# Patient Record
Sex: FEMALE | Race: WHITE | NOT HISPANIC OR LATINO | Employment: OTHER | URBAN - NONMETROPOLITAN AREA
[De-identification: names, ages, dates, MRNs, and addresses within clinical notes are randomized per-mention and may not be internally consistent; named-entity substitution may affect disease eponyms.]

---

## 2017-01-01 ENCOUNTER — HOSPITAL ENCOUNTER (OUTPATIENT)
Dept: PHYSICAL THERAPY | Facility: HOSPITAL | Age: 67
Setting detail: THERAPIES SERIES
Discharge: HOME | End: 2017-01-20
Attending: ORTHOPAEDIC SURGERY | Admitting: ORTHOPAEDIC SURGERY

## 2017-01-20 ENCOUNTER — TRANSCRIBE ORDERS (OUTPATIENT)
Dept: SPEECH THERAPY | Facility: HOSPITAL | Age: 67
End: 2017-01-20

## 2017-01-20 DIAGNOSIS — M25.512 LEFT SHOULDER PAIN, UNSPECIFIED CHRONICITY: Primary | ICD-10-CM

## 2017-01-25 ENCOUNTER — HOSPITAL ENCOUNTER (OUTPATIENT)
Dept: PHYSICAL THERAPY | Facility: HOSPITAL | Age: 67
Setting detail: THERAPIES SERIES
Discharge: HOME | End: 2017-01-25

## 2017-01-25 ENCOUNTER — TRANSCRIBE ORDERS (OUTPATIENT)
Dept: PHYSICAL THERAPY | Facility: HOSPITAL | Age: 67
End: 2017-01-25

## 2017-01-25 DIAGNOSIS — M25.512 PAIN IN JOINT OF LEFT SHOULDER: Primary | ICD-10-CM

## 2017-01-27 ENCOUNTER — APPOINTMENT (OUTPATIENT)
Dept: PHYSICAL THERAPY | Facility: HOSPITAL | Age: 67
End: 2017-01-27

## 2017-01-31 ENCOUNTER — HOSPITAL ENCOUNTER (OUTPATIENT)
Dept: PHYSICAL THERAPY | Facility: HOSPITAL | Age: 67
Setting detail: THERAPIES SERIES
Discharge: HOME | End: 2017-01-31

## 2017-01-31 DIAGNOSIS — M25.512 PAIN IN JOINT OF LEFT SHOULDER: ICD-10-CM

## 2017-01-31 PROCEDURE — 97110 THERAPEUTIC EXERCISES: CPT | Performed by: PHYSICAL THERAPIST

## 2017-01-31 PROCEDURE — 97164 PT RE-EVAL EST PLAN CARE: CPT | Performed by: PHYSICAL THERAPIST

## 2017-01-31 PROCEDURE — G8985 CARRY GOAL STATUS: HCPCS | Performed by: PHYSICAL THERAPIST

## 2017-01-31 PROCEDURE — G8984 CARRY CURRENT STATUS: HCPCS | Performed by: PHYSICAL THERAPIST

## 2017-02-07 ENCOUNTER — HOSPITAL ENCOUNTER (OUTPATIENT)
Dept: PHYSICAL THERAPY | Facility: HOSPITAL | Age: 67
Setting detail: THERAPIES SERIES
Discharge: HOME | End: 2017-02-07

## 2017-02-07 PROCEDURE — 97110 THERAPEUTIC EXERCISES: CPT | Performed by: PHYSICAL THERAPIST

## 2017-02-07 NOTE — PROGRESS NOTES
Outpatient Physical Therapy Ortho Treatment Note  [facility]     Patient Name: Mona Perales  : 1950  MRN: [id number]  Today's Date: 2017      Visit Date: 2017     Pt attended 10/12 scheduled visits.   Pt feels has improved 30% since beginning therapy.  Re-cert date 17    Visit Dx:  No diagnosis found.    There is no problem list on file for this patient.       Past Medical History   Diagnosis Date   • 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 with ulceration      bilateral legs - bilateral iliac stents       • COPD (chronic obstructive pulmonary disease)    • Coronary arteriosclerosis    • Essential (primary) hypertension    • Hyperlipidemia    • Nicotine dependence    • Occlusion of artery       and stenosis of bilateral carotid arteries - BABS 16-49%, LICA 0-15%   • Other atherosclerosis of native artery of other extremity      LEFT subclavian stent  (occluded)        Past Surgical History   Procedure Laterality Date   • Cardiac catheterization  2016     No evidence of any obstructive epicardial CAD.Preserved LV systolic function with EF of 55%.   • Central venous line insertion  2016     Successful placement of right uppe extremity 6-Irish triple lumen PICC line.   • Transesophageal echocardiogram (yanet)  2016     With color flow-Mild to moderate [facility].LV systolic function well preserved with Ef of 55-60%.Mitral and AV intact.Diastolic dysfunction             PT Ortho       17 1300    Subjective Comments    Subjective Comments States feels much better; less pain, increased motion. HEP  goign well.   -LF    Subjective Pain    Able to rate subjective pain? yes  -LF    Pre-Treatment Pain Level 0  -LF    ROM (Range of Motion)    General ROM Detail L shoulder PROM (pulley) flexion 110, abd 110.  AROM flexion/ abd 85.   -LF      User Key  (r) = Recorded By, (t) = Taken By, (c) = Cosigned By    Initials Name Provider Type    ADAIR CHAU  Geoff, PT Physical Therapist                            PT Assessment/Plan       02/07/17 1400 01/31/17 1438       PT Assessment    Functional Limitations  Limitation in home management;Limitations in community activities;Limitations in functional capacity and performance;Performance in leisure activities;Performance in self-care ADL  -LF     Impairments  Impaired muscle length;Impaired muscle power;Posture;Muscle strength;Pain;Impaired muscle endurance  -LF     Assessment Comments Nice progression: increased AROM, PROM, decreased pain. Pt pleased with her progressin the last week.   -LF Pt di dvery well today. States has had increased ain since the fall, 1/18/17, and has had difficulty and pan to move the L arm. Today she was able to perform exercises without pain. States feel sgood about the HEP as perscribed today. Remotivated.  Overall, p continues to have limited AROM L shoulder elevation, pain limited / guarded strength. Pt is nevertheless motivated to improve. She lives alone and must be I in ADLs and home tasks. Pt would benefit from continued therapy to progress functional strength and use of the L shoulder.   -LF     Please refer to paper survey for additional self-reported information  Yes  -LF     Rehab Potential  Good  -LF     Patient/caregiver participated in establishment of treatment plan and goals  Yes  -LF     Patient would benefit from skilled therapy intervention  Yes  -LF     PT Plan    PT Frequency  2x/week  -LF     Predicted Duration of Therapy Intervention (days/wks)  4 weeks  -LF     Planned CPT's?  PT RE-EVAL: 47703;PT THER PROC EA 15 MIN: 30414;PT MANUAL THERAPY EA 15 MIN: 04288;PT THER ACT EA 15 MIN: 59078;PT NEUROMUSC RE-EDUCATION EA 15 MIN: 13409;PT HOT OR COLD PACK TREAT MCARE;PT ELECTRICAL STIM UNATTEND:   -LF     Physical Therapy Interventions (Optional Details)  ROM (Range of Motion);manual therapy techniques;strengthening;stretching;neuromuscular re-education;home exercise  "program  -LF     PT Plan Comments Continue therapy to progress strength in supported position, progress to elevation as indicated. Nice progress.   -LF Continue therapy to regain functional use of L UE; strengthening, stretching, modalities PRN, manual techniques as indicated, progressive HEP.   -LF       User Key  (r) = Recorded By, (t) = Taken By, (c) = Cosigned By    Initials Name Provider Type    LF Ethel Tellez, PT Physical Therapist                    Exercises       02/07/17 1300          Subjective Comments    Subjective Comments States feels much better; less pain, increased motion. HEP  goign well.   -LF      Subjective Pain    Able to rate subjective pain? yes  -LF      Pre-Treatment Pain Level 0  -LF      Exercise 1    Exercise Name 1 standing pulley, flexion>abd  -LF      Cueing 1 Verbal  -LF      Sets 1 2  -LF      Time (Minutes) 1 5  -LF      Treatment Type 1 Ther Ex  -LF      Exercise 2    Exercise Name 2 pendulum  -LF      Reps 2 20  -LF      Exercise 3    Exercise Name 3 seated resisted \"lap rub\",  flexion  -LF      Cueing 3 Verbal;Demo  -LF      Equipment 3 Theraband  -LF      Resistance 3 Yellow  -LF      Reps 3 20  -LF      Exercise 4    Exercise Name 4 seated \"lap rub\", horiz adduction  -LF      Cueing 4 Verbal;Demo  -LF      Equipment 4 Theraband  -LF      Resistance 4 Yellow  -LF      Reps 4 20  -LF      Exercise 5    Exercise Name 5 bicep culs  -LF      Cueing 5 Verbal  -LF      Equipment 5 Theraband  -LF      Resistance 5 Yellow  -LF      Exercise 6    Exercise Name 6 tricep press  -LF      Cueing 6 Verbal  -LF      Equipment 6 Theraband  -LF      Resistance 6 Yellow  -LF      Reps 6 15  -LF      Exercise 7    Exercise Name 7 seated bicep curls/ triceps  -LF      Cueing 7 Verbal  -LF      Equipment 7 Theraband  -LF      Resistance 7 Yellow  -LF      Reps 7 15  -LF        User Key  (r) = Recorded By, (t) = Taken By, (c) = Cosigned By    Initials Name Provider Type    LF Ethel Tellez, PT " Physical Therapist                               PT OP Goals       02/07/17 1343 01/31/17 1400 01/31/17 1346    PT Short Term Goals    STG Date to Achieve 02/14/17  -LF 02/14/17  -LF     STG 1 Decrease pain to less than or equal to 6/10 on the visual analog scale.  -LF Decrease pain to less than or equal to 6/10 on the visual analog scale.  -LF     STG 1 Progress Progressing  -LF Progressing  -LF     STG 2 Increase AROM L shoulder  flexion 40, abduction 30  -LF Increase AROM L shoulder  flexion 40, abduction 30  -LF     STG 2 Progress Met  -LF New  -LF     STG 3 Increase strength in the left shoulder  3/5   -LF Increase strength in the left shoulder  3/5   -LF     STG 3 Progress New  -LF New  -LF     STG 4 I HEP  -LF I HEP  -LF     STG 4 Progress Ongoing  -LF Ongoing  -LF     STG 5 Pt will demonstrate AROM L shoulder to 110 flex, 100 abduction, no cc/o pain  -LF      STG 5 Progress New  -LF      Long Term Goals    LTG Date to Achieve 02/28/17  -LF 02/28/17  -LF     LTG 1 Decreased pain to less than 1/10 on the visual analog scale.  -LF Decreased pain to less than 1/10 on the visual analog scale.  -LF     LTG 1 Progress New  -LF New  -LF     LTG 2 Increased shoulder flexion and abduction to greater than or equal to 70 degrees.  -LF Increased shoulder flexion and abduction to greater than or equal to 70 degrees.  -LF     LTG 2 Progress Met  -LF New  -LF     LTG 3 Strength  in left shoulder will be greater than or equal to 4/5 in all regards.  -LF Strength  in left shoulder will be greater than or equal to 4/5 in all regards.  -LF     LTG 3 Progress New  -LF New  -LF     LTG 4 Patient barron be I in ADLs and household tasks with min c/o pain L shoulder.  -LF Patient barron be I in ADLs and household tasks with min c/o pain L shoulder.  -LF     LTG 4 Progress New  -LF New  -LF     Time Calculation    PT Goal Re-Cert Due Date 02/21/17  -LF  02/21/17  -LF      User Key  (r) = Recorded By, (t) = Taken By, (c) = Cosigned By     Initials Name Provider Type    LF Ethel Tellez, PT Physical Therapist                Therapy Education       02/07/17 1400    Therapy Education    Given HEP  -LF    Program New  -LF    How Provided Written;Demonstration;Verbal  -LF    Provided to Patient  -LF    Level of Understanding Verbalized;Demonstrated  -LF      01/31/17 1454    Therapy Education    Given HEP;Symptoms/condition management;Pain management;Posture/body mechanics  -LF    Program New  -LF    How Provided Verbal;Demonstration;Written  -LF    Provided to Patient  -LF    Level of Understanding Demonstrated;Verbalized  -LF      User Key  (r) = Recorded By, (t) = Taken By, (c) = Cosigned By    Initials Name Provider Type    LF Ethel Tellez, PT Physical Therapist                Time Calculation:   Start Time: 1300  Stop Time: 1343  Time Calculation (min): 43 min  Total Timed Code Minutes- PT: 43 minute(s)    Therapy Charges for Today     Code Description Service Date Service Provider Modifiers Qty    [id number]  PT THER PROC EA 15 MIN 2/7/2017 Ethel Tellez, PT GP 3                    Ethel Tellez, PT  2/7/2017

## 2017-02-09 ENCOUNTER — HOSPITAL ENCOUNTER (OUTPATIENT)
Dept: PHYSICAL THERAPY | Facility: HOSPITAL | Age: 67
Setting detail: THERAPIES SERIES
Discharge: HOME | End: 2017-02-09

## 2017-02-09 DIAGNOSIS — M25.512 PAIN IN JOINT OF LEFT SHOULDER: Primary | ICD-10-CM

## 2017-02-09 PROCEDURE — 97110 THERAPEUTIC EXERCISES: CPT | Performed by: PHYSICAL THERAPIST

## 2017-02-09 NOTE — PROGRESS NOTES
Outpatient Physical Therapy Ortho Treatment Note  [facility]     Patient Name: Mona Perales  : 1950  MRN: [id number]  Today's Date: 2017      Visit Date: 2017   11  Pt attended / scheduled visits.   Pt feels has improved 75% since beginning therapy.  Re-cert date 17  Pt will RTD 3/16/17    Visit Dx:    ICD-10-CM ICD-9-CM   1. Pain in joint of left shoulder M25.512 719.41       There is no problem list on file for this patient.       Past Medical History   Diagnosis Date   • 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 with ulceration      bilateral legs - bilateral iliac stents       • COPD (chronic obstructive pulmonary disease)    • Coronary arteriosclerosis    • Essential (primary) hypertension    • Hyperlipidemia    • Nicotine dependence    • Occlusion of artery       and stenosis of bilateral carotid arteries - BABS 16-49%, LICA 0-15%   • Other atherosclerosis of native artery of other extremity      LEFT subclavian stent  (occluded)        Past Surgical History   Procedure Laterality Date   • Cardiac catheterization  2016     No evidence of any obstructive epicardial CAD.Preserved LV systolic function with EF of 55%.   • Central venous line insertion  2016     Successful placement of right uppe extremity 6-Portuguese triple lumen PICC line.   • Transesophageal echocardiogram (yanet)  2016     With color flow-Mild to moderate [facility].LV systolic function well preserved with Ef of 55-60%.Mitral and AV intact.Diastolic dysfunction             PT Ortho       17 1300    Subjective Comments    Subjective Comments States L shoulder feeling even better, really likes the pulleys at home. States can reach higher. Sleeping better. States HEP gonig well.   -LF    ROM (Range of Motion)    General ROM Detail L shoulde flexion AROM 110, pulley 115; abd pulley 120, active 110. after session, active flexion 125  -LF      17 1300    Subjective  Comments    Subjective Comments States feels much better; less pain, increased motion. HEP  goign well.   -LF    Subjective Pain    Able to rate subjective pain? yes  -LF    Pre-Treatment Pain Level 0  -LF    ROM (Range of Motion)    General ROM Detail L shoulder PROM (pulley) flexion 110, abd 110.  AROM flexion/ abd 85.   -LF      User Key  (r) = Recorded By, (t) = Taken By, (c) = Cosigned By    Initials Name Provider Type    ADAIR Tellez, PT Physical Therapist                            PT Assessment/Plan       02/09/17 1700 02/07/17 1400       PT Assessment    Assessment Comments Continued nice progression, increased ability to lift the L arm with less pain . Pt is motivated and works hard on HEP. 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   -LF Nice progression: increased AROM, PROM, decreased pain. Pt pleased with her progressin the last week.   -LF     PT Plan    PT Plan Comments Continue therpay as planned, progres sstrength, active elevation, progress HEP.   -LF Continue therapy to progress strength in supported position, progress to elevation as indicated. Nice progress.   -LF       User Key  (r) = Recorded By, (t) = Taken By, (c) = Cosigned By    Initials Name Provider Type    ADAIR Tellez, PT Physical Therapist                    Exercises       02/09/17 1300          Subjective Comments    Subjective Comments States L shoulder feeling even better, really likes the pulleys at home. States can reach higher. Sleeping better. States HEP gonig well.   -LF      Subjective Pain    Able to rate subjective pain? yes  -LF      Pre-Treatment Pain Level 0  -LF      Exercise 1    Exercise Name 1 pulley standing flex<>abd  -LF      Time (Minutes) 1 6  -LF      Exercise 2    Exercise Name 2 supine with stick: resisted pressups - with band looped behind back  -LF      Cueing 2 Verbal;Demo  -LF      Equipment 2 Theraband  -LF      Resistance 2 Yellow  -LF      Sets 2 3  -LF      Reps 2 10   -LF      Exercise 3    Exercise Name 3 supine with stick:: resisted hox adduction at 90, with band looped behind back  -LF      Cueing 3 Verbal;Demo  -LF      Equipment 3 Theraband  -LF      Resistance 3 Yellow  -LF      Sets 3 3  -LF      Reps 3 10  -LF      Exercise 4    Exercise Name 4 supine with stick:: resisted flex/ ext at 90 with band looped behind back  -LF      Cueing 4 Verbal;Demo  -LF      Equipment 4 Theraband  -LF      Resistance 4 Yellow  -LF      Sets 4 3  -LF      Reps 4 10  -LF      Exercise 5    Exercise Name 5 hooklying  with stick: resisted bicep / forearm curls with band looped around knees  -LF      Cueing 5 Demo  -LF      Equipment 5 Theraband  -LF      Resistance 5 Yellow  -LF      Sets 5 2  -LF      Reps 5 10  -LF      Exercise 6    Exercise Name 6 standing, band attatched above door, resisted shoulder extensoin: start at  degrees elevation and  down to hip with elbow straight  -LF      Cueing 6 Demo  -LF      Equipment 6 Theraband  -LF      Resistance 6 Yellow  -LF      Sets 6 2  -LF      Reps 6 10  -LF        User Key  (r) = Recorded By, (t) = Taken By, (c) = Cosigned By    Initials Name Provider Type    LF Ethel Tellez, PT Physical Therapist                               PT OP Goals       02/09/17 1700 02/09/17 1300 02/07/17 1343    PT Short Term Goals    STG Date to Achieve 02/14/17  -LF 02/14/17  -LF 02/14/17  -LF    STG 1 Decrease pain to less than or equal to 6/10 on the visual analog scale.  -LF Decrease pain to less than or equal to 6/10 on the visual analog scale.  -LF Decrease pain to less than or equal to 6/10 on the visual analog scale.  -LF    STG 1 Progress Met  -LF Progressing  -LF Progressing  -LF    STG 2 Increase AROM L shoulder  flexion 40, abduction 30  -LF Increase AROM L shoulder  flexion 40, abduction 30  -LF Increase AROM L shoulder  flexion 40, abduction 30  -LF    STG 2 Progress Met  -LF Met  -LF Met  -LF    STG 3 Increase strength in the left  shoulder  3/5   -LF Increase strength in the left shoulder  3/5   -LF Increase strength in the left shoulder  3/5   -LF    STG 3 Progress Met  -LF New  -LF New  -LF    STG 4 I HEP  -LF I HEP  -LF I HEP  -LF    STG 4 Progress Ongoing  -LF Ongoing  -LF Ongoing  -LF    STG 5 Pt will demonstrate AROM L shoulder to 110 flex, 100 abduction, no cc/o pain  -LF Pt will demonstrate AROM L shoulder to 110 flex, 100 abduction, no cc/o pain  -LF Pt will demonstrate AROM L shoulder to 110 flex, 100 abduction, no cc/o pain  -LF    STG 5 Progress New  -LF New  -LF New  -LF    Long Term Goals    LTG Date to Achieve 02/28/17  -LF 02/28/17  -LF 02/28/17  -LF    LTG 1 Decreased pain to less than 1/10 on the visual analog scale.  -LF Decreased pain to less than 1/10 on the visual analog scale.  -LF Decreased pain to less than 1/10 on the visual analog scale.  -LF    LTG 1 Progress Progressing  -LF New  -LF New  -LF    LTG 2 Increased shoulder flexion and abduction to greater than or equal to 70 degrees.  -LF Increased shoulder flexion and abduction to greater than or equal to 70 degrees.  -LF Increased shoulder flexion and abduction to greater than or equal to 70 degrees.  -LF    LTG 2 Progress Met  -LF Met  -LF Met  -LF    LTG 3 Strength  in left shoulder will be greater than or equal to 4/5 in all planes  -LF Strength  in left shoulder will be greater than or equal to 4/5 in all regards.  -LF Strength  in left shoulder will be greater than or equal to 4/5 in all regards.  -LF    LTG 3 Progress New  -LF New  -LF New  -LF    LTG 4 Patient barron be I in ADLs and household tasks with min c/o pain L shoulder.  -LF Patient barron be I in ADLs and household tasks with min c/o pain L shoulder.  -LF Patient barron be I in ADLs and household tasks with min c/o pain L shoulder.  -LF    LTG 4 Progress Progressing  -LF New  -LF New  -LF    Time Calculation    PT Goal Re-Cert Due Date 02/22/17  -LF 02/21/17  -LF 02/21/17  -LF      01/31/17 1400 01/31/17  1346       PT Short Term Goals    STG Date to Achieve 02/14/17  -LF      STG 1 Decrease pain to less than or equal to 6/10 on the visual analog scale.  -LF      STG 1 Progress Progressing  -LF      STG 2 Increase AROM L shoulder  flexion 40, abduction 30  -LF      STG 2 Progress New  -LF      STG 3 Increase strength in the left shoulder  3/5   -LF      STG 3 Progress New  -LF      STG 4 I HEP  -LF      STG 4 Progress Ongoing  -LF      Long Term Goals    LTG Date to Achieve 02/28/17  -LF      LTG 1 Decreased pain to less than 1/10 on the visual analog scale.  -LF      LTG 1 Progress New  -LF      LTG 2 Increased shoulder flexion and abduction to greater than or equal to 70 degrees.  -LF      LTG 2 Progress New  -LF      LTG 3 Strength  in left shoulder will be greater than or equal to 4/5 in all regards.  -LF      LTG 3 Progress New  -LF      LTG 4 Patient barron be I in ADLs and household tasks with min c/o pain L shoulder.  -LF      LTG 4 Progress New  -LF      Time Calculation    PT Goal Re-Cert Due Date  02/21/17  -LF       User Key  (r) = Recorded By, (t) = Taken By, (c) = Cosigned By    Initials Name Provider Type     Ethel Tellez, PT Physical Therapist                Therapy Education       02/09/17 1700    Therapy Education    Given HEP  -LF    Program New  -LF    How Provided Verbal;Demonstration;Written  -LF    Provided to Patient  -LF    Level of Understanding Verbalized;Demonstrated  -LF      02/09/17 1300    Therapy Education    Given HEP  -LF    Program New  -LF    How Provided Verbal;Demonstration;Written  -LF    Provided to Patient  -LF    Level of Understanding Verbalized;Demonstrated  -LF      02/07/17 1400    Therapy Education    Given HEP  -LF    Program New  -LF    How Provided Written;Demonstration;Verbal  -LF    Provided to Patient  -LF    Level of Understanding Verbalized;Demonstrated  -LF      User Key  (r) = Recorded By, (t) = Taken By, (c) = Cosigned By    Initials Name Provider Type    LF  Ethel Tellez, PT Physical Therapist                Time Calculation:   Start Time: 1256  Stop Time: 1339  Time Calculation (min): 43 min  Total Timed Code Minutes- PT: 43 minute(s)    Therapy Charges for Today     Code Description Service Date Service Provider Modifiers Qty    34859419297 HC PT THER PROC EA 15 MIN 2/9/2017 Ethel Tellez, PT GP 3                    Ethel Tellez, PT  2/9/2017

## 2017-02-14 ENCOUNTER — APPOINTMENT (OUTPATIENT)
Dept: PHYSICAL THERAPY | Facility: HOSPITAL | Age: 67
End: 2017-02-14

## 2017-02-16 ENCOUNTER — APPOINTMENT (OUTPATIENT)
Dept: PHYSICAL THERAPY | Facility: HOSPITAL | Age: 67
End: 2017-02-16

## 2017-02-20 ENCOUNTER — APPOINTMENT (OUTPATIENT)
Dept: PHYSICAL THERAPY | Facility: HOSPITAL | Age: 67
End: 2017-02-20

## 2017-03-01 ENCOUNTER — APPOINTMENT (OUTPATIENT)
Dept: PHYSICAL THERAPY | Facility: HOSPITAL | Age: 67
End: 2017-03-01

## 2017-03-07 ENCOUNTER — APPOINTMENT (OUTPATIENT)
Dept: PHYSICAL THERAPY | Facility: HOSPITAL | Age: 67
End: 2017-03-07

## 2017-03-09 ENCOUNTER — HOSPITAL ENCOUNTER (OUTPATIENT)
Dept: PHYSICAL THERAPY | Facility: HOSPITAL | Age: 67
Setting detail: THERAPIES SERIES
Discharge: HOME | End: 2017-03-09

## 2017-03-09 DIAGNOSIS — M25.512 PAIN IN JOINT OF LEFT SHOULDER: Primary | ICD-10-CM

## 2017-03-09 PROCEDURE — 97110 THERAPEUTIC EXERCISES: CPT | Performed by: PHYSICAL THERAPIST

## 2017-03-09 PROCEDURE — G8986 CARRY D/C STATUS: HCPCS | Performed by: PHYSICAL THERAPIST

## 2017-03-09 PROCEDURE — G8985 CARRY GOAL STATUS: HCPCS | Performed by: PHYSICAL THERAPIST

## 2017-03-09 NOTE — THERAPY DISCHARGE NOTE
Outpatient Physical Therapy Ortho Treatment Note/Discharge Summary  [facility]     Patient Name: Mona Perales  : 1950  MRN: [id number]  Today's Date: 3/9/2017      Visit Date: 2017     Pt attended 10/10 scheduled visits.   Pt feels has improved 100% since beginning therapy.  Pt will RTD PRN    Visit Dx:    ICD-10-CM ICD-9-CM   1. Pain in joint of left shoulder M25.512 719.41       There is no problem list on file for this patient.       Past Medical History   Diagnosis Date   • 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 with ulceration      bilateral legs - bilateral iliac stents       • COPD (chronic obstructive pulmonary disease)    • Coronary arteriosclerosis    • Essential (primary) hypertension    • Hyperlipidemia    • Nicotine dependence    • Occlusion of artery       and stenosis of bilateral carotid arteries - BABS 16-49%, LICA 0-15%   • Other atherosclerosis of native artery of other extremity      LEFT subclavian stent  (occluded)        Past Surgical History   Procedure Laterality Date   • Cardiac catheterization  2016     No evidence of any obstructive epicardial CAD.Preserved LV systolic function with EF of 55%.   • Central venous line insertion  2016     Successful placement of right uppe extremity 6-Maltese triple lumen PICC line.   • Transesophageal echocardiogram (yanet)  2016     With color flow-Mild to moderate [facility].LV systolic function well preserved with Ef of 55-60%.Mitral and AV intact.Diastolic dysfunction             PT Ortho       17 1400    Subjective Comments    Subjective Comments States L shoulder is feeling good. States has continued HEP QD. States has less pain, improved motion, improved strength, improved ability to perform ADLs. States is sleeping better. Denies limitations with ADLs, other than lifting heavy objects. States is pleased with her progress.   -LF    Subjective Pain    Able to rate subjective pain? yes   -LF    Pre-Treatment Pain Level 0  -LF    ROM (Range of Motion)    General ROM Detail L shoulder AROM flexion 110, abd 110, Ir still limited as expected due to the surgery, ER - able to reach behind head  -LF      User Key  (r) = Recorded By, (t) = Taken By, (c) = Cosigned By    Initials Name Provider Type    ADAIR Tellez, PT Physical Therapist                            PT Assessment/Plan       03/09/17 1400       PT Assessment    Assessment Comments Nice progression. Pt denies limitaions, is pleased with her progress. Relates is goign to continue HEP. Pt demonstrates AROM L shoulder witout c/o pain. Relates resumption of ADLs. Pt is appropriate for D/C PT to HEP .   -LF     Please refer to paper survey for additional self-reported information Yes  -LF     Rehab Potential Excellent  -LF     Patient/caregiver participated in establishment of treatment plan and goals Yes  -LF     Patient would benefit from skilled therapy intervention Yes  -LF     PT Plan    PT Plan Comments Will D/ c PT to HEP .   -LF       User Key  (r) = Recorded By, (t) = Taken By, (c) = Cosigned By    Initials Name Provider Type    ADAIR Tellez, PT Physical Therapist                    Exercises       03/09/17 1400          Subjective Comments    Subjective Comments States L shoulder is feeling good. States has continued HEP QD. States has less pain, improved motion, improved strength, improved ability to perform ADLs. States is sleeping better. Denies limitations with ADLs, other than lifting heavy objects. States is pleased with her progress.   -LF      Subjective Pain    Able to rate subjective pain? yes  -LF      Pre-Treatment Pain Level 0  -LF      Exercise 1    Exercise Name 1 Reviewed HEP , given red adn greeen bands to progress HEP   -LF      Time (Minutes) 1 7  -LF        User Key  (r) = Recorded By, (t) = Taken By, (c) = Cosigned By    Initials Name Provider Type    ADAIR Tellez PT Physical Therapist                                PT OP Goals       03/09/17 1424       PT Short Term Goals    STG Date to Achieve 02/14/17  -LF     STG 1 Decrease pain to less than or equal to 6/10 on the visual analog scale.  -LF     STG 1 Progress Met  -LF     STG 2 Increase AROM L shoulder  flexion 40, abduction 30  -LF     STG 2 Progress Met  -LF     STG 3 Increase strength in the left shoulder  3/5   -LF     STG 3 Progress Met  -LF     STG 4 I HEP  -LF     STG 4 Progress Ongoing  -LF     STG 5 Pt will demonstrate AROM L shoulder to 110 flex, 100 abduction, no cc/o pain  -LF     STG 5 Progress Met  -LF     Long Term Goals    LTG Date to Achieve 02/28/17  -LF     LTG 1 Decreased pain to less than 1/10 on the visual analog scale.  -LF     LTG 1 Progress Met  -LF     LTG 2 Increased shoulder flexion and abduction to greater than or equal to 70 degrees.  -LF     LTG 2 Progress Met  -LF     LTG 3 Strength  in left shoulder will be greater than or equal to 4/5 in all planes  -LF     LTG 3 Progress Progressing  -LF     LTG 4 Patient barron be I in ADLs and household tasks with min c/o pain L shoulder.  -LF     LTG 4 Progress Met  -LF     Time Calculation    PT Goal Re-Cert Due Date 03/30/17  -LF       User Key  (r) = Recorded By, (t) = Taken By, (c) = Cosigned By    Initials Name Provider Type    ADAIR Tellez, PT Physical Therapist                Therapy Education       03/09/17 1400          Therapy Education    Given HEP  -LF      Program Reinforced  -LF      How Provided Verbal;Demonstration  -LF      Provided to Patient  -LF      Level of Understanding Verbalized  -LF        User Key  (r) = Recorded By, (t) = Taken By, (c) = Cosigned By    Initials Name Provider Type    ADAIR Tellez, PT Physical Therapist                Outcome Measures       03/09/17 1400          Quick DASH    Open a tight or new jar. 2  -LF      Do heavy household chores (e.g., wash walls, wash floors) 3  -LF      Carry a shopping bag or briefcase 2  -LF      Wash your back  4  -LF      Use a knife to cut food 1  -LF      Recreational activities in which you take some force or impact through your arm, should or hand (e.g. golf, hammering, tennis, etc.) 2  -LF      During the past week, to what extent has your arm, shoulder, or hand problem interfered with your normal social activites with family, friends, neighbors or groups? 1  -LF      During the past week, were you limited in your work or other regular daily activities as a result of your arm, shoulder or hand problem? 1  -LF      Arm, Shoulder, or hand pain 1  -LF      Tingling (pins and needles) in your arm, shoulder, or hand 1  -LF      During the past week, how much difficulty have you had sleeping because of the pain in your arm, shoulder or hand? 1  -LF      Number of Questions Answered 11  -LF      Quick DASH Score 18.18  -LF      Functional Assessment    Outcome Measure Options Quick DASH  -LF        User Key  (r) = Recorded By, (t) = Taken By, (c) = Cosigned By    Initials Name Provider Type     Ethel Tellez, PT Physical Therapist            Time Calculation:   Start Time: 1346  Stop Time: 1416  Time Calculation (min): 30 min  Total Timed Code Minutes- PT: 30 minute(s)    Therapy Charges for Today     Code Description Service Date Service Provider Modifiers Qty    75121194935 HC PT CARRY MOV HAND OBJ PROJECTED 3/9/2017 Ethel Tellez, PT GP, CK 1    33665947666 HC PT CARRY MOV HAND OBJ DISCHARGE 3/9/2017 Ethel Tellez, PT GP, CI 1    75376626785 HC PT THER PROC EA 15 MIN 3/9/2017 Ethel Tellez, PT GP 2    28188070420 HC PT THER SUPP EA 15 MIN 3/9/2017 Ethel Tellez, PT GP 1          PT G-Codes  PT Professional Judgement Used?: Yes  Outcome Measure Options: Quick DASH  Score: 18.18  Functional Limitation: Carrying, moving and handling objects  Carrying, Moving and Handling Objects Goal Status (): At least 40 percent but less than 60 percent impaired, limited or restricted  Carrying, Moving and Handling Objects  Discharge Status (): At least 1 percent but less than 20 percent impaired, limited or restricted     OP PT Discharge Summary  Date of Discharge: 03/09/17  Reason for Discharge: All goals achieved  Outcomes Achieved: Able to achieve all goals within established timeline  Discharge Destination: Home with home program      Ethel Tellez, PT  3/9/2017

## 2017-07-06 ENCOUNTER — OFFICE VISIT (OUTPATIENT)
Dept: CARDIOLOGY | Facility: CLINIC | Age: 67
End: 2017-07-06

## 2017-07-06 VITALS
SYSTOLIC BLOOD PRESSURE: 120 MMHG | HEART RATE: 70 BPM | HEIGHT: 62 IN | DIASTOLIC BLOOD PRESSURE: 70 MMHG | WEIGHT: 120 LBS | BODY MASS INDEX: 22.08 KG/M2

## 2017-07-06 DIAGNOSIS — I26.99 OTHER PULMONARY EMBOLISM WITHOUT ACUTE COR PULMONALE (HCC): ICD-10-CM

## 2017-07-06 DIAGNOSIS — Z72.0 NICOTINE ABUSE: ICD-10-CM

## 2017-07-06 DIAGNOSIS — I73.9 PERIPHERAL ARTERIAL DISEASE (HCC): Primary | ICD-10-CM

## 2017-07-06 DIAGNOSIS — R42 DIZZINESS: ICD-10-CM

## 2017-07-06 DIAGNOSIS — J42 CHRONIC BRONCHITIS, UNSPECIFIED CHRONIC BRONCHITIS TYPE (HCC): ICD-10-CM

## 2017-07-06 PROCEDURE — 99214 OFFICE O/P EST MOD 30 MIN: CPT | Performed by: INTERNAL MEDICINE

## 2017-07-06 PROCEDURE — 93000 ELECTROCARDIOGRAM COMPLETE: CPT | Performed by: INTERNAL MEDICINE

## 2017-07-06 RX ORDER — HYDROXYZINE PAMOATE 50 MG/1
50 CAPSULE ORAL 3 TIMES DAILY PRN
COMMUNITY
End: 2017-08-08

## 2017-07-06 RX ORDER — PRAVASTATIN SODIUM 20 MG
20 TABLET ORAL DAILY
COMMUNITY
End: 2019-05-20

## 2017-07-06 RX ORDER — GABAPENTIN 800 MG/1
800 TABLET ORAL 3 TIMES DAILY
COMMUNITY
End: 2022-11-17 | Stop reason: HOSPADM

## 2017-07-06 RX ORDER — ALBUTEROL SULFATE 2.5 MG/3ML
2.5 SOLUTION RESPIRATORY (INHALATION) EVERY 8 HOURS PRN
Status: ON HOLD | COMMUNITY
End: 2020-09-09

## 2017-07-06 RX ORDER — ONDANSETRON 4 MG/1
4 TABLET, FILM COATED ORAL EVERY 8 HOURS PRN
COMMUNITY
End: 2019-02-26 | Stop reason: HOSPADM

## 2017-07-06 RX ORDER — PROPRANOLOL HYDROCHLORIDE 20 MG/1
20 TABLET ORAL 2 TIMES DAILY
COMMUNITY
End: 2017-07-06

## 2017-07-06 RX ORDER — SERTRALINE HYDROCHLORIDE 100 MG/1
100 TABLET, FILM COATED ORAL DAILY
COMMUNITY

## 2017-07-06 RX ORDER — AMLODIPINE BESYLATE 10 MG/1
10 TABLET ORAL DAILY
COMMUNITY
End: 2020-03-04 | Stop reason: HOSPADM

## 2017-07-06 RX ORDER — MECLIZINE HYDROCHLORIDE 25 MG/1
25 TABLET ORAL 3 TIMES DAILY PRN
COMMUNITY
End: 2018-02-28

## 2017-07-06 RX ORDER — PENCICLOVIR 10 MG/G
1 CREAM TOPICAL AS NEEDED
COMMUNITY

## 2017-07-06 RX ORDER — TRAZODONE HYDROCHLORIDE 300 MG/1
300 TABLET ORAL NIGHTLY
COMMUNITY
End: 2019-02-26 | Stop reason: HOSPADM

## 2017-07-06 NOTE — PROGRESS NOTES
[facility]  OFFICE NOTE    Mona Perales  66 y.o. female    07/06/2017  1. Peripheral arterial disease    2. Dizziness    3. Nicotine abuse    4. Other pulmonary embolism without acute cor pulmonale    5. Chronic bronchitis, unspecified chronic bronchitis type        Chief complaint -Dizziness      History of present Illness- 66 a lady who came for establishment who used to see Dr. Oneal, and had cardiac catheterization in April 2016 which showed no CAD.  There is a history of PE in the past and she has been on eliquis 2.5 mg twice a day.  And she takes Plavix for her peripheral vascular disease with prior stent placement in 2008 by Dr. Negro and before that by Dr. Hughes.  She still smokes and she says she is dizzy she is on multiple medications including gabapentin, Vistaril, and he worked, Zoloft and trazodone.  She takes amlodipine for blood pressure.  She has multiple somatic complaints including chronic back pain              Allergies   Allergen Reactions   • Morphine And Related Itching, Confusion and Hallucinations         Past Medical History:   Diagnosis Date   • Anxiety    • 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 with ulceration     bilateral legs - bilateral iliac stents 2008      • Chronic lower back pain    • COPD (chronic obstructive pulmonary disease)    • Coronary arteriosclerosis    • Essential (primary) hypertension    • History of pulmonary embolus (PE)    • Hyperlipidemia    • Insomnia    • Lumbago    • Nicotine dependence    • Occlusion of artery      and stenosis of bilateral carotid arteries - BABS 16-49%, LICA 0-15%   • Other atherosclerosis of native artery of other extremity     LEFT subclavian stent 2005 (occluded)         Past Surgical History:   Procedure Laterality Date   • CARDIAC CATHETERIZATION  04/06/2016    No evidence of any obstructive epicardial CAD.Preserved LV systolic function with EF of 55%.   • CENTRAL VENOUS LINE  INSERTION  04/07/2016    Successful placement of right uppe extremity 6-French triple lumen PICC line.   • TOTAL SHOULDER REPLACEMENT Left    • TRANSESOPHAGEAL ECHOCARDIOGRAM (JACQUES)  04/07/2016    With color flow-Mild to moderate [facility].LV systolic function well preserved with Ef of 55-60%.Mitral and AV intact.Diastolic dysfunction         Family History   Problem Relation Age of Onset   • Heart disease Other    • Hypertension Other          Social History     Social History   • Marital status:      Spouse name: N/A   • Number of children: N/A   • Years of education: N/A     Occupational History   • Not on file.     Social History Main Topics   • Smoking status: Current Every Day Smoker   • Smokeless tobacco: Not on file   • Alcohol use Not on file   • Drug use: Not on file   • Sexual activity: Not on file     Other Topics Concern   • Not on file     Social History Narrative         Current Outpatient Prescriptions   Medication Sig Dispense Refill   • albuterol (PROVENTIL HFA;VENTOLIN HFA) 108 (90 BASE) MCG/ACT inhaler Inhale 2 puffs every night.     • albuterol (PROVENTIL) (2.5 MG/3ML) 0.083% nebulizer solution Take 2.5 mg by nebulization Every 8 (Eight) Hours As Needed for Wheezing.     • amLODIPine (NORVASC) 10 MG tablet Take 10 mg by mouth Daily.     • apixaban (ELIQUIS) 2.5 MG tablet tablet Take 2.5 mg by mouth 2 (Two) Times a Day.     • Fluticasone Furoate-Vilanterol (BREO ELLIPTA) 100-25 MCG/INH aerosol powder  Inhale 1 puff Daily.     • gabapentin (NEURONTIN) 800 MG tablet Take 800 mg by mouth 3 (Three) Times a Day.     • hydrOXYzine (VISTARIL) 50 MG capsule Take 50 mg by mouth 3 (Three) Times a Day As Needed for Itching.     • meclizine (ANTIVERT) 25 MG tablet Take 25 mg by mouth 3 (Three) Times a Day As Needed for dizziness.     • ondansetron (ZOFRAN) 4 MG tablet Take 4 mg by mouth Every 8 (Eight) Hours As Needed for Nausea or Vomiting.     • penciclovir (DENAVIR) 1 % cream Apply 1 application  "topically Every 2 (Two) Hours.     • pravastatin (PRAVACHOL) 20 MG tablet Take 20 mg by mouth Daily.     • sertraline (ZOLOFT) 100 MG tablet Take 100 mg by mouth Daily.     • traZODone (DESYREL) 300 MG tablet Take 300 mg by mouth Every Night.       No current facility-administered medications for this visit.          Review of Systems     Constitution: Denies any fatigue, fever or chills    HENT: Denies any headache, hearing impairment,     Eyes: Denies any blurring of vision, or photophobia     Cardivascular - As per history of present illness     Respiratory system- COPD with shortness of breath,       Endocrine:   history of hyperlipidemia                      Hypothyrodism       Musculoskeletal:   history of arthritis with musculoskeletal problems    Gastrointestinal: No nausea, vomiting, or melena    Genitourinary: No dysuria or hematuria    Neurological:   No history of seizure disorder, stroke, memory problems    Psychiatric/Behavioral:       history of depression,      Hematological- no history of easy bruising or any bleeding diathesis            OBJECTIVE    /70  Pulse 70  Ht 62\" (157.5 cm)  Wt 120 lb (54.4 kg)  BMI 21.95 kg/m2      Physical Exam     Constitutional: is oriented to person, place, and time.     Skin-warm and dry    Head: Normocephalic and atraumatic.     Eyes: Pupils are equal, round, and reactive to light.     Neck: Neck supple. No bruit in the carotids, no elevation of JVD    Cardiovascular: [place] in the fifth intercostal space   Regular rate, and  Rhythm,    S1 greater than S2, no S3 or S4, no gallop     Pulmonary/Chest:   Air  Entry is equal on both sides and decreased all areas  No wheezing or crackles,      Abdominal: Soft.  No hepatosplenomegaly, bowel sounds are present    Musculoskeletal: No kyphoscoliosis, no significant thickening of the joints    Neurological: is alert and oriented to person, place, and time.    cranial nerve are intact .   No motor or sensory " deficit    Extremities-trace to 1+ bilateral edema, no radial femoral delay      Psychiatric: He has a normal mood and affect.                  His behavior is normal.             ECG 12 Lead  Date/Time: 7/6/2017 12:37 PM  Performed by: HYUN CASTRO  Authorized by: HYUN CASTRO   Comparison: not compared with previous ECG   Rhythm: sinus rhythm  Clinical impression: abnormal ECG and dysrhythmia - non-specific                           A/P    Complaint of chronic dizziness possible vertigo, has memory issues from possibly small vessel disease may need evaluation for dementia.  She had a cardiac catheterization in April 2016 which showed no obstructive disease and she had previous stent placement to the left subclavian and in the iliac in the past was on Plavix but with the risk of bleeding and she falls frequently will stop the Plavix.  She has been on eliquis for history of DVT in the past.  She still smokes.    COPD with chronic bronchitis on multiple inhalers and has frequent PACs which could be causing some of her palpitations.    Depression and anxiety on Zoloft, trazodone and hydroxyzine which can also increase her dizziness which needed to be addressed by the primary doctor.    We'll check a carotid duplex as she hasn't had done one and recently and check an echo and follow-up in 4 weeks            This document has been electronically signed by Hyun Castro MD on July 6, 2017 12:29 PM       EMR Dragon/Transcription disclaimer:   Some of this note may be an electronic transcription/translation of spoken language to printed text. The electronic translation of spoken language may permit erroneous, or at times, nonsensical words or phrases to be inadvertently transcribed; Although I have reviewed the note for such errors, some may still exist.

## 2017-07-25 LAB
BH CV ECHO MEAS - ACS: 2 CM
BH CV ECHO MEAS - AO MAX PG (FULL): 1.1 MMHG
BH CV ECHO MEAS - AO MAX PG: 4.2 MMHG
BH CV ECHO MEAS - AO MEAN PG (FULL): 1 MMHG
BH CV ECHO MEAS - AO MEAN PG: 2 MMHG
BH CV ECHO MEAS - AO ROOT AREA (BSA CORRECTED): 1.8
BH CV ECHO MEAS - AO ROOT AREA: 5.7 CM^2
BH CV ECHO MEAS - AO ROOT DIAM: 2.7 CM
BH CV ECHO MEAS - AO V2 MAX: 103 CM/SEC
BH CV ECHO MEAS - AO V2 MEAN: 72.3 CM/SEC
BH CV ECHO MEAS - AO V2 VTI: 27.4 CM
BH CV ECHO MEAS - BSA(HAYCOCK): 1.5 M^2
BH CV ECHO MEAS - BSA: 1.5 M^2
BH CV ECHO MEAS - BZI_BMI: 21.9 KILOGRAMS/M^2
BH CV ECHO MEAS - BZI_METRIC_HEIGHT: 157.5 CM
BH CV ECHO MEAS - BZI_METRIC_WEIGHT: 54.4 KG
BH CV ECHO MEAS - EDV(CUBED): 85.2 ML
BH CV ECHO MEAS - EDV(TEICH): 87.7 ML
BH CV ECHO MEAS - EF(CUBED): 57.8 %
BH CV ECHO MEAS - EF(MOD-SP4): 60 %
BH CV ECHO MEAS - EF(TEICH): 49.7 %
BH CV ECHO MEAS - EPSS: 0.2 CM
BH CV ECHO MEAS - ESV(CUBED): 35.9 ML
BH CV ECHO MEAS - ESV(TEICH): 44.1 ML
BH CV ECHO MEAS - FS: 25 %
BH CV ECHO MEAS - IVS/LVPW: 1
BH CV ECHO MEAS - IVSD: 1.2 CM
BH CV ECHO MEAS - LA DIMENSION: 3.7 CM
BH CV ECHO MEAS - LA/AO: 1.4
BH CV ECHO MEAS - LV MASS(C)D: 180 GRAMS
BH CV ECHO MEAS - LV MASS(C)DI: 117 GRAMS/M^2
BH CV ECHO MEAS - LV MAX PG: 3.1 MMHG
BH CV ECHO MEAS - LV MEAN PG: 1 MMHG
BH CV ECHO MEAS - LV V1 MAX: 88.7 CM/SEC
BH CV ECHO MEAS - LV V1 MEAN: 52.3 CM/SEC
BH CV ECHO MEAS - LV V1 VTI: 23.5 CM
BH CV ECHO MEAS - LVIDD: 4.4 CM
BH CV ECHO MEAS - LVIDS: 3.3 CM
BH CV ECHO MEAS - LVPWD: 1.2 CM
BH CV ECHO MEAS - MR MAX PG: 49 MMHG
BH CV ECHO MEAS - MR MAX VEL: 350 CM/SEC
BH CV ECHO MEAS - MV A MAX VEL: 77.5 CM/SEC
BH CV ECHO MEAS - MV E MAX VEL: 64.2 CM/SEC
BH CV ECHO MEAS - MV E/A: 0.83
BH CV ECHO MEAS - PA MAX PG: 2.5 MMHG
BH CV ECHO MEAS - PA MEAN PG: 2 MMHG
BH CV ECHO MEAS - PA V2 MAX: 78.6 CM/SEC
BH CV ECHO MEAS - PA V2 MEAN: 58.9 CM/SEC
BH CV ECHO MEAS - PA V2 VTI: 21.3 CM
BH CV ECHO MEAS - PI END-D VEL: 132 CM/SEC
BH CV ECHO MEAS - RAP SYSTOLE: 10 MMHG
BH CV ECHO MEAS - RVDD: 2.6 CM
BH CV ECHO MEAS - RVSP: 25.1 MMHG
BH CV ECHO MEAS - SI(AO): 102 ML/M^2
BH CV ECHO MEAS - SI(CUBED): 32 ML/M^2
BH CV ECHO MEAS - SI(TEICH): 28.3 ML/M^2
BH CV ECHO MEAS - SV(AO): 156.9 ML
BH CV ECHO MEAS - SV(CUBED): 49.2 ML
BH CV ECHO MEAS - SV(TEICH): 43.6 ML
BH CV ECHO MEAS - TR MAX VEL: 193 CM/SEC
LV EF 2D ECHO EST: 55 %

## 2017-07-27 LAB
BH CV XLRA MEAS LEFT BULB EDV: -22.6 CM/SEC
BH CV XLRA MEAS LEFT BULB PSV: -58.4 CM/SEC
BH CV XLRA MEAS LEFT CCA RATIO VEL: 59.9 CM/SEC
BH CV XLRA MEAS LEFT DIST CCA EDV: -22.1 CM/SEC
BH CV XLRA MEAS LEFT DIST CCA PSV: -58.4 CM/SEC
BH CV XLRA MEAS LEFT DIST ICA EDV: -27.7 CM/SEC
BH CV XLRA MEAS LEFT DIST ICA PSV: -59.7 CM/SEC
BH CV XLRA MEAS LEFT ICA RATIO VEL: -72.6 CM/SEC
BH CV XLRA MEAS LEFT ICA/CCA RATIO: -1.2
BH CV XLRA MEAS LEFT MID CCA EDV: 22.1 CM/SEC
BH CV XLRA MEAS LEFT MID CCA PSV: 59.9 CM/SEC
BH CV XLRA MEAS LEFT MID ICA EDV: -32.7 CM/SEC
BH CV XLRA MEAS LEFT MID ICA PSV: -72.6 CM/SEC
BH CV XLRA MEAS LEFT PROX CCA EDV: 16.2 CM/SEC
BH CV XLRA MEAS LEFT PROX CCA PSV: 50.6 CM/SEC
BH CV XLRA MEAS LEFT PROX ECA EDV: -15.2 CM/SEC
BH CV XLRA MEAS LEFT PROX ECA PSV: -60.4 CM/SEC
BH CV XLRA MEAS LEFT PROX ICA EDV: -11.3 CM/SEC
BH CV XLRA MEAS LEFT PROX ICA PSV: -44.2 CM/SEC
BH CV XLRA MEAS LEFT PROX SCLA PSV: 56.6 CM/SEC
BH CV XLRA MEAS LEFT VERTEBRAL A EDV: 25.8 CM/SEC
BH CV XLRA MEAS LEFT VERTEBRAL A PSV: 48.2 CM/SEC
BH CV XLRA MEAS RIGHT BULB EDV: -13.9 CM/SEC
BH CV XLRA MEAS RIGHT BULB PSV: -30.5 CM/SEC
BH CV XLRA MEAS RIGHT CCA RATIO VEL: 84.9 CM/SEC
BH CV XLRA MEAS RIGHT DIST CCA EDV: 19.4 CM/SEC
BH CV XLRA MEAS RIGHT DIST CCA PSV: 44.1 CM/SEC
BH CV XLRA MEAS RIGHT DIST ICA EDV: -24.3 CM/SEC
BH CV XLRA MEAS RIGHT DIST ICA PSV: -58 CM/SEC
BH CV XLRA MEAS RIGHT ICA RATIO VEL: -60.6 CM/SEC
BH CV XLRA MEAS RIGHT ICA/CCA RATIO: -0.71
BH CV XLRA MEAS RIGHT MID CCA EDV: 16.9 CM/SEC
BH CV XLRA MEAS RIGHT MID CCA PSV: 47.1 CM/SEC
BH CV XLRA MEAS RIGHT MID ICA EDV: -24 CM/SEC
BH CV XLRA MEAS RIGHT MID ICA PSV: -60.6 CM/SEC
BH CV XLRA MEAS RIGHT PROX CCA EDV: 11.8 CM/SEC
BH CV XLRA MEAS RIGHT PROX CCA PSV: 84.9 CM/SEC
BH CV XLRA MEAS RIGHT PROX ECA EDV: -8.9 CM/SEC
BH CV XLRA MEAS RIGHT PROX ECA PSV: -46 CM/SEC
BH CV XLRA MEAS RIGHT PROX ICA EDV: -22 CM/SEC
BH CV XLRA MEAS RIGHT PROX ICA PSV: -44 CM/SEC
BH CV XLRA MEAS RIGHT PROX SCLA PSV: -68.3 CM/SEC
BH CV XLRA MEAS RIGHT VERTEBRAL A EDV: 10.8 CM/SEC
BH CV XLRA MEAS RIGHT VERTEBRAL A PSV: 37.8 CM/SEC

## 2017-08-08 ENCOUNTER — OFFICE VISIT (OUTPATIENT)
Dept: CARDIOLOGY | Facility: CLINIC | Age: 67
End: 2017-08-08

## 2017-08-08 VITALS
HEIGHT: 62 IN | DIASTOLIC BLOOD PRESSURE: 70 MMHG | BODY MASS INDEX: 22.26 KG/M2 | WEIGHT: 121 LBS | SYSTOLIC BLOOD PRESSURE: 118 MMHG | HEART RATE: 69 BPM

## 2017-08-08 DIAGNOSIS — I25.119 ATHEROSCLEROSIS OF NATIVE CORONARY ARTERY OF NATIVE HEART WITH ANGINA PECTORIS (HCC): Primary | ICD-10-CM

## 2017-08-08 DIAGNOSIS — I73.9 PERIPHERAL ARTERIAL DISEASE (HCC): ICD-10-CM

## 2017-08-08 DIAGNOSIS — E78.00 PURE HYPERCHOLESTEROLEMIA: ICD-10-CM

## 2017-08-08 DIAGNOSIS — I10 BENIGN ESSENTIAL HYPERTENSION: ICD-10-CM

## 2017-08-08 DIAGNOSIS — Z72.0 NICOTINE ABUSE: ICD-10-CM

## 2017-08-08 DIAGNOSIS — J42 CHRONIC BRONCHITIS, UNSPECIFIED CHRONIC BRONCHITIS TYPE (HCC): ICD-10-CM

## 2017-08-08 PROCEDURE — 99213 OFFICE O/P EST LOW 20 MIN: CPT | Performed by: INTERNAL MEDICINE

## 2017-08-08 NOTE — PROGRESS NOTES
[facility]  OFFICE NOTE    Mona Perales  66 y.o. female      1. Atherosclerosis of native coronary artery of native heart with angina pectoris    2. Benign essential hypertension    3. Peripheral arterial disease    4. Chronic bronchitis, unspecified chronic bronchitis type    5. Pure hypercholesterolemia    6. Nicotine abuse        Chief complaint -Dizziness      History of present Illness- 66 yr lady who had cardiac catheterization in April 2016 which showed no CAD.  There is a history of PE in May 2016 and she has been on eliquis 2.5 mg twice a day.  And she takes Plavix for her peripheral vascular disease with prior stent placement in 2008 by Dr. Negro and before that by Dr. Hughes.  She still smokes and she says she is dizzy she is on multiple medications including gabapentin, Vistaril, and he worked, Zoloft and trazodone.  She takes amlodipine for blood pressure.  She has multiple somatic complaints including chronic back pain.  She had a carotid duplex which was unremarkable and had an echo which showed normal LV systolic function.  I stopped the Plavix and increased the dose of her eliquis according to the creatinine clearance  To 5 mg twice a day as she needs it for history of pulmonary embolism from her family doctor.  She won't quit smoking she still smoking some.              Allergies   Allergen Reactions   • Morphine And Related Itching, Confusion and Hallucinations         Past Medical History:   Diagnosis Date   • Anxiety    • 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 with ulceration     bilateral legs - bilateral iliac stents 2008      • Chronic lower back pain    • COPD (chronic obstructive pulmonary disease)    • Coronary arteriosclerosis    • Essential (primary) hypertension    • History of pulmonary embolus (PE)    • Hyperlipidemia    • Insomnia    • Lumbago    • Nicotine dependence    • Occlusion of artery      and stenosis of bilateral carotid  arteries - BABS 16-49%, LICA 0-15%   • Other atherosclerosis of native artery of other extremity     LEFT subclavian stent 2005 (occluded)         Past Surgical History:   Procedure Laterality Date   • CARDIAC CATHETERIZATION  04/06/2016    No evidence of any obstructive epicardial CAD.Preserved LV systolic function with EF of 55%.   • CENTRAL VENOUS LINE INSERTION  04/07/2016    Successful placement of right uppe extremity 6-French triple lumen PICC line.   • TOTAL SHOULDER REPLACEMENT Left    • TRANSESOPHAGEAL ECHOCARDIOGRAM (JACQUES)  04/07/2016    With color flow-Mild to moderate [facility].LV systolic function well preserved with Ef of 55-60%.Mitral and AV intact.Diastolic dysfunction         Family History   Problem Relation Age of Onset   • Heart disease Other    • Hypertension Other          Social History     Social History   • Marital status:      Spouse name: N/A   • Number of children: N/A   • Years of education: N/A     Occupational History   • Not on file.     Social History Main Topics   • Smoking status: Current Every Day Smoker   • Smokeless tobacco: Never Used   • Alcohol use No   • Drug use: No   • Sexual activity: Not on file     Other Topics Concern   • Not on file     Social History Narrative         Current Outpatient Prescriptions   Medication Sig Dispense Refill   • albuterol (PROVENTIL HFA;VENTOLIN HFA) 108 (90 BASE) MCG/ACT inhaler Inhale 2 puffs every night.     • albuterol (PROVENTIL) (2.5 MG/3ML) 0.083% nebulizer solution Take 2.5 mg by nebulization Every 8 (Eight) Hours As Needed for Wheezing.     • amLODIPine (NORVASC) 10 MG tablet Take 10 mg by mouth Daily.     • Fluticasone Furoate-Vilanterol (BREO ELLIPTA) 100-25 MCG/INH aerosol powder  Inhale 1 puff Daily.     • gabapentin (NEURONTIN) 800 MG tablet Take 800 mg by mouth 3 (Three) Times a Day.     • meclizine (ANTIVERT) 25 MG tablet Take 25 mg by mouth 3 (Three) Times a Day As Needed for dizziness.     • ondansetron (ZOFRAN) 4 MG  "tablet Take 4 mg by mouth Every 8 (Eight) Hours As Needed for Nausea or Vomiting.     • penciclovir (DENAVIR) 1 % cream Apply 1 application topically Every 2 (Two) Hours.     • pravastatin (PRAVACHOL) 20 MG tablet Take 20 mg by mouth Daily.     • sertraline (ZOLOFT) 100 MG tablet Take 100 mg by mouth Daily.     • traZODone (DESYREL) 300 MG tablet Take 300 mg by mouth Every Night.     • apixaban (ELIQUIS) 5 MG tablet tablet Take 1 tablet by mouth 2 (Two) Times a Day. 180 tablet 5     No current facility-administered medications for this visit.          Review of Systems     Constitution: Denies any fatigue, fever or chills    HENT: Denies any headache, hearing impairment,     Eyes: Denies any blurring of vision, or photophobia     Cardivascular - As per history of present illness     Respiratory system- COPD with shortness of breath,       Endocrine:   history of hyperlipidemia                      Hypothyrodism       Musculoskeletal:   history of arthritis with musculoskeletal problems    Gastrointestinal: No nausea, vomiting, or melena    Genitourinary: No dysuria or hematuria    Neurological:   No history of seizure disorder, stroke, memory problems    Psychiatric/Behavioral:       history of depression,      Hematological- no history of easy bruising or any bleeding diathesis            OBJECTIVE    /70  Pulse 69  Ht 62\" (157.5 cm)  Wt 121 lb (54.9 kg)  BMI 22.13 kg/m2      Physical Exam     Constitutional: is oriented to person, place, and time.     Skin-warm and dry    Head: Normocephalic and atraumatic.     Eyes: Pupils are equal, round, and reactive to light.     Neck: Neck supple. No bruit in the carotids, no elevation of JVD    Cardiovascular: [place] in the fifth intercostal space   Regular rate, and  Rhythm,    S1 greater than S2, no S3 or S4, no gallop     Pulmonary/Chest:   Air  Entry is equal on both sides and decreased all areas  No wheezing or crackles,      Abdominal: Soft.  No " hepatosplenomegaly, bowel sounds are present    Musculoskeletal: No kyphoscoliosis, no significant thickening of the joints    Neurological: is alert and oriented to person, place, and time.    cranial nerve are intact .   No motor or sensory deficit    Extremities-trace to 1+ bilateral edema, no radial femoral delay      Psychiatric: He has a normal mood and affect.                  His behavior is normal.           Procedures                   A/P    Chronic dizziness-carotid duplex was unremarkable, echo was unremarkable, she has history of subclavian stent in the past, her dizziness is possibly due to micro-vascular disease in the brain and some memory problems.  I asked her to quit smoking to help with that.    COPD with chronic bronchitis on multiple inhalers and has frequent PACs which could be causing some of her palpitations.    Depression and anxiety on Zoloft, trazodone and hydroxyzine which can also increase her dizziness which needed to be addressed by the primary doctor.    Follow-up in 8 months            This document has been electronically signed by Kris Peres MD on August 8, 2017 2:47 PM       EMR Dragon/Transcription disclaimer:   Some of this note may be an electronic transcription/translation of spoken language to printed text. The electronic translation of spoken language may permit erroneous, or at times, nonsensical words or phrases to be inadvertently transcribed; Although I have reviewed the note for such errors, some may still exist.

## 2018-01-09 ENCOUNTER — APPOINTMENT (OUTPATIENT)
Dept: INTERVENTIONAL RADIOLOGY/VASCULAR | Facility: HOSPITAL | Age: 68
End: 2018-01-09

## 2018-01-09 ENCOUNTER — APPOINTMENT (OUTPATIENT)
Dept: GENERAL RADIOLOGY | Facility: HOSPITAL | Age: 68
End: 2018-01-09

## 2018-01-09 ENCOUNTER — APPOINTMENT (OUTPATIENT)
Dept: ULTRASOUND IMAGING | Facility: HOSPITAL | Age: 68
End: 2018-01-09

## 2018-01-09 ENCOUNTER — HOSPITAL ENCOUNTER (INPATIENT)
Facility: HOSPITAL | Age: 68
LOS: 8 days | Discharge: HOME | End: 2018-01-17
Attending: INTERNAL MEDICINE | Admitting: INTERNAL MEDICINE

## 2018-01-09 DIAGNOSIS — J18.9 PNEUMONIA OF RIGHT UPPER LOBE DUE TO INFECTIOUS ORGANISM: Primary | ICD-10-CM

## 2018-01-09 DIAGNOSIS — R09.02 HYPOXIA: ICD-10-CM

## 2018-01-09 DIAGNOSIS — R13.10 DYSPHAGIA, UNSPECIFIED TYPE: ICD-10-CM

## 2018-01-09 DIAGNOSIS — Z78.9 DECREASED ACTIVITIES OF DAILY LIVING (ADL): ICD-10-CM

## 2018-01-09 DIAGNOSIS — Z74.09 IMPAIRED FUNCTIONAL MOBILITY, BALANCE, GAIT, AND ENDURANCE: ICD-10-CM

## 2018-01-09 LAB
ALBUMIN SERPL-MCNC: 4.1 G/DL (ref 3.4–4.8)
ALBUMIN/GLOB SERPL: 1.2 G/DL (ref 1.1–1.8)
ALP SERPL-CCNC: 92 U/L (ref 38–126)
ALT SERPL W P-5'-P-CCNC: 31 U/L (ref 9–52)
ANION GAP SERPL CALCULATED.3IONS-SCNC: 10 MMOL/L (ref 5–15)
APTT PPP: 34.7 SECONDS (ref 20–40.3)
AST SERPL-CCNC: 23 U/L (ref 14–36)
BASOPHILS # BLD AUTO: 0.05 10*3/MM3 (ref 0–0.2)
BASOPHILS NFR BLD AUTO: 0.4 % (ref 0–2)
BILIRUB SERPL-MCNC: 0.4 MG/DL (ref 0.2–1.3)
BILIRUB UR QL STRIP: NEGATIVE
BUN BLD-MCNC: 11 MG/DL (ref 7–21)
BUN/CREAT SERPL: 22 (ref 7–25)
CALCIUM SPEC-SCNC: 9.5 MG/DL (ref 8.4–10.2)
CHLORIDE SERPL-SCNC: 100 MMOL/L (ref 95–110)
CK MB SERPL-CCNC: 1.65 NG/ML (ref 0–5)
CLARITY UR: CLEAR
CO2 SERPL-SCNC: 23 MMOL/L (ref 22–31)
COLOR UR: YELLOW
CREAT BLD-MCNC: 0.5 MG/DL (ref 0.5–1)
DEPRECATED RDW RBC AUTO: 42.5 FL (ref 36.4–46.3)
EOSINOPHIL # BLD AUTO: 0.12 10*3/MM3 (ref 0–0.7)
EOSINOPHIL NFR BLD AUTO: 1 % (ref 0–7)
ERYTHROCYTE [DISTWIDTH] IN BLOOD BY AUTOMATED COUNT: 12.9 % (ref 11.5–14.5)
GFR SERPL CREATININE-BSD FRML MDRD: 124 ML/MIN/1.73 (ref 45–104)
GLOBULIN UR ELPH-MCNC: 3.4 GM/DL (ref 2.3–3.5)
GLUCOSE BLD-MCNC: 101 MG/DL (ref 60–100)
GLUCOSE UR STRIP-MCNC: NEGATIVE MG/DL
HCT VFR BLD AUTO: 35.4 % (ref 35–45)
HGB BLD-MCNC: 12.2 G/DL (ref 12–15.5)
HGB UR QL STRIP.AUTO: NEGATIVE
HOLD SPECIMEN: NORMAL
IMM GRANULOCYTES # BLD: 0.05 10*3/MM3 (ref 0–0.02)
IMM GRANULOCYTES NFR BLD: 0.4 % (ref 0–0.5)
INR PPP: 0.95 (ref 0.8–1.2)
KETONES UR QL STRIP: NEGATIVE
LEUKOCYTE ESTERASE UR QL STRIP.AUTO: NEGATIVE
LYMPHOCYTES # BLD AUTO: 3.53 10*3/MM3 (ref 0.6–4.2)
LYMPHOCYTES NFR BLD AUTO: 29.5 % (ref 10–50)
MCH RBC QN AUTO: 31.2 PG (ref 26.5–34)
MCHC RBC AUTO-ENTMCNC: 34.5 G/DL (ref 31.4–36)
MCV RBC AUTO: 90.5 FL (ref 80–98)
MONOCYTES # BLD AUTO: 1.41 10*3/MM3 (ref 0–0.9)
MONOCYTES NFR BLD AUTO: 11.8 % (ref 0–12)
NEUTROPHILS # BLD AUTO: 6.81 10*3/MM3 (ref 2–8.6)
NEUTROPHILS NFR BLD AUTO: 56.9 % (ref 37–80)
NITRITE UR QL STRIP: NEGATIVE
NRBC BLD MANUAL-RTO: 0 /100 WBC (ref 0–0)
NT-PROBNP SERPL-MCNC: 199 PG/ML (ref 0–900)
PH UR STRIP.AUTO: 6.5 [PH] (ref 5–9)
PLATELET # BLD AUTO: 321 10*3/MM3 (ref 150–450)
PMV BLD AUTO: 8.8 FL (ref 8–12)
POTASSIUM BLD-SCNC: 4.3 MMOL/L (ref 3.5–5.1)
PROT SERPL-MCNC: 7.5 G/DL (ref 6.3–8.6)
PROT UR QL STRIP: NEGATIVE
PROTHROMBIN TIME: 12.6 SECONDS (ref 11.1–15.3)
RBC # BLD AUTO: 3.91 10*6/MM3 (ref 3.77–5.16)
SODIUM BLD-SCNC: 133 MMOL/L (ref 137–145)
SP GR UR STRIP: 1.01 (ref 1–1.03)
TROPONIN I SERPL-MCNC: <0.012 NG/ML
UROBILINOGEN UR QL STRIP: NORMAL
WBC NRBC COR # BLD: 11.97 10*3/MM3 (ref 3.2–9.8)

## 2018-01-09 PROCEDURE — 25010000002 METHYLPREDNISOLONE PER 125 MG: Performed by: PHYSICIAN ASSISTANT

## 2018-01-09 PROCEDURE — 87040 BLOOD CULTURE FOR BACTERIA: CPT | Performed by: INTERNAL MEDICINE

## 2018-01-09 PROCEDURE — 84484 ASSAY OF TROPONIN QUANT: CPT | Performed by: PHYSICIAN ASSISTANT

## 2018-01-09 PROCEDURE — 93010 ELECTROCARDIOGRAM REPORT: CPT | Performed by: INTERNAL MEDICINE

## 2018-01-09 PROCEDURE — 93005 ELECTROCARDIOGRAM TRACING: CPT | Performed by: PHYSICIAN ASSISTANT

## 2018-01-09 PROCEDURE — C1751 CATH, INF, PER/CENT/MIDLINE: HCPCS

## 2018-01-09 PROCEDURE — 76937 US GUIDE VASCULAR ACCESS: CPT

## 2018-01-09 PROCEDURE — 85730 THROMBOPLASTIN TIME PARTIAL: CPT | Performed by: PHYSICIAN ASSISTANT

## 2018-01-09 PROCEDURE — 71046 X-RAY EXAM CHEST 2 VIEWS: CPT

## 2018-01-09 PROCEDURE — 02HV33Z INSERTION OF INFUSION DEVICE INTO SUPERIOR VENA CAVA, PERCUTANEOUS APPROACH: ICD-10-PCS | Performed by: INTERNAL MEDICINE

## 2018-01-09 PROCEDURE — 83880 ASSAY OF NATRIURETIC PEPTIDE: CPT | Performed by: PHYSICIAN ASSISTANT

## 2018-01-09 PROCEDURE — B548ZZA ULTRASONOGRAPHY OF SUPERIOR VENA CAVA, GUIDANCE: ICD-10-PCS | Performed by: INTERNAL MEDICINE

## 2018-01-09 PROCEDURE — 99285 EMERGENCY DEPT VISIT HI MDM: CPT

## 2018-01-09 PROCEDURE — 94640 AIRWAY INHALATION TREATMENT: CPT

## 2018-01-09 PROCEDURE — 80053 COMPREHEN METABOLIC PANEL: CPT | Performed by: PHYSICIAN ASSISTANT

## 2018-01-09 PROCEDURE — 85610 PROTHROMBIN TIME: CPT | Performed by: PHYSICIAN ASSISTANT

## 2018-01-09 PROCEDURE — 85025 COMPLETE CBC W/AUTO DIFF WBC: CPT | Performed by: PHYSICIAN ASSISTANT

## 2018-01-09 PROCEDURE — 82553 CREATINE MB FRACTION: CPT | Performed by: PHYSICIAN ASSISTANT

## 2018-01-09 PROCEDURE — 81003 URINALYSIS AUTO W/O SCOPE: CPT | Performed by: PHYSICIAN ASSISTANT

## 2018-01-09 PROCEDURE — 25010000002 AZITHROMYCIN PER 500 MG: Performed by: PHYSICIAN ASSISTANT

## 2018-01-09 PROCEDURE — 25010000002 PROMETHAZINE PER 50 MG: Performed by: PHYSICIAN ASSISTANT

## 2018-01-09 RX ORDER — SODIUM CHLORIDE 9 MG/ML
75 INJECTION, SOLUTION INTRAVENOUS CONTINUOUS
Status: DISCONTINUED | OUTPATIENT
Start: 2018-01-09 | End: 2018-01-17 | Stop reason: HOSPADM

## 2018-01-09 RX ORDER — ACETAMINOPHEN 325 MG/1
650 TABLET ORAL EVERY 6 HOURS PRN
Status: DISCONTINUED | OUTPATIENT
Start: 2018-01-09 | End: 2018-01-17 | Stop reason: HOSPADM

## 2018-01-09 RX ORDER — TRAZODONE HYDROCHLORIDE 150 MG/1
300 TABLET ORAL NIGHTLY
Status: DISCONTINUED | OUTPATIENT
Start: 2018-01-09 | End: 2018-01-17 | Stop reason: HOSPADM

## 2018-01-09 RX ORDER — ONDANSETRON 4 MG/1
4 TABLET, FILM COATED ORAL EVERY 8 HOURS PRN
Status: DISCONTINUED | OUTPATIENT
Start: 2018-01-09 | End: 2018-01-17 | Stop reason: HOSPADM

## 2018-01-09 RX ORDER — ATORVASTATIN CALCIUM 10 MG/1
10 TABLET, FILM COATED ORAL DAILY
Status: DISCONTINUED | OUTPATIENT
Start: 2018-01-10 | End: 2018-01-17 | Stop reason: HOSPADM

## 2018-01-09 RX ORDER — AMLODIPINE BESYLATE 10 MG/1
10 TABLET ORAL DAILY
Status: DISCONTINUED | OUTPATIENT
Start: 2018-01-10 | End: 2018-01-17 | Stop reason: HOSPADM

## 2018-01-09 RX ORDER — SODIUM CHLORIDE 0.9 % (FLUSH) 0.9 %
1-10 SYRINGE (ML) INJECTION AS NEEDED
Status: DISCONTINUED | OUTPATIENT
Start: 2018-01-09 | End: 2018-01-17 | Stop reason: HOSPADM

## 2018-01-09 RX ORDER — BENZONATATE 100 MG/1
100 CAPSULE ORAL 3 TIMES DAILY PRN
Status: DISCONTINUED | OUTPATIENT
Start: 2018-01-09 | End: 2018-01-17 | Stop reason: HOSPADM

## 2018-01-09 RX ORDER — ASPIRIN 81 MG/1
325 TABLET, CHEWABLE ORAL ONCE
Status: DISCONTINUED | OUTPATIENT
Start: 2018-01-09 | End: 2018-01-09

## 2018-01-09 RX ORDER — FAMOTIDINE 40 MG/1
40 TABLET, FILM COATED ORAL DAILY
Status: DISCONTINUED | OUTPATIENT
Start: 2018-01-10 | End: 2018-01-12

## 2018-01-09 RX ORDER — IPRATROPIUM BROMIDE AND ALBUTEROL SULFATE 2.5; .5 MG/3ML; MG/3ML
3 SOLUTION RESPIRATORY (INHALATION)
Status: DISCONTINUED | OUTPATIENT
Start: 2018-01-09 | End: 2018-01-17 | Stop reason: HOSPADM

## 2018-01-09 RX ORDER — METHYLPREDNISOLONE SODIUM SUCCINATE 125 MG/2ML
125 INJECTION, POWDER, LYOPHILIZED, FOR SOLUTION INTRAMUSCULAR; INTRAVENOUS ONCE
Status: COMPLETED | OUTPATIENT
Start: 2018-01-09 | End: 2018-01-09

## 2018-01-09 RX ORDER — MECLIZINE HYDROCHLORIDE 25 MG/1
25 TABLET ORAL 3 TIMES DAILY PRN
Status: DISCONTINUED | OUTPATIENT
Start: 2018-01-09 | End: 2018-01-17 | Stop reason: HOSPADM

## 2018-01-09 RX ORDER — BUDESONIDE AND FORMOTEROL FUMARATE DIHYDRATE 160; 4.5 UG/1; UG/1
2 AEROSOL RESPIRATORY (INHALATION)
Status: DISCONTINUED | OUTPATIENT
Start: 2018-01-09 | End: 2018-01-17 | Stop reason: HOSPADM

## 2018-01-09 RX ORDER — NICOTINE 21 MG/24HR
1 PATCH, TRANSDERMAL 24 HOURS TRANSDERMAL EVERY 24 HOURS
Status: DISCONTINUED | OUTPATIENT
Start: 2018-01-09 | End: 2018-01-17 | Stop reason: HOSPADM

## 2018-01-09 RX ORDER — PROMETHAZINE HYDROCHLORIDE 25 MG/ML
12.5 INJECTION, SOLUTION INTRAMUSCULAR; INTRAVENOUS ONCE
Status: COMPLETED | OUTPATIENT
Start: 2018-01-09 | End: 2018-01-09

## 2018-01-09 RX ORDER — GABAPENTIN 400 MG/1
800 CAPSULE ORAL EVERY 8 HOURS SCHEDULED
Status: DISCONTINUED | OUTPATIENT
Start: 2018-01-09 | End: 2018-01-17 | Stop reason: HOSPADM

## 2018-01-09 RX ORDER — BISACODYL 5 MG/1
5 TABLET, DELAYED RELEASE ORAL DAILY PRN
Status: DISCONTINUED | OUTPATIENT
Start: 2018-01-09 | End: 2018-01-17 | Stop reason: HOSPADM

## 2018-01-09 RX ORDER — IPRATROPIUM BROMIDE AND ALBUTEROL SULFATE 2.5; .5 MG/3ML; MG/3ML
3 SOLUTION RESPIRATORY (INHALATION) ONCE
Status: COMPLETED | OUTPATIENT
Start: 2018-01-09 | End: 2018-01-09

## 2018-01-09 RX ORDER — METHYLPREDNISOLONE SODIUM SUCCINATE 125 MG/2ML
60 INJECTION, POWDER, LYOPHILIZED, FOR SOLUTION INTRAMUSCULAR; INTRAVENOUS EVERY 8 HOURS
Status: DISCONTINUED | OUTPATIENT
Start: 2018-01-10 | End: 2018-01-11

## 2018-01-09 RX ORDER — SODIUM CHLORIDE 0.9 % (FLUSH) 0.9 %
10 SYRINGE (ML) INJECTION AS NEEDED
Status: DISCONTINUED | OUTPATIENT
Start: 2018-01-09 | End: 2018-01-17 | Stop reason: HOSPADM

## 2018-01-09 RX ADMIN — ONDANSETRON 4 MG: 4 TABLET, FILM COATED ORAL at 23:13

## 2018-01-09 RX ADMIN — METHYLPREDNISOLONE SODIUM SUCCINATE 125 MG: 125 INJECTION, POWDER, FOR SOLUTION INTRAMUSCULAR; INTRAVENOUS at 16:06

## 2018-01-09 RX ADMIN — IPRATROPIUM BROMIDE AND ALBUTEROL SULFATE 3 ML: .5; 3 SOLUTION RESPIRATORY (INHALATION) at 14:48

## 2018-01-09 RX ADMIN — PROMETHAZINE HYDROCHLORIDE 12.5 MG: 25 INJECTION INTRAMUSCULAR; INTRAVENOUS at 16:56

## 2018-01-09 RX ADMIN — IPRATROPIUM BROMIDE AND ALBUTEROL SULFATE 3 ML: 2.5; .5 SOLUTION RESPIRATORY (INHALATION) at 23:04

## 2018-01-09 RX ADMIN — AZITHROMYCIN 500 MG: 500 INJECTION, POWDER, LYOPHILIZED, FOR SOLUTION INTRAVENOUS at 16:56

## 2018-01-09 NOTE — ED PROVIDER NOTES
Subjective   Patient is a 67 y.o. female presenting with shortness of breath.   History provided by:  Patient   used: No    Shortness of Breath   Severity:  Mild  Onset quality:  Gradual  Duration:  30 days  Timing:  Constant  Progression:  Worsening  Chronicity:  Recurrent  Context: smoke exposure    Context: not activity, not animal exposure, not emotional upset, not fumes, not known allergens, not occupational exposure, not pollens, not strong odors, not URI and not weather changes    Relieved by:  Nothing  Worsened by:  Deep breathing and coughing  Ineffective treatments:  None tried  Associated symptoms: cough, fever, sputum production and wheezing    Associated symptoms: no abdominal pain, no chest pain, no claudication, no diaphoresis, no ear pain, no headaches, no hemoptysis, no neck pain, no PND, no rash, no sore throat, no syncope, no swollen glands and no vomiting    Risk factors: tobacco use    Risk factors: no recent alcohol use, no family hx of DVT, no hx of cancer, no hx of PE/DVT, no obesity, no oral contraceptive use, no prolonged immobilization and no recent surgery        Review of Systems   Constitutional: Positive for fever. Negative for activity change, appetite change, chills, diaphoresis, fatigue and unexpected weight change.   HENT: Negative.  Negative for ear pain and sore throat.    Eyes: Negative.    Respiratory: Positive for cough, sputum production, shortness of breath and wheezing. Negative for apnea, hemoptysis, choking, chest tightness and stridor.    Cardiovascular: Negative.  Negative for chest pain, claudication, syncope and PND.   Gastrointestinal: Negative.  Negative for abdominal pain and vomiting.   Endocrine: Negative.    Genitourinary: Negative.    Musculoskeletal: Negative.  Negative for neck pain.   Skin: Negative.  Negative for rash.   Allergic/Immunologic: Negative.    Neurological: Negative.  Negative for headaches.   Hematological: Negative.     Psychiatric/Behavioral: Negative.        Past Medical History:   Diagnosis Date   • Anxiety    • 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 with ulceration     bilateral legs - bilateral iliac stents 2008      • Chronic lower back pain    • COPD (chronic obstructive pulmonary disease)    • Coronary arteriosclerosis    • Essential (primary) hypertension    • History of pulmonary embolus (PE)    • Hyperlipidemia    • Insomnia    • Lumbago    • Nicotine dependence    • Occlusion of artery      and stenosis of bilateral carotid arteries - BABS 16-49%, LICA 0-15%   • Other atherosclerosis of native artery of other extremity     LEFT subclavian stent 2005 (occluded)       Allergies   Allergen Reactions   • Morphine And Related Itching, Confusion and Hallucinations       Past Surgical History:   Procedure Laterality Date   • CARDIAC CATHETERIZATION  04/06/2016    No evidence of any obstructive epicardial CAD.Preserved LV systolic function with EF of 55%.   • CENTRAL VENOUS LINE INSERTION  04/07/2016    Successful placement of right uppe extremity 6-Pashto triple lumen PICC line.   • TOTAL SHOULDER REPLACEMENT Left    • TRANSESOPHAGEAL ECHOCARDIOGRAM (JACQUES)  04/07/2016    With color flow-Mild to moderate [facility].LV systolic function well preserved with Ef of 55-60%.Mitral and AV intact.Diastolic dysfunction       Family History   Problem Relation Age of Onset   • Heart disease Other    • Hypertension Other        Social History     Social History   • Marital status:      Spouse name: N/A   • Number of children: N/A   • Years of education: N/A     Social History Main Topics   • Smoking status: Current Every Day Smoker     Packs/day: 1.00   • Smokeless tobacco: Never Used   • Alcohol use No   • Drug use: No   • Sexual activity: Not Asked     Other Topics Concern   • None     Social History Narrative   • None           Objective   Physical Exam   Constitutional: She is oriented to person, place, and time. She  appears well-developed and well-nourished. No distress. She is not intubated.   HENT:   Head: Normocephalic and atraumatic.   Eyes: Conjunctivae and EOM are normal. Pupils are equal, round, and reactive to light. Right eye exhibits no discharge. Left eye exhibits no discharge. No scleral icterus.   Neck: Normal range of motion. Neck supple. No JVD present. No tracheal deviation present. No thyromegaly present.   Cardiovascular: Normal rate, regular rhythm, normal heart sounds and intact distal pulses.  Exam reveals no gallop and no friction rub.    No murmur heard.  Pulmonary/Chest: Effort normal. No accessory muscle usage or stridor. No tachypnea and no bradypnea. She is not intubated. No respiratory distress. She has decreased breath sounds. She has wheezes. She has rhonchi. She has rales. She exhibits no tenderness.   Abdominal: Soft. Bowel sounds are normal. She exhibits no distension and no mass. There is no tenderness. There is no rebound and no guarding. No hernia.   Musculoskeletal: Normal range of motion. She exhibits no edema, tenderness or deformity.   Lymphadenopathy:     She has no cervical adenopathy.   Neurological: She is alert and oriented to person, place, and time. She has normal reflexes.   Skin: Skin is warm and dry. No rash noted. She is not diaphoretic. No erythema. No pallor.   Psychiatric: She has a normal mood and affect. Her behavior is normal. Judgment and thought content normal.   Nursing note and vitals reviewed.      ECG 12 Lead    Date/Time: 1/9/2018 3:16 PM  Performed by: DAKOTA LLANES  Authorized by: DAKOTA LLANES   Interpreted by physician  Rhythm: sinus rhythm  Rate: normal  BPM: 64  Clinical impression: normal ECG               ED Course  ED Course      Labs Reviewed   CBC WITH AUTO DIFFERENTIAL - Abnormal; Notable for the following:        Result Value    WBC 11.97 (*)     Monocytes, Absolute 1.41 (*)     Immature Grans, Absolute 0.05 (*)     All other components within normal  limits   PROTIME-INR - Normal    Narrative:     Therapeutic range for most indications is 2.0-3.0 INR,  or 2.5-3.5 for mechanical heart valves.   APTT - Normal    Narrative:     The recommended Heparin therapeutic range is 68-97 seconds.   BNP (IN-HOUSE) - Normal   URINALYSIS W/ CULTURE IF INDICATED - Normal    Narrative:     Urine microscopic not indicated.   CK MB - Normal   TROPONIN (IN-HOUSE) - Normal   RAINBOW DRAW    Narrative:     The following orders were created for panel order [place] Draw.  Procedure                               Abnormality         Status                     ---------                               -----------         ------                     Light Blue Top[[id number]]                                                              Green Top (Gel)[[id number]]                                                             Lavender Top[[id number]]                                                                Gold Top - SST[[id number]]                                   Final result                 Please view results for these tests on the individual orders.   COMPREHENSIVE METABOLIC PANEL   CBC AND DIFFERENTIAL    Narrative:     The following orders were created for panel order CBC & Differential.  Procedure                               Abnormality         Status                     ---------                               -----------         ------                     CBC Auto Differential[[id number]]        Abnormal            Final result                 Please view results for these tests on the individual orders.   GOLD TOP - SST   EXTRA TUBES    Narrative:     The following orders were created for panel order Extra Tubes.  Procedure                               Abnormality         Status                     ---------                               -----------         ------                     Green Top (Gel)[[id number]]                                  Final result                 Please view  results for these tests on the individual orders.   GREEN TOP     Xr Chest 2 View    Result Date: 1/9/2018  Narrative: EXAM:          Radiograph(s), Chest VIEWS:    PA / Lat ; 2     DATE/TIME:  1/9/2018 4:07 PM CST            INDICATION:   shortness of breath  COMPARISON:  CXR: 4/6/16         FINDINGS:         - lines/tubes:    none   - cardiac:         size within normal limits       - mediastinum: contour within normal limits       - lungs:         Linearly oriented airspace disease of the right upper lobe. The remainder of the lungs are clear. Left lung granuloma noted.           - pleura:         no evidence of  fluid                - osseous:         Status post left shoulder repair.               - misc.:        Impression: CONCLUSION:    1. Right upper lobe airspace disease presumably representing a focus of pneumonia.                                   Electronically signed by:  KRYSTEN Cast MD  1/9/2018 4:09 PM CST Workstation: [id number]    Us Guided Vascular Access    Result Date: 1/9/2018  Narrative: This procedure was auto-finalized with no dictation required.    Xr Chest Post Cva Port    Result Date: 1/9/2018  Narrative: EXAM:         Radiograph(s), Chest VIEWS:   Portable ; 1     DATE/TIME:  1/9/2018 5:15 PM CST            INDICATION:   PICC placement  COMPARISON:  CXR: 4/7/16         FINDINGS:         - lines/tubes:    Right approach PICC line in standard position.  - cardiac:         size within normal limits       - mediastinum: contour within normal limits       - lungs:         Focus of airspace disease in the right lung, probably lower lobe.           - pleura:         no evidence of  fluid                - osseous:         Status post left shoulder replacement.               - misc.:        Impression: CONCLUSION:    1. Right approach PICC line in standard position. 2. Suspect focal airspace disease in the right lower lobe.                                  Electronically signed by:  KRYSTEN  Delmar Cast MD  1/9/2018 5:18 PM CST Workstation: [id number]    Ir Picc Wo Fluoro Guidance    Result Date: 1/9/2018  Narrative: This procedure was auto-finalized with no dictation required.                MDM  Number of Diagnoses or Management Options      Final diagnoses:   Pneumonia of right upper lobe due to infectious organism   Hypoxia            RACHEL Lange  01/09/18 4538

## 2018-01-09 NOTE — PROGRESS NOTES
TWO PATIENT IDENTIFIERS WERE USED. CONSENT WAS SIGNED PER PATIENT EDUCATION MATERIAL WAS GIVEN TO PATIENT AND / OR FAMILY. THE PATIENT WAS DRAPED WITH FULL BODY DRAPE AND PATIENT'SRIGHT   ARM WAS PREPPED WITH CHLORAPREP.  ULTRASOUND WAS USED TO LOCALIZE THERIGHT BASILIC VEIN. SUBCUTANEOUS TISSUE AT THE CATHETER SITE WAS INFILTRATED WITH 2% LIDOCAINE. UNDER ULTRASOUND GUIDANCE, THE VEIN WAS ACCESSED WITH A 21GAUGE  NEEDLE. AN 0.018 WIRE WAS THEN THREADED THROUGH THE NEEDLE INTO THE CENTRAL VENOUS SYSTEM. THE 21GAUGE  NEEDLE WAS REMOVED AND A 6 FRENCHPEEL AWAY SHEATH WAS PLACED OVER THE WIRE. THE PICC LINE CATHETER WAS CUT AT 34 CM. THE PICC LINE CATHETER WAS THEN PLACED OVER THE WIRE INTO THE VEIN, THE SHEATH WAS PEELED AWAY,WIRE WAS REMOVED. CATHETER WAS FLUSHED WITH NORMAL SALINE AND TIPS APPLIED. BIOPATCH PLACED. CATHETER SECURED WITHSTATLOCK  AND TEGADERM. PATIENT TOLERATED PROCEDURE WELL. THIS WAS DONE IN   ER      IMPRESSION: SUCCESSFUL PLACEMENT OF TRIPLE LUMEN SOLO PICC        Danyell Reid  1/9/2018  4:53 PM      IMPRESSION: SUCCESSFUL PLACEMENT OF TRIPLE LUMEN SOLO PICC        Danyell Reid  1/9/2018  4:53 PM

## 2018-01-09 NOTE — H&P
[facility] Medicine Services  INPATIENT HISTORY AND PHYSICAL       Patient Care Team:  DELFINO Smith as PCP - General  Kris Peres MD as Consulting Physician (Cardiology)    Chief complaint   Chief Complaint   Patient presents with   • Shortness of Breath       Subjective     Patient is a 67 y.o. female with history of nicotine dependence, COPD, hypertension, carotid artery stenosis and dyslipidemia who presents to emergency department secondary to 10 day history of progressively worsening cough productive of brownish sputum, nasal congestion, low-grade fever and shortness of air.  She denies chest pain, nausea, vomiting, diarrhea, palpitations, hemoptysis, syncope or presyncope.    Patient recently finished a course of an oral antibiotics that was prescribed by her family doctor and continued to remain symptomatic despite the outpatient treatment.     In the ER she was noted to have O2 saturation of 88-89% on room air.  She was placed on supplemental oxygen and given a dose of bronchodilators and IV steroids.  Chest x-ray was done and showed a evidence of right upper lobe airspace changes.     Family and social history: Patient lives with her daughter and able to take care of her activities of daily living.  She has been smoking on average of one pack of cigarettes a day for more than 30 years and denies history of alcohol use.  There is family history of hypertension and heart disease.    Review of Systems   Review of Systems   Constitutional: Negative for activity change, appetite change, chills, diaphoresis, fatigue and fever.   HENT: Positive for congestion. Negative for trouble swallowing and voice change.    Eyes: Negative for photophobia and visual disturbance.   Respiratory: Positive for cough and shortness of breath. Negative for choking, chest tightness, wheezing and stridor.    Cardiovascular: Negative for chest pain, palpitations and  leg swelling.   Gastrointestinal: Negative for abdominal distention, abdominal pain, blood in stool, constipation, diarrhea, nausea and vomiting.   Endocrine: Negative for cold intolerance, heat intolerance, polydipsia, polyphagia and polyuria.   Genitourinary: Negative for decreased urine volume, difficulty urinating, dysuria, enuresis, flank pain, frequency, hematuria and urgency.   Musculoskeletal: Negative for arthralgias, gait problem, myalgias, neck pain and neck stiffness.   Skin: Negative for pallor, rash and wound.   Neurological: Negative for dizziness, tremors, seizures, syncope, facial asymmetry, speech difficulty, weakness, light-headedness, numbness and headaches.   Hematological: Does not bruise/bleed easily.   Psychiatric/Behavioral: Negative for agitation, behavioral problems and confusion.       History  Past Medical History:   Diagnosis Date   • Anxiety    • 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 with ulceration     bilateral legs - bilateral iliac stents 2008      • Chronic lower back pain    • COPD (chronic obstructive pulmonary disease)    • Coronary arteriosclerosis    • Essential (primary) hypertension    • History of pulmonary embolus (PE)    • Hyperlipidemia    • Insomnia    • Lumbago    • Nicotine dependence    • Occlusion of artery      and stenosis of bilateral carotid arteries - BABS 16-49%, LICA 0-15%   • Other atherosclerosis of native artery of other extremity     LEFT subclavian stent 2005 (occluded)     Past Surgical History:   Procedure Laterality Date   • CARDIAC CATHETERIZATION  04/06/2016    No evidence of any obstructive epicardial CAD.Preserved LV systolic function with EF of 55%.   • CENTRAL VENOUS LINE INSERTION  04/07/2016    Successful placement of right uppe extremity 6-[place] triple lumen PICC line.   • TOTAL SHOULDER REPLACEMENT Left    • TRANSESOPHAGEAL ECHOCARDIOGRAM (JACQUES)  04/07/2016    With color flow-Mild to moderate [facility].LV systolic function well  preserved with Ef of 55-60%.Mitral and AV intact.Diastolic dysfunction     Family History   Problem Relation Age of Onset   • Heart disease Other    • Hypertension Other      Social History   Substance Use Topics   • Smoking status: Current Every Day Smoker     Packs/day: 1.00   • Smokeless tobacco: Never Used   • Alcohol use No       (Not in a hospital admission)  Allergies:  Morphine and related  Prior to Admission medications    Medication Sig Start Date End Date Taking? Authorizing Provider   albuterol (PROVENTIL HFA;VENTOLIN HFA) 108 (90 BASE) MCG/ACT inhaler Inhale 2 puffs every night.    Historical Provider, MD   albuterol (PROVENTIL) (2.5 MG/3ML) 0.083% nebulizer solution Take 2.5 mg by nebulization Every 8 (Eight) Hours As Needed for Wheezing.    Historical Provider, MD   amLODIPine (NORVASC) 10 MG tablet Take 10 mg by mouth Daily.    Historical Provider, MD   apixaban (ELIQUIS) 5 MG tablet tablet Take 1 tablet by mouth 2 (Two) Times a Day. 8/8/17   Kris Peres MD   Fluticasone Furoate-Vilanterol (BREO ELLIPTA) 100-25 MCG/INH aerosol powder  Inhale 1 puff Daily.    Historical Provider, MD   gabapentin (NEURONTIN) 800 MG tablet Take 800 mg by mouth 3 (Three) Times a Day.    Historical Provider, MD   meclizine (ANTIVERT) 25 MG tablet Take 25 mg by mouth 3 (Three) Times a Day As Needed for dizziness.    Historical Provider, MD   ondansetron (ZOFRAN) 4 MG tablet Take 4 mg by mouth Every 8 (Eight) Hours As Needed for Nausea or Vomiting.    Historical Provider, MD   penciclovir (DENAVIR) 1 % cream Apply 1 application topically Every 2 (Two) Hours.    Historical Provider, MD   pravastatin (PRAVACHOL) 20 MG tablet Take 20 mg by mouth Daily.    Historical Provider, MD   sertraline (ZOLOFT) 100 MG tablet Take 100 mg by mouth Daily.    Historical Provider, MD   traZODone (DESYREL) 300 MG tablet Take 300 mg by mouth Every Night.    Historical Provider, MD       Objective     Vital Signs    Temp:  [98.6 °F (37  °C)] 98.6 °F (37 °C)  Heart Rate:  [64-79] 64  Resp:  [16-22] 16  BP: (107-154)/(68-80) 151/74    Physical Exam:      Physical Exam   Constitutional: She is oriented to person, place, and time. She appears well-developed and well-nourished. She is cooperative. No distress.   HENT:   Head: Normocephalic and atraumatic.   Right Ear: External ear normal.   Left Ear: External ear normal.   Nose: Nose normal.   Mouth/Throat: Oropharynx is clear and moist.   Eyes: Conjunctivae and EOM are normal. Pupils are equal, round, and reactive to light.   Neck: Normal range of motion. Neck supple. No JVD present. No thyromegaly present.   Cardiovascular: Normal rate, regular rhythm, normal heart sounds and intact distal pulses.  Exam reveals no gallop and no friction rub.    No murmur heard.  Pulmonary/Chest: Effort normal. No stridor. No respiratory distress. She has decreased breath sounds in the right lower field and the left lower field. She has wheezes. She has rhonchi. She has no rales. She exhibits no tenderness.   Abdominal: Soft. Bowel sounds are normal. She exhibits no distension and no mass. There is no tenderness. There is no rebound and no guarding. No hernia.   Musculoskeletal: Normal range of motion. She exhibits no edema, tenderness or deformity.   Neurological: She is alert and oriented to person, place, and time. She has normal reflexes. She displays normal reflexes. No cranial nerve deficit. She exhibits normal muscle tone.   Skin: Skin is warm and dry. No rash noted. She is not diaphoretic. No erythema. No pallor.   Psychiatric: She has a normal mood and affect. Her behavior is normal. Judgment and thought content normal.   Nursing note and vitals reviewed.      Results Review:           Invalid input(s): LABALBU, PROT            Results from last 7 days  Lab Units 01/09/18  1646   WBC 10*3/mm3 11.97*   HEMOGLOBIN g/dL 12.2   HEMATOCRIT % 35.4   PLATELETS 10*3/mm3 321         Results from last 7 days  Lab Units  01/09/18  1646   INR  0.95     Imaging Results (last 7 days)     Procedure Component Value Units Date/Time    XR Chest 2 View [[id number]] Collected:  01/09/18 1554     Updated:  01/09/18 1610    Narrative:         EXAM:          Radiograph(s), Chest   VIEWS:    PA / Lat ; 2       DATE/TIME:  1/9/2018 4:07 PM CST                INDICATION:   shortness of breath    COMPARISON:  CXR: 4/6/16             FINDINGS:             - lines/tubes:    none     - cardiac:         size within normal limits         - mediastinum: contour within normal limits         - lungs:         Linearly oriented airspace disease of the right  upper lobe. The remainder of the lungs are clear. Left lung  granuloma noted.             - pleura:         no evidence of  fluid                  - osseous:         Status post left shoulder repair.                   - misc.:         Impression:       CONCLUSION:        1. Right upper lobe airspace disease presumably representing a  focus of pneumonia.                                               Electronically signed by:  KRYSTEN Cast MD  1/9/2018 4:09 PM  CST Workstation: [id number]    IR PICC wo fluoro guidance [[id number]] Resulted:  01/09/18 1654     Updated:  01/09/18 1654    Narrative:       This procedure was auto-finalized with no dictation required.    US Guided Vascular Access [[id number]] Resulted:  01/09/18 1716     Updated:  01/09/18 1716    Narrative:       This procedure was auto-finalized with no dictation required.    XR Chest Post CVA Port [[id number]] Collected:  01/09/18 1655     Updated:  01/09/18 1719    Narrative:         EXAM:         Radiograph(s), Chest   VIEWS:   Portable ; 1       DATE/TIME:  1/9/2018 5:15 PM CST                INDICATION:   PICC placement    COMPARISON:  CXR: 4/7/16             FINDINGS:             - lines/tubes:    Right approach PICC line in standard position.      - cardiac:         size within normal limits         - mediastinum: contour within normal  limits         - lungs:         Focus of airspace disease in the right lung,  probably lower lobe.             - pleura:         no evidence of  fluid                  - osseous:         Status post left shoulder replacement.                   - misc.:         Impression:       CONCLUSION:        1. Right approach PICC line in standard position.  2. Suspect focal airspace disease in the right lower lobe.                                                Electronically signed by:  KRYSTEN Cast MD  1/9/2018 5:18 PM  CST Workstation: [id number]          Assessment/Plan   1.  Pneumonia: Patient had failed outpatient therapy.  She'll be admitted for IV antibiotics and will be continued on supplemental oxygen.  Check blood cultures and influenza screen.  2.  History of nicotine dependence/COPD with possible COPD exacerbation: Continue supplemental oxygen, bronchodilators and begin steroids.  She'll be placed on nicotine patch and has been advised on the need to quit smoking.  3.  Hypertension: Continue home medications and adjust as needed to achieve optimal blood pressure control.  4.  Begin GI prophylaxis and continue Apixaban.  5.  Further treatment plans or diagnostic studies as hospital course dictates.      I discussed the patients findings and my recommendations with patient and her daughter..     Kev Abraham MD  01/09/18  5:36 PM        Total Time Spent: 50 minutes    EMR Dragon/Transcription disclaimer:   Much of this encounter note is an electronic transcription/translation of spoken language to printed text. The electronic translation of spoken language may permit erroneous, or at times, nonsensical words or phrases to be inadvertently transcribed; Although I have reviewed the note for such errors, some may still exist.

## 2018-01-10 LAB
ANION GAP SERPL CALCULATED.3IONS-SCNC: 10 MMOL/L (ref 5–15)
BASOPHILS # BLD AUTO: 0.02 10*3/MM3 (ref 0–0.2)
BASOPHILS NFR BLD AUTO: 0.1 % (ref 0–2)
BUN BLD-MCNC: 19 MG/DL (ref 7–21)
BUN/CREAT SERPL: 35.2 (ref 7–25)
CALCIUM SPEC-SCNC: 9.3 MG/DL (ref 8.4–10.2)
CHLORIDE SERPL-SCNC: 102 MMOL/L (ref 95–110)
CO2 SERPL-SCNC: 26 MMOL/L (ref 22–31)
CREAT BLD-MCNC: 0.54 MG/DL (ref 0.5–1)
DEPRECATED RDW RBC AUTO: 42.5 FL (ref 36.4–46.3)
EOSINOPHIL # BLD AUTO: 0.01 10*3/MM3 (ref 0–0.7)
EOSINOPHIL NFR BLD AUTO: 0.1 % (ref 0–7)
ERYTHROCYTE [DISTWIDTH] IN BLOOD BY AUTOMATED COUNT: 12.8 % (ref 11.5–14.5)
GFR SERPL CREATININE-BSD FRML MDRD: 113 ML/MIN/1.73 (ref 45–104)
GLUCOSE BLD-MCNC: 142 MG/DL (ref 60–100)
HCT VFR BLD AUTO: 31.9 % (ref 35–45)
HGB BLD-MCNC: 11.2 G/DL (ref 12–15.5)
IMM GRANULOCYTES # BLD: 0.09 10*3/MM3 (ref 0–0.02)
IMM GRANULOCYTES NFR BLD: 0.5 % (ref 0–0.5)
LYMPHOCYTES # BLD AUTO: 1.46 10*3/MM3 (ref 0.6–4.2)
LYMPHOCYTES NFR BLD AUTO: 7.8 % (ref 10–50)
MCH RBC QN AUTO: 31.6 PG (ref 26.5–34)
MCHC RBC AUTO-ENTMCNC: 35.1 G/DL (ref 31.4–36)
MCV RBC AUTO: 90.1 FL (ref 80–98)
MONOCYTES # BLD AUTO: 0.9 10*3/MM3 (ref 0–0.9)
MONOCYTES NFR BLD AUTO: 4.8 % (ref 0–12)
NEUTROPHILS # BLD AUTO: 16.29 10*3/MM3 (ref 2–8.6)
NEUTROPHILS NFR BLD AUTO: 86.7 % (ref 37–80)
PLATELET # BLD AUTO: 250 10*3/MM3 (ref 150–450)
PMV BLD AUTO: 8.9 FL (ref 8–12)
POTASSIUM BLD-SCNC: 4.3 MMOL/L (ref 3.5–5.1)
RBC # BLD AUTO: 3.54 10*6/MM3 (ref 3.77–5.16)
SODIUM BLD-SCNC: 138 MMOL/L (ref 137–145)
WBC NRBC COR # BLD: 18.77 10*3/MM3 (ref 3.2–9.8)

## 2018-01-10 PROCEDURE — 25010000002 AZITHROMYCIN PER 500 MG: Performed by: INTERNAL MEDICINE

## 2018-01-10 PROCEDURE — 97161 PT EVAL LOW COMPLEX 20 MIN: CPT

## 2018-01-10 PROCEDURE — 25010000002 CEFTRIAXONE PER 250 MG: Performed by: INTERNAL MEDICINE

## 2018-01-10 PROCEDURE — 94760 N-INVAS EAR/PLS OXIMETRY 1: CPT

## 2018-01-10 PROCEDURE — G8978 MOBILITY CURRENT STATUS: HCPCS

## 2018-01-10 PROCEDURE — G8979 MOBILITY GOAL STATUS: HCPCS

## 2018-01-10 PROCEDURE — 94799 UNLISTED PULMONARY SVC/PX: CPT

## 2018-01-10 PROCEDURE — G8987 SELF CARE CURRENT STATUS: HCPCS

## 2018-01-10 PROCEDURE — 85025 COMPLETE CBC W/AUTO DIFF WBC: CPT | Performed by: INTERNAL MEDICINE

## 2018-01-10 PROCEDURE — G8988 SELF CARE GOAL STATUS: HCPCS

## 2018-01-10 PROCEDURE — 80048 BASIC METABOLIC PNL TOTAL CA: CPT | Performed by: INTERNAL MEDICINE

## 2018-01-10 PROCEDURE — 25010000002 METHYLPREDNISOLONE PER 125 MG: Performed by: INTERNAL MEDICINE

## 2018-01-10 PROCEDURE — 97165 OT EVAL LOW COMPLEX 30 MIN: CPT

## 2018-01-10 RX ORDER — TRAMADOL HYDROCHLORIDE 50 MG/1
50 TABLET ORAL EVERY 6 HOURS PRN
Status: DISCONTINUED | OUTPATIENT
Start: 2018-01-10 | End: 2018-01-17 | Stop reason: HOSPADM

## 2018-01-10 RX ADMIN — SERTRALINE HYDROCHLORIDE 100 MG: 50 TABLET ORAL at 08:11

## 2018-01-10 RX ADMIN — METHYLPREDNISOLONE SODIUM SUCCINATE 60 MG: 125 INJECTION, POWDER, FOR SOLUTION INTRAMUSCULAR; INTRAVENOUS at 23:44

## 2018-01-10 RX ADMIN — GABAPENTIN 800 MG: 400 CAPSULE ORAL at 21:52

## 2018-01-10 RX ADMIN — IPRATROPIUM BROMIDE AND ALBUTEROL SULFATE 3 ML: 2.5; .5 SOLUTION RESPIRATORY (INHALATION) at 20:20

## 2018-01-10 RX ADMIN — TRAZODONE HYDROCHLORIDE 300 MG: 150 TABLET ORAL at 00:01

## 2018-01-10 RX ADMIN — METHYLPREDNISOLONE SODIUM SUCCINATE 60 MG: 125 INJECTION, POWDER, FOR SOLUTION INTRAMUSCULAR; INTRAVENOUS at 15:18

## 2018-01-10 RX ADMIN — TRAMADOL HYDROCHLORIDE 50 MG: 50 TABLET, FILM COATED ORAL at 16:59

## 2018-01-10 RX ADMIN — IPRATROPIUM BROMIDE AND ALBUTEROL SULFATE 3 ML: 2.5; .5 SOLUTION RESPIRATORY (INHALATION) at 06:57

## 2018-01-10 RX ADMIN — IPRATROPIUM BROMIDE AND ALBUTEROL SULFATE 3 ML: 2.5; .5 SOLUTION RESPIRATORY (INHALATION) at 14:12

## 2018-01-10 RX ADMIN — NICOTINE 1 PATCH: 21 PATCH TRANSDERMAL at 00:01

## 2018-01-10 RX ADMIN — SODIUM CHLORIDE 75 ML/HR: 9 INJECTION, SOLUTION INTRAVENOUS at 22:26

## 2018-01-10 RX ADMIN — AZITHROMYCIN 500 MG: 500 INJECTION, POWDER, LYOPHILIZED, FOR SOLUTION INTRAVENOUS at 17:14

## 2018-01-10 RX ADMIN — TRAZODONE HYDROCHLORIDE 300 MG: 150 TABLET ORAL at 21:52

## 2018-01-10 RX ADMIN — IPRATROPIUM BROMIDE AND ALBUTEROL SULFATE 3 ML: 2.5; .5 SOLUTION RESPIRATORY (INHALATION) at 10:37

## 2018-01-10 RX ADMIN — SODIUM CHLORIDE 75 ML/HR: 9 INJECTION, SOLUTION INTRAVENOUS at 00:10

## 2018-01-10 RX ADMIN — SODIUM CHLORIDE 1 G: 9 INJECTION INTRAMUSCULAR; INTRAVENOUS; SUBCUTANEOUS at 00:08

## 2018-01-10 RX ADMIN — NICOTINE 1 PATCH: 21 PATCH TRANSDERMAL at 23:54

## 2018-01-10 RX ADMIN — GABAPENTIN 800 MG: 400 CAPSULE ORAL at 13:00

## 2018-01-10 RX ADMIN — APIXABAN 5 MG: 5 TABLET, FILM COATED ORAL at 00:01

## 2018-01-10 RX ADMIN — ATORVASTATIN CALCIUM 10 MG: 10 TABLET, FILM COATED ORAL at 08:12

## 2018-01-10 RX ADMIN — BUDESONIDE AND FORMOTEROL FUMARATE DIHYDRATE 2 PUFF: 160; 4.5 AEROSOL RESPIRATORY (INHALATION) at 20:20

## 2018-01-10 RX ADMIN — FAMOTIDINE 40 MG: 40 TABLET ORAL at 08:12

## 2018-01-10 RX ADMIN — ONDANSETRON 4 MG: 4 TABLET, FILM COATED ORAL at 16:59

## 2018-01-10 RX ADMIN — GABAPENTIN 800 MG: 400 CAPSULE ORAL at 05:51

## 2018-01-10 RX ADMIN — APIXABAN 5 MG: 5 TABLET, FILM COATED ORAL at 08:11

## 2018-01-10 RX ADMIN — METHYLPREDNISOLONE SODIUM SUCCINATE 60 MG: 125 INJECTION, POWDER, FOR SOLUTION INTRAMUSCULAR; INTRAVENOUS at 08:09

## 2018-01-10 RX ADMIN — SODIUM CHLORIDE 1 G: 9 INJECTION INTRAMUSCULAR; INTRAVENOUS; SUBCUTANEOUS at 23:45

## 2018-01-10 RX ADMIN — AMLODIPINE BESYLATE 10 MG: 10 TABLET ORAL at 08:11

## 2018-01-10 RX ADMIN — METHYLPREDNISOLONE SODIUM SUCCINATE 60 MG: 125 INJECTION, POWDER, FOR SOLUTION INTRAMUSCULAR; INTRAVENOUS at 00:01

## 2018-01-10 RX ADMIN — APIXABAN 5 MG: 5 TABLET, FILM COATED ORAL at 21:52

## 2018-01-10 RX ADMIN — GABAPENTIN 800 MG: 400 CAPSULE ORAL at 00:08

## 2018-01-10 NOTE — PAYOR COMM NOTE
"New inpatient admission 1/9/2018  ICD 10- J18.9  [facility] NPI# [id number]  Admitting Angie Ribeiro MD  NPI# [id number]        Filomena Perales (67 y.o. Female)     Date of Birth Social Security Number Address Home Phone MRN    1950  [address]   [place] [address] [phone] [id number]    [employer] Marital Status          [employer]        Admission Date Admission Type Admitting Provider Attending Provider Department, Room/Bed    1/9/18 Emergency Angie Ribeiro MD Amsler, Haizia L, MD [address], 408/1    Discharge Date Discharge Disposition Discharge Destination                      Attending Provider: Angie Ribeiro MD     Allergies:  Morphine And Related    Isolation:  None   Infection:  None   Code Status:  FULL    Ht:  157.5 cm (62\")   Wt:  53.6 kg (118 lb 1.6 oz)    Admission Cmt:  None   Principal Problem:  None                Active Insurance as of 1/9/2018     Primary Coverage     Payor Plan Insurance Group Employer/Plan Group    MISC MEDICARE REPLACMENT [facility]      Coverage Address Coverage Phone Number Effective From Effective To    [phone] 1/1/2015     [place] [id number]       Subscriber Name Subscriber Birth Date Member ID       FILOMENA PERALES [id number]                 Emergency Contacts      (Rel.) Home Phone Work Phone Mobile Phone    Torri Rivero (Daughter) [phone] -- [phone]    Susan Green (Relative) [phone] -- [phone]            Problem List           Codes Noted - Resolved       Hospital    Pneumonia of right upper lobe due to infectious organism ICD-10-CM: J18.1  ICD-9-CM: 486 1/9/2018 - Present       Non-Hospital    Peripheral arterial disease ICD-10-CM: I73.9  ICD-9-CM: 443.9 7/6/2017 - Present    Dizziness ICD-10-CM: R42  ICD-9-CM: 780.4 7/6/2017 - Present    Nicotine abuse ICD-10-CM: Z72.0  ICD-9-CM: 305.1 7/6/2017 - Present    " RUQ abdominal pain ICD-10-CM: R10.11  ICD-9-CM: 789.01 10/19/2015 - Present    COPD with exacerbation ICD-10-CM: J44.1  ICD-9-CM: 491.21 9/16/2015 - Present    Low back pain ICD-10-CM: M54.5  ICD-9-CM: 724.2 2/3/2015 - Present    Anxiety ICD-10-CM: F41.9  ICD-9-CM: 300.00 9/4/2014 - Present    Carotid stenosis ICD-10-CM: I65.29  ICD-9-CM: 433.10 9/4/2014 - Present    COPD (chronic obstructive pulmonary disease) ICD-10-CM: J44.9  ICD-9-CM: 496 6/4/2014 - Present    Osteoporosis ICD-10-CM: M81.0  ICD-9-CM: 733.00 12/20/2013 - Present    Other screening mammogram ICD-10-CM: Z12.31  ICD-9-CM: V76.12 9/10/2013 - Present    Insomnia ICD-10-CM: G47.00  ICD-9-CM: 780.52 6/10/2013 - Present    Hypercholesteremia ICD-10-CM: E78.00  ICD-9-CM: 272.0 1/8/2013 - Present    CAD (coronary atherosclerotic disease) ICD-10-CM: I25.10  ICD-9-CM: 414.00 12/18/2012 - Present    Depressive disorder, not elsewhere classified ICD-10-CM: F32.9  ICD-9-CM: 311 10/19/2010 - Present    Benign essential hypertension ICD-10-CM: I10  ICD-9-CM: 401.1 9/3/2009 - Present             History & Physical      Kev Abraham MD at 1/9/2018  5:35 PM                [facility] Medicine Services  INPATIENT HISTORY AND PHYSICAL       Patient Care Team:  DELFINO Smith as PCP - General  Kris Peres MD as Consulting Physician (Cardiology)    Chief complaint   Chief Complaint   Patient presents with   • Shortness of Breath       Subjective     Patient is a 67 y.o. female with history of nicotine dependence, COPD, hypertension, carotid artery stenosis and dyslipidemia who presents to emergency department secondary to 10 day history of progressively worsening cough productive of brownish sputum, nasal congestion, low-grade fever and shortness of air.  She denies chest pain, nausea, vomiting, diarrhea, palpitations, hemoptysis, syncope or presyncope.    Patient recently finished a course of an oral  antibiotics that was prescribed by her family doctor and continued to remain symptomatic despite the outpatient treatment.     In the ER she was noted to have O2 saturation of 88-89% on room air.  She was placed on supplemental oxygen and given a dose of bronchodilators and IV steroids.  Chest x-ray was done and showed a evidence of right upper lobe airspace changes.     Family and social history: Patient lives with her daughter and able to take care of her activities of daily living.  She has been smoking on average of one pack of cigarettes a day for more than 30 years and denies history of alcohol use.  There is family history of hypertension and heart disease.    Review of Systems   Review of Systems   Constitutional: Negative for activity change, appetite change, chills, diaphoresis, fatigue and fever.   HENT: Positive for congestion. Negative for trouble swallowing and voice change.    Eyes: Negative for photophobia and visual disturbance.   Respiratory: Positive for cough and shortness of breath. Negative for choking, chest tightness, wheezing and stridor.    Cardiovascular: Negative for chest pain, palpitations and leg swelling.   Gastrointestinal: Negative for abdominal distention, abdominal pain, blood in stool, constipation, diarrhea, nausea and vomiting.   Endocrine: Negative for cold intolerance, heat intolerance, polydipsia, polyphagia and polyuria.   Genitourinary: Negative for decreased urine volume, difficulty urinating, dysuria, enuresis, flank pain, frequency, hematuria and urgency.   Musculoskeletal: Negative for arthralgias, gait problem, myalgias, neck pain and neck stiffness.   Skin: Negative for pallor, rash and wound.   Neurological: Negative for dizziness, tremors, seizures, syncope, facial asymmetry, speech difficulty, weakness, light-headedness, numbness and headaches.   Hematological: Does not bruise/bleed easily.   Psychiatric/Behavioral: Negative for agitation, behavioral problems and  confusion.       History  Past Medical History:   Diagnosis Date   • Anxiety    • 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 with ulceration     bilateral legs - bilateral iliac stents 2008      • Chronic lower back pain    • COPD (chronic obstructive pulmonary disease)    • Coronary arteriosclerosis    • Essential (primary) hypertension    • History of pulmonary embolus (PE)    • Hyperlipidemia    • Insomnia    • Lumbago    • Nicotine dependence    • Occlusion of artery      and stenosis of bilateral carotid arteries - BABS 16-49%, LICA 0-15%   • Other atherosclerosis of native artery of other extremity     LEFT subclavian stent 2005 (occluded)     Past Surgical History:   Procedure Laterality Date   • CARDIAC CATHETERIZATION  04/06/2016    No evidence of any obstructive epicardial CAD.Preserved LV systolic function with EF of 55%.   • CENTRAL VENOUS LINE INSERTION  04/07/2016    Successful placement of right uppe extremity 6-French triple lumen PICC line.   • TOTAL SHOULDER REPLACEMENT Left    • TRANSESOPHAGEAL ECHOCARDIOGRAM (JACQUES)  04/07/2016    With color flow-Mild to moderate [facility].LV systolic function well preserved with Ef of 55-60%.Mitral and AV intact.Diastolic dysfunction     Family History   Problem Relation Age of Onset   • Heart disease Other    • Hypertension Other      Social History   Substance Use Topics   • Smoking status: Current Every Day Smoker     Packs/day: 1.00   • Smokeless tobacco: Never Used   • Alcohol use No       (Not in a hospital admission)  Allergies:  Morphine and related  Prior to Admission medications    Medication Sig Start Date End Date Taking? Authorizing Provider   albuterol (PROVENTIL HFA;VENTOLIN HFA) 108 (90 BASE) MCG/ACT inhaler Inhale 2 puffs every night.    Historical Provider, MD   albuterol (PROVENTIL) (2.5 MG/3ML) 0.083% nebulizer solution Take 2.5 mg by nebulization Every 8 (Eight) Hours As Needed for Wheezing.    Historical Provider, MD   amLODIPine  (NORVASC) 10 MG tablet Take 10 mg by mouth Daily.    Historical Provider, MD   apixaban (ELIQUIS) 5 MG tablet tablet Take 1 tablet by mouth 2 (Two) Times a Day. 8/8/17   Kris Peres MD   Fluticasone Furoate-Vilanterol (BREO ELLIPTA) 100-25 MCG/INH aerosol powder  Inhale 1 puff Daily.    Historical Provider, MD   gabapentin (NEURONTIN) 800 MG tablet Take 800 mg by mouth 3 (Three) Times a Day.    Historical Provider, MD   meclizine (ANTIVERT) 25 MG tablet Take 25 mg by mouth 3 (Three) Times a Day As Needed for dizziness.    Historical Provider, MD   ondansetron (ZOFRAN) 4 MG tablet Take 4 mg by mouth Every 8 (Eight) Hours As Needed for Nausea or Vomiting.    Historical Provider, MD   penciclovir (DENAVIR) 1 % cream Apply 1 application topically Every 2 (Two) Hours.    Historical Provider, MD   pravastatin (PRAVACHOL) 20 MG tablet Take 20 mg by mouth Daily.    Historical Provider, MD   sertraline (ZOLOFT) 100 MG tablet Take 100 mg by mouth Daily.    Historical Provider, MD   traZODone (DESYREL) 300 MG tablet Take 300 mg by mouth Every Night.    Historical Provider, MD       Objective     Vital Signs    Temp:  [98.6 °F (37 °C)] 98.6 °F (37 °C)  Heart Rate:  [64-79] 64  Resp:  [16-22] 16  BP: (107-154)/(68-80) 151/74    Physical Exam:      Physical Exam   Constitutional: She is oriented to person, place, and time. She appears well-developed and well-nourished. She is cooperative. No distress.   HENT:   Head: Normocephalic and atraumatic.   Right Ear: External ear normal.   Left Ear: External ear normal.   Nose: Nose normal.   Mouth/Throat: Oropharynx is clear and moist.   Eyes: Conjunctivae and EOM are normal. Pupils are equal, round, and reactive to light.   Neck: Normal range of motion. Neck supple. No JVD present. No thyromegaly present.   Cardiovascular: Normal rate, regular rhythm, normal heart sounds and intact distal pulses.  Exam reveals no gallop and no friction rub.    No murmur  heard.  Pulmonary/Chest: Effort normal. No stridor. No respiratory distress. She has decreased breath sounds in the right lower field and the left lower field. She has wheezes. She has rhonchi. She has no rales. She exhibits no tenderness.   Abdominal: Soft. Bowel sounds are normal. She exhibits no distension and no mass. There is no tenderness. There is no rebound and no guarding. No hernia.   Musculoskeletal: Normal range of motion. She exhibits no edema, tenderness or deformity.   Neurological: She is alert and oriented to person, place, and time. She has normal reflexes. She displays normal reflexes. No cranial nerve deficit. She exhibits normal muscle tone.   Skin: Skin is warm and dry. No rash noted. She is not diaphoretic. No erythema. No pallor.   Psychiatric: She has a normal mood and affect. Her behavior is normal. Judgment and thought content normal.   Nursing note and vitals reviewed.      Results Review:           Invalid input(s): LABALBU, PROT            Results from last 7 days  Lab Units 01/09/18  1646   WBC 10*3/mm3 11.97*   HEMOGLOBIN g/dL 12.2   HEMATOCRIT % 35.4   PLATELETS 10*3/mm3 321         Results from last 7 days  Lab Units 01/09/18  1646   INR  0.95     Imaging Results (last 7 days)     Procedure Component Value Units Date/Time    XR Chest 2 View [[id number]] Collected:  01/09/18 1554     Updated:  01/09/18 1610    Narrative:         EXAM:          Radiograph(s), Chest   VIEWS:    PA / Lat ; 2       DATE/TIME:  1/9/2018 4:07 PM CST                INDICATION:   shortness of breath    COMPARISON:  CXR: 4/6/16             FINDINGS:             - lines/tubes:    none     - cardiac:         size within normal limits         - mediastinum: contour within normal limits         - lungs:         Linearly oriented airspace disease of the right  upper lobe. The remainder of the lungs are clear. Left lung  granuloma noted.             - pleura:         no evidence of  fluid                  - osseous:          Status post left shoulder repair.                   - misc.:         Impression:       CONCLUSION:        1. Right upper lobe airspace disease presumably representing a  focus of pneumonia.                                               Electronically signed by:  KRYSTEN Cast MD  1/9/2018 4:09 PM  CST Workstation: [id number]    IR PICC wo fluoro guidance [[id number]] Resulted:  01/09/18 1654     Updated:  01/09/18 1654    Narrative:       This procedure was auto-finalized with no dictation required.    US Guided Vascular Access [[id number]] Resulted:  01/09/18 1716     Updated:  01/09/18 1716    Narrative:       This procedure was auto-finalized with no dictation required.    XR Chest Post CVA Port [[id number]] Collected:  01/09/18 1655     Updated:  01/09/18 1719    Narrative:         EXAM:         Radiograph(s), Chest   VIEWS:   Portable ; 1       DATE/TIME:  1/9/2018 5:15 PM CST                INDICATION:   PICC placement    COMPARISON:  CXR: 4/7/16             FINDINGS:             - lines/tubes:    Right approach PICC line in standard position.      - cardiac:         size within normal limits         - mediastinum: contour within normal limits         - lungs:         Focus of airspace disease in the right lung,  probably lower lobe.             - pleura:         no evidence of  fluid                  - osseous:         Status post left shoulder replacement.                   - misc.:         Impression:       CONCLUSION:        1. Right approach PICC line in standard position.  2. Suspect focal airspace disease in the right lower lobe.                                                Electronically signed by:  KRYSTEN Cast MD  1/9/2018 5:18 PM  CST Workstation: [id number]          Assessment/Plan   1.  Pneumonia: Patient had failed outpatient therapy.  She'll be admitted for IV antibiotics and will be continued on supplemental oxygen.  Check blood cultures and influenza screen.  2.  History of  nicotine dependence/COPD with possible COPD exacerbation: Continue supplemental oxygen, bronchodilators and begin steroids.  She'll be placed on nicotine patch and has been advised on the need to quit smoking.  3.  Hypertension: Continue home medications and adjust as needed to achieve optimal blood pressure control.  4.  Begin GI prophylaxis and continue Apixaban.  5.  Further treatment plans or diagnostic studies as hospital course dictates.      I discussed the patients findings and my recommendations with patient and her daughter..     Kev Abraham MD  01/09/18  5:36 PM        Total Time Spent: 50 minutes    EMR Dragon/Transcription disclaimer:   Much of this encounter note is an electronic transcription/translation of spoken language to printed text. The electronic translation of spoken language may permit erroneous, or at times, nonsensical words or phrases to be inadvertently transcribed; Although I have reviewed the note for such errors, some may still exist.                 Electronically signed by Kev Abraham MD at 1/9/2018  5:49 PM           Emergency Department Notes      RACHEL Lange at 1/9/2018  2:30 PM      Procedure Orders:    1. ECG 12 Lead [[id number]] ordered by RACHEL Lange at 01/09/18 1433           Attestation signed by Chaitanya Domingo MD at 1/9/2018 11:14 PM              For this patient encounter, I reviewed the NP or PA documentation, treatment plan, and medical decision making. Chaitanya Domingo MD 1/9/2018 11:14 PM                               Subjective   Patient is a 67 y.o. female presenting with shortness of breath.   History provided by:  Patient   used: No    Shortness of Breath   Severity:  Mild  Onset quality:  Gradual  Duration:  30 days  Timing:  Constant  Progression:  Worsening  Chronicity:  Recurrent  Context: smoke exposure    Context: not activity, not animal exposure, not emotional upset, not fumes, not known allergens, not  occupational exposure, not pollens, not strong odors, not URI and not weather changes    Relieved by:  Nothing  Worsened by:  Deep breathing and coughing  Ineffective treatments:  None tried  Associated symptoms: cough, fever, sputum production and wheezing    Associated symptoms: no abdominal pain, no chest pain, no claudication, no diaphoresis, no ear pain, no headaches, no hemoptysis, no neck pain, no PND, no rash, no sore throat, no syncope, no swollen glands and no vomiting    Risk factors: tobacco use    Risk factors: no recent alcohol use, no family hx of DVT, no hx of cancer, no hx of PE/DVT, no obesity, no oral contraceptive use, no prolonged immobilization and no recent surgery        Review of Systems   Constitutional: Positive for fever. Negative for activity change, appetite change, chills, diaphoresis, fatigue and unexpected weight change.   HENT: Negative.  Negative for ear pain and sore throat.    Eyes: Negative.    Respiratory: Positive for cough, sputum production, shortness of breath and wheezing. Negative for apnea, hemoptysis, choking, chest tightness and stridor.    Cardiovascular: Negative.  Negative for chest pain, claudication, syncope and PND.   Gastrointestinal: Negative.  Negative for abdominal pain and vomiting.   Endocrine: Negative.    Genitourinary: Negative.    Musculoskeletal: Negative.  Negative for neck pain.   Skin: Negative.  Negative for rash.   Allergic/Immunologic: Negative.    Neurological: Negative.  Negative for headaches.   Hematological: Negative.    Psychiatric/Behavioral: Negative.        Past Medical History:   Diagnosis Date   • Anxiety    • 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 with ulceration     bilateral legs - bilateral iliac stents 2008      • Chronic lower back pain    • COPD (chronic obstructive pulmonary disease)    • Coronary arteriosclerosis    • Essential (primary) hypertension    • History of pulmonary embolus (PE)    • Hyperlipidemia     • Insomnia    • Lumbago    • Nicotine dependence    • Occlusion of artery      and stenosis of bilateral carotid arteries - BABS 16-49%, LICA 0-15%   • Other atherosclerosis of native artery of other extremity     LEFT subclavian stent 2005 (occluded)       Allergies   Allergen Reactions   • Morphine And Related Itching, Confusion and Hallucinations       Past Surgical History:   Procedure Laterality Date   • CARDIAC CATHETERIZATION  04/06/2016    No evidence of any obstructive epicardial CAD.Preserved LV systolic function with EF of 55%.   • CENTRAL VENOUS LINE INSERTION  04/07/2016    Successful placement of right uppe extremity 6-Turkmen triple lumen PICC line.   • TOTAL SHOULDER REPLACEMENT Left    • TRANSESOPHAGEAL ECHOCARDIOGRAM (JACQUES)  04/07/2016    With color flow-Mild to moderate [facility].LV systolic function well preserved with Ef of 55-60%.Mitral and AV intact.Diastolic dysfunction       Family History   Problem Relation Age of Onset   • Heart disease Other    • Hypertension Other        Social History     Social History   • Marital status:      Spouse name: N/A   • Number of children: N/A   • Years of education: N/A     Social History Main Topics   • Smoking status: Current Every Day Smoker     Packs/day: 1.00   • Smokeless tobacco: Never Used   • Alcohol use No   • Drug use: No   • Sexual activity: Not Asked     Other Topics Concern   • None     Social History Narrative   • None           Objective   Physical Exam   Constitutional: She is oriented to person, place, and time. She appears well-developed and well-nourished. No distress. She is not intubated.   HENT:   Head: Normocephalic and atraumatic.   Eyes: Conjunctivae and EOM are normal. Pupils are equal, round, and reactive to light. Right eye exhibits no discharge. Left eye exhibits no discharge. No scleral icterus.   Neck: Normal range of motion. Neck supple. No JVD present. No tracheal deviation present. No thyromegaly present.    Cardiovascular: Normal rate, regular rhythm, normal heart sounds and intact distal pulses.  Exam reveals no gallop and no friction rub.    No murmur heard.  Pulmonary/Chest: Effort normal. No accessory muscle usage or stridor. No tachypnea and no bradypnea. She is not intubated. No respiratory distress. She has decreased breath sounds. She has wheezes. She has rhonchi. She has rales. She exhibits no tenderness.   Abdominal: Soft. Bowel sounds are normal. She exhibits no distension and no mass. There is no tenderness. There is no rebound and no guarding. No hernia.   Musculoskeletal: Normal range of motion. She exhibits no edema, tenderness or deformity.   Lymphadenopathy:     She has no cervical adenopathy.   Neurological: She is alert and oriented to person, place, and time. She has normal reflexes.   Skin: Skin is warm and dry. No rash noted. She is not diaphoretic. No erythema. No pallor.   Psychiatric: She has a normal mood and affect. Her behavior is normal. Judgment and thought content normal.   Nursing note and vitals reviewed.      ECG 12 Lead    Date/Time: 1/9/2018 3:16 PM  Performed by: DAKOTA LLANES  Authorized by: DAKOTA LLANES   Interpreted by physician  Rhythm: sinus rhythm  Rate: normal  BPM: 64  Clinical impression: normal ECG              ED Course  ED Course      Labs Reviewed   CBC WITH AUTO DIFFERENTIAL - Abnormal; Notable for the following:        Result Value    WBC 11.97 (*)     Monocytes, Absolute 1.41 (*)     Immature Grans, Absolute 0.05 (*)     All other components within normal limits   PROTIME-INR - Normal    Narrative:     Therapeutic range for most indications is 2.0-3.0 INR,  or 2.5-3.5 for mechanical heart valves.   APTT - Normal    Narrative:     The recommended Heparin therapeutic range is 68-97 seconds.   BNP (IN-HOUSE) - Normal   URINALYSIS W/ CULTURE IF INDICATED - Normal    Narrative:     Urine microscopic not indicated.   CK MB - Normal   TROPONIN (IN-HOUSE) - Normal    RAINBOW DRAW    Narrative:     The following orders were created for panel order [place] Draw.  Procedure                               Abnormality         Status                     ---------                               -----------         ------                     Light Blue Top[[id number]]                                                              Green Top (Gel)[[id number]]                                                             Lavender Top[[id number]]                                                                Gold Top - SST[[id number]]                                   Final result                 Please view results for these tests on the individual orders.   COMPREHENSIVE METABOLIC PANEL   CBC AND DIFFERENTIAL    Narrative:     The following orders were created for panel order CBC & Differential.  Procedure                               Abnormality         Status                     ---------                               -----------         ------                     CBC Auto Differential[[id number]]        Abnormal            Final result                 Please view results for these tests on the individual orders.   GOLD TOP - SST   EXTRA TUBES    Narrative:     The following orders were created for panel order Extra Tubes.  Procedure                               Abnormality         Status                     ---------                               -----------         ------                     Green Top (Gel)[[id number]]                                  Final result                 Please view results for these tests on the individual orders.   GREEN TOP     Xr Chest 2 View    Result Date: 1/9/2018  Narrative: EXAM:          Radiograph(s), Chest VIEWS:    PA / Lat ; 2     DATE/TIME:  1/9/2018 4:07 PM CST            INDICATION:   shortness of breath  COMPARISON:  CXR: 4/6/16         FINDINGS:         - lines/tubes:    none   - cardiac:         size within normal limits       - mediastinum:  contour within normal limits       - lungs:         Linearly oriented airspace disease of the right upper lobe. The remainder of the lungs are clear. Left lung granuloma noted.           - pleura:         no evidence of  fluid                - osseous:         Status post left shoulder repair.               - misc.:        Impression: CONCLUSION:    1. Right upper lobe airspace disease presumably representing a focus of pneumonia.                                   Electronically signed by:  KRYSTEN Cast MD  1/9/2018 4:09 PM CST Workstation: [id number]    Us Guided Vascular Access    Result Date: 1/9/2018  Narrative: This procedure was auto-finalized with no dictation required.    Xr Chest Post Cva Port    Result Date: 1/9/2018  Narrative: EXAM:         Radiograph(s), Chest VIEWS:   Portable ; 1     DATE/TIME:  1/9/2018 5:15 PM CST            INDICATION:   PICC placement  COMPARISON:  CXR: 4/7/16         FINDINGS:         - lines/tubes:    Right approach PICC line in standard position.  - cardiac:         size within normal limits       - mediastinum: contour within normal limits       - lungs:         Focus of airspace disease in the right lung, probably lower lobe.           - pleura:         no evidence of  fluid                - osseous:         Status post left shoulder replacement.               - misc.:        Impression: CONCLUSION:    1. Right approach PICC line in standard position. 2. Suspect focal airspace disease in the right lower lobe.                                  Electronically signed by:  KRYSTEN Cast MD  1/9/2018 5:18 PM CST Workstation: [id number]    Ir Picc Wo Fluoro Guidance    Result Date: 1/9/2018  Narrative: This procedure was auto-finalized with no dictation required.                MDM  Number of Diagnoses or Management Options      Final diagnoses:   Pneumonia of right upper lobe due to infectious organism   Hypoxia            RACHEL Lange  01/09/18 7169       Electronically  signed by Chaitanya Domingo MD at 1/9/2018 11:14 PM      Jordin Merino RN at 1/9/2018  3:27 PM          Pt refused ABG draw. PA notified.     Jordin Merino RN  01/09/18 1527       Electronically signed by Jordin Merino RN at 1/9/2018  3:27 PM      Jordin Merino RN at 1/9/2018  3:27 PM          Two unsuccessful IV attempts.     Jordin Merino RN  01/09/18 1527       Electronically signed by Jordin Merino RN at 1/9/2018  3:27 PM      Jordin Merino RN at 1/9/2018  4:09 PM          Pt refuses blood draw until PICC line is inserted.     Jordin Merino RN  01/09/18 1609       Electronically signed by Jordin Merino RN at 1/9/2018  4:09 PM      Jordin Merino RN at 1/9/2018  4:24 PM          PICC team at bedside.     Jordin Merino RN  01/09/18 1624       Electronically signed by Jordin Merino RN at 1/9/2018  4:24 PM      Jordin Merino RN at 1/9/2018  5:53 PM          Unable to draw blood from PICC, phlebotomist notified.     Jordin Merino RN  01/09/18 1755       Electronically signed by Jordin Merino RN at 1/9/2018  5:55 PM        Vital Signs (last 72 hrs)       01/07 0700  -  01/08 0659 01/08 0700  -  01/09 0659 01/09 0700  -  01/10 0659 01/10 0700  -  01/10 1059   Most Recent    Temp (°F)     98.6 -  98.9      98.3     98.3 (36.8)    Heart Rate     64 -  87    74 -  96     84    Resp     16 -  22    18 -  20     20    BP     107/71 -  154/80      112/68     112/68    SpO2 (%)     (!)89 -  95    (!)88 -  96     96          Intake & Output (last 3 days)       01/07 0701 - 01/08 0700 01/08 0701 - 01/09 0700 01/09 0701 - 01/10 0700 01/10 0701 - 01/11 0700    P.O.   120 480    Total Intake(mL/kg)   120 (2.2) 480 (9)    Net     +120 +480            Unmeasured Urine Occurrence   1 x         Lines, Drains & Airways    Active LDAs     Name:   Placement date:   Placement time:   Site:   Days:    PICC Line - Triple Lumen 01/09/18 1653 01/09/18    1653      less than 1                Hospital Medications (active)       Dose  Frequency Start End    acetaminophen (TYLENOL) tablet 650 mg 650 mg Every 6 Hours PRN 1/9/2018     Sig - Route: Take 2 tablets by mouth Every 6 (Six) Hours As Needed for Mild Pain . - Oral    amLODIPine (NORVASC) tablet 10 mg 10 mg Daily 1/10/2018     Sig - Route: Take 1 tablet by mouth Daily. - Oral    apixaban (ELIQUIS) tablet 5 mg 5 mg Every 12 Hours Scheduled 1/9/2018     Sig - Route: Take 1 tablet by mouth Every 12 (Twelve) Hours. - Oral    atorvastatin (LIPITOR) tablet 10 mg 10 mg Daily 1/10/2018     Sig - Route: Take 1 tablet by mouth Daily. - Oral    AZITHROMYCIN 500 MG/250 ML 0.9% NS IVPB (vial-mate) 500 mg Once 1/9/2018 1/9/2018    Sig - Route: Infuse 500 mg into a venous catheter 1 (One) Time. - Intravenous    Cosign for Ordering: Accepted by Chaitanya Domingo MD on 1/9/2018 11:14 PM    AZITHROMYCIN 500 MG/250 ML 0.9% NS IVPB (vial-mate) 500 mg Every 24 Hours 1/10/2018 1/14/2018    Sig - Route: Infuse 500 mg into a venous catheter Daily. - Intravenous    benzonatate (TESSALON) capsule 100 mg 100 mg 3 Times Daily PRN 1/9/2018     Sig - Route: Take 1 capsule by mouth 3 (Three) Times a Day As Needed for Cough. - Oral    bisacodyl (DULCOLAX) EC tablet 5 mg 5 mg Daily PRN 1/9/2018     Sig - Route: Take 1 tablet by mouth Daily As Needed for Constipation. - Oral    budesonide-formoterol (SYMBICORT) 160-4.5 MCG/ACT inhaler 2 puff 2 puff 2 Times Daily - RT 1/9/2018     Sig - Route: Inhale 2 puffs 2 (Two) Times a Day. - Inhalation    cefTRIAXone (ROCEPHIN) in NS 1 gram/10ml IV PUSH syringe 1 g Every 24 Hours 1/9/2018 1/16/2018    Sig - Route: Infuse 10 mL into a venous catheter Daily. - Intravenous    famotidine (PEPCID) tablet 40 mg 40 mg Daily 1/10/2018     Sig - Route: Take 1 tablet by mouth Daily. - Oral    gabapentin (NEURONTIN) capsule 800 mg 800 mg Every 8 Hours Scheduled 1/9/2018     Sig - Route: Take 2 capsules by mouth Every 8 (Eight) Hours. - Oral    ipratropium-albuterol (DUO-NEB) nebulizer solution 3 mL 3  mL Once 1/9/2018 1/9/2018    Sig - Route: Take 3 mL by nebulization 1 (One) Time. - Nebulization    Cosign for Ordering: Accepted by Chaitanya Domingo MD on 1/9/2018 11:14 PM    ipratropium-albuterol (DUO-NEB) nebulizer solution 3 mL 3 mL 4 Times Daily - RT 1/9/2018     Sig - Route: Take 3 mL by nebulization 4 (Four) Times a Day. - Nebulization    meclizine (ANTIVERT) tablet 25 mg 25 mg 3 Times Daily PRN 1/9/2018     Sig - Route: Take 1 tablet by mouth 3 (Three) Times a Day As Needed for dizziness. - Oral    methylPREDNISolone sodium succinate (SOLU-Medrol) injection 125 mg 125 mg Once 1/9/2018 1/9/2018    Sig - Route: Infuse 2 mL into a venous catheter 1 (One) Time. - Intravenous    Cosign for Ordering: Accepted by Chaitanya Domingo MD on 1/9/2018 11:14 PM    methylPREDNISolone sodium succinate (SOLU-Medrol) injection 60 mg 60 mg Every 8 Hours 1/10/2018     Sig - Route: Infuse 0.96 mL into a venous catheter Every 8 (Eight) Hours. - Intravenous    nicotine (NICODERM CQ) 21 MG/24HR patch 1 patch 1 patch Every 24 Hours 1/9/2018     Sig - Route: Place 1 patch on the skin Daily. - Transdermal    ondansetron (ZOFRAN) tablet 4 mg 4 mg Every 8 Hours PRN 1/9/2018     Sig - Route: Take 1 tablet by mouth Every 8 (Eight) Hours As Needed for Nausea or Vomiting. - Oral    promethazine (PHENERGAN) injection 12.5 mg 12.5 mg Once 1/9/2018 1/9/2018    Sig - Route: Infuse 0.5 mL into a venous catheter 1 (One) Time. - Intravenous    Cosign for Ordering: Required by [facility]    sertraline (ZOLOFT) tablet 100 mg 100 mg Daily 1/10/2018     Sig - Route: Take 2 tablets by mouth Daily. - Oral    sodium chloride 0.9 % flush 1-10 mL 1-10 mL As Needed 1/9/2018     Sig - Route: Infuse 1-10 mL into a venous catheter As Needed for Line Care. - Intravenous    sodium chloride 0.9 % flush 10 mL 10 mL As Needed 1/9/2018     Sig - Route: Infuse 10 mL into a venous catheter As Needed for Line Care. - Intravenous    Cosign for Ordering: Accepted by  "Chaitanya Domingo MD on 1/9/2018 11:14 PM    Linked Group 1:  \"And\" Linked Group Details        sodium chloride 0.9 % infusion 75 mL/hr Continuous 1/9/2018     Sig - Route: Infuse 75 mL/hr into a venous catheter Continuous. - Intravenous    traMADol (ULTRAM) tablet 50 mg 50 mg Every 6 Hours PRN 1/10/2018 1/20/2018    Sig - Route: Take 1 tablet by mouth Every 6 (Six) Hours As Needed for Moderate Pain . - Oral    traZODone (DESYREL) tablet 300 mg 300 mg Nightly 1/9/2018     Sig - Route: Take 2 tablets by mouth Every Night. - Oral    aspirin chewable tablet 325 mg (Discontinued) 325 mg Once 1/9/2018 1/9/2018    Sig - Route: Chew 4 tablets 1 (One) Time. - Oral    Cosign for Ordering: Accepted by Chaitanya Domingo MD on 1/9/2018 11:14 PM          Lab Results (last 72 hours)     Procedure Component Value Units Date/Time    CBC & Differential [[id number]] Collected:  01/09/18 1646    Specimen:  Blood Updated:  01/09/18 1658    Narrative:       The following orders were created for panel order CBC & Differential.  Procedure                               Abnormality         Status                     ---------                               -----------         ------                     CBC Auto Differential[[id number]]        Abnormal            Final result                 Please view results for these tests on the individual orders.    CBC Auto Differential [[id number]]  (Abnormal) Collected:  01/09/18 1646    Specimen:  Blood Updated:  01/09/18 1658     WBC 11.97 (H) 10*3/mm3      RBC 3.91 10*6/mm3      Hemoglobin 12.2 g/dL      Hematocrit 35.4 %      MCV 90.5 fL      MCH 31.2 pg      MCHC 34.5 g/dL      RDW 12.9 %      RDW-SD 42.5 fl      MPV 8.8 fL      Platelets 321 10*3/mm3      Neutrophil % 56.9 %      Lymphocyte % 29.5 %      Monocyte % 11.8 %      Eosinophil % 1.0 %      Basophil % 0.4 %      Immature Grans % 0.4 %      Neutrophils, Absolute 6.81 10*3/mm3      Lymphocytes, Absolute 3.53 10*3/mm3      Monocytes, Absolute " 1.41 (H) 10*3/mm3      Eosinophils, Absolute 0.12 10*3/mm3      Basophils, Absolute 0.05 10*3/mm3      Immature Grans, Absolute 0.05 (H) 10*3/mm3      nRBC 0.0 /100 WBC     Protime-INR [[id number]]  (Normal) Collected:  01/09/18 1646    Specimen:  Blood from Arm, Right Updated:  01/09/18 1720     Protime 12.6 Seconds      INR 0.95    Narrative:       Therapeutic range for most indications is 2.0-3.0 INR,  or 2.5-3.5 for mechanical heart valves.    aPTT [[id number]]  (Normal) Collected:  01/09/18 1646    Specimen:  Blood from Arm, Right Updated:  01/09/18 1720     PTT 34.7 seconds     Narrative:       The recommended Heparin therapeutic range is 68-97 seconds.    Troponin [[id number]]  (Normal) Collected:  01/09/18 1646    Specimen:  Blood Updated:  01/09/18 1724     Troponin I <0.012 ng/mL     BNP [[id number]]  (Normal) Collected:  01/09/18 1646    Specimen:  Blood Updated:  01/09/18 1724     proBNP 199.0 pg/mL     CK-MB [[id number]]  (Normal) Collected:  01/09/18 1646    Specimen:  Blood Updated:  01/09/18 1724     CKMB 1.65 ng/mL     [place] Draw [[id number]] Collected:  01/09/18 1646    Specimen:  Blood Updated:  01/09/18 1801    Narrative:       The following orders were created for panel order [place] Draw.  Procedure                               Abnormality         Status                     ---------                               -----------         ------                     Light Blue Top[[id number]]                                                              Green Top (Gel)[[id number]]                                                             Lavender Top[[id number]]                                                                Gold Top - SST[[id number]]                                   Final result                 Please view results for these tests on the individual orders.    Gold Top - SST [[id number]] Collected:  01/09/18 1646    Specimen:  Blood Updated:  01/09/18 1801     Extra Tube Hold for add-ons.       Auto resulted.       Extra Tubes [[id number]] Collected:  01/09/18 1646    Specimen:  Blood from Blood, Venous Line Updated:  01/09/18 1801    Narrative:       The following orders were created for panel order Extra Tubes.  Procedure                               Abnormality         Status                     ---------                               -----------         ------                     Green Top (Gel)[[id number]]                                  Final result                 Please view results for these tests on the individual orders.    Green Top (Gel) [[id number]] Collected:  01/09/18 1646    Specimen:  Blood Updated:  01/09/18 1801     Extra Tube Hold for add-ons.      Auto resulted.       Urinalysis With / Culture If Indicated - Urine, Clean Catch [[id number]]  (Normal) Collected:  01/09/18 1757    Specimen:  Urine from Urine, Clean Catch Updated:  01/09/18 1809     Color, UA Yellow     Appearance, UA Clear     pH, UA 6.5     Specific Gravity, UA 1.010     Glucose, UA Negative     Ketones, UA Negative     Bilirubin, UA Negative     Blood, UA Negative     Protein, UA Negative     Leuk Esterase, UA Negative     Nitrite, UA Negative     Urobilinogen, UA 0.2 E.U./dL    Narrative:       Urine microscopic not indicated.    Comprehensive Metabolic Panel [[id number]]  (Abnormal) Collected:  01/09/18 1828    Specimen:  Blood from Hand, Right Updated:  01/09/18 1854     Glucose 101 (H) mg/dL      BUN 11 mg/dL      Creatinine 0.50 mg/dL      Sodium 133 (L) mmol/L      Potassium 4.3 mmol/L      Chloride 100 mmol/L      CO2 23.0 mmol/L      Calcium 9.5 mg/dL      Total Protein 7.5 g/dL      Albumin 4.10 g/dL      ALT (SGPT) 31 U/L      AST (SGOT) 23 U/L      Alkaline Phosphatase 92 U/L      Total Bilirubin 0.4 mg/dL      eGFR Non African Amer 124 (H) mL/min/1.73      Globulin 3.4 gm/dL      A/G Ratio 1.2 g/dL      BUN/Creatinine Ratio 22.0     Anion Gap 10.0 mmol/L     Extra Tubes [[id number]] Collected:  01/09/18 1646     Specimen:  Blood from Blood, Venous Line Updated:  01/09/18 1946    Narrative:       The following orders were created for panel order Extra Tubes.  Procedure                               Abnormality         Status                     ---------                               -----------         ------                     Blood Culture Bottle Set[[id number]]                         Final result                 Please view results for these tests on the individual orders.    Blood Culture Bottle Set [[id number]] Collected:  01/09/18 1646    Specimen:  Blood from Blood, Arterial Line Updated:  01/09/18 1946     Extra Tube Hold for add-ons.      Auto resulted.       Blood Culture - Blood, [[id number]] Collected:  01/09/18 2258    Specimen:  Blood from Arm, Left Updated:  01/09/18 2318    Blood Culture - Blood, [[id number]]  (Normal) Collected:  01/09/18 1646    Specimen:  Blood from Blood, Arterial Line Updated:  01/10/18 1046     Blood Culture No growth at less than 24 hours          Imaging Results (last 72 hours)     Procedure Component Value Units Date/Time    XR Chest 2 View [[id number]] Collected:  01/09/18 1554     Updated:  01/09/18 1610    Narrative:         EXAM:          Radiograph(s), Chest   VIEWS:    PA / Lat ; 2       DATE/TIME:  1/9/2018 4:07 PM CST                INDICATION:   shortness of breath    COMPARISON:  CXR: 4/6/16             FINDINGS:             - lines/tubes:    none     - cardiac:         size within normal limits         - mediastinum: contour within normal limits         - lungs:         Linearly oriented airspace disease of the right  upper lobe. The remainder of the lungs are clear. Left lung  granuloma noted.             - pleura:         no evidence of  fluid                  - osseous:         Status post left shoulder repair.                   - misc.:         Impression:       CONCLUSION:        1. Right upper lobe airspace disease presumably representing a  focus of pneumonia.                                                Electronically signed by:  KRYSTEN Cast MD  1/9/2018 4:09 PM  CST Workstation: [id number]    IR PICC wo fluoro guidance [[id number]] Resulted:  01/09/18 1654     Updated:  01/09/18 1654    Narrative:       This procedure was auto-finalized with no dictation required.    US Guided Vascular Access [[id number]] Resulted:  01/09/18 1716     Updated:  01/09/18 1716    Narrative:       This procedure was auto-finalized with no dictation required.    XR Chest Post CVA Port [[id number]] Collected:  01/09/18 1655     Updated:  01/09/18 1719    Narrative:         EXAM:         Radiograph(s), Chest   VIEWS:   Portable ; 1       DATE/TIME:  1/9/2018 5:15 PM CST                INDICATION:   PICC placement    COMPARISON:  CXR: 4/7/16             FINDINGS:             - lines/tubes:    Right approach PICC line in standard position.      - cardiac:         size within normal limits         - mediastinum: contour within normal limits         - lungs:         Focus of airspace disease in the right lung,  probably lower lobe.             - pleura:         no evidence of  fluid                  - osseous:         Status post left shoulder replacement.                   - misc.:         Impression:       CONCLUSION:        1. Right approach PICC line in standard position.  2. Suspect focal airspace disease in the right lower lobe.                                                Electronically signed by:  KRYSTEN Cast MD  1/9/2018 5:18 PM  CST Workstation: [id number]          ECG/EMG Results (most recent)     Procedure Component Value Units Date/Time    SCANNED EKG [[id number]] Resulted:  01/09/18      Updated:  01/09/18 1528    ECG 12 Lead [[id number]] Collected:  01/09/18 1454     Updated:  01/10/18 0753    Narrative:       Test Reason : chest pain  Blood Pressure : **/** mmHG  Vent. Rate : 064 BPM     Atrial Rate : 064 BPM     P-R Int : 152 ms          QRS Dur : 088 ms      QT Int : 414 ms        P-R-T Axes : 031 -03 104 degrees     QTc Int : 427 ms    Normal sinus rhythm  Left ventricular hypertrophy with repolarization abnormality  Inferior infarct , age undetermined  Abnormal ECG    Confirmed by YASMANY BECKMAN MD ([id number]) on 1/10/2018 7:53:09 AM    Referred By:             Confirmed By:YASMANY BECKMAN MD

## 2018-01-10 NOTE — PROGRESS NOTES
[facility] Medicine Services  INPATIENT PROGRESS NOTE    Length of Stay: 1  Date of Admission: 1/9/2018  Primary Care Physician: DELFINO Smith    Subjective     Chief Complaint   Patient presents with   • Shortness of Breath       HPI:  Patient was seen and examined this morning. Patient feels slightly better , still complains of dyspnea and cough and feeling tired and weak.  Patient is tolerating diet , passing gas, Patient denies, headache or dizziness, chest pain or palpitations,abdominal pain N/V/D , blood in the urine or blood in the stool , or any urinary symptoms , weakness or numbness or tingling in the extremities or visual changes.    Review of Systems     All pertinent negatives and positives are as above. All other systems have been reviewed and are negative unless otherwise stated.   Objective    Physical exam    Temp:  [98.3 °F (36.8 °C)-98.9 °F (37.2 °C)] 98.3 °F (36.8 °C)  Heart Rate:  [64-96] 84  Resp:  [16-22] 20  BP: (107-154)/(65-80) 112/68    -Constitutional: Patient is AAO x 3. Patient appears well-developed and well-nourished.   -CVS: Normal rate, regular rhythm, normal heart sounds and intact distal pulses.  Exam reveals no gallop and no friction rub.  No murmur heard.  -Pulm: mild b/l scattered wheezes , diminished b/l  -Abdominal: Soft. Bowel sounds are normal. No distension, no tenderness. There is no rebound and no guarding. No hernia.   -Musculoskeletal: No Cyanosis clubbing or edema.    Results Review:    Laboratory Data:     Results from last 7 days  Lab Units 01/09/18  1828   SODIUM mmol/L 133*   POTASSIUM mmol/L 4.3   CHLORIDE mmol/L 100   CO2 mmol/L 23.0   BUN mg/dL 11   CREATININE mg/dL 0.50   GLUCOSE mg/dL 101*   CALCIUM mg/dL 9.5   BILIRUBIN mg/dL 0.4   ALK PHOS U/L 92   ALT (SGPT) U/L 31   AST (SGOT) U/L 23   ANION GAP mmol/L 10.0     Estimated Creatinine Clearance: 59.3 mL/min (by C-G formula based on Cr of 0.5).             Results from last 7 days  Lab Units 01/10/18  1225 01/09/18  1646   WBC 10*3/mm3 18.77* 11.97*   HEMOGLOBIN g/dL 11.2* 12.2   HEMATOCRIT % 31.9* 35.4   PLATELETS 10*3/mm3 250 321       Results from last 7 days  Lab Units 01/09/18  1646   INR  0.95       Culture Data:   Blood Culture   Date Value Ref Range Status   01/09/2018 No growth at less than 24 hours  Preliminary   01/09/2018 No growth at less than 24 hours  Preliminary     No results found for: URINECX  No results found for: RESPCX  No results found for: WOUNDCX  No results found for: STOOLCX  No components found for: BODYFLD    Micobiology  Blood Culture   Date Value Ref Range Status   01/09/2018 No growth at less than 24 hours  Preliminary                            Radiology Data:   Imaging Results (all)     Procedure Component Value Units Date/Time    XR Chest 2 View [[id number]] Collected:  01/09/18 1554     Updated:  01/09/18 1610    Narrative:         EXAM:          Radiograph(s), Chest   VIEWS:    PA / Lat ; 2       DATE/TIME:  1/9/2018 4:07 PM CST                INDICATION:   shortness of breath    COMPARISON:  CXR: 4/6/16             FINDINGS:             - lines/tubes:    none     - cardiac:         size within normal limits         - mediastinum: contour within normal limits         - lungs:         Linearly oriented airspace disease of the right  upper lobe. The remainder of the lungs are clear. Left lung  granuloma noted.             - pleura:         no evidence of  fluid                  - osseous:         Status post left shoulder repair.                   - misc.:         Impression:       CONCLUSION:        1. Right upper lobe airspace disease presumably representing a  focus of pneumonia.                                               Electronically signed by:  KRYSTEN Cast MD  1/9/2018 4:09 PM  CST Workstation: [id number]    IR PICC wo fluoro guidance [[id number]] Resulted:  01/09/18 1654     Updated:  01/09/18 1654    Narrative:        This procedure was auto-finalized with no dictation required.    US Guided Vascular Access [[id number]] Resulted:  01/09/18 1716     Updated:  01/09/18 1716    Narrative:       This procedure was auto-finalized with no dictation required.    XR Chest Post CVA Port [[id number]] Collected:  01/09/18 1655     Updated:  01/09/18 1719    Narrative:         EXAM:         Radiograph(s), Chest   VIEWS:   Portable ; 1       DATE/TIME:  1/9/2018 5:15 PM CST                INDICATION:   PICC placement    COMPARISON:  CXR: 4/7/16             FINDINGS:             - lines/tubes:    Right approach PICC line in standard position.      - cardiac:         size within normal limits         - mediastinum: contour within normal limits         - lungs:         Focus of airspace disease in the right lung,  probably lower lobe.             - pleura:         no evidence of  fluid                  - osseous:         Status post left shoulder replacement.                   - misc.:         Impression:       CONCLUSION:        1. Right approach PICC line in standard position.  2. Suspect focal airspace disease in the right lower lobe.                                                Electronically signed by:  KRYSTEN Cast MD  1/9/2018 5:18 PM  CST Workstation: [id number]          I have reviewed the patient current medications.   Assessment/Plan     Hospital Problem List     Pneumonia of right upper lobe due to infectious organism          Assessment / Plan    --Pneumonia  failed outpatient therapy.  Slightly improved   continue IV  Antibiotics( rocephin and azithro day 2) and  supplemental oxygen, solumedrol, inceptive tara,      --COPD exacerbation:   Continue supplemental oxygen, bronchodilators and steroids.      --Tobacco use  nicotine patch    --Hypertension:   Controlled  Continue home medications     --GI prophylaxis and continue Apixaban.       This document has been electronically signed by Conner Ingram MD on January 10, 2018  1:08 PM

## 2018-01-10 NOTE — PLAN OF CARE
Problem: Patient Care Overview (Adult)  Goal: Plan of Care Review  Outcome: Ongoing (interventions implemented as appropriate)   01/10/18 1512   Coping/Psychosocial Response Interventions   Plan Of Care Reviewed With patient   Outcome Evaluation   Outcome Summary/Follow up Plan PT evaluation completed today. Pt admitted with PNE and COPD exacerbation. She presents with decreased LE strength, endurance and pulmonary function. She ambulated 300 ft with 2L O2 and SpO2 at 96% after gait. Pt had one episode of LOB in halls needing Fani to correct. Pt will benefit from cont skilled PT to achieve highest level of function prior to d/c home with assist prn.        Problem: Inpatient Physical Therapy  Goal: Transfer Training Goal 1 STG- PT  Outcome: Ongoing (interventions implemented as appropriate)   01/10/18 1512   Transfer Training PT STG   Transfer Training PT STG, Date Established 01/10/18   Transfer Training PT STG, Time to Achieve 5 days   Transfer Training PT STG, Activity Type bed to chair /chair to bed;sit to stand/stand to sit;toilet   Transfer Training PT STG, [place] Level independent     Goal: Gait Training Goal STG- PT  Outcome: Ongoing (interventions implemented as appropriate)   01/10/18 1512   Gait Training PT STG   Gait Training Goal PT STG, Date Established 01/10/18   Gait Training Goal PT STG, Time to Achieve 5 days   Gait Training Goal PT STG, [place] Level conditional independence   Gait Training Goal PT STG, Assist Device cane, straight   Gait Training Goal PT STG, Distance to Achieve 600 ft

## 2018-01-10 NOTE — CONSULTS
"Adult Nutrition  Assessment    Patient Name:  Mona Perales  YOB: 1950  MRN: [id number]  Admit Date:  1/9/2018    Assessment Date:  1/10/2018    Comments:  Pt is a 67 year old female admitted with pneumonia who has concurrent medical hx of COPD.  Pt reports her appetite has been decreased over the past month but has improved since being in the hospital.  PO Intakes 100% x 2.  Pt reports 5 lb wt loss over the past 2 weeks since feeling ill.  No GI complaints.  Pt and family present report that she gets Meals on Wheels and normally only eats one meal daily and snacks for the rest of the time.  Pt willing to try supplements, would like to try Boost which RD encouraged.  Currently on consistent carb diet but does not have DM, glucose elevated w/steroids.  Will monitor.          Reason for Assessment       01/10/18 1431    Reason for Assessment    Reason For Assessment/Visit identified at risk by screening criteria    Identified At Risk By Screening Criteria MST SCORE 2+;unintentional loss of 10 lbs or more in the past 2 mos;reduced oral intake over the last month    Diagnosis Diagnosis    Pulmonary/Critical Care COPD;Pneumonia    Factors Affecting Nutrition Factors    Appetite Poor at this Time;Improved                Nutrition/Diet History       01/10/18 1433    Nutrition/Diet History    Typical Food/Fluid Intake Pt reports her appetite has been decreased over the past month, reports she gets Meals on Wheels and usually saves it for supper so she only eats once daily.    Supplemental Drinks/Foods/Additives Willing to try supplements.            Anthropometrics       01/10/18 1436    Anthropometrics    Height 157.5 cm (62\")    Weight 53.5 kg (118 lb)    Anthropometrics (Special Considerations)    Height Used for Calculations 1.575 m (5' 2\")    Weight Used for Calculations 53.5 kg (118 lb)    RD Calculated     RD Calculated %     Ideal Body Weight (IBW)    Ideal Body Weight (IBW), Female " "50.83    % Ideal Body Weight 105.52    Usual Body Weight (UBW)    Weight Loss 2.268 kg (5 lb)    Weight Loss Time Frame 2 weeks    Body Mass Index (BMI)    BMI (kg/m2) 21.63    BMI Grade 19.1 - 24.9 - normal            Labs/Tests/Procedures/Meds       01/10/18 1438    Labs/Tests/Procedures/Meds    Labs/Tests Review Reviewed    Medication Review Reviewed, pertinent;Steroid              Estimated/Assessed Needs       01/10/18 1439    Calculation Measurements    Weight Used For Calculations 53.5 kg (118 lb)    Height Used for Calculations 1.575 m (5' 2\")    Estimated/Assessed Energy Needs    Energy Need Method Asyscoor    Age 67    RMR ([employer] Equation) 1033.49    Activity Factors ([employer])  --   1.4    Total estimated needs ([place] St[place] Jeor) 1446    Estimated/Assessed Protein Needs    Weight Used for Protein Calculation 53.5 kg (118 lb)    Protein (gm/kg) 1.2    1.2 Gm Protein (gm) 64.23    Estimated Protein Range 54-64    Estimated/Assessed Fluid Needs    Fluid Need Method Other (comment)   1500ml            Nutrition Prescription Ordered       01/10/18 1441    Nutrition Prescription PO    Current PO Diet Regular    Common Modifiers Cardiac;Consistent Carbohydrate            Evaluation of Received Nutrient/Fluid Intake       01/10/18 1441    PO Evaluation    Number of Meals 2    % PO Intake 100            Electronically signed by:  Alma Birmingham RD  01/10/18 2:41 PM  "

## 2018-01-10 NOTE — PLAN OF CARE
Problem: Patient Care Overview (Adult)  Goal: Plan of Care Review  Outcome: Ongoing (interventions implemented as appropriate)   01/10/18 7017   Coping/Psychosocial Response Interventions   Plan Of Care Reviewed With patient;family   Patient Care Overview   Progress no change   Outcome Evaluation   Outcome Summary/Follow up Plan Pt reports appetite improving. Willing to try Boost and may purchase at home to supplement between meals.

## 2018-01-10 NOTE — THERAPY EVALUATION
Acute Care - Occupational Therapy Initial Evaluation  [facility]     Patient Name: Mona Perales  : 1950  MRN: [id number]  Today's Date: 1/10/2018  Onset of Illness/Injury or Date of Surgery Date: 18  Date of Referral to OT: 01/10/18  Referring Physician: Conner Ingram MD    Admit Date: 2018       ICD-10-CM ICD-9-CM   1. Pneumonia of right upper lobe due to infectious organism J18.1 486   2. Hypoxia R09.02 799.02   3. Decreased activities of daily living (ADL) Z78.9 V49.89     Patient Active Problem List   Diagnosis   • Anxiety   • CAD (coronary atherosclerotic disease)   • Carotid stenosis   • COPD (chronic obstructive pulmonary disease)   • COPD with exacerbation   • Depressive disorder, not elsewhere classified   • Benign essential hypertension   • Hypercholesteremia   • Insomnia   • Low back pain   • Osteoporosis   • Other screening mammogram   • RUQ abdominal pain   • Peripheral arterial disease   • Dizziness   • Nicotine abuse   • Pneumonia of right upper lobe due to infectious organism     Past Medical History:   Diagnosis Date   • Anxiety    • Arthritis    • Asthma    • 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 with ulceration     bilateral legs - bilateral iliac stents       • Cancer    • Chronic lower back pain    • COPD (chronic obstructive pulmonary disease)    • Coronary arteriosclerosis    • Essential (primary) hypertension    • History of pulmonary embolus (PE)    • Hyperlipidemia    • Insomnia    • Lumbago    • Nicotine dependence    • Occlusion of artery      and stenosis of bilateral carotid arteries - BABS 16-49%, LICA 0-15%   • Other atherosclerosis of native artery of other extremity     LEFT subclavian stent  (occluded)     Past Surgical History:   Procedure Laterality Date   • CARDIAC CATHETERIZATION  2016    No evidence of any obstructive epicardial CAD.Preserved LV systolic function with EF of 55%.   • CENTRAL VENOUS LINE INSERTION   04/07/2016    Successful placement of right uppe extremity 6-[place] triple lumen PICC line.   • FRACTURE SURGERY     • HYSTERECTOMY     • JOINT REPLACEMENT     • TOTAL SHOULDER REPLACEMENT Left    • TRANSESOPHAGEAL ECHOCARDIOGRAM (JACQUES)  04/07/2016    With color flow-Mild to moderate [facility].LV systolic function well preserved with Ef of 55-60%.Mitral and AV intact.Diastolic dysfunction          OT ASSESSMENT FLOWSHEET (last 72 hours)      OT Evaluation       01/10/18 1424 01/09/18 2213 01/09/18 2140          Rehab Evaluation    Document Type evaluation  -NN        Subjective Information agree to therapy;complains of;weakness;fatigue;pain;dyspnea  -NN        General Information    Patient Profile Review yes  -NN        Onset of Illness/Injury or Date of Surgery Date 01/09/18  -NN        Referring Physician Conner Ingram MD  -NN        General Observations fowlers, IV, O2 via nc, family present  -NN        Pertinent History Of Current Problem Admitted for pneumonia  -NN        Precautions/Limitations fall precautions;oxygen therapy device and L/min  -NN        Prior Level of Function independent:;all household mobility;community mobility;ADL's;home management  -NN        Equipment Currently Used at Home nebulizer;cane, straight;shower chair;walker, rolling   does not use AE/AD  -NN  nebulizer;cane, straight;walker, standard;shower chair  -RE      Plans/Goals Discussed With patient;agreed upon  -NN        Risks Reviewed patient:;LOB;nausea/vomiting;dizziness;increased discomfort;change in vital signs;increased drainage;lines disloged  -NN        Benefits Reviewed patient:;improve function;increase independence;increase strength;increase balance;decrease pain;decrease risk of DVT;improve skin integrity;increase knowledge  -NN        Barriers to Rehab medically complex  -NN        Living Environment    Lives With child(lima), adult   DAUGHTER  -NN child(lima), adult  -RE       Living Arrangements apartment  -NN apartment  -RE        Home Accessibility bed and bath on same level;tub/shower is not walk in;grab bars present (bathtub)  -NN no concerns  -RE       Stair Railings at Home none  -NN none  -RE       Type of Financial/Environmental Concern  none  -RE       Transportation Available  car  -RE       Clinical Impression    Date of Referral to OT 01/10/18  -NN        OT Diagnosis DECREASED ADL  -NN        Prognosis good  -NN        Impairments Found (describe specific impairments) aerobic capacity/endurance;ergonomics and body mechanics;gait, locomotion, and balance;posture  -NN        Patient/Family Goals Statement go home  -NN        Criteria for Skilled Therapeutic Interventions Met yes;treatment indicated  -NN        Rehab Potential good, to achieve stated therapy goals  -NN        Therapy Frequency --   3-14x/week  -NN        Predicted Duration of Therapy Intervention (days/wks) until dc  -NN        Anticipated Discharge Disposition home with assist;home with home health  -NN        Functional Level Prior    Ambulation   0-->independent  -RE      Transferring   0-->independent  -RE      Toileting   0-->independent  -RE      Bathing   0-->independent  -RE      Dressing   0-->independent  -RE      Eating   0-->independent  -RE      Communication   0-->understands/communicates without difficulty  -RE      Swallowing   0-->swallows foods/liquids without difficulty  -RE      Vital Signs    Pre Systolic BP Rehab 109  -NN        Pre Treatment Diastolic BP 59  -NN        Post Systolic BP Rehab 128  -NN        Post Treatment Diastolic BP 61  -NN        Pretreatment Heart Rate (beats/min) 89  -NN        Intratreatment Heart Rate (beats/min) 101  -NN        Posttreatment Heart Rate (beats/min) 113  -NN        Pretreatment Resp Rate (breaths/min) 104  -NN        Pre SpO2 (%) 95  -NN        O2 Delivery Pre Treatment supplemental O2  -NN        Intra SpO2 (%) 94  -NN        O2 Delivery Intra Treatment supplemental O2  -NN        Post SpO2 (%) 96   "-NN        O2 Delivery Post Treatment supplemental O2  -NN        Pre Patient Position Sitting  -NN        Intra Patient Position Standing  -NN        Pain Assessment    Pain Assessment 0-10  -NN        Pain Score --   \"12\"  -NN        Pain Type Chronic pain  -NN        Pain Location Back   and L hip with movement  -NN        Pain Descriptors Aching  -NN        Pain Frequency Constant/continuous  -NN        Pain Intervention(s) Medication (See MAR);Repositioned  -NN        Response to Interventions tolerated  -NN        Vision Assessment/Intervention    Visual Impairment WFL with corrective lenses  -NN        Cognitive Assessment/Intervention    Current Cognitive/Communication Assessment functional  -NN        Orientation Status oriented x 4  -NN        Follows Commands/Answers Questions 100% of the time;able to follow multi-step instructions  -NN        Personal Safety WNL/WFL  -NN        Personal Safety Interventions gait belt;muscle strengthening facilitated;fall prevention program maintained;nonskid shoes/slippers when out of bed;supervised activity  -NN        ROM (Range of Motion)    General ROM no range of motion deficits identified  -NN        MMT (Manual Muscle Testing)    General MMT Assessment Detail BUE functionally 4/5  -NN        Bed Mobility, Assessment/Treatment    Bed Mobility, Scoot/Bridge, [place] conditional independence  -NN        Bed Mob, Supine to Sit, [place] conditional independence  -NN        Bed Mob, Sit to Supine, [place] conditional independence  -NN        Transfer Assessment/Treatment    Transfers, Sit-Stand [place] supervision required  -NN        Transfers, Stand-Sit [place] supervision required  -NN        Functional Mobility    Functional Mobility- Ind. Level supervision required;contact guard assist  -NN        Functional Mobility-Distance (Feet) --   in room in moura  -NN        Functional Mobility- Safety Issues supplemental O2  -NN        Functional " Mobility- Comment Pt. had 1 LOB in moura but easily corrected balance  -NN        Lower Body Dressing Assessment/Training    LB Dressing Assess/Train, Clothing Type doffing:;donning:;socks  -NN        LB Dressing Assess/Train, Position edge of bed  -NN        LB Dressing Assess/Train, [place] independent  -NN        Motor Skills/Interventions    Additional Documentation Balance Skills Training (Group)  -NN        Balance Skills Training    Sitting-Level of Assistance Independent  -NN        Sitting-Balance Support Right upper extremity supported;Left upper extremity supported;Feet supported  -NN        Standing-Level of Assistance Close supervision  -NN        Static Standing Balance Support No upper extremity supported  -NN        Gait Balance-Level of Assistance Contact guard;Close supervision  -NN        Gait Balance Support No upper extremity supported  -NN        Sensory Assessment/Intervention    Sensory Impairment --   WFL per pt  -NN        General Therapy Interventions    Planned Therapy Interventions activity intolerance;ADL retraining;balance training;bed mobility training;energy conservation;home exercise program;strengthening  -NN        Positioning and Restraints    Pre-Treatment Position in bed  -NN        Post Treatment Position bed  -NN        In Bed sitting EOB;encouraged to call for assist;call light within reach;with family/caregiver;side rails up x2  -NN          User Key  (r) = Recorded By, (t) = Taken By, (c) = Cosigned By    Initials Name Effective Dates    RE Raven Casey RN 10/17/16 -     NN MELANIA Gomez/ARMANDO 11/08/17 -            Occupational Therapy Education     Title: PT OT SLP Therapies (Done)     Topic: Occupational Therapy (Done)     Point: ADL training (Done)    Description: Instruct learner(s) on proper safety adaptation and remediation techniques during self care or transfers.   Instruct in proper use of assistive devices.    Learning Progress Summary    Learner  Readiness Method Response Comment Documented by Status   Patient Acceptance E VU,NR Pt. educated on role of OT, safe t/f, benefit of therapy, calling for assist, progression with poc NN 01/10/18 1451 Done               Point: Home exercise program (Done)    Description: Instruct learner(s) on appropriate technique for monitoring, assisting and/or progressing therapeutic exercises/activities.    Learning Progress Summary    Learner Readiness Method Response Comment Documented by Status   Patient Acceptance E VU,NR Pt. educated on role of OT, safe t/f, benefit of therapy, calling for assist, progression with poc NN 01/10/18 1451 Done               Point: Body mechanics (Done)    Description: Instruct learner(s) on proper positioning and spine alignment during self-care, functional mobility activities and/or exercises.    Learning Progress Summary    Learner Readiness Method Response Comment Documented by Status   Patient Acceptance E VU,NR Pt. educated on role of OT, safe t/f, benefit of therapy, calling for assist, progression with poc NN 01/10/18 1451 Done                      User Key     Initials Effective Dates Name Provider Type Discipline    NN 11/08/17 -  An Meyer OTR/L Occupational Therapist OT                  OT Recommendation and Plan  Anticipated Discharge Disposition: home with assist, home with home health  Planned Therapy Interventions: activity intolerance, ADL retraining, balance training, bed mobility training, energy conservation, home exercise program, strengthening  Therapy Frequency:  (3-14x/week)  Plan of Care Review  Plan Of Care Reviewed With: patient  Progress: progress toward functional goals as expected  Outcome Summary/Follow up Plan: OT eval completed. Pt. requiring supervsion/cga for all functional activities d/t decreased balance, decreased strength, decreased activity tolerance, Skilled OT will address all mentioned deficits. Anticipated dc is home with assist. 1/10 1450           OT Goals       01/10/18 1451          Transfer Training OT LTG    Transfer Training OT LTG, Date Established 01/10/18  -NN      Transfer Training OT LTG, Time to Achieve by discharge  -NN      Transfer Training OT LTG, Activity Type all transfers  -NN      Transfer Training OT LTG, [place] Level independent  -NN      Strength OT LTG    Strength Goal OT LTG, Date Established 01/10/18  -NN      Strength Goal OT LTG, Time to Achieve by discharge  -NN      Strength Goal OT LTG, Measure to Achieve Pt. will complete BUE strengthening exercises all planes and joints to increase BUE strength to 5/5 for ADLs.   -NN      ADL OT LTG    ADL OT LTG, Date Established 01/10/18  -NN      ADL OT LTG, Activity Type ADL skills  -NN      ADL OT LTG, [place] Level independent  -NN        User Key  (r) = Recorded By, (t) = Taken By, (c) = Cosigned By    Initials Name Provider Type    LAKESHA Meyer OTR/L Occupational Therapist                Outcome Measures       01/10/18 1400          How much help from another is currently needed...    Putting on and taking off regular lower body clothing? 3  -NN      Bathing (including washing, rinsing, and drying) 3  -NN      Toileting (which includes using toilet bed pan or urinal) 4  -NN      Putting on and taking off regular upper body clothing 4  -NN      Taking care of personal grooming (such as brushing teeth) 4  -NN      Eating meals 4  -NN      Score 22  -NN      Functional Assessment    Outcome Measure Options AM-PAC 6 Clicks Daily Activity (OT)  -NN        User Key  (r) = Recorded By, (t) = Taken By, (c) = Cosigned By    Initials Name Provider Type    LAKESHA Meyer OTR/L Occupational Therapist          Time Calculation:   OT Start Time: 1424  OT Stop Time: 1450  OT Time Calculation (min): 26 min    Therapy Charges for Today     Code Description Service Date Service Provider Modifiers Qty    78037457878 HC OT EVAL LOW COMPLEXITY 2 1/10/2018 An Meyer  OTR/L GO 1    [id number]  OT SELFCARE CURRENT 1/10/2018 An Meyer OTR/L GO, CJ 1    15260088017 HC OT SELFCARE PROJECTED 1/10/2018 An Meyer OTR/L GO, CI 1          OT G-codes  OT Functional Scales Options: AM-PAC 6 Clicks Daily Activity (OT)  Functional Limitation: Self care  Self Care Current Status (): At least 20 percent but less than 40 percent impaired, limited or restricted  Self Care Goal Status (): At least 1 percent but less than 20 percent impaired, limited or restricted    An Meyer OTR/L  1/10/2018

## 2018-01-10 NOTE — PLAN OF CARE
Problem: Patient Care Overview (Adult)  Goal: Plan of Care Review  Outcome: Ongoing (interventions implemented as appropriate)   01/10/18 1451   Coping/Psychosocial Response Interventions   Plan Of Care Reviewed With patient   Patient Care Overview   Progress progress toward functional goals as expected   Outcome Evaluation   Outcome Summary/Follow up Plan OT eval completed. Pt. requiring supervsion/cga for all functional activities d/t decreased balance, decreased strength, decreased activity tolerance, Skilled OT will address all mentioned deficits. Anticipated dc is home with assist. 1/10 1450       Problem: Inpatient Occupational Therapy  Goal: Transfer Training Goal 1 LTG- OT  Outcome: Ongoing (interventions implemented as appropriate)   01/10/18 1451   Transfer Training OT LTG   Transfer Training OT LTG, Date Established 01/10/18   Transfer Training OT LTG, Time to Achieve by discharge   Transfer Training OT LTG, Activity Type all transfers   Transfer Training OT LTG, [place] Level independent     Goal: Strength Goal LTG- OT  Outcome: Ongoing (interventions implemented as appropriate)   01/10/18 1451   Strength OT LTG   Strength Goal OT LTG, Date Established 01/10/18   Strength Goal OT LTG, Time to Achieve by discharge   Strength Goal OT LTG, Measure to Achieve Pt. will complete BUE strengthening exercises all planes and joints to increase BUE strength to 5/5 for ADLs.      Goal: ADL Goal LTG- OT  Outcome: Ongoing (interventions implemented as appropriate)   01/10/18 1451   ADL OT LTG   ADL OT LTG, Date Established 01/10/18   ADL OT LTG, Activity Type ADL skills   ADL OT LTG, [place] Level independent

## 2018-01-10 NOTE — PLAN OF CARE
Problem: Patient Care Overview (Adult)  Goal: Plan of Care Review  Outcome: Ongoing (interventions implemented as appropriate)   01/10/18 0326   Coping/Psychosocial Response Interventions   Plan Of Care Reviewed With patient   Patient Care Overview   Progress no change   Outcome Evaluation   Outcome Summary/Follow up Plan pt presents to the unit with pain and SOA r/t pneumonia of RUL; tramadol ordered PRN for pain; pt currently sleeping and has not requested pain meds; vital signs stable; will continue to monitor     Goal: Adult Individualization and Mutuality  Outcome: Ongoing (interventions implemented as appropriate)    Goal: Discharge Needs Assessment  Outcome: Ongoing (interventions implemented as appropriate)      Problem: Pneumonia (Adult)  Goal: Signs and Symptoms of Listed Potential Problems Will be Absent or Manageable (Pneumonia)  Outcome: Ongoing (interventions implemented as appropriate)      Problem: COPD, Chronic Bronchitis/Emphysema (Adult)  Goal: Signs and Symptoms of Listed Potential Problems Will be Absent or Manageable (COPD, Chronic Bronchitis/Emphysema)  Outcome: Ongoing (interventions implemented as appropriate)      Problem: Fall Risk (Adult)  Goal: Identify Related Risk Factors and Signs and Symptoms  Outcome: Ongoing (interventions implemented as appropriate)    Goal: Absence of Falls  Outcome: Ongoing (interventions implemented as appropriate)

## 2018-01-11 ENCOUNTER — APPOINTMENT (OUTPATIENT)
Dept: ULTRASOUND IMAGING | Facility: HOSPITAL | Age: 68
End: 2018-01-11

## 2018-01-11 ENCOUNTER — APPOINTMENT (OUTPATIENT)
Dept: GENERAL RADIOLOGY | Facility: HOSPITAL | Age: 68
End: 2018-01-11

## 2018-01-11 ENCOUNTER — APPOINTMENT (OUTPATIENT)
Dept: CT IMAGING | Facility: HOSPITAL | Age: 68
End: 2018-01-11

## 2018-01-11 LAB
ANION GAP SERPL CALCULATED.3IONS-SCNC: 11 MMOL/L (ref 5–15)
BASOPHILS # BLD AUTO: 0.01 10*3/MM3 (ref 0–0.2)
BASOPHILS NFR BLD AUTO: 0 % (ref 0–2)
BUN BLD-MCNC: 16 MG/DL (ref 7–21)
BUN/CREAT SERPL: 30.2 (ref 7–25)
CALCIUM SPEC-SCNC: 8.8 MG/DL (ref 8.4–10.2)
CHLORIDE SERPL-SCNC: 104 MMOL/L (ref 95–110)
CO2 SERPL-SCNC: 28 MMOL/L (ref 22–31)
CREAT BLD-MCNC: 0.53 MG/DL (ref 0.5–1)
D-DIMER, QUANTITATIVE (MAD,POW, STR): 837 NG/ML (FEU) (ref 0–470)
DEPRECATED RDW RBC AUTO: 44.8 FL (ref 36.4–46.3)
EOSINOPHIL # BLD AUTO: 0 10*3/MM3 (ref 0–0.7)
EOSINOPHIL NFR BLD AUTO: 0 % (ref 0–7)
ERYTHROCYTE [DISTWIDTH] IN BLOOD BY AUTOMATED COUNT: 13.4 % (ref 11.5–14.5)
GFR SERPL CREATININE-BSD FRML MDRD: 116 ML/MIN/1.73 (ref 60–104)
GLUCOSE BLD-MCNC: 151 MG/DL (ref 60–100)
HCT VFR BLD AUTO: 32.5 % (ref 35–45)
HGB BLD-MCNC: 10.7 G/DL (ref 12–15.5)
IMM GRANULOCYTES # BLD: 0.1 10*3/MM3 (ref 0–0.02)
IMM GRANULOCYTES NFR BLD: 0.4 % (ref 0–0.5)
LYMPHOCYTES # BLD AUTO: 1.37 10*3/MM3 (ref 0.6–4.2)
LYMPHOCYTES NFR BLD AUTO: 5.5 % (ref 10–50)
MCH RBC QN AUTO: 30.7 PG (ref 26.5–34)
MCHC RBC AUTO-ENTMCNC: 32.9 G/DL (ref 31.4–36)
MCV RBC AUTO: 93.1 FL (ref 80–98)
MONOCYTES # BLD AUTO: 0.79 10*3/MM3 (ref 0–0.9)
MONOCYTES NFR BLD AUTO: 3.2 % (ref 0–12)
NEUTROPHILS # BLD AUTO: 22.8 10*3/MM3 (ref 2–8.6)
NEUTROPHILS NFR BLD AUTO: 90.9 % (ref 37–80)
PLATELET # BLD AUTO: 340 10*3/MM3 (ref 150–450)
PMV BLD AUTO: 8.8 FL (ref 8–12)
POTASSIUM BLD-SCNC: 4.5 MMOL/L (ref 3.5–5.1)
RBC # BLD AUTO: 3.49 10*6/MM3 (ref 3.77–5.16)
SODIUM BLD-SCNC: 143 MMOL/L (ref 137–145)
WBC NRBC COR # BLD: 25.07 10*3/MM3 (ref 3.2–9.8)

## 2018-01-11 PROCEDURE — 97110 THERAPEUTIC EXERCISES: CPT

## 2018-01-11 PROCEDURE — 80048 BASIC METABOLIC PNL TOTAL CA: CPT | Performed by: INTERNAL MEDICINE

## 2018-01-11 PROCEDURE — 25010000002 PIPERACILLIN SOD-TAZOBACTAM PER 1 G: Performed by: FAMILY MEDICINE

## 2018-01-11 PROCEDURE — 93970 EXTREMITY STUDY: CPT

## 2018-01-11 PROCEDURE — 71046 X-RAY EXAM CHEST 2 VIEWS: CPT

## 2018-01-11 PROCEDURE — 0 DIATRIZOATE MEGLUMINE & SODIUM PER 1 ML: Performed by: INTERNAL MEDICINE

## 2018-01-11 PROCEDURE — 85379 FIBRIN DEGRADATION QUANT: CPT | Performed by: FAMILY MEDICINE

## 2018-01-11 PROCEDURE — 85025 COMPLETE CBC W/AUTO DIFF WBC: CPT | Performed by: INTERNAL MEDICINE

## 2018-01-11 PROCEDURE — 25010000002 VANCOMYCIN PER 500 MG: Performed by: FAMILY MEDICINE

## 2018-01-11 PROCEDURE — 25010000002 METHYLPREDNISOLONE PER 125 MG: Performed by: INTERNAL MEDICINE

## 2018-01-11 PROCEDURE — 97116 GAIT TRAINING THERAPY: CPT

## 2018-01-11 PROCEDURE — 94799 UNLISTED PULMONARY SVC/PX: CPT

## 2018-01-11 PROCEDURE — 25010000002 METHYLPREDNISOLONE PER 40 MG: Performed by: FAMILY MEDICINE

## 2018-01-11 PROCEDURE — 71275 CT ANGIOGRAPHY CHEST: CPT

## 2018-01-11 PROCEDURE — 94760 N-INVAS EAR/PLS OXIMETRY 1: CPT

## 2018-01-11 PROCEDURE — 0 IOPAMIDOL PER 1 ML: Performed by: INTERNAL MEDICINE

## 2018-01-11 PROCEDURE — 74220 X-RAY XM ESOPHAGUS 1CNTRST: CPT

## 2018-01-11 RX ORDER — METHYLPREDNISOLONE SODIUM SUCCINATE 40 MG/ML
40 INJECTION, POWDER, LYOPHILIZED, FOR SOLUTION INTRAMUSCULAR; INTRAVENOUS EVERY 8 HOURS
Status: DISCONTINUED | OUTPATIENT
Start: 2018-01-11 | End: 2018-01-12

## 2018-01-11 RX ORDER — BUTALBITAL, ACETAMINOPHEN AND CAFFEINE 50; 325; 40 MG/1; MG/1; MG/1
1 TABLET ORAL EVERY 12 HOURS PRN
Status: DISCONTINUED | OUTPATIENT
Start: 2018-01-11 | End: 2018-01-12

## 2018-01-11 RX ORDER — LEVOFLOXACIN 750 MG/1
750 TABLET ORAL EVERY 24 HOURS
Status: DISCONTINUED | OUTPATIENT
Start: 2018-01-11 | End: 2018-01-11

## 2018-01-11 RX ORDER — PANTOPRAZOLE SODIUM 40 MG/1
40 TABLET, DELAYED RELEASE ORAL
Status: DISCONTINUED | OUTPATIENT
Start: 2018-01-11 | End: 2018-01-12

## 2018-01-11 RX ADMIN — IPRATROPIUM BROMIDE AND ALBUTEROL SULFATE 3 ML: 2.5; .5 SOLUTION RESPIRATORY (INHALATION) at 10:48

## 2018-01-11 RX ADMIN — PANTOPRAZOLE SODIUM 40 MG: 40 TABLET, DELAYED RELEASE ORAL at 19:26

## 2018-01-11 RX ADMIN — TAZOBACTAM SODIUM AND PIPERACILLIN SODIUM 3.38 G: 375; 3 INJECTION, SOLUTION INTRAVENOUS at 18:38

## 2018-01-11 RX ADMIN — ATORVASTATIN CALCIUM 10 MG: 10 TABLET, FILM COATED ORAL at 08:16

## 2018-01-11 RX ADMIN — IPRATROPIUM BROMIDE AND ALBUTEROL SULFATE 3 ML: 2.5; .5 SOLUTION RESPIRATORY (INHALATION) at 19:30

## 2018-01-11 RX ADMIN — VANCOMYCIN HYDROCHLORIDE 750 MG: 5 INJECTION, POWDER, LYOPHILIZED, FOR SOLUTION INTRAVENOUS at 21:52

## 2018-01-11 RX ADMIN — METHYLPREDNISOLONE SODIUM SUCCINATE 60 MG: 125 INJECTION, POWDER, FOR SOLUTION INTRAMUSCULAR; INTRAVENOUS at 08:16

## 2018-01-11 RX ADMIN — IOPAMIDOL 56 ML: 755 INJECTION, SOLUTION INTRAVENOUS at 13:15

## 2018-01-11 RX ADMIN — METHYLPREDNISOLONE SODIUM SUCCINATE 40 MG: 40 INJECTION, POWDER, FOR SOLUTION INTRAMUSCULAR; INTRAVENOUS at 15:22

## 2018-01-11 RX ADMIN — NICOTINE 1 PATCH: 21 PATCH TRANSDERMAL at 23:39

## 2018-01-11 RX ADMIN — GABAPENTIN 800 MG: 400 CAPSULE ORAL at 05:27

## 2018-01-11 RX ADMIN — GABAPENTIN 800 MG: 400 CAPSULE ORAL at 21:51

## 2018-01-11 RX ADMIN — LEVOFLOXACIN 750 MG: 750 TABLET, FILM COATED ORAL at 11:10

## 2018-01-11 RX ADMIN — AMLODIPINE BESYLATE 10 MG: 10 TABLET ORAL at 08:17

## 2018-01-11 RX ADMIN — VANCOMYCIN HYDROCHLORIDE 750 MG: 5 INJECTION, POWDER, LYOPHILIZED, FOR SOLUTION INTRAVENOUS at 11:10

## 2018-01-11 RX ADMIN — FAMOTIDINE 40 MG: 40 TABLET ORAL at 08:16

## 2018-01-11 RX ADMIN — SODIUM CHLORIDE 75 ML/HR: 9 INJECTION, SOLUTION INTRAVENOUS at 15:43

## 2018-01-11 RX ADMIN — TRAZODONE HYDROCHLORIDE 300 MG: 150 TABLET ORAL at 21:51

## 2018-01-11 RX ADMIN — IPRATROPIUM BROMIDE AND ALBUTEROL SULFATE 3 ML: 2.5; .5 SOLUTION RESPIRATORY (INHALATION) at 16:01

## 2018-01-11 RX ADMIN — ACETAMINOPHEN 650 MG: 325 TABLET ORAL at 08:22

## 2018-01-11 RX ADMIN — BUTALBITAL, ACETAMINOPHEN, AND CAFFEINE 1 TABLET: 50; 325; 40 TABLET ORAL at 14:32

## 2018-01-11 RX ADMIN — SERTRALINE HYDROCHLORIDE 100 MG: 50 TABLET ORAL at 08:16

## 2018-01-11 RX ADMIN — APIXABAN 5 MG: 5 TABLET, FILM COATED ORAL at 08:16

## 2018-01-11 RX ADMIN — BUDESONIDE AND FORMOTEROL FUMARATE DIHYDRATE 2 PUFF: 160; 4.5 AEROSOL RESPIRATORY (INHALATION) at 07:05

## 2018-01-11 RX ADMIN — IPRATROPIUM BROMIDE AND ALBUTEROL SULFATE 3 ML: 2.5; .5 SOLUTION RESPIRATORY (INHALATION) at 07:00

## 2018-01-11 RX ADMIN — DIATRIZOATE MEGLUMINE AND DIATRIZOATE SODIUM 60 ML: 660; 100 LIQUID ORAL; RECTAL at 17:49

## 2018-01-11 RX ADMIN — BARIUM SULFATE 20 ML: 960 POWDER, FOR SUSPENSION ORAL at 17:48

## 2018-01-11 RX ADMIN — GABAPENTIN 800 MG: 400 CAPSULE ORAL at 14:32

## 2018-01-11 RX ADMIN — METHYLPREDNISOLONE SODIUM SUCCINATE 40 MG: 40 INJECTION, POWDER, FOR SOLUTION INTRAMUSCULAR; INTRAVENOUS at 23:38

## 2018-01-11 RX ADMIN — BUDESONIDE AND FORMOTEROL FUMARATE DIHYDRATE 2 PUFF: 160; 4.5 AEROSOL RESPIRATORY (INHALATION) at 19:36

## 2018-01-11 NOTE — THERAPY TREATMENT NOTE
Acute Care - Physical Therapy Treatment Note  [facility]     Patient Name: Mona Perales  : 1950  MRN: [id number]  Today's Date: 2018  Onset of Illness/Injury or Date of Surgery Date: 18  Date of Referral to PT: 01/10/18  Referring Physician: Dr. Ingram     Admit Date: 2018    Visit Dx:    ICD-10-CM ICD-9-CM   1. Pneumonia of right upper lobe due to infectious organism J18.1 486   2. Hypoxia R09.02 799.02   3. Decreased activities of daily living (ADL) Z78.9 V49.89   4. Impaired functional mobility, balance, gait, and endurance Z74.09 V49.89     Patient Active Problem List   Diagnosis   • Anxiety   • CAD (coronary atherosclerotic disease)   • Carotid stenosis   • COPD (chronic obstructive pulmonary disease)   • COPD with exacerbation   • Depressive disorder, not elsewhere classified   • Benign essential hypertension   • Hypercholesteremia   • Insomnia   • Low back pain   • Osteoporosis   • Other screening mammogram   • RUQ abdominal pain   • Peripheral arterial disease   • Dizziness   • Nicotine abuse   • Pneumonia of right upper lobe due to infectious organism               Adult Rehabilitation Note       1815          Rehab Assessment/Intervention    Treatment Not Performed other (see comments)  -NN      Treatment Not Performed, Comment Pt. will PTA  -NN      Recorded by [NN] MELANIA Gomez/L        User Key  (r) = Recorded By, (t) = Taken By, (c) = Cosigned By    Initials Name Effective Dates    MELANIA Chawla/ARMANDO 17 -                 IP PT Goals       18 0911 01/10/18 1512       Transfer Training PT STG    Transfer Training PT STG, Date Established (P)  18  -BM 01/10/18  -MN     Transfer Training PT STG, Time to Achieve  5 days  -MN     Transfer Training PT STG, Activity Type  bed to chair /chair to bed;sit to stand/stand to sit;toilet  -MN     Transfer Training PT STG, [place] Level  independent  -MN     Transfer Training PT STG,  Date Goal Reviewed (P)  01/11/18  -BM      Transfer Training PT STG, Outcome (P)  goal ongoing  -BM      Gait Training PT STG    Gait Training Goal PT STG, Date Established  01/10/18  -MN     Gait Training Goal PT STG, Time to Achieve  5 days  -MN     Gait Training Goal PT STG, [place] Level  conditional independence  -MN     Gait Training Goal PT STG, Assist Device  cane, straight  -MN     Gait Training Goal PT STG, Distance to Achieve  600 ft  -MN     Gait Training Goal PT STG, Date Goal Reviewed (P)  01/11/18  -BM      Gait Training Goal PT STG, Outcome (P)  goal ongoing  -BM        User Key  (r) = Recorded By, (t) = Taken By, (c) = Cosigned By    Initials Name Provider Type    MN Georgia Holcomb, PT Physical Therapist    TURNER Melton, PT Student PT Student          Physical Therapy Education     Title: PT OT SLP Therapies (Active)     Topic: Physical Therapy (Active)     Point: Precautions (Done)    Learning Progress Summary    Learner Readiness Method Response Comment Documented by Status   Patient Acceptance E VU role of PT in hospital. call for assist for OOB MN 01/10/18 1512 Done                      User Key     Initials Effective Dates Name Provider Type Discipline    MN 10/17/16 -  Georgia Holcomb, PT Physical Therapist PT                    PT Recommendation and Plan  Anticipated Discharge Disposition: home with assist, home with home health  Planned Therapy Interventions: balance training, gait training, home exercise program, stair training, strengthening, stretching, transfer training, patient/family education  PT Frequency: other (see comments) (5-14x/week)  Plan of Care Review  Plan Of Care Reviewed With: (P) patient  Progress: (P) progress toward functional goals as expected  Outcome Summary/Follow up Plan: (P) pt ambulated 310 ft contact guard assist and maintained balance throughout. Pt performed bilateral 15x LE exercises without pain or discomfort.  Signee recommends home health to  take place once pt is discharged.          Outcome Measures       01/11/18 0911 01/10/18 1425 01/10/18 1400    How much help from another person do you currently need...    Turning from your back to your side while in flat bed without using bedrails? (P)  4  -BM 4  -MN     Moving from lying on back to sitting on the side of a flat bed without bedrails? (P)  4  -BM 4  -MN     Moving to and from a bed to a chair (including a wheelchair)? (P)  3  -BM 3  -MN     Standing up from a chair using your arms (e.g., wheelchair, bedside chair)? (P)  4  -BM 3  -MN     Climbing 3-5 steps with a railing? (P)  3  -BM 3  -MN     To walk in hospital room? (P)  3  -BM 3  -MN     AM-PAC 6 Clicks Score (P)  21  -BM 20  -MN     How much help from another is currently needed...    Putting on and taking off regular lower body clothing?   3  -NN    Bathing (including washing, rinsing, and drying)   3  -NN    Toileting (which includes using toilet bed pan or urinal)   4  -NN    Putting on and taking off regular upper body clothing   4  -NN    Taking care of personal grooming (such as brushing teeth)   4  -NN    Eating meals   4  -NN    Score   22  -NN    Functional Assessment    Outcome Measure Options  AM-PAC 6 Clicks Basic Mobility (PT)  -MN AM-PAC 6 Clicks Daily Activity (OT)  -NN      User Key  (r) = Recorded By, (t) = Taken By, (c) = Cosigned By    Initials Name Provider Type    LACHELLE Holcomb, PT Physical Therapist    NN An Meyer, OTR/L Occupational Therapist    TURNER Melton, PT Student PT Student           Time Calculation:           PT G-Codes  PT Professional Judgement Used?: Yes  Outcome Measure Options: AM-PAC 6 Clicks Basic Mobility (PT)  Score: 20  Functional Limitation: Mobility: Walking and moving around  Mobility: Walking and Moving Around Current Status (): At least 20 percent but less than 40 percent impaired, limited or restricted  Mobility: Walking and Moving Around Goal Status (): At least 1  percent but less than 20 percent impaired, limited or restricted    Keisha Melton, PT Student  1/11/2018

## 2018-01-11 NOTE — PLAN OF CARE
Problem: Patient Care Overview (Adult)  Goal: Plan of Care Review  Outcome: Ongoing (interventions implemented as appropriate)   01/11/18 0684   Coping/Psychosocial Response Interventions   Plan Of Care Reviewed With patient   Patient Care Overview   Progress no change   Outcome Evaluation   Outcome Summary/Follow up Plan Pt reports no pain at this time; oxygen on 1L NC; will continue to monitor.      Goal: Adult Individualization and Mutuality  Outcome: Ongoing (interventions implemented as appropriate)      Problem: Pneumonia (Adult)  Goal: Signs and Symptoms of Listed Potential Problems Will be Absent or Manageable (Pneumonia)  Outcome: Ongoing (interventions implemented as appropriate)      Problem: COPD, Chronic Bronchitis/Emphysema (Adult)  Goal: Signs and Symptoms of Listed Potential Problems Will be Absent or Manageable (COPD, Chronic Bronchitis/Emphysema)  Outcome: Ongoing (interventions implemented as appropriate)      Problem: Fall Risk (Adult)  Goal: Identify Related Risk Factors and Signs and Symptoms  Outcome: Outcome(s) achieved Date Met: 01/11/18      Problem: Pain, Acute (Adult)  Goal: Identify Related Risk Factors and Signs and Symptoms  Outcome: Outcome(s) achieved Date Met: 01/11/18    Goal: Acceptable Pain Control/Comfort Level  Outcome: Ongoing (interventions implemented as appropriate)

## 2018-01-11 NOTE — THERAPY TREATMENT NOTE
Acute Care - Occupational Therapy Treatment Note  [facility]     Patient Name: Mona Perales  : 1950  MRN: [id number]  Today's Date: 2018  Onset of Illness/Injury or Date of Surgery Date: 18  Date of Referral to OT: 01/10/18  Referring Physician: Dr. Ingram       Admit Date: 2018    Visit Dx:     ICD-10-CM ICD-9-CM   1. Pneumonia of right upper lobe due to infectious organism J18.1 486   2. Hypoxia R09.02 799.02   3. Decreased activities of daily living (ADL) Z78.9 V49.89   4. Impaired functional mobility, balance, gait, and endurance Z74.09 V49.89     Patient Active Problem List   Diagnosis   • Anxiety   • CAD (coronary atherosclerotic disease)   • Carotid stenosis   • COPD (chronic obstructive pulmonary disease)   • COPD with exacerbation   • Depressive disorder, not elsewhere classified   • Benign essential hypertension   • Hypercholesteremia   • Insomnia   • Low back pain   • Osteoporosis   • Other screening mammogram   • RUQ abdominal pain   • Peripheral arterial disease   • Dizziness   • Nicotine abuse   • Pneumonia of right upper lobe due to infectious organism             Adult Rehabilitation Note       18 0955 18 0915       Rehab Assessment/Intervention    Discipline occupational therapist  -NN      Document Type therapy note (daily note)  -NN      Subjective Information complains of;agree to therapy;weakness;fatigue;pain;dyspnea  -NN      Treatment Not Performed  other (see comments)  -NN     Treatment Not Performed, Comment  Pt. will PTA  -NN     Recorded by [NN] An Meyer, OTR/L [NN] An Meyer, OTR/L     Vital Signs    Pre Systolic BP Rehab 120  -NN      Pre Treatment Diastolic BP 50  -NN      Pretreatment Heart Rate (beats/min) 108  -NN      Pre SpO2 (%) 98  -NN      O2 Delivery Pre Treatment supplemental O2  -NN      Pre Patient Position Supine  -NN      Recorded by [NN] An Meyer, OTR/L      Pain Assessment    Pain Assessment 0-10  -NN       Pain Score 8  -NN      Pain Type Acute pain  -NN      Pain Location Back  -NN      Pain Descriptors Aching  -NN      Pain Frequency Constant/continuous  -NN      Pain Intervention(s) Medication (See MAR);Repositioned  -NN      Response to Interventions tolerated  -NN      Recorded by [NN] MELANIA Gomez/L      Bed Mobility, Assessment/Treatment    Bed Mobility, Assistive Device head of bed elevated;bed rails  -NN      Bed Mob, Supine to Sit, [place] conditional independence  -NN      Bed Mob, Sit to Supine, [place] conditional independence  -NN      Recorded by [NN] MELANIA Gomez/L      Transfer Assessment/Treatment    Transfer, Comment Pt. stated she has been awake since 430 because someone came in her room asking for a bath and she has worked with therapy and been down for radiology so she just wants to take it easy right now  -NN      Recorded by [NN] MELANIA Gomez/L      Therapy Exercises    Bilateral Upper Extremity AROM:;15 reps;sitting;elbow flexion/extension;hand pumps;pronation/supination;shoulder abduction/adduction;shoulder extension/flexion;shoulder horizontal abd/add   3x15 reps  -NN      Recorded by [NN] MELANIA Gomez/ARMANDO      Positioning and Restraints    Pre-Treatment Position in bed  -NN      Post Treatment Position bed  -NN      In Bed fowlers;call light within reach;encouraged to call for assist;with other staff;side rails up x2  -NN      Recorded by [NN] MELANIA Gomez/L        User Key  (r) = Recorded By, (t) = Taken By, (c) = Cosigned By    Initials Name Effective Dates    NN MELANIA Gomez/ARMANDO 11/08/17 -                 OT Goals       01/10/18 1451          Transfer Training OT LTG    Transfer Training OT LTG, Date Established 01/10/18  -NN      Transfer Training OT LTG, Time to Achieve by discharge  -NN      Transfer Training OT LTG, Activity Type all transfers  -NN      Transfer Training OT LTG, [place] Level independent  -NN       Strength OT LTG    Strength Goal OT LTG, Date Established 01/10/18  -NN      Strength Goal OT LTG, Time to Achieve by discharge  -NN      Strength Goal OT LTG, Measure to Achieve Pt. will complete BUE strengthening exercises all planes and joints to increase BUE strength to 5/5 for ADLs.   -NN      ADL OT LTG    ADL OT LTG, Date Established 01/10/18  -NN      ADL OT LTG, Activity Type ADL skills  -NN      ADL OT LTG, [place] Level independent  -NN        User Key  (r) = Recorded By, (t) = Taken By, (c) = Cosigned By    Initials Name Provider Type    NN An Meyer, OTR/L Occupational Therapist          Occupational Therapy Education     Title: PT OT SLP Therapies (Active)     Topic: Occupational Therapy (Active)     Point: ADL training (Active)    Description: Instruct learner(s) on proper safety adaptation and remediation techniques during self care or transfers.   Instruct in proper use of assistive devices.    Learning Progress Summary    Learner Readiness Method Response Comment Documented by Status   Patient Acceptance E NR Pt. educated on role of OT, safe bed mobility, benefit of OOB activity, progression with [facility] 01/11/18 1016 Active    Acceptance E VU,NR Pt. educated on role of OT, safe t/f, benefit of therapy, calling for assist, progression with [facility] 01/10/18 1451 Done               Point: Home exercise program (Active)    Description: Instruct learner(s) on appropriate technique for monitoring, assisting and/or progressing therapeutic exercises/activities.    Learning Progress Summary    Learner Readiness Method Response Comment Documented by Status   Patient Acceptance E NR Pt. educated on role of OT, safe bed mobility, benefit of OOB activity, progression with [facility] 01/11/18 1016 Active    Acceptance E VU,NR Pt. educated on role of OT, safe t/f, benefit of therapy, calling for assist, progression with [facility] 01/10/18 1451 Done               Point: Body mechanics (Active)    Description:  Instruct learner(s) on proper positioning and spine alignment during self-care, functional mobility activities and/or exercises.    Learning Progress Summary    Learner Readiness Method Response Comment Documented by Status   Patient Acceptance E NR Pt. educated on role of OT, safe bed mobility, benefit of OOB activity, progression with poc NN 01/11/18 1016 Active    Acceptance E VU,NR Pt. educated on role of OT, safe t/f, benefit of therapy, calling for assist, progression with poc NN 01/10/18 1451 Done                      User Key     Initials Effective Dates Name Provider Type Discipline    NN 11/08/17 -  An Meyer, OTR/L Occupational Therapist OT                  OT Recommendation and Plan  Anticipated Discharge Disposition: home with assist, home with home health  Planned Therapy Interventions: activity intolerance, ADL retraining, balance training, bed mobility training, energy conservation, home exercise program, strengthening  Therapy Frequency:  (3-14x/week)  Plan of Care Review  Plan Of Care Reviewed With: patient  Progress: progress toward functional goals as expected  Outcome Summary/Follow up Plan: OT tx completed. Pt. fatigued this am from being up since 430, working with therapy, and being down for RAD. Pt. completed AROM to help increase functional strength. Pt. cont to benefit from skilled OT. 1/11        Outcome Measures       01/11/18 1000 01/11/18 0911 01/10/18 1425    How much help from another person do you currently need...    Turning from your back to your side while in flat bed without using bedrails?  4  -BRENDAN (r) BM (t) BRENDAN (c) 4  -MN    Moving from lying on back to sitting on the side of a flat bed without bedrails?  4  -BRENDAN (r) BM (t) BRENDAN (c) 4  -MN    Moving to and from a bed to a chair (including a wheelchair)?  3  -BRENDAN (r) BM (t) BRENDAN (c) 3  -MN    Standing up from a chair using your arms (e.g., wheelchair, bedside chair)?  4  -BRENDAN (r) BM (t) BRENDAN (c) 3  -MN    Climbing 3-5 steps with a  railing?  3  -BRENDAN (r) BM (t) BRENDAN (c) 3  -MN    To walk in hospital room?  3  -BRENDAN (r) BM (t) BRENDAN (c) 3  -MN    AM-PAC 6 Clicks Score  21  -BRENDAN (r) BM (t) 20  -MN    How much help from another is currently needed...    Putting on and taking off regular lower body clothing? 3  -NN      Bathing (including washing, rinsing, and drying) 3  -NN      Toileting (which includes using toilet bed pan or urinal) 3  -NN      Putting on and taking off regular upper body clothing 4  -NN      Taking care of personal grooming (such as brushing teeth) 4  -NN      Eating meals 4  -NN      Score 21  -NN      Functional Assessment    Outcome Measure Options   AM-PAC 6 Clicks Basic Mobility (PT)  -MN      01/10/18 1400          How much help from another is currently needed...    Putting on and taking off regular lower body clothing? 3  -NN      Bathing (including washing, rinsing, and drying) 3  -NN      Toileting (which includes using toilet bed pan or urinal) 4  -NN      Putting on and taking off regular upper body clothing 4  -NN      Taking care of personal grooming (such as brushing teeth) 4  -NN      Eating meals 4  -NN      Score 22  -NN      Functional Assessment    Outcome Measure Options AM-PAC 6 Clicks Daily Activity (OT)  -NN        User Key  (r) = Recorded By, (t) = Taken By, (c) = Cosigned By    Initials Name Provider Type    LACHELLE Holcomb, PT Physical Therapist    BRENDAN Morales, PTA Physical Therapy Assistant    LAKESHA Meyer, OTR/L Occupational Therapist    TURNER Melton, PT Student PT Student           Time Calculation:         Time Calculation- OT       01/11/18 1029          Time Calculation- OT    OT Start Time 0955  -NN      OT Stop Time 1011  -NN      OT Time Calculation (min) 16 min  -NN      OT Received On 01/11/18  -NN        User Key  (r) = Recorded By, (t) = Taken By, (c) = Cosigned By    Initials Name Provider Type    LAKESHA Meyer, OTR/L Occupational Therapist           Therapy Charges  for Today     Code Description Service Date Service Provider Modifiers Qty    16847119946 HC OT EVAL LOW COMPLEXITY 2 1/10/2018 An Meyer OTR/L GO 1    35697200558 HC OT SELFCARE CURRENT 1/10/2018 An Meyer OTR/L GO, CJ 1    35267104314 HC OT SELFCARE PROJECTED 1/10/2018 An Meyer OTR/L GO, CI 1    80820003650 HC OT THER PROC EA 15 MIN 1/11/2018 An Meyer OTR/L GO 1          OT G-codes  OT Functional Scales Options: AM-PAC 6 Clicks Daily Activity (OT)  Functional Limitation: Self care  Self Care Current Status (): At least 20 percent but less than 40 percent impaired, limited or restricted  Self Care Goal Status (): At least 1 percent but less than 20 percent impaired, limited or restricted    An Meyer OTR/L  1/11/2018

## 2018-01-11 NOTE — PLAN OF CARE
Problem: Patient Care Overview (Adult)  Goal: Plan of Care Review  Outcome: Ongoing (interventions implemented as appropriate)   01/11/18 1021   Coping/Psychosocial Response Interventions   Plan Of Care Reviewed With patient   Patient Care Overview   Progress progress toward functional goals as expected   Outcome Evaluation   Outcome Summary/Follow up Plan OT tx completed. Pt. fatigued this am from being up since 430, working with therapy, and being down for RAD. Pt. completed AROM to help increase functional strength. Pt. cont to benefit from skilled OT. 1/11

## 2018-01-11 NOTE — PROGRESS NOTES
"Pharmacokinetics by Pharmacy - Vancomycin    Mona Perales is a 67 y.o. female   [Ht: 157.5 cm (62\"); Wt: 58.7 kg (129 lb 4.8 oz)]    Estimated Creatinine Clearance: 65 mL/min (by C-G formula based on Cr of 0.53).   Lab Results   Component Value Date    CREATININE 0.53 01/11/2018    CREATININE 0.54 01/10/2018    CREATININE 0.50 01/09/2018      Lab Results   Component Value Date    WBC 25.07 (H) 01/11/2018    WBC 18.77 (H) 01/10/2018    WBC 11.97 (H) 01/09/2018      Temp Readings from Last 1 Encounters:   01/11/18 97.6 °F (36.4 °C) (Oral)       Indication for use: pneumonia     Baseline culture results:  Microbiology Results (last 10 days)       Procedure Component Value - Date/Time      Blood Culture - Blood, [[id number]]  (Normal) Collected:  01/09/18 2258    Lab Status:  Preliminary result Specimen:  Blood from Arm, Left Updated:  01/10/18 2331     Blood Culture No growth at 24 hours    Blood Culture - Blood, [[id number]]  (Normal) Collected:  01/09/18 1646    Lab Status:  Preliminary result Specimen:  Blood from Blood, Arterial Line Updated:  01/10/18 2246     Blood Culture No growth at 24 hours               Assessment/Plan  Utilizing AUC dosing.  Initiated Vancomycin 750 mg IVPB Q12H. Will order Vancomycin peak level 1/12 1230, vancomycin trough 1/12 2200.  Reviewed labs above.  WBC 25.07.  SCr 0.53.  Concurrent antibiotics, levofloxacin.  Labs in progress, blood, respiratory.  Pharmacy will continue to monitor renal function and adjust dose accordingly.      Surinder Oliver, [facility]  01/11/18 9:35 AM     "

## 2018-01-11 NOTE — PLAN OF CARE
Problem: Patient Care Overview (Adult)  Goal: Plan of Care Review  Outcome: Ongoing (interventions implemented as appropriate)   01/11/18 0911   Coping/Psychosocial Response Interventions   Plan Of Care Reviewed With patient   Patient Care Overview   Progress progress toward functional goals as expected   Outcome Evaluation   Outcome Summary/Follow up Plan pt ambulated 310 ft contact guard assist and maintained balance throughout. Pt performed bilateral 15x LE exercises without pain or discomfort. Signee recommends home health to take place once pt is discharged.       Problem: Inpatient Physical Therapy  Goal: Transfer Training Goal 1 STG- PT  Outcome: Ongoing (interventions implemented as appropriate)   01/10/18 1512 01/11/18 0911   Transfer Training PT STG   Transfer Training PT STG, Date Established --  01/11/18   Transfer Training PT STG, Time to Achieve 5 days --    Transfer Training PT STG, Activity Type bed to chair /chair to bed;sit to stand/stand to sit;toilet --    Transfer Training PT STG, [place] Level independent --    Transfer Training PT STG, Date Goal Reviewed --  01/11/18   Transfer Training PT STG, Outcome --  goal ongoing     Goal: Gait Training Goal STG- PT  Outcome: Ongoing (interventions implemented as appropriate)   01/10/18 1512 01/11/18 0911   Gait Training PT STG   Gait Training Goal PT STG, Date Established 01/10/18 --    Gait Training Goal PT STG, Time to Achieve 5 days --    Gait Training Goal PT STG, [place] Level conditional independence --    Gait Training Goal PT STG, Assist Device cane, straight --    Gait Training Goal PT STG, Distance to Achieve 600 ft --    Gait Training Goal PT STG, Date Goal Reviewed --  01/11/18   Gait Training Goal PT STG, Outcome --  goal ongoing

## 2018-01-11 NOTE — PLAN OF CARE
Problem: Patient Care Overview (Adult)  Goal: Plan of Care Review  Outcome: Ongoing (interventions implemented as appropriate)   01/11/18 0139   Coping/Psychosocial Response Interventions   Plan Of Care Reviewed With patient   Patient Care Overview   Progress no change   Outcome Evaluation   Outcome Summary/Follow up Plan Patient resting comfortably with no complaints. Will continue to monitor.      Goal: Adult Individualization and Mutuality  Outcome: Ongoing (interventions implemented as appropriate)    Goal: Discharge Needs Assessment  Outcome: Ongoing (interventions implemented as appropriate)      Problem: Pneumonia (Adult)  Goal: Signs and Symptoms of Listed Potential Problems Will be Absent or Manageable (Pneumonia)  Outcome: Ongoing (interventions implemented as appropriate)      Problem: COPD, Chronic Bronchitis/Emphysema (Adult)  Goal: Signs and Symptoms of Listed Potential Problems Will be Absent or Manageable (COPD, Chronic Bronchitis/Emphysema)  Outcome: Ongoing (interventions implemented as appropriate)      Problem: Fall Risk (Adult)  Goal: Identify Related Risk Factors and Signs and Symptoms  Outcome: Ongoing (interventions implemented as appropriate)    Goal: Absence of Falls  Outcome: Outcome(s) achieved Date Met: 01/11/18

## 2018-01-12 ENCOUNTER — ANESTHESIA EVENT (OUTPATIENT)
Dept: GASTROENTEROLOGY | Facility: HOSPITAL | Age: 68
End: 2018-01-12

## 2018-01-12 ENCOUNTER — ANESTHESIA (OUTPATIENT)
Dept: GASTROENTEROLOGY | Facility: HOSPITAL | Age: 68
End: 2018-01-12

## 2018-01-12 PROBLEM — R13.10 DYSPHAGIA: Status: ACTIVE | Noted: 2018-01-09

## 2018-01-12 LAB
ANION GAP SERPL CALCULATED.3IONS-SCNC: 10 MMOL/L (ref 5–15)
BASOPHILS # BLD AUTO: 0.02 10*3/MM3 (ref 0–0.2)
BASOPHILS NFR BLD AUTO: 0.1 % (ref 0–2)
BUN BLD-MCNC: 16 MG/DL (ref 7–21)
BUN/CREAT SERPL: 28.1 (ref 7–25)
CALCIUM SPEC-SCNC: 9.3 MG/DL (ref 8.4–10.2)
CHLORIDE SERPL-SCNC: 102 MMOL/L (ref 95–110)
CO2 SERPL-SCNC: 29 MMOL/L (ref 22–31)
CREAT BLD-MCNC: 0.57 MG/DL (ref 0.5–1)
DEPRECATED RDW RBC AUTO: 47.5 FL (ref 36.4–46.3)
EOSINOPHIL # BLD AUTO: 0 10*3/MM3 (ref 0–0.7)
EOSINOPHIL NFR BLD AUTO: 0 % (ref 0–7)
ERYTHROCYTE [DISTWIDTH] IN BLOOD BY AUTOMATED COUNT: 13.8 % (ref 11.5–14.5)
GFR SERPL CREATININE-BSD FRML MDRD: 106 ML/MIN/1.73 (ref 45–104)
GLUCOSE BLD-MCNC: 136 MG/DL (ref 60–100)
HCT VFR BLD AUTO: 33.1 % (ref 35–45)
HGB BLD-MCNC: 11.2 G/DL (ref 12–15.5)
IMM GRANULOCYTES # BLD: 0.12 10*3/MM3 (ref 0–0.02)
IMM GRANULOCYTES NFR BLD: 0.5 % (ref 0–0.5)
LYMPHOCYTES # BLD AUTO: 1.38 10*3/MM3 (ref 0.6–4.2)
LYMPHOCYTES NFR BLD AUTO: 6.2 % (ref 10–50)
MCH RBC QN AUTO: 31.9 PG (ref 26.5–34)
MCHC RBC AUTO-ENTMCNC: 33.8 G/DL (ref 31.4–36)
MCV RBC AUTO: 94.3 FL (ref 80–98)
MONOCYTES # BLD AUTO: 1.11 10*3/MM3 (ref 0–0.9)
MONOCYTES NFR BLD AUTO: 5 % (ref 0–12)
NEUTROPHILS # BLD AUTO: 19.55 10*3/MM3 (ref 2–8.6)
NEUTROPHILS NFR BLD AUTO: 88.2 % (ref 37–80)
NRBC BLD MANUAL-RTO: 0 /100 WBC (ref 0–0)
PLATELET # BLD AUTO: 377 10*3/MM3 (ref 150–450)
PMV BLD AUTO: 8.2 FL (ref 8–12)
POTASSIUM BLD-SCNC: 4.3 MMOL/L (ref 3.5–5.1)
RBC # BLD AUTO: 3.51 10*6/MM3 (ref 3.77–5.16)
SODIUM BLD-SCNC: 141 MMOL/L (ref 137–145)
VANCOMYCIN PEAK SERPL-MCNC: 16.64 MCG/ML (ref 30–40)
VANCOMYCIN PEAK SERPL-MCNC: 6.47 MCG/ML (ref 30–40)
WBC NRBC COR # BLD: 22.18 10*3/MM3 (ref 3.2–9.8)

## 2018-01-12 PROCEDURE — 99231 SBSQ HOSP IP/OBS SF/LOW 25: CPT | Performed by: INTERNAL MEDICINE

## 2018-01-12 PROCEDURE — 94799 UNLISTED PULMONARY SVC/PX: CPT

## 2018-01-12 PROCEDURE — 97116 GAIT TRAINING THERAPY: CPT

## 2018-01-12 PROCEDURE — 97535 SELF CARE MNGMENT TRAINING: CPT

## 2018-01-12 PROCEDURE — 94760 N-INVAS EAR/PLS OXIMETRY 1: CPT

## 2018-01-12 PROCEDURE — 25010000002 METHYLPREDNISOLONE PER 40 MG: Performed by: FAMILY MEDICINE

## 2018-01-12 PROCEDURE — 25010000002 VANCOMYCIN PER 500 MG: Performed by: FAMILY MEDICINE

## 2018-01-12 PROCEDURE — 97530 THERAPEUTIC ACTIVITIES: CPT

## 2018-01-12 PROCEDURE — 97110 THERAPEUTIC EXERCISES: CPT

## 2018-01-12 PROCEDURE — 25010000002 LORAZEPAM PER 2 MG: Performed by: FAMILY MEDICINE

## 2018-01-12 PROCEDURE — 80048 BASIC METABOLIC PNL TOTAL CA: CPT | Performed by: INTERNAL MEDICINE

## 2018-01-12 PROCEDURE — 85025 COMPLETE CBC W/AUTO DIFF WBC: CPT | Performed by: INTERNAL MEDICINE

## 2018-01-12 PROCEDURE — 25010000002 PIPERACILLIN SOD-TAZOBACTAM PER 1 G: Performed by: FAMILY MEDICINE

## 2018-01-12 PROCEDURE — 80202 ASSAY OF VANCOMYCIN: CPT | Performed by: FAMILY MEDICINE

## 2018-01-12 RX ORDER — HYDRALAZINE HYDROCHLORIDE 20 MG/ML
5 INJECTION INTRAMUSCULAR; INTRAVENOUS EVERY 6 HOURS PRN
Status: DISCONTINUED | OUTPATIENT
Start: 2018-01-12 | End: 2018-01-17 | Stop reason: HOSPADM

## 2018-01-12 RX ORDER — METHYLPREDNISOLONE SODIUM SUCCINATE 40 MG/ML
40 INJECTION, POWDER, LYOPHILIZED, FOR SOLUTION INTRAMUSCULAR; INTRAVENOUS EVERY 12 HOURS
Status: DISCONTINUED | OUTPATIENT
Start: 2018-01-12 | End: 2018-01-13

## 2018-01-12 RX ORDER — PANTOPRAZOLE SODIUM 40 MG/10ML
40 INJECTION, POWDER, LYOPHILIZED, FOR SOLUTION INTRAVENOUS
Status: DISCONTINUED | OUTPATIENT
Start: 2018-01-12 | End: 2018-01-17 | Stop reason: HOSPADM

## 2018-01-12 RX ORDER — METHYLPREDNISOLONE SODIUM SUCCINATE 40 MG/ML
40 INJECTION, POWDER, LYOPHILIZED, FOR SOLUTION INTRAMUSCULAR; INTRAVENOUS DAILY
Status: DISCONTINUED | OUTPATIENT
Start: 2018-01-13 | End: 2018-01-12

## 2018-01-12 RX ORDER — BUTALBITAL, ACETAMINOPHEN AND CAFFEINE 50; 325; 40 MG/1; MG/1; MG/1
2 TABLET ORAL EVERY 12 HOURS PRN
Status: DISCONTINUED | OUTPATIENT
Start: 2018-01-12 | End: 2018-01-13

## 2018-01-12 RX ORDER — LORAZEPAM 2 MG/ML
0.5 INJECTION INTRAMUSCULAR ONCE
Status: COMPLETED | OUTPATIENT
Start: 2018-01-12 | End: 2018-01-12

## 2018-01-12 RX ORDER — SODIUM CHLORIDE 0.9 % (FLUSH) 0.9 %
1-10 SYRINGE (ML) INJECTION AS NEEDED
Status: CANCELLED | OUTPATIENT
Start: 2018-01-12

## 2018-01-12 RX ORDER — DEXTROSE AND SODIUM CHLORIDE 5; .45 G/100ML; G/100ML
30 INJECTION, SOLUTION INTRAVENOUS CONTINUOUS PRN
Status: CANCELLED | OUTPATIENT
Start: 2018-01-12

## 2018-01-12 RX ADMIN — PANTOPRAZOLE SODIUM 40 MG: 40 INJECTION, POWDER, FOR SOLUTION INTRAVENOUS at 16:07

## 2018-01-12 RX ADMIN — TAZOBACTAM SODIUM AND PIPERACILLIN SODIUM 3.38 G: 375; 3 INJECTION, SOLUTION INTRAVENOUS at 23:30

## 2018-01-12 RX ADMIN — BUTALBITAL, ACETAMINOPHEN, AND CAFFEINE 1 TABLET: 50; 325; 40 TABLET ORAL at 04:11

## 2018-01-12 RX ADMIN — GABAPENTIN 800 MG: 400 CAPSULE ORAL at 05:45

## 2018-01-12 RX ADMIN — METHYLPREDNISOLONE SODIUM SUCCINATE 40 MG: 40 INJECTION, POWDER, FOR SOLUTION INTRAMUSCULAR; INTRAVENOUS at 22:15

## 2018-01-12 RX ADMIN — IPRATROPIUM BROMIDE AND ALBUTEROL SULFATE 3 ML: 2.5; .5 SOLUTION RESPIRATORY (INHALATION) at 07:39

## 2018-01-12 RX ADMIN — TRAZODONE HYDROCHLORIDE 300 MG: 150 TABLET ORAL at 20:44

## 2018-01-12 RX ADMIN — IPRATROPIUM BROMIDE AND ALBUTEROL SULFATE 3 ML: 2.5; .5 SOLUTION RESPIRATORY (INHALATION) at 14:57

## 2018-01-12 RX ADMIN — NICOTINE 1 PATCH: 21 PATCH TRANSDERMAL at 23:15

## 2018-01-12 RX ADMIN — SERTRALINE HYDROCHLORIDE 100 MG: 50 TABLET ORAL at 16:05

## 2018-01-12 RX ADMIN — IPRATROPIUM BROMIDE AND ALBUTEROL SULFATE 3 ML: 2.5; .5 SOLUTION RESPIRATORY (INHALATION) at 19:03

## 2018-01-12 RX ADMIN — VANCOMYCIN HYDROCHLORIDE 750 MG: 5 INJECTION, POWDER, LYOPHILIZED, FOR SOLUTION INTRAVENOUS at 16:04

## 2018-01-12 RX ADMIN — BUDESONIDE AND FORMOTEROL FUMARATE DIHYDRATE 2 PUFF: 160; 4.5 AEROSOL RESPIRATORY (INHALATION) at 07:47

## 2018-01-12 RX ADMIN — BUTALBITAL, ACETAMINOPHEN, AND CAFFEINE 2 TABLET: 50; 325; 40 TABLET ORAL at 16:47

## 2018-01-12 RX ADMIN — METHYLPREDNISOLONE SODIUM SUCCINATE 40 MG: 40 INJECTION, POWDER, FOR SOLUTION INTRAMUSCULAR; INTRAVENOUS at 10:10

## 2018-01-12 RX ADMIN — TAZOBACTAM SODIUM AND PIPERACILLIN SODIUM 3.38 G: 375; 3 INJECTION, SOLUTION INTRAVENOUS at 11:26

## 2018-01-12 RX ADMIN — ATORVASTATIN CALCIUM 10 MG: 10 TABLET, FILM COATED ORAL at 16:06

## 2018-01-12 RX ADMIN — TAZOBACTAM SODIUM AND PIPERACILLIN SODIUM 3.38 G: 375; 3 INJECTION, SOLUTION INTRAVENOUS at 00:27

## 2018-01-12 RX ADMIN — AMLODIPINE BESYLATE 10 MG: 10 TABLET ORAL at 16:05

## 2018-01-12 RX ADMIN — GABAPENTIN 800 MG: 400 CAPSULE ORAL at 16:07

## 2018-01-12 RX ADMIN — LORAZEPAM 0.5 MG: 2 INJECTION INTRAMUSCULAR; INTRAVENOUS at 11:25

## 2018-01-12 RX ADMIN — METHYLPREDNISOLONE SODIUM SUCCINATE 40 MG: 40 INJECTION, POWDER, FOR SOLUTION INTRAMUSCULAR; INTRAVENOUS at 10:07

## 2018-01-12 RX ADMIN — BUDESONIDE AND FORMOTEROL FUMARATE DIHYDRATE 2 PUFF: 160; 4.5 AEROSOL RESPIRATORY (INHALATION) at 19:03

## 2018-01-12 RX ADMIN — IPRATROPIUM BROMIDE AND ALBUTEROL SULFATE 3 ML: 2.5; .5 SOLUTION RESPIRATORY (INHALATION) at 11:23

## 2018-01-12 RX ADMIN — GABAPENTIN 800 MG: 400 CAPSULE ORAL at 22:00

## 2018-01-12 RX ADMIN — TRAMADOL HYDROCHLORIDE 50 MG: 50 TABLET, FILM COATED ORAL at 16:05

## 2018-01-12 NOTE — ANESTHESIA PREPROCEDURE EVALUATION
Anesthesia Evaluation     no history of anesthetic complications:  NPO Solid Status: > 8 hours  NPO Liquid Status: > 8 hours     Airway   Mallampati: I  TM distance: <3 FB  Neck ROM: full  possible difficult intubation and anterior  Dental    (+) upper dentures    Comment: Patient states dentures wont come out      Pulmonary    (+) pneumonia stable , a smoker Current Abstained day of surgery, COPD, asthma, rhonchi, decreased breath sounds, wheezes,   Cardiovascular - normal exam    (+) hypertension, CAD, PVD, hyperlipidemia  (-) angina      Neuro/Psych  (+) dizziness/light headedness, psychiatric history Anxiety and Depression,     GI/Hepatic/Renal/Endo      Musculoskeletal     Abdominal    Substance History      OB/GYN          Other                                                Anesthesia Plan    ASA 3     MAC     intravenous induction   Anesthetic plan and risks discussed with patient.

## 2018-01-12 NOTE — PLAN OF CARE
Problem: Patient Care Overview (Adult)  Goal: Plan of Care Review  Outcome: Ongoing (interventions implemented as appropriate)   01/12/18 0930   Coping/Psychosocial Response Interventions   Plan Of Care Reviewed With patient   Patient Care Overview   Progress progress toward functional goals as expected   Outcome Evaluation   Outcome Summary/Follow up Plan pt demosntrated good overall strength and endurance with gait however she did have one lateral LOB that she was able to self recover from. Pt has not met any goals but is progressing well.        Problem: Inpatient Physical Therapy  Goal: Transfer Training Goal 1 STG- PT  Outcome: Ongoing (interventions implemented as appropriate)   01/10/18 1512 01/11/18 0911 01/12/18 0930   Transfer Training PT STG   Transfer Training PT STG, Date Established --  01/11/18 --    Transfer Training PT STG, Time to Achieve 5 days --  --    Transfer Training PT STG, Activity Type bed to chair /chair to bed;sit to stand/stand to sit;toilet --  --    Transfer Training PT STG, [place] Level independent --  --    Transfer Training PT STG, Date Goal Reviewed --  --  01/12/18   Transfer Training PT STG, Outcome --  --  goal ongoing     Goal: Gait Training Goal STG- PT  Outcome: Ongoing (interventions implemented as appropriate)   01/10/18 1512 01/12/18 0930   Gait Training PT STG   Gait Training Goal PT STG, Date Established 01/10/18 --    Gait Training Goal PT STG, Time to Achieve 5 days --    Gait Training Goal PT STG, [place] Level conditional independence --    Gait Training Goal PT STG, Assist Device cane, straight --    Gait Training Goal PT STG, Distance to Achieve 600 ft --    Gait Training Goal PT STG, Date Goal Reviewed --  01/12/18   Gait Training Goal PT STG, Outcome --  goal ongoing

## 2018-01-12 NOTE — THERAPY TREATMENT NOTE
Acute Care - Occupational Therapy Treatment Note  [facility]     Patient Name: Mona Perales  : 1950  MRN: [id number]  Today's Date: 2018  Onset of Illness/Injury or Date of Surgery Date: 18  Date of Referral to OT: 01/10/18  Referring Physician: Dr. Ingram       Admit Date: 2018    Visit Dx:     ICD-10-CM ICD-9-CM   1. Pneumonia of right upper lobe due to infectious organism J18.1 486   2. Hypoxia R09.02 799.02   3. Decreased activities of daily living (ADL) Z78.9 V49.89   4. Impaired functional mobility, balance, gait, and endurance Z74.09 V49.89     Patient Active Problem List   Diagnosis   • Anxiety   • CAD (coronary atherosclerotic disease)   • Carotid stenosis   • COPD (chronic obstructive pulmonary disease)   • COPD with exacerbation   • Depressive disorder, not elsewhere classified   • Benign essential hypertension   • Hypercholesteremia   • Insomnia   • Low back pain   • Osteoporosis   • Other screening mammogram   • RUQ abdominal pain   • Peripheral arterial disease   • Dizziness   • Nicotine abuse   • Pneumonia of right upper lobe due to infectious organism             Adult Rehabilitation Note       18 0930 18 0620 18 0955    Rehab Assessment/Intervention    Discipline physical therapy assistant  -BRENDAN occupational therapy assistant  -LW occupational therapist  -NN    Document Type therapy note (daily note)  -BRENDAN therapy note (daily note)  -LW therapy note (daily note)  -NN    Subjective Information complains of;fatigue  -BRENDAN agree to therapy  -LW complains of;agree to therapy;weakness;fatigue;pain;dyspnea  -NN    Patient Effort, Rehab Treatment good  -BRENDAN good  -LW     Precautions/Limitations  fall precautions  -LW     Recorded by [BRENDAN] Navya Morales PTA [LW] Nuzhat Rodrigues, DALE/L [NN] An Meyer, OTR/L    Vital Signs    Pre Systolic BP Rehab 127  -BRENDAN 126  -  -NN    Pre Treatment Diastolic BP 74  -BRENDAN 66  -LW 50  -NN    Post Systolic BP Rehab  134  -BRENDAN      Post Treatment Diastolic BP 80  -BRENDAN      Pretreatment Heart Rate (beats/min) 79  -BRENDAN 73  -  -NN    Posttreatment Heart Rate (beats/min) 87  -BRENDAN      Pretreatment Resp Rate (breaths/min)  73  -LW     Pre SpO2 (%) 96  -BRENDAN 99  -LW 98  -NN    O2 Delivery Pre Treatment supplemental O2  -BRENDAN supplemental O2  -LW supplemental O2  -NN    Post SpO2 (%) 93  -BRENDAN 98  -LW     O2 Delivery Post Treatment supplemental O2  -BRENDAN supplemental O2  -LW     Pre Patient Position Supine  -BRENDAN Supine  -LW Supine  -NN    Intra Patient Position Sitting  -BRENDAN      Post Patient Position Supine  -BRENDAN Sitting  -LW     Recorded by [BRENDAN] Navya Morales PTA [LW] TAYLOR SolanoA/L [NN] An Meyer, OTR/L    Pain Assessment    Pain Assessment 0-10  -BRENDAN 0-10  -LW 0-10  -NN    Pain Score 7  -BRENDAN 7  -LW 8  -NN    Post Pain Score 8  -BRENDAN 7  -LW     Pain Type Acute pain  -BRENDAN Acute pain  -LW Acute pain  -NN    Pain Location Back  -BRENDAN Back  -LW Back  -NN    Pain Descriptors  Aching  -LW Aching  -NN    Pain Frequency  Constant/continuous  -LW Constant/continuous  -NN    Pain Intervention(s)  Medication (See MAR)  -LW Medication (See MAR);Repositioned  -NN    Response to Interventions   tolerated  -NN    Recorded by [BRENDAN] Navya Morales PTA [LW] CHITO Solano/L [NN] An Meyer, OTR/L    Vision Assessment/Intervention    Visual Impairment WNL;WFL  -BRENDAN WNL;WFL  -LW     Recorded by [BRENDAN] Navya Morales PTA [LW] TAYLOR SolanoA/L     Cognitive Assessment/Intervention    Current Cognitive/Communication Assessment functional  -BRENDAN functional  -LW     Orientation Status oriented x 4  -BRENDAN oriented x 4  -LW     Follows Commands/Answers Questions 100% of the time  -BRENDAN 100% of the time  -LW     Personal Safety WNL/WFL  -BRENDAN WNL/WFL  -LW     Personal Safety Interventions gait belt;supervised activity;nonskid shoes/slippers when out of bed;fall prevention program maintained  -BRENDAN gait belt;nonskid shoes/slippers when out of bed  -LW      Short/Long Term Memory intact short term memory;intact long term memory  -BRENDAN      Recorded by [BRENDAN] Navya Morales PTA [LW] Nuzhat Rodrigues, DALE/L     Bed Mobility, Assessment/Treatment    Bed Mobility, Assistive Device bed rails;head of bed elevated  -BRENDAN  head of bed elevated;bed rails  -NN    Bed Mobility, Roll Left, [place] independent  -BRENDAN      Bed Mobility, Roll Right, [place] not tested  -BRENDAN      Bed Mobility, Scoot/Bridge, [place] independent  -BRENDAN      Bed Mob, Supine to Sit, [place] independent  -BRENDAN conditional independence  -LW conditional independence  -NN    Bed Mob, Sit to Supine, [place] independent  -BRENDAN conditional independence  -LW conditional independence  -NN    Recorded by [BRENDAN] Navya Morales PTA [LW] TAYLOR SolanoA/L [NN] An Meyer, OTR/L    Transfer Assessment/Treatment    Transfers, Sit-Stand [place] independent  -BRENDAN independent  -LW     Transfers, Stand-Sit [place] independent  -BRENDAN independent  -LW     Transfers, Sit-Stand-Sit, Assist Device other (see comments)   none  -BRENDAN bed rails  -LW     Toilet Transfer, [place]  independent   none  -LW     Toilet Transfer, Assistive Device  other (see comments)   none  -LW     Transfer, Comment   Pt. stated she has been awake since 430 because someone came in her room asking for a bath and she has worked with therapy and been down for radiology so she just wants to take it easy right now  -NN    Recorded by [BRENDAN] Navya Morales PTA [LW] TAYLOR SolanoA/L [NN] An Meyer, OTR/L    Gait Assessment/Treatment    Gait, [place] Level stand by assist;contact guard assist  -BRENDAN      Gait, Assistive Device other (see comments)  -BRENDAN      Gait, Distance (Feet) 340  -BRENDAN      Gait, Comment pt had one LOB when her name was called by someone in the moura, she was able to self recover  -BRENDAN      Recorded by [BRENDAN] Navya Morales PTA      Toileting Assessment/Training    Toileting Assess/Train,  Assistive Device  grab bars  -LW     Toileting Assess/Train, Position  sitting  -LW     Toileting Assess/Train, Indepen Level  independent  -LW     Recorded by  [LW] CHITO Solano/L     Grooming Assessment/Training    Grooming Assess/Train, Position  sitting  -LW     Grooming Assess/Train, Indepen Level  set up required;conditional independence  -LW     Grooming Assess/Train, Comment  combed hair, washed face and hands, oral care  -LW     Recorded by  [LW] CHITO Solano/L     Therapy Exercises    Bilateral Lower Extremities AROM:;20 reps;sitting;ankle pumps/circles;glut sets;heel slides;hip abduction/adduction;hip flexion;LAQ  -BRENDAN      Bilateral Upper Extremity  AROM:;sitting;elbow flexion/extension;shoulder extension/flexion;shoulder ER/IR;shoulder protraction/retraction  -LW AROM:;15 reps;sitting;elbow flexion/extension;hand pumps;pronation/supination;shoulder abduction/adduction;shoulder extension/flexion;shoulder horizontal abd/add   3x15 reps  -NN    BUE Resistance  manual resistance- minimal   20  -LW     Recorded by [BRENDAN] Navya Morales PTA [LW] TAYLOR SolanoA/L [NN] An Meyer, OTR/L    Positioning and Restraints    Pre-Treatment Position in bed  -BRENDAN in bed  -LW in bed  -NN    Post Treatment Position bed  -BRENDAN bed  -LW bed  -NN    In Bed supine;call light within reach;encouraged to call for assist;exit alarm on;side rails up x2  -BRENDAN supine;call light within reach;encouraged to call for assist;exit alarm on;with family/caregiver  -LW fowlers;call light within reach;encouraged to call for assist;with other staff;side rails up x2  -NN    Recorded by [BRENDAN] Navya Morales PTA [LW] TAYLOR SolanoA/L [NN] An Meyer, OTR/L      01/11/18 0915 01/11/18 0911       Rehab Assessment/Intervention    Treatment Not Performed other (see comments)  -NN      Treatment Not Performed, Comment Pt. will PTA  -NN      Precautions/Limitations  fall precautions;oxygen therapy device and L/min   -BRENDAN     Recorded by [NN] An Meyer, OTR/L [BRENDAN] Navya Morales, PTA       User Key  (r) = Recorded By, (t) = Taken By, (c) = Cosigned By    Initials Name Effective Dates    BRENDAN Navya Morales, PTA 10/17/16 -     LW Nuzhat Rodrigues, DALE/L 10/17/16 -     NN An Meyer, OTR/L 11/08/17 -                 OT Goals       01/12/18 1153 01/10/18 1451       Transfer Training OT LTG    Transfer Training OT LTG, Date Established  01/10/18  -NN     Transfer Training OT LTG, Time to Achieve  by discharge  -NN     Transfer Training OT LTG, Activity Type  all transfers  -NN     Transfer Training OT LTG, [place] Level  independent  -NN     Transfer Training OT LTG, Date Goal Reviewed 01/12/18  -LW      Transfer Training OT LTG, Outcome goal ongoing  -LW      Strength OT LTG    Strength Goal OT LTG, Date Established  01/10/18  -NN     Strength Goal OT LTG, Time to Achieve  by discharge  -NN     Strength Goal OT LTG, Measure to Achieve  Pt. will complete BUE strengthening exercises all planes and joints to increase BUE strength to 5/5 for ADLs.   -NN     Strength Goal OT LTG, Date Goal Reviewed 01/12/18  -LW      ADL OT LTG    ADL OT LTG, Date Established  01/10/18  -NN     ADL OT LTG, Activity Type  ADL skills  -NN     ADL OT LTG, [place] Level  independent  -NN     ADL OT LTG, Date Goal Reviewed 01/12/18  -LW      ADL OT LTG, Outcome goal ongoing  -LW        User Key  (r) = Recorded By, (t) = Taken By, (c) = Cosigned By    Initials Name Provider Type    LW Nuzhat Rodrigues, DALE/L Occupational Therapy Assistant    NN An Meyer, OTR/L Occupational Therapist          Occupational Therapy Education     Title: PT OT SLP Therapies (Active)     Topic: Occupational Therapy (Done)     Point: ADL training (Done)    Description: Instruct learner(s) on proper safety adaptation and remediation techniques during self care or transfers.   Instruct in proper use of assistive devices.    Learning Progress Summary     Learner Readiness Method Response Comment Documented by Status   Patient Acceptance E VU  LW 01/12/18 1203 Done    Acceptance E NR Pt. educated on role of OT, safe bed mobility, benefit of OOB activity, progression with poc NN 01/11/18 1016 Active    Acceptance E VU,NR Pt. educated on role of OT, safe t/f, benefit of therapy, calling for assist, progression with poc NN 01/10/18 1451 Done               Point: Home exercise program (Done)    Description: Instruct learner(s) on appropriate technique for monitoring, assisting and/or progressing therapeutic exercises/activities.    Learning Progress Summary    Learner Readiness Method Response Comment Documented by Status   Patient Acceptance E VU  LW 01/12/18 1203 Done    Acceptance E NR Pt. educated on role of OT, safe bed mobility, benefit of OOB activity, progression with poc NN 01/11/18 1016 Active    Acceptance E VU,NR Pt. educated on role of OT, safe t/f, benefit of therapy, calling for assist, progression with poc NN 01/10/18 1451 Done               Point: Body mechanics (Done)    Description: Instruct learner(s) on proper positioning and spine alignment during self-care, functional mobility activities and/or exercises.    Learning Progress Summary    Learner Readiness Method Response Comment Documented by Status   Patient Acceptance E VU  LW 01/12/18 1203 Done    Acceptance E NR Pt. educated on role of OT, safe bed mobility, benefit of OOB activity, progression with poc NN 01/11/18 1016 Active    Acceptance E VU,NR Pt. educated on role of OT, safe t/f, benefit of therapy, calling for assist, progression with poc NN 01/10/18 1451 Done                      User Key     Initials Effective Dates Name Provider Type Discipline     10/17/16 -  CHITO Solano/L Occupational Therapy Assistant OT     11/08/17 -  MELANIA Gomez/L Occupational Therapist OT                  OT Recommendation and Plan  Anticipated Discharge Disposition: home with assist,  home with home health  Planned Therapy Interventions: activity intolerance, ADL retraining, balance training, bed mobility training, energy conservation, home exercise program, strengthening  Therapy Frequency:  (3-14x/week)  Plan of Care Review  Plan Of Care Reviewed With: patient  Progress: progress toward functional goals as expected  Outcome Summary/Follow up Plan: Pt is getting ready to go down for test but did agree to work with tx working on Grooming and arm ex for increased independence for ADL's        Outcome Measures       01/12/18 0930 01/12/18 0620 01/11/18 1000    How much help from another person do you currently need...    Turning from your back to your side while in flat bed without using bedrails? 4  -BRENDAN      Moving from lying on back to sitting on the side of a flat bed without bedrails? 4  -BRENDAN      Moving to and from a bed to a chair (including a wheelchair)? 3  -BRENDAN      Standing up from a chair using your arms (e.g., wheelchair, bedside chair)? 4  -BRENDAN      Climbing 3-5 steps with a railing? 3  -BRENDAN      To walk in hospital room? 3  -BRENDAN      AM-PAC 6 Clicks Score 21  -BRENDAN      How much help from another is currently needed...    Putting on and taking off regular lower body clothing?  3  -LW 3  -NN    Bathing (including washing, rinsing, and drying)  3  -LW 3  -NN    Toileting (which includes using toilet bed pan or urinal)  3  -LW 3  -NN    Putting on and taking off regular upper body clothing  4  -LW 4  -NN    Taking care of personal grooming (such as brushing teeth)  4  -LW 4  -NN    Eating meals  4  -LW 4  -NN    Score  21  -LW 21  -NN      01/11/18 0911 01/10/18 1425 01/10/18 1400    How much help from another person do you currently need...    Turning from your back to your side while in flat bed without using bedrails? 4  -BRENDAN (r) BM (t) BRENDAN (c) 4  -MN     Moving from lying on back to sitting on the side of a flat bed without bedrails? 4  -BRENDAN (r) BM (t) BRENDAN (c) 4  -MN     Moving to and from a  bed to a chair (including a wheelchair)? 3  -BRENDAN (r) BM (t) BRENDAN (c) 3  -MN     Standing up from a chair using your arms (e.g., wheelchair, bedside chair)? 4  -BRENDAN (r) BM (t) BRENDAN (c) 3  -MN     Climbing 3-5 steps with a railing? 3  -BRENDAN (r) BM (t) BRENDAN (c) 3  -MN     To walk in hospital room? 3  -BRENDAN (r) BM (t) BRENDAN (c) 3  -MN     AM-PAC 6 Clicks Score 21  -BRENDAN (r) BM (t) 20  -MN     How much help from another is currently needed...    Putting on and taking off regular lower body clothing?   3  -NN    Bathing (including washing, rinsing, and drying)   3  -NN    Toileting (which includes using toilet bed pan or urinal)   4  -NN    Putting on and taking off regular upper body clothing   4  -NN    Taking care of personal grooming (such as brushing teeth)   4  -NN    Eating meals   4  -NN    Score   22  -NN    Functional Assessment    Outcome Measure Options  AM-PAC 6 Clicks Basic Mobility (PT)  -MN AM-PAC 6 Clicks Daily Activity (OT)  -NN      User Key  (r) = Recorded By, (t) = Taken By, (c) = Cosigned By    Initials Name Provider Type    MN Georgia Holcomb, PT Physical Therapist    BRENDAN Morales, PTA Physical Therapy Assistant    LW CHITO Solano/L Occupational Therapy Assistant    LAKESHA Meyer, OTR/L Occupational Therapist    TURNER Melton, PT Student PT Student           Time Calculation:         Time Calculation- OT       01/12/18 1206          Time Calculation- OT    OT Start Time 0620  -LW      OT Stop Time 0700  -LW      OT Time Calculation (min) 40 min  -LW      Total Timed Code Minutes- OT 40 minute(s)  -LW      OT Received On 01/12/18  -LW        User Key  (r) = Recorded By, (t) = Taken By, (c) = Cosigned By    Initials Name Provider Type    LW CHITO Solano/L Occupational Therapy Assistant           Therapy Charges for Today     Code Description Service Date Service Provider Modifiers Qty    58839838194 HC OT SELF CARE/MGMT/TRAIN EA 15 MIN 1/12/2018 ADITYA Solano GO 2     [id number]  OT THER PROC EA 15 MIN 1/12/2018 CHITO Solano/L GO 1          OT G-codes  OT Functional Scales Options: AM-PAC 6 Clicks Daily Activity (OT)  Functional Limitation: Self care  Self Care Current Status (): At least 20 percent but less than 40 percent impaired, limited or restricted  Self Care Goal Status (): At least 1 percent but less than 20 percent impaired, limited or restricted    CHITO Solano/ARMANDO  1/12/2018

## 2018-01-12 NOTE — SIGNIFICANT NOTE
01/12/18 1342   Rehab Evaluation   Evaluation Not Performed patient unavailable for evaluation  (Pt having EGD today therefore pt is NPO for procedure. )

## 2018-01-12 NOTE — THERAPY TREATMENT NOTE
Acute Care - Physical Therapy Treatment Note  [facility]     Patient Name: Mona Perales  : 1950  MRN: [id number]  Today's Date: 2018  Onset of Illness/Injury or Date of Surgery Date: 18  Date of Referral to PT: 01/10/18  Referring Physician: Dr. Ingram     Admit Date: 2018    Visit Dx:    ICD-10-CM ICD-9-CM   1. Pneumonia of right upper lobe due to infectious organism J18.1 486   2. Hypoxia R09.02 799.02   3. Decreased activities of daily living (ADL) Z78.9 V49.89   4. Impaired functional mobility, balance, gait, and endurance Z74.09 V49.89     Patient Active Problem List   Diagnosis   • Anxiety   • CAD (coronary atherosclerotic disease)   • Carotid stenosis   • COPD (chronic obstructive pulmonary disease)   • COPD with exacerbation   • Depressive disorder, not elsewhere classified   • Benign essential hypertension   • Hypercholesteremia   • Insomnia   • Low back pain   • Osteoporosis   • Other screening mammogram   • RUQ abdominal pain   • Peripheral arterial disease   • Dizziness   • Nicotine abuse   • Pneumonia of right upper lobe due to infectious organism               Adult Rehabilitation Note       18 0930 18 0955 18 0915    Rehab Assessment/Intervention    Discipline physical therapy assistant  -BRENDAN occupational therapist  -NN     Document Type therapy note (daily note)  -BRENDAN therapy note (daily note)  -NN     Subjective Information complains of;fatigue  -BRENDAN complains of;agree to therapy;weakness;fatigue;pain;dyspnea  -NN     Patient Effort, Rehab Treatment good  -BRENDAN      Treatment Not Performed   other (see comments)  -NN    Treatment Not Performed, Comment   Pt. will PTA  -NN    Recorded by [BRENDAN] Navya Morales, PTA [NN] An Meyer, OTR/L [NN] An Meyer, OTR/L    Vital Signs    Pre Systolic BP Rehab 127  -BRENDAN 120  -NN     Pre Treatment Diastolic BP 74  -BRENDAN 50  -NN     Post Systolic BP Rehab 134  -BRENDAN      Post Treatment Diastolic BP 80  -BRENDAN       Pretreatment Heart Rate (beats/min) 79  -BRENDAN 108  -NN     Posttreatment Heart Rate (beats/min) 87  -BRENDAN      Pre SpO2 (%) 96  -BRENDAN 98  -NN     O2 Delivery Pre Treatment supplemental O2  -BRENDAN supplemental O2  -NN     Post SpO2 (%) 93  -BRENDAN      O2 Delivery Post Treatment supplemental O2  -BRENDAN      Pre Patient Position Supine  -BRENDAN Supine  -NN     Intra Patient Position Sitting  -BRENDAN      Post Patient Position Supine  -BRENDAN      Recorded by [BRENDAN] Navya Morales PTA [NN] An Meyer, OTR/L     Pain Assessment    Pain Assessment 0-10  -BRENDAN 0-10  -NN     Pain Score 7  -BRENDAN 8  -NN     Post Pain Score 8  -BRENDAN      Pain Type Acute pain  -BRENDAN Acute pain  -NN     Pain Location Back  -BRENDAN Back  -NN     Pain Descriptors  Aching  -NN     Pain Frequency  Constant/continuous  -NN     Pain Intervention(s)  Medication (See MAR);Repositioned  -NN     Response to Interventions  tolerated  -NN     Recorded by [BRENDAN] Navya Morales PTA [NN] An Meyer, OTR/L     Vision Assessment/Intervention    Visual Impairment WNL;WFL  -BRENDAN      Recorded by [BRENDAN] Navya Morales PTA      Cognitive Assessment/Intervention    Current Cognitive/Communication Assessment functional  -BRENDAN      Orientation Status oriented x 4  -BRENDAN      Follows Commands/Answers Questions 100% of the time  -BRENDAN      Personal Safety WNL/WFL  -BRENDAN      Personal Safety Interventions gait belt;supervised activity;nonskid shoes/slippers when out of bed;fall prevention program maintained  -BRENDAN      Short/Long Term Memory intact short term memory;intact long term memory  -BRENDAN      Recorded by [BRENDAN] Navya Morales PTA      Bed Mobility, Assessment/Treatment    Bed Mobility, Assistive Device bed rails;head of bed elevated  -BRENDAN head of bed elevated;bed rails  -NN     Bed Mobility, Roll Left, [place] independent  -BRENDAN      Bed Mobility, Roll Right, [place] not tested  -BRENDAN      Bed Mobility, Scoot/Bridge, [place] independent  -BRENDAN      Bed Mob, Supine to Sit, [place] independent   -BRENDAN conditional independence  -NN     Bed Mob, Sit to Supine, [place] independent  -BRENDAN conditional independence  -NN     Recorded by [BRENDAN] Navya Morales PTA [NN] An Meyer, OTR/L     Transfer Assessment/Treatment    Transfers, Sit-Stand [place] independent  -BRENDAN      Transfers, Stand-Sit [place] independent  -BRENDAN      Transfers, Sit-Stand-Sit, Assist Device other (see comments)   none  -BRENDAN      Transfer, Comment  Pt. stated she has been awake since 430 because someone came in her room asking for a bath and she has worked with therapy and been down for radiology so she just wants to take it easy right now  -NN     Recorded by [BRENDAN] Navya Morales PTA [NN] An Meyer, OTR/L     Gait Assessment/Treatment    Gait, [place] Level stand by assist;contact guard assist  -      Gait, Assistive Device other (see comments)  -      Gait, Distance (Feet) 340  -      Gait, Comment pt had one LOB when her name was called by someone in the moura, she was able to self recover  -BRENDAN      Recorded by [BRENDAN] Navya Morales PTA      Therapy Exercises    Bilateral Lower Extremities AROM:;20 reps;sitting;ankle pumps/circles;glut sets;heel slides;hip abduction/adduction;hip flexion;LAQ  -BRENDAN      Bilateral Upper Extremity  AROM:;15 reps;sitting;elbow flexion/extension;hand pumps;pronation/supination;shoulder abduction/adduction;shoulder extension/flexion;shoulder horizontal abd/add   3x15 reps  -NN     Recorded by [BRENDAN] Navya Morales PTA [NN] An Meyer, OTR/L     Positioning and Restraints    Pre-Treatment Position in bed  - in bed  -NN     Post Treatment Position bed  - bed  -NN     In Bed supine;call light within reach;encouraged to call for assist;exit alarm on;side rails up x2  -BRENDAN fowlers;call light within reach;encouraged to call for assist;with other staff;side rails up x2  -NN     Recorded by [BRENDAN] Navya Morales PTA [NN] An Meyer OTR/L       01/11/18 0911          Rehab  Assessment/Intervention    Precautions/Limitations fall precautions;oxygen therapy device and L/min  -BRENDAN      Recorded by [BRENDAN] Navya Morales PTA        User Key  (r) = Recorded By, (t) = Taken By, (c) = Cosigned By    Initials Name Effective Dates    BRENDAN Morales PTA 10/17/16 -     NN An Meyer, OTR/L 11/08/17 -                 IP PT Goals       01/12/18 0930 01/11/18 0911 01/10/18 1512    Transfer Training PT STG    Transfer Training PT STG, Date Established  01/11/18  -BRENDAN (r) BM (t) BRENDAN (c) 01/10/18  -MN    Transfer Training PT STG, Time to Achieve   5 days  -MN    Transfer Training PT STG, Activity Type   bed to chair /chair to bed;sit to stand/stand to sit;toilet  -MN    Transfer Training PT STG, [place] Level   independent  -MN    Transfer Training PT STG, Date Goal Reviewed 01/12/18  -BRENDAN 01/11/18  -BRENDAN (r) BM (t) BRENDAN (c)     Transfer Training PT STG, Outcome goal ongoing  -BRENDAN goal ongoing  -BRENDAN (r) BM (t) BRENDAN (c)     Gait Training PT STG    Gait Training Goal PT STG, Date Established   01/10/18  -MN    Gait Training Goal PT STG, Time to Achieve   5 days  -MN    Gait Training Goal PT STG, [place] Level   conditional independence  -MN    Gait Training Goal PT STG, Assist Device   cane, straight  -MN    Gait Training Goal PT STG, Distance to Achieve   600 ft  -MN    Gait Training Goal PT STG, Date Goal Reviewed 01/12/18  -BRENDAN 01/11/18  -BRENDAN (r) BM (t) BRENDAN (c)     Gait Training Goal PT STG, Outcome goal ongoing  -BRENDAN goal ongoing  -BRENDAN (r) BM (t) BRENDAN (c)       User Key  (r) = Recorded By, (t) = Taken By, (c) = Cosigned By    Initials Name Provider Type    MN Georgia Holcomb, PT Physical Therapist    BRENDAN Morales, MIREILLE Physical Therapy Assistant    TURNER Melton, PT Student PT Student          Physical Therapy Education     Title: PT OT SLP Therapies (Active)     Topic: Physical Therapy (Active)     Point: Precautions (Done)    Learning Progress Summary    Learner Readiness Method Response  Comment Documented by Status   Patient Acceptance E VU role of PT in hospital. call for assist for OOB MN 01/10/18 1512 Done                      User Key     Initials Effective Dates Name Provider Type Discipline    MN 10/17/16 -  Georgia Holcomb, PT Physical Therapist PT                    PT Recommendation and Plan  Anticipated Discharge Disposition: home with assist, home with home health  Planned Therapy Interventions: balance training, gait training, home exercise program, stair training, strengthening, stretching, transfer training, patient/family education  PT Frequency: other (see comments) (5-14x/week)  Plan of Care Review  Plan Of Care Reviewed With: patient  Progress: progress toward functional goals as expected  Outcome Summary/Follow up Plan: pt demosntrated good overall strength and endurance with gait however she did have one lateral LOB that she was able to self recover from.  Pt has not met any goals but is progressing well.           Outcome Measures       01/12/18 0930 01/11/18 1000 01/11/18 0911    How much help from another person do you currently need...    Turning from your back to your side while in flat bed without using bedrails? 4  -BRENDAN  4  -BRENDAN (r) BM (t) BRENDAN (c)    Moving from lying on back to sitting on the side of a flat bed without bedrails? 4  -BRENDAN  4  -BRENDAN (r) BM (t) BRENDAN (c)    Moving to and from a bed to a chair (including a wheelchair)? 3  -BRENDAN  3  -BRENDAN (r) BM (t) BRENDAN (c)    Standing up from a chair using your arms (e.g., wheelchair, bedside chair)? 4  -BRENDAN  4  -BRENDAN (r) BM (t) BRENDAN (c)    Climbing 3-5 steps with a railing? 3  -BRENDAN  3  -BRENDAN (r) BM (t) BRENDAN (c)    To walk in hospital room? 3  -BRENDAN  3  -BRENDAN (r) BM (t) BRENDAN (c)    AM-PAC 6 Clicks Score 21  -BRENDAN  21  -BRENDAN (r) BM (t)    How much help from another is currently needed...    Putting on and taking off regular lower body clothing?  3  -NN     Bathing (including washing, rinsing, and drying)  3  -NN     Toileting (which includes using toilet bed  pan or urinal)  3  -NN     Putting on and taking off regular upper body clothing  4  -NN     Taking care of personal grooming (such as brushing teeth)  4  -NN     Eating meals  4  -NN     Score  21  -NN       01/10/18 1425 01/10/18 1400       How much help from another person do you currently need...    Turning from your back to your side while in flat bed without using bedrails? 4  -MN      Moving from lying on back to sitting on the side of a flat bed without bedrails? 4  -MN      Moving to and from a bed to a chair (including a wheelchair)? 3  -MN      Standing up from a chair using your arms (e.g., wheelchair, bedside chair)? 3  -MN      Climbing 3-5 steps with a railing? 3  -MN      To walk in hospital room? 3  -MN      AM-PAC 6 Clicks Score 20  -MN      How much help from another is currently needed...    Putting on and taking off regular lower body clothing?  3  -NN     Bathing (including washing, rinsing, and drying)  3  -NN     Toileting (which includes using toilet bed pan or urinal)  4  -NN     Putting on and taking off regular upper body clothing  4  -NN     Taking care of personal grooming (such as brushing teeth)  4  -NN     Eating meals  4  -NN     Score  22  -NN     Functional Assessment    Outcome Measure Options AM-PAC 6 Clicks Basic Mobility (PT)  -MN AM-PAC 6 Clicks Daily Activity (OT)  -NN       User Key  (r) = Recorded By, (t) = Taken By, (c) = Cosigned By    Initials Name Provider Type    LACHELLE Holcomb, PT Physical Therapist    BRENDAN Morales PTA Physical Therapy Assistant    LAKESHA Meyer, OTR/L Occupational Therapist    TURNER Melton, PT Student PT Student           Time Calculation:         PT Charges       01/12/18 1147          Time Calculation    Start Time 0930  -BRENDAN      Stop Time 1026  -BRENDAN      Time Calculation (min) 56 min  -BRENDAN      Time Calculation- PT    Total Timed Code Minutes- PT 56 minute(s)  -BRENDAN        User Key  (r) = Recorded By, (t) = Taken By, (c) =  Cosigned By    Initials Name Provider Type    BRENDAN Morales PTA Physical Therapy Assistant          Therapy Charges for Today     Code Description Service Date Service Provider Modifiers Qty    82607033490 HC GAIT TRAINING EA 15 MIN 1/11/2018 Navya Morales, PTA GP 1    15400058389 HC PT THER PROC EA 15 MIN 1/11/2018 Navya Morales, MIREILLE GP 1    98704897865 HC GAIT TRAINING EA 15 MIN 1/12/2018 Navya Morales PTA GP 1    61432410813 HC PT THER PROC EA 15 MIN 1/12/2018 Navya Morales PTA GP 2    38794102399 HC PT THERAPEUTIC ACT EA 15 MIN 1/12/2018 Navya Morales PTA GP 1          PT G-Codes  PT Professional Judgement Used?: Yes  Outcome Measure Options: AM-PAC 6 Clicks Basic Mobility (PT)  Score: 20  Functional Limitation: Mobility: Walking and moving around  Mobility: Walking and Moving Around Current Status (): At least 20 percent but less than 40 percent impaired, limited or restricted  Mobility: Walking and Moving Around Goal Status (): At least 1 percent but less than 20 percent impaired, limited or restricted    Navya Morales PTA  1/12/2018

## 2018-01-12 NOTE — CONSULTS
SUBJECTIVE:   1/12/2018    Name: Mona Perales  DOD: 1950    REASON FOR CONSULT:Patient with epigastric abdominal pain or dysphagia.    Chief Complaint:     Chief Complaint   Patient presents with   • Shortness of Breath       Subjective     Patient is 67 y.o. female presentsWith a complaint of some difficulty swallowing.  Patient states that food gets stuck in her esophagus occasionally.      ROS/HISTORY/ CURRENT MEDICATIONS/OBJECTIVE/VS/PE:   Review of Systems:   Review of Systems   Constitutional: Negative for activity change, appetite change, chills, diaphoresis, fatigue, fever and unexpected weight change.   HENT: Positive for trouble swallowing. Negative for sore throat.    Respiratory: Negative for shortness of breath.    Gastrointestinal: Negative for abdominal distention, abdominal pain, anal bleeding, blood in stool, constipation, diarrhea, nausea, rectal pain and vomiting.   Musculoskeletal: Negative for arthralgias.   Skin: Negative for pallor.   Neurological: Negative for light-headedness.       History:     Past Medical History:   Diagnosis Date   • Anxiety    • Arthritis    • Asthma    • 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 with ulceration     bilateral legs - bilateral iliac stents 2008      • Cancer    • Chronic lower back pain    • COPD (chronic obstructive pulmonary disease)    • Coronary arteriosclerosis    • Essential (primary) hypertension    • History of pulmonary embolus (PE)    • Hyperlipidemia    • Insomnia    • Lumbago    • Nicotine dependence    • Occlusion of artery      and stenosis of bilateral carotid arteries - BABS 16-49%, LICA 0-15%   • Other atherosclerosis of native artery of other extremity     LEFT subclavian stent 2005 (occluded)     Past Surgical History:   Procedure Laterality Date   • CARDIAC CATHETERIZATION  04/06/2016    No evidence of any obstructive epicardial CAD.Preserved LV systolic function with EF of 55%.   • CENTRAL VENOUS LINE INSERTION   04/07/2016    Successful placement of right uppe extremity 6-French triple lumen PICC line.   • FRACTURE SURGERY     • HYSTERECTOMY     • JOINT REPLACEMENT     • TOTAL SHOULDER REPLACEMENT Left    • TRANSESOPHAGEAL ECHOCARDIOGRAM (JACQUES)  04/07/2016    With color flow-Mild to moderate [facility].LV systolic function well preserved with Ef of 55-60%.Mitral and AV intact.Diastolic dysfunction     Family History   Problem Relation Age of Onset   • Heart disease Other    • Hypertension Other      Social History   Substance Use Topics   • Smoking status: Current Every Day Smoker     Packs/day: 1.00   • Smokeless tobacco: Never Used   • Alcohol use No     Prescriptions Prior to Admission   Medication Sig Dispense Refill Last Dose   • albuterol (PROVENTIL HFA;VENTOLIN HFA) 108 (90 BASE) MCG/ACT inhaler Inhale 2 puffs every night.   Taking   • albuterol (PROVENTIL) (2.5 MG/3ML) 0.083% nebulizer solution Take 2.5 mg by nebulization Every 8 (Eight) Hours As Needed for Wheezing.   Taking   • amLODIPine (NORVASC) 10 MG tablet Take 10 mg by mouth Daily.   Taking   • apixaban (ELIQUIS) 5 MG tablet tablet Take 1 tablet by mouth 2 (Two) Times a Day. 180 tablet 5    • Fluticasone Furoate-Vilanterol (BREO ELLIPTA) 100-25 MCG/INH aerosol powder  Inhale 1 puff Daily.   Taking   • gabapentin (NEURONTIN) 800 MG tablet Take 800 mg by mouth 3 (Three) Times a Day.   Taking   • meclizine (ANTIVERT) 25 MG tablet Take 25 mg by mouth 3 (Three) Times a Day As Needed for dizziness.   Taking   • ondansetron (ZOFRAN) 4 MG tablet Take 4 mg by mouth Every 8 (Eight) Hours As Needed for Nausea or Vomiting.   Taking   • penciclovir (DENAVIR) 1 % cream Apply 1 application topically Every 2 (Two) Hours.   Taking   • pravastatin (PRAVACHOL) 20 MG tablet Take 20 mg by mouth Daily.   Taking   • sertraline (ZOLOFT) 100 MG tablet Take 100 mg by mouth Daily.   Taking   • traZODone (DESYREL) 300 MG tablet Take 300 mg by mouth Every Night.   Taking     Allergies:   Morphine and related    I have reviewed the patients medical history, surgical history and family history in the available medical record system.     Current Medications:     Current Facility-Administered Medications   Medication Dose Route Frequency Provider Last Rate Last Dose   • acetaminophen (TYLENOL) tablet 650 mg  650 mg Oral Q6H PRN Kev Abraham MD   650 mg at 01/11/18 0822   • amLODIPine (NORVASC) tablet 10 mg  10 mg Oral Daily Kev Abraham MD   10 mg at 01/11/18 0817   • atorvastatin (LIPITOR) tablet 10 mg  10 mg Oral Daily Kev Abraham MD   10 mg at 01/11/18 0816   • benzonatate (TESSALON) capsule 100 mg  100 mg Oral TID PRN Kev Abraham MD       • bisacodyl (DULCOLAX) EC tablet 5 mg  5 mg Oral Daily PRN Kev Abraham MD       • budesonide-formoterol (SYMBICORT) 160-4.5 MCG/ACT inhaler 2 puff  2 puff Inhalation BID - RT Kev Abraham MD   2 puff at 01/12/18 0747   • butalbital-acetaminophen-caffeine (FIORICET, ESGIC) -40 MG per tablet 1 tablet  1 tablet Oral Q12H PRN Conner Ingram MD   1 tablet at 01/12/18 0411   • gabapentin (NEURONTIN) capsule 800 mg  800 mg Oral Q8H Kev Abraham MD   800 mg at 01/12/18 0545   • hydrALAZINE (APRESOLINE) injection 5 mg  5 mg Intravenous Q6H PRN Conner Ingram MD       • ipratropium-albuterol (DUO-NEB) nebulizer solution 3 mL  3 mL Nebulization 4x Daily - RT Kev Abraham MD   3 mL at 01/12/18 1123   • meclizine (ANTIVERT) tablet 25 mg  25 mg Oral TID PRN Kev Abraham MD       • [START ON 1/13/2018] methylPREDNISolone sodium succinate (SOLU-Medrol) injection 40 mg  40 mg Intravenous Daily Conner Ingram MD   40 mg at 01/12/18 1007   • nicotine (NICODERM CQ) 21 MG/24HR patch 1 patch  1 patch Transdermal Q24H Kev Abraham MD   1 patch at 01/11/18 9351   • ondansetron (ZOFRAN) tablet 4 mg  4 mg Oral Q8H PRN Kev Abraham MD   4 mg at 01/10/18 1654   • pantoprazole (PROTONIX) injection 40 mg  40 mg Intravenous  Q AM Conner Ingram MD       • Pharmacy to dose vancomycin   Does not apply Continuous PRN Conner Ingram MD       • Pharmacy to Dose Zosyn   Does not apply Continuous PRN Conner Ingram MD       • piperacillin-tazobactam (ZOSYN) 3.375 g in iso-osmotic dextrose 50 ml (premix)  3.375 g Intravenous Q8H Conner Ingram MD 50 mL/hr at 01/12/18 1126 3.375 g at 01/12/18 1126   • sertraline (ZOLOFT) tablet 100 mg  100 mg Oral Daily Kev Abraham MD   100 mg at 01/11/18 0816   • sodium chloride 0.9 % flush 1-10 mL  1-10 mL Intravenous PRN Kev Abraham MD       • sodium chloride 0.9 % flush 10 mL  10 mL Intravenous PRN RACHEL Lange       • sodium chloride 0.9 % infusion  75 mL/hr Intravenous Continuous Kev Abraham MD 75 mL/hr at 01/11/18 1543 75 mL/hr at 01/11/18 1543   • traMADol (ULTRAM) tablet 50 mg  50 mg Oral Q6H PRN Mason Lamar MD   50 mg at 01/10/18 1659   • traZODone (DESYREL) tablet 300 mg  300 mg Oral Nightly Kev Abraham MD   300 mg at 01/11/18 2151   • vancomycin (VANCOCIN) 750 mg in sodium chloride 0.9 % 250 mL IVPB  750 mg Intravenous Q12H Conner Ingram MD   750 mg at 01/11/18 2152       Objective     Physical Exam:   Temp:  [98 °F (36.7 °C)-98.8 °F (37.1 °C)] 98.8 °F (37.1 °C)  Heart Rate:  [67-99] 86  Resp:  [16-20] 18  BP: (112-124)/(58-60) 116/60    Physical Exam:  General Appearance:    Alert, cooperative, in no acute distress   Head:    Normocephalic, without obvious abnormality, atraumatic   Eyes:            Lids and lashes normal, conjunctivae and sclerae normal, no   icterus, no pallor, corneas clear, PERRLA   Ears:    Ears appear intact with no abnormalities noted   Throat:   No oral lesions, no thrush, oral mucosa moist   Neck:   No adenopathy, supple, trachea midline, no thyromegaly, no     carotid bruit, no JVD   Back:     No kyphosis present, no scoliosis present, no skin lesions,       erythema or scars, no tenderness to percussion or                   palpation,    range of motion normal   Lungs:     Clear to auscultation,respirations regular, even and                   unlabored    Heart:    Regular rhythm and normal rate, normal S1 and S2, no            murmur, no gallop, no rub, no click   Breast Exam:    Deferred   Abdomen:     Normal bowel sounds, no masses, no organomegaly, soft        non-tender, non-distended, no guarding, no rebound                 tenderness   Genitalia:    Deferred   Extremities:   Moves all extremities well, no edema, no cyanosis, no              redness   Pulses:   Pulses palpable and equal bilaterally   Skin:   No bleeding, bruising or rash   Lymph nodes:   No palpable adenopathy   Neurologic:   Cranial nerves 2 - 12 grossly intact, sensation intact, DTR        present and equal bilaterally      Results Review:     Lab Results   Component Value Date    WBC 22.18 (H) 01/12/2018    WBC 25.07 (H) 01/11/2018    WBC 18.77 (H) 01/10/2018    HGB 11.2 (L) 01/12/2018    HGB 10.7 (L) 01/11/2018    HGB 11.2 (L) 01/10/2018    HCT 33.1 (L) 01/12/2018    HCT 32.5 (L) 01/11/2018    HCT 31.9 (L) 01/10/2018     01/12/2018     01/11/2018     01/10/2018       Results from last 7 days  Lab Units 01/09/18  1828   ALK PHOS U/L 92   ALT (SGPT) U/L 31   AST (SGOT) U/L 23       Results from last 7 days  Lab Units 01/09/18  1828   BILIRUBIN mg/dL 0.4   ALK PHOS U/L 92     No results found for: LIPASE  Lab Results   Component Value Date    INR 0.95 01/09/2018    INR 1.0 04/08/2016    INR 1.0 11/06/2014         Radiology Review:  Imaging Results (last 72 hours)     Procedure Component Value Units Date/Time    XR Chest 2 View [[id number]] Collected:  01/09/18 1554     Updated:  01/09/18 1610    Narrative:         EXAM:          Radiograph(s), Chest   VIEWS:    PA / Lat ; 2       DATE/TIME:  1/9/2018 4:07 PM CST                INDICATION:   shortness of breath    COMPARISON:  CXR: 4/6/16             FINDINGS:             - lines/tubes:    none     -  cardiac:         size within normal limits         - mediastinum: contour within normal limits         - lungs:         Linearly oriented airspace disease of the right  upper lobe. The remainder of the lungs are clear. Left lung  granuloma noted.             - pleura:         no evidence of  fluid                  - osseous:         Status post left shoulder repair.                   - misc.:         Impression:       CONCLUSION:        1. Right upper lobe airspace disease presumably representing a  focus of pneumonia.                                               Electronically signed by:  KRYSTEN Cast MD  1/9/2018 4:09 PM  CST Workstation: [id number]    IR PICC wo fluoro guidance [[id number]] Resulted:  01/09/18 1654     Updated:  01/09/18 1654    Narrative:       This procedure was auto-finalized with no dictation required.    US Guided Vascular Access [[id number]] Resulted:  01/09/18 1716     Updated:  01/09/18 1716    Narrative:       This procedure was auto-finalized with no dictation required.    XR Chest Post CVA Port [[id number]] Collected:  01/09/18 1655     Updated:  01/09/18 1719    Narrative:         EXAM:         Radiograph(s), Chest   VIEWS:   Portable ; 1       DATE/TIME:  1/9/2018 5:15 PM CST                INDICATION:   PICC placement    COMPARISON:  CXR: 4/7/16             FINDINGS:             - lines/tubes:    Right approach PICC line in standard position.      - cardiac:         size within normal limits         - mediastinum: contour within normal limits         - lungs:         Focus of airspace disease in the right lung,  probably lower lobe.             - pleura:         no evidence of  fluid                  - osseous:         Status post left shoulder replacement.                   - misc.:         Impression:       CONCLUSION:        1. Right approach PICC line in standard position.  2. Suspect focal airspace disease in the right lower lobe.                                                 Electronically signed by:  KRYSTEN Cast MD  1/9/2018 5:18 PM  CST Workstation: [id number]    XR Chest PA & Lateral [[id number]] Collected:  01/11/18 0929     Updated:  01/11/18 0944    Narrative:         [facility] [place]    Patient Name: FILOMENA BELTRAN    ORDERING: REBECCA ROBBINS     ATTENDING: MICKY TORRES     REFERRING: REBECCA ROBBINS    -----------------------    PROCEDURE: Two-view chest    COMPARISON: 1/9/2018    HISTORY: dyspnea, J18.1 Lobar pneumonia, unspecified organism  R09.02 Hypoxemia Z78.9 Other specified health status Z74.09 Other  reduced mobility    FINDINGS: Frontal and lateral views of the chest are obtained.     Devices: There is a right upper extremity PICC line with the tip  directed into the superior vena cava.    Lungs/Pleura: There is a bandlike opacity redemonstrated within  the right mid lung zone and more conspicuous currently within the  right lower lobe with minimal changes in the left base. This  likely represents atelectasis. There is a background of mild  interstitial marking accentuation. No effusion or pneumothorax.    Cardiomediastinal Structures: Heart size and mediastinal contours  within limits of normal. Vascular calcification involving the  aorta. The trachea is midline.     Skeletal Structures: Demineralization and degenerative changes.  Left shoulder arthroplasty. No free air beneath the diaphragm.      Impression:       Right upper extremity PICC line with the tip directed into the  SVC.    Chronic lung background with superimposed bandlike atelectasis  within the right mid lung zone and right greater than left lung  base. No acute pleural finding.    Electronically signed by:  John Leach MD  1/11/2018 9:43 AM  CST Workstation: [id number]    CT Angiogram Chest With Contrast [[id number]] Collected:  01/11/18 1225     Updated:  01/11/18 1309    Narrative:         EXAM:  Computed Tomography with CTA         REGION:  Chest       INDICATION:    dyspnea elevated D/dimer, J18.1 Lobar pneumonia,  unspecified organism R09.02 Hypoxemia Z78.9 Other specified  health status Z74.09 Other reduced mobility    - rule out pulmonary embolism       CLINICAL HISTORY:  CORRELATIVE IMAGING:  None                         TECHNIQUE:     - PE / vascular protocol     - reconstructions:  axial, coronal, sagittal, obliques     - computer-generated 3D reconstructions (MIPS) were performed.     - contrast:  intravenous Isovue 370, 56 mL                                 This exam was performed according to the departmental  dose-optimization program which includes automated exposure  control, adjustment of the mA and/or kV according to patient size  and/or use of iterative reconstruction technique.         COMMENTS:    - Pulmonary arterial system:      - Main pulmonary artery trunk:  negative      - Left, right main pulmonary arteries: negative      - Lobar arteries: negative       - Segmental arteries: negative      - Systemic vascularity (as visualized):        - Aorta:  grossly negative / normal caliber / no dissection        - roots of great vessels:  grossly negative / normal  caliber        - SVC / IVC:  grossly negative / normal caliber     - Misc (limited visualization):      - pulmonary parenchyma:  Scattered airspace changes in the  right middle lobe, and right lower lobe      - pleura:  negative      - mediastinal / luisito:  negative      - neck, inferior:  grossly wnl      - subdiaphragmatic structures:  grossly negative (limited  evaluation)       - osseous:      - misc:  Incidental note is made of a dilated esophageal  lumen, more proximally than distally, containing internal debris  throughout.        .        Impression:       CONCLUSION:          1.  No evidence of pulmonary embolism.            2.  No evidence of pathology associated with the visualized  aorta.        3. Scattered airspace changes in the right lung.                       4. Dilated proximal  esophagus, containing internal debris  throughout with a focal point of transition not clearly  visualized. Recommend correlation with barium swallow for further  assessment.                Electronically signed by:  KRYSTEN Cast MD  1/11/2018 1:07  PM CST Workstation: [id number]    US Venous Doppler Lower Extremity Bilateral (duplex) [[id number]] Collected:  01/11/18 1235     Updated:  01/11/18 1336    Narrative:       .  EXAMINATION:  Ultrasound, venous, lower extremity    CLINICAL INDICATION / HISTORY:   edema .elevated d-dimer, J18.1  Lobar pneumonia, unspecified organism R09.02 Hypoxemia Z78.9  Other specified health status Z74.09 Other reduced mobility    COMPARISON:  none  TECHNIQUE:  Bilateral lower extremity(ies)                          serial axial graded compression technique                          grayscale, color flow, spectral and  Doppler     FINDINGS:    Imaged vessels:    - common femoral vein (CFV)    - deep femoral vein (FV) (formally called superficial femoral  vein (SFV))    - profunda femoral vein (PFV) (cephalad portion)    - popliteal vein (PV)    - anterior tibial vein (ATV) (cephalad portion)    - posterior tibial vein (PTV)    - greater saphenous vein (GSV) (non-contiguous segments)    Unless otherwise indicated, all of the above described vessels  were freely compressible and demonstrated spontaneous and phasic  flow as well as appropriate flow with augmentation.    .    Impression:       CONCLUSION:  1. Negative examination - no evidence of deep venous thrombosis  within the femoral-popliteal system of the bilateral lower  extremity(ies).        Electronically signed by:  KRYSTEN Cast MD  1/11/2018 1:35  PM CST Workstation: [id number]    FL Esophagram Complete [[id number]] Collected:  01/11/18 1718     Updated:  01/11/18 1819    Narrative:         PROCEDURE: Barium swallow    HISTORY:  Dysphagia     COMPARISON:  None    Fluoroscopy time was one minute 54 seconds  Total # of films  = 34     TECHNIQUE:    Mucosal relief views were obtained in the LPO position.  Subsequently, a double contrast esophagram was performed using  effervescent granules followed by thick barium. The esophagus was  evaluated in the LPO position with spot fluoroscopic images  obtained. The single column portion of the exam was performed in  the prone, RICHARDS position.    FINDINGS:  Multiple tertiary contractions were seen during the fluoroscopic  exam indicative of presbyesophagus.   A coarse pattern of esophageal mucosa is seen throughout the  esophagus, most pronounced in the upper to mid segments, possibly  due to reflux esophagitis.  No debris was seen in the esophagus during the exam.  No suspicious esophageal filling defect was seen.  No esophageal stricture was visualized, however there is mass  effect on the esophagus at the level of the aortic arch.  The gastroesophageal junction distends appropriately.   Mild esophageal reflux was present.  The patient was given a 12.5 mm barium tablet which passed  through the esophagus and into the stomach without delay.       Impression:       CONCLUSION:    1.  Multiple tertiary contractions were seen during the  fluoroscopic exam indicative of presbyesophagus.   2.  A coarse pattern of esophageal mucosa is seen throughout the  esophagus, most pronounced in the upper to mid segments, possibly  due to reflux esophagitis.  3.  No debris was seen in the esophagus during the exam.  4.  No suspicious esophageal filling defect was seen.  5.  No esophageal stricture was visualized, however there is mass  effect on the esophagus at the level of the aortic arch.  6.  Mild gastroesophageal reflux visualized during the exam.  Suggest gastroenterology consultation.    Electronically signed by:  Corey Arteaga MD  1/11/2018 6:18 PM CST  Workstation: [id number]           I reviewed the patient's new clinical results.  I reviewed the patient's new imaging results and agree with the  interpretation.     ASSESSMENT/PLAN:   ASSESSMENT: Patient with episodes of dysphagia stating food is getting stuck upper GI series shows marked irritation in the distal esophagus but no evidence of stricture.    PLAN: We'll schedule patient for EGD to be done today.  The biopsies of the distal esophagus.  The risks, benefits, and alternatives of this procedure have been discussed with the patient or the responsible party- the patient understands and agrees to proceed.         Bin Oh MD  01/12/18  12:52 PM         This document has been electronically signed by Bin Oh MD on January 12, 2018 12:52 PM

## 2018-01-12 NOTE — PLAN OF CARE
Problem: Patient Care Overview (Adult)  Goal: Plan of Care Review  Outcome: Ongoing (interventions implemented as appropriate)      Problem: Inpatient Occupational Therapy  Goal: Transfer Training Goal 1 LTG- OT  Outcome: Ongoing (interventions implemented as appropriate)   01/10/18 1451 01/12/18 1153   Transfer Training OT LTG   Transfer Training OT LTG, Date Established 01/10/18 --    Transfer Training OT LTG, Time to Achieve by discharge --    Transfer Training OT LTG, Activity Type all transfers --    Transfer Training OT LTG, Grant Level independent --    Transfer Training OT LTG, Date Goal Reviewed --  01/12/18   Transfer Training OT LTG, Outcome --  goal ongoing     Goal: Strength Goal LTG- OT  Outcome: Ongoing (interventions implemented as appropriate)   01/10/18 1451 01/12/18 1153   Strength OT LTG   Strength Goal OT LTG, Date Established 01/10/18 --    Strength Goal OT LTG, Time to Achieve by discharge --    Strength Goal OT LTG, Measure to Achieve Pt. will complete BUE strengthening exercises all planes and joints to increase BUE strength to 5/5 for ADLs.  --    Strength Goal OT LTG, Date Goal Reviewed --  01/12/18     Goal: ADL Goal LTG- OT  Outcome: Ongoing (interventions implemented as appropriate)   01/10/18 1451 01/12/18 1153   ADL OT LTG   ADL OT LTG, Date Established 01/10/18 --    ADL OT LTG, Activity Type ADL skills --    ADL OT LTG, Grant Level independent --    ADL OT LTG, Date Goal Reviewed --  01/12/18   ADL OT LTG, Outcome --  goal ongoing

## 2018-01-12 NOTE — PROGRESS NOTES
[facility]  INPATIENT PROGRESS NOTE    Length of Stay: 2  Date of Admission: 1/9/2018  Primary Care Physician: DELFINO Smith    Subjective     Chief Complaint   Patient presents with   • Shortness of Breath       HPI:  Patient was seen and examined this morning. Patient feels better , dyspnea cough improved and feels stronger .  Patient is tolerating diet , passing gas, Patient denies, headache or dizziness, chest pain or palpitations,abdominal pain N/V/D , blood in the urine or blood in the stool , or any urinary symptoms , weakness or numbness or tingling in the extremities or visual changes.    Review of Systems     All pertinent negatives and positives are as above. All other systems have been reviewed and are negative unless otherwise stated.   Objective    Physical exam    Temp:  [96.9 °F (36.1 °C)-98.2 °F (36.8 °C)] 98.2 °F (36.8 °C)  Heart Rate:  [] 99  Resp:  [16-18] 16  BP: (114-124)/(56-59) 124/58    -Constitutional: Patient is AAO x 3. Patient appears well-developed and well-nourished.   -CVS: Normal rate, regular rhythm, normal heart sounds and intact distal pulses.  Exam reveals no gallop and no friction rub.  No murmur heard.  -Pulm: mild b/l scattered wheezes ,much improved  -Abdominal: Soft. Bowel sounds are normal. No distension, no tenderness. There is no rebound and no guarding. No hernia.   -Musculoskeletal: No Cyanosis clubbing or edema.    Results Review:    Laboratory Data:     Results from last 7 days  Lab Units 01/11/18  0605 01/10/18  1310 01/09/18  1828   SODIUM mmol/L 143 138 133*   POTASSIUM mmol/L 4.5 4.3 4.3   CHLORIDE mmol/L 104 102 100   CO2 mmol/L 28.0 26.0 23.0   BUN mg/dL 16 19 11   CREATININE mg/dL 0.53 0.54 0.50   GLUCOSE mg/dL 151* 142* 101*   CALCIUM mg/dL 8.8 9.3 9.5   BILIRUBIN mg/dL  --   --  0.4   ALK PHOS U/L  --   --  92   ALT (SGPT) U/L  --   --  31   AST (SGOT) U/L  --   --  23   ANION  GAP mmol/L 11.0 10.0 10.0     Estimated Creatinine Clearance: 65 mL/min (by C-G formula based on Cr of 0.53).            Results from last 7 days  Lab Units 01/11/18  0605 01/10/18  1225 01/09/18  1646   WBC 10*3/mm3 25.07* 18.77* 11.97*   HEMOGLOBIN g/dL 10.7* 11.2* 12.2   HEMATOCRIT % 32.5* 31.9* 35.4   PLATELETS 10*3/mm3 340 250 321       Results from last 7 days  Lab Units 01/09/18  1646   INR  0.95       Culture Data:   Blood Culture   Date Value Ref Range Status   01/09/2018 No growth at 24 hours  Preliminary   01/09/2018 No growth at 24 hours  Preliminary     No results found for: URINECX  No results found for: RESPCX  No results found for: WOUNDCX  No results found for: STOOLCX  No components found for: BODYFLD    Micobiology  Blood Culture   Date Value Ref Range Status   01/09/2018 No growth at less than 24 hours  Preliminary                            Radiology Data:   Imaging Results (all)     Procedure Component Value Units Date/Time    XR Chest 2 View [[id number]] Collected:  01/09/18 1554     Updated:  01/09/18 1610    Narrative:         EXAM:          Radiograph(s), Chest   VIEWS:    PA / Lat ; 2       DATE/TIME:  1/9/2018 4:07 PM CST                INDICATION:   shortness of breath    COMPARISON:  CXR: 4/6/16             FINDINGS:             - lines/tubes:    none     - cardiac:         size within normal limits         - mediastinum: contour within normal limits         - lungs:         Linearly oriented airspace disease of the right  upper lobe. The remainder of the lungs are clear. Left lung  granuloma noted.             - pleura:         no evidence of  fluid                  - osseous:         Status post left shoulder repair.                   - misc.:         Impression:       CONCLUSION:        1. Right upper lobe airspace disease presumably representing a  focus of pneumonia.                                               Electronically signed by:  KRYSTEN Cast MD  1/9/2018 4:09 PM  CST  Workstation: [id number]    IR PICC wo fluoro guidance [[id number]] Resulted:  01/09/18 1654     Updated:  01/09/18 1654    Narrative:       This procedure was auto-finalized with no dictation required.    US Guided Vascular Access [[id number]] Resulted:  01/09/18 1716     Updated:  01/09/18 1716    Narrative:       This procedure was auto-finalized with no dictation required.    XR Chest Post CVA Port [[id number]] Collected:  01/09/18 1655     Updated:  01/09/18 1719    Narrative:         EXAM:         Radiograph(s), Chest   VIEWS:   Portable ; 1       DATE/TIME:  1/9/2018 5:15 PM CST                INDICATION:   PICC placement    COMPARISON:  CXR: 4/7/16             FINDINGS:             - lines/tubes:    Right approach PICC line in standard position.      - cardiac:         size within normal limits         - mediastinum: contour within normal limits         - lungs:         Focus of airspace disease in the right lung,  probably lower lobe.             - pleura:         no evidence of  fluid                  - osseous:         Status post left shoulder replacement.                   - misc.:         Impression:       CONCLUSION:        1. Right approach PICC line in standard position.  2. Suspect focal airspace disease in the right lower lobe.                                                Electronically signed by:  KRYSTEN Cast MD  1/9/2018 5:18 PM  CST Workstation: [id number]          I have reviewed the patient current medications.   Assessment/Plan     Hospital Problem List     Pneumonia of right upper lobe due to infectious organism          Assessment / Plan    --Pneumonia  failed outpatient therapy.  Symptoms improved  WBC worsening :25.07  continue IV  Antibiotics( will switch to vanc and zosyn) and  supplemental oxygen, solumedrol, inceptive tara,      --proximal esophageal dilatation on CTA  CTA:Dilated proximal esophagus, containing internal debris  throughout with a focal point of transition not  clearly  visualized. Recommend correlation with barium swallow for further  Assessment  Barium swallow . NPO. Held eliquix .Dr Oh consult ( input appreciated)    --COPD exacerbation:   Continue supplemental oxygen, bronchodilators and steroids.      --hx of PE  eliquis ( Held for now)    --Tobacco use  nicotine patch    --Hypertension:   Controlled  Continue home medications     --GI prophylaxis/DVT prophylaxi     This document has been electronically signed by Conner Ingram MD on January 11, 2018 6:15 PM

## 2018-01-12 NOTE — PLAN OF CARE
"Problem: Patient Care Overview (Adult)  Goal: Plan of Care Review  Outcome: Ongoing (interventions implemented as appropriate)   01/11/18 1654 01/11/18 2100 01/12/18 0130   Coping/Psychosocial Response Interventions   Plan Of Care Reviewed With --  patient --    Patient Care Overview   Progress no change --  --    Outcome Evaluation   Outcome Summary/Follow up Plan --  --  Patient continues to report that pills feel like they are \"sticking in her throat\". Patient has no other complaints at this time. Will continue to monitor.      Goal: Adult Individualization and Mutuality  Outcome: Ongoing (interventions implemented as appropriate)    Goal: Discharge Needs Assessment  Outcome: Ongoing (interventions implemented as appropriate)      Problem: Pneumonia (Adult)  Goal: Signs and Symptoms of Listed Potential Problems Will be Absent or Manageable (Pneumonia)  Outcome: Ongoing (interventions implemented as appropriate)      Problem: COPD, Chronic Bronchitis/Emphysema (Adult)  Goal: Signs and Symptoms of Listed Potential Problems Will be Absent or Manageable (COPD, Chronic Bronchitis/Emphysema)  Outcome: Ongoing (interventions implemented as appropriate)      Problem: Pain, Acute (Adult)  Goal: Acceptable Pain Control/Comfort Level  Outcome: Ongoing (interventions implemented as appropriate)        "

## 2018-01-13 LAB
ANION GAP SERPL CALCULATED.3IONS-SCNC: 9 MMOL/L (ref 5–15)
BASOPHILS # BLD AUTO: 0.01 10*3/MM3 (ref 0–0.2)
BASOPHILS NFR BLD AUTO: 0.1 % (ref 0–2)
BUN BLD-MCNC: 14 MG/DL (ref 7–21)
BUN/CREAT SERPL: 22.6 (ref 7–25)
CALCIUM SPEC-SCNC: 7.9 MG/DL (ref 8.4–10.2)
CHLORIDE SERPL-SCNC: 105 MMOL/L (ref 95–110)
CO2 SERPL-SCNC: 27 MMOL/L (ref 22–31)
CREAT BLD-MCNC: 0.62 MG/DL (ref 0.5–1)
DEPRECATED RDW RBC AUTO: 45.6 FL (ref 36.4–46.3)
EOSINOPHIL # BLD AUTO: 0 10*3/MM3 (ref 0–0.7)
EOSINOPHIL NFR BLD AUTO: 0 % (ref 0–7)
ERYTHROCYTE [DISTWIDTH] IN BLOOD BY AUTOMATED COUNT: 13.4 % (ref 11.5–14.5)
GFR SERPL CREATININE-BSD FRML MDRD: 97 ML/MIN/1.73 (ref 60–104)
GLUCOSE BLD-MCNC: 114 MG/DL (ref 60–100)
HCT VFR BLD AUTO: 30.2 % (ref 35–45)
HGB BLD-MCNC: 10.1 G/DL (ref 12–15.5)
IMM GRANULOCYTES # BLD: 0.07 10*3/MM3 (ref 0–0.02)
IMM GRANULOCYTES NFR BLD: 0.5 % (ref 0–0.5)
LYMPHOCYTES # BLD AUTO: 1.37 10*3/MM3 (ref 0.6–4.2)
LYMPHOCYTES NFR BLD AUTO: 9.4 % (ref 10–50)
MCH RBC QN AUTO: 31 PG (ref 26.5–34)
MCHC RBC AUTO-ENTMCNC: 33.4 G/DL (ref 31.4–36)
MCV RBC AUTO: 92.6 FL (ref 80–98)
MONOCYTES # BLD AUTO: 0.78 10*3/MM3 (ref 0–0.9)
MONOCYTES NFR BLD AUTO: 5.3 % (ref 0–12)
NEUTROPHILS # BLD AUTO: 12.41 10*3/MM3 (ref 2–8.6)
NEUTROPHILS NFR BLD AUTO: 84.7 % (ref 37–80)
PLATELET # BLD AUTO: 314 10*3/MM3 (ref 150–450)
PMV BLD AUTO: 8.7 FL (ref 8–12)
POTASSIUM BLD-SCNC: 3.7 MMOL/L (ref 3.5–5.1)
RBC # BLD AUTO: 3.26 10*6/MM3 (ref 3.77–5.16)
SODIUM BLD-SCNC: 141 MMOL/L (ref 137–145)
VANCOMYCIN TROUGH SERPL-MCNC: 9.44 MCG/ML (ref 10–15)
WBC NRBC COR # BLD: 14.64 10*3/MM3 (ref 3.2–9.8)

## 2018-01-13 PROCEDURE — 80202 ASSAY OF VANCOMYCIN: CPT | Performed by: FAMILY MEDICINE

## 2018-01-13 PROCEDURE — 25010000002 VANCOMYCIN PER 500 MG: Performed by: FAMILY MEDICINE

## 2018-01-13 PROCEDURE — 92610 EVALUATE SWALLOWING FUNCTION: CPT | Performed by: SPEECH-LANGUAGE PATHOLOGIST

## 2018-01-13 PROCEDURE — 97535 SELF CARE MNGMENT TRAINING: CPT

## 2018-01-13 PROCEDURE — 80048 BASIC METABOLIC PNL TOTAL CA: CPT | Performed by: INTERNAL MEDICINE

## 2018-01-13 PROCEDURE — 25010000002 METHYLPREDNISOLONE PER 40 MG: Performed by: FAMILY MEDICINE

## 2018-01-13 PROCEDURE — 97116 GAIT TRAINING THERAPY: CPT

## 2018-01-13 PROCEDURE — 25010000002 PIPERACILLIN SOD-TAZOBACTAM PER 1 G: Performed by: FAMILY MEDICINE

## 2018-01-13 PROCEDURE — 94799 UNLISTED PULMONARY SVC/PX: CPT

## 2018-01-13 PROCEDURE — 94760 N-INVAS EAR/PLS OXIMETRY 1: CPT

## 2018-01-13 PROCEDURE — 97110 THERAPEUTIC EXERCISES: CPT

## 2018-01-13 PROCEDURE — 25010000002 METHYLPREDNISOLONE PER 125 MG: Performed by: FAMILY MEDICINE

## 2018-01-13 PROCEDURE — 85025 COMPLETE CBC W/AUTO DIFF WBC: CPT | Performed by: INTERNAL MEDICINE

## 2018-01-13 RX ORDER — METHYLPREDNISOLONE SODIUM SUCCINATE 125 MG/2ML
60 INJECTION, POWDER, LYOPHILIZED, FOR SOLUTION INTRAMUSCULAR; INTRAVENOUS EVERY 6 HOURS
Status: DISCONTINUED | OUTPATIENT
Start: 2018-01-13 | End: 2018-01-14

## 2018-01-13 RX ORDER — BUTALBITAL, ACETAMINOPHEN AND CAFFEINE 50; 325; 40 MG/1; MG/1; MG/1
1 TABLET ORAL EVERY 6 HOURS PRN
Status: DISCONTINUED | OUTPATIENT
Start: 2018-01-13 | End: 2018-01-17 | Stop reason: HOSPADM

## 2018-01-13 RX ADMIN — BUTALBITAL, ACETAMINOPHEN, AND CAFFEINE 1 TABLET: 50; 325; 40 TABLET ORAL at 10:57

## 2018-01-13 RX ADMIN — VANCOMYCIN HYDROCHLORIDE 750 MG: 5 INJECTION, POWDER, LYOPHILIZED, FOR SOLUTION INTRAVENOUS at 05:49

## 2018-01-13 RX ADMIN — BUTALBITAL, ACETAMINOPHEN, AND CAFFEINE 1 TABLET: 50; 325; 40 TABLET ORAL at 20:35

## 2018-01-13 RX ADMIN — ATORVASTATIN CALCIUM 10 MG: 10 TABLET, FILM COATED ORAL at 09:46

## 2018-01-13 RX ADMIN — APIXABAN 5 MG: 5 TABLET, FILM COATED ORAL at 20:34

## 2018-01-13 RX ADMIN — AMLODIPINE BESYLATE 10 MG: 10 TABLET ORAL at 09:46

## 2018-01-13 RX ADMIN — GABAPENTIN 800 MG: 400 CAPSULE ORAL at 22:41

## 2018-01-13 RX ADMIN — IPRATROPIUM BROMIDE AND ALBUTEROL SULFATE 3 ML: 2.5; .5 SOLUTION RESPIRATORY (INHALATION) at 20:17

## 2018-01-13 RX ADMIN — METHYLPREDNISOLONE SODIUM SUCCINATE 60 MG: 125 INJECTION, POWDER, FOR SOLUTION INTRAMUSCULAR; INTRAVENOUS at 16:19

## 2018-01-13 RX ADMIN — METHYLPREDNISOLONE SODIUM SUCCINATE 40 MG: 40 INJECTION, POWDER, FOR SOLUTION INTRAMUSCULAR; INTRAVENOUS at 09:46

## 2018-01-13 RX ADMIN — TRAMADOL HYDROCHLORIDE 50 MG: 50 TABLET, FILM COATED ORAL at 10:57

## 2018-01-13 RX ADMIN — GABAPENTIN 800 MG: 400 CAPSULE ORAL at 05:49

## 2018-01-13 RX ADMIN — APIXABAN 5 MG: 5 TABLET, FILM COATED ORAL at 16:19

## 2018-01-13 RX ADMIN — IPRATROPIUM BROMIDE AND ALBUTEROL SULFATE 3 ML: 2.5; .5 SOLUTION RESPIRATORY (INHALATION) at 14:20

## 2018-01-13 RX ADMIN — TAZOBACTAM SODIUM AND PIPERACILLIN SODIUM 3.38 G: 375; 3 INJECTION, SOLUTION INTRAVENOUS at 23:30

## 2018-01-13 RX ADMIN — SERTRALINE HYDROCHLORIDE 100 MG: 50 TABLET ORAL at 09:46

## 2018-01-13 RX ADMIN — PANTOPRAZOLE SODIUM 40 MG: 40 INJECTION, POWDER, FOR SOLUTION INTRAVENOUS at 05:49

## 2018-01-13 RX ADMIN — GABAPENTIN 800 MG: 400 CAPSULE ORAL at 13:49

## 2018-01-13 RX ADMIN — BUDESONIDE AND FORMOTEROL FUMARATE DIHYDRATE 2 PUFF: 160; 4.5 AEROSOL RESPIRATORY (INHALATION) at 20:18

## 2018-01-13 RX ADMIN — NICOTINE 1 PATCH: 21 PATCH TRANSDERMAL at 20:35

## 2018-01-13 RX ADMIN — METHYLPREDNISOLONE SODIUM SUCCINATE 60 MG: 125 INJECTION, POWDER, FOR SOLUTION INTRAMUSCULAR; INTRAVENOUS at 22:41

## 2018-01-13 RX ADMIN — IPRATROPIUM BROMIDE AND ALBUTEROL SULFATE 3 ML: 2.5; .5 SOLUTION RESPIRATORY (INHALATION) at 06:37

## 2018-01-13 RX ADMIN — VANCOMYCIN HYDROCHLORIDE 1000 MG: 1 INJECTION, POWDER, LYOPHILIZED, FOR SOLUTION INTRAVENOUS at 17:06

## 2018-01-13 RX ADMIN — BUDESONIDE AND FORMOTEROL FUMARATE DIHYDRATE 2 PUFF: 160; 4.5 AEROSOL RESPIRATORY (INHALATION) at 06:37

## 2018-01-13 RX ADMIN — TAZOBACTAM SODIUM AND PIPERACILLIN SODIUM 3.38 G: 375; 3 INJECTION, SOLUTION INTRAVENOUS at 16:19

## 2018-01-13 RX ADMIN — TAZOBACTAM SODIUM AND PIPERACILLIN SODIUM 3.38 G: 375; 3 INJECTION, SOLUTION INTRAVENOUS at 09:45

## 2018-01-13 RX ADMIN — TRAZODONE HYDROCHLORIDE 300 MG: 150 TABLET ORAL at 20:34

## 2018-01-13 NOTE — THERAPY TREATMENT NOTE
Acute Care - Occupational Therapy Treatment Note  [facility]     Patient Name: Mona Perales  : 1950  MRN: [id number]  Today's Date: 2018  Onset of Illness/Injury or Date of Surgery Date: 18  Date of Referral to OT: 01/10/18  Referring Physician: Dr. Ingram       Admit Date: 2018    Visit Dx:     ICD-10-CM ICD-9-CM   1. Pneumonia of right upper lobe due to infectious organism J18.1 486   2. Hypoxia R09.02 799.02   3. Decreased activities of daily living (ADL) Z78.9 V49.89   4. Impaired functional mobility, balance, gait, and endurance Z74.09 V49.89   5. Dysphagia, unspecified type R13.10 787.20     Patient Active Problem List   Diagnosis   • Anxiety   • CAD (coronary atherosclerotic disease)   • Carotid stenosis   • COPD (chronic obstructive pulmonary disease)   • COPD with exacerbation   • Depressive disorder, not elsewhere classified   • Benign essential hypertension   • Hypercholesteremia   • Insomnia   • Low back pain   • Osteoporosis   • Other screening mammogram   • RUQ abdominal pain   • Peripheral arterial disease   • Dizziness   • Nicotine abuse   • Pneumonia of right upper lobe due to infectious organism   • Dysphagia             Adult Rehabilitation Note       18 0900 18 0930 18 0620    Rehab Assessment/Intervention    Discipline occupational therapist  -NN physical therapy assistant  -BRENDAN occupational therapy assistant  -LW    Document Type therapy note (daily note)  -NN therapy note (daily note)  -BRENDAN therapy note (daily note)  -LW    Subjective Information agree to therapy;complains of;weakness;fatigue  -NN complains of;fatigue  -BRENDAN agree to therapy  -LW    Patient Effort, Rehab Treatment  good  -BRENDAN good  -LW    Precautions/Limitations fall precautions;oxygen therapy device and L/min  -NN  fall precautions  -LW    Recorded by [NN] An Meyer OTR/L [BRENDAN] Navya Morales PTA [LW] CHITO Solano/L    Vital Signs    Pre Systolic BP Rehab  127   -BRENDAN 126  -LW    Pre Treatment Diastolic BP  74  -BRENDAN 66  -LW    Post Systolic BP Rehab  134  -BRENDAN     Post Treatment Diastolic BP  80  -BRENDAN     Pretreatment Heart Rate (beats/min)  79  -BRENDAN 73  -LW    Posttreatment Heart Rate (beats/min)  87  -BRENDAN     Pretreatment Resp Rate (breaths/min)   73  -LW    Pre SpO2 (%)  96  -BRENDAN 99  -LW    O2 Delivery Pre Treatment  supplemental O2  -BRENDAN supplemental O2  -LW    Post SpO2 (%)  93  -BRENDAN 98  -LW    O2 Delivery Post Treatment  supplemental O2  -BRENDAN supplemental O2  -LW    Pre Patient Position  Supine  -BRENDAN Supine  -LW    Intra Patient Position  Sitting  -BRENDAN     Post Patient Position  Supine  -BRENDAN Sitting  -LW    Recorded by  [BRENDAN] Navya Morales PTA [LW] CHITO Solano/L    Pain Assessment    Pain Assessment  0-10  -BRENDAN 0-10  -LW    Pain Score  7  -BRENDAN 7  -LW    Post Pain Score  8  -BRENDAN 7  -LW    Pain Type  Acute pain  -BRENDAN Acute pain  -LW    Pain Location  Back  -BRENDAN Back  -LW    Pain Descriptors   Aching  -LW    Pain Frequency   Constant/continuous  -LW    Pain Intervention(s)   Medication (See MAR)  -LW    Recorded by  [BRENDAN] Navya Morales PTA [LW] CHITO Solano/L    Vision Assessment/Intervention    Visual Impairment  WNL;WFL  -BRENDAN WNL;WFL  -LW    Recorded by  [BRENDAN] Navya Morales PTA [LW] TAYLOR SolanoA/L    Cognitive Assessment/Intervention    Current Cognitive/Communication Assessment functional  -NN functional  -BRENDAN functional  -LW    Orientation Status oriented x 4  -NN oriented x 4  -BRENDAN oriented x 4  -LW    Follows Commands/Answers Questions 100% of the time;able to follow multi-step instructions  -% of the time  -BRENDAN 100% of the time  -LW    Personal Safety WNL/WFL  -NN WNL/WFL  -BRENDAN WNL/WFL  -LW    Personal Safety Interventions fall prevention program maintained;muscle strengthening facilitated;nonskid shoes/slippers when out of bed;supervised activity  -NN gait belt;supervised activity;nonskid shoes/slippers when out of bed;fall prevention program  maintained  -BRENDAN gait belt;nonskid shoes/slippers when out of bed  -LW    Short/Long Term Memory  intact short term memory;intact long term memory  -BRENDAN     Recorded by [NN] An Meyer, OTR/L [BRENDAN] Navya Morales PTA [LW] Nuzhat Rodrigues, DALE/L    Bed Mobility, Assessment/Treatment    Bed Mobility, Assistive Device  bed rails;head of bed elevated  -BRENDAN     Bed Mobility, Roll Left, [place]  independent  -BRENDAN     Bed Mobility, Roll Right, [place]  not tested  -BRENDAN     Bed Mobility, Scoot/Bridge, [place] independent  -NN independent  -BRENDAN     Bed Mob, Supine to Sit, [place] independent  -NN independent  -BRENDAN conditional independence  -LW    Bed Mob, Sit to Supine, [place] independent  -NN independent  -BRENDAN conditional independence  -LW    Recorded by [NN] An Meyer, OTR/L [BRENDAN] Navya Morales PTA [LW] Nuzhat Rodrigues, DALE/L    Transfer Assessment/Treatment    Transfers, Sit-Stand [place] independent  -NN independent  -BRENDAN independent  -LW    Transfers, Stand-Sit [place] independent  -NN independent  -BRENDAN independent  -LW    Transfers, Sit-Stand-Sit, Assist Device  other (see comments)   none  -BRENDAN bed rails  -LW    Toilet Transfer, [place]   independent   none  -LW    Toilet Transfer, Assistive Device   other (see comments)   none  -LW    Walk-In Shower Transfer, [place] supervision required  -NN      Walk-In Shower Transfer, Assist Device --   grab bar  -NN      Transfer, Impairments impaired balance;strength decreased  -NN      Recorded by [NN] An Meyer, OTR/L [BRENDAN] Navya Morales PTA [LW] Nuzhat Rodrigues, DALE/L    Gait Assessment/Treatment    Gait, [place] Level  stand by assist;contact guard assist  -BRENDAN     Gait, Assistive Device  other (see comments)  -BRENDAN     Gait, Distance (Feet)  340  -BRENDAN     Gait, Comment  pt had one LOB when her name was called by someone in the moura, she was able to self recover  -BRENDAN     Recorded by  [BRENDAN] Navya Morales PTA      Functional Mobility    Functional Mobility- Ind. Level supervision required  -NN      Functional Mobility-Distance (Feet) --   in room in shower  -NN      Functional Mobility- Safety Issues step length decreased;supplemental O2  -NN      Recorded by [NN] An Meyer, OTR/L      Upper Body Bathing Assessment/Training    UB Bathing Assess/Train, Position standing  -NN      UB Bathing Assess/Train, [place] Level minimum assist (75% patient effort)  -NN      UB Bathing Assess/Train, Impairments strength decreased  -NN      Recorded by [NN] An Meyer, OTR/L      Lower Body Bathing Assessment/Training    LB Bathing Assess/Train, Position standing  -NN      LB Bathing Assess/Train, [place] Level minimum assist (75% patient effort)  -NN      LB Bathing Assess/Train, Impairments decreased flexibility;strength decreased  -NN      Recorded by [NN] An Meyer, OTR/L      Upper Body Dressing Assessment/Training    UB Dressing Assess/Train, Clothing Type doffing:;donning:;hospital gown  -NN      UB Dressing Assess/Train, Position sitting  -NN      UB Dressing Assess/Train, [place] minimum assist (75% patient effort)  -NN      UB Dressing Assess/Train, Comment Min to manage wires  -NN      Recorded by [NN] An Meyer, OTR/L      Lower Body Dressing Assessment/Training    LB Dressing Assess/Train, Clothing Type donning:;doffing:;socks  -NN      LB Dressing Assess/Train, Position edge of bed  -NN      LB Dressing Assess/Train, [place] independent  -NN      Recorded by [NN] An Meyer OTR/L      Toileting Assessment/Training    Toileting Assess/Train, Assistive Device   grab bars  -LW    Toileting Assess/Train, Position   sitting  -LW    Toileting Assess/Train, Indepen Level   independent  -LW    Recorded by   [LW] CHITO Solano/L    Grooming Assessment/Training    Grooming Assess/Train, Assistive Device --   brush hair  -NN      Grooming Assess/Train, Position sitting  -NN   sitting  -LW    Grooming Assess/Train, Indepen Level independent  -NN  set up required;conditional independence  -LW    Grooming Assess/Train, Comment   combed hair, washed face and hands, oral care  -LW    Recorded by [NN] An Meyer, OTR/L  [LW] TAYLOR SolanoA/L    Therapy Exercises    Bilateral Lower Extremities  AROM:;20 reps;sitting;ankle pumps/circles;glut sets;heel slides;hip abduction/adduction;hip flexion;LAQ  -BRENDAN     Bilateral Upper Extremity   AROM:;sitting;elbow flexion/extension;shoulder extension/flexion;shoulder ER/IR;shoulder protraction/retraction  -LW    BUE Resistance   manual resistance- minimal   20  -LW    Recorded by  [BRENDAN] Navya Morales PTA [LW] TAYLOR SolanoA/L    Positioning and Restraints    Pre-Treatment Position in bed  -NN in bed  -BRENDAN in bed  -LW    Post Treatment Position bed  -NN bed  -BRENDAN bed  -LW    In Bed fowlers;call light within reach;encouraged to call for assist;side rails up x2  -NN supine;call light within reach;encouraged to call for assist;exit alarm on;side rails up x2  -BRENDAN supine;call light within reach;encouraged to call for assist;exit alarm on;with family/caregiver  -LW    Recorded by [NN] An Meyer OTR/L [BRENDAN] Navya Morales PTA [LW] TAYLOR SolanoA/ARMANDO      01/11/18 0955 01/11/18 0915 01/11/18 0911    Rehab Assessment/Intervention    Discipline occupational therapist  -NN      Document Type therapy note (daily note)  -NN      Subjective Information complains of;agree to therapy;weakness;fatigue;pain;dyspnea  -NN      Treatment Not Performed  other (see comments)  -NN     Treatment Not Performed, Comment  Pt. will PTA  -NN     Precautions/Limitations   fall precautions;oxygen therapy device and L/min  -BRENDAN    Recorded by [NN] An Meyer, OTR/L [NN] An Meyer, OTR/L [BRENDAN] Navya Morales PTA    Vital Signs    Pre Systolic BP Rehab 120  -NN      Pre Treatment Diastolic BP 50  -NN      Pretreatment Heart Rate (beats/min) 108   -NN      Pre SpO2 (%) 98  -NN      O2 Delivery Pre Treatment supplemental O2  -NN      Pre Patient Position Supine  -NN      Recorded by [NN] An Meyer OTR/L      Pain Assessment    Pain Assessment 0-10  -NN      Pain Score 8  -NN      Pain Type Acute pain  -NN      Pain Location Back  -NN      Pain Descriptors Aching  -NN      Pain Frequency Constant/continuous  -NN      Pain Intervention(s) Medication (See MAR);Repositioned  -NN      Response to Interventions tolerated  -NN      Recorded by [NN] MELANIA Gomez/L      Bed Mobility, Assessment/Treatment    Bed Mobility, Assistive Device head of bed elevated;bed rails  -NN      Bed Mob, Supine to Sit, [place] conditional independence  -NN      Bed Mob, Sit to Supine, [place] conditional independence  -NN      Recorded by [NN] MELANIA Gomez/L      Transfer Assessment/Treatment    Transfer, Comment Pt. stated she has been awake since 430 because someone came in her room asking for a bath and she has worked with therapy and been down for radiology so she just wants to take it easy right now  -NN      Recorded by [NN] An Meyer OTR/L      Therapy Exercises    Bilateral Upper Extremity AROM:;15 reps;sitting;elbow flexion/extension;hand pumps;pronation/supination;shoulder abduction/adduction;shoulder extension/flexion;shoulder horizontal abd/add   3x15 reps  -NN      Recorded by [NN] MELANIA Gomez/ARMANDO      Positioning and Restraints    Pre-Treatment Position in bed  -NN      Post Treatment Position bed  -NN      In Bed fowlers;call light within reach;encouraged to call for assist;with other staff;side rails up x2  -NN      Recorded by [NN] MELANIA Gomez/L        User Key  (r) = Recorded By, (t) = Taken By, (c) = Cosigned By    Initials Name Effective Dates    BRENDAN Morales, PTA 10/17/16 -     LW Nuzhat Rodrigues, DALE/L 10/17/16 -     NN MELANIA Gomez/ARMANDO 11/08/17 -                 OT Goals       01/12/18 1153  01/10/18 1451       Transfer Training OT LTG    Transfer Training OT LTG, Date Established  01/10/18  -NN     Transfer Training OT LTG, Time to Achieve  by discharge  -NN     Transfer Training OT LTG, Activity Type  all transfers  -NN     Transfer Training OT LTG, [place] Level  independent  -NN     Transfer Training OT LTG, Date Goal Reviewed 01/12/18  -LW      Transfer Training OT LTG, Outcome goal ongoing  -LW      Strength OT LTG    Strength Goal OT LTG, Date Established  01/10/18  -NN     Strength Goal OT LTG, Time to Achieve  by discharge  -NN     Strength Goal OT LTG, Measure to Achieve  Pt. will complete BUE strengthening exercises all planes and joints to increase BUE strength to 5/5 for ADLs.   -NN     Strength Goal OT LTG, Date Goal Reviewed 01/12/18  -LW      ADL OT LTG    ADL OT LTG, Date Established  01/10/18  -NN     ADL OT LTG, Activity Type  ADL skills  -NN     ADL OT LTG, [place] Level  independent  -NN     ADL OT LTG, Date Goal Reviewed 01/12/18  -LW      ADL OT LTG, Outcome goal ongoing  -LW        User Key  (r) = Recorded By, (t) = Taken By, (c) = Cosigned By    Initials Name Provider Type    LW CHITO Solano/L Occupational Therapy Assistant    MELANIA Chawla/L Occupational Therapist          Occupational Therapy Education     Title: PT OT SLP Therapies (Active)     Topic: Occupational Therapy (Done)     Point: ADL training (Done)    Description: Instruct learner(s) on proper safety adaptation and remediation techniques during self care or transfers.   Instruct in proper use of assistive devices.    Learning Progress Summary    Learner Readiness Method Response Comment Documented by Status   Patient Acceptance E VU,NR Pt. educated on role of OT, safe t/f, benefit of activity, PLB, progression with poc NN 01/13/18 0943 Done    Acceptance E VU  LW 01/12/18 1203 Done    Acceptance E NR Pt. educated on role of OT, safe bed mobility, benefit of OOB activity, progression  with poc NN 01/11/18 1016 Active    Acceptance E VU,NR Pt. educated on role of OT, safe t/f, benefit of therapy, calling for assist, progression with poc NN 01/10/18 1451 Done               Point: Home exercise program (Done)    Description: Instruct learner(s) on appropriate technique for monitoring, assisting and/or progressing therapeutic exercises/activities.    Learning Progress Summary    Learner Readiness Method Response Comment Documented by Status   Patient Acceptance E VU,NR Pt. educated on role of OT, safe t/f, benefit of activity, PLB, progression with poc NN 01/13/18 0943 Done    Acceptance E VU  LW 01/12/18 1203 Done    Acceptance E NR Pt. educated on role of OT, safe bed mobility, benefit of OOB activity, progression with poc NN 01/11/18 1016 Active    Acceptance E VU,NR Pt. educated on role of OT, safe t/f, benefit of therapy, calling for assist, progression with poc NN 01/10/18 1451 Done               Point: Body mechanics (Done)    Description: Instruct learner(s) on proper positioning and spine alignment during self-care, functional mobility activities and/or exercises.    Learning Progress Summary    Learner Readiness Method Response Comment Documented by Status   Patient Acceptance E VU,NR Pt. educated on role of OT, safe t/f, benefit of activity, PLB, progression with poc NN 01/13/18 0943 Done    Acceptance E VU  LW 01/12/18 1203 Done    Acceptance E NR Pt. educated on role of OT, safe bed mobility, benefit of OOB activity, progression with poc NN 01/11/18 1016 Active    Acceptance E VU,NR Pt. educated on role of OT, safe t/f, benefit of therapy, calling for assist, progression with poc NN 01/10/18 1451 Done                      User Key     Initials Effective Dates Name Provider Type Discipline    LW 10/17/16 -  CHITO Solano/L Occupational Therapy Assistant OT     11/08/17 -  MELANIA Gomez/L Occupational Therapist OT                  OT Recommendation and Plan  Anticipated  Discharge Disposition: home with assist, home with home health  Planned Therapy Interventions: activity intolerance, ADL retraining, balance training, bed mobility training, energy conservation, home exercise program, strengthening  Therapy Frequency:  (3-14x/week)  Plan of Care Review  Plan Of Care Reviewed With: patient  Progress: progress toward functional goals as expected  Outcome Summary/Follow up Plan: OT tx completed. Pt. requesting shower this day. Pt. completed shower with Min Assistance d/t management of wires and small shower stall. Pt. able to dress self independently besides managing her wires. Pt. educated on home safety, PLB and importance of OOB activity. Pt cont to benefit from skilled OT d/t decreased activity tolerance. 1/13 0945        Outcome Measures       01/13/18 0900 01/12/18 0930 01/12/18 0620    How much help from another person do you currently need...    Turning from your back to your side while in flat bed without using bedrails?  4  -BRENDAN     Moving from lying on back to sitting on the side of a flat bed without bedrails?  4  -BRENDAN     Moving to and from a bed to a chair (including a wheelchair)?  3  -BRENDAN     Standing up from a chair using your arms (e.g., wheelchair, bedside chair)?  4  -BRENDAN     Climbing 3-5 steps with a railing?  3  -BRENDAN     To walk in hospital room?  3  -BRENDAN     AM-PAC 6 Clicks Score  21  -BRENDAN     How much help from another is currently needed...    Putting on and taking off regular lower body clothing? 3  -NN  3  -LW    Bathing (including washing, rinsing, and drying) 4  -NN  3  -LW    Toileting (which includes using toilet bed pan or urinal) 4  -NN  3  -LW    Putting on and taking off regular upper body clothing 4  -NN  4  -LW    Taking care of personal grooming (such as brushing teeth) 4  -NN  4  -LW    Eating meals 4  -NN  4  -LW    Score 23  -NN  21  -LW      01/11/18 1000 01/11/18 0911 01/10/18 1425    How much help from another person do you currently need...     Turning from your back to your side while in flat bed without using bedrails?  4  -BRENDAN (r) BM (t) BRENDAN (c) 4  -MN    Moving from lying on back to sitting on the side of a flat bed without bedrails?  4  -BRENDAN (r) BM (t) BRENDAN (c) 4  -MN    Moving to and from a bed to a chair (including a wheelchair)?  3  -BRENDAN (r) BM (t) BRENDAN (c) 3  -MN    Standing up from a chair using your arms (e.g., wheelchair, bedside chair)?  4  -BRENDAN (r) BM (t) BRENDAN (c) 3  -MN    Climbing 3-5 steps with a railing?  3  -BRENDAN (r) BM (t) BRENDAN (c) 3  -MN    To walk in hospital room?  3  -BRENDAN (r) BM (t) BRENDAN (c) 3  -MN    AM-PAC 6 Clicks Score  21  -BRENDAN (r) BM (t) 20  -MN    How much help from another is currently needed...    Putting on and taking off regular lower body clothing? 3  -NN      Bathing (including washing, rinsing, and drying) 3  -NN      Toileting (which includes using toilet bed pan or urinal) 3  -NN      Putting on and taking off regular upper body clothing 4  -NN      Taking care of personal grooming (such as brushing teeth) 4  -NN      Eating meals 4  -NN      Score 21  -NN      Functional Assessment    Outcome Measure Options   AM-PAC 6 Clicks Basic Mobility (PT)  -MN      01/10/18 1400          How much help from another is currently needed...    Putting on and taking off regular lower body clothing? 3  -NN      Bathing (including washing, rinsing, and drying) 3  -NN      Toileting (which includes using toilet bed pan or urinal) 4  -NN      Putting on and taking off regular upper body clothing 4  -NN      Taking care of personal grooming (such as brushing teeth) 4  -NN      Eating meals 4  -NN      Score 22  -NN      Functional Assessment    Outcome Measure Options AM-PAC 6 Clicks Daily Activity (OT)  -NN        User Key  (r) = Recorded By, (t) = Taken By, (c) = Cosigned By    Initials Name Provider Type    LACHELLE Holcomb, PT Physical Therapist    BRENDAN Morales, PTA Physical Therapy Assistant    CHITO Moura/L Occupational Therapy  Assistant    NN An Meyer OTR/L Occupational Therapist    BM Keisha Melton, PT Student PT Student           Time Calculation:         Time Calculation- OT       01/13/18 0945          Time Calculation- OT    OT Start Time 0900  -NN      OT Stop Time 0945  -NN      OT Time Calculation (min) 45 min  -NN      Total Timed Code Minutes- OT 45 minute(s)  -NN      OT Received On 01/13/18  -NN        User Key  (r) = Recorded By, (t) = Taken By, (c) = Cosigned By    Initials Name Provider Type    NN An Meyer OTR/L Occupational Therapist           Therapy Charges for Today     Code Description Service Date Service Provider Modifiers Qty    [id number] HC OT SELF CARE/MGMT/TRAIN EA 15 MIN 1/13/2018 An Meyer OTR/L GO 3          OT G-codes  OT Functional Scales Options: AM-PAC 6 Clicks Daily Activity (OT)  Functional Limitation: Self care  Self Care Current Status (): At least 20 percent but less than 40 percent impaired, limited or restricted  Self Care Goal Status (): At least 1 percent but less than 20 percent impaired, limited or restricted    An Meyer OTR/L  1/13/2018

## 2018-01-13 NOTE — PLAN OF CARE
Problem: Patient Care Overview (Adult)  Goal: Plan of Care Review  Outcome: Ongoing (interventions implemented as appropriate)   01/13/18 0862   Coping/Psychosocial Response Interventions   Plan Of Care Reviewed With patient   Patient Care Overview   Progress improving   Outcome Evaluation   Outcome Summary/Follow up Plan Swallow evaluation, no tx recommended. Pt with no s/s of aspiration upon evaluation. Pt aware of need of esophageal dilitation after resolution of pneumonia.

## 2018-01-13 NOTE — PLAN OF CARE
Problem: Patient Care Overview (Adult)  Goal: Plan of Care Review  Outcome: Ongoing (interventions implemented as appropriate)   01/13/18 0943   Coping/Psychosocial Response Interventions   Plan Of Care Reviewed With patient   Patient Care Overview   Progress progress toward functional goals as expected   Outcome Evaluation   Outcome Summary/Follow up Plan OT tx completed. Pt. requesting shower this day. Pt. completed shower with Min Assistance d/t management of wires and small shower stall. Pt. able to dress self independently besides managing her wires. Pt. educated on home safety, PLB and importance of OOB activity. Pt cont to benefit from skilled OT d/t decreased activity tolerance. 1/13 4819

## 2018-01-13 NOTE — THERAPY TREATMENT NOTE
Acute Care - Physical Therapy Treatment Note  [facility]     Patient Name: Mona Perales  : 1950  MRN: [id number]  Today's Date: 2018  Onset of Illness/Injury or Date of Surgery Date: 18  Date of Referral to PT: 01/10/18  Referring Physician: Dr. Ingram     Admit Date: 2018    Visit Dx:    ICD-10-CM ICD-9-CM   1. Pneumonia of right upper lobe due to infectious organism J18.1 486   2. Hypoxia R09.02 799.02   3. Decreased activities of daily living (ADL) Z78.9 V49.89   4. Impaired functional mobility, balance, gait, and endurance Z74.09 V49.89   5. Dysphagia, unspecified type R13.10 787.20     Patient Active Problem List   Diagnosis   • Anxiety   • CAD (coronary atherosclerotic disease)   • Carotid stenosis   • COPD (chronic obstructive pulmonary disease)   • COPD with exacerbation   • Depressive disorder, not elsewhere classified   • Benign essential hypertension   • Hypercholesteremia   • Insomnia   • Low back pain   • Osteoporosis   • Other screening mammogram   • RUQ abdominal pain   • Peripheral arterial disease   • Dizziness   • Nicotine abuse   • Pneumonia of right upper lobe due to infectious organism   • Dysphagia               Adult Rehabilitation Note       18 1026 18 0900 18 0930    Rehab Assessment/Intervention    Discipline physical therapy assistant  -CH occupational therapist  -NN physical therapy assistant  -BRENDAN    Document Type therapy note (daily note)  - therapy note (daily note)  -NN therapy note (daily note)  -BRENDAN    Subjective Information agree to therapy;complains of;pain  - agree to therapy;complains of;weakness;fatigue  -NN complains of;fatigue  -BRENDAN    Patient Effort, Rehab Treatment good  -CH  good  -BRENDAN    Precautions/Limitations fall precautions;oxygen therapy device and L/min  -CH fall precautions;oxygen therapy device and L/min  -NN     Recorded by [CH] Aurora Rangel PTA [NN] An Meyer OTR/L [BRENDAN] Navya Morales PTA    Vital  Signs    Pre Systolic BP Rehab 122  -CH  127  -BRENDAN    Pre Treatment Diastolic BP 76  -CH  74  -BRENDAN    Post Systolic BP Rehab 145  -CH  134  -BRENDAN    Post Treatment Diastolic BP 78  -CH  80  -BRENDAN    Pretreatment Heart Rate (beats/min) 73  -CH  79  -BRENDAN    Intratreatment Heart Rate (beats/min) 82  -CH      Posttreatment Heart Rate (beats/min) 78  -CH  87  -BRENDAN    Pre SpO2 (%) 96  -CH  96  -BRENDAN    O2 Delivery Pre Treatment supplemental O2  -CH  supplemental O2  -BRENDAN    Intra SpO2 (%) 97  -CH      O2 Delivery Intra Treatment supplemental O2  -CH      Post SpO2 (%) 98  -CH  93  -BRENDAN    O2 Delivery Post Treatment supplemental O2  -CH  supplemental O2  -BRENDAN    Pre Patient Position Sitting  -CH  Supine  -BRENDAN    Intra Patient Position Standing  -CH  Sitting  -BRENDAN    Post Patient Position Sitting  -CH  Supine  -BRENDAN    Recorded by [CH] Aurora Rangel PTA  [BRENDAN] Navya Morales PTA    Pain Assessment    Pain Assessment 0-10  -CH  0-10  -BRENDAN    Pain Score 8  -CH  7  -BRENDAN    Post Pain Score   8  -BRENDAN    Pain Type Chronic pain  -CH  Acute pain  -BRENDAN    Pain Location Back  -CH  Back  -BRENDAN    Pain Orientation Lower  -CH      Pain Intervention(s) Medication (See MAR);Ambulation/increased activity  -      Recorded by [CH] Aurora Rangel PTA  [BRENDAN] Navya Morales PTA    Vision Assessment/Intervention    Visual Impairment WFL with corrective lenses  -CH  WNL;WFL  -BRENDAN    Recorded by [] Aurora Rangel PTA  [BRENDAN] Navya Morlaes PTA    Cognitive Assessment/Intervention    Current Cognitive/Communication Assessment functional  -CH functional  -NN functional  -BRENDAN    Orientation Status oriented x 4  -CH oriented x 4  -NN oriented x 4  -BRENDAN    Follows Commands/Answers Questions 100% of the time  -% of the time;able to follow multi-step instructions  -% of the time  -BRENDAN    Personal Safety WNL/WFL  -CH WNL/WFL  -NN WNL/WFL  -BRENDAN    Personal Safety Interventions fall prevention program maintained;gait belt;muscle strengthening  facilitated;nonskid shoes/slippers when out of bed;supervised activity  - fall prevention program maintained;muscle strengthening facilitated;nonskid shoes/slippers when out of bed;supervised activity  -NN gait belt;supervised activity;nonskid shoes/slippers when out of bed;fall prevention program maintained  -BRENDAN    Short/Long Term Memory   intact short term memory;intact long term memory  -BRENDAN    Recorded by [] Aurora Rangel, PTA [NN] An Meyer, OTR/L [BRENDAN] Navya Morales, MIREILLE    Bed Mobility, Assessment/Treatment    Bed Mobility, Assistive Device   bed rails;head of bed elevated  -BRNEDAN    Bed Mobility, Roll Left, [place]   independent  -BRENDAN    Bed Mobility, Roll Right, [place]   not tested  -BRENDAN    Bed Mobility, Scoot/Bridge, [place] not tested  - independent  -NN independent  -BRENDAN    Bed Mob, Supine to Sit, [place] not tested  - independent  -NN independent  -BRENDAN    Bed Mob, Sit to Supine, [place] not tested  - independent  -NN independent  -BRENDAN    Recorded by [] Aurora Rangel, PTA [NN] An Meyer, OTR/L [BRENDAN] Navya Morales, PTA    Transfer Assessment/Treatment    Transfers, Sit-Stand [place] independent  - independent  -NN independent  -BRENDAN    Transfers, Stand-Sit [place] independent  - independent  -NN independent  -BRENDAN    Transfers, Sit-Stand-Sit, Assist Device   other (see comments)   none  -BRENDAN    Walk-In Shower Transfer, [place]  supervision required  -NN     Walk-In Shower Transfer, Assist Device  --   grab bar  -NN     Transfer, Impairments  impaired balance;strength decreased  -NN     Recorded by [] Aurora Rangel, PTA [NN] An Meyer, OTR/L [BRENDAN] Navya Morales, PTA    Gait Assessment/Treatment    Gait, [place] Level conditional independence  -  stand by assist;contact guard assist  -    Gait, Assistive Device other (see comments)   none  -  other (see comments)  -    Gait, Distance (Feet) 600  -  340  -    Gait,  Safety Issues supplemental O2  -CH      Gait, Comment No LOB  -  pt had one LOB when her name was called by someone in the moura, she was able to self recover  -BRENDAN    Recorded by [CH] Aurora Rangel PTA  [BRENDAN] Navya Morales PTA    Stairs Assessment/Treatment    Stairs, Central Level not tested  -CH      Recorded by [CH] Aurora Rangel PTA      Functional Mobility    Functional Mobility- Ind. Level  supervision required  -NN     Functional Mobility-Distance (Feet)  --   in room in shower  -NN     Functional Mobility- Safety Issues  step length decreased;supplemental O2  -NN     Recorded by  [NN] An Meyer, OTR/L     Upper Body Bathing Assessment/Training    UB Bathing Assess/Train, Position  standing  -NN     UB Bathing Assess/Train, Central Level  minimum assist (75% patient effort)  -NN     UB Bathing Assess/Train, Impairments  strength decreased  -NN     Recorded by  [NN] An Meyer, OTR/L     Lower Body Bathing Assessment/Training    LB Bathing Assess/Train, Position  standing  -NN     LB Bathing Assess/Train, Central Level  minimum assist (75% patient effort)  -NN     LB Bathing Assess/Train, Impairments  decreased flexibility;strength decreased  -NN     Recorded by  [NN] An Meyer, OTR/L     Upper Body Dressing Assessment/Training    UB Dressing Assess/Train, Clothing Type  doffing:;donning:;hospital gown  -NN     UB Dressing Assess/Train, Position  sitting  -NN     UB Dressing Assess/Train, Central  minimum assist (75% patient effort)  -NN     UB Dressing Assess/Train, Comment  Min to manage wires  -NN     Recorded by  [NN] An Meyer, OTR/L     Lower Body Dressing Assessment/Training    LB Dressing Assess/Train, Clothing Type  donning:;doffing:;socks  -NN     LB Dressing Assess/Train, Position  edge of bed  -NN     LB Dressing Assess/Train, Central  independent  -NN     Recorded by  [NN] An Meyer, OTR/L     Grooming Assessment/Training     Grooming Assess/Train, Assistive Device  --   brush hair  -NN     Grooming Assess/Train, Position  sitting  -NN     Grooming Assess/Train, Indepen Level  independent  -NN     Recorded by  [NN] An Meyer OTR/L     Therapy Exercises    Bilateral Lower Extremities AROM:;15 reps;standing;glut sets;heel raises;hip abduction/adduction;hip flexion;mini squats  -  AROM:;20 reps;sitting;ankle pumps/circles;glut sets;heel slides;hip abduction/adduction;hip flexion;LAQ  -BRENDAN    Recorded by [] Aurora Rangel PTA  [BRENDAN] Navya Morales PTA    Positioning and Restraints    Pre-Treatment Position in bed  - in bed  -NN in bed  -BRENDAN    Post Treatment Position bed  - bed  - bed  -BRENDAN    In Bed sitting EOB;call light within reach;encouraged to call for assist  - fowlers;call light within reach;encouraged to call for assist;side rails up x2  -NN supine;call light within reach;encouraged to call for assist;exit alarm on;side rails up x2  -BRENDAN    Recorded by [] Aurora Rangel, PTA [NN] An Meyer OTR/L [BRENDAN] Navya Morales, MIREILLE      01/12/18 0620 01/11/18 0955 01/11/18 0915    Rehab Assessment/Intervention    Discipline occupational therapy assistant  -LW occupational therapist  -NN     Document Type therapy note (daily note)  -LW therapy note (daily note)  -NN     Subjective Information agree to therapy  -LW complains of;agree to therapy;weakness;fatigue;pain;dyspnea  -NN     Patient Effort, Rehab Treatment good  -LW      Treatment Not Performed   other (see comments)  -NN    Treatment Not Performed, Comment   Pt. will PTA  -NN    Precautions/Limitations fall precautions  -LW      Recorded by [LW] Nuzhat Rodrigues DALE/L [NN] An Meyer, OTR/L [NN] An Meyer, OTR/L    Vital Signs    Pre Systolic BP Rehab 126  -  -NN     Pre Treatment Diastolic BP 66  -LW 50  -NN     Pretreatment Heart Rate (beats/min) 73  -  -NN     Pretreatment Resp Rate (breaths/min) 73  -LW      Pre SpO2 (%) 99   -LW 98  -NN     O2 Delivery Pre Treatment supplemental O2  -LW supplemental O2  -NN     Post SpO2 (%) 98  -LW      O2 Delivery Post Treatment supplemental O2  -LW      Pre Patient Position Supine  -LW Supine  -NN     Post Patient Position Sitting  -LW      Recorded by [LW] CHITO Solano/L [NN] An Meyer, OTR/L     Pain Assessment    Pain Assessment 0-10  -LW 0-10  -NN     Pain Score 7  -LW 8  -NN     Post Pain Score 7  -LW      Pain Type Acute pain  -LW Acute pain  -NN     Pain Location Back  -LW Back  -NN     Pain Descriptors Aching  -LW Aching  -NN     Pain Frequency Constant/continuous  -LW Constant/continuous  -NN     Pain Intervention(s) Medication (See MAR)  -LW Medication (See MAR);Repositioned  -NN     Response to Interventions  tolerated  -NN     Recorded by [LW] CHITO Solano/L [NN] An Meyer, OTR/L     Vision Assessment/Intervention    Visual Impairment WNL;WFL  -LW      Recorded by [LW] CHITO Solano/L      Cognitive Assessment/Intervention    Current Cognitive/Communication Assessment functional  -LW      Orientation Status oriented x 4  -LW      Follows Commands/Answers Questions 100% of the time  -LW      Personal Safety WNL/WFL  -LW      Personal Safety Interventions gait belt;nonskid shoes/slippers when out of bed  -LW      Recorded by [LW] CHITO Solano/L      Bed Mobility, Assessment/Treatment    Bed Mobility, Assistive Device  head of bed elevated;bed rails  -NN     Bed Mob, Supine to Sit, [place] conditional independence  -LW conditional independence  -NN     Bed Mob, Sit to Supine, [place] conditional independence  -LW conditional independence  -NN     Recorded by [LW] CHITO Solano/ARMANDO [NN] An Meyer, OTR/L     Transfer Assessment/Treatment    Transfers, Sit-Stand [place] independent  -LW      Transfers, Stand-Sit [place] independent  -LW      Transfers, Sit-Stand-Sit, Assist Device bed rails  -LW      Toilet  Transfer, [place] independent   none  -LW      Toilet Transfer, Assistive Device other (see comments)   none  -LW      Transfer, Comment  Pt. stated she has been awake since 430 because someone came in her room asking for a bath and she has worked with therapy and been down for radiology so she just wants to take it easy right now  -NN     Recorded by [LW] TAYLOR SolanoA/L [NN] An Meyer, OTR/L     Toileting Assessment/Training    Toileting Assess/Train, Assistive Device grab bars  -LW      Toileting Assess/Train, Position sitting  -LW      Toileting Assess/Train, Indepen Level independent  -LW      Recorded by [LW] TAYLOR SolanoA/L      Grooming Assessment/Training    Grooming Assess/Train, Position sitting  -LW      Grooming Assess/Train, Indepen Level set up required;conditional independence  -LW      Grooming Assess/Train, Comment combed hair, washed face and hands, oral care  -LW      Recorded by [LW] Nuzhat Rodrigues DALE/L      Therapy Exercises    Bilateral Upper Extremity AROM:;sitting;elbow flexion/extension;shoulder extension/flexion;shoulder ER/IR;shoulder protraction/retraction  -LW AROM:;15 reps;sitting;elbow flexion/extension;hand pumps;pronation/supination;shoulder abduction/adduction;shoulder extension/flexion;shoulder horizontal abd/add   3x15 reps  -NN     BUE Resistance manual resistance- minimal   20  -LW      Recorded by [LW] TAYLOR SolanoA/L [NN] An Meyer, OTR/L     Positioning and Restraints    Pre-Treatment Position in bed  -LW in bed  -NN     Post Treatment Position bed  -LW bed  -NN     In Bed supine;call light within reach;encouraged to call for assist;exit alarm on;with family/caregiver  -LW fowlers;call light within reach;encouraged to call for assist;with other staff;side rails up x2  -NN     Recorded by [LW] HCITO Solano/ARMANDO [NN] An Meyer OTR/L       01/11/18 0911          Rehab Assessment/Intervention    Precautions/Limitations  fall precautions;oxygen therapy device and L/min  -BRENDAN      Recorded by [BRENDAN] Navya Morales, PTA        User Key  (r) = Recorded By, (t) = Taken By, (c) = Cosigned By    Initials Name Effective Dates    BRENDAN Navya Morales, PTA 10/17/16 -     CH Aurora Rivasroseann, PTA 10/17/16 -     LW Nuzhat Rodrigues, DALE/L 10/17/16 -     NN An Meyer, OTR/L 11/08/17 -                 IP PT Goals       01/13/18 1113 01/13/18 1102 01/12/18 0930    Transfer Training PT STG    Transfer Training PT STG, Date Goal Reviewed 01/13/18  -CH (P)  01/13/18  -CH 01/12/18  -BRENDAN    Transfer Training PT STG, Outcome goal met  -CH (P)  goal ongoing  -CH goal ongoing  -BRENDAN    Gait Training PT STG    Gait Training Goal PT STG, Date Goal Reviewed 01/13/18  -CH (P)  01/13/18  -CH 01/12/18  -BRENDAN    Gait Training Goal PT STG, Outcome goal met  -CH (P)  goal met  -CH goal ongoing  -BRENDAN      01/11/18 0911 01/10/18 1512       Transfer Training PT STG    Transfer Training PT STG, Date Established 01/11/18  -BRENDNA (r) BM (t) BRENDAN (c) 01/10/18  -MN     Transfer Training PT STG, Time to Achieve  5 days  -MN     Transfer Training PT STG, Activity Type  bed to chair /chair to bed;sit to stand/stand to sit;toilet  -MN     Transfer Training PT STG, [place] Level  independent  -MN     Transfer Training PT STG, Date Goal Reviewed 01/11/18  -BRENDAN (r) BM (t) BRENDAN (c)      Transfer Training PT STG, Outcome goal ongoing  -BRENDAN (r) BM (t) BRENDAN (c)      Gait Training PT STG    Gait Training Goal PT STG, Date Established  01/10/18  -MN     Gait Training Goal PT STG, Time to Achieve  5 days  -MN     Gait Training Goal PT STG, [place] Level  conditional independence  -MN     Gait Training Goal PT STG, Assist Device  cane, straight  -MN     Gait Training Goal PT STG, Distance to Achieve  600 ft  -MN     Gait Training Goal PT STG, Date Goal Reviewed 01/11/18  -BRENDAN (r) BM (t) BRENDAN (c)      Gait Training Goal PT STG, Outcome goal ongoing  -BRENDAN (r) BM (t) BRENDAN (c)        User Key  (r) =  Recorded By, (t) = Taken By, (c) = Cosigned By    Initials Name Provider Type    MN Georgia Holcomb, PT Physical Therapist    BRENDAN Morales, PTA Physical Therapy Assistant    YADIEL Rangel PTA Physical Therapy Assistant    TURNER Melton, PT Student PT Student          Physical Therapy Education     Title: PT OT SLP Therapies (Active)     Topic: Physical Therapy (Active)     Point: Precautions (Done)    Learning Progress Summary    Learner Readiness Method Response Comment Documented by Status   Patient Acceptance E VU role of PT in hospital. call for assist for OOB MN 01/10/18 1512 Done                      User Key     Initials Effective Dates Name Provider Type Discipline    MN 10/17/16 -  Georgia Holcomb, PT Physical Therapist PT                    PT Recommendation and Plan  Anticipated Discharge Disposition: home with assist, home with home health  Planned Therapy Interventions: balance training, gait training, home exercise program, stair training, strengthening, stretching, transfer training, patient/family education  PT Frequency: other (see comments) (5-14x/week)  Plan of Care Review  Plan Of Care Reviewed With: patient  Outcome Summary/Follow up Plan: Pt met all goals this tx. Pt ambulated 600ft with conditional independence and scored 27/28 on Tinetti test. Pt verbalized and demonstrated understanding of home safety and HEP.  Pt may continue to benefit from skilled therapy services for continued strengthening with HHPT upon D/C          Outcome Measures       01/13/18 1026 01/13/18 0900 01/12/18 0930    How much help from another person do you currently need...    Turning from your back to your side while in flat bed without using bedrails? 4  -CH  4  -BRENDAN    Moving from lying on back to sitting on the side of a flat bed without bedrails? 4  -CH  4  -BRENDAN    Moving to and from a bed to a chair (including a wheelchair)? 4  -CH  3  -BRENDAN    Standing up from a chair using your arms (e.g.,  "wheelchair, bedside chair)? 4  -CH  4  -BRENDAN    Climbing 3-5 steps with a railing? 3  -CH  3  -BRENDAN    To walk in hospital room? 4  -CH  3  -BRENDAN    AM-PAC 6 Clicks Score 23  -CH  21  -BRENDAN    How much help from another is currently needed...    Putting on and taking off regular lower body clothing?  3  -NN     Bathing (including washing, rinsing, and drying)  4  -NN     Toileting (which includes using toilet bed pan or urinal)  4  -NN     Putting on and taking off regular upper body clothing  4  -NN     Taking care of personal grooming (such as brushing teeth)  4  -NN     Eating meals  4  -NN     Score  23  -NN     Tinetti Assessment    Tinetti Assessment yes  -CH      Sitting Balance 1  -CH      Arises 2  -CH      Attempts to Rise 2  -CH      Immediate Standing Balance (first 5 sec) 2  -CH      Standing Balance 2  -CH      Sternal Nudge (feet close together) 2  -CH      Eyes Closed (feet close together) 1  -CH      Turning 360 Degrees- Steps 0  -CH      Turning 360 Degrees- Steadiness 1  -CH      Sitting Down 2  -CH      Tinetti Balance Score 15  -CH      Gait Initiation (immediate after told \"go\") 1  -CH      Step Length- Right Swing 1  -CH      Step Length- Left Swing 1  -CH      Foot Clearance- Right Foot 1  -CH      Foot Clearance- Left Foot 1  -CH      Step Symmetry 1  -CH      Step Continuity 1  -CH      Path (excursion) 2  -CH      Trunk 2  -CH      Base of Support 1  -CH      Gait Score 12  -CH      Tinetti Total Score 27  -CH      Functional Assessment    Outcome Measure Options Tinetti  -        01/12/18 0620 01/11/18 1000 01/11/18 0911    How much help from another person do you currently need...    Turning from your back to your side while in flat bed without using bedrails?   4  -BRENDAN (r) BM (t) BRENDAN (c)    Moving from lying on back to sitting on the side of a flat bed without bedrails?   4  -BRENDAN (r) BM (t) BRENDAN (c)    Moving to and from a bed to a chair (including a wheelchair)?   3  -BRENDAN (r) BM (t) BRENDAN (c)    " Standing up from a chair using your arms (e.g., wheelchair, bedside chair)?   4  -BRENDAN (r) BM (t) BRENDAN (c)    Climbing 3-5 steps with a railing?   3  -BRENDAN (r) BM (t) BRENDAN (c)    To walk in hospital room?   3  -BRENDAN (r) BM (t) BRENDAN (c)    AM-PAC 6 Clicks Score   21  -BRENDAN (r) BM (t)    How much help from another is currently needed...    Putting on and taking off regular lower body clothing? 3  -LW 3  -NN     Bathing (including washing, rinsing, and drying) 3  -LW 3  -NN     Toileting (which includes using toilet bed pan or urinal) 3  -LW 3  -NN     Putting on and taking off regular upper body clothing 4  -LW 4  -NN     Taking care of personal grooming (such as brushing teeth) 4  -LW 4  -NN     Eating meals 4  -LW 4  -NN     Score 21  -LW 21  -NN       01/10/18 1425 01/10/18 1400       How much help from another person do you currently need...    Turning from your back to your side while in flat bed without using bedrails? 4  -MN      Moving from lying on back to sitting on the side of a flat bed without bedrails? 4  -MN      Moving to and from a bed to a chair (including a wheelchair)? 3  -MN      Standing up from a chair using your arms (e.g., wheelchair, bedside chair)? 3  -MN      Climbing 3-5 steps with a railing? 3  -MN      To walk in hospital room? 3  -MN      AM-PAC 6 Clicks Score 20  -MN      How much help from another is currently needed...    Putting on and taking off regular lower body clothing?  3  -NN     Bathing (including washing, rinsing, and drying)  3  -NN     Toileting (which includes using toilet bed pan or urinal)  4  -NN     Putting on and taking off regular upper body clothing  4  -NN     Taking care of personal grooming (such as brushing teeth)  4  -NN     Eating meals  4  -NN     Score  22  -NN     Functional Assessment    Outcome Measure Options AM-PAC 6 Clicks Basic Mobility (PT)  -MN AM-PAC 6 Clicks Daily Activity (OT)  -NN       User Key  (r) = Recorded By, (t) = Taken By, (c) = Cosigned By     Initials Name Provider Type    MN Georgia Holcomb, PT Physical Therapist    BRENDAN Navya Morales, PTA Physical Therapy Assistant    CH Aurora Rangel, MIREILLE Physical Therapy Assistant    LW Nuzhat Rodrigues, DALE/L Occupational Therapy Assistant    NN An Meyer, OTR/L Occupational Therapist    BM Keisha Melton, PT Student PT Student           Time Calculation:         PT Charges       01/13/18 1116          Time Calculation    Start Time 1026  -CH      Stop Time 1101  -CH      Time Calculation (min) 35 min  -CH      PT Received On 01/13/18  -CH      Time Calculation- PT    Total Timed Code Minutes- PT 35 minute(s)  -CH        User Key  (r) = Recorded By, (t) = Taken By, (c) = Cosigned By    Initials Name Provider Type     Aurora Rangel, MIREILLE Physical Therapy Assistant          Therapy Charges for Today     Code Description Service Date Service Provider Modifiers Qty    42980418991 HC GAIT TRAINING EA 15 MIN 1/13/2018 Aurora Rangel PTA GP 1     Duration:  19m (10:26 AM - 10:45 AM)      [id number] HC PT THER PROC EA 15 MIN 1/13/2018 Aurora Rangel PTA GP 1     Duration:  16m (10:45 AM - 11:01 AM)            PT G-Codes  PT Professional Judgement Used?: Yes  Outcome Measure Options: Tinetti  Score: 20  Functional Limitation: Mobility: Walking and moving around  Mobility: Walking and Moving Around Current Status (): At least 20 percent but less than 40 percent impaired, limited or restricted  Mobility: Walking and Moving Around Goal Status (): At least 1 percent but less than 20 percent impaired, limited or restricted    Aurora Rangel PTA  1/13/2018

## 2018-01-13 NOTE — THERAPY DISCHARGE NOTE
Acute Care - Speech Language Pathology   Swallow Progress Note/Discharge   [facility]     Patient Name: Mona Perales  : 1950  MRN: [id number]  Today's Date: 2018  Onset of Illness/Injury or Date of Surgery Date: 18            Admit Date: 2018  Swallow evaluation, no tx recommended. Pt with no s/s of aspiration upon evaluation. Pt aware of need of esophageal dilitation after resolution of pneumonia. Pt is independent with small sips/bites, alternate solids and liquids, and cutting meats into small bites and pieces. SLP also educated pt on adding meats/gravies to meats to keep moist. SLP discussed placing medications in pudding/applesauce as needed.   Visit Dx:      ICD-10-CM ICD-9-CM   1. Pneumonia of right upper lobe due to infectious organism J18.1 486   2. Hypoxia R09.02 799.02   3. Decreased activities of daily living (ADL) Z78.9 V49.89   4. Impaired functional mobility, balance, gait, and endurance Z74.09 V49.89   5. Dysphagia, unspecified type R13.10 787.20     Patient Active Problem List   Diagnosis   • Anxiety   • CAD (coronary atherosclerotic disease)   • Carotid stenosis   • COPD (chronic obstructive pulmonary disease)   • COPD with exacerbation   • Depressive disorder, not elsewhere classified   • Benign essential hypertension   • Hypercholesteremia   • Insomnia   • Low back pain   • Osteoporosis   • Other screening mammogram   • RUQ abdominal pain   • Peripheral arterial disease   • Dizziness   • Nicotine abuse   • Pneumonia of right upper lobe due to infectious organism   • Dysphagia             Adult Rehabilitation Note       18 0900 18 0930 18 0620    Rehab Assessment/Intervention    Discipline occupational therapist  -NN physical therapy assistant  -BRENDAN occupational therapy assistant  -LW    Document Type therapy note (daily note)  -NN therapy note (daily note)  -BRENDAN therapy note (daily note)  -LW    Subjective Information agree to therapy;complains  of;weakness;fatigue  -NN complains of;fatigue  -BRENDAN agree to therapy  -LW    Patient Effort, Rehab Treatment  good  -BRENDAN good  -LW    Precautions/Limitations fall precautions;oxygen therapy device and L/min  -NN  fall precautions  -LW    Recorded by [NN] An Meyer, OTR/L [BRENDAN] Navya Morales PTA [LW] Nuzhat Rodrigues, DALE/L    Vital Signs    Pre Systolic BP Rehab  127  -BRENDAN 126  -LW    Pre Treatment Diastolic BP  74  -BRENDAN 66  -LW    Post Systolic BP Rehab  134  -BRENDAN     Post Treatment Diastolic BP  80  -BRENDAN     Pretreatment Heart Rate (beats/min)  79  -BRENDAN 73  -LW    Posttreatment Heart Rate (beats/min)  87  -BRENDAN     Pretreatment Resp Rate (breaths/min)   73  -LW    Pre SpO2 (%)  96  -BRENDAN 99  -LW    O2 Delivery Pre Treatment  supplemental O2  -BRENDAN supplemental O2  -LW    Post SpO2 (%)  93  -BRENDAN 98  -LW    O2 Delivery Post Treatment  supplemental O2  -BRENDAN supplemental O2  -LW    Pre Patient Position  Supine  -BRENDAN Supine  -LW    Intra Patient Position  Sitting  -BRENDAN     Post Patient Position  Supine  -BRENDAN Sitting  -LW    Recorded by  [BRENDAN] Navya Morales PTA [LW] Nuzhat Rodrigues, DALE/L    Pain Assessment    Pain Assessment  0-10  -BRENDAN 0-10  -LW    Pain Score  7  -BRENDAN 7  -LW    Post Pain Score  8  -BRENDAN 7  -LW    Pain Type  Acute pain  -BRENDAN Acute pain  -LW    Pain Location  Back  -BRENDAN Back  -LW    Pain Descriptors   Aching  -LW    Pain Frequency   Constant/continuous  -LW    Pain Intervention(s)   Medication (See MAR)  -LW    Recorded by  [BRENDAN] Navya Morales PTA [LW] Nuzhat Rodrigues DALE/L    Vision Assessment/Intervention    Visual Impairment  WNL;WFL  -BRENDAN WNL;WFL  -LW    Recorded by  [BRENDAN] Navya Morales PTA [LW] Nuzhat Rodrigues DALE/L    Cognitive Assessment/Intervention    Current Cognitive/Communication Assessment functional  -NN functional  -BRENDAN functional  -LW    Orientation Status oriented x 4  -NN oriented x 4  -BRENDAN oriented x 4  -LW    Follows Commands/Answers Questions 100% of the time;able to follow multi-step  instructions  -% of the time  -BRENDAN 100% of the time  -LW    Personal Safety WNL/WFL  -NN WNL/WFL  -BRENDAN WNL/WFL  -LW    Personal Safety Interventions fall prevention program maintained;muscle strengthening facilitated;nonskid shoes/slippers when out of bed;supervised activity  -NN gait belt;supervised activity;nonskid shoes/slippers when out of bed;fall prevention program maintained  -BRENDAN gait belt;nonskid shoes/slippers when out of bed  -LW    Short/Long Term Memory  intact short term memory;intact long term memory  -BRENDAN     Recorded by [NN] An Meyer, OTR/L [BRENDAN] Navya Morales, PTA [LW] Nuzhat Rodrigues, DALE/L    Bed Mobility, Assessment/Treatment    Bed Mobility, Assistive Device  bed rails;head of bed elevated  -BRENDAN     Bed Mobility, Roll Left, [place]  independent  -BRENDAN     Bed Mobility, Roll Right, [place]  not tested  -BRENDAN     Bed Mobility, Scoot/Bridge, [place] independent  -NN independent  -BRENDAN     Bed Mob, Supine to Sit, [place] independent  -NN independent  -BRENDAN conditional independence  -LW    Bed Mob, Sit to Supine, [place] independent  -NN independent  -BRENDAN conditional independence  -LW    Recorded by [NN] An Meyer, OTR/L [BRENDAN] Navya Morales, MIREILLE [LW] Nuzhat Rodrigues, DALE/L    Transfer Assessment/Treatment    Transfers, Sit-Stand [place] independent  -NN independent  -BRENDAN independent  -LW    Transfers, Stand-Sit [place] independent  -NN independent  -BRENDAN independent  -LW    Transfers, Sit-Stand-Sit, Assist Device  other (see comments)   none  -BRENDAN bed rails  -LW    Toilet Transfer, [place]   independent   none  -LW    Toilet Transfer, Assistive Device   other (see comments)   none  -LW    Walk-In Shower Transfer, [place] supervision required  -NN      Walk-In Shower Transfer, Assist Device --   grab bar  -NN      Transfer, Impairments impaired balance;strength decreased  -NN      Recorded by [NN] An Meyer, OTR/L [BRENDAN] Navya oMrales, MIREILLE [LW]  Nuzhat Rodrigues, DALE/L    Gait Assessment/Treatment    Gait, [place] Level  stand by assist;contact guard assist  -     Gait, Assistive Device  other (see comments)  -     Gait, Distance (Feet)  340  -     Gait, Comment  pt had one LOB when her name was called by someone in the moura, she was able to self recover  -BRENDAN     Recorded by  [BRENDAN] Navya Morales, PTA     Functional Mobility    Functional Mobility- Ind. Level supervision required  -NN      Functional Mobility-Distance (Feet) --   in room in shower  -NN      Functional Mobility- Safety Issues step length decreased;supplemental O2  -NN      Recorded by [NN] An Meyer, OTR/L      Upper Body Bathing Assessment/Training    UB Bathing Assess/Train, Position standing  -NN      UB Bathing Assess/Train, [place] Level minimum assist (75% patient effort)  -NN      UB Bathing Assess/Train, Impairments strength decreased  -NN      Recorded by [NN] An Meyer, OTR/L      Lower Body Bathing Assessment/Training    LB Bathing Assess/Train, Position standing  -NN      LB Bathing Assess/Train, [place] Level minimum assist (75% patient effort)  -NN      LB Bathing Assess/Train, Impairments decreased flexibility;strength decreased  -NN      Recorded by [NN] An Meyer, OTR/L      Upper Body Dressing Assessment/Training    UB Dressing Assess/Train, Clothing Type doffing:;donning:;hospital gown  -NN      UB Dressing Assess/Train, Position sitting  -NN      UB Dressing Assess/Train, [place] minimum assist (75% patient effort)  -NN      UB Dressing Assess/Train, Comment Min to manage wires  -NN      Recorded by [NN] An Meyer, OTR/L      Lower Body Dressing Assessment/Training    LB Dressing Assess/Train, Clothing Type donning:;doffing:;socks  -NN      LB Dressing Assess/Train, Position edge of bed  -NN      LB Dressing Assess/Train, [place] independent  -NN      Recorded by [NN] An Meyer, OTR/L      Toileting  Assessment/Training    Toileting Assess/Train, Assistive Device   grab bars  -LW    Toileting Assess/Train, Position   sitting  -LW    Toileting Assess/Train, Indepen Level   independent  -LW    Recorded by   [LW] CHITO Solano/L    Grooming Assessment/Training    Grooming Assess/Train, Assistive Device --   brush hair  -NN      Grooming Assess/Train, Position sitting  -NN  sitting  -LW    Grooming Assess/Train, Indepen Level independent  -NN  set up required;conditional independence  -LW    Grooming Assess/Train, Comment   combed hair, washed face and hands, oral care  -LW    Recorded by [NN] An Meyer OTR/L  [LW] TAYLOR SolanoA/L    Therapy Exercises    Bilateral Lower Extremities  AROM:;20 reps;sitting;ankle pumps/circles;glut sets;heel slides;hip abduction/adduction;hip flexion;LAQ  -BRENDAN     Bilateral Upper Extremity   AROM:;sitting;elbow flexion/extension;shoulder extension/flexion;shoulder ER/IR;shoulder protraction/retraction  -LW    BUE Resistance   manual resistance- minimal   20  -LW    Recorded by  [BRENDAN] Navya Morales PTA [LW] TAYLOR SolanoA/L    Positioning and Restraints    Pre-Treatment Position in bed  -NN in bed  -BRENDAN in bed  -LW    Post Treatment Position bed  -NN bed  -BRENDAN bed  -LW    In Bed fowlers;call light within reach;encouraged to call for assist;side rails up x2  -NN supine;call light within reach;encouraged to call for assist;exit alarm on;side rails up x2  -BRENDAN supine;call light within reach;encouraged to call for assist;exit alarm on;with family/caregiver  -LW    Recorded by [NN] An Meyer, OTR/L [BRENDAN] Navya Morales PTA [LW] TAYLOR SolanoA/ARMANDO      01/11/18 0955 01/11/18 0915 01/11/18 0911    Rehab Assessment/Intervention    Discipline occupational therapist  -NN      Document Type therapy note (daily note)  -NN      Subjective Information complains of;agree to therapy;weakness;fatigue;pain;dyspnea  -NN      Treatment Not Performed  other (see  comments)  -NN     Treatment Not Performed, Comment  Pt. will PTA  -NN     Precautions/Limitations   fall precautions;oxygen therapy device and L/min  -BRENDAN    Recorded by [NN] An Meyer OTR/L [NN] An Meyer OTR/ARMANDO [BRENDAN] Navya Morales, PTA    Vital Signs    Pre Systolic BP Rehab 120  -NN      Pre Treatment Diastolic BP 50  -NN      Pretreatment Heart Rate (beats/min) 108  -NN      Pre SpO2 (%) 98  -NN      O2 Delivery Pre Treatment supplemental O2  -NN      Pre Patient Position Supine  -NN      Recorded by [NN] An Meyer OTR/L      Pain Assessment    Pain Assessment 0-10  -NN      Pain Score 8  -NN      Pain Type Acute pain  -NN      Pain Location Back  -NN      Pain Descriptors Aching  -NN      Pain Frequency Constant/continuous  -NN      Pain Intervention(s) Medication (See MAR);Repositioned  -NN      Response to Interventions tolerated  -NN      Recorded by [NN] An Meyer OTR/L      Bed Mobility, Assessment/Treatment    Bed Mobility, Assistive Device head of bed elevated;bed rails  -NN      Bed Mob, Supine to Sit, [place] conditional independence  -NN      Bed Mob, Sit to Supine, [place] conditional independence  -NN      Recorded by [NN] An Meyer OTR/L      Transfer Assessment/Treatment    Transfer, Comment Pt. stated she has been awake since 430 because someone came in her room asking for a bath and she has worked with therapy and been down for radiology so she just wants to take it easy right now  -NN      Recorded by [NN] An Meyer, OTR/L      Therapy Exercises    Bilateral Upper Extremity AROM:;15 reps;sitting;elbow flexion/extension;hand pumps;pronation/supination;shoulder abduction/adduction;shoulder extension/flexion;shoulder horizontal abd/add   3x15 reps  -NN      Recorded by [NN] An Meyer, OTR/L      Positioning and Restraints    Pre-Treatment Position in bed  -NN      Post Treatment Position bed  -NN      In Bed fowlers;call light within  reach;encouraged to call for assist;with other staff;side rails up x2  -NN      Recorded by [NN] An Meyer, OTR/L        User Key  (r) = Recorded By, (t) = Taken By, (c) = Cosigned By    Initials Name Effective Dates    BRENDAN Navya Morales, PTA 10/17/16 -     LW Nuzhat OCHOA Ravi, DALE/L 10/17/16 -     NN An Meyer, OTR/L 11/08/17 -             EDUCATION  The patient has been educated in the following areas:   Dysphagia (Swallowing Impairment).    SLP Recommendation and Plan  SLP Swallowing Diagnosis: esophageal dysfunction (narrowing in esophagus)  SLP Diet Recommendation: regular textures, thin liquids  Recommended Feeding/Eating Techniques: maintain upright posture during/after eating for 30 mins, small sips/bites (may need sauce/gravy on meats to keep food moist)  SLP Rec. for Method of Medication Administration: meds whole with thin liquid (if difficulty with medications )        Criteria for Skilled Therapeutic Interventions Met: not appropriate for intervention  Anticipated Discharge Disposition: home     Therapy Frequency: evaluation only             Plan of Care Review  Plan Of Care Reviewed With: patient  Progress: improving  Outcome Summary/Follow up Plan: Swallow evaluation, no tx recommended. Pt with no s/s of aspiration upon evaluation. Pt aware of need of esophageal dilitation after resolution of pneumonia.            Time Calculation:         Time Calculation- SLP       01/13/18 0846          Time Calculation- SLP    SLP Start Time 0828  -EK      SLP Stop Time 0851  -EK      SLP Time Calculation (min) 23 min  -EK      SLP Received On 01/13/18  -EK        User Key  (r) = Recorded By, (t) = Taken By, (c) = Cosigned By    Initials Name Provider Type    KAYE Mcnair CCC-SLP Speech and Language Pathologist          Therapy Charges for Today     Code Description Service Date Service Provider Modifiers Qty    [id number]  ST EVAL ORAL PHARYNG SWALLOW 2 1/13/2018 Val  Sun Mcnair CCC-SLP GN 1               SLP Discharge Summary  Anticipated Discharge Disposition: home    Val Sun Mcnair CCC-SLP  1/13/2018

## 2018-01-13 NOTE — SIGNIFICANT NOTE
01/13/18 1337   Rehab Treatment   Discipline physical therapy assistant   Treatment Not Performed patient/family declined treatment  (Pt deferred BID tx this PM. Pt stated she had too much going on this PM and needed to take care of some things)   Recommendation   PT - Next Appointment 01/14/18

## 2018-01-13 NOTE — PROGRESS NOTES
"Pharmacokinetics by Pharmacy - Vancomycin    Mona Perales is a 67 y.o. female  [Ht: 157.5 cm (62\"); Wt: 55.7 kg (122 lb 12.7 oz)]    Estimated Creatinine Clearance: 61.6 mL/min (by C-G formula based on Cr of 0.62).   Lab Results   Component Value Date    CREATININE 0.62 01/13/2018    CREATININE 0.57 01/12/2018    CREATININE 0.53 01/11/2018      Lab Results   Component Value Date    WBC 14.64 (H) 01/13/2018    WBC 22.18 (H) 01/12/2018    WBC 25.07 (H) 01/11/2018      Temp Readings from Last 1 Encounters:   01/13/18 97 °F (36.1 °C) (Oral)      Lab Results   Component Value Date    VANCOPEAK 16.64 (L) 01/12/2018    VANCOTROUGH 9.44 (L) 01/13/2018         Culture Results:  Microbiology Results (last 10 days)       Procedure Component Value - Date/Time      Blood Culture - Blood, [[id number]]  (Normal) Collected:  01/09/18 2258    Lab Status:  Preliminary result Specimen:  Blood from Arm, Left Updated:  01/12/18 2331     Blood Culture No growth at 3 days    Blood Culture - Blood, [[id number]]  (Normal) Collected:  01/09/18 1646    Lab Status:  Preliminary result Specimen:  Blood from Blood, Arterial Line Updated:  01/12/18 2246     Blood Culture No growth at 3 days               Indication for use: pneumonia    Current Vancomycin Dose:  750 mg IVPB every 12 hours, day 3 of therapy.      Assessment/Plan:  Adjust vancomycin to 1000 IVPB every 12 hours.  Estimated , trough 13.  Reviewed labs above.  WBC 14.64.  SCr 0.62.  Concurrent antibiotics, zosyn.  Labs in progress, blood, resp, influenza.  Trough 1/15 @ 0415.  Pharmacy will continue to monitor renal function and adjust dose accordingly.      Surinder Oliver, [facility]   01/13/18 3:59 PM    "

## 2018-01-13 NOTE — NURSING NOTE
RN notified by lab of vancomycin trough failure to yield accurate result. Will repeat vancomycin peak and trough with this dose of antibiotics.

## 2018-01-13 NOTE — PROGRESS NOTES
[facility]  INPATIENT PROGRESS NOTE    Length of Stay: 4  Date of Admission: 1/9/2018  Primary Care Physician: DELFINO Smith    Subjective     Chief Complaint   Patient presents with   • Shortness of Breath       HPI:  Patient was seen and examined this morning. Patient feels much better , dyspnea cough significantly  improved and feels stronger, ambulating the hallways .   Patient is passing gas, Patient denies, headache or dizziness, chest pain or palpitations,abdominal pain N/V/D , blood in the urine or blood in the stool , or any urinary symptoms , weakness or numbness or tingling in the extremities or visual changes.    Review of Systems     All pertinent negatives and positives are as above. All other systems have been reviewed and are negative unless otherwise stated.   Objective    Physical exam    Temp:  [97 °F (36.1 °C)-98.5 °F (36.9 °C)] 97 °F (36.1 °C)  Heart Rate:  [] 77  Resp:  [16-22] 20  BP: (103-142)/(55-75) 136/65    -Constitutional: Patient is AAO x 3. Patient appears well-developed and well-nourished.   -CVS: Normal rate, regular rhythm, normal heart sounds and intact distal pulses.  Exam reveals no gallop and no friction rub.  No murmur heard.  -Pulm: mild b/l scattered wheezes   -Abdominal: Soft. Bowel sounds are normal. No distension, no tenderness. There is no rebound and no guarding. No hernia.   -Musculoskeletal: No Cyanosis clubbing or edema.    Results Review:    Laboratory Data:     Results from last 7 days  Lab Units 01/13/18  0338 01/12/18  0640 01/11/18  0605  01/09/18  1828   SODIUM mmol/L 141 141 143  < > 133*   POTASSIUM mmol/L 3.7 4.3 4.5  < > 4.3   CHLORIDE mmol/L 105 102 104  < > 100   CO2 mmol/L 27.0 29.0 28.0  < > 23.0   BUN mg/dL 14 16 16  < > 11   CREATININE mg/dL 0.62 0.57 0.53  < > 0.50   GLUCOSE mg/dL 114* 136* 151*  < > 101*   CALCIUM mg/dL 7.9* 9.3 8.8  < > 9.5   BILIRUBIN mg/dL  --   --    --   --  0.4   ALK PHOS U/L  --   --   --   --  92   ALT (SGPT) U/L  --   --   --   --  31   AST (SGOT) U/L  --   --   --   --  23   ANION GAP mmol/L 9.0 10.0 11.0  < > 10.0   < > = values in this interval not displayed.  Estimated Creatinine Clearance: 61.6 mL/min (by C-G formula based on Cr of 0.62).            Results from last 7 days  Lab Units 01/13/18  0338 01/12/18  0640 01/11/18  0605 01/10/18  1225 01/09/18  1646   WBC 10*3/mm3 14.64* 22.18* 25.07* 18.77* 11.97*   HEMOGLOBIN g/dL 10.1* 11.2* 10.7* 11.2* 12.2   HEMATOCRIT % 30.2* 33.1* 32.5* 31.9* 35.4   PLATELETS 10*3/mm3 314 377 340 250 321       Results from last 7 days  Lab Units 01/09/18  1646   INR  0.95       Culture Data:   No results found for: BLOODCX  No results found for: URINECX  No results found for: RESPCX  No results found for: WOUNDCX  No results found for: STOOLCX  No components found for: BODYFLD    Micobiology  Blood Culture   Date Value Ref Range Status   01/09/2018 No growth at less than 24 hours  Preliminary                            Radiology Data:   Imaging Results (all)     Procedure Component Value Units Date/Time    XR Chest 2 View [[id number]] Collected:  01/09/18 1554     Updated:  01/09/18 1610    Narrative:         EXAM:          Radiograph(s), Chest   VIEWS:    PA / Lat ; 2       DATE/TIME:  1/9/2018 4:07 PM CST                INDICATION:   shortness of breath    COMPARISON:  CXR: 4/6/16             FINDINGS:             - lines/tubes:    none     - cardiac:         size within normal limits         - mediastinum: contour within normal limits         - lungs:         Linearly oriented airspace disease of the right  upper lobe. The remainder of the lungs are clear. Left lung  granuloma noted.             - pleura:         no evidence of  fluid                  - osseous:         Status post left shoulder repair.                   - misc.:         Impression:       CONCLUSION:        1. Right upper lobe airspace disease  presumably representing a  focus of pneumonia.                                               Electronically signed by:  KRYSTEN Cast MD  1/9/2018 4:09 PM  CST Workstation: [id number]    IR PICC wo fluoro guidance [[id number]] Resulted:  01/09/18 1654     Updated:  01/09/18 1654    Narrative:       This procedure was auto-finalized with no dictation required.    US Guided Vascular Access [[id number]] Resulted:  01/09/18 1716     Updated:  01/09/18 1716    Narrative:       This procedure was auto-finalized with no dictation required.    XR Chest Post CVA Port [[id number]] Collected:  01/09/18 1655     Updated:  01/09/18 1719    Narrative:         EXAM:         Radiograph(s), Chest   VIEWS:   Portable ; 1       DATE/TIME:  1/9/2018 5:15 PM CST                INDICATION:   PICC placement    COMPARISON:  CXR: 4/7/16             FINDINGS:             - lines/tubes:    Right approach PICC line in standard position.      - cardiac:         size within normal limits         - mediastinum: contour within normal limits         - lungs:         Focus of airspace disease in the right lung,  probably lower lobe.             - pleura:         no evidence of  fluid                  - osseous:         Status post left shoulder replacement.                   - misc.:         Impression:       CONCLUSION:        1. Right approach PICC line in standard position.  2. Suspect focal airspace disease in the right lower lobe.                                                Electronically signed by:  KRYSTEN Cast MD  1/9/2018 5:18 PM  CST Workstation: [id number]          I have reviewed the patient current medications.   Assessment/Plan     Hospital Problem List     * (Principal)Dysphagia    Overview Signed 1/12/2018  1:12 PM by Bin Oh MD     Added automatically from request for surgery [id number]         Pneumonia of right upper lobe due to infectious organism          Assessment / Plan    --Pneumonia  failed outpatient  therapy.  Symptoms improving, dyspnea improved . wheezing continues   WBC improving :22.2--14.6  continue IV  Antibiotics(vanc and zosyn) and  supplemental oxygen, solumedrol, inceptive tara,    Will increase solumedrol dose .    --dysphagia   proximal esophageal dilatation on CTA  CTA:Dilated proximal esophagus .Barium swallow done.   Dr Oh consulted . EGD plan for wednesday , as yesterday was unable to perform EGD due to pulmonary function .  Will continue soft blend GI diet . Plan for scope on Wednesday    --COPD exacerbation:   Continue supplemental oxygen, bronchodilators and steroids.       --hx of PE  eliquis     --Tobacco use  nicotine patch    --Hypertension:   Controlled  Continue home medications     --GI prophylaxis/DVT prophylaxi     This document has been electronically signed by Conner Ingram MD on January 13, 2018 11:41 AM

## 2018-01-13 NOTE — PLAN OF CARE
Problem: Patient Care Overview (Adult)  Goal: Plan of Care Review  Outcome: Ongoing (interventions implemented as appropriate)   01/13/18 1113   Coping/Psychosocial Response Interventions   Plan Of Care Reviewed With patient   Outcome Evaluation   Outcome Summary/Follow up Plan Pt met all goals this tx. Pt ambulated 600ft with conditional independence and scored 27/28 on Tinetti test. Pt verbalized and demonstrated understanding of home safety and HEP. Pt may continue to benefit from skilled therapy services for continued strengthening with HHPT upon D/C     Goal: Discharge Needs Assessment  Outcome: Ongoing (interventions implemented as appropriate)   01/10/18 1423   Living Environment   Transportation Available car   Self-Care   Equipment Currently Used at Home nebulizer;cane, straight;shower chair;walker, rolling       Problem: Inpatient Physical Therapy  Goal: Transfer Training Goal 1 STG- PT  Outcome: Outcome(s) achieved Date Met: 01/13/18   01/10/18 1512 01/11/18 0911 01/13/18 1113   Transfer Training PT STG   Transfer Training PT STG, Date Established --  01/11/18 --    Transfer Training PT STG, Time to Achieve 5 days --  --    Transfer Training PT STG, Activity Type bed to chair /chair to bed;sit to stand/stand to sit;toilet --  --    Transfer Training PT STG, [place] Level independent --  --    Transfer Training PT STG, Date Goal Reviewed --  --  01/13/18   Transfer Training PT STG, Outcome --  --  goal met     Goal: Gait Training Goal STG- PT  Outcome: Outcome(s) achieved Date Met: 01/13/18   01/10/18 1512 01/13/18 1113   Gait Training PT STG   Gait Training Goal PT STG, Date Established 01/10/18 --    Gait Training Goal PT STG, Time to Achieve 5 days --    Gait Training Goal PT STG, [place] Level conditional independence --    Gait Training Goal PT STG, Assist Device cane, straight --    Gait Training Goal PT STG, Distance to Achieve 600 ft --    Gait Training Goal PT STG, Date Goal Reviewed --   01/13/18   Gait Training Goal PT STG, Outcome --  goal met

## 2018-01-13 NOTE — PLAN OF CARE
Problem: Patient Care Overview (Adult)  Goal: Plan of Care Review  Outcome: Ongoing (interventions implemented as appropriate)   01/13/18 0633   Coping/Psychosocial Response Interventions   Plan Of Care Reviewed With patient   Patient Care Overview   Progress no change   Outcome Evaluation   Outcome Summary/Follow up Plan Pt has rested most of this shift. VSS. No new issues.

## 2018-01-14 LAB
ANION GAP SERPL CALCULATED.3IONS-SCNC: 10 MMOL/L (ref 5–15)
BACTERIA SPEC AEROBE CULT: NORMAL
BACTERIA SPEC AEROBE CULT: NORMAL
BASOPHILS # BLD AUTO: 0 10*3/MM3 (ref 0–0.2)
BASOPHILS NFR BLD AUTO: 0 % (ref 0–2)
BUN BLD-MCNC: 19 MG/DL (ref 7–21)
BUN/CREAT SERPL: 29.7 (ref 7–25)
CALCIUM SPEC-SCNC: 9 MG/DL (ref 8.4–10.2)
CHLORIDE SERPL-SCNC: 98 MMOL/L (ref 95–110)
CO2 SERPL-SCNC: 30 MMOL/L (ref 22–31)
CREAT BLD-MCNC: 0.64 MG/DL (ref 0.5–1)
DEPRECATED RDW RBC AUTO: 44.8 FL (ref 36.4–46.3)
EOSINOPHIL # BLD AUTO: 0 10*3/MM3 (ref 0–0.7)
EOSINOPHIL NFR BLD AUTO: 0 % (ref 0–7)
ERYTHROCYTE [DISTWIDTH] IN BLOOD BY AUTOMATED COUNT: 13.3 % (ref 11.5–14.5)
GFR SERPL CREATININE-BSD FRML MDRD: 93 ML/MIN/1.73 (ref 45–104)
GLUCOSE BLD-MCNC: 136 MG/DL (ref 60–100)
HCT VFR BLD AUTO: 33 % (ref 35–45)
HGB BLD-MCNC: 10.9 G/DL (ref 12–15.5)
IMM GRANULOCYTES # BLD: 0.1 10*3/MM3 (ref 0–0.02)
IMM GRANULOCYTES NFR BLD: 0.8 % (ref 0–0.5)
LYMPHOCYTES # BLD AUTO: 1.48 10*3/MM3 (ref 0.6–4.2)
LYMPHOCYTES NFR BLD AUTO: 11.6 % (ref 10–50)
MCH RBC QN AUTO: 30.6 PG (ref 26.5–34)
MCHC RBC AUTO-ENTMCNC: 33 G/DL (ref 31.4–36)
MCV RBC AUTO: 92.7 FL (ref 80–98)
MONOCYTES # BLD AUTO: 1.05 10*3/MM3 (ref 0–0.9)
MONOCYTES NFR BLD AUTO: 8.2 % (ref 0–12)
NEUTROPHILS # BLD AUTO: 10.16 10*3/MM3 (ref 2–8.6)
NEUTROPHILS NFR BLD AUTO: 79.4 % (ref 37–80)
PLATELET # BLD AUTO: 341 10*3/MM3 (ref 150–450)
PMV BLD AUTO: 9 FL (ref 8–12)
POTASSIUM BLD-SCNC: 4.3 MMOL/L (ref 3.5–5.1)
RBC # BLD AUTO: 3.56 10*6/MM3 (ref 3.77–5.16)
SODIUM BLD-SCNC: 138 MMOL/L (ref 137–145)
WBC NRBC COR # BLD: 12.79 10*3/MM3 (ref 3.2–9.8)

## 2018-01-14 PROCEDURE — 25010000002 VANCOMYCIN PER 500 MG: Performed by: INTERNAL MEDICINE

## 2018-01-14 PROCEDURE — 97535 SELF CARE MNGMENT TRAINING: CPT

## 2018-01-14 PROCEDURE — 94760 N-INVAS EAR/PLS OXIMETRY 1: CPT

## 2018-01-14 PROCEDURE — 80048 BASIC METABOLIC PNL TOTAL CA: CPT | Performed by: INTERNAL MEDICINE

## 2018-01-14 PROCEDURE — 97530 THERAPEUTIC ACTIVITIES: CPT

## 2018-01-14 PROCEDURE — 85025 COMPLETE CBC W/AUTO DIFF WBC: CPT | Performed by: INTERNAL MEDICINE

## 2018-01-14 PROCEDURE — 25010000002 METHYLPREDNISOLONE PER 125 MG: Performed by: FAMILY MEDICINE

## 2018-01-14 PROCEDURE — 94799 UNLISTED PULMONARY SVC/PX: CPT

## 2018-01-14 PROCEDURE — 25010000002 PIPERACILLIN SOD-TAZOBACTAM PER 1 G: Performed by: FAMILY MEDICINE

## 2018-01-14 RX ORDER — METHYLPREDNISOLONE SODIUM SUCCINATE 125 MG/2ML
60 INJECTION, POWDER, LYOPHILIZED, FOR SOLUTION INTRAMUSCULAR; INTRAVENOUS EVERY 8 HOURS
Status: DISCONTINUED | OUTPATIENT
Start: 2018-01-14 | End: 2018-01-15

## 2018-01-14 RX ORDER — LORAZEPAM 0.5 MG/1
0.5 TABLET ORAL EVERY 12 HOURS PRN
Status: DISCONTINUED | OUTPATIENT
Start: 2018-01-14 | End: 2018-01-17 | Stop reason: HOSPADM

## 2018-01-14 RX ADMIN — IPRATROPIUM BROMIDE AND ALBUTEROL SULFATE 3 ML: 2.5; .5 SOLUTION RESPIRATORY (INHALATION) at 08:46

## 2018-01-14 RX ADMIN — TAZOBACTAM SODIUM AND PIPERACILLIN SODIUM 3.38 G: 375; 3 INJECTION, SOLUTION INTRAVENOUS at 14:47

## 2018-01-14 RX ADMIN — APIXABAN 5 MG: 5 TABLET, FILM COATED ORAL at 20:57

## 2018-01-14 RX ADMIN — BUTALBITAL, ACETAMINOPHEN, AND CAFFEINE 1 TABLET: 50; 325; 40 TABLET ORAL at 06:06

## 2018-01-14 RX ADMIN — TRAMADOL HYDROCHLORIDE 50 MG: 50 TABLET, FILM COATED ORAL at 09:50

## 2018-01-14 RX ADMIN — GABAPENTIN 800 MG: 400 CAPSULE ORAL at 22:00

## 2018-01-14 RX ADMIN — BUDESONIDE AND FORMOTEROL FUMARATE DIHYDRATE 2 PUFF: 160; 4.5 AEROSOL RESPIRATORY (INHALATION) at 08:57

## 2018-01-14 RX ADMIN — IPRATROPIUM BROMIDE AND ALBUTEROL SULFATE 3 ML: 2.5; .5 SOLUTION RESPIRATORY (INHALATION) at 16:39

## 2018-01-14 RX ADMIN — LORAZEPAM 0.5 MG: 0.5 TABLET ORAL at 12:02

## 2018-01-14 RX ADMIN — NICOTINE 1 PATCH: 21 PATCH TRANSDERMAL at 20:58

## 2018-01-14 RX ADMIN — METHYLPREDNISOLONE SODIUM SUCCINATE 60 MG: 125 INJECTION, POWDER, FOR SOLUTION INTRAMUSCULAR; INTRAVENOUS at 17:42

## 2018-01-14 RX ADMIN — IPRATROPIUM BROMIDE AND ALBUTEROL SULFATE 3 ML: 2.5; .5 SOLUTION RESPIRATORY (INHALATION) at 19:53

## 2018-01-14 RX ADMIN — ATORVASTATIN CALCIUM 10 MG: 10 TABLET, FILM COATED ORAL at 09:50

## 2018-01-14 RX ADMIN — PANTOPRAZOLE SODIUM 40 MG: 40 INJECTION, POWDER, FOR SOLUTION INTRAVENOUS at 06:05

## 2018-01-14 RX ADMIN — METHYLPREDNISOLONE SODIUM SUCCINATE 60 MG: 125 INJECTION, POWDER, FOR SOLUTION INTRAMUSCULAR; INTRAVENOUS at 09:50

## 2018-01-14 RX ADMIN — BUDESONIDE AND FORMOTEROL FUMARATE DIHYDRATE 2 PUFF: 160; 4.5 AEROSOL RESPIRATORY (INHALATION) at 19:54

## 2018-01-14 RX ADMIN — SERTRALINE HYDROCHLORIDE 100 MG: 50 TABLET ORAL at 09:50

## 2018-01-14 RX ADMIN — APIXABAN 5 MG: 5 TABLET, FILM COATED ORAL at 09:50

## 2018-01-14 RX ADMIN — BUTALBITAL, ACETAMINOPHEN, AND CAFFEINE 1 TABLET: 50; 325; 40 TABLET ORAL at 20:57

## 2018-01-14 RX ADMIN — TRAZODONE HYDROCHLORIDE 300 MG: 150 TABLET ORAL at 20:57

## 2018-01-14 RX ADMIN — VANCOMYCIN HYDROCHLORIDE 1000 MG: 1 INJECTION, POWDER, LYOPHILIZED, FOR SOLUTION INTRAVENOUS at 04:00

## 2018-01-14 RX ADMIN — TAZOBACTAM SODIUM AND PIPERACILLIN SODIUM 3.38 G: 375; 3 INJECTION, SOLUTION INTRAVENOUS at 23:30

## 2018-01-14 RX ADMIN — GABAPENTIN 800 MG: 400 CAPSULE ORAL at 06:06

## 2018-01-14 RX ADMIN — VANCOMYCIN HYDROCHLORIDE 1000 MG: 1 INJECTION, POWDER, LYOPHILIZED, FOR SOLUTION INTRAVENOUS at 14:47

## 2018-01-14 RX ADMIN — AMLODIPINE BESYLATE 10 MG: 10 TABLET ORAL at 09:50

## 2018-01-14 RX ADMIN — BUTALBITAL, ACETAMINOPHEN, AND CAFFEINE 1 TABLET: 50; 325; 40 TABLET ORAL at 14:47

## 2018-01-14 RX ADMIN — SODIUM CHLORIDE 75 ML/HR: 9 INJECTION, SOLUTION INTRAVENOUS at 14:47

## 2018-01-14 RX ADMIN — TAZOBACTAM SODIUM AND PIPERACILLIN SODIUM 3.38 G: 375; 3 INJECTION, SOLUTION INTRAVENOUS at 09:49

## 2018-01-14 RX ADMIN — GABAPENTIN 800 MG: 400 CAPSULE ORAL at 14:48

## 2018-01-14 RX ADMIN — METHYLPREDNISOLONE SODIUM SUCCINATE 60 MG: 125 INJECTION, POWDER, FOR SOLUTION INTRAMUSCULAR; INTRAVENOUS at 04:00

## 2018-01-14 NOTE — PLAN OF CARE
Problem: Patient Care Overview (Adult)  Goal: Plan of Care Review  Outcome: Ongoing (interventions implemented as appropriate)   01/14/18 0259   Coping/Psychosocial Response Interventions   Plan Of Care Reviewed With patient   Patient Care Overview   Progress no change   Outcome Evaluation   Outcome Summary/Follow up Plan Pt has rested most of this shift. VSS. Will continue to monitor.

## 2018-01-14 NOTE — PLAN OF CARE
Problem: Patient Care Overview (Adult)  Goal: Plan of Care Review  Outcome: Ongoing (interventions implemented as appropriate)   01/14/18 1342   Coping/Psychosocial Response Interventions   Plan Of Care Reviewed With patient   Patient Care Overview   Progress no change   Outcome Evaluation   Outcome Summary/Follow up Plan Pt Indep with bed-toilet t/f's. Pt completed B UE exercises to increase strength and endurance.Pt is progressing well toware OT goals.       Problem: Inpatient Occupational Therapy  Goal: Transfer Training Goal 1 LTG- OT  Outcome: Ongoing (interventions implemented as appropriate)   01/10/18 1451 01/12/18 1153 01/14/18 1342   Transfer Training OT LTG   Transfer Training OT LTG, Date Established 01/10/18 --  --    Transfer Training OT LTG, Time to Achieve by discharge --  --    Transfer Training OT LTG, Activity Type all transfers --  --    Transfer Training OT LTG, [place] Level independent --  --    Transfer Training OT LTG, Date Goal Reviewed --  --  01/14/18   Transfer Training OT LTG, Outcome --  goal ongoing --      Goal: Strength Goal LTG- OT  Outcome: Ongoing (interventions implemented as appropriate)   01/10/18 1451 01/14/18 1342   Strength OT LTG   Strength Goal OT LTG, Date Established 01/10/18 --    Strength Goal OT LTG, Time to Achieve by discharge --    Strength Goal OT LTG, Measure to Achieve Pt. will complete BUE strengthening exercises all planes and joints to increase BUE strength to 5/5 for ADLs.  --    Strength Goal OT LTG, Date Goal Reviewed --  01/14/18   Strength Goal OT LTG, Outcome --  goal ongoing     Goal: ADL Goal LTG- OT  Outcome: Ongoing (interventions implemented as appropriate)   01/10/18 1451 01/12/18 1153 01/14/18 1342   ADL OT LTG   ADL OT LTG, Date Established 01/10/18 --  --    ADL OT LTG, Activity Type ADL skills --  --    ADL OT LTG, [place] Level independent --  --    ADL OT LTG, Date Goal Reviewed --  --  01/14/18   ADL OT LTG, Outcome --  goal  ongoing --

## 2018-01-14 NOTE — THERAPY TREATMENT NOTE
Acute Care - Occupational Therapy Treatment Note  [facility]     Patient Name: Mona Perales  : 1950  MRN: [id number]  Today's Date: 2018  Onset of Illness/Injury or Date of Surgery Date: 18  Date of Referral to OT: 01/10/18  Referring Physician: Dr. Ingram       Admit Date: 2018    Visit Dx:     ICD-10-CM ICD-9-CM   1. Pneumonia of right upper lobe due to infectious organism J18.1 486   2. Hypoxia R09.02 799.02   3. Decreased activities of daily living (ADL) Z78.9 V49.89   4. Impaired functional mobility, balance, gait, and endurance Z74.09 V49.89   5. Dysphagia, unspecified type R13.10 787.20     Patient Active Problem List   Diagnosis   • Anxiety   • CAD (coronary atherosclerotic disease)   • Carotid stenosis   • COPD (chronic obstructive pulmonary disease)   • COPD with exacerbation   • Depressive disorder, not elsewhere classified   • Benign essential hypertension   • Hypercholesteremia   • Insomnia   • Low back pain   • Osteoporosis   • Other screening mammogram   • RUQ abdominal pain   • Peripheral arterial disease   • Dizziness   • Nicotine abuse   • Pneumonia of right upper lobe due to infectious organism   • Dysphagia             Adult Rehabilitation Note       18 0635 18 1026 18 0900    Rehab Assessment/Intervention    Discipline occupational therapy assistant  -BB physical therapy assistant  -CH occupational therapist  -NN    Document Type therapy note (daily note)  -BB therapy note (daily note)  -CH therapy note (daily note)  -NN    Subjective Information agree to therapy  -BB agree to therapy;complains of;pain  -CH agree to therapy;complains of;weakness;fatigue  -NN    Patient Effort, Rehab Treatment good  -BB good  -CH     Symptoms Noted During/After Treatment none  -BB      Precautions/Limitations fall precautions;oxygen therapy device and L/min  -BB fall precautions;oxygen therapy device and L/min  -CH fall precautions;oxygen therapy device and  L/min  -NN    Recorded by [BB] CHITO Gilbert/ARMANDO [CH] Aurora Rangel PTA [NN] An Meyer, OTR/L    Vital Signs    Pre Systolic BP Rehab 126  -  -CH     Pre Treatment Diastolic BP 73  -BB 76  -CH     Post Systolic BP Rehab  145  -CH     Post Treatment Diastolic BP  78  -CH     Pretreatment Heart Rate (beats/min) 76  -BB 73  -CH     Intratreatment Heart Rate (beats/min)  82  -CH     Posttreatment Heart Rate (beats/min) 78  -BB 78  -CH     Pre SpO2 (%) 97  -BB 96  -CH     O2 Delivery Pre Treatment supplemental O2  -BB supplemental O2  -CH     Intra SpO2 (%)  97  -CH     O2 Delivery Intra Treatment  supplemental O2  -CH     Post SpO2 (%) 96  -BB 98  -CH     O2 Delivery Post Treatment supplemental O2  -BB supplemental O2  -CH     Pre Patient Position Sitting  -BB Sitting  -CH     Intra Patient Position  Standing  -CH     Post Patient Position Sitting  -BB Sitting  -CH     Recorded by [BB] CHITO Gilbert/ARMANDO [CH] Aurora Rangel PTA     Pain Assessment    Pain Assessment 0-10  -BB 0-10  -CH     Pain Score 6  -BB 8  -CH     Post Pain Score 6  -BB      Pain Type Chronic pain  -BB Chronic pain  -CH     Pain Location Back  -BB Back  -CH     Pain Orientation Lower   neck also hurts  -BB Lower  -CH     Pain Intervention(s) Medication (See MAR)  -BB Medication (See MAR);Ambulation/increased activity  -CH     Recorded by [BB] CHITO Gilbert/ARMANDO [CH] Aurora Rangel PTA     Vision Assessment/Intervention    Visual Impairment  WFL with corrective lenses  -CH     Recorded by  [CH] Aurora Rangel PTA     Cognitive Assessment/Intervention    Current Cognitive/Communication Assessment functional  -BB functional  -CH functional  -NN    Orientation Status oriented x 4  -BB oriented x 4  -CH oriented x 4  -NN    Follows Commands/Answers Questions 100% of the time  -% of the time  -% of the time;able to follow multi-step instructions  -NN    Personal Safety --   discussed home  safety, E/C, E/S, breathing technique,posture  -BB WNL/WFL  -CH WNL/WFL  -NN    Personal Safety Interventions fall prevention program maintained;gait belt;nonskid shoes/slippers when out of bed;muscle strengthening facilitated;supervised activity  -BB fall prevention program maintained;gait belt;muscle strengthening facilitated;nonskid shoes/slippers when out of bed;supervised activity  -CH fall prevention program maintained;muscle strengthening facilitated;nonskid shoes/slippers when out of bed;supervised activity  -NN    Short/Long Term Memory intact short term memory;intact long term memory  -BB      Recorded by [BB] Mirna Herrera, DALE/L [CH] Aurora Rangel, PTA [NN] An Meyer, OTR/L    Bed Mobility, Assessment/Treatment    Bed Mobility, Scoot/Bridge, [place]  not tested  -CH independent  -NN    Bed Mob, Supine to Sit, [place] independent  -BB not tested  -CH independent  -NN    Bed Mob, Sit to Supine, [place] independent  -BB not tested  -CH independent  -NN    Recorded by [BB] Mirna Herrera, DALE/L [CH] Aurora Rangel, PTA [NN] An Meyer, OTR/L    Transfer Assessment/Treatment    Transfers, Sit-Stand [place] independent  -BB independent  -CH independent  -NN    Transfers, Stand-Sit [place] independent  -BB independent  -CH independent  -NN    Transfers, Sit-Stand-Sit, Assist Device --   none  -BB      Toilet Transfer, [place] independent  -BB      Toilet Transfer, Assistive Device other (see comments)   none  -BB      Walk-In Shower Transfer, [place]   supervision required  -NN    Walk-In Shower Transfer, Assist Device   --   grab bar  -NN    Transfer, Impairments   impaired balance;strength decreased  -NN    Recorded by [BB] Mirna Herrera, DALE/L [CH] Aurora Rangel, PTA [NN] An Meyer, OTR/L    Gait Assessment/Treatment    Gait, [place] Level  conditional independence  -CH     Gait, Assistive Device  other (see comments)   none   -CH     Gait, Distance (Feet)  600  -CH     Gait, Safety Issues  supplemental O2  -CH     Gait, Comment  No LOB  -CH     Recorded by  [CH] Aurora Rangel PTA     Stairs Assessment/Treatment    Stairs, [place] Level  not tested  -CH     Recorded by  [CH] Aurora Rangel PTA     Functional Mobility    Functional Mobility- Ind. Level   supervision required  -NN    Functional Mobility-Distance (Feet)   --   in room in shower  -NN    Functional Mobility- Safety Issues   step length decreased;supplemental O2  -NN    Recorded by   [NN] An Meyer, OTR/L    Upper Body Bathing Assessment/Training    UB Bathing Assess/Train, Position   standing  -NN    UB Bathing Assess/Train, [place] Level   minimum assist (75% patient effort)  -NN    UB Bathing Assess/Train, Impairments   strength decreased  -NN    Recorded by   [NN] An Meyer, OTR/L    Lower Body Bathing Assessment/Training    LB Bathing Assess/Train, Position   standing  -NN    LB Bathing Assess/Train, [place] Level   minimum assist (75% patient effort)  -NN    LB Bathing Assess/Train, Impairments   decreased flexibility;strength decreased  -NN    Recorded by   [NN] An Meyer, OTR/L    Upper Body Dressing Assessment/Training    UB Dressing Assess/Train, Clothing Type   doffing:;donning:;hospital gown  -NN    UB Dressing Assess/Train, Position   sitting  -NN    UB Dressing Assess/Train, [place]   minimum assist (75% patient effort)  -NN    UB Dressing Assess/Train, Comment   Min to manage wires  -NN    Recorded by   [NN] An Meyer, OTR/L    Lower Body Dressing Assessment/Training    LB Dressing Assess/Train, Clothing Type donning:;doffing:;socks  -BB  donning:;doffing:;socks  -NN    LB Dressing Assess/Train, Position edge of bed  -BB  edge of bed  -NN    LB Dressing Assess/Train, [place] independent  -BB  independent  -NN    Recorded by [BB] CHITO Gilbert/L  [NN] An Meyer, OTR/L    Toileting  Assessment/Training    Toileting Assess/Train, Assistive Device grab bars  -BB      Toileting Assess/Train, Position sitting  -BB      Toileting Assess/Train, Indepen Level independent  -BB      Recorded by [BB] CHITO Gilbert/L      Grooming Assessment/Training    Grooming Assess/Train, Assistive Device   --   brush hair  -NN    Grooming Assess/Train, Position sitting  -BB  sitting  -NN    Grooming Assess/Train, Indepen Level independent  -BB  independent  -NN    Grooming Assess/Train, Comment washed hands, face, combed hair, oral care  -BB      Recorded by [BB] CHITO Gilbert/ARMANDO  [NN] An Meyer, OTR/L    Therapy Exercises    Bilateral Lower Extremities  AROM:;15 reps;standing;glut sets;heel raises;hip abduction/adduction;hip flexion;mini squats  -CH     Bilateral Upper Extremity AROM:;20 reps;sitting;elbow flexion/extension;shoulder extension/flexion;hand pumps;pronation/supination;shoulder rolls/shrugs   wrist flesxion/extension 2 sets all exercises  -BB      BUE Resistance manual resistance- minimal  -BB      Recorded by [BB] CHITO Gilbert/ARMANDO [CH] Aurora Rangel PTA     Positioning and Restraints    Pre-Treatment Position in bed  -BB in bed  -CH in bed  -NN    Post Treatment Position bed  -BB bed  -CH bed  -NN    In Bed sitting;call light within reach;encouraged to call for assist;exit alarm on  -BB sitting EOB;call light within reach;encouraged to call for assist  - fowlers;call light within reach;encouraged to call for assist;side rails up x2  -NN    Recorded by [BB] CHITO Gilbert/ARMANDO [CH] Aurora Rangel PTA [NN] An Meyer, OTR/L      01/12/18 0930 01/12/18 0620       Rehab Assessment/Intervention    Discipline physical therapy assistant  -BRENDAN occupational therapy assistant  -LW     Document Type therapy note (daily note)  -BRENDAN therapy note (daily note)  -LW     Subjective Information complains of;fatigue  -BRENDAN agree to therapy  -LW     Patient Effort,  Rehab Treatment good  -BRENDAN good  -LW     Precautions/Limitations  fall precautions  -LW     Recorded by [BRENDAN] Navya Morales PTA [LW] TAYLOR SolanoA/L     Vital Signs    Pre Systolic BP Rehab 127  -BRENDAN 126  -LW     Pre Treatment Diastolic BP 74  -BRENDAN 66  -LW     Post Systolic BP Rehab 134  -BRENDAN      Post Treatment Diastolic BP 80  -BRENDAN      Pretreatment Heart Rate (beats/min) 79  -BRENDAN 73  -LW     Posttreatment Heart Rate (beats/min) 87  -BRENDAN      Pretreatment Resp Rate (breaths/min)  73  -LW     Pre SpO2 (%) 96  -BRENDAN 99  -LW     O2 Delivery Pre Treatment supplemental O2  -BRENDAN supplemental O2  -LW     Post SpO2 (%) 93  -BRENDAN 98  -LW     O2 Delivery Post Treatment supplemental O2  -BRENDAN supplemental O2  -LW     Pre Patient Position Supine  -BRENDAN Supine  -LW     Intra Patient Position Sitting  -BRENDAN      Post Patient Position Supine  -BRENDAN Sitting  -LW     Recorded by [BRENDAN] Navya Morales PTA [LW] TAYLOR SolanoA/L     Pain Assessment    Pain Assessment 0-10  -BRENDAN 0-10  -LW     Pain Score 7  -BRENDAN 7  -LW     Post Pain Score 8  -BRENDAN 7  -LW     Pain Type Acute pain  -BRENDAN Acute pain  -LW     Pain Location Back  -BRENDAN Back  -LW     Pain Descriptors  Aching  -LW     Pain Frequency  Constant/continuous  -LW     Pain Intervention(s)  Medication (See MAR)  -LW     Recorded by [BRENDAN] Navya Morales PTA [LW] TAYLOR SolanoA/L     Vision Assessment/Intervention    Visual Impairment WNL;WFL  -BRENDAN WNL;WFL  -LW     Recorded by [BRENDAN] Navya Morales PTA [LW] TAYLOR SolanoA/L     Cognitive Assessment/Intervention    Current Cognitive/Communication Assessment functional  -BRENDAN functional  -LW     Orientation Status oriented x 4  -BRENDAN oriented x 4  -LW     Follows Commands/Answers Questions 100% of the time  -BRENDAN 100% of the time  -LW     Personal Safety WNL/WFL  -BRENDAN WNL/WFL  -LW     Personal Safety Interventions gait belt;supervised activity;nonskid shoes/slippers when out of bed;fall prevention program maintained  -BRENDAN gait belt;nonskid  shoes/slippers when out of bed  -LW     Short/Long Term Memory intact short term memory;intact long term memory  -      Recorded by [BRENDAN] Navya Morales PTA [LW] Nuzhat Rodrigues, DALE/L     Bed Mobility, Assessment/Treatment    Bed Mobility, Assistive Device bed rails;head of bed elevated  -BRENDAN      Bed Mobility, Roll Left, [place] independent  -BRENDAN      Bed Mobility, Roll Right, [place] not tested  -BRENDAN      Bed Mobility, Scoot/Bridge, [place] independent  -BRENDAN      Bed Mob, Supine to Sit, [place] independent  -BRENDAN conditional independence  -LW     Bed Mob, Sit to Supine, [place] independent  -BRENDAN conditional independence  -LW     Recorded by [BRENDAN] Navya Morales PTA [LW] TAYLOR SolanoA/L     Transfer Assessment/Treatment    Transfers, Sit-Stand [place] independent  -BRENDAN independent  -LW     Transfers, Stand-Sit [place] independent  -BRENDAN independent  -LW     Transfers, Sit-Stand-Sit, Assist Device other (see comments)   none  -BRENDAN bed rails  -LW     Toilet Transfer, [place]  independent   none  -LW     Toilet Transfer, Assistive Device  other (see comments)   none  -LW     Recorded by [BRENDAN] Navya Morales PTA [LW] Nuzhat Rodrigues, DALE/L     Gait Assessment/Treatment    Gait, [place] Level stand by assist;contact guard assist  -      Gait, Assistive Device other (see comments)  -      Gait, Distance (Feet) 340  -      Gait, Comment pt had one LOB when her name was called by someone in the moura, she was able to self recover  -BRENDAN      Recorded by [BRENDAN] Navya Morales PTA      Toileting Assessment/Training    Toileting Assess/Train, Assistive Device  grab bars  -     Toileting Assess/Train, Position  sitting  -LW     Toileting Assess/Train, Indepen Level  independent  -LW     Recorded by  [LW] Nuzhat Rodrigues DALE/L     Grooming Assessment/Training    Grooming Assess/Train, Position  sitting  -LW     Grooming Assess/Train, Indepen Level  set up required;conditional  independence  -LW     Grooming Assess/Train, Comment  combed hair, washed face and hands, oral care  -LW     Recorded by  [LW] TAYLOR SolanoA/L     Therapy Exercises    Bilateral Lower Extremities AROM:;20 reps;sitting;ankle pumps/circles;glut sets;heel slides;hip abduction/adduction;hip flexion;LAQ  -BRENDAN      Bilateral Upper Extremity  AROM:;sitting;elbow flexion/extension;shoulder extension/flexion;shoulder ER/IR;shoulder protraction/retraction  -LW     BUE Resistance  manual resistance- minimal   20  -LW     Recorded by [BRENDAN] Navya Morales PTA [LW] TAYLOR SolanoA/L     Positioning and Restraints    Pre-Treatment Position in bed  -BRENDAN in bed  -LW     Post Treatment Position bed  -BRENDAN bed  -LW     In Bed supine;call light within reach;encouraged to call for assist;exit alarm on;side rails up x2  -BRENDAN supine;call light within reach;encouraged to call for assist;exit alarm on;with family/caregiver  -LW     Recorded by [BRENDAN] Navya Morales PTA [LW] Nuzhat Rodrigues DALE/L       User Key  (r) = Recorded By, (t) = Taken By, (c) = Cosigned By    Initials Name Effective Dates     Navya Morales, PTA 10/17/16 -     CH Aurora Rangel, PTA 10/17/16 -     BB Mirna Herrera, DALE/L 10/17/16 -     LW Nuzhat Rodrigues DALE/L 10/17/16 -     NN An Meyer, OTR/L 11/08/17 -                 OT Goals       01/14/18 1342 01/12/18 1153 01/10/18 1451    Transfer Training OT LTG    Transfer Training OT LTG, Date Established   01/10/18  -NN    Transfer Training OT LTG, Time to Achieve   by discharge  -NN    Transfer Training OT LTG, Activity Type   all transfers  -NN    Transfer Training OT LTG, [place] Level   independent  -NN    Transfer Training OT LTG, Date Goal Reviewed 01/14/18  -BB 01/12/18  -LW     Transfer Training OT LTG, Outcome  goal ongoing  -LW     Strength OT LTG    Strength Goal OT LTG, Date Established   01/10/18  -NN    Strength Goal OT LTG, Time to Achieve   by discharge  -NN    Strength  Goal OT LTG, Measure to Achieve   Pt. will complete BUE strengthening exercises all planes and joints to increase BUE strength to 5/5 for ADLs.   -NN    Strength Goal OT LTG, Date Goal Reviewed 01/14/18  -BB 01/12/18  -LW     Strength Goal OT LTG, Outcome goal ongoing  -BB      ADL OT LTG    ADL OT LTG, Date Established   01/10/18  -NN    ADL OT LTG, Activity Type   ADL skills  -NN    ADL OT LTG, [place] Level   independent  -NN    ADL OT LTG, Date Goal Reviewed 01/14/18  -BB 01/12/18  -LW     ADL OT LTG, Outcome  goal ongoing  -LW       User Key  (r) = Recorded By, (t) = Taken By, (c) = Cosigned By    Initials Name Provider Type    BB Mirna Herrera, DALE/L Occupational Therapy Assistant    LW Nuzhat Rodrigues DALE/L Occupational Therapy Assistant    NN An Meyer, OTR/L Occupational Therapist          Occupational Therapy Education     Title: PT OT SLP Therapies (Active)     Topic: Occupational Therapy (Done)     Point: ADL training (Done)    Description: Instruct learner(s) on proper safety adaptation and remediation techniques during self care or transfers.   Instruct in proper use of assistive devices.    Learning Progress Summary    Learner Readiness Method Response Comment Documented by Status   Patient Acceptance E,TB VU,DU  BB 01/14/18 1341 Done    Acceptance E VU,NR Pt. educated on role of OT, safe t/f, benefit of activity, PLB, progression with poc  01/13/18 0943 Done    Acceptance E VU   01/12/18 1203 Done    Acceptance E NR Pt. educated on role of OT, safe bed mobility, benefit of OOB activity, progression with poc  01/11/18 1016 Active    Acceptance E VU,NR Pt. educated on role of OT, safe t/f, benefit of therapy, calling for assist, progression with poc  01/10/18 1451 Done               Point: Home exercise program (Done)    Description: Instruct learner(s) on appropriate technique for monitoring, assisting and/or progressing therapeutic exercises/activities.    Learning  Progress Summary    Learner Readiness Method Response Comment Documented by Status   Patient Acceptance E,TB VU,DU  BB 01/14/18 1341 Done    Acceptance E VU,NR Pt. educated on role of OT, safe t/f, benefit of activity, PLB, progression with poc NN 01/13/18 0943 Done    Acceptance E VU  LW 01/12/18 1203 Done    Acceptance E NR Pt. educated on role of OT, safe bed mobility, benefit of OOB activity, progression with poc NN 01/11/18 1016 Active    Acceptance E VU,NR Pt. educated on role of OT, safe t/f, benefit of therapy, calling for assist, progression with poc NN 01/10/18 1451 Done               Point: Body mechanics (Done)    Description: Instruct learner(s) on proper positioning and spine alignment during self-care, functional mobility activities and/or exercises.    Learning Progress Summary    Learner Readiness Method Response Comment Documented by Status   Patient Acceptance E,TB VU,DU  BB 01/14/18 1341 Done    Acceptance E VU,NR Pt. educated on role of OT, safe t/f, benefit of activity, PLB, progression with poc NN 01/13/18 0943 Done    Acceptance E VU  LW 01/12/18 1203 Done    Acceptance E NR Pt. educated on role of OT, safe bed mobility, benefit of OOB activity, progression with poc NN 01/11/18 1016 Active    Acceptance E VU,NR Pt. educated on role of OT, safe t/f, benefit of therapy, calling for assist, progression with poc NN 01/10/18 1451 Done                      User Key     Initials Effective Dates Name Provider Type Discipline     10/17/16 -  CHITO Gilbert/L Occupational Therapy Assistant OT     10/17/16 -  CHITO Solano/L Occupational Therapy Assistant OT     11/08/17 -  An Meyer OTJOSÉ ANTONIO/L Occupational Therapist OT                  OT Recommendation and Plan  Anticipated Discharge Disposition: home with assist, home with home health  Planned Therapy Interventions: activity intolerance, ADL retraining, balance training, bed mobility training, energy conservation, home  "exercise program, strengthening  Therapy Frequency:  (3-14x/week)  Plan of Care Review  Plan Of Care Reviewed With: patient  Progress: no change  Outcome Summary/Follow up Plan: Pt Indep with bed-toilet t/f's. Pt completed B UE exercises to increase strength and endurance.Pt is progressing well toware OT goals.        Outcome Measures       01/14/18 0635 01/13/18 1026 01/13/18 0900    How much help from another person do you currently need...    Turning from your back to your side while in flat bed without using bedrails?  4  -CH     Moving from lying on back to sitting on the side of a flat bed without bedrails?  4  -CH     Moving to and from a bed to a chair (including a wheelchair)?  4  -CH     Standing up from a chair using your arms (e.g., wheelchair, bedside chair)?  4  -CH     Climbing 3-5 steps with a railing?  3  -CH     To walk in hospital room?  4  -CH     AM-PAC 6 Clicks Score  23  -CH     How much help from another is currently needed...    Putting on and taking off regular lower body clothing? 4  -BB  3  -NN    Bathing (including washing, rinsing, and drying) 4  -BB  4  -NN    Toileting (which includes using toilet bed pan or urinal) 4  -BB  4  -NN    Putting on and taking off regular upper body clothing 4  -BB  4  -NN    Taking care of personal grooming (such as brushing teeth) 4  -BB  4  -NN    Eating meals 4  -BB  4  -NN    Score 24  -BB  23  -NN    Tinetti Assessment    Tinetti Assessment  yes  -CH     Sitting Balance  1  -CH     Arises  2  -CH     Attempts to Rise  2  -CH     Immediate Standing Balance (first 5 sec)  2  -CH     Standing Balance  2  -CH     Sternal Nudge (feet close together)  2  -CH     Eyes Closed (feet close together)  1  -CH     Turning 360 Degrees- Steps  0  -CH     Turning 360 Degrees- Steadiness  1  -CH     Sitting Down  2  -CH     Tinetti Balance Score  15  -CH     Gait Initiation (immediate after told \"go\")  1  -CH     Step Length- Right Swing  1  -CH     Step Length- Left " Swing  1  -CH     Foot Clearance- Right Foot  1  -CH     Foot Clearance- Left Foot  1  -CH     Step Symmetry  1  -CH     Step Continuity  1  -CH     Path (excursion)  2  -CH     Trunk  2  -CH     Base of Support  1  -CH     Gait Score  12  -CH     Tinetti Total Score  27  -CH     Functional Assessment    Outcome Measure Options  Tinetti  -CH       01/12/18 0930 01/12/18 0620       How much help from another person do you currently need...    Turning from your back to your side while in flat bed without using bedrails? 4  -BRENDAN      Moving from lying on back to sitting on the side of a flat bed without bedrails? 4  -BRENDAN      Moving to and from a bed to a chair (including a wheelchair)? 3  -BRENDAN      Standing up from a chair using your arms (e.g., wheelchair, bedside chair)? 4  -BRENDAN      Climbing 3-5 steps with a railing? 3  -BRENDAN      To walk in hospital room? 3  -BRENDAN      AM-PAC 6 Clicks Score 21  -BRENDAN      How much help from another is currently needed...    Putting on and taking off regular lower body clothing?  3  -LW     Bathing (including washing, rinsing, and drying)  3  -LW     Toileting (which includes using toilet bed pan or urinal)  3  -LW     Putting on and taking off regular upper body clothing  4  -LW     Taking care of personal grooming (such as brushing teeth)  4  -LW     Eating meals  4  -LW     Score  21  -LW       User Key  (r) = Recorded By, (t) = Taken By, (c) = Cosigned By    Initials Name Provider Type    BRENDAN Navya Morales PTA Physical Therapy Assistant    CH uArora Rangel PTA Physical Therapy Assistant    BB Mirna Herrera, DALE/L Occupational Therapy Assistant    SADI Rodrigues, DALE/L Occupational Therapy Assistant    LAKESHA Meyer, OTR/L Occupational Therapist           Time Calculation:         Time Calculation- OT       01/14/18 1345          Time Calculation- OT    OT Start Time 0635  -BB      OT Stop Time 0728  -BB      OT Time Calculation (min) 53 min  -BB      Total Timed  Code Minutes- OT 53 minute(s)  -BB      OT Received On 01/14/18  -HITESH        User Key  (r) = Recorded By, (t) = Taken By, (c) = Cosigned By    Initials Name Provider Type    CHITO Gerardo/L Occupational Therapy Assistant           Therapy Charges for Today     Code Description Service Date Service Provider Modifiers Qty    69710029835 HC OT SELF CARE/MGMT/TRAIN EA 15 MIN 1/14/2018 CHITO Gilbert/L GO 2    39850848740 HC OT THERAPEUTIC ACT EA 15 MIN 1/14/2018 CHITO Gilbert/L GO 2          OT G-codes  OT Functional Scales Options: AM-PAC 6 Clicks Daily Activity (OT)  Functional Limitation: Self care  Self Care Current Status (): At least 20 percent but less than 40 percent impaired, limited or restricted  Self Care Goal Status (): At least 1 percent but less than 20 percent impaired, limited or restricted    ADITYA Gilbert  1/14/2018

## 2018-01-15 LAB
ANION GAP SERPL CALCULATED.3IONS-SCNC: 5 MMOL/L (ref 5–15)
BASOPHILS # BLD AUTO: 0 10*3/MM3 (ref 0–0.2)
BASOPHILS NFR BLD AUTO: 0 % (ref 0–2)
BUN BLD-MCNC: 16 MG/DL (ref 7–21)
BUN/CREAT SERPL: 29.6 (ref 7–25)
CALCIUM SPEC-SCNC: 7.8 MG/DL (ref 8.4–10.2)
CHLORIDE SERPL-SCNC: 105 MMOL/L (ref 95–110)
CO2 SERPL-SCNC: 30 MMOL/L (ref 22–31)
CREAT BLD-MCNC: 0.54 MG/DL (ref 0.5–1)
DEPRECATED RDW RBC AUTO: 43.5 FL (ref 36.4–46.3)
EOSINOPHIL # BLD AUTO: 0 10*3/MM3 (ref 0–0.7)
EOSINOPHIL NFR BLD AUTO: 0 % (ref 0–7)
ERYTHROCYTE [DISTWIDTH] IN BLOOD BY AUTOMATED COUNT: 13.1 % (ref 11.5–14.5)
GFR SERPL CREATININE-BSD FRML MDRD: 113 ML/MIN/1.73 (ref 45–104)
GLUCOSE BLD-MCNC: 113 MG/DL (ref 60–100)
HCT VFR BLD AUTO: 30.4 % (ref 35–45)
HGB BLD-MCNC: 10.4 G/DL (ref 12–15.5)
IMM GRANULOCYTES # BLD: 0.13 10*3/MM3 (ref 0–0.02)
IMM GRANULOCYTES NFR BLD: 1 % (ref 0–0.5)
LYMPHOCYTES # BLD AUTO: 1.34 10*3/MM3 (ref 0.6–4.2)
LYMPHOCYTES NFR BLD AUTO: 10.6 % (ref 10–50)
MCH RBC QN AUTO: 31.1 PG (ref 26.5–34)
MCHC RBC AUTO-ENTMCNC: 34.2 G/DL (ref 31.4–36)
MCV RBC AUTO: 91 FL (ref 80–98)
MONOCYTES # BLD AUTO: 1.02 10*3/MM3 (ref 0–0.9)
MONOCYTES NFR BLD AUTO: 8.1 % (ref 0–12)
NEUTROPHILS # BLD AUTO: 10.17 10*3/MM3 (ref 2–8.6)
NEUTROPHILS NFR BLD AUTO: 80.3 % (ref 37–80)
PLATELET # BLD AUTO: 289 10*3/MM3 (ref 150–450)
PMV BLD AUTO: 8.6 FL (ref 8–12)
POTASSIUM BLD-SCNC: 3.6 MMOL/L (ref 3.5–5.1)
RBC # BLD AUTO: 3.34 10*6/MM3 (ref 3.77–5.16)
SODIUM BLD-SCNC: 140 MMOL/L (ref 137–145)
VANCOMYCIN TROUGH SERPL-MCNC: 9.21 MCG/ML (ref 10–15)
WBC NRBC COR # BLD: 12.66 10*3/MM3 (ref 3.2–9.8)

## 2018-01-15 PROCEDURE — 25010000002 VANCOMYCIN PER 500 MG: Performed by: FAMILY MEDICINE

## 2018-01-15 PROCEDURE — 85025 COMPLETE CBC W/AUTO DIFF WBC: CPT | Performed by: INTERNAL MEDICINE

## 2018-01-15 PROCEDURE — 94799 UNLISTED PULMONARY SVC/PX: CPT

## 2018-01-15 PROCEDURE — 97110 THERAPEUTIC EXERCISES: CPT

## 2018-01-15 PROCEDURE — 97535 SELF CARE MNGMENT TRAINING: CPT

## 2018-01-15 PROCEDURE — 94760 N-INVAS EAR/PLS OXIMETRY 1: CPT

## 2018-01-15 PROCEDURE — 25010000002 METHYLPREDNISOLONE PER 125 MG: Performed by: FAMILY MEDICINE

## 2018-01-15 PROCEDURE — 25010000002 METHYLPREDNISOLONE PER 40 MG: Performed by: FAMILY MEDICINE

## 2018-01-15 PROCEDURE — 80202 ASSAY OF VANCOMYCIN: CPT | Performed by: INTERNAL MEDICINE

## 2018-01-15 PROCEDURE — 25010000002 PIPERACILLIN SOD-TAZOBACTAM PER 1 G: Performed by: FAMILY MEDICINE

## 2018-01-15 PROCEDURE — 80048 BASIC METABOLIC PNL TOTAL CA: CPT | Performed by: INTERNAL MEDICINE

## 2018-01-15 RX ORDER — METHYLPREDNISOLONE SODIUM SUCCINATE 40 MG/ML
40 INJECTION, POWDER, LYOPHILIZED, FOR SOLUTION INTRAMUSCULAR; INTRAVENOUS EVERY 8 HOURS
Status: DISCONTINUED | OUTPATIENT
Start: 2018-01-15 | End: 2018-01-16

## 2018-01-15 RX ADMIN — VANCOMYCIN HYDROCHLORIDE 1250 MG: 5 INJECTION, POWDER, LYOPHILIZED, FOR SOLUTION INTRAVENOUS at 17:11

## 2018-01-15 RX ADMIN — BUDESONIDE AND FORMOTEROL FUMARATE DIHYDRATE 2 PUFF: 160; 4.5 AEROSOL RESPIRATORY (INHALATION) at 20:45

## 2018-01-15 RX ADMIN — IPRATROPIUM BROMIDE AND ALBUTEROL SULFATE 3 ML: 2.5; .5 SOLUTION RESPIRATORY (INHALATION) at 20:44

## 2018-01-15 RX ADMIN — IPRATROPIUM BROMIDE AND ALBUTEROL SULFATE 3 ML: 2.5; .5 SOLUTION RESPIRATORY (INHALATION) at 11:03

## 2018-01-15 RX ADMIN — NICOTINE 1 PATCH: 21 PATCH TRANSDERMAL at 21:03

## 2018-01-15 RX ADMIN — TRAZODONE HYDROCHLORIDE 300 MG: 150 TABLET ORAL at 21:00

## 2018-01-15 RX ADMIN — BUDESONIDE AND FORMOTEROL FUMARATE DIHYDRATE 2 PUFF: 160; 4.5 AEROSOL RESPIRATORY (INHALATION) at 06:48

## 2018-01-15 RX ADMIN — ATORVASTATIN CALCIUM 10 MG: 10 TABLET, FILM COATED ORAL at 09:00

## 2018-01-15 RX ADMIN — GABAPENTIN 800 MG: 400 CAPSULE ORAL at 05:16

## 2018-01-15 RX ADMIN — TAZOBACTAM SODIUM AND PIPERACILLIN SODIUM 3.38 G: 375; 3 INJECTION, SOLUTION INTRAVENOUS at 23:08

## 2018-01-15 RX ADMIN — LORAZEPAM 0.5 MG: 0.5 TABLET ORAL at 10:29

## 2018-01-15 RX ADMIN — IPRATROPIUM BROMIDE AND ALBUTEROL SULFATE 3 ML: 2.5; .5 SOLUTION RESPIRATORY (INHALATION) at 06:47

## 2018-01-15 RX ADMIN — PANTOPRAZOLE SODIUM 40 MG: 40 INJECTION, POWDER, FOR SOLUTION INTRAVENOUS at 05:16

## 2018-01-15 RX ADMIN — METHYLPREDNISOLONE SODIUM SUCCINATE 40 MG: 40 INJECTION, POWDER, FOR SOLUTION INTRAMUSCULAR; INTRAVENOUS at 17:12

## 2018-01-15 RX ADMIN — METHYLPREDNISOLONE SODIUM SUCCINATE 60 MG: 125 INJECTION, POWDER, FOR SOLUTION INTRAMUSCULAR; INTRAVENOUS at 02:00

## 2018-01-15 RX ADMIN — VANCOMYCIN HYDROCHLORIDE 1000 MG: 1 INJECTION, POWDER, LYOPHILIZED, FOR SOLUTION INTRAVENOUS at 04:40

## 2018-01-15 RX ADMIN — GABAPENTIN 800 MG: 400 CAPSULE ORAL at 21:00

## 2018-01-15 RX ADMIN — TAZOBACTAM SODIUM AND PIPERACILLIN SODIUM 3.38 G: 375; 3 INJECTION, SOLUTION INTRAVENOUS at 15:17

## 2018-01-15 RX ADMIN — TRAMADOL HYDROCHLORIDE 50 MG: 50 TABLET, FILM COATED ORAL at 10:28

## 2018-01-15 RX ADMIN — AMLODIPINE BESYLATE 10 MG: 10 TABLET ORAL at 10:28

## 2018-01-15 RX ADMIN — TRAMADOL HYDROCHLORIDE 50 MG: 50 TABLET, FILM COATED ORAL at 21:43

## 2018-01-15 RX ADMIN — Medication 3 ML: at 10:31

## 2018-01-15 RX ADMIN — METHYLPREDNISOLONE SODIUM SUCCINATE 60 MG: 125 INJECTION, POWDER, FOR SOLUTION INTRAMUSCULAR; INTRAVENOUS at 10:28

## 2018-01-15 RX ADMIN — SERTRALINE HYDROCHLORIDE 100 MG: 50 TABLET ORAL at 10:29

## 2018-01-15 RX ADMIN — IPRATROPIUM BROMIDE AND ALBUTEROL SULFATE 3 ML: 2.5; .5 SOLUTION RESPIRATORY (INHALATION) at 14:46

## 2018-01-15 RX ADMIN — Medication 10 ML: at 17:12

## 2018-01-15 RX ADMIN — BUTALBITAL, ACETAMINOPHEN, AND CAFFEINE 1 TABLET: 50; 325; 40 TABLET ORAL at 21:00

## 2018-01-15 RX ADMIN — GABAPENTIN 800 MG: 400 CAPSULE ORAL at 13:29

## 2018-01-15 RX ADMIN — SODIUM CHLORIDE 75 ML/HR: 9 INJECTION, SOLUTION INTRAVENOUS at 23:08

## 2018-01-15 RX ADMIN — TAZOBACTAM SODIUM AND PIPERACILLIN SODIUM 3.38 G: 375; 3 INJECTION, SOLUTION INTRAVENOUS at 10:29

## 2018-01-15 NOTE — PLAN OF CARE
Problem: Patient Care Overview (Adult)  Goal: Plan of Care Review  Outcome: Ongoing (interventions implemented as appropriate)   01/15/18 2383   Coping/Psychosocial Response Interventions   Plan Of Care Reviewed With patient   Patient Care Overview   Progress improving   Outcome Evaluation   Outcome Summary/Follow up Plan Pt's breath sounds are improved; EGD planned to evaluate why Pt reports difficulty with swallowing     Goal: Adult Individualization and Mutuality  Outcome: Ongoing (interventions implemented as appropriate)    Goal: Discharge Needs Assessment  Outcome: Ongoing (interventions implemented as appropriate)      Problem: Pneumonia (Adult)  Goal: Signs and Symptoms of Listed Potential Problems Will be Absent or Manageable (Pneumonia)  Outcome: Ongoing (interventions implemented as appropriate)      Problem: COPD, Chronic Bronchitis/Emphysema (Adult)  Goal: Signs and Symptoms of Listed Potential Problems Will be Absent or Manageable (COPD, Chronic Bronchitis/Emphysema)  Outcome: Ongoing (interventions implemented as appropriate)      Problem: Pain, Acute (Adult)  Goal: Acceptable Pain Control/Comfort Level  Outcome: Ongoing (interventions implemented as appropriate)      Problem: Dysphagia (Adult)  Goal: Identify Related Risk Factors and Signs and Symptoms  Outcome: Ongoing (interventions implemented as appropriate)    Goal: Functional/Safe Swallow  Outcome: Ongoing (interventions implemented as appropriate)    Goal: Compensatory Techniques to Improve Safety/Function with Swallowing  Outcome: Ongoing (interventions implemented as appropriate)

## 2018-01-15 NOTE — PROGRESS NOTES
[facility] Medicine Services  INPATIENT PROGRESS NOTE    Length of Stay: 6  Date of Admission: 1/9/2018  Primary Care Physician: DELFINO Smith    Subjective     Chief Complaint   Patient presents with   • Shortness of Breath       HPI:  Patient was seen and examined this morning. Patient feels significantly better , dyspnea cough significantly  improved and resolving , feels stronger, ambulating the hallways . Patient is passing gas, Patient denies, headache or dizziness, chest pain or palpitations,abdominal pain N/V/D , blood in the urine or blood in the stool , or any urinary symptoms , weakness or numbness or tingling in the extremities or visual changes.    Review of Systems     All pertinent negatives and positives are as above. All other systems have been reviewed and are negative unless otherwise stated.   Objective    Physical exam    Temp:  [97.6 °F (36.4 °C)-98.2 °F (36.8 °C)] 97.6 °F (36.4 °C)  Heart Rate:  [67-86] 73  Resp:  [18] 18  BP: (114-133)/(55-69) 114/55    -Constitutional: Patient is AAO x 3. Patient appears well-developed and well-nourished.   -CVS: Normal rate, regular rhythm, normal heart sounds and intact distal pulses.  Exam reveals no gallop and no friction rub.  No murmur heard.  -Pulm: mild b/l scattered wheezes ( significantly improved)  -Abdominal: Soft. Bowel sounds are normal. No distension, no tenderness. There is no rebound and no guarding. No hernia.   -Musculoskeletal: No Cyanosis clubbing or edema.    Results Review:    Laboratory Data:     Results from last 7 days  Lab Units 01/15/18  0400 01/14/18  0600 01/13/18  0338  01/09/18  1828   SODIUM mmol/L 140 138 141  < > 133*   POTASSIUM mmol/L 3.6 4.3 3.7  < > 4.3   CHLORIDE mmol/L 105 98 105  < > 100   CO2 mmol/L 30.0 30.0 27.0  < > 23.0   BUN mg/dL 16 19 14  < > 11   CREATININE mg/dL 0.54 0.64 0.62  < > 0.50   GLUCOSE mg/dL 113* 136* 114*  < > 101*   CALCIUM mg/dL 7.8*  9.0 7.9*  < > 9.5   BILIRUBIN mg/dL  --   --   --   --  0.4   ALK PHOS U/L  --   --   --   --  92   ALT (SGPT) U/L  --   --   --   --  31   AST (SGOT) U/L  --   --   --   --  23   ANION GAP mmol/L 5.0 10.0 9.0  < > 10.0   < > = values in this interval not displayed.  Estimated Creatinine Clearance: 63.9 mL/min (by C-G formula based on Cr of 0.54).            Results from last 7 days  Lab Units 01/15/18  0400 01/14/18  0600 01/13/18  0338 01/12/18  0640 01/11/18  0605   WBC 10*3/mm3 12.66* 12.79* 14.64* 22.18* 25.07*   HEMOGLOBIN g/dL 10.4* 10.9* 10.1* 11.2* 10.7*   HEMATOCRIT % 30.4* 33.0* 30.2* 33.1* 32.5*   PLATELETS 10*3/mm3 289 341 314 377 340       Results from last 7 days  Lab Units 01/09/18  1646   INR  0.95       Culture Data:   No results found for: BLOODCX  No results found for: URINECX  No results found for: RESPCX  No results found for: WOUNDCX  No results found for: STOOLCX  No components found for: BODYFLD    Micobiology  Blood Culture   Date Value Ref Range Status   01/09/2018 No growth at less than 24 hours  Preliminary                            Radiology Data:   Imaging Results (all)     Procedure Component Value Units Date/Time    XR Chest 2 View [[id number]] Collected:  01/09/18 1554     Updated:  01/09/18 1610    Narrative:         EXAM:          Radiograph(s), Chest   VIEWS:    PA / Lat ; 2       DATE/TIME:  1/9/2018 4:07 PM CST                INDICATION:   shortness of breath    COMPARISON:  CXR: 4/6/16             FINDINGS:             - lines/tubes:    none     - cardiac:         size within normal limits         - mediastinum: contour within normal limits         - lungs:         Linearly oriented airspace disease of the right  upper lobe. The remainder of the lungs are clear. Left lung  granuloma noted.             - pleura:         no evidence of  fluid                  - osseous:         Status post left shoulder repair.                   - misc.:         Impression:       CONCLUSION:         1. Right upper lobe airspace disease presumably representing a  focus of pneumonia.                                               Electronically signed by:  KRYSTEN Cast MD  1/9/2018 4:09 PM  CST Workstation: [id number]    IR PICC wo fluoro guidance [[id number]] Resulted:  01/09/18 1654     Updated:  01/09/18 1654    Narrative:       This procedure was auto-finalized with no dictation required.    US Guided Vascular Access [[id number]] Resulted:  01/09/18 1716     Updated:  01/09/18 1716    Narrative:       This procedure was auto-finalized with no dictation required.    XR Chest Post CVA Port [[id number]] Collected:  01/09/18 1655     Updated:  01/09/18 1719    Narrative:         EXAM:         Radiograph(s), Chest   VIEWS:   Portable ; 1       DATE/TIME:  1/9/2018 5:15 PM CST                INDICATION:   PICC placement    COMPARISON:  CXR: 4/7/16             FINDINGS:             - lines/tubes:    Right approach PICC line in standard position.      - cardiac:         size within normal limits         - mediastinum: contour within normal limits         - lungs:         Focus of airspace disease in the right lung,  probably lower lobe.             - pleura:         no evidence of  fluid                  - osseous:         Status post left shoulder replacement.                   - misc.:         Impression:       CONCLUSION:        1. Right approach PICC line in standard position.  2. Suspect focal airspace disease in the right lower lobe.                                                Electronically signed by:  KRYSTEN Cast MD  1/9/2018 5:18 PM  CST Workstation: [id number]          I have reviewed the patient current medications.   Assessment/Plan     Hospital Problem List     * (Principal)Dysphagia    Overview Signed 1/12/2018  1:12 PM by Bin Oh MD     Added automatically from request for surgery [id number]         Pneumonia of right upper lobe due to infectious organism          Assessment /  Plan    --Pneumonia  failed outpatient therapy.  Symptoms improving, dyspnea improved . wheezing improved  WBC improving :22.2--14.6--12.79--12.6  continue IV  Antibiotics(vanc and zosyn) and  supplemental oxygen, solumedrol decreased to 40mg Q8 hours), inceptive tara,      --dysphagia   proximal esophageal dilatation on CTA  CTA:Dilated proximal esophagus .Barium swallow done.   Dr Oh consulted . EGD plan for wednesday , as yesterday was unable to perform EGD due to pulmonary function .  Will continue soft blend GI diet . Plan for scope on Wednesday    --COPD exacerbation:   Continue supplemental oxygen, bronchodilators and steroids.       --anxiety  Ativan     --hx of PE  eliquis . Will hold on Monday AM as patient having EGD Wed.    --Tobacco use  nicotine patch    --Hypertension:   Controlled  Continue home medications     --GI prophylaxis/DVT prophylaxis    This document has been electronically signed by Conner Ingram MD on January 15, 2018 11:28 AM

## 2018-01-15 NOTE — THERAPY TREATMENT NOTE
Acute Care - Occupational Therapy Treatment Note  [facility]     Patient Name: Mona Perales  : 1950  MRN: [id number]  Today's Date: 1/15/2018  Onset of Illness/Injury or Date of Surgery Date: 18  Date of Referral to OT: 01/10/18  Referring Physician: Dr. Ingram       Admit Date: 2018    Visit Dx:     ICD-10-CM ICD-9-CM   1. Pneumonia of right upper lobe due to infectious organism J18.1 486   2. Hypoxia R09.02 799.02   3. Decreased activities of daily living (ADL) Z78.9 V49.89   4. Impaired functional mobility, balance, gait, and endurance Z74.09 V49.89   5. Dysphagia, unspecified type R13.10 787.20     Patient Active Problem List   Diagnosis   • Anxiety   • CAD (coronary atherosclerotic disease)   • Carotid stenosis   • COPD (chronic obstructive pulmonary disease)   • COPD with exacerbation   • Depressive disorder, not elsewhere classified   • Benign essential hypertension   • Hypercholesteremia   • Insomnia   • Low back pain   • Osteoporosis   • Other screening mammogram   • RUQ abdominal pain   • Peripheral arterial disease   • Dizziness   • Nicotine abuse   • Pneumonia of right upper lobe due to infectious organism   • Dysphagia             Adult Rehabilitation Note       01/15/18 0935 18 0635 18 1026    Rehab Assessment/Intervention    Discipline occupational therapy assistant  -LW occupational therapy assistant  -BB physical therapy assistant  -CH    Document Type therapy note (daily note)  -LW therapy note (daily note)  -BB therapy note (daily note)  -CH    Subjective Information agree to therapy  -LW agree to therapy  -BB agree to therapy;complains of;pain  -CH    Patient Effort, Rehab Treatment good  -LW good  -BB good  -CH    Symptoms Noted During/After Treatment  none  -BB     Precautions/Limitations fall precautions;oxygen therapy device and L/min  -LW fall precautions;oxygen therapy device and L/min  -BB fall precautions;oxygen therapy device and L/min  -CH     Precautions/Limitations, Vision WFL with corrective lenses  -LW      Precautions/Limitations, Hearing WFL  -LW      Recorded by [LW] CHITO Solano/ARMANDO [BB] CHITO Gilbert/ARMANDO [CH] Aurora Rangel PTA    Vital Signs    Pre Systolic BP Rehab 142  -  -  -CH    Pre Treatment Diastolic BP 75  -LW 73  -BB 76  -CH    Post Systolic BP Rehab   145  -CH    Post Treatment Diastolic BP   78  -CH    Pretreatment Heart Rate (beats/min) 75  -LW 76  -BB 73  -CH    Intratreatment Heart Rate (beats/min)   82  -CH    Posttreatment Heart Rate (beats/min) 77  -LW 78  -BB 78  -CH    Pre SpO2 (%) 98  -LW 97  -BB 96  -CH    O2 Delivery Pre Treatment supplemental O2  -LW supplemental O2  -BB supplemental O2  -CH    Intra SpO2 (%)   97  -CH    O2 Delivery Intra Treatment   supplemental O2  -CH    Post SpO2 (%) 97  -LW 96  -BB 98  -CH    O2 Delivery Post Treatment supplemental O2  -LW supplemental O2  -BB supplemental O2  -CH    Pre Patient Position Sitting  -LW Sitting  -BB Sitting  -CH    Intra Patient Position   Standing  -CH    Post Patient Position Sitting  -LW Sitting  -BB Sitting  -CH    Recorded by [LW] CHITO Solano/ARMANDO [BB] CHITO Gilbert/ARMANDO [CH] Aurora Rangel PTA    Pain Assessment    Pain Assessment 0-10  -LW 0-10  -BB 0-10  -CH    Pain Score 4  -LW 6  -BB 8  -CH    Post Pain Score 4  -LW 6  -BB     Pain Type Chronic pain  -LW Chronic pain  -BB Chronic pain  -CH    Pain Location Back  -LW Back  -BB Back  -CH    Pain Orientation Lower  -LW Lower   neck also hurts  -BB Lower  -CH    Pain Descriptors Aching  -LW      Pain Frequency Constant/continuous  -LW      Pain Intervention(s) Medication (See MAR)  -LW Medication (See MAR)  -BB Medication (See MAR);Ambulation/increased activity  -CH    Recorded by [LW] CHITO Solano/ARMANDO [BB] CHITO Gilbert/ARMANDO [CH] Aurora Rangel PTA    Vision Assessment/Intervention    Visual Impairment WFL with corrective lenses  -LW  WFL with  corrective lenses  -CH    Recorded by [LW] CHITO Solano/ARMANDO  [CH] Aurora Rangel PTA    Cognitive Assessment/Intervention    Current Cognitive/Communication Assessment functional  -LW functional  -BB functional  -CH    Orientation Status oriented x 4  -LW oriented x 4  -BB oriented x 4  -CH    Follows Commands/Answers Questions 100% of the time  -% of the time  -% of the time  -CH    Personal Safety WNL/WFL  -LW --   discussed home safety, E/C, E/S, breathing technique,posture  -BB WNL/WFL  -CH    Personal Safety Interventions fall prevention program maintained  -LW fall prevention program maintained;gait belt;nonskid shoes/slippers when out of bed;muscle strengthening facilitated;supervised activity  -BB fall prevention program maintained;gait belt;muscle strengthening facilitated;nonskid shoes/slippers when out of bed;supervised activity  -CH    Short/Long Term Memory  intact short term memory;intact long term memory  -BB     Recorded by [LW] CHITO Solano/ARMANDO [BB] CHITO Gilbert/ARMANDO [CH] Aurora Rangel PTA    Bed Mobility, Assessment/Treatment    Bed Mobility, Scoot/Bridge, [place]   not tested  -CH    Bed Mob, Supine to Sit, [place]  independent  -BB not tested  -CH    Bed Mob, Sit to Supine, [place]  independent  -BB not tested  -CH    Recorded by  [BB] CHITO Gilbert/ARMANDO [CH] Aurora Rangel PTA    Transfer Assessment/Treatment    Transfers, Sit-Stand [place] independent  -LW independent  -BB independent  -CH    Transfers, Stand-Sit [place] independent  -LW independent  -BB independent  -CH    Transfers, Sit-Stand-Sit, Assist Device --   none  -LW --   none  -BB     Toilet Transfer, [place] independent  -LW independent  -BB     Toilet Transfer, Assistive Device other (see comments)   none  -LW other (see comments)   none  -BB     Recorded by [LW] CHITO Solano/ARMANDO [BB] CHITO Gilbert/ARMANDO [CH] Aurora Rangel, PTA     Gait Assessment/Treatment    Gait, [place] Level   conditional independence  -CH    Gait, Assistive Device   other (see comments)   none  -CH    Gait, Distance (Feet)   600  -CH    Gait, Safety Issues   supplemental O2  -CH    Gait, Comment   No LOB  -CH    Recorded by   [CH] Aurora Rangel PTA    Stairs Assessment/Treatment    Stairs, [place] Level   not tested  -CH    Recorded by   [CH] Aurora Rangel PTA    Lower Body Dressing Assessment/Training    LB Dressing Assess/Train, Clothing Type  donning:;doffing:;socks  -BB     LB Dressing Assess/Train, Position  edge of bed  -BB     LB Dressing Assess/Train, [place]  independent  -BB     Recorded by  [BB] CHITO Gilbert/L     Toileting Assessment/Training    Toileting Assess/Train, Assistive Device grab bars  -LW grab bars  -BB     Toileting Assess/Train, Position sitting  -LW sitting  -BB     Toileting Assess/Train, Indepen Level independent  -LW independent  -BB     Recorded by [LW] CHITO Solano/ARMANDO [BB] CHITO Gilbert/L     Grooming Assessment/Training    Grooming Assess/Train, Position standing;sink side  -LW sitting  -BB     Grooming Assess/Train, Indepen Level independent  -LW independent  -BB     Grooming Assess/Train, Comment washed face and hands, oral care, combed hair  -LW washed hands, face, combed hair, oral care  -BB     Recorded by [LW] CHITO Solano/ARMANDO [BB] CHITO Gilbert/L     Therapy Exercises    Bilateral Lower Extremities   AROM:;15 reps;standing;glut sets;heel raises;hip abduction/adduction;hip flexion;mini squats  -    Bilateral Upper Extremity AROM:;20 reps;sitting   ROM  -LW AROM:;20 reps;sitting;elbow flexion/extension;shoulder extension/flexion;hand pumps;pronation/supination;shoulder rolls/shrugs   wrist flesxion/extension 2 sets all exercises  -BB     BUE Resistance manual resistance- minimal  -LW manual resistance- minimal  -BB     Recorded by [LW] CHITO Solano/ARMANDO  [BB] Mirna Herrera, DALE/L [CH] Aurora Rangel, PTA    Positioning and Restraints    Pre-Treatment Position in bed  -LW in bed  -BB in bed  -CH    Post Treatment Position bed  -LW bed  -BB bed  -CH    In Bed sitting EOB;call light within reach;encouraged to call for assist;exit alarm on  -LW sitting;call light within reach;encouraged to call for assist;exit alarm on  -BB sitting EOB;call light within reach;encouraged to call for assist  -CH    Recorded by [LW] Nuzhat Rodrigues, DALE/L [BB] Mirna Herrera, DALE/L [CH] Aurora Rangel, PTA      01/13/18 0900          Rehab Assessment/Intervention    Discipline occupational therapist  -NN      Document Type therapy note (daily note)  -NN      Subjective Information agree to therapy;complains of;weakness;fatigue  -NN      Precautions/Limitations fall precautions;oxygen therapy device and L/min  -NN      Recorded by [NN] An Meyer, OTR/L      Cognitive Assessment/Intervention    Current Cognitive/Communication Assessment functional  -NN      Orientation Status oriented x 4  -NN      Follows Commands/Answers Questions 100% of the time;able to follow multi-step instructions  -NN      Personal Safety WNL/WFL  -NN      Personal Safety Interventions fall prevention program maintained;muscle strengthening facilitated;nonskid shoes/slippers when out of bed;supervised activity  -NN      Recorded by [NN] An Meyer, OTR/L      Bed Mobility, Assessment/Treatment    Bed Mobility, Scoot/Bridge, [place] independent  -NN      Bed Mob, Supine to Sit, [place] independent  -NN      Bed Mob, Sit to Supine, [place] independent  -NN      Recorded by [NN] An Meyer OTR/L      Transfer Assessment/Treatment    Transfers, Sit-Stand [place] independent  -NN      Transfers, Stand-Sit [place] independent  -NN      Walk-In Shower Transfer, [place] supervision required  -NN      Walk-In Shower Transfer, Assist Device --   grab bar  -NN       Transfer, Impairments impaired balance;strength decreased  -NN      Recorded by [NN] An Meyer, OTR/L      Functional Mobility    Functional Mobility- Ind. Level supervision required  -NN      Functional Mobility-Distance (Feet) --   in room in shower  -NN      Functional Mobility- Safety Issues step length decreased;supplemental O2  -NN      Recorded by [NN] An Meyer, OTR/L      Upper Body Bathing Assessment/Training    UB Bathing Assess/Train, Position standing  -NN      UB Bathing Assess/Train, [place] Level minimum assist (75% patient effort)  -NN      UB Bathing Assess/Train, Impairments strength decreased  -NN      Recorded by [NN] An Meyer, OTR/L      Lower Body Bathing Assessment/Training    LB Bathing Assess/Train, Position standing  -NN      LB Bathing Assess/Train, [place] Level minimum assist (75% patient effort)  -NN      LB Bathing Assess/Train, Impairments decreased flexibility;strength decreased  -NN      Recorded by [NN] An Meyer, OTR/L      Upper Body Dressing Assessment/Training    UB Dressing Assess/Train, Clothing Type doffing:;donning:;hospital gown  -NN      UB Dressing Assess/Train, Position sitting  -NN      UB Dressing Assess/Train, [place] minimum assist (75% patient effort)  -NN      UB Dressing Assess/Train, Comment Min to manage wires  -NN      Recorded by [NN] An Meyer, OTR/L      Lower Body Dressing Assessment/Training    LB Dressing Assess/Train, Clothing Type donning:;doffing:;socks  -NN      LB Dressing Assess/Train, Position edge of bed  -NN      LB Dressing Assess/Train, [place] independent  -NN      Recorded by [NN] An Meyer, OTR/L      Grooming Assessment/Training    Grooming Assess/Train, Assistive Device --   brush hair  -NN      Grooming Assess/Train, Position sitting  -NN      Grooming Assess/Train, Indepen Level independent  -NN      Recorded by [NN] An Meyer, OTR/L      Positioning and  Restraints    Pre-Treatment Position in bed  -NN      Post Treatment Position bed  -NN      In Bed fowlers;call light within reach;encouraged to call for assist;side rails up x2  -NN      Recorded by [NN] An Meyer, OTR/L        User Key  (r) = Recorded By, (t) = Taken By, (c) = Cosigned By    Initials Name Effective Dates    CH Aurora Rivasroseann, PTA 10/17/16 -     BB Mirna Herrera, DALE/L 10/17/16 -     LW Nuzhat Rodrigues, DALE/L 10/17/16 -     NN An Meyer, OTR/L 11/08/17 -                 OT Goals       01/15/18 1112 01/14/18 1342 01/12/18 1153    Transfer Training OT LTG    Transfer Training OT LTG, Date Goal Reviewed 01/15/18  -LW 01/14/18  -BB 01/12/18  -LW    Transfer Training OT LTG, Outcome goal met  -LW  goal ongoing  -LW    Strength OT LTG    Strength Goal OT LTG, Date Goal Reviewed 01/15/18  -LW 01/14/18  -BB 01/12/18  -LW    Strength Goal OT LTG, Outcome goal ongoing  -LW goal ongoing  -BB     ADL OT LTG    ADL OT LTG, Date Goal Reviewed 01/15/18  -LW 01/14/18  -BB 01/12/18  -LW    ADL OT LTG, Outcome goal ongoing  -LW  goal ongoing  -LW      01/10/18 1451          Transfer Training OT LTG    Transfer Training OT LTG, Date Established 01/10/18  -NN      Transfer Training OT LTG, Time to Achieve by discharge  -NN      Transfer Training OT LTG, Activity Type all transfers  -NN      Transfer Training OT LTG, [place] Level independent  -NN      Strength OT LTG    Strength Goal OT LTG, Date Established 01/10/18  -NN      Strength Goal OT LTG, Time to Achieve by discharge  -NN      Strength Goal OT LTG, Measure to Achieve Pt. will complete BUE strengthening exercises all planes and joints to increase BUE strength to 5/5 for ADLs.   -NN      ADL OT LTG    ADL OT LTG, Date Established 01/10/18  -NN      ADL OT LTG, Activity Type ADL skills  -NN      ADL OT LTG, [place] Level independent  -NN        User Key  (r) = Recorded By, (t) = Taken By, (c) = Cosigned By    Initials Name  Provider Type    BB Mirna Herrera, DALE/L Occupational Therapy Assistant    LW Nuzhat Rodrigues, DALE/L Occupational Therapy Assistant    LAKESHA Meyer, OTR/L Occupational Therapist          Occupational Therapy Education     Title: PT OT SLP Therapies (Active)     Topic: Occupational Therapy (Done)     Point: ADL training (Done)    Description: Instruct learner(s) on proper safety adaptation and remediation techniques during self care or transfers.   Instruct in proper use of assistive devices.    Learning Progress Summary    Learner Readiness Method Response Comment Documented by Status   Patient Acceptance E VU HEP LW 01/15/18 1120 Done    Acceptance E,TB DIEGO SUMMERS  BB 01/14/18 1341 Done    Acceptance E VU,NR Pt. educated on role of OT, safe t/f, benefit of activity, PLB, progression with poc NN 01/13/18 0943 Done    Acceptance E VU  LW 01/12/18 1203 Done    Acceptance E NR Pt. educated on role of OT, safe bed mobility, benefit of OOB activity, progression with poc NN 01/11/18 1016 Active    Acceptance E VU,NR Pt. educated on role of OT, safe t/f, benefit of therapy, calling for assist, progression with poc NN 01/10/18 1451 Done               Point: Home exercise program (Done)    Description: Instruct learner(s) on appropriate technique for monitoring, assisting and/or progressing therapeutic exercises/activities.    Learning Progress Summary    Learner Readiness Method Response Comment Documented by Status   Patient Acceptance E VU HEP LW 01/15/18 1120 Done    Acceptance E,TB [place]  BB 01/14/18 1341 Done    Acceptance E VU,NR Pt. educated on role of OT, safe t/f, benefit of activity, PLB, progression with poc NN 01/13/18 0943 Done    Acceptance E VU  LW 01/12/18 1203 Done    Acceptance E NR Pt. educated on role of OT, safe bed mobility, benefit of OOB activity, progression with poc NN 01/11/18 1016 Active    Acceptance E VU,NR Pt. educated on role of OT, safe t/f, benefit of therapy, calling for assist,  progression with poc NN 01/10/18 1451 Done               Point: Body mechanics (Done)    Description: Instruct learner(s) on proper positioning and spine alignment during self-care, functional mobility activities and/or exercises.    Learning Progress Summary    Learner Readiness Method Response Comment Documented by Status   Patient Acceptance E VU HEP LW 01/15/18 1120 Done    Acceptance E,TB VU,DU  BB 01/14/18 1341 Done    Acceptance E VU,NR Pt. educated on role of OT, safe t/f, benefit of activity, PLB, progression with poc NN 01/13/18 0943 Done    Acceptance E VU  LW 01/12/18 1203 Done    Acceptance E NR Pt. educated on role of OT, safe bed mobility, benefit of OOB activity, progression with poc NN 01/11/18 1016 Active    Acceptance E VU,NR Pt. educated on role of OT, safe t/f, benefit of therapy, calling for assist, progression with poc NN 01/10/18 1451 Done                      User Key     Initials Effective Dates Name Provider Type Discipline     10/17/16 -  Mirna Herrera DALE/L Occupational Therapy Assistant OT     10/17/16 -  Nuzhat Rodrigues DALE/L Occupational Therapy Assistant OT     11/08/17 -  An Meyer OTR/L Occupational Therapist OT                  OT Recommendation and Plan  Anticipated Discharge Disposition: home with assist, home with home health  Planned Therapy Interventions: activity intolerance, ADL retraining, balance training, bed mobility training, energy conservation, home exercise program, strengthening  Therapy Frequency:  (3-14x/week)  Plan of Care Review  Plan Of Care Reviewed With: patient  Progress: improving  Outcome Summary/Follow up Plan: Worked on UB strengthening for independence on ADL's. Worked on Grooming. HEP        Outcome Measures       01/15/18 0935 01/14/18 0635 01/13/18 1026    How much help from another person do you currently need...    Turning from your back to your side while in flat bed without using bedrails?   4  -CH    Moving from lying on  "back to sitting on the side of a flat bed without bedrails?   4  -CH    Moving to and from a bed to a chair (including a wheelchair)?   4  -CH    Standing up from a chair using your arms (e.g., wheelchair, bedside chair)?   4  -CH    Climbing 3-5 steps with a railing?   3  -CH    To walk in hospital room?   4  -CH    AM-PAC 6 Clicks Score   23  -CH    How much help from another is currently needed...    Putting on and taking off regular lower body clothing? 4  -LW 4  -BB     Bathing (including washing, rinsing, and drying) 4  -LW 4  -BB     Toileting (which includes using toilet bed pan or urinal) 4  -LW 4  -BB     Putting on and taking off regular upper body clothing 4  -LW 4  -BB     Taking care of personal grooming (such as brushing teeth) 4  -LW 4  -BB     Eating meals 4  -LW 4  -BB     Score 24  -LW 24  -BB     Tinetti Assessment    Tinetti Assessment   yes  -CH    Sitting Balance   1  -CH    Arises   2  -CH    Attempts to Rise   2  -CH    Immediate Standing Balance (first 5 sec)   2  -CH    Standing Balance   2  -CH    Sternal Nudge (feet close together)   2  -CH    Eyes Closed (feet close together)   1  -CH    Turning 360 Degrees- Steps   0  -CH    Turning 360 Degrees- Steadiness   1  -CH    Sitting Down   2  -CH    Tinetti Balance Score   15  -CH    Gait Initiation (immediate after told \"go\")   1  -CH    Step Length- Right Swing   1  -CH    Step Length- Left Swing   1  -CH    Foot Clearance- Right Foot   1  -CH    Foot Clearance- Left Foot   1  -CH    Step Symmetry   1  -CH    Step Continuity   1  -CH    Path (excursion)   2  -CH    Trunk   2  -CH    Base of Support   1  -CH    Gait Score   12  -CH    Tinetti Total Score   27  -CH    Functional Assessment    Outcome Measure Options   Tinetti  -      01/13/18 0900          How much help from another is currently needed...    Putting on and taking off regular lower body clothing? 3  -NN      Bathing (including washing, rinsing, and drying) 4  -NN      " Toileting (which includes using toilet bed pan or urinal) 4  -NN      Putting on and taking off regular upper body clothing 4  -NN      Taking care of personal grooming (such as brushing teeth) 4  -NN      Eating meals 4  -NN      Score 23  -NN        User Key  (r) = Recorded By, (t) = Taken By, (c) = Cosigned By    Initials Name Provider Type     Aurora Rangel, PTA Physical Therapy Assistant    BB Mirna Herrera, DALE/L Occupational Therapy Assistant    LW Nuzhat Rodrigues DALE/L Occupational Therapy Assistant    LAKESHA Meyer, OTR/L Occupational Therapist           Time Calculation:         Time Calculation- OT       01/15/18 1124          Time Calculation- OT    OT Start Time 0935  -LW      OT Stop Time 1030  -LW      OT Time Calculation (min) 55 min  -LW      Total Timed Code Minutes- OT 55 minute(s)  -LW      OT Received On 01/15/18  -        User Key  (r) = Recorded By, (t) = Taken By, (c) = Cosigned By    Initials Name Provider Type     TAYLOR SolanoA/L Occupational Therapy Assistant           Therapy Charges for Today     Code Description Service Date Service Provider Modifiers Qty    63455648269 HC OT SELF CARE/MGMT/TRAIN EA 15 MIN 1/15/2018 CHITO Solano/L GO 2    63150651303 HC OT THER PROC EA 15 MIN 1/15/2018 CHITO Solano/L GO 2          OT G-codes  OT Functional Scales Options: AM-PAC 6 Clicks Daily Activity (OT)  Functional Limitation: Self care  Self Care Current Status (): At least 20 percent but less than 40 percent impaired, limited or restricted  Self Care Goal Status (): At least 1 percent but less than 20 percent impaired, limited or restricted    ADITYA Solano  1/15/2018

## 2018-01-15 NOTE — PROGRESS NOTES
"Discharge Planning Assessment  [facility]     Patient Name: Mona Perales  MRN: [id number]  Today's Date: 1/15/2018    Admit Date: 1/9/2018          Discharge Needs Assessment       01/15/18 1054    Living Environment    Lives With alone;other (see comments)   Patient states that her daughter, Torri, does not live with her, but voiced she \"checks in\" on her regularly.     Living Arrangements apartment   Contact information on face sheet confirmed with patient.     Home Accessibility no concerns    Transportation Available car   If unable to drive, patient states she has used [employer] to assist at times with transportation.     Living Environment    Provides Primary Care For no one    Quality Of Family Relationships supportive;involved   Patient voiced that she has support/assistance from her daughter, Torri.     Able to Return to Prior Living Arrangements yes    Discharge Needs Assessment    Concerns To Be Addressed other (see comments)   SHANIKA spoke with patient re: transition visit. Patient is agreeable to services.     Readmission Within The Last 30 Days no previous admission in last 30 days    Anticipated Changes Related to Illness none    Equipment Currently Used at Home nebulizer;other (see comments)   access to walker, if needed.     Equipment Needed After Discharge none   Patient is not requiring use of an assistive device at this time during PT treatment.     Discharge Facility/Level Of Care Needs home with home health   transition visit    Current Discharge Risk chronically ill   dx: COPD    Discharge Disposition home or self-care            Discharge Plan       01/15/18 1102    Case Management/Social Work Plan    Plan home with home health transition visit.     Additional Comments LACE assessment complete. PT notes reveiwed. Patient has met PT goals. Noted that she has ambulated 600 ft without use of an assistive device. Patient has dx: COPD exacerbation. At this time, she is independent with ADL's. " SHANIKA left a note for HH transition visit secondary to patient's level of function at this time. Patient is agreeable to HH transition visit. Patient was also receptive to nicotine patches being ordered at d/c. SHANIKA left a note for MD with request for HH transition visit and nicotine patches at d/c. No additional needs presented and/or voiced at this time. Continue with medical  management....Meena Muir LS      01/15/18 1051    Case Management/Social Work Plan    Additional Comments Team rounding complete, patient doing better but still quite a bit of trouble with dysphagia. Have been unable to perform EGD d/t respiratory status. Patient is doing better. EGD scheduled for Wednesday but Dr. Ingram feels she needs EGD before discharge. Will attempt to get EGD moved up. Ethel Mar RN,         Discharge Placement     No information found        Expected Discharge Date and Time     Expected Discharge Date Expected Discharge Time    Carlos 15, 2018               Demographic Summary       01/15/18 1052    Referral Information    Admission Type inpatient    Referral Source high risk screening   LACE score 11    Record Reviewed clinical discipline documentation;history and physical;medical record    Primary Care Physician Information    Name Isi SALEH            Functional Status       01/15/18 1053    Functional Status Prior    Ambulation 0-->independent    Transferring 0-->independent    Toileting 0-->independent    Bathing 0-->independent    Dressing 0-->independent    Eating 0-->independent    Communication 0-->understands/communicates without difficulty    Swallowing --   Noted difficulty with swallowing. Plan for EGD    IADL    Medications independent   Pharmacy, [facility], and coverage verfied with patient.     Meal Preparation independent    Housekeeping independent    Laundry independent    Shopping independent    Oral Care independent    Activity Tolerance    Current Activity Limitations none     Usual Activity Tolerance good    Cognitive/Perceptual/Developmental    Current Mental Status/Cognitive Functioning no deficits noted    Recent Changes in Mental Status/Cognitive Functioning no changes    Developmental Stage (Eriksson's Stages of Development) Stage 7 (35-65 years/Middle Adulthood) Generativity vs. Stagnation            Psychosocial     None            Abuse/Neglect     None            Legal     None            Substance Abuse     None            Patient Forms     None          Brandie Muir, FRANK

## 2018-01-15 NOTE — PLAN OF CARE
Problem: Patient Care Overview (Adult)  Goal: Plan of Care Review  Outcome: Ongoing (interventions implemented as appropriate)   01/15/18 0523   Coping/Psychosocial Response Interventions   Plan Of Care Reviewed With patient   Patient Care Overview   Progress no change   Outcome Evaluation   Outcome Summary/Follow up Plan Pt has rested most of this shift. Anxiety at minimum at this time per pt report. Vss. No new concerns.

## 2018-01-15 NOTE — PLAN OF CARE
Problem: Patient Care Overview (Adult)  Goal: Plan of Care Review  Outcome: Ongoing (interventions implemented as appropriate)   01/15/18 1112   Coping/Psychosocial Response Interventions   Plan Of Care Reviewed With patient   Patient Care Overview   Progress improving   Outcome Evaluation   Outcome Summary/Follow up Plan Worked on UB strengthening for independence on ADL's. Worked on Grooming. HEP       Problem: Inpatient Occupational Therapy  Goal: Transfer Training Goal 1 LTG- OT  Outcome: Ongoing (interventions implemented as appropriate)   01/10/18 1451 01/15/18 1112   Transfer Training OT LTG   Transfer Training OT LTG, Date Established 01/10/18 --    Transfer Training OT LTG, Time to Achieve by discharge --    Transfer Training OT LTG, Activity Type all transfers --    Transfer Training OT LTG, [place] Level independent --    Transfer Training OT LTG, Date Goal Reviewed --  01/15/18   Transfer Training OT LTG, Outcome --  goal met     Goal: Strength Goal LTG- OT  Outcome: Ongoing (interventions implemented as appropriate)   01/10/18 1451 01/15/18 1112   Strength OT LTG   Strength Goal OT LTG, Date Established 01/10/18 --    Strength Goal OT LTG, Time to Achieve by discharge --    Strength Goal OT LTG, Measure to Achieve Pt. will complete BUE strengthening exercises all planes and joints to increase BUE strength to 5/5 for ADLs.  --    Strength Goal OT LTG, Date Goal Reviewed --  01/15/18   Strength Goal OT LTG, Outcome --  goal ongoing     Goal: ADL Goal LTG- OT  Outcome: Ongoing (interventions implemented as appropriate)   01/10/18 1451 01/15/18 1112   ADL OT LTG   ADL OT LTG, Date Established 01/10/18 --    ADL OT LTG, Activity Type ADL skills --    ADL OT LTG, [place] Level independent --    ADL OT LTG, Date Goal Reviewed --  01/15/18   ADL OT LTG, Outcome --  goal ongoing

## 2018-01-15 NOTE — SIGNIFICANT NOTE
01/15/18 0934   Rehab Treatment   Discipline physical therapy assistant   Treatment Not Performed other (see comments)  (all goals met awaiting PT to D/C)

## 2018-01-15 NOTE — PAYOR COMM NOTE
"Filomena Perales (67 y.o. Female)     Date of Birth Social Security Number Address Home Phone MRN    1950  [address] [address] [phone] [id number]    [place] Marital Status          Mormon        Admission Date Admission Type Admitting Provider Attending Provider Department, Room/Bed    1/9/18 Emergency Angie Ribeiro MD Amsler, Haizia L, MD [address], 408/1    Discharge Date Discharge Disposition Discharge Destination                      Attending Provider: Angie Ribeiro MD     Allergies:  Morphine And Related    Isolation:  None   Infection:  None   Code Status:  FULL    Ht:  157.5 cm (62\")   Wt:  57.7 kg (127 lb 1.6 oz)    Admission Cmt:  None   Principal Problem:  Dysphagia [R13.10] More...                 Active Insurance as of 1/9/2018     Primary Coverage     Payor Plan Insurance Group Employer/Plan Group    MISC MEDICARE REPLACMENT [facility]      Coverage Address Coverage Phone Number Effective From Effective To    [phone] 1/1/2015     [place] [id number]       Subscriber Name Subscriber Birth Date Member ID       FILOMENA PERALES 1950 [id number]                 Emergency Contacts      (Rel.) Home Phone Work Phone Mobile Phone    Torri Rivero (Daughter) [phone] -- [phone]    AlfredSusan dang (Relative) [phone] -- [phone]            [facility] Medications (active)       Dose Frequency Start End    acetaminophen (TYLENOL) tablet 650 mg 650 mg Every 6 Hours PRN 1/9/2018     Sig - Route: Take 2 tablets by mouth Every 6 (Six) Hours As Needed for Mild Pain . - Oral    amLODIPine (NORVASC) tablet 10 mg 10 mg Daily 1/10/2018     Sig - Route: Take 1 tablet by mouth Daily. - Oral    apixaban (ELIQUIS) tablet 5 mg 5 mg Every 12 Hours Scheduled 1/13/2018 1/14/2018    Sig - Route: Take 1 tablet by mouth Every 12 (Twelve) Hours. - Oral    atorvastatin (LIPITOR) tablet 10 " mg 10 mg Daily 1/10/2018     Sig - Route: Take 1 tablet by mouth Daily. - Oral    benzonatate (TESSALON) capsule 100 mg 100 mg 3 Times Daily PRN 1/9/2018     Sig - Route: Take 1 capsule by mouth 3 (Three) Times a Day As Needed for Cough. - Oral    bisacodyl (DULCOLAX) EC tablet 5 mg 5 mg Daily PRN 1/9/2018     Sig - Route: Take 1 tablet by mouth Daily As Needed for Constipation. - Oral    budesonide-formoterol (SYMBICORT) 160-4.5 MCG/ACT inhaler 2 puff 2 puff 2 Times Daily - RT 1/9/2018     Sig - Route: Inhale 2 puffs 2 (Two) Times a Day. - Inhalation    butalbital-acetaminophen-caffeine (FIORICET, ESGIC) -40 MG per tablet 1 tablet 1 tablet Every 6 Hours PRN 1/13/2018     Sig - Route: Take 1 tablet by mouth Every 6 (Six) Hours As Needed for Migraine. - Oral    gabapentin (NEURONTIN) capsule 800 mg 800 mg Every 8 Hours Scheduled 1/9/2018     Sig - Route: Take 2 capsules by mouth Every 8 (Eight) Hours. - Oral    hydrALAZINE (APRESOLINE) injection 5 mg 5 mg Every 6 Hours PRN 1/12/2018     Sig - Route: Infuse 0.25 mL into a venous catheter Every 6 (Six) Hours As Needed for High Blood Pressure. - Intravenous    ipratropium-albuterol (DUO-NEB) nebulizer solution 3 mL 3 mL 4 Times Daily - RT 1/9/2018     Sig - Route: Take 3 mL by nebulization 4 (Four) Times a Day. - Nebulization    LORazepam (ATIVAN) tablet 0.5 mg 0.5 mg Every 12 Hours PRN 1/14/2018 1/24/2018    Sig - Route: Take 1 tablet by mouth Every 12 (Twelve) Hours As Needed for Anxiety. - Oral    meclizine (ANTIVERT) tablet 25 mg 25 mg 3 Times Daily PRN 1/9/2018     Sig - Route: Take 1 tablet by mouth 3 (Three) Times a Day As Needed for dizziness. - Oral    methylPREDNISolone sodium succinate (SOLU-Medrol) injection 40 mg 40 mg Every 8 Hours 1/15/2018     Sig - Route: Infuse 1 mL into a venous catheter Every 8 (Eight) Hours. - Intravenous    nicotine (NICODERM CQ) 21 MG/24HR patch 1 patch 1 patch Every 24 Hours 1/9/2018     Sig - Route: Place 1 patch on the  "skin Daily. - Transdermal    ondansetron (ZOFRAN) tablet 4 mg 4 mg Every 8 Hours PRN 1/9/2018     Sig - Route: Take 1 tablet by mouth Every 8 (Eight) Hours As Needed for Nausea or Vomiting. - Oral    pantoprazole (PROTONIX) injection 40 mg 40 mg Every Early Morning 1/12/2018     Sig - Route: Infuse 10 mL into a venous catheter Every Morning. - Intravenous    Pharmacy to dose vancomycin  Continuous PRN 1/11/2018 1/18/2018    Sig - Route: Continuous As Needed for Consult. - Does not apply    Pharmacy to Dose Zosyn  Continuous PRN 1/11/2018 1/18/2018    Sig - Route: Continuous As Needed for Consult. - Does not apply    piperacillin-tazobactam (ZOSYN) 3.375 g in iso-osmotic dextrose 50 ml (premix) 3.375 g Every 8 Hours 1/11/2018 1/18/2018    Sig - Route: Infuse 50 mL into a venous catheter Every 8 (Eight) Hours. - Intravenous    sertraline (ZOLOFT) tablet 100 mg 100 mg Daily 1/10/2018     Sig - Route: Take 2 tablets by mouth Daily. - Oral    sodium chloride 0.9 % flush 1-10 mL 1-10 mL As Needed 1/9/2018     Sig - Route: Infuse 1-10 mL into a venous catheter As Needed for Line Care. - Intravenous    sodium chloride 0.9 % flush 10 mL 10 mL As Needed 1/9/2018     Sig - Route: Infuse 10 mL into a venous catheter As Needed for Line Care. - Intravenous    Cosign for Ordering: Accepted by Chaitanya Domingo MD on 1/9/2018 11:14 PM    Linked Group 1:  \"And\" Linked Group Details        sodium chloride 0.9 % infusion 75 mL/hr Continuous 1/9/2018     Sig - Route: Infuse 75 mL/hr into a venous catheter Continuous. - Intravenous    traMADol (ULTRAM) tablet 50 mg 50 mg Every 6 Hours PRN 1/10/2018 1/20/2018    Sig - Route: Take 1 tablet by mouth Every 6 (Six) Hours As Needed for Moderate Pain . - Oral    traZODone (DESYREL) tablet 300 mg 300 mg Nightly 1/9/2018     Sig - Route: Take 2 tablets by mouth Every Night. - Oral    vancomycin (VANCOCIN) 1,250 mg in sodium chloride 0.9 % 250 mL IVPB 1,250 mg Every 12 Hours 1/15/2018 1/18/2018    " Sig - Route: Infuse 1,250 mg into a venous catheter Every 12 (Twelve) Hours. - Intravenous    methylPREDNISolone sodium succinate (SOLU-Medrol) injection 60 mg (Discontinued) 60 mg Every 8 Hours 1/14/2018 1/15/2018    Sig - Route: Infuse 0.96 mL into a venous catheter Every 8 (Eight) Hours. - Intravenous    vancomycin 1 g/250 mL 0.9% NS (vial-mate) (Discontinued) 1,000 mg Every 12 Hours 1/13/2018 1/15/2018    Sig - Route: Infuse 250 mL into a venous catheter Every 12 (Twelve) Hours. - Intravenous    Reason for Discontinue: Dose adjustment          Lab Results (last 24 hours)     Procedure Component Value Units Date/Time    Blood Culture - Blood, [[id number]]  (Normal) Collected:  01/09/18 1646    Specimen:  Blood from Blood, Arterial Line Updated:  01/14/18 2246     Blood Culture No growth at 5 days    Blood Culture - Blood, [[id number]]  (Normal) Collected:  01/09/18 2258    Specimen:  Blood from Arm, Left Updated:  01/14/18 2331     Blood Culture No growth at 5 days    CBC & Differential [[id number]] Collected:  01/15/18 0400    Specimen:  Blood Updated:  01/15/18 0406    Narrative:       The following orders were created for panel order CBC & Differential.  Procedure                               Abnormality         Status                     ---------                               -----------         ------                     CBC Auto Differential[[id number]]        Abnormal            Final result                 Please view results for these tests on the individual orders.    CBC Auto Differential [[id number]]  (Abnormal) Collected:  01/15/18 0400    Specimen:  Blood Updated:  01/15/18 0406     WBC 12.66 (H) 10*3/mm3      RBC 3.34 (L) 10*6/mm3      Hemoglobin 10.4 (L) g/dL      Hematocrit 30.4 (L) %      MCV 91.0 fL      MCH 31.1 pg      MCHC 34.2 g/dL      RDW 13.1 %      RDW-SD 43.5 fl      MPV 8.6 fL      Platelets 289 10*3/mm3      Neutrophil % 80.3 (H) %      Lymphocyte % 10.6 %      Monocyte % 8.1 %       Eosinophil % 0.0 %      Basophil % 0.0 %      Immature Grans % 1.0 (H) %      Neutrophils, Absolute 10.17 (H) 10*3/mm3      Lymphocytes, Absolute 1.34 10*3/mm3      Monocytes, Absolute 1.02 (H) 10*3/mm3      Eosinophils, Absolute 0.00 10*3/mm3      Basophils, Absolute 0.00 10*3/mm3      Immature Grans, Absolute 0.13 (H) 10*3/mm3     Basic Metabolic Panel [[id number]]  (Abnormal) Collected:  01/15/18 0400    Specimen:  Blood Updated:  01/15/18 0420     Glucose 113 (H) mg/dL      BUN 16 mg/dL      Creatinine 0.54 mg/dL      Sodium 140 mmol/L      Potassium 3.6 mmol/L      Chloride 105 mmol/L      CO2 30.0 mmol/L      Calcium 7.8 (L) mg/dL      eGFR Non African Amer 113 (H) mL/min/1.73      BUN/Creatinine Ratio 29.6 (H)     Anion Gap 5.0 mmol/L     Vancomycin, Trough 30 minutes before vancomycin infusion please [[id number]]  (Abnormal) Collected:  01/15/18 0400    Specimen:  Blood Updated:  01/15/18 0423     Vancomycin Trough 9.21 (L) mcg/mL         Imaging Results (last 24 hours)     ** No results found for the last 24 hours. **           Physician Progress Notes (most recent note)      Conner Ingram MD at 1/15/2018 11:28 AM  Version 1 of 1             [facility] Medicine Services  INPATIENT PROGRESS NOTE    Length of Stay: 6  Date of Admission: 1/9/2018  Primary Care Physician: DELFINO Smith    Subjective     Chief Complaint   Patient presents with   • Shortness of Breath       HPI:  Patient was seen and examined this morning. Patient feels significantly better , dyspnea cough significantly  improved and resolving , feels stronger, ambulating the hallways . Patient is passing gas, Patient denies, headache or dizziness, chest pain or palpitations,abdominal pain N/V/D , blood in the urine or blood in the stool , or any urinary symptoms , weakness or numbness or tingling in the extremities or visual changes.    Review of Systems     All pertinent negatives and  positives are as above. All other systems have been reviewed and are negative unless otherwise stated.   Objective    Physical exam    Temp:  [97.6 °F (36.4 °C)-98.2 °F (36.8 °C)] 97.6 °F (36.4 °C)  Heart Rate:  [67-86] 73  Resp:  [18] 18  BP: (114-133)/(55-69) 114/55    -Constitutional: Patient is AAO x 3. Patient appears well-developed and well-nourished.   -CVS: Normal rate, regular rhythm, normal heart sounds and intact distal pulses.  Exam reveals no gallop and no friction rub.  No murmur heard.  -Pulm: mild b/l scattered wheezes ( significantly improved)  -Abdominal: Soft. Bowel sounds are normal. No distension, no tenderness. There is no rebound and no guarding. No hernia.   -Musculoskeletal: No Cyanosis clubbing or edema.    Results Review:    Laboratory Data:     Results from last 7 days  Lab Units 01/15/18  0400 01/14/18  0600 01/13/18  0338  01/09/18  1828   SODIUM mmol/L 140 138 141  < > 133*   POTASSIUM mmol/L 3.6 4.3 3.7  < > 4.3   CHLORIDE mmol/L 105 98 105  < > 100   CO2 mmol/L 30.0 30.0 27.0  < > 23.0   BUN mg/dL 16 19 14  < > 11   CREATININE mg/dL 0.54 0.64 0.62  < > 0.50   GLUCOSE mg/dL 113* 136* 114*  < > 101*   CALCIUM mg/dL 7.8* 9.0 7.9*  < > 9.5   BILIRUBIN mg/dL  --   --   --   --  0.4   ALK PHOS U/L  --   --   --   --  92   ALT (SGPT) U/L  --   --   --   --  31   AST (SGOT) U/L  --   --   --   --  23   ANION GAP mmol/L 5.0 10.0 9.0  < > 10.0   < > = values in this interval not displayed.  Estimated Creatinine Clearance: 63.9 mL/min (by C-G formula based on Cr of 0.54).            Results from last 7 days  Lab Units 01/15/18  0400 01/14/18  0600 01/13/18  0338 01/12/18  0640 01/11/18  0605   WBC 10*3/mm3 12.66* 12.79* 14.64* 22.18* 25.07*   HEMOGLOBIN g/dL 10.4* 10.9* 10.1* 11.2* 10.7*   HEMATOCRIT % 30.4* 33.0* 30.2* 33.1* 32.5*   PLATELETS 10*3/mm3 289 341 314 377 340       Results from last 7 days  Lab Units 01/09/18  1646   INR  0.95       Culture Data:   No results found for: BLOODCX  No  results found for: URINECX  No results found for: RESPCX  No results found for: WOUNDCX  No results found for: STOOLCX  No components found for: BODYFLD    Micobiology  Blood Culture   Date Value Ref Range Status   01/09/2018 No growth at less than 24 hours  Preliminary                            Radiology Data:   Imaging Results (all)     Procedure Component Value Units Date/Time    XR Chest 2 View [[id number]] Collected:  01/09/18 1554     Updated:  01/09/18 1610    Narrative:         EXAM:          Radiograph(s), Chest   VIEWS:    PA / Lat ; 2       DATE/TIME:  1/9/2018 4:07 PM CST                INDICATION:   shortness of breath    COMPARISON:  CXR: 4/6/16             FINDINGS:             - lines/tubes:    none     - cardiac:         size within normal limits         - mediastinum: contour within normal limits         - lungs:         Linearly oriented airspace disease of the right  upper lobe. The remainder of the lungs are clear. Left lung  granuloma noted.             - pleura:         no evidence of  fluid                  - osseous:         Status post left shoulder repair.                   - misc.:         Impression:       CONCLUSION:        1. Right upper lobe airspace disease presumably representing a  focus of pneumonia.                                               Electronically signed by:  KRYSTEN Cast MD  1/9/2018 4:09 PM  CST Workstation: [id number]    IR PICC wo fluoro guidance [[id number]] Resulted:  01/09/18 1654     Updated:  01/09/18 1654    Narrative:       This procedure was auto-finalized with no dictation required.    US Guided Vascular Access [[id number]] Resulted:  01/09/18 1716     Updated:  01/09/18 1716    Narrative:       This procedure was auto-finalized with no dictation required.    XR Chest Post CVA Port [[id number]] Collected:  01/09/18 1655     Updated:  01/09/18 1719    Narrative:         EXAM:         Radiograph(s), Chest   VIEWS:   Portable ; 1       DATE/TIME:  1/9/2018  5:15 PM CST                INDICATION:   PICC placement    COMPARISON:  CXR: 4/7/16             FINDINGS:             - lines/tubes:    Right approach PICC line in standard position.      - cardiac:         size within normal limits         - mediastinum: contour within normal limits         - lungs:         Focus of airspace disease in the right lung,  probably lower lobe.             - pleura:         no evidence of  fluid                  - osseous:         Status post left shoulder replacement.                   - misc.:         Impression:       CONCLUSION:        1. Right approach PICC line in standard position.  2. Suspect focal airspace disease in the right lower lobe.                                                Electronically signed by:  KRYSTEN Cast MD  1/9/2018 5:18 PM  CST Workstation: [id number]          I have reviewed the patient current medications.   Assessment/Plan     Hospital Problem List     * (Principal)Dysphagia    Overview Signed 1/12/2018  1:12 PM by Bin Oh MD     Added automatically from request for surgery [id number]         Pneumonia of right upper lobe due to infectious organism          Assessment / Plan    --Pneumonia  failed outpatient therapy.  Symptoms improving, dyspnea improved . wheezing improved  WBC improving :22.2--14.6--12.79--12.6  continue IV  Antibiotics(vanc and zosyn) and  supplemental oxygen, solumedrol decreased to 40mg Q8 hours), inceptive tara,      --dysphagia   proximal esophageal dilatation on CTA  CTA:Dilated proximal esophagus .Barium swallow done.   Dr Oh consulted . EGD plan for wednesday , as yesterday was unable to perform EGD due to pulmonary function .  Will continue soft blend GI diet . Plan for scope on Wednesday    --COPD exacerbation:   Continue supplemental oxygen, bronchodilators and steroids.       --anxiety  Ativan     --hx of PE  eliquis . Will hold on Monday AM as patient having EGD Wed.    --Tobacco use  nicotine  patch    --Hypertension:   Controlled  Continue home medications     --GI prophylaxis/DVT prophylaxis    This document has been electronically signed by Conner Ingram MD on January 15, 2018 11:28 AM       Electronically signed by Conner Ingram MD at 1/15/2018 11:31 AM           Consult Notes (most recent note)      Bin Oh MD at 1/12/2018 12:52 PM  Version 1 of 1     Consult Orders:    1. Inpatient Consult to Gastroenterology [[id number]] ordered by Conner Ingram MD at 01/11/18 1802                SUBJECTIVE:   1/12/2018    Name: Mona Perales  DOD: 1950    REASON FOR CONSULT:Patient with epigastric abdominal pain or dysphagia.    Chief Complaint:     Chief Complaint   Patient presents with   • Shortness of Breath       Subjective     Patient is 67 y.o. female presentsWith a complaint of some difficulty swallowing.  Patient states that food gets stuck in her esophagus occasionally.      ROS/HISTORY/ CURRENT MEDICATIONS/OBJECTIVE/VS/PE:   Review of Systems:   Review of Systems   Constitutional: Negative for activity change, appetite change, chills, diaphoresis, fatigue, fever and unexpected weight change.   HENT: Positive for trouble swallowing. Negative for sore throat.    Respiratory: Negative for shortness of breath.    Gastrointestinal: Negative for abdominal distention, abdominal pain, anal bleeding, blood in stool, constipation, diarrhea, nausea, rectal pain and vomiting.   Musculoskeletal: Negative for arthralgias.   Skin: Negative for pallor.   Neurological: Negative for light-headedness.       History:     Past Medical History:   Diagnosis Date   • Anxiety    • Arthritis    • Asthma    • 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 with ulceration     bilateral legs - bilateral iliac stents 2008      • Cancer    • Chronic lower back pain    • COPD (chronic obstructive pulmonary disease)    • Coronary arteriosclerosis    • Essential (primary) hypertension    • History of pulmonary  embolus (PE)    • Hyperlipidemia    • Insomnia    • Lumbago    • Nicotine dependence    • Occlusion of artery      and stenosis of bilateral carotid arteries - BABS 16-49%, LICA 0-15%   • Other atherosclerosis of native artery of other extremity     LEFT subclavian stent 2005 (occluded)     Past Surgical History:   Procedure Laterality Date   • CARDIAC CATHETERIZATION  04/06/2016    No evidence of any obstructive epicardial CAD.Preserved LV systolic function with EF of 55%.   • CENTRAL VENOUS LINE INSERTION  04/07/2016    Successful placement of right uppe extremity 6-French triple lumen PICC line.   • FRACTURE SURGERY     • HYSTERECTOMY     • JOINT REPLACEMENT     • TOTAL SHOULDER REPLACEMENT Left    • TRANSESOPHAGEAL ECHOCARDIOGRAM (JACQUES)  04/07/2016    With color flow-Mild to moderate CLVH.LV systolic function well preserved with Ef of 55-60%.Mitral and AV intact.Diastolic dysfunction     Family History   Problem Relation Age of Onset   • Heart disease Other    • Hypertension Other      Social History   Substance Use Topics   • Smoking status: Current Every Day Smoker     Packs/day: 1.00   • Smokeless tobacco: Never Used   • Alcohol use No     Prescriptions Prior to Admission   Medication Sig Dispense Refill Last Dose   • albuterol (PROVENTIL HFA;VENTOLIN HFA) 108 (90 BASE) MCG/ACT inhaler Inhale 2 puffs every night.   Taking   • albuterol (PROVENTIL) (2.5 MG/3ML) 0.083% nebulizer solution Take 2.5 mg by nebulization Every 8 (Eight) Hours As Needed for Wheezing.   Taking   • amLODIPine (NORVASC) 10 MG tablet Take 10 mg by mouth Daily.   Taking   • apixaban (ELIQUIS) 5 MG tablet tablet Take 1 tablet by mouth 2 (Two) Times a Day. 180 tablet 5    • Fluticasone Furoate-Vilanterol (BREO ELLIPTA) 100-25 MCG/INH aerosol powder  Inhale 1 puff Daily.   Taking   • gabapentin (NEURONTIN) 800 MG tablet Take 800 mg by mouth 3 (Three) Times a Day.   Taking   • meclizine (ANTIVERT) 25 MG tablet Take 25 mg by mouth 3 (Three)  Times a Day As Needed for dizziness.   Taking   • ondansetron (ZOFRAN) 4 MG tablet Take 4 mg by mouth Every 8 (Eight) Hours As Needed for Nausea or Vomiting.   Taking   • penciclovir (DENAVIR) 1 % cream Apply 1 application topically Every 2 (Two) Hours.   Taking   • pravastatin (PRAVACHOL) 20 MG tablet Take 20 mg by mouth Daily.   Taking   • sertraline (ZOLOFT) 100 MG tablet Take 100 mg by mouth Daily.   Taking   • traZODone (DESYREL) 300 MG tablet Take 300 mg by mouth Every Night.   Taking     Allergies:  Morphine and related    I have reviewed the patients medical history, surgical history and family history in the available medical record system.     Current Medications:     Current Facility-Administered Medications   Medication Dose Route Frequency Provider Last Rate Last Dose   • acetaminophen (TYLENOL) tablet 650 mg  650 mg Oral Q6H PRN Kev Abraham MD   650 mg at 01/11/18 0822   • amLODIPine (NORVASC) tablet 10 mg  10 mg Oral Daily Kev Abraham MD   10 mg at 01/11/18 0817   • atorvastatin (LIPITOR) tablet 10 mg  10 mg Oral Daily Kev Abraham MD   10 mg at 01/11/18 0816   • benzonatate (TESSALON) capsule 100 mg  100 mg Oral TID PRN Kev Abraham MD       • bisacodyl (DULCOLAX) EC tablet 5 mg  5 mg Oral Daily PRN Kev Abraham MD       • budesonide-formoterol (SYMBICORT) 160-4.5 MCG/ACT inhaler 2 puff  2 puff Inhalation BID - RT Kev Abraham MD   2 puff at 01/12/18 0747   • butalbital-acetaminophen-caffeine (FIORICET, ESGIC) -40 MG per tablet 1 tablet  1 tablet Oral Q12H PRN Conner Ingram MD   1 tablet at 01/12/18 0411   • gabapentin (NEURONTIN) capsule 800 mg  800 mg Oral Q8H Kev Abraham MD   800 mg at 01/12/18 0545   • hydrALAZINE (APRESOLINE) injection 5 mg  5 mg Intravenous Q6H PRN Conner Ingram MD       • ipratropium-albuterol (DUO-NEB) nebulizer solution 3 mL  3 mL Nebulization 4x Daily - RT Kev Abraham MD   3 mL at 01/12/18 1123   • meclizine  (ANTIVERT) tablet 25 mg  25 mg Oral TID PRN Kev Abraham MD       • [START ON 1/13/2018] methylPREDNISolone sodium succinate (SOLU-Medrol) injection 40 mg  40 mg Intravenous Daily Conner Ingram MD   40 mg at 01/12/18 1007   • nicotine (NICODERM CQ) 21 MG/24HR patch 1 patch  1 patch Transdermal Q24H Kev Abraham MD   1 patch at 01/11/18 2339   • ondansetron (ZOFRAN) tablet 4 mg  4 mg Oral Q8H PRN Kev Abraham MD   4 mg at 01/10/18 1659   • pantoprazole (PROTONIX) injection 40 mg  40 mg Intravenous Q AM Conner Ingram MD       • Pharmacy to dose vancomycin   Does not apply Continuous PRN Conner Ingram MD       • Pharmacy to Dose Zosyn   Does not apply Continuous PRN Conner Ingram MD       • piperacillin-tazobactam (ZOSYN) 3.375 g in iso-osmotic dextrose 50 ml (premix)  3.375 g Intravenous Q8H Conner Ingram MD 50 mL/hr at 01/12/18 1126 3.375 g at 01/12/18 1126   • sertraline (ZOLOFT) tablet 100 mg  100 mg Oral Daily Kev Abraham MD   100 mg at 01/11/18 0816   • sodium chloride 0.9 % flush 1-10 mL  1-10 mL Intravenous PRN Kev Abraham MD       • sodium chloride 0.9 % flush 10 mL  10 mL Intravenous PRN RACHEL Lange       • sodium chloride 0.9 % infusion  75 mL/hr Intravenous Continuous Kev Abraham MD 75 mL/hr at 01/11/18 1543 75 mL/hr at 01/11/18 1543   • traMADol (ULTRAM) tablet 50 mg  50 mg Oral Q6H PRN Mason Lamar MD   50 mg at 01/10/18 1659   • traZODone (DESYREL) tablet 300 mg  300 mg Oral Nightly Kev Abraham MD   300 mg at 01/11/18 2151   • vancomycin (VANCOCIN) 750 mg in sodium chloride 0.9 % 250 mL IVPB  750 mg Intravenous Q12H Conner Ingram MD   750 mg at 01/11/18 6505       Objective     Physical Exam:   Temp:  [98 °F (36.7 °C)-98.8 °F (37.1 °C)] 98.8 °F (37.1 °C)  Heart Rate:  [67-99] 86  Resp:  [16-20] 18  BP: (112-124)/(58-60) 116/60    Physical Exam:  General Appearance:    Alert, cooperative, in no acute distress   Head:    Normocephalic, without  obvious abnormality, atraumatic   Eyes:            Lids and lashes normal, conjunctivae and sclerae normal, no   icterus, no pallor, corneas clear, PERRLA   Ears:    Ears appear intact with no abnormalities noted   Throat:   No oral lesions, no thrush, oral mucosa moist   Neck:   No adenopathy, supple, trachea midline, no thyromegaly, no     carotid bruit, no JVD   Back:     No kyphosis present, no scoliosis present, no skin lesions,       erythema or scars, no tenderness to percussion or                   palpation,   range of motion normal   Lungs:     Clear to auscultation,respirations regular, even and                   unlabored    Heart:    Regular rhythm and normal rate, normal S1 and S2, no            murmur, no gallop, no rub, no click   Breast Exam:    Deferred   Abdomen:     Normal bowel sounds, no masses, no organomegaly, soft        non-tender, non-distended, no guarding, no rebound                 tenderness   Genitalia:    Deferred   Extremities:   Moves all extremities well, no edema, no cyanosis, no              redness   Pulses:   Pulses palpable and equal bilaterally   Skin:   No bleeding, bruising or rash   Lymph nodes:   No palpable adenopathy   Neurologic:   Cranial nerves 2 - 12 grossly intact, sensation intact, DTR        present and equal bilaterally      Results Review:     Lab Results   Component Value Date    WBC 22.18 (H) 01/12/2018    WBC 25.07 (H) 01/11/2018    WBC 18.77 (H) 01/10/2018    HGB 11.2 (L) 01/12/2018    HGB 10.7 (L) 01/11/2018    HGB 11.2 (L) 01/10/2018    HCT 33.1 (L) 01/12/2018    HCT 32.5 (L) 01/11/2018    HCT 31.9 (L) 01/10/2018     01/12/2018     01/11/2018     01/10/2018       Results from last 7 days  Lab Units 01/09/18  1828   ALK PHOS U/L 92   ALT (SGPT) U/L 31   AST (SGOT) U/L 23       Results from last 7 days  Lab Units 01/09/18  1828   BILIRUBIN mg/dL 0.4   ALK PHOS U/L 92     No results found for: LIPASE  Lab Results   Component Value Date     INR 0.95 01/09/2018    INR 1.0 04/08/2016    INR 1.0 11/06/2014         Radiology Review:  Imaging Results (last 72 hours)     Procedure Component Value Units Date/Time    XR Chest 2 View [[id number]] Collected:  01/09/18 1554     Updated:  01/09/18 1610    Narrative:         EXAM:          Radiograph(s), Chest   VIEWS:    PA / Lat ; 2       DATE/TIME:  1/9/2018 4:07 PM CST                INDICATION:   shortness of breath    COMPARISON:  CXR: 4/6/16             FINDINGS:             - lines/tubes:    none     - cardiac:         size within normal limits         - mediastinum: contour within normal limits         - lungs:         Linearly oriented airspace disease of the right  upper lobe. The remainder of the lungs are clear. Left lung  granuloma noted.             - pleura:         no evidence of  fluid                  - osseous:         Status post left shoulder repair.                   - misc.:         Impression:       CONCLUSION:        1. Right upper lobe airspace disease presumably representing a  focus of pneumonia.                                               Electronically signed by:  KRYSTEN Cast MD  1/9/2018 4:09 PM  CST Workstation: [id number]    IR PICC wo fluoro guidance [[id number]] Resulted:  01/09/18 1654     Updated:  01/09/18 1654    Narrative:       This procedure was auto-finalized with no dictation required.    US Guided Vascular Access [[id number]] Resulted:  01/09/18 1716     Updated:  01/09/18 1716    Narrative:       This procedure was auto-finalized with no dictation required.    XR Chest Post CVA Port [[id number]] Collected:  01/09/18 1655     Updated:  01/09/18 1719    Narrative:         EXAM:         Radiograph(s), Chest   VIEWS:   Portable ; 1       DATE/TIME:  1/9/2018 5:15 PM CST                INDICATION:   PICC placement    COMPARISON:  CXR: 4/7/16             FINDINGS:             - lines/tubes:    Right approach PICC line in standard position.      - cardiac:         size  within normal limits         - mediastinum: contour within normal limits         - lungs:         Focus of airspace disease in the right lung,  probably lower lobe.             - pleura:         no evidence of  fluid                  - osseous:         Status post left shoulder replacement.                   - misc.:         Impression:       CONCLUSION:        1. Right approach PICC line in standard position.  2. Suspect focal airspace disease in the right lower lobe.                                                Electronically signed by:  KRYSTEN Cast MD  1/9/2018 5:18 PM  CST Workstation: [id number]    XR Chest PA & Lateral [[id number]] Collected:  01/11/18 0929     Updated:  01/11/18 0944    Narrative:         [facility] [place]    Patient Name: FILOMENA PRETTYILJESSIE    ORDERING: REBECCA ROBBINS     ATTENDING: MICKY TORRES     REFERRING: REBECCA ROBBINS    -----------------------    PROCEDURE: Two-view chest    COMPARISON: 1/9/2018    HISTORY: dyspnea, J18.1 Lobar pneumonia, unspecified organism  R09.02 Hypoxemia Z78.9 Other specified health status Z74.09 Other  reduced mobility    FINDINGS: Frontal and lateral views of the chest are obtained.     Devices: There is a right upper extremity PICC line with the tip  directed into the superior vena cava.    Lungs/Pleura: There is a bandlike opacity redemonstrated within  the right mid lung zone and more conspicuous currently within the  right lower lobe with minimal changes in the left base. This  likely represents atelectasis. There is a background of mild  interstitial marking accentuation. No effusion or pneumothorax.    Cardiomediastinal Structures: Heart size and mediastinal contours  within limits of normal. Vascular calcification involving the  aorta. The trachea is midline.     Skeletal Structures: Demineralization and degenerative changes.  Left shoulder arthroplasty. No free air beneath the diaphragm.      Impression:       Right upper  extremity PICC line with the tip directed into the  SVC.    Chronic lung background with superimposed bandlike atelectasis  within the right mid lung zone and right greater than left lung  base. No acute pleural finding.    Electronically signed by:  John Leach MD  1/11/2018 9:43 AM  CST Workstation: [id number]    CT Angiogram Chest With Contrast [[id number]] Collected:  01/11/18 1225     Updated:  01/11/18 1309    Narrative:         EXAM:  Computed Tomography with CTA         REGION:  Chest       INDICATION:   dyspnea elevated D/dimer, J18.1 Lobar pneumonia,  unspecified organism R09.02 Hypoxemia Z78.9 Other specified  health status Z74.09 Other reduced mobility    - rule out pulmonary embolism       CLINICAL HISTORY:  CORRELATIVE IMAGING:  None                         TECHNIQUE:     - PE / vascular protocol     - reconstructions:  axial, coronal, sagittal, obliques     - computer-generated 3D reconstructions (MIPS) were performed.     - contrast:  intravenous Isovue 370, 56 mL                                 This exam was performed according to the departmental  dose-optimization program which includes automated exposure  control, adjustment of the mA and/or kV according to patient size  and/or use of iterative reconstruction technique.         COMMENTS:    - Pulmonary arterial system:      - Main pulmonary artery trunk:  negative      - Left, right main pulmonary arteries: negative      - Lobar arteries: negative       - Segmental arteries: negative      - Systemic vascularity (as visualized):        - Aorta:  grossly negative / normal caliber / no dissection        - roots of great vessels:  grossly negative / normal  caliber        - SVC / IVC:  grossly negative / normal caliber     - Misc (limited visualization):      - pulmonary parenchyma:  Scattered airspace changes in the  right middle lobe, and right lower lobe      - pleura:  negative      - mediastinal / luisito:  negative      - neck, inferior:  grossly  wnl      - subdiaphragmatic structures:  grossly negative (limited  evaluation)       - osseous:      - misc:  Incidental note is made of a dilated esophageal  lumen, more proximally than distally, containing internal debris  throughout.        .        Impression:       CONCLUSION:          1.  No evidence of pulmonary embolism.            2.  No evidence of pathology associated with the visualized  aorta.        3. Scattered airspace changes in the right lung.                       4. Dilated proximal esophagus, containing internal debris  throughout with a focal point of transition not clearly  visualized. Recommend correlation with barium swallow for further  assessment.                Electronically signed by:  KRYSTEN Cast MD  1/11/2018 1:07  PM CST Workstation: [id number]    US Venous Doppler Lower Extremity Bilateral (duplex) [[id number]] Collected:  01/11/18 1235     Updated:  01/11/18 7270    Narrative:       .  EXAMINATION:  Ultrasound, venous, lower extremity    CLINICAL INDICATION / HISTORY:   edema .elevated d-dimer, J18.1  Lobar pneumonia, unspecified organism R09.02 Hypoxemia Z78.9  Other specified health status Z74.09 Other reduced mobility    COMPARISON:  none  TECHNIQUE:  Bilateral lower extremity(ies)                          serial axial graded compression technique                          grayscale, color flow, spectral and  Doppler     FINDINGS:    Imaged vessels:    - common femoral vein (CFV)    - deep femoral vein (FV) (formally called superficial femoral  vein (SFV))    - profunda femoral vein (PFV) (cephalad portion)    - popliteal vein (PV)    - anterior tibial vein (ATV) (cephalad portion)    - posterior tibial vein (PTV)    - greater saphenous vein (GSV) (non-contiguous segments)    Unless otherwise indicated, all of the above described vessels  were freely compressible and demonstrated spontaneous and phasic  flow as well as appropriate flow with augmentation.    .    Impression:        CONCLUSION:  1. Negative examination - no evidence of deep venous thrombosis  within the femoral-popliteal system of the bilateral lower  extremity(ies).        Electronically signed by:  KRYSTEN Cast MD  1/11/2018 1:35  PM [facility] Workstation: [id number]    FL Esophagram Complete [[id number]] Collected:  01/11/18 1718     Updated:  01/11/18 1819    Narrative:         PROCEDURE: Barium swallow    HISTORY:  Dysphagia     COMPARISON:  None    Fluoroscopy time was one minute 54 seconds  Total # of films = 34     TECHNIQUE:    Mucosal relief views were obtained in the LPO position.  Subsequently, a double contrast esophagram was performed using  effervescent granules followed by thick barium. The esophagus was  evaluated in the LPO position with spot fluoroscopic images  obtained. The single column portion of the exam was performed in  the prone, RICHARDS position.    FINDINGS:  Multiple tertiary contractions were seen during the fluoroscopic  exam indicative of presbyesophagus.   A coarse pattern of esophageal mucosa is seen throughout the  esophagus, most pronounced in the upper to mid segments, possibly  due to reflux esophagitis.  No debris was seen in the esophagus during the exam.  No suspicious esophageal filling defect was seen.  No esophageal stricture was visualized, however there is mass  effect on the esophagus at the level of the aortic arch.  The gastroesophageal junction distends appropriately.   Mild esophageal reflux was present.  The patient was given a 12.5 mm barium tablet which passed  through the esophagus and into the stomach without delay.       Impression:       CONCLUSION:    1.  Multiple tertiary contractions were seen during the  fluoroscopic exam indicative of presbyesophagus.   2.  A coarse pattern of esophageal mucosa is seen throughout the  esophagus, most pronounced in the upper to mid segments, possibly  due to reflux esophagitis.  3.  No debris was seen in the esophagus during the  exam.  4.  No suspicious esophageal filling defect was seen.  5.  No esophageal stricture was visualized, however there is mass  effect on the esophagus at the level of the aortic arch.  6.  Mild gastroesophageal reflux visualized during the exam.  Suggest gastroenterology consultation.    Electronically signed by:  Corey Arteaga MD  1/11/2018 6:18 PM CST  Workstation: [id number]           I reviewed the patient's new clinical results.  I reviewed the patient's new imaging results and agree with the interpretation.     ASSESSMENT/PLAN:   ASSESSMENT: Patient with episodes of dysphagia stating food is getting stuck upper GI series shows marked irritation in the distal esophagus but no evidence of stricture.    PLAN: We'll schedule patient for EGD to be done today.  The biopsies of the distal esophagus.  The risks, benefits, and alternatives of this procedure have been discussed with the patient or the responsible party- the patient understands and agrees to proceed.         Bin Oh MD  01/12/18  12:52 PM         This document has been electronically signed by Bin Oh MD on January 12, 2018 12:52 PM       Electronically signed by Bin Oh MD at 1/12/2018 12:59 PM          Continued Stay Review Clinicals  Auth # [id number]  Ethel Mar RN,   [phone] phone  [phone] fax

## 2018-01-15 NOTE — CONSULTS
Adult Nutrition  Assessment    Patient Name:  Mona Perales  YOB: 1950  MRN: [id number]  Admit Date:  1/9/2018    Assessment Date:  1/15/2018    Comments:  Pt reports she has been eating fine since being in hospital, only has difficulty swallowing pills.  MD ordered bland/GI soft diet to ease swallowing.  Plan is for EGD with dilatation on Wednesday.  PO intakes %.  Pt likes Boost, will continue to send on trays.  Wt up since admit.  Glucose up and appetite improved w/steroids.  RD will monitor.          Reason for Assessment       01/15/18 1549    Reason for Assessment    Reason For Assessment/Visit follow up protocol    Identified At Risk By Screening Criteria dysphagia or difficulty swallowing    Appetite Improved                Nutrition/Diet History       01/15/18 1549    Nutrition/Diet History    Typical Food/Fluid Intake Pt reports she has been eating fine since being in hospital, only has difficulty swallowing pills.  Pt likes Boost.              Labs/Tests/Procedures/Meds       01/15/18 1549    Labs/Tests/Procedures/Meds    Labs/Tests Review Reviewed    Medication Review Reviewed, pertinent;Steroid                Nutrition Prescription Ordered       01/15/18 1550    Nutrition Prescription PO    Current PO Diet Regular    Supplement Boost    Supplement Frequency 2 times a day    Common Modifiers GI Soft/[place]            Evaluation of Received Nutrient/Fluid Intake       01/15/18 1551    PO Evaluation    Number of Days PO Intake Evaluated 3 days    % PO Intake             Electronically signed by:  Alma Birmingham RD  01/15/18 3:51 PM

## 2018-01-16 LAB
ANION GAP SERPL CALCULATED.3IONS-SCNC: 6 MMOL/L (ref 5–15)
BASOPHILS # BLD AUTO: 0.01 10*3/MM3 (ref 0–0.2)
BASOPHILS NFR BLD AUTO: 0.1 % (ref 0–2)
BUN BLD-MCNC: 21 MG/DL (ref 7–21)
BUN/CREAT SERPL: 36.8 (ref 7–25)
CALCIUM SPEC-SCNC: 8.2 MG/DL (ref 8.4–10.2)
CHLORIDE SERPL-SCNC: 99 MMOL/L (ref 95–110)
CO2 SERPL-SCNC: 29 MMOL/L (ref 22–31)
CREAT BLD-MCNC: 0.57 MG/DL (ref 0.5–1)
DEPRECATED RDW RBC AUTO: 44.2 FL (ref 36.4–46.3)
EOSINOPHIL # BLD AUTO: 0.01 10*3/MM3 (ref 0–0.7)
EOSINOPHIL NFR BLD AUTO: 0.1 % (ref 0–7)
ERYTHROCYTE [DISTWIDTH] IN BLOOD BY AUTOMATED COUNT: 13.2 % (ref 11.5–14.5)
GFR SERPL CREATININE-BSD FRML MDRD: 106 ML/MIN/1.73 (ref 45–104)
GLUCOSE BLD-MCNC: 111 MG/DL (ref 60–100)
HCT VFR BLD AUTO: 32.4 % (ref 35–45)
HGB BLD-MCNC: 10.9 G/DL (ref 12–15.5)
IMM GRANULOCYTES # BLD: 0.2 10*3/MM3 (ref 0–0.02)
IMM GRANULOCYTES NFR BLD: 1.4 % (ref 0–0.5)
LYMPHOCYTES # BLD AUTO: 1.49 10*3/MM3 (ref 0.6–4.2)
LYMPHOCYTES NFR BLD AUTO: 10.1 % (ref 10–50)
MCH RBC QN AUTO: 30.8 PG (ref 26.5–34)
MCHC RBC AUTO-ENTMCNC: 33.6 G/DL (ref 31.4–36)
MCV RBC AUTO: 91.5 FL (ref 80–98)
MONOCYTES # BLD AUTO: 1.01 10*3/MM3 (ref 0–0.9)
MONOCYTES NFR BLD AUTO: 6.9 % (ref 0–12)
NEUTROPHILS # BLD AUTO: 11.97 10*3/MM3 (ref 2–8.6)
NEUTROPHILS NFR BLD AUTO: 81.4 % (ref 37–80)
PLATELET # BLD AUTO: 305 10*3/MM3 (ref 150–450)
PMV BLD AUTO: 8.9 FL (ref 8–12)
POTASSIUM BLD-SCNC: 4.6 MMOL/L (ref 3.5–5.1)
RBC # BLD AUTO: 3.54 10*6/MM3 (ref 3.77–5.16)
SODIUM BLD-SCNC: 134 MMOL/L (ref 137–145)
WBC NRBC COR # BLD: 14.69 10*3/MM3 (ref 3.2–9.8)

## 2018-01-16 PROCEDURE — 25010000002 LORAZEPAM PER 2 MG: Performed by: FAMILY MEDICINE

## 2018-01-16 PROCEDURE — 94799 UNLISTED PULMONARY SVC/PX: CPT

## 2018-01-16 PROCEDURE — 25010000002 METHYLPREDNISOLONE PER 40 MG: Performed by: FAMILY MEDICINE

## 2018-01-16 PROCEDURE — 80048 BASIC METABOLIC PNL TOTAL CA: CPT | Performed by: INTERNAL MEDICINE

## 2018-01-16 PROCEDURE — 85025 COMPLETE CBC W/AUTO DIFF WBC: CPT | Performed by: INTERNAL MEDICINE

## 2018-01-16 PROCEDURE — 94760 N-INVAS EAR/PLS OXIMETRY 1: CPT

## 2018-01-16 PROCEDURE — 25010000002 VANCOMYCIN PER 500 MG: Performed by: FAMILY MEDICINE

## 2018-01-16 PROCEDURE — 25010000002 PIPERACILLIN SOD-TAZOBACTAM PER 1 G: Performed by: FAMILY MEDICINE

## 2018-01-16 RX ORDER — BUTALBITAL, ACETAMINOPHEN AND CAFFEINE 50; 325; 40 MG/1; MG/1; MG/1
2 TABLET ORAL ONCE
Status: COMPLETED | OUTPATIENT
Start: 2018-01-16 | End: 2018-01-16

## 2018-01-16 RX ORDER — METHYLPREDNISOLONE SODIUM SUCCINATE 40 MG/ML
20 INJECTION, POWDER, LYOPHILIZED, FOR SOLUTION INTRAMUSCULAR; INTRAVENOUS EVERY 8 HOURS
Status: DISCONTINUED | OUTPATIENT
Start: 2018-01-16 | End: 2018-01-17 | Stop reason: HOSPADM

## 2018-01-16 RX ORDER — LORAZEPAM 2 MG/ML
1 INJECTION INTRAMUSCULAR ONCE
Status: COMPLETED | OUTPATIENT
Start: 2018-01-16 | End: 2018-01-16

## 2018-01-16 RX ADMIN — SERTRALINE HYDROCHLORIDE 100 MG: 50 TABLET ORAL at 08:39

## 2018-01-16 RX ADMIN — ONDANSETRON 4 MG: 4 TABLET, FILM COATED ORAL at 05:27

## 2018-01-16 RX ADMIN — AMLODIPINE BESYLATE 10 MG: 10 TABLET ORAL at 08:39

## 2018-01-16 RX ADMIN — TRAMADOL HYDROCHLORIDE 50 MG: 50 TABLET, FILM COATED ORAL at 08:39

## 2018-01-16 RX ADMIN — PANTOPRAZOLE SODIUM 40 MG: 40 INJECTION, POWDER, FOR SOLUTION INTRAVENOUS at 05:27

## 2018-01-16 RX ADMIN — TAZOBACTAM SODIUM AND PIPERACILLIN SODIUM 3.38 G: 375; 3 INJECTION, SOLUTION INTRAVENOUS at 15:13

## 2018-01-16 RX ADMIN — IPRATROPIUM BROMIDE AND ALBUTEROL SULFATE 3 ML: 2.5; .5 SOLUTION RESPIRATORY (INHALATION) at 11:18

## 2018-01-16 RX ADMIN — BUTALBITAL, ACETAMINOPHEN, AND CAFFEINE 2 TABLET: 50; 325; 40 TABLET ORAL at 17:12

## 2018-01-16 RX ADMIN — VANCOMYCIN HYDROCHLORIDE 1250 MG: 5 INJECTION, POWDER, LYOPHILIZED, FOR SOLUTION INTRAVENOUS at 15:13

## 2018-01-16 RX ADMIN — IPRATROPIUM BROMIDE AND ALBUTEROL SULFATE 3 ML: 2.5; .5 SOLUTION RESPIRATORY (INHALATION) at 07:17

## 2018-01-16 RX ADMIN — BUTALBITAL, ACETAMINOPHEN, AND CAFFEINE 1 TABLET: 50; 325; 40 TABLET ORAL at 05:27

## 2018-01-16 RX ADMIN — Medication 10 ML: at 08:39

## 2018-01-16 RX ADMIN — Medication 10 ML: at 15:13

## 2018-01-16 RX ADMIN — TRAZODONE HYDROCHLORIDE 300 MG: 150 TABLET ORAL at 21:20

## 2018-01-16 RX ADMIN — VANCOMYCIN HYDROCHLORIDE 1250 MG: 5 INJECTION, POWDER, LYOPHILIZED, FOR SOLUTION INTRAVENOUS at 04:23

## 2018-01-16 RX ADMIN — BUTALBITAL, ACETAMINOPHEN, AND CAFFEINE 1 TABLET: 50; 325; 40 TABLET ORAL at 10:01

## 2018-01-16 RX ADMIN — TRAMADOL HYDROCHLORIDE 50 MG: 50 TABLET, FILM COATED ORAL at 15:22

## 2018-01-16 RX ADMIN — METHYLPREDNISOLONE SODIUM SUCCINATE 40 MG: 40 INJECTION, POWDER, FOR SOLUTION INTRAMUSCULAR; INTRAVENOUS at 01:55

## 2018-01-16 RX ADMIN — ONDANSETRON 4 MG: 4 TABLET, FILM COATED ORAL at 15:22

## 2018-01-16 RX ADMIN — TAZOBACTAM SODIUM AND PIPERACILLIN SODIUM 3.38 G: 375; 3 INJECTION, SOLUTION INTRAVENOUS at 08:38

## 2018-01-16 RX ADMIN — Medication 10 ML: at 18:56

## 2018-01-16 RX ADMIN — LORAZEPAM 0.5 MG: 0.5 TABLET ORAL at 11:52

## 2018-01-16 RX ADMIN — GABAPENTIN 800 MG: 400 CAPSULE ORAL at 05:27

## 2018-01-16 RX ADMIN — ATORVASTATIN CALCIUM 10 MG: 10 TABLET, FILM COATED ORAL at 08:39

## 2018-01-16 RX ADMIN — METHYLPREDNISOLONE SODIUM SUCCINATE 20 MG: 40 INJECTION, POWDER, FOR SOLUTION INTRAMUSCULAR; INTRAVENOUS at 17:14

## 2018-01-16 RX ADMIN — SODIUM CHLORIDE 75 ML/HR: 9 INJECTION, SOLUTION INTRAVENOUS at 13:57

## 2018-01-16 RX ADMIN — NICOTINE 1 PATCH: 21 PATCH TRANSDERMAL at 21:20

## 2018-01-16 RX ADMIN — GABAPENTIN 800 MG: 400 CAPSULE ORAL at 13:57

## 2018-01-16 RX ADMIN — BUDESONIDE AND FORMOTEROL FUMARATE DIHYDRATE 2 PUFF: 160; 4.5 AEROSOL RESPIRATORY (INHALATION) at 07:18

## 2018-01-16 RX ADMIN — METHYLPREDNISOLONE SODIUM SUCCINATE 40 MG: 40 INJECTION, POWDER, FOR SOLUTION INTRAMUSCULAR; INTRAVENOUS at 10:04

## 2018-01-16 RX ADMIN — TAZOBACTAM SODIUM AND PIPERACILLIN SODIUM 3.38 G: 375; 3 INJECTION, SOLUTION INTRAVENOUS at 23:19

## 2018-01-16 RX ADMIN — GABAPENTIN 800 MG: 400 CAPSULE ORAL at 21:20

## 2018-01-16 RX ADMIN — LORAZEPAM 1 MG: 2 INJECTION INTRAMUSCULAR; INTRAVENOUS at 18:56

## 2018-01-16 NOTE — THERAPY DISCHARGE NOTE
Acute Care - Physical Therapy Discharge Summary  [facility]       Patient Name: Mona Perales  : 1950  MRN: [id number]    Today's Date: 2018  Onset of Illness/Injury or Date of Surgery Date: 18    Date of Referral to PT: 01/10/18  Referring Physician: Dr. Ingram       Admit Date: 2018      PT Recommendation and Plan    Visit Dx:    ICD-10-CM ICD-9-CM   1. Pneumonia of right upper lobe due to infectious organism J18.1 486   2. Hypoxia R09.02 799.02   3. Decreased activities of daily living (ADL) Z78.9 V49.89   4. Impaired functional mobility, balance, gait, and endurance Z74.09 V49.89   5. Dysphagia, unspecified type R13.10 787.20             Outcome Measures       01/15/18 0935 18 0635 18 1026    How much help from another person do you currently need...    Turning from your back to your side while in flat bed without using bedrails?   4  -CH    Moving from lying on back to sitting on the side of a flat bed without bedrails?   4  -CH    Moving to and from a bed to a chair (including a wheelchair)?   4  -CH    Standing up from a chair using your arms (e.g., wheelchair, bedside chair)?   4  -CH    Climbing 3-5 steps with a railing?   3  -CH    To walk in hospital room?   4  -CH    AM-PAC 6 Clicks Score   23  -CH    How much help from another is currently needed...    Putting on and taking off regular lower body clothing? 4  -LW 4  -BB     Bathing (including washing, rinsing, and drying) 4  -LW 4  -BB     Toileting (which includes using toilet bed pan or urinal) 4  -LW 4  -BB     Putting on and taking off regular upper body clothing 4  -LW 4  -BB     Taking care of personal grooming (such as brushing teeth) 4  -LW 4  -BB     Eating meals 4  -LW 4  -BB     Score 24  -LW 24  -BB     Tinetti Assessment    Tinetti Assessment   yes  -CH    Sitting Balance   1  -CH    Arises   2  -CH    Attempts to Rise   2  -CH    Immediate Standing Balance (first 5 sec)   2  -CH    Standing  "Balance   2  -CH    Sternal Nudge (feet close together)   2  -CH    Eyes Closed (feet close together)   1  -CH    Turning 360 Degrees- Steps   0  -CH    Turning 360 Degrees- Steadiness   1  -CH    Sitting Down   2  -CH    Tinetti Balance Score   15  -CH    Gait Initiation (immediate after told \"go\")   1  -CH    Step Length- Right Swing   1  -CH    Step Length- Left Swing   1  -CH    Foot Clearance- Right Foot   1  -CH    Foot Clearance- Left Foot   1  -CH    Step Symmetry   1  -CH    Step Continuity   1  -CH    Path (excursion)   2  -CH    Trunk   2  -CH    Base of Support   1  -CH    Gait Score   12  -CH    Tinetti Total Score   27  -CH    Functional Assessment    Outcome Measure Options   Tinetti  -CH      User Key  (r) = Recorded By, (t) = Taken By, (c) = Cosigned By    Initials Name Provider Type    CH Aurora Rangel, PTA Physical Therapy Assistant    BB Mirna Hrerera, DALE/L Occupational Therapy Assistant    LW Nuzhat Rodrigues, DALE/L Occupational Therapy Assistant                      IP PT Goals       01/16/18 0956 01/13/18 1113 01/13/18 1102    Transfer Training PT STG    Transfer Training PT STG, Date Goal Reviewed 01/16/18  -MN 01/13/18  -CH (P)  01/13/18  -CH    Transfer Training PT STG, Outcome goal met  -MN goal met  -CH (P)  goal ongoing  -CH    Gait Training PT STG    Gait Training Goal PT STG, Date Goal Reviewed 01/16/18  -MN 01/13/18  -CH (P)  01/13/18  -CH    Gait Training Goal PT STG, Outcome goal met  -MN goal met  -CH (P)  goal met  -CH      01/12/18 0930 01/11/18 0911 01/10/18 1512    Transfer Training PT STG    Transfer Training PT STG, Date Established  01/11/18  -BRENDAN (r) BM (t) BRENDAN (c) 01/10/18  -MN    Transfer Training PT STG, Time to Achieve   5 days  -MN    Transfer Training PT STG, Activity Type   bed to chair /chair to bed;sit to stand/stand to sit;toilet  -MN    Transfer Training PT STG, Lucas Level   independent  -MN    Transfer Training PT STG, Date Goal Reviewed 01/12/18 "  -BRENDAN 01/11/18  -BRENDAN (r) BM (t) BRENDAN (c)     Transfer Training PT STG, Outcome goal ongoing  -BRENDAN goal ongoing  -BRENDAN (r) BM (t) BRENDAN (c)     Gait Training PT STG    Gait Training Goal PT STG, Date Established   01/10/18  -MN    Gait Training Goal PT STG, Time to Achieve   5 days  -MN    Gait Training Goal PT STG, [place] Level   conditional independence  -MN    Gait Training Goal PT STG, Assist Device   cane, straight  -MN    Gait Training Goal PT STG, Distance to Achieve   600 ft  -MN    Gait Training Goal PT STG, Date Goal Reviewed 01/12/18  -BRENDAN 01/11/18  -BRENDAN (r) BM (t) BRENDAN (c)     Gait Training Goal PT STG, Outcome goal ongoing  -BRENDAN goal ongoing  -BRENDAN (r) BM (t) BRENDAN (c)       User Key  (r) = Recorded By, (t) = Taken By, (c) = Cosigned By    Initials Name Provider Type    MN Georgia Holcomb, PT Physical Therapist    BRENDAN Navya Morales, PTA Physical Therapy Assistant     Aurora Rangel, PTA Physical Therapy Assistant     Keisha Melton, PT Student PT Student              PT Discharge Summary  Anticipated Discharge Disposition: home  Reason for Discharge: Independent, At baseline function      Georgia Holcomb, PT   1/16/2018

## 2018-01-16 NOTE — SIGNIFICANT NOTE
01/16/18 1439   Rehab Treatment   Discipline occupational therapy assistant   Treatment Not Performed patient/family declined treatment   pt declined 2* to having a migraine at this time

## 2018-01-16 NOTE — PLAN OF CARE
Problem: Patient Care Overview (Adult)  Goal: Plan of Care Review  Outcome: Ongoing (interventions implemented as appropriate)   01/16/18 0224   Coping/Psychosocial Response Interventions   Plan Of Care Reviewed With patient   Patient Care Overview   Progress no change   Outcome Evaluation   Outcome Summary/Follow up Plan Pt has no new complaints at this time. Will continue to monitor.      Goal: Adult Individualization and Mutuality  Outcome: Ongoing (interventions implemented as appropriate)    Goal: Discharge Needs Assessment  Outcome: Ongoing (interventions implemented as appropriate)      Problem: Pneumonia (Adult)  Goal: Signs and Symptoms of Listed Potential Problems Will be Absent or Manageable (Pneumonia)  Outcome: Ongoing (interventions implemented as appropriate)      Problem: COPD, Chronic Bronchitis/Emphysema (Adult)  Goal: Signs and Symptoms of Listed Potential Problems Will be Absent or Manageable (COPD, Chronic Bronchitis/Emphysema)  Outcome: Ongoing (interventions implemented as appropriate)      Problem: Pain, Acute (Adult)  Goal: Acceptable Pain Control/Comfort Level  Outcome: Ongoing (interventions implemented as appropriate)      Problem: Dysphagia (Adult)  Goal: Identify Related Risk Factors and Signs and Symptoms  Outcome: Outcome(s) achieved Date Met: 01/16/18    Goal: Functional/Safe Swallow  Outcome: Ongoing (interventions implemented as appropriate)    Goal: Compensatory Techniques to Improve Safety/Function with Swallowing  Outcome: Ongoing (interventions implemented as appropriate)

## 2018-01-16 NOTE — PROGRESS NOTES
[facility] Medicine Services  INPATIENT PROGRESS NOTE    Length of Stay: 7  Date of Admission: 1/9/2018  Primary Care Physician: DELFINO Smith    Subjective     Chief Complaint   Patient presents with   • Shortness of Breath       HPI:  Patient was seen and examined this morning. Patient feels significantly better , dyspnea cough  resolving , feels stronger, ambulating the hallways .complains of migraine headache and dysphahea  Patient is passing gas, Patient denies, headache or dizziness, chest pain or palpitations,abdominal pain N/V/D , blood in the urine or blood in the stool , or any urinary symptoms , weakness or numbness or tingling in the extremities or visual changes.    Review of Systems     All pertinent negatives and positives are as above. All other systems have been reviewed and are negative unless otherwise stated.   Objective    Physical exam    Temp:  [97.6 °F (36.4 °C)-98.7 °F (37.1 °C)] 98.5 °F (36.9 °C)  Heart Rate:  [64-85] 69  Resp:  [16-20] 16  BP: (121-136)/(61-70) 125/61    -Constitutional: Patient is AAO x 3. Patient appears well-developed and well-nourished.   -CVS: Normal rate, regular rhythm, normal heart sounds and intact distal pulses.  Exam reveals no gallop and no friction rub.  No murmur heard.  -Pulm: CTAB/l .   -Abdominal: Soft. Bowel sounds are normal. No distension, no tenderness. There is no rebound and no guarding. No hernia.   -Musculoskeletal: No Cyanosis clubbing or edema.    Results Review:    Laboratory Data:     Results from last 7 days  Lab Units 01/16/18  0657 01/15/18  0400 01/14/18  0600  01/09/18  1828   SODIUM mmol/L 134* 140 138  < > 133*   POTASSIUM mmol/L 4.6 3.6 4.3  < > 4.3   CHLORIDE mmol/L 99 105 98  < > 100   CO2 mmol/L 29.0 30.0 30.0  < > 23.0   BUN mg/dL 21 16 19  < > 11   CREATININE mg/dL 0.57 0.54 0.64  < > 0.50   GLUCOSE mg/dL 111* 113* 136*  < > 101*   CALCIUM mg/dL 8.2* 7.8* 9.0  < > 9.5    BILIRUBIN mg/dL  --   --   --   --  0.4   ALK PHOS U/L  --   --   --   --  92   ALT (SGPT) U/L  --   --   --   --  31   AST (SGOT) U/L  --   --   --   --  23   ANION GAP mmol/L 6.0 5.0 10.0  < > 10.0   < > = values in this interval not displayed.  Estimated Creatinine Clearance: 61.8 mL/min (by C-G formula based on Cr of 0.57).            Results from last 7 days  Lab Units 01/16/18  0530 01/15/18  0400 01/14/18  0600 01/13/18  0338 01/12/18  0640   WBC 10*3/mm3 14.69* 12.66* 12.79* 14.64* 22.18*   HEMOGLOBIN g/dL 10.9* 10.4* 10.9* 10.1* 11.2*   HEMATOCRIT % 32.4* 30.4* 33.0* 30.2* 33.1*   PLATELETS 10*3/mm3 305 289 341 314 377       Results from last 7 days  Lab Units 01/09/18  1646   INR  0.95       Culture Data:   No results found for: BLOODCX  No results found for: URINECX  No results found for: RESPCX  No results found for: WOUNDCX  No results found for: STOOLCX  No components found for: BODYFLD    Micobiology  Blood Culture   Date Value Ref Range Status   01/09/2018 No growth at less than 24 hours  Preliminary                            Radiology Data:   Imaging Results (all)     Procedure Component Value Units Date/Time    XR Chest 2 View [[id number]] Collected:  01/09/18 1554     Updated:  01/09/18 1610    Narrative:         EXAM:          Radiograph(s), Chest   VIEWS:    PA / Lat ; 2       DATE/TIME:  1/9/2018 4:07 PM CST                INDICATION:   shortness of breath    COMPARISON:  CXR: 4/6/16             FINDINGS:             - lines/tubes:    none     - cardiac:         size within normal limits         - mediastinum: contour within normal limits         - lungs:         Linearly oriented airspace disease of the right  upper lobe. The remainder of the lungs are clear. Left lung  granuloma noted.             - pleura:         no evidence of  fluid                  - osseous:         Status post left shoulder repair.                   - misc.:         Impression:       CONCLUSION:        1. Right upper  lobe airspace disease presumably representing a  focus of pneumonia.                                               Electronically signed by:  KRYSTEN Cast MD  1/9/2018 4:09 PM  CST Workstation: [id number]    IR PICC wo fluoro guidance [[id number]] Resulted:  01/09/18 1654     Updated:  01/09/18 1654    Narrative:       This procedure was auto-finalized with no dictation required.    US Guided Vascular Access [[id number]] Resulted:  01/09/18 1716     Updated:  01/09/18 1716    Narrative:       This procedure was auto-finalized with no dictation required.    XR Chest Post CVA Port [[id number]] Collected:  01/09/18 1655     Updated:  01/09/18 1719    Narrative:         EXAM:         Radiograph(s), Chest   VIEWS:   Portable ; 1       DATE/TIME:  1/9/2018 5:15 PM CST                INDICATION:   PICC placement    COMPARISON:  CXR: 4/7/16             FINDINGS:             - lines/tubes:    Right approach PICC line in standard position.      - cardiac:         size within normal limits         - mediastinum: contour within normal limits         - lungs:         Focus of airspace disease in the right lung,  probably lower lobe.             - pleura:         no evidence of  fluid                  - osseous:         Status post left shoulder replacement.                   - misc.:         Impression:       CONCLUSION:        1. Right approach PICC line in standard position.  2. Suspect focal airspace disease in the right lower lobe.                                                Electronically signed by:  KRYSTEN Cast MD  1/9/2018 5:18 PM  CST Workstation: [id number]          I have reviewed the patient current medications.   Assessment/Plan     Hospital Problem List     * (Principal)Dysphagia    Overview Signed 1/12/2018  1:12 PM by Bin Oh MD     Added automatically from request for surgery [id number]         Pneumonia of right upper lobe due to infectious organism          Assessment / Plan    --Pneumonia  failed  outpatient therapy.  Symptoms improving, dyspnea improved . wheezing resolved  WBC improving :22.2--14.6--12.79--12.6--14.69  continue IV  Antibiotics(vanc and zosyn) and  supplemental oxygen, solumedrol decreased to 20mg Q8), inceptive tara     --dysphagia   proximal esophageal dilatation on CTA  CTA:Dilated proximal esophagus .Barium swallow done.   Dr Oh consulted . EGD plan for wednesday , as yesterday was unable to perform EGD due to pulmonary function .  Will continue soft blend GI diet . Plan for scope on Wednesday    --COPD exacerbation:   Continue supplemental oxygen, bronchodilators and steroids.       --anxiety  Ativan     --hx of PE  eliquis hold for EGD Wed.    --Tobacco use  nicotine patch    --Hypertension:   Controlled  Continue home medications     --GI prophylaxis/DVT prophylaxis    This document has been electronically signed by Conner Ingram MD on January 16, 2018 11:57 AM

## 2018-01-16 NOTE — PAYOR COMM NOTE
"Filomena Perales (67 y.o. Female)     Date of Birth Social Security Number Address Home Phone MRN    1950  [address] [address] [phone] [id number]    Yarsani Marital Status          Restoration        Admission Date Admission Type Admitting Provider Attending Provider Department, Room/Bed    1/9/18 Emergency Angie Ribeiro MD Amsler, Haizia L, MD [address], 408/1    Discharge Date Discharge Disposition Discharge Destination                      Attending Provider: Angie Ribeiro MD     Allergies:  Morphine And Related    Isolation:  None   Infection:  None   Code Status:  FULL    Ht:  157.5 cm (62\")   Wt:  55.8 kg (123 lb)    Admission Cmt:  None   Principal Problem:  Dysphagia [R13.10] More...                 Active Insurance as of 1/9/2018     Primary Coverage     Payor Plan Insurance Group Employer/Plan Group    MISC MEDICARE REPLACMENT [employer]      Coverage Address Coverage Phone Number Effective From Effective To    [phone] 1/1/2015     [place] [id number]       Subscriber Name Subscriber Birth Date Member ID       FILOMENA PERALES 1950 [id number]                 Emergency Contacts      (Rel.) Home Phone Work Phone Mobile Phone    Torri Rivero (Daughter) [phone] -- [phone]    Susan Green (Relative) [phone] -- [phone]            Insurance Information                [facility] [place]PLACMENT/[employer] Phone: [phone]    Subscriber: Filomena Perales Subscriber#: [id number]    Group#:  Precert#:           Vital Signs (last 24 hours)       01/15 0700  -  01/16 0659 01/16 0700  -  01/16 1146   Most Recent    Temp (°F) 97.6 -  98.7    97.6 -  98.5     98.5 (36.9)    Heart Rate 72 -  85    64 -  75     69    Resp 18 -  20    16 -  18     16    /55 -  136/65    121/62 -  125/61     125/61    SpO2 (%) 93 -  99    93 -  96     95          [facility]" Medications (active)       Dose Frequency Start End    acetaminophen (TYLENOL) tablet 650 mg 650 mg Every 6 Hours PRN 1/9/2018     Sig - Route: Take 2 tablets by mouth Every 6 (Six) Hours As Needed for Mild Pain . - Oral    amLODIPine (NORVASC) tablet 10 mg 10 mg Daily 1/10/2018     Sig - Route: Take 1 tablet by mouth Daily. - Oral    atorvastatin (LIPITOR) tablet 10 mg 10 mg Daily 1/10/2018     Sig - Route: Take 1 tablet by mouth Daily. - Oral    benzonatate (TESSALON) capsule 100 mg 100 mg 3 Times Daily PRN 1/9/2018     Sig - Route: Take 1 capsule by mouth 3 (Three) Times a Day As Needed for Cough. - Oral    bisacodyl (DULCOLAX) EC tablet 5 mg 5 mg Daily PRN 1/9/2018     Sig - Route: Take 1 tablet by mouth Daily As Needed for Constipation. - Oral    budesonide-formoterol (SYMBICORT) 160-4.5 MCG/ACT inhaler 2 puff 2 puff 2 Times Daily - RT 1/9/2018     Sig - Route: Inhale 2 puffs 2 (Two) Times a Day. - Inhalation    butalbital-acetaminophen-caffeine (FIORICET, ESGIC) -40 MG per tablet 1 tablet 1 tablet Every 6 Hours PRN 1/13/2018     Sig - Route: Take 1 tablet by mouth Every 6 (Six) Hours As Needed for Migraine. - Oral    gabapentin (NEURONTIN) capsule 800 mg 800 mg Every 8 Hours Scheduled 1/9/2018     Sig - Route: Take 2 capsules by mouth Every 8 (Eight) Hours. - Oral    hydrALAZINE (APRESOLINE) injection 5 mg 5 mg Every 6 Hours PRN 1/12/2018     Sig - Route: Infuse 0.25 mL into a venous catheter Every 6 (Six) Hours As Needed for High Blood Pressure. - Intravenous    ipratropium-albuterol (DUO-NEB) nebulizer solution 3 mL 3 mL 4 Times Daily - RT 1/9/2018     Sig - Route: Take 3 mL by nebulization 4 (Four) Times a Day. - Nebulization    LORazepam (ATIVAN) tablet 0.5 mg 0.5 mg Every 12 Hours PRN 1/14/2018 1/24/2018    Sig - Route: Take 1 tablet by mouth Every 12 (Twelve) Hours As Needed for Anxiety. - Oral    meclizine (ANTIVERT) tablet 25 mg 25 mg 3 Times Daily PRN 1/9/2018     Sig - Route: Take 1 tablet by  "mouth 3 (Three) Times a Day As Needed for dizziness. - Oral    methylPREDNISolone sodium succinate (SOLU-Medrol) injection 40 mg 40 mg Every 8 Hours 1/15/2018     Sig - Route: Infuse 1 mL into a venous catheter Every 8 (Eight) Hours. - Intravenous    nicotine (NICODERM CQ) 21 MG/24HR patch 1 patch 1 patch Every 24 Hours 1/9/2018     Sig - Route: Place 1 patch on the skin Daily. - Transdermal    ondansetron (ZOFRAN) tablet 4 mg 4 mg Every 8 Hours PRN 1/9/2018     Sig - Route: Take 1 tablet by mouth Every 8 (Eight) Hours As Needed for Nausea or Vomiting. - Oral    pantoprazole (PROTONIX) injection 40 mg 40 mg Every Early Morning 1/12/2018     Sig - Route: Infuse 10 mL into a venous catheter Every Morning. - Intravenous    Pharmacy to dose vancomycin  Continuous PRN 1/11/2018 1/18/2018    Sig - Route: Continuous As Needed for Consult. - Does not apply    Pharmacy to Dose Zosyn  Continuous PRN 1/11/2018 1/18/2018    Sig - Route: Continuous As Needed for Consult. - Does not apply    piperacillin-tazobactam (ZOSYN) 3.375 g in iso-osmotic dextrose 50 ml (premix) 3.375 g Every 8 Hours 1/11/2018 1/18/2018    Sig - Route: Infuse 50 mL into a venous catheter Every 8 (Eight) Hours. - Intravenous    sertraline (ZOLOFT) tablet 100 mg 100 mg Daily 1/10/2018     Sig - Route: Take 2 tablets by mouth Daily. - Oral    sodium chloride 0.9 % flush 1-10 mL 1-10 mL As Needed 1/9/2018     Sig - Route: Infuse 1-10 mL into a venous catheter As Needed for Line Care. - Intravenous    sodium chloride 0.9 % flush 10 mL 10 mL As Needed 1/9/2018     Sig - Route: Infuse 10 mL into a venous catheter As Needed for Line Care. - Intravenous    Cosign for Ordering: Accepted by Chaitanya Domingo MD on 1/9/2018 11:14 PM    Linked Group 1:  \"And\" Linked Group Details        sodium chloride 0.9 % infusion 75 mL/hr Continuous 1/9/2018     Sig - Route: Infuse 75 mL/hr into a venous catheter Continuous. - Intravenous    traMADol (ULTRAM) tablet 50 mg 50 mg Every " 6 Hours PRN 1/10/2018 1/20/2018    Sig - Route: Take 1 tablet by mouth Every 6 (Six) Hours As Needed for Moderate Pain . - Oral    traZODone (DESYREL) tablet 300 mg 300 mg Nightly 1/9/2018     Sig - Route: Take 2 tablets by mouth Every Night. - Oral    vancomycin (VANCOCIN) 1,250 mg in sodium chloride 0.9 % 250 mL IVPB 1,250 mg Every 12 Hours 1/15/2018 1/18/2018    Sig - Route: Infuse 1,250 mg into a venous catheter Every 12 (Twelve) Hours. - Intravenous          Physician Progress Notes (last 24 hours) (Notes from 1/15/2018 11:47 AM through 1/16/2018 11:47 AM)     No notes of this type exist for this encounter.       [id number]

## 2018-01-17 ENCOUNTER — ANESTHESIA (OUTPATIENT)
Dept: GASTROENTEROLOGY | Facility: HOSPITAL | Age: 68
End: 2018-01-17

## 2018-01-17 ENCOUNTER — ANESTHESIA EVENT (OUTPATIENT)
Dept: GASTROENTEROLOGY | Facility: HOSPITAL | Age: 68
End: 2018-01-17

## 2018-01-17 VITALS
BODY MASS INDEX: 22.86 KG/M2 | HEART RATE: 55 BPM | WEIGHT: 124.2 LBS | DIASTOLIC BLOOD PRESSURE: 63 MMHG | TEMPERATURE: 98 F | OXYGEN SATURATION: 98 % | SYSTOLIC BLOOD PRESSURE: 133 MMHG | HEIGHT: 62 IN | RESPIRATION RATE: 18 BRPM

## 2018-01-17 PROBLEM — J18.9 PNEUMONIA OF RIGHT UPPER LOBE DUE TO INFECTIOUS ORGANISM: Status: RESOLVED | Noted: 2018-01-09 | Resolved: 2018-01-17

## 2018-01-17 LAB
ANION GAP SERPL CALCULATED.3IONS-SCNC: 9 MMOL/L (ref 5–15)
BASOPHILS # BLD AUTO: 0.01 10*3/MM3 (ref 0–0.2)
BASOPHILS NFR BLD AUTO: 0.1 % (ref 0–2)
BUN BLD-MCNC: 25 MG/DL (ref 7–21)
BUN/CREAT SERPL: 43.1 (ref 7–25)
CALCIUM SPEC-SCNC: 8.5 MG/DL (ref 8.4–10.2)
CHLORIDE SERPL-SCNC: 96 MMOL/L (ref 95–110)
CO2 SERPL-SCNC: 32 MMOL/L (ref 22–31)
CREAT BLD-MCNC: 0.58 MG/DL (ref 0.5–1)
DEPRECATED RDW RBC AUTO: 44.6 FL (ref 36.4–46.3)
EOSINOPHIL # BLD AUTO: 0.01 10*3/MM3 (ref 0–0.7)
EOSINOPHIL NFR BLD AUTO: 0.1 % (ref 0–7)
ERYTHROCYTE [DISTWIDTH] IN BLOOD BY AUTOMATED COUNT: 13.4 % (ref 11.5–14.5)
GFR SERPL CREATININE-BSD FRML MDRD: 104 ML/MIN/1.73 (ref 60–104)
GLUCOSE BLD-MCNC: 99 MG/DL (ref 60–100)
HCT VFR BLD AUTO: 32.7 % (ref 35–45)
HGB BLD-MCNC: 10.9 G/DL (ref 12–15.5)
IMM GRANULOCYTES # BLD: 0.26 10*3/MM3 (ref 0–0.02)
IMM GRANULOCYTES NFR BLD: 1.6 % (ref 0–0.5)
LYMPHOCYTES # BLD AUTO: 1.69 10*3/MM3 (ref 0.6–4.2)
LYMPHOCYTES NFR BLD AUTO: 10.3 % (ref 10–50)
MCH RBC QN AUTO: 30.6 PG (ref 26.5–34)
MCHC RBC AUTO-ENTMCNC: 33.3 G/DL (ref 31.4–36)
MCV RBC AUTO: 91.9 FL (ref 80–98)
MONOCYTES # BLD AUTO: 1.24 10*3/MM3 (ref 0–0.9)
MONOCYTES NFR BLD AUTO: 7.5 % (ref 0–12)
NEUTROPHILS # BLD AUTO: 13.24 10*3/MM3 (ref 2–8.6)
NEUTROPHILS NFR BLD AUTO: 80.4 % (ref 37–80)
PLATELET # BLD AUTO: 286 10*3/MM3 (ref 150–450)
PMV BLD AUTO: 8.8 FL (ref 8–12)
POTASSIUM BLD-SCNC: 4.5 MMOL/L (ref 3.5–5.1)
RBC # BLD AUTO: 3.56 10*6/MM3 (ref 3.77–5.16)
SODIUM BLD-SCNC: 137 MMOL/L (ref 137–145)
WBC NRBC COR # BLD: 16.45 10*3/MM3 (ref 3.2–9.8)

## 2018-01-17 PROCEDURE — 94799 UNLISTED PULMONARY SVC/PX: CPT

## 2018-01-17 PROCEDURE — 99232 SBSQ HOSP IP/OBS MODERATE 35: CPT | Performed by: INTERNAL MEDICINE

## 2018-01-17 PROCEDURE — 25010000002 METHYLPREDNISOLONE PER 40 MG: Performed by: FAMILY MEDICINE

## 2018-01-17 PROCEDURE — 25010000002 PIPERACILLIN SOD-TAZOBACTAM PER 1 G: Performed by: FAMILY MEDICINE

## 2018-01-17 PROCEDURE — 25010000002 VANCOMYCIN PER 500 MG: Performed by: FAMILY MEDICINE

## 2018-01-17 PROCEDURE — 43239 EGD BIOPSY SINGLE/MULTIPLE: CPT | Performed by: INTERNAL MEDICINE

## 2018-01-17 PROCEDURE — 85025 COMPLETE CBC W/AUTO DIFF WBC: CPT | Performed by: INTERNAL MEDICINE

## 2018-01-17 PROCEDURE — 94760 N-INVAS EAR/PLS OXIMETRY 1: CPT

## 2018-01-17 PROCEDURE — 0DB38ZX EXCISION OF LOWER ESOPHAGUS, VIA NATURAL OR ARTIFICIAL OPENING ENDOSCOPIC, DIAGNOSTIC: ICD-10-PCS | Performed by: INTERNAL MEDICINE

## 2018-01-17 PROCEDURE — 88305 TISSUE EXAM BY PATHOLOGIST: CPT | Performed by: PATHOLOGY

## 2018-01-17 PROCEDURE — 97535 SELF CARE MNGMENT TRAINING: CPT

## 2018-01-17 PROCEDURE — 80048 BASIC METABOLIC PNL TOTAL CA: CPT | Performed by: INTERNAL MEDICINE

## 2018-01-17 PROCEDURE — 25010000002 PROPOFOL 10 MG/ML EMULSION: Performed by: NURSE ANESTHETIST, CERTIFIED REGISTERED

## 2018-01-17 PROCEDURE — 88305 TISSUE EXAM BY PATHOLOGIST: CPT | Performed by: INTERNAL MEDICINE

## 2018-01-17 RX ORDER — PANTOPRAZOLE SODIUM 40 MG/1
40 TABLET, DELAYED RELEASE ORAL DAILY
Qty: 30 TABLET | Refills: 0 | Status: SHIPPED | OUTPATIENT
Start: 2018-01-17 | End: 2018-02-05 | Stop reason: SDUPTHER

## 2018-01-17 RX ORDER — LIDOCAINE HYDROCHLORIDE 10 MG/ML
INJECTION, SOLUTION INFILTRATION; PERINEURAL AS NEEDED
Status: DISCONTINUED | OUTPATIENT
Start: 2018-01-17 | End: 2018-01-17 | Stop reason: SURG

## 2018-01-17 RX ORDER — DEXTROSE AND SODIUM CHLORIDE 5; .45 G/100ML; G/100ML
30 INJECTION, SOLUTION INTRAVENOUS CONTINUOUS PRN
Status: DISCONTINUED | OUTPATIENT
Start: 2018-01-17 | End: 2018-01-17 | Stop reason: HOSPADM

## 2018-01-17 RX ORDER — SODIUM CHLORIDE 0.9 % (FLUSH) 0.9 %
1-10 SYRINGE (ML) INJECTION AS NEEDED
Status: DISCONTINUED | OUTPATIENT
Start: 2018-01-17 | End: 2018-01-17 | Stop reason: HOSPADM

## 2018-01-17 RX ORDER — PROPOFOL 10 MG/ML
VIAL (ML) INTRAVENOUS AS NEEDED
Status: DISCONTINUED | OUTPATIENT
Start: 2018-01-17 | End: 2018-01-17 | Stop reason: SURG

## 2018-01-17 RX ORDER — SODIUM CHLORIDE 9 MG/ML
INJECTION, SOLUTION INTRAVENOUS CONTINUOUS PRN
Status: DISCONTINUED | OUTPATIENT
Start: 2018-01-17 | End: 2018-01-17 | Stop reason: SURG

## 2018-01-17 RX ADMIN — BUTALBITAL, ACETAMINOPHEN, AND CAFFEINE 1 TABLET: 50; 325; 40 TABLET ORAL at 03:44

## 2018-01-17 RX ADMIN — METHYLPREDNISOLONE SODIUM SUCCINATE 20 MG: 40 INJECTION, POWDER, FOR SOLUTION INTRAMUSCULAR; INTRAVENOUS at 02:00

## 2018-01-17 RX ADMIN — PANTOPRAZOLE SODIUM 40 MG: 40 INJECTION, POWDER, FOR SOLUTION INTRAVENOUS at 05:57

## 2018-01-17 RX ADMIN — GABAPENTIN 800 MG: 400 CAPSULE ORAL at 05:58

## 2018-01-17 RX ADMIN — AMLODIPINE BESYLATE 10 MG: 10 TABLET ORAL at 09:02

## 2018-01-17 RX ADMIN — SODIUM CHLORIDE: 9 INJECTION, SOLUTION INTRAVENOUS at 15:08

## 2018-01-17 RX ADMIN — PROPOFOL 70 MG: 10 INJECTION, EMULSION INTRAVENOUS at 15:10

## 2018-01-17 RX ADMIN — METHYLPREDNISOLONE SODIUM SUCCINATE 20 MG: 40 INJECTION, POWDER, FOR SOLUTION INTRAMUSCULAR; INTRAVENOUS at 09:03

## 2018-01-17 RX ADMIN — BUDESONIDE AND FORMOTEROL FUMARATE DIHYDRATE 2 PUFF: 160; 4.5 AEROSOL RESPIRATORY (INHALATION) at 07:22

## 2018-01-17 RX ADMIN — TRAMADOL HYDROCHLORIDE 50 MG: 50 TABLET, FILM COATED ORAL at 09:01

## 2018-01-17 RX ADMIN — SERTRALINE HYDROCHLORIDE 100 MG: 50 TABLET ORAL at 09:01

## 2018-01-17 RX ADMIN — TAZOBACTAM SODIUM AND PIPERACILLIN SODIUM 3.38 G: 375; 3 INJECTION, SOLUTION INTRAVENOUS at 09:02

## 2018-01-17 RX ADMIN — SODIUM CHLORIDE 75 ML/HR: 9 INJECTION, SOLUTION INTRAVENOUS at 09:03

## 2018-01-17 RX ADMIN — LIDOCAINE HYDROCHLORIDE 80 MG: 10 INJECTION, SOLUTION INFILTRATION; PERINEURAL at 15:10

## 2018-01-17 RX ADMIN — VANCOMYCIN HYDROCHLORIDE 1250 MG: 5 INJECTION, POWDER, LYOPHILIZED, FOR SOLUTION INTRAVENOUS at 03:44

## 2018-01-17 RX ADMIN — LORAZEPAM 0.5 MG: 0.5 TABLET ORAL at 03:46

## 2018-01-17 RX ADMIN — ATORVASTATIN CALCIUM 10 MG: 10 TABLET, FILM COATED ORAL at 09:02

## 2018-01-17 RX ADMIN — BUTALBITAL, ACETAMINOPHEN, AND CAFFEINE 1 TABLET: 50; 325; 40 TABLET ORAL at 10:51

## 2018-01-17 RX ADMIN — IPRATROPIUM BROMIDE AND ALBUTEROL SULFATE 3 ML: 2.5; .5 SOLUTION RESPIRATORY (INHALATION) at 07:15

## 2018-01-17 NOTE — H&P (VIEW-ONLY)
SUBJECTIVE:   1/12/2018    Name: Mona Perales  DOD: 1950    REASON FOR CONSULT:Patient with epigastric abdominal pain or dysphagia.    Chief Complaint:     Chief Complaint   Patient presents with   • Shortness of Breath       Subjective     Patient is 67 y.o. female presentsWith a complaint of some difficulty swallowing.  Patient states that food gets stuck in her esophagus occasionally.      ROS/HISTORY/ CURRENT MEDICATIONS/OBJECTIVE/VS/PE:   Review of Systems:   Review of Systems   Constitutional: Negative for activity change, appetite change, chills, diaphoresis, fatigue, fever and unexpected weight change.   HENT: Positive for trouble swallowing. Negative for sore throat.    Respiratory: Negative for shortness of breath.    Gastrointestinal: Negative for abdominal distention, abdominal pain, anal bleeding, blood in stool, constipation, diarrhea, nausea, rectal pain and vomiting.   Musculoskeletal: Negative for arthralgias.   Skin: Negative for pallor.   Neurological: Negative for light-headedness.       History:     Past Medical History:   Diagnosis Date   • Anxiety    • Arthritis    • Asthma    • 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 with ulceration     bilateral legs - bilateral iliac stents 2008      • Cancer    • Chronic lower back pain    • COPD (chronic obstructive pulmonary disease)    • Coronary arteriosclerosis    • Essential (primary) hypertension    • History of pulmonary embolus (PE)    • Hyperlipidemia    • Insomnia    • Lumbago    • Nicotine dependence    • Occlusion of artery      and stenosis of bilateral carotid arteries - BABS 16-49%, LICA 0-15%   • Other atherosclerosis of native artery of other extremity     LEFT subclavian stent 2005 (occluded)     Past Surgical History:   Procedure Laterality Date   • CARDIAC CATHETERIZATION  04/06/2016    No evidence of any obstructive epicardial CAD.Preserved LV systolic function with EF of 55%.   • CENTRAL VENOUS LINE INSERTION   04/07/2016    Successful placement of right uppe extremity 6-French triple lumen PICC line.   • FRACTURE SURGERY     • HYSTERECTOMY     • JOINT REPLACEMENT     • TOTAL SHOULDER REPLACEMENT Left    • TRANSESOPHAGEAL ECHOCARDIOGRAM (JACQUES)  04/07/2016    With color flow-Mild to moderate [facility].LV systolic function well preserved with Ef of 55-60%.Mitral and AV intact.Diastolic dysfunction     Family History   Problem Relation Age of Onset   • Heart disease Other    • Hypertension Other      Social History   Substance Use Topics   • Smoking status: Current Every Day Smoker     Packs/day: 1.00   • Smokeless tobacco: Never Used   • Alcohol use No     Prescriptions Prior to Admission   Medication Sig Dispense Refill Last Dose   • albuterol (PROVENTIL HFA;VENTOLIN HFA) 108 (90 BASE) MCG/ACT inhaler Inhale 2 puffs every night.   Taking   • albuterol (PROVENTIL) (2.5 MG/3ML) 0.083% nebulizer solution Take 2.5 mg by nebulization Every 8 (Eight) Hours As Needed for Wheezing.   Taking   • amLODIPine (NORVASC) 10 MG tablet Take 10 mg by mouth Daily.   Taking   • apixaban (ELIQUIS) 5 MG tablet tablet Take 1 tablet by mouth 2 (Two) Times a Day. 180 tablet 5    • Fluticasone Furoate-Vilanterol (BREO ELLIPTA) 100-25 MCG/INH aerosol powder  Inhale 1 puff Daily.   Taking   • gabapentin (NEURONTIN) 800 MG tablet Take 800 mg by mouth 3 (Three) Times a Day.   Taking   • meclizine (ANTIVERT) 25 MG tablet Take 25 mg by mouth 3 (Three) Times a Day As Needed for dizziness.   Taking   • ondansetron (ZOFRAN) 4 MG tablet Take 4 mg by mouth Every 8 (Eight) Hours As Needed for Nausea or Vomiting.   Taking   • penciclovir (DENAVIR) 1 % cream Apply 1 application topically Every 2 (Two) Hours.   Taking   • pravastatin (PRAVACHOL) 20 MG tablet Take 20 mg by mouth Daily.   Taking   • sertraline (ZOLOFT) 100 MG tablet Take 100 mg by mouth Daily.   Taking   • traZODone (DESYREL) 300 MG tablet Take 300 mg by mouth Every Night.   Taking     Allergies:   Morphine and related    I have reviewed the patients medical history, surgical history and family history in the available medical record system.     Current Medications:     Current Facility-Administered Medications   Medication Dose Route Frequency Provider Last Rate Last Dose   • acetaminophen (TYLENOL) tablet 650 mg  650 mg Oral Q6H PRN Kev Abraham MD   650 mg at 01/11/18 0822   • amLODIPine (NORVASC) tablet 10 mg  10 mg Oral Daily Kev Abraham MD   10 mg at 01/11/18 0817   • atorvastatin (LIPITOR) tablet 10 mg  10 mg Oral Daily Kev Abraham MD   10 mg at 01/11/18 0816   • benzonatate (TESSALON) capsule 100 mg  100 mg Oral TID PRN Kev Abraham MD       • bisacodyl (DULCOLAX) EC tablet 5 mg  5 mg Oral Daily PRN Kev Abraham MD       • budesonide-formoterol (SYMBICORT) 160-4.5 MCG/ACT inhaler 2 puff  2 puff Inhalation BID - RT Kev Abraham MD   2 puff at 01/12/18 0747   • butalbital-acetaminophen-caffeine (FIORICET, ESGIC) -40 MG per tablet 1 tablet  1 tablet Oral Q12H PRN Conner Ingram MD   1 tablet at 01/12/18 0411   • gabapentin (NEURONTIN) capsule 800 mg  800 mg Oral Q8H Kev Abraham MD   800 mg at 01/12/18 0545   • hydrALAZINE (APRESOLINE) injection 5 mg  5 mg Intravenous Q6H PRN Conner Ingram MD       • ipratropium-albuterol (DUO-NEB) nebulizer solution 3 mL  3 mL Nebulization 4x Daily - RT Kev Abraham MD   3 mL at 01/12/18 1123   • meclizine (ANTIVERT) tablet 25 mg  25 mg Oral TID PRN Kev Abraham MD       • [START ON 1/13/2018] methylPREDNISolone sodium succinate (SOLU-Medrol) injection 40 mg  40 mg Intravenous Daily Conner Ingram MD   40 mg at 01/12/18 1007   • nicotine (NICODERM CQ) 21 MG/24HR patch 1 patch  1 patch Transdermal Q24H Kev Abraham MD   1 patch at 01/11/18 7018   • ondansetron (ZOFRAN) tablet 4 mg  4 mg Oral Q8H PRN Kev Abraham MD   4 mg at 01/10/18 1657   • pantoprazole (PROTONIX) injection 40 mg  40 mg Intravenous  Q AM Conner Ingram MD       • Pharmacy to dose vancomycin   Does not apply Continuous PRN Conner Ingram MD       • Pharmacy to Dose Zosyn   Does not apply Continuous PRN Conner Ingram MD       • piperacillin-tazobactam (ZOSYN) 3.375 g in iso-osmotic dextrose 50 ml (premix)  3.375 g Intravenous Q8H Conner Ingram MD 50 mL/hr at 01/12/18 1126 3.375 g at 01/12/18 1126   • sertraline (ZOLOFT) tablet 100 mg  100 mg Oral Daily Kev Abraham MD   100 mg at 01/11/18 0816   • sodium chloride 0.9 % flush 1-10 mL  1-10 mL Intravenous PRN Kev Abraham MD       • sodium chloride 0.9 % flush 10 mL  10 mL Intravenous PRN RACHEL Lange       • sodium chloride 0.9 % infusion  75 mL/hr Intravenous Continuous Kev Abraham MD 75 mL/hr at 01/11/18 1543 75 mL/hr at 01/11/18 1543   • traMADol (ULTRAM) tablet 50 mg  50 mg Oral Q6H PRN Mason Lamar MD   50 mg at 01/10/18 1659   • traZODone (DESYREL) tablet 300 mg  300 mg Oral Nightly Kev Abraham MD   300 mg at 01/11/18 2151   • vancomycin (VANCOCIN) 750 mg in sodium chloride 0.9 % 250 mL IVPB  750 mg Intravenous Q12H Conner Ingram MD   750 mg at 01/11/18 2152       Objective     Physical Exam:   Temp:  [98 °F (36.7 °C)-98.8 °F (37.1 °C)] 98.8 °F (37.1 °C)  Heart Rate:  [67-99] 86  Resp:  [16-20] 18  BP: (112-124)/(58-60) 116/60    Physical Exam:  General Appearance:    Alert, cooperative, in no acute distress   Head:    Normocephalic, without obvious abnormality, atraumatic   Eyes:            Lids and lashes normal, conjunctivae and sclerae normal, no   icterus, no pallor, corneas clear, PERRLA   Ears:    Ears appear intact with no abnormalities noted   Throat:   No oral lesions, no thrush, oral mucosa moist   Neck:   No adenopathy, supple, trachea midline, no thyromegaly, no     carotid bruit, no JVD   Back:     No kyphosis present, no scoliosis present, no skin lesions,       erythema or scars, no tenderness to percussion or                   palpation,    range of motion normal   Lungs:     Clear to auscultation,respirations regular, even and                   unlabored    Heart:    Regular rhythm and normal rate, normal S1 and S2, no            murmur, no gallop, no rub, no click   Breast Exam:    Deferred   Abdomen:     Normal bowel sounds, no masses, no organomegaly, soft        non-tender, non-distended, no guarding, no rebound                 tenderness   Genitalia:    Deferred   Extremities:   Moves all extremities well, no edema, no cyanosis, no              redness   Pulses:   Pulses palpable and equal bilaterally   Skin:   No bleeding, bruising or rash   Lymph nodes:   No palpable adenopathy   Neurologic:   Cranial nerves 2 - 12 grossly intact, sensation intact, DTR        present and equal bilaterally      Results Review:     Lab Results   Component Value Date    WBC 22.18 (H) 01/12/2018    WBC 25.07 (H) 01/11/2018    WBC 18.77 (H) 01/10/2018    HGB 11.2 (L) 01/12/2018    HGB 10.7 (L) 01/11/2018    HGB 11.2 (L) 01/10/2018    HCT 33.1 (L) 01/12/2018    HCT 32.5 (L) 01/11/2018    HCT 31.9 (L) 01/10/2018     01/12/2018     01/11/2018     01/10/2018       Results from last 7 days  Lab Units 01/09/18  1828   ALK PHOS U/L 92   ALT (SGPT) U/L 31   AST (SGOT) U/L 23       Results from last 7 days  Lab Units 01/09/18  1828   BILIRUBIN mg/dL 0.4   ALK PHOS U/L 92     No results found for: LIPASE  Lab Results   Component Value Date    INR 0.95 01/09/2018    INR 1.0 04/08/2016    INR 1.0 11/06/2014         Radiology Review:  Imaging Results (last 72 hours)     Procedure Component Value Units Date/Time    XR Chest 2 View [[id number]] Collected:  01/09/18 1554     Updated:  01/09/18 1610    Narrative:         EXAM:          Radiograph(s), Chest   VIEWS:    PA / Lat ; 2       DATE/TIME:  1/9/2018 4:07 PM CST                INDICATION:   shortness of breath    COMPARISON:  CXR: 4/6/16             FINDINGS:             - lines/tubes:    none     -  cardiac:         size within normal limits         - mediastinum: contour within normal limits         - lungs:         Linearly oriented airspace disease of the right  upper lobe. The remainder of the lungs are clear. Left lung  granuloma noted.             - pleura:         no evidence of  fluid                  - osseous:         Status post left shoulder repair.                   - misc.:         Impression:       CONCLUSION:        1. Right upper lobe airspace disease presumably representing a  focus of pneumonia.                                               Electronically signed by:  KRYSTEN Cast MD  1/9/2018 4:09 PM  CST Workstation: [id number]    IR PICC wo fluoro guidance [[id number]] Resulted:  01/09/18 1654     Updated:  01/09/18 1654    Narrative:       This procedure was auto-finalized with no dictation required.    US Guided Vascular Access [[id number]] Resulted:  01/09/18 1716     Updated:  01/09/18 1716    Narrative:       This procedure was auto-finalized with no dictation required.    XR Chest Post CVA Port [[id number]] Collected:  01/09/18 1655     Updated:  01/09/18 1719    Narrative:         EXAM:         Radiograph(s), Chest   VIEWS:   Portable ; 1       DATE/TIME:  1/9/2018 5:15 PM CST                INDICATION:   PICC placement    COMPARISON:  CXR: 4/7/16             FINDINGS:             - lines/tubes:    Right approach PICC line in standard position.      - cardiac:         size within normal limits         - mediastinum: contour within normal limits         - lungs:         Focus of airspace disease in the right lung,  probably lower lobe.             - pleura:         no evidence of  fluid                  - osseous:         Status post left shoulder replacement.                   - misc.:         Impression:       CONCLUSION:        1. Right approach PICC line in standard position.  2. Suspect focal airspace disease in the right lower lobe.                                                     Electronically signed by:  KRYSTEN Cast MD  1/9/2018 5:18 PM  CST Workstation: [id number]    XR Chest PA & Lateral [[id number]] Collected:  01/11/18 0929     Updated:  01/11/18 0944    Narrative:         [facility] [place]    Patient Name: FILOMENA BELTRAN    ORDERING: REBECCA ROBBINS     ATTENDING: MICKY TORRES     REFERRING: REBECCA ROBBINS    -----------------------    PROCEDURE: Two-view chest    COMPARISON: 1/9/2018    HISTORY: dyspnea, J18.1 Lobar pneumonia, unspecified organism  R09.02 Hypoxemia Z78.9 Other specified health status Z74.09 Other  reduced mobility    FINDINGS: Frontal and lateral views of the chest are obtained.     Devices: There is a right upper extremity PICC line with the tip  directed into the superior vena cava.    Lungs/Pleura: There is a bandlike opacity redemonstrated within  the right mid lung zone and more conspicuous currently within the  right lower lobe with minimal changes in the left base. This  likely represents atelectasis. There is a background of mild  interstitial marking accentuation. No effusion or pneumothorax.    Cardiomediastinal Structures: Heart size and mediastinal contours  within limits of normal. Vascular calcification involving the  aorta. The trachea is midline.     Skeletal Structures: Demineralization and degenerative changes.  Left shoulder arthroplasty. No free air beneath the diaphragm.      Impression:       Right upper extremity PICC line with the tip directed into the  SVC.    Chronic lung background with superimposed bandlike atelectasis  within the right mid lung zone and right greater than left lung  base. No acute pleural finding.    Electronically signed by:  John Leach MD  1/11/2018 9:43 AM  CST Workstation: [id number]    CT Angiogram Chest With Contrast [[id number]] Collected:  01/11/18 1225     Updated:  01/11/18 1309    Narrative:         EXAM:  Computed Tomography with CTA         REGION:  Chest       INDICATION:    dyspnea elevated D/dimer, J18.1 Lobar pneumonia,  unspecified organism R09.02 Hypoxemia Z78.9 Other specified  health status Z74.09 Other reduced mobility    - rule out pulmonary embolism       CLINICAL HISTORY:  CORRELATIVE IMAGING:  None                         TECHNIQUE:     - PE / vascular protocol     - reconstructions:  axial, coronal, sagittal, obliques     - computer-generated 3D reconstructions (MIPS) were performed.     - contrast:  intravenous Isovue 370, 56 mL                                 This exam was performed according to the departmental  dose-optimization program which includes automated exposure  control, adjustment of the mA and/or kV according to patient size  and/or use of iterative reconstruction technique.         COMMENTS:    - Pulmonary arterial system:      - Main pulmonary artery trunk:  negative      - Left, right main pulmonary arteries: negative      - Lobar arteries: negative       - Segmental arteries: negative      - Systemic vascularity (as visualized):        - Aorta:  grossly negative / normal caliber / no dissection        - roots of great vessels:  grossly negative / normal  caliber        - SVC / IVC:  grossly negative / normal caliber     - Misc (limited visualization):      - pulmonary parenchyma:  Scattered airspace changes in the  right middle lobe, and right lower lobe      - pleura:  negative      - mediastinal / luisito:  negative      - neck, inferior:  grossly wnl      - subdiaphragmatic structures:  grossly negative (limited  evaluation)       - osseous:      - misc:  Incidental note is made of a dilated esophageal  lumen, more proximally than distally, containing internal debris  throughout.        .        Impression:       CONCLUSION:          1.  No evidence of pulmonary embolism.            2.  No evidence of pathology associated with the visualized  aorta.        3. Scattered airspace changes in the right lung.                       4. Dilated proximal  esophagus, containing internal debris  throughout with a focal point of transition not clearly  visualized. Recommend correlation with barium swallow for further  assessment.                Electronically signed by:  KRYSTEN Cast MD  1/11/2018 1:07  PM CST Workstation: [id number]    US Venous Doppler Lower Extremity Bilateral (duplex) [[id number]] Collected:  01/11/18 1235     Updated:  01/11/18 1336    Narrative:       .  EXAMINATION:  Ultrasound, venous, lower extremity    CLINICAL INDICATION / HISTORY:   edema .elevated d-dimer, J18.1  Lobar pneumonia, unspecified organism R09.02 Hypoxemia Z78.9  Other specified health status Z74.09 Other reduced mobility    COMPARISON:  none  TECHNIQUE:  Bilateral lower extremity(ies)                          serial axial graded compression technique                          grayscale, color flow, spectral and  Doppler     FINDINGS:    Imaged vessels:    - common femoral vein (CFV)    - deep femoral vein (FV) (formally called superficial femoral  vein (SFV))    - profunda femoral vein (PFV) (cephalad portion)    - popliteal vein (PV)    - anterior tibial vein (ATV) (cephalad portion)    - posterior tibial vein (PTV)    - greater saphenous vein (GSV) (non-contiguous segments)    Unless otherwise indicated, all of the above described vessels  were freely compressible and demonstrated spontaneous and phasic  flow as well as appropriate flow with augmentation.    .    Impression:       CONCLUSION:  1. Negative examination - no evidence of deep venous thrombosis  within the femoral-popliteal system of the bilateral lower  extremity(ies).        Electronically signed by:  KRYSTEN Cast MD  1/11/2018 1:35  PM CST Workstation: [id number]    FL Esophagram Complete [[id number]] Collected:  01/11/18 1718     Updated:  01/11/18 1819    Narrative:         PROCEDURE: Barium swallow    HISTORY:  Dysphagia     COMPARISON:  None    Fluoroscopy time was one minute 54 seconds  Total # of films  = 34     TECHNIQUE:    Mucosal relief views were obtained in the LPO position.  Subsequently, a double contrast esophagram was performed using  effervescent granules followed by thick barium. The esophagus was  evaluated in the LPO position with spot fluoroscopic images  obtained. The single column portion of the exam was performed in  the prone, RICHARDS position.    FINDINGS:  Multiple tertiary contractions were seen during the fluoroscopic  exam indicative of presbyesophagus.   A coarse pattern of esophageal mucosa is seen throughout the  esophagus, most pronounced in the upper to mid segments, possibly  due to reflux esophagitis.  No debris was seen in the esophagus during the exam.  No suspicious esophageal filling defect was seen.  No esophageal stricture was visualized, however there is mass  effect on the esophagus at the level of the aortic arch.  The gastroesophageal junction distends appropriately.   Mild esophageal reflux was present.  The patient was given a 12.5 mm barium tablet which passed  through the esophagus and into the stomach without delay.       Impression:       CONCLUSION:    1.  Multiple tertiary contractions were seen during the  fluoroscopic exam indicative of presbyesophagus.   2.  A coarse pattern of esophageal mucosa is seen throughout the  esophagus, most pronounced in the upper to mid segments, possibly  due to reflux esophagitis.  3.  No debris was seen in the esophagus during the exam.  4.  No suspicious esophageal filling defect was seen.  5.  No esophageal stricture was visualized, however there is mass  effect on the esophagus at the level of the aortic arch.  6.  Mild gastroesophageal reflux visualized during the exam.  Suggest gastroenterology consultation.    Electronically signed by:  Corey Arteaga MD  1/11/2018 6:18 PM CST  Workstation: [id number]           I reviewed the patient's new clinical results.  I reviewed the patient's new imaging results and agree with the  interpretation.     ASSESSMENT/PLAN:   ASSESSMENT: Patient with episodes of dysphagia stating food is getting stuck upper GI series shows marked irritation in the distal esophagus but no evidence of stricture.    PLAN: We'll schedule patient for EGD to be done today.  The biopsies of the distal esophagus.  The risks, benefits, and alternatives of this procedure have been discussed with the patient or the responsible party- the patient understands and agrees to proceed.         Bin Oh MD  01/12/18  12:52 PM         This document has been electronically signed by Bin Oh MD on January 12, 2018 12:52 PM

## 2018-01-17 NOTE — DISCHARGE INSTRUCTIONS
Dysphagia  Swallowing problems (dysphagia) occur when solids and liquids seem to stick in your throat on the way down to your stomach, or the food takes longer to get to the stomach. Other symptoms include regurgitating food, noises coming from the throat, chest discomfort with swallowing, and a feeling of fullness or the feeling of something being stuck in your throat when swallowing. When blockage in your throat is complete, it may be associated with drooling.  CAUSES   Problems with swallowing may occur because of problems with the muscles. The food cannot be propelled in the usual manner into your stomach. You may have ulcers, scar tissue, or inflammation in the tube down which food travels from your mouth to your stomach (esophagus), which blocks food from passing normally into the stomach. Causes of inflammation include:  · Acid reflux from your stomach into your esophagus.  · Infection.  · Radiation treatment for cancer.  · Medicines taken without enough fluids to wash them down into your stomach.  You may have nerve problems that prevent signals from being sent to the muscles of your esophagus to contract and move your food down to your stomach. Globus pharyngeus is a relatively common problem in which there is a sense of an obstruction or difficulty in swallowing, without any physical abnormalities of the swallowing passages being found. This problem usually improves over time with reassurance and testing to rule out other causes.  DIAGNOSIS  Dysphagia can be diagnosed and its cause can be determined by tests in which you swallow a white substance that helps illuminate the inside of your throat (contrast medium) while X-rays are taken. Sometimes a flexible telescope that is inserted down your throat (endoscopy) to look at your esophagus and stomach is used.  TREATMENT   · If the dysphagia is caused by acid reflux or infection, medicines may be used.  · If the dysphagia is caused by problems with your  swallowing muscles, swallowing therapy may be used to help you strengthen your swallowing muscles.  · If the dysphagia is caused by a blockage or mass, procedures to remove the blockage may be done.  HOME CARE INSTRUCTIONS  · Try to eat soft food that is easier to swallow and check your weight on a daily basis to be sure that it is not decreasing.  · Be sure to drink liquids when sitting upright (not lying down).  SEEK MEDICAL CARE IF:  · You are losing weight because you are unable to swallow.  · You are coughing when you drink liquids (aspiration).  · You are coughing up partially digested food.  SEEK IMMEDIATE MEDICAL CARE IF:  · You are unable to swallow your own saliva?.  · You are having shortness of breath or a fever, or both.?  · You have a hoarse voice along with difficulty swallowing.  MAKE SURE YOU:  · Understand these instructions.  · Will watch your condition.  · Will get help right away if you are not doing well or get worse.  This information is not intended to replace advice given to you by your health care provider. Make sure you discuss any questions you have with your health care provider.  Document Released: 12/15/2001 Document Revised: 01/08/2016 Document Reviewed: 06/06/2014  [employer] Interactive Patient Education © 2017 [employer]      Community-Acquired Pneumonia, Adult  Pneumonia is an infection of the lungs. There are different types of pneumonia. One type can develop while a person is in a hospital. A different type, called community-acquired pneumonia, develops in people who are not, or have not recently been, in the hospital or other health care facility.  What are the causes?  Pneumonia may be caused by bacteria, viruses, or funguses. Community-acquired pneumonia is often caused by Streptococcus pneumonia bacteria. These bacteria are often passed from one person to another by breathing in droplets from the cough or sneeze of an infected person.  What increases the risk?  The  condition is more likely to develop in:  · People who have chronic diseases, such as chronic obstructive pulmonary disease (COPD), asthma, congestive heart failure, cystic fibrosis, diabetes, or kidney disease.  · People who have early-stage or late-stage HIV.  · People who have sickle cell disease.  · People who have had their spleen removed (splenectomy).  · People who have poor dental hygiene.  · People who have medical conditions that increase the risk of breathing in (aspirating) secretions their own mouth and nose.  · People who have a weakened immune system (immunocompromised).  · People who smoke.  · People who travel to areas where pneumonia-causing germs commonly exist.  · People who are around animal habitats or animals that have pneumonia-causing germs, including birds, bats, rabbits, cats, and farm animals.  What are the signs or symptoms?  Symptoms of this condition include:  · A dry cough.  · A wet (productive) cough.  · Fever.  · Sweating.  · Chest pain, especially when breathing deeply or coughing.  · Rapid breathing or difficulty breathing.  · Shortness of breath.  · Shaking chills.  · Fatigue.  · Muscle aches.  How is this diagnosed?  Your health care provider will take a medical history and perform a physical exam. You may also have other tests, including:  · Imaging studies of your chest, including X-rays.  · Tests to check your blood oxygen level and other blood gases.  · Other tests on blood, mucus (sputum), fluid around your lungs (pleural fluid), and urine.  If your pneumonia is severe, other tests may be done to identify the specific cause of your illness.  How is this treated?  The type of treatment that you receive depends on many factors, such as the cause of your pneumonia, the medicines you take, and other medical conditions that you have. For most adults, treatment and recovery from pneumonia may occur at home. In some cases, treatment must happen in a hospital. Treatment may  include:  · Antibiotic medicines, if the pneumonia was caused by bacteria.  · Antiviral medicines, if the pneumonia was caused by a virus.  · Medicines that are given by mouth or through an IV tube.  · Oxygen.  · Respiratory therapy.  Although rare, treating severe pneumonia may include:  · Mechanical ventilation. This is done if you are not breathing well on your own and you cannot maintain a safe blood oxygen level.  · Thoracentesis. This procedure removes fluid around one lung or both lungs to help you breathe better.  Follow these instructions at home:  · Take over-the-counter and prescription medicines only as told by your health care provider.  ¨ Only take cough medicine if you are losing sleep. Understand that cough medicine can prevent your body’s natural ability to remove mucus from your lungs.  ¨ If you were prescribed an antibiotic medicine, take it as told by your health care provider. Do not stop taking the antibiotic even if you start to feel better.  · Sleep in a semi-upright position at night. Try sleeping in a reclining chair, or place a few pillows under your head.  · Do not use tobacco products, including cigarettes, chewing tobacco, and e-cigarettes. If you need help quitting, ask your health care provider.  · Drink enough water to keep your urine clear or pale yellow. This will help to thin out mucus secretions in your lungs.  How is this prevented?  There are ways that you can decrease your risk of developing community-acquired pneumonia. Consider getting a pneumococcal vaccine if:  · You are older than 65 years of age.  · You are older than 19 years of age and are undergoing cancer treatment, have chronic lung disease, or have other medical conditions that affect your immune system. Ask your health care provider if this applies to you.  There are different types and schedules of pneumococcal vaccines. Ask your health care provider which vaccination option is best for you.  You may also prevent  community-acquired pneumonia if you take these actions:  · Get an influenza vaccine every year. Ask your health care provider which type of influenza vaccine is best for you.  · Go to the dentist on a regular basis.  · Wash your hands often. Use hand  if soap and water are not available.  Contact a health care provider if:  · You have a fever.  · You are losing sleep because you cannot control your cough with cough medicine.  Get help right away if:  · You have worsening shortness of breath.  · You have increased chest pain.  · Your sickness becomes worse, especially if you are an older adult or have a weakened immune system.  · You cough up blood.  This information is not intended to replace advice given to you by your health care provider. Make sure you discuss any questions you have with your health care provider.  Document Released: 12/18/2006 Document Revised: 04/27/2017 Document Reviewed: 04/13/2016  [employer] Interactive Patient Education © 2017 [employer]

## 2018-01-17 NOTE — DISCHARGE SUMMARY
[facility] Medicine Services  DISCHARGE SUMMARY       Date of Admission: 1/9/2018  Date of Discharge:  1/17/2018  Primary Care Physician: DELFINO Smith    Presenting Problem/History of Present Illness:  Hypoxia [R09.02]  Pneumonia of right upper lobe due to infectious organism [J18.1]   Dysphagia    Final Discharge Diagnoses:  Hospital Problem List     * (Principal)Dysphagia    Overview Signed 1/12/2018  1:12 PM by Bin Oh MD     Added automatically from request for surgery [id number]               Consults:   Consults     Date and Time Order Name Status Description    1/11/2018 1802 Inpatient Consult to Gastroenterology Completed     1/9/2018 1701 Hospitalist (on-call MD unless specified)            Procedures Performed: Procedure(s):  ESOPHAGOGASTRODUODENOSCOPY           01/17 1508 UPPER GI ENDOSCOPY    Pertinent Test Results:  EGD showing Gastritis and possible stricture    Chief Complaint on Day of Discharge: None    Hospital Course:  The patient is a 67 y.o. female who presented to [facility] with 10 day history of progressively worsening cough productive of brownish sputum, nasal congestion, low-grade fever and shortness of air. Patient recently finished a course of an oral antibiotics that was prescribed by her family doctor and continued to remain symptomatic despite the outpatient treatment. Chest x-ray was done and showed a evidence of right upper lobe airspace changes. Her other major complaint was chronic dysphagia. CT and barium swallow were done showing   No esophageal stricture was visualized, however there is mass effect on the esophagus at the level of the aortic arch. Gi Was consulted and EGD showed Gastritis with possible stricture. No Dilation was done because food remained in the stomach and there was fear of worsening aspiration. Discussed with Gi and felt it is safe for D/c and Pt would need essentially a liquid/  "soft diet until follow up with Gi next week    Condition on Discharge:  Stable    Physical Exam on Discharge:  /63  Pulse 55  Temp 98 °F (36.7 °C)  Resp 18  Ht 157.5 cm (62\")  Wt 56.3 kg (124 lb 3.2 oz)  LMP Comment: postmenopausal  SpO2 98%  BMI 22.72 kg/m2  Physical Exam   Constitutional: She is oriented to person, place, and time. She appears well-developed.   HENT:   Head: Normocephalic and atraumatic.   Eyes: EOM are normal. Pupils are equal, round, and reactive to light.   Neck: Normal range of motion.   Cardiovascular: Normal rate and regular rhythm.    Pulmonary/Chest: Effort normal.   Abdominal: Soft. Bowel sounds are normal.   Musculoskeletal: Normal range of motion.   Neurological: She is alert and oriented to person, place, and time.         Discharge Disposition:  Home or Self Care    Discharge Medications:   Mona Perales   Home Medication Instructions BERNARDINO:[id number]    Printed on:01/17/18 1610   Medication Information                      albuterol (PROVENTIL HFA;VENTOLIN HFA) 108 (90 BASE) MCG/ACT inhaler  Inhale 2 puffs every night.             albuterol (PROVENTIL) (2.5 MG/3ML) 0.083% nebulizer solution  Take 2.5 mg by nebulization Every 8 (Eight) Hours As Needed for Wheezing.             amLODIPine (NORVASC) 10 MG tablet  Take 10 mg by mouth Daily.             apixaban (ELIQUIS) 5 MG tablet tablet  Take 1 tablet by mouth 2 (Two) Times a Day.             Fluticasone Furoate-Vilanterol (BREO ELLIPTA) 100-25 MCG/INH aerosol powder   Inhale 1 puff Daily.             gabapentin (NEURONTIN) 800 MG tablet  Take 800 mg by mouth 3 (Three) Times a Day.             meclizine (ANTIVERT) 25 MG tablet  Take 25 mg by mouth 3 (Three) Times a Day As Needed for dizziness.             ondansetron (ZOFRAN) 4 MG tablet  Take 4 mg by mouth Every 8 (Eight) Hours As Needed for Nausea or Vomiting.             pantoprazole (PROTONIX) 40 MG EC tablet  Take 1 tablet by mouth Daily.           "   penciclovir (DENAVIR) 1 % cream  Apply 1 application topically Every 2 (Two) Hours.             pravastatin (PRAVACHOL) 20 MG tablet  Take 20 mg by mouth Daily.             sertraline (ZOLOFT) 100 MG tablet  Take 100 mg by mouth Daily.             traZODone (DESYREL) 300 MG tablet  Take 300 mg by mouth Every Night.                 Discharge Diet: Iiquid with ensure TID. May increas    Activity at Discharge:     Discharge Care Plan/Instructions: Follow up with GI next week  Follow-up Appointments:   Future Appointments  Date Time Provider Department Center   1/24/2018 9:00 AM DELFINO Baig [facility] None   4/10/2018 1:45 PM Kris Peres MD [facility] None       Test Results Pending at Discharge:  Order Current Status    Tissue Pathology Exam - Tissue, Esophagus, Distal In process          Chaz Leung DO  01/17/18  4:10 PM    Time: 40 min

## 2018-01-17 NOTE — ANESTHESIA PREPROCEDURE EVALUATION
Anesthesia Evaluation     Patient summary reviewed and Nursing notes reviewed   NPO Solid Status: > 6 hours  NPO Liquid Status: > 2 hours     Airway   Mallampati: II  TM distance: >3 FB  Neck ROM: full  no difficulty expected  Dental    (+) poor dentition    Pulmonary - normal exam   (+) a smoker Former,   Cardiovascular     Rhythm: regular  Rate: normal        Neuro/Psych  (+) headaches,     GI/Hepatic/Renal/Endo - negative ROS     Musculoskeletal (-) negative ROS    Abdominal    Substance History - negative use     OB/GYN negative ob/gyn ROS         Other                                                Anesthesia Plan    ASA 3     MAC     intravenous induction   Anesthetic plan and risks discussed with patient.    Plan discussed with CRNA.

## 2018-01-17 NOTE — PLAN OF CARE
Problem: Patient Care Overview (Adult)  Goal: Plan of Care Review  Outcome: Ongoing (interventions implemented as appropriate)   01/17/18 1021   Coping/Psychosocial Response Interventions   Plan Of Care Reviewed With patient   Patient Care Overview   Progress improving   Outcome Evaluation   Outcome Summary/Follow up Plan Pt awaiting EGD today and discharge this afternoon. Pt has c/o mild headache this am.

## 2018-01-17 NOTE — PLAN OF CARE
Problem: GI Endoscopy (Adult)  Goal: Signs and Symptoms of Listed Potential Problems Will be Absent or Manageable (GI Endoscopy)  Outcome: Outcome(s) achieved Date Met: 01/17/18 01/17/18 1528   GI Endoscopy   Problems Assessed (GI Endoscopy) all   Problems Present (GI Endoscopy) none

## 2018-01-17 NOTE — PLAN OF CARE
Problem: Patient Care Overview (Adult)  Goal: Plan of Care Review   01/17/18 0034   Coping/Psychosocial Response Interventions   Plan Of Care Reviewed With patient   Patient Care Overview   Progress improving     Goal: Adult Individualization and Mutuality  Outcome: Ongoing (interventions implemented as appropriate)    Goal: Discharge Needs Assessment  Outcome: Ongoing (interventions implemented as appropriate)      Problem: Pneumonia (Adult)  Goal: Signs and Symptoms of Listed Potential Problems Will be Absent or Manageable (Pneumonia)  Outcome: Ongoing (interventions implemented as appropriate)      Problem: COPD, Chronic Bronchitis/Emphysema (Adult)  Goal: Signs and Symptoms of Listed Potential Problems Will be Absent or Manageable (COPD, Chronic Bronchitis/Emphysema)  Outcome: Ongoing (interventions implemented as appropriate)      Problem: Fall Risk (Adult)  Goal: Absence of Falls  Outcome: Ongoing (interventions implemented as appropriate)      Problem: Pain, Acute (Adult)  Goal: Acceptable Pain Control/Comfort Level  Outcome: Ongoing (interventions implemented as appropriate)      Problem: Dysphagia (Adult)  Goal: Functional/Safe Swallow  Outcome: Ongoing (interventions implemented as appropriate)    Goal: Compensatory Techniques to Improve Safety/Function with Swallowing  Outcome: Ongoing (interventions implemented as appropriate)

## 2018-01-17 NOTE — PROGRESS NOTES
"Pharmacokinetics by Pharmacy - Vancomycin    Mona Perales is a 67 y.o. female  [Ht: 157.5 cm (62\"); Wt: 56.3 kg (124 lb 3.2 oz)]    Estimated Creatinine Clearance: 62.3 mL/min (by C-G formula based on Cr of 0.58).   Lab Results   Component Value Date    CREATININE 0.58 01/17/2018    CREATININE 0.57 01/16/2018    CREATININE 0.54 01/15/2018      Lab Results   Component Value Date    WBC 16.45 (H) 01/17/2018    WBC 14.69 (H) 01/16/2018    WBC 12.66 (H) 01/15/2018      Temp Readings from Last 1 Encounters:   01/17/18 98 °F (36.7 °C) (Temporal Artery )      Lab Results   Component Value Date    VANCOPEAK 16.64 (L) 01/12/2018    VANCOTROUGH 9.21 (L) 01/15/2018         Culture Results:  Microbiology Results (last 10 days)       Procedure Component Value - Date/Time      Blood Culture - Blood, [[id number]]  (Normal) Collected:  01/09/18 2258    Lab Status:  Final result Specimen:  Blood from Arm, Left Updated:  01/14/18 2331     Blood Culture No growth at 5 days    Blood Culture - Blood, [[id number]]  (Normal) Collected:  01/09/18 1646    Lab Status:  Final result Specimen:  Blood from Blood, Arterial Line Updated:  01/14/18 2246     Blood Culture No growth at 5 days               Indication for use: pneumonia    Current Vancomycin Dose:  1250 mg IVPB every 12 hours, day 7 of therapy.      Assessment/Plan:  Will continue current therapy. No level was drawn today. Cr remains stable. Continue current therapy. Therapy ends 01/18 at 1559.    Yancy Bull [facility]   01/17/18 10:03 AM      "

## 2018-01-17 NOTE — THERAPY TREATMENT NOTE
Acute Care - Occupational Therapy Treatment Note  [facility]     Patient Name: Mona Perales  : 1950  MRN: [id number]  Today's Date: 2018  Onset of Illness/Injury or Date of Surgery Date: 18  Date of Referral to OT: 01/10/18  Referring Physician: Dr. Ingram       Admit Date: 2018    Visit Dx:     ICD-10-CM ICD-9-CM   1. Pneumonia of right upper lobe due to infectious organism J18.1 486   2. Hypoxia R09.02 799.02   3. Decreased activities of daily living (ADL) Z78.9 V49.89   4. Impaired functional mobility, balance, gait, and endurance Z74.09 V49.89   5. Dysphagia, unspecified type R13.10 787.20     Patient Active Problem List   Diagnosis   • Anxiety   • CAD (coronary atherosclerotic disease)   • Carotid stenosis   • COPD (chronic obstructive pulmonary disease)   • COPD with exacerbation   • Depressive disorder, not elsewhere classified   • Benign essential hypertension   • Hypercholesteremia   • Insomnia   • Low back pain   • Osteoporosis   • Other screening mammogram   • RUQ abdominal pain   • Peripheral arterial disease   • Dizziness   • Nicotine abuse   • Pneumonia of right upper lobe due to infectious organism   • Dysphagia             Adult Rehabilitation Note       18 1415 18 0810 01/15/18 0935    Rehab Assessment/Intervention    Discipline occupational therapist  -NN occupational therapist  -NN occupational therapy assistant  -LW    Document Type therapy note (daily note)  -NN therapy note (daily note)  -NN therapy note (daily note)  -LW    Subjective Information  agree to therapy;complains of;weakness;fatigue  -NN agree to therapy  -LW    Patient Effort, Rehab Treatment   good  -LW    Precautions/Limitations   fall precautions;oxygen therapy device and L/min  -LW    Precautions/Limitations, Vision   WFL with corrective lenses  -LW    Precautions/Limitations, Hearing   WFL  -LW    Recorded by [NN] An Meyer, OTR/L [NN] An Meyer, OTR/L [LW] Nuzhat OCHOA  CHITO Rodrigues/L    Vital Signs    Pre Systolic BP Rehab   142  -LW    Pre Treatment Diastolic BP   75  -LW    Pretreatment Heart Rate (beats/min)   75  -LW    Posttreatment Heart Rate (beats/min)   77  -LW    Pre SpO2 (%)   98  -LW    O2 Delivery Pre Treatment   supplemental O2  -LW    Post SpO2 (%)   97  -LW    O2 Delivery Post Treatment   supplemental O2  -LW    Pre Patient Position   Sitting  -LW    Post Patient Position   Sitting  -LW    Recorded by   [LW] CHITO Solano/L    Pain Assessment    Pain Assessment 0-10  -NN 0-10  -NN 0-10  -LW    Pain Score 4  -NN 2  -NN 4  -LW    Post Pain Score   4  -LW    Pain Type   Chronic pain  -LW    Pain Location Head  -NN Head  -NN Back  -LW    Pain Orientation   Lower  -LW    Pain Descriptors Aching  -NN Aching  -NN Aching  -LW    Pain Frequency Constant/continuous  -NN Constant/continuous  -NN Constant/continuous  -LW    Pain Intervention(s) Repositioned  -NN Repositioned  -NN Medication (See MAR)  -LW    Response to Interventions tolerated  -NN      Recorded by [NN] An Meyer, OTR/L [NN] An Meyer OTR/L [LW] TAYLOR SolanoA/L    Vision Assessment/Intervention    Visual Impairment   WFL with corrective lenses  -LW    Recorded by   [LW] CHITO Solano/L    Cognitive Assessment/Intervention    Current Cognitive/Communication Assessment   functional  -LW    Orientation Status   oriented x 4  -LW    Follows Commands/Answers Questions   100% of the time  -LW    Personal Safety   WNL/WFL  -LW    Personal Safety Interventions   fall prevention program maintained  -LW    Recorded by   [LW] CHITO Solano/L    Bed Mobility, Assessment/Treatment    Bed Mobility, Assistive Device  bed rails;head of bed elevated  -NN     Bed Mobility, Scoot/Bridge, [place] independent  -NN      Bed Mob, Supine to Sit, [place] independent  -NN independent  -NN     Bed Mob, Sit to Supine, [place] independent  -NN independent  -NN     Recorded by  [NN] An Meyer OTR/L [NN] An Meyer OTR/L     Transfer Assessment/Treatment    Transfers, Sit-Stand [place]  independent  -NN independent  -LW    Transfers, Stand-Sit [place]  independent  -NN independent  -LW    Transfers, Sit-Stand-Sit, Assist Device   --   none  -LW    Toilet Transfer, [place]   independent  -LW    Toilet Transfer, Assistive Device   other (see comments)   none  -LW    Recorded by  [NN] An Meyer OTR/L [LW] Nuzhat Rodrigues DALE/L    Functional Mobility    Functional Mobility- Ind. Level  independent  -NN     Functional Mobility-Distance (Feet)  --   in room  -NN     Functional Mobility- Safety Issues  supplemental O2  -NN     Recorded by  [NN] An Meyer OTR/L     Upper Body Bathing Assessment/Training    UB Bathing Assess/Train Assistive Device  --   sponge bath  -NN     UB Bathing Assess/Train, Position  standing  -NN     UB Bathing Assess/Train, [place] Level  minimum assist (75% patient effort)  -NN     UB Bathing Assess/Train, Impairments  decreased flexibility  -NN     Recorded by  [NN] An Meyer OTR/L     Lower Body Bathing Assessment/Training    LB Bathing Assess/Train Assistive Device  --   sponge bathing  -NN     LB Bathing Assess/Train, Position  standing  -NN     LB Bathing Assess/Train, [place] Level  supervision required  -NN     Recorded by  [NN] An Meyer OTR/L     Upper Body Dressing Assessment/Training    UB Dressing Assess/Train, Clothing Type  doffing:;donning:;hospital gown  -NN     UB Dressing Assess/Train, Position  standing  -NN     UB Dressing Assess/Train, [place]  supervision required  -NN     Recorded by  [NN] An Meyer OTR/L     Lower Body Dressing Assessment/Training    LB Dressing Assess/Train, Clothing Type  doffing:;donning:;socks   underwear  -NN     LB Dressing Assess/Train, Position  sitting;standing  -NN     LB Dressing Assess/Train, [place]  supervision required  -NN      Recorded by  [NN] An Meyer OTR/L     Toileting Assessment/Training    Toileting Assess/Train, Assistive Device   grab bars  -LW    Toileting Assess/Train, Position   sitting  -LW    Toileting Assess/Train, Indepen Level   independent  -LW    Recorded by   [LW] TAYLOR SolanoA/L    Grooming Assessment/Training    Grooming Assess/Train, Position   standing;sink side  -LW    Grooming Assess/Train, Indepen Level   independent  -LW    Grooming Assess/Train, Comment   washed face and hands, oral care, combed hair  -LW    Recorded by   [LW] TAYLOR SolanoA/L    Therapy Exercises    Bilateral Upper Extremity AROM:;10 reps;elbow flexion/extension;hand pumps;shoulder abduction/adduction;pronation/supination;shoulder extension/flexion;shoulder horizontal abd/add  -NN  AROM:;20 reps;sitting   ROM  -LW    BUE Resistance   manual resistance- minimal  -LW    Recorded by [NN] An Meyer OTR/L  [LW] Nuzhat Rodrigues DALE/L    Positioning and Restraints    Pre-Treatment Position in bed  -NN in bed  -NN in bed  -LW    Post Treatment Position bed  -NN bed  -NN bed  -LW    In Bed sitting EOB;call light within reach;side rails up x2  -NN sitting EOB;call light within reach;encouraged to call for assist;side rails up x2  -NN sitting EOB;call light within reach;encouraged to call for assist;exit alarm on  -LW    Recorded by [NN] An Meyer, OTR/L [NN] An Meyer, OTR/L [LW] Nuzhat Rodrigues DALE/L      User Key  (r) = Recorded By, (t) = Taken By, (c) = Cosigned By    Initials Name Effective Dates    LW Nuzhat Rodrigues DALE/L 10/17/16 -     NN An Meyer OTR/ARMANDO 11/08/17 -                 OT Goals       01/17/18 0901 01/15/18 1112 01/14/18 1342    Transfer Training OT LTG    Transfer Training OT LTG, Date Goal Reviewed 01/17/18  -NN 01/15/18  -LW 01/14/18  -BB    Transfer Training OT LTG, Outcome goal met  -NN goal met  -LW     Strength OT LTG    Strength Goal OT LTG, Date Goal Reviewed  01/17/18  -NN 01/15/18  -LW 01/14/18  -BB    Strength Goal OT LTG, Outcome goal met  -NN goal ongoing  -LW goal ongoing  -BB    ADL OT LTG    ADL OT LTG, Date Goal Reviewed 01/17/18  -NN 01/15/18  -LW 01/14/18  -BB    ADL OT LTG, Outcome goal ongoing  -NN goal ongoing  -LW       01/12/18 1153 01/10/18 1451       Transfer Training OT LTG    Transfer Training OT LTG, Date Established  01/10/18  -NN     Transfer Training OT LTG, Time to Achieve  by discharge  -NN     Transfer Training OT LTG, Activity Type  all transfers  -NN     Transfer Training OT LTG, [place] Level  independent  -NN     Transfer Training OT LTG, Date Goal Reviewed 01/12/18  -LW      Transfer Training OT LTG, Outcome goal ongoing  -LW      Strength OT LTG    Strength Goal OT LTG, Date Established  01/10/18  -NN     Strength Goal OT LTG, Time to Achieve  by discharge  -NN     Strength Goal OT LTG, Measure to Achieve  Pt. will complete BUE strengthening exercises all planes and joints to increase BUE strength to 5/5 for ADLs.   -NN     Strength Goal OT LTG, Date Goal Reviewed 01/12/18  -LW      ADL OT LTG    ADL OT LTG, Date Established  01/10/18  -NN     ADL OT LTG, Activity Type  ADL skills  -NN     ADL OT LTG, [place] Level  independent  -NN     ADL OT LTG, Date Goal Reviewed 01/12/18  -LW      ADL OT LTG, Outcome goal ongoing  -LW        User Key  (r) = Recorded By, (t) = Taken By, (c) = Cosigned By    Initials Name Provider Type    BB TAYLOR GilbertA/L Occupational Therapy Assistant    CHITO Moura/L Occupational Therapy Assistant    NN An Meyer OTR/L Occupational Therapist          Occupational Therapy Education     Title: PT OT SLP Therapies (Active)     Topic: Occupational Therapy (Done)     Point: ADL training (Done)    Description: Instruct learner(s) on proper safety adaptation and remediation techniques during self care or transfers.   Instruct in proper use of assistive devices.    Learning Progress  Summary    Learner Readiness Method Response Comment Documented by Status   Patient Acceptance E VU,NR Pt. educated on role of OT. benefit of therapy, progression with poc NN 01/17/18 1434 Done    Acceptance E VU,NR Pt. educated on role of OT, safe t/f, benefit of activity, progression with poc NN 01/17/18 0859 Done    Acceptance E VU HEP LW 01/15/18 1120 Done    Acceptance E,TB KRISTOPHER,DIEGO  BB 01/14/18 1341 Done    Acceptance E VU,NR Pt. educated on role of OT, safe t/f, benefit of activity, PLB, progression with poc NN 01/13/18 0943 Done    Acceptance E VU  LW 01/12/18 1203 Done    Acceptance E NR Pt. educated on role of OT, safe bed mobility, benefit of OOB activity, progression with poc NN 01/11/18 1016 Active    Acceptance E VU,NR Pt. educated on role of OT, safe t/f, benefit of therapy, calling for assist, progression with poc NN 01/10/18 1451 Done               Point: Home exercise program (Done)    Description: Instruct learner(s) on appropriate technique for monitoring, assisting and/or progressing therapeutic exercises/activities.    Learning Progress Summary    Learner Readiness Method Response Comment Documented by Status   Patient Acceptance E VU,NR Pt. educated on role of OT. benefit of therapy, progression with poc NN 01/17/18 1434 Done    Acceptance E VU,NR Pt. educated on role of OT, safe t/f, benefit of activity, progression with poc NN 01/17/18 0859 Done    Acceptance E VU HEP LW 01/15/18 1120 Done    Acceptance E,TB DIEGO SUMMERS  BB 01/14/18 1341 Done    Acceptance E VU,NR Pt. educated on role of OT, safe t/f, benefit of activity, PLB, progression with poc NN 01/13/18 0943 Done    Acceptance E VU  LW 01/12/18 1203 Done    Acceptance E NR Pt. educated on role of OT, safe bed mobility, benefit of OOB activity, progression with poc NN 01/11/18 1016 Active    Acceptance E VU,NR Pt. educated on role of OT, safe t/f, benefit of therapy, calling for assist, progression with poc NN 01/10/18 1451 Done                Point: Body mechanics (Done)    Description: Instruct learner(s) on proper positioning and spine alignment during self-care, functional mobility activities and/or exercises.    Learning Progress Summary    Learner Readiness Method Response Comment Documented by Status   Patient Acceptance E VU,NR Pt. educated on role of OT. benefit of therapy, progression with poc  01/17/18 1434 Done    Acceptance E VU,NR Pt. educated on role of OT, safe t/f, benefit of activity, progression with poc NN 01/17/18 0859 Done    Acceptance E VU HEP LW 01/15/18 1120 Done    Acceptance E,TB VU,DU  BB 01/14/18 1341 Done    Acceptance E VU,NR Pt. educated on role of OT, safe t/f, benefit of activity, PLB, progression with poc NN 01/13/18 0943 Done    Acceptance E VU  LW 01/12/18 1203 Done    Acceptance E NR Pt. educated on role of OT, safe bed mobility, benefit of OOB activity, progression with poc  01/11/18 1016 Active    Acceptance E VU,NR Pt. educated on role of OT, safe t/f, benefit of therapy, calling for assist, progression with poc  01/10/18 1451 Done                      User Key     Initials Effective Dates Name Provider Type Discipline     10/17/16 -  CHITO Gilbert/L Occupational Therapy Assistant OT     10/17/16 -  CHITO Solano/L Occupational Therapy Assistant OT     11/08/17 -  MELANIA Gomez/L Occupational Therapist OT                  OT Recommendation and Plan  Anticipated Discharge Disposition: home with assist, home with home health  Planned Therapy Interventions: activity intolerance, ADL retraining, balance training, bed mobility training, energy conservation, home exercise program, strengthening  Therapy Frequency:  (3-14x/week)  Plan of Care Review  Plan Of Care Reviewed With: patient  Progress: progress toward functional goals as expected  Outcome Summary/Follow up Plan: OT tx completed. Pt. completed bed mobility independently. Pt. cont to benefit from skilled OT d/t decreased  overall activity tolerance.         Outcome Measures       01/17/18 0900 01/15/18 0935       How much help from another is currently needed...    Putting on and taking off regular lower body clothing? 4  -NN 4  -LW     Bathing (including washing, rinsing, and drying) 3  -NN 4  -LW     Toileting (which includes using toilet bed pan or urinal) 4  -NN 4  -LW     Putting on and taking off regular upper body clothing 4  -NN 4  -LW     Taking care of personal grooming (such as brushing teeth) 4  -NN 4  -LW     Eating meals 4  -NN 4  -LW     Score 23  -NN 24  -LW       User Key  (r) = Recorded By, (t) = Taken By, (c) = Cosigned By    Initials Name Provider Type    LW CHITO Solano/L Occupational Therapy Assistant    LAKESHA Meyer OTR/L Occupational Therapist           Time Calculation:         Time Calculation- OT       01/17/18 0903          Time Calculation- OT    OT Start Time 0810  -NN      OT Stop Time 0855  -      OT Time Calculation (min) 45 min  -      Total Timed Code Minutes- OT 45 minute(s)  -NN      OT Received On 01/17/18  -        User Key  (r) = Recorded By, (t) = Taken By, (c) = Cosigned By    Initials Name Provider Type    LAKESHA Meyer OTR/L Occupational Therapist           Therapy Charges for Today     Code Description Service Date Service Provider Modifiers Qty    [id number] HC OT SELF CARE/MGMT/TRAIN EA 15 MIN 1/17/2018 An Meyer OTR/L GO 3          OT G-codes  OT Functional Scales Options: AM-PAC 6 Clicks Daily Activity (OT)  Functional Limitation: Self care  Self Care Current Status (): At least 20 percent but less than 40 percent impaired, limited or restricted  Self Care Goal Status (): At least 1 percent but less than 20 percent impaired, limited or restricted    An Meyer OTR/L  1/17/2018

## 2018-01-17 NOTE — PLAN OF CARE
Problem: Patient Care Overview (Adult)  Goal: Plan of Care Review  Outcome: Ongoing (interventions implemented as appropriate)   01/17/18 1403   Coping/Psychosocial Response Interventions   Plan Of Care Reviewed With patient   Patient Care Overview   Progress progress toward functional goals as expected   Outcome Evaluation   Outcome Summary/Follow up Plan OT tx completed. Pt. completed bed mobility independently. Pt. cont to benefit from skilled OT d/t decreased overall activity tolerance.

## 2018-01-17 NOTE — PLAN OF CARE
Problem: Patient Care Overview (Adult)  Goal: Plan of Care Review  Outcome: Ongoing (interventions implemented as appropriate)   01/17/18 0901   Coping/Psychosocial Response Interventions   Plan Of Care Reviewed With patient   Patient Care Overview   Progress progress toward functional goals as expected   Outcome Evaluation   Outcome Summary/Follow up Plan OT tx completed. Pt. progressing great with ADL and strength goals however still limited by activity tolerance. Pt. cont to benefit from skilled OT d/t decreased balance, decreased strength, decreased activity tolerance, decreased ind in ADLs. 1/17       Problem: Inpatient Occupational Therapy  Goal: Transfer Training Goal 1 LTG- OT  Outcome: Outcome(s) achieved Date Met: 01/17/18   01/10/18 1451 01/17/18 0901   Transfer Training OT LTG   Transfer Training OT LTG, Date Established 01/10/18 --    Transfer Training OT LTG, Time to Achieve by discharge --    Transfer Training OT LTG, Activity Type all transfers --    Transfer Training OT LTG, [place] Level independent --    Transfer Training OT LTG, Date Goal Reviewed --  01/17/18   Transfer Training OT LTG, Outcome --  goal met     Goal: Strength Goal LTG- OT  Outcome: Outcome(s) achieved Date Met: 01/17/18   01/10/18 1451 01/17/18 0901   Strength OT LTG   Strength Goal OT LTG, Date Established 01/10/18 --    Strength Goal OT LTG, Time to Achieve by discharge --    Strength Goal OT LTG, Measure to Achieve Pt. will complete BUE strengthening exercises all planes and joints to increase BUE strength to 5/5 for ADLs.  --    Strength Goal OT LTG, Date Goal Reviewed --  01/17/18   Strength Goal OT LTG, Outcome --  goal met     Goal: ADL Goal LTG- OT  Outcome: Ongoing (interventions implemented as appropriate)   01/10/18 1451 01/17/18 0901   ADL OT LTG   ADL OT LTG, Date Established 01/10/18 --    ADL OT LTG, Activity Type ADL skills --    ADL OT LTG, [place] Level independent --    ADL OT LTG, Date Goal Reviewed  --  01/17/18   ADL OT LTG, Outcome --  goal ongoing

## 2018-01-17 NOTE — THERAPY TREATMENT NOTE
Acute Care - Occupational Therapy Treatment Note  [facility]     Patient Name: Mona Perales  : 1950  MRN: [id number]  Today's Date: 2018  Onset of Illness/Injury or Date of Surgery Date: 18  Date of Referral to OT: 01/10/18  Referring Physician: Dr. Ingram       Admit Date: 2018    Visit Dx:     ICD-10-CM ICD-9-CM   1. Pneumonia of right upper lobe due to infectious organism J18.1 486   2. Hypoxia R09.02 799.02   3. Decreased activities of daily living (ADL) Z78.9 V49.89   4. Impaired functional mobility, balance, gait, and endurance Z74.09 V49.89   5. Dysphagia, unspecified type R13.10 787.20     Patient Active Problem List   Diagnosis   • Anxiety   • CAD (coronary atherosclerotic disease)   • Carotid stenosis   • COPD (chronic obstructive pulmonary disease)   • COPD with exacerbation   • Depressive disorder, not elsewhere classified   • Benign essential hypertension   • Hypercholesteremia   • Insomnia   • Low back pain   • Osteoporosis   • Other screening mammogram   • RUQ abdominal pain   • Peripheral arterial disease   • Dizziness   • Nicotine abuse   • Pneumonia of right upper lobe due to infectious organism   • Dysphagia             Adult Rehabilitation Note       18 0810 01/15/18 0935       Rehab Assessment/Intervention    Discipline occupational therapist  -NN occupational therapy assistant  -LW     Document Type therapy note (daily note)  -NN therapy note (daily note)  -LW     Subjective Information agree to therapy;complains of;weakness;fatigue  -NN agree to therapy  -LW     Patient Effort, Rehab Treatment  good  -LW     Precautions/Limitations  fall precautions;oxygen therapy device and L/min  -LW     Precautions/Limitations, Vision  WFL with corrective lenses  -LW     Precautions/Limitations, Hearing  WFL  -LW     Recorded by [NN] An Meyer OTR/L [LW] CHITO Solano/L     Vital Signs    Pre Systolic BP Rehab  142  -LW     Pre Treatment Diastolic BP   75  -LW     Pretreatment Heart Rate (beats/min)  75  -LW     Posttreatment Heart Rate (beats/min)  77  -LW     Pre SpO2 (%)  98  -LW     O2 Delivery Pre Treatment  supplemental O2  -LW     Post SpO2 (%)  97  -LW     O2 Delivery Post Treatment  supplemental O2  -LW     Pre Patient Position  Sitting  -LW     Post Patient Position  Sitting  -LW     Recorded by  [LW] TAYLOR SolanoA/L     Pain Assessment    Pain Assessment 0-10  -NN 0-10  -LW     Pain Score 2  -NN 4  -LW     Post Pain Score  4  -LW     Pain Type  Chronic pain  -LW     Pain Location Head  -NN Back  -LW     Pain Orientation  Lower  -LW     Pain Descriptors Aching  -NN Aching  -LW     Pain Frequency Constant/continuous  -NN Constant/continuous  -LW     Pain Intervention(s) Repositioned  -NN Medication (See MAR)  -LW     Recorded by [NN] An Meyer OTR/L [LW] TAYLOR SolanoA/L     Vision Assessment/Intervention    Visual Impairment  WFL with corrective lenses  -LW     Recorded by  [LW] CHITO Solano/L     Cognitive Assessment/Intervention    Current Cognitive/Communication Assessment  functional  -LW     Orientation Status  oriented x 4  -LW     Follows Commands/Answers Questions  100% of the time  -LW     Personal Safety  WNL/WFL  -LW     Personal Safety Interventions  fall prevention program maintained  -LW     Recorded by  [LW] CHITO Solano/L     Bed Mobility, Assessment/Treatment    Bed Mobility, Assistive Device bed rails;head of bed elevated  -NN      Bed Mob, Supine to Sit, [place] independent  -NN      Bed Mob, Sit to Supine, [place] independent  -NN      Recorded by [NN] An Meyer, OTR/L      Transfer Assessment/Treatment    Transfers, Sit-Stand [place] independent  -NN independent  -LW     Transfers, Stand-Sit [place] independent  -NN independent  -LW     Transfers, Sit-Stand-Sit, Assist Device  --   none  -LW     Toilet Transfer, [place]  independent  -LW     Toilet Transfer,  Assistive Device  other (see comments)   none  -LW     Recorded by [NN] An Meyer OTR/L [LW] TAYLOR SolanoA/L     Functional Mobility    Functional Mobility- Ind. Level independent  -NN      Functional Mobility-Distance (Feet) --   in room  -NN      Functional Mobility- Safety Issues supplemental O2  -NN      Recorded by [NN] An Meyer OTR/L      Upper Body Bathing Assessment/Training    UB Bathing Assess/Train Assistive Device --   sponge bath  -NN      UB Bathing Assess/Train, Position standing  -NN      UB Bathing Assess/Train, [place] Level minimum assist (75% patient effort)  -NN      UB Bathing Assess/Train, Impairments decreased flexibility  -NN      Recorded by [NN] An Meyer OTR/L      Lower Body Bathing Assessment/Training    LB Bathing Assess/Train Assistive Device --   sponge bathing  -NN      LB Bathing Assess/Train, Position standing  -NN      LB Bathing Assess/Train, [place] Level supervision required  -NN      Recorded by [NN] An Meyer OTR/L      Upper Body Dressing Assessment/Training    UB Dressing Assess/Train, Clothing Type doffing:;donning:;hospital gown  -NN      UB Dressing Assess/Train, Position standing  -NN      UB Dressing Assess/Train, [place] supervision required  -NN      Recorded by [NN] An Meyer OTR/L      Lower Body Dressing Assessment/Training    LB Dressing Assess/Train, Clothing Type doffing:;donning:;socks   underwear  -NN      LB Dressing Assess/Train, Position sitting;standing  -NN      LB Dressing Assess/Train, [place] supervision required  -NN      Recorded by [NN] An Meyer OTR/L      Toileting Assessment/Training    Toileting Assess/Train, Assistive Device  grab bars  -LW     Toileting Assess/Train, Position  sitting  -LW     Toileting Assess/Train, Indepen Level  independent  -LW     Recorded by  [LW] TAYLOR SolanoA/L     Grooming Assessment/Training    Grooming Assess/Train, Position   standing;sink side  -LW     Grooming Assess/Train, Indepen Level  independent  -LW     Grooming Assess/Train, Comment  washed face and hands, oral care, combed hair  -LW     Recorded by  [LW] CHITO Solano/L     Therapy Exercises    Bilateral Upper Extremity  AROM:;20 reps;sitting   ROM  -LW     BUE Resistance  manual resistance- minimal  -LW     Recorded by  [LW] TAYLOR SolanoA/L     Positioning and Restraints    Pre-Treatment Position in bed  -NN in bed  -LW     Post Treatment Position bed  -NN bed  -LW     In Bed sitting EOB;call light within reach;encouraged to call for assist;side rails up x2  -NN sitting EOB;call light within reach;encouraged to call for assist;exit alarm on  -LW     Recorded by [NN] An Meyer OTR/L [LW] TAYLOR SolanoA/L       User Key  (r) = Recorded By, (t) = Taken By, (c) = Cosigned By    Initials Name Effective Dates    LW TAYLOR SolanoA/L 10/17/16 -     NN An Meyer OTR/L 11/08/17 -                 OT Goals       01/17/18 0901 01/15/18 1112 01/14/18 1342    Transfer Training OT LTG    Transfer Training OT LTG, Date Goal Reviewed 01/17/18  -NN 01/15/18  -LW 01/14/18  -BB    Transfer Training OT LTG, Outcome goal met  -NN goal met  -LW     Strength OT LTG    Strength Goal OT LTG, Date Goal Reviewed 01/17/18  -NN 01/15/18  -LW 01/14/18  -BB    Strength Goal OT LTG, Outcome goal met  -NN goal ongoing  -LW goal ongoing  -BB    ADL OT LTG    ADL OT LTG, Date Goal Reviewed 01/17/18  -NN 01/15/18  -LW 01/14/18  -BB    ADL OT LTG, Outcome goal ongoing  -NN goal ongoing  -LW       01/12/18 1153 01/10/18 1451       Transfer Training OT LTG    Transfer Training OT LTG, Date Established  01/10/18  -NN     Transfer Training OT LTG, Time to Achieve  by discharge  -NN     Transfer Training OT LTG, Activity Type  all transfers  -NN     Transfer Training OT LTG, [place] Level  independent  -NN     Transfer Training OT LTG, Date Goal Reviewed 01/12/18  -LW       Transfer Training OT LTG, Outcome goal ongoing  -LW      Strength OT LTG    Strength Goal OT LTG, Date Established  01/10/18  -NN     Strength Goal OT LTG, Time to Achieve  by discharge  -NN     Strength Goal OT LTG, Measure to Achieve  Pt. will complete BUE strengthening exercises all planes and joints to increase BUE strength to 5/5 for ADLs.   -NN     Strength Goal OT LTG, Date Goal Reviewed 01/12/18  -LW      ADL OT LTG    ADL OT LTG, Date Established  01/10/18  -NN     ADL OT LTG, Activity Type  ADL skills  -NN     ADL OT LTG, [place] Level  independent  -NN     ADL OT LTG, Date Goal Reviewed 01/12/18  -LW      ADL OT LTG, Outcome goal ongoing  -LW        User Key  (r) = Recorded By, (t) = Taken By, (c) = Cosigned By    Initials Name Provider Type    BB Mirna Herrera, DALE/L Occupational Therapy Assistant    SADI Rodrigues DALE/L Occupational Therapy Assistant    LAKESHA Meyer OTJOSÉ ANTONIO/L Occupational Therapist          Occupational Therapy Education     Title: PT OT SLP Therapies (Active)     Topic: Occupational Therapy (Done)     Point: ADL training (Done)    Description: Instruct learner(s) on proper safety adaptation and remediation techniques during self care or transfers.   Instruct in proper use of assistive devices.    Learning Progress Summary    Learner Readiness Method Response Comment Documented by Status   Patient Acceptance E VU,NR Pt. educated on role of OT, safe t/f, benefit of activity, progression with poc  01/17/18 0859 Done    Acceptance E VU HEP  01/15/18 1120 Done    Acceptance E,TB VU,DU  BB 01/14/18 1341 Done    Acceptance E VU,NR Pt. educated on role of OT, safe t/f, benefit of activity, PLB, progression with poc NN 01/13/18 0943 Done    Acceptance E VU  LW 01/12/18 1203 Done    Acceptance E NR Pt. educated on role of OT, safe bed mobility, benefit of OOB activity, progression with poc  01/11/18 1016 Active    Acceptance E VU,NR Pt. educated on role of OT, safe  t/f, benefit of therapy, calling for assist, progression with poc NN 01/10/18 1451 Done               Point: Home exercise program (Done)    Description: Instruct learner(s) on appropriate technique for monitoring, assisting and/or progressing therapeutic exercises/activities.    Learning Progress Summary    Learner Readiness Method Response Comment Documented by Status   Patient Acceptance E VU,NR Pt. educated on role of OT, safe t/f, benefit of activity, progression with poc NN 01/17/18 0859 Done    Acceptance E VU HEP  01/15/18 1120 Done    Acceptance E,TB DIEGO SUMMERS  BB 01/14/18 1341 Done    Acceptance E VU,NR Pt. educated on role of OT, safe t/f, benefit of activity, PLB, progression with poc NN 01/13/18 0943 Done    Acceptance E VU  LW 01/12/18 1203 Done    Acceptance E NR Pt. educated on role of OT, safe bed mobility, benefit of OOB activity, progression with poc NN 01/11/18 1016 Active    Acceptance E VU,NR Pt. educated on role of OT, safe t/f, benefit of therapy, calling for assist, progression with poc NN 01/10/18 1451 Done               Point: Body mechanics (Done)    Description: Instruct learner(s) on proper positioning and spine alignment during self-care, functional mobility activities and/or exercises.    Learning Progress Summary    Learner Readiness Method Response Comment Documented by Status   Patient Acceptance E VU,NR Pt. educated on role of OT, safe t/f, benefit of activity, progression with poc NN 01/17/18 0859 Done    Acceptance E VU HEP  01/15/18 1120 Done    Acceptance E,DIEGO CAVANAUGH  BB 01/14/18 1341 Done    Acceptance E VU,NR Pt. educated on role of OT, safe t/f, benefit of activity, PLB, progression with poc NN 01/13/18 0943 Done    Acceptance E VU  LW 01/12/18 1203 Done    Acceptance E NR Pt. educated on role of OT, safe bed mobility, benefit of OOB activity, progression with poc NN 01/11/18 1016 Active    Acceptance E VU,NR Pt. educated on role of OT, safe t/f, benefit of therapy, calling  for assist, progression with poc  01/10/18 1451 Done                      User Key     Initials Effective Dates Name Provider Type Discipline    BB 10/17/16 -  CHITO Gilbert/L Occupational Therapy Assistant OT    LW 10/17/16 -  CHITO Solano/L Occupational Therapy Assistant OT    NN 11/08/17 -  An Meyer, OTR/L Occupational Therapist OT                  OT Recommendation and Plan  Anticipated Discharge Disposition: home with assist, home with home health  Planned Therapy Interventions: activity intolerance, ADL retraining, balance training, bed mobility training, energy conservation, home exercise program, strengthening  Therapy Frequency:  (3-14x/week)  Plan of Care Review  Plan Of Care Reviewed With: patient  Progress: progress toward functional goals as expected  Outcome Summary/Follow up Plan: OT tx completed. Pt. progressing great with ADL and strength goals however still limited by activity tolerance. Pt. cont to benefit from skilled OT d/t decreased balance, decreased strength, decreased activity tolerance, decreased ind in ADLs. 1/17        Outcome Measures       01/17/18 0900 01/15/18 0935       How much help from another is currently needed...    Putting on and taking off regular lower body clothing? 4  -NN 4  -LW     Bathing (including washing, rinsing, and drying) 3  -NN 4  -LW     Toileting (which includes using toilet bed pan or urinal) 4  -NN 4  -LW     Putting on and taking off regular upper body clothing 4  -NN 4  -LW     Taking care of personal grooming (such as brushing teeth) 4  -NN 4  -LW     Eating meals 4  -NN 4  -LW     Score 23  -NN 24  -LW       User Key  (r) = Recorded By, (t) = Taken By, (c) = Cosigned By    Initials Name Provider Type    LW CHITO Solano/L Occupational Therapy Assistant    NN An Meyer, OTR/L Occupational Therapist           Time Calculation:         Time Calculation- OT       01/17/18 0903          Time Calculation- OT    OT  Start Time 0810  -      OT Stop Time 0855  -      OT Time Calculation (min) 45 min  -      Total Timed Code Minutes- OT 45 minute(s)  -      OT Received On 01/17/18  -        User Key  (r) = Recorded By, (t) = Taken By, (c) = Cosigned By    Initials Name Provider Type     An Meyer OTR/L Occupational Therapist           Therapy Charges for Today     Code Description Service Date Service Provider Modifiers Qty    32029304599 HC OT SELF CARE/MGMT/TRAIN EA 15 MIN 1/17/2018 An Meyer OTR/L GO 3          OT G-codes  OT Functional Scales Options: AM-PAC 6 Clicks Daily Activity (OT)  Functional Limitation: Self care  Self Care Current Status (): At least 20 percent but less than 40 percent impaired, limited or restricted  Self Care Goal Status (): At least 1 percent but less than 20 percent impaired, limited or restricted    An Meyer OTR/L  1/17/2018

## 2018-01-17 NOTE — PROGRESS NOTES
SUBJECTIVE:   1/17/2018  Chief Complaint:     Subjective      Patient is 67 y.o. female with dysphagia and probable aspiration pneumonia possibly secondary to stricture in the esophagus.  Patient states that she feels much better as far as the pneumonia but is still having some difficulty with swallowing..      CURRENT MEDICATIONS/OBJECTIVE/VS/PE:     Current Medications:     Current Facility-Administered Medications   Medication Dose Route Frequency Provider Last Rate Last Dose   • acetaminophen (TYLENOL) tablet 650 mg  650 mg Oral Q6H PRN Kev Abraham MD   650 mg at 01/11/18 0822   • amLODIPine (NORVASC) tablet 10 mg  10 mg Oral Daily Kev Abraham MD   10 mg at 01/17/18 0902   • atorvastatin (LIPITOR) tablet 10 mg  10 mg Oral Daily Kev Abraham MD   10 mg at 01/17/18 0902   • benzonatate (TESSALON) capsule 100 mg  100 mg Oral TID PRN Kev Abraham MD       • bisacodyl (DULCOLAX) EC tablet 5 mg  5 mg Oral Daily PRN Kev Abraham MD       • budesonide-formoterol (SYMBICORT) 160-4.5 MCG/ACT inhaler 2 puff  2 puff Inhalation BID - RT Kev Abraham MD   2 puff at 01/17/18 0722   • butalbital-acetaminophen-caffeine (FIORICET, ESGIC) -40 MG per tablet 1 tablet  1 tablet Oral Q6H PRN Conner Ingram MD   1 tablet at 01/17/18 1051   • dextrose 5 % and sodium chloride 0.45 % infusion  30 mL/hr Intravenous Continuous PRN Bin Oh MD       • gabapentin (NEURONTIN) capsule 800 mg  800 mg Oral Q8H Kev Abraham MD   800 mg at 01/17/18 0558   • hydrALAZINE (APRESOLINE) injection 5 mg  5 mg Intravenous Q6H PRN Conner Ingram MD       • ipratropium-albuterol (DUO-NEB) nebulizer solution 3 mL  3 mL Nebulization 4x Daily - RT Kev Abraham MD   3 mL at 01/17/18 0715   • LORazepam (ATIVAN) tablet 0.5 mg  0.5 mg Oral Q12H PRN Conner Ingram MD   0.5 mg at 01/17/18 0346   • meclizine (ANTIVERT) tablet 25 mg  25 mg Oral TID PRN Kev Abraham MD       • methylPREDNISolone  sodium succinate (SOLU-Medrol) injection 20 mg  20 mg Intravenous Q8H Conner Ingram MD   20 mg at 01/17/18 0903   • nicotine (NICODERM CQ) 21 MG/24HR patch 1 patch  1 patch Transdermal Q24H Kev Abraham MD   1 patch at 01/16/18 2120   • ondansetron (ZOFRAN) tablet 4 mg  4 mg Oral Q8H PRN Kev Abraham MD   4 mg at 01/16/18 1522   • pantoprazole (PROTONIX) injection 40 mg  40 mg Intravenous Q AM Conner Ingram MD   40 mg at 01/17/18 0557   • Pharmacy to dose vancomycin   Does not apply Continuous PRN Conner Ingram MD       • Pharmacy to Dose Zosyn   Does not apply Continuous PRN Conner Ingram MD       • piperacillin-tazobactam (ZOSYN) 3.375 g in iso-osmotic dextrose 50 ml (premix)  3.375 g Intravenous Q8H Conner Ingram MD 50 mL/hr at 01/12/18 1126 3.375 g at 01/17/18 0902   • sertraline (ZOLOFT) tablet 100 mg  100 mg Oral Daily Kev Abraham MD   100 mg at 01/17/18 0901   • sodium chloride 0.9 % flush 1-10 mL  1-10 mL Intravenous PRN Kev Abraham MD   3 mL at 01/15/18 1031   • sodium chloride 0.9 % flush 1-10 mL  1-10 mL Intravenous PRN Bin Oh MD       • sodium chloride 0.9 % flush 10 mL  10 mL Intravenous PRN RACHEL Lange   10 mL at 01/16/18 1856   • sodium chloride 0.9 % infusion  75 mL/hr Intravenous Continuous Kev Abraham MD 75 mL/hr at 01/17/18 0903 75 mL/hr at 01/17/18 0903   • traMADol (ULTRAM) tablet 50 mg  50 mg Oral Q6H PRN Mason Lamar MD   50 mg at 01/17/18 0901   • traZODone (DESYREL) tablet 300 mg  300 mg Oral Nightly Kev Abraham MD   300 mg at 01/16/18 2120   • vancomycin (VANCOCIN) 1,250 mg in sodium chloride 0.9 % 250 mL IVPB  1,250 mg Intravenous Q12H Conner Ingram MD   1,250 mg at 01/17/18 0344       Objective     Physical Exam:   Temp:  [97.7 °F (36.5 °C)-98.9 °F (37.2 °C)] 97.9 °F (36.6 °C)  Heart Rate:  [61-80] 64  Resp:  [16-18] 18  BP: (120-145)/(58-75) 139/71     Physical Exam:  General Appearance:    Alert, cooperative, in no acute  distress   Head:    Normocephalic, without obvious abnormality, atraumatic   Eyes:            Lids and lashes normal, conjunctivae and sclerae normal, no   icterus, no pallor, corneas clear, PERRLA   Ears:    Ears appear intact with no abnormalities noted   Throat:   No oral lesions, no thrush, oral mucosa moist   Neck:   No adenopathy, supple, trachea midline, no thyromegaly, no     carotid bruit, no JVD   Back:     No kyphosis present, no scoliosis present, no skin lesions,       erythema or scars, no tenderness to percussion or                   palpation,   range of motion normal   Lungs:     Clear to auscultation,respirations regular, even and                   unlabored    Heart:    Regular rhythm and normal rate, normal S1 and S2, no            murmur, no gallop, no rub, no click   Breast Exam:    Deferred   Abdomen:     Normal bowel sounds, no masses, no organomegaly, soft        non-tender, non-distended, no guarding, no rebound                 tenderness   Genitalia:    Deferred   Extremities:   Moves all extremities well, no edema, no cyanosis, no              redness   Pulses:   Pulses palpable and equal bilaterally   Skin:   No bleeding, bruising or rash   Lymph nodes:   No palpable adenopathy   Neurologic:   Cranial nerves 2 - 12 grossly intact, sensation intact, DTR        present and equal bilaterally      Results Review:     Lab Results (last 24 hours)     Procedure Component Value Units Date/Time    CBC & Differential [[id number]] Collected:  01/17/18 0547    Specimen:  Blood Updated:  01/17/18 0651    Narrative:       The following orders were created for panel order CBC & Differential.  Procedure                               Abnormality         Status                     ---------                               -----------         ------                     CBC Auto Differential[[id number]]        Abnormal            Final result                 Please view results for these tests on the individual  orders.    CBC Auto Differential [[id number]]  (Abnormal) Collected:  01/17/18 0547    Specimen:  Blood Updated:  01/17/18 0651     WBC 16.45 (H) 10*3/mm3      RBC 3.56 (L) 10*6/mm3      Hemoglobin 10.9 (L) g/dL      Hematocrit 32.7 (L) %      MCV 91.9 fL      MCH 30.6 pg      MCHC 33.3 g/dL      RDW 13.4 %      RDW-SD 44.6 fl      MPV 8.8 fL      Platelets 286 10*3/mm3      Neutrophil % 80.4 (H) %      Lymphocyte % 10.3 %      Monocyte % 7.5 %      Eosinophil % 0.1 %      Basophil % 0.1 %      Immature Grans % 1.6 (H) %      Neutrophils, Absolute 13.24 (H) 10*3/mm3      Lymphocytes, Absolute 1.69 10*3/mm3      Monocytes, Absolute 1.24 (H) 10*3/mm3      Eosinophils, Absolute 0.01 10*3/mm3      Basophils, Absolute 0.01 10*3/mm3      Immature Grans, Absolute 0.26 (H) 10*3/mm3     Basic Metabolic Panel [[id number]]  (Abnormal) Collected:  01/17/18 0546    Specimen:  Blood Updated:  01/17/18 0706     Glucose 99 mg/dL      BUN 25 (H) mg/dL      Creatinine 0.58 mg/dL      Sodium 137 mmol/L      Potassium 4.5 mmol/L      Chloride 96 mmol/L      CO2 32.0 (H) mmol/L      Calcium 8.5 mg/dL      eGFR Non African Amer 104 mL/min/1.73      BUN/Creatinine Ratio 43.1 (H)     Anion Gap 9.0 mmol/L            I reviewed the patient's new clinical results.  I reviewed the patient's new imaging results and agree with the interpretation.     ASSESSMENT/PLAN:   ASSESSMENT: Patient with dysphagia.  Patient was scheduled for EGD last week but because of respiratory symptoms this was delayed until this time.  Patient's breathing is markedly improved with no shortness of breath or coughing.    PLAN: #1 we'll do EGD today with possible dilatation depending on evaluation of distal esophagus.  #2 patient to be kept on a proton pump inhibitor.  #3 after endoscopy patient may be discharged home to follow up as an outpatient.  The risks, benefits, and alternatives of this procedure have been discussed with the patient or the responsible party- the  patient understands and agrees to proceed.         Bin Oh MD  01/17/18  3:05 PM           This document has been electronically signed by Bin Oh MD on January 17, 2018 3:05 PM

## 2018-01-17 NOTE — H&P (VIEW-ONLY)
SUBJECTIVE:   1/17/2018  Chief Complaint:     Subjective      Patient is 67 y.o. female with dysphagia and probable aspiration pneumonia possibly secondary to stricture in the esophagus.  Patient states that she feels much better as far as the pneumonia but is still having some difficulty with swallowing..      CURRENT MEDICATIONS/OBJECTIVE/VS/PE:     Current Medications:     Current Facility-Administered Medications   Medication Dose Route Frequency Provider Last Rate Last Dose   • acetaminophen (TYLENOL) tablet 650 mg  650 mg Oral Q6H PRN Kev Abraham MD   650 mg at 01/11/18 0822   • amLODIPine (NORVASC) tablet 10 mg  10 mg Oral Daily Kev Abraham MD   10 mg at 01/17/18 0902   • atorvastatin (LIPITOR) tablet 10 mg  10 mg Oral Daily Kev Abraham MD   10 mg at 01/17/18 0902   • benzonatate (TESSALON) capsule 100 mg  100 mg Oral TID PRN Kev Abraham MD       • bisacodyl (DULCOLAX) EC tablet 5 mg  5 mg Oral Daily PRN Kev Abraham MD       • budesonide-formoterol (SYMBICORT) 160-4.5 MCG/ACT inhaler 2 puff  2 puff Inhalation BID - RT Kev Abraham MD   2 puff at 01/17/18 0722   • butalbital-acetaminophen-caffeine (FIORICET, ESGIC) -40 MG per tablet 1 tablet  1 tablet Oral Q6H PRN Conner Ingram MD   1 tablet at 01/17/18 1051   • dextrose 5 % and sodium chloride 0.45 % infusion  30 mL/hr Intravenous Continuous PRN Bin Oh MD       • gabapentin (NEURONTIN) capsule 800 mg  800 mg Oral Q8H Kev Abraham MD   800 mg at 01/17/18 0558   • hydrALAZINE (APRESOLINE) injection 5 mg  5 mg Intravenous Q6H PRN Conner Ingram MD       • ipratropium-albuterol (DUO-NEB) nebulizer solution 3 mL  3 mL Nebulization 4x Daily - RT Kev Abraham MD   3 mL at 01/17/18 0715   • LORazepam (ATIVAN) tablet 0.5 mg  0.5 mg Oral Q12H PRN Conner Ingram MD   0.5 mg at 01/17/18 0346   • meclizine (ANTIVERT) tablet 25 mg  25 mg Oral TID PRN Kev Abraham MD       • methylPREDNISolone  sodium succinate (SOLU-Medrol) injection 20 mg  20 mg Intravenous Q8H Conner Ingram MD   20 mg at 01/17/18 0903   • nicotine (NICODERM CQ) 21 MG/24HR patch 1 patch  1 patch Transdermal Q24H Kev Abraham MD   1 patch at 01/16/18 2120   • ondansetron (ZOFRAN) tablet 4 mg  4 mg Oral Q8H PRN Kev Abraham MD   4 mg at 01/16/18 1522   • pantoprazole (PROTONIX) injection 40 mg  40 mg Intravenous Q AM Conner Ingram MD   40 mg at 01/17/18 0557   • Pharmacy to dose vancomycin   Does not apply Continuous PRN Conner Ingram MD       • Pharmacy to Dose Zosyn   Does not apply Continuous PRN Conner Ingram MD       • piperacillin-tazobactam (ZOSYN) 3.375 g in iso-osmotic dextrose 50 ml (premix)  3.375 g Intravenous Q8H Conner Ingram MD 50 mL/hr at 01/12/18 1126 3.375 g at 01/17/18 0902   • sertraline (ZOLOFT) tablet 100 mg  100 mg Oral Daily Kev Abraham MD   100 mg at 01/17/18 0901   • sodium chloride 0.9 % flush 1-10 mL  1-10 mL Intravenous PRN Kev Abraham MD   3 mL at 01/15/18 1031   • sodium chloride 0.9 % flush 1-10 mL  1-10 mL Intravenous PRN Bin Oh MD       • sodium chloride 0.9 % flush 10 mL  10 mL Intravenous PRN RACHEL Lange   10 mL at 01/16/18 1856   • sodium chloride 0.9 % infusion  75 mL/hr Intravenous Continuous Kev Abraham MD 75 mL/hr at 01/17/18 0903 75 mL/hr at 01/17/18 0903   • traMADol (ULTRAM) tablet 50 mg  50 mg Oral Q6H PRN Mason Lamar MD   50 mg at 01/17/18 0901   • traZODone (DESYREL) tablet 300 mg  300 mg Oral Nightly Kev Abraham MD   300 mg at 01/16/18 2120   • vancomycin (VANCOCIN) 1,250 mg in sodium chloride 0.9 % 250 mL IVPB  1,250 mg Intravenous Q12H Conner Ingram MD   1,250 mg at 01/17/18 0344       Objective     Physical Exam:   Temp:  [97.7 °F (36.5 °C)-98.9 °F (37.2 °C)] 97.9 °F (36.6 °C)  Heart Rate:  [61-80] 64  Resp:  [16-18] 18  BP: (120-145)/(58-75) 139/71     Physical Exam:  General Appearance:    Alert, cooperative, in no acute  distress   Head:    Normocephalic, without obvious abnormality, atraumatic   Eyes:            Lids and lashes normal, conjunctivae and sclerae normal, no   icterus, no pallor, corneas clear, PERRLA   Ears:    Ears appear intact with no abnormalities noted   Throat:   No oral lesions, no thrush, oral mucosa moist   Neck:   No adenopathy, supple, trachea midline, no thyromegaly, no     carotid bruit, no JVD   Back:     No kyphosis present, no scoliosis present, no skin lesions,       erythema or scars, no tenderness to percussion or                   palpation,   range of motion normal   Lungs:     Clear to auscultation,respirations regular, even and                   unlabored    Heart:    Regular rhythm and normal rate, normal S1 and S2, no            murmur, no gallop, no rub, no click   Breast Exam:    Deferred   Abdomen:     Normal bowel sounds, no masses, no organomegaly, soft        non-tender, non-distended, no guarding, no rebound                 tenderness   Genitalia:    Deferred   Extremities:   Moves all extremities well, no edema, no cyanosis, no              redness   Pulses:   Pulses palpable and equal bilaterally   Skin:   No bleeding, bruising or rash   Lymph nodes:   No palpable adenopathy   Neurologic:   Cranial nerves 2 - 12 grossly intact, sensation intact, DTR        present and equal bilaterally      Results Review:     Lab Results (last 24 hours)     Procedure Component Value Units Date/Time    CBC & Differential [[id number]] Collected:  01/17/18 0547    Specimen:  Blood Updated:  01/17/18 0651    Narrative:       The following orders were created for panel order CBC & Differential.  Procedure                               Abnormality         Status                     ---------                               -----------         ------                     CBC Auto Differential[[id number]]        Abnormal            Final result                 Please view results for these tests on the individual  orders.    CBC Auto Differential [[id number]]  (Abnormal) Collected:  01/17/18 0547    Specimen:  Blood Updated:  01/17/18 0651     WBC 16.45 (H) 10*3/mm3      RBC 3.56 (L) 10*6/mm3      Hemoglobin 10.9 (L) g/dL      Hematocrit 32.7 (L) %      MCV 91.9 fL      MCH 30.6 pg      MCHC 33.3 g/dL      RDW 13.4 %      RDW-SD 44.6 fl      MPV 8.8 fL      Platelets 286 10*3/mm3      Neutrophil % 80.4 (H) %      Lymphocyte % 10.3 %      Monocyte % 7.5 %      Eosinophil % 0.1 %      Basophil % 0.1 %      Immature Grans % 1.6 (H) %      Neutrophils, Absolute 13.24 (H) 10*3/mm3      Lymphocytes, Absolute 1.69 10*3/mm3      Monocytes, Absolute 1.24 (H) 10*3/mm3      Eosinophils, Absolute 0.01 10*3/mm3      Basophils, Absolute 0.01 10*3/mm3      Immature Grans, Absolute 0.26 (H) 10*3/mm3     Basic Metabolic Panel [[id number]]  (Abnormal) Collected:  01/17/18 0546    Specimen:  Blood Updated:  01/17/18 0706     Glucose 99 mg/dL      BUN 25 (H) mg/dL      Creatinine 0.58 mg/dL      Sodium 137 mmol/L      Potassium 4.5 mmol/L      Chloride 96 mmol/L      CO2 32.0 (H) mmol/L      Calcium 8.5 mg/dL      eGFR Non African Amer 104 mL/min/1.73      BUN/Creatinine Ratio 43.1 (H)     Anion Gap 9.0 mmol/L            I reviewed the patient's new clinical results.  I reviewed the patient's new imaging results and agree with the interpretation.     ASSESSMENT/PLAN:   ASSESSMENT: Patient with dysphagia.  Patient was scheduled for EGD last week but because of respiratory symptoms this was delayed until this time.  Patient's breathing is markedly improved with no shortness of breath or coughing.    PLAN: #1 we'll do EGD today with possible dilatation depending on evaluation of distal esophagus.  #2 patient to be kept on a proton pump inhibitor.  #3 after endoscopy patient may be discharged home to follow up as an outpatient.  The risks, benefits, and alternatives of this procedure have been discussed with the patient or the responsible party- the  patient understands and agrees to proceed.         Bin Oh MD  01/17/18  3:05 PM           This document has been electronically signed by Bin Oh MD on January 17, 2018 3:05 PM

## 2018-01-17 NOTE — ANESTHESIA POSTPROCEDURE EVALUATION
Patient: Mona Perales    Procedure Summary     Date Anesthesia Start Anesthesia Stop Room / Location    01/17/18 1508 1517 [facility] ENDOSCOPY 1 / [facility] ENDOSCOPY       Procedure Diagnosis Surgeon Provider    ESOPHAGOGASTRODUODENOSCOPY (N/A Esophagus) Dysphagia, unspecified type  (Dysphagia, unspecified type [R13.10]) MD Toña Marcano, LORI          Anesthesia Type: MAC  Last vitals  BP   139/71 (01/17/18 1446)   Temp   97.9 °F (36.6 °C) (01/17/18 1446)   Pulse   64 (01/17/18 1446)   Resp   18 (01/17/18 1446)     SpO2   98 % (01/17/18 1446)     Post Anesthesia Care and Evaluation    Patient location during evaluation: bedside  Patient participation: waiting for patient participation  Level of consciousness: sleepy but conscious  Pain score: 0  Pain management: adequate  Airway patency: patent  Anesthetic complications: No anesthetic complications  PONV Status: none  Cardiovascular status: acceptable  Respiratory status: acceptable  Hydration status: acceptable

## 2018-01-18 LAB
LAB AP CASE REPORT: NORMAL
Lab: NORMAL
PATH REPORT.FINAL DX SPEC: NORMAL
PATH REPORT.GROSS SPEC: NORMAL

## 2018-01-18 NOTE — PLAN OF CARE
Problem: Inpatient Occupational Therapy  Goal: Transfer Training Goal 1 LTG- OT  Outcome: Outcome(s) achieved Date Met: 01/18/18   01/10/18 1451 01/17/18 0901   Transfer Training OT LTG   Transfer Training OT LTG, Date Established 01/10/18 --    Transfer Training OT LTG, Time to Achieve by discharge --    Transfer Training OT LTG, Activity Type all transfers --    Transfer Training OT LTG, Lares Level independent --    Transfer Training OT LTG, Date Goal Reviewed --  01/17/18   Transfer Training OT LTG, Outcome --  goal met     Goal: Strength Goal LTG- OT  Outcome: Outcome(s) achieved Date Met: 01/18/18   01/10/18 1451 01/17/18 0901   Strength OT LTG   Strength Goal OT LTG, Date Established 01/10/18 --    Strength Goal OT LTG, Time to Achieve by discharge --    Strength Goal OT LTG, Measure to Achieve Pt. will complete BUE strengthening exercises all planes and joints to increase BUE strength to 5/5 for ADLs.  --    Strength Goal OT LTG, Date Goal Reviewed --  01/17/18   Strength Goal OT LTG, Outcome --  goal met     Goal: ADL Goal LTG- OT  Outcome: Unable to achieve outcome(s) by discharge Date Met: 01/18/18   01/10/18 1451 01/18/18 0658   ADL OT LTG   ADL OT LTG, Date Established 01/10/18 --    ADL OT LTG, Activity Type ADL skills --    ADL OT LTG, Lares Level independent --    ADL OT LTG, Date Goal Reviewed --  01/18/18   ADL OT LTG, Outcome --  goal not met   ADL OT LTG, Reason Goal Not Met --  discharged from facility

## 2018-01-18 NOTE — THERAPY DISCHARGE NOTE
Acute Care - Occupational Therapy Initial Eval/Discharge  [facility]     Patient Name: Mona Perales  : 1950  MRN: [id number]  Today's Date: 2018  Onset of Illness/Injury or Date of Surgery Date: 18  Date of Referral to OT: 01/10/18  Referring Physician: Dr. Ingram       Admit Date: 2018       ICD-10-CM ICD-9-CM   1. Pneumonia of right upper lobe due to infectious organism J18.1 486   2. Hypoxia R09.02 799.02   3. Decreased activities of daily living (ADL) Z78.9 V49.89   4. Impaired functional mobility, balance, gait, and endurance Z74.09 V49.89   5. Dysphagia, unspecified type R13.10 787.20     Patient Active Problem List   Diagnosis   • Anxiety   • CAD (coronary atherosclerotic disease)   • Carotid stenosis   • COPD (chronic obstructive pulmonary disease)   • COPD with exacerbation   • Depressive disorder, not elsewhere classified   • Benign essential hypertension   • Hypercholesteremia   • Insomnia   • Low back pain   • Osteoporosis   • Other screening mammogram   • RUQ abdominal pain   • Peripheral arterial disease   • Dizziness   • Nicotine abuse   • Dysphagia     Past Medical History:   Diagnosis Date   • Anxiety    • Arthritis    • Asthma    • 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 with ulceration     bilateral legs - bilateral iliac stents       • Cancer    • Chronic lower back pain    • COPD (chronic obstructive pulmonary disease)    • Coronary arteriosclerosis    • Essential (primary) hypertension    • History of pulmonary embolus (PE)    • Hyperlipidemia    • Insomnia    • Lumbago    • Nicotine dependence    • Occlusion of artery      and stenosis of bilateral carotid arteries - BABS 16-49%, LICA 0-15%   • Other atherosclerosis of native artery of other extremity     LEFT subclavian stent  (occluded)     Past Surgical History:   Procedure Laterality Date   • CARDIAC CATHETERIZATION  2016    No evidence of any obstructive epicardial  "CAD.Preserved LV systolic function with EF of 55%.   • CENTRAL VENOUS LINE INSERTION  04/07/2016    Successful placement of right uppe extremity 6-[place] triple lumen PICC line.   • ENDOSCOPY N/A 1/12/2018    Procedure: ESOPHAGOGASTRODUODENOSCOPY;  Surgeon: Bin Oh MD;  Location: [facility] ENDOSCOPY;  Service:    • FRACTURE SURGERY     • HYSTERECTOMY     • TOTAL SHOULDER REPLACEMENT Left    • TRANSESOPHAGEAL ECHOCARDIOGRAM (JACQUES)  04/07/2016    With color flow-Mild to moderate [facility].LV systolic function well preserved with Ef of 55-60%.Mitral and AV intact.Diastolic dysfunction          OT ASSESSMENT FLOWSHEET (last 72 hours)      OT Evaluation       01/18/18 0659 01/17/18 1415 01/17/18 0810 01/15/18 1054 01/15/18 1053    Rehab Evaluation    Document Type  therapy note (daily note)  -NN therapy note (daily note)  -      Subjective Information   agree to therapy;complains of;weakness;fatigue  -NN      General Information    Equipment Currently Used at Home    nebulizer;other (see comments)   access to walker, if needed.   -     Living Environment    Lives With    alone;other (see comments)   Patient states that her daughter, Torri, does not live with her, but voiced she \"checks in\" on her regularly.   -     Living Arrangements    apartment   Contact information on face sheet confirmed with patient.   -     Home Accessibility    no concerns  -     Transportation Available    car   If unable to drive, patient states she has used PACS to assist at times with transportation.   -     Clinical Impression    Anticipated Discharge Disposition home  -NN        Functional Level Prior    Ambulation     0-->independent  -AH    Transferring     0-->independent  -    Toileting     0-->independent  -    Bathing     0-->independent  -AH    Dressing     0-->independent  -AH    Eating     0-->independent  -    Communication     0-->understands/communicates without difficulty  -    Swallowing     --   Noted " difficulty with swallowing. Plan for EGD  -    Pain Assessment    Pain Assessment  0-10  -NN 0-10  -NN      Pain Score  4  -NN 2  -NN      Pain Location  Head  -NN Head  -NN      Pain Descriptors  Aching  -NN Aching  -NN      Pain Frequency  Constant/continuous  -NN Constant/continuous  -NN      Pain Intervention(s)  Repositioned  -NN Repositioned  -NN      Response to Interventions  tolerated  -NN       Bed Mobility, Assessment/Treatment    Bed Mobility, Assistive Device   bed rails;head of bed elevated  -NN      Bed Mobility, Scoot/Bridge, [place]  independent  -NN       Bed Mob, Supine to Sit, [place]  independent  -NN independent  -NN      Bed Mob, Sit to Supine, [place]  independent  -NN independent  -NN      Transfer Assessment/Treatment    Transfers, Sit-Stand [place]   independent  -NN      Transfers, Stand-Sit [place]   independent  -NN      Functional Mobility    Functional Mobility- Ind. Level   independent  -NN      Functional Mobility-Distance (Feet)   --   in room  -NN      Functional Mobility- Safety Issues   supplemental O2  -NN      Upper Body Bathing Assessment/Training    UB Bathing Assess/Train Assistive Device   --   sponge bath  -NN      UB Bathing Assess/Train, Position   standing  -NN      UB Bathing Assess/Train, [place] Level   minimum assist (75% patient effort)  -NN      UB Bathing Assess/Train, Impairments   decreased flexibility  -NN      Lower Body Bathing Assessment/Training    LB Bathing Assess/Train Assistive Device   --   sponge bathing  -NN      LB Bathing Assess/Train, Position   standing  -NN      LB Bathing Assess/Train, [place] Level   supervision required  -NN      Upper Body Dressing Assessment/Training    UB Dressing Assess/Train, Clothing Type   doffing:;donning:;hospital gown  -NN      UB Dressing Assess/Train, Position   standing  -NN      UB Dressing Assess/Train, [place]   supervision required  -      Lower Body Dressing  Assessment/Training    LB Dressing Assess/Train, Clothing Type   doffing:;donning:;socks   underwear  -NN      LB Dressing Assess/Train, Position   sitting;standing  -NN      LB Dressing Assess/Train, [place]   supervision required  -NN      Therapy Exercises    Bilateral Upper Extremity  AROM:;10 reps;elbow flexion/extension;hand pumps;shoulder abduction/adduction;pronation/supination;shoulder extension/flexion;shoulder horizontal abd/add  -NN       Positioning and Restraints    Pre-Treatment Position  in bed  -NN in bed  -NN      Post Treatment Position  bed  -NN bed  -NN      In Bed  sitting EOB;call light within reach;side rails up x2  -NN sitting EOB;call light within reach;encouraged to call for assist;side rails up x2  -NN        01/15/18 0911                Rehab Evaluation    Document Type therapy note (daily note)  -LW        Subjective Information agree to therapy  -LW        Patient Effort, Rehab Treatment good  -LW        General Information    Precautions/Limitations fall precautions;oxygen therapy device and L/min  -LW        Vital Signs    Pre Systolic BP Rehab 142  -LW        Pre Treatment Diastolic BP 75  -LW        Pretreatment Heart Rate (beats/min) 75  -LW        Posttreatment Heart Rate (beats/min) 77  -LW        Pre SpO2 (%) 98  -LW        O2 Delivery Pre Treatment supplemental O2  -LW        Post SpO2 (%) 97  -LW        O2 Delivery Post Treatment supplemental O2  -LW        Pre Patient Position Sitting  -LW        Post Patient Position Sitting  -LW        Pain Assessment    Pain Assessment 0-10  -LW        Pain Score 4  -LW        Post Pain Score 4  -LW        Pain Type Chronic pain  -LW        Pain Location Back  -LW        Pain Orientation Lower  -LW        Pain Descriptors Aching  -LW        Pain Frequency Constant/continuous  -LW        Pain Intervention(s) Medication (See MAR)  -LW        Vision Assessment/Intervention    Visual Impairment WFL with corrective lenses  -LW         Cognitive Assessment/Intervention    Current Cognitive/Communication Assessment functional  -LW        Orientation Status oriented x 4  -LW        Follows Commands/Answers Questions 100% of the time  -LW        Personal Safety WNL/WFL  -LW        Personal Safety Interventions fall prevention program maintained  -LW        Transfer Assessment/Treatment    Transfers, Sit-Stand [place] independent  -LW        Transfers, Stand-Sit [place] independent  -LW        Transfers, Sit-Stand-Sit, Assist Device --   none  -LW        Toilet Transfer, [place] independent  -LW        Toilet Transfer, Assistive Device other (see comments)   none  -LW        Toileting Assessment/Training    Toileting Assess/Train, Assistive Device grab bars  -LW        Toileting Assess/Train, Position sitting  -LW        Toileting Assess/Train, Indepen Level independent  -LW        Grooming Assessment/Training    Grooming Assess/Train, Position standing;sink side  -LW        Grooming Assess/Train, Indepen Level independent  -LW        Grooming Assess/Train, Comment washed face and hands, oral care, combed hair  -LW        Therapy Exercises    Bilateral Upper Extremity AROM:;20 reps;sitting   ROM  -LW        BUE Resistance manual resistance- minimal  -LW        Positioning and Restraints    Pre-Treatment Position in bed  -LW        Post Treatment Position bed  -LW        In Bed sitting EOB;call light within reach;encouraged to call for assist;exit alarm on  -LW          User Key  (r) = Recorded By, (t) = Taken By, (c) = Cosigned By    Initials Name Effective Dates     Brandie Muir, ROMELIAW 10/17/16 -     LW Nuzhat Rodrigues, DALE/L 10/17/16 -     NN An Meyer, OTR/L 11/08/17 -           Occupational Therapy Education     Title: PT OT SLP Therapies (Active)     Topic: Occupational Therapy (Resolved)     Point: ADL training (Resolved)    Description: Instruct learner(s) on proper safety adaptation and remediation techniques during  self care or transfers.   Instruct in proper use of assistive devices.    Learning Progress Summary    Learner Readiness Method Response Comment Documented by Status   Patient Acceptance E VU,NR Pt. educated on role of OT. benefit of therapy, progression with poc NN 01/17/18 1434 Done    Acceptance E VU,NR Pt. educated on role of OT, safe t/f, benefit of activity, progression with poc NN 01/17/18 0859 Done    Acceptance E VU HEP LW 01/15/18 1120 Done    Acceptance E,DIEGO CAVANAUGH  BB 01/14/18 1341 Done    Acceptance E VU,NR Pt. educated on role of OT, safe t/f, benefit of activity, PLB, progression with poc NN 01/13/18 0943 Done    Acceptance E VU  LW 01/12/18 1203 Done    Acceptance E NR Pt. educated on role of OT, safe bed mobility, benefit of OOB activity, progression with poc NN 01/11/18 1016 Active    Acceptance E VU,NR Pt. educated on role of OT, safe t/f, benefit of therapy, calling for assist, progression with poc NN 01/10/18 1451 Done               Point: Home exercise program (Resolved)    Description: Instruct learner(s) on appropriate technique for monitoring, assisting and/or progressing therapeutic exercises/activities.    Learning Progress Summary    Learner Readiness Method Response Comment Documented by Status   Patient Acceptance E VU,NR Pt. educated on role of OT. benefit of therapy, progression with poc NN 01/17/18 1434 Done    Acceptance E VU,NR Pt. educated on role of OT, safe t/f, benefit of activity, progression with poc NN 01/17/18 0859 Done    Acceptance E VU HEP LW 01/15/18 1120 Done    Acceptance E,DIEGO CAVANAUGH 01/14/18 1341 Done    Acceptance E VU,NR Pt. educated on role of OT, safe t/f, benefit of activity, PLB, progression with poc NN 01/13/18 0943 Done    Acceptance E VU  LW 01/12/18 1203 Done    Acceptance E NR Pt. educated on role of OT, safe bed mobility, benefit of OOB activity, progression with poc NN 01/11/18 1016 Active    Acceptance E VU,NR Pt. educated on role of OT, safe t/f,  benefit of therapy, calling for assist, progression with poc  01/10/18 1451 Done               Point: Body mechanics (Resolved)    Description: Instruct learner(s) on proper positioning and spine alignment during self-care, functional mobility activities and/or exercises.    Learning Progress Summary    Learner Readiness Method Response Comment Documented by Status   Patient Acceptance E VU,NR Pt. educated on role of OT. benefit of therapy, progression with poc NN 01/17/18 1434 Done    Acceptance E VU,NR Pt. educated on role of OT, safe t/f, benefit of activity, progression with poc NN 01/17/18 0859 Done    Acceptance E VU HEP LW 01/15/18 1120 Done    Acceptance E,TB VU,DU  BB 01/14/18 1341 Done    Acceptance E VU,NR Pt. educated on role of OT, safe t/f, benefit of activity, PLB, progression with poc NN 01/13/18 0943 Done    Acceptance E VU  LW 01/12/18 1203 Done    Acceptance E NR Pt. educated on role of OT, safe bed mobility, benefit of OOB activity, progression with poc  01/11/18 1016 Active    Acceptance E VU,NR Pt. educated on role of OT, safe t/f, benefit of therapy, calling for assist, progression with [facility] 01/10/18 1451 Done                      User Key     Initials Effective Dates Name Provider Type Discipline     10/17/16 -  CHITO Gilbert/L Occupational Therapy Assistant OT     10/17/16 -  CHITO Solano/L Occupational Therapy Assistant OT     11/08/17 -  MELANIA Gomez/L Occupational Therapist OT                OT Recommendation and Plan  Anticipated Discharge Disposition: home  Planned Therapy Interventions: activity intolerance, ADL retraining, balance training, bed mobility training, energy conservation, home exercise program, strengthening  Therapy Frequency:  (3-14x/week)  Plan of Care Review  Plan Of Care Reviewed With: patient  Progress: progress toward functional goals as expected  Outcome Summary/Follow up Plan: OT tx completed. Pt. completed bed mobility  independently. Pt. cont to benefit from skilled OT d/t decreased overall activity tolerance.            OT Goals       01/18/18 0658 01/17/18 0901 01/15/18 1112    Transfer Training OT LTG    Transfer Training OT LTG, Date Goal Reviewed  01/17/18  -NN 01/15/18  -LW    Transfer Training OT LTG, Outcome  goal met  -NN goal met  -LW    Strength OT LTG    Strength Goal OT LTG, Date Goal Reviewed  01/17/18  -NN 01/15/18  -LW    Strength Goal OT LTG, Outcome  goal met  -NN goal ongoing  -LW    ADL OT LTG    ADL OT LTG, Date Goal Reviewed 01/18/18  -NN 01/17/18  -NN 01/15/18  -LW    ADL OT LTG, Outcome goal not met  -NN goal ongoing  -NN goal ongoing  -LW    ADL OT LTG, Reason Goal Not Met discharged from facility  -NN        01/14/18 1342 01/12/18 1153 01/10/18 1451    Transfer Training OT LTG    Transfer Training OT LTG, Date Established   01/10/18  -NN    Transfer Training OT LTG, Time to Achieve   by discharge  -NN    Transfer Training OT LTG, Activity Type   all transfers  -NN    Transfer Training OT LTG, [place] Level   independent  -NN    Transfer Training OT LTG, Date Goal Reviewed 01/14/18  -BB 01/12/18  -LW     Transfer Training OT LTG, Outcome  goal ongoing  -LW     Strength OT LTG    Strength Goal OT LTG, Date Established   01/10/18  -NN    Strength Goal OT LTG, Time to Achieve   by discharge  -NN    Strength Goal OT LTG, Measure to Achieve   Pt. will complete BUE strengthening exercises all planes and joints to increase BUE strength to 5/5 for ADLs.   -NN    Strength Goal OT LTG, Date Goal Reviewed 01/14/18  -BB 01/12/18  -LW     Strength Goal OT LTG, Outcome goal ongoing  -BB      ADL OT LTG    ADL OT LTG, Date Established   01/10/18  -NN    ADL OT LTG, Activity Type   ADL skills  -NN    ADL OT LTG, [place] Level   independent  -NN    ADL OT LTG, Date Goal Reviewed 01/14/18  -BB 01/12/18  -LW     ADL OT LTG, Outcome  goal ongoing  -LW       User Key  (r) = Recorded By, (t) = Taken By, (c) = Cosigned  By    Initials Name Provider Type    BB Mirna Herrera DALE/L Occupational Therapy Assistant    SADI Rodrigues DALE/L Occupational Therapy Assistant    LAKESHA Meyer OTR/L Occupational Therapist                Outcome Measures       01/17/18 0900 01/15/18 0935       How much help from another is currently needed...    Putting on and taking off regular lower body clothing? 4  -NN 4  -LW     Bathing (including washing, rinsing, and drying) 3  -NN 4  -LW     Toileting (which includes using toilet bed pan or urinal) 4  -NN 4  -LW     Putting on and taking off regular upper body clothing 4  -NN 4  -LW     Taking care of personal grooming (such as brushing teeth) 4  -NN 4  -LW     Eating meals 4  -NN 4  -LW     Score 23  -NN 24  -LW       User Key  (r) = Recorded By, (t) = Taken By, (c) = Cosigned By    Initials Name Provider Type    TAYLOR MouraA/L Occupational Therapy Assistant    LAKESHA Meyer OTR/L Occupational Therapist          Time Calculation:       Therapy Charges for Today     Code Description Service Date Service Provider Modifiers Qty    [id number] HC OT SELF CARE/MGMT/TRAIN EA 15 MIN 1/17/2018 An Meyer OTR/L GO 3          OT G-codes  OT Functional Scales Options: AM-PAC 6 Clicks Daily Activity (OT)  Functional Limitation: Self care  Self Care Current Status (): At least 20 percent but less than 40 percent impaired, limited or restricted  Self Care Goal Status (): At least 1 percent but less than 20 percent impaired, limited or restricted    OT Discharge Summary  Anticipated Discharge Disposition: home  Reason for Discharge: Discharge from facility  Outcomes Achieved: Refer to plan of care for updates on goals achieved  Discharge Destination: Home    MELANIA Gomez/ARMANDO  1/18/2018

## 2018-02-05 ENCOUNTER — OFFICE VISIT (OUTPATIENT)
Dept: GASTROENTEROLOGY | Facility: CLINIC | Age: 68
End: 2018-02-05

## 2018-02-05 VITALS
SYSTOLIC BLOOD PRESSURE: 134 MMHG | HEIGHT: 62 IN | WEIGHT: 123.4 LBS | HEART RATE: 75 BPM | OXYGEN SATURATION: 95 % | DIASTOLIC BLOOD PRESSURE: 70 MMHG | BODY MASS INDEX: 22.71 KG/M2

## 2018-02-05 DIAGNOSIS — K21.00 GASTROESOPHAGEAL REFLUX DISEASE WITH ESOPHAGITIS: ICD-10-CM

## 2018-02-05 DIAGNOSIS — R13.10 DYSPHAGIA, UNSPECIFIED TYPE: Primary | ICD-10-CM

## 2018-02-05 DIAGNOSIS — R10.13 EPIGASTRIC PAIN: ICD-10-CM

## 2018-02-05 PROCEDURE — 99214 OFFICE O/P EST MOD 30 MIN: CPT | Performed by: NURSE PRACTITIONER

## 2018-02-05 RX ORDER — PROPRANOLOL HYDROCHLORIDE 20 MG/1
40 TABLET ORAL 2 TIMES DAILY
Refills: 5 | COMMUNITY
Start: 2018-01-22 | End: 2018-06-04

## 2018-02-05 RX ORDER — DILTIAZEM HYDROCHLORIDE 60 MG/1
2 TABLET, FILM COATED ORAL 2 TIMES DAILY PRN
Refills: 1 | COMMUNITY
Start: 2018-01-19 | End: 2019-02-26 | Stop reason: HOSPADM

## 2018-02-05 RX ORDER — APIXABAN 2.5 MG/1
TABLET, FILM COATED ORAL
Refills: 5 | COMMUNITY
Start: 2018-01-22 | End: 2018-02-22 | Stop reason: ALTCHOICE

## 2018-02-05 RX ORDER — PANTOPRAZOLE SODIUM 40 MG/1
40 TABLET, DELAYED RELEASE ORAL DAILY
Qty: 30 TABLET | Refills: 5 | Status: SHIPPED | OUTPATIENT
Start: 2018-02-05

## 2018-02-05 RX ORDER — DEXTROSE AND SODIUM CHLORIDE 5; .45 G/100ML; G/100ML
30 INJECTION, SOLUTION INTRAVENOUS CONTINUOUS PRN
Status: CANCELLED | OUTPATIENT
Start: 2018-03-16

## 2018-02-05 NOTE — PROGRESS NOTES
Chief Complaint   Patient presents with   • EGD Performed 01/17/2018       Subjective    Mona Perales is a 67 y.o. female. she is here today for follow-up.    Difficulty Swallowing   This is a new problem. The current episode started more than 1 month ago. The problem occurs intermittently. The problem has been gradually worsening. Associated symptoms include fatigue. Pertinent negatives include no abdominal pain, arthralgias, chills, diaphoresis, fever, nausea, sore throat or vomiting. The symptoms are aggravated by eating.   67-year-old female presents for hospital follow-up.  Denies any current abdominal pain.  Hospitalized due to pneumonia and GI was consult is due to painful swallowing.  States she is still having painful swallowing and feeling of food getting hung in her midesophagus and epigastric region.  She underwent EGD while hospitalized January 17, 2018 which noted mildly severe esophagitis biopsy was consistent with chronic esophagitis.  Food was found throughout her entire stomach.  She underwent upper GI series with noted a possible stricture versus filling defect.  No stricture was seen on EGD over there was a large amount of residue in the stomach.  Patient states she had not eaten prior to 8 hours before procedure.  Reports she began having epigastric pain trouble swallowing about a month ago.  She states Protonix has helped somewhat.  Plan; we'll schedule patient for repeat EGD due to possible esophageal stricture and continual dysphagia.  I have  instructed patient to follow clear liquid diet day prior to procedure.  Follow-up after test return to office sooner if needed.     The following portions of the patient's history were reviewed and updated as appropriate:   Past Medical History:   Diagnosis Date   • Anxiety    • Arthritis    • Asthma    • 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 with ulceration     bilateral legs - bilateral iliac stents 2008      • Cancer    • Chronic  lower back pain    • COPD (chronic obstructive pulmonary disease)    • Coronary arteriosclerosis    • Essential (primary) hypertension    • History of pulmonary embolus (PE)    • Hyperlipidemia    • Insomnia    • Lumbago    • Nicotine dependence    • Occlusion of artery      and stenosis of bilateral carotid arteries - BABS 16-49%, LICA 0-15%   • Other atherosclerosis of native artery of other extremity     LEFT subclavian stent 2005 (occluded)     Past Surgical History:   Procedure Laterality Date   • CARDIAC CATHETERIZATION  04/06/2016    No evidence of any obstructive epicardial CAD.Preserved LV systolic function with EF of 55%.   • CENTRAL VENOUS LINE INSERTION  04/07/2016    Successful placement of right uppe extremity 6-French triple lumen PICC line.   • ENDOSCOPY N/A 1/12/2018    Procedure: ESOPHAGOGASTRODUODENOSCOPY;  Surgeon: Bin Martin MD;  Location: [facility] ENDOSCOPY;  Service:    • ENDOSCOPY N/A 1/17/2018    Procedure: ESOPHAGOGASTRODUODENOSCOPY;  Surgeon: Bin Martin MD;  Location: [facility] ENDOSCOPY;  Service:    • FRACTURE SURGERY     • HYSTERECTOMY     • TOTAL SHOULDER REPLACEMENT Left    • TRANSESOPHAGEAL ECHOCARDIOGRAM (JACQUES)  04/07/2016    With color flow-Mild to moderate [facility].LV systolic function well preserved with Ef of 55-60%.Mitral and AV intact.Diastolic dysfunction   • UPPER GASTROINTESTINAL ENDOSCOPY  01/17/2018    Dr. Bin Martin M.D.     Family History   Problem Relation Age of Onset   • Heart disease Other    • Hypertension Other      OB History     No data available        Current Outpatient Prescriptions   Medication Sig Dispense Refill   • albuterol (PROVENTIL) (2.5 MG/3ML) 0.083% nebulizer solution Take 2.5 mg by nebulization Every 8 (Eight) Hours As Needed for Wheezing.     • amLODIPine (NORVASC) 10 MG tablet Take 10 mg by mouth Daily.     • gabapentin (NEURONTIN) 800 MG tablet Take 800 mg by mouth 3 (Three) Times a Day.     • meclizine (ANTIVERT) 25 MG tablet Take 25 mg  by mouth 3 (Three) Times a Day As Needed for dizziness.     • ondansetron (ZOFRAN) 4 MG tablet Take 4 mg by mouth Every 8 (Eight) Hours As Needed for Nausea or Vomiting.     • pantoprazole (PROTONIX) 40 MG EC tablet Take 1 tablet by mouth Daily. 30 tablet 5   • penciclovir (DENAVIR) 1 % cream Apply 1 application topically Every 2 (Two) Hours.     • pravastatin (PRAVACHOL) 20 MG tablet Take 20 mg by mouth Daily.     • sertraline (ZOLOFT) 100 MG tablet Take 100 mg by mouth Daily.     • SYMBICORT 80-4.5 MCG/ACT inhaler   1   • traZODone (DESYREL) 300 MG tablet Take 300 mg by mouth Every Night.     • ELIQUIS 2.5 MG tablet tablet   5   • propranolol (INDERAL) 20 MG tablet   5     No current facility-administered medications for this visit.      Allergies   Allergen Reactions   • Morphine And Related Itching, Confusion and Hallucinations     Social History     Social History   • Marital status:      Spouse name: N/A   • Number of children: N/A   • Years of education: N/A     Social History Main Topics   • Smoking status: Current Every Day Smoker     Packs/day: 1.00     Years: 50.00   • Smokeless tobacco: Never Used      Comment: 02/05/2018 - Patient opted to decline cessation of smoking counseling at present time.   • Alcohol use No   • Drug use: No   • Sexual activity: Defer     Other Topics Concern   • None     Social History Narrative       Review of Systems  Review of Systems   Constitutional: Positive for fatigue. Negative for activity change, appetite change, chills, diaphoresis, fever and unexpected weight change.   HENT: Positive for trouble swallowing. Negative for sore throat.    Respiratory: Negative for shortness of breath.    Gastrointestinal: Negative for abdominal distention, abdominal pain, anal bleeding, blood in stool, constipation, diarrhea, nausea, rectal pain and vomiting.   Musculoskeletal: Negative for arthralgias.   Skin: Negative for pallor.   Neurological: Negative for light-headedness.  "       /70 (BP Location: Left arm, Patient Position: Sitting, Cuff Size: Adult)  Pulse 75  Ht 157.5 cm (62\")  Wt 56 kg (123 lb 6.4 oz)  SpO2 95%  BMI 22.57 kg/m2    Objective    Physical Exam   Constitutional: She is oriented to person, place, and time. She appears well-developed and well-nourished. She is cooperative. No distress.   HENT:   Head: Normocephalic and atraumatic.   Neck: Normal range of motion. Neck supple. No thyromegaly present.   Cardiovascular: Normal rate, regular rhythm and normal heart sounds.    Pulmonary/Chest: Effort normal and breath sounds normal. She has no wheezes. She has no rhonchi. She has no rales.   Abdominal: Soft. Normal appearance and bowel sounds are normal. She exhibits no shifting dullness and no distension. There is no hepatosplenomegaly. There is no tenderness. There is no rigidity and no guarding. No hernia.   Lymphadenopathy:     She has no cervical adenopathy.   Neurological: She is alert and oriented to person, place, and time.   Skin: Skin is warm, dry and intact. No rash noted. No pallor.   Psychiatric: She has a normal mood and affect. Her speech is normal.     Admission on 01/09/2018, Discharged on 01/17/2018   No results displayed because visit has over 200 results.        Assessment/Plan      1. Dysphagia, unspecified type    2. Epigastric pain    3. Gastroesophageal reflux disease with esophagitis    .       Orders placed during this encounter include:    ESOPHAGOGASTRODUODENOSCOPY possible dilation  (N/A)    Review and/or summary of lab tests, radiology, procedures, medications. Review and summary of old records and obtaining of history. The risks and benefits of my recommendations, as well as other treatment options were discussed with the patient today. Questions were answered.    New Medications Ordered This Visit   Medications   • pantoprazole (PROTONIX) 40 MG EC tablet     Sig: Take 1 tablet by mouth Daily.     Dispense:  30 tablet     Refill:  5 "       Follow-up: Return in about 4 weeks (around 3/5/2018).          This document has been electronically signed by DELFINO Gross on February 5, 2018 4:10 PM             Results for orders placed or performed during the hospital encounter of 01/09/18   Blood Culture Bottle Set   Result Value Ref Range    Extra Tube Hold for add-ons.    Gold Top - SST   Result Value Ref Range    Extra Tube Hold for add-ons.    Green Top (Gel)   Result Value Ref Range    Extra Tube Hold for add-ons.    Urinalysis With / Culture If Indicated - Urine, Clean Catch   Result Value Ref Range    Color, UA Yellow Yellow, Straw, Dark Yellow, Maryjo    Appearance, UA Clear Clear    pH, UA 6.5 5.0 - 9.0    Specific Gravity, UA 1.010 1.003 - 1.030    Glucose, UA Negative Negative    Ketones, UA Negative Negative    Bilirubin, UA Negative Negative    Blood, UA Negative Negative    Protein, UA Negative Negative    Leuk Esterase, UA Negative Negative    Nitrite, UA Negative Negative    Urobilinogen, UA 0.2 E.U./dL 0.2 - 1.0 E.U./dL   CK-MB   Result Value Ref Range    CKMB 1.65 0.00 - 5.00 ng/mL   CBC Auto Differential   Result Value Ref Range    WBC 16.45 (H) 3.20 - 9.80 10*3/mm3    RBC 3.56 (L) 3.77 - 5.16 10*6/mm3    Hemoglobin 10.9 (L) 12.0 - 15.5 g/dL    Hematocrit 32.7 (L) 35.0 - 45.0 %    MCV 91.9 80.0 - 98.0 fL    MCH 30.6 26.5 - 34.0 pg    MCHC 33.3 31.4 - 36.0 g/dL    RDW 13.4 11.5 - 14.5 %    RDW-SD 44.6 36.4 - 46.3 fl    MPV 8.8 8.0 - 12.0 fL    Platelets 286 150 - 450 10*3/mm3    Neutrophil % 80.4 (H) 37.0 - 80.0 %    Lymphocyte % 10.3 10.0 - 50.0 %    Monocyte % 7.5 0.0 - 12.0 %    Eosinophil % 0.1 0.0 - 7.0 %    Basophil % 0.1 0.0 - 2.0 %    Immature Grans % 1.6 (H) 0.0 - 0.5 %    Neutrophils, Absolute 13.24 (H) 2.00 - 8.60 10*3/mm3    Lymphocytes, Absolute 1.69 0.60 - 4.20 10*3/mm3    Monocytes, Absolute 1.24 (H) 0.00 - 0.90 10*3/mm3    Eosinophils, Absolute 0.01 0.00 - 0.70 10*3/mm3    Basophils, Absolute 0.01 0.00 - 0.20  10*3/mm3    Immature Grans, Absolute 0.26 (H) 0.00 - 0.02 10*3/mm3   CBC Auto Differential   Result Value Ref Range    WBC 14.69 (H) 3.20 - 9.80 10*3/mm3    RBC 3.54 (L) 3.77 - 5.16 10*6/mm3    Hemoglobin 10.9 (L) 12.0 - 15.5 g/dL    Hematocrit 32.4 (L) 35.0 - 45.0 %    MCV 91.5 80.0 - 98.0 fL    MCH 30.8 26.5 - 34.0 pg    MCHC 33.6 31.4 - 36.0 g/dL    RDW 13.2 11.5 - 14.5 %    RDW-SD 44.2 36.4 - 46.3 fl    MPV 8.9 8.0 - 12.0 fL    Platelets 305 150 - 450 10*3/mm3    Neutrophil % 81.4 (H) 37.0 - 80.0 %    Lymphocyte % 10.1 10.0 - 50.0 %    Monocyte % 6.9 0.0 - 12.0 %    Eosinophil % 0.1 0.0 - 7.0 %    Basophil % 0.1 0.0 - 2.0 %    Immature Grans % 1.4 (H) 0.0 - 0.5 %    Neutrophils, Absolute 11.97 (H) 2.00 - 8.60 10*3/mm3    Lymphocytes, Absolute 1.49 0.60 - 4.20 10*3/mm3    Monocytes, Absolute 1.01 (H) 0.00 - 0.90 10*3/mm3    Eosinophils, Absolute 0.01 0.00 - 0.70 10*3/mm3    Basophils, Absolute 0.01 0.00 - 0.20 10*3/mm3    Immature Grans, Absolute 0.20 (H) 0.00 - 0.02 10*3/mm3   CBC Auto Differential   Result Value Ref Range    WBC 12.66 (H) 3.20 - 9.80 10*3/mm3    RBC 3.34 (L) 3.77 - 5.16 10*6/mm3    Hemoglobin 10.4 (L) 12.0 - 15.5 g/dL    Hematocrit 30.4 (L) 35.0 - 45.0 %    MCV 91.0 80.0 - 98.0 fL    MCH 31.1 26.5 - 34.0 pg    MCHC 34.2 31.4 - 36.0 g/dL    RDW 13.1 11.5 - 14.5 %    RDW-SD 43.5 36.4 - 46.3 fl    MPV 8.6 8.0 - 12.0 fL    Platelets 289 150 - 450 10*3/mm3    Neutrophil % 80.3 (H) 37.0 - 80.0 %    Lymphocyte % 10.6 10.0 - 50.0 %    Monocyte % 8.1 0.0 - 12.0 %    Eosinophil % 0.0 0.0 - 7.0 %    Basophil % 0.0 0.0 - 2.0 %    Immature Grans % 1.0 (H) 0.0 - 0.5 %    Neutrophils, Absolute 10.17 (H) 2.00 - 8.60 10*3/mm3    Lymphocytes, Absolute 1.34 0.60 - 4.20 10*3/mm3    Monocytes, Absolute 1.02 (H) 0.00 - 0.90 10*3/mm3    Eosinophils, Absolute 0.00 0.00 - 0.70 10*3/mm3    Basophils, Absolute 0.00 0.00 - 0.20 10*3/mm3    Immature Grans, Absolute 0.13 (H) 0.00 - 0.02 10*3/mm3   CBC Auto Differential    Result Value Ref Range    WBC 12.79 (H) 3.20 - 9.80 10*3/mm3    RBC 3.56 (L) 3.77 - 5.16 10*6/mm3    Hemoglobin 10.9 (L) 12.0 - 15.5 g/dL    Hematocrit 33.0 (L) 35.0 - 45.0 %    MCV 92.7 80.0 - 98.0 fL    MCH 30.6 26.5 - 34.0 pg    MCHC 33.0 31.4 - 36.0 g/dL    RDW 13.3 11.5 - 14.5 %    RDW-SD 44.8 36.4 - 46.3 fl    MPV 9.0 8.0 - 12.0 fL    Platelets 341 150 - 450 10*3/mm3    Neutrophil % 79.4 37.0 - 80.0 %    Lymphocyte % 11.6 10.0 - 50.0 %    Monocyte % 8.2 0.0 - 12.0 %    Eosinophil % 0.0 0.0 - 7.0 %    Basophil % 0.0 0.0 - 2.0 %    Immature Grans % 0.8 (H) 0.0 - 0.5 %    Neutrophils, Absolute 10.16 (H) 2.00 - 8.60 10*3/mm3    Lymphocytes, Absolute 1.48 0.60 - 4.20 10*3/mm3    Monocytes, Absolute 1.05 (H) 0.00 - 0.90 10*3/mm3    Eosinophils, Absolute 0.00 0.00 - 0.70 10*3/mm3    Basophils, Absolute 0.00 0.00 - 0.20 10*3/mm3    Immature Grans, Absolute 0.10 (H) 0.00 - 0.02 10*3/mm3     *Note: Due to a large number of results and/or encounters for the requested time period, some results have not been displayed. A complete set of results can be found in Results Review.

## 2018-02-22 NOTE — TELEPHONE ENCOUNTER
Insurance notified office that eliquis was not formulary.  Xarelto or pradaxa are perferred medications.  MD notified and ordered Xarelto 20mg daily.  Pt called and instructed to finish current supply of medication and then switch to Xarelto daily.  New prescription faxed to pharmacy

## 2018-02-28 ENCOUNTER — OFFICE VISIT (OUTPATIENT)
Dept: ORTHOPEDIC SURGERY | Facility: CLINIC | Age: 68
End: 2018-02-28

## 2018-02-28 VITALS — BODY MASS INDEX: 22.63 KG/M2 | HEIGHT: 62 IN | WEIGHT: 123 LBS

## 2018-02-28 DIAGNOSIS — S62.326A CLOSED DISPLACED FRACTURE OF SHAFT OF FIFTH METACARPAL BONE OF RIGHT HAND, INITIAL ENCOUNTER: ICD-10-CM

## 2018-02-28 DIAGNOSIS — M79.641 PAIN OF RIGHT HAND: Primary | ICD-10-CM
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
## 2018-02-28 PROCEDURE — 99214 OFFICE O/P EST MOD 30 MIN: CPT | Performed by: NURSE PRACTITIONER

## 2018-02-28 PROCEDURE — 26600 TREAT METACARPAL FRACTURE: CPT | Performed by: NURSE PRACTITIONER

## 2018-02-28 RX ORDER — HYDROCODONE BITARTRATE AND ACETAMINOPHEN 5; 325 MG/1; MG/1
1 TABLET ORAL EVERY 6 HOURS PRN
Qty: 40 TABLET | Refills: 0 | Status: SHIPPED | OUTPATIENT
Start: 2018-02-28 | End: 2018-03-10

## 2018-02-28 RX ORDER — APIXABAN 5 MG/1
5 TABLET, FILM COATED ORAL DAILY
Refills: 5 | COMMUNITY
Start: 2018-02-21 | End: 2018-10-01 | Stop reason: ALTCHOICE

## 2018-02-28 RX ORDER — NAPROXEN 500 MG/1
500 TABLET ORAL DAILY PRN
Refills: 1 | COMMUNITY
Start: 2018-02-19 | End: 2018-04-09 | Stop reason: SDUPTHER

## 2018-02-28 NOTE — PROGRESS NOTES
Mona Perales is a 67 y.o. female   Primary provider:  DELFINO Smith       Chief Complaint   Patient presents with   • Right Hand - Establish Care     Had xray at Isi Bansal's office that she did not bring. Repeat xray done today in office.        HISTORY OF PRESENT ILLNESS:    HPI Comments: Patient complains of right hand pain/injury after sustaining a fall at her home on 2/25/2018.  Patient reports that she fell out of the chair and landed on her right hand.  Patient was seen by her primary care provider, DELFINO Cha, with x-rays done and diagnosed with a metacarpal fracture.  Patient did not bring her x-rays today, so x-rays are repeated for review.  Patient has not been in a splint but has been using waldo taping of the fifth finger to the fourth finger.  Pain is described as constant and severe.  Pain is described as aching in nature with associated swelling of the hand.  Pain is worse with palpation and movement of the right hand.  Pain improves mildly with rest and immobilization/waldo taping.    Hand Injury    Incident onset: 2/25/18. The incident occurred at home. The injury mechanism was a fall (fell out of chair). The pain is present in the left hand. The quality of the pain is described as aching. The pain is severe. The pain has been constant since the incident. The symptoms are aggravated by movement and palpation. She has tried immobilization and rest for the symptoms. The treatment provided mild relief.        CONCURRENT MEDICAL HISTORY:    Past Medical History:   Diagnosis Date   • Anxiety    • Arthritis    • Asthma    • 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 with ulceration     bilateral legs - bilateral iliac stents 2008      • Cancer    • Chronic lower back pain    • COPD (chronic obstructive pulmonary disease)    • Coronary arteriosclerosis    • Essential (primary) hypertension    • History of pulmonary embolus (PE)    • Hyperlipidemia    •  Insomnia    • Lumbago    • Nicotine dependence    • Occlusion of artery      and stenosis of bilateral carotid arteries - BABS 16-49%, LICA 0-15%   • Other atherosclerosis of native artery of other extremity     LEFT subclavian stent 2005 (occluded)       Allergies   Allergen Reactions   • Morphine And Related Itching, Confusion and Hallucinations   • Penicillins Hallucinations         Current Outpatient Prescriptions:   •  albuterol (PROVENTIL) (2.5 MG/3ML) 0.083% nebulizer solution, Take 2.5 mg by nebulization Every 8 (Eight) Hours As Needed for Wheezing., Disp: , Rfl:   •  amLODIPine (NORVASC) 10 MG tablet, Take 10 mg by mouth Daily., Disp: , Rfl:   •  ELIQUIS 5 MG tablet tablet, , Disp: , Rfl: 5  •  gabapentin (NEURONTIN) 800 MG tablet, Take 800 mg by mouth 3 (Three) Times a Day., Disp: , Rfl:   •  naproxen (NAPROSYN) 500 MG tablet, , Disp: , Rfl: 1  •  ondansetron (ZOFRAN) 4 MG tablet, Take 4 mg by mouth Every 8 (Eight) Hours As Needed for Nausea or Vomiting., Disp: , Rfl:   •  pantoprazole (PROTONIX) 40 MG EC tablet, Take 1 tablet by mouth Daily., Disp: 30 tablet, Rfl: 5  •  pravastatin (PRAVACHOL) 20 MG tablet, Take 20 mg by mouth Daily., Disp: , Rfl:   •  propranolol (INDERAL) 20 MG tablet, , Disp: , Rfl: 5  •  sertraline (ZOLOFT) 100 MG tablet, Take 100 mg by mouth Daily., Disp: , Rfl:   •  SYMBICORT 80-4.5 MCG/ACT inhaler, , Disp: , Rfl: 1  •  traZODone (DESYREL) 300 MG tablet, Take 300 mg by mouth Every Night., Disp: , Rfl:   •  HYDROcodone-acetaminophen (NORCO) 5-325 MG per tablet, Take 1 tablet by mouth Every 6 (Six) Hours As Needed (PRN) for up to 10 days., Disp: 40 tablet, Rfl: 0  •  penciclovir (DENAVIR) 1 % cream, Apply 1 application topically Every 2 (Two) Hours., Disp: , Rfl:     Past Surgical History:   Procedure Laterality Date   • CARDIAC CATHETERIZATION  04/06/2016    No evidence of any obstructive epicardial CAD.Preserved LV systolic function with EF of 55%.   • CENTRAL VENOUS LINE INSERTION   "04/07/2016    Successful placement of right uppe extremity 6-Thai triple lumen PICC line.   • ENDOSCOPY N/A 1/12/2018    Procedure: ESOPHAGOGASTRODUODENOSCOPY;  Surgeon: Bin Martin MD;  Location: [facility] ENDOSCOPY;  Service:    • ENDOSCOPY N/A 1/17/2018    Procedure: ESOPHAGOGASTRODUODENOSCOPY;  Surgeon: Bin Martin MD;  Location: [facility] ENDOSCOPY;  Service:    • FRACTURE SURGERY     • HYSTERECTOMY     • TOTAL SHOULDER REPLACEMENT Left    • TRANSESOPHAGEAL ECHOCARDIOGRAM (JACQUES)  04/07/2016    With color flow-Mild to moderate [facility].LV systolic function well preserved with Ef of 55-60%.Mitral and AV intact.Diastolic dysfunction   • UPPER GASTROINTESTINAL ENDOSCOPY  01/17/2018    Dr. Bin Martin M.D.       Family History   Problem Relation Age of Onset   • Heart disease Other    • Hypertension Other         Social History     Social History   • Marital status:      Spouse name: N/A   • Number of children: N/A   • Years of education: N/A     Occupational History   • Not on file.     Social History Main Topics   • Smoking status: Current Every Day Smoker     Packs/day: 1.00     Years: 50.00   • Smokeless tobacco: Never Used      Comment: 02/05/2018 - Patient opted to decline cessation of smoking counseling at present time.   • Alcohol use No   • Drug use: No   • Sexual activity: Defer     Other Topics Concern   • Not on file     Social History Narrative        Review of Systems   Musculoskeletal: Positive for joint swelling.        Right hand pain.    Neurological: Positive for weakness (Right hand).   Psychiatric/Behavioral: Positive for sleep disturbance.   All other systems reviewed and are negative.      PHYSICAL EXAMINATION:       Ht 157.5 cm (62\")  Wt 55.8 kg (123 lb)  BMI 22.5 kg/m2    Physical Exam   Constitutional: She is oriented to person, place, and time. Vital signs are normal. She appears well-developed and well-nourished.   HENT:   Head: Normocephalic.   Pulmonary/Chest: Effort " normal. No respiratory distress.   Abdominal: Soft. She exhibits no distension.   Neurological: She is alert and oriented to person, place, and time. GCS eye subscore is 4. GCS verbal subscore is 5. GCS motor subscore is 6.   Skin: Skin is warm, dry and intact.   Psychiatric: She has a normal mood and affect. Her speech is normal and behavior is normal. Judgment and thought content normal. Cognition and memory are normal.   Vitals reviewed.      GAIT:     [x]  Normal  []  Antalgic    Assistive device: [x]  None  []  Walker     []  Crutches  []  Cane     []  Wheelchair  []  Stretcher    Right Hand Exam     Tenderness   The patient is experiencing tenderness in the dorsal area and ulnar area.    Range of Motion     Wrist   Extension: normal   Flexion: normal   Pronation: normal   Supination: normal     Hand   MP Thumb: normal   MP Index: normal   MP Middle: normal   MP Ring: abnormal   MP Little: abnormal   PIP Index: normal   PIP Middle: normal   PIP Ring: normal   PIP Little: abnormal   DIP Thumb: normal   DIP Index: normal   DIP Middle: normal   DIP Ring: normal   DIP Little: normal     Other   Erythema: absent  Sensation: normal  Pulse: present    Comments:  Moderate swelling present to the dorsal and ulnar aspect of the hand.  Skin is intact.  No ecchymosis noted.  No deformity or rotation noted at the fifth digit.  Capillary refill is less than 3 seconds.  Limited range of motion of the fifth finger due to pain in acute fracture.  Strength assessment and full range of motion assessment deferred due to acute metacarpal fracture.      Left Hand Exam   Left hand exam is normal.            Xr Hand 3+ View Right    Result Date: 2/28/2018  Narrative: 3 views of the right hand reveal an acute, obliquely-oriented and mildly displaced fracture of the fifth metacarpal shaft with surrounding soft tissue swelling. There are moderate, diffuse degenerative changes throughout the hand. No other acute radiologic abnormalities  are noted at this time. 02/28/18 at 10:51 AM by DELFINO Cotton     ASSESSMENT:    Diagnoses and all orders for this visit:    Pain of right hand    Closed displaced fracture of shaft of fifth metacarpal bone of right hand, initial encounter    Fall with injury, initial encounter    Other orders  -     HYDROcodone-acetaminophen (NORCO) 5-325 MG per tablet; Take 1 tablet by mouth Every 6 (Six) Hours As Needed (PRN) for up to 10 days.    PLAN    X-rays of right hand reviewed today.  X-rays are also reviewed today per Dr. Aden in consultation with recommendations for treatment.  Per Dr. Aden, recommend conservative treatment of right fifth metacarpal fracture.  Recommend splinting with an ulnar gutter fiberglass splint today.  Recommend frequent elevation of the right hand above the heart to minimize swelling.  Recommend ice therapy intermittently 3-4 times daily for 20 minutes at a time.  Recommend Norco as needed for moderate to severe pain.  Prescription written per DELFINO Piedra.  Also recommend Ibuprofen in conjunction with pain medication for improved pain control and swelling.  As pain begins to improve, recommend plain Tylenol or Ibuprofen as needed for pain.  Currently pain is uncontrolled and the patient is having difficulty sleeping at night due to severe pain.  Follow-up in one week for recheck and repeat x-rays of the hand.  Plan to transition to an Exosplint for the right hand or custom hand brace for immobilization if swelling has improved.    Return in about 1 week (around 3/7/2018) for Recheck.      This document has been electronically signed by DELFINO Cotton on February 28, 2018 12:05 PM      DELFINO Cotton

## 2018-03-01 ENCOUNTER — TRANSCRIBE ORDERS (OUTPATIENT)
Dept: ORTHOPEDIC SURGERY | Facility: CLINIC | Age: 68
End: 2018-03-01

## 2018-03-01 DIAGNOSIS — S62.91XA RIGHT HAND FRACTURE, CLOSED, INITIAL ENCOUNTER: Primary | ICD-10-CM

## 2018-03-06 DIAGNOSIS — S62.326A CLOSED DISPLACED FRACTURE OF SHAFT OF FIFTH METACARPAL BONE OF RIGHT HAND, INITIAL ENCOUNTER: Primary | ICD-10-CM

## 2018-03-07 ENCOUNTER — OFFICE VISIT (OUTPATIENT)
Dept: ORTHOPEDIC SURGERY | Facility: CLINIC | Age: 68
End: 2018-03-07

## 2018-03-07 VITALS — BODY MASS INDEX: 22.69 KG/M2 | HEIGHT: 62 IN | WEIGHT: 123.3 LBS

## 2018-03-07 DIAGNOSIS — S62.326D CLOSED DISPLACED FRACTURE OF SHAFT OF FIFTH METACARPAL BONE OF RIGHT HAND WITH ROUTINE HEALING, SUBSEQUENT ENCOUNTER: Primary | ICD-10-CM
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
## 2018-03-07 DIAGNOSIS — M79.641 PAIN OF RIGHT HAND: ICD-10-CM

## 2018-03-07 PROCEDURE — 99024 POSTOP FOLLOW-UP VISIT: CPT | Performed by: NURSE PRACTITIONER

## 2018-03-07 NOTE — PROGRESS NOTES
Mona Perales is a 67 y.o. female returns for     Chief Complaint   Patient presents with   • Right Hand - Follow-up     Repeat xray done today in office.        HISTORY OF PRESENT ILLNESS: Patient presents to office for follow up of right fifth metacarpal. Initial injury occurred on 2/25/2018 due to a fall at home. Patient has been wearing her fiberglass splint. Pain has gradually improved some. Swelling has slightly improved. X-rays are repeated today.      CONCURRENT MEDICAL HISTORY:    Past Medical History:   Diagnosis Date   • Anxiety    • Arthritis    • Asthma    • 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 with ulceration     bilateral legs - bilateral iliac stents 2008      • Cancer    • Chronic lower back pain    • COPD (chronic obstructive pulmonary disease)    • Coronary arteriosclerosis    • Essential (primary) hypertension    • History of pulmonary embolus (PE)    • Hyperlipidemia    • Insomnia    • Lumbago    • Nicotine dependence    • Occlusion of artery      and stenosis of bilateral carotid arteries - BABS 16-49%, LICA 0-15%   • Other atherosclerosis of native artery of other extremity     LEFT subclavian stent 2005 (occluded)       Allergies   Allergen Reactions   • Morphine And Related Itching, Confusion and Hallucinations   • Penicillins Hallucinations         Current Outpatient Prescriptions:   •  albuterol (PROVENTIL) (2.5 MG/3ML) 0.083% nebulizer solution, Take 2.5 mg by nebulization Every 8 (Eight) Hours As Needed for Wheezing., Disp: , Rfl:   •  amLODIPine (NORVASC) 10 MG tablet, Take 10 mg by mouth Daily., Disp: , Rfl:   •  ELIQUIS 5 MG tablet tablet, , Disp: , Rfl: 5  •  gabapentin (NEURONTIN) 800 MG tablet, Take 800 mg by mouth 3 (Three) Times a Day., Disp: , Rfl:   •  HYDROcodone-acetaminophen (NORCO) 5-325 MG per tablet, Take 1 tablet by mouth Every 6 (Six) Hours As Needed (PRN) for up to 10 days., Disp: 40 tablet, Rfl: 0  •  naproxen (NAPROSYN) 500 MG tablet, , Disp: ,  "Rfl: 1  •  ondansetron (ZOFRAN) 4 MG tablet, Take 4 mg by mouth Every 8 (Eight) Hours As Needed for Nausea or Vomiting., Disp: , Rfl:   •  pantoprazole (PROTONIX) 40 MG EC tablet, Take 1 tablet by mouth Daily., Disp: 30 tablet, Rfl: 5  •  penciclovir (DENAVIR) 1 % cream, Apply 1 application topically Every 2 (Two) Hours., Disp: , Rfl:   •  pravastatin (PRAVACHOL) 20 MG tablet, Take 20 mg by mouth Daily., Disp: , Rfl:   •  propranolol (INDERAL) 20 MG tablet, , Disp: , Rfl: 5  •  sertraline (ZOLOFT) 100 MG tablet, Take 100 mg by mouth Daily., Disp: , Rfl:   •  SYMBICORT 80-4.5 MCG/ACT inhaler, , Disp: , Rfl: 1  •  traZODone (DESYREL) 300 MG tablet, Take 300 mg by mouth Every Night., Disp: , Rfl:     Past Surgical History:   Procedure Laterality Date   • CARDIAC CATHETERIZATION  04/06/2016    No evidence of any obstructive epicardial CAD.Preserved LV systolic function with EF of 55%.   • CENTRAL VENOUS LINE INSERTION  04/07/2016    Successful placement of right uppe extremity 6-French triple lumen PICC line.   • ENDOSCOPY N/A 1/12/2018    Procedure: ESOPHAGOGASTRODUODENOSCOPY;  Surgeon: Bin Martin MD;  Location: [facility] ENDOSCOPY;  Service:    • ENDOSCOPY N/A 1/17/2018    Procedure: ESOPHAGOGASTRODUODENOSCOPY;  Surgeon: Bin Martin MD;  Location: [facility] ENDOSCOPY;  Service:    • FRACTURE SURGERY     • HYSTERECTOMY     • TOTAL SHOULDER REPLACEMENT Left    • TRANSESOPHAGEAL ECHOCARDIOGRAM (JACQUES)  04/07/2016    With color flow-Mild to moderate [facility].LV systolic function well preserved with Ef of 55-60%.Mitral and AV intact.Diastolic dysfunction   • UPPER GASTROINTESTINAL ENDOSCOPY  01/17/2018    Dr. Bin Martin M.D.       ROS  No fevers or chills.  No chest pain or shortness of air.  No GI or  disturbances. Right hand pain.     PHYSICAL EXAMINATION:       Ht 157.5 cm (62\")   Wt 55.9 kg (123 lb 4.8 oz)   BMI 22.55 kg/m²     Physical Exam   Constitutional: She is oriented to person, place, and time. Vital " signs are normal. She appears well-developed and well-nourished. She is active and cooperative. She does not appear ill. No distress.   Pulmonary/Chest: Effort normal.   Musculoskeletal: She exhibits edema (right hand) and tenderness (right hand). She exhibits no deformity.   Neurological: She is alert and oriented to person, place, and time. GCS eye subscore is 4. GCS verbal subscore is 5. GCS motor subscore is 6.   Skin: Skin is warm, dry and intact. Capillary refill takes less than 2 seconds.   Psychiatric: She has a normal mood and affect. Her speech is normal and behavior is normal. Thought content normal. Cognition and memory are normal.   Vitals reviewed.      GAIT:     [x]  Normal  []  Antalgic    Assistive device: [x]  None  []  Walker     []  Crutches  []  Cane     []  Wheelchair  []  Stretcher    Right Hand Exam     Tenderness   The patient is experiencing tenderness in the dorsal area and ulnar area (fifth finger).    Range of Motion     Wrist   Extension: normal   Flexion: normal   Pronation: normal   Supination: normal     Hand   MP Thumb: normal   MP Index: normal   MP Middle: normal   MP Ring: normal   MP Little: abnormal   PIP Index: normal   PIP Middle: normal   PIP Ring: normal   PIP Little: abnormal   DIP Thumb: normal   DIP Index: normal   DIP Middle: normal   DIP Ring: normal   DIP Little: normal     Other   Erythema: absent  Sensation: normal  Pulse: present    Comments:  Moderate swelling present to the dorsal and ulnar aspect of the hand, slightly improved from prior exam.  Skin is intact.  No ecchymosis noted.  No deformity or rotation noted at the fifth digit.  Capillary refill is less than 3 seconds.  Limited range of motion of the fifth finger due to pain in acute fracture.  Strength assessment and full range of motion assessment deferred due to acute metacarpal fracture.      Left Hand Exam     Tenderness   The patient is experiencing no tenderness.         Range of Motion   The patient  has normal left wrist ROM.    Muscle Strength   The patient has normal left wrist strength.    Other   Erythema: absent  Sensation: normal  Pulse: present            Xr Hand 3+ View Right    Result Date: 3/7/2018  Narrative: 3 views of the right hand reveal a stable, obliquely-oriented and mildly displaced fracture of the fifth metacarpal shaft with surrounding soft tissue swelling. There are moderate, diffuse degenerative changes throughout the hand.  No significant changes are noted when compared with previous images from 2/28/2018. 03/07/18 at 3:31 PM by DELFINO Cotton     Xr Hand 3+ View Right    Result Date: 2/28/2018  Narrative: 3 views of the right hand reveal an acute, obliquely-oriented and mildly displaced fracture of the fifth metacarpal shaft with surrounding soft tissue swelling. There are moderate, diffuse degenerative changes throughout the hand. No other acute radiologic abnormalities are noted at this time. 02/28/18 at 10:51 AM by DELFINO Cotton         ASSESSMENT:    Diagnoses and all orders for this visit:    Closed displaced fracture of shaft of fifth metacarpal bone of right hand with routine healing, subsequent encounter    Pain of right hand    Fall with injury, subsequent encounter    PLAN    X-rays of right hand reviewed today and compared with previous images from 2/28/2018. Right fifth metacarpal fracture is stable. Recommend to continue with fiberglass ulnar gutter splint to right hand for immobilization of fracture. Continue with frequent elevation of the right hand above the heart to minimize swelling. Continue with ice therapy intermittently 3 to 4 times daily for 20 minutes at a time. Continue Norco and Naproxen for pain. Follow up in 4 weeks for recheck and repeat x-rays at that time.     Return in about 4 weeks (around 4/4/2018) for Recheck.      This document has been electronically signed by DELFINO Cotton on March 10, 2018 11:59 AM      DELFINO Cotton

## 2018-03-16 ENCOUNTER — ANESTHESIA (OUTPATIENT)
Dept: GASTROENTEROLOGY | Facility: HOSPITAL | Age: 68
End: 2018-03-16

## 2018-03-16 ENCOUNTER — HOSPITAL ENCOUNTER (OUTPATIENT)
Facility: HOSPITAL | Age: 68
Setting detail: HOSPITAL OUTPATIENT SURGERY
Discharge: HOME | End: 2018-03-16
Attending: INTERNAL MEDICINE | Admitting: INTERNAL MEDICINE

## 2018-03-16 ENCOUNTER — ANESTHESIA EVENT (OUTPATIENT)
Dept: GASTROENTEROLOGY | Facility: HOSPITAL | Age: 68
End: 2018-03-16

## 2018-03-16 VITALS
RESPIRATION RATE: 18 BRPM | TEMPERATURE: 97.2 F | HEART RATE: 64 BPM | OXYGEN SATURATION: 94 % | BODY MASS INDEX: 22.88 KG/M2 | WEIGHT: 124.34 LBS | SYSTOLIC BLOOD PRESSURE: 117 MMHG | HEIGHT: 62 IN | DIASTOLIC BLOOD PRESSURE: 53 MMHG

## 2018-03-16 DIAGNOSIS — R13.10 DYSPHAGIA, UNSPECIFIED TYPE: ICD-10-CM

## 2018-03-16 DIAGNOSIS — R10.13 EPIGASTRIC PAIN: ICD-10-CM

## 2018-03-16 PROCEDURE — 88305 TISSUE EXAM BY PATHOLOGIST: CPT | Performed by: PATHOLOGY

## 2018-03-16 PROCEDURE — 45386 COLONOSCOPY W/BALLOON DILAT: CPT | Performed by: INTERNAL MEDICINE

## 2018-03-16 PROCEDURE — 88305 TISSUE EXAM BY PATHOLOGIST: CPT | Performed by: INTERNAL MEDICINE

## 2018-03-16 PROCEDURE — 43239 EGD BIOPSY SINGLE/MULTIPLE: CPT | Performed by: INTERNAL MEDICINE

## 2018-03-16 PROCEDURE — 25010000002 PROPOFOL 10 MG/ML EMULSION: Performed by: NURSE ANESTHETIST, CERTIFIED REGISTERED

## 2018-03-16 RX ORDER — PROPOFOL 10 MG/ML
VIAL (ML) INTRAVENOUS AS NEEDED
Status: DISCONTINUED | OUTPATIENT
Start: 2018-03-16 | End: 2018-03-16 | Stop reason: SURG

## 2018-03-16 RX ORDER — DEXTROSE AND SODIUM CHLORIDE 5; .45 G/100ML; G/100ML
30 INJECTION, SOLUTION INTRAVENOUS CONTINUOUS PRN
Status: DISCONTINUED | OUTPATIENT
Start: 2018-03-16 | End: 2018-03-16 | Stop reason: HOSPADM

## 2018-03-16 RX ORDER — LIDOCAINE HYDROCHLORIDE 10 MG/ML
INJECTION, SOLUTION INFILTRATION; PERINEURAL AS NEEDED
Status: DISCONTINUED | OUTPATIENT
Start: 2018-03-16 | End: 2018-03-16 | Stop reason: SURG

## 2018-03-16 RX ADMIN — LIDOCAINE HYDROCHLORIDE 100 MG: 10 INJECTION, SOLUTION INFILTRATION; PERINEURAL at 15:09

## 2018-03-16 RX ADMIN — PROPOFOL 80 MG: 10 INJECTION, EMULSION INTRAVENOUS at 15:07

## 2018-03-16 RX ADMIN — PROPOFOL 20 MG: 10 INJECTION, EMULSION INTRAVENOUS at 15:09

## 2018-03-16 RX ADMIN — DEXTROSE AND SODIUM CHLORIDE 30 ML/HR: 5; 450 INJECTION, SOLUTION INTRAVENOUS at 14:47

## 2018-03-16 NOTE — H&P
Progress Notes  Encounter Date: 3/16/2018  Bin echols  Gastroenterology   Expand All Collapse All    []Manual[]Template  []Copied  Chief Complaint   Patient presents with   • EGD Performed 01/17/2018            Telma Perales is a 67 y.o. female. she is here today for follow-up.     Difficulty Swallowing   This is a new problem. The current episode started more than 1 month ago. The problem occurs intermittently. The problem has been gradually worsening. Associated symptoms include fatigue. Pertinent negatives include no abdominal pain, arthralgias, chills, diaphoresis, fever, nausea, sore throat or vomiting. The symptoms are aggravated by eating.   67-year-old female presents for hospital follow-up.  Denies any current abdominal pain.  Hospitalized due to pneumonia and GI was consult is due to painful swallowing.  States she is still having painful swallowing and feeling of food getting hung in her midesophagus and epigastric region.  She underwent EGD while hospitalized January 17, 2018 which noted mildly severe esophagitis biopsy was consistent with chronic esophagitis.  Food was found throughout her entire stomach.  She underwent upper GI series with noted a possible stricture versus filling defect.  No stricture was seen on EGD over there was a large amount of residue in the stomach.  Patient states she had not eaten prior to 8 hours before procedure.  Reports she began having epigastric pain trouble swallowing about a month ago.  She states Protonix has helped somewhat.  Plan; we'll schedule patient for repeat EGD due to possible esophageal stricture and continual dysphagia.  I have  instructed patient to follow clear liquid diet day prior to procedure.  Follow-up after test return to office sooner if needed.     The following portions of the patient's history were reviewed and updated as appropriate:   Medical History        Past Medical History:   Diagnosis Date   • Anxiety     •  Arthritis     • Asthma     • 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 with ulceration       bilateral legs - bilateral iliac stents 2008      • Cancer     • Chronic lower back pain     • COPD (chronic obstructive pulmonary disease)     • Coronary arteriosclerosis     • Essential (primary) hypertension     • History of pulmonary embolus (PE)     • Hyperlipidemia     • Insomnia     • Lumbago     • Nicotine dependence     • Occlusion of artery        and stenosis of bilateral carotid arteries - BABS 16-49%, LICA 0-15%   • Other atherosclerosis of native artery of other extremity       LEFT subclavian stent 2005 (occluded)         Surgical History         Past Surgical History:   Procedure Laterality Date   • CARDIAC CATHETERIZATION   04/06/2016     No evidence of any obstructive epicardial CAD.Preserved LV systolic function with EF of 55%.   • CENTRAL VENOUS LINE INSERTION   04/07/2016     Successful placement of right uppe extremity 6-[place] triple lumen PICC line.   • ENDOSCOPY N/A 1/12/2018     Procedure: ESOPHAGOGASTRODUODENOSCOPY;  Surgeon: Bin Martin MD;  Location: [facility] ENDOSCOPY;  Service:    • ENDOSCOPY N/A 1/17/2018     Procedure: ESOPHAGOGASTRODUODENOSCOPY;  Surgeon: Bin Martin MD;  Location: [facility] ENDOSCOPY;  Service:    • FRACTURE SURGERY       • HYSTERECTOMY       • TOTAL SHOULDER REPLACEMENT Left     • TRANSESOPHAGEAL ECHOCARDIOGRAM (JACQUES)   04/07/2016     With color flow-Mild to moderate [facility].LV systolic function well preserved with Ef of 55-60%.Mitral and AV intact.Diastolic dysfunction   • UPPER GASTROINTESTINAL ENDOSCOPY   01/17/2018     Dr. Bin Martin M.D.               Family History   Problem Relation Age of Onset   • Heart disease Other     • Hypertension Other            OB History      No data available          Current Medications          Current Outpatient Prescriptions   Medication Sig Dispense Refill   • albuterol (PROVENTIL) (2.5 MG/3ML) 0.083% nebulizer  solution Take 2.5 mg by nebulization Every 8 (Eight) Hours As Needed for Wheezing.       • amLODIPine (NORVASC) 10 MG tablet Take 10 mg by mouth Daily.       • gabapentin (NEURONTIN) 800 MG tablet Take 800 mg by mouth 3 (Three) Times a Day.       • meclizine (ANTIVERT) 25 MG tablet Take 25 mg by mouth 3 (Three) Times a Day As Needed for dizziness.       • ondansetron (ZOFRAN) 4 MG tablet Take 4 mg by mouth Every 8 (Eight) Hours As Needed for Nausea or Vomiting.       • pantoprazole (PROTONIX) 40 MG EC tablet Take 1 tablet by mouth Daily. 30 tablet 5   • penciclovir (DENAVIR) 1 % cream Apply 1 application topically Every 2 (Two) Hours.       • pravastatin (PRAVACHOL) 20 MG tablet Take 20 mg by mouth Daily.       • sertraline (ZOLOFT) 100 MG tablet Take 100 mg by mouth Daily.       • SYMBICORT 80-4.5 MCG/ACT inhaler     1   • traZODone (DESYREL) 300 MG tablet Take 300 mg by mouth Every Night.       • ELIQUIS 2.5 MG tablet tablet     5   • propranolol (INDERAL) 20 MG tablet     5      No current facility-administered medications for this visit.               Allergies   Allergen Reactions   • Morphine And Related Itching, Confusion and Hallucinations      Social History   Social History            Social History   • Marital status:        Spouse name: N/A   • Number of children: N/A   • Years of education: N/A             Social History Main Topics   • Smoking status: Current Every Day Smoker       Packs/day: 1.00       Years: 50.00   • Smokeless tobacco: Never Used         Comment: 02/05/2018 - Patient opted to decline cessation of smoking counseling at present time.   • Alcohol use No   • Drug use: No   • Sexual activity: Defer           Other Topics Concern   • None      Social History Narrative            Review of Systems  Review of Systems   Constitutional: Positive for fatigue. Negative for activity change, appetite change, chills, diaphoresis, fever and unexpected weight change.   HENT: Positive for  "trouble swallowing. Negative for sore throat.    Respiratory: Negative for shortness of breath.    Gastrointestinal: Negative for abdominal distention, abdominal pain, anal bleeding, blood in stool, constipation, diarrhea, nausea, rectal pain and vomiting.   Musculoskeletal: Negative for arthralgias.   Skin: Negative for pallor.   Neurological: Negative for light-headedness.                    /70 (BP Location: Left arm, Patient Position: Sitting, Cuff Size: Adult)  Pulse 75  Ht 157.5 cm (62\")  Wt 56 kg (123 lb 6.4 oz)  SpO2 95%  BMI 22.57 kg/m2        Objective       Physical Exam   Constitutional: She is oriented to person, place, and time. She appears well-developed and well-nourished. She is cooperative. No distress.   HENT:   Head: Normocephalic and atraumatic.   Neck: Normal range of motion. Neck supple. No thyromegaly present.   Cardiovascular: Normal rate, regular rhythm and normal heart sounds.    Pulmonary/Chest: Effort normal and breath sounds normal. She has no wheezes. She has no rhonchi. She has no rales.   Abdominal: Soft. Normal appearance and bowel sounds are normal. She exhibits no shifting dullness and no distension. There is no hepatosplenomegaly. There is no tenderness. There is no rigidity and no guarding. No hernia.   Lymphadenopathy:     She has no cervical adenopathy.   Neurological: She is alert and oriented to person, place, and time.   Skin: Skin is warm, dry and intact. No rash noted. No pallor.   Psychiatric: She has a normal mood and affect. Her speech is normal.      Admission on 01/09/2018, Discharged on 01/17/2018   No results displayed because visit has over 200 results.             Assessment/Plan          1. Dysphagia, unspecified type    2. Epigastric pain    3. Gastroesophageal reflux disease with esophagitis    .         Orders placed during this encounter include:     ESOPHAGOGASTRODUODENOSCOPY possible dilation  (N/A)     Review and/or summary of lab tests, " radiology, procedures, medications. Review and summary of old records and obtaining of history. The risks and benefits of my recommendations, as well as other treatment options were discussed with the patient today. Questions were answered.          New Medications Ordered This Visit   Medications   • pantoprazole (PROTONIX) 40 MG EC tablet       Sig: Take 1 tablet by mouth Daily.       Dispense:  30 tablet       Refill:  5               This document has been electronically signed by Bin Oh MD on March 16, 2018 2:09 PM                     Results for orders placed or performed during the hospital encounter of 01/09/18   Blood Culture Bottle Set   Result Value Ref Range     Extra Tube Hold for add-ons.     Gold Top - SST   Result Value Ref Range     Extra Tube Hold for add-ons.     Green Top (Gel)   Result Value Ref Range     Extra Tube Hold for add-ons.     Urinalysis With / Culture If Indicated - Urine, Clean Catch   Result Value Ref Range     Color, UA Yellow Yellow, Straw, Dark Yellow, Maryjo     Appearance, UA Clear Clear     pH, UA 6.5 5.0 - 9.0     Specific Gravity, UA 1.010 1.003 - 1.030     Glucose, UA Negative Negative     Ketones, UA Negative Negative     Bilirubin, UA Negative Negative     Blood, UA Negative Negative     Protein, UA Negative Negative     Leuk Esterase, UA Negative Negative     Nitrite, UA Negative Negative     Urobilinogen, UA 0.2 E.U./dL 0.2 - 1.0 E.U./dL   CK-MB   Result Value Ref Range     CKMB 1.65 0.00 - 5.00 ng/mL   CBC Auto Differential   Result Value Ref Range     WBC 16.45 (H) 3.20 - 9.80 10*3/mm3     RBC 3.56 (L) 3.77 - 5.16 10*6/mm3     Hemoglobin 10.9 (L) 12.0 - 15.5 g/dL     Hematocrit 32.7 (L) 35.0 - 45.0 %     MCV 91.9 80.0 - 98.0 fL     MCH 30.6 26.5 - 34.0 pg     MCHC 33.3 31.4 - 36.0 g/dL     RDW 13.4 11.5 - 14.5 %     RDW-SD 44.6 36.4 - 46.3 fl     MPV 8.8 8.0 - 12.0 fL     Platelets 286 150 - 450 10*3/mm3     Neutrophil % 80.4 (H) 37.0 - 80.0 %     Lymphocyte  % 10.3 10.0 - 50.0 %     Monocyte % 7.5 0.0 - 12.0 %     Eosinophil % 0.1 0.0 - 7.0 %     Basophil % 0.1 0.0 - 2.0 %     Immature Grans % 1.6 (H) 0.0 - 0.5 %     Neutrophils, Absolute 13.24 (H) 2.00 - 8.60 10*3/mm3     Lymphocytes, Absolute 1.69 0.60 - 4.20 10*3/mm3     Monocytes, Absolute 1.24 (H) 0.00 - 0.90 10*3/mm3     Eosinophils, Absolute 0.01 0.00 - 0.70 10*3/mm3     Basophils, Absolute 0.01 0.00 - 0.20 10*3/mm3     Immature Grans, Absolute 0.26 (H) 0.00 - 0.02 10*3/mm3   CBC Auto Differential   Result Value Ref Range     WBC 14.69 (H) 3.20 - 9.80 10*3/mm3     RBC 3.54 (L) 3.77 - 5.16 10*6/mm3     Hemoglobin 10.9 (L) 12.0 - 15.5 g/dL     Hematocrit 32.4 (L) 35.0 - 45.0 %     MCV 91.5 80.0 - 98.0 fL     MCH 30.8 26.5 - 34.0 pg     MCHC 33.6 31.4 - 36.0 g/dL     RDW 13.2 11.5 - 14.5 %     RDW-SD 44.2 36.4 - 46.3 fl     MPV 8.9 8.0 - 12.0 fL     Platelets 305 150 - 450 10*3/mm3     Neutrophil % 81.4 (H) 37.0 - 80.0 %     Lymphocyte % 10.1 10.0 - 50.0 %     Monocyte % 6.9 0.0 - 12.0 %     Eosinophil % 0.1 0.0 - 7.0 %     Basophil % 0.1 0.0 - 2.0 %     Immature Grans % 1.4 (H) 0.0 - 0.5 %     Neutrophils, Absolute 11.97 (H) 2.00 - 8.60 10*3/mm3     Lymphocytes, Absolute 1.49 0.60 - 4.20 10*3/mm3     Monocytes, Absolute 1.01 (H) 0.00 - 0.90 10*3/mm3     Eosinophils, Absolute 0.01 0.00 - 0.70 10*3/mm3     Basophils, Absolute 0.01 0.00 - 0.20 10*3/mm3     Immature Grans, Absolute 0.20 (H) 0.00 - 0.02 10*3/mm3   CBC Auto Differential   Result Value Ref Range     WBC 12.66 (H) 3.20 - 9.80 10*3/mm3     RBC 3.34 (L) 3.77 - 5.16 10*6/mm3     Hemoglobin 10.4 (L) 12.0 - 15.5 g/dL     Hematocrit 30.4 (L) 35.0 - 45.0 %     MCV 91.0 80.0 - 98.0 fL     MCH 31.1 26.5 - 34.0 pg     MCHC 34.2 31.4 - 36.0 g/dL     RDW 13.1 11.5 - 14.5 %     RDW-SD 43.5 36.4 - 46.3 fl     MPV 8.6 8.0 - 12.0 fL     Platelets 289 150 - 450 10*3/mm3     Neutrophil % 80.3 (H) 37.0 - 80.0 %     Lymphocyte % 10.6 10.0 - 50.0 %     Monocyte % 8.1 0.0 -  12.0 %     Eosinophil % 0.0 0.0 - 7.0 %     Basophil % 0.0 0.0 - 2.0 %     Immature Grans % 1.0 (H) 0.0 - 0.5 %     Neutrophils, Absolute 10.17 (H) 2.00 - 8.60 10*3/mm3     Lymphocytes, Absolute 1.34 0.60 - 4.20 10*3/mm3     Monocytes, Absolute 1.02 (H) 0.00 - 0.90 10*3/mm3     Eosinophils, Absolute 0.00 0.00 - 0.70 10*3/mm3     Basophils, Absolute 0.00 0.00 - 0.20 10*3/mm3     Immature Grans, Absolute 0.13 (H) 0.00 - 0.02 10*3/mm3   CBC Auto Differential   Result Value Ref Range     WBC 12.79 (H) 3.20 - 9.80 10*3/mm3     RBC 3.56 (L) 3.77 - 5.16 10*6/mm3     Hemoglobin 10.9 (L) 12.0 - 15.5 g/dL     Hematocrit 33.0 (L) 35.0 - 45.0 %     MCV 92.7 80.0 - 98.0 fL     MCH 30.6 26.5 - 34.0 pg     MCHC 33.0 31.4 - 36.0 g/dL     RDW 13.3 11.5 - 14.5 %     RDW-SD 44.8 36.4 - 46.3 fl     MPV 9.0 8.0 - 12.0 fL     Platelets 341 150 - 450 10*3/mm3     Neutrophil % 79.4 37.0 - 80.0 %     Lymphocyte % 11.6 10.0 - 50.0 %     Monocyte % 8.2 0.0 - 12.0 %     Eosinophil % 0.0 0.0 - 7.0 %     Basophil % 0.0 0.0 - 2.0 %     Immature Grans % 0.8 (H) 0.0 - 0.5 %     Neutrophils, Absolute 10.16 (H) 2.00 - 8.60 10*3/mm3     Lymphocytes, Absolute 1.48 0.60 - 4.20 10*3/mm3     Monocytes, Absolute 1.05 (H) 0.00 - 0.90 10*3/mm3     Eosinophils, Absolute 0.00 0.00 - 0.70 10*3/mm3     Basophils, Absolute 0.00 0.00 - 0.20 10*3/mm3     Immature Grans, Absolute 0.10 (H) 0.00 - 0.02 10*3/mm3      *Note: Due to a large number of results and/or encounters for the requested time period, some results have not been displayed. A complete set of results can be found in Results Review.              Office Visit on 2/5/2018            Detailed Report

## 2018-03-16 NOTE — ANESTHESIA PREPROCEDURE EVALUATION
Anesthesia Evaluation     NPO Solid Status: > 8 hours  NPO Liquid Status: > 8 hours           Airway   Mallampati: III  TM distance: >3 FB  Neck ROM: full  No difficulty expected  Dental    (+) poor dentition    Pulmonary - normal exam   (+) COPD moderate, asthma,   Cardiovascular - normal exam    (+) hypertension, CAD, PVD, hyperlipidemia,  carotid artery disease      Neuro/Psych  (+) dizziness/light headedness,     GI/Hepatic/Renal/Endo    (+)  GERD,      Musculoskeletal     Abdominal  - normal exam   Substance History      OB/GYN          Other   (+) arthritis                     Anesthesia Plan    ASA 3     MAC     intravenous induction   Anesthetic plan and risks discussed with patient.

## 2018-03-16 NOTE — ANESTHESIA POSTPROCEDURE EVALUATION
Patient: Mona Perales    Procedure Summary     Date:  03/16/18 Room / Location:  [facility] ENDOSCOPY 1 / [facility] ENDOSCOPY    Anesthesia Start:  1501 Anesthesia Stop:  1517    Procedure:  ESOPHAGOGASTRODUODENOSCOPY possible dilation (N/A Esophagus) Diagnosis:       Epigastric pain      Dysphagia, unspecified type      (Epigastric pain [R10.13])      (Dysphagia, unspecified type [R13.10])    Surgeon:  Bin Oh MD Provider:  Toña Escobar CRNA    Anesthesia Type:  MAC ASA Status:  3          Anesthesia Type: MAC  Last vitals  BP   132/74 (03/16/18 1403)   Temp   96.6 °F (35.9 °C) (03/16/18 1403)   Pulse   69 (03/16/18 1403)   Resp   18 (03/16/18 1403)     SpO2   95 % (03/16/18 1403)     Post Anesthesia Care and Evaluation    Patient location during evaluation: bedside  Patient participation: waiting for patient participation  Level of consciousness: sleepy but conscious  Pain management: adequate  Airway patency: patent  Anesthetic complications: No anesthetic complications  PONV Status: none  Cardiovascular status: acceptable  Respiratory status: acceptable  Hydration status: acceptable

## 2018-03-20 DIAGNOSIS — S62.326D CLOSED DISPLACED FRACTURE OF SHAFT OF FIFTH METACARPAL BONE OF RIGHT HAND WITH ROUTINE HEALING, SUBSEQUENT ENCOUNTER: Primary | ICD-10-CM

## 2018-03-23 ENCOUNTER — OFFICE VISIT (OUTPATIENT)
Dept: ORTHOPEDIC SURGERY | Facility: CLINIC | Age: 68
End: 2018-03-23

## 2018-03-23 VITALS — WEIGHT: 126.3 LBS | BODY MASS INDEX: 23.24 KG/M2 | HEIGHT: 62 IN
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
## 2018-03-23 DIAGNOSIS — M79.641 PAIN OF RIGHT HAND: ICD-10-CM

## 2018-03-23 DIAGNOSIS — S62.326D CLOSED DISPLACED FRACTURE OF SHAFT OF FIFTH METACARPAL BONE OF RIGHT HAND WITH ROUTINE HEALING, SUBSEQUENT ENCOUNTER: Primary | ICD-10-CM

## 2018-03-23 PROCEDURE — 99024 POSTOP FOLLOW-UP VISIT: CPT | Performed by: NURSE PRACTITIONER

## 2018-03-23 RX ORDER — HYDROCODONE BITARTRATE AND ACETAMINOPHEN 5; 325 MG/1; MG/1
1 TABLET ORAL EVERY 6 HOURS PRN
Qty: 40 TABLET | Refills: 0 | Status: SHIPPED | OUTPATIENT
Start: 2018-03-23 | End: 2018-04-02

## 2018-03-23 NOTE — PROGRESS NOTES
Mona Perales is a 67 y.o. female returns for     Chief Complaint   Patient presents with   • Right Hand - Follow-up     Repeat xray done today in office        HISTORY OF PRESENT ILLNESS: Patient presents to office for follow up of right fifth metacarpal. Initial injury occurred on 2/25/2018 due to a fall at home. Patient continues to have right hand pain/tenderness with some swelling. There has been some gradual improvement in pain/swelling. Patient has been wearing her fiberglass splint as instructed. X-rays are repeated today.      CONCURRENT MEDICAL HISTORY:    Past Medical History:   Diagnosis Date   • Anxiety    • Arthritis    • Asthma    • 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 with ulceration     bilateral legs - bilateral iliac stents 2008      • Chronic lower back pain    • COPD (chronic obstructive pulmonary disease)    • Coronary arteriosclerosis    • Essential (primary) hypertension    • History of pulmonary embolus (PE)    • Hyperlipidemia    • Insomnia    • Lumbago    • Nicotine dependence    • Occlusion of artery      and stenosis of bilateral carotid arteries - BABS 16-49%, LICA 0-15%   • Other atherosclerosis of native artery of other extremity     LEFT subclavian stent 2005 (occluded)       Allergies   Allergen Reactions   • Morphine And Related Itching, Confusion and Hallucinations   • Penicillins Hallucinations         Current Outpatient Prescriptions:   •  albuterol (PROVENTIL) (2.5 MG/3ML) 0.083% nebulizer solution, Take 2.5 mg by nebulization Every 8 (Eight) Hours As Needed for Wheezing., Disp: , Rfl:   •  amLODIPine (NORVASC) 10 MG tablet, Take 10 mg by mouth Daily., Disp: , Rfl:   •  ELIQUIS 5 MG tablet tablet, Take 5 mg by mouth Daily., Disp: , Rfl: 5  •  gabapentin (NEURONTIN) 800 MG tablet, Take 800 mg by mouth 3 (Three) Times a Day., Disp: , Rfl:   •  HYDROcodone-acetaminophen (NORCO) 5-325 MG per tablet, Take 1 tablet by mouth Every 6 (Six) Hours As Needed (PRN) for  up to 10 days., Disp: 40 tablet, Rfl: 0  •  naproxen (NAPROSYN) 500 MG tablet, Take 500 mg by mouth Daily As Needed., Disp: , Rfl: 1  •  ondansetron (ZOFRAN) 4 MG tablet, Take 4 mg by mouth Every 8 (Eight) Hours As Needed for Nausea or Vomiting., Disp: , Rfl:   •  pantoprazole (PROTONIX) 40 MG EC tablet, Take 1 tablet by mouth Daily., Disp: 30 tablet, Rfl: 5  •  penciclovir (DENAVIR) 1 % cream, Apply 1 application topically Every 2 (Two) Hours., Disp: , Rfl:   •  pravastatin (PRAVACHOL) 20 MG tablet, Take 20 mg by mouth Daily., Disp: , Rfl:   •  propranolol (INDERAL) 20 MG tablet, Take 40 mg by mouth 2 (Two) Times a Day., Disp: , Rfl: 5  •  sertraline (ZOLOFT) 100 MG tablet, Take 100 mg by mouth Daily., Disp: , Rfl:   •  SYMBICORT 80-4.5 MCG/ACT inhaler, Inhale 2 puffs 2 (Two) Times a Day As Needed., Disp: , Rfl: 1  •  traZODone (DESYREL) 300 MG tablet, Take 300 mg by mouth Every Night., Disp: , Rfl:     Past Surgical History:   Procedure Laterality Date   • CARDIAC CATHETERIZATION  04/06/2016    No evidence of any obstructive epicardial CAD.Preserved LV systolic function with EF of 55%.   • CENTRAL VENOUS LINE INSERTION  04/07/2016    Successful placement of right uppe extremity 6-French triple lumen PICC line.   • ENDOSCOPY N/A 1/12/2018    Procedure: ESOPHAGOGASTRODUODENOSCOPY;  Surgeon: Bin Oh MD;  Location: [facility] ENDOSCOPY;  Service:    • ENDOSCOPY N/A 1/17/2018    Procedure: ESOPHAGOGASTRODUODENOSCOPY;  Surgeon: Bni Oh MD;  Location: [facility] ENDOSCOPY;  Service:    • ENDOSCOPY N/A 3/16/2018    Procedure: ESOPHAGOGASTRODUODENOSCOPY possible dilation;  Surgeon: Bin Oh MD;  Location: [facility] ENDOSCOPY;  Service: Gastroenterology   • FRACTURE SURGERY     • HYSTERECTOMY     • TOTAL SHOULDER REPLACEMENT Left    • TRANSESOPHAGEAL ECHOCARDIOGRAM (JACQUES)  04/07/2016    With color flow-Mild to moderate [facility].LV systolic function well preserved with Ef of 55-60%.Mitral and AV intact.Diastolic  "dysfunction   • UPPER GASTROINTESTINAL ENDOSCOPY  01/17/2018    Dr. Bin Martin M.D.       ROS  No fevers or chills.  No chest pain or shortness of air.  No GI or  disturbances. Right hand pain/swelling.     PHYSICAL EXAMINATION:       Ht 157.5 cm (62\")   Wt 57.3 kg (126 lb 4.8 oz)   BMI 23.10 kg/m²     Physical Exam   Constitutional: She is oriented to person, place, and time. Vital signs are normal. She appears well-developed and well-nourished. She is active and cooperative. She does not appear ill. No distress.   HENT:   Head: Normocephalic.   Pulmonary/Chest: Effort normal. No respiratory distress.   Abdominal: Soft. She exhibits no distension.   Musculoskeletal: She exhibits edema (Right hand, dorsal/ulnar aspect) and tenderness (Right hand, dorsal/ulnar aspect). She exhibits no deformity.   Neurological: She is alert and oriented to person, place, and time. GCS eye subscore is 4. GCS verbal subscore is 5. GCS motor subscore is 6.   Skin: Skin is warm, dry and intact. Capillary refill takes less than 2 seconds.   Psychiatric: She has a normal mood and affect. Her speech is normal and behavior is normal. Judgment and thought content normal. Cognition and memory are normal.   Vitals reviewed.      GAIT:     [x]  Normal  []  Antalgic    Assistive device: [x]  None  []  Walker     []  Crutches  []  Cane     []  Wheelchair  []  Stretcher    Right Hand Exam     Tenderness   The patient is experiencing tenderness in the dorsal area and ulnar area (fifth finger, fifth metacarpal).    Range of Motion     Wrist   Extension: normal   Flexion: normal   Pronation: normal   Supination: normal     Hand   MP Thumb: normal   MP Index: normal   MP Middle: normal   MP Ring: normal   MP Little: abnormal   PIP Index: normal   PIP Middle: normal   PIP Ring: normal   PIP Little: abnormal   DIP Thumb: normal   DIP Index: normal   DIP Middle: normal   DIP Ring: normal   DIP Little: normal     Other   Erythema: " absent  Sensation: normal  Pulse: present    Comments:  Mild swelling present to the dorsal/ulnar aspect of the hand, improved from prior exam.  Skin is intact.  No ecchymosis noted. No deformity or rotation noted at the fifth digit.  Capillary refill is less than 3 seconds.  Limited range of motion of the fifth finger due to pain and acute 5th metacarpal fracture.  Strength assessment and full range of motion assessment deferred due to acute metacarpal fracture.      Left Hand Exam     Tenderness   The patient is experiencing no tenderness.         Range of Motion   The patient has normal left wrist ROM.    Muscle Strength   The patient has normal left wrist strength.    Other   Erythema: absent  Sensation: normal  Pulse: present            Xr Hand 3+ View Right    Result Date: 3/23/2018  Narrative: 3 views of the right hand reveal a stable, obliquely-oriented and mildly displaced fracture of the fifth metacarpal shaft. There is some evidence of healing noted when compared with previous images from 3/7/2018 at 2/28/2018. There is mild surrounding soft tissue swelling noted.  Alignment remains acceptable and unchanged from prior images.  There are moderate, diffuse degenerative changes throughout the hand. 03/23/18 at 3:53 PM by DELFINO Cotton     Xr Hand 3+ View Right    Result Date: 3/7/2018  Narrative: 3 views of the right hand reveal a stable, obliquely-oriented and mildly displaced fracture of the fifth metacarpal shaft with surrounding soft tissue swelling. There are moderate, diffuse degenerative changes throughout the hand.  No significant changes are noted when compared with previous images from 2/28/2018. 03/07/18 at 3:31 PM by DELFINO Cotton     Xr Hand 3+ View Right    Result Date: 2/28/2018  Narrative: 3 views of the right hand reveal an acute, obliquely-oriented and mildly displaced fracture of the fifth metacarpal shaft with surrounding soft tissue swelling. There are moderate, diffuse  degenerative changes throughout the hand. No other acute radiologic abnormalities are noted at this time. 02/28/18 at 10:51 AM by DELFINO Cotton             ASSESSMENT:    Diagnoses and all orders for this visit:    Closed displaced fracture of shaft of fifth metacarpal bone of right hand with routine healing, subsequent encounter    Pain of right hand    Fall with injury, subsequent encounter    Other orders  -     HYDROcodone-acetaminophen (NORCO) 5-325 MG per tablet; Take 1 tablet by mouth Every 6 (Six) Hours As Needed (PRN) for up to 10 days.    PLAN    X-rays of right hand reviewed today and compared with previous images from 3/7/2018 and 2/28/2018.  Right fifth metacarpal fracture is stable with some evidence of healing noted.  Recommend changing from fiberglass splint to a Velcro wrist splint for immobilization.  Patient is instructed to wear the splint at all times except removing briefly for washing.  Patient was also instructed to remove the splint intermittently while at rest and perform gentle range of motion with her fingers.  Recommend to continue with intermittent elevation of the right hand to minimize swelling.  Recommend to continue with ice therapy intermittently 3 times daily for 20 minutes of time to minimize swelling/pain.  Patient continues to complain of pain that is severe at times.  Norco is refilled today to take as needed for moderate to severe pain.  Patient is instructed to take Tylenol or Ibuprofen as needed for milder pain.  Follow-up in 4 weeks for recheck with repeat x-rays at that time.    This patient has tried and failed a course of multiple treatment modalities which include the use of Naproxen, Tylenol, rest, splinting and ice therapy and has sustained a traumatic injury.  Therefore, I will recommend a course of narcotic pain medication for this patient until their pain has been sufficiently reduced to a level that I deem acceptable to be treated with alternative treatment  options.       Return in about 4 weeks (around 4/20/2018) for Recheck.      This document has been electronically signed by DELFINO Cotton on March 26, 2018 8:30 AM      DELFINO Cotton

## 2018-04-09 RX ORDER — NAPROXEN 500 MG/1
500 TABLET ORAL 2 TIMES DAILY WITH MEALS
Qty: 60 TABLET | Refills: 1 | Status: SHIPPED | OUTPATIENT
Start: 2018-04-09 | End: 2018-10-01 | Stop reason: SDUPTHER

## 2018-04-18 DIAGNOSIS — S62.326D CLOSED DISPLACED FRACTURE OF SHAFT OF FIFTH METACARPAL BONE OF RIGHT HAND WITH ROUTINE HEALING, SUBSEQUENT ENCOUNTER: Primary | ICD-10-CM

## 2018-04-23 ENCOUNTER — OFFICE VISIT (OUTPATIENT)
Dept: ORTHOPEDIC SURGERY | Facility: CLINIC | Age: 68
End: 2018-04-23

## 2018-04-23 VITALS — BODY MASS INDEX: 23.55 KG/M2 | WEIGHT: 128 LBS | HEIGHT: 62 IN

## 2018-04-23 DIAGNOSIS — M79.641 PAIN OF RIGHT HAND: ICD-10-CM

## 2018-04-23 DIAGNOSIS — S62.326D CLOSED DISPLACED FRACTURE OF SHAFT OF FIFTH METACARPAL BONE OF RIGHT HAND WITH ROUTINE HEALING, SUBSEQUENT ENCOUNTER: Primary | ICD-10-CM
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
## 2018-04-23 PROCEDURE — 99024 POSTOP FOLLOW-UP VISIT: CPT | Performed by: NURSE PRACTITIONER

## 2018-04-23 RX ORDER — HYDROCODONE BITARTRATE AND ACETAMINOPHEN 5; 325 MG/1; MG/1
1 TABLET ORAL EVERY 6 HOURS PRN
Qty: 40 TABLET | Refills: 0 | Status: SHIPPED | OUTPATIENT
Start: 2018-04-23 | End: 2018-05-03

## 2018-04-23 NOTE — PROGRESS NOTES
The patient is a 67 y.o. female who presents for followup.    Chief Complaint   Patient presents with   • Right Hand - Follow-up       HPI: Patient presents to office for follow-up of right fifth metacarpal fracture.  Initial injury occurred on 2/25/2018 due to a fall at home.  Patient continues to have right hand pain and tenderness with mild swelling.  Patient continues wearing her fiberglass splint as instructed.  X-rays are repeated today.      Current Outpatient Prescriptions:   •  albuterol (PROVENTIL) (2.5 MG/3ML) 0.083% nebulizer solution, Take 2.5 mg by nebulization Every 8 (Eight) Hours As Needed for Wheezing., Disp: , Rfl:   •  amLODIPine (NORVASC) 10 MG tablet, Take 10 mg by mouth Daily., Disp: , Rfl:   •  ELIQUIS 5 MG tablet tablet, Take 5 mg by mouth Daily., Disp: , Rfl: 5  •  gabapentin (NEURONTIN) 800 MG tablet, Take 800 mg by mouth 3 (Three) Times a Day., Disp: , Rfl:   •  naproxen (NAPROSYN) 500 MG tablet, Take 1 tablet by mouth 2 (Two) Times a Day With Meals., Disp: 60 tablet, Rfl: 1  •  ondansetron (ZOFRAN) 4 MG tablet, Take 4 mg by mouth Every 8 (Eight) Hours As Needed for Nausea or Vomiting., Disp: , Rfl:   •  pantoprazole (PROTONIX) 40 MG EC tablet, Take 1 tablet by mouth Daily., Disp: 30 tablet, Rfl: 5  •  penciclovir (DENAVIR) 1 % cream, Apply 1 application topically Every 2 (Two) Hours., Disp: , Rfl:   •  pravastatin (PRAVACHOL) 20 MG tablet, Take 20 mg by mouth Daily., Disp: , Rfl:   •  propranolol (INDERAL) 20 MG tablet, Take 40 mg by mouth 2 (Two) Times a Day., Disp: , Rfl: 5  •  sertraline (ZOLOFT) 100 MG tablet, Take 100 mg by mouth Daily., Disp: , Rfl:   •  SYMBICORT 80-4.5 MCG/ACT inhaler, Inhale 2 puffs 2 (Two) Times a Day As Needed., Disp: , Rfl: 1  •  traZODone (DESYREL) 300 MG tablet, Take 300 mg by mouth Every Night., Disp: , Rfl:   •  HYDROcodone-acetaminophen (NORCO) 5-325 MG per tablet, Take 1 tablet by mouth Every 6 (Six) Hours As Needed (PRN) for up to 10 days., Disp: 40  "tablet, Rfl: 0    Allergies   Allergen Reactions   • Morphine And Related Itching, Confusion and Hallucinations       ROS:  No fevers or chills.  No nausea or vomiting. Right hand pain/swelling.     PHYSICAL EXAM:    Vitals:    04/23/18 1556   Weight: 58.1 kg (128 lb)   Height: 157.5 cm (62\")       GAIT:     [x]  Normal  []  Antalgic    Assistive device: [x]  None  []  Walker     []  Crutches  []  Cane     []  Wheelchair  []  Stretcher    Patient is awake and alert, answers questions appropriately, and is in no apparent distress.    Left Hand/Wrist Exam   Sensation: Normal    Range of Motion   Normal left wrist ROM    Muscle Strength   Normal left wrist strength    Right Hand/Wrist Exam   Sensation: Normal    Tenderness   The patient is experiencing tenderness in the dorsal area, ulnar area, 5th metacarpal.    Range of Motion   Wrist  Pronation:  Normal  Supination: Normal  Flexion:      Normal  Extension:  Normal  Hand  DIP Thumb: Normal  DIP Index:    Normal  DIP Middle:  Normal  DIP Ring:     Normal  DIP Little:     Normal  MP Thumb:  Normal  MP Index:     Normal  MP Middle:   Normal  MP Ring:      Normal  MP Little:      Abnormal  PIP Index:    Normal  PIP Middle:  Normal  PIP Ring:     Normal  PIP Little:     Abnormal    Comments:  Mild, persistent swelling present to the dorsal/ulnar aspect of the hand, unchanged from prior exam.  Skin is intact.  No ecchymosis noted.  No deformity or rotation noted at the fifth digit.  Capillary refill is less than 3 seconds.  Pain and limitations with range of motion.          Xr Hand 3+ View Right    Result Date: 4/24/2018  Narrative: 3 views of the right hand reveal a stable, obliquely oriented and mildly displaced fracture of the fifth metacarpal shaft.  There is evidence of healing noted when compared with previous images from 3/23/2018, 3/7/2018 and 2/28/2018. Alignment remains acceptable and unchanged from prior images.  There is mild surrounding soft tissue swelling " noted.  There are moderate, diffuse degenerative changes throughout the hand.  No other radiologic abnormalities are noted at this time. 04/24/18 at 4:45 PM by DELFINO Cotton       ASSESSMENT:  Diagnoses and all orders for this visit:    Closed displaced fracture of shaft of fifth metacarpal bone of right hand with routine healing, subsequent encounter  -     Ambulatory Referral to Physical Therapy Evaluate and treat (hand therapy); ROM, Strengthening    Pain of right hand  -     Ambulatory Referral to Physical Therapy Evaluate and treat (hand therapy); ROM, Strengthening    Fall with injury, subsequent encounter  -     Ambulatory Referral to Physical Therapy Evaluate and treat (hand therapy); ROM, Strengthening    Other orders  -     HYDROcodone-acetaminophen (NORCO) 5-325 MG per tablet; Take 1 tablet by mouth Every 6 (Six) Hours As Needed (PRN) for up to 10 days.    PLAN: X-rays of right hand reviewed and compared with multiple prior images.  The fifth metacarpal fracture is stable with evidence of healing noted on x-ray.  Patient continues to complain of pain and limitations with range of motion.  Patient has some mild, persistent swelling.  Recommend to continue with her velcro wrist splint for immobilization.  Recommend starting physical therapy for conditioning, strengthening and range of motion of the right hand.  Patient would also benefit from being fitted with a hand brace therapy.  Patient is instructed to continue to remove the splint intermittently while at rest and perform gentle range of motion with her fingers.  Recommend to continue with intermittent elevation of the right hand minimize swelling.  Recommend to continue with ice therapy intermittently 3 times daily for 20 minutes at a time to minimize swelling/pain.  Norco is refilled for pain today to take as needed for moderate to severe pain.  Also recommend to continue naproxen twice daily.  Follow-up in 6 weeks for recheck and repeat x-rays  at that time.    This patient has tried and failed a course of multiple treatment modalities which include the use of anti-inflammatories/acetaminophen, home exercises, rest, splinting and ice therapy and  has sustained a traumatic injury.  Therefore, I will recommend a course of narcotic pain medication for this patient until their pain has been sufficiently reduced to a level that I deem acceptable to be treated with alternative treatment options.  [facility] reviewed # [id number].     Return in about 6 weeks (around 6/4/2018) for Recheck.      This document has been electronically signed by DELFINO Cotton on April 26, 2018 10:20 AM      DELFINO Cotton

## 2018-06-01 DIAGNOSIS — S62.326D CLOSED DISPLACED FRACTURE OF SHAFT OF FIFTH METACARPAL BONE OF RIGHT HAND WITH ROUTINE HEALING, SUBSEQUENT ENCOUNTER: Primary | ICD-10-CM

## 2018-06-04 ENCOUNTER — OFFICE VISIT (OUTPATIENT)
Dept: ORTHOPEDIC SURGERY | Facility: CLINIC | Age: 68
End: 2018-06-04

## 2018-06-04 VITALS — BODY MASS INDEX: 23 KG/M2 | WEIGHT: 125 LBS | HEIGHT: 62 IN

## 2018-06-04 DIAGNOSIS — S62.326D CLOSED DISPLACED FRACTURE OF SHAFT OF FIFTH METACARPAL BONE OF RIGHT HAND WITH ROUTINE HEALING, SUBSEQUENT ENCOUNTER: Primary | ICD-10-CM

## 2018-06-04 DIAGNOSIS — M79.641 PAIN OF RIGHT HAND: ICD-10-CM
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
## 2018-06-04 PROCEDURE — 99214 OFFICE O/P EST MOD 30 MIN: CPT | Performed by: NURSE PRACTITIONER

## 2018-06-04 RX ORDER — HYDROCODONE BITARTRATE AND ACETAMINOPHEN 5; 325 MG/1; MG/1
1 TABLET ORAL EVERY 8 HOURS PRN
Qty: 40 TABLET | Refills: 0 | Status: SHIPPED | OUTPATIENT
Start: 2018-06-04 | End: 2019-02-26 | Stop reason: HOSPADM

## 2018-06-04 NOTE — PROGRESS NOTES
Mona Perales is a 67 y.o. female returns for     Chief Complaint   Patient presents with   • Right Hand - Follow-up       HISTORY OF PRESENT ILLNESS: Patient presents to office for follow-up of right fifth metacarpal fracture.  Initial injury occurred on 2/25/2018 due to a fall at home.  Patient continues to have right hand pain and tenderness with mild swelling. Patient reports increased pain recently due to another fall. States she tripped over her dog at home. Patient states she has been taking Naproxen without any pain relief. X-rays are repeated today.     CONCURRENT MEDICAL HISTORY:    Past Medical History:   Diagnosis Date   • Anxiety    • Arthritis    • Asthma    • 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 with ulceration     bilateral legs - bilateral iliac stents 2008      • Chronic lower back pain    • COPD (chronic obstructive pulmonary disease)    • Coronary arteriosclerosis    • Essential (primary) hypertension    • History of pulmonary embolus (PE)    • Hyperlipidemia    • Insomnia    • Lumbago    • Nicotine dependence    • Occlusion of artery      and stenosis of bilateral carotid arteries - BABS 16-49%, LICA 0-15%   • Other atherosclerosis of native artery of other extremity     LEFT subclavian stent 2005 (occluded)       Allergies   Allergen Reactions   • Morphine And Related Itching, Confusion and Hallucinations         Current Outpatient Prescriptions:   •  albuterol (PROVENTIL) (2.5 MG/3ML) 0.083% nebulizer solution, Take 2.5 mg by nebulization Every 8 (Eight) Hours As Needed for Wheezing., Disp: , Rfl:   •  amLODIPine (NORVASC) 10 MG tablet, Take 10 mg by mouth Daily., Disp: , Rfl:   •  ELIQUIS 5 MG tablet tablet, Take 5 mg by mouth Daily., Disp: , Rfl: 5  •  gabapentin (NEURONTIN) 800 MG tablet, Take 800 mg by mouth 3 (Three) Times a Day., Disp: , Rfl:   •  naproxen (NAPROSYN) 500 MG tablet, Take 1 tablet by mouth 2 (Two) Times a Day With Meals., Disp: 60 tablet, Rfl: 1  •   ondansetron (ZOFRAN) 4 MG tablet, Take 4 mg by mouth Every 8 (Eight) Hours As Needed for Nausea or Vomiting., Disp: , Rfl:   •  pantoprazole (PROTONIX) 40 MG EC tablet, Take 1 tablet by mouth Daily., Disp: 30 tablet, Rfl: 5  •  penciclovir (DENAVIR) 1 % cream, Apply 1 application topically Every 2 (Two) Hours., Disp: , Rfl:   •  pravastatin (PRAVACHOL) 20 MG tablet, Take 20 mg by mouth Daily., Disp: , Rfl:   •  sertraline (ZOLOFT) 100 MG tablet, Take 100 mg by mouth Daily., Disp: , Rfl:   •  SYMBICORT 80-4.5 MCG/ACT inhaler, Inhale 2 puffs 2 (Two) Times a Day As Needed., Disp: , Rfl: 1  •  traZODone (DESYREL) 300 MG tablet, Take 300 mg by mouth Every Night., Disp: , Rfl:   •  HYDROcodone-acetaminophen (NORCO) 5-325 MG per tablet, Take 1 tablet by mouth Every 8 (Eight) Hours As Needed (PRN)., Disp: 40 tablet, Rfl: 0    Past Surgical History:   Procedure Laterality Date   • CARDIAC CATHETERIZATION  04/06/2016    No evidence of any obstructive epicardial CAD.Preserved LV systolic function with EF of 55%.   • CENTRAL VENOUS LINE INSERTION  04/07/2016    Successful placement of right uppe extremity 6-French triple lumen PICC line.   • ENDOSCOPY N/A 1/12/2018    Procedure: ESOPHAGOGASTRODUODENOSCOPY;  Surgeon: Bin Martin MD;  Location: [facility] ENDOSCOPY;  Service:    • ENDOSCOPY N/A 1/17/2018    Procedure: ESOPHAGOGASTRODUODENOSCOPY;  Surgeon: Bin Martin MD;  Location: [facility] ENDOSCOPY;  Service:    • ENDOSCOPY N/A 3/16/2018    Procedure: ESOPHAGOGASTRODUODENOSCOPY possible dilation;  Surgeon: Bin Martin MD;  Location: [facility] ENDOSCOPY;  Service: Gastroenterology   • FRACTURE SURGERY     • HYSTERECTOMY     • TOTAL SHOULDER REPLACEMENT Left    • TRANSESOPHAGEAL ECHOCARDIOGRAM (JACQUES)  04/07/2016    With color flow-Mild to moderate [facility].LV systolic function well preserved with Ef of 55-60%.Mitral and AV intact.Diastolic dysfunction   • UPPER GASTROINTESTINAL ENDOSCOPY  01/17/2018    Dr. Bin Martin M.D.  "      ROS  No fevers or chills.  No chest pain or shortness of air.  No GI or  disturbances. Right hand pain.     PHYSICAL EXAMINATION:       Ht 157.5 cm (62\")   Wt 56.7 kg (125 lb)   BMI 22.86 kg/m²     Physical Exam   Constitutional: She is oriented to person, place, and time. Vital signs are normal. She appears well-developed and well-nourished. She is active and cooperative. She does not appear ill. No distress.   HENT:   Head: Normocephalic.   Pulmonary/Chest: Effort normal. No respiratory distress.   Abdominal: Soft. She exhibits no distension.   Musculoskeletal: She exhibits edema (Mild, right hand (present since initial injury in [facility])) and tenderness (Right hand, dorsolateral aspect, 5th metacarpal). She exhibits no deformity.   Neurological: She is alert and oriented to person, place, and time. GCS eye subscore is 4. GCS verbal subscore is 5. GCS motor subscore is 6.   Skin: Skin is warm, dry and intact. Capillary refill takes less than 2 seconds. No erythema.   Psychiatric: She has a normal mood and affect. Her speech is normal and behavior is normal. Judgment and thought content normal. Cognition and memory are normal.   Vitals reviewed.      GAIT:     [x]  Normal  []  Antalgic    Assistive device: [x]  None  []  Walker     []  Crutches  []  Cane     []  Wheelchair  []  Stretcher    Right Hand Exam     Tenderness   The patient is experiencing tenderness in the dorsal area and ulnar area (5th metacarpal).    Range of Motion     Wrist   Extension: normal   Flexion: normal   Pronation: normal   Supination: normal     Hand   MP Thumb: normal   MP Index: normal   MP Middle: normal   MP Ring: abnormal   MP Little: abnormal   PIP Index: normal   PIP Middle: normal   PIP Ring: abnormal   PIP Little: abnormal   DIP Thumb: normal   DIP Index: normal   DIP Middle: normal   DIP Ring: normal   DIP Little: normal     Muscle Strength   Wrist Extension: 4/5   Wrist Flexion: 4/5   : 0/5     Tests "   Finkelstein: negative    Other   Erythema: absent  Sensation: normal  Pulse: present    Comments:  Mild, persistent swelling present to the dorsal/ulnar aspect of the hand, unchanged from prior exam.  Skin is intact.  No ecchymosis noted.  No deformity or rotation noted at the fifth digit.  Capillary refill is less than 3 seconds.  Pain and limitations with range of motion.      Left Hand Exam     Tenderness   The patient is experiencing no tenderness.         Range of Motion   The patient has normal left wrist ROM.    Muscle Strength   The patient has normal left wrist strength.    Tests   Finkelstein: negative    Other   Erythema: absent  Sensation: normal  Pulse: present            Xr Hand 3+ View Right    Result Date: 6/4/2018  Narrative: AP, oblique and lateral views of the right hand reveal a stable, healing obliquely-oriented fracture of the fifth metacarpal shaft.  There is increased evidence of healing noted when compared with prior images from 4/24/2018, 3/23/2018 and 2/28/2018.  There is near-complete bony consolidation of the fracture lines with a large bone callus noted to the lateral aspect of the fifth metacarpal at the site of the fracture.  Alignment remains acceptable and unchanged from prior images.  There is mild surrounding soft tissue swelling noted, unchanged from prior images.  Moderate, diffuse degenerative changes throughout the hand.  No other radiologic abnormalities are noted at this time.06/04/18 at 4:50 PM by DELFINO Cotton          ASSESSMENT:    Diagnoses and all orders for this visit:    Closed displaced fracture of shaft of fifth metacarpal bone of right hand with routine healing, subsequent encounter    Pain of right hand    Fall with injury, subsequent encounter    Other orders  -     HYDROcodone-acetaminophen (NORCO) 5-325 MG per tablet; Take 1 tablet by mouth Every 8 (Eight) Hours As Needed (PRN).    PLAN    X-rays of right hand reviewed today and compared with multiple  previous images. Right fifth metacarpal fracture is healing well. No signs of a new fracture in light of her recent fall and increased pain. No acute signs of injury noted on physical exam. Mild, persistent swelling at the 5th metacarpal remains unchanged. No new swelling or ecchymosis. No deformity. Skin is intact. Discussed with patient going back to wearing her Velcro wrist splint as long as she is having increased pain. Discussed with patient recommendation to transition out of the splint when she is improved and to remove it intermittently and perform range of motion exercises of her fingers as tolerated. Recommend to continue with elevation and ice therapy as needed for pain/swelling. Patient has been taking Naproxen without any pain relief. She requests a refill of pain medication. Norco is refilled today to take as needed for moderate to severe pain. Continue Naproxen twice daily for consistent dosing of oral NSAIDs. Follow up in 6 weeks for recheck and repeat x-rays at that time. It is noted that a referral was sent for physical therapy at last office visit on 4/26/2018. It does not appear that the patient ever went to physical therapy.     This patient has tried and failed a course of multiple treatment modalities which include the use of  anti-inflammatories (Naproxen), splinting, ice therapy, rest and elevation and has sustained a traumatic injury resulting in a fracture. Therefore, I will recommend a course of narcotic pain medication for this patient until their pain has been sufficiently reduced to a level that I deem acceptable to be treated with alternative treatment options. [facility] reviewed # [id number].     Return in about 6 weeks (around 7/16/2018) for Recheck.      This document has been electronically signed by DELFINO Cotton on June 4, 2018 6:08 PM      DELFINO Cotton

## 2018-09-16 ENCOUNTER — APPOINTMENT (OUTPATIENT)
Dept: GENERAL RADIOLOGY | Facility: HOSPITAL | Age: 68
End: 2018-09-16

## 2018-09-16 ENCOUNTER — APPOINTMENT (OUTPATIENT)
Dept: CT IMAGING | Facility: HOSPITAL | Age: 68
End: 2018-09-16

## 2018-09-16 ENCOUNTER — HOSPITAL ENCOUNTER (INPATIENT)
Facility: HOSPITAL | Age: 68
LOS: 2 days | Discharge: HOME | End: 2018-09-19
Attending: EMERGENCY MEDICINE | Admitting: FAMILY MEDICINE

## 2018-09-16 DIAGNOSIS — R55 SYNCOPE, UNSPECIFIED SYNCOPE TYPE: ICD-10-CM

## 2018-09-16 DIAGNOSIS — Z74.09 IMPAIRED FUNCTIONAL MOBILITY, BALANCE, AND ENDURANCE: ICD-10-CM

## 2018-09-16 DIAGNOSIS — Z78.9 IMPAIRED MOBILITY AND ADLS: ICD-10-CM

## 2018-09-16 DIAGNOSIS — S00.03XA CONTUSION OF SCALP, INITIAL ENCOUNTER: ICD-10-CM

## 2018-09-16 DIAGNOSIS — Z74.09 IMPAIRED MOBILITY AND ADLS: ICD-10-CM

## 2018-09-16 DIAGNOSIS — R09.02 HYPOXIA: ICD-10-CM
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
## 2018-09-16 LAB
ALBUMIN SERPL-MCNC: 3.8 G/DL (ref 3.4–4.8)
ALBUMIN/GLOB SERPL: 1.2 G/DL (ref 1.1–1.8)
ALP SERPL-CCNC: 101 U/L (ref 38–126)
ALT SERPL W P-5'-P-CCNC: 20 U/L (ref 9–52)
AMPHET+METHAMPHET UR QL: POSITIVE
ANION GAP SERPL CALCULATED.3IONS-SCNC: 7 MMOL/L (ref 5–15)
APTT PPP: 29 SECONDS (ref 20–40.3)
AST SERPL-CCNC: 25 U/L (ref 14–36)
BACTERIA UR QL AUTO: ABNORMAL /HPF
BARBITURATES UR QL SCN: NEGATIVE
BASOPHILS # BLD AUTO: 0.05 10*3/MM3 (ref 0–0.2)
BASOPHILS NFR BLD AUTO: 0.5 % (ref 0–2)
BENZODIAZ UR QL SCN: NEGATIVE
BILIRUB SERPL-MCNC: 0.3 MG/DL (ref 0.2–1.3)
BILIRUB UR QL STRIP: NEGATIVE
BUN BLD-MCNC: 17 MG/DL (ref 7–21)
BUN/CREAT SERPL: 22.7 (ref 7–25)
CALCIUM SPEC-SCNC: 8.5 MG/DL (ref 8.4–10.2)
CANNABINOIDS SERPL QL: POSITIVE
CHLORIDE SERPL-SCNC: 105 MMOL/L (ref 95–110)
CK SERPL-CCNC: 100 U/L (ref 30–135)
CLARITY UR: CLEAR
CO2 SERPL-SCNC: 27 MMOL/L (ref 22–31)
COCAINE UR QL: NEGATIVE
COLOR UR: YELLOW
CREAT BLD-MCNC: 0.75 MG/DL (ref 0.5–1)
DEPRECATED RDW RBC AUTO: 46.9 FL (ref 36.4–46.3)
EOSINOPHIL # BLD AUTO: 0.31 10*3/MM3 (ref 0–0.7)
EOSINOPHIL NFR BLD AUTO: 3.1 % (ref 0–7)
ERYTHROCYTE [DISTWIDTH] IN BLOOD BY AUTOMATED COUNT: 14.3 % (ref 11.5–14.5)
ETHANOL BLD-MCNC: <10 MG/DL (ref 0–10)
ETHANOL UR QL: <0.01 %
GFR SERPL CREATININE-BSD FRML MDRD: 77 ML/MIN/1.73 (ref 45–104)
GLOBULIN UR ELPH-MCNC: 3.1 GM/DL (ref 2.3–3.5)
GLUCOSE BLD-MCNC: 97 MG/DL (ref 60–100)
GLUCOSE UR STRIP-MCNC: NEGATIVE MG/DL
HCT VFR BLD AUTO: 33.1 % (ref 35–45)
HGB BLD-MCNC: 11.2 G/DL (ref 12–15.5)
HGB UR QL STRIP.AUTO: NEGATIVE
HYALINE CASTS UR QL AUTO: ABNORMAL /LPF
IMM GRANULOCYTES # BLD: 0.04 10*3/MM3 (ref 0–0.02)
IMM GRANULOCYTES NFR BLD: 0.4 % (ref 0–0.5)
INR PPP: 1.04 (ref 0.8–1.2)
KETONES UR QL STRIP: NEGATIVE
LEUKOCYTE ESTERASE UR QL STRIP.AUTO: ABNORMAL
LYMPHOCYTES # BLD AUTO: 2.75 10*3/MM3 (ref 0.6–4.2)
LYMPHOCYTES NFR BLD AUTO: 27.4 % (ref 10–50)
MAGNESIUM SERPL-MCNC: 1.9 MG/DL (ref 1.6–2.3)
MCH RBC QN AUTO: 30.3 PG (ref 26.5–34)
MCHC RBC AUTO-ENTMCNC: 33.8 G/DL (ref 31.4–36)
MCV RBC AUTO: 89.5 FL (ref 80–98)
METHADONE UR QL SCN: NEGATIVE
MONOCYTES # BLD AUTO: 1.02 10*3/MM3 (ref 0–0.9)
MONOCYTES NFR BLD AUTO: 10.1 % (ref 0–12)
NEUTROPHILS # BLD AUTO: 5.88 10*3/MM3 (ref 2–8.6)
NEUTROPHILS NFR BLD AUTO: 58.5 % (ref 37–80)
NITRITE UR QL STRIP: NEGATIVE
NT-PROBNP SERPL-MCNC: 348 PG/ML (ref 0–900)
OPIATES UR QL: NEGATIVE
OXYCODONE UR QL SCN: NEGATIVE
PH UR STRIP.AUTO: 6.5 [PH] (ref 5–9)
PLATELET # BLD AUTO: 258 10*3/MM3 (ref 150–450)
PMV BLD AUTO: 8.9 FL (ref 8–12)
POTASSIUM BLD-SCNC: 3.5 MMOL/L (ref 3.5–5.1)
PROT SERPL-MCNC: 6.9 G/DL (ref 6.3–8.6)
PROT UR QL STRIP: NEGATIVE
PROTHROMBIN TIME: 13.4 SECONDS (ref 11.1–15.3)
RBC # BLD AUTO: 3.7 10*6/MM3 (ref 3.77–5.16)
RBC # UR: ABNORMAL /HPF
REF LAB TEST METHOD: ABNORMAL
SODIUM BLD-SCNC: 139 MMOL/L (ref 137–145)
SP GR UR STRIP: 1.01 (ref 1–1.03)
SQUAMOUS #/AREA URNS HPF: ABNORMAL /HPF
TROPONIN I SERPL-MCNC: <0.012 NG/ML
UROBILINOGEN UR QL STRIP: ABNORMAL
WBC NRBC COR # BLD: 10.05 10*3/MM3 (ref 3.2–9.8)
WBC UR QL AUTO: ABNORMAL /HPF

## 2018-09-16 PROCEDURE — 70450 CT HEAD/BRAIN W/O DYE: CPT

## 2018-09-16 PROCEDURE — 99285 EMERGENCY DEPT VISIT HI MDM: CPT

## 2018-09-16 PROCEDURE — 80307 DRUG TEST PRSMV CHEM ANLYZR: CPT | Performed by: EMERGENCY MEDICINE

## 2018-09-16 PROCEDURE — 36415 COLL VENOUS BLD VENIPUNCTURE: CPT | Performed by: EMERGENCY MEDICINE

## 2018-09-16 PROCEDURE — 94640 AIRWAY INHALATION TREATMENT: CPT

## 2018-09-16 PROCEDURE — 84484 ASSAY OF TROPONIN QUANT: CPT | Performed by: EMERGENCY MEDICINE

## 2018-09-16 PROCEDURE — 93010 ELECTROCARDIOGRAM REPORT: CPT | Performed by: INTERNAL MEDICINE

## 2018-09-16 PROCEDURE — 72125 CT NECK SPINE W/O DYE: CPT

## 2018-09-16 PROCEDURE — 85025 COMPLETE CBC W/AUTO DIFF WBC: CPT | Performed by: EMERGENCY MEDICINE

## 2018-09-16 PROCEDURE — 81001 URINALYSIS AUTO W/SCOPE: CPT | Performed by: EMERGENCY MEDICINE

## 2018-09-16 PROCEDURE — 73030 X-RAY EXAM OF SHOULDER: CPT

## 2018-09-16 PROCEDURE — 80053 COMPREHEN METABOLIC PANEL: CPT | Performed by: EMERGENCY MEDICINE

## 2018-09-16 PROCEDURE — 85610 PROTHROMBIN TIME: CPT | Performed by: EMERGENCY MEDICINE

## 2018-09-16 PROCEDURE — 71045 X-RAY EXAM CHEST 1 VIEW: CPT

## 2018-09-16 PROCEDURE — 93005 ELECTROCARDIOGRAM TRACING: CPT | Performed by: EMERGENCY MEDICINE

## 2018-09-16 PROCEDURE — 83735 ASSAY OF MAGNESIUM: CPT | Performed by: EMERGENCY MEDICINE

## 2018-09-16 PROCEDURE — 93005 ELECTROCARDIOGRAM TRACING: CPT

## 2018-09-16 PROCEDURE — 85730 THROMBOPLASTIN TIME PARTIAL: CPT | Performed by: EMERGENCY MEDICINE

## 2018-09-16 PROCEDURE — 73502 X-RAY EXAM HIP UNI 2-3 VIEWS: CPT

## 2018-09-16 PROCEDURE — 72131 CT LUMBAR SPINE W/O DYE: CPT

## 2018-09-16 PROCEDURE — 83880 ASSAY OF NATRIURETIC PEPTIDE: CPT | Performed by: EMERGENCY MEDICINE

## 2018-09-16 PROCEDURE — 82550 ASSAY OF CK (CPK): CPT | Performed by: EMERGENCY MEDICINE

## 2018-09-16 RX ORDER — IPRATROPIUM BROMIDE AND ALBUTEROL SULFATE 2.5; .5 MG/3ML; MG/3ML
3 SOLUTION RESPIRATORY (INHALATION) ONCE
Status: COMPLETED | OUTPATIENT
Start: 2018-09-16 | End: 2018-09-16

## 2018-09-16 RX ORDER — HYDROCODONE BITARTRATE AND ACETAMINOPHEN 5; 325 MG/1; MG/1
1 TABLET ORAL ONCE
Status: COMPLETED | OUTPATIENT
Start: 2018-09-16 | End: 2018-09-16

## 2018-09-16 RX ORDER — SODIUM CHLORIDE 0.9 % (FLUSH) 0.9 %
10 SYRINGE (ML) INJECTION AS NEEDED
Status: DISCONTINUED | OUTPATIENT
Start: 2018-09-16 | End: 2018-09-19 | Stop reason: HOSPADM

## 2018-09-16 RX ADMIN — HYDROCODONE BITARTRATE AND ACETAMINOPHEN 1 TABLET: 5; 325 TABLET ORAL at 23:47

## 2018-09-16 RX ADMIN — IPRATROPIUM BROMIDE AND ALBUTEROL SULFATE 3 ML: 2.5; .5 SOLUTION RESPIRATORY (INHALATION) at 23:51

## 2018-09-17 ENCOUNTER — APPOINTMENT (OUTPATIENT)
Dept: INTERVENTIONAL RADIOLOGY/VASCULAR | Facility: HOSPITAL | Age: 68
End: 2018-09-17
Attending: FAMILY MEDICINE

## 2018-09-17 ENCOUNTER — APPOINTMENT (OUTPATIENT)
Dept: ULTRASOUND IMAGING | Facility: HOSPITAL | Age: 68
End: 2018-09-17

## 2018-09-17 ENCOUNTER — APPOINTMENT (OUTPATIENT)
Dept: CARDIOLOGY | Facility: HOSPITAL | Age: 68
End: 2018-09-17
Attending: INTERNAL MEDICINE
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
## 2018-09-17 LAB
ALBUMIN SERPL-MCNC: 3.4 G/DL (ref 3.4–4.8)
ALBUMIN SERPL-MCNC: 3.7 G/DL (ref 3.4–4.8)
ALBUMIN/GLOB SERPL: 1.2 G/DL (ref 1.1–1.8)
ALBUMIN/GLOB SERPL: 1.2 G/DL (ref 1.1–1.8)
ALP SERPL-CCNC: 80 U/L (ref 38–126)
ALP SERPL-CCNC: 92 U/L (ref 38–126)
ALT SERPL W P-5'-P-CCNC: 18 U/L (ref 9–52)
ALT SERPL W P-5'-P-CCNC: 21 U/L (ref 9–52)
ANION GAP SERPL CALCULATED.3IONS-SCNC: 4 MMOL/L (ref 5–15)
ANION GAP SERPL CALCULATED.3IONS-SCNC: 7 MMOL/L (ref 5–15)
AST SERPL-CCNC: 20 U/L (ref 14–36)
AST SERPL-CCNC: 28 U/L (ref 14–36)
BASOPHILS # BLD AUTO: 0.07 10*3/MM3 (ref 0–0.2)
BASOPHILS NFR BLD AUTO: 0.7 % (ref 0–2)
BH CV ECHO MEAS - ACS: 2 CM
BH CV ECHO MEAS - AO MAX PG (FULL): 1.6 MMHG
BH CV ECHO MEAS - AO MAX PG: 6.8 MMHG
BH CV ECHO MEAS - AO MEAN PG (FULL): 1.8 MMHG
BH CV ECHO MEAS - AO MEAN PG: 3.6 MMHG
BH CV ECHO MEAS - AO ROOT AREA (BSA CORRECTED): 1.9
BH CV ECHO MEAS - AO ROOT AREA: 6.8 CM^2
BH CV ECHO MEAS - AO ROOT DIAM: 2.9 CM
BH CV ECHO MEAS - AO V2 MAX: 130 CM/SEC
BH CV ECHO MEAS - AO V2 MEAN: 90.5 CM/SEC
BH CV ECHO MEAS - AO V2 VTI: 31.3 CM
BH CV ECHO MEAS - AVA(I,A): 2.4 CM^2
BH CV ECHO MEAS - AVA(I,D): 2.4 CM^2
BH CV ECHO MEAS - AVA(V,A): 2.9 CM^2
BH CV ECHO MEAS - AVA(V,D): 2.9 CM^2
BH CV ECHO MEAS - BSA(HAYCOCK): 1.6 M^2
BH CV ECHO MEAS - BSA: 1.6 M^2
BH CV ECHO MEAS - BZI_BMI: 23.1 KILOGRAMS/M^2
BH CV ECHO MEAS - BZI_METRIC_HEIGHT: 157 CM
BH CV ECHO MEAS - BZI_METRIC_WEIGHT: 57 KG
BH CV ECHO MEAS - EDV(CUBED): 76.5 ML
BH CV ECHO MEAS - EDV(TEICH): 80.6 ML
BH CV ECHO MEAS - EF(CUBED): 63.5 %
BH CV ECHO MEAS - EF(TEICH): 55.4 %
BH CV ECHO MEAS - ESV(CUBED): 27.9 ML
BH CV ECHO MEAS - ESV(TEICH): 36 ML
BH CV ECHO MEAS - FS: 28.6 %
BH CV ECHO MEAS - IVS/LVPW: 1
BH CV ECHO MEAS - IVSD: 1 CM
BH CV ECHO MEAS - LA DIMENSION: 3.3 CM
BH CV ECHO MEAS - LA/AO: 1.1
BH CV ECHO MEAS - LV MASS(C)D: 145.7 GRAMS
BH CV ECHO MEAS - LV MASS(C)DI: 93.1 GRAMS/M^2
BH CV ECHO MEAS - LV MAX PG: 5.2 MMHG
BH CV ECHO MEAS - LV MEAN PG: 1.8 MMHG
BH CV ECHO MEAS - LV V1 MAX: 114 CM/SEC
BH CV ECHO MEAS - LV V1 MEAN: 60.3 CM/SEC
BH CV ECHO MEAS - LV V1 VTI: 22.8 CM
BH CV ECHO MEAS - LVIDD: 4.2 CM
BH CV ECHO MEAS - LVIDS: 3 CM
BH CV ECHO MEAS - LVOT AREA: 3.3 CM^2
BH CV ECHO MEAS - LVOT DIAM: 2 CM
BH CV ECHO MEAS - LVPWD: 1 CM
BH CV ECHO MEAS - MR MAX PG: 63.3 MMHG
BH CV ECHO MEAS - MR MAX VEL: 397.8 CM/SEC
BH CV ECHO MEAS - MV A MAX VEL: 72.7 CM/SEC
BH CV ECHO MEAS - MV E MAX VEL: 115.9 CM/SEC
BH CV ECHO MEAS - MV E/A: 1.6
BH CV ECHO MEAS - MV MAX PG: 4.2 MMHG
BH CV ECHO MEAS - MV MEAN PG: 1.7 MMHG
BH CV ECHO MEAS - MV P1/2T MAX VEL: 88.8 CM/SEC
BH CV ECHO MEAS - MV V2 MAX: 102 CM/SEC
BH CV ECHO MEAS - MV V2 MEAN: 59.3 CM/SEC
BH CV ECHO MEAS - MV V2 VTI: 30.4 CM
BH CV ECHO MEAS - MVA P1/2T LCG: 2.5 CM^2
BH CV ECHO MEAS - MVA(VTI): 2.5 CM^2
BH CV ECHO MEAS - PA MAX PG: 4.1 MMHG
BH CV ECHO MEAS - PA V2 MAX: 100.8 CM/SEC
BH CV ECHO MEAS - RAP SYSTOLE: 5 MMHG
BH CV ECHO MEAS - RVDD: 2.1 CM
BH CV ECHO MEAS - RVSP: 28.2 MMHG
BH CV ECHO MEAS - SI(AO): 135.2 ML/M^2
BH CV ECHO MEAS - SI(CUBED): 31.1 ML/M^2
BH CV ECHO MEAS - SI(LVOT): 47.7 ML/M^2
BH CV ECHO MEAS - SI(TEICH): 28.5 ML/M^2
BH CV ECHO MEAS - SV(AO): 211.6 ML
BH CV ECHO MEAS - SV(CUBED): 48.6 ML
BH CV ECHO MEAS - SV(LVOT): 74.6 ML
BH CV ECHO MEAS - SV(TEICH): 44.7 ML
BH CV ECHO MEAS - TR MAX VEL: 240.8 CM/SEC
BILIRUB SERPL-MCNC: 0.2 MG/DL (ref 0.2–1.3)
BILIRUB SERPL-MCNC: 0.2 MG/DL (ref 0.2–1.3)
BUN BLD-MCNC: 18 MG/DL (ref 7–21)
BUN BLD-MCNC: 18 MG/DL (ref 7–21)
BUN/CREAT SERPL: 23.7 (ref 7–25)
BUN/CREAT SERPL: 24.7 (ref 7–25)
CALCIUM SPEC-SCNC: 8.1 MG/DL (ref 8.4–10.2)
CALCIUM SPEC-SCNC: 8.2 MG/DL (ref 8.4–10.2)
CHLORIDE SERPL-SCNC: 106 MMOL/L (ref 95–110)
CHLORIDE SERPL-SCNC: 108 MMOL/L (ref 95–110)
CK MB SERPL-CCNC: 2.19 NG/ML (ref 0–5)
CK SERPL-CCNC: 89 U/L (ref 30–135)
CO2 SERPL-SCNC: 29 MMOL/L (ref 22–31)
CO2 SERPL-SCNC: 30 MMOL/L (ref 22–31)
CREAT BLD-MCNC: 0.73 MG/DL (ref 0.5–1)
CREAT BLD-MCNC: 0.76 MG/DL (ref 0.5–1)
D-DIMER, QUANTITATIVE (MAD,POW, STR): 496 NG/ML (FEU) (ref 0–470)
DEPRECATED RDW RBC AUTO: 49.8 FL (ref 36.4–46.3)
EOSINOPHIL # BLD AUTO: 0.3 10*3/MM3 (ref 0–0.7)
EOSINOPHIL NFR BLD AUTO: 2.9 % (ref 0–7)
ERYTHROCYTE [DISTWIDTH] IN BLOOD BY AUTOMATED COUNT: 14.7 % (ref 11.5–14.5)
GFR SERPL CREATININE-BSD FRML MDRD: 76 ML/MIN/1.73 (ref 45–104)
GFR SERPL CREATININE-BSD FRML MDRD: 80 ML/MIN/1.73 (ref 45–104)
GLOBULIN UR ELPH-MCNC: 2.9 GM/DL (ref 2.3–3.5)
GLOBULIN UR ELPH-MCNC: 3.1 GM/DL (ref 2.3–3.5)
GLUCOSE BLD-MCNC: 101 MG/DL (ref 60–100)
GLUCOSE BLD-MCNC: 110 MG/DL (ref 60–100)
HCT VFR BLD AUTO: 32.5 % (ref 35–45)
HGB BLD-MCNC: 10.6 G/DL (ref 12–15.5)
IMM GRANULOCYTES # BLD: 0.03 10*3/MM3 (ref 0–0.02)
IMM GRANULOCYTES NFR BLD: 0.3 % (ref 0–0.5)
LV EF 2D ECHO EST: 57 %
LYMPHOCYTES # BLD AUTO: 3.47 10*3/MM3 (ref 0.6–4.2)
LYMPHOCYTES NFR BLD AUTO: 33 % (ref 10–50)
MAXIMAL PREDICTED HEART RATE: 154 BPM
MCH RBC QN AUTO: 29.8 PG (ref 26.5–34)
MCHC RBC AUTO-ENTMCNC: 32.6 G/DL (ref 31.4–36)
MCV RBC AUTO: 91.3 FL (ref 80–98)
MONOCYTES # BLD AUTO: 1.33 10*3/MM3 (ref 0–0.9)
MONOCYTES NFR BLD AUTO: 12.7 % (ref 0–12)
NEUTROPHILS # BLD AUTO: 5.31 10*3/MM3 (ref 2–8.6)
NEUTROPHILS NFR BLD AUTO: 50.4 % (ref 37–80)
PLATELET # BLD AUTO: 271 10*3/MM3 (ref 150–450)
PMV BLD AUTO: 9 FL (ref 8–12)
POTASSIUM BLD-SCNC: 3.6 MMOL/L (ref 3.5–5.1)
POTASSIUM BLD-SCNC: 3.7 MMOL/L (ref 3.5–5.1)
PROT SERPL-MCNC: 6.3 G/DL (ref 6.3–8.6)
PROT SERPL-MCNC: 6.8 G/DL (ref 6.3–8.6)
RBC # BLD AUTO: 3.56 10*6/MM3 (ref 3.77–5.16)
SODIUM BLD-SCNC: 142 MMOL/L (ref 137–145)
SODIUM BLD-SCNC: 142 MMOL/L (ref 137–145)
STRESS TARGET HR: 131 BPM
TROPONIN I SERPL-MCNC: <0.012 NG/ML
WBC NRBC COR # BLD: 10.51 10*3/MM3 (ref 3.2–9.8)
WHOLE BLOOD HOLD SPECIMEN: NORMAL

## 2018-09-17 PROCEDURE — C1751 CATH, INF, PER/CENT/MIDLINE: HCPCS

## 2018-09-17 PROCEDURE — 82550 ASSAY OF CK (CPK): CPT | Performed by: INTERNAL MEDICINE

## 2018-09-17 PROCEDURE — 87040 BLOOD CULTURE FOR BACTERIA: CPT | Performed by: INTERNAL MEDICINE

## 2018-09-17 PROCEDURE — G8978 MOBILITY CURRENT STATUS: HCPCS

## 2018-09-17 PROCEDURE — G8979 MOBILITY GOAL STATUS: HCPCS

## 2018-09-17 PROCEDURE — 76937 US GUIDE VASCULAR ACCESS: CPT

## 2018-09-17 PROCEDURE — 97162 PT EVAL MOD COMPLEX 30 MIN: CPT

## 2018-09-17 PROCEDURE — 94799 UNLISTED PULMONARY SVC/PX: CPT

## 2018-09-17 PROCEDURE — 85379 FIBRIN DEGRADATION QUANT: CPT | Performed by: INTERNAL MEDICINE

## 2018-09-17 PROCEDURE — 93306 TTE W/DOPPLER COMPLETE: CPT

## 2018-09-17 PROCEDURE — 94640 AIRWAY INHALATION TREATMENT: CPT

## 2018-09-17 PROCEDURE — 93306 TTE W/DOPPLER COMPLETE: CPT | Performed by: INTERNAL MEDICINE

## 2018-09-17 PROCEDURE — 94760 N-INVAS EAR/PLS OXIMETRY 1: CPT

## 2018-09-17 PROCEDURE — 82553 CREATINE MB FRACTION: CPT | Performed by: INTERNAL MEDICINE

## 2018-09-17 PROCEDURE — 80053 COMPREHEN METABOLIC PANEL: CPT | Performed by: INTERNAL MEDICINE

## 2018-09-17 PROCEDURE — 85025 COMPLETE CBC W/AUTO DIFF WBC: CPT | Performed by: INTERNAL MEDICINE

## 2018-09-17 PROCEDURE — 25010000002 LEVOFLOXACIN PER 250 MG: Performed by: INTERNAL MEDICINE

## 2018-09-17 PROCEDURE — 84484 ASSAY OF TROPONIN QUANT: CPT | Performed by: INTERNAL MEDICINE

## 2018-09-17 RX ORDER — SODIUM CHLORIDE 9 MG/ML
75 INJECTION, SOLUTION INTRAVENOUS CONTINUOUS
Status: DISCONTINUED | OUTPATIENT
Start: 2018-09-17 | End: 2018-09-19 | Stop reason: HOSPADM

## 2018-09-17 RX ORDER — ACETAMINOPHEN 325 MG/1
650 TABLET ORAL EVERY 6 HOURS PRN
Status: DISCONTINUED | OUTPATIENT
Start: 2018-09-17 | End: 2018-09-19 | Stop reason: HOSPADM

## 2018-09-17 RX ORDER — PANTOPRAZOLE SODIUM 40 MG/1
40 TABLET, DELAYED RELEASE ORAL DAILY
Status: DISCONTINUED | OUTPATIENT
Start: 2018-09-17 | End: 2018-09-19 | Stop reason: HOSPADM

## 2018-09-17 RX ORDER — ATORVASTATIN CALCIUM 10 MG/1
10 TABLET, FILM COATED ORAL DAILY
Status: DISCONTINUED | OUTPATIENT
Start: 2018-09-17 | End: 2018-09-19 | Stop reason: HOSPADM

## 2018-09-17 RX ORDER — LEVOFLOXACIN 5 MG/ML
500 INJECTION, SOLUTION INTRAVENOUS EVERY 24 HOURS
Status: DISCONTINUED | OUTPATIENT
Start: 2018-09-17 | End: 2018-09-19 | Stop reason: HOSPADM

## 2018-09-17 RX ORDER — TRAZODONE HYDROCHLORIDE 50 MG/1
50 TABLET ORAL NIGHTLY PRN
Status: DISCONTINUED | OUTPATIENT
Start: 2018-09-17 | End: 2018-09-19 | Stop reason: HOSPADM

## 2018-09-17 RX ORDER — AMLODIPINE BESYLATE 10 MG/1
10 TABLET ORAL DAILY
Status: DISCONTINUED | OUTPATIENT
Start: 2018-09-17 | End: 2018-09-19 | Stop reason: HOSPADM

## 2018-09-17 RX ORDER — IPRATROPIUM BROMIDE AND ALBUTEROL SULFATE 2.5; .5 MG/3ML; MG/3ML
3 SOLUTION RESPIRATORY (INHALATION)
Status: DISCONTINUED | OUTPATIENT
Start: 2018-09-17 | End: 2018-09-19 | Stop reason: HOSPADM

## 2018-09-17 RX ADMIN — ACETAMINOPHEN 650 MG: 325 TABLET ORAL at 15:53

## 2018-09-17 RX ADMIN — IPRATROPIUM BROMIDE AND ALBUTEROL SULFATE 3 ML: 2.5; .5 SOLUTION RESPIRATORY (INHALATION) at 14:41

## 2018-09-17 RX ADMIN — SERTRALINE HYDROCHLORIDE 100 MG: 50 TABLET ORAL at 08:03

## 2018-09-17 RX ADMIN — PANTOPRAZOLE SODIUM 40 MG: 40 TABLET, DELAYED RELEASE ORAL at 08:03

## 2018-09-17 RX ADMIN — AMLODIPINE BESYLATE 10 MG: 10 TABLET ORAL at 08:03

## 2018-09-17 RX ADMIN — LEVOFLOXACIN 500 MG: 5 INJECTION, SOLUTION INTRAVENOUS at 08:06

## 2018-09-17 RX ADMIN — APIXABAN 5 MG: 5 TABLET, FILM COATED ORAL at 08:03

## 2018-09-17 RX ADMIN — SODIUM CHLORIDE 75 ML/HR: 9 INJECTION, SOLUTION INTRAVENOUS at 02:54

## 2018-09-17 RX ADMIN — IPRATROPIUM BROMIDE AND ALBUTEROL SULFATE 3 ML: 2.5; .5 SOLUTION RESPIRATORY (INHALATION) at 07:25

## 2018-09-17 RX ADMIN — IPRATROPIUM BROMIDE AND ALBUTEROL SULFATE 3 ML: 2.5; .5 SOLUTION RESPIRATORY (INHALATION) at 19:46

## 2018-09-17 RX ADMIN — TRAZODONE HYDROCHLORIDE 50 MG: 50 TABLET ORAL at 23:02

## 2018-09-17 RX ADMIN — ACETAMINOPHEN 650 MG: 325 TABLET ORAL at 22:19

## 2018-09-17 RX ADMIN — ATORVASTATIN CALCIUM 10 MG: 10 TABLET, FILM COATED ORAL at 08:03

## 2018-09-17 NOTE — ED NOTES
Spoke with Vivian with resp and she will come to give neb tx      Jany Arredondo, RN  09/16/18 [id number]

## 2018-09-17 NOTE — PLAN OF CARE
Problem: Patient Care Overview  Goal: Plan of Care Review   09/17/18 1310   Coping/Psychosocial   Plan of Care Reviewed With patient   Plan of Care Review   Progress no change   OTHER   Outcome Summary echo today, VSS, no complaints of pain at this time

## 2018-09-17 NOTE — PROGRESS NOTES
TWO PATIENT IDENTIFIERS WERE USED. THE PATIENT WAS DRAPED WITH A FULL BODY DRAPE AND THE PATIENT'S RIGHT ARM WAS PREPPED WITH CHLORA PREP. ULTRASOUND WAS USED TO LOCALIZE THERIGHT BASILIC VEIN. SUBCUTANEOUS TISSUE AT THE CATHETER SITE WAS INFILTRATED WITH 2% LIDOCAINE. UNDER ULTRASOUND GUIDANCE, THE VEIN WAS ACCESSED WITH A 21 GAUGE  NEEDLE. AN 0.018 WIRE WAS THEN THREADED THROUGH THE NEEDLE. THE 21 GAUGE NEEDLE WAS REMOVED AND A 4 Tamazight SHEATH WAS PLACED OVER THE WIRE INTO THE VEIN.THE MIDLINE CATHETER WAS TRIMMED TO 20CM. THE MIDLINE CATHETER WAS THEN PLACED OVER THE WIRE INTO THE VEIN, THE SHEATH WAS PEELED AWAY, WIRE WAS REMOVED. CATHETER WAS FLUSHED WITH NORMAL SALINE AND CATHETER TIP APPLIED. BIOPATCH PLACED. CATHETER SECURED WITH STAT LOCK AND TEGADERM. PATIENT TOLERATED PROCEDURE WELL. THIS WAS DONE IN THE ANGIOSUITE      IMPRESSION:SUCCESSFUL PLACEMENT OF DUAL LUMEN MIDLINE.           Airam Moreno  9/17/2018  11:08 AM

## 2018-09-17 NOTE — PLAN OF CARE
Problem: Syncope (Adult)  Goal: Identify Related Risk Factors and Signs and Symptoms  Outcome: Outcome(s) achieved Date Met: 09/17/18

## 2018-09-17 NOTE — ED PROVIDER NOTES
"Subjective   68-year-old female is brought to emergency department via EMS from her home.  She was reportedly seen by a neighbor falling on her porch.  They reported that she \"collapsed\".  Patient does not remember what happened.  She reports that she had walked outside and went to go back in and then for some reason apparently turned to go back out and does not remember after that until EMS arrived.  She is complaining of neck and low back pain as well as headache.  She states she has a lump on the posterior aspect of her head from the fall.  She denies any chest pain.  She denies shortness of breath.  She states she spent the whole day cleaning her house and had not been having any symptoms or complaints.    The friend that accompanies her to emergency department.  Sitting outside the room and states the patient has been known to \"do drugs\".  She is concerned that this may have contributed to this episode today.    Family history, surgical history, social history, current medications and allergies are reviewed with the patient and triage documentation and vitals are reviewed.          History provided by:  Patient and friend   used: No        Review of Systems   Constitutional: Negative for chills, diaphoresis and fever.   Eyes: Negative.    Respiratory: Negative for cough, shortness of breath and wheezing.    Cardiovascular: Negative for chest pain, palpitations and leg swelling.   Gastrointestinal: Negative for abdominal distention, abdominal pain, diarrhea, nausea and vomiting.   Endocrine: Negative.    Genitourinary: Negative for dysuria, frequency and urgency.   Musculoskeletal: Positive for arthralgias, back pain and neck pain. Negative for joint swelling and myalgias.   Skin: Negative for rash and wound.   Neurological: Positive for syncope and headaches. Negative for dizziness, seizures, facial asymmetry, speech difficulty, weakness, light-headedness and numbness.   Hematological: " Negative.    Psychiatric/Behavioral: Negative.        Past Medical History:   Diagnosis Date   • Anxiety    • Arthritis    • Asthma    • 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)     bilateral legs - bilateral iliac stents 2008      • Chronic lower back pain    • COPD (chronic obstructive pulmonary disease) (CMS/HCC)    • Coronary arteriosclerosis    • Essential (primary) hypertension    • History of pulmonary embolus (PE)    • Hyperlipidemia    • Insomnia    • Lumbago    • Nicotine dependence    • Occlusion of artery (CMS/HCC)      and stenosis of bilateral carotid arteries - BABS 16-49%, LICA 0-15%   • Other atherosclerosis of native artery of other extremity     LEFT subclavian stent 2005 (occluded)       Allergies   Allergen Reactions   • Morphine And Related Itching, Confusion and Hallucinations   • Penicillins Rash       Past Surgical History:   Procedure Laterality Date   • CARDIAC CATHETERIZATION  04/06/2016    No evidence of any obstructive epicardial CAD.Preserved LV systolic function with EF of 55%.   • CENTRAL VENOUS LINE INSERTION  04/07/2016    Successful placement of right uppe extremity 6-[place] triple lumen PICC line.   • ENDOSCOPY N/A 1/12/2018    Procedure: ESOPHAGOGASTRODUODENOSCOPY;  Surgeon: Bin Oh MD;  Location: [facility] ENDOSCOPY;  Service:    • ENDOSCOPY N/A 1/17/2018    Procedure: ESOPHAGOGASTRODUODENOSCOPY;  Surgeon: Bin Oh MD;  Location: [facility] ENDOSCOPY;  Service:    • ENDOSCOPY N/A 3/16/2018    Procedure: ESOPHAGOGASTRODUODENOSCOPY possible dilation;  Surgeon: Bin Oh MD;  Location: [facility] ENDOSCOPY;  Service: Gastroenterology   • FRACTURE SURGERY     • HYSTERECTOMY     • TOTAL SHOULDER REPLACEMENT Left    • TRANSESOPHAGEAL ECHOCARDIOGRAM (JACQUES)  04/07/2016    With color flow-Mild to moderate [facility].LV systolic function well preserved with Ef of 55-60%.Mitral and AV intact.Diastolic dysfunction   • UPPER GASTROINTESTINAL  ENDOSCOPY  01/17/2018    Dr. Bin Martin M.D.       Family History   Problem Relation Age of Onset   • Heart disease Other    • Hypertension Other        Social History     Social History   • Marital status:      Social History Main Topics   • Smoking status: Current Every Day Smoker     Packs/day: 0.50     Years: 50.00     Types: Cigarettes   • Smokeless tobacco: Never Used      Comment: 02/05/2018 - Patient opted to decline cessation of smoking counseling at present time.   • Alcohol use No   • Drug use: No   • Sexual activity: Defer     Other Topics Concern   • Not on file           Objective   Physical Exam   Constitutional: She is oriented to person, place, and time. She appears well-developed and well-nourished. No distress.   HENT:   Head: Normocephalic.   Right Ear: External ear normal.   Left Ear: External ear normal.   Mouth/Throat: Oropharynx is clear and moist.   Eyes: Pupils are equal, round, and reactive to light. Conjunctivae and EOM are normal. Right eye exhibits no discharge. Left eye exhibits no discharge.   Neck: Normal range of motion. Neck supple. No JVD present.   Cardiovascular: Normal rate and regular rhythm.    No murmur heard.  Pulses:       Radial pulses are 2+ on the right side, and 2+ on the left side.        Dorsalis pedis pulses are 2+ on the right side, and 2+ on the left side.   Pulmonary/Chest: Effort normal. No tachypnea. No respiratory distress. She has wheezes in the right upper field, the right lower field, the left upper field and the left lower field. She has no rhonchi. She has no rales.   Abdominal: Soft. Bowel sounds are normal. She exhibits no distension. There is no tenderness.   Musculoskeletal:        Left shoulder: She exhibits tenderness and pain. She exhibits no bony tenderness, no swelling, no crepitus, no deformity, no spasm, normal pulse and normal strength.        Left hip: She exhibits decreased range of motion (due to pain in back), tenderness and  bony tenderness. She exhibits no swelling, no crepitus and no deformity.        Cervical back: She exhibits tenderness, bony tenderness and pain. She exhibits no deformity and no spasm.        Thoracic back: She exhibits normal range of motion, no tenderness, no bony tenderness, no pain and no spasm.        Lumbar back: She exhibits decreased range of motion, tenderness, bony tenderness and pain. She exhibits no deformity and no spasm.   Neurological: She is alert and oriented to person, place, and time.   Skin: Skin is dry. Capillary refill takes less than 2 seconds. She is not diaphoretic.   Psychiatric: She has a normal mood and affect.   Nursing note and vitals reviewed.      Procedures  None         ED Course      Labs Reviewed   URINALYSIS W/ CULTURE IF INDICATED - Abnormal; Notable for the following:        Result Value    Leuk Esterase, UA Trace (*)     All other components within normal limits   URINE DRUG SCREEN - Abnormal; Notable for the following:     Amphetamine Screen, Urine Positive (*)     THC, Screen, Urine Positive (*)     All other components within normal limits    Narrative:     Negative Thresholds For Drugs Screened in Urine:     Amphetamines          500 ng/ml  Barbiturates          200 ng/ml  Benzodiazepines       200 ng/ml  Cocaine               150 ng/ml  Methadone             300 ng/mL  Opiates               300 ng/mL  Oxycodone             100 ng/mL  THC                   20 ng/mL    The normal value for all drugs tested is negative. This report includes final unconfirmed screening results.  A positive result by this assay can be, at your request, sent to the Reference Lab for confirmation by gas chromatography. Unconfirmed results must not be used for non-medical purposes, such as employment or legal testing. Clinical consideration should be applied to any drug of abuse test result, particularly when unconfirmed results are used.   CBC WITH AUTO DIFFERENTIAL - Abnormal; Notable for the  following:     WBC 10.05 (*)     RBC 3.70 (*)     Hemoglobin 11.2 (*)     Hematocrit 33.1 (*)     RDW-SD 46.9 (*)     Monocytes, Absolute 1.02 (*)     Immature Grans, Absolute 0.04 (*)     All other components within normal limits   URINALYSIS, MICROSCOPIC ONLY - Abnormal; Notable for the following:     RBC, UA 0-2 (*)     WBC, UA 6-12 (*)     Squamous Epithelial Cells, UA 3-5 (*)     All other components within normal limits   D-DIMER, QUANTITATIVE - Abnormal; Notable for the following:     D-Dimer, Quantitative 496 (*)     All other components within normal limits    Narrative:     Dimer values <500 ng/ml FEU are FDA approved as aid in diagnosis of deep venous thrombosis and pulmonary embolism.  This test should not be used in an exclusion strategy with pretest probability alone.    A recent guideline regarding diagnosis for pulmonary thromboembolism recommends an adjusted exclusion criterion of age x 10 ng/ml FEU for patients >50 years of age ([employer] Intern Med 2015; 163: 701-711).   COMPREHENSIVE METABOLIC PANEL - Abnormal; Notable for the following:     Glucose 110 (*)     Calcium 8.2 (*)     All other components within normal limits   COMPREHENSIVE METABOLIC PANEL - Normal   PROTIME-INR - Normal    Narrative:     Therapeutic range for most indications is 2.0-3.0 INR,  or 2.5-3.5 for mechanical heart valves.   APTT - Normal    Narrative:     The recommended Heparin therapeutic range is 68-97 seconds.   BNP (IN-HOUSE) - Normal   TROPONIN (IN-HOUSE) - Normal   CK - Normal   MAGNESIUM - Normal   TROPONIN (IN-HOUSE) - Normal   CK - Normal   CK MB - Normal   ETHANOL   TROPONIN (IN-HOUSE)   COMPREHENSIVE METABOLIC PANEL   CBC WITH AUTO DIFFERENTIAL   CBC AND DIFFERENTIAL    Narrative:     The following orders were created for panel order CBC & Differential.  Procedure                               Abnormality         Status                     ---------                               -----------         ------                      CBC Auto Differential[[id number]]        Abnormal            Final result                 Please view results for these tests on the individual orders.   CBC AND DIFFERENTIAL    Narrative:     The following orders were created for panel order CBC & Differential.  Procedure                               Abnormality         Status                     ---------                               -----------         ------                     CBC Auto Differential[[id number]]                                                         Please view results for these tests on the individual orders.     Xr Shoulder 2+ View Left    Result Date: 9/16/2018  Narrative: Left shoulder three view on 9/16/2018 CLINICAL INDICATION: Left shoulder pain after fall COMPARISON: 3/10/2016 FINDINGS: The patient is status post interval shoulder arthroplasty. There has been resection of the left distal clavicle. No hardware complication is noted. There is no dislocation. There are no fractures.     Impression: No acute bony abnormality. Electronically signed by:  Surinder Cantu  9/16/2018 9:20 PM CDT Workstation: [id number]    Ct Head Without Contrast    Result Date: 9/16/2018  Narrative: CT head without contrast on 9/16/2018 CLINICAL INDICATION: Fell and hit head, pain TECHNIQUE:  Multiple axial images are obtained throughout the head without the administration of contrast.  This exam was performed according to our departmental dose-optimization program, which includes automated exposure control, adjustment of the mA and/or kV according to patient size and/or use of iterative reconstruction technique. Total DLP is 1029 mGy*cm. COMPARISON: 11/6/2014 FINDINGS:  There is mild generalized cerebral atrophy. There is mild low density in the periventricular white matter consistent with chronic small vessel ischemic changes. There is no hydrocephalus. There is no hemorrhage. There are no abnormal extra-axial fluid collections. There is no  mass, mass effect or midline shift. There is no CT evidence of acute infarct. No bony abnormality is noted.     Impression: Atrophy and chronic small vessel ischemic changes with no acute intracranial abnormality. Electronically signed by:  Surinder Cantu  9/16/2018 9:42 PM CDT Workstation: [id number]    Ct Cervical Spine Without Contrast    Result Date: 9/16/2018  Narrative: PROCEDURE: CT cervical spine without contrast. INDICATION: Fall. Pain. COMPARISON: None. CT .0 milliGray-cm. TECHNIQUE: This exam was performed according to our departmental dose-optimization program, which includes automated exposure control, adjustment of the mA and/or kV according to patient size and/or use of iterative reconstruction technique. CT cervical spine without contrast performed with axial and reconstructed sagittal and coronal images from above the foramen magnum to the bottom of T3. Prevertebral soft tissues are normal. Cervical vertebrae and visualized upper thoracic vertebrae normal height and normally aligned. No compression injuries. Moderate degenerative disc changes C5-6 and C7-T1 with associated hypertrophic spurring. The articular facets are intact and normally aligned. Scattered areas of facet arthritic change. The lamina, spinous processes and odontoid are intact. There are degenerative arthritic with bony spurring at the articulation of the odontoid and anterior arch C1. Apices of lungs clear.     Impression: No fractures or acute osseous abnormalities. Degenerative arthritic changes with bony osteophytes at the junction of the anterior arch C1 hand the odontoid. Scattered facet arthritic changes. Moderate degenerative disc changes C5-C6 and C7-T1. [id number] Electronically signed by:  Billy Bennett MD  9/16/2018 9:58 PM CDT Workstation: [id number]    Ct Lumbar Spine Without Contrast    Result Date: 9/16/2018  Narrative: CT lumbar spine without contrast on  9/16/2018 CLINICAL INDICATION: Low back pain  after fall TECHNIQUE: Multiple axial images are obtained throughout the lumbar spine without the administration of contrast. Sagittal and coronal reformatted images are also performed and reviewed. This exam was performed according to our departmental dose-optimization program, which includes automated exposure control, adjustment of the mA and/or kV according to patient size and/or use of iterative reconstruction technique. Total DLP is 511.8 mGy*cm. COMPARISON: None FINDINGS: There is levoscoliosis of the lumbar spine. Degenerative disc disease is noted especially from T12 through L3. Large Schmorl's node formation is noted in the superior endplate of L2. There is an old compression fracture of the superior endplate of L3. Reformatted images reveal otherwise normal alignment of the lumbar spine. Facet arthropathy is noted throughout the lumbar spine. Vascular calcifications are noted. The SI joints are well aligned. At the T12-L1 level, there is a calcified right paracentral disc bulge producing mild to moderate right-sided canal stenosis and very mild right foraminal narrowing. At the L1-2 level, based disc osteophyte complex produces mild canal stenosis and mild right foraminal narrowing. At the L2-3 level, broad-based disc osteophyte complex and facet arthropathy produces mild canal stenosis and mild right foraminal narrowing. No definite disc herniation is noted otherwise.     Impression: Levoscoliosis with degenerative changes with no acute abnormality. Electronically signed by:  Surinder Cantu  9/16/2018 9:49 PM CDT Workstation: [id number]    Xr Chest 1 View    Result Date: 9/16/2018  Narrative: Chest single view on  9/16/2018 CLINICAL INDICATION: Syncope, fall COMPARISON: 1/11/2018 FINDINGS: The patient is status post a left shoulder arthroplasty. Borderline cardiomegaly is noted. Vascular calcification is noted in the aorta. There is mild linear atelectasis or scarring in the lung bases. The lungs  are otherwise clear. Hilar and mediastinal contours are within normal limits.     Impression: No acute disease. Electronically signed by:  Surinder Cantu  9/16/2018 9:12 PM CDT Workstation: [id number]    Xr Hip With Or Without Pelvis 2 - 3 View Left    Result Date: 9/16/2018  Narrative: Pelvis and left hip total three view on 9/16/2018 CLINICAL INDICATION: Left hip pain after fall COMPARISON: None FINDINGS: Vascular calcifications are noted. Calcified phleboliths and likely injection granulomas are noted in the pelvis. The hips are well located. The SI joints are well aligned. No acute fracture is noted.     Impression: No acute bony abnormality. Electronically signed by:  Surinder Cantu  9/16/2018 9:28 PM CDT Workstation: [id number]      EKG September 16, 2018 at 2013 reveals normal sinus rhythm with sinus arrhythmia at 67 bpm.  There is biphasic P wave in V1 with inverted T waves in V5 and V6 without evidence of acute ischemia.  This is unchanged from previous EKG.          MDM  Number of Diagnoses or Management Options  Contusion of scalp, initial encounter:   Fall, initial encounter:   Hypoxia:   Syncope, unspecified syncope type:      Amount and/or Complexity of Data Reviewed  Clinical lab tests: reviewed  Tests in the radiology section of CPT®: reviewed  Tests in the medicine section of CPT®: reviewed    Patient Progress  Patient progress: stable    Laboratory studies are unremarkable.  UDS reveals amphetamine and THC.  Patient denies use of either base.  EKG shows no acute ischemia.  Chest x-ray is unremarkable.  Shoulder x-ray reveals evidence of previous shoulder replacement without acute abnormality.  Imaging of the head, cervical and lumbar spines reveal no acute abnormality.  Hip is also a mention is unremarkable.  From a traumatic standpoint patient is felt appropriate for discharge home however she is continued hypoxia even on 3 L nasal cannula oxygen.  When removed to room air she desats  to 87%.  On 3 L nasal cannula she hovers around 90-91%.  Patient has chronic lung disease, COPD and is felt to likely live at a lower oxygen saturation however she cannot go home and oxygen.  Due to concern for possible syncopal episode even though she does have evidence of drug intoxication patient will be admitted for observation and further evaluation of her hypoxia.  She acknowledges understanding and is agreeable.    Final diagnoses:   Fall, initial encounter   Contusion of scalp, initial encounter   Hypoxia   Syncope, unspecified syncope type            Jayme Lamar, DO  09/17/18 0315

## 2018-09-17 NOTE — PROGRESS NOTES
Patient seen and examined.  Will assume care from nocturnist. Patient admitted with syncope. Appears vasovagal. Enzymes trended. ECHO and carotids ordered. Consult cardiology as needed. Will monitor. PT/OT to eval.          This document has been electronically signed by Jani Tavera MD on September 17, 2018 1:05 PM

## 2018-09-17 NOTE — H&P
[facility] Medicine Admission      Date of Admission: 9/16/2018      Primary Care Physician: Val Bansal APRN      Chief Complaint:   Syncope    HPI:  This is a 68-year-old white female who is being admitted to the hospital with a syncopal episode.  She was at home this afternoon, she went out to her porch she stepped over a concrete, she felt dizzy and fell.  She reports hitting her head, she had a syncope for about 2 minutes.  Her neighbor woke her up.  She states that she is not aware of a triggering factor.  Apparently this is been happening to her, in the past week it happened twice.  There was no reported confusion, she denies subjective fever or chills.  She denies chest pain or shortness of breath, no cough no hematuria dysuria hematemesis or hematochezia.  At the time of examination patient is back to baseline.    Past Medical History:   Past Medical History:   Diagnosis Date   • Anxiety    • Arthritis    • Asthma    • 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)     bilateral legs - bilateral iliac stents 2008      • Chronic lower back pain    • COPD (chronic obstructive pulmonary disease) (CMS/HCC)    • Coronary arteriosclerosis    • Essential (primary) hypertension    • History of pulmonary embolus (PE)    • Hyperlipidemia    • Insomnia    • Lumbago    • Nicotine dependence    • Occlusion of artery (CMS/HCC)      and stenosis of bilateral carotid arteries - BABS 16-49%, LICA 0-15%   • Other atherosclerosis of native artery of other extremity     LEFT subclavian stent 2005 (occluded)       Past Surgical History:   Past Surgical History:   Procedure Laterality Date   • CARDIAC CATHETERIZATION  04/06/2016    No evidence of any obstructive epicardial CAD.Preserved LV systolic function with EF of 55%.   • CENTRAL VENOUS LINE INSERTION  04/07/2016    Successful placement of right uppe extremity 6-[place]at[place] triple lumen  PICC line.   • ENDOSCOPY N/A 1/12/2018    Procedure: ESOPHAGOGASTRODUODENOSCOPY;  Surgeon: Bin Martin MD;  Location: Health system ENDOSCOPY;  Service:    • ENDOSCOPY N/A 1/17/2018    Procedure: ESOPHAGOGASTRODUODENOSCOPY;  Surgeon: Bin Martin MD;  Location: Health system ENDOSCOPY;  Service:    • ENDOSCOPY N/A 3/16/2018    Procedure: ESOPHAGOGASTRODUODENOSCOPY possible dilation;  Surgeon: Bin Martin MD;  Location: Health system ENDOSCOPY;  Service: Gastroenterology   • FRACTURE SURGERY     • HYSTERECTOMY     • TOTAL SHOULDER REPLACEMENT Left    • TRANSESOPHAGEAL ECHOCARDIOGRAM (JACQUES)  04/07/2016    With color flow-Mild to moderate [facility].LV systolic function well preserved with Ef of 55-60%.Mitral and AV intact.Diastolic dysfunction   • UPPER GASTROINTESTINAL ENDOSCOPY  01/17/2018    Dr. Bin Martin M.D.       Family History:   Family History   Problem Relation Age of Onset   • Heart disease Other    • Hypertension Other        Social History:   Social History     Social History   • Marital status:      Social History Main Topics   • Smoking status: Current Every Day Smoker     Packs/day: 0.50     Years: 50.00     Types: Cigarettes   • Smokeless tobacco: Never Used      Comment: 02/05/2018 - Patient opted to decline cessation of smoking counseling at present time.   • Alcohol use No   • Drug use: No   • Sexual activity: Defer     Other Topics Concern   • Not on file       Allergies:   Allergies   Allergen Reactions   • Morphine And Related Itching, Confusion and Hallucinations   • Penicillins Rash       Medications:   Prior to Admission medications    Medication Sig Start Date End Date Taking? Authorizing Provider   albuterol (PROVENTIL) (2.5 MG/3ML) 0.083% nebulizer solution Take 2.5 mg by nebulization Every 8 (Eight) Hours As Needed for Wheezing.    Provider, MD Miguelito   amLODIPine (NORVASC) 10 MG tablet Take 10 mg by mouth Daily.    Provider, MD Mgiuelito   ELIQUIS 5 MG tablet tablet Take 5 mg by mouth  Daily. 2/21/18   Miguelito Chatman MD   gabapentin (NEURONTIN) 800 MG tablet Take 800 mg by mouth 3 (Three) Times a Day.    Miguelito Chatman MD   HYDROcodone-acetaminophen (NORCO) 5-325 MG per tablet Take 1 tablet by mouth Every 8 (Eight) Hours As Needed (PRN). 6/4/18   Isadora Davila APRN   naproxen (NAPROSYN) 500 MG tablet Take 1 tablet by mouth 2 (Two) Times a Day With Meals. 4/9/18   Isadora Davila APRN   ondansetron (ZOFRAN) 4 MG tablet Take 4 mg by mouth Every 8 (Eight) Hours As Needed for Nausea or Vomiting.    Miguelito Chatman MD   pantoprazole (PROTONIX) 40 MG EC tablet Take 1 tablet by mouth Daily. 2/5/18   Mary Shen APRN   penciclovir (DENAVIR) 1 % cream Apply 1 application topically Every 2 (Two) Hours.    Miguelito hCatman MD   pravastatin (PRAVACHOL) 20 MG tablet Take 20 mg by mouth Daily.    Miguelito Chatman MD   sertraline (ZOLOFT) 100 MG tablet Take 100 mg by mouth Daily.    Miguelito Chatman MD   SYMBICORT 80-4.5 MCG/ACT inhaler Inhale 2 puffs 2 (Two) Times a Day As Needed. 1/19/18   Miguelito Chatman MD   traZODone (DESYREL) 300 MG tablet Take 300 mg by mouth Every Night.    Miguelito Chatman MD       Review of Systems:  Review of Systems   Otherwise complete ROS is negative except as mentioned above.  No chest pain no shortness of breath no nausea no vomiting  Physical Exam:   Temp:  [98.1 °F (36.7 °C)] 98.1 °F (36.7 °C)  Heart Rate:  [63-76] 68  Resp:  [18-20] 18  BP: (120-135)/(59-67) 120/64  Physical Exam  Patient appears well, alert and oriented x 3, pleasant, cooperative.   Neck supple  No JVD No thyromegaly.  Lungs decreased breath sounds No crackles no wheezing   CV S1S2 normal, no murmurs, clicks, gallops or rubs.  Abdomen is soft, no tenderness, masses or organomegaly.    Extremities: no clubbing cyanosis or edema   Neurological CN 2-12 intact   Skin is normal    Results Reviewed:  I have personally reviewed current lab, radiology, and data and  agree with results.  Lab Results (last 24 hours)     Procedure Component Value Units Date/Time    Urine Drug Screen - Urine, Clean Catch [[id number]]  (Abnormal) Collected:  09/16/18 2218    Specimen:  Urine from Urine, Clean Catch Updated:  09/16/18 2329     Amphetamine Screen, Urine Positive (A)     Barbiturates Screen, Urine Negative     Benzodiazepine Screen, Urine Negative     Cocaine Screen, Urine Negative     Methadone Screen, Urine Negative     Opiate Screen Negative     Oxycodone Screen, Urine Negative     THC, Screen, Urine Positive (A)    Narrative:       Negative Thresholds For Drugs Screened in Urine:     Amphetamines          500 ng/ml  Barbiturates          200 ng/ml  Benzodiazepines       200 ng/ml  Cocaine               150 ng/ml  Methadone             300 ng/mL  Opiates               300 ng/mL  Oxycodone             100 ng/mL  THC                   20 ng/mL    The normal value for all drugs tested is negative. This report includes final unconfirmed screening results.  A positive result by this assay can be, at your request, sent to the Reference Lab for confirmation by gas chromatography. Unconfirmed results must not be used for non-medical purposes, such as employment or legal testing. Clinical consideration should be applied to any drug of abuse test result, particularly when unconfirmed results are used.    BNP [[id number]]  (Normal) Collected:  09/16/18 2211    Specimen:  Blood from Arm, Right Updated:  09/16/18 2301     proBNP 348.0 pg/mL     Troponin [[id number]]  (Normal) Collected:  09/16/18 2211    Specimen:  Blood from Arm, Right Updated:  09/16/18 2301     Troponin I <0.012 ng/mL     Ethanol [[id number]] Collected:  09/16/18 2211    Specimen:  Blood from Arm, Right Updated:  09/16/18 2258     Ethanol <10 mg/dL      Ethanol % <0.010 %     Protime-INR [[id number]]  (Normal) Collected:  09/16/18 2211    Specimen:  Blood from Arm, Right Updated:  09/16/18 2243     Protime 13.4 Seconds      INR  1.04    Narrative:       Therapeutic range for most indications is 2.0-3.0 INR,  or 2.5-3.5 for mechanical heart valves.    aPTT [[id number]]  (Normal) Collected:  09/16/18 2211    Specimen:  Blood from Arm, Right Updated:  09/16/18 2243     PTT 29.0 seconds     Narrative:       The recommended Heparin therapeutic range is 68-97 seconds.    Comprehensive Metabolic Panel [[id number]]  (Normal) Collected:  09/16/18 2211    Specimen:  Blood from Arm, Right Updated:  09/16/18 2243     Glucose 97 mg/dL      BUN 17 mg/dL      Creatinine 0.75 mg/dL      Sodium 139 mmol/L      Potassium 3.5 mmol/L      Chloride 105 mmol/L      CO2 27.0 mmol/L      Calcium 8.5 mg/dL      Total Protein 6.9 g/dL      Albumin 3.80 g/dL      ALT (SGPT) 20 U/L      AST (SGOT) 25 U/L      Alkaline Phosphatase 101 U/L      Total Bilirubin 0.3 mg/dL      eGFR Non African Amer 77 mL/min/1.73      Globulin 3.1 gm/dL      A/G Ratio 1.2 g/dL      BUN/Creatinine Ratio 22.7     Anion Gap 7.0 mmol/L     CK [[id number]]  (Normal) Collected:  09/16/18 2211    Specimen:  Blood from Arm, Right Updated:  09/16/18 2243     Creatine Kinase 100 U/L     Magnesium [[id number]]  (Normal) Collected:  09/16/18 2211    Specimen:  Blood from Arm, Right Updated:  09/16/18 2243     Magnesium 1.9 mg/dL     Urinalysis, Microscopic Only - Urine, Clean Catch [[id number]]  (Abnormal) Collected:  09/16/18 2218    Specimen:  Urine from Urine, Clean Catch Updated:  09/16/18 2237     RBC, UA 0-2 (A) /HPF      WBC, UA 6-12 (A) /HPF      Bacteria, UA None Seen /HPF      Squamous Epithelial Cells, UA 3-5 (A) /HPF      Hyaline Casts, UA None Seen /LPF      Methodology Automated Microscopy    Urinalysis With Culture If Indicated - Urine, Clean Catch [[id number]]  (Abnormal) Collected:  09/16/18 2218    Specimen:  Urine from Urine, Clean Catch Updated:  09/16/18 2237     Color, UA Yellow     Appearance, UA Clear     pH, UA 6.5     Specific Gravity, UA 1.009     Glucose, UA Negative      Ketones, UA Negative     Bilirubin, UA Negative     Blood, UA Negative     Protein, UA Negative     Leuk Esterase, UA Trace (A)     Nitrite, UA Negative     Urobilinogen, UA 0.2 E.U./dL    CBC & Differential [[id number]] Collected:  09/16/18 2211    Specimen:  Blood Updated:  09/16/18 2221    Narrative:       The following orders were created for panel order CBC & Differential.  Procedure                               Abnormality         Status                     ---------                               -----------         ------                     CBC Auto Differential[[id number]]        Abnormal            Final result                 Please view results for these tests on the individual orders.    CBC Auto Differential [[id number]]  (Abnormal) Collected:  09/16/18 2211    Specimen:  Blood from Arm, Right Updated:  09/16/18 2221     WBC 10.05 (H) 10*3/mm3      RBC 3.70 (L) 10*6/mm3      Hemoglobin 11.2 (L) g/dL      Hematocrit 33.1 (L) %      MCV 89.5 fL      MCH 30.3 pg      MCHC 33.8 g/dL      RDW 14.3 %      RDW-SD 46.9 (H) fl      MPV 8.9 fL      Platelets 258 10*3/mm3      Neutrophil % 58.5 %      Lymphocyte % 27.4 %      Monocyte % 10.1 %      Eosinophil % 3.1 %      Basophil % 0.5 %      Immature Grans % 0.4 %      Neutrophils, Absolute 5.88 10*3/mm3      Lymphocytes, Absolute 2.75 10*3/mm3      Monocytes, Absolute 1.02 (H) 10*3/mm3      Eosinophils, Absolute 0.31 10*3/mm3      Basophils, Absolute 0.05 10*3/mm3      Immature Grans, Absolute 0.04 (H) 10*3/mm3         Imaging Results (last 24 hours)     Procedure Component Value Units Date/Time    CT Cervical Spine Without Contrast [[id number]] Collected:  09/16/18 2124     Updated:  09/16/18 2159    Narrative:       PROCEDURE: CT cervical spine without contrast.    INDICATION: Fall. Pain.    COMPARISON: None.    CT .0 milliGray-cm.    TECHNIQUE: This exam was performed according to our departmental  dose-optimization program, which includes automated  exposure  control, adjustment of the mA and/or kV according to patient size  and/or use of iterative reconstruction technique. CT cervical  spine without contrast performed with axial and reconstructed  sagittal and coronal images from above the foramen magnum to the  bottom of T3.    Prevertebral soft tissues are normal.    Cervical vertebrae and visualized upper thoracic vertebrae normal  height and normally aligned. No compression injuries.    Moderate degenerative disc changes C5-6 and C7-T1 with associated  hypertrophic spurring.    The articular facets are intact and normally aligned. Scattered  areas of facet arthritic change.    The lamina, spinous processes and odontoid are intact. There are  degenerative arthritic with bony spurring at the articulation of  the odontoid and anterior arch C1.    Apices of lungs clear.      Impression:       No fractures or acute osseous abnormalities.    Degenerative arthritic changes with bony osteophytes at the  junction of the anterior arch C1 hand the odontoid.    Scattered facet arthritic changes.    Moderate degenerative disc changes C5-C6 and C7-T1.    [id number]    Electronically signed by:  Billy Bennett MD  9/16/2018 9:58  PM CDT [facility]station[email or web address]    CT Lumbar Spine Without Contrast [[id number]] Collected:  09/16/18 2124     Updated:  09/16/18 2150    Narrative:       CT lumbar spine without contrast on  9/16/2018     CLINICAL INDICATION: Low back pain after fall    TECHNIQUE: Multiple axial images are obtained throughout the  lumbar spine without the administration of contrast. Sagittal and  coronal reformatted images are also performed and reviewed. This  exam was performed according to our departmental  dose-optimization program, which includes automated exposure  control, adjustment of the mA and/or kV according to patient size  and/or use of iterative reconstruction technique.   Total DLP is 511.8 mGy*cm.    COMPARISON: None    FINDINGS: There is  levoscoliosis of the lumbar spine.  Degenerative disc disease is noted especially from T12 through  L3. Large Schmorl's node formation is noted in the superior  endplate of L2. There is an old compression fracture of the  superior endplate of L3. Reformatted images reveal otherwise  normal alignment of the lumbar spine. Facet arthropathy is noted  throughout the lumbar spine. Vascular calcifications are noted.  The SI joints are well aligned.    At the T12-L1 level, there is a calcified right paracentral disc  bulge producing mild to moderate right-sided canal stenosis and  very mild right foraminal narrowing.    At the L1-2 level, based disc osteophyte complex produces mild  canal stenosis and mild right foraminal narrowing.    At the L2-3 level, broad-based disc osteophyte complex and facet  arthropathy produces mild canal stenosis and mild right foraminal  narrowing.    No definite disc herniation is noted otherwise.      Impression:       Levoscoliosis with degenerative changes with no acute  abnormality.    Electronically signed by:  Surinder Cantu  9/16/2018 9:49 PM  CDT Workstation: [id number]    CT Head Without Contrast [[id number]] Collected:  09/16/18 2124     Updated:  09/16/18 2144    Narrative:         CT head without contrast on 9/16/2018     CLINICAL INDICATION: Fell and hit head, pain    TECHNIQUE:  Multiple axial images are obtained throughout the  head without the administration of contrast.  This exam was  performed according to our departmental dose-optimization  program, which includes automated exposure control, adjustment of  the mA and/or kV according to patient size and/or use of  iterative reconstruction technique.   Total DLP is 1029 mGy*cm.    COMPARISON: 11/6/2014    FINDINGS:  There is mild generalized cerebral atrophy. There is  mild low density in the periventricular white matter consistent  with chronic small vessel ischemic changes. There is no  hydrocephalus. There is no  hemorrhage. There are no abnormal  extra-axial fluid collections. There is no mass, mass effect or  midline shift. There is no CT evidence of acute infarct. No bony  abnormality is noted.      Impression:       Atrophy and chronic small vessel ischemic changes  with no acute intracranial abnormality.    Electronically signed by:  Surinder Cantu  9/16/2018 9:42 PM  CDT Workstation: [id number]    XR Hip With or Without Pelvis 2 - 3 View Left [[id number]] Collected:  09/16/18 2103     Updated:  09/16/18 2129    Narrative:       Pelvis and left hip total three view on 9/16/2018    CLINICAL INDICATION: Left hip pain after fall    COMPARISON: None    FINDINGS: Vascular calcifications are noted. Calcified  phleboliths and likely injection granulomas are noted in the  pelvis. The hips are well located. The SI joints are well  aligned. No acute fracture is noted.      Impression:       No acute bony abnormality.    Electronically signed by:  Surinder Cantu  9/16/2018 9:28 PM  CDT Workstation: [id number]    XR Shoulder 2+ View Left [[id number]] Collected:  09/16/18 2103     Updated:  09/16/18 2122    Narrative:       Left shoulder three view on 9/16/2018    CLINICAL INDICATION: Left shoulder pain after fall    COMPARISON: 3/10/2016    FINDINGS: The patient is status post interval shoulder  arthroplasty. There has been resection of the left distal  clavicle. No hardware complication is noted. There is no  dislocation. There are no fractures.      Impression:       No acute bony abnormality.    Electronically signed by:  Surinder Cantu  9/16/2018 9:20 PM  CDT Workstation: [id number]    XR Chest 1 View [[id number]] Collected:  09/16/18 2102     Updated:  09/16/18 2113    Narrative:         Chest single view on  9/16/2018     CLINICAL INDICATION: Syncope, fall    COMPARISON: 1/11/2018    FINDINGS: The patient is status post a left shoulder  arthroplasty. Borderline cardiomegaly is noted. Vascular  calcification  is noted in the aorta. There is mild linear  atelectasis or scarring in the lung bases. The lungs are  otherwise clear. Hilar and mediastinal contours are within normal  limits.      Impression:       No acute disease.    Electronically signed by:  Surinder Cantu  9/16/2018 9:12 PM  CDT Workstation: [id number]            Assessment/Plan:           1.  Fall with syncope: Likely multifactorial in the setting of methamphetamine/marijuana use, also could be related to hypoxia in the setting of COPD, place on oxygen and bronchodilators, obtain cardiac enzymes and a d-dimer, obtain carotid Dopplers and a 2-D echo, start normal saline at 75 mL per hour.  2.  COPD without acute exacerbation: Oxygen and bronchodilators as indicated  3.  Illicit drug use with methamphetamine/marijuana: Counseled  4.  Nicotine use without evidence of withdrawal wall: Counseled  5.  Depression and anxiety, resume outpatient medications  6.  Essential hypertension: Resume outpatient medications  7.  History of pulmonary embolism: Continue Eliquis  8.  History  of carotid stenosis: Check carotid Dopplers  9. Acute uti upon admission, unspecified location or organism: start Levaquin obtain blood and urine cultures   10.  DVT prophylaxis: Seraquis   Mariza Ingram MD  09/17/18  12:25 AM

## 2018-09-17 NOTE — THERAPY EVALUATION
Acute Care - Physical Therapy Initial Evaluation  [facility]     Patient Name: Mona Perales  : 1950  MRN: [id number]  Today's Date: 2018   Onset of Illness/Injury or Date of Surgery: 18  Date of Referral to PT: 18  Referring Physician: Jani Tavera MD      Admit Date: 2018    Visit Dx:     ICD-10-CM ICD-9-CM   1. Fall, initial encounter W19.XXXA E888.9   2. Contusion of scalp, initial encounter S00.03XA 920   3. Hypoxia R09.02 799.02   4. Syncope, unspecified syncope type R55 780.2   5. Impaired functional mobility, balance, and endurance Z74.09 V49.89     Patient Active Problem List   Diagnosis   • Anxiety   • CAD (coronary atherosclerotic disease)   • Carotid stenosis   • COPD (chronic obstructive pulmonary disease) (CMS/HCC)   • COPD with exacerbation (CMS/HCC)   • Depressive disorder, not elsewhere classified   • Benign essential hypertension   • Hypercholesteremia   • Insomnia   • Low back pain   • Osteoporosis   • Other screening mammogram   • RUQ abdominal pain   • Peripheral arterial disease (CMS/HCC)   • Dizziness   • Nicotine abuse   • Dysphagia   • Epigastric pain   • Pain of right hand   • Closed displaced fracture of shaft of fifth metacarpal bone of right hand   • Cause of injury, fall   • Displaced fracture of shaft of fifth metacarpal bone of right hand with routine healing   • Fall     Past Medical History:   Diagnosis Date   • Anxiety    • Arthritis    • Asthma    • 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)     bilateral legs - bilateral iliac stents       • Chronic lower back pain    • COPD (chronic obstructive pulmonary disease) (CMS/HCC)    • Coronary arteriosclerosis    • Essential (primary) hypertension    • History of pulmonary embolus (PE)    • Hyperlipidemia    • Insomnia    • Lumbago    • Nicotine dependence    • Occlusion of artery (CMS/HCC)      and stenosis of bilateral carotid arteries - BABS 16-49%,  LICA 0-15%   • Other atherosclerosis of native artery of other extremity     LEFT subclavian stent 2005 (occluded)     Past Surgical History:   Procedure Laterality Date   • CARDIAC CATHETERIZATION  04/06/2016    No evidence of any obstructive epicardial CAD.Preserved LV systolic function with EF of 55%.   • CENTRAL VENOUS LINE INSERTION  04/07/2016    Successful placement of right uppe extremity 6-[place] triple lumen PICC line.   • ENDOSCOPY N/A 1/12/2018    Procedure: ESOPHAGOGASTRODUODENOSCOPY;  Surgeon: iBn Martin MD;  Location: [facility] ENDOSCOPY;  Service:    • ENDOSCOPY N/A 1/17/2018    Procedure: ESOPHAGOGASTRODUODENOSCOPY;  Surgeon: Bin Martin MD;  Location: [facility] ENDOSCOPY;  Service:    • ENDOSCOPY N/A 3/16/2018    Procedure: ESOPHAGOGASTRODUODENOSCOPY possible dilation;  Surgeon: Bin Martin MD;  Location: [facility] ENDOSCOPY;  Service: Gastroenterology   • FRACTURE SURGERY     • HYSTERECTOMY     • TOTAL SHOULDER REPLACEMENT Left    • TRANSESOPHAGEAL ECHOCARDIOGRAM (JACQUES)  04/07/2016    With color flow-Mild to moderate [facility].LV systolic function well preserved with Ef of 55-60%.Mitral and AV intact.Diastolic dysfunction   • UPPER GASTROINTESTINAL ENDOSCOPY  01/17/2018    Dr. Bin Martin M.D.        PT ASSESSMENT (last 12 hours)      Physical Therapy Evaluation     Row Name 09/17/18 3871          PT Evaluation Time/Intention    Subjective Information complains of;pain;dizziness  -LF     Document Type evaluation  -     Mode of Treatment individual therapy;physical therapy  -LF     Patient Effort good  -LF     Symptoms Noted During/After Treatment dizziness  -LF     Row Name 09/17/18 0425          General Information    Patient Profile Reviewed? yes  -LF     Onset of Illness/Injury or Date of Surgery 09/16/18  -     Referring Physician Jani Tavera MD  -LF     Patient Observations alert;cooperative;agree to therapy  -LF     Patient/Family Observations no family present  -LF     General  Observations of Patient in bed, O2 at 3L, PICC  -LF     Prior Level of Function independent:;all household mobility;community mobility;ADL's;home management;driving;shopping  -LF     Equipment Currently Used at Home none  -LF     Pertinent History of Current Functional Problem Pt is a 68 year old female hospitalized after collasping at home. Pt was found to have a UTI and have amphetamines and THC in her system.  -LF     Existing Precautions/Restrictions fall;oxygen therapy device and L/min  -LF     Equipment Issued to Patient gait belt  -LF     Risks Reviewed patient:;LOB;nausea/vomiting;dizziness;increased discomfort;change in vital signs  -LF     Benefits Reviewed patient:;improve function;increase strength;increase independence;increase balance;increase knowledge  -     Row Name 09/17/18 1333          Relationship/Environment    Lives With alone  -     Row Name 09/17/18 1333          Resource/Environmental Concerns    Current Living Arrangements home/apartment/condo   apartment  -     Row Name 09/17/18 1333          Stairs Within Home, Primary    Stairs, Within Home, Primary to back patio  -LF     Number of Stairs, Within Home, Primary one  -LF     Stair Railings, Within Home, Primary none  -LF     Row Name 09/17/18 1333          Cognitive Assessment/Intervention- PT/OT    Orientation Status (Cognition) oriented x 4  -LF     Follows Commands (Cognition) WNL  -     Row Name 09/17/18 1333          Safety Issues, Functional Mobility    Safety Issues Affecting Function (Mobility) --   none  -LF     Impairments Affecting Function (Mobility) balance;coordination;endurance/activity tolerance  -     Row Name 09/17/18 1333          Bed Mobility Assessment/Treatment    Bed Mobility Assessment/Treatment bed mobility (all) activities  -LF     [place] Level (Bed Mobility) conditional independence  -LF     Assistive Device (Bed Mobility) bed rails;head of bed elevated  -     Row Name 09/17/18 1333           Transfer Assessment/Treatment    Transfer Assessment/Treatment sit-stand transfer;stand-sit transfer;toilet transfer  -     Sit-Stand Pender (Transfers) stand by assist  -LF     Stand-Sit Pender (Transfers) stand by assist  -     [place] Level (Toilet Transfer) stand by assist  -LF     Assistive Device (Toilet Transfer) grab bars/safety frame  -LF     Row Name 09/17/18 1333          Sit-Stand Transfer    Assistive Device (Sit-Stand Transfers) --   none  -[facility] Name 09/17/18 1333          Stand-Sit Transfer    Assistive Device (Stand-Sit Transfers) --   none  -[facility] Name 09/17/18 1333          Toilet Transfer    Type (Toilet Transfer) sit-stand;stand-sit  -LF     Row Name 09/17/18 1333          Gait/Stairs Assessment/Training    [place] Level (Gait) stand by assist  -     Assistive Device (Gait) other (see comments)   intermittent use of rail on wall for steadying  -     Distance in Feet (Gait) 50 feet x2  -     Deviations/Abnormal Patterns (Gait) base of support, wide;gait speed decreased;stride length decreased  -[facility] Name 09/17/18 1333          General ROM    GENERAL ROM COMMENTS BLE AROM WNL  -[facility] Name 09/17/18 1333          MMT (Manual Muscle Testing)    General MMT Comments BLE strength 5/5  -[facility] Name 09/17/18 1333          Motor Assessment/Intervention    Additional Documentation Balance (Group)  -LF     Row Name 09/17/18 1333          Balance    Balance static standing balance;dynamic standing balance  -LF     Row Name 09/17/18 1333          Static Standing Balance    Level of Pender (Unsupported Standing, Static Balance) supervision  -     Time Able to Maintain Position (Unsupported Standing, Static Balance) 4 to 5 minutes  -[facility] Name 09/17/18 1333          Dynamic Standing Balance    Level of Pender, Resists Mild Perturbations (Standing, Dynamic Balance) contact guard assist  -     Time Able to Maintain Position, Resists  Mild Perturbations (Standing, Dynamic Balance) 2 to 3 minutes  -     Row Name 09/17/18 1333          Sensory Assessment/Intervention    Sensory General Assessment no sensation deficits identified   no deficit to light touch identified in LEs  -[facility] Name 09/17/18 1333          Vision Assessment/Intervention    Visual Impairment/Limitations corrective lenses full time  -[facility] Name 09/17/18 1333          Pain Assessment    Additional Documentation Pain Scale: Numbers Pre/Post-Treatment (Group)  -[facility] Name 09/17/18 1333          Pain Scale: Numbers Pre/Post-Treatment    Pain Scale: Numbers, Pretreatment 8/10  -     Pain Scale: Numbers, Post-Treatment 8/10  -     Pain Location head   headache  -[facility] Name 09/17/18 1333          Plan of Care Review    Plan of Care Reviewed With patient  -[facility] Name 09/17/18 7533          Physical Therapy Clinical Impression    Date of Referral to PT 09/17/18  -     PT Diagnosis (PT Clinical Impression) impaired balance, impaired tolerance for functional mobility  -     Prognosis (PT Clinical Impression) good  -LF     Functional Level at Time of Evaluation (PT Clinical Impression) SBA needed for ambulation of 50 feet without AD  -LF     Patient/Family Goals Statement (PT Clinical Impression) return home  -     Criteria for Skilled Interventions Met (PT Clinical Impression) yes;treatment indicated  -     Pathology/Pathophysiology Noted (Describe Specifically for Each System) musculoskeletal;cardiovascular;pulmonary  -     Impairments Found (describe specific impairments) aerobic capacity/endurance;gait, locomotion, and balance;ventilation and respiration/gas exchange  -     Rehab Potential (PT Clinical Summary) good, to achieve stated therapy goals  -     Predicted Duration of Therapy (PT) until acute care discharge or until PT goals are met  -     Care Plan Review (PT) evaluation/treatment results reviewed;care plan/treatment goals  reviewed;risks/benefits reviewed;patient/other agree to care plan  -LF     Row Name 09/17/18 1333          Vital Signs    Pre Systolic BP Rehab 132  -LF     Pre Treatment Diastolic BP 64  -LF     Pretreatment Heart Rate (beats/min) 73  -LF     Intratreatment Heart Rate (beats/min) 76   with ambulation  -LF     Pre SpO2 (%) 97  -LF     O2 Delivery Pre Treatment supplemental O2  -LF     Intra SpO2 (%) 96   with ambulation; also 96% on RA with 2nd bout of ambulation  -LF     O2 Delivery Intra Treatment supplemental O2  -LF     Pre Patient Position Supine  -LF     Intra Patient Position Standing  -LF     Row Name 09/17/18 1333          Physical Therapy Goals    Transfer Goal Selection (PT) transfer, PT goal 1  -LF     Gait Training Goal Selection (PT) gait training, PT goal 1  -LF     Balance Goal Selection (PT) balance, PT goal (free text)  -LF     Additional Documentation Balance Goal Selection (PT) (Row)  -LF     Row Name 09/17/18 1066          Transfer Goal 1 (PT)    Activity/Assistive Device (Transfer Goal 1, PT) sit-to-stand/stand-to-sit;bed-to-chair/chair-to-bed  -LF     [place] Level/Cues Needed (Transfer Goal 1, PT) independent  -LF     Time Frame (Transfer Goal 1, PT) 2 days  -LF     Progress/Outcome (Transfer Goal 1, PT) goal not met  -LF     Row Name 09/17/18 1943          Gait Training Goal 1 (PT)    Activity/Assistive Device (Gait Training Goal 1, PT) increase endurance/gait distance;improve balance and speed   no AD  -LF     [place] Level (Gait Training Goal 1, PT) independent  -LF     Distance (Gait Goal 1, PT) 500 feet  -LF     Time Frame (Gait Training Goal 1, PT) 2 - 3 days  -LF     Progress/Outcome (Gait Training Goal 1, PT) goal not met  -LF     Row Name 09/17/18 4813          Balance Goal (PT)    Balance Goal (PT) Patient will score at least 24/28 on Tinetti to demo low risk of falls  -LF     Time Frame (Balance Goal, PT) 2 - 3 days  -LF     Progress/Outcome (Balance Goal, PT) goal not  met  -LF     Row Name 09/17/18 1333          Positioning and Restraints    Pre-Treatment Position in bed  -LF     Post Treatment Position bed  -LF     In Bed fowlers;call light within reach;encouraged to call for assist;exit alarm on;side rails up x2;notified nsg  -LF     Row Name 09/17/18 1333          Living Environment    Home Accessibility stairs within home  -       User Key  (r) = Recorded By, (t) = Taken By, (c) = Cosigned By    Initials Name Provider Type     Dulce Moise, JUSTINA Physical Therapist          Physical Therapy Education     Title: PT OT SLP Therapies (Active)     Topic: Physical Therapy (Active)     Point: Mobility training (Done)    Learning Progress Summary     Learner Status Readiness Method Response Comment Documented by    Patient Done Acceptance E VU,NR Role of PT, safety/fall precautions in hospital  09/17/18 1421          Point: Precautions (Done)    Learning Progress Summary     Learner Status Readiness Method Response Comment Documented by    Patient Done Acceptance E VU,NR Role of PT, safety/fall precautions in hospital  09/17/18 1421                      User Key     Initials Effective Dates Name Provider Type Discipline     07/23/18 -  Dulce Moise, JUSTINA Physical Therapist PT                PT Recommendation and Plan  Anticipated Discharge Disposition (PT): home with home health  Planned Therapy Interventions (PT Eval): balance training, gait training, home exercise program, patient/family education, stair training, strengthening, transfer training  Therapy Frequency (PT Clinical Impression): other (see comments) (5-14x/wk)  Outcome Summary/Treatment Plan (PT)  Anticipated Discharge Disposition (PT): home with home health  Plan of Care Reviewed With: patient  Outcome Summary: PT eval completed. Patient was able to transfer with SBA and ambulate 50x2 without assistive device (but reaching for rail in moura) with SBA. Patient ambulated once with 3L O2 and once on room air. SpO2 was  96% both times. Patient can benefit from skilled PT in improve balance and tolerance for functional mobility since these are below patient's reported baseline. Home with home health PT is recommended after acute care discharge at this point.          Outcome Measures     Row Name 09/17/18 1333             How much help from another person do you currently need...    Turning from your back to your side while in flat bed without using bedrails? 4  -LF      Moving from lying on back to sitting on the side of a flat bed without bedrails? 4  -LF      Moving to and from a bed to a chair (including a wheelchair)? 3  -LF      Standing up from a chair using your arms (e.g., wheelchair, bedside chair)? 4  -LF      Climbing 3-5 steps with a railing? 3  -LF      To walk in hospital room? 3  -LF      AM-PAC 6 Clicks Score 21  -LF         Functional Assessment    Outcome Measure Options AM-PAC 6 Clicks Basic Mobility (PT)  -LF        User Key  (r) = Recorded By, (t) = Taken By, (c) = Cosigned By    Initials Name Provider Type     Dulce Moise PT Physical Therapist           Time Calculation:         PT Charges     Row Name 09/17/18 1422             Time Calculation    Start Time 1333  -LF      Stop Time 1404  -LF      Time Calculation (min) 31 min  -LF      PT Received On 09/17/18  -LF      PT Goal Re-Cert Due Date 09/30/18  -LF        User Key  (r) = Recorded By, (t) = Taken By, (c) = Cosigned By    Initials Name Provider Type    LF Dulce Moise PT Physical Therapist        Therapy Suggested Charges     Code   Minutes Charges    None           Therapy Charges for Today     Code Description Service Date Service Provider Modifiers Qty    21300441341 HC PT MOBILITY CURRENT 9/17/2018 Dulce Moise, PT GP, CJ 1    33602787561 HC PT MOBILITY PROJECTED 9/17/2018 Dulce Moise, PT GP, CI 1    83502633889 HC PT EVAL MOD COMPLEXITY 2 9/17/2018 Dulce Moise, PT GP 1          PT G-Codes  Outcome Measure Options: AM-PAC 6 Clicks Basic Mobility  (PT)  AM-PAC 6 Clicks Score: 21  Functional Limitation: Mobility: Walking and moving around  Mobility: Walking and Moving Around Current Status (): At least 20 percent but less than 40 percent impaired, limited or restricted  Mobility: Walking and Moving Around Goal Status (): At least 1 percent but less than 20 percent impaired, limited or restricted      Dulce Moise, PT  9/17/2018

## 2018-09-17 NOTE — PLAN OF CARE
Problem: Patient Care Overview  Goal: Plan of Care Review  Outcome: Ongoing (interventions implemented as appropriate)   09/17/18 2181   Coping/Psychosocial   Plan of Care Reviewed With patient   Plan of Care Review   Progress no change   OTHER   Outcome Summary New admission. VSS. No c/o pain at this time.

## 2018-09-17 NOTE — PLAN OF CARE
Problem: Patient Care Overview  Goal: Plan of Care Review  Outcome: Ongoing (interventions implemented as appropriate)   09/17/18 5780   Coping/Psychosocial   Plan of Care Reviewed With patient   OTHER   Outcome Summary PT eval completed. Patient was able to transfer with SBA and ambulate 50x2 without assistive device (but reaching for rail in moura) with SBA. Patient ambulated once with 3L O2 and once on room air. SpO2 was 96% both times. Patient can benefit from skilled PT in improve balance and tolerance for functional mobility since these are below patient's reported baseline. Home with home health PT is recommended after acute care discharge at this point.

## 2018-09-17 NOTE — ED NOTES
Neighbor called EMS for witnessed collapse on pt's back porch. Pt fell back and hit back of her head and has knot. Pt c/o neck pain, HA, back pain, L shoulder pain, and L side pain down to her hip. Pt A&Ox4.     Ebony Murray RN  09/16/18 2014

## 2018-09-18 ENCOUNTER — APPOINTMENT (OUTPATIENT)
Dept: ULTRASOUND IMAGING | Facility: HOSPITAL | Age: 68
End: 2018-09-18

## 2018-09-18 ENCOUNTER — APPOINTMENT (OUTPATIENT)
Dept: CT IMAGING | Facility: HOSPITAL | Age: 68
End: 2018-09-18

## 2018-09-18 PROBLEM — R55 SYNCOPE: Status: ACTIVE | Noted: 2018-09-18

## 2018-09-18 LAB
ALBUMIN SERPL-MCNC: 3.8 G/DL (ref 3.4–4.8)
ALBUMIN/GLOB SERPL: 1.2 G/DL (ref 1.1–1.8)
ALP SERPL-CCNC: 106 U/L (ref 38–126)
ALT SERPL W P-5'-P-CCNC: 23 U/L (ref 9–52)
ANION GAP SERPL CALCULATED.3IONS-SCNC: 8 MMOL/L (ref 5–15)
AST SERPL-CCNC: 29 U/L (ref 14–36)
BASOPHILS # BLD AUTO: 0.04 10*3/MM3 (ref 0–0.2)
BASOPHILS NFR BLD AUTO: 0.4 % (ref 0–2)
BILIRUB SERPL-MCNC: 0.4 MG/DL (ref 0.2–1.3)
BUN BLD-MCNC: 8 MG/DL (ref 7–21)
BUN/CREAT SERPL: 13.6 (ref 7–25)
CALCIUM SPEC-SCNC: 8.9 MG/DL (ref 8.4–10.2)
CHLORIDE SERPL-SCNC: 104 MMOL/L (ref 95–110)
CO2 SERPL-SCNC: 27 MMOL/L (ref 22–31)
CREAT BLD-MCNC: 0.59 MG/DL (ref 0.5–1)
DEPRECATED RDW RBC AUTO: 47.8 FL (ref 36.4–46.3)
EOSINOPHIL # BLD AUTO: 0.27 10*3/MM3 (ref 0–0.7)
EOSINOPHIL NFR BLD AUTO: 2.8 % (ref 0–7)
ERYTHROCYTE [DISTWIDTH] IN BLOOD BY AUTOMATED COUNT: 14.6 % (ref 11.5–14.5)
GFR SERPL CREATININE-BSD FRML MDRD: 102 ML/MIN/1.73 (ref 45–104)
GLOBULIN UR ELPH-MCNC: 3.2 GM/DL (ref 2.3–3.5)
GLUCOSE BLD-MCNC: 97 MG/DL (ref 60–100)
HCT VFR BLD AUTO: 36.7 % (ref 35–45)
HGB BLD-MCNC: 12.3 G/DL (ref 12–15.5)
IMM GRANULOCYTES # BLD: 0.01 10*3/MM3 (ref 0–0.02)
IMM GRANULOCYTES NFR BLD: 0.1 % (ref 0–0.5)
LYMPHOCYTES # BLD AUTO: 3.27 10*3/MM3 (ref 0.6–4.2)
LYMPHOCYTES NFR BLD AUTO: 33.7 % (ref 10–50)
MCH RBC QN AUTO: 30.1 PG (ref 26.5–34)
MCHC RBC AUTO-ENTMCNC: 33.5 G/DL (ref 31.4–36)
MCV RBC AUTO: 89.7 FL (ref 80–98)
MONOCYTES # BLD AUTO: 1.24 10*3/MM3 (ref 0–0.9)
MONOCYTES NFR BLD AUTO: 12.8 % (ref 0–12)
NEUTROPHILS # BLD AUTO: 4.88 10*3/MM3 (ref 2–8.6)
NEUTROPHILS NFR BLD AUTO: 50.2 % (ref 37–80)
NRBC BLD MANUAL-RTO: 0 /100 WBC (ref 0–0)
PLATELET # BLD AUTO: 292 10*3/MM3 (ref 150–450)
PMV BLD AUTO: 8.6 FL (ref 8–12)
POTASSIUM BLD-SCNC: 3.8 MMOL/L (ref 3.5–5.1)
PROT SERPL-MCNC: 7 G/DL (ref 6.3–8.6)
RBC # BLD AUTO: 4.09 10*6/MM3 (ref 3.77–5.16)
SODIUM BLD-SCNC: 139 MMOL/L (ref 137–145)
WBC NRBC COR # BLD: 9.71 10*3/MM3 (ref 3.2–9.8)

## 2018-09-18 PROCEDURE — 94799 UNLISTED PULMONARY SVC/PX: CPT

## 2018-09-18 PROCEDURE — 97110 THERAPEUTIC EXERCISES: CPT

## 2018-09-18 PROCEDURE — 80053 COMPREHEN METABOLIC PANEL: CPT | Performed by: INTERNAL MEDICINE

## 2018-09-18 PROCEDURE — G8988 SELF CARE GOAL STATUS: HCPCS

## 2018-09-18 PROCEDURE — 71275 CT ANGIOGRAPHY CHEST: CPT

## 2018-09-18 PROCEDURE — 94760 N-INVAS EAR/PLS OXIMETRY 1: CPT

## 2018-09-18 PROCEDURE — 97535 SELF CARE MNGMENT TRAINING: CPT

## 2018-09-18 PROCEDURE — 25010000002 LEVOFLOXACIN PER 250 MG: Performed by: INTERNAL MEDICINE

## 2018-09-18 PROCEDURE — 97530 THERAPEUTIC ACTIVITIES: CPT

## 2018-09-18 PROCEDURE — G8987 SELF CARE CURRENT STATUS: HCPCS

## 2018-09-18 PROCEDURE — 93880 EXTRACRANIAL BILAT STUDY: CPT

## 2018-09-18 PROCEDURE — 25010000002 ONDANSETRON PER 1 MG: Performed by: FAMILY MEDICINE

## 2018-09-18 PROCEDURE — 97166 OT EVAL MOD COMPLEX 45 MIN: CPT

## 2018-09-18 PROCEDURE — 85025 COMPLETE CBC W/AUTO DIFF WBC: CPT | Performed by: INTERNAL MEDICINE

## 2018-09-18 PROCEDURE — 0 IOPAMIDOL PER 1 ML: Performed by: INTERNAL MEDICINE

## 2018-09-18 RX ORDER — ONDANSETRON 2 MG/ML
4 INJECTION INTRAMUSCULAR; INTRAVENOUS EVERY 6 HOURS PRN
Status: DISCONTINUED | OUTPATIENT
Start: 2018-09-18 | End: 2018-09-19 | Stop reason: HOSPADM

## 2018-09-18 RX ADMIN — AMLODIPINE BESYLATE 10 MG: 10 TABLET ORAL at 07:49

## 2018-09-18 RX ADMIN — LEVOFLOXACIN 500 MG: 5 INJECTION, SOLUTION INTRAVENOUS at 07:48

## 2018-09-18 RX ADMIN — ACETAMINOPHEN 650 MG: 325 TABLET ORAL at 11:31

## 2018-09-18 RX ADMIN — ATORVASTATIN CALCIUM 10 MG: 10 TABLET, FILM COATED ORAL at 07:49

## 2018-09-18 RX ADMIN — IOPAMIDOL 47 ML: 755 INJECTION, SOLUTION INTRAVENOUS at 16:20

## 2018-09-18 RX ADMIN — IPRATROPIUM BROMIDE AND ALBUTEROL SULFATE 3 ML: 2.5; .5 SOLUTION RESPIRATORY (INHALATION) at 18:37

## 2018-09-18 RX ADMIN — TRAZODONE HYDROCHLORIDE 50 MG: 50 TABLET ORAL at 21:36

## 2018-09-18 RX ADMIN — SODIUM CHLORIDE 75 ML/HR: 9 INJECTION, SOLUTION INTRAVENOUS at 05:36

## 2018-09-18 RX ADMIN — SERTRALINE HYDROCHLORIDE 100 MG: 50 TABLET ORAL at 07:50

## 2018-09-18 RX ADMIN — IPRATROPIUM BROMIDE AND ALBUTEROL SULFATE 3 ML: 2.5; .5 SOLUTION RESPIRATORY (INHALATION) at 06:30

## 2018-09-18 RX ADMIN — PANTOPRAZOLE SODIUM 40 MG: 40 TABLET, DELAYED RELEASE ORAL at 07:50

## 2018-09-18 RX ADMIN — SODIUM CHLORIDE 75 ML/HR: 9 INJECTION, SOLUTION INTRAVENOUS at 17:43

## 2018-09-18 RX ADMIN — ONDANSETRON 4 MG: 2 INJECTION INTRAMUSCULAR; INTRAVENOUS at 15:30

## 2018-09-18 RX ADMIN — IPRATROPIUM BROMIDE AND ALBUTEROL SULFATE 3 ML: 2.5; .5 SOLUTION RESPIRATORY (INHALATION) at 11:15

## 2018-09-18 RX ADMIN — APIXABAN 5 MG: 5 TABLET, FILM COATED ORAL at 07:49

## 2018-09-18 RX ADMIN — ACETAMINOPHEN 650 MG: 325 TABLET ORAL at 17:38

## 2018-09-18 RX ADMIN — IPRATROPIUM BROMIDE AND ALBUTEROL SULFATE 3 ML: 2.5; .5 SOLUTION RESPIRATORY (INHALATION) at 14:00

## 2018-09-18 NOTE — PLAN OF CARE
Problem: Patient Care Overview  Goal: Plan of Care Review   09/18/18 1301   Coping/Psychosocial   Plan of Care Reviewed With patient   Plan of Care Review   Progress no change   OTHER   Outcome Summary Patient complaining of nausea MD notofied waiting for orders, carotid U/S today,

## 2018-09-18 NOTE — THERAPY EVALUATION
Acute Care - Occupational Therapy Initial Evaluation  [facility]     Patient Name: Mona Perales  : 1950  MRN: [id number]  Today's Date: 2018  Onset of Illness/Injury or Date of Surgery: 18  Date of Referral to OT: 18  Referring Physician: Dr. Jani Tavera    Admit Date: 2018       ICD-10-CM ICD-9-CM   1. Fall, initial encounter W19.XXXA E888.9   2. Contusion of scalp, initial encounter S00.03XA 920   3. Hypoxia R09.02 799.02   4. Syncope, unspecified syncope type R55 780.2   5. Impaired functional mobility, balance, and endurance Z74.09 V49.89   6. Impaired mobility and ADLs Z74.09 799.89     Patient Active Problem List   Diagnosis   • Anxiety   • CAD (coronary atherosclerotic disease)   • Carotid stenosis   • COPD (chronic obstructive pulmonary disease) (CMS/HCC)   • COPD with exacerbation (CMS/HCC)   • Depressive disorder, not elsewhere classified   • Benign essential hypertension   • Hypercholesteremia   • Insomnia   • Low back pain   • Osteoporosis   • Other screening mammogram   • RUQ abdominal pain   • Peripheral arterial disease (CMS/HCC)   • Dizziness   • Nicotine abuse   • Dysphagia   • Epigastric pain   • Pain of right hand   • Closed displaced fracture of shaft of fifth metacarpal bone of right hand   • Cause of injury, fall   • Displaced fracture of shaft of fifth metacarpal bone of right hand with routine healing   • Fall     Past Medical History:   Diagnosis Date   • Anxiety    • Arthritis    • Asthma    • 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)     bilateral legs - bilateral iliac stents       • Chronic lower back pain    • COPD (chronic obstructive pulmonary disease) (CMS/HCC)    • Coronary arteriosclerosis    • Essential (primary) hypertension    • History of pulmonary embolus (PE)    • Hyperlipidemia    • Insomnia    • Lumbago    • Nicotine dependence    • Occlusion of artery (CMS/HCC)      and stenosis of bilateral  carotid arteries - BABS 16-49%, LICA 0-15%   • Other atherosclerosis of native artery of other extremity     LEFT subclavian stent 2005 (occluded)     Past Surgical History:   Procedure Laterality Date   • CARDIAC CATHETERIZATION  04/06/2016    No evidence of any obstructive epicardial CAD.Preserved LV systolic function with EF of 55%.   • CENTRAL VENOUS LINE INSERTION  04/07/2016    Successful placement of right uppe extremity 6-[place] triple lumen PICC line.   • ENDOSCOPY N/A 1/12/2018    Procedure: ESOPHAGOGASTRODUODENOSCOPY;  Surgeon: Bin Martin MD;  Location: [facility] ENDOSCOPY;  Service:    • ENDOSCOPY N/A 1/17/2018    Procedure: ESOPHAGOGASTRODUODENOSCOPY;  Surgeon: Bin Martin MD;  Location: [facility] ENDOSCOPY;  Service:    • ENDOSCOPY N/A 3/16/2018    Procedure: ESOPHAGOGASTRODUODENOSCOPY possible dilation;  Surgeon: Bin Martin MD;  Location: [facility] ENDOSCOPY;  Service: Gastroenterology   • FRACTURE SURGERY     • HYSTERECTOMY     • TOTAL SHOULDER REPLACEMENT Left    • TRANSESOPHAGEAL ECHOCARDIOGRAM (JACQUES)  04/07/2016    With color flow-Mild to moderate [facility].LV systolic function well preserved with Ef of 55-60%.Mitral and AV intact.Diastolic dysfunction   • UPPER GASTROINTESTINAL ENDOSCOPY  01/17/2018    Dr. Bin Martin M.D.          OT ASSESSMENT FLOWSHEET (last 72 hours)      Occupational Therapy Evaluation     Row Name 09/18/18 0815                   OT Evaluation Time/Intention    Subjective Information complains of;pain  -        Document Type evaluation  -        Mode of Treatment individual therapy;occupational therapy  -        Total Evaluation Minutes, Occupational Therapy 22  -        Patient Effort good  -        Symptoms Noted During/After Treatment fatigue  -           General Information    Patient Profile Reviewed? yes  -        Onset of Illness/Injury or Date of Surgery 09/16/18  -        Referring Physician Dr. Jani Tavera  -        Patient Observations  alert;cooperative;agree to therapy  -        Patient/Family Observations no family present  -        General Observations of Patient pt supine HOB up on room air ,PICC/midline in RUE; tele  -        Prior Level of Function independent:;all household mobility;community mobility;transfer;ADL's;home management;cooking;cleaning;driving;shopping;using stairs  -        Equipment Currently Used at Home grab bar;shower chair  -        Pertinent History of Current Functional Problem Pt is a 68 year old female hospitalized after collasping at home. Pt was found to have a UTI and have amphetamines and THC in her system.  -        Existing Precautions/Restrictions fall   no BP in RUE  -        Limitations/Impairments safety/cognitive  -        Risks Reviewed patient:;LOB;dizziness;increased discomfort;change in vital signs  -        Benefits Reviewed patient:;improve function;increase independence;increase strength;decrease pain;increase knowledge  -           Relationship/Environment    Lives With alone  -           Resource/Environmental Concerns    Current Living Arrangements home/apartment/condo   apartment  -           Cognitive Assessment/Interventions    Additional Documentation Cognitive Assessment/Intervention (Group)  -           Cognitive Assessment/Intervention- PT/OT    Affect/Mental Status (Cognitive) WFL  -        Orientation Status (Cognition) oriented x 4  -        Follows Commands (Cognition) over 90% accuracy;verbal cues/prompting required  -        Safety Deficit (Cognitive) mild deficit  -        Personal Safety Interventions fall prevention program maintained;gait belt;supervised activity;nonskid shoes/slippers when out of bed  -           Safety Issues, Functional Mobility    Safety Issues Affecting Function (Mobility) safety precaution awareness;safety precautions follow-through/compliance  -        Impairments Affecting Function (Mobility)  balance;coordination;endurance/activity tolerance;pain;strength  -           Bed Mobility Assessment/Treatment    Bed Mobility Assessment/Treatment supine-sit;sit-supine  -        Supine-Sit [place] (Bed Mobility) supervision;conditional independence  -        Sit-Supine [place] (Bed Mobility) supervision;conditional independence  -        Assistive Device (Bed Mobility) bed rails;head of bed elevated  -           Functional Mobility    Functional Mobility- Ind. Level contact guard assist  [facility]        Functional Mobility- Comment min vc for safety  -           Transfer Assessment/Treatment    Transfer Assessment/Treatment sit-stand transfer;stand-sit transfer;toilet transfer  -           Sit-Stand Transfer    Sit-Stand [place] (Transfers) stand by assist  -           Stand-Sit Transfer    Stand-Sit [place] (Transfers) stand by assist  [facility]           Toilet Transfer    Type (Toilet Transfer) sit-stand;stand-sit  -        [place] Level (Toilet Transfer) stand by assist;contact guard  -           ADL Assessment/Intervention    BADL Assessment/Intervention lower body dressing;grooming;toileting  -           Lower Body Dressing Assessment/Training    Lower Body Dressing [place] Level doff;don;socks;set up;conditional independence  -        Lower Body Dressing Position edge of bed sitting  -           Grooming Assessment/Training    Comment (Grooming) Pt SBA to stand at the sink to wash her hands, vc   -           Toileting Assessment/Training    [place] Level (Toileting) toileting skills;contact guard assist   SBA  -           BADL Safety/Performance    Impairments, BADL Safety/Performance balance;endurance/activity tolerance;coordination;pain;strength  -           General ROM    GENERAL ROM COMMENTS BUE WFL painful in Right shoulde due to PICC line; LUE shoulder due to fall approx  degrees, rest WFL fair+ digit to thumb opposition  -           MMT  (Manual Muscle Testing)    General MMT Comments BUE  grossly 3+ to 4-/5; BUE grossly 4-/5  -           Balance    Balance dynamic sitting balance  -           Dynamic Sitting Balance    Level of [place], Reaches Outside Midline (Sitting, Dynamic Balance) supervision  -           Dynamic Standing Balance    Level of [place], Resists Mild Perturbations (Standing, Dynamic Balance) contact guard assist  -           Sensory Assessment/Intervention    Additional Documentation Hearing Assessment (Group)  -           Light Touch Sensation Assessment    Left Upper Extremity: Light Touch Sensation Assessment intact  -        Right Upper Extremity: Light Touch Sensation Assessment intact  -           Hearing Assessment    Hearing Status WFL  -           Vision Assessment/Intervention    Visual Impairment/Limitations corrective lenses full time  -           Positioning and Restraints    Pre-Treatment Position in bed  -        Post Treatment Position bed  -        In Bed supine;call light within reach;encouraged to call for assist;exit alarm on  -           Pain Assessment    Additional Documentation Pain Scale: Numbers Pre/Post-Treatment (Group)  -           Pain Scale: Numbers Pre/Post-Treatment    Pain Scale: Numbers, Pretreatment 8/10  -        Pain Scale: Numbers, Post-Treatment 8/10  [facility]        Pain Location --   back, left shoulder, head  -        Pain Intervention(s) Repositioned;Ambulation/increased activity;Rest   educated to call RN if need pain meds; informed RN  -           Plan of Care Review    Plan of Care Reviewed With patient  -           Clinical Impression (OT)    Date of Referral to OT 09/17/18  -        OT Diagnosis Impaired mobility and ADL  -        Prognosis (OT Eval) fair+  -        Functional Level at Time of Evaluation (OT Eval) Pt min decreased safety, overall decreased endurance and strength.   -        Patient/Family Goals Statement (OT Eval)  indicated going home  -        Criteria for Skilled Therapeutic Interventions Met (OT Eval) yes;treatment indicated  -        Rehab Potential (OT Eval) fair, will monitor progress closely  -        Therapy Frequency (OT Eval) other (see comments)   5-7 days a week  -        Predicted Duration of Therapy Intervention (Therapy Eval) until d/c  -BH        Care Plan Review (OT) evaluation/treatment results reviewed;care plan/treatment goals reviewed;risks/benefits reviewed;patient/other agree to care plan  -        Anticipated Discharge Disposition (OT) home with / care;home with home health  -           Vital Signs    Pre Systolic BP Rehab 128  -BH        Pre Treatment Diastolic BP 70  -BH        Post Systolic BP Rehab 140  -BH        Post Treatment Diastolic BP 72  -BH        Pretreatment Heart Rate (beats/min) 73  -BH        Intratreatment Heart Rate (beats/min) 85  -BH        Posttreatment Heart Rate (beats/min) 87  -BH        Pre SpO2 (%) 93  -BH        O2 Delivery Pre Treatment room air  -BH        Intra SpO2 (%) 97  -BH        O2 Delivery Intra Treatment room air  -BH        Post SpO2 (%) 93  -BH        O2 Delivery Post Treatment room air  -BH        Pre Patient Position Supine  -BH        Intra Patient Position Sitting  -BH        Post Patient Position Supine  -BH           Planned OT Interventions    Planned Therapy Interventions (OT Eval) activity tolerance training;adaptive equipment training;BADL retraining;cognitive/visual perception retraining;functional balance retraining;IADL retraining;occupation/activity based interventions;patient/caregiver education/training;ROM/therapeutic exercise;strengthening exercise;transfer/mobility retraining  -           OT Goals    Transfer Goal Selection (OT) transfer, OT goal 1  -BH        Bathing Goal Selection (OT) bathing, OT goal 1  -BH        Dressing Goal Selection (OT) dressing, OT goal 1  -        Toileting Goal Selection (OT) toileting, OT goal  1  -           Transfer Goal 1 (OT)    Activity/Assistive Device (Transfer Goal 1, OT) toilet  -        [place] Level/Cues Needed (Transfer Goal 1, OT) conditional independence  -BH        Time Frame (Transfer Goal 1, OT) long term goal (LTG);by discharge  -        Progress/Outcome (Transfer Goal 1, OT) goal not met  -           Bathing Goal 1 (OT)    Activity/Assistive Device (Bathing Goal 1, OT) bathing skills, all  -        [place] Level/Cues Needed (Bathing Goal 1, OT) set-up required;conditional independence  -BH        Time Frame (Bathing Goal 1, OT) long term goal (LTG);by discharge  -        Progress/Outcomes (Bathing Goal 1, OT) goal not met  -           Dressing Goal 1 (OT)    Activity/Assistive Device (Dressing Goal 1, OT) dressing skills, all  -        [place]/Cues Needed (Dressing Goal 1, OT) set-up required;conditional independence  -BH        Time Frame (Dressing Goal 1, OT) long term goal (LTG);by discharge  -        Progress/Outcome (Dressing Goal 1, OT) goal not met  -           Toileting Goal 1 (OT)    Activity/Device (Toileting Goal 1, OT) toileting skills, all  -        [place] Level/Cues Needed (Toileting Goal 1, OT) conditional independence  -        Time Frame (Toileting Goal 1, OT) long term goal (LTG);by discharge  -        Progress/Outcome (Toileting Goal 1, OT) goal not met  -           Patient Education Goal (OT)    Activity (Patient Education Goal, OT) Pt will communicate good home safety awareness for ADL and IADL and for BUE HEP.   -        [place]/Cues/Accuracy (Memory Goal 2, OT) verbalizes understanding  -        Time Frame (Patient Education Goal, OT) long term goal (LTG);by discharge  -        Progress/Outcome (Patient Education Goal, OT) goal not met  -           Living Environment    Home Accessibility tub/shower is not walk in   pt reports no stairs  -          User Key  (r) = Recorded By, (t) = Taken By, (c) =  Cosigned By    Initials Name Effective Dates     Lisa Porter, OTR/L 06/08/18 -            Occupational Therapy Education     Title: PT OT SLP Therapies (Active)     Topic: Occupational Therapy (Active)     Point: ADL training (Done)     Description: Instruct learner(s) on proper safety adaptation and remediation techniques during self care or transfers.   Instruct in proper use of assistive devices.   Learning Progress Summary     Learner Status Readiness Method Response Comment Documented by    Patient Done Acceptance E VU,NR Educated about OT and POC. Educate to call for assist and not get up on her own.  09/18/18 1139          Point: Precautions (Done)     Description: Instruct learner(s) on prescribed precautions during self-care and functional transfers.   Learning Progress Summary     Learner Status Readiness Method Response Comment Documented by    Patient Done Acceptance E KRISTOPHER,NR Educated about OT and POC. Educate to call for assist and not get up on her own.  09/18/18 1139                      User Key     Initials Effective Dates Name Provider Type Discipline     06/08/18 -  Lisa Porter, OTR/L Occupational Therapist OT                  OT Recommendation and Plan  Outcome Summary/Treatment Plan (OT)  Anticipated Discharge Disposition (OT): home with 24/7 care, home with home health  Planned Therapy Interventions (OT Eval): activity tolerance training, adaptive equipment training, BADL retraining, cognitive/visual perception retraining, functional balance retraining, IADL retraining, occupation/activity based interventions, patient/caregiver education/training, ROM/therapeutic exercise, strengthening exercise, transfer/mobility retraining  Therapy Frequency (OT Eval): other (see comments) (5-7 days a week)  Plan of Care Review  Plan of Care Reviewed With: patient  Plan of Care Reviewed With: patient  Outcome Summary: OT eval completed this date. Pt supervision to mod I with bed mobility with  HOB up. Pt with set up not physical assist to don and doff socks EOB. Pt close SBA to CGA for standing at sink to wash hands and for toileting. Pt CGA for sit to stand t/f and mobility around room. Pt displays decreased endurance and strength of BUE needed for functional tasks. Pt could benefit from further skilled OT to reach maximum level of safety and independence with ADL. Recommend d/c homew [facility] 24/7 assist and home health therapy.           Outcome Measures     Row Name 09/18/18 0815 09/17/18 3323          How much help from another person do you currently need...    Turning from your back to your side while in flat bed without using bedrails?  -- 4  -LF     Moving from lying on back to sitting on the side of a flat bed without bedrails?  -- 4  -LF     Moving to and from a bed to a chair (including a wheelchair)?  -- 3  -LF     Standing up from a chair using your arms (e.g., wheelchair, bedside chair)?  -- 4  -LF     Climbing 3-5 steps with a railing?  -- 3  -LF     To walk in hospital room?  -- 3  -LF     AM-PAC 6 Clicks Score  -- 21  -LF        How much help from another is currently needed...    Putting on and taking off regular lower body clothing? 3  -BH  --     Bathing (including washing, rinsing, and drying) 3  -BH  --     Toileting (which includes using toilet bed pan or urinal) 3  -BH  --     Putting on and taking off regular upper body clothing 3  -BH  --     Taking care of personal grooming (such as brushing teeth) 4  -BH  --     Eating meals 4  -BH  --     Score 20  -BH  --        Functional Assessment    Outcome Measure Options AM-PAC 6 Clicks Daily Activity (OT)  - AM-PAC 6 Clicks Basic Mobility (PT)  -       User Key  (r) = Recorded By, (t) = Taken By, (c) = Cosigned By    Initials Name Provider Type     Lisa Porter, OTR/L Occupational Therapist    Dulce Kumar PT Physical Therapist          Time Calculation:         Time Calculation- OT     Row Name 09/18/18 1141             Time  Calculation- OT    OT Start Time 0815  -      OT Stop Time 0837  -      OT Time Calculation (min) 22 min  -      OT Received On 09/18/18  -      OT Goal Re-Cert Due Date 10/01/18  -        User Key  (r) = Recorded By, (t) = Taken By, (c) = Cosigned By    Initials Name Provider Type     Lisa Porter, OTR/L Occupational Therapist        Therapy Suggested Charges     Code   Minutes Charges    None           Therapy Charges for Today     Code Description Service Date Service Provider Modifiers Qty    72335646063 HC OT SELFCARE CURRENT 9/18/2018 Lisa Porter OTR/L GO, CJ 1    64032720406 HC OT SELFCARE PROJECTED 9/18/2018 Lisa Porter OTR/L GO, CI 1    [id number]  OT EVAL MOD COMPLEXITY 1 9/18/2018 Lisa Porter OTR/L GO 1          OT G-codes  OT Professional Judgement Used?: Yes  OT Functional Scales Options: AM-PAC 6 Clicks Daily Activity (OT)  Score: 20  Functional Limitation: Self care  Self Care Current Status (): At least 20 percent but less than 40 percent impaired, limited or restricted  Self Care Goal Status (): At least 1 percent but less than 20 percent impaired, limited or restricted    MELANIA Mariscal/ARMANDO  9/18/2018

## 2018-09-18 NOTE — PLAN OF CARE
Problem: Patient Care Overview  Goal: Plan of Care Review  Outcome: Ongoing (interventions implemented as appropriate)   09/18/18 0567   Coping/Psychosocial   Plan of Care Reviewed With patient   OTHER   Outcome Summary OT estella completed this date. Pt supervision to mod I with bed mobility with HOB up. Pt with set up not physical assist to don and doff socks EOB. Pt close SBA to CGA for standing at sink to wash hands and for toileting. Pt CGA for sit to stand t/f and mobility around room. Pt displays decreased endurance and strength of BUE needed for functional tasks. Pt could benefit from further skilled OT to reach maximum level of safety and independence with ADL. Recommend d/c homew ith 24/7 assist and home health therapy.

## 2018-09-18 NOTE — PLAN OF CARE
Problem: Patient Care Overview  Goal: Plan of Care Review  Outcome: Ongoing (interventions implemented as appropriate)   09/18/18 1040 09/18/18 1301   Coping/Psychosocial   Plan of Care Reviewed With --  patient   Plan of Care Review   Progress --  no change   OTHER   Outcome Summary Pt agreed to work with OT, initiate UE HEP with pt returning demo, pt is new to OT no goals met at this time, HH OT/PT at d/c  --

## 2018-09-18 NOTE — SIGNIFICANT NOTE
09/18/18 1025   Rehab Treatment   Discipline physical therapy assistant   Reason Treatment Not Performed patient/family declined treatment  (pt just returned from [place], back is hurting from where she had to lie on the table for test, check back after lunch)

## 2018-09-18 NOTE — SIGNIFICANT NOTE
09/18/18 1320   Rehab Treatment   Discipline physical therapy assistant   Reason Treatment Not Performed patient/family declined treatment  (pt states she is nauseated and waiting for medicine to be sent up to floor, check back tomorrow)

## 2018-09-18 NOTE — PROGRESS NOTES
MEDICINE DAILY PROGRESS NOTE  NAME: Mona Perales  : 1950  MRN: [id number]     LOS: 1 day     PROVIDER OF SERVICE: Jani Tavera MD    Chief Complaint: Syncope    Subjective:   HPI: Patient admitted with a near syncopal episode. She denies loss of bowel or bladder. She states that she worked all day and after sitting down for a few minutes she got dizzy upon standing.    Interval History:  History taken from: patient chart RN  . Patient sore today. No complaints of dizziness at this time. ECHO/Carotids ordered. Patient tolerating diet.    Review of Systems:   Review of Systems   Constitutional: Negative for activity change, appetite change, fatigue and fever.   HENT: Negative for ear pain and sore throat.    Eyes: Negative for pain and visual disturbance.   Respiratory: Positive for cough. Negative for shortness of breath.    Cardiovascular: Negative for chest pain and palpitations.   Gastrointestinal: Positive for nausea. Negative for abdominal pain.   Endocrine: Negative for cold intolerance and heat intolerance.   Genitourinary: Negative for difficulty urinating and dysuria.   Musculoskeletal: Positive for arthralgias. Negative for gait problem.   Skin: Negative for color change and rash.   Neurological: Negative for dizziness, weakness and headaches.   Hematological: Negative for adenopathy. Does not bruise/bleed easily.   Psychiatric/Behavioral: Negative for agitation, confusion and sleep disturbance.       Objective:     Vital Signs  Vitals:    18 0747 18 1115 18 1122 18 1217   BP: 149/81   144/67   BP Location: Left arm   Left arm   Patient Position: Lying   Lying   Pulse: 77 76 98 76   Resp:    Temp:    97.6 °F (36.4 °C)   TempSrc:    Oral   SpO2: 91% 95%  95%   Weight:       Height:           Physical Exam  Physical Exam   Constitutional: She is oriented to person, place, and time. She appears well-developed and well-nourished. No distress.   HENT:   Head:  Normocephalic and atraumatic.   Right Ear: External ear normal.   Left Ear: External ear normal.   Nose: Nose normal.   Eyes: Pupils are equal, round, and reactive to light. Conjunctivae and EOM are normal.   Neck: Normal range of motion. Neck supple.   Cardiovascular: Normal rate, regular rhythm, normal heart sounds and intact distal pulses.    Pulmonary/Chest: Effort normal and breath sounds normal. No respiratory distress. She has no wheezes. She has no rales. She exhibits no tenderness.   Abdominal: Soft. Bowel sounds are normal. She exhibits no distension and no mass. There is no tenderness. There is no rebound and no guarding.   Musculoskeletal: Normal range of motion. She exhibits no edema.   Neurological: She is alert and oriented to person, place, and time.   Skin: Skin is warm and dry. No rash noted. She is not diaphoretic. No erythema. No pallor.   Psychiatric: She has a normal mood and affect. Her behavior is normal.   Nursing note and vitals reviewed.      Medication Review    Current Facility-Administered Medications:   •  acetaminophen (TYLENOL) tablet 650 mg, 650 mg, Oral, Q6H PRN, Jani Tavera MD, 650 mg at 09/18/18 1131  •  amLODIPine (NORVASC) tablet 10 mg, 10 mg, Oral, Daily, Mariza Ingram MD, 10 mg at 09/18/18 0749  •  apixaban (ELIQUIS) tablet 5 mg, 5 mg, Oral, Daily, Mariza Ingram MD, 5 mg at 09/18/18 0749  •  atorvastatin (LIPITOR) tablet 10 mg, 10 mg, Oral, Daily, Mariza Ingram MD, 10 mg at 09/18/18 0749  •  ipratropium-albuterol (DUO-NEB) nebulizer solution 3 mL, 3 mL, Nebulization, 4x Daily - RT, Mariza Ingram MD, 3 mL at 09/18/18 1115  •  levoFLOXacin (LEVAQUIN) 500 mg/100 mL D5W (premix) (LEVAQUIN) 500 mg, 500 mg, Intravenous, Q24H, Mariza Ingram MD, Last Rate: 0 mL/hr at 09/17/18 1023, 500 mg at 09/18/18 0748  •  pantoprazole (PROTONIX) EC tablet 40 mg, 40 mg, Oral, Daily, Mariza Ingram MD, 40 mg at 09/18/18 0750  •  sertraline (ZOLOFT) tablet 100 mg, 100 mg, Oral,  Daily, Mariza Ingram MD, 100 mg at 09/18/18 0750  •  Insert peripheral IV, , , Once **AND** sodium chloride 0.9 % flush 10 mL, 10 mL, Intravenous, PRN, Jayme Lamar, DO  •  sodium chloride 0.9 % infusion, 75 mL/hr, Intravenous, Continuous, Mariza Ingram MD, Last Rate: 75 mL/hr at 09/18/18 0536, 75 mL/hr at 09/18/18 0536  •  traZODone (DESYREL) tablet 50 mg, 50 mg, Oral, Nightly PRN, Mariza Ingram MD, 50 mg at 09/17/18 2302     Diagnostic Data    Lab Results (last 24 hours)     Procedure Component Value Units Date/Time    Blood Culture - Blood, [[id number]]  (Normal) Collected:  09/17/18 0754    Specimen:  Blood from Arm, Left Updated:  09/18/18 0800     Blood Culture No growth at 24 hours    Blood Culture - Blood, [[id number]]  (Normal) Collected:  09/17/18 0754    Specimen:  Blood from Hand, Right Updated:  09/18/18 0800     Blood Culture No growth at 24 hours    Comprehensive Metabolic Panel [[id number]]  (Normal) Collected:  09/18/18 0536    Specimen:  Blood Updated:  09/18/18 0610     Glucose 97 mg/dL      BUN 8 mg/dL      Creatinine 0.59 mg/dL      Sodium 139 mmol/L      Potassium 3.8 mmol/L      Chloride 104 mmol/L      CO2 27.0 mmol/L      Calcium 8.9 mg/dL      Total Protein 7.0 g/dL      Albumin 3.80 g/dL      ALT (SGPT) 23 U/L      AST (SGOT) 29 U/L      Alkaline Phosphatase 106 U/L      Total Bilirubin 0.4 mg/dL      eGFR Non African Amer 102 mL/min/1.73      Globulin 3.2 gm/dL      A/G Ratio 1.2 g/dL      BUN/Creatinine Ratio 13.6     Anion Gap 8.0 mmol/L     CBC & Differential [[id number]] Collected:  09/18/18 0536    Specimen:  Blood Updated:  09/18/18 0548    Narrative:       The following orders were created for panel order CBC & Differential.  Procedure                               Abnormality         Status                     ---------                               -----------         ------                     CBC Auto Differential[[id number]]        Abnormal            Final result                  Please view results for these tests on the individual orders.    CBC Auto Differential [[id number]]  (Abnormal) Collected:  09/18/18 0536    Specimen:  Blood Updated:  09/18/18 0548     WBC 9.71 10*3/mm3      RBC 4.09 10*6/mm3      Hemoglobin 12.3 g/dL      Hematocrit 36.7 %      MCV 89.7 fL      MCH 30.1 pg      MCHC 33.5 g/dL      RDW 14.6 (H) %      RDW-SD 47.8 (H) fl      MPV 8.6 fL      Platelets 292 10*3/mm3      Neutrophil % 50.2 %      Lymphocyte % 33.7 %      Monocyte % 12.8 (H) %      Eosinophil % 2.8 %      Basophil % 0.4 %      Immature Grans % 0.1 %      Neutrophils, Absolute 4.88 10*3/mm3      Lymphocytes, Absolute 3.27 10*3/mm3      Monocytes, Absolute 1.24 (H) 10*3/mm3      Eosinophils, Absolute 0.27 10*3/mm3      Basophils, Absolute 0.04 10*3/mm3      Immature Grans, Absolute 0.01 10*3/mm3      nRBC 0.0 /100 WBC           I reviewed the patient's new clinical results.    Assessment/Plan:     Active Hospital Problems    Diagnosis Date Noted   • **Syncope [R55] 09/18/2018   • Fall [W19.XXXA] 09/17/2018   • Anxiety [F41.9] 09/04/2014   • COPD (chronic obstructive pulmonary disease) (CMS/[facility]) [J44.9] 06/04/2014   • CAD (coronary atherosclerotic disease) [I25.10] 12/18/2012   • Benign essential hypertension [I10] 09/03/2009     #. Syncope. No further symptoms. PT/OT. ECHO. Carotids.  #. Substance abuse. Amphetamine confirmation pending. THC use. couseling.  #. COPD. Duonebs.  #. CAD. Statin.  #. HTN. Norvasc.  #. H/o PE. Eliquis.    Will monitor patient's hospital course and adjust treatment as hospital course dictates.    DVT prophylaxis: Eliquis  Code status is   Code Status and Medical Interventions:   Ordered at: 09/17/18 1451     Level Of Support Discussed With:    Patient     Code Status:    CPR     Medical Interventions (Level of Support Prior to Arrest):    Full       Plan for disposition:Where: home and home health and When:  1-2 days      Time:           This document has been  electronically signed by Jani Tavera MD on September 18, 2018 12:36 PM

## 2018-09-18 NOTE — PAYOR COMM NOTE
"Filomena Perales (68 y.o. Female)     Date of Birth Social Security Number Address Home Phone MRN    1950  [address] [address] [phone] [id number]    Mu-ism Marital Status          Religion        Admission Date Admission Type Admitting Provider Attending Provider Department, Room/Bed    9/16/18 Emergency Mariza Ingram MD Bleibel, Hani A, MD [facility] 3 [facility], 362/1    Discharge Date Discharge Disposition Discharge Destination                       Attending Provider:  Mariza Ingram MD    Allergies:  Morphine And Related, Penicillins    Isolation:  None   Infection:  None   Code Status:  CPR    Ht:  157.5 cm (62\")   Wt:  56.7 kg (125 lb)    Admission Cmt:  None   Principal Problem:  None                Active Insurance as of 9/16/2018     Primary Coverage     Payor Plan Insurance Group Employer/Plan Group    MISC MEDICARE REPLACMENT [facility] [id number]     Coverage Address Coverage Phone Number Effective From Effective To    [phone] 1/1/2015     [id number] [id number]       Subscriber Name Subscriber Birth Date Member ID       FILOMENA PERALES [id number]                 Emergency Contacts      (Rel.) Home Phone Work Phone Mobile Phone    Torri Rivero (Daughter) [phone] -- [phone]    Susan Green (Sister) [phone] -- [phone]          [facility]  P:[phone]  F:[phone]     History & Physical      Mariza Ingram MD at 9/17/2018 12:25 AM                [facility] Medicine Admission      Date of Admission: 9/16/2018      Primary Care Physician: Val Bansal APRN      Chief Complaint:   Syncope    HPI:  This is a 68-year-old white female who is being admitted to the hospital with a syncopal episode.  She was at home this afternoon, she went out to her porch she stepped over a concrete, " she felt dizzy and fell.  She reports hitting her head, she had a syncope for about 2 minutes.  Her neighbor woke her up.  She states that she is not aware of a triggering factor.  Apparently this is been happening to her, in the past week it happened twice.  There was no reported confusion, she denies subjective fever or chills.  She denies chest pain or shortness of breath, no cough no hematuria dysuria hematemesis or hematochezia.  At the time of examination patient is back to baseline.    Past Medical History:   Past Medical History:   Diagnosis Date   • Anxiety    • Arthritis    • Asthma    • 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)     bilateral legs - bilateral iliac stents 2008      • Chronic lower back pain    • COPD (chronic obstructive pulmonary disease) (CMS/HCC)    • Coronary arteriosclerosis    • Essential (primary) hypertension    • History of pulmonary embolus (PE)    • Hyperlipidemia    • Insomnia    • Lumbago    • Nicotine dependence    • Occlusion of artery (CMS/HCC)      and stenosis of bilateral carotid arteries - BABS 16-49%, LICA 0-15%   • Other atherosclerosis of native artery of other extremity     LEFT subclavian stent 2005 (occluded)       Past Surgical History:   Past Surgical History:   Procedure Laterality Date   • CARDIAC CATHETERIZATION  04/06/2016    No evidence of any obstructive epicardial CAD.Preserved LV systolic function with EF of 55%.   • CENTRAL VENOUS LINE INSERTION  04/07/2016    Successful placement of right uppe extremity 6-Namibian triple lumen PICC line.   • ENDOSCOPY N/A 1/12/2018    Procedure: ESOPHAGOGASTRODUODENOSCOPY;  Surgeon: Bin Oh MD;  Location: [facility] ENDOSCOPY;  Service:    • ENDOSCOPY N/A 1/17/2018    Procedure: ESOPHAGOGASTRODUODENOSCOPY;  Surgeon: Bin Oh MD;  Location: [facility] ENDOSCOPY;  Service:    • ENDOSCOPY N/A 3/16/2018    Procedure: ESOPHAGOGASTRODUODENOSCOPY possible dilation;  Surgeon: Bin VIRGEN  MD Althea;  Location: [facility] ENDOSCOPY;  Service: Gastroenterology   • FRACTURE SURGERY     • HYSTERECTOMY     • TOTAL SHOULDER REPLACEMENT Left    • TRANSESOPHAGEAL ECHOCARDIOGRAM (JACQUES)  04/07/2016    With color flow-Mild to moderate [facility].LV systolic function well preserved with Ef of 55-60%.Mitral and AV intact.Diastolic dysfunction   • UPPER GASTROINTESTINAL ENDOSCOPY  01/17/2018    Dr. Bin Matrin M.D.       Family History:   Family History   Problem Relation Age of Onset   • Heart disease Other    • Hypertension Other        Social History:   Social History     Social History   • Marital status:      Social History Main Topics   • Smoking status: Current Every Day Smoker     Packs/day: 0.50     Years: 50.00     Types: Cigarettes   • Smokeless tobacco: Never Used      Comment: 02/05/2018 - Patient opted to decline cessation of smoking counseling at present time.   • Alcohol use No   • Drug use: No   • Sexual activity: Defer     Other Topics Concern   • Not on file       Allergies:   Allergies   Allergen Reactions   • Morphine And Related Itching, Confusion and Hallucinations   • Penicillins Rash       Medications:   Prior to Admission medications    Medication Sig Start Date End Date Taking? Authorizing Provider   albuterol (PROVENTIL) (2.5 MG/3ML) 0.083% nebulizer solution Take 2.5 mg by nebulization Every 8 (Eight) Hours As Needed for Wheezing.    ProviderMiguelito MD   amLODIPine (NORVASC) 10 MG tablet Take 10 mg by mouth Daily.    ProviderMiguelito MD   ELIQUIS 5 MG tablet tablet Take 5 mg by mouth Daily. 2/21/18   Miguelito Chatman MD   gabapentin (NEURONTIN) 800 MG tablet Take 800 mg by mouth 3 (Three) Times a Day.    ProviderMiguelito MD   HYDROcodone-acetaminophen (NORCO) 5-325 MG per tablet Take 1 tablet by mouth Every 8 (Eight) Hours As Needed (PRN). 6/4/18   Isadora Davila APRN   naproxen (NAPROSYN) 500 MG tablet Take 1 tablet by mouth 2 (Two) Times a Day With Meals.  4/9/18   Isadora Davila APRN   ondansetron (ZOFRAN) 4 MG tablet Take 4 mg by mouth Every 8 (Eight) Hours As Needed for Nausea or Vomiting.    Miguelito Chatman MD   pantoprazole (PROTONIX) 40 MG EC tablet Take 1 tablet by mouth Daily. 2/5/18   Mary Shen APRN   penciclovir (DENAVIR) 1 % cream Apply 1 application topically Every 2 (Two) Hours.    Miguelito Chatman MD   pravastatin (PRAVACHOL) 20 MG tablet Take 20 mg by mouth Daily.    Miguelito Chatman MD   sertraline (ZOLOFT) 100 MG tablet Take 100 mg by mouth Daily.    Miguelito Chatman MD   SYMBICORT 80-4.5 MCG/ACT inhaler Inhale 2 puffs 2 (Two) Times a Day As Needed. 1/19/18   Miguelito Chatman MD   traZODone (DESYREL) 300 MG tablet Take 300 mg by mouth Every Night.    Miguelito Chatman MD       Review of Systems:  Review of Systems   Otherwise complete ROS is negative except as mentioned above.  No chest pain no shortness of breath no nausea no vomiting  Physical Exam:   Temp:  [98.1 °F (36.7 °C)] 98.1 °F (36.7 °C)  Heart Rate:  [63-76] 68  Resp:  [18-20] 18  BP: (120-135)/(59-67) 120/64  Physical Exam  Patient appears well, alert and oriented x 3, pleasant, cooperative.   Neck supple  No JVD No thyromegaly.  Lungs decreased breath sounds No crackles no wheezing   CV S1S2 normal, no murmurs, clicks, gallops or rubs.  Abdomen is soft, no tenderness, masses or organomegaly.    Extremities: no clubbing cyanosis or edema   Neurological CN 2-12 intact   Skin is normal    Results Reviewed:  I have personally reviewed current lab, radiology, and data and agree with results.  Lab Results (last 24 hours)     Procedure Component Value Units Date/Time    Urine Drug Screen - Urine, Clean Catch [[id number]]  (Abnormal) Collected:  09/16/18 2218    Specimen:  Urine from Urine, Clean Catch Updated:  09/16/18 2329     Amphetamine Screen, Urine Positive (A)     Barbiturates Screen, Urine Negative     Benzodiazepine Screen, Urine Negative     Cocaine  Screen, Urine Negative     Methadone Screen, Urine Negative     Opiate Screen Negative     Oxycodone Screen, Urine Negative     THC, Screen, Urine Positive (A)    Narrative:       Negative Thresholds For Drugs Screened in Urine:     Amphetamines          500 ng/ml  Barbiturates          200 ng/ml  Benzodiazepines       200 ng/ml  Cocaine               150 ng/ml  Methadone             300 ng/mL  Opiates               300 ng/mL  Oxycodone             100 ng/mL  THC                   20 ng/mL    The normal value for all drugs tested is negative. This report includes final unconfirmed screening results.  A positive result by this assay can be, at your request, sent to the Reference Lab for confirmation by gas chromatography. Unconfirmed results must not be used for non-medical purposes, such as employment or legal testing. Clinical consideration should be applied to any drug of abuse test result, particularly when unconfirmed results are used.    BNP [[id number]]  (Normal) Collected:  09/16/18 2211    Specimen:  Blood from Arm, Right Updated:  09/16/18 2301     proBNP 348.0 pg/mL     Troponin [[id number]]  (Normal) Collected:  09/16/18 2211    Specimen:  Blood from Arm, Right Updated:  09/16/18 2301     Troponin I <0.012 ng/mL     Ethanol [[id number]] Collected:  09/16/18 2211    Specimen:  Blood from Arm, Right Updated:  09/16/18 2258     Ethanol <10 mg/dL      Ethanol % <0.010 %     Protime-INR [[id number]]  (Normal) Collected:  09/16/18 2211    Specimen:  Blood from Arm, Right Updated:  09/16/18 2243     Protime 13.4 Seconds      INR 1.04    Narrative:       Therapeutic range for most indications is 2.0-3.0 INR,  or 2.5-3.5 for mechanical heart valves.    aPTT [[id number]]  (Normal) Collected:  09/16/18 2211    Specimen:  Blood from Arm, Right Updated:  09/16/18 2243     PTT 29.0 seconds     Narrative:       The recommended Heparin therapeutic range is 68-97 seconds.    Comprehensive Metabolic Panel [[id number]]  (Normal)  Collected:  09/16/18 2211    Specimen:  Blood from Arm, Right Updated:  09/16/18 2243     Glucose 97 mg/dL      BUN 17 mg/dL      Creatinine 0.75 mg/dL      Sodium 139 mmol/L      Potassium 3.5 mmol/L      Chloride 105 mmol/L      CO2 27.0 mmol/L      Calcium 8.5 mg/dL      Total Protein 6.9 g/dL      Albumin 3.80 g/dL      ALT (SGPT) 20 U/L      AST (SGOT) 25 U/L      Alkaline Phosphatase 101 U/L      Total Bilirubin 0.3 mg/dL      eGFR Non African Amer 77 mL/min/1.73      Globulin 3.1 gm/dL      A/G Ratio 1.2 g/dL      BUN/Creatinine Ratio 22.7     Anion Gap 7.0 mmol/L     CK [[id number]]  (Normal) Collected:  09/16/18 2211    Specimen:  Blood from Arm, Right Updated:  09/16/18 2243     Creatine Kinase 100 U/L     Magnesium [[id number]]  (Normal) Collected:  09/16/18 2211    Specimen:  Blood from Arm, Right Updated:  09/16/18 2243     Magnesium 1.9 mg/dL     Urinalysis, Microscopic Only - Urine, Clean Catch [[id number]]  (Abnormal) Collected:  09/16/18 2218    Specimen:  Urine from Urine, Clean Catch Updated:  09/16/18 2237     RBC, UA 0-2 (A) /HPF      WBC, UA 6-12 (A) /HPF      Bacteria, UA None Seen /HPF      Squamous Epithelial Cells, UA 3-5 (A) /HPF      Hyaline Casts, UA None Seen /LPF      Methodology Automated Microscopy    Urinalysis With Culture If Indicated - Urine, Clean Catch [[id number]]  (Abnormal) Collected:  09/16/18 2218    Specimen:  Urine from Urine, Clean Catch Updated:  09/16/18 2237     Color, UA Yellow     Appearance, UA Clear     pH, UA 6.5     Specific Gravity, UA 1.009     Glucose, UA Negative     Ketones, UA Negative     Bilirubin, UA Negative     Blood, UA Negative     Protein, UA Negative     Leuk Esterase, UA Trace (A)     Nitrite, UA Negative     Urobilinogen, UA 0.2 E.U./dL    CBC & Differential [[id number]] Collected:  09/16/18 2211    Specimen:  Blood Updated:  09/16/18 2221    Narrative:       The following orders were created for panel order CBC & Differential.  Procedure                                Abnormality         Status                     ---------                               -----------         ------                     CBC Auto Differential[[id number]]        Abnormal            Final result                 Please view results for these tests on the individual orders.    CBC Auto Differential [[id number]]  (Abnormal) Collected:  09/16/18 2211    Specimen:  Blood from Arm, Right Updated:  09/16/18 2221     WBC 10.05 (H) 10*3/mm3      RBC 3.70 (L) 10*6/mm3      Hemoglobin 11.2 (L) g/dL      Hematocrit 33.1 (L) %      MCV 89.5 fL      MCH 30.3 pg      MCHC 33.8 g/dL      RDW 14.3 %      RDW-SD 46.9 (H) fl      MPV 8.9 fL      Platelets 258 10*3/mm3      Neutrophil % 58.5 %      Lymphocyte % 27.4 %      Monocyte % 10.1 %      Eosinophil % 3.1 %      Basophil % 0.5 %      Immature Grans % 0.4 %      Neutrophils, Absolute 5.88 10*3/mm3      Lymphocytes, Absolute 2.75 10*3/mm3      Monocytes, Absolute 1.02 (H) 10*3/mm3      Eosinophils, Absolute 0.31 10*3/mm3      Basophils, Absolute 0.05 10*3/mm3      Immature Grans, Absolute 0.04 (H) 10*3/mm3         Imaging Results (last 24 hours)     Procedure Component Value Units Date/Time    CT Cervical Spine Without Contrast [[id number]] Collected:  09/16/18 2124     Updated:  09/16/18 2159    Narrative:       PROCEDURE: CT cervical spine without contrast.    INDICATION: Fall. Pain.    COMPARISON: None.    CT .0 milliGray-cm.    TECHNIQUE: This exam was performed according to our departmental  dose-optimization program, which includes automated exposure  control, adjustment of the mA and/or kV according to patient size  and/or use of iterative reconstruction technique. CT cervical  spine without contrast performed with axial and reconstructed  sagittal and coronal images from above the foramen magnum to the  bottom of T3.    Prevertebral soft tissues are normal.    Cervical vertebrae and visualized upper thoracic vertebrae normal  height and  normally aligned. No compression injuries.    Moderate degenerative disc changes C5-6 and C7-T1 with associated  hypertrophic spurring.    The articular facets are intact and normally aligned. Scattered  areas of facet arthritic change.    The lamina, spinous processes and odontoid are intact. There are  degenerative arthritic with bony spurring at the articulation of  the odontoid and anterior arch C1.    Apices of lungs clear.      Impression:       No fractures or acute osseous abnormalities.    Degenerative arthritic changes with bony osteophytes at the  junction of the anterior arch C1 hand the odontoid.    Scattered facet arthritic changes.    Moderate degenerative disc changes C5-C6 and C7-T1.    [id number]    Electronically signed by:  Billy Bennett MD  9/16/2018 9:58  PM CDT Workstation: [id number]    CT Lumbar Spine Without Contrast [[id number]] Collected:  09/16/18 2124     Updated:  09/16/18 2150    Narrative:       CT lumbar spine without contrast on  9/16/2018     CLINICAL INDICATION: Low back pain after fall    TECHNIQUE: Multiple axial images are obtained throughout the  lumbar spine without the administration of contrast. Sagittal and  coronal reformatted images are also performed and reviewed. This  exam was performed according to our departmental  dose-optimization program, which includes automated exposure  control, adjustment of the mA and/or kV according to patient size  and/or use of iterative reconstruction technique.   Total DLP is 511.8 mGy*cm.    COMPARISON: None    FINDINGS: There is levoscoliosis of the lumbar spine.  Degenerative disc disease is noted especially from T12 through  L3. Large Schmorl's node formation is noted in the superior  endplate of L2. There is an old compression fracture of the  superior endplate of L3. Reformatted images reveal otherwise  normal alignment of the lumbar spine. Facet arthropathy is noted  throughout the lumbar spine. Vascular calcifications are  noted.  The SI joints are well aligned.    At the T12-L1 level, there is a calcified right paracentral disc  bulge producing mild to moderate right-sided canal stenosis and  very mild right foraminal narrowing.    At the L1-2 level, based disc osteophyte complex produces mild  canal stenosis and mild right foraminal narrowing.    At the L2-3 level, broad-based disc osteophyte complex and facet  arthropathy produces mild canal stenosis and mild right foraminal  narrowing.    No definite disc herniation is noted otherwise.      Impression:       Levoscoliosis with degenerative changes with no acute  abnormality.    Electronically signed by:  Surinder Cantu  9/16/2018 9:49 PM  CDT Workstation: [id number]    CT Head Without Contrast [[id number]] Collected:  09/16/18 2124     Updated:  09/16/18 2144    Narrative:         CT head without contrast on 9/16/2018     CLINICAL INDICATION: Fell and hit head, pain    TECHNIQUE:  Multiple axial images are obtained throughout the  head without the administration of contrast.  This exam was  performed according to our departmental dose-optimization  program, which includes automated exposure control, adjustment of  the mA and/or kV according to patient size and/or use of  iterative reconstruction technique.   Total DLP is 1029 mGy*cm.    COMPARISON: 11/6/2014    FINDINGS:  There is mild generalized cerebral atrophy. There is  mild low density in the periventricular white matter consistent  with chronic small vessel ischemic changes. There is no  hydrocephalus. There is no hemorrhage. There are no abnormal  extra-axial fluid collections. There is no mass, mass effect or  midline shift. There is no CT evidence of acute infarct. No bony  abnormality is noted.      Impression:       Atrophy and chronic small vessel ischemic changes  with no acute intracranial abnormality.    Electronically signed by:  Surinder Cantu  9/16/2018 9:42 PM  CDT Workstation: [id number]    XR Hip  With or Without Pelvis 2 - 3 View Left [[id number]] Collected:  09/16/18 2103     Updated:  09/16/18 2129    Narrative:       Pelvis and left hip total three view on 9/16/2018    CLINICAL INDICATION: Left hip pain after fall    COMPARISON: None    FINDINGS: Vascular calcifications are noted. Calcified  phleboliths and likely injection granulomas are noted in the  pelvis. The hips are well located. The SI joints are well  aligned. No acute fracture is noted.      Impression:       No acute bony abnormality.    Electronically signed by:  Surinder Cantu  9/16/2018 9:28 PM  CDT Workstation: [id number]    XR Shoulder 2+ View Left [[id number]] Collected:  09/16/18 2103     Updated:  09/16/18 2122    Narrative:       Left shoulder three view on 9/16/2018    CLINICAL INDICATION: Left shoulder pain after fall    COMPARISON: 3/10/2016    FINDINGS: The patient is status post interval shoulder  arthroplasty. There has been resection of the left distal  clavicle. No hardware complication is noted. There is no  dislocation. There are no fractures.      Impression:       No acute bony abnormality.    Electronically signed by:  Surinder Cantu  9/16/2018 9:20 PM  CDT Workstation: [id number]    XR Chest 1 View [[id number]] Collected:  09/16/18 2102     Updated:  09/16/18 2113    Narrative:         Chest single view on  9/16/2018     CLINICAL INDICATION: Syncope, fall    COMPARISON: 1/11/2018    FINDINGS: The patient is status post a left shoulder  arthroplasty. Borderline cardiomegaly is noted. Vascular  calcification is noted in the aorta. There is mild linear  atelectasis or scarring in the lung bases. The lungs are  otherwise clear. Hilar and mediastinal contours are within normal  limits.      Impression:       No acute disease.    Electronically signed by:  Surinder Cantu  9/16/2018 9:12 PM  CDT Workstation: [id number]            Assessment/Plan:           1.  Fall with syncope: Likely multifactorial in the  "setting of methamphetamine/marijuana use, also could be related to hypoxia in the setting of COPD, place on oxygen and bronchodilators, obtain cardiac enzymes and a d-dimer, obtain carotid Dopplers and a 2-D echo, start normal saline at 75 mL per hour.  2.  COPD without acute exacerbation: Oxygen and bronchodilators as indicated  3.  Illicit drug use with methamphetamine/marijuana: Counseled  4.  Nicotine use without evidence of withdrawal wall: Counseled  5.  Depression and anxiety, resume outpatient medications  6.  Essential hypertension: Resume outpatient medications  7.  History of pulmonary embolism: Continue Eliquis  8.  History  of carotid stenosis: Check carotid Dopplers  9. Acute uti upon admission, unspecified location or organism: start Levaquin obtain blood and urine cultures   10.  DVT prophylaxis: Monty Ingram MD  09/17/18  12:25 AM                    Electronically signed by Mariza Ingram MD at 9/17/2018  7:24 AM          Emergency Department Notes      Ebony Murray RN at 9/16/2018  8:12 PM        Neighbor called EMS for witnessed collapse on pt's back porch. Pt fell back and hit back of her head and has knot. Pt c/o neck pain, HA, back pain, L shoulder pain, and L side pain down to her hip. Pt A&Ox4.     Ebony Murray RN  09/16/18 2014      Electronically signed by Ebony Murray RN at 9/16/2018  8:14 PM     Ebony Murray RN at 9/16/2018  8:35 PM        Pt removed off of backboard with MD direction.      Ebony Murray RN  09/16/18 2046      Electronically signed by Ebony Murray RN at 9/16/2018  8:46 PM     Jayme Lamar DO at 9/16/2018  8:35 PM          Subjective   68-year-old female is brought to emergency department via EMS from her home.  She was reportedly seen by a neighbor falling on her porch.  They reported that she \"collapsed\".  Patient does not remember what happened.  She reports that she had walked outside and went to go back in and " "then for some reason apparently turned to go back out and does not remember after that until EMS arrived.  She is complaining of neck and low back pain as well as headache.  She states she has a lump on the posterior aspect of her head from the fall.  She denies any chest pain.  She denies shortness of breath.  She states she spent the whole day cleaning her house and had not been having any symptoms or complaints.    The friend that accompanies her to emergency department.  Sitting outside the room and states the patient has been known to \"do drugs\".  She is concerned that this may have contributed to this episode today.    Family history, surgical history, social history, current medications and allergies are reviewed with the patient and triage documentation and vitals are reviewed.          History provided by:  Patient and friend   used: No        Review of Systems   Constitutional: Negative for chills, diaphoresis and fever.   Eyes: Negative.    Respiratory: Negative for cough, shortness of breath and wheezing.    Cardiovascular: Negative for chest pain, palpitations and leg swelling.   Gastrointestinal: Negative for abdominal distention, abdominal pain, diarrhea, nausea and vomiting.   Endocrine: Negative.    Genitourinary: Negative for dysuria, frequency and urgency.   Musculoskeletal: Positive for arthralgias, back pain and neck pain. Negative for joint swelling and myalgias.   Skin: Negative for rash and wound.   Neurological: Positive for syncope and headaches. Negative for dizziness, seizures, facial asymmetry, speech difficulty, weakness, light-headedness and numbness.   Hematological: Negative.    Psychiatric/Behavioral: Negative.        Past Medical History:   Diagnosis Date   • Anxiety    • Arthritis    • Asthma    • 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)     bilateral legs - bilateral iliac stents 2008      • Chronic lower back pain    • " COPD (chronic obstructive pulmonary disease) (CMS/HCC)    • Coronary arteriosclerosis    • Essential (primary) hypertension    • History of pulmonary embolus (PE)    • Hyperlipidemia    • Insomnia    • Lumbago    • Nicotine dependence    • Occlusion of artery (CMS/HCC)      and stenosis of bilateral carotid arteries - BABS 16-49%, LICA 0-15%   • Other atherosclerosis of native artery of other extremity     LEFT subclavian stent 2005 (occluded)       Allergies   Allergen Reactions   • Morphine And Related Itching, Confusion and Hallucinations   • Penicillins Rash       Past Surgical History:   Procedure Laterality Date   • CARDIAC CATHETERIZATION  04/06/2016    No evidence of any obstructive epicardial CAD.Preserved LV systolic function with EF of 57%.   • CENTRAL VENOUS LINE INSERTION  04/07/2016    Successful placement of right uppe extremity 6-Emirati triple lumen PICC line.   • ENDOSCOPY N/A 1/12/2018    Procedure: ESOPHAGOGASTRODUODENOSCOPY;  Surgeon: Bin Martin MD;  Location: [facility] ENDOSCOPY;  Service:    • ENDOSCOPY N/A 1/17/2018    Procedure: ESOPHAGOGASTRODUODENOSCOPY;  Surgeon: Bin Martin MD;  Location: [facility] ENDOSCOPY;  Service:    • ENDOSCOPY N/A 3/16/2018    Procedure: ESOPHAGOGASTRODUODENOSCOPY possible dilation;  Surgeon: Bin Martin MD;  Location: [facility] ENDOSCOPY;  Service: Gastroenterology   • FRACTURE SURGERY     • HYSTERECTOMY     • TOTAL SHOULDER REPLACEMENT Left    • TRANSESOPHAGEAL ECHOCARDIOGRAM (JACQUES)  04/07/2016    With color flow-Mild to moderate [facility].LV systolic function well preserved with Ef of 55-60%.Mitral and AV intact.Diastolic dysfunction   • UPPER GASTROINTESTINAL ENDOSCOPY  01/17/2018    Dr. Bin Martin M.D.       Family History   Problem Relation Age of Onset   • Heart disease Other    • Hypertension Other        Social History     Social History   • Marital status:      Social History Main Topics   • Smoking status: Current Every Day Smoker      Packs/day: 0.50     Years: 50.00     Types: Cigarettes   • Smokeless tobacco: Never Used      Comment: 02/05/2018 - Patient opted to decline cessation of smoking counseling at present time.   • Alcohol use No   • Drug use: No   • Sexual activity: Defer     Other Topics Concern   • Not on file           Objective   Physical Exam   Constitutional: She is oriented to person, place, and time. She appears well-developed and well-nourished. No distress.   HENT:   Head: Normocephalic.   Right Ear: External ear normal.   Left Ear: External ear normal.   Mouth/Throat: Oropharynx is clear and moist.   Eyes: Pupils are equal, round, and reactive to light. Conjunctivae and EOM are normal. Right eye exhibits no discharge. Left eye exhibits no discharge.   Neck: Normal range of motion. Neck supple. No JVD present.   Cardiovascular: Normal rate and regular rhythm.    No murmur heard.  Pulses:       Radial pulses are 2+ on the right side, and 2+ on the left side.        Dorsalis pedis pulses are 2+ on the right side, and 2+ on the left side.   Pulmonary/Chest: Effort normal. No tachypnea. No respiratory distress. She has wheezes in the right upper field, the right lower field, the left upper field and the left lower field. She has no rhonchi. She has no rales.   Abdominal: Soft. Bowel sounds are normal. She exhibits no distension. There is no tenderness.   Musculoskeletal:        Left shoulder: She exhibits tenderness and pain. She exhibits no bony tenderness, no swelling, no crepitus, no deformity, no spasm, normal pulse and normal strength.        Left hip: She exhibits decreased range of motion (due to pain in back), tenderness and bony tenderness. She exhibits no swelling, no crepitus and no deformity.        Cervical back: She exhibits tenderness, bony tenderness and pain. She exhibits no deformity and no spasm.        Thoracic back: She exhibits normal range of motion, no tenderness, no bony tenderness, no pain and no spasm.         Lumbar back: She exhibits decreased range of motion, tenderness, bony tenderness and pain. She exhibits no deformity and no spasm.   Neurological: She is alert and oriented to person, place, and time.   Skin: Skin is dry. Capillary refill takes less than 2 seconds. She is not diaphoretic.   Psychiatric: She has a normal mood and affect.   Nursing note and vitals reviewed.      Procedures  None        ED Course      Labs Reviewed   URINALYSIS W/ CULTURE IF INDICATED - Abnormal; Notable for the following:        Result Value    Leuk Esterase, UA Trace (*)     All other components within normal limits   URINE DRUG SCREEN - Abnormal; Notable for the following:     Amphetamine Screen, Urine Positive (*)     THC, Screen, Urine Positive (*)     All other components within normal limits    Narrative:     Negative Thresholds For Drugs Screened in Urine:     Amphetamines          500 ng/ml  Barbiturates          200 ng/ml  Benzodiazepines       200 ng/ml  Cocaine               150 ng/ml  Methadone             300 ng/mL  Opiates               300 ng/mL  Oxycodone             100 ng/mL  THC                   20 ng/mL    The normal value for all drugs tested is negative. This report includes final unconfirmed screening results.  A positive result by this assay can be, at your request, sent to the Reference Lab for confirmation by gas chromatography. Unconfirmed results must not be used for non-medical purposes, such as employment or legal testing. Clinical consideration should be applied to any drug of abuse test result, particularly when unconfirmed results are used.   CBC WITH AUTO DIFFERENTIAL - Abnormal; Notable for the following:     WBC 10.05 (*)     RBC 3.70 (*)     Hemoglobin 11.2 (*)     Hematocrit 33.1 (*)     RDW-SD 46.9 (*)     Monocytes, Absolute 1.02 (*)     Immature Grans, Absolute 0.04 (*)     All other components within normal limits   URINALYSIS, MICROSCOPIC ONLY - Abnormal; Notable for the following:      RBC, UA 0-2 (*)     WBC, UA 6-12 (*)     Squamous Epithelial Cells, UA 3-5 (*)     All other components within normal limits   D-DIMER, QUANTITATIVE - Abnormal; Notable for the following:     D-Dimer, Quantitative 496 (*)     All other components within normal limits    Narrative:     Dimer values <500 ng/ml FEU are FDA approved as aid in diagnosis of deep venous thrombosis and pulmonary embolism.  This test should not be used in an exclusion strategy with pretest probability alone.    A recent guideline regarding diagnosis for pulmonary thromboembolism recommends an adjusted exclusion criterion of age x 10 ng/ml FEU for patients >50 years of age ([employer] Intern Med 2015; 163: 701-711).   COMPREHENSIVE METABOLIC PANEL - Abnormal; Notable for the following:     Glucose 110 (*)     Calcium 8.2 (*)     All other components within normal limits   COMPREHENSIVE METABOLIC PANEL - Normal   PROTIME-INR - Normal    Narrative:     Therapeutic range for most indications is 2.0-3.0 INR,  or 2.5-3.5 for mechanical heart valves.   APTT - Normal    Narrative:     The recommended Heparin therapeutic range is 68-97 seconds.   BNP (IN-HOUSE) - Normal   TROPONIN (IN-HOUSE) - Normal   CK - Normal   MAGNESIUM - Normal   TROPONIN (IN-HOUSE) - Normal   CK - Normal   CK MB - Normal   ETHANOL   TROPONIN (IN-HOUSE)   COMPREHENSIVE METABOLIC PANEL   CBC WITH AUTO DIFFERENTIAL   CBC AND DIFFERENTIAL    Narrative:     The following orders were created for panel order CBC & Differential.  Procedure                               Abnormality         Status                     ---------                               -----------         ------                     CBC Auto Differential[[id number]]        Abnormal            Final result                 Please view results for these tests on the individual orders.   CBC AND DIFFERENTIAL    Narrative:     The following orders were created for panel order CBC & Differential.  Procedure                                Abnormality         Status                     ---------                               -----------         ------                     CBC Auto Differential[[id number]]                                                         Please view results for these tests on the individual orders.     Xr Shoulder 2+ View Left    Result Date: 9/16/2018  Narrative: Left shoulder three view on 9/16/2018 CLINICAL INDICATION: Left shoulder pain after fall COMPARISON: 3/10/2016 FINDINGS: The patient is status post interval shoulder arthroplasty. There has been resection of the left distal clavicle. No hardware complication is noted. There is no dislocation. There are no fractures.     Impression: No acute bony abnormality. Electronically signed by:  Surinder Cantu  9/16/2018 9:20 PM CDT Workstation: [id number]    Ct Head Without Contrast    Result Date: 9/16/2018  Narrative: CT head without contrast on 9/16/2018 CLINICAL INDICATION: Fell and hit head, pain TECHNIQUE:  Multiple axial images are obtained throughout the head without the administration of contrast.  This exam was performed according to our departmental dose-optimization program, which includes automated exposure control, adjustment of the mA and/or kV according to patient size and/or use of iterative reconstruction technique. Total DLP is 1029 mGy*cm. COMPARISON: 11/6/2014 FINDINGS:  There is mild generalized cerebral atrophy. There is mild low density in the periventricular white matter consistent with chronic small vessel ischemic changes. There is no hydrocephalus. There is no hemorrhage. There are no abnormal extra-axial fluid collections. There is no mass, mass effect or midline shift. There is no CT evidence of acute infarct. No bony abnormality is noted.     Impression: Atrophy and chronic small vessel ischemic changes with no acute intracranial abnormality. Electronically signed by:  Surinder Cantu  9/16/2018 9:42 PM CDT Workstation:  [id number]    Ct Cervical Spine Without Contrast    Result Date: 9/16/2018  Narrative: PROCEDURE: CT cervical spine without contrast. INDICATION: Fall. Pain. COMPARISON: None. CT .0 milliGray-cm. TECHNIQUE: This exam was performed according to our departmental dose-optimization program, which includes automated exposure control, adjustment of the mA and/or kV according to patient size and/or use of iterative reconstruction technique. CT cervical spine without contrast performed with axial and reconstructed sagittal and coronal images from above the foramen magnum to the bottom of T3. Prevertebral soft tissues are normal. Cervical vertebrae and visualized upper thoracic vertebrae normal height and normally aligned. No compression injuries. Moderate degenerative disc changes C5-6 and C7-T1 with associated hypertrophic spurring. The articular facets are intact and normally aligned. Scattered areas of facet arthritic change. The lamina, spinous processes and odontoid are intact. There are degenerative arthritic with bony spurring at the articulation of the odontoid and anterior arch C1. Apices of lungs clear.     Impression: No fractures or acute osseous abnormalities. Degenerative arthritic changes with bony osteophytes at the junction of the anterior arch C1 hand the odontoid. Scattered facet arthritic changes. Moderate degenerative disc changes C5-C6 and C7-T1. [id number] Electronically signed by:  Billy Bennett MD  9/16/2018 9:58 PM CDT Workstation: [id number]    Ct Lumbar Spine Without Contrast    Result Date: 9/16/2018  Narrative: CT lumbar spine without contrast on  9/16/2018 CLINICAL INDICATION: Low back pain after fall TECHNIQUE: Multiple axial images are obtained throughout the lumbar spine without the administration of contrast. Sagittal and coronal reformatted images are also performed and reviewed. This exam was performed according to our departmental dose-optimization program, which includes  automated exposure control, adjustment of the mA and/or kV according to patient size and/or use of iterative reconstruction technique. Total DLP is 511.8 mGy*cm. COMPARISON: None FINDINGS: There is levoscoliosis of the lumbar spine. Degenerative disc disease is noted especially from T12 through L3. Large Schmorl's node formation is noted in the superior endplate of L2. There is an old compression fracture of the superior endplate of L3. Reformatted images reveal otherwise normal alignment of the lumbar spine. Facet arthropathy is noted throughout the lumbar spine. Vascular calcifications are noted. The SI joints are well aligned. At the T12-L1 level, there is a calcified right paracentral disc bulge producing mild to moderate right-sided canal stenosis and very mild right foraminal narrowing. At the L1-2 level, based disc osteophyte complex produces mild canal stenosis and mild right foraminal narrowing. At the L2-3 level, broad-based disc osteophyte complex and facet arthropathy produces mild canal stenosis and mild right foraminal narrowing. No definite disc herniation is noted otherwise.     Impression: Levoscoliosis with degenerative changes with no acute abnormality. Electronically signed by:  Surinder Cantu  9/16/2018 9:49 PM CDT Workstation: [id number]    Xr Chest 1 View    Result Date: 9/16/2018  Narrative: Chest single view on  9/16/2018 CLINICAL INDICATION: Syncope, fall COMPARISON: 1/11/2018 FINDINGS: The patient is status post a left shoulder arthroplasty. Borderline cardiomegaly is noted. Vascular calcification is noted in the aorta. There is mild linear atelectasis or scarring in the lung bases. The lungs are otherwise clear. Hilar and mediastinal contours are within normal limits.     Impression: No acute disease. Electronically signed by:  Surinder Cantu  9/16/2018 9:12 PM CDT Workstation: [id number]    Xr Hip With Or Without Pelvis 2 - 3 View Left    Result Date: 9/16/2018  Narrative:  Pelvis and left hip total three view on 9/16/2018 CLINICAL INDICATION: Left hip pain after fall COMPARISON: None FINDINGS: Vascular calcifications are noted. Calcified phleboliths and likely injection granulomas are noted in the pelvis. The hips are well located. The SI joints are well aligned. No acute fracture is noted.     Impression: No acute bony abnormality. Electronically signed by:  Surinder Cantu  9/16/2018 9:28 PM CDT Workstation: [id number]      EKG September 16, 2018 at 2013 reveals normal sinus rhythm with sinus arrhythmia at 67 bpm.  There is biphasic P wave in V1 with inverted T waves in V5 and V6 without evidence of acute ischemia.  This is unchanged from previous EKG.          MDM  Number of Diagnoses or Management Options  Contusion of scalp, initial encounter:   Fall, initial encounter:   Hypoxia:   Syncope, unspecified syncope type:      Amount and/or Complexity of Data Reviewed  Clinical lab tests: reviewed  Tests in the radiology section of CPT®:  reviewed  Tests in the medicine section of CPT®:  reviewed    Patient Progress  Patient progress: stable    Laboratory studies are unremarkable.  UDS reveals amphetamine and THC.  Patient denies use of either base.  EKG shows no acute ischemia.  Chest x-ray is unremarkable.  Shoulder x-ray reveals evidence of previous shoulder replacement without acute abnormality.  Imaging of the head, cervical and lumbar spines reveal no acute abnormality.  Hip is also a mention is unremarkable.  From a traumatic standpoint patient is felt appropriate for discharge home however she is continued hypoxia even on 3 L nasal cannula oxygen.  When removed to room air she desats to 87%.  On 3 L nasal cannula she hovers around 90-91%.  Patient has chronic lung disease, COPD and is felt to likely live at a lower oxygen saturation however she cannot go home and oxygen.  Due to concern for possible syncopal episode even though she does have evidence of drug intoxication  patient will be admitted for observation and further evaluation of her hypoxia.  She acknowledges understanding and is agreeable.    Final diagnoses:   Fall, initial encounter   Contusion of scalp, initial encounter   Hypoxia   Syncope, unspecified syncope type            Jayme Lamar DO  09/17/18 0315      Electronically signed by Jayme Lamar DO at 9/17/2018  3:15 AM     Ebony Murray, RN at 9/16/2018  9:25 PM        Pt transported from [facility] to CT and is currently in CT now.      Ebony Murray RN  09/16/18 9220      Electronically signed by Ebony Murray RN at 9/16/2018  9:25 PM     Jany Arredondo, RN at 9/16/2018 11:43 PM        Spoke with Vivian with resp and she will come to give neb tx      Jany Arredondo RN  09/16/18 8551      Electronically signed by Jany Arredondo RN at 9/16/2018 11:44 PM       Hospital Medications (all)       Dose Frequency Start End    acetaminophen (TYLENOL) tablet 650 mg 650 mg Every 6 Hours PRN 9/17/2018     Sig - Route: Take 2 tablets by mouth Every 6 (Six) Hours As Needed for Mild Pain . - Oral    amLODIPine (NORVASC) tablet 10 mg 10 mg Daily 9/17/2018     Sig - Route: Take 1 tablet by mouth Daily. - Oral    apixaban (ELIQUIS) tablet 5 mg 5 mg Daily 9/17/2018     Sig - Route: Take 1 tablet by mouth Daily. - Oral    atorvastatin (LIPITOR) tablet 10 mg 10 mg Daily 9/17/2018     Sig - Route: Take 1 tablet by mouth Daily. - Oral    HYDROcodone-acetaminophen (NORCO) 5-325 MG per tablet 1 tablet 1 tablet Once 9/16/2018 9/16/2018    Sig - Route: Take 1 tablet by mouth 1 (One) Time. - Oral    ipratropium-albuterol (DUO-NEB) nebulizer solution 3 mL 3 mL Once 9/16/2018 9/16/2018    Sig - Route: Take 3 mL by nebulization 1 (One) Time. - Nebulization    ipratropium-albuterol (DUO-NEB) nebulizer solution 3 mL 3 mL 4 Times Daily - RT 9/17/2018     Sig - Route: Take 3 mL by nebulization 4 (Four) Times a Day. - Nebulization    levoFLOXacin (LEVAQUIN) 500 mg/100 mL D5W (premix)  "(LEVAQUIN) 500 mg 500 mg Every 24 Hours 9/17/2018 9/24/2018    Sig - Route: Infuse 100 mL into a venous catheter Daily. - Intravenous    pantoprazole (PROTONIX) EC tablet 40 mg 40 mg Daily 9/17/2018     Sig - Route: Take 1 tablet by mouth Daily. - Oral    sertraline (ZOLOFT) tablet 100 mg 100 mg Daily 9/17/2018     Sig - Route: Take 2 tablets by mouth Daily. - Oral    sodium chloride 0.9 % flush 10 mL 10 mL As Needed 9/16/2018     Sig - Route: Infuse 10 mL into a venous catheter As Needed for Line Care. - Intravenous    Linked Group 1:  \"And\" Linked Group Details        sodium chloride 0.9 % infusion 75 mL/hr Continuous 9/17/2018     Sig - Route: Infuse 75 mL/hr into a venous catheter Continuous. - Intravenous    traZODone (DESYREL) tablet 50 mg 50 mg Nightly PRN 9/17/2018     Sig - Route: Take 1 tablet by mouth At Night As Needed for Sleep. - Oral          Lab Results (last 72 hours)     Procedure Component Value Units Date/Time    Blood Culture - Blood, [[id number]]  (Normal) Collected:  09/17/18 0754    Specimen:  Blood from Arm, Left Updated:  09/18/18 0800     Blood Culture No growth at 24 hours    Blood Culture - Blood, [[id number]]  (Normal) Collected:  09/17/18 0754    Specimen:  Blood from Hand, Right Updated:  09/18/18 0800     Blood Culture No growth at 24 hours    Comprehensive Metabolic Panel [[id number]]  (Normal) Collected:  09/18/18 0536    Specimen:  Blood Updated:  09/18/18 0610     Glucose 97 mg/dL      BUN 8 mg/dL      Creatinine 0.59 mg/dL      Sodium 139 mmol/L      Potassium 3.8 mmol/L      Chloride 104 mmol/L      CO2 27.0 mmol/L      Calcium 8.9 mg/dL      Total Protein 7.0 g/dL      Albumin 3.80 g/dL      ALT (SGPT) 23 U/L      AST (SGOT) 29 U/L      Alkaline Phosphatase 106 U/L      Total Bilirubin 0.4 mg/dL      eGFR Non African Amer 102 mL/min/1.73      Globulin 3.2 gm/dL      A/G Ratio 1.2 g/dL      BUN/Creatinine Ratio 13.6     Anion Gap 8.0 mmol/L     CBC & Differential [[id number]] " Collected:  09/18/18 0536    Specimen:  Blood Updated:  09/18/18 0548    Narrative:       The following orders were created for panel order CBC & Differential.  Procedure                               Abnormality         Status                     ---------                               -----------         ------                     CBC Auto Differential[[id number]]        Abnormal            Final result                 Please view results for these tests on the individual orders.    CBC Auto Differential [[id number]]  (Abnormal) Collected:  09/18/18 0536    Specimen:  Blood Updated:  09/18/18 0548     WBC 9.71 10*3/mm3      RBC 4.09 10*6/mm3      Hemoglobin 12.3 g/dL      Hematocrit 36.7 %      MCV 89.7 fL      MCH 30.1 pg      MCHC 33.5 g/dL      RDW 14.6 (H) %      RDW-SD 47.8 (H) fl      MPV 8.6 fL      Platelets 292 10*3/mm3      Neutrophil % 50.2 %      Lymphocyte % 33.7 %      Monocyte % 12.8 (H) %      Eosinophil % 2.8 %      Basophil % 0.4 %      Immature Grans % 0.1 %      Neutrophils, Absolute 4.88 10*3/mm3      Lymphocytes, Absolute 3.27 10*3/mm3      Monocytes, Absolute 1.24 (H) 10*3/mm3      Eosinophils, Absolute 0.27 10*3/mm3      Basophils, Absolute 0.04 10*3/mm3      Immature Grans, Absolute 0.01 10*3/mm3      nRBC 0.0 /100 WBC     Troponin [[id number]]  (Normal) Collected:  09/17/18 0755    Specimen:  Blood Updated:  09/17/18 0828     Troponin I <0.012 ng/mL     Extra Tubes [[id number]] Collected:  09/17/18 0657    Specimen:  Blood from Blood, Venous Line Updated:  09/17/18 0800    Narrative:       The following orders were created for panel order Extra Tubes.  Procedure                               Abnormality         Status                     ---------                               -----------         ------                     Lavender Top[[id number]]                                     Final result                 Please view results for these tests on the individual orders.    Lavender Top  [[id number]] Collected:  09/17/18 0657    Specimen:  Blood Updated:  09/17/18 0800     Extra Tube hold for add-on     Comment: Auto resulted       Troponin [[id number]]  (Normal) Collected:  09/17/18 0657    Specimen:  Blood Updated:  09/17/18 0758     Troponin I <0.012 ng/mL     Comprehensive Metabolic Panel [[id number]]  (Abnormal) Collected:  09/17/18 0526    Specimen:  Blood Updated:  09/17/18 0623     Glucose 101 (H) mg/dL      BUN 18 mg/dL      Creatinine 0.73 mg/dL      Sodium 142 mmol/L      Potassium 3.7 mmol/L      Chloride 108 mmol/L      CO2 30.0 mmol/L      Calcium 8.1 (L) mg/dL      Total Protein 6.3 g/dL      Albumin 3.40 g/dL      ALT (SGPT) 21 U/L      AST (SGOT) 20 U/L      Alkaline Phosphatase 80 U/L      Total Bilirubin 0.2 mg/dL      eGFR Non African Amer 80 mL/min/1.73      Globulin 2.9 gm/dL      A/G Ratio 1.2 g/dL      BUN/Creatinine Ratio 24.7     Anion Gap 4.0 (L) mmol/L     CBC & Differential [[id number]] Collected:  09/17/18 0526    Specimen:  Blood Updated:  09/17/18 0605    Narrative:       The following orders were created for panel order CBC & Differential.  Procedure                               Abnormality         Status                     ---------                               -----------         ------                     CBC Auto Differential[[id number]]        Abnormal            Final result                 Please view results for these tests on the individual orders.    CBC Auto Differential [[id number]]  (Abnormal) Collected:  09/17/18 0526    Specimen:  Blood Updated:  09/17/18 0605     WBC 10.51 (H) 10*3/mm3      RBC 3.56 (L) 10*6/mm3      Hemoglobin 10.6 (L) g/dL      Hematocrit 32.5 (L) %      MCV 91.3 fL      MCH 29.8 pg      MCHC 32.6 g/dL      RDW 14.7 (H) %      RDW-SD 49.8 (H) fl      MPV 9.0 fL      Platelets 271 10*3/mm3      Neutrophil % 50.4 %      Lymphocyte % 33.0 %      Monocyte % 12.7 (H) %      Eosinophil % 2.9 %      Basophil % 0.7 %      Immature Grans % 0.3  %      Neutrophils, Absolute 5.31 10*3/mm3      Lymphocytes, Absolute 3.47 10*3/mm3      Monocytes, Absolute 1.33 (H) 10*3/mm3      Eosinophils, Absolute 0.30 10*3/mm3      Basophils, Absolute 0.07 10*3/mm3      Immature Grans, Absolute 0.03 (H) 10*3/mm3     CK-MB [[id number]]  (Normal) Collected:  09/17/18 0142    Specimen:  Blood Updated:  09/17/18 0217     CKMB 2.19 ng/mL     Troponin [[id number]]  (Normal) Collected:  09/17/18 0142    Specimen:  Blood Updated:  09/17/18 0217     Troponin I <0.012 ng/mL     D-dimer, Quantitative [[id number]]  (Abnormal) Collected:  09/17/18 0142    Specimen:  Blood Updated:  09/17/18 0212     D-Dimer, Quantitative 496 (H) ng/mL (FEU)     Narrative:       Dimer values <500 ng/ml FEU are FDA approved as aid in diagnosis of deep venous thrombosis and pulmonary embolism.  This test should not be used in an exclusion strategy with pretest probability alone.    A recent guideline regarding diagnosis for pulmonary thromboembolism recommends an adjusted exclusion criterion of age x 10 ng/ml FEU for patients >50 years of age ([employer] Intern Med 2015; 163: 701-711).    CK [[id number]]  (Normal) Collected:  09/17/18 0142    Specimen:  Blood Updated:  09/17/18 0202     Creatine Kinase 89 U/L     Comprehensive Metabolic Panel [[id number]]  (Abnormal) Collected:  09/17/18 0142    Specimen:  Blood Updated:  09/17/18 0202     Glucose 110 (H) mg/dL      BUN 18 mg/dL      Creatinine 0.76 mg/dL      Sodium 142 mmol/L      Potassium 3.6 mmol/L      Chloride 106 mmol/L      CO2 29.0 mmol/L      Calcium 8.2 (L) mg/dL      Total Protein 6.8 g/dL      Albumin 3.70 g/dL      ALT (SGPT) 18 U/L      AST (SGOT) 28 U/L      Alkaline Phosphatase 92 U/L      Total Bilirubin 0.2 mg/dL      eGFR Non African Amer 76 mL/min/1.73      Globulin 3.1 gm/dL      A/G Ratio 1.2 g/dL      BUN/Creatinine Ratio 23.7     Anion Gap 7.0 mmol/L     Urine Drug Screen - Urine, Clean Catch [[id number]]  (Abnormal) Collected:  09/16/18 8989     Specimen:  Urine from Urine, Clean Catch Updated:  09/16/18 2329     Amphetamine Screen, Urine Positive (A)     Barbiturates Screen, Urine Negative     Benzodiazepine Screen, Urine Negative     Cocaine Screen, Urine Negative     Methadone Screen, Urine Negative     Opiate Screen Negative     Oxycodone Screen, Urine Negative     THC, Screen, Urine Positive (A)    Narrative:       Negative Thresholds For Drugs Screened in Urine:     Amphetamines          500 ng/ml  Barbiturates          200 ng/ml  Benzodiazepines       200 ng/ml  Cocaine               150 ng/ml  Methadone             300 ng/mL  Opiates               300 ng/mL  Oxycodone             100 ng/mL  THC                   20 ng/mL    The normal value for all drugs tested is negative. This report includes final unconfirmed screening results.  A positive result by this assay can be, at your request, sent to the Reference Lab for confirmation by gas chromatography. Unconfirmed results must not be used for non-medical purposes, such as employment or legal testing. Clinical consideration should be applied to any drug of abuse test result, particularly when unconfirmed results are used.    BNP [[id number]]  (Normal) Collected:  09/16/18 2211    Specimen:  Blood from Arm, Right Updated:  09/16/18 2301     proBNP 348.0 pg/mL     Troponin [[id number]]  (Normal) Collected:  09/16/18 2211    Specimen:  Blood from Arm, Right Updated:  09/16/18 2301     Troponin I <0.012 ng/mL     Ethanol [[id number]] Collected:  09/16/18 2211    Specimen:  Blood from Arm, Right Updated:  09/16/18 2258     Ethanol <10 mg/dL      Ethanol % <0.010 %     Protime-INR [[id number]]  (Normal) Collected:  09/16/18 2211    Specimen:  Blood from Arm, Right Updated:  09/16/18 2243     Protime 13.4 Seconds      INR 1.04    Narrative:       Therapeutic range for most indications is 2.0-3.0 INR,  or 2.5-3.5 for mechanical heart valves.    aPTT [[id number]]  (Normal) Collected:  09/16/18 2211    Specimen:   Blood from Arm, Right Updated:  09/16/18 2243     PTT 29.0 seconds     Narrative:       The recommended Heparin therapeutic range is 68-97 seconds.    Comprehensive Metabolic Panel [[id number]]  (Normal) Collected:  09/16/18 2211    Specimen:  Blood from Arm, Right Updated:  09/16/18 2243     Glucose 97 mg/dL      BUN 17 mg/dL      Creatinine 0.75 mg/dL      Sodium 139 mmol/L      Potassium 3.5 mmol/L      Chloride 105 mmol/L      CO2 27.0 mmol/L      Calcium 8.5 mg/dL      Total Protein 6.9 g/dL      Albumin 3.80 g/dL      ALT (SGPT) 20 U/L      AST (SGOT) 25 U/L      Alkaline Phosphatase 101 U/L      Total Bilirubin 0.3 mg/dL      eGFR Non African Amer 77 mL/min/1.73      Globulin 3.1 gm/dL      A/G Ratio 1.2 g/dL      BUN/Creatinine Ratio 22.7     Anion Gap 7.0 mmol/L     CK [[id number]]  (Normal) Collected:  09/16/18 2211    Specimen:  Blood from Arm, Right Updated:  09/16/18 2243     Creatine Kinase 100 U/L     Magnesium [[id number]]  (Normal) Collected:  09/16/18 2211    Specimen:  Blood from Arm, Right Updated:  09/16/18 2243     Magnesium 1.9 mg/dL     Urinalysis, Microscopic Only - Urine, Clean Catch [[id number]]  (Abnormal) Collected:  09/16/18 2218    Specimen:  Urine from Urine, Clean Catch Updated:  09/16/18 2237     RBC, UA 0-2 (A) /HPF      WBC, UA 6-12 (A) /HPF      Bacteria, UA None Seen /HPF      Squamous Epithelial Cells, UA 3-5 (A) /HPF      Hyaline Casts, UA None Seen /LPF      Methodology Automated Microscopy    Urinalysis With Culture If Indicated - Urine, Clean Catch [[id number]]  (Abnormal) Collected:  09/16/18 2218    Specimen:  Urine from Urine, Clean Catch Updated:  09/16/18 2237     Color, UA Yellow     Appearance, UA Clear     pH, UA 6.5     Specific Gravity, UA 1.009     Glucose, UA Negative     Ketones, UA Negative     Bilirubin, UA Negative     Blood, UA Negative     Protein, UA Negative     Leuk Esterase, UA Trace (A)     Nitrite, UA Negative     Urobilinogen, UA 0.2 E.U./dL    CBC &  Differential [[id number]] Collected:  09/16/18 2211    Specimen:  Blood Updated:  09/16/18 2221    Narrative:       The following orders were created for panel order CBC & Differential.  Procedure                               Abnormality         Status                     ---------                               -----------         ------                     CBC Auto Differential[[id number]]        Abnormal            Final result                 Please view results for these tests on the individual orders.    CBC Auto Differential [[id number]]  (Abnormal) Collected:  09/16/18 2211    Specimen:  Blood from Arm, Right Updated:  09/16/18 2221     WBC 10.05 (H) 10*3/mm3      RBC 3.70 (L) 10*6/mm3      Hemoglobin 11.2 (L) g/dL      Hematocrit 33.1 (L) %      MCV 89.5 fL      MCH 30.3 pg      MCHC 33.8 g/dL      RDW 14.3 %      RDW-SD 46.9 (H) fl      MPV 8.9 fL      Platelets 258 10*3/mm3      Neutrophil % 58.5 %      Lymphocyte % 27.4 %      Monocyte % 10.1 %      Eosinophil % 3.1 %      Basophil % 0.5 %      Immature Grans % 0.4 %      Neutrophils, Absolute 5.88 10*3/mm3      Lymphocytes, Absolute 2.75 10*3/mm3      Monocytes, Absolute 1.02 (H) 10*3/mm3      Eosinophils, Absolute 0.31 10*3/mm3      Basophils, Absolute 0.05 10*3/mm3      Immature Grans, Absolute 0.04 (H) 10*3/mm3           Imaging Results (last 72 hours)     Procedure Component Value Units Date/Time    US Guided Vascular Access [[id number]] Resulted:  09/17/18 1109     Updated:  09/17/18 1109    Narrative:       This procedure was auto-finalized with no dictation required.    IR Insert Midline Without Port Pump 5 Plus [[id number]] Resulted:  09/17/18 1108     Updated:  09/17/18 1108    Narrative:       This procedure was auto-finalized with no dictation required.    CT Cervical Spine Without Contrast [[id number]] Collected:  09/16/18 2124     Updated:  09/16/18 2159    Narrative:       PROCEDURE: CT cervical spine without contrast.    INDICATION: Fall.  Pain.    COMPARISON: None.    CT .0 milliGray-cm.    TECHNIQUE: This exam was performed according to our departmental  dose-optimization program, which includes automated exposure  control, adjustment of the mA and/or kV according to patient size  and/or use of iterative reconstruction technique. CT cervical  spine without contrast performed with axial and reconstructed  sagittal and coronal images from above the foramen magnum to the  bottom of T3.    Prevertebral soft tissues are normal.    Cervical vertebrae and visualized upper thoracic vertebrae normal  height and normally aligned. No compression injuries.    Moderate degenerative disc changes C5-6 and C7-T1 with associated  hypertrophic spurring.    The articular facets are intact and normally aligned. Scattered  areas of facet arthritic change.    The lamina, spinous processes and odontoid are intact. There are  degenerative arthritic with bony spurring at the articulation of  the odontoid and anterior arch C1.    Apices of lungs clear.      Impression:       No fractures or acute osseous abnormalities.    Degenerative arthritic changes with bony osteophytes at the  junction of the anterior arch C1 hand the odontoid.    Scattered facet arthritic changes.    Moderate degenerative disc changes C5-C6 and C7-T1.    [id number]    Electronically signed by:  Billy Bennett MD  9/16/2018 9:58  PM CDT Workstation: [id number]    CT Lumbar Spine Without Contrast [[id number]] Collected:  09/16/18 2124     Updated:  09/16/18 2150    Narrative:       CT lumbar spine without contrast on  9/16/2018     CLINICAL INDICATION: Low back pain after fall    TECHNIQUE: Multiple axial images are obtained throughout the  lumbar spine without the administration of contrast. Sagittal and  coronal reformatted images are also performed and reviewed. This  exam was performed according to our departmental  dose-optimization program, which includes automated exposure  control,  adjustment of the mA and/or kV according to patient size  and/or use of iterative reconstruction technique.   Total DLP is 511.8 mGy*cm.    COMPARISON: None    FINDINGS: There is levoscoliosis of the lumbar spine.  Degenerative disc disease is noted especially from T12 through  L3. Large Schmorl's node formation is noted in the superior  endplate of L2. There is an old compression fracture of the  superior endplate of L3. Reformatted images reveal otherwise  normal alignment of the lumbar spine. Facet arthropathy is noted  throughout the lumbar spine. Vascular calcifications are noted.  The SI joints are well aligned.    At the T12-L1 level, there is a calcified right paracentral disc  bulge producing mild to moderate right-sided canal stenosis and  very mild right foraminal narrowing.    At the L1-2 level, based disc osteophyte complex produces mild  canal stenosis and mild right foraminal narrowing.    At the L2-3 level, broad-based disc osteophyte complex and facet  arthropathy produces mild canal stenosis and mild right foraminal  narrowing.    No definite disc herniation is noted otherwise.      Impression:       Levoscoliosis with degenerative changes with no acute  abnormality.    Electronically signed by:  Surinder Cantu  9/16/2018 9:49 PM  CDT Workstation: [id number]    CT Head Without Contrast [[id number]] Collected:  09/16/18 2124     Updated:  09/16/18 2144    Narrative:         CT head without contrast on 9/16/2018     CLINICAL INDICATION: Fell and hit head, pain    TECHNIQUE:  Multiple axial images are obtained throughout the  head without the administration of contrast.  This exam was  performed according to our departmental dose-optimization  program, which includes automated exposure control, adjustment of  the mA and/or kV according to patient size and/or use of  iterative reconstruction technique.   Total DLP is 1029 mGy*cm.    COMPARISON: 11/6/2014    FINDINGS:  There is mild generalized  cerebral atrophy. There is  mild low density in the periventricular white matter consistent  with chronic small vessel ischemic changes. There is no  hydrocephalus. There is no hemorrhage. There are no abnormal  extra-axial fluid collections. There is no mass, mass effect or  midline shift. There is no CT evidence of acute infarct. No bony  abnormality is noted.      Impression:       Atrophy and chronic small vessel ischemic changes  with no acute intracranial abnormality.    Electronically signed by:  Surinder Cantu  9/16/2018 9:42 PM  CDT Workstation: [id number]    XR Hip With or Without Pelvis 2 - 3 View Left [[id number]] Collected:  09/16/18 2103     Updated:  09/16/18 2129    Narrative:       Pelvis and left hip total three view on 9/16/2018    CLINICAL INDICATION: Left hip pain after fall    COMPARISON: None    FINDINGS: Vascular calcifications are noted. Calcified  phleboliths and likely injection granulomas are noted in the  pelvis. The hips are well located. The SI joints are well  aligned. No acute fracture is noted.      Impression:       No acute bony abnormality.    Electronically signed by:  Surinder Cantu  9/16/2018 9:28 PM  CDT Workstation: [id number]    XR Shoulder 2+ View Left [[id number]] Collected:  09/16/18 2103     Updated:  09/16/18 2122    Narrative:       Left shoulder three view on 9/16/2018    CLINICAL INDICATION: Left shoulder pain after fall    COMPARISON: 3/10/2016    FINDINGS: The patient is status post interval shoulder  arthroplasty. There has been resection of the left distal  clavicle. No hardware complication is noted. There is no  dislocation. There are no fractures.      Impression:       No acute bony abnormality.    Electronically signed by:  Surinder Cantu  9/16/2018 9:20 PM  CDT Workstation: [id number]    XR Chest 1 View [[id number]] Collected:  09/16/18 2102     Updated:  09/16/18 2113    Narrative:         Chest single view on  9/16/2018     CLINICAL  INDICATION: Syncope, fall    COMPARISON: 1/11/2018    FINDINGS: The patient is status post a left shoulder  arthroplasty. Borderline cardiomegaly is noted. Vascular  calcification is noted in the aorta. There is mild linear  atelectasis or scarring in the lung bases. The lungs are  otherwise clear. Hilar and mediastinal contours are within normal  limits.      Impression:       No acute disease.    Electronically signed by:  Surinder Cantu  9/16/2018 9:12 PM  [facility]T [email or web address]          ECG/EMG Results (last 72 hours)     Procedure Component Value Units Date/Time    ECG 12 Lead [[id number]] Collected:  09/16/18 2013     Updated:  09/17/18 0756    Narrative:       Test Reason : SYNCOPE  Blood Pressure : **/** mmHG  Vent. Rate : 067 BPM     Atrial Rate : 067 BPM     P-R Int : 148 ms          QRS Dur : 098 ms      QT Int : 436 ms       P-R-T Axes : 070 032 098 degrees     QTc Int : 460 ms    Normal sinus rhythm with sinus arrhythmia  Possible Left atrial enlargement  Possible Inferior infarct , age undetermined  ST &  T wave abnormality, consider lateral ischemia  Abnormal ECG    Confirmed by YASMANY BECKMAN MD ([id number]) on 9/17/2018 7:55:51 AM    Referred By:             Confirmed By:YASMANY BECKMAN MD    SCANNED EKG [[id number]] Resulted:  09/16/18      Updated:  09/17/18 0831    Adult Transthoracic Echo Complete W/ Cont if Necessary Per Protocol [[id number]] Collected:  09/17/18 0859     Updated:  09/17/18 1458     BSA 1.6 m^2      BH CV ECHO LORI - RVDD 2.1 cm      IVSd 1.0 cm      LVIDd 4.2 cm      LVIDs 3.0 cm      LVPWd 1.0 cm      IVS/LVPW 1.0     FS 28.6 %      EDV(Teich) 80.6 ml      ESV(Teich) 36.0 ml      EF(Teich) 55.4 %      EDV(cubed) 76.5 ml      ESV(cubed) 27.9 ml      EF(cubed) 63.5 %      LV mass(C)d 145.7 grams      LV mass(C)dI 93.1 grams/m^2      SV(Teich) 44.7 ml      SI(Teich) 28.5 ml/m^2      SV(cubed) 48.6 ml      SI(cubed) 31.1 ml/m^2      Ao root diam 2.9 cm      Ao root area  6.8 cm^2      ACS 2.0 cm      LA dimension 3.3 cm      LA/Ao 1.1     LVOT diam 2.0 cm      LVOT area 3.3 cm^2      Ao root area (BSA corrected) 1.9     MV E max lynn 115.9 cm/sec      MV A max lynn 72.7 cm/sec      MV E/A 1.6     MV V2 max 102.0 cm/sec      MV max PG 4.2 mmHg      MV V2 mean 59.3 cm/sec      MV mean PG 1.7 mmHg      MV V2 VTI 30.4 cm      MVA(VTI) 2.5 cm^2      MV P1/2t max lynn 88.8 cm/sec      Ao pk lynn 130.0 cm/sec      Ao max PG 6.8 mmHg      Ao max PG (full) 1.6 mmHg      Ao V2 mean 90.5 cm/sec      Ao mean PG 3.6 mmHg      Ao mean PG (full) 1.8 mmHg      Ao V2 VTI 31.3 cm      KAYDEN(I,A) 2.4 cm^2      KAYDEN(I,D) 2.4 cm^2      KAYDEN(V,A) 2.9 cm^2      KAYDEN(V,D) 2.9 cm^2      LV V1 max PG 5.2 mmHg      LV V1 mean PG 1.8 mmHg      LV V1 max 114.0 cm/sec      LV V1 mean 60.3 cm/sec      LV V1 VTI 22.8 cm      MR max lynn 397.8 cm/sec      MR max PG 63.3 mmHg      SV(Ao) 211.6 ml      SI(Ao) 135.2 ml/m^2      SV(LVOT) 74.6 ml      SI(LVOT) 47.7 ml/m^2      PA V2 max 100.8 cm/sec      PA max PG 4.1 mmHg      TR max lynn 240.8 cm/sec      RVSP(TR) 28.2 mmHg      RAP systole 5.0 mmHg      MVA P1/2T LCG 2.5 cm^2       CV ECHO LORI - BZI_BMI 23.1 kilograms/m^2       CV ECHO LORI - BSA(HAYCOCK) 1.6 m^2       CV ECHO LORI - BZI_METRIC_WEIGHT 57.0 kg       CV ECHO LORI - BZI_METRIC_HEIGHT 157.0 cm      Target HR (85%) 131 bpm      Max. Pred. HR (100%) 154 bpm      Echo EF Estimated 57 %     Narrative:       · Left ventricular systolic function is normal. Estimated EF = 57%.  · Left ventricular diastolic dysfunction (grade I) consistent with   impaired relaxation.  · The tricuspid valve is grossly normal. Trace to mild tricuspid valve   regurgitation is present. Estimated right ventricular systolic pressure   from tricuspid regurgitation is normal (<35 mmHg                Physician Progress Notes (last 72 hours) (Notes from 9/15/2018  8:55 AM through 9/18/2018  8:55 AM)      Jani Tavera MD at 9/17/2018  1:04  PM        Patient seen and examined.  Will assume care from nocturnist. Patient admitted with syncope. Appears vasovagal. Enzymes trended. ECHO and carotids ordered. Consult cardiology as needed. Will monitor. PT/OT to eval.          This document has been electronically signed by Jani Tavera MD on September 17, 2018 1:05 PM        Electronically signed by Jani Tavera MD at 9/17/2018  1:05 PM       Medical Student Notes (last 72 hours) (Notes from 9/15/2018  8:55 AM through 9/18/2018  8:55 AM)     No notes of this type exist for this encounter.        Consult Notes (last 72 hours) (Notes from 9/15/2018  8:55 AM through 9/18/2018  8:55 AM)     No notes of this type exist for this encounter.

## 2018-09-18 NOTE — THERAPY TREATMENT NOTE
Acute Care - Occupational Therapy Treatment Note  [facility]     Patient Name: Mona Perales  : 1950  MRN: [id number]  Today's Date: 2018  Onset of Illness/Injury or Date of Surgery: 18  Date of Referral to OT: 18  Referring Physician: Dr. Jani Tavera    Admit Date: 2018       ICD-10-CM ICD-9-CM   1. Fall, initial encounter W19.XXXA E888.9   2. Contusion of scalp, initial encounter S00.03XA 920   3. Hypoxia R09.02 799.02   4. Syncope, unspecified syncope type R55 780.2   5. Impaired functional mobility, balance, and endurance Z74.09 V49.89   6. Impaired mobility and ADLs Z74.09 799.89     Patient Active Problem List   Diagnosis   • Anxiety   • CAD (coronary atherosclerotic disease)   • Carotid stenosis   • COPD (chronic obstructive pulmonary disease) (CMS/HCC)   • COPD with exacerbation (CMS/HCC)   • Depressive disorder, not elsewhere classified   • Benign essential hypertension   • Hypercholesteremia   • Insomnia   • Low back pain   • Osteoporosis   • Other screening mammogram   • RUQ abdominal pain   • Peripheral arterial disease (CMS/HCC)   • Dizziness   • Nicotine abuse   • Dysphagia   • Epigastric pain   • Pain of right hand   • Closed displaced fracture of shaft of fifth metacarpal bone of right hand   • Cause of injury, fall   • Displaced fracture of shaft of fifth metacarpal bone of right hand with routine healing   • Fall     Past Medical History:   Diagnosis Date   • Anxiety    • Arthritis    • Asthma    • 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)     bilateral legs - bilateral iliac stents       • Chronic lower back pain    • COPD (chronic obstructive pulmonary disease) (CMS/HCC)    • Coronary arteriosclerosis    • Essential (primary) hypertension    • History of pulmonary embolus (PE)    • Hyperlipidemia    • Insomnia    • Lumbago    • Nicotine dependence    • Occlusion of artery (CMS/HCC)      and stenosis of bilateral  carotid arteries - BABS 16-49%, LICA 0-15%   • Other atherosclerosis of native artery of other extremity     LEFT subclavian stent 2005 (occluded)     Past Surgical History:   Procedure Laterality Date   • CARDIAC CATHETERIZATION  04/06/2016    No evidence of any obstructive epicardial CAD.Preserved LV systolic function with EF of 55%.   • CENTRAL VENOUS LINE INSERTION  04/07/2016    Successful placement of right uppe extremity 6-[place] triple lumen PICC line.   • ENDOSCOPY N/A 1/12/2018    Procedure: ESOPHAGOGASTRODUODENOSCOPY;  Surgeon: Bin Martin MD;  Location: [facility] ENDOSCOPY;  Service:    • ENDOSCOPY N/A 1/17/2018    Procedure: ESOPHAGOGASTRODUODENOSCOPY;  Surgeon: Bin Martin MD;  Location: [facility] ENDOSCOPY;  Service:    • ENDOSCOPY N/A 3/16/2018    Procedure: ESOPHAGOGASTRODUODENOSCOPY possible dilation;  Surgeon: Bin Martin MD;  Location: [facility] ENDOSCOPY;  Service: Gastroenterology   • FRACTURE SURGERY     • HYSTERECTOMY     • TOTAL SHOULDER REPLACEMENT Left    • TRANSESOPHAGEAL ECHOCARDIOGRAM (JACQUES)  04/07/2016    With color flow-Mild to moderate [facility].LV systolic function well preserved with Ef of 55-60%.Mitral and AV intact.Diastolic dysfunction   • UPPER GASTROINTESTINAL ENDOSCOPY  01/17/2018    Dr. Bin Martin M.D.       Therapy Treatment          Rehabilitation Treatment Summary     Row Name 09/18/18 1040 09/18/18 1015          Treatment Time/Intention    Discipline occupational therapy assistant  - --  -     Document Type therapy note (daily note)  -JH --  -     Subjective Information complains of  - --  -     Mode of Treatment individual therapy;occupational therapy  - --  -     Therapy Frequency (OT Eval) other (see comments)   5-7days a wk  -  --     Patient Effort good  -  --     Recorded by [STEPHANIE] Anna Aguiar COTA/L 09/18/18 1320 [STEPHANIE] Anna Aguiar COTA/L 09/18/18 1258     Row Name 09/18/18 1040             Vital Signs    Pretreatment Heart Rate  (beats/min) 99  -JH      Pre SpO2 (%) 96  -JH      O2 Delivery Pre Treatment room air  -JH      Recorded by [] Anna Aguiar DALE/L 09/18/18 1320      Row Name 09/18/18 1040             Cognitive Assessment/Intervention- PT/OT    Affect/Mental Status (Cognitive) WNL  -      Orientation Status (Cognition) oriented x 3  -JH      Recorded by [] Anna Aguiar DLAE/L 09/18/18 1320      Row Name 09/18/18 1040             Bed Mobility Assessment/Treatment    Supine-Sit [place] (Bed Mobility) independent  -JH      Sit-Supine [place] (Bed Mobility) independent  -JH      Recorded by [] Anna Aguiar DALE/L 09/18/18 1320      Row Name 09/18/18 1040             Bathing Assessment/Intervention    Comment (Bathing) Pt decline states she had bath  -JH      Recorded by [] Anna Aguiar DALE/L 09/18/18 1320      Row Name 09/18/18 1040             Lower Body Dressing Assessment/Training    Lower Body Dressing [place] Level set up;independent;socks;undergarment  -JH      Lower Body Dressing Position edge of bed sitting  -JH      Recorded by [] Anna Aguiar DALE/L 09/18/18 1320      Row Name 09/18/18 1040             Toileting Assessment/Training    [place] Level (Toileting) toileting skills;supervision  -      Assistive Devices (Toileting) grab bar/safety frame  -      Toileting Position unsupported sitting  -JH      Recorded by [] Anna Aguiar DALE/L 09/18/18 1320      Row Name 09/18/18 1040             Therapeutic Exercise    Upper Extremity Range of Motion (Therapeutic Exercise) shoulder flexion/extension, bilateral;shoulder horizontal abduction/adduction, bilateral;elbow flexion/extension, bilateral;forearm supination/pronation, bilateral  -      Weight/Resistance (Therapeutic Exercise) 2 pounds  -      Exercise Type (Therapeutic Exercise) AROM (active range of motion)  -      Position (Therapeutic Exercise) seated  -      Sets/Reps (Therapeutic Exercise) 2/20  -JH       Equipment (Therapeutic Exercise) free weight, barbbarron  -      Expected Outcome (Therapeutic Exercise) improve functional tolerance, household activity;improve functional tolerance, self-care activity  -      Recorded by [] Anna Aguiar COTA/L 09/18/18 1320      Row Name 09/18/18 1040             Positioning and Restraints    Pre-Treatment Position in bed  -      Post Treatment Position bed  -      In Bed encouraged to call for assist;call light within reach;supine;sitting  -      Recorded by [] Anna Aguiar COTA/L 09/18/18 1320      Row Name 09/18/18 1040             Pain Scale: Numbers Pre/Post-Treatment    Pain Scale: Numbers, Pretreatment 0/10 - no pain  -      Pain Scale: Numbers, Post-Treatment 0/10 - no pain  -      Recorded by [] Anna Aguiar COTA/L 09/18/18 1320      Row Name 09/18/18 1040             Outcome Summary/Treatment Plan (OT)    Daily Summary of Progress (OT) progress toward functional goals is good  -      Plan for Continued Treatment (OT) Continue per [facility]  -      Anticipated Discharge Disposition (OT) home with /7 care;home with home health  -      Recorded by [] Anna Aguiar COTA/ARMANDO 09/18/18 1320        User Key  (r) = Recorded By, (t) = Taken By, (c) = Cosigned By    Initials Name Effective Dates Discipline     Anna Aguiar DALE/L 03/07/18 -  OT                   OT Rehab Goals     Row Name 09/18/18 1040 09/18/18 0815          Transfer Goal 1 (OT)    Activity/Assistive Device (Transfer Goal 1, OT) toilet  - toilet  -     [place] Level/Cues Needed (Transfer Goal 1, OT) conditional independence  - conditional independence  -     Time Frame (Transfer Goal 1, OT) long term goal (LTG);by discharge  - long term goal (LTG);by discharge  -     Progress/Outcome (Transfer Goal 1, OT) goal not met  - goal not met  -        Bathing Goal 1 (OT)    Activity/Assistive Device (Bathing Goal 1, OT) bathing skills, all  - bathing  skills, all  -     [place] Level/Cues Needed (Bathing Goal 1, OT) set-up required;conditional independence  - set-up required;conditional independence  -     Time Frame (Bathing Goal 1, OT) long term goal (LTG);by discharge  - long term goal (LTG);by discharge  -     Progress/Outcomes (Bathing Goal 1, OT) goal not met  - goal not met  -        Dressing Goal 1 (OT)    Activity/Assistive Device (Dressing Goal 1, OT) dressing skills, all  - dressing skills, all  -     [place]/Cues Needed (Dressing Goal 1, OT) set-up required;conditional independence  - set-up required;conditional independence  -     Time Frame (Dressing Goal 1, OT) long term goal (LTG);by discharge  - long term goal (LTG);by discharge  -     Progress/Outcome (Dressing Goal 1, OT) goal not met  - goal not met  -        Toileting Goal 1 (OT)    Activity/Device (Toileting Goal 1, OT) toileting skills, all  - toileting skills, all  -     [place] Level/Cues Needed (Toileting Goal 1, OT) conditional independence  - conditional independence  -     Time Frame (Toileting Goal 1, OT) long term goal (LTG);by discharge  - long term goal (LTG);by discharge  -     Progress/Outcome (Toileting Goal 1, OT) goal not met  - goal not met  -        Patient Education Goal (OT)    Activity (Patient Education Goal, OT) Pt will communicate good home safety awareness for ADL and IADL and for BUE HEP.   - Pt will communicate good home safety awareness for ADL and IADL and for BUE HEP.   -     [place]/Cues/Accuracy (Memory Goal 2, OT) verbalizes understanding  - verbalizes understanding  -     Time Frame (Patient Education Goal, OT) long term goal (LTG);by discharge  - long term goal (LTG);by discharge  -     Progress/Outcome (Patient Education Goal, OT) goal not met  - goal not met  -       User Key  (r) = Recorded By, (t) = Taken By, (c) = Cosigned By    Initials Name Provider Type Discipline      Lisa Porter OTR/L Occupational Therapist OT     Anna Aguiar DALE/L Occupational Therapy Assistant OT        Occupational Therapy Education     Title: PT OT SLP Therapies (Active)     Topic: Occupational Therapy (Active)     Point: ADL training (Active)     Description: Instruct learner(s) on proper safety adaptation and remediation techniques during self care or transfers.   Instruct in proper use of assistive devices.   Learning Progress Summary     Learner Status Readiness Method Response Comment Documented by    Patient Active Acceptance E NR   09/18/18 1413     Done Acceptance E KRISTOPHERNR Educated about OT and POC. Educate to call for assist and not get up on her own.  09/18/18 1139          Point: Home exercise program (Active)     Description: Instruct learner(s) on appropriate technique for monitoring, assisting and/or progressing therapeutic exercises/activities.   Learning Progress Summary     Learner Status Readiness Method Response Comment Documented by    Patient Active Acceptance E NR   09/18/18 1413          Point: Precautions (Active)     Description: Instruct learner(s) on prescribed precautions during self-care and functional transfers.   Learning Progress Summary     Learner Status Readiness Method Response Comment Documented by    Patient Active Acceptance E NR   09/18/18 1413     Done Acceptance E NITHYA SUMMERS Educated about OT and POC. Educate to call for assist and not get up on her own.  09/18/18 1139          Point: Body mechanics (Active)     Description: Instruct learner(s) on proper positioning and spine alignment during self-care, functional mobility activities and/or exercises.   Learning Progress Summary     Learner Status Readiness Method Response Comment Documented by    Patient Active Acceptance E NR   09/18/18 1413                      User Key     Initials Effective Dates Name Provider Type Discipline     06/08/18 -  Lisa Porter OTR/L Occupational Therapist  OT     03/07/18 -  Anna Aguiar COTA/L Occupational Therapy Assistant OT                OT Recommendation and Plan  Outcome Summary/Treatment Plan (OT)  Daily Summary of Progress (OT): progress toward functional goals is good  Plan for Continued Treatment (OT): Continue per POC  Anticipated Discharge Disposition (OT): home with 24/7 care, home with home health  Therapy Frequency (OT Eval): other (see comments) (5-7days a wk)  Daily Summary of Progress (OT): progress toward functional goals is good  Outcome Summary: Pt agreed to work with OT, initiate UE HEP with pt returning demo, pt is new to OT no goals met at this time, HH OT/PT at d/c          Outcome Measures     Row Name 09/18/18 1040 09/18/18 0815 09/17/18 1333       How much help from another person do you currently need...    Turning from your back to your side while in flat bed without using bedrails?  --  -- 4  -LF    Moving from lying on back to sitting on the side of a flat bed without bedrails?  --  -- 4  -LF    Moving to and from a bed to a chair (including a wheelchair)?  --  -- 3  -LF    Standing up from a chair using your arms (e.g., wheelchair, bedside chair)?  --  -- 4  -LF    Climbing 3-5 steps with a railing?  --  -- 3  -LF    To walk in hospital room?  --  -- 3  -LF    AM-PAC 6 Clicks Score  --  -- 21  -LF       How much help from another is currently needed...    Putting on and taking off regular lower body clothing? 3  - 3  -BH  --    Bathing (including washing, rinsing, and drying) 3  - 3  -BH  --    Toileting (which includes using toilet bed pan or urinal) 4  - 3  -BH  --    Putting on and taking off regular upper body clothing 4  - 3  -BH  --    Taking care of personal grooming (such as brushing teeth) 4  - 4  -BH  --    Eating meals 4  - 4  -BH  --    Score 22  - 20  -BH  --       Functional Assessment    Outcome Measure Options  -- AM-PAC 6 Clicks Daily Activity (OT)  - AM-PAC 6 Clicks Basic Mobility (PT)  -LF       User Key  (r) = Recorded By, (t) = Taken By, (c) = Cosigned By    Initials Name Provider Type     Lisa Porter, OTR/L Occupational Therapist     Anna Aguira DALE/L Occupational Therapy Assistant    Dulce Kumar, PT Physical Therapist           Time Calculation:         Time Calculation- OT     Row Name 09/18/18 1414 09/18/18 1141          Time Calculation- OT    OT Start Time 1040  - 0815  -     OT Stop Time 1150  - 0837  -     OT Time Calculation (min) 70 min  - 22 min  -     OT Received On 09/18/18  - 09/18/18  -     OT Goal Re-Cert Due Date  -- 10/01/18  -       User Key  (r) = Recorded By, (t) = Taken By, (c) = Cosigned By    Initials Name Provider Type     Lisa Porter, OTR/L Occupational Therapist     Anna Aguiar, DALE/L Occupational Therapy Assistant           Therapy Suggested Charges     Code   Minutes Charges    None           Therapy Charges for Today     Code Description Service Date Service Provider Modifiers Qty    51972294006 HC OT THERAPEUTIC ACT EA 15 MIN 9/18/2018 Anna Aguiar DALE/L GO 2    47362558324 HC OT THER PROC EA 15 MIN 9/18/2018 Anna Aguiar DALE/L GO 2    77013845098 HC OT SELF CARE/MGMT/TRAIN EA 15 MIN 9/18/2018 Anna Aguiar DALE/L GO 1          OT G-codes  OT Professional Judgement Used?: Yes  OT Functional Scales Options: AM-PAC 6 Clicks Daily Activity (OT)  Score: 20  Functional Limitation: Self care  Self Care Current Status (): At least 20 percent but less than 40 percent impaired, limited or restricted  Self Care Goal Status (): At least 1 percent but less than 20 percent impaired, limited or restricted    ADITYA Sharma  9/18/2018

## 2018-09-18 NOTE — PLAN OF CARE
Problem: Patient Care Overview  Goal: Plan of Care Review  Outcome: Ongoing (interventions implemented as appropriate)   09/18/18 0315   Coping/Psychosocial   Plan of Care Reviewed With patient   Plan of Care Review   Progress no change   OTHER   Outcome Summary vss. pain controlled with interventions. no complaints at this time. continue to monitor.       Problem: Fall Risk (Adult)  Goal: Identify Related Risk Factors and Signs and Symptoms  Outcome: Ongoing (interventions implemented as appropriate)    Goal: Absence of Fall  Outcome: Ongoing (interventions implemented as appropriate)      Problem: Skin Injury Risk (Adult)  Goal: Identify Related Risk Factors and Signs and Symptoms  Outcome: Outcome(s) achieved Date Met: 09/18/18    Goal: Skin Health and Integrity  Outcome: Outcome(s) achieved Date Met: 09/18/18      Problem: Pain, Acute (Adult)  Goal: Identify Related Risk Factors and Signs and Symptoms  Outcome: Ongoing (interventions implemented as appropriate)    Goal: Acceptable Pain Control/Comfort Level  Outcome: Ongoing (interventions implemented as appropriate)      Problem: Syncope (Adult)  Goal: Physical Safety/Health Maintenance  Outcome: Ongoing (interventions implemented as appropriate)    Goal: Optimal Emotional/Functional [place]  Outcome: Ongoing (interventions implemented as appropriate)

## 2018-09-19 VITALS
BODY MASS INDEX: 23 KG/M2 | SYSTOLIC BLOOD PRESSURE: 130 MMHG | TEMPERATURE: 98.5 F | HEART RATE: 80 BPM | OXYGEN SATURATION: 92 % | WEIGHT: 125 LBS | RESPIRATION RATE: 18 BRPM | HEIGHT: 62 IN | DIASTOLIC BLOOD PRESSURE: 70 MMHG

## 2018-09-19 LAB
ALBUMIN SERPL-MCNC: 3.7 G/DL (ref 3.4–4.8)
ALBUMIN/GLOB SERPL: 1.2 G/DL (ref 1.1–1.8)
ALP SERPL-CCNC: 95 U/L (ref 38–126)
ALT SERPL W P-5'-P-CCNC: 20 U/L (ref 9–52)
ANION GAP SERPL CALCULATED.3IONS-SCNC: 8 MMOL/L (ref 5–15)
AST SERPL-CCNC: 24 U/L (ref 14–36)
BASOPHILS # BLD AUTO: 0.05 10*3/MM3 (ref 0–0.2)
BASOPHILS NFR BLD AUTO: 0.6 % (ref 0–2)
BILIRUB SERPL-MCNC: 0.4 MG/DL (ref 0.2–1.3)
BUN BLD-MCNC: 7 MG/DL (ref 7–21)
BUN/CREAT SERPL: 11.9 (ref 7–25)
CALCIUM SPEC-SCNC: 8.8 MG/DL (ref 8.4–10.2)
CHLORIDE SERPL-SCNC: 104 MMOL/L (ref 95–110)
CO2 SERPL-SCNC: 28 MMOL/L (ref 22–31)
CREAT BLD-MCNC: 0.59 MG/DL (ref 0.5–1)
DEPRECATED RDW RBC AUTO: 47.9 FL (ref 36.4–46.3)
EOSINOPHIL # BLD AUTO: 0.15 10*3/MM3 (ref 0–0.7)
EOSINOPHIL NFR BLD AUTO: 1.7 % (ref 0–7)
ERYTHROCYTE [DISTWIDTH] IN BLOOD BY AUTOMATED COUNT: 14.6 % (ref 11.5–14.5)
GFR SERPL CREATININE-BSD FRML MDRD: 102 ML/MIN/1.73 (ref 45–104)
GLOBULIN UR ELPH-MCNC: 3.2 GM/DL (ref 2.3–3.5)
GLUCOSE BLD-MCNC: 91 MG/DL (ref 60–100)
HCT VFR BLD AUTO: 37.2 % (ref 35–45)
HGB BLD-MCNC: 12.5 G/DL (ref 12–15.5)
IMM GRANULOCYTES # BLD: 0.01 10*3/MM3 (ref 0–0.02)
IMM GRANULOCYTES NFR BLD: 0.1 % (ref 0–0.5)
LYMPHOCYTES # BLD AUTO: 2.85 10*3/MM3 (ref 0.6–4.2)
LYMPHOCYTES NFR BLD AUTO: 32.8 % (ref 10–50)
MCH RBC QN AUTO: 30.1 PG (ref 26.5–34)
MCHC RBC AUTO-ENTMCNC: 33.6 G/DL (ref 31.4–36)
MCV RBC AUTO: 89.6 FL (ref 80–98)
MONOCYTES # BLD AUTO: 1.2 10*3/MM3 (ref 0–0.9)
MONOCYTES NFR BLD AUTO: 13.8 % (ref 0–12)
NEUTROPHILS # BLD AUTO: 4.42 10*3/MM3 (ref 2–8.6)
NEUTROPHILS NFR BLD AUTO: 51 % (ref 37–80)
NRBC BLD MANUAL-RTO: 0 /100 WBC (ref 0–0)
PLATELET # BLD AUTO: 315 10*3/MM3 (ref 150–450)
PMV BLD AUTO: 8.7 FL (ref 8–12)
POTASSIUM BLD-SCNC: 3.7 MMOL/L (ref 3.5–5.1)
PROT SERPL-MCNC: 6.9 G/DL (ref 6.3–8.6)
RBC # BLD AUTO: 4.15 10*6/MM3 (ref 3.77–5.16)
SODIUM BLD-SCNC: 140 MMOL/L (ref 137–145)
WBC NRBC COR # BLD: 8.68 10*3/MM3 (ref 3.2–9.8)

## 2018-09-19 PROCEDURE — 25010000002 LEVOFLOXACIN PER 250 MG: Performed by: INTERNAL MEDICINE

## 2018-09-19 PROCEDURE — 97530 THERAPEUTIC ACTIVITIES: CPT

## 2018-09-19 PROCEDURE — 85025 COMPLETE CBC W/AUTO DIFF WBC: CPT | Performed by: INTERNAL MEDICINE

## 2018-09-19 PROCEDURE — 80053 COMPREHEN METABOLIC PANEL: CPT | Performed by: INTERNAL MEDICINE

## 2018-09-19 PROCEDURE — 97110 THERAPEUTIC EXERCISES: CPT

## 2018-09-19 PROCEDURE — 97535 SELF CARE MNGMENT TRAINING: CPT

## 2018-09-19 PROCEDURE — 97116 GAIT TRAINING THERAPY: CPT

## 2018-09-19 PROCEDURE — 94760 N-INVAS EAR/PLS OXIMETRY 1: CPT

## 2018-09-19 PROCEDURE — 94799 UNLISTED PULMONARY SVC/PX: CPT

## 2018-09-19 RX ADMIN — IPRATROPIUM BROMIDE AND ALBUTEROL SULFATE 3 ML: 2.5; .5 SOLUTION RESPIRATORY (INHALATION) at 07:25

## 2018-09-19 RX ADMIN — AMLODIPINE BESYLATE 10 MG: 10 TABLET ORAL at 08:53

## 2018-09-19 RX ADMIN — PANTOPRAZOLE SODIUM 40 MG: 40 TABLET, DELAYED RELEASE ORAL at 08:53

## 2018-09-19 RX ADMIN — SERTRALINE HYDROCHLORIDE 100 MG: 50 TABLET ORAL at 08:53

## 2018-09-19 RX ADMIN — LEVOFLOXACIN 500 MG: 5 INJECTION, SOLUTION INTRAVENOUS at 08:52

## 2018-09-19 RX ADMIN — SODIUM CHLORIDE 75 ML/HR: 9 INJECTION, SOLUTION INTRAVENOUS at 06:38

## 2018-09-19 RX ADMIN — ATORVASTATIN CALCIUM 10 MG: 10 TABLET, FILM COATED ORAL at 08:52

## 2018-09-19 RX ADMIN — ACETAMINOPHEN 650 MG: 325 TABLET ORAL at 11:56

## 2018-09-19 RX ADMIN — APIXABAN 5 MG: 5 TABLET, FILM COATED ORAL at 08:53

## 2018-09-19 NOTE — THERAPY DISCHARGE NOTE
Acute Care - Physical Therapy Discharge Summary  [facility]       Patient Name: Mona Perales  : 1950  MRN: [id number]    Today's Date: 2018  Onset of Illness/Injury or Date of Surgery: 18    Date of Referral to PT: 18  Referring Physician: Dr. Jani Tavera      Admit Date: 2018      PT Recommendation and Plan    Visit Dx:    ICD-10-CM ICD-9-CM   1. Fall, initial encounter W19.XXXA E888.9   2. Contusion of scalp, initial encounter S00.03XA 920   3. Hypoxia R09.02 799.02   4. Syncope, unspecified syncope type R55 780.2   5. Impaired functional mobility, balance, and endurance Z74.09 V49.89   6. Impaired mobility and ADLs Z74.09 799.89             Outcome Measures     Row Name 18 0856 18 0750 18 1040       How much help from another person do you currently need...    Turning from your back to your side while in flat bed without using bedrails? 4  -CA  --  --    Moving from lying on back to sitting on the side of a flat bed without bedrails? 4  -CA  --  --    Moving to and from a bed to a chair (including a wheelchair)? 3  -CA  --  --    Standing up from a chair using your arms (e.g., wheelchair, bedside chair)? 4  -CA  --  --    Climbing 3-5 steps with a railing? 3  -CA  --  --    To walk in hospital room? 3  -CA  --  --    AM-[facility] 6 Clicks Score 21  -CA  --  --       How much help from another is currently needed...    Putting on and taking off regular lower body clothing?  -- 4  - 3  -    Bathing (including washing, rinsing, and drying)  -- 4  - 3  -    Toileting (which includes using toilet bed pan or urinal)  -- 4  - 4  -JH    Putting on and taking off regular upper body clothing  -- 4  - 4  -    Taking care of personal grooming (such as brushing teeth)  -- 4  - 4  -JH    Eating meals  -- 4  - 4  -    Score  -- 24  - 22  -       Functional Assessment    Outcome Measure Options AM-PAC 6 Clicks Basic Mobility (PT)  -CA  --  --    Row  Name 09/18/18 0815 09/17/18 1333          How much help from another person do you currently need...    Turning from your back to your side while in flat bed without using bedrails?  -- 4  -LF     Moving from lying on back to sitting on the side of a flat bed without bedrails?  -- 4  -LF     Moving to and from a bed to a chair (including a wheelchair)?  -- 3  -LF     Standing up from a chair using your arms (e.g., wheelchair, bedside chair)?  -- 4  -LF     Climbing 3-5 steps with a railing?  -- 3  -LF     To walk in hospital room?  -- 3  -LF     AM-PAC 6 Clicks Score  -- 21  -LF        How much help from another is currently needed...    Putting on and taking off regular lower body clothing? 3  -BH  --     Bathing (including washing, rinsing, and drying) 3  -BH  --     Toileting (which includes using toilet bed pan or urinal) 3  -BH  --     Putting on and taking off regular upper body clothing 3  -BH  --     Taking care of personal grooming (such as brushing teeth) 4  -BH  --     Eating meals 4  -BH  --     Score 20  -BH  --        Functional Assessment    Outcome Measure Options AM-PAC 6 Clicks Daily Activity (OT)  - AM-[facility] 6 Clicks Basic Mobility (PT)  -       User Key  (r) = Recorded By, (t) = Taken By, (c) = Cosigned By    Initials Name Provider Type     Lisa Porter, OTR/L Occupational Therapist    Kamar Parker, MIREILLE Physical Therapy Assistant    Anna Goldman DALE/L Occupational Therapy Assistant     Dulce Moise, PT Physical Therapist                PT Charges     Row Name 09/19/18 0929             Time Calculation    Start Time 0856  -CA      Stop Time 0920  -CA      Time Calculation (min) 24 min  -CA      PT Received On 09/19/18  -CA         Time Calculation- PT    Total Timed Code Minutes- PT 24 minute(s)  -CA        User Key  (r) = Recorded By, (t) = Taken By, (c) = Cosigned By    Initials Name Provider Type    Kamar Parker, MIREILLE Physical Therapy Assistant        Therapy Suggested  Charges     Code   Minutes Charges    None                   PT Rehab Goals     Row Name 09/19/18 0856             Transfer Goal 1 (PT)    Activity/Assistive Device (Transfer Goal 1, PT) sit-to-stand/stand-to-sit;bed-to-chair/chair-to-bed  -CA      [place] Level/Cues Needed (Transfer Goal 1, PT) independent  -CA      Time Frame (Transfer Goal 1, PT) 2 days  -CA      Progress/Outcome (Transfer Goal 1, PT) goal not met  -CA         Gait Training Goal 1 (PT)    Activity/Assistive Device (Gait Training Goal 1, PT) increase endurance/gait distance;improve balance and speed   no AD  -CA      [place] Level (Gait Training Goal 1, PT) independent  -CA      Distance (Gait Goal 1, PT) 500 feet  -CA      Time Frame (Gait Training Goal 1, PT) 2 - 3 days  -CA      Progress/Outcome (Gait Training Goal 1, PT) goal not met  -CA        User Key  (r) = Recorded By, (t) = Taken By, (c) = Cosigned By    Initials Name Provider Type Discipline    Kamar Parker, PTA Physical Therapy Assistant PT              PT Discharge Summary  Anticipated Discharge Disposition (PT): home with home health  Reason for Discharge: Discharge from facility, Per MD order  Outcomes Achieved: Unable to make functional progress toward goals at this time, Refer to plan of care for updates on goals achieved  Discharge Destination: Home      Dulce Moise, PT   9/19/2018

## 2018-09-19 NOTE — THERAPY TREATMENT NOTE
Acute Care - Occupational Therapy Treatment Note  [facility]     Patient Name: Mona Perales  : 1950  MRN: [id number]  Today's Date: 2018  Onset of Illness/Injury or Date of Surgery: 18  Date of Referral to OT: 18  Referring Physician: Dr. Jani Tavera    Admit Date: 2018       ICD-10-CM ICD-9-CM   1. Fall, initial encounter W19.XXXA E888.9   2. Contusion of scalp, initial encounter S00.03XA 920   3. Hypoxia R09.02 799.02   4. Syncope, unspecified syncope type R55 780.2   5. Impaired functional mobility, balance, and endurance Z74.09 V49.89   6. Impaired mobility and ADLs Z74.09 799.89     Patient Active Problem List   Diagnosis   • Anxiety   • CAD (coronary atherosclerotic disease)   • Carotid stenosis   • COPD (chronic obstructive pulmonary disease) (CMS/HCC)   • COPD with exacerbation (CMS/HCC)   • Depressive disorder, not elsewhere classified   • Benign essential hypertension   • Hypercholesteremia   • Insomnia   • Low back pain   • Osteoporosis   • Other screening mammogram   • RUQ abdominal pain   • Peripheral arterial disease (CMS/HCC)   • Dizziness   • Nicotine abuse   • Dysphagia   • Epigastric pain   • Pain of right hand   • Closed displaced fracture of shaft of fifth metacarpal bone of right hand   • Cause of injury, fall   • Displaced fracture of shaft of fifth metacarpal bone of right hand with routine healing   • Fall   • Syncope     Past Medical History:   Diagnosis Date   • Anxiety    • Arthritis    • Asthma    • 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)     bilateral legs - bilateral iliac stents       • Chronic lower back pain    • COPD (chronic obstructive pulmonary disease) (CMS/HCC)    • Coronary arteriosclerosis    • Essential (primary) hypertension    • History of pulmonary embolus (PE)    • Hyperlipidemia    • Insomnia    • Lumbago    • Nicotine dependence    • Occlusion of artery (CMS/HCC)      and stenosis of  bilateral carotid arteries - BABS 16-49%, LICA 0-15%   • Other atherosclerosis of native artery of other extremity     LEFT subclavian stent 2005 (occluded)     Past Surgical History:   Procedure Laterality Date   • CARDIAC CATHETERIZATION  04/06/2016    No evidence of any obstructive epicardial CAD.Preserved LV systolic function with EF of 55%.   • CENTRAL VENOUS LINE INSERTION  04/07/2016    Successful placement of right uppe extremity 6-Frisian triple lumen PICC line.   • ENDOSCOPY N/A 1/12/2018    Procedure: ESOPHAGOGASTRODUODENOSCOPY;  Surgeon: Bin Martin MD;  Location: [facility] ENDOSCOPY;  Service:    • ENDOSCOPY N/A 1/17/2018    Procedure: ESOPHAGOGASTRODUODENOSCOPY;  Surgeon: Bin Martin MD;  Location: [facility] ENDOSCOPY;  Service:    • ENDOSCOPY N/A 3/16/2018    Procedure: ESOPHAGOGASTRODUODENOSCOPY possible dilation;  Surgeon: Bin Martin MD;  Location: [facility] ENDOSCOPY;  Service: Gastroenterology   • FRACTURE SURGERY     • HYSTERECTOMY     • TOTAL SHOULDER REPLACEMENT Left    • TRANSESOPHAGEAL ECHOCARDIOGRAM (JACQUES)  04/07/2016    With color flow-Mild to moderate [facility].LV systolic function well preserved with Ef of 55-60%.Mitral and AV intact.Diastolic dysfunction   • UPPER GASTROINTESTINAL ENDOSCOPY  01/17/2018    Dr. Bin Martin M.D.       Therapy Treatment          Rehabilitation Treatment Summary     Row Name 09/19/18 0856 09/19/18 0750          Treatment Time/Intention    Discipline physical therapy assistant  -CA occupational therapy assistant  -     Document Type therapy note (daily note)  -CA therapy note (daily note)  -     Subjective Information no complaints  -CA no complaints  -     Mode of Treatment individual therapy;physical therapy  -CA individual therapy;occupational therapy  -     Patient/Family Observations no family present  -CA  --     Therapy Frequency (OT Eval)  -- other (see comments)   5-7 days a wk  -     Patient Effort good  -CA excellent  -     Existing  Precautions/Restrictions fall  -CA  --     Equipment Issued to Patient gait belt  -CA  --     Recorded by [CA] Kamar Velazquez, PTA 09/19/18 0927 [] Anna Aguiar DALE/L 09/19/18 1134     Row Name 09/19/18 0856 09/19/18 0750          Vital Signs    Intra Systolic BP Rehab  -- 153  -JH     Intra Treatment Diastolic BP  -- 79  -JH     Intratreatment Heart Rate (beats/min)  -- 82  -JH     Pre SpO2 (%)  -- 96  -JH     O2 Delivery Pre Treatment  -- room air  -     Pre Patient Position Sitting  -CA  --     Intra Patient Position Standing  -CA  --     Post Patient Position Sitting  -CA  --     Recorded by [CA] Kamar Velazquez, PTA 09/19/18 0927 [] Anna Aguiar DALE/L 09/19/18 1134     Row Name 09/19/18 0856 09/19/18 0750          Cognitive Assessment/Intervention- PT/OT    Affect/Mental Status (Cognitive) WFL;WNL  -CA WNL  -JH     Orientation Status (Cognition) oriented x 3  -CA oriented x 4  -JH     Follows Commands (Cognition) WFL  -CA  --     Safety Deficit (Cognitive) mild deficit  -CA  --     Personal Safety Interventions fall prevention program maintained;gait belt;nonskid shoes/slippers when out of bed  -CA  --     Recorded by [CA] Kamar Velazquez, PTA 09/19/18 0927 [] Anna Aguiar DALE/L 09/19/18 1134     Row Name 09/19/18 0750             Bed Mobility Assessment/Treatment    [place] Level (Bed Mobility) independent  -      Supine-Sit [place] (Bed Mobility) independent  -      Sit-Supine [place] (Bed Mobility) independent  -      Recorded by [] Anna Aguiar COTA/L 09/19/18 1134      Row Name 09/19/18 0856 09/19/18 0750          Transfer Assessment/Treatment    Transfer Assessment/Treatment sit-stand transfer;stand-sit transfer  -CA bed-chair transfer;toilet transfer  -     Comment (Transfers)  -- No AD  -JH     Recorded by [CA] Kamar Velazquez, PTA 09/19/18 0927 [] Anna Aguiar DALE/L 09/19/18 1134     Row Name 09/19/18 0750             Bed-Chair Transfer    Bed-Chair  [place] (Transfers) conditional independence  -JH      Recorded by [JH] Anna Aguiar DALE/L 09/19/18 1134      Row Name 09/19/18 0856 09/19/18 0750          Sit-Stand Transfer    Sit-Stand [place] (Transfers) stand by assist  -CA independent  -JH     Recorded by [CA] Kamar Velazquez, PTA 09/19/18 0927 [JH] Anna Aguiar, DALE/L 09/19/18 1134     Row Name 09/19/18 0856 09/19/18 0750          Stand-Sit Transfer    Stand-Sit [place] (Transfers) stand by assist  -CA independent  -JH     Recorded by [CA] Kamar Velazquez, PTA 09/19/18 0927 [JH] Anna Aguiar, DALE/L 09/19/18 1134     Row Name 09/19/18 0750             Toilet Transfer    Type (Toilet Transfer) sit-stand;stand-sit  -JH      [place] Level (Toilet Transfer) independent  -JH      Assistive Device (Toilet Transfer) grab bars/safety frame   Pt however did not utilize   -JH      Recorded by [JH] Anna Aguiar DALE/L 09/19/18 1134      Row Name 09/19/18 0856             Gait/Stairs Assessment/Training    [place] Level (Gait) stand by assist  -CA      Distance in Feet (Gait) 375  -CA      Deviations/Abnormal Patterns (Gait) base of support, narrow  -CA      Negotiation (Stairs) number of steps  -CA      [place] Level (Stairs) supervision  -CA      Handrail Location (Stairs) both sides  -CA      Number of Steps (Stairs) 2  -CA      Ascending Technique (Stairs) step-over-step  -CA      Descending Technique (Stairs) step-over-step  -CA      Recorded by [CA] Kamar Velazquez, PTA 09/19/18 0927      Row Name 09/19/18 0750             Bathing Assessment/Intervention    Comment (Bathing) Despite encouragement pt adamantly decline bath/dressing task  -JH      Recorded by [JH] Anna Aguiar DALE/L 09/19/18 1134      Row Name 09/19/18 0750             Upper Body Dressing Assessment/Training    Upper Body Dressing [place] Level don;front opening garment  -JH      Upper Body Dressing Position unsupported standing  -JH      Recorded by  [JH] Anna Aguiar COTA/L 09/19/18 1134      Row Name 09/19/18 0750             Lower Body Dressing Assessment/Training    Lower Body Dressing [place] Level socks;undergarment;conditional independence  -      Lower Body Dressing Position edge of bed sitting;unsupported standing  -      Recorded by [] Anna Aguiar COTA/L 09/19/18 1134      Row Name 09/19/18 0750             Grooming Assessment/Training    [place] Level (Grooming) hair care, combing/brushing;wash face, hands  -      Grooming Position sink side  -JH      Recorded by [] Anna Aguiar COTA/L 09/19/18 1134      Row Name 09/19/18 0750             Toileting Assessment/Training    [place] Level (Toileting) toileting skills;independent  -      Toileting Position unsupported sitting  -JH      Recorded by [] Anna Aguiar COTA/L 09/19/18 1134      Row Name 09/19/18 0750             Therapeutic Exercise    Upper Extremity Range of Motion (Therapeutic Exercise) shoulder flexion/extension, bilateral;shoulder horizontal abduction/adduction, bilateral;elbow flexion/extension, bilateral;forearm supination/pronation, bilateral  -      Weight/Resistance (Therapeutic Exercise) 2 pounds  -      Exercise Type (Therapeutic Exercise) AROM (active range of motion)  -      Position (Therapeutic Exercise) seated  -      Sets/Reps (Therapeutic Exercise) 2/25  -JH      Equipment (Therapeutic Exercise) free weight, barbell  -      Expected Outcome (Therapeutic Exercise) improve functional tolerance, household activity;improve functional tolerance, self-care activity  -      Recorded by [] Anna Aguiar COTA/L 09/19/18 1134      Row Name 09/19/18 0856 09/19/18 0750          Positioning and Restraints    Pre-Treatment Position sitting in chair/recliner  -CA in bed  -     Post Treatment Position chair  -CA chair  -JH     In Chair sitting;call light within reach;encouraged to call for assist  -CA sitting;call light within  reach;encouraged to call for assist  -     Recorded by [CA] Kamar Velazquez, PTA 09/19/18 0927 [JH] Anna Aguiar, DALE/L 09/19/18 1134     Row Name 09/19/18 0856 09/19/18 0750          Pain Scale: Numbers Pre/Post-Treatment    Pain Scale: Numbers, Pretreatment 0/10 - no pain  -CA 0/10 - no pain  -     Pain Scale: Numbers, Post-Treatment 0/10 - no pain  -CA 0/10 - no pain  -     Recorded by [CA] Kamar Velazquez, PTA 09/19/18 0927 [JH] Anna Aguiar, DALE/L 09/19/18 1134     Row Name 09/19/18 0750             Outcome Summary/Treatment Plan (OT)    Daily Summary of Progress (OT) progress toward functional goals is good  -      Plan for Continued Treatment (OT) Continue per POC  -      Anticipated Discharge Disposition (OT) home  -      Recorded by [] Anna Aguiar DALE/ARMANDO 09/19/18 1134        User Key  (r) = Recorded By, (t) = Taken By, (c) = Cosigned By    Initials Name Effective Dates Discipline    CA Kamar Velazquez, PTA 03/07/18 -  PT     Anna Aguiar DALE/L 03/07/18 -  OT                   OT Rehab Goals     Row Name 09/19/18 0750             Transfer Goal 1 (OT)    Activity/Assistive Device (Transfer Goal 1, OT) toilet  -      [place] Level/Cues Needed (Transfer Goal 1, OT) conditional independence  -      Time Frame (Transfer Goal 1, OT) long term goal (LTG);by discharge  -      Progress/Outcome (Transfer Goal 1, OT) goal met;goal ongoing  -         Bathing Goal 1 (OT)    Activity/Assistive Device (Bathing Goal 1, OT) bathing skills, all  -      [place] Level/Cues Needed (Bathing Goal 1, OT) set-up required;conditional independence  -      Time Frame (Bathing Goal 1, OT) long term goal (LTG);by discharge  -      Progress/Outcomes (Bathing Goal 1, OT) goal not met  -         Dressing Goal 1 (OT)    Activity/Assistive Device (Dressing Goal 1, OT) dressing skills, all  -JH      [place]/Cues Needed (Dressing Goal 1, OT) set-up required;conditional independence  -JH       Time Frame (Dressing Goal 1, OT) long term goal (LTG);by discharge  -      Progress/Outcome (Dressing Goal 1, OT) goal met;goal ongoing  -         Toileting Goal 1 (OT)    Activity/Device (Toileting Goal 1, OT) toileting skills, all  -      [place] Level/Cues Needed (Toileting Goal 1, OT) conditional independence  -      Time Frame (Toileting Goal 1, OT) long term goal (LTG);by discharge  -      Progress/Outcome (Toileting Goal 1, OT) goal met;goal ongoing  -         Patient Education Goal (OT)    Activity (Patient Education Goal, OT) Pt will communicate good home safety awareness for ADL and IADL and for BUE HEP.   -      [place]/Cues/Accuracy (Memory Goal 2, OT) verbalizes understanding  -      Time Frame (Patient Education Goal, OT) long term goal (LTG);by discharge  -      Progress/Outcome (Patient Education Goal, OT) goal met;goal ongoing  -        User Key  (r) = Recorded By, (t) = Taken By, (c) = Cosigned By    Initials Name Provider Type Discipline    Anna Goldman COTA/L Occupational Therapy Assistant OT        Occupational Therapy Education     Title: PT OT SLP Therapies (Active)     Topic: Occupational Therapy (Done)     Point: ADL training (Done)     Description: Instruct learner(s) on proper safety adaptation and remediation techniques during self care or transfers.   Instruct in proper use of assistive devices.   Learning Progress Summary     Learner Status Readiness Method Response Comment Documented by    Patient Done Acceptance E KRISTOPHER   09/19/18 1137     Active Acceptance E NR   09/18/18 1413     Done Acceptance E NITHYA SUMMERS Educated about OT and POC. Educate to call for assist and not get up on her own.  09/18/18 1139          Point: Home exercise program (Done)     Description: Instruct learner(s) on appropriate technique for monitoring, assisting and/or progressing therapeutic exercises/activities.   Learning Progress Summary     Learner Status Readiness  Method Response Comment Documented by    Patient Done Acceptance E VU   09/19/18 1137     Active Acceptance E NR   09/18/18 1413          Point: Precautions (Done)     Description: Instruct learner(s) on prescribed precautions during self-care and functional transfers.   Learning Progress Summary     Learner Status Readiness Method Response Comment Documented by    Patient Done Acceptance E VU   09/19/18 1137     Active Acceptance E NR   09/18/18 1413     Done Acceptance E VU,NR Educated about OT and POC. Educate to call for assist and not get up on her own.  09/18/18 1139          Point: Body mechanics (Done)     Description: Instruct learner(s) on proper positioning and spine alignment during self-care, functional mobility activities and/or exercises.   Learning Progress Summary     Learner Status Readiness Method Response Comment Documented by    Patient Done Acceptance E VU   09/19/18 1137     Active Acceptance E NR   09/18/18 1413                      User Key     Initials Effective Dates Name Provider Type Discipline     06/08/18 -  Lisa Porter, OTR/L Occupational Therapist OT     03/07/18 -  Anna Aguiar, DALE/L Occupational Therapy Assistant OT                OT Recommendation and Plan  Outcome Summary/Treatment Plan (OT)  Daily Summary of Progress (OT): progress toward functional goals is good  Plan for Continued Treatment (OT): Continue per POC  Anticipated Discharge Disposition (OT): home  Therapy Frequency (OT Eval): other (see comments) (5-7 days a wk)  Daily Summary of Progress (OT): progress toward functional goals is good  Outcome Summary: Pt agreed to work with OT, initiate UE HEP with pt returning demo, pt is new to OT no goals met at this time,  OT/PT at d/c          Outcome Measures     Row Name 09/19/18 0856 09/19/18 0750 09/18/18 1040       How much help from another person do you currently need...    Turning from your back to your side while in flat bed without using  bedrails? 4  -CA  --  --    Moving from lying on back to sitting on the side of a flat bed without bedrails? 4  -CA  --  --    Moving to and from a bed to a chair (including a wheelchair)? 3  -CA  --  --    Standing up from a chair using your arms (e.g., wheelchair, bedside chair)? 4  -CA  --  --    Climbing 3-5 steps with a railing? 3  -CA  --  --    To walk in hospital room? 3  -CA  --  --    AM-PAC 6 Clicks Score 21  -CA  --  --       How much help from another is currently needed...    Putting on and taking off regular lower body clothing?  -- 4  -JH 3  -JH    Bathing (including washing, rinsing, and drying)  -- 4  -JH 3  -JH    Toileting (which includes using toilet bed pan or urinal)  -- 4  -JH 4  -JH    Putting on and taking off regular upper body clothing  -- 4  -JH 4  -JH    Taking care of personal grooming (such as brushing teeth)  -- 4  -JH 4  -JH    Eating meals  -- 4  - 4  -JH    Score  -- 24  -JH 22  -JH       Functional Assessment    Outcome Measure Options AM-PAC 6 Clicks Basic Mobility (PT)  -CA  --  --    Row Name 09/18/18 0815 09/17/18 3303          How much help from another person do you currently need...    Turning from your back to your side while in flat bed without using bedrails?  -- 4  -LF     Moving from lying on back to sitting on the side of a flat bed without bedrails?  -- 4  -LF     Moving to and from a bed to a chair (including a wheelchair)?  -- 3  -LF     Standing up from a chair using your arms (e.g., wheelchair, bedside chair)?  -- 4  -LF     Climbing 3-5 steps with a railing?  -- 3  -LF     To walk in hospital room?  -- 3  -LF     AM-PAC 6 Clicks Score  -- 21  -LF        How much help from another is currently needed...    Putting on and taking off regular lower body clothing? 3  -BH  --     Bathing (including washing, rinsing, and drying) 3  -BH  --     Toileting (which includes using toilet bed pan or urinal) 3  -BH  --     Putting on and taking off regular upper body  clothing 3  -  --     Taking care of personal grooming (such as brushing teeth) 4  -BH  --     Eating meals 4  -  --     Score 20  -  --        Functional Assessment    Outcome Measure Options AM-PAC 6 Clicks Daily Activity (OT)  - AM-PAC 6 Clicks Basic Mobility (PT)  -       User Key  (r) = Recorded By, (t) = Taken By, (c) = Cosigned By    Initials Name Provider Type     Lisa Porter, OTR/L Occupational Therapist    Kamar Parker, PTA Physical Therapy Assistant     Anna Aguiar, DALE/L Occupational Therapy Assistant    LF Dulce Moise, PT Physical Therapist           Time Calculation:         Time Calculation- OT     Row Name 09/19/18 1138             Time Calculation-     OT Start Time 0750  -      OT Stop Time 0850  -      OT Time Calculation (min) 60 min  -      OT Received On 09/19/18  -        User Key  (r) = Recorded By, (t) = Taken By, (c) = Cosigned By    Initials Name Provider Type     Anna Aguiar DALE/L Occupational Therapy Assistant           Therapy Suggested Charges     Code   Minutes Charges    None           Therapy Charges for Today     Code Description Service Date Service Provider Modifiers Qty    30523258002 HC OT THERAPEUTIC ACT EA 15 MIN 9/18/2018 Anna Aguiar DALE/L GO 2    67735895525 HC OT THER PROC EA 15 MIN 9/18/2018 Anna Aguiar DALE/L GO 2    38447269948 HC OT SELF CARE/MGMT/TRAIN EA 15 MIN 9/18/2018 Anna Aguiar DALE/L GO 1    43608812968 HC OT SELF CARE/MGMT/TRAIN EA 15 MIN 9/19/2018 Anna Aguiar DALE/L GO 2    37375762049 HC OT THER PROC EA 15 MIN 9/19/2018 Anna Aguiar DALE/L GO 1    93466900506 HC OT THERAPEUTIC ACT EA 15 MIN 9/19/2018 Anna Aguiar DALE/L GO 1          OT G-codes  OT Professional Judgement Used?: Yes  OT Functional Scales Options: AM-PAC 6 Clicks Daily Activity (OT)  Score: 20  Functional Limitation: Self care  Self Care Current Status (): At least 20 percent but less than 40 percent impaired,  limited or restricted  Self Care Goal Status (): At least 1 percent but less than 20 percent impaired, limited or restricted    CHITO Sharma/ARMANDO  9/19/2018

## 2018-09-19 NOTE — PLAN OF CARE
Problem: Patient Care Overview  Goal: Plan of Care Review  Outcome: Ongoing (interventions implemented as appropriate)   09/19/18 0943   Coping/Psychosocial   Plan of Care Reviewed With patient   Plan of Care Review   Progress improving   OTHER   Outcome Summary pt. had increased balance this tx. and tolerated step training well. pt. ambulated 375' with NO AD and Supvervision

## 2018-09-19 NOTE — DISCHARGE PLACEMENT REQUEST
"Filomena Perales (68 y.o. Female)     Date of Birth Social Security Number Address Home Phone MRN    1950  [address] [id number] [phone] [id number]    Sabianism Marital Status          Episcopal        Admission Date Admission Type Admitting Provider Attending Provider Department, Room/Bed    18 Emergency Mariza Ingram MD Bleibel, Hani A, MD [address], [facility]/1    Discharge Date Discharge Disposition Discharge Destination         Home or Self Care              Attending Provider:  Mariza Ingram MD    Allergies:  Morphine And Related, Penicillins    Isolation:  None   Infection:  None   Code Status:  CPR    Ht:  157.5 cm (62\")   Wt:  56.7 kg (125 lb)    Admission Cmt:  None   Principal Problem:  Syncope [R55]                 Active Insurance as of 2018     Primary Coverage     Payor Plan Insurance Group Employer/Plan Group    MISC MEDICARE REPLACMENT [facility] [id number]     Coverage Address Coverage Phone Number Effective From Effective To    [phone] 2015     [place] [id number]       Subscriber Name Subscriber Birth Date Member ID       FILOMENA PERALES 1950 [id number]                 Emergency Contacts      (Rel.) Home Phone Work Phone Mobile Phone    Torri Rivero (Daughter) [phone] -- [phone]    Susan Green (Sister) [phone] -- [phone]        Millie Rao RN [facility]  [phone] phone  [phone] fax     [address] [address]  Dept. Phone:  [phone]  Dept. Fax:   Date Ordered: Sep 19, 2018         Patient:  Filomena Perales MRN:  [id number]   [address] [facility]   [place] [id number] :  1950  SSN:    Phone: [phone] Sex:  F     Weight: 56.7 kg (125 lb)         Ht Readings from Last 1 Encounters:   18 157.5 cm (62\")         Walker      "          (Order ID: [id number])    Diagnosis:  Fall, initial encounter (W19.XXXA [ICD-10-CM] E888.9 [ICD-9-CM])   Quantity:  1     Equipment:  Walker Folding with Wheels  Length of Need (99 Months = Lifetime): 15 Months        Authorizing Provider's Phone: [phone]         Authorizing Provider:Jani Tavera MD  Authorizing Provider's NPI: [id number]  Order Entered By: Jani Tavera MD 9/19/2018 10:27 AM     Electronically signed by: Jani Tavera MD 9/19/2018 10:27 AM            History & Physical      [facility] at 9/17/2018 12:25 AM                [facility] Medicine Admission      Date of Admission: 9/16/2018      Primary Care Physician: Val Bansal APRN      Chief Complaint:   Syncope    HPI:  This is a 68-year-old white female who is being admitted to the hospital with a syncopal episode.  She was at home this afternoon, she went out to her porch she stepped over a concrete, she felt dizzy and fell.  She reports hitting her head, she had a syncope for about 2 minutes.  Her neighbor woke her up.  She states that she is not aware of a triggering factor.  Apparently this is been happening to her, in the past week it happened twice.  There was no reported confusion, she denies subjective fever or chills.  She denies chest pain or shortness of breath, no cough no hematuria dysuria hematemesis or hematochezia.  At the time of examination patient is back to baseline.    Past Medical History:   Past Medical History:   Diagnosis Date   • Anxiety    • Arthritis    • Asthma    • 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)     bilateral legs - bilateral iliac stents 2008      • Chronic lower back pain    • COPD (chronic obstructive pulmonary disease) (CMS/[facility])    • Coronary arteriosclerosis    • Essential (primary) hypertension    • History of pulmonary embolus (PE)    • Hyperlipidemia    • Insomnia    • Lumbago     • Nicotine dependence    • Occlusion of artery (CMS/HCC)      and stenosis of bilateral carotid arteries - BABS 16-49%, LICA 0-15%   • Other atherosclerosis of native artery of other extremity     LEFT subclavian stent 2005 (occluded)       Past Surgical History:   Past Surgical History:   Procedure Laterality Date   • CARDIAC CATHETERIZATION  04/06/2016    No evidence of any obstructive epicardial CAD.Preserved LV systolic function with EF of 55%.   • CENTRAL VENOUS LINE INSERTION  04/07/2016    Successful placement of right uppe extremity 6-Central African triple lumen PICC line.   • ENDOSCOPY N/A 1/12/2018    Procedure: ESOPHAGOGASTRODUODENOSCOPY;  Surgeon: Bin Martin MD;  Location: [facility] ENDOSCOPY;  Service:    • ENDOSCOPY N/A 1/17/2018    Procedure: ESOPHAGOGASTRODUODENOSCOPY;  Surgeon: Bin Martin MD;  Location: [facility] ENDOSCOPY;  Service:    • ENDOSCOPY N/A 3/16/2018    Procedure: ESOPHAGOGASTRODUODENOSCOPY possible dilation;  Surgeon: Bin Martin MD;  Location: [facility] ENDOSCOPY;  Service: Gastroenterology   • FRACTURE SURGERY     • HYSTERECTOMY     • TOTAL SHOULDER REPLACEMENT Left    • TRANSESOPHAGEAL ECHOCARDIOGRAM (JACQUES)  04/07/2016    With color flow-Mild to moderate [facility].LV systolic function well preserved with Ef of 55-60%.Mitral and AV intact.Diastolic dysfunction   • UPPER GASTROINTESTINAL ENDOSCOPY  01/17/2018    Dr. Bin Martin M.D.       Family History:   Family History   Problem Relation Age of Onset   • Heart disease Other    • Hypertension Other        Social History:   Social History     Social History   • Marital status:      Social History Main Topics   • Smoking status: Current Every Day Smoker     Packs/day: 0.50     Years: 50.00     Types: Cigarettes   • Smokeless tobacco: Never Used      Comment: 02/05/2018 - Patient opted to decline cessation of smoking counseling at present time.   • Alcohol use No   • Drug use: No   • Sexual activity: Defer     Other Topics Concern    • Not on file       Allergies:   Allergies   Allergen Reactions   • Morphine And Related Itching, Confusion and Hallucinations   • Penicillins Rash       Medications:   Prior to Admission medications    Medication Sig Start Date End Date Taking? Authorizing Provider   albuterol (PROVENTIL) (2.5 MG/3ML) 0.083% nebulizer solution Take 2.5 mg by nebulization Every 8 (Eight) Hours As Needed for Wheezing.    Miguelito Chatman MD   amLODIPine (NORVASC) 10 MG tablet Take 10 mg by mouth Daily.    Miguelito Chatman MD   ELIQUIS 5 MG tablet tablet Take 5 mg by mouth Daily. 2/21/18   Miguelito Chatman MD   gabapentin (NEURONTIN) 800 MG tablet Take 800 mg by mouth 3 (Three) Times a Day.    Miguelito Chatman MD   HYDROcodone-acetaminophen (NORCO) 5-325 MG per tablet Take 1 tablet by mouth Every 8 (Eight) Hours As Needed (PRN). 6/4/18   Isadora Davila APRN   naproxen (NAPROSYN) 500 MG tablet Take 1 tablet by mouth 2 (Two) Times a Day With Meals. 4/9/18   Isadora Davila APRN   ondansetron (ZOFRAN) 4 MG tablet Take 4 mg by mouth Every 8 (Eight) Hours As Needed for Nausea or Vomiting.    Miguelito Chatman MD   pantoprazole (PROTONIX) 40 MG EC tablet Take 1 tablet by mouth Daily. 2/5/18   Mary Shen APRN   penciclovir (DENAVIR) 1 % cream Apply 1 application topically Every 2 (Two) Hours.    Miguelito Chatman MD   pravastatin (PRAVACHOL) 20 MG tablet Take 20 mg by mouth Daily.    Miguelito Chatman MD   sertraline (ZOLOFT) 100 MG tablet Take 100 mg by mouth Daily.    Miguelito Chatman MD   SYMBICORT 80-4.5 MCG/ACT inhaler Inhale 2 puffs 2 (Two) Times a Day As Needed. 1/19/18   Miguelito Chatman MD   traZODone (DESYREL) 300 MG tablet Take 300 mg by mouth Every Night.    Miguelito Chatman MD       Review of Systems:  Review of Systems   Otherwise complete ROS is negative except as mentioned above.  No chest pain no shortness of breath no nausea no vomiting  Physical Exam:   Temp:  [98.1 °F  (36.7 °C)] 98.1 °F (36.7 °C)  Heart Rate:  [63-76] 68  Resp:  [18-20] 18  BP: (120-135)/(59-67) 120/64  Physical Exam  Patient appears well, alert and oriented x 3, pleasant, cooperative.   Neck supple  No JVD No thyromegaly.  Lungs decreased breath sounds No crackles no wheezing   CV S1S2 normal, no murmurs, clicks, gallops or rubs.  Abdomen is soft, no tenderness, masses or organomegaly.    Extremities: no clubbing cyanosis or edema   Neurological CN 2-12 intact   Skin is normal    Results Reviewed:  I have personally reviewed current lab, radiology, and data and agree with results.  Lab Results (last 24 hours)     Procedure Component Value Units Date/Time    Urine Drug Screen - Urine, Clean Catch [[id number]]  (Abnormal) Collected:  09/16/18 2218    Specimen:  Urine from Urine, Clean Catch Updated:  09/16/18 2329     Amphetamine Screen, Urine Positive (A)     Barbiturates Screen, Urine Negative     Benzodiazepine Screen, Urine Negative     Cocaine Screen, Urine Negative     Methadone Screen, Urine Negative     Opiate Screen Negative     Oxycodone Screen, Urine Negative     THC, Screen, Urine Positive (A)    Narrative:       Negative Thresholds For Drugs Screened in Urine:     Amphetamines          500 ng/ml  Barbiturates          200 ng/ml  Benzodiazepines       200 ng/ml  Cocaine               150 ng/ml  Methadone             300 ng/mL  Opiates               300 ng/mL  Oxycodone             100 ng/mL  THC                   20 ng/mL    The normal value for all drugs tested is negative. This report includes final unconfirmed screening results.  A positive result by this assay can be, at your request, sent to the Reference Lab for confirmation by gas chromatography. Unconfirmed results must not be used for non-medical purposes, such as employment or legal testing. Clinical consideration should be applied to any drug of abuse test result, particularly when unconfirmed results are used.    BNP [[id number]]  (Normal)  Collected:  09/16/18 2211    Specimen:  Blood from Arm, Right Updated:  09/16/18 2301     proBNP 348.0 pg/mL     Troponin [[id number]]  (Normal) Collected:  09/16/18 2211    Specimen:  Blood from Arm, Right Updated:  09/16/18 2301     Troponin I <0.012 ng/mL     Ethanol [[id number]] Collected:  09/16/18 2211    Specimen:  Blood from Arm, Right Updated:  09/16/18 2258     Ethanol <10 mg/dL      Ethanol % <0.010 %     Protime-INR [[id number]]  (Normal) Collected:  09/16/18 2211    Specimen:  Blood from Arm, Right Updated:  09/16/18 2243     Protime 13.4 Seconds      INR 1.04    Narrative:       Therapeutic range for most indications is 2.0-3.0 INR,  or 2.5-3.5 for mechanical heart valves.    aPTT [[id number]]  (Normal) Collected:  09/16/18 2211    Specimen:  Blood from Arm, Right Updated:  09/16/18 2243     PTT 29.0 seconds     Narrative:       The recommended Heparin therapeutic range is 68-97 seconds.    Comprehensive Metabolic Panel [[id number]]  (Normal) Collected:  09/16/18 2211    Specimen:  Blood from Arm, Right Updated:  09/16/18 2243     Glucose 97 mg/dL      BUN 17 mg/dL      Creatinine 0.75 mg/dL      Sodium 139 mmol/L      Potassium 3.5 mmol/L      Chloride 105 mmol/L      CO2 27.0 mmol/L      Calcium 8.5 mg/dL      Total Protein 6.9 g/dL      Albumin 3.80 g/dL      ALT (SGPT) 20 U/L      AST (SGOT) 25 U/L      Alkaline Phosphatase 101 U/L      Total Bilirubin 0.3 mg/dL      eGFR Non African Amer 77 mL/min/1.73      Globulin 3.1 gm/dL      A/G Ratio 1.2 g/dL      BUN/Creatinine Ratio 22.7     Anion Gap 7.0 mmol/L     CK [[id number]]  (Normal) Collected:  09/16/18 2211    Specimen:  Blood from Arm, Right Updated:  09/16/18 2243     Creatine Kinase 100 U/L     Magnesium [[id number]]  (Normal) Collected:  09/16/18 2211    Specimen:  Blood from Arm, Right Updated:  09/16/18 2243     Magnesium 1.9 mg/dL     Urinalysis, Microscopic Only - Urine, Clean Catch [[id number]]  (Abnormal) Collected:  09/16/18 2443    Specimen:   Urine from Urine, Clean Catch Updated:  09/16/18 2237     RBC, UA 0-2 (A) /HPF      WBC, UA 6-12 (A) /HPF      Bacteria, UA None Seen /HPF      Squamous Epithelial Cells, UA 3-5 (A) /HPF      Hyaline Casts, UA None Seen /LPF      Methodology Automated Microscopy    Urinalysis With Culture If Indicated - Urine, Clean Catch [[id number]]  (Abnormal) Collected:  09/16/18 2218    Specimen:  Urine from Urine, Clean Catch Updated:  09/16/18 2237     Color, UA Yellow     Appearance, UA Clear     pH, UA 6.5     Specific Gravity, UA 1.009     Glucose, UA Negative     Ketones, UA Negative     Bilirubin, UA Negative     Blood, UA Negative     Protein, UA Negative     Leuk Esterase, UA Trace (A)     Nitrite, UA Negative     Urobilinogen, UA 0.2 E.U./dL    CBC & Differential [[id number]] Collected:  09/16/18 2211    Specimen:  Blood Updated:  09/16/18 2221    Narrative:       The following orders were created for panel order CBC & Differential.  Procedure                               Abnormality         Status                     ---------                               -----------         ------                     CBC Auto Differential[[id number]]        Abnormal            Final result                 Please view results for these tests on the individual orders.    CBC Auto Differential [[id number]]  (Abnormal) Collected:  09/16/18 2211    Specimen:  Blood from Arm, Right Updated:  09/16/18 2221     WBC 10.05 (H) 10*3/mm3      RBC 3.70 (L) 10*6/mm3      Hemoglobin 11.2 (L) g/dL      Hematocrit 33.1 (L) %      MCV 89.5 fL      MCH 30.3 pg      MCHC 33.8 g/dL      RDW 14.3 %      RDW-SD 46.9 (H) fl      MPV 8.9 fL      Platelets 258 10*3/mm3      Neutrophil % 58.5 %      Lymphocyte % 27.4 %      Monocyte % 10.1 %      Eosinophil % 3.1 %      Basophil % 0.5 %      Immature Grans % 0.4 %      Neutrophils, Absolute 5.88 10*3/mm3      Lymphocytes, Absolute 2.75 10*3/mm3      Monocytes, Absolute 1.02 (H) 10*3/mm3      Eosinophils,  Absolute 0.31 10*3/mm3      Basophils, Absolute 0.05 10*3/mm3      Immature Grans, Absolute 0.04 (H) 10*3/mm3         Imaging Results (last 24 hours)     Procedure Component Value Units Date/Time    CT Cervical Spine Without Contrast [[id number]] Collected:  09/16/18 2124     Updated:  09/16/18 2159    Narrative:       PROCEDURE: CT cervical spine without contrast.    INDICATION: Fall. Pain.    COMPARISON: None.    CT .0 milliGray-cm.    TECHNIQUE: This exam was performed according to our departmental  dose-optimization program, which includes automated exposure  control, adjustment of the mA and/or kV according to patient size  and/or use of iterative reconstruction technique. CT cervical  spine without contrast performed with axial and reconstructed  sagittal and coronal images from above the foramen magnum to the  bottom of T3.    Prevertebral soft tissues are normal.    Cervical vertebrae and visualized upper thoracic vertebrae normal  height and normally aligned. No compression injuries.    Moderate degenerative disc changes C5-6 and C7-T1 with associated  hypertrophic spurring.    The articular facets are intact and normally aligned. Scattered  areas of facet arthritic change.    The lamina, spinous processes and odontoid are intact. There are  degenerative arthritic with bony spurring at the articulation of  the odontoid and anterior arch C1.    Apices of lungs clear.      Impression:       No fractures or acute osseous abnormalities.    Degenerative arthritic changes with bony osteophytes at the  junction of the anterior arch C1 hand the odontoid.    Scattered facet arthritic changes.    Moderate degenerative disc changes C5-C6 and C7-T1.    [id number]    Electronically signed by:  Billy Bennett MD  9/16/2018 9:58  PM CDT Workstation: [email or web address]    CT Lumbar Spine Without Contrast [[id number]] Collected:  09/16/18 2124     Updated:  09/16/18 2150    Narrative:       CT lumbar spine without contrast on   9/16/2018     CLINICAL INDICATION: Low back pain after fall    TECHNIQUE: Multiple axial images are obtained throughout the  lumbar spine without the administration of contrast. Sagittal and  coronal reformatted images are also performed and reviewed. This  exam was performed according to our departmental  dose-optimization program, which includes automated exposure  control, adjustment of the mA and/or kV according to patient size  and/or use of iterative reconstruction technique.   Total DLP is 511.8 mGy*cm.    COMPARISON: None    FINDINGS: There is levoscoliosis of the lumbar spine.  Degenerative disc disease is noted especially from T12 through  L3. Large Schmorl's node formation is noted in the superior  endplate of L2. There is an old compression fracture of the  superior endplate of L3. Reformatted images reveal otherwise  normal alignment of the lumbar spine. Facet arthropathy is noted  throughout the lumbar spine. Vascular calcifications are noted.  The SI joints are well aligned.    At the T12-L1 level, there is a calcified right paracentral disc  bulge producing mild to moderate right-sided canal stenosis and  very mild right foraminal narrowing.    At the L1-2 level, based disc osteophyte complex produces mild  canal stenosis and mild right foraminal narrowing.    At the L2-3 level, broad-based disc osteophyte complex and facet  arthropathy produces mild canal stenosis and mild right foraminal  narrowing.    No definite disc herniation is noted otherwise.      Impression:       Levoscoliosis with degenerative changes with no acute  abnormality.    Electronically signed by:  Surinder Cantu  9/16/2018 9:49 PM  CDT Workstation: [id number]    CT Head Without Contrast [[id number]] Collected:  09/16/18 2124     Updated:  09/16/18 2144    Narrative:         CT head without contrast on 9/16/2018     CLINICAL INDICATION: Fell and hit head, pain    TECHNIQUE:  Multiple axial images are obtained throughout  the  head without the administration of contrast.  This exam was  performed according to our departmental dose-optimization  program, which includes automated exposure control, adjustment of  the mA and/or kV according to patient size and/or use of  iterative reconstruction technique.   Total DLP is 1029 mGy*cm.    COMPARISON: 11/6/2014    FINDINGS:  There is mild generalized cerebral atrophy. There is  mild low density in the periventricular white matter consistent  with chronic small vessel ischemic changes. There is no  hydrocephalus. There is no hemorrhage. There are no abnormal  extra-axial fluid collections. There is no mass, mass effect or  midline shift. There is no CT evidence of acute infarct. No bony  abnormality is noted.      Impression:       Atrophy and chronic small vessel ischemic changes  with no acute intracranial abnormality.    Electronically signed by:  Surinder Cantu  9/16/2018 9:42 PM  CDT Workstation: [id number]    XR Hip With or Without Pelvis 2 - 3 View Left [[id number]] Collected:  09/16/18 2103     Updated:  09/16/18 2129    Narrative:       Pelvis and left hip total three view on 9/16/2018    CLINICAL INDICATION: Left hip pain after fall    COMPARISON: None    FINDINGS: Vascular calcifications are noted. Calcified  phleboliths and likely injection granulomas are noted in the  pelvis. The hips are well located. The SI joints are well  aligned. No acute fracture is noted.      Impression:       No acute bony abnormality.    Electronically signed by:  Surinder Cantu  9/16/2018 9:28 PM  CDT Workstation: [id number]    XR Shoulder 2+ View Left [[id number]] Collected:  09/16/18 2103     Updated:  09/16/18 2122    Narrative:       Left shoulder three view on 9/16/2018    CLINICAL INDICATION: Left shoulder pain after fall    COMPARISON: 3/10/2016    FINDINGS: The patient is status post interval shoulder  arthroplasty. There has been resection of the left distal  clavicle. No hardware  complication is noted. There is no  dislocation. There are no fractures.      Impression:       No acute bony abnormality.    Electronically signed by:  Surinder Cantu  9/16/2018 9:20 PM  CDT Workstation: [email or web address]    XR Chest 1 View [[id number]] Collected:  09/16/18 2102     Updated:  09/16/18 2113    Narrative:         Chest single view on  9/16/2018     CLINICAL INDICATION: Syncope, fall    COMPARISON: 1/11/2018    FINDINGS: The patient is status post a left shoulder  arthroplasty. Borderline cardiomegaly is noted. Vascular  calcification is noted in the aorta. There is mild linear  atelectasis or scarring in the lung bases. The lungs are  otherwise clear. Hilar and mediastinal contours are within normal  limits.      Impression:       No acute disease.    Electronically signed by:  Surinder Cantu  9/16/2018 9:12 PM  CDT Workstation: [id number]            Assessment/Plan:           1.  Fall with syncope: Likely multifactorial in the setting of methamphetamine/marijuana use, also could be related to hypoxia in the setting of COPD, place on oxygen and bronchodilators, obtain cardiac enzymes and a d-dimer, obtain carotid Dopplers and a 2-D echo, start normal saline at 75 mL per hour.  2.  COPD without acute exacerbation: Oxygen and bronchodilators as indicated  3.  Illicit drug use with methamphetamine/marijuana: Counseled  4.  Nicotine use without evidence of withdrawal wall: Counseled  5.  Depression and anxiety, resume outpatient medications  6.  Essential hypertension: Resume outpatient medications  7.  History of pulmonary embolism: Continue Eliquis  8.  History  of carotid stenosis: Check carotid Dopplers  9. Acute uti upon admission, unspecified location or organism: start Levaquin obtain blood and urine cultures   10.  DVT prophylaxis: Monty Ingram MD  09/17/18  12:25 AM                    Electronically signed by Mariza Ingram MD at 9/17/2018  7:24 AM       Operative/Procedure  Notes (most recent note)     No notes of this type exist for this encounter.           Physician Progress Notes (most recent note)      Jani Tavera MD at 2018  8:20 AM          MEDICINE DAILY PROGRESS NOTE  NAME: Mona Perales  : 1950  MRN: [id number]     LOS: 2 days     PROVIDER OF SERVICE: Jani Tavera MD    Chief Complaint: Syncope    Subjective:   HPI: Patient admitted with a near syncopal episode. She denies loss of bowel or bladder. She states that she worked all day and after sitting down for a few minutes she got dizzy upon standing.    Interval History:  History taken from: patient chart RN  . Patient at baseline today. She desires to go home. No dizziness, syncope, or other symptoms reported today per patient.  . Patient sore today. No complaints of dizziness at this time. ECHO/Carotids ordered. Patient tolerating diet.    Review of Systems:   Review of Systems   Constitutional: Negative for activity change, appetite change, fatigue and fever.   HENT: Negative for ear pain and sore throat.    Eyes: Negative for pain and visual disturbance.   Respiratory: Positive for cough. Negative for shortness of breath.    Cardiovascular: Negative for chest pain and palpitations.   Gastrointestinal: Positive for nausea. Negative for abdominal pain.   Endocrine: Negative for cold intolerance and heat intolerance.   Genitourinary: Negative for difficulty urinating and dysuria.   Musculoskeletal: Positive for arthralgias. Negative for gait problem.   Skin: Negative for color change and rash.   Neurological: Negative for dizziness, weakness and headaches.   Hematological: Negative for adenopathy. Does not bruise/bleed easily.   Psychiatric/Behavioral: Negative for agitation, confusion and sleep disturbance.       Objective:     Vital Signs  Vitals:    18 0718 18 0725 18 0730 18 0734   BP:    169/88   BP Location:       Patient Position:       Pulse: 76 89 90 85   Resp:   16 18 18   Temp:    97.9 °F (36.6 °C)   TempSrc:       SpO2:  92%  92%   Weight:       Height:           Physical Exam  Physical Exam   Constitutional: She is oriented to person, place, and time. She appears well-developed and well-nourished. No distress.   HENT:   Head: Normocephalic and atraumatic.   Right Ear: External ear normal.   Left Ear: External ear normal.   Nose: Nose normal.   Eyes: Pupils are equal, round, and reactive to light. Conjunctivae and EOM are normal.   Neck: Normal range of motion. Neck supple.   Cardiovascular: Normal rate, regular rhythm, normal heart sounds and intact distal pulses.    Pulmonary/Chest: Effort normal and breath sounds normal. No respiratory distress. She has no wheezes. She has no rales. She exhibits no tenderness.   Abdominal: Soft. Bowel sounds are normal. She exhibits no distension and no mass. There is no tenderness. There is no rebound and no guarding.   Musculoskeletal: Normal range of motion. She exhibits no edema.   Neurological: She is alert and oriented to person, place, and time.   Skin: Skin is warm and dry. No rash noted. She is not diaphoretic. No erythema. No pallor.   Psychiatric: She has a normal mood and affect. Her behavior is normal.   Nursing note and vitals reviewed.      Medication Review    Current Facility-Administered Medications:   •  acetaminophen (TYLENOL) tablet 650 mg, 650 mg, Oral, Q6H PRN, Jani Tavera MD, 650 mg at 09/18/18 1738  •  amLODIPine (NORVASC) tablet 10 mg, 10 mg, Oral, Daily, Mariza Ingram MD, 10 mg at 09/18/18 0749  •  apixaban (ELIQUIS) tablet 5 mg, 5 mg, Oral, Daily, Mariza Ingram MD, 5 mg at 09/18/18 0749  •  atorvastatin (LIPITOR) tablet 10 mg, 10 mg, Oral, Daily, Mariza Ingram MD, 10 mg at 09/18/18 0749  •  ipratropium-albuterol (DUO-NEB) nebulizer solution 3 mL, 3 mL, Nebulization, 4x Daily - RT, Mariza Ingram MD, 3 mL at 09/19/18 0725  •  levoFLOXacin (LEVAQUIN) 500 mg/100 mL D5W (premix) (LEVAQUIN) 500 mg,  500 mg, Intravenous, Q24H, Mariza Ingram MD, Last Rate: 0 mL/hr at 09/17/18 1023, 500 mg at 09/18/18 0748  •  ondansetron (ZOFRAN) injection 4 mg, 4 mg, Intravenous, Q6H PRN, Jani Tavera MD, 4 mg at 09/18/18 1530  •  pantoprazole (PROTONIX) EC tablet 40 mg, 40 mg, Oral, Daily, Mariza Ingram MD, 40 mg at 09/18/18 0750  •  sertraline (ZOLOFT) tablet 100 mg, 100 mg, Oral, Daily, Mariza Ingram MD, 100 mg at 09/18/18 0750  •  Insert peripheral IV, , , Once **AND** sodium chloride 0.9 % flush 10 mL, 10 mL, Intravenous, PRN, Jayme Lamar, DO  •  sodium chloride 0.9 % infusion, 75 mL/hr, Intravenous, Continuous, Mariza Ingram MD, Last Rate: 75 mL/hr at 09/19/18 0638, 75 mL/hr at 09/19/18 0638  •  traZODone (DESYREL) tablet 50 mg, 50 mg, Oral, Nightly PRN, Mariza Ingram MD, 50 mg at 09/18/18 2136     Diagnostic Data    Lab Results (last 24 hours)     Procedure Component Value Units Date/Time    Blood Culture - Blood, [[id number]]  (Normal) Collected:  09/17/18 0754    Specimen:  Blood from Arm, Left Updated:  09/19/18 0800     Blood Culture No growth at 2 days    Blood Culture - Blood, [[id number]]  (Normal) Collected:  09/17/18 0754    Specimen:  Blood from Hand, Right Updated:  09/19/18 0800     Blood Culture No growth at 2 days    Comprehensive Metabolic Panel [[id number]]  (Normal) Collected:  09/19/18 0535    Specimen:  Blood Updated:  09/19/18 0624     Glucose 91 mg/dL      BUN 7 mg/dL      Creatinine 0.59 mg/dL      Sodium 140 mmol/L      Potassium 3.7 mmol/L      Chloride 104 mmol/L      CO2 28.0 mmol/L      Calcium 8.8 mg/dL      Total Protein 6.9 g/dL      Albumin 3.70 g/dL      ALT (SGPT) 20 U/L      AST (SGOT) 24 U/L      Alkaline Phosphatase 95 U/L      Total Bilirubin 0.4 mg/dL      eGFR Non African Amer 102 mL/min/1.73      Globulin 3.2 gm/dL      A/G Ratio 1.2 g/dL      BUN/Creatinine Ratio 11.9     Anion Gap 8.0 mmol/L     CBC & Differential [[id number]] Collected:  09/19/18 0535     Specimen:  Blood Updated:  09/19/18 0609    Narrative:       The following orders were created for panel order CBC & Differential.  Procedure                               Abnormality         Status                     ---------                               -----------         ------                     CBC Auto Differential[[id number]]        Abnormal            Final result                 Please view results for these tests on the individual orders.    CBC Auto Differential [[id number]]  (Abnormal) Collected:  09/19/18 0535    Specimen:  Blood Updated:  09/19/18 0609     WBC 8.68 10*3/mm3      RBC 4.15 10*6/mm3      Hemoglobin 12.5 g/dL      Hematocrit 37.2 %      MCV 89.6 fL      MCH 30.1 pg      MCHC 33.6 g/dL      RDW 14.6 (H) %      RDW-SD 47.9 (H) fl      MPV 8.7 fL      Platelets 315 10*3/mm3      Neutrophil % 51.0 %      Lymphocyte % 32.8 %      Monocyte % 13.8 (H) %      Eosinophil % 1.7 %      Basophil % 0.6 %      Immature Grans % 0.1 %      Neutrophils, Absolute 4.42 10*3/mm3      Lymphocytes, Absolute 2.85 10*3/mm3      Monocytes, Absolute 1.20 (H) 10*3/mm3      Eosinophils, Absolute 0.15 10*3/mm3      Basophils, Absolute 0.05 10*3/mm3      Immature Grans, Absolute 0.01 10*3/mm3      nRBC 0.0 /100 WBC           I reviewed the patient's new clinical results.    Assessment/Plan:     Active Hospital Problems    Diagnosis Date Noted   • **Syncope [R55] 09/18/2018   • Fall [W19.XXXA] 09/17/2018   • Anxiety [F41.9] 09/04/2014   • COPD (chronic obstructive pulmonary disease) (CMS/[facility]) [J44.9] 06/04/2014   • CAD (coronary atherosclerotic disease) [I25.10] 12/18/2012   • Benign essential hypertension [I10] 09/03/2009     #. Syncope.  9/19. No change noted from ECHO/Cartoids from previous. Patient asymptomatic.  9/18. No further symptoms. PT/OT. ECHO. Carotids.  #. Substance abuse. Amphetamine confirmation pending. THC use. couseling.  #. COPD. Duonebs.  #. CAD. Statin.  #. HTN. Norvasc.  #. H/o PE.  Eliquis.    Will monitor patient's hospital course and adjust treatment as hospital course dictates.    DVT prophylaxis: Eliquis  Code status is   Code Status and Medical Interventions:   Ordered at: 09/17/18 1458     Level Of Support Discussed With:    Patient     Code Status:    CPR     Medical Interventions (Level of Support Prior to Arrest):    Full       Plan for disposition:Where: home and home health and When:  today      Time:           This document has been electronically signed by Jani Tavera MD on September 19, 2018 8:20 AM    Electronically signed by Jani Tavera MD at 9/19/2018 10:29 AM       Medical Student Notes (most recent note)     No notes of this type exist for this encounter.        Consult Notes (most recent note)     No notes of this type exist for this encounter.

## 2018-09-19 NOTE — DISCHARGE SUMMARY
DISCHARGE SUMMARY    NAME: Mona Perales   PHYSICIAN: Jani Tavera MD  : 1950  MRN: [id number]    ADMITTED: 2018   DISCHARGED: 18    ADMISSION DIAGNOSES:  Present on Admission:  • Fall  • COPD (chronic obstructive pulmonary disease) (CMS/HCC)  • CAD (coronary atherosclerotic disease)  • Anxiety  • Benign essential hypertension  • Syncope    DISCHARGE DIAGNOSES:   Principal Problem:    Syncope  Active Problems:    Anxiety    CAD (coronary atherosclerotic disease)    COPD (chronic obstructive pulmonary disease) (CMS/[facility])    Benign essential hypertension    Fall    SERVICE: Medicine. Attending Jani Tavera MD    CONSULTS:   Consult Orders (all)     Start     Ordered    18 0014  Hospitalist (on-call MD unless specified)  Once     Specialty:  Hospitalist  Provider:  Marzia Ingram MD    18 0013          PROCEDURES:   Imaging Results (last 7 days)     Procedure Component Value Units Date/Time    CT Angiogram Chest With Contrast [[id number]] Collected:  18 1610     Updated:  18 1759    Narrative:       Syncope and hypoxemia.    CT, arteriogram chest with intravenous contrast.    Radiation dose-limiting techniques also utilized, including  automated exposure control and adjustment of mA and/or KVP to the  patient size according to ALARA (as low as reasonably  achievable).    Isovue-370 47 mL injected IV. Computer generated 3-D  reconstruction and MIPS images were performed.    The thyroid gland is unremarkable. The cardiac silhouette is  within normal limits. No pulmonary embolus is identified. There  are few mediastinal lymph nodes normal by size criteria. There  are bilateral interstitial markings. There are mild atelectasis  in the lung bases. No pleural effusion is identified.    No acute abnormality is seen in the upper abdomen. No acute bony  abnormality is identified. There are degenerative changes of the  thoracic and upper lumbar spine. There are depressions seen  in  the superior endplates of L1 and L2 vertebra which probably  represent Schmorl's discs. No acute bony abnormality is  identified.      Impression:       CONCLUSION:  1. No pulmonary embolus is identified.  2. Bilateral interstitial changes and mild atelectasis in the  lung bases.    Electronically signed by:  Kris Joshi MD  9/18/2018 5:58  PM CDT Workstation: [id number]    US Carotid Bilateral [[id number]] Collected:  09/18/18 0946     Updated:  09/18/18 1150    Narrative:         ULTRASOUND CAROTID DUPLEX SCAN    HISTORY: Syncope. Fell. Collapse.    COMPARISON: February 2, 2016.    Duplex ultrasound of the carotid bifurcations was performed.    Real time and color flow images demonstrate bilateral plaque  formation.  No Doppler evidence of significant stenosis.  The peak systolic velocity in the right internal carotid artery  is 100.3 cm/s.  End-diastolic velocity 35.3 cm/s.  Ratio peak systolic velocity right internal carotid artery to  right common carotid 1.4.  The peak systolic velocity in left internal carotid artery is  101.6 cm/s.  End-diastolic velocity 22.5 cm/s.  Ratio peak systolic velocity left internal carotid artery to left  common carotid 1.2.  Antegrade flow is present in each vertebral artery.      Impression:       CONCLUSION:  Less than 50% diameter reduction stenosis each internal carotid  artery.    10300    Electronically signed by:  Jason Marie MD  9/18/2018 11:49 AM  CDT Workstation: [id number]    US Guided Vascular Access [[id number]] Resulted:  09/17/18 1109     Updated:  09/17/18 1109    Narrative:       This procedure was auto-finalized with no dictation required.    IR Insert Midline Without Port Pump 5 Plus [[id number]] Resulted:  09/17/18 1108     Updated:  09/17/18 1108    Narrative:       This procedure was auto-finalized with no dictation required.    CT Cervical Spine Without Contrast [[id number]] Collected:  09/16/18 2124     Updated:  09/16/18 2159    Narrative:        PROCEDURE: CT cervical spine without contrast.    INDICATION: Fall. Pain.    COMPARISON: None.    CT .0 milliGray-cm.    TECHNIQUE: This exam was performed according to our departmental  dose-optimization program, which includes automated exposure  control, adjustment of the mA and/or kV according to patient size  and/or use of iterative reconstruction technique. CT cervical  spine without contrast performed with axial and reconstructed  sagittal and coronal images from above the foramen magnum to the  bottom of T3.    Prevertebral soft tissues are normal.    Cervical vertebrae and visualized upper thoracic vertebrae normal  height and normally aligned. No compression injuries.    Moderate degenerative disc changes C5-6 and C7-T1 with associated  hypertrophic spurring.    The articular facets are intact and normally aligned. Scattered  areas of facet arthritic change.    The lamina, spinous processes and odontoid are intact. There are  degenerative arthritic with bony spurring at the articulation of  the odontoid and anterior arch C1.    Apices of lungs clear.      Impression:       No fractures or acute osseous abnormalities.    Degenerative arthritic changes with bony osteophytes at the  junction of the anterior arch C1 hand the odontoid.    Scattered facet arthritic changes.    Moderate degenerative disc changes C5-C6 and C7-T1.    [id number]    Electronically signed by:  Billy Bennett MD  9/16/2018 9:58  PM CDT [facility]station: [email or web address]    CT Lumbar Spine Without Contrast [[id number]] Collected:  09/16/18 2124     Updated:  09/16/18 2150    Narrative:       CT lumbar spine without contrast on  9/16/2018     CLINICAL INDICATION: Low back pain after fall    TECHNIQUE: Multiple axial images are obtained throughout the  lumbar spine without the administration of contrast. Sagittal and  coronal reformatted images are also performed and reviewed. This  exam was performed according to our  departmental  dose-optimization program, which includes automated exposure  control, adjustment of the mA and/or kV according to patient size  and/or use of iterative reconstruction technique.   Total DLP is 511.8 mGy*cm.    COMPARISON: None    FINDINGS: There is levoscoliosis of the lumbar spine.  Degenerative disc disease is noted especially from T12 through  L3. Large Schmorl's node formation is noted in the superior  endplate of L2. There is an old compression fracture of the  superior endplate of L3. Reformatted images reveal otherwise  normal alignment of the lumbar spine. Facet arthropathy is noted  throughout the lumbar spine. Vascular calcifications are noted.  The SI joints are well aligned.    At the T12-L1 level, there is a calcified right paracentral disc  bulge producing mild to moderate right-sided canal stenosis and  very mild right foraminal narrowing.    At the L1-2 level, based disc osteophyte complex produces mild  canal stenosis and mild right foraminal narrowing.    At the L2-3 level, broad-based disc osteophyte complex and facet  arthropathy produces mild canal stenosis and mild right foraminal  narrowing.    No definite disc herniation is noted otherwise.      Impression:       Levoscoliosis with degenerative changes with no acute  abnormality.    Electronically signed by:  Surinder Cantu  9/16/2018 9:49 PM  CDT Workstation: [id number]    CT Head Without Contrast [[id number]] Collected:  09/16/18 2124     Updated:  09/16/18 2144    Narrative:         CT head without contrast on 9/16/2018     CLINICAL INDICATION: Fell and hit head, pain    TECHNIQUE:  Multiple axial images are obtained throughout the  head without the administration of contrast.  This exam was  performed according to our departmental dose-optimization  program, which includes automated exposure control, adjustment of  the mA and/or kV according to patient size and/or use of  iterative reconstruction technique.   Total  DLP is 1029 mGy*cm.    COMPARISON: 11/6/2014    FINDINGS:  There is mild generalized cerebral atrophy. There is  mild low density in the periventricular white matter consistent  with chronic small vessel ischemic changes. There is no  hydrocephalus. There is no hemorrhage. There are no abnormal  extra-axial fluid collections. There is no mass, mass effect or  midline shift. There is no CT evidence of acute infarct. No bony  abnormality is noted.      Impression:       Atrophy and chronic small vessel ischemic changes  with no acute intracranial abnormality.    Electronically signed by:  Surinder Cantu  9/16/2018 9:42 PM  CDT Workstation: [id number]    XR Hip With or Without Pelvis 2 - 3 View Left [[id number]] Collected:  09/16/18 2103     Updated:  09/16/18 2129    Narrative:       Pelvis and left hip total three view on 9/16/2018    CLINICAL INDICATION: Left hip pain after fall    COMPARISON: None    FINDINGS: Vascular calcifications are noted. Calcified  phleboliths and likely injection granulomas are noted in the  pelvis. The hips are well located. The SI joints are well  aligned. No acute fracture is noted.      Impression:       No acute bony abnormality.    Electronically signed by:  Surinder Cantu  9/16/2018 9:28 PM  CDT Workstation: [id number]    XR Shoulder 2+ View Left [[id number]] Collected:  09/16/18 2103     Updated:  09/16/18 2122    Narrative:       Left shoulder three view on 9/16/2018    CLINICAL INDICATION: Left shoulder pain after fall    COMPARISON: 3/10/2016    FINDINGS: The patient is status post interval shoulder  arthroplasty. There has been resection of the left distal  clavicle. No hardware complication is noted. There is no  dislocation. There are no fractures.      Impression:       No acute bony abnormality.    Electronically signed by:  Surinder Cantu  9/16/2018 9:20 PM  CDT Workstation: [id number]    XR Chest 1 View [[id number]] Collected:  09/16/18 2102     Updated:   09/16/18 2113    Narrative:         Chest single view on  9/16/2018     CLINICAL INDICATION: Syncope, fall    COMPARISON: 1/11/2018    FINDINGS: The patient is status post a left shoulder  arthroplasty. Borderline cardiomegaly is noted. Vascular  calcification is noted in the aorta. There is mild linear  atelectasis or scarring in the lung bases. The lungs are  otherwise clear. Hilar and mediastinal contours are within normal  limits.      Impression:       No acute disease.    Electronically signed by:  Surinder Cantu  9/16/2018 9:12 PM  CDT Workstation: [id number]          HISTORY OF PRESENT ILLNESS: *Copied from Mariza Ingram MD's H&P*  This is a 68-year-old white female who is being admitted to the hospital with a syncopal episode.  She was at home this afternoon, she went out to her porch she stepped over a concrete, she felt dizzy and fell.  She reports hitting her head, she had a syncope for about 2 minutes.  Her neighbor woke her up.  She states that she is not aware of a triggering factor.  Apparently this is been happening to her, in the past week it happened twice.  There was no reported confusion, she denies subjective fever or chills.  She denies chest pain or shortness of breath, no cough no hematuria dysuria hematemesis or hematochezia.  At the time of examination patient is back to baseline.    DIAGNOSTIC DATA:   Lab Results (last 7 days)     Procedure Component Value Units Date/Time    Blood Culture - Blood, [[id number]]  (Normal) Collected:  09/17/18 0754    Specimen:  Blood from Arm, Left Updated:  09/19/18 0800     Blood Culture No growth at 2 days    Blood Culture - Blood, [[id number]]  (Normal) Collected:  09/17/18 0754    Specimen:  Blood from Hand, Right Updated:  09/19/18 0800     Blood Culture No growth at 2 days    Comprehensive Metabolic Panel [[id number]]  (Normal) Collected:  09/19/18 0535    Specimen:  Blood Updated:  09/19/18 0624     Glucose 91 mg/dL      BUN 7 mg/dL       Creatinine 0.59 mg/dL      Sodium 140 mmol/L      Potassium 3.7 mmol/L      Chloride 104 mmol/L      CO2 28.0 mmol/L      Calcium 8.8 mg/dL      Total Protein 6.9 g/dL      Albumin 3.70 g/dL      ALT (SGPT) 20 U/L      AST (SGOT) 24 U/L      Alkaline Phosphatase 95 U/L      Total Bilirubin 0.4 mg/dL      eGFR Non African Amer 102 mL/min/1.73      Globulin 3.2 gm/dL      A/G Ratio 1.2 g/dL      BUN/Creatinine Ratio 11.9     Anion Gap 8.0 mmol/L     CBC & Differential [[id number]] Collected:  09/19/18 0535    Specimen:  Blood Updated:  09/19/18 0609    Narrative:       The following orders were created for panel order CBC & Differential.  Procedure                               Abnormality         Status                     ---------                               -----------         ------                     CBC Auto Differential[[id number]]        Abnormal            Final result                 Please view results for these tests on the individual orders.    CBC Auto Differential [[id number]]  (Abnormal) Collected:  09/19/18 0535    Specimen:  Blood Updated:  09/19/18 0609     WBC 8.68 10*3/mm3      RBC 4.15 10*6/mm3      Hemoglobin 12.5 g/dL      Hematocrit 37.2 %      MCV 89.6 fL      MCH 30.1 pg      MCHC 33.6 g/dL      RDW 14.6 (H) %      RDW-SD 47.9 (H) fl      MPV 8.7 fL      Platelets 315 10*3/mm3      Neutrophil % 51.0 %      Lymphocyte % 32.8 %      Monocyte % 13.8 (H) %      Eosinophil % 1.7 %      Basophil % 0.6 %      Immature Grans % 0.1 %      Neutrophils, Absolute 4.42 10*3/mm3      Lymphocytes, Absolute 2.85 10*3/mm3      Monocytes, Absolute 1.20 (H) 10*3/mm3      Eosinophils, Absolute 0.15 10*3/mm3      Basophils, Absolute 0.05 10*3/mm3      Immature Grans, Absolute 0.01 10*3/mm3      nRBC 0.0 /100 WBC     Comprehensive Metabolic Panel [[id number]]  (Normal) Collected:  09/18/18 0536    Specimen:  Blood Updated:  09/18/18 0610     Glucose 97 mg/dL      BUN 8 mg/dL      Creatinine 0.59 mg/dL      Sodium  139 mmol/L      Potassium 3.8 mmol/L      Chloride 104 mmol/L      CO2 27.0 mmol/L      Calcium 8.9 mg/dL      Total Protein 7.0 g/dL      Albumin 3.80 g/dL      ALT (SGPT) 23 U/L      AST (SGOT) 29 U/L      Alkaline Phosphatase 106 U/L      Total Bilirubin 0.4 mg/dL      eGFR Non African Amer 102 mL/min/1.73      Globulin 3.2 gm/dL      A/G Ratio 1.2 g/dL      BUN/Creatinine Ratio 13.6     Anion Gap 8.0 mmol/L     CBC & Differential [[id number]] Collected:  09/18/18 0536    Specimen:  Blood Updated:  09/18/18 0548    Narrative:       The following orders were created for panel order CBC & Differential.  Procedure                               Abnormality         Status                     ---------                               -----------         ------                     CBC Auto Differential[[id number]]        Abnormal            Final result                 Please view results for these tests on the individual orders.    CBC Auto Differential [[id number]]  (Abnormal) Collected:  09/18/18 0536    Specimen:  Blood Updated:  09/18/18 0548     WBC 9.71 10*3/mm3      RBC 4.09 10*6/mm3      Hemoglobin 12.3 g/dL      Hematocrit 36.7 %      MCV 89.7 fL      MCH 30.1 pg      MCHC 33.5 g/dL      RDW 14.6 (H) %      RDW-SD 47.8 (H) fl      MPV 8.6 fL      Platelets 292 10*3/mm3      Neutrophil % 50.2 %      Lymphocyte % 33.7 %      Monocyte % 12.8 (H) %      Eosinophil % 2.8 %      Basophil % 0.4 %      Immature Grans % 0.1 %      Neutrophils, Absolute 4.88 10*3/mm3      Lymphocytes, Absolute 3.27 10*3/mm3      Monocytes, Absolute 1.24 (H) 10*3/mm3      Eosinophils, Absolute 0.27 10*3/mm3      Basophils, Absolute 0.04 10*3/mm3      Immature Grans, Absolute 0.01 10*3/mm3      nRBC 0.0 /100 WBC     Troponin [[id number]]  (Normal) Collected:  09/17/18 0755    Specimen:  Blood Updated:  09/17/18 0828     Troponin I <0.012 ng/mL     Extra Tubes [[id number]] Collected:  09/17/18 0657    Specimen:  Blood from Blood, Venous Line  Updated:  09/17/18 0800    Narrative:       The following orders were created for panel order Extra Tubes.  Procedure                               Abnormality         Status                     ---------                               -----------         ------                     Lavender Top[[id number]]                                     Final result                 Please view results for these tests on the individual orders.    Lavender Top [[id number]] Collected:  09/17/18 0657    Specimen:  Blood Updated:  09/17/18 0800     Extra Tube hold for add-on     Comment: Auto resulted       Troponin [[id number]]  (Normal) Collected:  09/17/18 0657    Specimen:  Blood Updated:  09/17/18 0758     Troponin I <0.012 ng/mL     Comprehensive Metabolic Panel [[id number]]  (Abnormal) Collected:  09/17/18 0526    Specimen:  Blood Updated:  09/17/18 0623     Glucose 101 (H) mg/dL      BUN 18 mg/dL      Creatinine 0.73 mg/dL      Sodium 142 mmol/L      Potassium 3.7 mmol/L      Chloride 108 mmol/L      CO2 30.0 mmol/L      Calcium 8.1 (L) mg/dL      Total Protein 6.3 g/dL      Albumin 3.40 g/dL      ALT (SGPT) 21 U/L      AST (SGOT) 20 U/L      Alkaline Phosphatase 80 U/L      Total Bilirubin 0.2 mg/dL      eGFR Non African Amer 80 mL/min/1.73      Globulin 2.9 gm/dL      A/G Ratio 1.2 g/dL      BUN/Creatinine Ratio 24.7     Anion Gap 4.0 (L) mmol/L     CBC & Differential [[id number]] Collected:  09/17/18 0526    Specimen:  Blood Updated:  09/17/18 0605    Narrative:       The following orders were created for panel order CBC & Differential.  Procedure                               Abnormality         Status                     ---------                               -----------         ------                     CBC Auto Differential[[id number]]        Abnormal            Final result                 Please view results for these tests on the individual orders.    CBC Auto Differential [[id number]]  (Abnormal) Collected:  09/17/18  0526    Specimen:  Blood Updated:  09/17/18 0605     WBC 10.51 (H) 10*3/mm3      RBC 3.56 (L) 10*6/mm3      Hemoglobin 10.6 (L) g/dL      Hematocrit 32.5 (L) %      MCV 91.3 fL      MCH 29.8 pg      MCHC 32.6 g/dL      RDW 14.7 (H) %      RDW-SD 49.8 (H) fl      MPV 9.0 fL      Platelets 271 10*3/mm3      Neutrophil % 50.4 %      Lymphocyte % 33.0 %      Monocyte % 12.7 (H) %      Eosinophil % 2.9 %      Basophil % 0.7 %      Immature Grans % 0.3 %      Neutrophils, Absolute 5.31 10*3/mm3      Lymphocytes, Absolute 3.47 10*3/mm3      Monocytes, Absolute 1.33 (H) 10*3/mm3      Eosinophils, Absolute 0.30 10*3/mm3      Basophils, Absolute 0.07 10*3/mm3      Immature Grans, Absolute 0.03 (H) 10*3/mm3     CK-MB [[id number]]  (Normal) Collected:  09/17/18 0142    Specimen:  Blood Updated:  09/17/18 0217     CKMB 2.19 ng/mL     Troponin [[id number]]  (Normal) Collected:  09/17/18 0142    Specimen:  Blood Updated:  09/17/18 0217     Troponin I <0.012 ng/mL     D-dimer, Quantitative [[id number]]  (Abnormal) Collected:  09/17/18 0142    Specimen:  Blood Updated:  09/17/18 0212     D-Dimer, Quantitative 496 (H) ng/mL (FEU)     Narrative:       Dimer values <500 ng/ml FEU are FDA approved as aid in diagnosis of deep venous thrombosis and pulmonary embolism.  This test should not be used in an exclusion strategy with pretest probability alone.    A recent guideline regarding diagnosis for pulmonary thromboembolism recommends an adjusted exclusion criterion of age x 10 ng/ml FEU for patients >50 years of age ([employer] Intern Med 2015; 163: 701-711).    CK [[id number]]  (Normal) Collected:  09/17/18 0142    Specimen:  Blood Updated:  09/17/18 0202     Creatine Kinase 89 U/L     Comprehensive Metabolic Panel [[id number]]  (Abnormal) Collected:  09/17/18 0142    Specimen:  Blood Updated:  09/17/18 0202     Glucose 110 (H) mg/dL      BUN 18 mg/dL      Creatinine 0.76 mg/dL      Sodium 142 mmol/L      Potassium 3.6 mmol/L      Chloride 106  mmol/L      CO2 29.0 mmol/L      Calcium 8.2 (L) mg/dL      Total Protein 6.8 g/dL      Albumin 3.70 g/dL      ALT (SGPT) 18 U/L      AST (SGOT) 28 U/L      Alkaline Phosphatase 92 U/L      Total Bilirubin 0.2 mg/dL      eGFR Non African Amer 76 mL/min/1.73      Globulin 3.1 gm/dL      A/G Ratio 1.2 g/dL      BUN/Creatinine Ratio 23.7     Anion Gap 7.0 mmol/L     Urine Drug Screen - Urine, Clean Catch [[id number]]  (Abnormal) Collected:  09/16/18 2218    Specimen:  Urine from Urine, Clean Catch Updated:  09/16/18 2329     Amphetamine Screen, Urine Positive (A)     Barbiturates Screen, Urine Negative     Benzodiazepine Screen, Urine Negative     Cocaine Screen, Urine Negative     Methadone Screen, Urine Negative     Opiate Screen Negative     Oxycodone Screen, Urine Negative     THC, Screen, Urine Positive (A)    Narrative:       Negative Thresholds For Drugs Screened in Urine:     Amphetamines          500 ng/ml  Barbiturates          200 ng/ml  Benzodiazepines       200 ng/ml  Cocaine               150 ng/ml  Methadone             300 ng/mL  Opiates               300 ng/mL  Oxycodone             100 ng/mL  THC                   20 ng/mL    The normal value for all drugs tested is negative. This report includes final unconfirmed screening results.  A positive result by this assay can be, at your request, sent to the Reference Lab for confirmation by gas chromatography. Unconfirmed results must not be used for non-medical purposes, such as employment or legal testing. Clinical consideration should be applied to any drug of abuse test result, particularly when unconfirmed results are used.    BNP [[id number]]  (Normal) Collected:  09/16/18 2211    Specimen:  Blood from Arm, Right Updated:  09/16/18 2301     proBNP 348.0 pg/mL     Troponin [[id number]]  (Normal) Collected:  09/16/18 2211    Specimen:  Blood from Arm, Right Updated:  09/16/18 2301     Troponin I <0.012 ng/mL     Ethanol [[id number]] Collected:  09/16/18  2211    Specimen:  Blood from Arm, Right Updated:  09/16/18 2258     Ethanol <10 mg/dL      Ethanol % <0.010 %     Protime-INR [[id number]]  (Normal) Collected:  09/16/18 2211    Specimen:  Blood from Arm, Right Updated:  09/16/18 2243     Protime 13.4 Seconds      INR 1.04    Narrative:       Therapeutic range for most indications is 2.0-3.0 INR,  or 2.5-3.5 for mechanical heart valves.    aPTT [[id number]]  (Normal) Collected:  09/16/18 2211    Specimen:  Blood from Arm, Right Updated:  09/16/18 2243     PTT 29.0 seconds     Narrative:       The recommended Heparin therapeutic range is 68-97 seconds.    Comprehensive Metabolic Panel [[id number]]  (Normal) Collected:  09/16/18 2211    Specimen:  Blood from Arm, Right Updated:  09/16/18 2243     Glucose 97 mg/dL      BUN 17 mg/dL      Creatinine 0.75 mg/dL      Sodium 139 mmol/L      Potassium 3.5 mmol/L      Chloride 105 mmol/L      CO2 27.0 mmol/L      Calcium 8.5 mg/dL      Total Protein 6.9 g/dL      Albumin 3.80 g/dL      ALT (SGPT) 20 U/L      AST (SGOT) 25 U/L      Alkaline Phosphatase 101 U/L      Total Bilirubin 0.3 mg/dL      eGFR Non African Amer 77 mL/min/1.73      Globulin 3.1 gm/dL      A/G Ratio 1.2 g/dL      BUN/Creatinine Ratio 22.7     Anion Gap 7.0 mmol/L     CK [[id number]]  (Normal) Collected:  09/16/18 2211    Specimen:  Blood from Arm, Right Updated:  09/16/18 2243     Creatine Kinase 100 U/L     Magnesium [[id number]]  (Normal) Collected:  09/16/18 2211    Specimen:  Blood from Arm, Right Updated:  09/16/18 2243     Magnesium 1.9 mg/dL     Urinalysis, Microscopic Only - Urine, Clean Catch [[id number]]  (Abnormal) Collected:  09/16/18 2218    Specimen:  Urine from Urine, Clean Catch Updated:  09/16/18 2237     RBC, UA 0-2 (A) /HPF      WBC, UA 6-12 (A) /HPF      Bacteria, UA None Seen /HPF      Squamous Epithelial Cells, UA 3-5 (A) /HPF      Hyaline Casts, UA None Seen /LPF      Methodology Automated Microscopy    Urinalysis With Culture If  Indicated - Urine, Clean Catch [[id number]]  (Abnormal) Collected:  09/16/18 2218    Specimen:  Urine from Urine, Clean Catch Updated:  09/16/18 2237     Color, UA Yellow     Appearance, UA Clear     pH, UA 6.5     Specific Gravity, UA 1.009     Glucose, UA Negative     Ketones, UA Negative     Bilirubin, UA Negative     Blood, UA Negative     Protein, UA Negative     Leuk Esterase, UA Trace (A)     Nitrite, UA Negative     Urobilinogen, UA 0.2 E.U./dL    CBC & Differential [[id number]] Collected:  09/16/18 2211    Specimen:  Blood Updated:  09/16/18 2221    Narrative:       The following orders were created for panel order CBC & Differential.  Procedure                               Abnormality         Status                     ---------                               -----------         ------                     CBC Auto Differential[[id number]]        Abnormal            Final result                 Please view results for these tests on the individual orders.    CBC Auto Differential [[id number]]  (Abnormal) Collected:  09/16/18 2211    Specimen:  Blood from Arm, Right Updated:  09/16/18 2221     WBC 10.05 (H) 10*3/mm3      RBC 3.70 (L) 10*6/mm3      Hemoglobin 11.2 (L) g/dL      Hematocrit 33.1 (L) %      MCV 89.5 fL      MCH 30.3 pg      MCHC 33.8 g/dL      RDW 14.3 %      RDW-SD 46.9 (H) fl      MPV 8.9 fL      Platelets 258 10*3/mm3      Neutrophil % 58.5 %      Lymphocyte % 27.4 %      Monocyte % 10.1 %      Eosinophil % 3.1 %      Basophil % 0.5 %      Immature Grans % 0.4 %      Neutrophils, Absolute 5.88 10*3/mm3      Lymphocytes, Absolute 2.75 10*3/mm3      Monocytes, Absolute 1.02 (H) 10*3/mm3      Eosinophils, Absolute 0.31 10*3/mm3      Basophils, Absolute 0.05 10*3/mm3      Immature Grans, Absolute 0.04 (H) 10*3/mm3           HOSPITAL COURSE:  Active Hospital Problems    Diagnosis Date Noted   • **Syncope [R55] 09/18/2018   • Fall [W19.XXXA] 09/17/2018   • Anxiety [F41.9] 09/04/2014   • COPD (chronic  obstructive pulmonary disease) (CMS/[facility]) [J44.9] 06/04/2014   • CAD (coronary atherosclerotic disease) [I25.10] 12/18/2012   • Benign essential hypertension [I10] 09/03/2009     Patient admitted with syncope/near syncope. Her ECHO and carotids were similar to her last one of each. Patient was given IVF's and worked well with therapy without any return of symptoms.  Some concern regarding illicit drugs as a possible contributing factor. Amphetamine confirmation ordered, but not collected. Patient admitted to [facility]. Patient stable and requesting discharge on 9/19. She is to have close PCP and cardiology follow up on discharge. Patient advised to stay well hydrated.    #. Syncope.  9/19. No change noted from ECHO/Cartoids from previous. Patient asymptomatic.  9/18. No further symptoms. PT/OT. ECHO. Carotids.  #. Substance abuse. Amphetamine confirmation pending. THC use. couseling.  #. COPD. Duonebs.  #. CAD. Statin.  #. HTN. Norvasc.  #. H/o PE. Eliquis.    DISCHARGE CONDITION:   Stable    DISPOSITION:  Home or Self Care    DISCHARGE MEDICATIONS     Discharge Medications      Continue These Medications      Instructions Start Date   albuterol (2.5 MG/3ML) 0.083% nebulizer solution  Commonly known as:  PROVENTIL   2.5 mg, Nebulization, Every 8 Hours PRN      amLODIPine 10 MG tablet  Commonly known as:  NORVASC   10 mg, Oral, Daily      DENAVIR 1 % cream  Generic drug:  penciclovir   1 application, Topical, Every 2 Hours      ELIQUIS 5 MG tablet tablet  Generic drug:  apixaban   5 mg, Oral, Daily      gabapentin 800 MG tablet  Commonly known as:  NEURONTIN   800 mg, Oral, 3 Times Daily      HYDROcodone-acetaminophen 5-325 MG per tablet  Commonly known as:  NORCO   1 tablet, Oral, Every 8 Hours PRN      naproxen 500 MG tablet  Commonly known as:  NAPROSYN   500 mg, Oral, 2 Times Daily With Meals      ondansetron 4 MG tablet  Commonly known as:  ZOFRAN   4 mg, Oral, Every 8 Hours PRN      pantoprazole 40 MG EC  tablet  Commonly known as:  PROTONIX   40 mg, Oral, Daily      pravastatin 20 MG tablet  Commonly known as:  PRAVACHOL   20 mg, Oral, Daily      sertraline 100 MG tablet  Commonly known as:  ZOLOFT   100 mg, Oral, Daily      SYMBICORT 80-4.5 MCG/ACT inhaler  Generic drug:  budesonide-formoterol   2 puffs, Inhalation, 2 Times Daily PRN      traZODone 300 MG tablet  Commonly known as:  DESYREL   300 mg, Oral, Nightly             INSTRUCTIONS:  Activity:   Activity Instructions     Activity as Tolerated             Diet:   Diet Instructions     Advance Diet As Tolerated             Special instructions: Patient instructed to call MD or return to ED with worsening shortness of breath, chest pain, fever greater than 100.4 degrees F or any other medical concerns..    FOLLOW UP:   Additional Instructions for the Follow-ups that You Need to Schedule     Referral to Home Health    As directed      Face to Face Visit Date:  9/19/2018    Follow-up Provider for Plan of Care?:  I treated the patient in an acute care facility and will not continue treatment after discharge.    Follow-up Provider:  NO KNOWN PROVIDER [[id number]]    Reason/Clinical Findings:  Transitional visit    Describe mobility limitations that make leaving home difficult:  Transitional visit    Nursing/Therapeutic Services Requested:  Other (Transitional visit)    Frequency:  1 Week 1            Contact information for follow-up providers     Val Bansal APRN. Schedule an appointment as soon as possible for a visit in 1 week(s).    Specialty:  Family Medicine  Why:  SEPTEMBER 26, 2018 AT 2:45 P.M.  Contact information:  [address] [facility] [address] [phone]             Kris Peres MD. Schedule an appointment as soon as possible for a visit in 1 week(s).    Specialty:  Cardiology  Contact information:  [address] [place] [address] [address]  [address]  [facility] [address] [phone]                   Contact information for  after-discharge care     Home Medical Care     [facility] .    Specialty:  Home Health Services  Contact information:  [address] [place] [address] [phone]                             PENDING TEST RESULTS AT DISCHARGE   Order Current Status    Blood Culture - Blood, Preliminary result    Blood Culture - Blood, Preliminary result          Time: Discharge 35 min          This document has been electronically signed by Jani Tavera MD on September 19, 2018 10:31 AM

## 2018-09-19 NOTE — THERAPY DISCHARGE NOTE
Acute Care - Occupational Therapy Discharge Summary  [facility]     Patient Name: Mona Perales  : 1950  MRN: [id number]    Today's Date: 2018  Onset of Illness/Injury or Date of Surgery: 18    Date of Referral to OT: 18  Referring Physician: Dr. Jani Tavera      Admit Date: 2018        OT Recommendation and Plan    Visit Dx:    ICD-10-CM ICD-9-CM   1. Fall, initial encounter W19.XXXA E888.9   2. Contusion of scalp, initial encounter S00.03XA 920   3. Hypoxia R09.02 799.02   4. Syncope, unspecified syncope type R55 780.2   5. Impaired functional mobility, balance, and endurance Z74.09 V49.89   6. Impaired mobility and ADLs Z74.09 799.89               Time Calculation- OT     Row Name 18 1138             Time Calculation- OT    OT Start Time 0750  -      OT Stop Time 0850  -      OT Time Calculation (min) 60 min  -      OT Received On 18  -        User Key  (r) = Recorded By, (t) = Taken By, (c) = Cosigned By    Initials Name Provider Type     Anna Aguiar COTA/L Occupational Therapy Assistant                  OT Rehab Goals     Row Name 18 0750             Transfer Goal 1 (OT)    Activity/Assistive Device (Transfer Goal 1, OT) toilet  -      [place] Level/Cues Needed (Transfer Goal 1, OT) conditional independence  -      Time Frame (Transfer Goal 1, OT) long term goal (LTG);by discharge  -      Progress/Outcome (Transfer Goal 1, OT) goal met;goal ongoing  -         Bathing Goal 1 (OT)    Activity/Assistive Device (Bathing Goal 1, OT) bathing skills, all  -      [place] Level/Cues Needed (Bathing Goal 1, OT) set-up required;conditional independence  -      Time Frame (Bathing Goal 1, OT) long term goal (LTG);by discharge  -      Progress/Outcomes (Bathing Goal 1, OT) goal not met  -         Dressing Goal 1 (OT)    Activity/Assistive Device (Dressing Goal 1, OT) dressing skills, all  -      [place]/Cues Needed  (Dressing Goal 1, OT) set-up required;conditional independence  -      Time Frame (Dressing Goal 1, OT) long term goal (LTG);by discharge  -      Progress/Outcome (Dressing Goal 1, OT) goal met;goal ongoing  -         Toileting Goal 1 (OT)    Activity/Device (Toileting Goal 1, OT) toileting skills, all  -      [place] Level/Cues Needed (Toileting Goal 1, OT) conditional independence  -      Time Frame (Toileting Goal 1, OT) long term goal (LTG);by discharge  -      Progress/Outcome (Toileting Goal 1, OT) goal met;goal ongoing  -         Patient Education Goal (OT)    Activity (Patient Education Goal, OT) Pt will communicate good home safety awareness for ADL and IADL and for BUE HEP.   -      [place]/Cues/Accuracy (Memory Goal 2, OT) verbalizes understanding  -      Time Frame (Patient Education Goal, OT) long term goal (LTG);by discharge  -      Progress/Outcome (Patient Education Goal, OT) goal met;goal ongoing  -        User Key  (r) = Recorded By, (t) = Taken By, (c) = Cosigned By    Initials Name Provider Type Discipline     Anna Aguiar, CHITO/L Occupational Therapy Assistant OT                Outcome Measures     Row Name 09/19/18 0856 09/19/18 0750 09/18/18 1040       How much help from another person do you currently need...    Turning from your back to your side while in flat bed without using bedrails? 4  -CA  --  --    Moving from lying on back to sitting on the side of a flat bed without bedrails? 4  -CA  --  --    Moving to and from a bed to a chair (including a wheelchair)? 3  -CA  --  --    Standing up from a chair using your arms (e.g., wheelchair, bedside chair)? 4  -CA  --  --    Climbing 3-5 steps with a railing? 3  -CA  --  --    To walk in hospital room? 3  -CA  --  --    AM-[facility] 6 Clicks Score 21  -CA  --  --       How much help from another is currently needed...    Putting on and taking off regular lower body clothing?  --   - 3  -    Bathing  (including washing, rinsing, and drying)  -- 4  - 3  -JH    Toileting (which includes using toilet bed pan or urinal)  -- 4  - 4  -JH    Putting on and taking off regular upper body clothing  -- 4  - 4  -JH    Taking care of personal grooming (such as brushing teeth)  -- 4  - 4  -JH    Eating meals  -- 4  - 4  -    Score  -- 24  - 22  -       Functional Assessment    Outcome Measure Options AM-PAC 6 Clicks Basic Mobility (PT)  -CA  --  --    Row Name 09/18/18 0815 09/17/18 1333          How much help from another person do you currently need...    Turning from your back to your side while in flat bed without using bedrails?  -- 4  -LF     Moving from lying on back to sitting on the side of a flat bed without bedrails?  -- 4  -LF     Moving to and from a bed to a chair (including a wheelchair)?  -- 3  -LF     Standing up from a chair using your arms (e.g., wheelchair, bedside chair)?  -- 4  -LF     Climbing 3-5 steps with a railing?  -- 3  -LF     To walk in hospital room?  -- 3  -LF     AM-PAC 6 Clicks Score  -- 21  -LF        How much help from another is currently needed...    Putting on and taking off regular lower body clothing? 3  -BH  --     Bathing (including washing, rinsing, and drying) 3  -BH  --     Toileting (which includes using toilet bed pan or urinal) 3  -BH  --     Putting on and taking off regular upper body clothing 3  -BH  --     Taking care of personal grooming (such as brushing teeth) 4  -BH  --     Eating meals 4  -  --     Score 20  -BH  --        Functional Assessment    Outcome Measure Options AM-PAC 6 Clicks Daily Activity (OT)  - AM-PAC 6 Clicks Basic Mobility (PT)  -       User Key  (r) = Recorded By, (t) = Taken By, (c) = Cosigned By    Initials Name Provider Type     Lisa Porter, OTR/L Occupational Therapist    Kamar Parker, PTA Physical Therapy Assistant    Anna Goldman, DALE/L Occupational Therapy Assistant    Dulce Kumar, PT Physical  Therapist          Therapy Suggested Charges     Code   Minutes Charges    None             Therapy Charges for Today     Code Description Service Date Service Provider Modifiers Qty    64358466830  OT SELFCARE CURRENT 9/18/2018 Lisa Porter OTR/L GO, CJ 1    82241986970  OT SELFCARE PROJECTED 9/18/2018 Lisa Porter OTR/L GO, CI 1    [id number]  OT EVAL MOD COMPLEXITY 1 9/18/2018 Lisa Porter OTR/L GO 1          OT Discharge Summary  Anticipated Discharge Disposition (OT): home  Reason for Discharge: Discharge from facility  Outcomes Achieved: Patient able to partially acheive established goals  Discharge Destination: Home      MELANIA Mariscal/ARMANDO  9/19/2018

## 2018-09-19 NOTE — PLAN OF CARE
Problem: Patient Care Overview  Goal: Plan of Care Review  Outcome: Ongoing (interventions implemented as appropriate)   09/19/18 0457   Coping/Psychosocial   Plan of Care Reviewed With patient   Plan of Care Review   Progress no change   OTHER   Outcome Summary VSS, resting throughout the night, continue to monitor     Goal: Individualization and Mutuality  Outcome: Ongoing (interventions implemented as appropriate)      Problem: Fall Risk (Adult)  Goal: Identify Related Risk Factors and Signs and Symptoms  Outcome: Ongoing (interventions implemented as appropriate)    Goal: Absence of Fall  Outcome: Ongoing (interventions implemented as appropriate)      Problem: Pain, Acute (Adult)  Goal: Identify Related Risk Factors and Signs and Symptoms  Outcome: Ongoing (interventions implemented as appropriate)    Goal: Acceptable Pain Control/Comfort Level  Outcome: Ongoing (interventions implemented as appropriate)      Problem: Syncope (Adult)  Goal: Physical Safety/Health Maintenance  Outcome: Ongoing (interventions implemented as appropriate)    Goal: Optimal Emotional/Functional [place]  Outcome: Ongoing (interventions implemented as appropriate)

## 2018-09-19 NOTE — THERAPY TREATMENT NOTE
Acute Care - Physical Therapy Treatment Note  [facility]     Patient Name: Mona Perales  : 1950  MRN: [id number]  Today's Date: 2018  Onset of Illness/Injury or Date of Surgery: 18  Date of Referral to PT: 18  Referring Physician: Dr. Jani Tavera    Admit Date: 2018    Visit Dx:    ICD-10-CM ICD-9-CM   1. Fall, initial encounter W19.XXXA E888.9   2. Contusion of scalp, initial encounter S00.03XA 920   3. Hypoxia R09.02 799.02   4. Syncope, unspecified syncope type R55 780.2   5. Impaired functional mobility, balance, and endurance Z74.09 V49.89   6. Impaired mobility and ADLs Z74.09 799.89     Patient Active Problem List   Diagnosis   • Anxiety   • CAD (coronary atherosclerotic disease)   • Carotid stenosis   • COPD (chronic obstructive pulmonary disease) (CMS/HCC)   • COPD with exacerbation (CMS/[facility])   • Depressive disorder, not elsewhere classified   • Benign essential hypertension   • Hypercholesteremia   • Insomnia   • Low back pain   • Osteoporosis   • Other screening mammogram   • RUQ abdominal pain   • Peripheral arterial disease (CMS/HCC)   • Dizziness   • Nicotine abuse   • Dysphagia   • Epigastric pain   • Pain of right hand   • Closed displaced fracture of shaft of fifth metacarpal bone of right hand   • Cause of injury, fall   • Displaced fracture of shaft of fifth metacarpal bone of right hand with routine healing   • Fall   • Syncope       Therapy Treatment          Rehabilitation Treatment Summary     Row Name 18 0856             Treatment Time/Intention    Discipline physical therapy assistant  -CA      Document Type therapy note (daily note)  -CA      Subjective Information no complaints  -CA      Mode of Treatment individual therapy;physical therapy  -CA      Patient/Family Observations no family present  -CA      Patient Effort good  -CA      Existing Precautions/Restrictions fall  -CA      Equipment Issued to Patient gait belt  -CA      Recorded by  [CA] Kamar Velazquez, PTA 09/19/18 0927      Row Name 09/19/18 0856             Vital Signs    Pre Patient Position Sitting  -CA      Intra Patient Position Standing  -CA      Post Patient Position Sitting  -CA      Recorded by [CA] Kamar Velazquez, PTA 09/19/18 0927      Row Name 09/19/18 0856             Cognitive Assessment/Intervention- PT/OT    Affect/Mental Status (Cognitive) WFL;WNL  -CA      Orientation Status (Cognition) oriented x 3  -CA      Follows Commands (Cognition) WFL  -CA      Safety Deficit (Cognitive) mild deficit  -CA      Personal Safety Interventions fall prevention program maintained;gait belt;nonskid shoes/slippers when out of bed  -CA      Recorded by [CA] Kamar Velazquez, PTA 09/19/18 0927      Row Name 09/19/18 0856             Transfer Assessment/Treatment    Transfer Assessment/Treatment sit-stand transfer;stand-sit transfer  -CA      Recorded by [CA] Kamar Velazquez, [facility] 09/19/18 0927      Row Name 09/19/18 0856             Sit-Stand Transfer    Sit-Stand [place] (Transfers) stand by assist  -CA      Recorded by [CA] Kamar Velazquez, PTA 09/19/18 0927      Row Name 09/19/18 0856             Stand-Sit Transfer    Stand-Sit [place] (Transfers) stand by assist  -CA      Recorded by [CA] Kamar Velazquez, PTA 09/19/18 0927      Row Name 09/19/18 0856             Gait/Stairs Assessment/Training    [place] Level (Gait) stand by assist  -CA      Distance in Feet (Gait) 375  -CA      Deviations/Abnormal Patterns (Gait) base of support, narrow  -CA      Negotiation (Stairs) number of steps  -CA      [place] Level (Stairs) supervision  -CA      Handrail Location (Stairs) both sides  -CA      Number of Steps (Stairs) 2  -CA      Ascending Technique (Stairs) step-over-step  -CA      Descending Technique (Stairs) step-over-step  -CA      Recorded by [CA] Kamar Velazquez, PTA 09/19/18 0927      Row Name 09/19/18 0856             Positioning and Restraints    Pre-Treatment Position sitting in  chair/recliner  -CA      Post Treatment Position chair  -CA      In Chair sitting;call light within reach;encouraged to call for assist  -CA      Recorded by [CA] Kamar Velazquez, PTA 09/19/18 0927      Row Name 09/19/18 0856             Pain Scale: Numbers Pre/Post-Treatment    Pain Scale: Numbers, Pretreatment 0/10 - no pain  -CA      Pain Scale: Numbers, Post-Treatment 0/10 - no pain  -CA      Recorded by [CA] Kamar Velazquez, PTA 09/19/18 0927        User Key  (r) = Recorded By, (t) = Taken By, (c) = Cosigned By    Initials Name Effective Dates Discipline    CA Kamar Velazquez, PTA 03/07/18 -  PT                       PT Rehab Goals     Row Name 09/19/18 0856             Transfer Goal 1 (PT)    Activity/Assistive Device (Transfer Goal 1, PT) sit-to-stand/stand-to-sit;bed-to-chair/chair-to-bed  -CA      [place] Level/Cues Needed (Transfer Goal 1, PT) independent  -CA      Time Frame (Transfer Goal 1, PT) 2 days  -CA      Progress/Outcome (Transfer Goal 1, PT) goal not met  -CA         Gait Training Goal 1 (PT)    Activity/Assistive Device (Gait Training Goal 1, PT) increase endurance/gait distance;improve balance and speed   no AD  -CA      [place] Level (Gait Training Goal 1, PT) independent  -CA      Distance (Gait Goal 1, PT) 500 feet  -CA      Time Frame (Gait Training Goal 1, PT) 2 - 3 days  -CA      Progress/Outcome (Gait Training Goal 1, PT) goal not met  -CA        User Key  (r) = Recorded By, (t) = Taken By, (c) = Cosigned By    Initials Name Provider Type Discipline    CA Kamar Velazquez, PTA Physical Therapy Assistant PT          Physical Therapy Education     Title: PT OT SLP Therapies (Active)     Topic: Physical Therapy (Active)     Point: Mobility training (Done)    Learning Progress Summary     Learner Status Readiness Method Response Comment Documented by    Patient Done Acceptance E KRISTOPHER,NR Role of PT, safety/fall precautions in hospital  09/17/18 6271          Point: Precautions (Done)     Learning Progress Summary     Learner Status Readiness Method Response Comment Documented by    Patient Done Acceptance E VU,NR Role of PT, safety/fall precautions in hospital  09/17/18 1421                      User Key     Initials Effective Dates Name Provider Type Discipline     07/23/18 -  Dulce Moise, PT Physical Therapist PT                    PT Recommendation and Plan     Plan of Care Reviewed With: patient  Progress: improving  Outcome Summary: pt. had increased balance this tx. and tolerated step training well. pt. ambulated 375' with NO AD and Supvervision          Outcome Measures     Row Name 09/19/18 0856 09/18/18 1040 09/18/18 0815       How much help from another person do you currently need...    Turning from your back to your side while in flat bed without using bedrails? 4  -CA  --  --    Moving from lying on back to sitting on the side of a flat bed without bedrails? 4  -CA  --  --    Moving to and from a bed to a chair (including a wheelchair)? 3  -CA  --  --    Standing up from a chair using your arms (e.g., wheelchair, bedside chair)? 4  -CA  --  --    Climbing 3-5 steps with a railing? 3  -CA  --  --    To walk in hospital room? 3  -CA  --  --    AM-PAC 6 Clicks Score 21  -CA  --  --       How much help from another is currently needed...    Putting on and taking off regular lower body clothing?  -- 3  - 3  -BH    Bathing (including washing, rinsing, and drying)  -- 3  - 3  -BH    Toileting (which includes using toilet bed pan or urinal)  -- 4  - 3  -BH    Putting on and taking off regular upper body clothing  -- 4  -JH 3  -BH    Taking care of personal grooming (such as brushing teeth)  -- 4  - 4  -BH    Eating meals  -- 4  - 4  -BH    Score  -- 22  - 20  -BH       Functional Assessment    Outcome Measure Options AM-PAC 6 Clicks Basic Mobility (PT)  -CA  -- AM-PAC 6 Clicks Daily Activity (OT)  -BH    Row Name 09/17/18 1333             How much help from another person do  you currently need...    Turning from your back to your side while in flat bed without using bedrails? 4  -LF      Moving from lying on back to sitting on the side of a flat bed without bedrails? 4  -LF      Moving to and from a bed to a chair (including a wheelchair)? 3  -LF      Standing up from a chair using your arms (e.g., wheelchair, bedside chair)? 4  -LF      Climbing 3-5 steps with a railing? 3  -LF      To walk in hospital room? 3  -LF      AM-PAC 6 Clicks Score 21  -LF         Functional Assessment    Outcome Measure Options AM-PAC 6 Clicks Basic Mobility (PT)  -        User Key  (r) = Recorded By, (t) = Taken By, (c) = Cosigned By    Initials Name Provider Type     Lisa Porter, OTR/L Occupational Therapist    Kamar Parker PTA Physical Therapy Assistant     Anna Aguiar, DALE/L Occupational Therapy Assistant    LF Dulce Moise, JUSTINA Physical Therapist           Time Calculation:         PT Charges     Row Name 09/19/18 0929             Time Calculation    Start Time 0856  -CA      Stop Time 0920  -CA      Time Calculation (min) 24 min  -CA      PT Received On 09/19/18  -CA         Time Calculation- PT    Total Timed Code Minutes- PT 24 minute(s)  -CA        User Key  (r) = Recorded By, (t) = Taken By, (c) = Cosigned By    Initials Name Provider Type    Kamar Parker PTA Physical Therapy Assistant        Therapy Suggested Charges     Code   Minutes Charges    None           Therapy Charges for Today     Code Description Service Date Service Provider Modifiers Qty    72694386685 HC GAIT TRAINING EA 15 MIN 9/19/2018 Kamar Velazquez PTA GP 1    08603427298 HC PT THERAPEUTIC ACT EA 15 MIN 9/19/2018 Kamar Velazquez PTA GP 1          PT G-Codes  Outcome Measure Options: AM-PAC 6 Clicks Basic Mobility (PT)  AM-PAC 6 Clicks Score: 21  Score: 22  Functional Limitation: Mobility: Walking and moving around  Mobility: Walking and Moving Around Current Status (): At least 20 percent but less than  40 percent impaired, limited or restricted  Mobility: Walking and Moving Around Goal Status (): At least 1 percent but less than 20 percent impaired, limited or restricted    Kamar Velazquez, PTA  9/19/2018

## 2018-09-19 NOTE — PROGRESS NOTES
MEDICINE DAILY PROGRESS NOTE  NAME: Mona Perales  : 1950  MRN: [id number]     LOS: 2 days     PROVIDER OF SERVICE: Jani Tavera MD    Chief Complaint: Syncope    Subjective:   HPI: Patient admitted with a near syncopal episode. She denies loss of bowel or bladder. She states that she worked all day and after sitting down for a few minutes she got dizzy upon standing.    Interval History:  History taken from: patient chart RN  . Patient at baseline today. She desires to go home. No dizziness, syncope, or other symptoms reported today per patient.  . Patient sore today. No complaints of dizziness at this time. ECHO/Carotids ordered. Patient tolerating diet.    Review of Systems:   Review of Systems   Constitutional: Negative for activity change, appetite change, fatigue and fever.   HENT: Negative for ear pain and sore throat.    Eyes: Negative for pain and visual disturbance.   Respiratory: Positive for cough. Negative for shortness of breath.    Cardiovascular: Negative for chest pain and palpitations.   Gastrointestinal: Positive for nausea. Negative for abdominal pain.   Endocrine: Negative for cold intolerance and heat intolerance.   Genitourinary: Negative for difficulty urinating and dysuria.   Musculoskeletal: Positive for arthralgias. Negative for gait problem.   Skin: Negative for color change and rash.   Neurological: Negative for dizziness, weakness and headaches.   Hematological: Negative for adenopathy. Does not bruise/bleed easily.   Psychiatric/Behavioral: Negative for agitation, confusion and sleep disturbance.       Objective:     Vital Signs  Vitals:    18 0718 18 0725 18 0730 18 0734   BP:    169/88   BP Location:       Patient Position:       Pulse: 76 89 90 85   Resp:     Temp:    97.9 °F (36.6 °C)   TempSrc:       SpO2:  92%  92%   Weight:       Height:           Physical Exam  Physical Exam   Constitutional: She is oriented to person, place,  and time. She appears well-developed and well-nourished. No distress.   HENT:   Head: Normocephalic and atraumatic.   Right Ear: External ear normal.   Left Ear: External ear normal.   Nose: Nose normal.   Eyes: Pupils are equal, round, and reactive to light. Conjunctivae and EOM are normal.   Neck: Normal range of motion. Neck supple.   Cardiovascular: Normal rate, regular rhythm, normal heart sounds and intact distal pulses.    Pulmonary/Chest: Effort normal and breath sounds normal. No respiratory distress. She has no wheezes. She has no rales. She exhibits no tenderness.   Abdominal: Soft. Bowel sounds are normal. She exhibits no distension and no mass. There is no tenderness. There is no rebound and no guarding.   Musculoskeletal: Normal range of motion. She exhibits no edema.   Neurological: She is alert and oriented to person, place, and time.   Skin: Skin is warm and dry. No rash noted. She is not diaphoretic. No erythema. No pallor.   Psychiatric: She has a normal mood and affect. Her behavior is normal.   Nursing note and vitals reviewed.      Medication Review    Current Facility-Administered Medications:   •  acetaminophen (TYLENOL) tablet 650 mg, 650 mg, Oral, Q6H PRN, Jani Tavera MD, 650 mg at 09/18/18 1738  •  amLODIPine (NORVASC) tablet 10 mg, 10 mg, Oral, Daily, Mariza Ingram MD, 10 mg at 09/18/18 0749  •  apixaban (ELIQUIS) tablet 5 mg, 5 mg, Oral, Daily, Mariza Ingram MD, 5 mg at 09/18/18 0749  •  atorvastatin (LIPITOR) tablet 10 mg, 10 mg, Oral, Daily, Mariza Ingram MD, 10 mg at 09/18/18 0749  •  ipratropium-albuterol (DUO-NEB) nebulizer solution 3 mL, 3 mL, Nebulization, 4x Daily - RT, Mariza Ingram MD, 3 mL at 09/19/18 0725  •  levoFLOXacin (LEVAQUIN) 500 mg/100 mL D5W (premix) (LEVAQUIN) 500 mg, 500 mg, Intravenous, Q24H, Mariza Ingram MD, Last Rate: 0 mL/hr at 09/17/18 1023, 500 mg at 09/18/18 0748  •  ondansetron (ZOFRAN) injection 4 mg, 4 mg, Intravenous, Q6H PRN, Liang,  MD Jani, 4 mg at 09/18/18 1530  •  pantoprazole (PROTONIX) EC tablet 40 mg, 40 mg, Oral, Daily, Mariza Ingram MD, 40 mg at 09/18/18 0750  •  sertraline (ZOLOFT) tablet 100 mg, 100 mg, Oral, Daily, Mariza Ingram MD, 100 mg at 09/18/18 0750  •  Insert peripheral IV, , , Once **AND** sodium chloride 0.9 % flush 10 mL, 10 mL, Intravenous, PRN, Jayme Lamar DO  •  sodium chloride 0.9 % infusion, 75 mL/hr, Intravenous, Continuous, Mariza Ingram MD, Last Rate: 75 mL/hr at 09/19/18 0638, 75 mL/hr at 09/19/18 0638  •  traZODone (DESYREL) tablet 50 mg, 50 mg, Oral, Nightly PRN, Mariza Ingram MD, 50 mg at 09/18/18 2136     Diagnostic Data    Lab Results (last 24 hours)     Procedure Component Value Units Date/Time    Blood Culture - Blood, [[id number]]  (Normal) Collected:  09/17/18 0754    Specimen:  Blood from Arm, Left Updated:  09/19/18 0800     Blood Culture No growth at 2 days    Blood Culture - Blood, [[id number]]  (Normal) Collected:  09/17/18 0754    Specimen:  Blood from Hand, Right Updated:  09/19/18 0800     Blood Culture No growth at 2 days    Comprehensive Metabolic Panel [[id number]]  (Normal) Collected:  09/19/18 0535    Specimen:  Blood Updated:  09/19/18 0624     Glucose 91 mg/dL      BUN 7 mg/dL      Creatinine 0.59 mg/dL      Sodium 140 mmol/L      Potassium 3.7 mmol/L      Chloride 104 mmol/L      CO2 28.0 mmol/L      Calcium 8.8 mg/dL      Total Protein 6.9 g/dL      Albumin 3.70 g/dL      ALT (SGPT) 20 U/L      AST (SGOT) 24 U/L      Alkaline Phosphatase 95 U/L      Total Bilirubin 0.4 mg/dL      eGFR Non African Amer 102 mL/min/1.73      Globulin 3.2 gm/dL      A/G Ratio 1.2 g/dL      BUN/Creatinine Ratio 11.9     Anion Gap 8.0 mmol/L     CBC & Differential [[id number]] Collected:  09/19/18 0535    Specimen:  Blood Updated:  09/19/18 0609    Narrative:       The following orders were created for panel order CBC & Differential.  Procedure                               Abnormality          Status                     ---------                               -----------         ------                     CBC Auto Differential[[id number]]        Abnormal            Final result                 Please view results for these tests on the individual orders.    CBC Auto Differential [[id number]]  (Abnormal) Collected:  09/19/18 0535    Specimen:  Blood Updated:  09/19/18 0609     WBC 8.68 10*3/mm3      RBC 4.15 10*6/mm3      Hemoglobin 12.5 g/dL      Hematocrit 37.2 %      MCV 89.6 fL      MCH 30.1 pg      MCHC 33.6 g/dL      RDW 14.6 (H) %      RDW-SD 47.9 (H) fl      MPV 8.7 fL      Platelets 315 10*3/mm3      Neutrophil % 51.0 %      Lymphocyte % 32.8 %      Monocyte % 13.8 (H) %      Eosinophil % 1.7 %      Basophil % 0.6 %      Immature Grans % 0.1 %      Neutrophils, Absolute 4.42 10*3/mm3      Lymphocytes, Absolute 2.85 10*3/mm3      Monocytes, Absolute 1.20 (H) 10*3/mm3      Eosinophils, Absolute 0.15 10*3/mm3      Basophils, Absolute 0.05 10*3/mm3      Immature Grans, Absolute 0.01 10*3/mm3      nRBC 0.0 /100 WBC           I reviewed the patient's new clinical results.    Assessment/Plan:     Active Hospital Problems    Diagnosis Date Noted   • **Syncope [R55] 09/18/2018   • Fall [W19.XXXA] 09/17/2018   • Anxiety [F41.9] 09/04/2014   • COPD (chronic obstructive pulmonary disease) (CMS/[facility]) [J44.9] 06/04/2014   • CAD (coronary atherosclerotic disease) [I25.10] 12/18/2012   • Benign essential hypertension [I10] 09/03/2009     #. Syncope.  9/19. No change noted from ECHO/Cartoids from previous. Patient asymptomatic.  9/18. No further symptoms. PT/OT. ECHO. Carotids.  #. Substance abuse. Amphetamine confirmation pending. THC use. couseling.  #. COPD. Duonebs.  #. CAD. Statin.  #. HTN. Norvasc.  #. H/o PE. Eliquis.    Will monitor patient's hospital course and adjust treatment as hospital course dictates.    DVT prophylaxis: Eliquis  Code status is   Code Status and Medical Interventions:   Ordered at:  09/17/18 1458     Level Of Support Discussed With:    Patient     Code Status:    CPR     Medical Interventions (Level of Support Prior to Arrest):    Full       Plan for disposition:Where: home and home health and When:  today      Time:           This document has been electronically signed by Jani Tavera MD on September 19, 2018 8:20 AM

## 2018-09-20 ENCOUNTER — READMISSION MANAGEMENT (OUTPATIENT)
Dept: CALL CENTER | Facility: HOSPITAL | Age: 68
End: 2018-09-20

## 2018-09-20 NOTE — OUTREACH NOTE
Prep Survey      Responses   Facility patient discharged from?  [facility]   Is patient eligible?  Yes   Discharge diagnosis  Fall with syncope/COPD   Does the patient have one of the following disease processes/diagnoses(primary or secondary)?  COPD/Pneumonia   Does the patient have Home health ordered?  Yes   Is there a DME ordered?  Yes   What DME was ordered?  oxygen, rolling walker/[facility]   Prep survey completed?  Yes          Dottie Rice RN

## 2018-09-20 NOTE — PAYOR COMM NOTE
"Filomena Perales (68 y.o. Female)     Date of Birth Social Security Number Address Home Phone MRN    1950  [address]  [place] [address] [phone] [id number]    Catholic Marital Status          Baptism        Admission Date Admission Type Admitting Provider Attending Provider Department, Room/Bed    9/16/18 Emergency Mariza Ingram MD  [facility], 362/1    Discharge Date Discharge Disposition Discharge Destination        9/19/2018 Home or Self Care              Attending Provider:  (none)   Allergies:  Morphine And Related, Penicillins    Isolation:  None   Infection:  None   Code Status:  Prior    Ht:  157.5 cm (62\")   Wt:  56.7 kg (125 lb)    Admission Cmt:  None   Principal Problem:  Syncope [R55]                 Active Insurance as of 9/16/2018     Primary Coverage     Payor Plan Insurance Group Employer/Plan Group    MISC MEDICARE [id number]     Coverage Address Coverage Phone Number Effective From Effective To    [phone] 1/1/2015     [place] [id number]       Subscriber Name Subscriber Birth Date Member ID       FILOMENA PERALES 1950 [id number]                 Emergency Contacts      (Rel.) Home Phone Work Phone Mobile Phone    Torri Rivero (Daughter) [phone] -- [phone]    AlfredSusan dang (Sister) [phone] -- [phone]            Discharge Order     Start     Ordered    09/19/18 1031  Discharge patient  Once     Expected Discharge Date:  09/19/18    Discharge Disposition:  Home or Self Care    Physician of Record for Attribution - Please select from Treatment Team:  ZAHIDA SALDAÑA [[id number]]    Review needed by CMO to determine Physician of Record:  No       Question Answer Comment   Physician of Record for Attribution - Please select from Treatment Team ZAHIDA SALDAÑA    Review needed by CMO to determine Physician of Record No        09/19/18 1030          "

## 2018-09-22 LAB
BACTERIA SPEC AEROBE CULT: NORMAL
BACTERIA SPEC AEROBE CULT: NORMAL

## 2018-09-24 ENCOUNTER — READMISSION MANAGEMENT (OUTPATIENT)
Dept: CALL CENTER | Facility: HOSPITAL | Age: 68
End: 2018-09-24

## 2018-09-24 NOTE — OUTREACH NOTE
COPD/PN Week 1 Survey      Responses   Facility patient discharged from?  [facility]   Does the patient have one of the following disease processes/diagnoses(primary or secondary)?  COPD/Pneumonia   Is there a successful TCM telephone encounter documented?  No   Was the primary reason for admission:  COPD exacerbation   Week 1 attempt successful?  No   Unsuccessful attempts  Attempt 1          Ary Hassan RN

## 2018-09-26 ENCOUNTER — READMISSION MANAGEMENT (OUTPATIENT)
Dept: CALL CENTER | Facility: HOSPITAL | Age: 68
End: 2018-09-26

## 2018-09-26 NOTE — OUTREACH NOTE
COPD/PN Week 1 Survey      Responses   Facility patient discharged from?  [facility]   Does the patient have one of the following disease processes/diagnoses(primary or secondary)?  COPD/Pneumonia   Is there a successful TCM telephone encounter documented?  No   Was the primary reason for admission:  COPD exacerbation   Week 1 attempt successful?  No   Unsuccessful attempts  Attempt 2          Tala Hernández RN

## 2018-09-28 ENCOUNTER — READMISSION MANAGEMENT (OUTPATIENT)
Dept: CALL CENTER | Facility: HOSPITAL | Age: 68
End: 2018-09-28

## 2018-09-28 NOTE — OUTREACH NOTE
COPD/PN Week 1 Survey      Responses   Facility patient discharged from?  [facility]   Does the patient have one of the following disease processes/diagnoses(primary or secondary)?  COPD/Pneumonia   Is there a successful TCM telephone encounter documented?  No   Was the primary reason for admission:  COPD exacerbation   Week 1 attempt successful?  Yes   Call start time  1011   Rescheduled  Rescheduled-pt requested   Discharge diagnosis  COPD          Mary Wei RN

## 2018-10-01 ENCOUNTER — READMISSION MANAGEMENT (OUTPATIENT)
Dept: CALL CENTER | Facility: HOSPITAL | Age: 68
End: 2018-10-01

## 2018-10-01 RX ORDER — RIVAROXABAN 20 MG/1
20 TABLET, FILM COATED ORAL DAILY
Qty: 30 TABLET | Refills: 6 | Status: SHIPPED | OUTPATIENT
Start: 2018-10-01 | End: 2019-05-20

## 2018-10-01 NOTE — OUTREACH NOTE
COPD/PN Week 1 Survey      Responses   Facility patient discharged from?  [facility]   Does the patient have one of the following disease processes/diagnoses(primary or secondary)?  COPD/Pneumonia   Is there a successful TCM telephone encounter documented?  No   Was the primary reason for admission:  COPD exacerbation   Week 1 attempt successful?  No   Rescheduled  Revoked          Tigist Charles RN

## 2019-02-21 ENCOUNTER — HOSPITAL ENCOUNTER (INPATIENT)
Facility: HOSPITAL | Age: 69
LOS: 5 days | Discharge: NURSING, REHAB OR LONG-TERM CARE | End: 2019-02-26
Attending: EMERGENCY MEDICINE | Admitting: INTERNAL MEDICINE

## 2019-02-21 ENCOUNTER — APPOINTMENT (OUTPATIENT)
Dept: GENERAL RADIOLOGY | Facility: HOSPITAL | Age: 69
End: 2019-02-21

## 2019-02-21 DIAGNOSIS — I25.119 ATHEROSCLEROSIS OF NATIVE CORONARY ARTERY OF NATIVE HEART WITH ANGINA PECTORIS (HCC): ICD-10-CM

## 2019-02-21 DIAGNOSIS — J18.9 PNEUMONIA OF BOTH LUNGS DUE TO INFECTIOUS ORGANISM, UNSPECIFIED PART OF LUNG: ICD-10-CM

## 2019-02-21 DIAGNOSIS — J96.01 ACUTE RESPIRATORY FAILURE WITH HYPOXIA (HCC): Primary | ICD-10-CM

## 2019-02-21 DIAGNOSIS — Z74.09 IMPAIRED FUNCTIONAL MOBILITY, BALANCE, GAIT, AND ENDURANCE: ICD-10-CM

## 2019-02-21 LAB
ALBUMIN SERPL-MCNC: 3.3 G/DL (ref 3.4–4.8)
ALBUMIN/GLOB SERPL: 0.8 G/DL (ref 1.1–1.8)
ALP SERPL-CCNC: 152 U/L (ref 38–126)
ALT SERPL W P-5'-P-CCNC: 528 U/L (ref 9–52)
ANION GAP SERPL CALCULATED.3IONS-SCNC: 6 MMOL/L (ref 5–15)
ARTERIAL PATENCY WRIST A: ABNORMAL
AST SERPL-CCNC: 287 U/L (ref 14–36)
ATMOSPHERIC PRESS: 754 MMHG
BACTERIA UR QL AUTO: ABNORMAL /HPF
BASE EXCESS BLDA CALC-SCNC: 9.4 MMOL/L (ref 0–2)
BASOPHILS # BLD AUTO: 0.05 10*3/MM3 (ref 0–0.2)
BASOPHILS NFR BLD AUTO: 0.2 % (ref 0–1.5)
BDY SITE: ABNORMAL
BILIRUB SERPL-MCNC: 0.9 MG/DL (ref 0.2–1.3)
BILIRUB UR QL STRIP: ABNORMAL
BUN BLD-MCNC: 19 MG/DL (ref 7–21)
BUN/CREAT SERPL: 36.5 (ref 7–25)
CALCIUM SPEC-SCNC: 8 MG/DL (ref 8.4–10.2)
CHLORIDE SERPL-SCNC: 96 MMOL/L (ref 95–110)
CLARITY UR: ABNORMAL
CO2 SERPL-SCNC: 34 MMOL/L (ref 22–31)
COLOR UR: ABNORMAL
CREAT BLD-MCNC: 0.52 MG/DL (ref 0.5–1)
D-LACTATE SERPL-SCNC: 1.4 MMOL/L (ref 0.5–2)
DEPRECATED RDW RBC AUTO: 53.2 FL (ref 37–54)
EOSINOPHIL # BLD AUTO: 0.01 10*3/MM3 (ref 0–0.4)
EOSINOPHIL NFR BLD AUTO: 0 % (ref 0.3–6.2)
EPAP: 6
ERYTHROCYTE [DISTWIDTH] IN BLOOD BY AUTOMATED COUNT: 17.5 % (ref 12.3–15.4)
FLUAV AG NPH QL: NEGATIVE
FLUBV AG NPH QL IA: NEGATIVE
GFR SERPL CREATININE-BSD FRML MDRD: 118 ML/MIN/1.73 (ref 45–104)
GLOBULIN UR ELPH-MCNC: 3.9 GM/DL (ref 2.3–3.5)
GLUCOSE BLD-MCNC: 100 MG/DL (ref 60–100)
GLUCOSE UR STRIP-MCNC: NEGATIVE MG/DL
HCO3 BLDA-SCNC: 35 MMOL/L (ref 20–26)
HCT VFR BLD AUTO: 31.2 % (ref 34–46.6)
HGB BLD-MCNC: 10.2 G/DL (ref 12–15.9)
HGB UR QL STRIP.AUTO: ABNORMAL
HOLD SPECIMEN: NORMAL
HOROWITZ INDEX BLD+IHG-RTO: 60 %
HYALINE CASTS UR QL AUTO: ABNORMAL /LPF
IMM GRANULOCYTES # BLD AUTO: 0.69 10*3/MM3 (ref 0–0.05)
IMM GRANULOCYTES NFR BLD AUTO: 2.9 % (ref 0–0.5)
KETONES UR QL STRIP: NEGATIVE
LEUKOCYTE ESTERASE UR QL STRIP.AUTO: ABNORMAL
LYMPHOCYTES # BLD AUTO: 2.49 10*3/MM3 (ref 0.7–3.1)
LYMPHOCYTES NFR BLD AUTO: 10.6 % (ref 19.6–45.3)
Lab: ABNORMAL
MCH RBC QN AUTO: 28.7 PG (ref 26.6–33)
MCHC RBC AUTO-ENTMCNC: 32.7 G/DL (ref 31.5–35.7)
MCV RBC AUTO: 87.6 FL (ref 79–97)
MODALITY: ABNORMAL
MONOCYTES # BLD AUTO: 1.6 10*3/MM3 (ref 0.1–0.9)
MONOCYTES NFR BLD AUTO: 6.8 % (ref 5–12)
NEUTROPHILS # BLD AUTO: 18.59 10*3/MM3 (ref 1.4–7)
NEUTROPHILS NFR BLD AUTO: 79.5 % (ref 42.7–76)
NITRITE UR QL STRIP: NEGATIVE
NRBC BLD AUTO-RTO: 4 /100 WBC (ref 0–0)
NT-PROBNP SERPL-MCNC: 5330 PG/ML (ref 0–900)
PAW @ PEAK INSP FLOW SETTING VENT: 19 CMH2O
PCO2 BLDA: 51.8 MM HG (ref 35–45)
PH BLDA: 7.44 PH UNITS (ref 7.35–7.45)
PH UR STRIP.AUTO: 6 [PH] (ref 5–9)
PLAT MORPH BLD: NORMAL
PLATELET # BLD AUTO: 216 10*3/MM3 (ref 140–450)
PMV BLD AUTO: 9.9 FL (ref 6–12)
PO2 BLDA: 79.4 MM HG (ref 83–108)
POLYCHROMASIA BLD QL SMEAR: NORMAL
POTASSIUM BLD-SCNC: 3.1 MMOL/L (ref 3.5–5.1)
PROT SERPL-MCNC: 7.2 G/DL (ref 6.3–8.6)
PROT UR QL STRIP: ABNORMAL
RBC # BLD AUTO: 3.56 10*6/MM3 (ref 3.77–5.28)
RBC # UR: ABNORMAL /HPF
REF LAB TEST METHOD: ABNORMAL
SAO2 % BLDCOA: 93.1 % (ref 94–99)
SET MECH RESP RATE: 20
SODIUM BLD-SCNC: 136 MMOL/L (ref 137–145)
SP GR UR STRIP: 1.02 (ref 1–1.03)
SQUAMOUS #/AREA URNS HPF: ABNORMAL /HPF
TROPONIN I SERPL-MCNC: 0.04 NG/ML
UROBILINOGEN UR QL STRIP: ABNORMAL
VENTILATOR MODE: ABNORMAL
VT ON VENT VENT: 500 ML
WBC MORPH BLD: NORMAL
WBC NRBC COR # BLD: 23.43 10*3/MM3 (ref 3.4–10.8)
WBC UR QL AUTO: ABNORMAL /HPF

## 2019-02-21 PROCEDURE — 94660 CPAP INITIATION&MGMT: CPT

## 2019-02-21 PROCEDURE — 87040 BLOOD CULTURE FOR BACTERIA: CPT | Performed by: EMERGENCY MEDICINE

## 2019-02-21 PROCEDURE — 82803 BLOOD GASES ANY COMBINATION: CPT

## 2019-02-21 PROCEDURE — 80053 COMPREHEN METABOLIC PANEL: CPT | Performed by: EMERGENCY MEDICINE

## 2019-02-21 PROCEDURE — 94799 UNLISTED PULMONARY SVC/PX: CPT

## 2019-02-21 PROCEDURE — 25010000002 METHYLPREDNISOLONE PER 125 MG: Performed by: EMERGENCY MEDICINE

## 2019-02-21 PROCEDURE — 87804 INFLUENZA ASSAY W/OPTIC: CPT | Performed by: EMERGENCY MEDICINE

## 2019-02-21 PROCEDURE — 36600 WITHDRAWAL OF ARTERIAL BLOOD: CPT

## 2019-02-21 PROCEDURE — 93010 ELECTROCARDIOGRAM REPORT: CPT | Performed by: INTERNAL MEDICINE

## 2019-02-21 PROCEDURE — 93005 ELECTROCARDIOGRAM TRACING: CPT | Performed by: EMERGENCY MEDICINE

## 2019-02-21 PROCEDURE — 85007 BL SMEAR W/DIFF WBC COUNT: CPT | Performed by: EMERGENCY MEDICINE

## 2019-02-21 PROCEDURE — 94760 N-INVAS EAR/PLS OXIMETRY 1: CPT

## 2019-02-21 PROCEDURE — 36415 COLL VENOUS BLD VENIPUNCTURE: CPT | Performed by: EMERGENCY MEDICINE

## 2019-02-21 PROCEDURE — 83605 ASSAY OF LACTIC ACID: CPT | Performed by: EMERGENCY MEDICINE

## 2019-02-21 PROCEDURE — 85025 COMPLETE CBC W/AUTO DIFF WBC: CPT | Performed by: EMERGENCY MEDICINE

## 2019-02-21 PROCEDURE — 25010000002 METHYLPREDNISOLONE PER 40 MG: Performed by: FAMILY MEDICINE

## 2019-02-21 PROCEDURE — 71045 X-RAY EXAM CHEST 1 VIEW: CPT

## 2019-02-21 PROCEDURE — 81001 URINALYSIS AUTO W/SCOPE: CPT | Performed by: EMERGENCY MEDICINE

## 2019-02-21 PROCEDURE — 94640 AIRWAY INHALATION TREATMENT: CPT

## 2019-02-21 PROCEDURE — 83880 ASSAY OF NATRIURETIC PEPTIDE: CPT | Performed by: EMERGENCY MEDICINE

## 2019-02-21 PROCEDURE — 84484 ASSAY OF TROPONIN QUANT: CPT | Performed by: EMERGENCY MEDICINE

## 2019-02-21 PROCEDURE — 99285 EMERGENCY DEPT VISIT HI MDM: CPT

## 2019-02-21 PROCEDURE — 25010000002 LEVOFLOXACIN PER 250 MG: Performed by: EMERGENCY MEDICINE

## 2019-02-21 RX ORDER — IPRATROPIUM BROMIDE AND ALBUTEROL SULFATE 2.5; .5 MG/3ML; MG/3ML
SOLUTION RESPIRATORY (INHALATION)
Status: COMPLETED
Start: 2019-02-21 | End: 2019-02-21

## 2019-02-21 RX ORDER — POTASSIUM CHLORIDE 7.45 MG/ML
INJECTION INTRAVENOUS
Status: DISCONTINUED
Start: 2019-02-21 | End: 2019-02-21 | Stop reason: WASHOUT

## 2019-02-21 RX ORDER — LEVOFLOXACIN 5 MG/ML
750 INJECTION, SOLUTION INTRAVENOUS ONCE
Status: COMPLETED | OUTPATIENT
Start: 2019-02-21 | End: 2019-02-21

## 2019-02-21 RX ORDER — SODIUM CHLORIDE 0.9 % (FLUSH) 0.9 %
10 SYRINGE (ML) INJECTION AS NEEDED
Status: DISCONTINUED | OUTPATIENT
Start: 2019-02-21 | End: 2019-02-26 | Stop reason: HOSPADM

## 2019-02-21 RX ORDER — SODIUM CHLORIDE 0.9 % (FLUSH) 0.9 %
3-10 SYRINGE (ML) INJECTION AS NEEDED
Status: DISCONTINUED | OUTPATIENT
Start: 2019-02-21 | End: 2019-02-26 | Stop reason: HOSPADM

## 2019-02-21 RX ORDER — PANTOPRAZOLE SODIUM 40 MG/1
40 TABLET, DELAYED RELEASE ORAL DAILY
Status: DISCONTINUED | OUTPATIENT
Start: 2019-02-22 | End: 2019-02-26 | Stop reason: HOSPADM

## 2019-02-21 RX ORDER — ALBUTEROL SULFATE 2.5 MG/3ML
2.5 SOLUTION RESPIRATORY (INHALATION) EVERY 8 HOURS PRN
Status: DISCONTINUED | OUTPATIENT
Start: 2019-02-21 | End: 2019-02-26 | Stop reason: HOSPADM

## 2019-02-21 RX ORDER — ATORVASTATIN CALCIUM 20 MG/1
20 TABLET, FILM COATED ORAL DAILY
Status: DISCONTINUED | OUTPATIENT
Start: 2019-02-22 | End: 2019-02-24

## 2019-02-21 RX ORDER — IPRATROPIUM BROMIDE AND ALBUTEROL SULFATE 2.5; .5 MG/3ML; MG/3ML
3 SOLUTION RESPIRATORY (INHALATION) ONCE
Status: COMPLETED | OUTPATIENT
Start: 2019-02-21 | End: 2019-02-21

## 2019-02-21 RX ORDER — SODIUM CHLORIDE 9 MG/ML
INJECTION, SOLUTION INTRAVENOUS
Status: DISCONTINUED
Start: 2019-02-21 | End: 2019-02-21 | Stop reason: WASHOUT

## 2019-02-21 RX ORDER — ONDANSETRON 4 MG/1
4 TABLET, FILM COATED ORAL EVERY 8 HOURS PRN
Status: DISCONTINUED | OUTPATIENT
Start: 2019-02-21 | End: 2019-02-26 | Stop reason: HOSPADM

## 2019-02-21 RX ORDER — METHYLPREDNISOLONE SODIUM SUCCINATE 125 MG/2ML
125 INJECTION, POWDER, LYOPHILIZED, FOR SOLUTION INTRAMUSCULAR; INTRAVENOUS ONCE
Status: COMPLETED | OUTPATIENT
Start: 2019-02-21 | End: 2019-02-21

## 2019-02-21 RX ORDER — HYDROCODONE BITARTRATE AND ACETAMINOPHEN 5; 325 MG/1; MG/1
1 TABLET ORAL EVERY 8 HOURS PRN
Status: DISCONTINUED | OUTPATIENT
Start: 2019-02-21 | End: 2019-02-25

## 2019-02-21 RX ORDER — TRAZODONE HYDROCHLORIDE 150 MG/1
150 TABLET ORAL NIGHTLY
Status: DISCONTINUED | OUTPATIENT
Start: 2019-02-21 | End: 2019-02-26 | Stop reason: HOSPADM

## 2019-02-21 RX ORDER — BENZONATATE 100 MG/1
100 CAPSULE ORAL 3 TIMES DAILY PRN
Status: DISCONTINUED | OUTPATIENT
Start: 2019-02-21 | End: 2019-02-26 | Stop reason: HOSPADM

## 2019-02-21 RX ORDER — POTASSIUM CHLORIDE 7.45 MG/ML
10 INJECTION INTRAVENOUS ONCE
Status: DISCONTINUED | OUTPATIENT
Start: 2019-02-21 | End: 2019-02-26 | Stop reason: HOSPADM

## 2019-02-21 RX ORDER — LEVOFLOXACIN 5 MG/ML
500 INJECTION, SOLUTION INTRAVENOUS EVERY 24 HOURS
Status: DISCONTINUED | OUTPATIENT
Start: 2019-02-22 | End: 2019-02-23

## 2019-02-21 RX ORDER — POTASSIUM CHLORIDE 750 MG/1
10 CAPSULE, EXTENDED RELEASE ORAL ONCE
Status: COMPLETED | OUTPATIENT
Start: 2019-02-21 | End: 2019-02-21

## 2019-02-21 RX ORDER — METHYLPREDNISOLONE SODIUM SUCCINATE 40 MG/ML
40 INJECTION, POWDER, LYOPHILIZED, FOR SOLUTION INTRAMUSCULAR; INTRAVENOUS EVERY 8 HOURS
Status: DISCONTINUED | OUTPATIENT
Start: 2019-02-21 | End: 2019-02-23

## 2019-02-21 RX ORDER — BUDESONIDE AND FORMOTEROL FUMARATE DIHYDRATE 80; 4.5 UG/1; UG/1
2 AEROSOL RESPIRATORY (INHALATION)
Status: DISCONTINUED | OUTPATIENT
Start: 2019-02-21 | End: 2019-02-24

## 2019-02-21 RX ORDER — ONDANSETRON 2 MG/ML
4 INJECTION INTRAMUSCULAR; INTRAVENOUS EVERY 6 HOURS PRN
Status: DISCONTINUED | OUTPATIENT
Start: 2019-02-21 | End: 2019-02-26 | Stop reason: HOSPADM

## 2019-02-21 RX ORDER — FUROSEMIDE 10 MG/ML
40 INJECTION INTRAMUSCULAR; INTRAVENOUS DAILY
Status: DISCONTINUED | OUTPATIENT
Start: 2019-02-22 | End: 2019-02-22

## 2019-02-21 RX ORDER — SODIUM CHLORIDE 0.9 % (FLUSH) 0.9 %
3 SYRINGE (ML) INJECTION EVERY 12 HOURS SCHEDULED
Status: DISCONTINUED | OUTPATIENT
Start: 2019-02-21 | End: 2019-02-26 | Stop reason: HOSPADM

## 2019-02-21 RX ORDER — IPRATROPIUM BROMIDE AND ALBUTEROL SULFATE 2.5; .5 MG/3ML; MG/3ML
3 SOLUTION RESPIRATORY (INHALATION)
Status: DISCONTINUED | OUTPATIENT
Start: 2019-02-21 | End: 2019-02-23

## 2019-02-21 RX ORDER — AMLODIPINE BESYLATE 10 MG/1
10 TABLET ORAL DAILY
Status: DISCONTINUED | OUTPATIENT
Start: 2019-02-22 | End: 2019-02-22

## 2019-02-21 RX ADMIN — LEVOFLOXACIN 750 MG: 5 INJECTION, SOLUTION INTRAVENOUS at 19:40

## 2019-02-21 RX ADMIN — IPRATROPIUM BROMIDE AND ALBUTEROL SULFATE 3 ML: 2.5; .5 SOLUTION RESPIRATORY (INHALATION) at 16:57

## 2019-02-21 RX ADMIN — METHYLPREDNISOLONE SODIUM SUCCINATE 40 MG: 40 INJECTION, POWDER, FOR SOLUTION INTRAMUSCULAR; INTRAVENOUS at 22:32

## 2019-02-21 RX ADMIN — BENZONATATE 100 MG: 100 CAPSULE ORAL at 22:31

## 2019-02-21 RX ADMIN — IPRATROPIUM BROMIDE AND ALBUTEROL SULFATE 3 ML: 2.5; .5 SOLUTION RESPIRATORY (INHALATION) at 21:23

## 2019-02-21 RX ADMIN — METHYLPREDNISOLONE SODIUM SUCCINATE 125 MG: 125 INJECTION, POWDER, FOR SOLUTION INTRAMUSCULAR; INTRAVENOUS at 17:09

## 2019-02-21 RX ADMIN — POTASSIUM CHLORIDE 10 MEQ: 750 CAPSULE, EXTENDED RELEASE ORAL at 22:36

## 2019-02-21 RX ADMIN — AZTREONAM 2 G: 2 INJECTION, POWDER, LYOPHILIZED, FOR SOLUTION INTRAMUSCULAR; INTRAVENOUS at 19:20

## 2019-02-21 RX ADMIN — SODIUM CHLORIDE, PRESERVATIVE FREE 10 ML: 5 INJECTION INTRAVENOUS at 19:21

## 2019-02-21 RX ADMIN — Medication 10 ML: at 22:32

## 2019-02-21 RX ADMIN — IPRATROPIUM BROMIDE AND ALBUTEROL SULFATE 3 ML: .5; 3 SOLUTION RESPIRATORY (INHALATION) at 16:57

## 2019-02-21 RX ADMIN — TRAZODONE HYDROCHLORIDE 150 MG: 150 TABLET ORAL at 22:37

## 2019-02-22 ENCOUNTER — APPOINTMENT (OUTPATIENT)
Dept: CARDIOLOGY | Facility: HOSPITAL | Age: 69
End: 2019-02-22

## 2019-02-22 ENCOUNTER — APPOINTMENT (OUTPATIENT)
Dept: ULTRASOUND IMAGING | Facility: HOSPITAL | Age: 69
End: 2019-02-22

## 2019-02-22 PROBLEM — I50.30 (HFPEF) HEART FAILURE WITH PRESERVED EJECTION FRACTION: Status: ACTIVE | Noted: 2019-02-22

## 2019-02-22 LAB
ALBUMIN SERPL-MCNC: 3 G/DL (ref 3.4–4.8)
ALBUMIN/GLOB SERPL: 0.8 G/DL (ref 1.1–1.8)
ALP SERPL-CCNC: 127 U/L (ref 38–126)
ALT SERPL W P-5'-P-CCNC: 426 U/L (ref 9–52)
ANION GAP SERPL CALCULATED.3IONS-SCNC: 9 MMOL/L (ref 5–15)
ARTERIAL PATENCY WRIST A: POSITIVE
AST SERPL-CCNC: 199 U/L (ref 14–36)
ATMOSPHERIC PRESS: 754 MMHG
BASE EXCESS BLDA CALC-SCNC: 8.3 MMOL/L (ref 0–2)
BASOPHILS # BLD AUTO: 0.03 10*3/MM3 (ref 0–0.2)
BASOPHILS NFR BLD AUTO: 0.2 % (ref 0–1.5)
BDY SITE: ABNORMAL
BH CV ECHO MEAS - ACS: 1.7 CM
BH CV ECHO MEAS - AO ISTHMUS: 1.9 CM
BH CV ECHO MEAS - AO MAX PG (FULL): 2.3 MMHG
BH CV ECHO MEAS - AO MAX PG: 6.2 MMHG
BH CV ECHO MEAS - AO MEAN PG (FULL): 0.65 MMHG
BH CV ECHO MEAS - AO MEAN PG: 2.7 MMHG
BH CV ECHO MEAS - AO ROOT AREA (BSA CORRECTED): 1.5
BH CV ECHO MEAS - AO ROOT AREA: 4.2 CM^2
BH CV ECHO MEAS - AO ROOT DIAM: 2.3 CM
BH CV ECHO MEAS - AO V2 MAX: 124 CM/SEC
BH CV ECHO MEAS - AO V2 MEAN: 78.5 CM/SEC
BH CV ECHO MEAS - AO V2 VTI: 23.3 CM
BH CV ECHO MEAS - ASC AORTA: 2.9 CM
BH CV ECHO MEAS - AVA(I,A): 2.1 CM^2
BH CV ECHO MEAS - AVA(I,D): 2.1 CM^2
BH CV ECHO MEAS - AVA(V,A): 2.2 CM^2
BH CV ECHO MEAS - AVA(V,D): 2.2 CM^2
BH CV ECHO MEAS - BSA(HAYCOCK): 1.6 M^2
BH CV ECHO MEAS - BSA: 1.5 M^2
BH CV ECHO MEAS - BZI_BMI: 22.3 KILOGRAMS/M^2
BH CV ECHO MEAS - BZI_METRIC_HEIGHT: 157 CM
BH CV ECHO MEAS - BZI_METRIC_WEIGHT: 55 KG
BH CV ECHO MEAS - EDV(CUBED): 120.3 ML
BH CV ECHO MEAS - EDV(TEICH): 114.8 ML
BH CV ECHO MEAS - EF(CUBED): 83.9 %
BH CV ECHO MEAS - EF(TEICH): 76.7 %
BH CV ECHO MEAS - ESV(CUBED): 19.4 ML
BH CV ECHO MEAS - ESV(TEICH): 26.7 ML
BH CV ECHO MEAS - FS: 45.6 %
BH CV ECHO MEAS - IVS/LVPW: 0.83
BH CV ECHO MEAS - IVSD: 0.92 CM
BH CV ECHO MEAS - LA DIMENSION: 3.6 CM
BH CV ECHO MEAS - LA/AO: 1.6
BH CV ECHO MEAS - LV MASS(C)D: 180.6 GRAMS
BH CV ECHO MEAS - LV MASS(C)DI: 117.1 GRAMS/M^2
BH CV ECHO MEAS - LV MAX PG: 3.9 MMHG
BH CV ECHO MEAS - LV MEAN PG: 2 MMHG
BH CV ECHO MEAS - LV V1 MAX: 98.4 CM/SEC
BH CV ECHO MEAS - LV V1 MEAN: 67.2 CM/SEC
BH CV ECHO MEAS - LV V1 VTI: 17.5 CM
BH CV ECHO MEAS - LVIDD: 4.9 CM
BH CV ECHO MEAS - LVIDS: 2.7 CM
BH CV ECHO MEAS - LVOT AREA: 2.7 CM^2
BH CV ECHO MEAS - LVOT DIAM: 1.9 CM
BH CV ECHO MEAS - LVPWD: 1.1 CM
BH CV ECHO MEAS - MR MAX PG: 51.9 MMHG
BH CV ECHO MEAS - MR MAX VEL: 360.4 CM/SEC
BH CV ECHO MEAS - MV A MAX VEL: 76.8 CM/SEC
BH CV ECHO MEAS - MV E MAX VEL: 98.4 CM/SEC
BH CV ECHO MEAS - MV E/A: 1.3
BH CV ECHO MEAS - MV MAX PG: 5 MMHG
BH CV ECHO MEAS - MV MEAN PG: 2.3 MMHG
BH CV ECHO MEAS - MV P1/2T MAX VEL: 106 CM/SEC
BH CV ECHO MEAS - MV V2 MAX: 112 CM/SEC
BH CV ECHO MEAS - MV V2 MEAN: 71.1 CM/SEC
BH CV ECHO MEAS - MV V2 VTI: 33 CM
BH CV ECHO MEAS - MVA P1/2T LCG: 2.1 CM^2
BH CV ECHO MEAS - MVA(VTI): 1.5 CM^2
BH CV ECHO MEAS - PA MAX PG: 5.7 MMHG
BH CV ECHO MEAS - PA V2 MAX: 119.3 CM/SEC
BH CV ECHO MEAS - PI END-D VEL: 144.5 CM/SEC
BH CV ECHO MEAS - RVDD: 1.7 CM
BH CV ECHO MEAS - SI(AO): 62.8 ML/M^2
BH CV ECHO MEAS - SI(CUBED): 65.4 ML/M^2
BH CV ECHO MEAS - SI(LVOT): 31.2 ML/M^2
BH CV ECHO MEAS - SI(TEICH): 57.1 ML/M^2
BH CV ECHO MEAS - SV(AO): 96.9 ML
BH CV ECHO MEAS - SV(CUBED): 100.9 ML
BH CV ECHO MEAS - SV(LVOT): 48.1 ML
BH CV ECHO MEAS - SV(TEICH): 88.1 ML
BH CV ECHO MEAS - TR MAX VEL: 314 CM/SEC
BILIRUB SERPL-MCNC: 0.7 MG/DL (ref 0.2–1.3)
BUN BLD-MCNC: 20 MG/DL (ref 7–21)
BUN/CREAT SERPL: 43.5 (ref 7–25)
CALCIUM SPEC-SCNC: 7.5 MG/DL (ref 8.4–10.2)
CHLORIDE SERPL-SCNC: 99 MMOL/L (ref 95–110)
CO2 SERPL-SCNC: 30 MMOL/L (ref 22–31)
CREAT BLD-MCNC: 0.46 MG/DL (ref 0.5–1)
DEPRECATED RDW RBC AUTO: 50.4 FL (ref 37–54)
EOSINOPHIL # BLD AUTO: 0 10*3/MM3 (ref 0–0.4)
EOSINOPHIL NFR BLD AUTO: 0 % (ref 0.3–6.2)
ERYTHROCYTE [DISTWIDTH] IN BLOOD BY AUTOMATED COUNT: 16.9 % (ref 12.3–15.4)
GAS FLOW AIRWAY: 5 LPM
GFR SERPL CREATININE-BSD FRML MDRD: 136 ML/MIN/1.73 (ref 45–104)
GLOBULIN UR ELPH-MCNC: 3.6 GM/DL (ref 2.3–3.5)
GLUCOSE BLD-MCNC: 123 MG/DL (ref 60–100)
HCO3 BLDA-SCNC: 34.4 MMOL/L (ref 20–26)
HCT VFR BLD AUTO: 28.1 % (ref 34–46.6)
HGB BLD-MCNC: 9.3 G/DL (ref 12–15.9)
HOLD SPECIMEN: NORMAL
IMM GRANULOCYTES # BLD AUTO: 0.39 10*3/MM3 (ref 0–0.05)
IMM GRANULOCYTES NFR BLD AUTO: 2.1 % (ref 0–0.5)
LV EF 2D ECHO EST: 61 %
LYMPHOCYTES # BLD AUTO: 0.9 10*3/MM3 (ref 0.7–3.1)
LYMPHOCYTES NFR BLD AUTO: 4.9 % (ref 19.6–45.3)
Lab: ABNORMAL
MAXIMAL PREDICTED HEART RATE: 154 BPM
MCH RBC QN AUTO: 28.7 PG (ref 26.6–33)
MCHC RBC AUTO-ENTMCNC: 33.1 G/DL (ref 31.5–35.7)
MCV RBC AUTO: 86.7 FL (ref 79–97)
MODALITY: ABNORMAL
MONOCYTES # BLD AUTO: 0.18 10*3/MM3 (ref 0.1–0.9)
MONOCYTES NFR BLD AUTO: 1 % (ref 5–12)
NEUTROPHILS # BLD AUTO: 16.81 10*3/MM3 (ref 1.4–7)
NEUTROPHILS NFR BLD AUTO: 91.8 % (ref 42.7–76)
NRBC BLD AUTO-RTO: 2.2 /100 WBC (ref 0–0)
PCO2 BLDA: 53.7 MM HG (ref 35–45)
PH BLDA: 7.41 PH UNITS (ref 7.35–7.45)
PLATELET # BLD AUTO: 181 10*3/MM3 (ref 140–450)
PMV BLD AUTO: 10.1 FL (ref 6–12)
PO2 BLDA: 64.7 MM HG (ref 83–108)
POTASSIUM BLD-SCNC: 3.4 MMOL/L (ref 3.5–5.1)
POTASSIUM BLD-SCNC: 4.1 MMOL/L (ref 3.5–5.1)
PROT SERPL-MCNC: 6.6 G/DL (ref 6.3–8.6)
RBC # BLD AUTO: 3.24 10*6/MM3 (ref 3.77–5.28)
SAO2 % BLDCOA: 89.4 % (ref 94–99)
SODIUM BLD-SCNC: 138 MMOL/L (ref 137–145)
STRESS TARGET HR: 131 BPM
TROPONIN I SERPL-MCNC: 0.03 NG/ML
VENTILATOR MODE: ABNORMAL
WBC NRBC COR # BLD: 18.31 10*3/MM3 (ref 3.4–10.8)
WHOLE BLOOD HOLD SPECIMEN: NORMAL

## 2019-02-22 PROCEDURE — 25010000002 METHYLPREDNISOLONE PER 40 MG: Performed by: FAMILY MEDICINE

## 2019-02-22 PROCEDURE — 85025 COMPLETE CBC W/AUTO DIFF WBC: CPT | Performed by: FAMILY MEDICINE

## 2019-02-22 PROCEDURE — 94799 UNLISTED PULMONARY SVC/PX: CPT

## 2019-02-22 PROCEDURE — 76705 ECHO EXAM OF ABDOMEN: CPT

## 2019-02-22 PROCEDURE — 84132 ASSAY OF SERUM POTASSIUM: CPT | Performed by: HOSPITALIST

## 2019-02-22 PROCEDURE — 80053 COMPREHEN METABOLIC PANEL: CPT | Performed by: FAMILY MEDICINE

## 2019-02-22 PROCEDURE — 25010000002 LEVOFLOXACIN PER 250 MG: Performed by: FAMILY MEDICINE

## 2019-02-22 PROCEDURE — 25010000002 FUROSEMIDE PER 20 MG: Performed by: FAMILY MEDICINE

## 2019-02-22 PROCEDURE — 36600 WITHDRAWAL OF ARTERIAL BLOOD: CPT

## 2019-02-22 PROCEDURE — 93306 TTE W/DOPPLER COMPLETE: CPT | Performed by: INTERNAL MEDICINE

## 2019-02-22 PROCEDURE — 99222 1ST HOSP IP/OBS MODERATE 55: CPT | Performed by: NURSE PRACTITIONER

## 2019-02-22 PROCEDURE — 94660 CPAP INITIATION&MGMT: CPT

## 2019-02-22 PROCEDURE — 84484 ASSAY OF TROPONIN QUANT: CPT | Performed by: FAMILY MEDICINE

## 2019-02-22 PROCEDURE — 93306 TTE W/DOPPLER COMPLETE: CPT

## 2019-02-22 PROCEDURE — 82803 BLOOD GASES ANY COMBINATION: CPT

## 2019-02-22 RX ORDER — FUROSEMIDE 10 MG/ML
20 INJECTION INTRAMUSCULAR; INTRAVENOUS DAILY
Status: DISCONTINUED | OUTPATIENT
Start: 2019-02-23 | End: 2019-02-23

## 2019-02-22 RX ORDER — GUAIFENESIN/DEXTROMETHORPHAN 100-10MG/5
5 SYRUP ORAL EVERY 4 HOURS PRN
Status: DISCONTINUED | OUTPATIENT
Start: 2019-02-22 | End: 2019-02-26 | Stop reason: HOSPADM

## 2019-02-22 RX ORDER — ECHINACEA PURPUREA EXTRACT 125 MG
1 TABLET ORAL AS NEEDED
Status: DISCONTINUED | OUTPATIENT
Start: 2019-02-22 | End: 2019-02-22

## 2019-02-22 RX ORDER — POTASSIUM CHLORIDE 750 MG/1
40 CAPSULE, EXTENDED RELEASE ORAL AS NEEDED
Status: DISCONTINUED | OUTPATIENT
Start: 2019-02-22 | End: 2019-02-26 | Stop reason: HOSPADM

## 2019-02-22 RX ORDER — POTASSIUM CHLORIDE 1.5 G/1.77G
40 POWDER, FOR SOLUTION ORAL AS NEEDED
Status: DISCONTINUED | OUTPATIENT
Start: 2019-02-22 | End: 2019-02-26 | Stop reason: HOSPADM

## 2019-02-22 RX ORDER — POTASSIUM CHLORIDE 7.45 MG/ML
10 INJECTION INTRAVENOUS
Status: DISCONTINUED | OUTPATIENT
Start: 2019-02-22 | End: 2019-02-26 | Stop reason: HOSPADM

## 2019-02-22 RX ORDER — LOSARTAN POTASSIUM 25 MG/1
25 TABLET ORAL
Status: DISCONTINUED | OUTPATIENT
Start: 2019-02-23 | End: 2019-02-24

## 2019-02-22 RX ADMIN — POTASSIUM CHLORIDE 40 MEQ: 750 CAPSULE, EXTENDED RELEASE ORAL at 03:43

## 2019-02-22 RX ADMIN — BUDESONIDE AND FORMOTEROL FUMARATE DIHYDRATE 2 PUFF: 80; 4.5 AEROSOL RESPIRATORY (INHALATION) at 07:28

## 2019-02-22 RX ADMIN — POTASSIUM CHLORIDE 40 MEQ: 750 CAPSULE, EXTENDED RELEASE ORAL at 09:52

## 2019-02-22 RX ADMIN — HYDROCORTISONE: 1 CREAM TOPICAL at 23:01

## 2019-02-22 RX ADMIN — AZTREONAM 1 G: 1 INJECTION, POWDER, LYOPHILIZED, FOR SOLUTION INTRAMUSCULAR; INTRAVENOUS at 10:31

## 2019-02-22 RX ADMIN — BUDESONIDE AND FORMOTEROL FUMARATE DIHYDRATE 2 PUFF: 80; 4.5 AEROSOL RESPIRATORY (INHALATION) at 20:05

## 2019-02-22 RX ADMIN — METHYLPREDNISOLONE SODIUM SUCCINATE 40 MG: 40 INJECTION, POWDER, FOR SOLUTION INTRAMUSCULAR; INTRAVENOUS at 15:06

## 2019-02-22 RX ADMIN — TRAZODONE HYDROCHLORIDE 150 MG: 150 TABLET ORAL at 20:36

## 2019-02-22 RX ADMIN — IPRATROPIUM BROMIDE AND ALBUTEROL SULFATE 3 ML: 2.5; .5 SOLUTION RESPIRATORY (INHALATION) at 11:30

## 2019-02-22 RX ADMIN — BENZONATATE 100 MG: 100 CAPSULE ORAL at 10:31

## 2019-02-22 RX ADMIN — SODIUM CHLORIDE, PRESERVATIVE FREE 10 ML: 5 INJECTION INTRAVENOUS at 09:53

## 2019-02-22 RX ADMIN — IPRATROPIUM BROMIDE AND ALBUTEROL SULFATE 3 ML: 2.5; .5 SOLUTION RESPIRATORY (INHALATION) at 20:05

## 2019-02-22 RX ADMIN — RIVAROXABAN 20 MG: 10 TABLET, FILM COATED ORAL at 09:52

## 2019-02-22 RX ADMIN — HYDROCODONE BITARTRATE AND ACETAMINOPHEN 1 TABLET: 5; 325 TABLET ORAL at 18:48

## 2019-02-22 RX ADMIN — SODIUM CHLORIDE, PRESERVATIVE FREE 3 ML: 5 INJECTION INTRAVENOUS at 20:36

## 2019-02-22 RX ADMIN — LEVOFLOXACIN 500 MG: 5 INJECTION, SOLUTION INTRAVENOUS at 18:39

## 2019-02-22 RX ADMIN — IPRATROPIUM BROMIDE AND ALBUTEROL SULFATE 3 ML: 2.5; .5 SOLUTION RESPIRATORY (INHALATION) at 07:28

## 2019-02-22 RX ADMIN — METHYLPREDNISOLONE SODIUM SUCCINATE 40 MG: 40 INJECTION, POWDER, FOR SOLUTION INTRAMUSCULAR; INTRAVENOUS at 22:32

## 2019-02-22 RX ADMIN — AZTREONAM 1 G: 1 INJECTION, POWDER, LYOPHILIZED, FOR SOLUTION INTRAMUSCULAR; INTRAVENOUS at 17:56

## 2019-02-22 RX ADMIN — FUROSEMIDE 40 MG: 10 INJECTION, SOLUTION INTRAMUSCULAR; INTRAVENOUS at 09:53

## 2019-02-22 RX ADMIN — PANTOPRAZOLE SODIUM 40 MG: 40 TABLET, DELAYED RELEASE ORAL at 09:52

## 2019-02-22 RX ADMIN — AZTREONAM 1 G: 1 INJECTION, POWDER, LYOPHILIZED, FOR SOLUTION INTRAMUSCULAR; INTRAVENOUS at 02:12

## 2019-02-22 RX ADMIN — METHYLPREDNISOLONE SODIUM SUCCINATE 40 MG: 40 INJECTION, POWDER, FOR SOLUTION INTRAMUSCULAR; INTRAVENOUS at 06:29

## 2019-02-22 RX ADMIN — SERTRALINE HYDROCHLORIDE 100 MG: 50 TABLET ORAL at 09:52

## 2019-02-22 RX ADMIN — HYDROCODONE BITARTRATE AND ACETAMINOPHEN 1 TABLET: 5; 325 TABLET ORAL at 10:00

## 2019-02-22 RX ADMIN — IPRATROPIUM BROMIDE AND ALBUTEROL SULFATE 3 ML: 2.5; .5 SOLUTION RESPIRATORY (INHALATION) at 15:13

## 2019-02-22 RX ADMIN — ATORVASTATIN CALCIUM 20 MG: 20 TABLET, FILM COATED ORAL at 09:53

## 2019-02-23 LAB
ANION GAP SERPL CALCULATED.3IONS-SCNC: 5 MMOL/L (ref 5–15)
ARTERIAL PATENCY WRIST A: ABNORMAL
ATMOSPHERIC PRESS: 747 MMHG
BASE EXCESS BLDA CALC-SCNC: 7 MMOL/L (ref 0–2)
BASOPHILS # BLD AUTO: 0.02 10*3/MM3 (ref 0–0.2)
BASOPHILS NFR BLD AUTO: 0.1 % (ref 0–1.5)
BDY SITE: ABNORMAL
BUN BLD-MCNC: 29 MG/DL (ref 7–21)
BUN/CREAT SERPL: 49.2 (ref 7–25)
CALCIUM SPEC-SCNC: 7.9 MG/DL (ref 8.4–10.2)
CHLORIDE SERPL-SCNC: 102 MMOL/L (ref 95–110)
CO2 SERPL-SCNC: 29 MMOL/L (ref 22–31)
CREAT BLD-MCNC: 0.59 MG/DL (ref 0.5–1)
DEPRECATED RDW RBC AUTO: 55 FL (ref 37–54)
EOSINOPHIL # BLD AUTO: 0 10*3/MM3 (ref 0–0.4)
EOSINOPHIL NFR BLD AUTO: 0 % (ref 0.3–6.2)
ERYTHROCYTE [DISTWIDTH] IN BLOOD BY AUTOMATED COUNT: 18.3 % (ref 12.3–15.4)
GAS FLOW AIRWAY: 4 LPM
GFR SERPL CREATININE-BSD FRML MDRD: 102 ML/MIN/1.73 (ref 45–104)
GLUCOSE BLD-MCNC: 144 MG/DL (ref 60–100)
HCO3 BLDA-SCNC: 32.9 MMOL/L (ref 20–26)
HCT VFR BLD AUTO: 27.4 % (ref 34–46.6)
HGB BLD-MCNC: 8.8 G/DL (ref 12–15.9)
IMM GRANULOCYTES # BLD AUTO: 0.24 10*3/MM3 (ref 0–0.05)
IMM GRANULOCYTES NFR BLD AUTO: 1.4 % (ref 0–0.5)
LYMPHOCYTES # BLD AUTO: 0.98 10*3/MM3 (ref 0.7–3.1)
LYMPHOCYTES NFR BLD AUTO: 5.8 % (ref 19.6–45.3)
Lab: ABNORMAL
MCH RBC QN AUTO: 28.9 PG (ref 26.6–33)
MCHC RBC AUTO-ENTMCNC: 32.1 G/DL (ref 31.5–35.7)
MCV RBC AUTO: 89.8 FL (ref 79–97)
MODALITY: ABNORMAL
MONOCYTES # BLD AUTO: 0.67 10*3/MM3 (ref 0.1–0.9)
MONOCYTES NFR BLD AUTO: 4 % (ref 5–12)
NEUTROPHILS # BLD AUTO: 15.02 10*3/MM3 (ref 1.4–7)
NEUTROPHILS NFR BLD AUTO: 88.7 % (ref 42.7–76)
NRBC BLD AUTO-RTO: 0.8 /100 WBC (ref 0–0)
PCO2 BLDA: 53.6 MM HG (ref 35–45)
PH BLDA: 7.4 PH UNITS (ref 7.35–7.45)
PLATELET # BLD AUTO: 160 10*3/MM3 (ref 140–450)
PMV BLD AUTO: 10.9 FL (ref 6–12)
PO2 BLDA: 61.7 MM HG (ref 83–108)
POTASSIUM BLD-SCNC: 4.5 MMOL/L (ref 3.5–5.1)
RBC # BLD AUTO: 3.05 10*6/MM3 (ref 3.77–5.28)
SAO2 % BLDCOA: 88.8 % (ref 94–99)
SODIUM BLD-SCNC: 136 MMOL/L (ref 137–145)
VENTILATOR MODE: ABNORMAL
WBC NRBC COR # BLD: 16.93 10*3/MM3 (ref 3.4–10.8)

## 2019-02-23 PROCEDURE — 63710000001 PREDNISONE PER 1 MG: Performed by: HOSPITALIST

## 2019-02-23 PROCEDURE — 94760 N-INVAS EAR/PLS OXIMETRY 1: CPT

## 2019-02-23 PROCEDURE — 25010000002 METHYLPREDNISOLONE PER 40 MG: Performed by: FAMILY MEDICINE

## 2019-02-23 PROCEDURE — 85025 COMPLETE CBC W/AUTO DIFF WBC: CPT | Performed by: FAMILY MEDICINE

## 2019-02-23 PROCEDURE — 36600 WITHDRAWAL OF ARTERIAL BLOOD: CPT

## 2019-02-23 PROCEDURE — 94660 CPAP INITIATION&MGMT: CPT

## 2019-02-23 PROCEDURE — 25010000002 FUROSEMIDE PER 20 MG: Performed by: INTERNAL MEDICINE

## 2019-02-23 PROCEDURE — 82803 BLOOD GASES ANY COMBINATION: CPT

## 2019-02-23 PROCEDURE — 80048 BASIC METABOLIC PNL TOTAL CA: CPT | Performed by: FAMILY MEDICINE

## 2019-02-23 PROCEDURE — 94799 UNLISTED PULMONARY SVC/PX: CPT

## 2019-02-23 RX ORDER — CEFDINIR 300 MG/1
300 CAPSULE ORAL EVERY 12 HOURS SCHEDULED
Status: DISCONTINUED | OUTPATIENT
Start: 2019-02-23 | End: 2019-02-24

## 2019-02-23 RX ORDER — FUROSEMIDE 40 MG/1
40 TABLET ORAL DAILY
Status: DISCONTINUED | OUTPATIENT
Start: 2019-02-24 | End: 2019-02-24

## 2019-02-23 RX ORDER — PREDNISONE 20 MG/1
60 TABLET ORAL
Status: DISCONTINUED | OUTPATIENT
Start: 2019-02-23 | End: 2019-02-24

## 2019-02-23 RX ORDER — IPRATROPIUM BROMIDE AND ALBUTEROL SULFATE 2.5; .5 MG/3ML; MG/3ML
3 SOLUTION RESPIRATORY (INHALATION)
Status: DISCONTINUED | OUTPATIENT
Start: 2019-02-23 | End: 2019-02-24

## 2019-02-23 RX ADMIN — HYDROCORTISONE: 1 CREAM TOPICAL at 09:59

## 2019-02-23 RX ADMIN — METHYLPREDNISOLONE SODIUM SUCCINATE 40 MG: 40 INJECTION, POWDER, FOR SOLUTION INTRAMUSCULAR; INTRAVENOUS at 05:35

## 2019-02-23 RX ADMIN — LOSARTAN POTASSIUM 25 MG: 25 TABLET, FILM COATED ORAL at 09:12

## 2019-02-23 RX ADMIN — SERTRALINE HYDROCHLORIDE 100 MG: 50 TABLET ORAL at 09:12

## 2019-02-23 RX ADMIN — SODIUM CHLORIDE, PRESERVATIVE FREE 3 ML: 5 INJECTION INTRAVENOUS at 09:13

## 2019-02-23 RX ADMIN — ATORVASTATIN CALCIUM 20 MG: 20 TABLET, FILM COATED ORAL at 09:12

## 2019-02-23 RX ADMIN — PREDNISONE 60 MG: 20 TABLET ORAL at 15:35

## 2019-02-23 RX ADMIN — IPRATROPIUM BROMIDE AND ALBUTEROL SULFATE 3 ML: 2.5; .5 SOLUTION RESPIRATORY (INHALATION) at 12:18

## 2019-02-23 RX ADMIN — BENZONATATE 100 MG: 100 CAPSULE ORAL at 09:12

## 2019-02-23 RX ADMIN — BUDESONIDE AND FORMOTEROL FUMARATE DIHYDRATE 2 PUFF: 80; 4.5 AEROSOL RESPIRATORY (INHALATION) at 18:57

## 2019-02-23 RX ADMIN — TRAZODONE HYDROCHLORIDE 150 MG: 150 TABLET ORAL at 20:58

## 2019-02-23 RX ADMIN — CEFDINIR 300 MG: 300 CAPSULE ORAL at 20:18

## 2019-02-23 RX ADMIN — AZTREONAM 1 G: 1 INJECTION, POWDER, LYOPHILIZED, FOR SOLUTION INTRAMUSCULAR; INTRAVENOUS at 01:20

## 2019-02-23 RX ADMIN — RIVAROXABAN 20 MG: 10 TABLET, FILM COATED ORAL at 09:12

## 2019-02-23 RX ADMIN — IPRATROPIUM BROMIDE AND ALBUTEROL SULFATE 3 ML: 2.5; .5 SOLUTION RESPIRATORY (INHALATION) at 07:26

## 2019-02-23 RX ADMIN — CEFDINIR 300 MG: 300 CAPSULE ORAL at 15:35

## 2019-02-23 RX ADMIN — PANTOPRAZOLE SODIUM 40 MG: 40 TABLET, DELAYED RELEASE ORAL at 09:12

## 2019-02-23 RX ADMIN — HYDROCODONE BITARTRATE AND ACETAMINOPHEN 1 TABLET: 5; 325 TABLET ORAL at 09:12

## 2019-02-23 RX ADMIN — BUDESONIDE AND FORMOTEROL FUMARATE DIHYDRATE 2 PUFF: 80; 4.5 AEROSOL RESPIRATORY (INHALATION) at 07:27

## 2019-02-23 RX ADMIN — HYDROCORTISONE: 1 CREAM TOPICAL at 20:18

## 2019-02-23 RX ADMIN — IPRATROPIUM BROMIDE AND ALBUTEROL SULFATE 3 ML: 2.5; .5 SOLUTION RESPIRATORY (INHALATION) at 23:34

## 2019-02-23 RX ADMIN — IPRATROPIUM BROMIDE AND ALBUTEROL SULFATE 3 ML: 2.5; .5 SOLUTION RESPIRATORY (INHALATION) at 15:22

## 2019-02-23 RX ADMIN — HYDROCODONE BITARTRATE AND ACETAMINOPHEN 1 TABLET: 5; 325 TABLET ORAL at 19:26

## 2019-02-23 RX ADMIN — FUROSEMIDE 20 MG: 10 INJECTION, SOLUTION INTRAVENOUS at 09:12

## 2019-02-23 RX ADMIN — IPRATROPIUM BROMIDE AND ALBUTEROL SULFATE 3 ML: 2.5; .5 SOLUTION RESPIRATORY (INHALATION) at 18:57

## 2019-02-24 ENCOUNTER — APPOINTMENT (OUTPATIENT)
Dept: INTERVENTIONAL RADIOLOGY/VASCULAR | Facility: HOSPITAL | Age: 69
End: 2019-02-24

## 2019-02-24 ENCOUNTER — APPOINTMENT (OUTPATIENT)
Dept: ULTRASOUND IMAGING | Facility: HOSPITAL | Age: 69
End: 2019-02-24

## 2019-02-24 ENCOUNTER — APPOINTMENT (OUTPATIENT)
Dept: GENERAL RADIOLOGY | Facility: HOSPITAL | Age: 69
End: 2019-02-24

## 2019-02-24 ENCOUNTER — APPOINTMENT (OUTPATIENT)
Dept: CT IMAGING | Facility: HOSPITAL | Age: 69
End: 2019-02-24

## 2019-02-24 LAB
ALBUMIN SERPL-MCNC: 2.8 G/DL (ref 3.4–4.8)
ALBUMIN/GLOB SERPL: 0.9 G/DL (ref 1.1–1.8)
ALP SERPL-CCNC: 86 U/L (ref 38–126)
ALT SERPL W P-5'-P-CCNC: 225 U/L (ref 9–52)
ANION GAP SERPL CALCULATED.3IONS-SCNC: 4 MMOL/L (ref 5–15)
AST SERPL-CCNC: 53 U/L (ref 14–36)
BASOPHILS # BLD AUTO: 0.02 10*3/MM3 (ref 0–0.2)
BASOPHILS NFR BLD AUTO: 0.1 % (ref 0–1.5)
BILIRUB SERPL-MCNC: 0.4 MG/DL (ref 0.2–1.3)
BUN BLD-MCNC: 23 MG/DL (ref 7–21)
BUN/CREAT SERPL: 46 (ref 7–25)
CALCIUM SPEC-SCNC: 8 MG/DL (ref 8.4–10.2)
CHLORIDE SERPL-SCNC: 99 MMOL/L (ref 95–110)
CO2 SERPL-SCNC: 31 MMOL/L (ref 22–31)
CREAT BLD-MCNC: 0.5 MG/DL (ref 0.5–1)
DEPRECATED RDW RBC AUTO: 58 FL (ref 37–54)
EOSINOPHIL # BLD AUTO: 0 10*3/MM3 (ref 0–0.4)
EOSINOPHIL NFR BLD AUTO: 0 % (ref 0.3–6.2)
ERYTHROCYTE [DISTWIDTH] IN BLOOD BY AUTOMATED COUNT: 18.6 % (ref 12.3–15.4)
GFR SERPL CREATININE-BSD FRML MDRD: 123 ML/MIN/1.73 (ref 45–104)
GLOBULIN UR ELPH-MCNC: 3.2 GM/DL (ref 2.3–3.5)
GLUCOSE BLD-MCNC: 117 MG/DL (ref 60–100)
HCT VFR BLD AUTO: 27.7 % (ref 34–46.6)
HGB BLD-MCNC: 8.8 G/DL (ref 12–15.9)
IMM GRANULOCYTES # BLD AUTO: 0.26 10*3/MM3 (ref 0–0.05)
IMM GRANULOCYTES NFR BLD AUTO: 1.3 % (ref 0–0.5)
LYMPHOCYTES # BLD AUTO: 1.07 10*3/MM3 (ref 0.7–3.1)
LYMPHOCYTES NFR BLD AUTO: 5.3 % (ref 19.6–45.3)
MCH RBC QN AUTO: 28.7 PG (ref 26.6–33)
MCHC RBC AUTO-ENTMCNC: 31.8 G/DL (ref 31.5–35.7)
MCV RBC AUTO: 90.2 FL (ref 79–97)
MONOCYTES # BLD AUTO: 1.26 10*3/MM3 (ref 0.1–0.9)
MONOCYTES NFR BLD AUTO: 6.3 % (ref 5–12)
NEUTROPHILS # BLD AUTO: 17.47 10*3/MM3 (ref 1.4–7)
NEUTROPHILS NFR BLD AUTO: 87 % (ref 42.7–76)
NRBC BLD AUTO-RTO: 0.4 /100 WBC (ref 0–0)
PLATELET # BLD AUTO: 161 10*3/MM3 (ref 140–450)
PMV BLD AUTO: 11.5 FL (ref 6–12)
POTASSIUM BLD-SCNC: 4.5 MMOL/L (ref 3.5–5.1)
PROT SERPL-MCNC: 6 G/DL (ref 6.3–8.6)
RBC # BLD AUTO: 3.07 10*6/MM3 (ref 3.77–5.28)
SODIUM BLD-SCNC: 134 MMOL/L (ref 137–145)
WBC NRBC COR # BLD: 20.08 10*3/MM3 (ref 3.4–10.8)

## 2019-02-24 PROCEDURE — C1751 CATH, INF, PER/CENT/MIDLINE: HCPCS

## 2019-02-24 PROCEDURE — 80053 COMPREHEN METABOLIC PANEL: CPT | Performed by: HOSPITALIST

## 2019-02-24 PROCEDURE — 63710000001 PREDNISONE PER 1 MG: Performed by: HOSPITALIST

## 2019-02-24 PROCEDURE — 25010000002 CEFTRIAXONE PER 250 MG: Performed by: INTERNAL MEDICINE

## 2019-02-24 PROCEDURE — 94799 UNLISTED PULMONARY SVC/PX: CPT

## 2019-02-24 PROCEDURE — 71250 CT THORAX DX C-: CPT

## 2019-02-24 PROCEDURE — 05HY33Z INSERTION OF INFUSION DEVICE INTO UPPER VEIN, PERCUTANEOUS APPROACH: ICD-10-PCS | Performed by: FAMILY MEDICINE

## 2019-02-24 PROCEDURE — 76937 US GUIDE VASCULAR ACCESS: CPT

## 2019-02-24 PROCEDURE — 25010000002 METHYLPREDNISOLONE PER 125 MG: Performed by: INTERNAL MEDICINE

## 2019-02-24 PROCEDURE — 85025 COMPLETE CBC W/AUTO DIFF WBC: CPT | Performed by: FAMILY MEDICINE

## 2019-02-24 PROCEDURE — 94760 N-INVAS EAR/PLS OXIMETRY 1: CPT

## 2019-02-24 PROCEDURE — 25010000002 AZITHROMYCIN PER 500 MG: Performed by: INTERNAL MEDICINE

## 2019-02-24 PROCEDURE — 36410 VNPNXR 3YR/> PHY/QHP DX/THER: CPT

## 2019-02-24 PROCEDURE — 73502 X-RAY EXAM HIP UNI 2-3 VIEWS: CPT

## 2019-02-24 RX ORDER — METHYLPREDNISOLONE SODIUM SUCCINATE 125 MG/2ML
80 INJECTION, POWDER, LYOPHILIZED, FOR SOLUTION INTRAMUSCULAR; INTRAVENOUS EVERY 8 HOURS
Status: DISCONTINUED | OUTPATIENT
Start: 2019-02-24 | End: 2019-02-26 | Stop reason: HOSPADM

## 2019-02-24 RX ORDER — DOCUSATE SODIUM 100 MG/1
100 CAPSULE, LIQUID FILLED ORAL 2 TIMES DAILY
Status: DISCONTINUED | OUTPATIENT
Start: 2019-02-24 | End: 2019-02-26 | Stop reason: HOSPADM

## 2019-02-24 RX ORDER — IPRATROPIUM BROMIDE AND ALBUTEROL SULFATE 2.5; .5 MG/3ML; MG/3ML
3 SOLUTION RESPIRATORY (INHALATION)
Status: DISCONTINUED | OUTPATIENT
Start: 2019-02-24 | End: 2019-02-26 | Stop reason: HOSPADM

## 2019-02-24 RX ORDER — MAGNESIUM CARB/ALUMINUM HYDROX 105-160MG
150 TABLET,CHEWABLE ORAL ONCE
Status: COMPLETED | OUTPATIENT
Start: 2019-02-24 | End: 2019-02-24

## 2019-02-24 RX ORDER — SODIUM CHLORIDE 9 MG/ML
125 INJECTION, SOLUTION INTRAVENOUS CONTINUOUS
Status: DISCONTINUED | OUTPATIENT
Start: 2019-02-24 | End: 2019-02-26 | Stop reason: HOSPADM

## 2019-02-24 RX ORDER — GABAPENTIN 400 MG/1
800 CAPSULE ORAL 3 TIMES DAILY
Status: DISCONTINUED | OUTPATIENT
Start: 2019-02-25 | End: 2019-02-25

## 2019-02-24 RX ORDER — SODIUM CHLORIDE, SODIUM LACTATE, POTASSIUM CHLORIDE, CALCIUM CHLORIDE 600; 310; 30; 20 MG/100ML; MG/100ML; MG/100ML; MG/100ML
100 INJECTION, SOLUTION INTRAVENOUS CONTINUOUS
Status: DISCONTINUED | OUTPATIENT
Start: 2019-02-24 | End: 2019-02-24

## 2019-02-24 RX ADMIN — IPRATROPIUM BROMIDE AND ALBUTEROL SULFATE 3 ML: 2.5; .5 SOLUTION RESPIRATORY (INHALATION) at 15:21

## 2019-02-24 RX ADMIN — CEFDINIR 300 MG: 300 CAPSULE ORAL at 09:12

## 2019-02-24 RX ADMIN — DOCUSATE SODIUM 100 MG: 100 CAPSULE, LIQUID FILLED ORAL at 20:41

## 2019-02-24 RX ADMIN — CEFTRIAXONE SODIUM 1 G: 1 INJECTION, POWDER, FOR SOLUTION INTRAMUSCULAR; INTRAVENOUS at 18:03

## 2019-02-24 RX ADMIN — IPRATROPIUM BROMIDE AND ALBUTEROL SULFATE 3 ML: 2.5; .5 SOLUTION RESPIRATORY (INHALATION) at 19:50

## 2019-02-24 RX ADMIN — HYDROCODONE BITARTRATE AND ACETAMINOPHEN 1 TABLET: 5; 325 TABLET ORAL at 19:47

## 2019-02-24 RX ADMIN — PREDNISONE 60 MG: 20 TABLET ORAL at 09:12

## 2019-02-24 RX ADMIN — GUAIFENESIN 400 MG: 100 LIQUID ORAL at 20:44

## 2019-02-24 RX ADMIN — TRAZODONE HYDROCHLORIDE 150 MG: 150 TABLET ORAL at 20:41

## 2019-02-24 RX ADMIN — IPRATROPIUM BROMIDE AND ALBUTEROL SULFATE 3 ML: 2.5; .5 SOLUTION RESPIRATORY (INHALATION) at 07:30

## 2019-02-24 RX ADMIN — LOSARTAN POTASSIUM 25 MG: 25 TABLET, FILM COATED ORAL at 09:12

## 2019-02-24 RX ADMIN — AZITHROMYCIN MONOHYDRATE 500 MG: 500 INJECTION, POWDER, LYOPHILIZED, FOR SOLUTION INTRAVENOUS at 18:03

## 2019-02-24 RX ADMIN — FUROSEMIDE 40 MG: 40 TABLET ORAL at 09:12

## 2019-02-24 RX ADMIN — SERTRALINE HYDROCHLORIDE 100 MG: 50 TABLET ORAL at 09:13

## 2019-02-24 RX ADMIN — HYDROCORTISONE: 1 CREAM TOPICAL at 09:12

## 2019-02-24 RX ADMIN — BUDESONIDE AND FORMOTEROL FUMARATE DIHYDRATE 2 PUFF: 80; 4.5 AEROSOL RESPIRATORY (INHALATION) at 07:30

## 2019-02-24 RX ADMIN — ATORVASTATIN CALCIUM 20 MG: 20 TABLET, FILM COATED ORAL at 09:12

## 2019-02-24 RX ADMIN — MAGNESIUM CITRATE 150 ML: 1.75 LIQUID ORAL at 18:02

## 2019-02-24 RX ADMIN — IPRATROPIUM BROMIDE AND ALBUTEROL SULFATE 3 ML: 2.5; .5 SOLUTION RESPIRATORY (INHALATION) at 03:40

## 2019-02-24 RX ADMIN — HYDROCODONE BITARTRATE AND ACETAMINOPHEN 1 TABLET: 5; 325 TABLET ORAL at 09:22

## 2019-02-24 RX ADMIN — IPRATROPIUM BROMIDE AND ALBUTEROL SULFATE 3 ML: 2.5; .5 SOLUTION RESPIRATORY (INHALATION) at 12:10

## 2019-02-24 RX ADMIN — PANTOPRAZOLE SODIUM 40 MG: 40 TABLET, DELAYED RELEASE ORAL at 09:12

## 2019-02-24 RX ADMIN — RIVAROXABAN 20 MG: 10 TABLET, FILM COATED ORAL at 09:13

## 2019-02-24 RX ADMIN — SODIUM CHLORIDE 125 ML/HR: 9 INJECTION, SOLUTION INTRAVENOUS at 18:03

## 2019-02-24 RX ADMIN — METHYLPREDNISOLONE SODIUM SUCCINATE 80 MG: 125 INJECTION, POWDER, FOR SOLUTION INTRAMUSCULAR; INTRAVENOUS at 18:44

## 2019-02-24 RX ADMIN — SODIUM CHLORIDE, PRESERVATIVE FREE 3 ML: 5 INJECTION INTRAVENOUS at 20:41

## 2019-02-24 RX ADMIN — HYDROCORTISONE: 1 CREAM TOPICAL at 20:41

## 2019-02-25 LAB
ANION GAP SERPL CALCULATED.3IONS-SCNC: 4 MMOL/L (ref 5–15)
BASOPHILS # BLD AUTO: 0.01 10*3/MM3 (ref 0–0.2)
BASOPHILS NFR BLD AUTO: 0.1 % (ref 0–1.5)
BUN BLD-MCNC: 21 MG/DL (ref 7–21)
BUN/CREAT SERPL: 45.7 (ref 7–25)
CALCIUM SPEC-SCNC: 7.7 MG/DL (ref 8.4–10.2)
CHLORIDE SERPL-SCNC: 97 MMOL/L (ref 95–110)
CO2 SERPL-SCNC: 33 MMOL/L (ref 22–31)
CREAT BLD-MCNC: 0.46 MG/DL (ref 0.5–1)
DEPRECATED RDW RBC AUTO: 58.3 FL (ref 37–54)
EOSINOPHIL # BLD AUTO: 0 10*3/MM3 (ref 0–0.4)
EOSINOPHIL NFR BLD AUTO: 0 % (ref 0.3–6.2)
ERYTHROCYTE [DISTWIDTH] IN BLOOD BY AUTOMATED COUNT: 18.5 % (ref 12.3–15.4)
GFR SERPL CREATININE-BSD FRML MDRD: 136 ML/MIN/1.73 (ref 45–104)
GLUCOSE BLD-MCNC: 119 MG/DL (ref 60–100)
HCT VFR BLD AUTO: 28.6 % (ref 34–46.6)
HGB BLD-MCNC: 9.1 G/DL (ref 12–15.9)
IMM GRANULOCYTES # BLD AUTO: 0.09 10*3/MM3 (ref 0–0.05)
IMM GRANULOCYTES NFR BLD AUTO: 0.8 % (ref 0–0.5)
LYMPHOCYTES # BLD AUTO: 0.56 10*3/MM3 (ref 0.7–3.1)
LYMPHOCYTES NFR BLD AUTO: 4.7 % (ref 19.6–45.3)
MAGNESIUM SERPL-MCNC: 1.8 MG/DL (ref 1.6–2.3)
MCH RBC QN AUTO: 28.8 PG (ref 26.6–33)
MCHC RBC AUTO-ENTMCNC: 31.8 G/DL (ref 31.5–35.7)
MCV RBC AUTO: 90.5 FL (ref 79–97)
MONOCYTES # BLD AUTO: 0.3 10*3/MM3 (ref 0.1–0.9)
MONOCYTES NFR BLD AUTO: 2.5 % (ref 5–12)
NEUTROPHILS # BLD AUTO: 11 10*3/MM3 (ref 1.4–7)
NEUTROPHILS NFR BLD AUTO: 91.9 % (ref 42.7–76)
NRBC BLD AUTO-RTO: 0 /100 WBC (ref 0–0)
PLATELET # BLD AUTO: 167 10*3/MM3 (ref 140–450)
PMV BLD AUTO: 11.2 FL (ref 6–12)
POTASSIUM BLD-SCNC: 4.3 MMOL/L (ref 3.5–5.1)
RBC # BLD AUTO: 3.16 10*6/MM3 (ref 3.77–5.28)
SODIUM BLD-SCNC: 134 MMOL/L (ref 137–145)
WBC NRBC COR # BLD: 11.96 10*3/MM3 (ref 3.4–10.8)

## 2019-02-25 PROCEDURE — 94660 CPAP INITIATION&MGMT: CPT

## 2019-02-25 PROCEDURE — 25010000002 CEFTRIAXONE PER 250 MG: Performed by: INTERNAL MEDICINE

## 2019-02-25 PROCEDURE — 94799 UNLISTED PULMONARY SVC/PX: CPT

## 2019-02-25 PROCEDURE — 94760 N-INVAS EAR/PLS OXIMETRY 1: CPT

## 2019-02-25 PROCEDURE — 25010000002 METHYLPREDNISOLONE PER 125 MG: Performed by: INTERNAL MEDICINE

## 2019-02-25 PROCEDURE — 25010000002 KETOROLAC TROMETHAMINE PER 15 MG: Performed by: INTERNAL MEDICINE

## 2019-02-25 PROCEDURE — 25010000002 AZITHROMYCIN PER 500 MG: Performed by: INTERNAL MEDICINE

## 2019-02-25 PROCEDURE — 80048 BASIC METABOLIC PNL TOTAL CA: CPT | Performed by: INTERNAL MEDICINE

## 2019-02-25 PROCEDURE — 83735 ASSAY OF MAGNESIUM: CPT | Performed by: INTERNAL MEDICINE

## 2019-02-25 PROCEDURE — 84145 PROCALCITONIN (PCT): CPT | Performed by: INTERNAL MEDICINE

## 2019-02-25 PROCEDURE — 85025 COMPLETE CBC W/AUTO DIFF WBC: CPT | Performed by: FAMILY MEDICINE

## 2019-02-25 RX ORDER — GABAPENTIN 400 MG/1
800 CAPSULE ORAL 3 TIMES DAILY
Status: DISCONTINUED | OUTPATIENT
Start: 2019-02-25 | End: 2019-02-26 | Stop reason: HOSPADM

## 2019-02-25 RX ORDER — KETOROLAC TROMETHAMINE 30 MG/ML
30 INJECTION, SOLUTION INTRAMUSCULAR; INTRAVENOUS ONCE
Status: COMPLETED | OUTPATIENT
Start: 2019-02-25 | End: 2019-02-25

## 2019-02-25 RX ORDER — OXYCODONE HYDROCHLORIDE AND ACETAMINOPHEN 5; 325 MG/1; MG/1
2 TABLET ORAL EVERY 6 HOURS PRN
Status: DISCONTINUED | OUTPATIENT
Start: 2019-02-25 | End: 2019-02-26 | Stop reason: HOSPADM

## 2019-02-25 RX ORDER — OXYCODONE HYDROCHLORIDE AND ACETAMINOPHEN 5; 325 MG/1; MG/1
2 TABLET ORAL ONCE
Status: COMPLETED | OUTPATIENT
Start: 2019-02-25 | End: 2019-02-25

## 2019-02-25 RX ORDER — KETOROLAC TROMETHAMINE 30 MG/ML
15 INJECTION, SOLUTION INTRAMUSCULAR; INTRAVENOUS EVERY 6 HOURS PRN
Status: DISCONTINUED | OUTPATIENT
Start: 2019-02-25 | End: 2019-02-26 | Stop reason: HOSPADM

## 2019-02-25 RX ADMIN — SODIUM CHLORIDE 125 ML/HR: 9 INJECTION, SOLUTION INTRAVENOUS at 22:03

## 2019-02-25 RX ADMIN — DOCUSATE SODIUM 100 MG: 100 CAPSULE, LIQUID FILLED ORAL at 09:08

## 2019-02-25 RX ADMIN — IPRATROPIUM BROMIDE AND ALBUTEROL SULFATE 3 ML: 2.5; .5 SOLUTION RESPIRATORY (INHALATION) at 07:39

## 2019-02-25 RX ADMIN — METHYLPREDNISOLONE SODIUM SUCCINATE 80 MG: 125 INJECTION, POWDER, FOR SOLUTION INTRAMUSCULAR; INTRAVENOUS at 09:08

## 2019-02-25 RX ADMIN — HYDROCORTISONE: 1 CREAM TOPICAL at 09:02

## 2019-02-25 RX ADMIN — HYDROCORTISONE: 1 CREAM TOPICAL at 22:02

## 2019-02-25 RX ADMIN — OXYCODONE HYDROCHLORIDE AND ACETAMINOPHEN 2 TABLET: 5; 325 TABLET ORAL at 15:49

## 2019-02-25 RX ADMIN — METHYLPREDNISOLONE SODIUM SUCCINATE 80 MG: 125 INJECTION, POWDER, FOR SOLUTION INTRAMUSCULAR; INTRAVENOUS at 17:18

## 2019-02-25 RX ADMIN — PANTOPRAZOLE SODIUM 40 MG: 40 TABLET, DELAYED RELEASE ORAL at 09:09

## 2019-02-25 RX ADMIN — SERTRALINE HYDROCHLORIDE 100 MG: 50 TABLET ORAL at 09:09

## 2019-02-25 RX ADMIN — GABAPENTIN 800 MG: 400 CAPSULE ORAL at 09:07

## 2019-02-25 RX ADMIN — KETOROLAC TROMETHAMINE 30 MG: 30 INJECTION, SOLUTION INTRAMUSCULAR at 15:49

## 2019-02-25 RX ADMIN — IPRATROPIUM BROMIDE AND ALBUTEROL SULFATE 3 ML: 2.5; .5 SOLUTION RESPIRATORY (INHALATION) at 19:37

## 2019-02-25 RX ADMIN — GUAIFENESIN 400 MG: 200 SOLUTION ORAL at 22:08

## 2019-02-25 RX ADMIN — IPRATROPIUM BROMIDE AND ALBUTEROL SULFATE 3 ML: 2.5; .5 SOLUTION RESPIRATORY (INHALATION) at 11:04

## 2019-02-25 RX ADMIN — RIVAROXABAN 20 MG: 10 TABLET, FILM COATED ORAL at 09:09

## 2019-02-25 RX ADMIN — GABAPENTIN 800 MG: 400 CAPSULE ORAL at 22:02

## 2019-02-25 RX ADMIN — IPRATROPIUM BROMIDE AND ALBUTEROL SULFATE 3 ML: 2.5; .5 SOLUTION RESPIRATORY (INHALATION) at 15:31

## 2019-02-25 RX ADMIN — GABAPENTIN 800 MG: 400 CAPSULE ORAL at 00:24

## 2019-02-25 RX ADMIN — GABAPENTIN 800 MG: 400 CAPSULE ORAL at 15:50

## 2019-02-25 RX ADMIN — METHYLPREDNISOLONE SODIUM SUCCINATE 80 MG: 125 INJECTION, POWDER, FOR SOLUTION INTRAMUSCULAR; INTRAVENOUS at 02:56

## 2019-02-25 RX ADMIN — TRAZODONE HYDROCHLORIDE 150 MG: 150 TABLET ORAL at 22:02

## 2019-02-25 RX ADMIN — OXYCODONE HYDROCHLORIDE AND ACETAMINOPHEN 2 TABLET: 5; 325 TABLET ORAL at 22:02

## 2019-02-25 RX ADMIN — SODIUM CHLORIDE, PRESERVATIVE FREE 3 ML: 5 INJECTION INTRAVENOUS at 22:03

## 2019-02-25 RX ADMIN — AZITHROMYCIN MONOHYDRATE 500 MG: 500 INJECTION, POWDER, LYOPHILIZED, FOR SOLUTION INTRAVENOUS at 17:18

## 2019-02-25 RX ADMIN — SODIUM CHLORIDE, PRESERVATIVE FREE 3 ML: 5 INJECTION INTRAVENOUS at 09:10

## 2019-02-25 RX ADMIN — DOCUSATE SODIUM 100 MG: 100 CAPSULE, LIQUID FILLED ORAL at 22:02

## 2019-02-25 RX ADMIN — CEFTRIAXONE SODIUM 1 G: 1 INJECTION, POWDER, FOR SOLUTION INTRAMUSCULAR; INTRAVENOUS at 18:30

## 2019-02-26 ENCOUNTER — APPOINTMENT (OUTPATIENT)
Dept: GENERAL RADIOLOGY | Facility: HOSPITAL | Age: 69
End: 2019-02-26

## 2019-02-26 ENCOUNTER — HOSPITAL ENCOUNTER (OUTPATIENT)
Facility: HOSPITAL | Age: 69
Discharge: NURSING, REHAB OR LONG-TERM CARE | End: 2019-03-15
Attending: INTERNAL MEDICINE | Admitting: INTERNAL MEDICINE

## 2019-02-26 VITALS
OXYGEN SATURATION: 91 % | DIASTOLIC BLOOD PRESSURE: 56 MMHG | TEMPERATURE: 98.7 F | WEIGHT: 123.2 LBS | RESPIRATION RATE: 18 BRPM | HEART RATE: 72 BPM | HEIGHT: 62 IN | SYSTOLIC BLOOD PRESSURE: 106 MMHG | BODY MASS INDEX: 22.67 KG/M2

## 2019-02-26 DIAGNOSIS — Z78.9 IMPAIRED MOBILITY AND ADLS: ICD-10-CM

## 2019-02-26 DIAGNOSIS — Z74.09 IMPAIRED PHYSICAL MOBILITY: ICD-10-CM

## 2019-02-26 DIAGNOSIS — Z74.09 IMPAIRED MOBILITY AND ADLS: ICD-10-CM

## 2019-02-26 LAB
ALBUMIN SERPL-MCNC: 2.8 G/DL (ref 3.4–4.8)
ALBUMIN/GLOB SERPL: 0.9 G/DL (ref 1.1–1.8)
ALP SERPL-CCNC: 64 U/L (ref 38–126)
ALT SERPL W P-5'-P-CCNC: 129 U/L (ref 9–52)
ANION GAP SERPL CALCULATED.3IONS-SCNC: 7 MMOL/L (ref 5–15)
AST SERPL-CCNC: 46 U/L (ref 14–36)
BACTERIA SPEC AEROBE CULT: NORMAL
BACTERIA SPEC AEROBE CULT: NORMAL
BASOPHILS # BLD AUTO: 0.01 10*3/MM3 (ref 0–0.2)
BASOPHILS # BLD AUTO: 0.03 10*3/MM3 (ref 0–0.2)
BASOPHILS NFR BLD AUTO: 0.1 % (ref 0–1.5)
BASOPHILS NFR BLD AUTO: 0.2 % (ref 0–1.5)
BILIRUB SERPL-MCNC: 0.3 MG/DL (ref 0.2–1.3)
BUN BLD-MCNC: 22 MG/DL (ref 7–21)
BUN/CREAT SERPL: 40 (ref 7–25)
CALCIUM SPEC-SCNC: 7.9 MG/DL (ref 8.4–10.2)
CHLORIDE SERPL-SCNC: 100 MMOL/L (ref 95–110)
CO2 SERPL-SCNC: 26 MMOL/L (ref 22–31)
CREAT BLD-MCNC: 0.55 MG/DL (ref 0.5–1)
DEPRECATED RDW RBC AUTO: 56.1 FL (ref 37–54)
DEPRECATED RDW RBC AUTO: 59.5 FL (ref 37–54)
EOSINOPHIL # BLD AUTO: 0 10*3/MM3 (ref 0–0.4)
EOSINOPHIL # BLD AUTO: 0 10*3/MM3 (ref 0–0.4)
EOSINOPHIL NFR BLD AUTO: 0 % (ref 0.3–6.2)
EOSINOPHIL NFR BLD AUTO: 0 % (ref 0.3–6.2)
ERYTHROCYTE [DISTWIDTH] IN BLOOD BY AUTOMATED COUNT: 18.2 % (ref 12.3–15.4)
ERYTHROCYTE [DISTWIDTH] IN BLOOD BY AUTOMATED COUNT: 18.5 % (ref 12.3–15.4)
GFR SERPL CREATININE-BSD FRML MDRD: 111 ML/MIN/1.73 (ref 45–104)
GLOBULIN UR ELPH-MCNC: 3 GM/DL (ref 2.3–3.5)
GLUCOSE BLD-MCNC: 115 MG/DL (ref 60–100)
HCT VFR BLD AUTO: 28 % (ref 34–46.6)
HCT VFR BLD AUTO: 28 % (ref 34–46.6)
HGB BLD-MCNC: 8.9 G/DL (ref 12–15.9)
HGB BLD-MCNC: 9 G/DL (ref 12–15.9)
IMM GRANULOCYTES # BLD AUTO: 0.12 10*3/MM3 (ref 0–0.05)
IMM GRANULOCYTES # BLD AUTO: 0.3 10*3/MM3 (ref 0–0.05)
IMM GRANULOCYTES NFR BLD AUTO: 0.8 % (ref 0–0.5)
IMM GRANULOCYTES NFR BLD AUTO: 1.7 % (ref 0–0.5)
LYMPHOCYTES # BLD AUTO: 0.42 10*3/MM3 (ref 0.7–3.1)
LYMPHOCYTES # BLD AUTO: 1.14 10*3/MM3 (ref 0.7–3.1)
LYMPHOCYTES NFR BLD AUTO: 2.8 % (ref 19.6–45.3)
LYMPHOCYTES NFR BLD AUTO: 6.6 % (ref 19.6–45.3)
MAGNESIUM SERPL-MCNC: 1.5 MG/DL (ref 1.6–2.3)
MCH RBC QN AUTO: 28.5 PG (ref 26.6–33)
MCH RBC QN AUTO: 29 PG (ref 26.6–33)
MCHC RBC AUTO-ENTMCNC: 31.8 G/DL (ref 31.5–35.7)
MCHC RBC AUTO-ENTMCNC: 32.1 G/DL (ref 31.5–35.7)
MCV RBC AUTO: 88.6 FL (ref 79–97)
MCV RBC AUTO: 91.2 FL (ref 79–97)
MONOCYTES # BLD AUTO: 0.55 10*3/MM3 (ref 0.1–0.9)
MONOCYTES # BLD AUTO: 0.67 10*3/MM3 (ref 0.1–0.9)
MONOCYTES NFR BLD AUTO: 3.2 % (ref 5–12)
MONOCYTES NFR BLD AUTO: 4.4 % (ref 5–12)
NEUTROPHILS # BLD AUTO: 13.87 10*3/MM3 (ref 1.4–7)
NEUTROPHILS # BLD AUTO: 15.25 10*3/MM3 (ref 1.4–7)
NEUTROPHILS NFR BLD AUTO: 88.3 % (ref 42.7–76)
NEUTROPHILS NFR BLD AUTO: 91.9 % (ref 42.7–76)
NRBC BLD AUTO-RTO: 0 /100 WBC (ref 0–0)
NRBC BLD AUTO-RTO: 0.2 /100 WBC (ref 0–0)
PLATELET # BLD AUTO: 179 10*3/MM3 (ref 140–450)
PLATELET # BLD AUTO: 193 10*3/MM3 (ref 140–450)
PMV BLD AUTO: 10.5 FL (ref 6–12)
PMV BLD AUTO: 10.8 FL (ref 6–12)
POTASSIUM BLD-SCNC: 4.6 MMOL/L (ref 3.5–5.1)
PROT SERPL-MCNC: 5.8 G/DL (ref 6.3–8.6)
RBC # BLD AUTO: 3.07 10*6/MM3 (ref 3.77–5.28)
RBC # BLD AUTO: 3.16 10*6/MM3 (ref 3.77–5.28)
SODIUM BLD-SCNC: 133 MMOL/L (ref 137–145)
WBC NRBC COR # BLD: 15.09 10*3/MM3 (ref 3.4–10.8)
WBC NRBC COR # BLD: 17.27 10*3/MM3 (ref 3.4–10.8)

## 2019-02-26 PROCEDURE — 25010000002 METHYLPREDNISOLONE PER 125 MG: Performed by: INTERNAL MEDICINE

## 2019-02-26 PROCEDURE — 87040 BLOOD CULTURE FOR BACTERIA: CPT | Performed by: NURSE PRACTITIONER

## 2019-02-26 PROCEDURE — 97162 PT EVAL MOD COMPLEX 30 MIN: CPT

## 2019-02-26 PROCEDURE — 93010 ELECTROCARDIOGRAM REPORT: CPT | Performed by: INTERNAL MEDICINE

## 2019-02-26 PROCEDURE — 80053 COMPREHEN METABOLIC PANEL: CPT | Performed by: NURSE PRACTITIONER

## 2019-02-26 PROCEDURE — 94799 UNLISTED PULMONARY SVC/PX: CPT

## 2019-02-26 PROCEDURE — 83735 ASSAY OF MAGNESIUM: CPT | Performed by: NURSE PRACTITIONER

## 2019-02-26 PROCEDURE — 94760 N-INVAS EAR/PLS OXIMETRY 1: CPT

## 2019-02-26 PROCEDURE — 25010000002 KETOROLAC TROMETHAMINE PER 15 MG: Performed by: FAMILY MEDICINE

## 2019-02-26 PROCEDURE — 25010000002 CEFTRIAXONE: Performed by: INTERNAL MEDICINE

## 2019-02-26 PROCEDURE — 85025 COMPLETE CBC W/AUTO DIFF WBC: CPT | Performed by: FAMILY MEDICINE

## 2019-02-26 PROCEDURE — 85025 COMPLETE CBC W/AUTO DIFF WBC: CPT | Performed by: NURSE PRACTITIONER

## 2019-02-26 PROCEDURE — 71045 X-RAY EXAM CHEST 1 VIEW: CPT

## 2019-02-26 PROCEDURE — 93005 ELECTROCARDIOGRAM TRACING: CPT | Performed by: NURSE PRACTITIONER

## 2019-02-26 RX ORDER — IPRATROPIUM BROMIDE AND ALBUTEROL SULFATE 2.5; .5 MG/3ML; MG/3ML
3 SOLUTION RESPIRATORY (INHALATION) EVERY 4 HOURS
Status: DISCONTINUED | OUTPATIENT
Start: 2019-02-26 | End: 2019-03-15 | Stop reason: HOSPADM

## 2019-02-26 RX ORDER — OXYCODONE HYDROCHLORIDE AND ACETAMINOPHEN 5; 325 MG/1; MG/1
1 TABLET ORAL EVERY 6 HOURS PRN
Status: DISCONTINUED | OUTPATIENT
Start: 2019-02-26 | End: 2019-03-08

## 2019-02-26 RX ORDER — DOCUSATE SODIUM 100 MG/1
100 CAPSULE, LIQUID FILLED ORAL 2 TIMES DAILY
Status: DISCONTINUED | OUTPATIENT
Start: 2019-02-26 | End: 2019-03-15 | Stop reason: HOSPADM

## 2019-02-26 RX ORDER — PSEUDOEPHEDRINE HCL 30 MG
100 TABLET ORAL 2 TIMES DAILY
Start: 2019-02-26 | End: 2021-08-25

## 2019-02-26 RX ORDER — BENZONATATE 100 MG/1
100 CAPSULE ORAL 3 TIMES DAILY PRN
Start: 2019-02-26 | End: 2020-03-02

## 2019-02-26 RX ORDER — ONDANSETRON 2 MG/ML
4 INJECTION INTRAMUSCULAR; INTRAVENOUS EVERY 6 HOURS PRN
Start: 2019-02-26 | End: 2019-05-20

## 2019-02-26 RX ORDER — IPRATROPIUM BROMIDE AND ALBUTEROL SULFATE 2.5; .5 MG/3ML; MG/3ML
3 SOLUTION RESPIRATORY (INHALATION)
Status: DISCONTINUED | OUTPATIENT
Start: 2019-02-26 | End: 2019-02-26

## 2019-02-26 RX ORDER — SODIUM CHLORIDE 0.9 % (FLUSH) 0.9 %
10 SYRINGE (ML) INJECTION EVERY 8 HOURS SCHEDULED
Status: DISCONTINUED | OUTPATIENT
Start: 2019-02-26 | End: 2019-03-15 | Stop reason: HOSPADM

## 2019-02-26 RX ORDER — SODIUM CHLORIDE 9 MG/ML
INJECTION, SOLUTION INTRAVENOUS
Status: DISPENSED
Start: 2019-02-26 | End: 2019-02-27

## 2019-02-26 RX ORDER — GUAIFENESIN/DEXTROMETHORPHAN 100-10MG/5
5 SYRUP ORAL EVERY 4 HOURS PRN
Start: 2019-02-26 | End: 2019-05-20

## 2019-02-26 RX ORDER — HEPARIN SODIUM,PORCINE 10 UNIT/ML
30 VIAL (ML) INTRAVENOUS EVERY 8 HOURS SCHEDULED
Status: DISCONTINUED | OUTPATIENT
Start: 2019-02-26 | End: 2019-03-15 | Stop reason: HOSPADM

## 2019-02-26 RX ORDER — KETOROLAC TROMETHAMINE 30 MG/ML
15 INJECTION, SOLUTION INTRAMUSCULAR; INTRAVENOUS EVERY 6 HOURS PRN
Start: 2019-02-26 | End: 2019-03-02

## 2019-02-26 RX ORDER — PANTOPRAZOLE SODIUM 40 MG/1
40 TABLET, DELAYED RELEASE ORAL DAILY
Status: DISCONTINUED | OUTPATIENT
Start: 2019-02-27 | End: 2019-03-15 | Stop reason: HOSPADM

## 2019-02-26 RX ORDER — ONDANSETRON 2 MG/ML
4 INJECTION INTRAMUSCULAR; INTRAVENOUS EVERY 6 HOURS PRN
Status: DISCONTINUED | OUTPATIENT
Start: 2019-02-26 | End: 2019-03-15 | Stop reason: HOSPADM

## 2019-02-26 RX ORDER — IPRATROPIUM BROMIDE AND ALBUTEROL SULFATE 2.5; .5 MG/3ML; MG/3ML
3 SOLUTION RESPIRATORY (INHALATION)
Qty: 360 ML
Start: 2019-02-26 | End: 2019-05-20

## 2019-02-26 RX ORDER — GABAPENTIN 400 MG/1
800 CAPSULE ORAL 3 TIMES DAILY
Status: DISPENSED | OUTPATIENT
Start: 2019-02-26 | End: 2019-03-08

## 2019-02-26 RX ORDER — OXYCODONE HYDROCHLORIDE AND ACETAMINOPHEN 5; 325 MG/1; MG/1
2 TABLET ORAL EVERY 6 HOURS PRN
Status: DISCONTINUED | OUTPATIENT
Start: 2019-02-26 | End: 2019-02-26

## 2019-02-26 RX ORDER — BENZONATATE 100 MG/1
100 CAPSULE ORAL 3 TIMES DAILY PRN
Status: DISCONTINUED | OUTPATIENT
Start: 2019-02-26 | End: 2019-03-15 | Stop reason: HOSPADM

## 2019-02-26 RX ORDER — TRAZODONE HYDROCHLORIDE 150 MG/1
150 TABLET ORAL NIGHTLY
Start: 2019-02-26

## 2019-02-26 RX ORDER — METHYLPREDNISOLONE SODIUM SUCCINATE 125 MG/2ML
60 INJECTION, POWDER, LYOPHILIZED, FOR SOLUTION INTRAMUSCULAR; INTRAVENOUS EVERY 6 HOURS
Status: DISCONTINUED | OUTPATIENT
Start: 2019-02-26 | End: 2019-03-04

## 2019-02-26 RX ORDER — TRAZODONE HYDROCHLORIDE 150 MG/1
150 TABLET ORAL NIGHTLY
Status: DISCONTINUED | OUTPATIENT
Start: 2019-02-26 | End: 2019-03-15 | Stop reason: HOSPADM

## 2019-02-26 RX ORDER — SODIUM CHLORIDE 0.9 % (FLUSH) 0.9 %
10 SYRINGE (ML) INJECTION AS NEEDED
Status: DISCONTINUED | OUTPATIENT
Start: 2019-02-26 | End: 2019-03-15 | Stop reason: HOSPADM

## 2019-02-26 RX ORDER — METHYLPREDNISOLONE SODIUM SUCCINATE 125 MG/2ML
80 INJECTION, POWDER, LYOPHILIZED, FOR SOLUTION INTRAMUSCULAR; INTRAVENOUS EVERY 8 HOURS
Start: 2019-02-26 | End: 2019-05-20

## 2019-02-26 RX ORDER — OXYCODONE HYDROCHLORIDE AND ACETAMINOPHEN 5; 325 MG/1; MG/1
2 TABLET ORAL EVERY 6 HOURS PRN
Start: 2019-02-26 | End: 2019-03-07

## 2019-02-26 RX ADMIN — OXYCODONE HYDROCHLORIDE AND ACETAMINOPHEN 2 TABLET: 5; 325 TABLET ORAL at 07:10

## 2019-02-26 RX ADMIN — GABAPENTIN 800 MG: 400 CAPSULE ORAL at 10:32

## 2019-02-26 RX ADMIN — METHYLPREDNISOLONE SODIUM SUCCINATE 80 MG: 125 INJECTION, POWDER, FOR SOLUTION INTRAMUSCULAR; INTRAVENOUS at 12:33

## 2019-02-26 RX ADMIN — DOCUSATE SODIUM 100 MG: 100 CAPSULE, LIQUID FILLED ORAL at 10:34

## 2019-02-26 RX ADMIN — GUAIFENESIN 400 MG: 200 SOLUTION ORAL at 17:06

## 2019-02-26 RX ADMIN — SODIUM CHLORIDE, PRESERVATIVE FREE 3 ML: 5 INJECTION INTRAVENOUS at 10:32

## 2019-02-26 RX ADMIN — GABAPENTIN 800 MG: 400 CAPSULE ORAL at 17:06

## 2019-02-26 RX ADMIN — RIVAROXABAN 20 MG: 10 TABLET, FILM COATED ORAL at 10:32

## 2019-02-26 RX ADMIN — IPRATROPIUM BROMIDE AND ALBUTEROL SULFATE 3 ML: 2.5; .5 SOLUTION RESPIRATORY (INHALATION) at 15:05

## 2019-02-26 RX ADMIN — OXYCODONE HYDROCHLORIDE AND ACETAMINOPHEN 2 TABLET: 5; 325 TABLET ORAL at 14:19

## 2019-02-26 RX ADMIN — METHYLPREDNISOLONE SODIUM SUCCINATE 80 MG: 125 INJECTION, POWDER, FOR SOLUTION INTRAMUSCULAR; INTRAVENOUS at 01:36

## 2019-02-26 RX ADMIN — IPRATROPIUM BROMIDE AND ALBUTEROL SULFATE 3 ML: 2.5; .5 SOLUTION RESPIRATORY (INHALATION) at 07:19

## 2019-02-26 RX ADMIN — HYDROCORTISONE: 1 CREAM TOPICAL at 10:31

## 2019-02-26 RX ADMIN — KETOROLAC TROMETHAMINE 15 MG: 30 INJECTION INTRAMUSCULAR; INTRAVENOUS at 00:56

## 2019-02-26 RX ADMIN — SODIUM CHLORIDE 125 ML/HR: 9 INJECTION, SOLUTION INTRAVENOUS at 10:31

## 2019-02-26 RX ADMIN — SERTRALINE HYDROCHLORIDE 100 MG: 50 TABLET ORAL at 10:34

## 2019-02-26 RX ADMIN — KETOROLAC TROMETHAMINE 15 MG: 30 INJECTION INTRAMUSCULAR; INTRAVENOUS at 10:33

## 2019-02-26 RX ADMIN — IPRATROPIUM BROMIDE AND ALBUTEROL SULFATE 3 ML: 2.5; .5 SOLUTION RESPIRATORY (INHALATION) at 11:32

## 2019-02-27 LAB
ALBUMIN SERPL-MCNC: 2.7 G/DL (ref 3.4–4.8)
ALBUMIN/GLOB SERPL: 1 G/DL (ref 1.1–1.8)
ALP SERPL-CCNC: 74 U/L (ref 38–126)
ALT SERPL W P-5'-P-CCNC: 122 U/L (ref 9–52)
ANION GAP SERPL CALCULATED.3IONS-SCNC: 4 MMOL/L (ref 5–15)
AST SERPL-CCNC: 34 U/L (ref 14–36)
BASOPHILS # BLD AUTO: 0.01 10*3/MM3 (ref 0–0.2)
BASOPHILS NFR BLD AUTO: 0.1 % (ref 0–1.5)
BILIRUB SERPL-MCNC: 0.3 MG/DL (ref 0.2–1.3)
BUN BLD-MCNC: 21 MG/DL (ref 7–21)
BUN/CREAT SERPL: 32.8 (ref 7–25)
CALCIUM SPEC-SCNC: 7.9 MG/DL (ref 8.4–10.2)
CHLORIDE SERPL-SCNC: 100 MMOL/L (ref 95–110)
CO2 SERPL-SCNC: 30 MMOL/L (ref 22–31)
CREAT BLD-MCNC: 0.64 MG/DL (ref 0.5–1)
DEPRECATED RDW RBC AUTO: 61.8 FL (ref 37–54)
EOSINOPHIL # BLD AUTO: 0 10*3/MM3 (ref 0–0.4)
EOSINOPHIL NFR BLD AUTO: 0 % (ref 0.3–6.2)
ERYTHROCYTE [DISTWIDTH] IN BLOOD BY AUTOMATED COUNT: 18.8 % (ref 12.3–15.4)
GFR SERPL CREATININE-BSD FRML MDRD: 93 ML/MIN/1.73 (ref 45–104)
GLOBULIN UR ELPH-MCNC: 2.8 GM/DL (ref 2.3–3.5)
GLUCOSE BLD-MCNC: 82 MG/DL (ref 60–100)
HCT VFR BLD AUTO: 28 % (ref 34–46.6)
HGB BLD-MCNC: 8.4 G/DL (ref 12–15.9)
IMM GRANULOCYTES # BLD AUTO: 0.1 10*3/MM3 (ref 0–0.05)
IMM GRANULOCYTES NFR BLD AUTO: 0.6 % (ref 0–0.5)
LYMPHOCYTES # BLD AUTO: 1.11 10*3/MM3 (ref 0.7–3.1)
LYMPHOCYTES NFR BLD AUTO: 7.2 % (ref 19.6–45.3)
MAGNESIUM SERPL-MCNC: 1.6 MG/DL (ref 1.6–2.3)
MCH RBC QN AUTO: 27.9 PG (ref 26.6–33)
MCHC RBC AUTO-ENTMCNC: 30 G/DL (ref 31.5–35.7)
MCV RBC AUTO: 93 FL (ref 79–97)
MONOCYTES # BLD AUTO: 1.3 10*3/MM3 (ref 0.1–0.9)
MONOCYTES NFR BLD AUTO: 8.4 % (ref 5–12)
NEUTROPHILS # BLD AUTO: 12.89 10*3/MM3 (ref 1.4–7)
NEUTROPHILS NFR BLD AUTO: 83.7 % (ref 42.7–76)
NRBC BLD AUTO-RTO: 0 /100 WBC (ref 0–0)
PLATELET # BLD AUTO: 203 10*3/MM3 (ref 140–450)
PMV BLD AUTO: 11 FL (ref 6–12)
POTASSIUM BLD-SCNC: 4.4 MMOL/L (ref 3.5–5.1)
PROCALCITONIN SERPL-MCNC: 0.04 NG/ML (ref 0–0.08)
PROT SERPL-MCNC: 5.5 G/DL (ref 6.3–8.6)
RBC # BLD AUTO: 3.01 10*6/MM3 (ref 3.77–5.28)
SODIUM BLD-SCNC: 134 MMOL/L (ref 137–145)
WBC NRBC COR # BLD: 15.41 10*3/MM3 (ref 3.4–10.8)

## 2019-02-27 PROCEDURE — 85025 COMPLETE CBC W/AUTO DIFF WBC: CPT | Performed by: NURSE PRACTITIONER

## 2019-02-27 PROCEDURE — 80053 COMPREHEN METABOLIC PANEL: CPT | Performed by: NURSE PRACTITIONER

## 2019-02-27 PROCEDURE — 87070 CULTURE OTHR SPECIMN AEROBIC: CPT | Performed by: INTERNAL MEDICINE

## 2019-02-27 PROCEDURE — 87205 SMEAR GRAM STAIN: CPT | Performed by: INTERNAL MEDICINE

## 2019-02-27 PROCEDURE — 83735 ASSAY OF MAGNESIUM: CPT | Performed by: NURSE PRACTITIONER

## 2019-02-27 PROCEDURE — 97162 PT EVAL MOD COMPLEX 30 MIN: CPT

## 2019-02-27 PROCEDURE — 25010000002 METHYLPREDNISOLONE PER 125 MG: Performed by: INTERNAL MEDICINE

## 2019-02-27 PROCEDURE — 25010000002 CEFTRIAXONE: Performed by: INTERNAL MEDICINE

## 2019-02-27 RX ORDER — SODIUM CHLORIDE 9 MG/ML
INJECTION, SOLUTION INTRAVENOUS
Status: DISPENSED
Start: 2019-02-27 | End: 2019-02-28

## 2019-02-28 PROCEDURE — 97110 THERAPEUTIC EXERCISES: CPT

## 2019-02-28 PROCEDURE — 25010000002 CEFTRIAXONE: Performed by: INTERNAL MEDICINE

## 2019-02-28 PROCEDURE — 97530 THERAPEUTIC ACTIVITIES: CPT

## 2019-02-28 PROCEDURE — 25010000002 METHYLPREDNISOLONE PER 125 MG: Performed by: INTERNAL MEDICINE

## 2019-02-28 RX ORDER — DOCOSANOL 100 MG/G
CREAM TOPICAL 2 TIMES DAILY
Status: DISCONTINUED | OUTPATIENT
Start: 2019-02-28 | End: 2019-03-15 | Stop reason: HOSPADM

## 2019-03-01 ENCOUNTER — APPOINTMENT (OUTPATIENT)
Dept: GENERAL RADIOLOGY | Facility: HOSPITAL | Age: 69
End: 2019-03-01

## 2019-03-01 LAB
ALBUMIN SERPL-MCNC: 2.6 G/DL (ref 3.4–4.8)
ALBUMIN/GLOB SERPL: 1 G/DL (ref 1.1–1.8)
ALP SERPL-CCNC: 59 U/L (ref 38–126)
ALT SERPL W P-5'-P-CCNC: 91 U/L (ref 9–52)
ANION GAP SERPL CALCULATED.3IONS-SCNC: 2 MMOL/L (ref 5–15)
AST SERPL-CCNC: 35 U/L (ref 14–36)
BACTERIA SPEC RESP CULT: NORMAL
BILIRUB SERPL-MCNC: 0.3 MG/DL (ref 0.2–1.3)
BUN BLD-MCNC: 23 MG/DL (ref 7–21)
BUN/CREAT SERPL: 47.9 (ref 7–25)
CALCIUM SPEC-SCNC: 8.3 MG/DL (ref 8.4–10.2)
CHLORIDE SERPL-SCNC: 102 MMOL/L (ref 95–110)
CO2 SERPL-SCNC: 30 MMOL/L (ref 22–31)
CREAT BLD-MCNC: 0.48 MG/DL (ref 0.5–1)
DEPRECATED RDW RBC AUTO: 60.1 FL (ref 37–54)
ERYTHROCYTE [DISTWIDTH] IN BLOOD BY AUTOMATED COUNT: 18.8 % (ref 12.3–15.4)
GFR SERPL CREATININE-BSD FRML MDRD: 129 ML/MIN/1.73 (ref 45–104)
GLOBULIN UR ELPH-MCNC: 2.7 GM/DL (ref 2.3–3.5)
GLUCOSE BLD-MCNC: 126 MG/DL (ref 60–100)
GRAM STN SPEC: NORMAL
HCT VFR BLD AUTO: 26.1 % (ref 34–46.6)
HGB BLD-MCNC: 8.4 G/DL (ref 12–15.9)
MAGNESIUM SERPL-MCNC: 1.9 MG/DL (ref 1.6–2.3)
MCH RBC QN AUTO: 28.9 PG (ref 26.6–33)
MCHC RBC AUTO-ENTMCNC: 32.2 G/DL (ref 31.5–35.7)
MCV RBC AUTO: 89.7 FL (ref 79–97)
PLATELET # BLD AUTO: 223 10*3/MM3 (ref 140–450)
PMV BLD AUTO: 10.5 FL (ref 6–12)
POTASSIUM BLD-SCNC: 4.5 MMOL/L (ref 3.5–5.1)
PROT SERPL-MCNC: 5.3 G/DL (ref 6.3–8.6)
RBC # BLD AUTO: 2.91 10*6/MM3 (ref 3.77–5.28)
SODIUM BLD-SCNC: 134 MMOL/L (ref 137–145)
WBC NRBC COR # BLD: 13.91 10*3/MM3 (ref 3.4–10.8)

## 2019-03-01 PROCEDURE — 97116 GAIT TRAINING THERAPY: CPT

## 2019-03-01 PROCEDURE — 85027 COMPLETE CBC AUTOMATED: CPT | Performed by: NURSE PRACTITIONER

## 2019-03-01 PROCEDURE — 97530 THERAPEUTIC ACTIVITIES: CPT

## 2019-03-01 PROCEDURE — 83735 ASSAY OF MAGNESIUM: CPT | Performed by: NURSE PRACTITIONER

## 2019-03-01 PROCEDURE — 80053 COMPREHEN METABOLIC PANEL: CPT | Performed by: NURSE PRACTITIONER

## 2019-03-01 PROCEDURE — 25010000002 METHYLPREDNISOLONE PER 125 MG: Performed by: INTERNAL MEDICINE

## 2019-03-01 PROCEDURE — 73502 X-RAY EXAM HIP UNI 2-3 VIEWS: CPT

## 2019-03-01 PROCEDURE — 25010000002 CEFTRIAXONE: Performed by: INTERNAL MEDICINE

## 2019-03-01 RX ORDER — SODIUM CHLORIDE 9 MG/ML
INJECTION, SOLUTION INTRAVENOUS
Status: DISPENSED
Start: 2019-03-01 | End: 2019-03-02

## 2019-03-01 RX ORDER — CYCLOBENZAPRINE HCL 5 MG
5 TABLET ORAL 2 TIMES DAILY PRN
Status: DISCONTINUED | OUTPATIENT
Start: 2019-03-01 | End: 2019-03-02

## 2019-03-02 ENCOUNTER — APPOINTMENT (OUTPATIENT)
Dept: GENERAL RADIOLOGY | Facility: HOSPITAL | Age: 69
End: 2019-03-02

## 2019-03-02 PROCEDURE — 97530 THERAPEUTIC ACTIVITIES: CPT

## 2019-03-02 PROCEDURE — 97110 THERAPEUTIC EXERCISES: CPT

## 2019-03-02 PROCEDURE — 71101 X-RAY EXAM UNILAT RIBS/CHEST: CPT

## 2019-03-02 PROCEDURE — 25010000002 METHYLPREDNISOLONE PER 125 MG: Performed by: INTERNAL MEDICINE

## 2019-03-02 PROCEDURE — 25010000002 CEFTRIAXONE: Performed by: INTERNAL MEDICINE

## 2019-03-02 PROCEDURE — 97166 OT EVAL MOD COMPLEX 45 MIN: CPT

## 2019-03-02 RX ORDER — CYCLOBENZAPRINE HCL 10 MG
10 TABLET ORAL 3 TIMES DAILY PRN
Status: DISCONTINUED | OUTPATIENT
Start: 2019-03-02 | End: 2019-03-15 | Stop reason: HOSPADM

## 2019-03-03 LAB
ALBUMIN SERPL-MCNC: 2.7 G/DL (ref 3.4–4.8)
ALBUMIN/GLOB SERPL: 1 G/DL (ref 1.1–1.8)
ALP SERPL-CCNC: 57 U/L (ref 38–126)
ALT SERPL W P-5'-P-CCNC: 66 U/L (ref 9–52)
ANION GAP SERPL CALCULATED.3IONS-SCNC: 6 MMOL/L (ref 5–15)
AST SERPL-CCNC: 29 U/L (ref 14–36)
BACTERIA SPEC AEROBE CULT: NORMAL
BACTERIA SPEC AEROBE CULT: NORMAL
BILIRUB SERPL-MCNC: 0.3 MG/DL (ref 0.2–1.3)
BUN BLD-MCNC: 27 MG/DL (ref 7–21)
BUN/CREAT SERPL: 48.2 (ref 7–25)
CALCIUM SPEC-SCNC: 7.8 MG/DL (ref 8.4–10.2)
CHLORIDE SERPL-SCNC: 97 MMOL/L (ref 95–110)
CO2 SERPL-SCNC: 28 MMOL/L (ref 22–31)
CREAT BLD-MCNC: 0.56 MG/DL (ref 0.5–1)
DEPRECATED RDW RBC AUTO: 60.7 FL (ref 37–54)
ERYTHROCYTE [DISTWIDTH] IN BLOOD BY AUTOMATED COUNT: 19.1 % (ref 12.3–15.4)
GFR SERPL CREATININE-BSD FRML MDRD: 108 ML/MIN/1.73 (ref 45–104)
GLOBULIN UR ELPH-MCNC: 2.6 GM/DL (ref 2.3–3.5)
GLUCOSE BLD-MCNC: 192 MG/DL (ref 60–100)
HCT VFR BLD AUTO: 26.4 % (ref 34–46.6)
HGB BLD-MCNC: 8.2 G/DL (ref 12–15.9)
MAGNESIUM SERPL-MCNC: 1.9 MG/DL (ref 1.6–2.3)
MCH RBC QN AUTO: 28.1 PG (ref 26.6–33)
MCHC RBC AUTO-ENTMCNC: 31.1 G/DL (ref 31.5–35.7)
MCV RBC AUTO: 90.4 FL (ref 79–97)
PLATELET # BLD AUTO: 220 10*3/MM3 (ref 140–450)
PMV BLD AUTO: 10 FL (ref 6–12)
POTASSIUM BLD-SCNC: 4.5 MMOL/L (ref 3.5–5.1)
PROT SERPL-MCNC: 5.3 G/DL (ref 6.3–8.6)
RBC # BLD AUTO: 2.92 10*6/MM3 (ref 3.77–5.28)
SODIUM BLD-SCNC: 131 MMOL/L (ref 137–145)
WBC NRBC COR # BLD: 13.96 10*3/MM3 (ref 3.4–10.8)

## 2019-03-03 PROCEDURE — 80053 COMPREHEN METABOLIC PANEL: CPT | Performed by: NURSE PRACTITIONER

## 2019-03-03 PROCEDURE — 25010000002 METHYLPREDNISOLONE PER 125 MG: Performed by: INTERNAL MEDICINE

## 2019-03-03 PROCEDURE — 83735 ASSAY OF MAGNESIUM: CPT | Performed by: NURSE PRACTITIONER

## 2019-03-03 PROCEDURE — 25010000002 FUROSEMIDE PER 20 MG: Performed by: NURSE PRACTITIONER

## 2019-03-03 PROCEDURE — 85027 COMPLETE CBC AUTOMATED: CPT | Performed by: NURSE PRACTITIONER

## 2019-03-03 RX ORDER — FUROSEMIDE 10 MG/ML
20 INJECTION INTRAMUSCULAR; INTRAVENOUS EVERY 12 HOURS
Status: DISCONTINUED | OUTPATIENT
Start: 2019-03-03 | End: 2019-03-05

## 2019-03-04 LAB
ANION GAP SERPL CALCULATED.3IONS-SCNC: 1 MMOL/L (ref 5–15)
BUN BLD-MCNC: 28 MG/DL (ref 7–21)
BUN/CREAT SERPL: 52.8 (ref 7–25)
CALCIUM SPEC-SCNC: 8 MG/DL (ref 8.4–10.2)
CHLORIDE SERPL-SCNC: 95 MMOL/L (ref 95–110)
CO2 SERPL-SCNC: 35 MMOL/L (ref 22–31)
CREAT BLD-MCNC: 0.53 MG/DL (ref 0.5–1)
GFR SERPL CREATININE-BSD FRML MDRD: 115 ML/MIN/1.73 (ref 45–104)
GLUCOSE BLD-MCNC: 118 MG/DL (ref 60–100)
POTASSIUM BLD-SCNC: 4.5 MMOL/L (ref 3.5–5.1)
SODIUM BLD-SCNC: 131 MMOL/L (ref 137–145)
WHOLE BLOOD HOLD SPECIMEN: NORMAL

## 2019-03-04 PROCEDURE — 25010000002 METHYLPREDNISOLONE PER 125 MG: Performed by: INTERNAL MEDICINE

## 2019-03-04 PROCEDURE — 25010000002 METHYLPREDNISOLONE PER 125 MG: Performed by: NURSE PRACTITIONER

## 2019-03-04 PROCEDURE — 25010000002 FUROSEMIDE PER 20 MG: Performed by: NURSE PRACTITIONER

## 2019-03-04 PROCEDURE — 97535 SELF CARE MNGMENT TRAINING: CPT

## 2019-03-04 PROCEDURE — 97110 THERAPEUTIC EXERCISES: CPT

## 2019-03-04 PROCEDURE — 80048 BASIC METABOLIC PNL TOTAL CA: CPT | Performed by: NURSE PRACTITIONER

## 2019-03-04 RX ORDER — METHYLPREDNISOLONE SODIUM SUCCINATE 125 MG/2ML
60 INJECTION, POWDER, LYOPHILIZED, FOR SOLUTION INTRAMUSCULAR; INTRAVENOUS EVERY 12 HOURS
Status: DISCONTINUED | OUTPATIENT
Start: 2019-03-04 | End: 2019-03-05

## 2019-03-04 NOTE — SIGNIFICANT NOTE
03/04/19 1603   Rehab Treatment   Discipline physical therapy assistant   Reason Treatment Not Performed patient/family declined treatment  (see paper chart for details)

## 2019-03-05 ENCOUNTER — APPOINTMENT (OUTPATIENT)
Dept: CT IMAGING | Facility: HOSPITAL | Age: 69
End: 2019-03-05

## 2019-03-05 LAB
ALBUMIN SERPL-MCNC: 2.9 G/DL (ref 3.4–4.8)
ALBUMIN/GLOB SERPL: 1.1 G/DL (ref 1.1–1.8)
ALP SERPL-CCNC: 57 U/L (ref 38–126)
ALT SERPL W P-5'-P-CCNC: 68 U/L (ref 9–52)
ANION GAP SERPL CALCULATED.3IONS-SCNC: 2 MMOL/L (ref 5–15)
AST SERPL-CCNC: 34 U/L (ref 14–36)
BILIRUB SERPL-MCNC: 0.4 MG/DL (ref 0.2–1.3)
BUN BLD-MCNC: 30 MG/DL (ref 7–21)
BUN/CREAT SERPL: 55.6 (ref 7–25)
CALCIUM SPEC-SCNC: 8.2 MG/DL (ref 8.4–10.2)
CHLORIDE SERPL-SCNC: 94 MMOL/L (ref 95–110)
CO2 SERPL-SCNC: 36 MMOL/L (ref 22–31)
CREAT BLD-MCNC: 0.54 MG/DL (ref 0.5–1)
DEPRECATED RDW RBC AUTO: 60 FL (ref 37–54)
ERYTHROCYTE [DISTWIDTH] IN BLOOD BY AUTOMATED COUNT: 19.1 % (ref 12.3–15.4)
GFR SERPL CREATININE-BSD FRML MDRD: 113 ML/MIN/1.73 (ref 45–104)
GLOBULIN UR ELPH-MCNC: 2.7 GM/DL (ref 2.3–3.5)
GLUCOSE BLD-MCNC: 83 MG/DL (ref 60–100)
HCT VFR BLD AUTO: 27.6 % (ref 34–46.6)
HGB BLD-MCNC: 9 G/DL (ref 12–15.9)
MAGNESIUM SERPL-MCNC: 1.9 MG/DL (ref 1.6–2.3)
MCH RBC QN AUTO: 28.8 PG (ref 26.6–33)
MCHC RBC AUTO-ENTMCNC: 32.6 G/DL (ref 31.5–35.7)
MCV RBC AUTO: 88.2 FL (ref 79–97)
PLATELET # BLD AUTO: 210 10*3/MM3 (ref 140–450)
PMV BLD AUTO: 9.7 FL (ref 6–12)
POTASSIUM BLD-SCNC: 4.6 MMOL/L (ref 3.5–5.1)
PROT SERPL-MCNC: 5.6 G/DL (ref 6.3–8.6)
RBC # BLD AUTO: 3.13 10*6/MM3 (ref 3.77–5.28)
SODIUM BLD-SCNC: 132 MMOL/L (ref 137–145)
WBC NRBC COR # BLD: 15.6 10*3/MM3 (ref 3.4–10.8)

## 2019-03-05 PROCEDURE — 97530 THERAPEUTIC ACTIVITIES: CPT | Performed by: PHYSICAL THERAPIST

## 2019-03-05 PROCEDURE — 97110 THERAPEUTIC EXERCISES: CPT | Performed by: PHYSICAL THERAPIST

## 2019-03-05 PROCEDURE — 85027 COMPLETE CBC AUTOMATED: CPT | Performed by: NURSE PRACTITIONER

## 2019-03-05 PROCEDURE — 63710000001 PREDNISONE PER 1 MG: Performed by: INTERNAL MEDICINE

## 2019-03-05 PROCEDURE — 25010000002 FUROSEMIDE PER 20 MG: Performed by: NURSE PRACTITIONER

## 2019-03-05 PROCEDURE — 80053 COMPREHEN METABOLIC PANEL: CPT | Performed by: NURSE PRACTITIONER

## 2019-03-05 PROCEDURE — 83735 ASSAY OF MAGNESIUM: CPT | Performed by: NURSE PRACTITIONER

## 2019-03-05 PROCEDURE — 74176 CT ABD & PELVIS W/O CONTRAST: CPT

## 2019-03-05 PROCEDURE — 25010000002 METHYLPREDNISOLONE PER 125 MG: Performed by: NURSE PRACTITIONER

## 2019-03-05 PROCEDURE — 97110 THERAPEUTIC EXERCISES: CPT

## 2019-03-05 PROCEDURE — 73700 CT LOWER EXTREMITY W/O DYE: CPT

## 2019-03-05 RX ORDER — PREDNISONE 20 MG/1
40 TABLET ORAL ONCE
Status: DISCONTINUED | OUTPATIENT
Start: 2019-03-09 | End: 2019-03-15 | Stop reason: HOSPADM

## 2019-03-05 RX ORDER — PREDNISONE 1 MG/1
5 TABLET ORAL ONCE
Status: DISCONTINUED | OUTPATIENT
Start: 2019-03-16 | End: 2019-03-15 | Stop reason: HOSPADM

## 2019-03-05 RX ORDER — OXYCODONE AND ACETAMINOPHEN 10; 325 MG/1; MG/1
1 TABLET ORAL EVERY 6 HOURS PRN
Status: DISPENSED | OUTPATIENT
Start: 2019-03-05 | End: 2019-03-15

## 2019-03-05 RX ORDER — FUROSEMIDE 40 MG/1
40 TABLET ORAL DAILY
Status: DISCONTINUED | OUTPATIENT
Start: 2019-03-06 | End: 2019-03-15 | Stop reason: HOSPADM

## 2019-03-05 RX ORDER — PREDNISONE 20 MG/1
60 TABLET ORAL ONCE
Status: DISCONTINUED | OUTPATIENT
Start: 2019-03-05 | End: 2019-03-15 | Stop reason: HOSPADM

## 2019-03-05 RX ORDER — PREDNISONE 10 MG/1
10 TABLET ORAL ONCE
Status: DISCONTINUED | OUTPATIENT
Start: 2019-03-15 | End: 2019-03-15 | Stop reason: HOSPADM

## 2019-03-05 RX ORDER — VALACYCLOVIR HYDROCHLORIDE 1 G/1
2000 TABLET, FILM COATED ORAL EVERY 12 HOURS SCHEDULED
Status: DISPENSED | OUTPATIENT
Start: 2019-03-05 | End: 2019-03-06

## 2019-03-05 RX ORDER — PREDNISONE 20 MG/1
20 TABLET ORAL ONCE
Status: DISCONTINUED | OUTPATIENT
Start: 2019-03-13 | End: 2019-03-15 | Stop reason: HOSPADM

## 2019-03-06 LAB
ANION GAP SERPL CALCULATED.3IONS-SCNC: 1 MMOL/L (ref 5–15)
BUN BLD-MCNC: 24 MG/DL (ref 7–21)
BUN/CREAT SERPL: 39.3 (ref 7–25)
CALCIUM SPEC-SCNC: 7.8 MG/DL (ref 8.4–10.2)
CHLORIDE SERPL-SCNC: 98 MMOL/L (ref 95–110)
CO2 SERPL-SCNC: 34 MMOL/L (ref 22–31)
CREAT BLD-MCNC: 0.61 MG/DL (ref 0.5–1)
GFR SERPL CREATININE-BSD FRML MDRD: 98 ML/MIN/1.73 (ref 45–104)
GLUCOSE BLD-MCNC: 84 MG/DL (ref 60–100)
POTASSIUM BLD-SCNC: 4.7 MMOL/L (ref 3.5–5.1)
SODIUM BLD-SCNC: 133 MMOL/L (ref 137–145)

## 2019-03-06 PROCEDURE — 97530 THERAPEUTIC ACTIVITIES: CPT

## 2019-03-06 PROCEDURE — 80048 BASIC METABOLIC PNL TOTAL CA: CPT | Performed by: NURSE PRACTITIONER

## 2019-03-06 PROCEDURE — 97110 THERAPEUTIC EXERCISES: CPT

## 2019-03-06 PROCEDURE — 63710000001 PREDNISONE PER 5 MG: Performed by: INTERNAL MEDICINE

## 2019-03-06 PROCEDURE — 97535 SELF CARE MNGMENT TRAINING: CPT

## 2019-03-06 PROCEDURE — 63710000001 PREDNISONE PER 1 MG: Performed by: INTERNAL MEDICINE

## 2019-03-06 RX ORDER — LIDOCAINE 50 MG/G
1 PATCH TOPICAL
Status: DISCONTINUED | OUTPATIENT
Start: 2019-03-06 | End: 2019-03-15 | Stop reason: HOSPADM

## 2019-03-07 LAB
ALBUMIN SERPL-MCNC: 2.9 G/DL (ref 3.4–4.8)
ALBUMIN SERPL-MCNC: 3 G/DL (ref 3.4–4.8)
ALBUMIN/GLOB SERPL: 1 G/DL (ref 1.1–1.8)
ALBUMIN/GLOB SERPL: 1.1 G/DL (ref 1.1–1.8)
ALP SERPL-CCNC: 56 U/L (ref 38–126)
ALP SERPL-CCNC: 56 U/L (ref 38–126)
ALT SERPL W P-5'-P-CCNC: 56 U/L (ref 9–52)
ALT SERPL W P-5'-P-CCNC: 61 U/L (ref 9–52)
ANION GAP SERPL CALCULATED.3IONS-SCNC: 5 MMOL/L (ref 5–15)
ANION GAP SERPL CALCULATED.3IONS-SCNC: 7 MMOL/L (ref 5–15)
AST SERPL-CCNC: 40 U/L (ref 14–36)
AST SERPL-CCNC: 43 U/L (ref 14–36)
BILIRUB SERPL-MCNC: 0.4 MG/DL (ref 0.2–1.3)
BILIRUB SERPL-MCNC: 0.5 MG/DL (ref 0.2–1.3)
BUN BLD-MCNC: 25 MG/DL (ref 7–21)
BUN BLD-MCNC: 26 MG/DL (ref 7–21)
BUN/CREAT SERPL: 47.2 (ref 7–25)
BUN/CREAT SERPL: 49.1 (ref 7–25)
CALCIUM SPEC-SCNC: 7.9 MG/DL (ref 8.4–10.2)
CALCIUM SPEC-SCNC: 7.9 MG/DL (ref 8.4–10.2)
CHLORIDE SERPL-SCNC: 94 MMOL/L (ref 95–110)
CHLORIDE SERPL-SCNC: 94 MMOL/L (ref 95–110)
CO2 SERPL-SCNC: 30 MMOL/L (ref 22–31)
CO2 SERPL-SCNC: 31 MMOL/L (ref 22–31)
CREAT BLD-MCNC: 0.53 MG/DL (ref 0.5–1)
CREAT BLD-MCNC: 0.53 MG/DL (ref 0.5–1)
DEPRECATED RDW RBC AUTO: 61.9 FL (ref 37–54)
ERYTHROCYTE [DISTWIDTH] IN BLOOD BY AUTOMATED COUNT: 19.1 % (ref 12.3–15.4)
GFR SERPL CREATININE-BSD FRML MDRD: 115 ML/MIN/1.73 (ref 45–104)
GFR SERPL CREATININE-BSD FRML MDRD: 115 ML/MIN/1.73 (ref 45–104)
GLOBULIN UR ELPH-MCNC: 2.7 GM/DL (ref 2.3–3.5)
GLOBULIN UR ELPH-MCNC: 3 GM/DL (ref 2.3–3.5)
GLUCOSE BLD-MCNC: 112 MG/DL (ref 60–100)
GLUCOSE BLD-MCNC: 120 MG/DL (ref 60–100)
HCT VFR BLD AUTO: 27.7 % (ref 34–46.6)
HGB BLD-MCNC: 8.9 G/DL (ref 12–15.9)
MAGNESIUM SERPL-MCNC: 1.7 MG/DL (ref 1.6–2.3)
MAGNESIUM SERPL-MCNC: 1.7 MG/DL (ref 1.6–2.3)
MCH RBC QN AUTO: 28.6 PG (ref 26.6–33)
MCHC RBC AUTO-ENTMCNC: 32.1 G/DL (ref 31.5–35.7)
MCV RBC AUTO: 89.1 FL (ref 79–97)
PLATELET # BLD AUTO: 183 10*3/MM3 (ref 140–450)
PMV BLD AUTO: 9.6 FL (ref 6–12)
POTASSIUM BLD-SCNC: 4.2 MMOL/L (ref 3.5–5.1)
POTASSIUM BLD-SCNC: 4.3 MMOL/L (ref 3.5–5.1)
PROT SERPL-MCNC: 5.6 G/DL (ref 6.3–8.6)
PROT SERPL-MCNC: 6 G/DL (ref 6.3–8.6)
RBC # BLD AUTO: 3.11 10*6/MM3 (ref 3.77–5.28)
SODIUM BLD-SCNC: 130 MMOL/L (ref 137–145)
SODIUM BLD-SCNC: 131 MMOL/L (ref 137–145)
WBC NRBC COR # BLD: 13.59 10*3/MM3 (ref 3.4–10.8)

## 2019-03-07 PROCEDURE — 63710000001 PREDNISONE PER 5 MG: Performed by: INTERNAL MEDICINE

## 2019-03-07 PROCEDURE — 85027 COMPLETE CBC AUTOMATED: CPT | Performed by: NURSE PRACTITIONER

## 2019-03-07 PROCEDURE — 80053 COMPREHEN METABOLIC PANEL: CPT | Performed by: NURSE PRACTITIONER

## 2019-03-07 PROCEDURE — 97530 THERAPEUTIC ACTIVITIES: CPT

## 2019-03-07 PROCEDURE — 97110 THERAPEUTIC EXERCISES: CPT

## 2019-03-07 PROCEDURE — 63710000001 PREDNISONE PER 1 MG: Performed by: INTERNAL MEDICINE

## 2019-03-07 PROCEDURE — 97535 SELF CARE MNGMENT TRAINING: CPT

## 2019-03-07 PROCEDURE — 83735 ASSAY OF MAGNESIUM: CPT | Performed by: NURSE PRACTITIONER

## 2019-03-08 LAB
ANION GAP SERPL CALCULATED.3IONS-SCNC: 7 MMOL/L (ref 5–15)
BUN BLD-MCNC: 28 MG/DL (ref 7–21)
BUN/CREAT SERPL: 38.4 (ref 7–25)
CALCIUM SPEC-SCNC: 8.2 MG/DL (ref 8.4–10.2)
CHLORIDE SERPL-SCNC: 96 MMOL/L (ref 95–110)
CO2 SERPL-SCNC: 30 MMOL/L (ref 22–31)
CREAT BLD-MCNC: 0.73 MG/DL (ref 0.5–1)
GFR SERPL CREATININE-BSD FRML MDRD: 80 ML/MIN/1.73 (ref 45–104)
GLUCOSE BLD-MCNC: 100 MG/DL (ref 60–100)
POTASSIUM BLD-SCNC: 4.3 MMOL/L (ref 3.5–5.1)
SODIUM BLD-SCNC: 133 MMOL/L (ref 137–145)

## 2019-03-08 PROCEDURE — 80048 BASIC METABOLIC PNL TOTAL CA: CPT | Performed by: NURSE PRACTITIONER

## 2019-03-08 PROCEDURE — 97530 THERAPEUTIC ACTIVITIES: CPT

## 2019-03-08 PROCEDURE — 63710000001 PREDNISONE PER 5 MG: Performed by: INTERNAL MEDICINE

## 2019-03-08 PROCEDURE — 97110 THERAPEUTIC EXERCISES: CPT

## 2019-03-08 PROCEDURE — 97535 SELF CARE MNGMENT TRAINING: CPT

## 2019-03-08 PROCEDURE — 63710000001 PREDNISONE PER 1 MG: Performed by: INTERNAL MEDICINE

## 2019-03-09 LAB
ALBUMIN SERPL-MCNC: 3.2 G/DL (ref 3.4–4.8)
ALBUMIN/GLOB SERPL: 1.2 G/DL (ref 1.1–1.8)
ALP SERPL-CCNC: 64 U/L (ref 38–126)
ALT SERPL W P-5'-P-CCNC: 58 U/L (ref 9–52)
ANION GAP SERPL CALCULATED.3IONS-SCNC: 6 MMOL/L (ref 5–15)
AST SERPL-CCNC: 52 U/L (ref 14–36)
BILIRUB SERPL-MCNC: 0.4 MG/DL (ref 0.2–1.3)
BUN BLD-MCNC: 28 MG/DL (ref 7–21)
BUN/CREAT SERPL: 46.7 (ref 7–25)
CALCIUM SPEC-SCNC: 8.2 MG/DL (ref 8.4–10.2)
CHLORIDE SERPL-SCNC: 93 MMOL/L (ref 95–110)
CO2 SERPL-SCNC: 32 MMOL/L (ref 22–31)
CREAT BLD-MCNC: 0.6 MG/DL (ref 0.5–1)
DEPRECATED RDW RBC AUTO: 64.7 FL (ref 37–54)
ERYTHROCYTE [DISTWIDTH] IN BLOOD BY AUTOMATED COUNT: 19.7 % (ref 12.3–15.4)
GFR SERPL CREATININE-BSD FRML MDRD: 100 ML/MIN/1.73 (ref 45–104)
GLOBULIN UR ELPH-MCNC: 2.7 GM/DL (ref 2.3–3.5)
GLUCOSE BLD-MCNC: 117 MG/DL (ref 60–100)
HCT VFR BLD AUTO: 29.3 % (ref 34–46.6)
HGB BLD-MCNC: 9.2 G/DL (ref 12–15.9)
MAGNESIUM SERPL-MCNC: 1.9 MG/DL (ref 1.6–2.3)
MCH RBC QN AUTO: 28.8 PG (ref 26.6–33)
MCHC RBC AUTO-ENTMCNC: 31.4 G/DL (ref 31.5–35.7)
MCV RBC AUTO: 91.6 FL (ref 79–97)
PLATELET # BLD AUTO: 169 10*3/MM3 (ref 140–450)
PMV BLD AUTO: 9.9 FL (ref 6–12)
POTASSIUM BLD-SCNC: 4.1 MMOL/L (ref 3.5–5.1)
PROT SERPL-MCNC: 5.9 G/DL (ref 6.3–8.6)
RBC # BLD AUTO: 3.2 10*6/MM3 (ref 3.77–5.28)
SODIUM BLD-SCNC: 131 MMOL/L (ref 137–145)
WBC NRBC COR # BLD: 14.66 10*3/MM3 (ref 3.4–10.8)

## 2019-03-09 PROCEDURE — 97110 THERAPEUTIC EXERCISES: CPT

## 2019-03-09 PROCEDURE — 85027 COMPLETE CBC AUTOMATED: CPT | Performed by: NURSE PRACTITIONER

## 2019-03-09 PROCEDURE — 97530 THERAPEUTIC ACTIVITIES: CPT

## 2019-03-09 PROCEDURE — 80053 COMPREHEN METABOLIC PANEL: CPT | Performed by: NURSE PRACTITIONER

## 2019-03-09 PROCEDURE — 83735 ASSAY OF MAGNESIUM: CPT | Performed by: NURSE PRACTITIONER

## 2019-03-09 PROCEDURE — 63710000001 PREDNISONE PER 1 MG: Performed by: INTERNAL MEDICINE

## 2019-03-09 RX ORDER — GABAPENTIN 400 MG/1
800 CAPSULE ORAL 3 TIMES DAILY
Status: DISCONTINUED | OUTPATIENT
Start: 2019-03-09 | End: 2019-03-15 | Stop reason: HOSPADM

## 2019-03-10 ENCOUNTER — APPOINTMENT (OUTPATIENT)
Dept: MRI IMAGING | Facility: HOSPITAL | Age: 69
End: 2019-03-10

## 2019-03-10 PROCEDURE — 63710000001 PREDNISONE PER 1 MG: Performed by: INTERNAL MEDICINE

## 2019-03-10 PROCEDURE — 72148 MRI LUMBAR SPINE W/O DYE: CPT

## 2019-03-10 PROCEDURE — 63710000001 PREDNISONE PER 5 MG: Performed by: INTERNAL MEDICINE

## 2019-03-10 RX ORDER — CYCLOBENZAPRINE HCL 5 MG
5 TABLET ORAL ONCE
Status: DISCONTINUED | OUTPATIENT
Start: 2019-03-10 | End: 2019-03-15 | Stop reason: HOSPADM

## 2019-03-11 ENCOUNTER — APPOINTMENT (OUTPATIENT)
Dept: NUCLEAR MEDICINE | Facility: HOSPITAL | Age: 69
End: 2019-03-11

## 2019-03-11 LAB
ALBUMIN SERPL-MCNC: 3 G/DL (ref 3.4–4.8)
ALBUMIN/GLOB SERPL: 1.1 G/DL (ref 1.1–1.8)
ALP SERPL-CCNC: 56 U/L (ref 38–126)
ALT SERPL W P-5'-P-CCNC: 58 U/L (ref 9–52)
ANION GAP SERPL CALCULATED.3IONS-SCNC: 4 MMOL/L (ref 5–15)
AST SERPL-CCNC: 46 U/L (ref 14–36)
BILIRUB SERPL-MCNC: 0.4 MG/DL (ref 0.2–1.3)
BUN BLD-MCNC: 26 MG/DL (ref 7–21)
BUN/CREAT SERPL: 43.3 (ref 7–25)
CALCIUM SPEC-SCNC: 8 MG/DL (ref 8.4–10.2)
CHLORIDE SERPL-SCNC: 96 MMOL/L (ref 95–110)
CO2 SERPL-SCNC: 32 MMOL/L (ref 22–31)
CREAT BLD-MCNC: 0.6 MG/DL (ref 0.5–1)
DEPRECATED RDW RBC AUTO: 65.6 FL (ref 37–54)
ERYTHROCYTE [DISTWIDTH] IN BLOOD BY AUTOMATED COUNT: 19.8 % (ref 12.3–15.4)
GFR SERPL CREATININE-BSD FRML MDRD: 100 ML/MIN/1.73 (ref 45–104)
GLOBULIN UR ELPH-MCNC: 2.7 GM/DL (ref 2.3–3.5)
GLUCOSE BLD-MCNC: 84 MG/DL (ref 60–100)
HCT VFR BLD AUTO: 27.9 % (ref 34–46.6)
HGB BLD-MCNC: 8.9 G/DL (ref 12–15.9)
MAGNESIUM SERPL-MCNC: 1.9 MG/DL (ref 1.6–2.3)
MCH RBC QN AUTO: 29.1 PG (ref 26.6–33)
MCHC RBC AUTO-ENTMCNC: 31.9 G/DL (ref 31.5–35.7)
MCV RBC AUTO: 91.2 FL (ref 79–97)
PLATELET # BLD AUTO: 162 10*3/MM3 (ref 140–450)
PMV BLD AUTO: 9.5 FL (ref 6–12)
POTASSIUM BLD-SCNC: 4.6 MMOL/L (ref 3.5–5.1)
PROT SERPL-MCNC: 5.7 G/DL (ref 6.3–8.6)
RBC # BLD AUTO: 3.06 10*6/MM3 (ref 3.77–5.28)
SODIUM BLD-SCNC: 132 MMOL/L (ref 137–145)
WBC NRBC COR # BLD: 10.95 10*3/MM3 (ref 3.4–10.8)

## 2019-03-11 PROCEDURE — 0 TECHNETIUM MEDRONATE KIT: Performed by: INTERNAL MEDICINE

## 2019-03-11 PROCEDURE — 85027 COMPLETE CBC AUTOMATED: CPT | Performed by: NURSE PRACTITIONER

## 2019-03-11 PROCEDURE — 80053 COMPREHEN METABOLIC PANEL: CPT | Performed by: NURSE PRACTITIONER

## 2019-03-11 PROCEDURE — 63710000001 PREDNISONE PER 5 MG: Performed by: INTERNAL MEDICINE

## 2019-03-11 PROCEDURE — 78306 BONE IMAGING WHOLE BODY: CPT

## 2019-03-11 PROCEDURE — A9503 TC99M MEDRONATE: HCPCS | Performed by: INTERNAL MEDICINE

## 2019-03-11 PROCEDURE — 63710000001 PREDNISONE PER 1 MG: Performed by: INTERNAL MEDICINE

## 2019-03-11 PROCEDURE — 97530 THERAPEUTIC ACTIVITIES: CPT

## 2019-03-11 PROCEDURE — 83735 ASSAY OF MAGNESIUM: CPT | Performed by: NURSE PRACTITIONER

## 2019-03-11 RX ORDER — TC 99M MEDRONATE 20 MG/10ML
26.9 INJECTION, POWDER, LYOPHILIZED, FOR SOLUTION INTRAVENOUS
Status: COMPLETED | OUTPATIENT
Start: 2019-03-11 | End: 2019-03-11

## 2019-03-11 RX ADMIN — Medication 26.9 MILLICURIE: at 09:35

## 2019-03-11 NOTE — SIGNIFICANT NOTE
03/11/19 1330   Rehab Treatment   Discipline occupational therapy assistant   Reason Treatment Not Performed unable to treat, medical status change  (pt to have bone scan this date 2* to hip pain. Hold this date per APRN.)

## 2019-03-12 LAB
ANION GAP SERPL CALCULATED.3IONS-SCNC: 5 MMOL/L (ref 5–15)
BUN BLD-MCNC: 25 MG/DL (ref 7–21)
BUN/CREAT SERPL: 42.4 (ref 7–25)
CALCIUM SPEC-SCNC: 8.1 MG/DL (ref 8.4–10.2)
CHLORIDE SERPL-SCNC: 99 MMOL/L (ref 95–110)
CO2 SERPL-SCNC: 31 MMOL/L (ref 22–31)
CREAT BLD-MCNC: 0.59 MG/DL (ref 0.5–1)
GFR SERPL CREATININE-BSD FRML MDRD: 102 ML/MIN/1.73 (ref 45–104)
GLUCOSE BLD-MCNC: 138 MG/DL (ref 60–100)
POTASSIUM BLD-SCNC: 4.3 MMOL/L (ref 3.5–5.1)
SODIUM BLD-SCNC: 135 MMOL/L (ref 137–145)

## 2019-03-12 PROCEDURE — 97530 THERAPEUTIC ACTIVITIES: CPT | Performed by: PHYSICAL THERAPIST

## 2019-03-12 PROCEDURE — 80048 BASIC METABOLIC PNL TOTAL CA: CPT | Performed by: INTERNAL MEDICINE

## 2019-03-12 PROCEDURE — 97535 SELF CARE MNGMENT TRAINING: CPT

## 2019-03-12 PROCEDURE — 97110 THERAPEUTIC EXERCISES: CPT | Performed by: PHYSICAL THERAPIST

## 2019-03-12 PROCEDURE — 97530 THERAPEUTIC ACTIVITIES: CPT

## 2019-03-12 PROCEDURE — 63710000001 PREDNISONE PER 5 MG: Performed by: INTERNAL MEDICINE

## 2019-03-12 PROCEDURE — 63710000001 PREDNISONE PER 1 MG: Performed by: INTERNAL MEDICINE

## 2019-03-13 LAB
ALBUMIN SERPL-MCNC: 3.1 G/DL (ref 3.4–4.8)
ALBUMIN/GLOB SERPL: 1.1 G/DL (ref 1.1–1.8)
ALP SERPL-CCNC: 57 U/L (ref 38–126)
ALT SERPL W P-5'-P-CCNC: 60 U/L (ref 9–52)
ANION GAP SERPL CALCULATED.3IONS-SCNC: 5 MMOL/L (ref 5–15)
AST SERPL-CCNC: 41 U/L (ref 14–36)
BILIRUB SERPL-MCNC: 0.3 MG/DL (ref 0.2–1.3)
BUN BLD-MCNC: 24 MG/DL (ref 7–21)
BUN/CREAT SERPL: 42.1 (ref 7–25)
CALCIUM SPEC-SCNC: 8.3 MG/DL (ref 8.4–10.2)
CHLORIDE SERPL-SCNC: 95 MMOL/L (ref 95–110)
CO2 SERPL-SCNC: 33 MMOL/L (ref 22–31)
CREAT BLD-MCNC: 0.57 MG/DL (ref 0.5–1)
DEPRECATED RDW RBC AUTO: 64.8 FL (ref 37–54)
ERYTHROCYTE [DISTWIDTH] IN BLOOD BY AUTOMATED COUNT: 19.4 % (ref 12.3–15.4)
GFR SERPL CREATININE-BSD FRML MDRD: 106 ML/MIN/1.73 (ref 45–104)
GLOBULIN UR ELPH-MCNC: 2.8 GM/DL (ref 2.3–3.5)
GLUCOSE BLD-MCNC: 90 MG/DL (ref 60–100)
HCT VFR BLD AUTO: 28.3 % (ref 34–46.6)
HGB BLD-MCNC: 8.8 G/DL (ref 12–15.9)
MAGNESIUM SERPL-MCNC: 1.9 MG/DL (ref 1.6–2.3)
MCH RBC QN AUTO: 28.5 PG (ref 26.6–33)
MCHC RBC AUTO-ENTMCNC: 31.1 G/DL (ref 31.5–35.7)
MCV RBC AUTO: 91.6 FL (ref 79–97)
PLATELET # BLD AUTO: 161 10*3/MM3 (ref 140–450)
PMV BLD AUTO: 9.3 FL (ref 6–12)
POTASSIUM BLD-SCNC: 4.6 MMOL/L (ref 3.5–5.1)
PROT SERPL-MCNC: 5.9 G/DL (ref 6.3–8.6)
RBC # BLD AUTO: 3.09 10*6/MM3 (ref 3.77–5.28)
SODIUM BLD-SCNC: 133 MMOL/L (ref 137–145)
WBC NRBC COR # BLD: 10.46 10*3/MM3 (ref 3.4–10.8)

## 2019-03-13 PROCEDURE — 97535 SELF CARE MNGMENT TRAINING: CPT

## 2019-03-13 PROCEDURE — 83735 ASSAY OF MAGNESIUM: CPT | Performed by: NURSE PRACTITIONER

## 2019-03-13 PROCEDURE — 97110 THERAPEUTIC EXERCISES: CPT

## 2019-03-13 PROCEDURE — 80053 COMPREHEN METABOLIC PANEL: CPT | Performed by: NURSE PRACTITIONER

## 2019-03-13 PROCEDURE — 63710000001 PREDNISONE PER 1 MG: Performed by: INTERNAL MEDICINE

## 2019-03-13 PROCEDURE — 85027 COMPLETE CBC AUTOMATED: CPT | Performed by: NURSE PRACTITIONER

## 2019-03-14 PROCEDURE — 97535 SELF CARE MNGMENT TRAINING: CPT

## 2019-03-14 PROCEDURE — 97530 THERAPEUTIC ACTIVITIES: CPT

## 2019-03-14 PROCEDURE — 97116 GAIT TRAINING THERAPY: CPT

## 2019-03-14 PROCEDURE — 63710000001 PREDNISONE PER 5 MG: Performed by: INTERNAL MEDICINE

## 2019-03-14 PROCEDURE — 97110 THERAPEUTIC EXERCISES: CPT

## 2019-03-15 VITALS — HEART RATE: 81 BPM

## 2019-03-15 LAB
ALBUMIN SERPL-MCNC: 3.3 G/DL (ref 3.4–4.8)
ALBUMIN/GLOB SERPL: 1.2 G/DL (ref 1.1–1.8)
ALP SERPL-CCNC: 64 U/L (ref 38–126)
ALT SERPL W P-5'-P-CCNC: 56 U/L (ref 9–52)
ANION GAP SERPL CALCULATED.3IONS-SCNC: 4 MMOL/L (ref 5–15)
AST SERPL-CCNC: 44 U/L (ref 14–36)
BILIRUB SERPL-MCNC: 0.3 MG/DL (ref 0.2–1.3)
BUN BLD-MCNC: 29 MG/DL (ref 7–21)
BUN/CREAT SERPL: 42 (ref 7–25)
CALCIUM SPEC-SCNC: 8.8 MG/DL (ref 8.4–10.2)
CHLORIDE SERPL-SCNC: 92 MMOL/L (ref 95–110)
CO2 SERPL-SCNC: 35 MMOL/L (ref 22–31)
CREAT BLD-MCNC: 0.69 MG/DL (ref 0.5–1)
DEPRECATED RDW RBC AUTO: 62.4 FL (ref 37–54)
ERYTHROCYTE [DISTWIDTH] IN BLOOD BY AUTOMATED COUNT: 18.7 % (ref 12.3–15.4)
GFR SERPL CREATININE-BSD FRML MDRD: 85 ML/MIN/1.73 (ref 45–104)
GLOBULIN UR ELPH-MCNC: 2.8 GM/DL (ref 2.3–3.5)
GLUCOSE BLD-MCNC: 81 MG/DL (ref 60–100)
HCT VFR BLD AUTO: 28.1 % (ref 34–46.6)
HGB BLD-MCNC: 8.9 G/DL (ref 12–15.9)
MAGNESIUM SERPL-MCNC: 1.9 MG/DL (ref 1.6–2.3)
MCH RBC QN AUTO: 29 PG (ref 26.6–33)
MCHC RBC AUTO-ENTMCNC: 31.7 G/DL (ref 31.5–35.7)
MCV RBC AUTO: 91.5 FL (ref 79–97)
PLATELET # BLD AUTO: 164 10*3/MM3 (ref 140–450)
PMV BLD AUTO: 9.8 FL (ref 6–12)
POTASSIUM BLD-SCNC: 4.8 MMOL/L (ref 3.5–5.1)
PROT SERPL-MCNC: 6.1 G/DL (ref 6.3–8.6)
RBC # BLD AUTO: 3.07 10*6/MM3 (ref 3.77–5.28)
SODIUM BLD-SCNC: 131 MMOL/L (ref 137–145)
WBC NRBC COR # BLD: 13.3 10*3/MM3 (ref 3.4–10.8)

## 2019-03-15 PROCEDURE — 97110 THERAPEUTIC EXERCISES: CPT

## 2019-03-15 PROCEDURE — 97535 SELF CARE MNGMENT TRAINING: CPT

## 2019-03-15 PROCEDURE — 83735 ASSAY OF MAGNESIUM: CPT | Performed by: NURSE PRACTITIONER

## 2019-03-15 PROCEDURE — 63710000001 PREDNISONE PER 5 MG: Performed by: INTERNAL MEDICINE

## 2019-03-15 PROCEDURE — 80053 COMPREHEN METABOLIC PANEL: CPT | Performed by: NURSE PRACTITIONER

## 2019-03-15 PROCEDURE — 85027 COMPLETE CBC AUTOMATED: CPT | Performed by: NURSE PRACTITIONER

## 2019-03-22 ENCOUNTER — HOSPITAL ENCOUNTER (EMERGENCY)
Facility: HOSPITAL | Age: 69
Discharge: HOME | End: 2019-03-22
Attending: EMERGENCY MEDICINE | Admitting: EMERGENCY MEDICINE

## 2019-03-22 ENCOUNTER — APPOINTMENT (OUTPATIENT)
Dept: ULTRASOUND IMAGING | Facility: HOSPITAL | Age: 69
End: 2019-03-22

## 2019-03-22 VITALS
SYSTOLIC BLOOD PRESSURE: 144 MMHG | WEIGHT: 133 LBS | RESPIRATION RATE: 17 BRPM | BODY MASS INDEX: 24.48 KG/M2 | HEIGHT: 62 IN | DIASTOLIC BLOOD PRESSURE: 63 MMHG | OXYGEN SATURATION: 98 % | HEART RATE: 79 BPM | TEMPERATURE: 98.1 F

## 2019-03-22 DIAGNOSIS — M25.431 WRIST SWELLING, RIGHT: Primary | ICD-10-CM

## 2019-03-22 LAB
ALBUMIN SERPL-MCNC: 3.3 G/DL (ref 3.4–4.8)
ALBUMIN/GLOB SERPL: 1 G/DL (ref 1.1–1.8)
ALP SERPL-CCNC: 73 U/L (ref 38–126)
ALT SERPL W P-5'-P-CCNC: 45 U/L (ref 9–52)
ANION GAP SERPL CALCULATED.3IONS-SCNC: 5 MMOL/L (ref 5–15)
ANISOCYTOSIS BLD QL: NORMAL
AST SERPL-CCNC: 38 U/L (ref 14–36)
BILIRUB SERPL-MCNC: 0.3 MG/DL (ref 0.2–1.3)
BUN BLD-MCNC: 15 MG/DL (ref 7–21)
BUN/CREAT SERPL: 28.8 (ref 7–25)
CALCIUM SPEC-SCNC: 8.3 MG/DL (ref 8.4–10.2)
CHLORIDE SERPL-SCNC: 92 MMOL/L (ref 95–110)
CO2 SERPL-SCNC: 35 MMOL/L (ref 22–31)
CREAT BLD-MCNC: 0.52 MG/DL (ref 0.5–1)
DEPRECATED RDW RBC AUTO: 59.3 FL (ref 37–54)
EOSINOPHIL # BLD MANUAL: 0.1 10*3/MM3 (ref 0–0.4)
EOSINOPHIL NFR BLD MANUAL: 2 % (ref 0.3–6.2)
ERYTHROCYTE [DISTWIDTH] IN BLOOD BY AUTOMATED COUNT: 17.8 % (ref 12.3–15.4)
GFR SERPL CREATININE-BSD FRML MDRD: 118 ML/MIN/1.73 (ref 45–104)
GLOBULIN UR ELPH-MCNC: 3.4 GM/DL (ref 2.3–3.5)
GLUCOSE BLD-MCNC: 77 MG/DL (ref 60–100)
HCT VFR BLD AUTO: 28.9 % (ref 34–46.6)
HGB BLD-MCNC: 9 G/DL (ref 12–15.9)
HYPOCHROMIA BLD QL: NORMAL
LYMPHOCYTES # BLD MANUAL: 1.87 10*3/MM3 (ref 0.7–3.1)
LYMPHOCYTES NFR BLD MANUAL: 12 % (ref 5–12)
LYMPHOCYTES NFR BLD MANUAL: 36 % (ref 19.6–45.3)
MCH RBC QN AUTO: 28.2 PG (ref 26.6–33)
MCHC RBC AUTO-ENTMCNC: 31.1 G/DL (ref 31.5–35.7)
MCV RBC AUTO: 90.6 FL (ref 79–97)
MONOCYTES # BLD AUTO: 0.62 10*3/MM3 (ref 0.1–0.9)
NEUTROPHILS # BLD AUTO: 2.34 10*3/MM3 (ref 1.4–7)
NEUTROPHILS NFR BLD MANUAL: 45 % (ref 42.7–76)
PLATELET # BLD AUTO: 168 10*3/MM3 (ref 140–450)
PMV BLD AUTO: 9.3 FL (ref 6–12)
POLYCHROMASIA BLD QL SMEAR: NORMAL
POTASSIUM BLD-SCNC: 3.8 MMOL/L (ref 3.5–5.1)
PROT SERPL-MCNC: 6.7 G/DL (ref 6.3–8.6)
RBC # BLD AUTO: 3.19 10*6/MM3 (ref 3.77–5.28)
SMALL PLATELETS BLD QL SMEAR: ADEQUATE
SODIUM BLD-SCNC: 132 MMOL/L (ref 137–145)
VARIANT LYMPHS NFR BLD MANUAL: 5 % (ref 0–5)
WBC MORPH BLD: NORMAL
WBC NRBC COR # BLD: 5.19 10*3/MM3 (ref 3.4–10.8)

## 2019-03-22 PROCEDURE — 85025 COMPLETE CBC W/AUTO DIFF WBC: CPT | Performed by: PHYSICIAN ASSISTANT

## 2019-03-22 PROCEDURE — 80053 COMPREHEN METABOLIC PANEL: CPT | Performed by: PHYSICIAN ASSISTANT

## 2019-03-22 PROCEDURE — 85007 BL SMEAR W/DIFF WBC COUNT: CPT | Performed by: PHYSICIAN ASSISTANT

## 2019-03-22 PROCEDURE — 99283 EMERGENCY DEPT VISIT LOW MDM: CPT

## 2019-03-22 PROCEDURE — 93971 EXTREMITY STUDY: CPT

## 2019-03-29 ENCOUNTER — OFFICE VISIT (OUTPATIENT)
Dept: ORTHOPEDIC SURGERY | Facility: CLINIC | Age: 69
End: 2019-03-29

## 2019-03-29 VITALS — HEIGHT: 62 IN | BODY MASS INDEX: 24.33 KG/M2

## 2019-03-29 DIAGNOSIS — M54.89 OTHER BACK PAIN, UNSPECIFIED CHRONICITY: Primary | ICD-10-CM

## 2019-03-29 PROCEDURE — 99212 OFFICE O/P EST SF 10 MIN: CPT | Performed by: ORTHOPAEDIC SURGERY

## 2019-03-29 NOTE — PROGRESS NOTES
"Mona Perales is a 68 y.o. female returns for     Chief Complaint   Patient presents with   • Lumbar Spine - Follow-up   • Left Hip - Follow-up       HISTORY OF PRESENT ILLNESS: hospital follow up.       68 y pain of the back and hip region, and the buttock, the pain radiates at times.  States she is working with physical therapy, she is at [facility].  States pain is somewhat improved.  No fevers, no chills, no nighttime awakening pain  Making progress with physical therapy.       CONCURRENT MEDICAL HISTORY:    The following portions of the patient's history were reviewed and updated as appropriate: allergies, current medications, past family history, past medical history, past social history, past surgical history and problem list.     ROS  No fevers or chills.  No chest pain or shortness of air.  No GI or  disturbances.    PHYSICAL EXAMINATION:       Ht 157.5 cm (62\")   BMI 24.33 kg/m²     Physical Exam    GAIT:     []  Normal  []  Antalgic    Assistive device: []  None  []  Walker     []  Crutches  []  Cane     [x]  Wheelchair  []  Stretcher    Ortho Exam    In wheelchair  No mass, no erythema,  No swelling  Moving extremities well.      Ct Abdomen Pelvis Without Contrast    Result Date: 3/5/2019  Narrative: PROCEDURES: CT ABDOMEN PELVIS WITHOUT IV CONTRAST, CT LOWER EXTREMITY WITHOUT IV CONTRAST HISTORY: Abdomen-pelvis trauma, minor, blunt, Z74.09 Other reduced mobility Z74.09 Other reduced mobility Dose length product: 1296 This exam was performed using radiation doses that are as low as reasonably achievable (ALARA). This exam was performed according to our departmental dose optimization program, which includes automated exposure control, adjustment of the mA and/or KV according to patient size and/or use of iterative reconstruction technique. COMPARISON: Chest CT 2/24/2019. CONTRAST: None TECHNIQUE: Multiple contiguous noncontrast axial images are obtained of the abdomen and pelvis. FINDINGS: " LOWER CHEST: Tiny bilateral pleural effusions. Atelectasis/consolidation in the lung bases. HEPATOBILIARY: Unremarkable. SPLEEN: Unremarkable. PANCREAS: Unremarkable ADRENAL GLANDS: Unremarkable. KIDNEYS/URETERS: No evidence of hydronephrosis or suspicious mass. GASTROINTESTINAL: Moderate amount of stool throughout the colon REPRODUCTIVE ORGANS: Unremarkable. URINARY BLADDER: Unremarkable VASCULAR: Severe aortoiliac catheter was chronic calcification. Left common iliac stent LYMPH NODES: No pathologically enlarged nodes by size criteria. PERITONEUM/RETROPERITONEUM: Unremarkable. OSSEOUS STRUCTURES: Nondisplaced fracture posterior left ninth rib. Old healed fracture of the left inferior pubic ramus. Degenerative changes of the spine. LEFT HIP CT: No evidence of acute fracture. Old healed fracture of the left inferior pubic ramus.     Impression: CONCLUSION: Atelectasis/consolidation lung bases and tiny bilateral pleural effusions. Moderate amount of stool throughout the colon. Nondisplaced fracture left posterior ninth rib. No CT evidence of acute left hip fracture. Electronically signed by:  Corey Arteaga MD  3/5/2019 4:33 PM CST Workstation: [id number]    Xr Ribs Left With Pa Chest    Result Date: 3/2/2019  Narrative: EXAM:         Radiograph(s), Chest and left ribs VIEWS:   CXR: 1; left ribs:2     DATE/TIME:  3/2/2019 3:42 PM CST            INDICATION:   r/o fracture, Z74.09 Other reduced mobility Z74.09 Other reduced mobility  COMPARISON:  CXR: 2/26/19         FINDINGS:         - lines/tubes:    none   - cardiac:         size within normal limits       - mediastinum: contour within normal limits       - lungs:         no focal air space process, pulmonary interstitial edema, nodule(s)/mass           - pleura:         no evidence of  fluid                - osseous:         Status post left shoulder replacement. No evidence of a displaced rib fracture.                - misc.:        Impression: CONCLUSION:    1. No  evidence of an active cardiopulmonary process.      2. No evidence of a displaced rib fracture.                                             Electronically signed by:  KRYSTEN Cast MD  3/2/2019 3:45 PM CST Workstation: [id number]    Mri Lumbar Spine Without Contrast    Result Date: 3/10/2019  Narrative: MRI of the lumbar spine without contrast HISTORY: Back and hip pain. Low back pain radiating down the left hip and leg. Axial and sagittal T1 and T2 images and sagittal fat saturated T2-weighted images of the lumbar spine were obtained COMPARISON: None. Correlation CT lumbar spine September 16, 2018 and CT abdomen and pelvis March 5, 2019. The conus is in normal position. No intradural or extradural mass. Degenerative disc disease and spondylotic change at T10-11 without associated stenosis. T12-L1 degenerative disc disease and right paracentral disc herniation with minimal right-sided canal stenosis. This is similar in appearance to the CT of September 16, 2018. L1-2 degenerative disc disease and spondylotic change without associated stenosis. L2-3 degenerative disc disease and spondylotic change without associated stenosis. Recent minimal concave compression fracture of the inferior endplate of L4. Ligamentum flavum hypertrophy and facet arthropathy L4-5 with minimal stenosis neural foramen for the L4 nerve roots bilaterally. Old minimal concave compression deformities superior endplates L1 and L2 vertebral bodies.     Impression: CONCLUSION: Degenerative disc disease and spondylotic change at T10-11 without associated stenosis. T12-L1 degenerative disc disease and right paracentral disc herniation with minimal right-sided canal stenosis. This is similar in appearance to the CT of September 16, 2018. L1-2 and L2-3 degenerative disc disease and spondylotic change without associated stenosis. Recent minimal concave compression fracture of the inferior endplate of L4. Ligamentum flavum hypertrophy and facet  arthropathy L4-5 with minimal stenosis neural foramen for the L4 nerve roots bilaterally. Old minimal concave compression deformities superior endplates L1 and L2 vertebral bodies. [id number] Electronically signed by:  Jason Marie MD  3/10/2019 12:20 PM CDT Workstation: [id number]    Nm Bone Scan Whole Body    Result Date: 3/11/2019  Narrative: . EXAMINATION:  Total body bone scan      INDICATION:  back and hip pain, Z74.09 Other reduced mobility Z74.09 Other reduced mobility  CLINICAL HISTORY: COMPARISON EXAMINATIONS:   No prior studies available for correlation.  DOSE:  27 mCi of technetium labeled diphosphonate (MDP or HDP) (I.V.)    TECHNIQUE:  Total body scan, anterior and posterior projections         FINDINGS:        The uptake and distribution of radiotracer activity demonstrates:        A physiologic distribution for a patient of this age.       The kidneys are visualized bilaterally.    .        Impression: CONCLUSION:         1.  Negative examination for age.          Electronically signed by:  KRYSTEN Cast MD  3/11/2019 4:40 PM CDT Workstation: [id number]    Us Venous Doppler Upper Extremity Right (duplex)    Result Date: 3/22/2019  Narrative: Ultrasound venous duplex right upper extremity HISTORY: Right upper extremity swelling. Swelling right hand and fingers. Duplex ultrasound of the venous system of the right upper extremity was performed Real-time images demonstrate normal compressibility without evidence of intraluminal thrombus. Doppler shows phasic flow and augmentation. Color Doppler also reveals venous patency. Minimal subcutaneous fluid dorsal aspect of the wrist.     Impression: CONCLUSION: No ultrasound evidence of venous thrombosis of the right upper extremity. Minimal subcutaneous fluid dorsal aspect of the wrist. [id number] Electronically signed by:  Jason Marie MD  3/22/2019 12:19 PM CDT Workstation: [id number]    Ct Lower Extremity Left Without Contrast    Result Date:  3/5/2019  Narrative: PROCEDURES: CT ABDOMEN PELVIS WITHOUT IV CONTRAST, CT LOWER EXTREMITY WITHOUT IV CONTRAST HISTORY: Abdomen-pelvis trauma, minor, blunt, Z74.09 Other reduced mobility Z74.09 Other reduced mobility Dose length product: 1296 This exam was performed using radiation doses that are as low as reasonably achievable (ALARA). This exam was performed according to our departmental dose optimization program, which includes automated exposure control, adjustment of the mA and/or KV according to patient size and/or use of iterative reconstruction technique. COMPARISON: Chest CT 2/24/2019. CONTRAST: None TECHNIQUE: Multiple contiguous noncontrast axial images are obtained of the abdomen and pelvis. FINDINGS: LOWER CHEST: Tiny bilateral pleural effusions. Atelectasis/consolidation in the lung bases. HEPATOBILIARY: Unremarkable. SPLEEN: Unremarkable. PANCREAS: Unremarkable ADRENAL GLANDS: Unremarkable. KIDNEYS/URETERS: No evidence of hydronephrosis or suspicious mass. GASTROINTESTINAL: Moderate amount of stool throughout the colon REPRODUCTIVE ORGANS: Unremarkable. URINARY BLADDER: Unremarkable VASCULAR: Severe aortoiliac catheter was chronic calcification. Left common iliac stent LYMPH NODES: No pathologically enlarged nodes by size criteria. PERITONEUM/RETROPERITONEUM: Unremarkable. OSSEOUS STRUCTURES: Nondisplaced fracture posterior left ninth rib. Old healed fracture of the left inferior pubic ramus. Degenerative changes of the spine. LEFT HIP CT: No evidence of acute fracture. Old healed fracture of the left inferior pubic ramus.     Impression: CONCLUSION: Atelectasis/consolidation lung bases and tiny bilateral pleural effusions. Moderate amount of stool throughout the colon. Nondisplaced fracture left posterior ninth rib. No CT evidence of acute left hip fracture. Electronically signed by:  Corey Arteaga MD  3/5/2019 4:33 PM CST Workstation: [id number]    Xr Hip With Or Without Pelvis 2 - 3 View Left    Result  Date: 3/1/2019  Narrative: EXAM:  Radiograph(s), Osseous       REGION:   Pelvis and left hip  SIDE:     Left   VIEWS:   3         INDICATION:    post fall, Z74.09 Other reduced mobility   COMPARISON:    none          FINDINGS:       No evidence of a fracture or bony malalignment.   No evidence of osteolytic/osteoblastic disease.   Age-related degenerative changes. Diffuse osteopenia.                         .        Impression: CONCLUSION:       1. No radiographic evidence of acute osseous pathology.          Note:  if pain or symptoms persist beyond reasonable expectations and follow-up imaging is anticipated,  cross sectional imaging (CT and/or MRI) is suggested, as is deemed clinically appropriate.            Electronically signed by:  KRYSTEN Cast MD  3/1/2019 5:32 PM CST Workstation: [id number]            ASSESSMENT:    Diagnoses and all orders for this visit:    Other back pain, unspecified chronicity          PLAN    I offered a back brace for her which she declines today.  I discussed options for kyphoplasty, which she has declined to have done today.  Xray lumbar spine, xray pelvis.      Aba Santa MD

## 2019-04-09 ENCOUNTER — HOSPITAL ENCOUNTER (EMERGENCY)
Facility: HOSPITAL | Age: 69
Discharge: HOME | End: 2019-04-10
Attending: EMERGENCY MEDICINE | Admitting: EMERGENCY MEDICINE

## 2019-04-09 ENCOUNTER — APPOINTMENT (OUTPATIENT)
Dept: GENERAL RADIOLOGY | Facility: HOSPITAL | Age: 69
End: 2019-04-09

## 2019-04-09 ENCOUNTER — APPOINTMENT (OUTPATIENT)
Dept: CT IMAGING | Facility: HOSPITAL | Age: 69
End: 2019-04-09

## 2019-04-09 DIAGNOSIS — M19.041 PRIMARY OSTEOARTHRITIS OF RIGHT HAND: ICD-10-CM

## 2019-04-09 DIAGNOSIS — D72.829 LEUKOCYTOSIS, UNSPECIFIED TYPE: Primary | ICD-10-CM

## 2019-04-09 LAB — GLUCOSE BLDC GLUCOMTR-MCNC: 104 MG/DL (ref 70–130)

## 2019-04-09 PROCEDURE — 85007 BL SMEAR W/DIFF WBC COUNT: CPT | Performed by: EMERGENCY MEDICINE

## 2019-04-09 PROCEDURE — 80053 COMPREHEN METABOLIC PANEL: CPT | Performed by: EMERGENCY MEDICINE

## 2019-04-09 PROCEDURE — 85610 PROTHROMBIN TIME: CPT | Performed by: EMERGENCY MEDICINE

## 2019-04-09 PROCEDURE — 93010 ELECTROCARDIOGRAM REPORT: CPT | Performed by: INTERNAL MEDICINE

## 2019-04-09 PROCEDURE — 93005 ELECTROCARDIOGRAM TRACING: CPT | Performed by: EMERGENCY MEDICINE

## 2019-04-09 PROCEDURE — 73130 X-RAY EXAM OF HAND: CPT

## 2019-04-09 PROCEDURE — 96361 HYDRATE IV INFUSION ADD-ON: CPT

## 2019-04-09 PROCEDURE — 85730 THROMBOPLASTIN TIME PARTIAL: CPT | Performed by: EMERGENCY MEDICINE

## 2019-04-09 PROCEDURE — 85025 COMPLETE CBC W/AUTO DIFF WBC: CPT | Performed by: EMERGENCY MEDICINE

## 2019-04-09 PROCEDURE — 99284 EMERGENCY DEPT VISIT MOD MDM: CPT

## 2019-04-09 PROCEDURE — 84484 ASSAY OF TROPONIN QUANT: CPT | Performed by: EMERGENCY MEDICINE

## 2019-04-09 PROCEDURE — 82962 GLUCOSE BLOOD TEST: CPT

## 2019-04-09 PROCEDURE — 96360 HYDRATION IV INFUSION INIT: CPT

## 2019-04-09 PROCEDURE — 71045 X-RAY EXAM CHEST 1 VIEW: CPT

## 2019-04-09 PROCEDURE — 70450 CT HEAD/BRAIN W/O DYE: CPT

## 2019-04-09 RX ORDER — SODIUM CHLORIDE 0.9 % (FLUSH) 0.9 %
10 SYRINGE (ML) INJECTION AS NEEDED
Status: DISCONTINUED | OUTPATIENT
Start: 2019-04-09 | End: 2019-04-10 | Stop reason: HOSPADM

## 2019-04-09 RX ORDER — CYCLOBENZAPRINE HCL 10 MG
10 TABLET ORAL 3 TIMES DAILY PRN
COMMUNITY
End: 2020-03-02

## 2019-04-09 RX ORDER — SODIUM CHLORIDE 9 MG/ML
100 INJECTION, SOLUTION INTRAVENOUS CONTINUOUS
Status: DISCONTINUED | OUTPATIENT
Start: 2019-04-09 | End: 2019-04-10 | Stop reason: HOSPADM

## 2019-04-10 VITALS
WEIGHT: 137 LBS | SYSTOLIC BLOOD PRESSURE: 161 MMHG | OXYGEN SATURATION: 93 % | HEIGHT: 62 IN | TEMPERATURE: 100 F | DIASTOLIC BLOOD PRESSURE: 70 MMHG | BODY MASS INDEX: 25.21 KG/M2 | RESPIRATION RATE: 18 BRPM | HEART RATE: 91 BPM

## 2019-04-10 LAB
ABO GROUP BLD: NORMAL
ALBUMIN SERPL-MCNC: 3.9 G/DL (ref 3.5–5.2)
ALBUMIN/GLOB SERPL: 1 G/DL
ALP SERPL-CCNC: 99 U/L (ref 39–117)
ALT SERPL W P-5'-P-CCNC: 9 U/L (ref 1–33)
ANION GAP SERPL CALCULATED.3IONS-SCNC: 12 MMOL/L
ANISOCYTOSIS BLD QL: NORMAL
APTT PPP: 34.9 SECONDS (ref 20–40.3)
ARTERIAL PATENCY WRIST A: ABNORMAL
AST SERPL-CCNC: 17 U/L (ref 1–32)
ATMOSPHERIC PRESS: 744 MMHG
BASE EXCESS BLDA CALC-SCNC: 2.4 MMOL/L (ref 0–2)
BASOPHILS # BLD AUTO: 0.09 10*3/MM3 (ref 0–0.2)
BASOPHILS NFR BLD AUTO: 0.6 % (ref 0–1.5)
BDY SITE: ABNORMAL
BILIRUB SERPL-MCNC: 0.3 MG/DL (ref 0.2–1.2)
BILIRUB UR QL STRIP: NEGATIVE
BLD GP AB SCN SERPL QL: NEGATIVE
BUN BLD-MCNC: 11 MG/DL (ref 8–23)
BUN/CREAT SERPL: 14.5 (ref 7–25)
CALCIUM SPEC-SCNC: 9.6 MG/DL (ref 8.6–10.5)
CHLORIDE SERPL-SCNC: 101 MMOL/L (ref 98–107)
CLARITY UR: CLEAR
CO2 SERPL-SCNC: 25 MMOL/L (ref 22–29)
COLOR UR: YELLOW
CREAT BLD-MCNC: 0.76 MG/DL (ref 0.57–1)
D-LACTATE SERPL-SCNC: 1.4 MMOL/L (ref 0.5–2)
DEPRECATED RDW RBC AUTO: 52.1 FL (ref 37–54)
EOSINOPHIL # BLD AUTO: 0.01 10*3/MM3 (ref 0–0.4)
EOSINOPHIL NFR BLD AUTO: 0.1 % (ref 0.3–6.2)
ERYTHROCYTE [DISTWIDTH] IN BLOOD BY AUTOMATED COUNT: 16.8 % (ref 12.3–15.4)
GFR SERPL CREATININE-BSD FRML MDRD: 76 ML/MIN/1.73
GLOBULIN UR ELPH-MCNC: 3.9 GM/DL
GLUCOSE BLD-MCNC: 111 MG/DL (ref 65–99)
GLUCOSE UR STRIP-MCNC: NEGATIVE MG/DL
HCO3 BLDA-SCNC: 26 MMOL/L (ref 20–26)
HCT VFR BLD AUTO: 36.3 % (ref 34–46.6)
HGB BLD-MCNC: 11.6 G/DL (ref 12–15.9)
HGB UR QL STRIP.AUTO: NEGATIVE
HOLD SPECIMEN: NORMAL
HOLD SPECIMEN: NORMAL
HYPOCHROMIA BLD QL: NORMAL
IMM GRANULOCYTES # BLD AUTO: 0.07 10*3/MM3 (ref 0–0.05)
IMM GRANULOCYTES NFR BLD AUTO: 0.5 % (ref 0–0.5)
INR PPP: 2 (ref 0.8–1.2)
KETONES UR QL STRIP: NEGATIVE
LEUKOCYTE ESTERASE UR QL STRIP.AUTO: NEGATIVE
LYMPHOCYTES # BLD AUTO: 4.6 10*3/MM3 (ref 0.7–3.1)
LYMPHOCYTES NFR BLD AUTO: 32.3 % (ref 19.6–45.3)
Lab: ABNORMAL
Lab: NORMAL
MCH RBC QN AUTO: 27.6 PG (ref 26.6–33)
MCHC RBC AUTO-ENTMCNC: 32 G/DL (ref 31.5–35.7)
MCV RBC AUTO: 86.2 FL (ref 79–97)
MODALITY: ABNORMAL
MONOCYTES # BLD AUTO: 2.26 10*3/MM3 (ref 0.1–0.9)
MONOCYTES NFR BLD AUTO: 15.8 % (ref 5–12)
NEUTROPHILS # BLD AUTO: 7.23 10*3/MM3 (ref 1.4–7)
NEUTROPHILS NFR BLD AUTO: 50.7 % (ref 42.7–76)
NEUTS HYPERSEG # BLD: NORMAL 10*3/UL
NITRITE UR QL STRIP: NEGATIVE
NRBC BLD AUTO-RTO: 0 /100 WBC (ref 0–0)
PCO2 BLDA: 35.5 MM HG (ref 35–45)
PH BLDA: 7.47 PH UNITS (ref 7.35–7.45)
PH UR STRIP.AUTO: 5.5 [PH] (ref 5–9)
PLATELET # BLD AUTO: 353 10*3/MM3 (ref 140–450)
PMV BLD AUTO: 8.8 FL (ref 6–12)
PO2 BLDA: 61.1 MM HG (ref 83–108)
POIKILOCYTOSIS BLD QL SMEAR: NORMAL
POTASSIUM BLD-SCNC: 3.5 MMOL/L (ref 3.5–5.2)
PROT SERPL-MCNC: 7.8 G/DL (ref 6–8.5)
PROT UR QL STRIP: NEGATIVE
PROTHROMBIN TIME: 21.9 SECONDS (ref 11.1–15.3)
RBC # BLD AUTO: 4.21 10*6/MM3 (ref 3.77–5.28)
RH BLD: POSITIVE
SAO2 % BLDCOA: 92.9 % (ref 94–99)
SMALL PLATELETS BLD QL SMEAR: ADEQUATE
SODIUM BLD-SCNC: 138 MMOL/L (ref 136–145)
SP GR UR STRIP: 1.02 (ref 1–1.03)
T&S EXPIRATION DATE: NORMAL
TROPONIN T SERPL-MCNC: <0.01 NG/ML (ref 0–0.03)
UROBILINOGEN UR QL STRIP: NORMAL
VENTILATOR MODE: ABNORMAL
WBC NRBC COR # BLD: 14.26 10*3/MM3 (ref 3.4–10.8)
WHOLE BLOOD HOLD SPECIMEN: NORMAL
WHOLE BLOOD HOLD SPECIMEN: NORMAL

## 2019-04-10 PROCEDURE — 96360 HYDRATION IV INFUSION INIT: CPT

## 2019-04-10 PROCEDURE — 36600 WITHDRAWAL OF ARTERIAL BLOOD: CPT

## 2019-04-10 PROCEDURE — 86901 BLOOD TYPING SEROLOGIC RH(D): CPT | Performed by: EMERGENCY MEDICINE

## 2019-04-10 PROCEDURE — 83605 ASSAY OF LACTIC ACID: CPT | Performed by: EMERGENCY MEDICINE

## 2019-04-10 PROCEDURE — 96361 HYDRATE IV INFUSION ADD-ON: CPT

## 2019-04-10 PROCEDURE — 81003 URINALYSIS AUTO W/O SCOPE: CPT | Performed by: EMERGENCY MEDICINE

## 2019-04-10 PROCEDURE — 86850 RBC ANTIBODY SCREEN: CPT | Performed by: EMERGENCY MEDICINE

## 2019-04-10 PROCEDURE — 86900 BLOOD TYPING SEROLOGIC ABO: CPT | Performed by: EMERGENCY MEDICINE

## 2019-04-10 PROCEDURE — 82803 BLOOD GASES ANY COMBINATION: CPT

## 2019-04-10 RX ORDER — LEVOFLOXACIN 500 MG/1
500 TABLET, FILM COATED ORAL DAILY
Qty: 7 TABLET | Refills: 0 | Status: SHIPPED | OUTPATIENT
Start: 2019-04-10 | End: 2019-04-17

## 2019-04-10 RX ORDER — LEVOFLOXACIN 500 MG/1
500 TABLET, FILM COATED ORAL ONCE
Status: COMPLETED | OUTPATIENT
Start: 2019-04-10 | End: 2019-04-10

## 2019-04-10 RX ADMIN — SODIUM CHLORIDE 100 ML/HR: 900 INJECTION, SOLUTION INTRAVENOUS at 00:29

## 2019-04-10 RX ADMIN — LEVOFLOXACIN 500 MG: 500 TABLET, FILM COATED ORAL at 03:20

## 2019-04-10 NOTE — ED NOTES
"Reported \"possible stroke\" initial complaint and symptoms onset to Catalina SUNG.     Navya Hogan  04/09/19 2226    " Patient nonverbal, bedbound, incontinent of urine and stool. Skin warm, dry with increased moisture in intertriginous folds, adequate skin turgor, scattered areas of hyperpigmentation and hypopigmentation. Bilateral lower legs with severe contracture, bilateral foot drop. Bilateral upper extremities rigid. Hypopigmentation of right buttock and right elbow.    Bilateral lower extremities with dry, flaky skin. Thickened-yellow toenails. No temperature changes noted. No Edema. Capillary refill < 3 seconds. B/L DP pulse palpable with biphasic doppler sound, B/L PT pulses palpable, with triphasic doppler sounds.    Left plantar foot located between 3-4th toe on metatarsal head: measures 1cmx1.3cmx0.5cm. Wound base 10% bone, 20% pale pink agranular tissue and 70% hypergranular tissue. Periwound with circumferential callus, able to probe under callus in periphery of wound from 11-3 o'clock that extends 0.5cm. Hyperpigmentation surrounding callus. Scant-small amount of serosanguinous drainage, no odor. No induration, no increased warmth, no erythema. Goal of care: decrease/control bioburden, manage exudate.    At risk for incontinence associated dermatitis of bilateral buttock: skin intact, no erythema.

## 2019-04-10 NOTE — ED PROVIDER NOTES
Subjective   67yo female pmh significant o2 dependent copd/htn/hyperlipidemia/cad/pad/mdd/pe presents ED c/o 1d hx intermittent confusion.  Pt c/o additional 1d hx nontraumatic right hand pain.  ROS (+) mild headache.  Denies fever/chills/cough/soa/chest pain/abd pain/paresthesia/motor weakness/dysarthria/dysphagia.        History provided by:  Relative and patient  Illness   Duration:  1 day  Chronicity:  New  Associated symptoms: headaches        Review of Systems   Constitutional: Negative.    Respiratory: Negative.    Cardiovascular: Negative.    Gastrointestinal: Negative.    Genitourinary: Negative.    Musculoskeletal: Positive for joint swelling.   Skin: Negative.    Neurological: Positive for headaches. Negative for speech difficulty, weakness and numbness.   All other systems reviewed and are negative.      Past Medical History:   Diagnosis Date   • Anxiety    • Arthritis    • Asthma    • 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)     bilateral legs - bilateral iliac stents 2008      • Chronic lower back pain    • COPD (chronic obstructive pulmonary disease) (CMS/HCC)    • Coronary arteriosclerosis    • Essential (primary) hypertension    • History of pulmonary embolus (PE)    • Hyperlipidemia    • Insomnia    • Lumbago    • Nicotine dependence    • Occlusion of artery      and stenosis of bilateral carotid arteries - BABS 16-49%, LICA 0-15%   • Other atherosclerosis of native artery of other extremity     LEFT subclavian stent 2005 (occluded)       Allergies   Allergen Reactions   • Morphine And Related Itching, Confusion and Hallucinations   • Penicillins Rash       Past Surgical History:   Procedure Laterality Date   • CARDIAC CATHETERIZATION  04/06/2016    No evidence of any obstructive epicardial CAD.Preserved LV systolic function with EF of 55%.   • CENTRAL VENOUS LINE INSERTION  04/07/2016    Successful placement of right uppe extremity 6-Tajik triple lumen PICC  line.   • ENDOSCOPY N/A 2018    Procedure: ESOPHAGOGASTRODUODENOSCOPY;  Surgeon: Bin Martin MD;  Location: [facility] ENDOSCOPY;  Service:    • ENDOSCOPY N/A 2018    Procedure: ESOPHAGOGASTRODUODENOSCOPY;  Surgeon: Bin Martin MD;  Location: [facility] ENDOSCOPY;  Service:    • ENDOSCOPY N/A 3/16/2018    Procedure: ESOPHAGOGASTRODUODENOSCOPY possible dilation;  Surgeon: Bin Martin MD;  Location: [facility] ENDOSCOPY;  Service: Gastroenterology   • FRACTURE SURGERY     • HYSTERECTOMY     • TOTAL SHOULDER REPLACEMENT Left    • TRANSESOPHAGEAL ECHOCARDIOGRAM (JACQUES)  2016    With color flow-Mild to moderate [facility].LV systolic function well preserved with Ef of 55-60%.Mitral and AV intact.Diastolic dysfunction   • UPPER GASTROINTESTINAL ENDOSCOPY  2018    Dr. Bin Martin M.D.       Family History   Problem Relation Age of Onset   • Heart disease Other    • Hypertension Other        Social History     Socioeconomic History   • Marital status:      Spouse name: Not on file   • Number of children: Not on file   • Years of education: Not on file   • Highest education level: Not on file   Tobacco Use   • Smoking status: Former Smoker     Packs/day: 1.00     Years: 50.00     Pack years: 50.00     Types: Cigarettes     Last attempt to quit: 2019     Years since quittin.1   • Smokeless tobacco: Never Used   • Tobacco comment: 2018 - Patient opted to decline cessation of smoking counseling at present time.   Substance and Sexual Activity   • Alcohol use: No   • Drug use: No   • Sexual activity: Defer           Objective   Physical Exam   Constitutional: She is oriented to person, place, and time. She appears well-developed and well-nourished.   HENT:   Head: Normocephalic and atraumatic.   Nose: Nose normal.   Mouth/Throat: Oropharynx is clear and moist.   Eyes: EOM are normal. Pupils are equal, round, and reactive to light.   Neck: Normal range of motion. Neck supple. No JVD  present. No tracheal deviation present.   Cardiovascular: Normal rate, regular rhythm, normal heart sounds and intact distal pulses. Exam reveals no gallop and no friction rub.   No murmur heard.  Pulmonary/Chest: Effort normal and breath sounds normal. No stridor. She has no wheezes. She has no rales.   Abdominal: Soft. Bowel sounds are normal. She exhibits no distension. There is no tenderness. There is no guarding.   Musculoskeletal: Normal range of motion.   Lymphadenopathy:     She has no cervical adenopathy.   Neurological: She is alert and oriented to person, place, and time. She has normal strength. No cranial nerve deficit or sensory deficit. Coordination normal. GCS eye subscore is 4. GCS verbal subscore is 5. GCS motor subscore is 6.   Neg [place] prehospital stroke scale   Skin: Skin is warm and dry.   Nursing note and vitals reviewed.      ECG 12 Lead    Date/Time: 4/10/2019 12:07 AM  Performed by: Chaitanya Domingo MD  Authorized by: Chaitanya Domingo MD   Interpreted by physician  Rhythm: sinus rhythm  Ectopy: PVCs  Rate: normal  BPM: 85  QRS axis: left  Conduction: conduction normal  Q waves: III and aVF  Clinical impression: abnormal ECG                 ED Course  ED Course as of Apr 10 0308   Wed Apr 10, 2019   0127 Checkout Dr. Saleh. Pending lab/disposition.  [SD]   0307 Patient was signed out to me at shift change at 1:30 AM by Dr. Domingo.  He requested I follow-up on the lactic acid level which was pending.  He related to me that if the lactic acid was normal then patient could be discharged with outpatient antibiotics.  Patient is alert and resting comfortably in no acute distress.  I reviewed the results of her evaluation with her and her daughter.  I recommended close follow-up with her primary care physician advised to return to the emergency department if her symptoms change or worsen.  [DR]      ED Course User Index  [DR] Abdelrahman Saleh MD  [SD] Chaitanya Domingo MD      Labs Reviewed    COMPREHENSIVE METABOLIC PANEL - Abnormal; Notable for the following components:       Result Value    Glucose 111 (*)     All other components within normal limits    Narrative:     GFR Normal >60  Chronic Kidney Disease <60  Kidney Failure <15   PROTIME-INR - Abnormal; Notable for the following components:    Protime 21.9 (*)     INR 2.00 (*)     All other components within normal limits    Narrative:     Therapeutic range for most indications is 2.0-3.0 INR,  or 2.5-3.5 for mechanical heart valves.   CBC WITH AUTO DIFFERENTIAL - Abnormal; Notable for the following components:    WBC 14.26 (*)     Hemoglobin 11.6 (*)     RDW 16.8 (*)     Monocyte % 15.8 (*)     Eosinophil % 0.1 (*)     Neutrophils, Absolute 7.23 (*)     Lymphocytes, Absolute 4.60 (*)     Monocytes, Absolute 2.26 (*)     Immature Grans, Absolute 0.07 (*)     All other components within normal limits   BLOOD GAS, ARTERIAL - Abnormal; Notable for the following components:    pH, Arterial 7.473 (*)     pO2, Arterial 61.1 (*)     Base Excess, Arterial 2.4 (*)     O2 Saturation, Arterial 92.9 (*)     All other components within normal limits   APTT - Normal    Narrative:     The recommended Heparin therapeutic range is 68-97 seconds.   TROPONIN (IN-HOUSE) - Normal    Narrative:     Troponin T Reference Range:  <= 0.03 ng/mL-   Negative for AMI  >0.03 ng/mL-     Abnormal for myocardial necrosis.  Clinicians would have to utilize clinical acumen, EKG, Troponin and serial changes to determine if it is an Acute Myocardial Infarction or myocardial injury due to an underlying chronic condition.    URINALYSIS W/ MICROSCOPIC IF INDICATED (NO CULTURE) - Normal    Narrative:     Urine microscopic not indicated.   LACTIC ACID, PLASMA - Normal   POCT GLUCOSE FINGERSTICK - Normal   BLOOD CULTURE   BLOOD CULTURE   RAINBOW DRAW    Narrative:     The following orders were created for panel order [place] Draw.  Procedure                               Abnormality          Status                     ---------                               -----------         ------                     Light Blue Top[[id number]]                                   Final result               Green Top (Gel)[[id number]]                                  Final result               Lavender Top[[id number]]                                     Final result               Gold Top - SST[[id number]]                                   Final result                 Please view results for these tests on the individual orders.   BLOOD GAS, ARTERIAL   SCAN SLIDE   POCT GLUCOSE FINGERSTICK   TYPE AND SCREEN   PREVIOUS HISTORY   CBC AND DIFFERENTIAL    Narrative:     The following orders were created for panel order CBC & Differential.  Procedure                               Abnormality         Status                     ---------                               -----------         ------                     Scan Slide[[id number]]                                       Final result               CBC Auto Differential[[id number]]        Abnormal            Final result                 Please view results for these tests on the individual orders.   LIGHT BLUE TOP   GREEN TOP   LAVENDER TOP   GOLD TOP - SST     Xr Hand 3+ View Right    Result Date: 4/9/2019  Narrative: Right hand three view on 4/9/2019 CLINICAL INDICATION: Right hand pain COMPARISON: 6/4/2018 FINDINGS: There has been complete healing of the prior fracture of the mid fifth metacarpal diaphysis. There is a chronic calcification and/or foreign body in the palmar soft tissues underlying the third metacarpal. There is diffuse osteopenia. Changes of osteoarthritis are noted in the base of the thumb and IP joints. No acute fracture is noted. There is no dislocation. No other bony abnormality is noted.     Impression: Chronic findings as above with no acute abnormality. Electronically signed by:  Surinder Cantu  4/9/2019 11:27 PM CDT Workstation: [id number]    Nm  Bone Scan Whole Body    Result Date: 3/11/2019  Narrative: . EXAMINATION:  Total body bone scan      INDICATION:  back and hip pain, Z74.09 Other reduced mobility Z74.09 Other reduced mobility  CLINICAL HISTORY: COMPARISON EXAMINATIONS:   No prior studies available for correlation.  DOSE:  27 mCi of technetium labeled diphosphonate (MDP or HDP) (I.V.)    TECHNIQUE:  Total body scan, anterior and posterior projections         FINDINGS:        The uptake and distribution of radiotracer activity demonstrates:        A physiologic distribution for a patient of this age.       The kidneys are visualized bilaterally.    .        Impression: CONCLUSION:         1.  Negative examination for age.          Electronically signed by:  KRYSTEN Cast MD  3/11/2019 4:40 PM CDT Workstation: [id number]    Xr Chest 1 View    Result Date: 4/9/2019  Narrative: Chest single view on  4/9/2019 CLINICAL INDICATION: Right arm weakness, stroke COMPARISON: 2/26/2019 FINDINGS: There is mild elevation of the right hemidiaphragm. There is adjacent linear atelectasis or scarring in the right lung base. Mild chronic interstitial changes are noted. There is evidence of calcified granulomatous disease in the chest. Vascular calcification is noted in the aorta. Right-sided PICC line has been removed. The patient is status post left shoulder arthroplasty.     Impression: Chronic findings as above with no acute abnormality. Electronically signed by:  Surinder Cantu  4/9/2019 11:00 PM CDT Workstation: [id number]    Us Venous Doppler Upper Extremity Right (duplex)    Result Date: 3/22/2019  Narrative: Ultrasound venous duplex right upper extremity HISTORY: Right upper extremity swelling. Swelling right hand and fingers. Duplex ultrasound of the venous system of the right upper extremity was performed Real-time images demonstrate normal compressibility without evidence of intraluminal thrombus. Doppler shows phasic flow and augmentation. Color  Doppler also reveals venous patency. Minimal subcutaneous fluid dorsal aspect of the wrist.     Impression: CONCLUSION: No ultrasound evidence of venous thrombosis of the right upper extremity. Minimal subcutaneous fluid dorsal aspect of the wrist. [id number] Electronically signed by:  Jason Marie MD  3/22/2019 12:19 PM CDT Workstation: [id number]    Ct Head Without Contrast Stroke Protocol    Result Date: 4/9/2019  Narrative: CT head without contrast on 4/9/2019 CLINICAL INDICATION: Right arm weakness TECHNIQUE:  Multiple axial images are obtained throughout the head without the administration of contrast.  This exam was performed according to our departmental dose-optimization program, which includes automated exposure control, adjustment of the mA and/or kV according to patient size and/or use of iterative reconstruction technique. Total DLP is 1011.7 mGy*cm. COMPARISON: 9/16/2018 FINDINGS:  There is generalized cerebral atrophy. There is low density in the periventricular white matter consistent with chronic small vessel ischemic changes. There is no hydrocephalus. There is no hemorrhage. There are no abnormal extra-axial fluid collections. There is no mass, mass effect or midline shift. There is no CT evidence of acute infarct. No bony abnormality is noted.     Impression: Atrophy and chronic small vessel ischemic changes with no acute intracranial abnormality. Electronically signed by:  Surinder Cantu  4/9/2019 10:50 PM CDT Workstation: [id number]         Interval: baseline  1a. Level of Consciousness: 0-->Alert, keenly responsive  1b. LOC Questions: 0-->Answers both questions correctly  1c. LOC Commands: 0-->Performs both tasks correctly  2. Best Gaze: 0-->Normal  3. Visual: 0-->No visual loss  4. Facial Palsy: 0-->Normal symmetrical movements  5a. Motor Arm, Left: 0-->No drift, limb holds 90 (or 45) degrees for full 10 secs  5b. Motor Arm, Right: 0-->No drift, limb holds 90 (or 45) degrees for full 10  secs  6a. Motor Leg, Left: 0-->No drift, leg holds 30 degree position for full 5 secs  6b. Motor Leg, Right: 0-->No drift, leg holds 30 degree position for full 5 secs  7. Limb Ataxia: 0-->Absent  8. Sensory: 0-->Normal, no sensory loss  9. Best Language: 0-->No aphasia, normal  10. Dysarthria: 0-->Normal  11. Extinction and Inattention (formerly Neglect): 0-->No abnormality    Total (NIH Stroke Scale): 0    MDM  Labs Reviewed   COMPREHENSIVE METABOLIC PANEL - Abnormal; Notable for the following components:       Result Value    Glucose 111 (*)     All other components within normal limits    Narrative:     GFR Normal >60  Chronic Kidney Disease <60  Kidney Failure <15   PROTIME-INR - Abnormal; Notable for the following components:    Protime 21.9 (*)     INR 2.00 (*)     All other components within normal limits    Narrative:     Therapeutic range for most indications is 2.0-3.0 INR,  or 2.5-3.5 for mechanical heart valves.   CBC WITH AUTO DIFFERENTIAL - Abnormal; Notable for the following components:    WBC 14.26 (*)     Hemoglobin 11.6 (*)     RDW 16.8 (*)     Monocyte % 15.8 (*)     Eosinophil % 0.1 (*)     Neutrophils, Absolute 7.23 (*)     Lymphocytes, Absolute 4.60 (*)     Monocytes, Absolute 2.26 (*)     Immature Grans, Absolute 0.07 (*)     All other components within normal limits   BLOOD GAS, ARTERIAL - Abnormal; Notable for the following components:    pH, Arterial 7.473 (*)     pO2, Arterial 61.1 (*)     Base Excess, Arterial 2.4 (*)     O2 Saturation, Arterial 92.9 (*)     All other components within normal limits   APTT - Normal    Narrative:     The recommended Heparin therapeutic range is 68-97 seconds.   TROPONIN (IN-HOUSE) - Normal    Narrative:     Troponin T Reference Range:  <= 0.03 ng/mL-   Negative for AMI  >0.03 ng/mL-     Abnormal for myocardial necrosis.  Clinicians would have to utilize clinical acumen, EKG, Troponin and serial changes to determine if it is an Acute Myocardial Infarction  or myocardial injury due to an underlying chronic condition.    URINALYSIS W/ MICROSCOPIC IF INDICATED (NO CULTURE) - Normal    Narrative:     Urine microscopic not indicated.   LACTIC ACID, PLASMA - Normal   POCT GLUCOSE FINGERSTICK - Normal   BLOOD CULTURE   BLOOD CULTURE   RAINBOW DRAW    Narrative:     The following orders were created for panel order [place] Draw.  Procedure                               Abnormality         Status                     ---------                               -----------         ------                     Light Blue Top[[id number]]                                   Final result               Green Top (Gel)[[id number]]                                  Final result               Lavender Top[[id number]]                                     Final result               Gold Top - SST[[id number]]                                   Final result                 Please view results for these tests on the individual orders.   BLOOD GAS, ARTERIAL   SCAN SLIDE   POCT GLUCOSE FINGERSTICK   TYPE AND SCREEN   PREVIOUS HISTORY   CBC AND DIFFERENTIAL    Narrative:     The following orders were created for panel order CBC & Differential.  Procedure                               Abnormality         Status                     ---------                               -----------         ------                     Scan Slide[[id number]]                                       Final result               CBC Auto Differential[[id number]]        Abnormal            Final result                 Please view results for these tests on the individual orders.   LIGHT BLUE TOP   GREEN TOP   LAVENDER TOP   GOLD TOP - SST     Xr Hand 3+ View Right    Result Date: 4/9/2019  Narrative: Right hand three view on 4/9/2019 CLINICAL INDICATION: Right hand pain COMPARISON: 6/4/2018 FINDINGS: There has been complete healing of the prior fracture of the mid fifth metacarpal diaphysis. There is a chronic calcification and/or foreign  body in the palmar soft tissues underlying the third metacarpal. There is diffuse osteopenia. Changes of osteoarthritis are noted in the base of the thumb and IP joints. No acute fracture is noted. There is no dislocation. No other bony abnormality is noted.     Impression: Chronic findings as above with no acute abnormality. Electronically signed by:  Surinder Cantu  4/9/2019 11:27 PM CDT Workstation: [id number]    Nm Bone Scan Whole Body    Result Date: 3/11/2019  Narrative: . EXAMINATION:  Total body bone scan      INDICATION:  back and hip pain, Z74.09 Other reduced mobility Z74.09 Other reduced mobility  CLINICAL HISTORY: COMPARISON EXAMINATIONS:   No prior studies available for correlation.  DOSE:  27 mCi of technetium labeled diphosphonate (MDP or HDP) (I.V.)    TECHNIQUE:  Total body scan, anterior and posterior projections         FINDINGS:        The uptake and distribution of radiotracer activity demonstrates:        A physiologic distribution for a patient of this age.       The kidneys are visualized bilaterally.    .        Impression: CONCLUSION:         1.  Negative examination for age.          Electronically signed by:  KRYSTEN Cast MD  3/11/2019 4:40 PM CDT Workstation: [id number]    Xr Chest 1 View    Result Date: 4/9/2019  Narrative: Chest single view on  4/9/2019 CLINICAL INDICATION: Right arm weakness, stroke COMPARISON: 2/26/2019 FINDINGS: There is mild elevation of the right hemidiaphragm. There is adjacent linear atelectasis or scarring in the right lung base. Mild chronic interstitial changes are noted. There is evidence of calcified granulomatous disease in the chest. Vascular calcification is noted in the aorta. Right-sided PICC line has been removed. The patient is status post left shoulder arthroplasty.     Impression: Chronic findings as above with no acute abnormality. Electronically signed by:  Surinder Cantu  4/9/2019 11:00 PM CDT Workstation: [id number]    Us  Venous Doppler Upper Extremity Right (duplex)    Result Date: 3/22/2019  Narrative: Ultrasound venous duplex right upper extremity HISTORY: Right upper extremity swelling. Swelling right hand and fingers. Duplex ultrasound of the venous system of the right upper extremity was performed Real-time images demonstrate normal compressibility without evidence of intraluminal thrombus. Doppler shows phasic flow and augmentation. Color Doppler also reveals venous patency. Minimal subcutaneous fluid dorsal aspect of the wrist.     Impression: CONCLUSION: No ultrasound evidence of venous thrombosis of the right upper extremity. Minimal subcutaneous fluid dorsal aspect of the wrist. [id number] Electronically signed by:  Jason Marie MD  3/22/2019 12:19 PM CDT Workstation: [id number]    Ct Head Without Contrast Stroke Protocol    Result Date: 4/9/2019  Narrative: CT head without contrast on 4/9/2019 CLINICAL INDICATION: Right arm weakness TECHNIQUE:  Multiple axial images are obtained throughout the head without the administration of contrast.  This exam was performed according to our departmental dose-optimization program, which includes automated exposure control, adjustment of the mA and/or kV according to patient size and/or use of iterative reconstruction technique. Total DLP is 1011.7 mGy*cm. COMPARISON: 9/16/2018 FINDINGS:  There is generalized cerebral atrophy. There is low density in the periventricular white matter consistent with chronic small vessel ischemic changes. There is no hydrocephalus. There is no hemorrhage. There are no abnormal extra-axial fluid collections. There is no mass, mass effect or midline shift. There is no CT evidence of acute infarct. No bony abnormality is noted.     Impression: Atrophy and chronic small vessel ischemic changes with no acute intracranial abnormality. Electronically signed by:  Surinder Cantu  4/9/2019 10:50 PM CDT Workstation: [id number]      Final diagnoses:   Leukocytosis,  unspecified type   Primary osteoarthritis of right hand            Abdelrahman Saleh MD  04/10/19 1745

## 2019-04-24 NOTE — ED PROVIDER NOTES
Subjective   History of Present Illness    Review of Systems    Past Medical History:   Diagnosis Date   • Anxiety    • Arthritis    • Asthma    • 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)     bilateral legs - bilateral iliac stents 2008      • Chronic lower back pain    • COPD (chronic obstructive pulmonary disease) (CMS/HCC)    • Coronary arteriosclerosis    • Essential (primary) hypertension    • History of pulmonary embolus (PE)    • Hyperlipidemia    • Insomnia    • Lumbago    • Nicotine dependence    • Occlusion of artery      and stenosis of bilateral carotid arteries - BABS 16-49%, LICA 0-15%   • Other atherosclerosis of native artery of other extremity     LEFT subclavian stent 2005 (occluded)       Allergies   Allergen Reactions   • Morphine And Related Itching, Confusion and Hallucinations   • Penicillins Rash       Past Surgical History:   Procedure Laterality Date   • CARDIAC CATHETERIZATION  04/06/2016    No evidence of any obstructive epicardial CAD.Preserved LV systolic function with EF of 55%.   • CENTRAL VENOUS LINE INSERTION  04/07/2016    Successful placement of right uppe extremity 6-[place] triple lumen PICC line.   • ENDOSCOPY N/A 1/12/2018    Procedure: ESOPHAGOGASTRODUODENOSCOPY;  Surgeon: Bin Oh MD;  Location: [facility] ENDOSCOPY;  Service:    • ENDOSCOPY N/A 1/17/2018    Procedure: ESOPHAGOGASTRODUODENOSCOPY;  Surgeon: Bin Oh MD;  Location: [facility] ENDOSCOPY;  Service:    • ENDOSCOPY N/A 3/16/2018    Procedure: ESOPHAGOGASTRODUODENOSCOPY possible dilation;  Surgeon: Bin Oh MD;  Location: [facility] ENDOSCOPY;  Service: Gastroenterology   • FRACTURE SURGERY     • HYSTERECTOMY     • TOTAL SHOULDER REPLACEMENT Left    • TRANSESOPHAGEAL ECHOCARDIOGRAM (JACQUES)  04/07/2016    With color flow-Mild to moderate [facility].LV systolic function well preserved with Ef of 55-60%.Mitral and AV intact.Diastolic dysfunction   • UPPER GASTROINTESTINAL  ENDOSCOPY  2018    Dr. Bin Martin M.D.       Family History   Problem Relation Age of Onset   • Heart disease Other    • Hypertension Other        Social History     Socioeconomic History   • Marital status:      Spouse name: Not on file   • Number of children: Not on file   • Years of education: Not on file   • Highest education level: Not on file   Tobacco Use   • Smoking status: Former Smoker     Packs/day: 1.00     Years: 50.00     Pack years: 50.00     Types: Cigarettes     Last attempt to quit: 2019     Years since quittin.1   • Smokeless tobacco: Never Used   • Tobacco comment: 2018 - Patient opted to decline cessation of smoking counseling at present time.   Substance and Sexual Activity   • Alcohol use: No   • Drug use: No   • Sexual activity: Defer           Objective   Physical Exam    Procedures           ED Course  ED Course as of Apr 24 1402   Wed Apr 10, 2019   0127 Checkout Dr. Saleh. Pending lab/disposition.  [SD]   0307 Patient was signed out to me at shift change at 1:30 AM by Dr. Domingo.  He requested I follow-up on the lactic acid level which was pending.  He related to me that if the lactic acid was normal then patient could be discharged with outpatient antibiotics.  Patient is alert and resting comfortably in no acute distress.  I reviewed the results of her evaluation with her and her daughter.  I recommended close follow-up with her primary care physician advised to return to the emergency department if her symptoms change or worsen.  [DR]      ED Course User Index  [DR] Abdelrahman Saleh MD  [SD] Chaitanya Domingo MD                  [facility]      Final diagnoses:   Leukocytosis, unspecified type   Primary osteoarthritis of right hand            Chaitanya Domingo MD  19 1402

## 2019-04-26 DIAGNOSIS — M54.5 LOW BACK PAIN, UNSPECIFIED BACK PAIN LATERALITY, UNSPECIFIED CHRONICITY, WITH SCIATICA PRESENCE UNSPECIFIED: Primary | ICD-10-CM

## 2019-04-30 RX ORDER — RIVAROXABAN 20 MG/1
20 TABLET, FILM COATED ORAL DAILY
Qty: 30 TABLET | Refills: 6 | OUTPATIENT
Start: 2019-04-30

## 2019-05-09 DIAGNOSIS — M54.5 LOW BACK PAIN, UNSPECIFIED BACK PAIN LATERALITY, UNSPECIFIED CHRONICITY, WITH SCIATICA PRESENCE UNSPECIFIED: Primary | ICD-10-CM

## 2019-05-13 ENCOUNTER — OFFICE VISIT (OUTPATIENT)
Dept: ORTHOPEDIC SURGERY | Facility: CLINIC | Age: 69
End: 2019-05-13

## 2019-05-13 VITALS — BODY MASS INDEX: 25.21 KG/M2 | WEIGHT: 137 LBS | HEIGHT: 62 IN

## 2019-05-13 DIAGNOSIS — M54.5 LOW BACK PAIN, UNSPECIFIED BACK PAIN LATERALITY, UNSPECIFIED CHRONICITY, WITH SCIATICA PRESENCE UNSPECIFIED: Primary | ICD-10-CM
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
## 2019-05-13 DIAGNOSIS — M81.0 OSTEOPOROSIS, SENILE: ICD-10-CM

## 2019-05-13 DIAGNOSIS — M54.89 OTHER BACK PAIN, UNSPECIFIED CHRONICITY: ICD-10-CM

## 2019-05-13 DIAGNOSIS — M80.88XA OTHER OSTEOPOROSIS WITH CURRENT PATHOLOGICAL FRACTURE, VERTEBRA(E), INITIAL ENCOUNTER FOR FRACTURE (HCC): ICD-10-CM

## 2019-05-13 PROCEDURE — 99213 OFFICE O/P EST LOW 20 MIN: CPT | Performed by: ORTHOPAEDIC SURGERY

## 2019-05-13 RX ORDER — BUPIVACAINE HCL/0.9 % NACL/PF 0.1 %
2 PLASTIC BAG, INJECTION (ML) EPIDURAL ONCE
Status: CANCELLED | OUTPATIENT
Start: 2019-05-23 | End: 2019-05-13

## 2019-05-13 RX ORDER — TRAMADOL HYDROCHLORIDE 50 MG/1
50 TABLET ORAL EVERY 6 HOURS PRN
Qty: 60 TABLET | Refills: 0 | Status: SHIPPED | OUTPATIENT
Start: 2019-05-13 | End: 2020-03-02

## 2019-05-13 NOTE — PROGRESS NOTES
Mona Perales is a 68 y.o. female returns for     Chief Complaint   Patient presents with   • Left Hip - Follow-up       HISTORY OF PRESENT ILLNESS: f/u on back pain, patient had xrays done today, patient states that she has a pain score of 10,     68 y pain of the back and hip region, and the buttock, the pain radiates at times.  States she is working with physical therapy, she is at [facility].  States pain is somewhat improved.  No fevers, no chills, no nighttime awakening pain  Making progress with physical therapy.   complains of persistent ongoing pain, worse with activity.  States she cannot live with ongoing persistent pain, states she wishes to have a procedure done.  Pain is severe, unrelenting.     CONCURRENT MEDICAL HISTORY:    The following portions of the patient's history were reviewed and updated as appropriate: allergies, current medications, past family history, past medical history, past social history, past surgical history and problem list.     ROS  No fevers or chills.  No chest pain or shortness of air.  No GI or  disturbances.    Past Medical History:   Diagnosis Date   • Anxiety    • Arthritis    • Asthma    • 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)     bilateral legs - bilateral iliac stents 2008      • Chronic lower back pain    • COPD (chronic obstructive pulmonary disease) (CMS/HCC)    • Coronary arteriosclerosis    • Essential (primary) hypertension    • History of pulmonary embolus (PE)    • Hyperlipidemia    • Insomnia    • Lumbago    • Nicotine dependence    • Occlusion of artery      and stenosis of bilateral carotid arteries - BABS 16-49%, LICA 0-15%   • Other atherosclerosis of native artery of other extremity     LEFT subclavian stent 2005 (occluded)     Past Surgical History:   Procedure Laterality Date   • CARDIAC CATHETERIZATION  04/06/2016    No evidence of any obstructive epicardial CAD.Preserved LV systolic function with  EF of 55%.   • CENTRAL VENOUS LINE INSERTION  04/07/2016    Successful placement of right uppe extremity 6-French triple lumen PICC line.   • ENDOSCOPY N/A 1/12/2018    Procedure: ESOPHAGOGASTRODUODENOSCOPY;  Surgeon: Bin Martin MD;  Location: [facility] ENDOSCOPY;  Service:    • ENDOSCOPY N/A 1/17/2018    Procedure: ESOPHAGOGASTRODUODENOSCOPY;  Surgeon: Bin Martin MD;  Location: [facility] ENDOSCOPY;  Service:    • ENDOSCOPY N/A 3/16/2018    Procedure: ESOPHAGOGASTRODUODENOSCOPY possible dilation;  Surgeon: Bin Martin MD;  Location: [facility] ENDOSCOPY;  Service: Gastroenterology   • FRACTURE SURGERY     • HYSTERECTOMY     • TOTAL SHOULDER REPLACEMENT Left    • TRANSESOPHAGEAL ECHOCARDIOGRAM (JACQUES)  04/07/2016    With color flow-Mild to moderate [facility].LV systolic function well preserved with Ef of 55-60%.Mitral and AV intact.Diastolic dysfunction   • UPPER GASTROINTESTINAL ENDOSCOPY  01/17/2018    Dr. Bin Martin M.D.      Current Outpatient Medications on File Prior to Visit   Medication Sig Dispense Refill   • albuterol (PROVENTIL) (2.5 MG/3ML) 0.083% nebulizer solution Take 2.5 mg by nebulization Every 8 (Eight) Hours As Needed for Wheezing.     • amLODIPine (NORVASC) 10 MG tablet Take 10 mg by mouth Daily.     • Bacitracin Zinc (MAGIC BUTT OINTMENT) Apply 5 g topically to the appropriate area as directed 2 (Two) Times a Day.     • benzonatate (TESSALON) 100 MG capsule Take 1 capsule by mouth 3 (Three) Times a Day As Needed for Cough.     • cyclobenzaprine (FLEXERIL) 10 MG tablet Take 10 mg by mouth 3 (Three) Times a Day As Needed for Muscle Spasms.     • docusate sodium 100 MG capsule Take 100 mg by mouth 2 (Two) Times a Day.     • gabapentin (NEURONTIN) 800 MG tablet Take 800 mg by mouth 3 (Three) Times a Day.     • guaiFENesin (ROBITUSSIN) 100 MG/5ML solution oral solution Take 20 mL by mouth 3 (Three) Times a Day.     • guaifenesin-dextromethorphan (ROBITUSSIN DM) 100-10 MG/5ML syrup Take 5 mL by  "mouth Every 4 (Four) Hours As Needed for Cough.     • ipratropium-albuterol (DUO-NEB) 0.5-2.5 mg/3 ml nebulizer Take 3 mL by nebulization 4 (Four) Times a Day. 360 mL    • methylPREDNISolone sodium succinate (SOLU-Medrol) 125 MG injection Infuse 1.28 mL into a venous catheter Every 8 (Eight) Hours.     • ondansetron (ZOFRAN) 4 MG/2ML injection Infuse 2 mL into a venous catheter Every 6 (Six) Hours As Needed for Nausea or Vomiting.     • pantoprazole (PROTONIX) 40 MG EC tablet Take 1 tablet by mouth Daily. 30 tablet 5   • penciclovir (DENAVIR) 1 % cream Apply 1 application topically Every 2 (Two) Hours.     • pravastatin (PRAVACHOL) 20 MG tablet Take 20 mg by mouth Daily.     • sertraline (ZOLOFT) 100 MG tablet Take 100 mg by mouth Daily.     • traZODone (DESYREL) 150 MG tablet Take 1 tablet by mouth Every Night.     • XARELTO 20 MG tablet TAKE 1 TABLET BY MOUTH DAILY. 30 tablet 6     No current facility-administered medications on file prior to visit.      PHYSICAL EXAMINATION:       Ht 157.5 cm (62\")   Wt 62.1 kg (137 lb)   BMI 25.06 kg/m²     Physical Exam   Constitutional: She appears well-developed and well-nourished. No distress.   Elderly.   HENT:   Head: Normocephalic.   Mouth/Throat: No oropharyngeal exudate.   Eyes: Conjunctivae are normal. Pupils are equal, round, and reactive to light. No scleral icterus.   Neck: No JVD present. No tracheal deviation present. No thyromegaly present.   Cardiovascular: Normal rate and intact distal pulses.   Pulmonary/Chest: Effort normal. No stridor. No respiratory distress. She has no wheezes.   Abdominal: Soft. She exhibits no distension. There is no tenderness. There is no guarding.   Musculoskeletal: She exhibits no edema or tenderness.   Neurological: She displays normal reflexes. No cranial nerve deficit. Coordination normal.   Skin: Capillary refill takes less than 2 seconds. No rash noted. No erythema.   Psychiatric: She has a normal mood and affect. Her behavior " is normal.       GAIT:     []  Normal  []  Antalgic    Assistive device: []  None  []  Walker     []  Crutches  []  Cane     []  Wheelchair  []  Stretcher    Back Exam     Tenderness   The patient is experiencing tenderness in the lumbar.    Range of Motion   Extension: 10   Flexion: 30   Lateral bend right: 10   Lateral bend left: 10   Rotation right: 10   Rotation left: 10     Muscle Strength   Right Quadriceps:  5/5   Left Quadriceps:  5/5   Right Hamstrings:  5/5   Left Hamstrings:  5/5     Tests   Straight leg raise right: negative  Straight leg raise left: negative    Other   Sensation: normal  Erythema: no back redness    Comments:  In wheelchair.              Xr Spine Lumbar 2 Or 3 View    Result Date: 5/13/2019  Narrative: Ordering Provider:  Aba Santa MD Ordering Diagnosis/Indication:  Low back pain, unspecified back pain laterality, unspecified chronicity, with sciatica presence unspecified Procedure:  XR SPINE LUMBAR 2 OR 3 VW Exam Date:  5/13/19 RELEVANT PRIOR IMAGES:  XR Pelvis 1 or 2 View 05/13/2019 [id number] Final COMPARISON:  Not applicable, no relevant images available.     Impression:  poor bone quality, fracture of L4, lumbar lordosis is good, wedging deformity of the L4 vertebra.  Bone quality:  poor Alignment:  Lumbar lordosis is good Fracture: L4 vertebral compression fracture Soft tissue: calcification of the aorta, soft tissues.     Xr Pelvis 1 Or 2 View    Result Date: 5/13/2019  Narrative: Ordering Provider:  Aba Santa MD Ordering Diagnosis/Indication:  Low back pain, unspecified back pain laterality, unspecified chronicity, with sciatica presence unspecified Procedure:  XR PELVIS 1 OR 2 VW Exam Date:  5/13/19 RELEVANT PRIOR IMAGES:  XR Chest 1 View 04/09/2019 [id number] Final COMPARISON:  Not applicable, no relevant images available.     Impression:   Poor bone quality, popcorn calcifications in several areas of the skeleton, no fracture, iliac stents in  place, hip joints alignment is good Bone quality: poor Alignment:  Pelvis alignment is normal Fracture: none Soft tissue: iliac stents present.              ASSESSMENT:    Diagnoses and all orders for this visit:    Low back pain, unspecified back pain laterality, unspecified chronicity, with sciatica presence unspecified    Fall with injury, subsequent encounter  -     Case Request; Standing  -     Case Request    Other back pain, unspecified chronicity  -     Case Request; Standing  -     Case Request    Other osteoporosis with current pathological fracture, vertebra(e), initial encounter for fracture (CMS/[facility])  -     ceFAZolin (ANCEF) 2 g in sodium chloride 0.9 % 100 mL IVPB    Osteoporosis, senile  -     ceFAZolin (ANCEF) 2 g in sodium chloride 0.9 % 100 mL IVPB    Other orders  -     Obtain Informed Consent; Future  -     Obtain Informed Consent; Standing  -     Basic Metabolic Panel; Standing  -     aPTT; Standing  -     CBC (No Diff); Standing  -     ECG 12 Lead; Standing  -     XR Chest PA & Lateral; Standing  -     Protime-INR; Standing  -     traMADol (ULTRAM) 50 MG tablet; Take 1 tablet by mouth Every 6 (Six) Hours As Needed for Moderate Pain .          PLAN      Recommend Kyphoplasty of L4 , I described the risks including the risk of infection cement extrusion, neurologic injury including numbness and weakness and possible worsening of pain.    No guarantees are given.        Aba Santa MD

## 2019-05-13 NOTE — H&P (VIEW-ONLY)
Mona Perales is a 68 y.o. female returns for     Chief Complaint   Patient presents with   • Left Hip - Follow-up       HISTORY OF PRESENT ILLNESS: f/u on back pain, patient had xrays done today, patient states that she has a pain score of 10,     68 y pain of the back and hip region, and the buttock, the pain radiates at times.  States she is working with physical therapy, she is at [facility].  States pain is somewhat improved.  No fevers, no chills, no nighttime awakening pain  Making progress with physical therapy.   complains of persistent ongoing pain, worse with activity.  States she cannot live with ongoing persistent pain, states she wishes to have a procedure done.  Pain is severe, unrelenting.     CONCURRENT MEDICAL HISTORY:    The following portions of the patient's history were reviewed and updated as appropriate: allergies, current medications, past family history, past medical history, past social history, past surgical history and problem list.     ROS  No fevers or chills.  No chest pain or shortness of air.  No GI or  disturbances.    Past Medical History:   Diagnosis Date   • Anxiety    • Arthritis    • Asthma    • 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)     bilateral legs - bilateral iliac stents 2008      • Chronic lower back pain    • COPD (chronic obstructive pulmonary disease) (CMS/HCC)    • Coronary arteriosclerosis    • Essential (primary) hypertension    • History of pulmonary embolus (PE)    • Hyperlipidemia    • Insomnia    • Lumbago    • Nicotine dependence    • Occlusion of artery      and stenosis of bilateral carotid arteries - BABS 16-49%, LICA 0-15%   • Other atherosclerosis of native artery of other extremity     LEFT subclavian stent 2005 (occluded)     Past Surgical History:   Procedure Laterality Date   • CARDIAC CATHETERIZATION  04/06/2016    No evidence of any obstructive epicardial CAD.Preserved LV systolic function with  EF of 55%.   • CENTRAL VENOUS LINE INSERTION  04/07/2016    Successful placement of right uppe extremity 6-French triple lumen PICC line.   • ENDOSCOPY N/A 1/12/2018    Procedure: ESOPHAGOGASTRODUODENOSCOPY;  Surgeon: Bin Martin MD;  Location: [facility] ENDOSCOPY;  Service:    • ENDOSCOPY N/A 1/17/2018    Procedure: ESOPHAGOGASTRODUODENOSCOPY;  Surgeon: Bin Martin MD;  Location: [facility] ENDOSCOPY;  Service:    • ENDOSCOPY N/A 3/16/2018    Procedure: ESOPHAGOGASTRODUODENOSCOPY possible dilation;  Surgeon: Bin Martin MD;  Location: [facility] ENDOSCOPY;  Service: Gastroenterology   • FRACTURE SURGERY     • HYSTERECTOMY     • TOTAL SHOULDER REPLACEMENT Left    • TRANSESOPHAGEAL ECHOCARDIOGRAM (JACQUES)  04/07/2016    With color flow-Mild to moderate [facility].LV systolic function well preserved with Ef of 55-60%.Mitral and AV intact.Diastolic dysfunction   • UPPER GASTROINTESTINAL ENDOSCOPY  01/17/2018    Dr. Bin Martin M.D.      Current Outpatient Medications on File Prior to Visit   Medication Sig Dispense Refill   • albuterol (PROVENTIL) (2.5 MG/3ML) 0.083% nebulizer solution Take 2.5 mg by nebulization Every 8 (Eight) Hours As Needed for Wheezing.     • amLODIPine (NORVASC) 10 MG tablet Take 10 mg by mouth Daily.     • Bacitracin Zinc (MAGIC BUTT OINTMENT) Apply 5 g topically to the appropriate area as directed 2 (Two) Times a Day.     • benzonatate (TESSALON) 100 MG capsule Take 1 capsule by mouth 3 (Three) Times a Day As Needed for Cough.     • cyclobenzaprine (FLEXERIL) 10 MG tablet Take 10 mg by mouth 3 (Three) Times a Day As Needed for Muscle Spasms.     • docusate sodium 100 MG capsule Take 100 mg by mouth 2 (Two) Times a Day.     • gabapentin (NEURONTIN) 800 MG tablet Take 800 mg by mouth 3 (Three) Times a Day.     • guaiFENesin (ROBITUSSIN) 100 MG/5ML solution oral solution Take 20 mL by mouth 3 (Three) Times a Day.     • guaifenesin-dextromethorphan (ROBITUSSIN DM) 100-10 MG/5ML syrup Take 5 mL by  "mouth Every 4 (Four) Hours As Needed for Cough.     • ipratropium-albuterol (DUO-NEB) 0.5-2.5 mg/3 ml nebulizer Take 3 mL by nebulization 4 (Four) Times a Day. 360 mL    • methylPREDNISolone sodium succinate (SOLU-Medrol) 125 MG injection Infuse 1.28 mL into a venous catheter Every 8 (Eight) Hours.     • ondansetron (ZOFRAN) 4 MG/2ML injection Infuse 2 mL into a venous catheter Every 6 (Six) Hours As Needed for Nausea or Vomiting.     • pantoprazole (PROTONIX) 40 MG EC tablet Take 1 tablet by mouth Daily. 30 tablet 5   • penciclovir (DENAVIR) 1 % cream Apply 1 application topically Every 2 (Two) Hours.     • pravastatin (PRAVACHOL) 20 MG tablet Take 20 mg by mouth Daily.     • sertraline (ZOLOFT) 100 MG tablet Take 100 mg by mouth Daily.     • traZODone (DESYREL) 150 MG tablet Take 1 tablet by mouth Every Night.     • XARELTO 20 MG tablet TAKE 1 TABLET BY MOUTH DAILY. 30 tablet 6     No current facility-administered medications on file prior to visit.      PHYSICAL EXAMINATION:       Ht 157.5 cm (62\")   Wt 62.1 kg (137 lb)   BMI 25.06 kg/m²     Physical Exam   Constitutional: She appears well-developed and well-nourished. No distress.   Elderly.   HENT:   Head: Normocephalic.   Mouth/Throat: No oropharyngeal exudate.   Eyes: Conjunctivae are normal. Pupils are equal, round, and reactive to light. No scleral icterus.   Neck: No JVD present. No tracheal deviation present. No thyromegaly present.   Cardiovascular: Normal rate and intact distal pulses.   Pulmonary/Chest: Effort normal. No stridor. No respiratory distress. She has no wheezes.   Abdominal: Soft. She exhibits no distension. There is no tenderness. There is no guarding.   Musculoskeletal: She exhibits no edema or tenderness.   Neurological: She displays normal reflexes. No cranial nerve deficit. Coordination normal.   Skin: Capillary refill takes less than 2 seconds. No rash noted. No erythema.   Psychiatric: She has a normal mood and affect. Her behavior " is normal.       GAIT:     []  Normal  []  Antalgic    Assistive device: []  None  []  Walker     []  Crutches  []  Cane     []  Wheelchair  []  Stretcher    Back Exam     Tenderness   The patient is experiencing tenderness in the lumbar.    Range of Motion   Extension: 10   Flexion: 30   Lateral bend right: 10   Lateral bend left: 10   Rotation right: 10   Rotation left: 10     Muscle Strength   Right Quadriceps:  5/5   Left Quadriceps:  5/5   Right Hamstrings:  5/5   Left Hamstrings:  5/5     Tests   Straight leg raise right: negative  Straight leg raise left: negative    Other   Sensation: normal  Erythema: no back redness    Comments:  In wheelchair.              Xr Spine Lumbar 2 Or 3 View    Result Date: 5/13/2019  Narrative: Ordering Provider:  Aba Santa MD Ordering Diagnosis/Indication:  Low back pain, unspecified back pain laterality, unspecified chronicity, with sciatica presence unspecified Procedure:  XR SPINE LUMBAR 2 OR 3 VW Exam Date:  5/13/19 RELEVANT PRIOR IMAGES:  XR Pelvis 1 or 2 View 05/13/2019 [id number] Final COMPARISON:  Not applicable, no relevant images available.     Impression:  poor bone quality, fracture of L4, lumbar lordosis is good, wedging deformity of the L4 vertebra.  Bone quality:  poor Alignment:  Lumbar lordosis is good Fracture: L4 vertebral compression fracture Soft tissue: calcification of the aorta, soft tissues.     Xr Pelvis 1 Or 2 View    Result Date: 5/13/2019  Narrative: Ordering Provider:  Aba Santa MD Ordering Diagnosis/Indication:  Low back pain, unspecified back pain laterality, unspecified chronicity, with sciatica presence unspecified Procedure:  XR PELVIS 1 OR 2 VW Exam Date:  5/13/19 RELEVANT PRIOR IMAGES:  XR Chest 1 View 04/09/2019 [id number] Final COMPARISON:  Not applicable, no relevant images available.     Impression:   Poor bone quality, popcorn calcifications in several areas of the skeleton, no fracture, iliac stents in  place, hip joints alignment is good Bone quality: poor Alignment:  Pelvis alignment is normal Fracture: none Soft tissue: iliac stents present.              ASSESSMENT:    Diagnoses and all orders for this visit:    Low back pain, unspecified back pain laterality, unspecified chronicity, with sciatica presence unspecified    Fall with injury, subsequent encounter  -     Case Request; Standing  -     Case Request    Other back pain, unspecified chronicity  -     Case Request; Standing  -     Case Request    Other osteoporosis with current pathological fracture, vertebra(e), initial encounter for fracture (CMS/[facility])  -     ceFAZolin (ANCEF) 2 g in sodium chloride 0.9 % 100 mL IVPB    Osteoporosis, senile  -     ceFAZolin (ANCEF) 2 g in sodium chloride 0.9 % 100 mL IVPB    Other orders  -     Obtain Informed Consent; Future  -     Obtain Informed Consent; Standing  -     Basic Metabolic Panel; Standing  -     aPTT; Standing  -     CBC (No Diff); Standing  -     ECG 12 Lead; Standing  -     XR Chest PA & Lateral; Standing  -     Protime-INR; Standing  -     traMADol (ULTRAM) 50 MG tablet; Take 1 tablet by mouth Every 6 (Six) Hours As Needed for Moderate Pain .          PLAN      Recommend Kyphoplasty of L4 , I described the risks including the risk of infection cement extrusion, neurologic injury including numbness and weakness and possible worsening of pain.    No guarantees are given.        Aba Santa MD

## 2019-05-20 ENCOUNTER — APPOINTMENT (OUTPATIENT)
Dept: PREADMISSION TESTING | Facility: HOSPITAL | Age: 69
End: 2019-05-20

## 2019-05-20 ENCOUNTER — HOSPITAL ENCOUNTER (OUTPATIENT)
Dept: GENERAL RADIOLOGY | Facility: HOSPITAL | Age: 69
Discharge: HOME | End: 2019-05-20
Admitting: ORTHOPAEDIC SURGERY

## 2019-05-20 ENCOUNTER — TELEPHONE (OUTPATIENT)
Dept: ORTHOPEDIC SURGERY | Facility: CLINIC | Age: 69
End: 2019-05-20

## 2019-05-20 VITALS
DIASTOLIC BLOOD PRESSURE: 88 MMHG | BODY MASS INDEX: 21.71 KG/M2 | HEART RATE: 100 BPM | OXYGEN SATURATION: 96 % | SYSTOLIC BLOOD PRESSURE: 166 MMHG | WEIGHT: 118 LBS | RESPIRATION RATE: 18 BRPM | HEIGHT: 62 IN

## 2019-05-20 LAB
ANION GAP SERPL CALCULATED.3IONS-SCNC: 14 MMOL/L
APTT PPP: 26.6 SECONDS (ref 20–40.3)
BUN BLD-MCNC: 12 MG/DL (ref 8–23)
BUN/CREAT SERPL: 18.2 (ref 7–25)
CALCIUM SPEC-SCNC: 9 MG/DL (ref 8.6–10.5)
CHLORIDE SERPL-SCNC: 103 MMOL/L (ref 98–107)
CO2 SERPL-SCNC: 23 MMOL/L (ref 22–29)
CREAT BLD-MCNC: 0.66 MG/DL (ref 0.57–1)
DEPRECATED RDW RBC AUTO: 49.3 FL (ref 37–54)
ERYTHROCYTE [DISTWIDTH] IN BLOOD BY AUTOMATED COUNT: 15.8 % (ref 12.3–15.4)
GFR SERPL CREATININE-BSD FRML MDRD: 90 ML/MIN/1.73
GLUCOSE BLD-MCNC: 90 MG/DL (ref 65–99)
HCT VFR BLD AUTO: 37.5 % (ref 34–46.6)
HGB BLD-MCNC: 11.7 G/DL (ref 12–15.9)
INR PPP: 0.89 (ref 0.8–1.2)
MCH RBC QN AUTO: 26.7 PG (ref 26.6–33)
MCHC RBC AUTO-ENTMCNC: 31.2 G/DL (ref 31.5–35.7)
MCV RBC AUTO: 85.4 FL (ref 79–97)
PLATELET # BLD AUTO: 253 10*3/MM3 (ref 140–450)
PMV BLD AUTO: 9.1 FL (ref 6–12)
POTASSIUM BLD-SCNC: 4.5 MMOL/L (ref 3.5–5.2)
PROTHROMBIN TIME: 11.9 SECONDS (ref 11.1–15.3)
RBC # BLD AUTO: 4.39 10*6/MM3 (ref 3.77–5.28)
SODIUM BLD-SCNC: 140 MMOL/L (ref 136–145)
WBC NRBC COR # BLD: 9.88 10*3/MM3 (ref 3.4–10.8)

## 2019-05-20 PROCEDURE — 93005 ELECTROCARDIOGRAM TRACING: CPT | Performed by: ORTHOPAEDIC SURGERY

## 2019-05-20 PROCEDURE — 85610 PROTHROMBIN TIME: CPT | Performed by: ORTHOPAEDIC SURGERY

## 2019-05-20 PROCEDURE — 85027 COMPLETE CBC AUTOMATED: CPT | Performed by: ORTHOPAEDIC SURGERY

## 2019-05-20 PROCEDURE — 85730 THROMBOPLASTIN TIME PARTIAL: CPT | Performed by: ORTHOPAEDIC SURGERY

## 2019-05-20 PROCEDURE — 71046 X-RAY EXAM CHEST 2 VIEWS: CPT

## 2019-05-20 PROCEDURE — 80048 BASIC METABOLIC PNL TOTAL CA: CPT | Performed by: ORTHOPAEDIC SURGERY

## 2019-05-20 PROCEDURE — 93010 ELECTROCARDIOGRAM REPORT: CPT | Performed by: INTERNAL MEDICINE

## 2019-05-20 RX ORDER — SODIUM CHLORIDE, SODIUM GLUCONATE, SODIUM ACETATE, POTASSIUM CHLORIDE, AND MAGNESIUM CHLORIDE 526; 502; 368; 37; 30 MG/100ML; MG/100ML; MG/100ML; MG/100ML; MG/100ML
1000 INJECTION, SOLUTION INTRAVENOUS CONTINUOUS
Status: CANCELLED | OUTPATIENT
Start: 2019-05-23

## 2019-05-22 ENCOUNTER — ANESTHESIA EVENT (OUTPATIENT)
Dept: PERIOP | Facility: HOSPITAL | Age: 69
End: 2019-05-22

## 2019-05-23 ENCOUNTER — ANESTHESIA (OUTPATIENT)
Dept: PERIOP | Facility: HOSPITAL | Age: 69
End: 2019-05-23

## 2019-05-23 ENCOUNTER — APPOINTMENT (OUTPATIENT)
Dept: GENERAL RADIOLOGY | Facility: HOSPITAL | Age: 69
End: 2019-05-23

## 2019-05-23 ENCOUNTER — HOSPITAL ENCOUNTER (OUTPATIENT)
Facility: HOSPITAL | Age: 69
Setting detail: HOSPITAL OUTPATIENT SURGERY
Discharge: HOME | End: 2019-05-23
Attending: ORTHOPAEDIC SURGERY | Admitting: ORTHOPAEDIC SURGERY

## 2019-05-23 VITALS
HEART RATE: 96 BPM | OXYGEN SATURATION: 92 % | DIASTOLIC BLOOD PRESSURE: 67 MMHG | TEMPERATURE: 97.8 F | WEIGHT: 120.37 LBS | RESPIRATION RATE: 20 BRPM | HEIGHT: 62 IN | SYSTOLIC BLOOD PRESSURE: 146 MMHG | BODY MASS INDEX: 22.15 KG/M2

## 2019-05-23 DIAGNOSIS — M80.88XA OTHER OSTEOPOROSIS WITH CURRENT PATHOLOGICAL FRACTURE, VERTEBRA(E), INITIAL ENCOUNTER FOR FRACTURE (HCC): ICD-10-CM

## 2019-05-23 DIAGNOSIS — M81.0 OSTEOPOROSIS, SENILE: ICD-10-CM

## 2019-05-23 PROCEDURE — 22514 PERQ VERTEBRAL AUGMENTATION: CPT | Performed by: ORTHOPAEDIC SURGERY

## 2019-05-23 PROCEDURE — 76000 FLUOROSCOPY <1 HR PHYS/QHP: CPT

## 2019-05-23 PROCEDURE — C1713 ANCHOR/SCREW BN/BN,TIS/BN: HCPCS | Performed by: ORTHOPAEDIC SURGERY

## 2019-05-23 PROCEDURE — 25010000002 MIDAZOLAM PER 1 MG: Performed by: NURSE ANESTHETIST, CERTIFIED REGISTERED

## 2019-05-23 PROCEDURE — 25010000002 IOPAMIDOL 61 % SOLUTION: Performed by: ORTHOPAEDIC SURGERY

## 2019-05-23 PROCEDURE — 25010000002 HYDROMORPHONE 1 MG/ML SOLUTION: Performed by: ANESTHESIOLOGY

## 2019-05-23 PROCEDURE — 25010000002 PROPOFOL 10 MG/ML EMULSION: Performed by: NURSE ANESTHETIST, CERTIFIED REGISTERED

## 2019-05-23 PROCEDURE — 94640 AIRWAY INHALATION TREATMENT: CPT

## 2019-05-23 DEVICE — CMT BONE CONFIDENCE HI VISC: Type: IMPLANTABLE DEVICE | Site: SPINE LUMBAR | Status: FUNCTIONAL

## 2019-05-23 DEVICE — CMT BONE CONFIDENCE/PLS DS KT 11CC: Type: IMPLANTABLE DEVICE | Site: SPINE LUMBAR | Status: FUNCTIONAL

## 2019-05-23 RX ORDER — ONDANSETRON 2 MG/ML
4 INJECTION INTRAMUSCULAR; INTRAVENOUS ONCE AS NEEDED
Status: CANCELLED | OUTPATIENT
Start: 2019-05-23

## 2019-05-23 RX ORDER — BUPIVACAINE HCL/0.9 % NACL/PF 0.1 %
2 PLASTIC BAG, INJECTION (ML) EPIDURAL ONCE
Status: COMPLETED | OUTPATIENT
Start: 2019-05-23 | End: 2019-05-23

## 2019-05-23 RX ORDER — LIDOCAINE HYDROCHLORIDE 10 MG/ML
INJECTION, SOLUTION INFILTRATION; PERINEURAL AS NEEDED
Status: DISCONTINUED | OUTPATIENT
Start: 2019-05-23 | End: 2019-05-23 | Stop reason: SURG

## 2019-05-23 RX ORDER — PROMETHAZINE HYDROCHLORIDE 25 MG/1
25 SUPPOSITORY RECTAL ONCE AS NEEDED
Status: CANCELLED | OUTPATIENT
Start: 2019-05-23

## 2019-05-23 RX ORDER — PROMETHAZINE HYDROCHLORIDE 25 MG/ML
12.5 INJECTION, SOLUTION INTRAMUSCULAR; INTRAVENOUS ONCE AS NEEDED
Status: CANCELLED | OUTPATIENT
Start: 2019-05-23

## 2019-05-23 RX ORDER — SODIUM CHLORIDE, SODIUM GLUCONATE, SODIUM ACETATE, POTASSIUM CHLORIDE, AND MAGNESIUM CHLORIDE 526; 502; 368; 37; 30 MG/100ML; MG/100ML; MG/100ML; MG/100ML; MG/100ML
1000 INJECTION, SOLUTION INTRAVENOUS CONTINUOUS
Status: DISCONTINUED | OUTPATIENT
Start: 2019-05-23 | End: 2019-05-23 | Stop reason: HOSPADM

## 2019-05-23 RX ORDER — DEXAMETHASONE SODIUM PHOSPHATE 4 MG/ML
8 INJECTION, SOLUTION INTRA-ARTICULAR; INTRALESIONAL; INTRAMUSCULAR; INTRAVENOUS; SOFT TISSUE ONCE AS NEEDED
Status: CANCELLED | OUTPATIENT
Start: 2019-05-23

## 2019-05-23 RX ORDER — PROMETHAZINE HYDROCHLORIDE 25 MG/1
25 TABLET ORAL ONCE AS NEEDED
Status: CANCELLED | OUTPATIENT
Start: 2019-05-23

## 2019-05-23 RX ORDER — OXYCODONE AND ACETAMINOPHEN 7.5; 325 MG/1; MG/1
1 TABLET ORAL EVERY 4 HOURS PRN
Qty: 15 TABLET | Refills: 0 | Status: SHIPPED | OUTPATIENT
Start: 2019-05-23 | End: 2020-03-02

## 2019-05-23 RX ORDER — PROPOFOL 10 MG/ML
VIAL (ML) INTRAVENOUS AS NEEDED
Status: DISCONTINUED | OUTPATIENT
Start: 2019-05-23 | End: 2019-05-23 | Stop reason: SURG

## 2019-05-23 RX ORDER — IPRATROPIUM BROMIDE AND ALBUTEROL SULFATE 2.5; .5 MG/3ML; MG/3ML
3 SOLUTION RESPIRATORY (INHALATION) ONCE
Status: COMPLETED | OUTPATIENT
Start: 2019-05-23 | End: 2019-05-23

## 2019-05-23 RX ORDER — PROMETHAZINE HYDROCHLORIDE 25 MG/ML
12.5 INJECTION, SOLUTION INTRAMUSCULAR; INTRAVENOUS EVERY 6 HOURS PRN
Status: DISCONTINUED | OUTPATIENT
Start: 2019-05-23 | End: 2019-05-23 | Stop reason: HOSPADM

## 2019-05-23 RX ORDER — MIDAZOLAM HYDROCHLORIDE 1 MG/ML
INJECTION INTRAMUSCULAR; INTRAVENOUS AS NEEDED
Status: DISCONTINUED | OUTPATIENT
Start: 2019-05-23 | End: 2019-05-23 | Stop reason: SURG

## 2019-05-23 RX ORDER — OXYCODONE AND ACETAMINOPHEN 7.5; 325 MG/1; MG/1
1 TABLET ORAL EVERY 4 HOURS PRN
Status: DISCONTINUED | OUTPATIENT
Start: 2019-05-23 | End: 2019-05-23 | Stop reason: HOSPADM

## 2019-05-23 RX ADMIN — MIDAZOLAM HYDROCHLORIDE 2 MG: 2 INJECTION, SOLUTION INTRAMUSCULAR; INTRAVENOUS at 15:38

## 2019-05-23 RX ADMIN — PROPOFOL 20 MG: 10 INJECTION, EMULSION INTRAVENOUS at 16:12

## 2019-05-23 RX ADMIN — SODIUM CHLORIDE, SODIUM GLUCONATE, SODIUM ACETATE, POTASSIUM CHLORIDE, AND MAGNESIUM CHLORIDE 1000 ML: 526; 502; 368; 37; 30 INJECTION, SOLUTION INTRAVENOUS at 13:23

## 2019-05-23 RX ADMIN — PROPOFOL 20 MG: 10 INJECTION, EMULSION INTRAVENOUS at 15:58

## 2019-05-23 RX ADMIN — LIDOCAINE HYDROCHLORIDE 80 MG: 10 INJECTION, SOLUTION INFILTRATION; PERINEURAL at 15:52

## 2019-05-23 RX ADMIN — PROPOFOL 20 MG: 10 INJECTION, EMULSION INTRAVENOUS at 16:02

## 2019-05-23 RX ADMIN — HYDROMORPHONE HYDROCHLORIDE 0.5 MG: 1 INJECTION, SOLUTION INTRAMUSCULAR; INTRAVENOUS; SUBCUTANEOUS at 14:31

## 2019-05-23 RX ADMIN — IPRATROPIUM BROMIDE AND ALBUTEROL SULFATE 3 ML: 2.5; .5 SOLUTION RESPIRATORY (INHALATION) at 15:06

## 2019-05-23 RX ADMIN — OXYCODONE HYDROCHLORIDE AND ACETAMINOPHEN 1 TABLET: 7.5; 325 TABLET ORAL at 16:55

## 2019-05-23 RX ADMIN — PROPOFOL 20 MG: 10 INJECTION, EMULSION INTRAVENOUS at 15:55

## 2019-05-23 RX ADMIN — Medication 2 G: at 15:45

## 2019-05-23 RX ADMIN — PROPOFOL 30 MG: 10 INJECTION, EMULSION INTRAVENOUS at 16:05

## 2019-05-23 RX ADMIN — PROPOFOL 80 MG: 10 INJECTION, EMULSION INTRAVENOUS at 15:52

## 2019-05-23 RX ADMIN — PROPOFOL 30 MG: 10 INJECTION, EMULSION INTRAVENOUS at 16:00

## 2019-05-23 NOTE — DISCHARGE INSTRUCTIONS
Ambulate each day  May remove dressing may shower  No heavy lifting, no strenuous activity  Return for follow up with orthopaedic surgery in 6 weeks.

## 2019-05-23 NOTE — BRIEF OP NOTE
KYPHOPLASTY 1-2 LEVELS  Progress Note    Mona Perales  5/23/2019    Pre-op Diagnosis:   Fall with injury, subsequent encounter [W19.XXXD]  Other back pain, unspecified chronicity [M54.89]       Post-Op Diagnosis Codes:     * Fall with injury, subsequent encounter [W19.XXXD]     * Other back pain, unspecified chronicity [M54.89]    Procedure/CPT® Codes:  WI PERQ VERT AGMNTJ CAVITY CRTJ UNI/BI CANNULJ LMBR [96339]    Procedure(s):  KYPHOPLASTY LUMBAR FOUR    Surgeon(s):  Aba Santa MD    Anesthesia: [facility]    Staff:   Circulator: Natasha Pham RN  Radiology Technologist: Marino Aguilar  Scrub Person: Isi Cantu  Assistant: Ksenia Hooker CSA  Other: Irene Lugo    Estimated Blood Loss: minimal    Urine Voided: * No values recorded between 5/23/2019  3:43 PM and 5/23/2019  4:29 PM *    Specimens:                None          Drains:      Findings: vertebral compression fracture of L4     Complications: none      Aba Santa MD     Date: 5/23/2019  Time: 4:55 PM

## 2019-05-23 NOTE — OP NOTE
DATE OF PROCEDURE: 5/23/2019     PREOPERATIVE DIAGNOSIS:   1.)  L4 vertebral compression fracture.  2.)  Severe osteoporosis    POSTOPERATIVE DIAGNOSIS:    1.) L4 vertebral compression fracture.  2.) Severe osteoporosis    PROCEDURE PERFORMED:    L4 kyphoplasty.    Surgeon Mel  ASSISTANT:  Ksenia Hooker CSA     ANESTHESIA:  General.    POSITIONING:  Prone.    ESTIMATED BLOOD LOSS:  10 mL.     FINDINGS:  osteoporosis.    DESCRIPTION OF PROCEDURE:  The patient was brought to the operating room. Satisfactory airway was established. Intravenous anesthetic was administered. She was positioned prone. The thoracolumbar region was cleansed with a ChloraPrep wash. Standard surgical barriers were positioned. Translucent iodine impregnated surgical barrier was placed overlying the thoracolumbar region. Her consent form was reviewed and everyone agrees. MRI was reviewed prior to skin incision demonstrating a L4 vertebral compression fracture. Intravenous antibiotics were administered. Utilizing fluoroscopic visualization and fluoroscopic guidance, I anesthetized the area of the pedicles of L4. This was done with 0.25% bupivacaine, a total of 15 mL.   I placed a small incision, about 3 mm at the level of the operative pedicle. The trocar was placed and  positioned into the vertebra through the pedicle.  This was done with fluoroscopic guidance. I placed the drill. The bone was very soft and had poor structural integrity, and virtually no resistance. I introduced 2 kyphoplasty balloons. They were positioned and inflated on both the left and right sides. The vertebra was collapsed approximately 30%. The balloons expanded the eggshell bone a minor degree. However, I did not want to over expand the soft bone.  I positioned cement in the vertebra, approximately 4 mL from the right and left sides with cement layering in the anterior aspect of the vertebra toward the midline.  There was cement which migrated to the interbody  disc space of L4-5. After the cement had matured, the trocars were removed and pressure was held firmly. Final radiographic images were obtained. Sutures were placed, followed by a clean sterile bandage. The patient was repositioned and transported to recovery. No complications.

## 2019-05-23 NOTE — ANESTHESIA POSTPROCEDURE EVALUATION
Patient: Mona Perales    Procedure Summary     Date:  05/23/19 Room / Location:  [facility] OR  / [facility] OR    Anesthesia Start:  1544 Anesthesia Stop:  1632    Procedure:  KYPHOPLASTY LUMBAR FOUR (N/A Spine Lumbar) Diagnosis:       Fall with injury, subsequent encounter      Other back pain, unspecified chronicity      (Fall with injury, subsequent encounter [W19.XXXD])      (Other back pain, unspecified chronicity [M54.89])    Surgeon:  Aba Santa MD Provider:  Georges Jauregui MD    Anesthesia Type:  MAC ASA Status:  3          Anesthesia Type: MAC  Last vitals  BP   180/88 (05/23/19 1309)   Temp   97.1 °F (36.2 °C) (05/23/19 1309)   Pulse   81 (05/23/19 1508)   Resp   18 (05/23/19 1309)     SpO2   100 % (05/23/19 1508)     Post Anesthesia Care and Evaluation    Patient location during evaluation: PACU  Patient participation: complete - patient participated  Level of consciousness: awake and alert  Pain score: 0  Pain management: adequate  Airway patency: patent  Anesthetic complications: No anesthetic complications  PONV Status: none  Cardiovascular status: acceptable  Respiratory status: acceptable  Hydration status: acceptable  Post Neuraxial Block status: Motor and sensory function returned to baseline

## 2019-05-23 NOTE — INTERVAL H&P NOTE
H&P reviewed. The patient was examined and there are no changes to the H&P.      Temp:  [97.1 °F (36.2 °C)] 97.1 °F (36.2 °C)  Heart Rate:  [81-84] 81  Resp:  [18] 18  BP: (180)/(88) 180/88    Allergies   Allergen Reactions   • Morphine And Related Itching, Confusion and Hallucinations   • Penicillins Rash       Prior to Admission medications    Medication Sig Start Date End Date Taking? Authorizing Provider   albuterol (PROVENTIL) (2.5 MG/3ML) 0.083% nebulizer solution Take 2.5 mg by nebulization Every 8 (Eight) Hours As Needed for Wheezing.   Yes Miguelito Chatman MD   amLODIPine (NORVASC) 10 MG tablet Take 10 mg by mouth Daily.   Yes Miguelito Chatman MD   benzonatate (TESSALON) 100 MG capsule Take 1 capsule by mouth 3 (Three) Times a Day As Needed for Cough. 2/26/19   Donn Triana MD   cyclobenzaprine (FLEXERIL) 10 MG tablet Take 10 mg by mouth 3 (Three) Times a Day As Needed for Muscle Spasms.    Miguelito Chatman MD   docusate sodium 100 MG capsule Take 100 mg by mouth 2 (Two) Times a Day. 2/26/19   Donn Triana MD   gabapentin (NEURONTIN) 800 MG tablet Take 800 mg by mouth 3 (Three) Times a Day.    Miguelito Chatman MD   pantoprazole (PROTONIX) 40 MG EC tablet Take 1 tablet by mouth Daily. 2/5/18   Mary Shen APRN   penciclovir (DENAVIR) 1 % cream Apply 1 application topically to the appropriate area as directed As Needed (fever blisters).    Miguelito Chatman MD   rivaroxaban (XARELTO) 20 MG tablet Take 20 mg by mouth Daily With Dinner.    Miguelito Chatman MD   sertraline (ZOLOFT) 100 MG tablet Take 100 mg by mouth Daily.    Miguelito Chatman MD   traMADol (ULTRAM) 50 MG tablet Take 1 tablet by mouth Every 6 (Six) Hours As Needed for Moderate Pain . 5/13/19   Aba Santa MD   traZODone (DESYREL) 150 MG tablet Take 1 tablet by mouth Every Night. 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)     bilateral legs - bilateral iliac stents 2008      • CHF (congestive heart failure) (CMS/HCC)    • Chronic lower back pain    • COPD (chronic obstructive pulmonary disease) (CMS/[facility])    • Essential (primary) hypertension    • GERD (gastroesophageal reflux disease)    • History of pulmonary embolus (PE)    • Hyperlipidemia    • Insomnia    • Lumbago    • Nicotine dependence    • Occlusion of artery      and stenosis of bilateral carotid arteries - BABS 16-49%, LICA 0-15%   • Other atherosclerosis of native artery of other extremity     LEFT subclavian stent 2005 (occluded)   • Sleep apnea        Past Surgical History:   Procedure Laterality Date   • CARDIAC CATHETERIZATION  04/06/2016    No evidence of any obstructive epicardial CAD.Preserved LV systolic function with EF of 55%.   • CENTRAL VENOUS LINE INSERTION  04/07/2016    Successful placement of right uppe extremity 6-[place] triple lumen PICC line.   • ENDOSCOPY N/A 1/12/2018    Procedure: ESOPHAGOGASTRODUODENOSCOPY;  Surgeon: Bin Martin MD;  Location: [facility] ENDOSCOPY;  Service:    • ENDOSCOPY N/A 1/17/2018    Procedure: ESOPHAGOGASTRODUODENOSCOPY;  Surgeon: Bin Martin MD;  Location: [facility] ENDOSCOPY;  Service:    • ENDOSCOPY N/A 3/16/2018    Procedure: ESOPHAGOGASTRODUODENOSCOPY possible dilation;  Surgeon: Bin Martin MD;  Location: [facility] ENDOSCOPY;  Service: Gastroenterology   • HYSTERECTOMY     • TOTAL SHOULDER REPLACEMENT Left    • TRANSESOPHAGEAL ECHOCARDIOGRAM (JACQUES)  04/07/2016    With color flow-Mild to moderate [facility].LV systolic function well preserved with Ef of 55-60%.Mitral and AV intact.Diastolic dysfunction   • UPPER GASTROINTESTINAL ENDOSCOPY  01/17/2018    Dr. Bin Martin M.D.       Social History     Socioeconomic History   • Marital status:      Spouse name: Not on file   • Number of children: Not on file   •  Years of education: Not on file   • Highest education level: Not on file   Tobacco Use   • Smoking status: Former Smoker     Packs/day: 1.00     Years: 50.00     Pack years: 50.00     Types: Cigarettes     Last attempt to quit: 2019     Years since quittin.2   • Smokeless tobacco: Never Used   Substance and Sexual Activity   • Alcohol use: No   • Drug use: No   • Sexual activity: Defer       Family History   Problem Relation Age of Onset   • Heart disease Other    • Hypertension Other          Notalgia    Cause of injury, fall

## 2019-05-23 NOTE — ANESTHESIA PREPROCEDURE EVALUATION
Anesthesia Evaluation     no history of anesthetic complications:  NPO Solid Status: > 8 hours  NPO Liquid Status: > 6 hours           Airway   Mallampati: I  TM distance: >3 FB  Neck ROM: full  No difficulty expected  Dental    (+) poor dentition and upper dentures        Pulmonary    (+) a smoker Current Smoked day of surgery, COPD moderate, asthma, rhonchi, wheezes,   (-) sleep apnea    ROS comment: HOME O2  Cardiovascular   Exercise tolerance: poor (<4 METS)    ECG reviewed  PT is on anticoagulation therapy  Rhythm: regular  Rate: normal    (+) hypertension poorly controlled, murmur, PVD, hyperlipidemia,  carotid artery disease left carotid  (-) valvular problems/murmurs, dysrhythmias, angina, cardiac stents    ROS comment: Normal sinus rhythm  Possible Inferior infarct (cited on or before 18-DEC-2012)  Abnormal ECG  When compared with ECG of 09-APR-2019 22:47,  premature ventricular complexes are no longer present  Inverted T waves have replaced nonspecific T wave abnormality in Lateral  leads  Confirmed by AKRAM     · Estimated EF = 61%.  · Left ventricular systolic function is normal.  · Left ventricular diastolic dysfunction (grade I) consistent with impaired relaxation.  · Mild mitral valve regurgitation is present  · Mild tricuspid valve regurgitation is present.    Neuro/Psych  (+) headaches (migraines every 2-3 months), psychiatric history,     (-) seizures, TIA, CVA  GI/Hepatic/Renal/Endo    (+)  GERD well controlled,    (-) hepatitis, liver disease, no renal disease, diabetes, hypothyroidism    Musculoskeletal     (+) back pain, chronic pain, radiculopathy Left lower extremity      ROS comment: Pain 10/10  Radiculopathy down left leg  Abdominal    Substance History - negative use  (-) alcohol use, drug use     OB/GYN          Other   (+) arthritis     (-) history of cancer                  Anesthesia Plan    ASA 3     MAC   (Will order duoneb)  intravenous induction   Anesthetic plan, all risks,  benefits, and alternatives have been provided, discussed and informed consent has been obtained with: patient and sibling.

## 2019-05-29 RX ORDER — RIVAROXABAN 20 MG/1
20 TABLET, FILM COATED ORAL DAILY
Qty: 30 TABLET | Refills: 6 | Status: SHIPPED | OUTPATIENT
Start: 2019-05-29 | End: 2020-09-22 | Stop reason: SDUPTHER

## 2019-09-23 ENCOUNTER — TRANSCRIBE ORDERS (OUTPATIENT)
Dept: MRI IMAGING | Facility: HOSPITAL | Age: 69
End: 2019-09-23

## 2019-09-23 DIAGNOSIS — R51.9 NEW ONSET HEADACHE: Primary | ICD-10-CM

## 2019-09-30 ENCOUNTER — HOSPITAL ENCOUNTER (OUTPATIENT)
Dept: MRI IMAGING | Facility: HOSPITAL | Age: 69
Discharge: HOME | End: 2019-09-30
Admitting: NURSE PRACTITIONER

## 2019-09-30 DIAGNOSIS — G44.89 OTHER HEADACHE SYNDROME: ICD-10-CM

## 2019-09-30 PROCEDURE — 25010000002 GADOTERIDOL PER 1 ML: Performed by: NURSE PRACTITIONER

## 2019-09-30 PROCEDURE — A9576 INJ PROHANCE MULTIPACK: HCPCS | Performed by: NURSE PRACTITIONER

## 2019-09-30 PROCEDURE — 70553 MRI BRAIN STEM W/O & W/DYE: CPT

## 2019-09-30 RX ADMIN — GADOTERIDOL 12 ML: 279.3 INJECTION, SOLUTION INTRAVENOUS at 11:05

## 2019-10-01 ENCOUNTER — HOSPITAL ENCOUNTER (EMERGENCY)
Facility: HOSPITAL | Age: 69
Discharge: HOME | End: 2019-10-01
Attending: FAMILY MEDICINE

## 2019-10-01 VITALS
DIASTOLIC BLOOD PRESSURE: 65 MMHG | WEIGHT: 124 LBS | HEART RATE: 71 BPM | TEMPERATURE: 98.4 F | BODY MASS INDEX: 22.82 KG/M2 | HEIGHT: 62 IN | OXYGEN SATURATION: 100 % | RESPIRATION RATE: 17 BRPM | SYSTOLIC BLOOD PRESSURE: 119 MMHG

## 2019-10-01 DIAGNOSIS — H53.9 VISION CHANGES: Primary | ICD-10-CM

## 2019-10-01 DIAGNOSIS — R53.1 WEAKNESS: ICD-10-CM

## 2019-10-01 NOTE — ED PROVIDER NOTES
Subjective   Patient had an MRI done in clinic yesterday.  Her provider sent her to the emergency department today to be evaluated for abnormal results that were seen on the MRI.  The MRI was reviewed in the emergency department and no acute findings were seen on the MRI.  Patient does complain of generalized weakness that has been progressing over the past several years and over the past 2 months she has had several falls at home.  She states that her legs feel weak, and she also complains of visual changes mainly from her left eye over the past few months.  Patient continues to smoke.        Fall   Prior to arrival data:     Loss of consciousness: no      Amnesic to event: no      Airway interventions:  None    Breathing interventions:  None  Associated symptoms: no abdominal pain, no chest pain, no headaches, no nausea, no neck pain, no seizures and no vomiting    Risk factors: COPD        Review of Systems   Constitutional: Negative for appetite change, chills, diaphoresis, fatigue and fever.   HENT: Negative for congestion, ear discharge, ear pain, nosebleeds, rhinorrhea, sinus pressure, sore throat and trouble swallowing.    Eyes: Negative for discharge and redness.   Respiratory: Positive for wheezing. Negative for apnea, cough, chest tightness and shortness of breath.    Cardiovascular: Negative for chest pain.   Gastrointestinal: Negative for abdominal pain, diarrhea, nausea and vomiting.   Endocrine: Negative for polyuria.   Genitourinary: Negative for dysuria, frequency and urgency.   Musculoskeletal: Negative for myalgias and neck pain.   Skin: Negative for color change and rash.   Allergic/Immunologic: Negative for immunocompromised state.   Neurological: Positive for weakness. Negative for dizziness, seizures, syncope, light-headedness and headaches.   Hematological: Negative for adenopathy. Does not bruise/bleed easily.   Psychiatric/Behavioral: Negative for behavioral problems and confusion.   All  other systems reviewed and are negative.      Past Medical History:   Diagnosis Date   • A-fib (CMS/[facility])    • Anxiety    • Arthritis    • Asthma    • 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 with ulceration (CMS/[facility])     bilateral legs - bilateral iliac stents 2008      • CHF (congestive heart failure) (CMS/[facility])    • Chronic lower back pain    • COPD (chronic obstructive pulmonary disease) (CMS/[facility])    • Essential (primary) hypertension    • GERD (gastroesophageal reflux disease)    • History of pulmonary embolus (PE)    • Hyperlipidemia    • Insomnia    • Lumbago    • Nicotine dependence    • Occlusion of artery      and stenosis of bilateral carotid arteries - BABS 16-49%, LICA 0-15%   • Other atherosclerosis of native artery of other extremity     LEFT subclavian stent 2005 (occluded)   • Sleep apnea        Allergies   Allergen Reactions   • Morphine And Related Itching, Confusion and Hallucinations   • Penicillins Rash       Past Surgical History:   Procedure Laterality Date   • CARDIAC CATHETERIZATION  04/06/2016    No evidence of any obstructive epicardial CAD.Preserved LV systolic function with EF of 55%.   • CENTRAL VENOUS LINE INSERTION  04/07/2016    Successful placement of right uppe extremity 6-[place] triple lumen PICC line.   • ENDOSCOPY N/A 1/12/2018    Procedure: ESOPHAGOGASTRODUODENOSCOPY;  Surgeon: Bin Oh MD;  Location: [facility] ENDOSCOPY;  Service:    • ENDOSCOPY N/A 1/17/2018    Procedure: ESOPHAGOGASTRODUODENOSCOPY;  Surgeon: Bin Oh MD;  Location: [facility] ENDOSCOPY;  Service:    • ENDOSCOPY N/A 3/16/2018    Procedure: ESOPHAGOGASTRODUODENOSCOPY possible dilation;  Surgeon: Bin hO MD;  Location: [facility] ENDOSCOPY;  Service: Gastroenterology   • HYSTERECTOMY     • KYPHOPLASTY N/A 5/23/2019    Procedure: KYPHOPLASTY LUMBAR FOUR;  Surgeon: Aba Santa MD;  Location: [facility] OR;  Service: Orthopedic Spine   • TOTAL SHOULDER REPLACEMENT Left    •  TRANSESOPHAGEAL ECHOCARDIOGRAM (JACQUES)  2016    With color flow-Mild to moderate [facility].LV systolic function well preserved with Ef of 55-60%.Mitral and AV intact.Diastolic dysfunction   • UPPER GASTROINTESTINAL ENDOSCOPY  2018    Dr. Bin Martin M.D.       Family History   Problem Relation Age of Onset   • Heart disease Other    • Hypertension Other        Social History     Socioeconomic History   • Marital status:      Spouse name: Not on file   • Number of children: Not on file   • Years of education: Not on file   • Highest education level: Not on file   Tobacco Use   • Smoking status: Former Smoker     Packs/day: 1.00     Years: 50.00     Pack years: 50.00     Types: Cigarettes     Last attempt to quit: 2019     Years since quittin.6   • Smokeless tobacco: Never Used   Substance and Sexual Activity   • Alcohol use: No   • Drug use: No   • Sexual activity: Defer           Objective   Physical Exam   Constitutional: She is oriented to person, place, and time. She appears well-developed and well-nourished.   HENT:   Head: Normocephalic and atraumatic.   Nose: Nose normal.   Mouth/Throat: Oropharynx is clear and moist.   Eyes: Conjunctivae and EOM are normal. Pupils are equal, round, and reactive to light. Right eye exhibits no discharge. Left eye exhibits no discharge. No scleral icterus.   Neck: Normal range of motion. Neck supple. No tracheal deviation present.   Cardiovascular: Normal rate, regular rhythm and normal heart sounds.   No murmur heard.  Pulmonary/Chest: Effort normal. No stridor. No respiratory distress. She has wheezes. She has no rales.   Abdominal: Soft. Bowel sounds are normal. She exhibits no distension and no mass. There is no tenderness. There is no rebound and no guarding.   Musculoskeletal: She exhibits no edema.   Neurological: She is alert and oriented to person, place, and time. Coordination normal.   Skin: Skin is warm and dry. No rash noted. No erythema.    Psychiatric: She has a normal mood and affect. Her behavior is normal. Thought content normal.   Nursing note and vitals reviewed.      Procedures           ED Course  ED Course as of Oct 15 1512   Tue Oct 01, 2019   1850 Lab work was ordered to assess the patient is generalized fatigue.  She refused labs and eloped from the emergency department to the waiting room where she waited for her discharge paperwork.  [CB]      ED Course User Index  [CB] Marino Perez MD          Labs Reviewed - No data to display    No orders to display                 MDM    Final diagnoses:   Vision changes   Weakness              Marino Perez MD  10/15/19 [id number]       Marino Perez MD  10/15/19 4539

## 2019-10-01 NOTE — ED TRIAGE NOTES
"Pt states she has been falling for 2 months and seeing \"floaties.\" Pt saw PCP 2 weeks ago and had MRI scheduled for yesterday. PCP received results today. Pt referred to ED r/t \"white patches\" on MRI.  "

## 2019-11-12 DIAGNOSIS — I95.1 AUTONOMIC ORTHOSTATIC HYPOTENSION: Primary | ICD-10-CM

## 2020-01-21 DIAGNOSIS — R55 SYNCOPE, UNSPECIFIED SYNCOPE TYPE: Primary | ICD-10-CM

## 2020-02-25 ENCOUNTER — HOSPITAL ENCOUNTER (OUTPATIENT)
Dept: ULTRASOUND IMAGING | Facility: HOSPITAL | Age: 70
End: 2020-02-25

## 2020-02-26 ENCOUNTER — HOSPITAL ENCOUNTER (OUTPATIENT)
Dept: ULTRASOUND IMAGING | Facility: HOSPITAL | Age: 70
Discharge: HOME | End: 2020-02-26
Admitting: NURSE PRACTITIONER

## 2020-02-26 DIAGNOSIS — M79.605 LEFT LEG PAIN: ICD-10-CM

## 2020-02-26 DIAGNOSIS — R60.0 LOCALIZED EDEMA: ICD-10-CM

## 2020-02-26 PROCEDURE — 93971 EXTREMITY STUDY: CPT

## 2020-03-02 ENCOUNTER — APPOINTMENT (OUTPATIENT)
Dept: GENERAL RADIOLOGY | Facility: HOSPITAL | Age: 70
End: 2020-03-02

## 2020-03-02 ENCOUNTER — APPOINTMENT (OUTPATIENT)
Dept: ULTRASOUND IMAGING | Facility: HOSPITAL | Age: 70
End: 2020-03-02

## 2020-03-02 ENCOUNTER — HOSPITAL ENCOUNTER (OUTPATIENT)
Facility: HOSPITAL | Age: 70
Setting detail: OBSERVATION
Discharge: HOME | End: 2020-03-04
Attending: EMERGENCY MEDICINE | Admitting: HOSPITALIST

## 2020-03-02 DIAGNOSIS — Z74.09 IMPAIRED MOBILITY AND ACTIVITIES OF DAILY LIVING: ICD-10-CM

## 2020-03-02 DIAGNOSIS — I25.119 ATHEROSCLEROSIS OF NATIVE CORONARY ARTERY OF NATIVE HEART WITH ANGINA PECTORIS (HCC): ICD-10-CM

## 2020-03-02 DIAGNOSIS — R42 POSTURAL DIZZINESS WITH NEAR SYNCOPE: ICD-10-CM

## 2020-03-02 DIAGNOSIS — Z78.9 IMPAIRED MOBILITY AND ACTIVITIES OF DAILY LIVING: ICD-10-CM

## 2020-03-02 DIAGNOSIS — R55 POSTURAL DIZZINESS WITH NEAR SYNCOPE: ICD-10-CM

## 2020-03-02 DIAGNOSIS — R53.1 GENERAL WEAKNESS: ICD-10-CM
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
## 2020-03-02 DIAGNOSIS — I95.9 HYPOTENSION, UNSPECIFIED HYPOTENSION TYPE: Primary | ICD-10-CM

## 2020-03-02 DIAGNOSIS — Z74.09 IMPAIRED PHYSICAL MOBILITY: ICD-10-CM

## 2020-03-02 PROBLEM — D72.829 ELEVATED WBCS: Status: ACTIVE | Noted: 2020-03-02

## 2020-03-02 LAB
ALBUMIN SERPL-MCNC: 4.4 G/DL (ref 3.5–5.2)
ALBUMIN/GLOB SERPL: 1.3 G/DL
ALP SERPL-CCNC: 102 U/L (ref 39–117)
ALT SERPL W P-5'-P-CCNC: 15 U/L (ref 1–33)
ANION GAP SERPL CALCULATED.3IONS-SCNC: 15 MMOL/L (ref 5–15)
AST SERPL-CCNC: 57 U/L (ref 1–32)
BASOPHILS # BLD AUTO: 0.1 10*3/MM3 (ref 0–0.2)
BASOPHILS NFR BLD AUTO: 0.8 % (ref 0–1.5)
BILIRUB SERPL-MCNC: 0.3 MG/DL (ref 0.2–1.2)
BUN BLD-MCNC: 7 MG/DL (ref 8–23)
BUN/CREAT SERPL: 10.4 (ref 7–25)
CALCIUM SPEC-SCNC: 9.4 MG/DL (ref 8.6–10.5)
CHLORIDE SERPL-SCNC: 95 MMOL/L (ref 98–107)
CO2 SERPL-SCNC: 28 MMOL/L (ref 22–29)
CREAT BLD-MCNC: 0.67 MG/DL (ref 0.57–1)
DEPRECATED RDW RBC AUTO: 54.8 FL (ref 37–54)
EOSINOPHIL # BLD AUTO: 0.04 10*3/MM3 (ref 0–0.4)
EOSINOPHIL NFR BLD AUTO: 0.3 % (ref 0.3–6.2)
ERYTHROCYTE [DISTWIDTH] IN BLOOD BY AUTOMATED COUNT: 17.8 % (ref 12.3–15.4)
GFR SERPL CREATININE-BSD FRML MDRD: 88 ML/MIN/1.73
GLOBULIN UR ELPH-MCNC: 3.4 GM/DL
GLUCOSE BLD-MCNC: 104 MG/DL (ref 65–99)
HCT VFR BLD AUTO: 37.2 % (ref 34–46.6)
HGB BLD-MCNC: 12.1 G/DL (ref 12–15.9)
HOLD SPECIMEN: NORMAL
HOLD SPECIMEN: NORMAL
IMM GRANULOCYTES # BLD AUTO: 0.03 10*3/MM3 (ref 0–0.05)
IMM GRANULOCYTES NFR BLD AUTO: 0.2 % (ref 0–0.5)
LYMPHOCYTES # BLD AUTO: 3.99 10*3/MM3 (ref 0.7–3.1)
LYMPHOCYTES NFR BLD AUTO: 32.5 % (ref 19.6–45.3)
MAGNESIUM SERPL-MCNC: 1.9 MG/DL (ref 1.6–2.4)
MCH RBC QN AUTO: 27.6 PG (ref 26.6–33)
MCHC RBC AUTO-ENTMCNC: 32.5 G/DL (ref 31.5–35.7)
MCV RBC AUTO: 84.7 FL (ref 79–97)
MONOCYTES # BLD AUTO: 1.24 10*3/MM3 (ref 0.1–0.9)
MONOCYTES NFR BLD AUTO: 10.1 % (ref 5–12)
NEUTROPHILS # BLD AUTO: 6.88 10*3/MM3 (ref 1.7–7)
NEUTROPHILS NFR BLD AUTO: 56.1 % (ref 42.7–76)
NRBC BLD AUTO-RTO: 0 /100 WBC (ref 0–0.2)
NT-PROBNP SERPL-MCNC: 239.7 PG/ML (ref 5–900)
PLATELET # BLD AUTO: 314 10*3/MM3 (ref 140–450)
PMV BLD AUTO: 8.7 FL (ref 6–12)
POTASSIUM BLD-SCNC: 3.6 MMOL/L (ref 3.5–5.2)
PROT SERPL-MCNC: 7.8 G/DL (ref 6–8.5)
RBC # BLD AUTO: 4.39 10*6/MM3 (ref 3.77–5.28)
SODIUM BLD-SCNC: 138 MMOL/L (ref 136–145)
TROPONIN T SERPL-MCNC: <0.01 NG/ML (ref 0–0.03)
WBC NRBC COR # BLD: 12.28 10*3/MM3 (ref 3.4–10.8)
WHOLE BLOOD HOLD SPECIMEN: NORMAL
WHOLE BLOOD HOLD SPECIMEN: NORMAL

## 2020-03-02 PROCEDURE — 93005 ELECTROCARDIOGRAM TRACING: CPT | Performed by: EMERGENCY MEDICINE

## 2020-03-02 PROCEDURE — 99284 EMERGENCY DEPT VISIT MOD MDM: CPT

## 2020-03-02 PROCEDURE — G0378 HOSPITAL OBSERVATION PER HR: HCPCS

## 2020-03-02 PROCEDURE — 93010 ELECTROCARDIOGRAM REPORT: CPT | Performed by: INTERNAL MEDICINE

## 2020-03-02 PROCEDURE — 93005 ELECTROCARDIOGRAM TRACING: CPT

## 2020-03-02 PROCEDURE — 85025 COMPLETE CBC W/AUTO DIFF WBC: CPT | Performed by: EMERGENCY MEDICINE

## 2020-03-02 PROCEDURE — 80053 COMPREHEN METABOLIC PANEL: CPT | Performed by: EMERGENCY MEDICINE

## 2020-03-02 PROCEDURE — 83880 ASSAY OF NATRIURETIC PEPTIDE: CPT | Performed by: EMERGENCY MEDICINE

## 2020-03-02 PROCEDURE — 83735 ASSAY OF MAGNESIUM: CPT | Performed by: EMERGENCY MEDICINE

## 2020-03-02 PROCEDURE — 71045 X-RAY EXAM CHEST 1 VIEW: CPT

## 2020-03-02 PROCEDURE — 93971 EXTREMITY STUDY: CPT

## 2020-03-02 PROCEDURE — 84484 ASSAY OF TROPONIN QUANT: CPT | Performed by: EMERGENCY MEDICINE

## 2020-03-02 RX ORDER — DOCUSATE SODIUM 100 MG/1
100 CAPSULE, LIQUID FILLED ORAL 2 TIMES DAILY
Status: DISCONTINUED | OUTPATIENT
Start: 2020-03-02 | End: 2020-03-04 | Stop reason: HOSPADM

## 2020-03-02 RX ORDER — TRAZODONE HYDROCHLORIDE 150 MG/1
150 TABLET ORAL NIGHTLY
Status: DISCONTINUED | OUTPATIENT
Start: 2020-03-02 | End: 2020-03-04 | Stop reason: HOSPADM

## 2020-03-02 RX ORDER — ALBUTEROL SULFATE 2.5 MG/3ML
2.5 SOLUTION RESPIRATORY (INHALATION) EVERY 8 HOURS PRN
Status: DISCONTINUED | OUTPATIENT
Start: 2020-03-02 | End: 2020-03-04 | Stop reason: HOSPADM

## 2020-03-02 RX ORDER — PANTOPRAZOLE SODIUM 40 MG/1
40 TABLET, DELAYED RELEASE ORAL DAILY
Status: DISCONTINUED | OUTPATIENT
Start: 2020-03-02 | End: 2020-03-04 | Stop reason: HOSPADM

## 2020-03-02 RX ORDER — GABAPENTIN 400 MG/1
400 CAPSULE ORAL ONCE
Status: COMPLETED | OUTPATIENT
Start: 2020-03-02 | End: 2020-03-02

## 2020-03-02 RX ORDER — SODIUM CHLORIDE 0.9 % (FLUSH) 0.9 %
10 SYRINGE (ML) INJECTION EVERY 12 HOURS SCHEDULED
Status: DISCONTINUED | OUTPATIENT
Start: 2020-03-02 | End: 2020-03-04 | Stop reason: HOSPADM

## 2020-03-02 RX ORDER — SODIUM CHLORIDE 0.9 % (FLUSH) 0.9 %
10 SYRINGE (ML) INJECTION AS NEEDED
Status: DISCONTINUED | OUTPATIENT
Start: 2020-03-02 | End: 2020-03-04 | Stop reason: HOSPADM

## 2020-03-02 RX ORDER — SODIUM CHLORIDE 9 MG/ML
125 INJECTION, SOLUTION INTRAVENOUS CONTINUOUS
Status: DISCONTINUED | OUTPATIENT
Start: 2020-03-02 | End: 2020-03-04

## 2020-03-02 RX ADMIN — RIVAROXABAN 20 MG: 10 TABLET, FILM COATED ORAL at 18:32

## 2020-03-02 RX ADMIN — SODIUM CHLORIDE, PRESERVATIVE FREE 10 ML: 5 INJECTION INTRAVENOUS at 22:42

## 2020-03-02 RX ADMIN — TRAZODONE HYDROCHLORIDE 150 MG: 150 TABLET ORAL at 22:40

## 2020-03-02 RX ADMIN — PANTOPRAZOLE SODIUM 40 MG: 40 TABLET, DELAYED RELEASE ORAL at 18:31

## 2020-03-02 RX ADMIN — SODIUM CHLORIDE 500 ML: 9 INJECTION, SOLUTION INTRAVENOUS at 14:33

## 2020-03-02 RX ADMIN — GABAPENTIN 400 MG: 400 CAPSULE ORAL at 22:40

## 2020-03-02 RX ADMIN — SODIUM CHLORIDE 125 ML/HR: 900 INJECTION, SOLUTION INTRAVENOUS at 15:24

## 2020-03-02 NOTE — H&P
[facility] Medicine Admission      Date of Admission: 3/2/2020      Primary Care Physician: Provider, No Known      Chief Complaint: Near syncope for 2 to 3 days    HPI: This is a [age]-year-old woman with past medical history of hypertension,  hyperlipidemia, CHF, A. fib, pulmonary embolism, COPD, anxiety came to the hospital complaining of near syncope and lightheadedness for 2- 3 days, patient went to see her private doctor today and she felt dizzy advised her to come to emergency room also patient complaining of mild swelling of left leg for which ultrasound was done in ER came back negative for DVT, patient received 500 cc of normal saline patient was orthostatic in ER the blood pressure was 77/50 diastolic for which patient is being admitted for near syncope, orthostatic hypotension, elevated WBCs  No history of falling, no head trauma, no fever, no chest pain, no shortness of breath, no burning urination, change in mental status, no loss of consciousness,    Concurrent Medical History:  has a past medical history of A-fib (CMS/[facility]), Anxiety, Arthritis, Asthma, 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 with ulceration (CMS/[facility]), CHF (congestive heart failure) (CMS/HCC), Chronic lower back pain, COPD (chronic obstructive pulmonary disease) (CMS/[facility]), Essential (primary) hypertension, GERD (gastroesophageal reflux disease), History of pulmonary embolus (PE), Hyperlipidemia, Insomnia, Lumbago, Nicotine dependence, Occlusion of artery, Other atherosclerosis of native artery of other extremity, and Sleep apnea.    Past Surgical History:  has a past surgical history that includes Cardiac catheterization (04/06/2016); Central venous catheter insertion (04/07/2016); transesophageal echocardiogram (yanet) (04/07/2016); Total shoulder replacement (Left); Hysterectomy; Esophagogastroduodenoscopy (N/A, 1/12/2018); Esophagogastroduodenoscopy (N/A, 1/17/2018); Upper  gastrointestinal endoscopy (01/17/2018); Esophagogastroduodenoscopy (N/A, 3/16/2018); and Kyphoplasty (N/A, 5/23/2019).    Family History: family history includes Heart disease in an other family member; Hypertension in an other family member.     Social History:  reports that she quit smoking about a year ago. Her smoking use included cigarettes. She has a 50.00 pack-year smoking history. She has never used smokeless tobacco. She reports that she does not drink alcohol or use drugs.    Allergies:   Allergies   Allergen Reactions   • Morphine And Related Itching, Confusion and Hallucinations   • Penicillins Rash       Medications:   Prior to Admission medications    Medication Sig Start Date End Date Taking? Authorizing Provider   albuterol (PROVENTIL) (2.5 MG/3ML) 0.083% nebulizer solution Take 2.5 mg by nebulization Every 8 (Eight) Hours As Needed for Wheezing.    Miguelito Chatman MD   amLODIPine (NORVASC) 10 MG tablet Take 10 mg by mouth Daily.    Miguelito Chatman MD   benzonatate (TESSALON) 100 MG capsule Take 1 capsule by mouth 3 (Three) Times a Day As Needed for Cough. 2/26/19   Donn Triana MD   cyclobenzaprine (FLEXERIL) 10 MG tablet Take 10 mg by mouth 3 (Three) Times a Day As Needed for Muscle Spasms.    Miguelito Chatman MD   docusate sodium 100 MG capsule Take 100 mg by mouth 2 (Two) Times a Day. 2/26/19   Donn Triana MD   gabapentin (NEURONTIN) 800 MG tablet Take 800 mg by mouth 3 (Three) Times a Day.    Miguelito Chatman MD   oxyCODONE-acetaminophen (PERCOCET) 7.5-325 MG per tablet Take 1 tablet by mouth Every 4 (Four) Hours As Needed for Moderate Pain . 5/23/19   Aba Santa MD   pantoprazole (PROTONIX) 40 MG EC tablet Take 1 tablet by mouth Daily. 2/5/18   Mary Shen APRN   penciclovir (DENAVIR) 1 % cream Apply 1 application topically to the appropriate area as directed As Needed (fever blisters).    Miguelito Chatman MD   sertraline (ZOLOFT) 100 MG tablet  Take 100 mg by mouth Daily.    Provider, MD Miguelito   traMADol (ULTRAM) 50 MG tablet Take 1 tablet by mouth Every 6 (Six) Hours As Needed for Moderate Pain . 5/13/19   Aba Santa MD   traZODone (DESYREL) 150 MG tablet Take 1 tablet by mouth Every Night. 2/26/19   Donn Triana MD   XARELTO 20 MG tablet TAKE 1 TABLET BY MOUTH DAILY. 5/29/19   Kris Peres MD       Review of Systems:  Review of Systems   Constitutional: Positive for fatigue. Negative for appetite change, chills, diaphoresis and fever.   HENT: Negative for congestion, dental problem, ear pain, facial swelling, mouth sores, nosebleeds, rhinorrhea, sinus pain and sore throat.    Eyes: Negative for photophobia, pain, discharge, redness and itching.   Respiratory: Negative for apnea, cough, choking, chest tightness, shortness of breath, wheezing and stridor.    Cardiovascular: Negative for chest pain, palpitations and leg swelling.   Gastrointestinal: Negative for abdominal distention, abdominal pain, anal bleeding, blood in stool, constipation, diarrhea, nausea and vomiting.   Genitourinary: Negative for difficulty urinating and flank pain.   Musculoskeletal: Negative for arthralgias, back pain and neck pain.   Skin: Negative for color change, pallor, rash and wound.   Neurological: Positive for syncope and light-headedness. Negative for seizures and facial asymmetry.   Psychiatric/Behavioral: Negative for agitation, behavioral problems, confusion, decreased concentration, hallucinations and self-injury. The patient is not nervous/anxious and is not hyperactive.       Otherwise complete ROS is negative except as mentioned above.    Physical Exam:   Temp:  [98.6 °F (37 °C)] 98.6 °F (37 °C)  Heart Rate:  [82-95] 95  Resp:  [18] 18  BP: ()/(50-98) 80/59  Physical Exam   Constitutional: She is oriented to person, place, and time. She appears well-developed and well-nourished. No distress.   HENT:   Head: Normocephalic and  atraumatic.   Right Ear: External ear normal.   Left Ear: External ear normal.   Nose: Nose normal.   Mouth/Throat: Oropharynx is clear and moist. No oropharyngeal exudate.   Eyes: Pupils are equal, round, and reactive to light. Conjunctivae and EOM are normal. Right eye exhibits no discharge. Left eye exhibits no discharge. No scleral icterus.   Neck: Normal range of motion. Neck supple. No JVD present. No tracheal deviation present.   Cardiovascular: Normal rate and regular rhythm. Exam reveals no friction rub.   No murmur heard.  Pulmonary/Chest: Effort normal and breath sounds normal. No stridor. She has no wheezes. She has no rales. She exhibits no tenderness.   Abdominal: Soft. Bowel sounds are normal. She exhibits no distension and no mass. There is no tenderness.   Musculoskeletal: Normal range of motion. She exhibits no edema, tenderness or deformity.   Neurological: She is alert and oriented to person, place, and time. She displays normal reflexes. No cranial nerve deficit or sensory deficit. She exhibits normal muscle tone. Coordination normal.   Skin: Skin is warm and dry. No rash noted. She is not diaphoretic. No erythema. No pallor.   Psychiatric: She has a normal mood and affect. Her behavior is normal. Judgment and thought content normal.   Nursing note and vitals reviewed.        Results Reviewed:  I have personally reviewed current lab, radiology, and data and agree with results.  Lab Results (last 24 hours)     Procedure Component Value Units Date/Time    [place] Draw [[id number]] Collected:  03/02/20 1434    Specimen:  Blood Updated:  03/02/20 1545    Narrative:       The following orders were created for panel order [place] Draw.  Procedure                               Abnormality         Status                     ---------                               -----------         ------                     Light Blue Top[[id number]]                                   Final result               Green Top  (Gel)[[id number]]                                  Final result               Lavender Top[[id number]]                                     Final result               Gold Top - SST[[id number]]                                   Final result                 Please view results for these tests on the individual orders.    Light Blue Top [[id number]] Collected:  03/02/20 1434    Specimen:  Blood Updated:  03/02/20 1545     Extra Tube hold for add-on     Comment: Auto resulted       Green Top (Gel) [[id number]] Collected:  03/02/20 1434    Specimen:  Blood Updated:  03/02/20 1545     Extra Tube Hold for add-ons.     Comment: Auto resulted.       Lavender Top [[id number]] Collected:  03/02/20 1434    Specimen:  Blood Updated:  03/02/20 1545     Extra Tube hold for add-on     Comment: Auto resulted       Gold Top - SST [[id number]] Collected:  03/02/20 1434    Specimen:  Blood Updated:  03/02/20 1545     Extra Tube Hold for add-ons.     Comment: Auto resulted.       Comprehensive Metabolic Panel [[id number]]  (Abnormal) Collected:  03/02/20 1434    Specimen:  Blood Updated:  03/02/20 1502     Glucose 104 mg/dL      BUN 7 mg/dL      Creatinine 0.67 mg/dL      Sodium 138 mmol/L      Potassium 3.6 mmol/L      Chloride 95 mmol/L      CO2 28.0 mmol/L      Calcium 9.4 mg/dL      Total Protein 7.8 g/dL      Albumin 4.40 g/dL      ALT (SGPT) 15 U/L      AST (SGOT) 57 U/L      Alkaline Phosphatase 102 U/L      Total Bilirubin 0.3 mg/dL      eGFR Non African Amer 88 mL/min/1.73      Globulin 3.4 gm/dL      A/G Ratio 1.3 g/dL      BUN/Creatinine Ratio 10.4     Anion Gap 15.0 mmol/L     Narrative:       GFR Normal >60  Chronic Kidney Disease <60  Kidney Failure <15      Troponin [[id number]]  (Normal) Collected:  03/02/20 1434    Specimen:  Blood Updated:  03/02/20 1502     Troponin T <0.010 ng/mL     Narrative:       Troponin T Reference Range:  <= 0.03 ng/mL-   Negative for AMI  >0.03 ng/mL-     Abnormal for myocardial necrosis.  Clinicians  would have to utilize clinical acumen, EKG, Troponin and serial changes to determine if it is an Acute Myocardial Infarction or myocardial injury due to an underlying chronic condition.       Results may be falsely decreased if patient taking Biotin.      Magnesium [[id number]]  (Normal) Collected:  03/02/20 1434    Specimen:  Blood Updated:  03/02/20 1502     Magnesium 1.9 mg/dL     BNP [[id number]]  (Normal) Collected:  03/02/20 1434    Specimen:  Blood Updated:  03/02/20 1500     proBNP 239.7 pg/mL     Narrative:       Among patients with dyspnea, NT-proBNP is highly sensitive for the detection of acute congestive heart failure. In addition NT-proBNP of <300 pg/ml effectively rules out acute congestive heart failure with 99% negative predictive value.    Results may be falsely decreased if patient taking Biotin.      CBC & Differential [[id number]] Collected:  03/02/20 1434    Specimen:  Blood Updated:  03/02/20 1442    Narrative:       The following orders were created for panel order CBC & Differential.  Procedure                               Abnormality         Status                     ---------                               -----------         ------                     CBC Auto Differential[[id number]]        Abnormal            Final result                 Please view results for these tests on the individual orders.    CBC Auto Differential [[id number]]  (Abnormal) Collected:  03/02/20 1434    Specimen:  Blood Updated:  03/02/20 1442     WBC 12.28 10*3/mm3      RBC 4.39 10*6/mm3      Hemoglobin 12.1 g/dL      Hematocrit 37.2 %      MCV 84.7 fL      MCH 27.6 pg      MCHC 32.5 g/dL      RDW 17.8 %      RDW-SD 54.8 fl      MPV 8.7 fL      Platelets 314 10*3/mm3      Neutrophil % 56.1 %      Lymphocyte % 32.5 %      Monocyte % 10.1 %      Eosinophil % 0.3 %      Basophil % 0.8 %      Immature Grans % 0.2 %      Neutrophils, Absolute 6.88 10*3/mm3      Lymphocytes, Absolute 3.99 10*3/mm3      Monocytes, Absolute  1.24 10*3/mm3      Eosinophils, Absolute 0.04 10*3/mm3      Basophils, Absolute 0.10 10*3/mm3      Immature Grans, Absolute 0.03 10*3/mm3      nRBC 0.0 /100 WBC         Imaging Results (Last 24 Hours)     Procedure Component Value Units Date/Time    US Venous Doppler Lower Extremity Left (duplex) [[id number]] Collected:  03/02/20 1306     Updated:  03/02/20 1413    Narrative:         Ultrasound venous duplex left lower extremity    HISTORY: Erythema    Duplex ultrasound of the deep venous system of the left lower  extremity was performed    FINDINGS:  Real-time images demonstrate normal compressibility without  evidence of intraluminal thrombus.  Doppler shows phasic flow and augmentation.  Color Doppler also reveals venous patency.      Impression:       CONCLUSION:  No ultrasound evidence of deep venous thrombosis of the left  lower extremity.    [id number]    Electronically signed by:  Jason Marie MD  3/2/2020 2:12 PM CST  Workstation: [id number]    XR Chest 1 View [[id number]] Collected:  03/02/20 1240     Updated:  03/02/20 1309    Narrative:         PROCEDURE: Single chest view portable erect    REASON FOR EXAM:gen weakness    FINDINGS: Comparison exam dated May 20, 2019. Cardiac and  pulmonary vasculature are normal. Lungs are clear. Pleural spaces  are normal. No acute osseous abnormality. Stable postsurgical  changes consistent with left reverse total shoulder arthroplasty.      Impression:       No acute cardiopulmonary abnormality.    Electronically signed by:  Jim Muro MD  3/2/2020 1:08 PM CST  Workstation: [id number]            Assessment:      Hypotension    Elevated WBCs    Near syncope          Plan:  1.  Near syncope  No chest pain  Troponins negative  Patient is orthostatic patient blood pressure dropped systolic to 77/50 diastolic  Admit to hospital observation status  Telemetry  Normal saline  Hold antihypertensive medication  Orthostatic blood pressure measurement    2.  Hypotension  Orthostatic  hypotension  Treat as above    3.  Elevated WBCs  No burning urination, no coughing, no fever  No symptoms no signs of infection  Most probably secondary to stress  Observe  Follow-up white blood cell count in a.m.    DVT and GI prophylaxis  Continue Xarelto home dose once pharmacy verify and Protonix    CODE STATUS full code    I discussed the patient's findings and my recommendations with: Patient at bedside and ER physician Dr. Miguelangel Gomez MD  03/02/20  3:48 PM

## 2020-03-02 NOTE — ED NOTES
Patient placed on Telemetry [id number], confirmed with telemetry at ext [phone]     Radha Wei, LPN  03/02/20 2166

## 2020-03-03 LAB
ANION GAP SERPL CALCULATED.3IONS-SCNC: 14 MMOL/L (ref 5–15)
BACTERIA UR QL AUTO: ABNORMAL /HPF
BASOPHILS # BLD AUTO: 0.08 10*3/MM3 (ref 0–0.2)
BASOPHILS NFR BLD AUTO: 0.8 % (ref 0–1.5)
BILIRUB UR QL STRIP: NEGATIVE
BUN BLD-MCNC: 8 MG/DL (ref 8–23)
BUN/CREAT SERPL: 12.7 (ref 7–25)
CALCIUM SPEC-SCNC: 9.4 MG/DL (ref 8.6–10.5)
CHLORIDE SERPL-SCNC: 100 MMOL/L (ref 98–107)
CLARITY UR: CLEAR
CO2 SERPL-SCNC: 24 MMOL/L (ref 22–29)
COLOR UR: YELLOW
CREAT BLD-MCNC: 0.63 MG/DL (ref 0.57–1)
DEPRECATED RDW RBC AUTO: 53 FL (ref 37–54)
EOSINOPHIL # BLD AUTO: 0.13 10*3/MM3 (ref 0–0.4)
EOSINOPHIL NFR BLD AUTO: 1.3 % (ref 0.3–6.2)
ERYTHROCYTE [DISTWIDTH] IN BLOOD BY AUTOMATED COUNT: 17.4 % (ref 12.3–15.4)
GFR SERPL CREATININE-BSD FRML MDRD: 94 ML/MIN/1.73
GLUCOSE BLD-MCNC: 102 MG/DL (ref 65–99)
GLUCOSE UR STRIP-MCNC: NEGATIVE MG/DL
HCT VFR BLD AUTO: 37.9 % (ref 34–46.6)
HGB BLD-MCNC: 12.6 G/DL (ref 12–15.9)
HGB UR QL STRIP.AUTO: NEGATIVE
HYALINE CASTS UR QL AUTO: ABNORMAL /LPF
IMM GRANULOCYTES # BLD AUTO: 0.02 10*3/MM3 (ref 0–0.05)
IMM GRANULOCYTES NFR BLD AUTO: 0.2 % (ref 0–0.5)
KETONES UR QL STRIP: NEGATIVE
LEUKOCYTE ESTERASE UR QL STRIP.AUTO: NEGATIVE
LYMPHOCYTES # BLD AUTO: 2.6 10*3/MM3 (ref 0.7–3.1)
LYMPHOCYTES NFR BLD AUTO: 25.8 % (ref 19.6–45.3)
MCH RBC QN AUTO: 27.8 PG (ref 26.6–33)
MCHC RBC AUTO-ENTMCNC: 33.2 G/DL (ref 31.5–35.7)
MCV RBC AUTO: 83.7 FL (ref 79–97)
MONOCYTES # BLD AUTO: 1.31 10*3/MM3 (ref 0.1–0.9)
MONOCYTES NFR BLD AUTO: 13 % (ref 5–12)
NEUTROPHILS # BLD AUTO: 5.92 10*3/MM3 (ref 1.7–7)
NEUTROPHILS NFR BLD AUTO: 58.9 % (ref 42.7–76)
NITRITE UR QL STRIP: NEGATIVE
NRBC BLD AUTO-RTO: 0 /100 WBC (ref 0–0.2)
PH UR STRIP.AUTO: 7 [PH] (ref 5–9)
PLATELET # BLD AUTO: 302 10*3/MM3 (ref 140–450)
PMV BLD AUTO: 8.5 FL (ref 6–12)
POTASSIUM BLD-SCNC: 3.8 MMOL/L (ref 3.5–5.2)
PROT UR QL STRIP: ABNORMAL
RBC # BLD AUTO: 4.53 10*6/MM3 (ref 3.77–5.28)
RBC # UR: ABNORMAL /HPF
REF LAB TEST METHOD: ABNORMAL
SODIUM BLD-SCNC: 138 MMOL/L (ref 136–145)
SP GR UR STRIP: 1.02 (ref 1–1.03)
SQUAMOUS #/AREA URNS HPF: ABNORMAL /HPF
UROBILINOGEN UR QL STRIP: ABNORMAL
WBC NRBC COR # BLD: 10.06 10*3/MM3 (ref 3.4–10.8)
WBC UR QL AUTO: ABNORMAL /HPF

## 2020-03-03 PROCEDURE — 81001 URINALYSIS AUTO W/SCOPE: CPT | Performed by: INTERNAL MEDICINE

## 2020-03-03 PROCEDURE — 97165 OT EVAL LOW COMPLEX 30 MIN: CPT

## 2020-03-03 PROCEDURE — 80048 BASIC METABOLIC PNL TOTAL CA: CPT | Performed by: INTERNAL MEDICINE

## 2020-03-03 PROCEDURE — 97162 PT EVAL MOD COMPLEX 30 MIN: CPT

## 2020-03-03 PROCEDURE — G0378 HOSPITAL OBSERVATION PER HR: HCPCS

## 2020-03-03 PROCEDURE — 85025 COMPLETE CBC W/AUTO DIFF WBC: CPT | Performed by: INTERNAL MEDICINE

## 2020-03-03 RX ORDER — MIDODRINE HYDROCHLORIDE 5 MG/1
5 TABLET ORAL
Status: DISCONTINUED | OUTPATIENT
Start: 2020-03-03 | End: 2020-03-04 | Stop reason: HOSPADM

## 2020-03-03 RX ORDER — GABAPENTIN 400 MG/1
800 CAPSULE ORAL EVERY 12 HOURS SCHEDULED
Status: DISCONTINUED | OUTPATIENT
Start: 2020-03-03 | End: 2020-03-04 | Stop reason: HOSPADM

## 2020-03-03 RX ADMIN — GABAPENTIN 800 MG: 400 CAPSULE ORAL at 20:42

## 2020-03-03 RX ADMIN — DOCUSATE SODIUM 100 MG: 100 CAPSULE, LIQUID FILLED ORAL at 09:08

## 2020-03-03 RX ADMIN — SERTRALINE HYDROCHLORIDE 100 MG: 50 TABLET ORAL at 09:09

## 2020-03-03 RX ADMIN — TRAZODONE HYDROCHLORIDE 150 MG: 150 TABLET ORAL at 20:42

## 2020-03-03 RX ADMIN — MIDODRINE HYDROCHLORIDE 5 MG: 5 TABLET ORAL at 17:22

## 2020-03-03 RX ADMIN — SODIUM CHLORIDE, PRESERVATIVE FREE 10 ML: 5 INJECTION INTRAVENOUS at 09:09

## 2020-03-03 RX ADMIN — PANTOPRAZOLE SODIUM 40 MG: 40 TABLET, DELAYED RELEASE ORAL at 09:09

## 2020-03-03 RX ADMIN — DOCUSATE SODIUM 100 MG: 100 CAPSULE, LIQUID FILLED ORAL at 20:42

## 2020-03-03 RX ADMIN — GABAPENTIN 800 MG: 400 CAPSULE ORAL at 11:50

## 2020-03-03 RX ADMIN — MIDODRINE HYDROCHLORIDE 5 MG: 5 TABLET ORAL at 11:50

## 2020-03-03 RX ADMIN — RIVAROXABAN 20 MG: 10 TABLET, FILM COATED ORAL at 09:09

## 2020-03-03 RX ADMIN — SODIUM CHLORIDE, PRESERVATIVE FREE 10 ML: 5 INJECTION INTRAVENOUS at 20:42

## 2020-03-03 NOTE — THERAPY EVALUATION
Patient Name: Mona Perales  : 1950    MRN: [id number]                              Today's Date: 3/3/2020       Admit Date: 3/2/2020    Visit Dx:     ICD-10-CM ICD-9-CM   1. Hypotension, unspecified hypotension type I95.9 458.9   2. General weakness R53.1 780.79   3. Postural dizziness with near syncope R42 780.4    R55 780.2   4. Impaired mobility and activities of daily living Z74.09 799.89   5. Impaired physical mobility Z74.09 781.99     Patient Active Problem List   Diagnosis   • Anxiety   • CAD (coronary atherosclerotic disease)   • Carotid stenosis   • COPD (chronic obstructive pulmonary disease) (CMS/[facility])   • COPD with exacerbation (CMS/[facility])   • Depressive disorder, not elsewhere classified   • Benign essential hypertension   • Hypercholesteremia   • Insomnia   • Notalgia   • Osteoporosis   • Other screening mammogram   • RUQ abdominal pain   • Peripheral arterial disease (CMS/[facility])   • Dizziness   • Nicotine abuse   • Dysphagia   • Epigastric pain   • Pain of right hand   • Closed displaced fracture of shaft of fifth metacarpal bone of right hand   • Cause of injury, fall   • Displaced fracture of shaft of fifth metacarpal bone of right hand with routine healing   • Fall   • Syncope   • Acute respiratory failure with hypoxia (CMS/[facility])   • (HFpEF) heart failure with preserved ejection fraction (CMS/[facility])   • Hypotension   • Elevated WBCs   • Near syncope     Past Medical History:   Diagnosis Date   • A-fib (CMS/[facility])    • Anxiety    • Arthritis    • Asthma    • 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 with ulceration (CMS/[facility])     bilateral legs - bilateral iliac stents       • CHF (congestive heart failure) (CMS/[facility])    • Chronic lower back pain    • COPD (chronic obstructive pulmonary disease) (CMS/[facility])    • Essential (primary) hypertension    • GERD (gastroesophageal reflux disease)    • History of pulmonary embolus (PE)    • Hyperlipidemia    • Insomnia    • Lumbago    •  Nicotine dependence    • Occlusion of artery      and stenosis of bilateral carotid arteries - BABS 16-49%, LICA 0-15%   • Other atherosclerosis of native artery of other extremity     LEFT subclavian stent 2005 (occluded)   • Sleep apnea      Past Surgical History:   Procedure Laterality Date   • CARDIAC CATHETERIZATION  04/06/2016    No evidence of any obstructive epicardial CAD.Preserved LV systolic function with EF of 55%.   • CENTRAL VENOUS LINE INSERTION  04/07/2016    Successful placement of right uppe extremity 6-Lao triple lumen PICC line.   • ENDOSCOPY N/A 1/12/2018    Procedure: ESOPHAGOGASTRODUODENOSCOPY;  Surgeon: Bin Martin MD;  Location: [facility] ENDOSCOPY;  Service:    • ENDOSCOPY N/A 1/17/2018    Procedure: ESOPHAGOGASTRODUODENOSCOPY;  Surgeon: Bin Martin MD;  Location: [facility] ENDOSCOPY;  Service:    • ENDOSCOPY N/A 3/16/2018    Procedure: ESOPHAGOGASTRODUODENOSCOPY possible dilation;  Surgeon: Bin Martin MD;  Location: [facility] ENDOSCOPY;  Service: Gastroenterology   • HYSTERECTOMY     • KYPHOPLASTY N/A 5/23/2019    Procedure: KYPHOPLASTY LUMBAR FOUR;  Surgeon: Aba Santa MD;  Location: [facility] OR;  Service: Orthopedic Spine   • TOTAL SHOULDER REPLACEMENT Left    • TRANSESOPHAGEAL ECHOCARDIOGRAM (JACQUES)  04/07/2016    With color flow-Mild to moderate [facility].LV systolic function well preserved with Ef of 55-60%.Mitral and AV intact.Diastolic dysfunction   • UPPER GASTROINTESTINAL ENDOSCOPY  01/17/2018    Dr. Bin Martin M.D.     General Information     Row Name 03/03/20 1505          PT Evaluation Time/Intention    Document Type  evaluation  -CZ     Mode of Treatment  co-treatment;physical therapy;occupational therapy  -CZ     Row Name 03/03/20 1502          General Information    Patient Profile Reviewed?  yes  -CZ     Prior Level of Function  independent:;all household mobility  -CZ     Existing Precautions/Restrictions  fall;other (see comments) orthostatic  hypotension.   -CZ     Row Name 03/03/20 1505          Relationship/Environment    Lives With  other (see comments) room mate.   -CZ     Row Name 03/03/20 1505          Resource/Environmental Concerns    Current Living Arrangements  home/apartment/condo  -CZ     Row Name 03/03/20 1505          Home Main Entrance    Number of Stairs, Main Entrance  two  -CZ     Stair Railings, Main Entrance  railing on left side (ascending)  -CZ     Row Name 03/03/20 1505          Stairs Within Home, Primary    Stairs, Within Home, Primary  Room mate drives, patient otherwise (I) without an AD, has FWW, has shower chair, tub/shower. No steps into apartment, but 2 steps up from driveway.   -CZ     Number of Stairs, Within Home, Primary  none  -CZ     Row Name 03/03/20 1505          Cognitive Assessment/Intervention- PT/OT    Orientation Status (Cognition)  oriented x 4  -CZ       User Key  (r) = Recorded By, (t) = Taken By, (c) = Cosigned By    Initials Name Provider Type    CZ Hakeem Wolfe, PT Physical Therapist        Mobility     Row Name 03/03/20 1505          Bed Mobility Assessment/Treatment    Bed Mobility Assessment/Treatment  supine-sit-supine  -CZ     Supine-Sit-Supine [place] (Bed Mobility)  conditional independence  -CZ     Row Name 03/03/20 1505          Sit-Stand Transfer    Sit-Stand [place] (Transfers)  supervision  -CZ     Row Name 03/03/20 1505          Gait/Stairs Assessment/Training    [place] Level (Gait)  contact guard  -CZ     Distance in Feet (Gait)  100'x1  -CZ     Comment (Gait/Stairs)  Unsteady with gait, no falls.  Encourage use of FWW.   -CZ       User Key  (r) = Recorded By, (t) = Taken By, (c) = Cosigned By    Initials Name Provider Type    Hakeem Gutierrez, PT Physical Therapist        Obj/Interventions     Row Name 03/03/20 1505          General ROM    GENERAL ROM COMMENTS  BLEs WFL  -CZ     Row Name 03/03/20 1505          MMT (Manual Muscle Testing)    General MMT Comments  BLEs:  45 grossly.   -CZ     Row Name 03/03/20 1505          Therapeutic Exercise    Sets/Reps (Therapeutic Exercise)  Seated L piriformis stretch, 30 sec x 2.   -CZ     Row Name 03/03/20 1505          Light Touch Sensation Assessment    Left Lower Extremity: Light Touch Sensation Assessment  mild impairment, 75% or more correct responses  -CZ     Right Lower Extremity: Light Touch Sensation Assessment  mild impairment, 75% or more correct responses  -CZ       User Key  (r) = Recorded By, (t) = Taken By, (c) = Cosigned By    Initials Name Provider Type    CZ Hakeem Wolfe, PT Physical Therapist        Goals/Plan     Row Name 03/03/20 1400          Transfer Goal 1 (PT)    Activity/Assistive Device (Transfer Goal 1, PT)  sit-to-stand/stand-to-sit;bed-to-chair/chair-to-bed;walker, rolling  -CZ     [place] Level/Cues Needed (Transfer Goal 1, PT)  conditional independence  -CZ     Time Frame (Transfer Goal 1, PT)  long term goal (LTG);by discharge  -CZ     Barriers (Transfers Goal 1, PT)  Orthostatic hypotension.  -CZ     Progress/Outcome (Transfer Goal 1, PT)  goal not met  -CZ     Row Name 03/03/20 1400          Gait Training Goal 1 (PT)    Activity/Assistive Device (Gait Training Goal 1, PT)  walker, rolling  -CZ     [place] Level (Gait Training Goal 1, PT)  conditional independence  -CZ     Distance (Gait Goal 1, PT)  150'x 1  -CZ     Time Frame (Gait Training Goal 1, PT)  long term goal (LTG);by discharge  -CZ     Barriers (Gait Training Goal 1, PT)  Orthostatic hypotension.  -CZ     Progress/Outcome (Gait Training Goal 1, PT)  goal not met  -CZ     Row Name 03/03/20 1400          Stairs Goal 1 (PT)    Activity/Assistive Device (Stairs Goal 1, PT)  using handrail, left  -CZ     [place] Level/Cues Needed (Stairs Goal 1, PT)  conditional independence  -CZ     Number of Stairs (Stairs Goal 1, PT)  2 steps, L rail.   -CZ     Time Frame (Stairs Goal 1, PT)  long term goal (LTG);by discharge  -CZ     Barriers  (Stairs Goal 1, PT)  Orthostatic hypotension.  -CZ     Row Name 03/03/20 1400          Patient Education Goal (PT)    Activity (Patient Education Goal, PT)  Patient will demonstrate proper piriformis stretch in seated position, hold 30 sec x 3.   -CZ     [place]/Cues/Accuracy (Memory Goal 2, PT)  demonstrates adequately  -CZ     Time Frame (Patient Education Goal, PT)  long term goal (LTG);by discharge  -CZ     Barriers (Patient Education Goal, PT)  LLE pain.   -CZ     Progress/Outcome (Patient Education Goal, PT)  goal not met  -CZ       User Key  (r) = Recorded By, (t) = Taken By, (c) = Cosigned By    Initials Name Provider Type    CZ Hakeem Wolfe, PT Physical Therapist        Clinical Impression     Row Name 03/03/20 1505          Pain Assessment    Additional Documentation  Pain Scale: Numbers Pre/Post-Treatment (Group)  -CZ     Row Name 03/03/20 1505          Pain Scale: Numbers Pre/Post-Treatment    Pain Scale: Numbers, Pretreatment  0/10 - no pain  -CZ     Pain Scale: Numbers, Post-Treatment  0/10 - no pain  -CZ     Row Name 03/03/20 1505          Plan of Care Review    Plan of Care Reviewed With  patient  -CZ     Row Name 03/03/20 1505          Physical Therapy Clinical Impression    Criteria for Skilled Interventions Met (PT Clinical Impression)  yes;treatment indicated  -CZ     Rehab Potential (PT Clinical Summary)  good, to achieve stated therapy goals  -CZ     Predicted Duration of Therapy (PT)  1-3 days.   -CZ     Row Name 03/03/20 1505          Vital Signs    Intra Systolic BP Rehab  146  -CZ     Intra Treatment Diastolic BP  83  -CZ     Post Systolic BP Rehab  154  -CZ     Post Treatment Diastolic BP  87  -CZ     Pretreatment Heart Rate (beats/min)  85  -CZ     Pre SpO2 (%)  93  -CZ     O2 Delivery Pre Treatment  room air  -CZ     O2 Delivery Post Treatment  room air  -CZ     Pre Patient Position  Supine  -CZ     Post Patient Position  Sitting  -CZ     Row Name 03/03/20 1508           Positioning and Restraints    Pre-Treatment Position  in bed  -CZ     Post Treatment Position  bed  -CZ     In Bed  supine;call light within reach;encouraged to call for assist;exit alarm on;side rails up x2  -CZ       User Key  (r) = Recorded By, (t) = Taken By, (c) = Cosigned By    Initials Name Provider Type    Hakeem Gutierrez, PT Physical Therapist        Outcome Measures     Row Name 03/03/20 1505          How much help from another person do you currently need...    Turning from your back to your side while in flat bed without using bedrails?  4  -CZ     Moving from lying on back to sitting on the side of a flat bed without bedrails?  4  -CZ     Moving to and from a bed to a chair (including a wheelchair)?  3  -CZ     Standing up from a chair using your arms (e.g., wheelchair, bedside chair)?  3  -CZ     Climbing 3-5 steps with a railing?  3  -CZ     To walk in hospital room?  3  -CZ     AM-PAC 6 Clicks Score (PT)  20  -CZ     Row Name 03/03/20 1505          Functional Assessment    Outcome Measure Options  AM-PAC 6 Clicks Basic Mobility (PT)  -CZ       User Key  (r) = Recorded By, (t) = Taken By, (c) = Cosigned By    Initials Name Provider Type    Hakeem Gutierrez, PT Physical Therapist          PT Recommendation and Plan  Planned Therapy Interventions (PT Eval): balance training, gait training, patient/family education, stretching, transfer training, strengthening, stair training  Plan of Care Reviewed With: patient     Time Calculation:   PT Charges     Row Name 03/03/20 1625             Time Calculation    Start Time  1505  -CZ      Stop Time  1544  -CZ      Time Calculation (min)  39 min  -CZ      PT Received On  03/03/20  -CZ      PT Goal Re-Cert Due Date  03/16/20  -CZ        User Key  (r) = Recorded By, (t) = Taken By, (c) = Cosigned By    Initials Name Provider Type    Hakeem Gutierrez, PT Physical Therapist        Therapy Charges for Today     Code Description Service Date Service Provider  Modifiers Qty    03888644144 HC PT EVAL MOD COMPLEXITY 3 3/3/2020 Hakeem Wolfe, PT GP 1          PT G-Codes  Outcome Measure Options: AM-PAC 6 Clicks Daily Activity (OT)  AM-PAC 6 Clicks Score (PT): 20  AM-PAC 6 Clicks Score (OT): 20    Hakeem Wolfe, PT  3/3/2020

## 2020-03-03 NOTE — PROGRESS NOTES
[facility] Medicine Services  INPATIENT PROGRESS NOTE    Length of Stay: 0  Date of Admission: 3/2/2020  Primary Care Physician: Lisa Alejandro APRN    Subjective   Chief Complaint: Syncope  HPI: Patient states she has been lightheaded and dizzy with syncopal episodes for the last 4 days or so.  She was noted to have orthostasis in the emergency department.  She states this has happened in the past.    Review of Systems   Constitutional: Negative for appetite change, chills, fatigue, fever and unexpected weight change.   Respiratory: Negative for cough, choking, chest tightness, shortness of breath and wheezing.    Cardiovascular: Negative for chest pain, palpitations and leg swelling.   Gastrointestinal: Negative for abdominal pain, blood in stool, constipation, diarrhea, nausea and vomiting.   Genitourinary: Negative for dysuria, flank pain and hematuria.   Neurological: Positive for syncope. Negative for dizziness, seizures, speech difficulty, weakness, light-headedness, numbness and headaches.   Hematological: Does not bruise/bleed easily.        All pertinent negatives and positives are as above. All other systems have been reviewed and are negative unless otherwise stated.     Objective    Temp:  [97 °F (36.1 °C)-98.4 °F (36.9 °C)] 98 °F (36.7 °C)  Heart Rate:  [] 84  Resp:  [16-18] 16  BP: ()/(50-98) 151/69    Physical Exam   Constitutional: She appears well-developed and well-nourished.   HENT:   Head: Normocephalic and atraumatic.   Eyes: Pupils are equal, round, and reactive to light. EOM are normal.   Neck: Normal range of motion. Neck supple.   Cardiovascular: Normal rate, regular rhythm and normal heart sounds. Exam reveals no gallop and no friction rub.   No murmur heard.  Pulmonary/Chest: Effort normal and breath sounds normal. No respiratory distress. She has no wheezes. She has no rales. She exhibits no tenderness.   Abdominal: Soft. Bowel  sounds are normal. She exhibits no distension. There is no tenderness. There is no guarding.   Musculoskeletal: She exhibits no edema.   Skin: Skin is warm and dry.   Psychiatric: She has a normal mood and affect. Her behavior is normal. Thought content normal.   Vitals reviewed.      Results Review:  I have reviewed the labs, radiology results, and diagnostic studies.    Laboratory Data:   Results from last 7 days   Lab Units 03/03/20  0538 03/02/20  1434   SODIUM mmol/L 138 138   POTASSIUM mmol/L 3.8 3.6   CHLORIDE mmol/L 100 95*   CO2 mmol/L 24.0 28.0   BUN mg/dL 8 7*   CREATININE mg/dL 0.63 0.67   GLUCOSE mg/dL 102* 104*   CALCIUM mg/dL 9.4 9.4   BILIRUBIN mg/dL  --  0.3   ALK PHOS U/L  --  102   ALT (SGPT) U/L  --  15   AST (SGOT) U/L  --  57*   ANION GAP mmol/L 14.0 15.0     Estimated Creatinine Clearance: 58.5 mL/min (by C-G formula based on SCr of 0.63 mg/dL).  Results from last 7 days   Lab Units 03/02/20  1434   MAGNESIUM mg/dL 1.9         Results from last 7 days   Lab Units 03/03/20  0538 03/02/20  1434   WBC 10*3/mm3 10.06 12.28*   HEMOGLOBIN g/dL 12.6 12.1   HEMATOCRIT % 37.9 37.2   PLATELETS 10*3/mm3 302 314           Culture Data:   No results found for: BLOODCX  No results found for: URINECX  No results found for: RESPCX  No results found for: WOUNDCX  No results found for: STOOLCX  No components found for: BODYFLD    Radiology Data:   Imaging Results (Last 24 Hours)     Procedure Component Value Units Date/Time    US Venous Doppler Lower Extremity Left (duplex) [[id number]] Collected:  03/02/20 1306     Updated:  03/02/20 1413    Narrative:         Ultrasound venous duplex left lower extremity    HISTORY: Erythema    Duplex ultrasound of the deep venous system of the left lower  extremity was performed    FINDINGS:  Real-time images demonstrate normal compressibility without  evidence of intraluminal thrombus.  Doppler shows phasic flow and augmentation.  Color Doppler also reveals venous patency.       Impression:       CONCLUSION:  No ultrasound evidence of deep venous thrombosis of the left  lower extremity.    [id number]    Electronically signed by:  Jason Marei MD  3/2/2020 2:12 PM CST  Workstation: [id number]    XR Chest 1 View [[id number]] Collected:  03/02/20 1240     Updated:  03/02/20 1309    Narrative:         PROCEDURE: Single chest view portable erect    REASON FOR EXAM:gen weakness    FINDINGS: Comparison exam dated May 20, 2019. Cardiac and  pulmonary vasculature are normal. Lungs are clear. Pleural spaces  are normal. No acute osseous abnormality. Stable postsurgical  changes consistent with left reverse total shoulder arthroplasty.      Impression:       No acute cardiopulmonary abnormality.    Electronically signed by:  Jim Muro MD  3/2/2020 1:08 PM CST  Workstation: [id number]          I have reviewed the patient's current medications.     Assessment/Plan     Active Hospital Problems    Diagnosis   • Hypotension   • Elevated WBCs   • Near syncope       Plan:    1.  Orthostatic hypotension: We will add low-dose Midrin.  May require adjustment of her blood pressure medicine.  2.  Syncope: Likely secondary to orthostasis.  3.  COPD: Patient continues to smoke.  Continue home medication.  4.  Hypertension: Medications will likely need adjustment after the addition of Midodrine.  5.  Tobacco abuse: Counseled on cessation.          Discharge Planning: I expect patient to be discharged to home in 1-2 days.        This document has been electronically signed by Mason Lamar MD on March 3, 2020 11:43 AM

## 2020-03-03 NOTE — PLAN OF CARE
Problem: Patient Care Overview  Goal: Plan of Care Review  Flowsheets (Taken 3/3/2020 1611)  Plan of Care Reviewed With: patient  Outcome Summary: OT eval completed; co-eval with PT. Pt alert and oriented. Pt performed bed mobility with mod I, sit<>stand with SPV, and performed functional mobility with CGA. Pt performed toileting and grooming standing at sink with SPV. Pt denied any dizziness during standing and amb. Pt BP inconsistent, however at completion of session pt BP in supine 169/87, in sitting 164/83, and in standing 116/72. Pt presents overall close to baseline function. Pt demo some unsteadiness in ambulation however this appears to be related to LLE pain. Pt demo no skilled OT needs at this time. Will d/c OT; rec home with assist PRN at d/c.

## 2020-03-03 NOTE — PLAN OF CARE
Problem: Patient Care Overview  Goal: Plan of Care Review  Outcome: Ongoing (interventions implemented as appropriate)  Note:   Initial PT evaluation complete; co-evaluation with OT.  Patient is alert and cooperative.  She demonstrates (I) with bed mobility, requires SPV with transfers, CGA with gait.  Patient ambulates 100'x1 without an AD, unsteady, limited by LLE pain; recommend use of FWW.  Patient taught piriformis stretching for LLE, noted some pain relief.  BP assessed in supine, sitting and standing but did not clearly indicated orthostatic hypotension and patient denies dizziness.  Patient reports she is currently more limited by LLE pain. Piriformis stretch helpful in mitigating pain but patient would benefit from x-rays to L-spine and L hip to further identify source of pain. Nurse notified.  Goals established, continue skilled PT.

## 2020-03-04 ENCOUNTER — ANTICOAGULATION VISIT (OUTPATIENT)
Dept: CARDIAC SURGERY | Facility: CLINIC | Age: 70
End: 2020-03-04

## 2020-03-04 ENCOUNTER — APPOINTMENT (OUTPATIENT)
Dept: GENERAL RADIOLOGY | Facility: HOSPITAL | Age: 70
End: 2020-03-04

## 2020-03-04 VITALS
WEIGHT: 128.6 LBS | TEMPERATURE: 98.2 F | SYSTOLIC BLOOD PRESSURE: 143 MMHG | HEIGHT: 62 IN | OXYGEN SATURATION: 90 % | BODY MASS INDEX: 23.66 KG/M2 | RESPIRATION RATE: 18 BRPM | DIASTOLIC BLOOD PRESSURE: 69 MMHG | HEART RATE: 88 BPM

## 2020-03-04 DIAGNOSIS — I25.119 ATHEROSCLEROSIS OF NATIVE CORONARY ARTERY OF NATIVE HEART WITH ANGINA PECTORIS (HCC): ICD-10-CM

## 2020-03-04 PROCEDURE — G0378 HOSPITAL OBSERVATION PER HR: HCPCS

## 2020-03-04 PROCEDURE — 73630 X-RAY EXAM OF FOOT: CPT

## 2020-03-04 RX ORDER — MIDODRINE HYDROCHLORIDE 5 MG/1
5 TABLET ORAL
Qty: 90 TABLET | Refills: 0 | Status: ON HOLD | OUTPATIENT
Start: 2020-03-04 | End: 2020-09-09

## 2020-03-04 RX ORDER — OXYCODONE HYDROCHLORIDE AND ACETAMINOPHEN 5; 325 MG/1; MG/1
1 TABLET ORAL EVERY 6 HOURS PRN
Qty: 12 TABLET | Refills: 0 | Status: SHIPPED | OUTPATIENT
Start: 2020-03-04 | End: 2020-03-14

## 2020-03-04 RX ORDER — OXYCODONE HYDROCHLORIDE AND ACETAMINOPHEN 5; 325 MG/1; MG/1
1 TABLET ORAL EVERY 6 HOURS PRN
Status: DISCONTINUED | OUTPATIENT
Start: 2020-03-04 | End: 2020-03-04 | Stop reason: HOSPADM

## 2020-03-04 RX ORDER — OXYCODONE HYDROCHLORIDE AND ACETAMINOPHEN 5; 325 MG/1; MG/1
1 TABLET ORAL ONCE
Status: COMPLETED | OUTPATIENT
Start: 2020-03-04 | End: 2020-03-04

## 2020-03-04 RX ADMIN — RIVAROXABAN 20 MG: 10 TABLET, FILM COATED ORAL at 08:21

## 2020-03-04 RX ADMIN — PANTOPRAZOLE SODIUM 40 MG: 40 TABLET, DELAYED RELEASE ORAL at 08:21

## 2020-03-04 RX ADMIN — OXYCODONE HYDROCHLORIDE AND ACETAMINOPHEN 1 TABLET: 5; 325 TABLET ORAL at 00:47

## 2020-03-04 RX ADMIN — SERTRALINE HYDROCHLORIDE 100 MG: 50 TABLET ORAL at 08:22

## 2020-03-04 RX ADMIN — MIDODRINE HYDROCHLORIDE 5 MG: 5 TABLET ORAL at 08:21

## 2020-03-04 RX ADMIN — GABAPENTIN 800 MG: 400 CAPSULE ORAL at 08:21

## 2020-03-04 RX ADMIN — OXYCODONE HYDROCHLORIDE AND ACETAMINOPHEN 1 TABLET: 5; 325 TABLET ORAL at 11:26

## 2020-03-04 RX ADMIN — DOCUSATE SODIUM 100 MG: 100 CAPSULE, LIQUID FILLED ORAL at 08:22

## 2020-03-04 NOTE — SIGNIFICANT NOTE
03/04/20 1009   Rehab Treatment   Discipline physical therapy assistant   Reason Treatment Not Performed unavailable for treatment  (pt off the floor @ x-ray @ present time)

## 2020-03-04 NOTE — PLAN OF CARE
Problem: Patient Care Overview  Goal: Plan of Care Review  Outcome: Ongoing (interventions implemented as appropriate)  Flowsheets  Taken 3/4/2020 0259  Progress: improving  Outcome Summary: pt rested better tonight. vitals signs maintain stable. no s/s of distress.  Taken 3/3/2020 2045  Plan of Care Reviewed With: patient

## 2020-03-04 NOTE — NURSING NOTE
Pt refused to wait for coupons for chad and was educated on there dangers of not taking her blood thinner by writer. Pt staed she knows of the dangers but still insisted on leaving with her ride to go home. Pt

## 2020-03-04 NOTE — DISCHARGE SUMMARY
[facility] Medicine Services  DISCHARGE SUMMARY       Date of Admission: 3/2/2020  Date of Discharge:  3/4/2020  Primary Care Physician: Lisa Alejandro APRN    Presenting Problem/History of Present Illness:  General weakness [R53.1]  Hypotension, unspecified hypotension type [I95.9]  Postural dizziness with near syncope [R42, R55]       Final Discharge Diagnoses:  Active Hospital Problems    Diagnosis   • Hypotension   • Elevated WBCs   • Near syncope       Consults:   Consults     No orders found from 2/2/2020 to 3/3/2020.          Pertinent Test Results:   Lab Results (most recent)     Procedure Component Value Units Date/Time    Urinalysis, Microscopic Only - Urine, Clean Catch [[id number]]  (Abnormal) Collected:  03/03/20 1236    Specimen:  Urine, Clean Catch Updated:  03/03/20 1248     RBC, UA 0-2 /HPF      WBC, UA 0-2 /HPF      Bacteria, UA 1+ /HPF      Squamous Epithelial Cells, UA 3-5 /HPF      Hyaline Casts, UA 3-6 /LPF      Methodology Automated Microscopy    Urinalysis With Culture If Indicated - Urine, Clean Catch [[id number]]  (Abnormal) Collected:  03/03/20 1236    Specimen:  Urine, Clean Catch Updated:  03/03/20 1248     Color, UA Yellow     Appearance, UA Clear     pH, UA 7.0     Specific Gravity, UA 1.017     Glucose, UA Negative     Ketones, UA Negative     Bilirubin, UA Negative     Blood, UA Negative     Protein, UA 30 mg/dL (1+)     Leuk Esterase, UA Negative     Nitrite, UA Negative     Urobilinogen, UA 1.0 E.U./dL    Basic Metabolic Panel [[id number]]  (Abnormal) Collected:  03/03/20 0538    Specimen:  Blood Updated:  03/03/20 0628     Glucose 102 mg/dL      BUN 8 mg/dL      Creatinine 0.63 mg/dL      Sodium 138 mmol/L      Potassium 3.8 mmol/L      Chloride 100 mmol/L      CO2 24.0 mmol/L      Calcium 9.4 mg/dL      eGFR Non African Amer 94 mL/min/1.73      BUN/Creatinine Ratio 12.7     Anion Gap 14.0 mmol/L     Narrative:       GFR Normal >60  Chronic  Kidney Disease <60  Kidney Failure <15      CBC Auto Differential [[id number]]  (Abnormal) Collected:  03/03/20 0538    Specimen:  Blood Updated:  03/03/20 0604     WBC 10.06 10*3/mm3      RBC 4.53 10*6/mm3      Hemoglobin 12.6 g/dL      Hematocrit 37.9 %      MCV 83.7 fL      MCH 27.8 pg      MCHC 33.2 g/dL      RDW 17.4 %      RDW-SD 53.0 fl      MPV 8.5 fL      Platelets 302 10*3/mm3      Neutrophil % 58.9 %      Lymphocyte % 25.8 %      Monocyte % 13.0 %      Eosinophil % 1.3 %      Basophil % 0.8 %      Immature Grans % 0.2 %      Neutrophils, Absolute 5.92 10*3/mm3      Lymphocytes, Absolute 2.60 10*3/mm3      Monocytes, Absolute 1.31 10*3/mm3      Eosinophils, Absolute 0.13 10*3/mm3      Basophils, Absolute 0.08 10*3/mm3      Immature Grans, Absolute 0.02 10*3/mm3      nRBC 0.0 /100 WBC     Heth Draw [[id number]] Collected:  03/02/20 1434    Specimen:  Blood Updated:  03/02/20 1545    Narrative:       The following orders were created for panel order [facility] Draw.  Procedure                               Abnormality         Status                     ---------                               -----------         ------                     Light Blue Top[[id number]]                                   Final result               Green Top (Gel)[[id number]]                                  Final result               Lavender Top[[id number]]                                     Final result               Gold Top - SST[[id number]]                                   Final result                 Please view results for these tests on the individual orders.    Light Blue Top [[id number]] Collected:  03/02/20 1434    Specimen:  Blood Updated:  03/02/20 1545     Extra Tube hold for add-on     Comment: Auto resulted       Green Top (Gel) [[id number]] Collected:  03/02/20 1434    Specimen:  Blood Updated:  03/02/20 1545     Extra Tube Hold for add-ons.     Comment: Auto resulted.       Lavender Top [[id number]] Collected:  03/02/20  1434    Specimen:  Blood Updated:  03/02/20 1545     Extra Tube hold for add-on     Comment: Auto resulted       Gold Top - SST [[id number]] Collected:  03/02/20 1434    Specimen:  Blood Updated:  03/02/20 1545     Extra Tube Hold for add-ons.     Comment: Auto resulted.       Comprehensive Metabolic Panel [[id number]]  (Abnormal) Collected:  03/02/20 1434    Specimen:  Blood Updated:  03/02/20 1502     Glucose 104 mg/dL      BUN 7 mg/dL      Creatinine 0.67 mg/dL      Sodium 138 mmol/L      Potassium 3.6 mmol/L      Chloride 95 mmol/L      CO2 28.0 mmol/L      Calcium 9.4 mg/dL      Total Protein 7.8 g/dL      Albumin 4.40 g/dL      ALT (SGPT) 15 U/L      AST (SGOT) 57 U/L      Alkaline Phosphatase 102 U/L      Total Bilirubin 0.3 mg/dL      eGFR Non African Amer 88 mL/min/1.73      Globulin 3.4 gm/dL      A/G Ratio 1.3 g/dL      BUN/Creatinine Ratio 10.4     Anion Gap 15.0 mmol/L     Narrative:       GFR Normal >60  Chronic Kidney Disease <60  Kidney Failure <15      Troponin [[id number]]  (Normal) Collected:  03/02/20 1434    Specimen:  Blood Updated:  03/02/20 1502     Troponin T <0.010 ng/mL     Narrative:       Troponin T Reference Range:  <= 0.03 ng/mL-   Negative for AMI  >0.03 ng/mL-     Abnormal for myocardial necrosis.  Clinicians would have to utilize clinical acumen, EKG, Troponin and serial changes to determine if it is an Acute Myocardial Infarction or myocardial injury due to an underlying chronic condition.       Results may be falsely decreased if patient taking Biotin.      Magnesium [[id number]]  (Normal) Collected:  03/02/20 1434    Specimen:  Blood Updated:  03/02/20 1502     Magnesium 1.9 mg/dL     BNP [[id number]]  (Normal) Collected:  03/02/20 1434    Specimen:  Blood Updated:  03/02/20 1500     proBNP 239.7 pg/mL     Narrative:       Among patients with dyspnea, NT-proBNP is highly sensitive for the detection of acute congestive heart failure. In addition NT-proBNP of <300 pg/ml effectively  rules out acute congestive heart failure with 99% negative predictive value.    Results may be falsely decreased if patient taking Biotin.      CBC & Differential [[id number]] Collected:  03/02/20 1434    Specimen:  Blood Updated:  03/02/20 1442    Narrative:       The following orders were created for panel order CBC & Differential.  Procedure                               Abnormality         Status                     ---------                               -----------         ------                     CBC Auto Differential[[id number]]        Abnormal            Final result                 Please view results for these tests on the individual orders.    CBC Auto Differential [[id number]]  (Abnormal) Collected:  03/02/20 1434    Specimen:  Blood Updated:  03/02/20 1442     WBC 12.28 10*3/mm3      RBC 4.39 10*6/mm3      Hemoglobin 12.1 g/dL      Hematocrit 37.2 %      MCV 84.7 fL      MCH 27.6 pg      MCHC 32.5 g/dL      RDW 17.8 %      RDW-SD 54.8 fl      MPV 8.7 fL      Platelets 314 10*3/mm3      Neutrophil % 56.1 %      Lymphocyte % 32.5 %      Monocyte % 10.1 %      Eosinophil % 0.3 %      Basophil % 0.8 %      Immature Grans % 0.2 %      Neutrophils, Absolute 6.88 10*3/mm3      Lymphocytes, Absolute 3.99 10*3/mm3      Monocytes, Absolute 1.24 10*3/mm3      Eosinophils, Absolute 0.04 10*3/mm3      Basophils, Absolute 0.10 10*3/mm3      Immature Grans, Absolute 0.03 10*3/mm3      nRBC 0.0 /100 WBC         Imaging Results (Most Recent)     Procedure Component Value Units Date/Time    XR Foot 3+ View Left [[id number]] Collected:  03/04/20 1004     Updated:  03/04/20 1202    Narrative:         PROCEDURE:  Left  foot 3 views    REASON FOR EXAM: pain after fall, I95.9 Hypotension, unspecified  R53.1 Weakness R42 Dizziness and giddiness R55 Syncope and  collapse Z74.09 Other reduced mobility Z74.09 Other reduced  mobility    FINDINGS: Diffuse bony osteopenia/osteoporosis. Otherwise bony  structures of the left foot are  unremarkable. There is no  evidence of fracture or dislocation. Soft tissue structures  normal.        Impression:       1.  Diffuse bony osteopenia/osteoporosis.  2.  Otherwise unremarkable left foot.    Electronically signed by:  Jim Muro MD  3/4/2020 12:01 PM CST  Workstation: [id number]    US Venous Doppler Lower Extremity Left (duplex) [[id number]] Collected:  03/02/20 1306     Updated:  03/02/20 1413    Narrative:         Ultrasound venous duplex left lower extremity    HISTORY: Erythema    Duplex ultrasound of the deep venous system of the left lower  extremity was performed    FINDINGS:  Real-time images demonstrate normal compressibility without  evidence of intraluminal thrombus.  Doppler shows phasic flow and augmentation.  Color Doppler also reveals venous patency.      Impression:       CONCLUSION:  No ultrasound evidence of deep venous thrombosis of the left  lower extremity.    [id number]    Electronically signed by:  Jason Marie MD  3/2/2020 2:12 PM CST  Workstation: [id number]    XR Chest 1 View [[id number]] Collected:  03/02/20 1240     Updated:  03/02/20 1309    Narrative:         PROCEDURE: Single chest view portable erect    REASON FOR EXAM:gen weakness    FINDINGS: Comparison exam dated May 20, 2019. Cardiac and  pulmonary vasculature are normal. Lungs are clear. Pleural spaces  are normal. No acute osseous abnormality. Stable postsurgical  changes consistent with left reverse total shoulder arthroplasty.      Impression:       No acute cardiopulmonary abnormality.    Electronically signed by:  Jim Muro MD  3/2/2020 1:08 PM CST  Workstation: [id number]          Chief Complaint on Day of Discharge: Left foot pain.    Hospital Course:  The patient is a 69 y.o. female who presented to [facility] with syncope.  Patient was noted to be orthostatic.  She was hydrated appropriately, her Norvasc was stopped, and Midrin was added.  She felt good.  She was able to sit up without hypotension.  On  "day of discharge she complained of left foot pain.  X-ray was performed and showed osteopenia and osteoporosis but no fracture.  She is able to ambulate without difficulty.  She stated that she had not been taking her Xarelto due to cost.  She was scheduled with the anticoagulation clinic and samples were attempted to be obtained.  Patient declined to wait for samples and left.  Case management was working on getting samples arranged for patient so she can come back before.  The importance of Xarelto was stressed to the patient prior to discharge.  Follow-up with anticoagulation clinic and with her primary care provider in 1 week.    Condition on Discharge: Stable    Physical Exam on Discharge:  /69 (BP Location: Right arm, Patient Position: Lying)   Pulse 88   Temp 98.2 °F (36.8 °C) (Oral)   Resp 18   Ht 157.5 cm (62\")   Wt 58.3 kg (128 lb 9.6 oz)   SpO2 90%   Breastfeeding No   BMI 23.52 kg/m²      Physical Exam   Constitutional: She appears well-developed and well-nourished.   HENT:   Head: Normocephalic and atraumatic.   Eyes: Pupils are equal, round, and reactive to light. EOM are normal.   Neck: Normal range of motion. Neck supple.   Cardiovascular: Normal rate, regular rhythm and normal heart sounds. Exam reveals no gallop and no friction rub.   No murmur heard.  Pulmonary/Chest: Effort normal and breath sounds normal. No respiratory distress. She has no wheezes. She has no rales. She exhibits no tenderness.   Abdominal: Soft. Bowel sounds are normal. She exhibits no distension. There is no tenderness. There is no guarding.   Musculoskeletal: She exhibits no edema.   Skin: Skin is warm and dry.   Psychiatric: She has a normal mood and affect. Her behavior is normal. Thought content normal.   Vitals reviewed.        Discharge Disposition:  Home or Self Care    Discharge Medications:     Discharge Medications      New Medications      Instructions Start Date   midodrine 5 MG tablet  Commonly known " as:  PROAMATINE   5 mg, Oral, 3 Times Daily Before Meals      oxyCODONE-acetaminophen 5-325 MG per tablet  Commonly known as:  PERCOCET   1 tablet, Oral, Every 6 Hours PRN         Changes to Medications      Instructions Start Date   docusate sodium 100 MG capsule  What changed:    · when to take this  · reasons to take this   100 mg, Oral, 2 Times Daily         Continue These Medications      Instructions Start Date   albuterol (2.5 MG/3ML) 0.083% nebulizer solution  Commonly known as:  PROVENTIL   2.5 mg, Nebulization, Every 8 Hours PRN      DENAVIR 1 % cream  Generic drug:  penciclovir   1 application, Topical, As Needed      gabapentin 800 MG tablet  Commonly known as:  NEURONTIN   800 mg, Oral, 2 Times Daily      pantoprazole 40 MG EC tablet  Commonly known as:  PROTONIX   40 mg, Oral, Daily      sertraline 100 MG tablet  Commonly known as:  ZOLOFT   100 mg, Oral, Daily      traZODone 150 MG tablet  Commonly known as:  DESYREL   150 mg, Oral, Nightly      XARELTO 20 MG tablet  Generic drug:  rivaroxaban   20 mg, Oral, Daily         Stop These Medications    amLODIPine 10 MG tablet  Commonly known as:  NORVASC            Discharge Diet:   Diet Instructions     Advance Diet As Tolerated            Activity at Discharge:   Activity Instructions     Activity as Tolerated                Follow-up Appointments:   Future Appointments   Date Time Provider Department Center   3/23/2020 10:00 AM Jacki Polanco APRN [facility] None   PCP 1 week          This document has been electronically signed by Mason Lamar MD on March 4, 2020 2:40 PM      Time: 35 minutes

## 2020-03-04 NOTE — DISCHARGE PLACEMENT REQUEST
"Filomena Perales (69 y.o. Female)     Date of Birth Social Security Number Address Home Phone MRN    1950  [address] [id number] [phone] [id number]    Anabaptism Marital Status          Baptism        Admission Date Admission Type Admitting Provider Attending Provider Department, Room/Bed    3/2/20 Emergency Clay Gomez MD Kaldas, Amir I, MD [address],     Discharge Date Discharge Disposition Discharge Destination         Home or Self Care              Attending Provider:  Clay Gomez MD    Allergies:  Morphine And Related, Penicillins    Isolation:  None   Infection:  None   Code Status:  CPR    Ht:  157.5 cm (62\")   Wt:  58.3 kg (128 lb 9.6 oz)    Admission Cmt:  None   Principal Problem:  None                Active Insurance as of 3/2/2020     Primary Coverage     Payor Plan Insurance Group Employer/Plan Group    HUMANA MEDICARE REPLACEMENT HUMANA MEDICARE REPLACEMENT [id number]     Payor Plan Address Payor Plan Phone Number Payor Plan Fax Number Effective Dates    [address] [address] [phone]  10/1/2019 - None Entered    [facility] [address]       Subscriber Name Subscriber Birth Date Member ID       FILOMENA PERALES 1950 [id number]                 Emergency Contacts      (Rel.) Home Phone Work Phone Mobile Phone    MARIA EUGENIA LONG (Sister) [phone] -- [phone]        [address] [address]  Phone:  [phone]  Fax:   Date: Mar 4, 2020      Referral to Home Health     Patient:  Filomena Perales MRN:  [id number]   [address] [id number] :  1950  SSN:    Phone: [phone] Sex:  F      INSURANCE PAYOR PLAN GROUP # SUBSCRIBER ID   Primary:    HUMANA MEDICARE REPLACEMENT [id number]      Referring Provider Information:  KATERINE WILLS Phone: [phone] Fax:       Referral " Information:   # Visits:  1 Referral Type: Home Health [42]   Urgency:  Routine Referral Reason: Specialty Services Required   Start Date: Mar 4, 2020 End Date:  To be determined by Insurer   Diagnosis: Impaired mobility and activities of daily living (Z74.09 [ICD-10-CM] 799.89 [ICD-9-CM])  Impaired physical mobility (Z74.09 [ICD-10-CM] 781.99 [ICD-9-CM])  Fall, initial encounter (W19.XXXA [ICD-10-CM] E888.9 [ICD-9-CM])      Refer to Dept: [facility]  Refer to Provider:   Refer to Facility:       Face to Face Visit Date: 3/4/2020  Follow-up provider for Plan of Care? I treated the patient in an acute care facility and will not continue treatment after discharge.  Follow-up provider: FLOR ANAYA [[id number]]  Reason/Clinical Findings: Deconditioning  Describe mobility limitations that make leaving home difficult: Deconditioning  Nursing/Therapeutic Services Requested: Physical Therapy  Nursing/Therapeutic Services Requested: Occupational Therapy  PT orders: Therapeutic exercise  Occupational orders: Activities of daily living  Frequency: 1 Week 1     This document serves as a request of services and does not constitute Insurance authorization or approval of services.  To determine eligibility, please contact the members Insurance carrier to verify and review coverage.     If you have medical questions regarding this request for services. Please contact [address] at [phone] during normal business hours.       Authorizing Provider:Mason Lamar MD  Authorizing Provider's NPI: [id number]  Order Entered By: Mason Lamar MD 3/4/2020 12:41 PM     Electronically signed by: Mason Lamar MD 3/4/2020 12:41 PM              Insurance Information                HUMANA MEDICARE REPLACEMENT/HUMANA MEDICARE REPLACEMENT Phone: [phone]    Subscriber: Mona Perales Subscriber#: [id number]    Group#: [id number] Precert#:              History & Physical      Clay Gomez MD at  03/02/20 1548                [facility] Medicine Admission      Date of Admission: 3/2/2020      Primary Care Physician: Provider, No Known      Chief Complaint: Near syncope for 2 to 3 days    HPI: This is a [age]-year-old woman with past medical history of hypertension,  hyperlipidemia, CHF, A. fib, pulmonary embolism, COPD, anxiety came to the hospital complaining of near syncope and lightheadedness for 2- 3 days, patient went to see her private doctor today and she felt dizzy advised her to come to emergency room also patient complaining of mild swelling of left leg for which ultrasound was done in ER came back negative for DVT, patient received 500 cc of normal saline patient was orthostatic in ER the blood pressure was 77/50 diastolic for which patient is being admitted for near syncope, orthostatic hypotension, elevated WBCs  No history of falling, no head trauma, no fever, no chest pain, no shortness of breath, no burning urination, change in mental status, no loss of consciousness,    Concurrent Medical History:  has a past medical history of A-fib (CMS/LTAC, located within [facility]), Anxiety, Arthritis, Asthma, 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 with ulceration (CMS/LTAC, located within [facility]), CHF (congestive heart failure) (CMS/HCC), Chronic lower back pain, COPD (chronic obstructive pulmonary disease) (CMS/LTAC, located within [facility]), Essential (primary) hypertension, GERD (gastroesophageal reflux disease), History of pulmonary embolus (PE), Hyperlipidemia, Insomnia, Lumbago, Nicotine dependence, Occlusion of artery, Other atherosclerosis of native artery of other extremity, and Sleep apnea.    Past Surgical History:  has a past surgical history that includes Cardiac catheterization (04/06/2016); Central venous catheter insertion (04/07/2016); transesophageal echocardiogram (yanet) (04/07/2016); Total shoulder replacement (Left); Hysterectomy; Esophagogastroduodenoscopy (N/A, 1/12/2018); Esophagogastroduodenoscopy (N/A,  1/17/2018); Upper gastrointestinal endoscopy (01/17/2018); Esophagogastroduodenoscopy (N/A, 3/16/2018); and Kyphoplasty (N/A, 5/23/2019).    Family History: family history includes Heart disease in an other family member; Hypertension in an other family member.     Social History:  reports that she quit smoking about a year ago. Her smoking use included cigarettes. She has a 50.00 pack-year smoking history. She has never used smokeless tobacco. She reports that she does not drink alcohol or use drugs.    Allergies:   Allergies   Allergen Reactions   • Morphine And Related Itching, Confusion and Hallucinations   • Penicillins Rash       Medications:   Prior to Admission medications    Medication Sig Start Date End Date Taking? Authorizing Provider   albuterol (PROVENTIL) (2.5 MG/3ML) 0.083% nebulizer solution Take 2.5 mg by nebulization Every 8 (Eight) Hours As Needed for Wheezing.    Miguelito Chatman MD   amLODIPine (NORVASC) 10 MG tablet Take 10 mg by mouth Daily.    Miguelito Chatman MD   benzonatate (TESSALON) 100 MG capsule Take 1 capsule by mouth 3 (Three) Times a Day As Needed for Cough. 2/26/19   Donn Triana MD   cyclobenzaprine (FLEXERIL) 10 MG tablet Take 10 mg by mouth 3 (Three) Times a Day As Needed for Muscle Spasms.    Miguelito Chatman MD   docusate sodium 100 MG capsule Take 100 mg by mouth 2 (Two) Times a Day. 2/26/19   Donn Triana MD   gabapentin (NEURONTIN) 800 MG tablet Take 800 mg by mouth 3 (Three) Times a Day.    Miguelito Chatman MD   oxyCODONE-acetaminophen (PERCOCET) 7.5-325 MG per tablet Take 1 tablet by mouth Every 4 (Four) Hours As Needed for Moderate Pain . 5/23/19   Aba Santa MD   pantoprazole (PROTONIX) 40 MG EC tablet Take 1 tablet by mouth Daily. 2/5/18   Mary Shen APRN   penciclovir (DENAVIR) 1 % cream Apply 1 application topically to the appropriate area as directed As Needed (fever blisters).    Miguelito Chatman MD   sertraline  (ZOLOFT) 100 MG tablet Take 100 mg by mouth Daily.    Provider, MD Miguelito   traMADol (ULTRAM) 50 MG tablet Take 1 tablet by mouth Every 6 (Six) Hours As Needed for Moderate Pain . 5/13/19   Aba Santa MD   traZODone (DESYREL) 150 MG tablet Take 1 tablet by mouth Every Night. 2/26/19   Donn Triana MD   XARELTO 20 MG tablet TAKE 1 TABLET BY MOUTH DAILY. 5/29/19   Kris Peres MD       Review of Systems:  Review of Systems   Constitutional: Positive for fatigue. Negative for appetite change, chills, diaphoresis and fever.   HENT: Negative for congestion, dental problem, ear pain, facial swelling, mouth sores, nosebleeds, rhinorrhea, sinus pain and sore throat.    Eyes: Negative for photophobia, pain, discharge, redness and itching.   Respiratory: Negative for apnea, cough, choking, chest tightness, shortness of breath, wheezing and stridor.    Cardiovascular: Negative for chest pain, palpitations and leg swelling.   Gastrointestinal: Negative for abdominal distention, abdominal pain, anal bleeding, blood in stool, constipation, diarrhea, nausea and vomiting.   Genitourinary: Negative for difficulty urinating and flank pain.   Musculoskeletal: Negative for arthralgias, back pain and neck pain.   Skin: Negative for color change, pallor, rash and wound.   Neurological: Positive for syncope and light-headedness. Negative for seizures and facial asymmetry.   Psychiatric/Behavioral: Negative for agitation, behavioral problems, confusion, decreased concentration, hallucinations and self-injury. The patient is not nervous/anxious and is not hyperactive.       Otherwise complete ROS is negative except as mentioned above.    Physical Exam:   Temp:  [98.6 °F (37 °C)] 98.6 °F (37 °C)  Heart Rate:  [82-95] 95  Resp:  [18] 18  BP: ()/(50-98) 80/59  Physical Exam   Constitutional: She is oriented to person, place, and time. She appears well-developed and well-nourished. No distress.   HENT:    Head: Normocephalic and atraumatic.   Right Ear: External ear normal.   Left Ear: External ear normal.   Nose: Nose normal.   Mouth/Throat: Oropharynx is clear and moist. No oropharyngeal exudate.   Eyes: Pupils are equal, round, and reactive to light. Conjunctivae and EOM are normal. Right eye exhibits no discharge. Left eye exhibits no discharge. No scleral icterus.   Neck: Normal range of motion. Neck supple. No JVD present. No tracheal deviation present.   Cardiovascular: Normal rate and regular rhythm. Exam reveals no friction rub.   No murmur heard.  Pulmonary/Chest: Effort normal and breath sounds normal. No stridor. She has no wheezes. She has no rales. She exhibits no tenderness.   Abdominal: Soft. Bowel sounds are normal. She exhibits no distension and no mass. There is no tenderness.   Musculoskeletal: Normal range of motion. She exhibits no edema, tenderness or deformity.   Neurological: She is alert and oriented to person, place, and time. She displays normal reflexes. No cranial nerve deficit or sensory deficit. She exhibits normal muscle tone. Coordination normal.   Skin: Skin is warm and dry. No rash noted. She is not diaphoretic. No erythema. No pallor.   Psychiatric: She has a normal mood and affect. Her behavior is normal. Judgment and thought content normal.   Nursing note and vitals reviewed.        Results Reviewed:  I have personally reviewed current lab, radiology, and data and agree with results.  Lab Results (last 24 hours)     Procedure Component Value Units Date/Time    [facility] [place] [facility] [[id number]] Collected:  03/02/20 1434    Specimen:  Blood Updated:  03/02/20 1545    Narrative:       The following orders were created for panel order [facility].  Procedure                               Abnormality         Status                     ---------                               -----------         ------                     Light Blue Top[[id number]]                                   Final  result               Green Top (Gel)[[id number]]                                  Final result               Lavender Top[[id number]]                                     Final result               Gold Top - SST[[id number]]                                   Final result                 Please view results for these tests on the individual orders.    Light Blue Top [[id number]] Collected:  03/02/20 1434    Specimen:  Blood Updated:  03/02/20 1545     Extra Tube hold for add-on     Comment: Auto resulted       Green Top (Gel) [[id number]] Collected:  03/02/20 1434    Specimen:  Blood Updated:  03/02/20 1545     Extra Tube Hold for add-ons.     Comment: Auto resulted.       Lavender Top [[id number]] Collected:  03/02/20 1434    Specimen:  Blood Updated:  03/02/20 1545     Extra Tube hold for add-on     Comment: Auto resulted       Gold Top - SST [[id number]] Collected:  03/02/20 1434    Specimen:  Blood Updated:  03/02/20 1545     Extra Tube Hold for add-ons.     Comment: Auto resulted.       Comprehensive Metabolic Panel [[id number]]  (Abnormal) Collected:  03/02/20 1434    Specimen:  Blood Updated:  03/02/20 1502     Glucose 104 mg/dL      BUN 7 mg/dL      Creatinine 0.67 mg/dL      Sodium 138 mmol/L      Potassium 3.6 mmol/L      Chloride 95 mmol/L      CO2 28.0 mmol/L      Calcium 9.4 mg/dL      Total Protein 7.8 g/dL      Albumin 4.40 g/dL      ALT (SGPT) 15 U/L      AST (SGOT) 57 U/L      Alkaline Phosphatase 102 U/L      Total Bilirubin 0.3 mg/dL      eGFR Non African Amer 88 mL/min/1.73      Globulin 3.4 gm/dL      A/G Ratio 1.3 g/dL      BUN/Creatinine Ratio 10.4     Anion Gap 15.0 mmol/L     Narrative:       GFR Normal >60  Chronic Kidney Disease <60  Kidney Failure <15      Troponin [[id number]]  (Normal) Collected:  03/02/20 1434    Specimen:  Blood Updated:  03/02/20 1502     Troponin T <0.010 ng/mL     Narrative:       Troponin T Reference Range:  <= 0.03 ng/mL-   Negative for AMI  >0.03 ng/mL-     Abnormal for  myocardial necrosis.  Clinicians would have to utilize clinical acumen, EKG, Troponin and serial changes to determine if it is an Acute Myocardial Infarction or myocardial injury due to an underlying chronic condition.       Results may be falsely decreased if patient taking Biotin.      Magnesium [[id number]]  (Normal) Collected:  03/02/20 1434    Specimen:  Blood Updated:  03/02/20 1502     Magnesium 1.9 mg/dL     BNP [[id number]]  (Normal) Collected:  03/02/20 1434    Specimen:  Blood Updated:  03/02/20 1500     proBNP 239.7 pg/mL     Narrative:       Among patients with dyspnea, NT-proBNP is highly sensitive for the detection of acute congestive heart failure. In addition NT-proBNP of <300 pg/ml effectively rules out acute congestive heart failure with 99% negative predictive value.    Results may be falsely decreased if patient taking Biotin.      CBC & Differential [[id number]] Collected:  03/02/20 1434    Specimen:  Blood Updated:  03/02/20 1442    Narrative:       The following orders were created for panel order CBC & Differential.  Procedure                               Abnormality         Status                     ---------                               -----------         ------                     CBC Auto Differential[[id number]]        Abnormal            Final result                 Please view results for these tests on the individual orders.    CBC Auto Differential [[id number]]  (Abnormal) Collected:  03/02/20 1434    Specimen:  Blood Updated:  03/02/20 1442     WBC 12.28 10*3/mm3      RBC 4.39 10*6/mm3      Hemoglobin 12.1 g/dL      Hematocrit 37.2 %      MCV 84.7 fL      MCH 27.6 pg      MCHC 32.5 g/dL      RDW 17.8 %      RDW-SD 54.8 fl      MPV 8.7 fL      Platelets 314 10*3/mm3      Neutrophil % 56.1 %      Lymphocyte % 32.5 %      Monocyte % 10.1 %      Eosinophil % 0.3 %      Basophil % 0.8 %      Immature Grans % 0.2 %      Neutrophils, Absolute 6.88 10*3/mm3      Lymphocytes, Absolute 3.99  10*3/mm3      Monocytes, Absolute 1.24 10*3/mm3      Eosinophils, Absolute 0.04 10*3/mm3      Basophils, Absolute 0.10 10*3/mm3      Immature Grans, Absolute 0.03 10*3/mm3      nRBC 0.0 /100 WBC         Imaging Results (Last 24 Hours)     Procedure Component Value Units Date/Time    US Venous Doppler Lower Extremity Left (duplex) [[id number]] Collected:  03/02/20 1306     Updated:  03/02/20 1413    Narrative:         Ultrasound venous duplex left lower extremity    HISTORY: Erythema    Duplex ultrasound of the deep venous system of the left lower  extremity was performed    FINDINGS:  Real-time images demonstrate normal compressibility without  evidence of intraluminal thrombus.  Doppler shows phasic flow and augmentation.  Color Doppler also reveals venous patency.      Impression:       CONCLUSION:  No ultrasound evidence of deep venous thrombosis of the left  lower extremity.    [id number]    Electronically signed by:  Jason Marie MD  3/2/2020 2:12 PM CST  Workstation: [id number]    XR Chest 1 View [[id number]] Collected:  03/02/20 1240     Updated:  03/02/20 1309    Narrative:         PROCEDURE: Single chest view portable erect    REASON FOR EXAM:gen weakness    FINDINGS: Comparison exam dated May 20, 2019. Cardiac and  pulmonary vasculature are normal. Lungs are clear. Pleural spaces  are normal. No acute osseous abnormality. Stable postsurgical  changes consistent with left reverse total shoulder arthroplasty.      Impression:       No acute cardiopulmonary abnormality.    Electronically signed by:  Jim Muro MD  3/2/2020 1:08 PM CST  Workstation: [id number]            Assessment:      Hypotension    Elevated WBCs    Near syncope          Plan:  1.  Near syncope  No chest pain  Troponins negative  Patient is orthostatic patient blood pressure dropped systolic to 77/50 diastolic  Admit to hospital observation status  Telemetry  Normal saline  Hold antihypertensive medication  Orthostatic blood pressure measurement    2.   Hypotension  Orthostatic hypotension  Treat as above    3.  Elevated WBCs  No burning urination, no coughing, no fever  No symptoms no signs of infection  Most probably secondary to stress  Observe  Follow-up white blood cell count in a.m.    DVT and GI prophylaxis  Continue Xarelto home dose once pharmacy verify and Protonix    CODE STATUS full code    I discussed the patient's findings and my recommendations with: Patient at bedside and ER physician Dr. Miguelangel Gomez MD  03/02/20  3:48 PM                  Electronically signed by Clay Gomez MD at 03/02/20 9910

## 2020-03-05 ENCOUNTER — READMISSION MANAGEMENT (OUTPATIENT)
Dept: CALL CENTER | Facility: HOSPITAL | Age: 70
End: 2020-03-05

## 2020-03-05 NOTE — OUTREACH NOTE
Prep Survey      Responses   [facility] facility patient discharged from?  [facility]   Is LACE score < 7 ?  No   Eligibility  Readm Mgmt   Discharge diagnosis  hypotension,  elevated WBCs,  near syncope   Does the patient have one of the following disease processes/diagnoses(primary or secondary)?  Other   Does the patient have Home health ordered?  Yes   What [facility] the [facility] agency[facility]   Is there a DME ordered?  No   Comments regarding appointments  see AVS   Prep survey completed?  Yes          Edwina Briggs RN

## 2020-03-06 ENCOUNTER — READMISSION MANAGEMENT (OUTPATIENT)
Dept: CALL CENTER | Facility: HOSPITAL | Age: 70
End: 2020-03-06

## 2020-03-06 NOTE — OUTREACH NOTE
Medical Week 1 Survey      Responses   [facility] patient discharged from?  [place]   Does the patient have one of the following disease processes/diagnoses(primary or secondary)?  Other   Is there a successful TCM telephone encounter documented?  No   Week 1 attempt successful?  Yes   Call start time  1558   Call end time  1600   Discharge diagnosis  hypotension,  elevated WBCs,  near syncope   Meds reviewed with patient/caregiver?  Yes   Is the patient having any side effects they believe may be caused by any medication additions or changes?  No   Does the patient have all medications ordered at discharge?  Yes   Is the patient taking all medications as directed (includes completed medication regime)?  Yes   Does the patient have a primary care provider?   Yes   Does the patient have an appointment with their PCP within 7 days of discharge?  Yes   Has the patient kept scheduled appointments due by today?  N/A   [facility]   Has home health visited the patient within 72 hours of discharge?  Yes   Did the patient receive a copy of their discharge instructions?  Yes   Nursing interventions  Reviewed instructions with patient   What is the patient's perception of their health status since discharge?  Improving   Is the patient/caregiver able to teach back signs and symptoms related to disease process for when to call PCP?  Yes   Is the patient/caregiver able to teach back signs and symptoms related to disease process for when to call 911?  Yes   Is the patient/caregiver able to teach back the hierarchy of who to call/visit for symptoms/problems? PCP, Specialist, Home health nurse, Urgent Care, ED, 911  Yes   Additional teach back comments  Patient says she is doing well, no questions or concerns at this time.   Week 1 call completed?  Yes          Kacey Lewis RN

## 2020-03-20 DIAGNOSIS — I73.9 PERIPHERAL ARTERIAL DISEASE (HCC): Primary | ICD-10-CM

## 2020-03-23 ENCOUNTER — OFFICE VISIT (OUTPATIENT)
Dept: CARDIAC SURGERY | Facility: CLINIC | Age: 70
End: 2020-03-23

## 2020-03-23 VITALS
WEIGHT: 129.2 LBS | BODY MASS INDEX: 23.77 KG/M2 | HEART RATE: 75 BPM | DIASTOLIC BLOOD PRESSURE: 84 MMHG | OXYGEN SATURATION: 97 % | HEIGHT: 62 IN | SYSTOLIC BLOOD PRESSURE: 148 MMHG | TEMPERATURE: 98.1 F

## 2020-03-23 DIAGNOSIS — M79.605 PAIN OF LEFT LOWER EXTREMITY DUE TO ISCHEMIA: Primary | ICD-10-CM

## 2020-03-23 DIAGNOSIS — I70.222 ATHEROSCLEROSIS OF NATIVE ARTERIES OF EXTREMITIES WITH REST PAIN, LEFT LEG (HCC): ICD-10-CM

## 2020-03-23 DIAGNOSIS — I73.9 PERIPHERAL ARTERIAL DISEASE (HCC): ICD-10-CM

## 2020-03-23 DIAGNOSIS — E78.00 HYPERCHOLESTEREMIA: ICD-10-CM

## 2020-03-23 DIAGNOSIS — I99.8 PAIN OF LEFT LOWER EXTREMITY DUE TO ISCHEMIA: Primary | ICD-10-CM

## 2020-03-23 DIAGNOSIS — I10 BENIGN ESSENTIAL HYPERTENSION: ICD-10-CM

## 2020-03-23 DIAGNOSIS — Z72.0 NICOTINE ABUSE: ICD-10-CM

## 2020-03-23 PROCEDURE — 99215 OFFICE O/P EST HI 40 MIN: CPT | Performed by: NURSE PRACTITIONER

## 2020-03-23 RX ORDER — HYDROCODONE BITARTRATE AND ACETAMINOPHEN 5; 325 MG/1; MG/1
1-2 TABLET ORAL EVERY 4 HOURS PRN
Qty: 36 TABLET | Refills: 0 | Status: SHIPPED | OUTPATIENT
Start: 2020-03-23 | End: 2020-03-27

## 2020-03-23 NOTE — PATIENT INSTRUCTIONS
severe reduction Arterial Flow left Lower Extremity rest pain   Proceed with CTA runoff for leg ischemia     If signs and symptoms of ischemia should occur including but not limited to pale/blue discoloration of limb, increasing pain with ambulation or at rest, or a non-healing wound. Patient is to notify [facility] center for immediate evaluation.   Return as scheduled

## 2020-03-23 NOTE — PROGRESS NOTES
CVTS Office Progress Note       Subjective   Patient ID: Mona Perales is a 69 y.o. female is here today for follow-up.    Chief Complaint:    Chief Complaint   Patient presents with   • Anticoagulation   • Peripheral Vascular Disease       The following portions of the patient's history were reviewed and updated as appropriate: allergies, current medications, past family history, past medical history, past social history, past surgical history and problem list.  Recent images independently reviewed.  Available laboratory values reviewed.    PCP:  Lisa Alejandro APRN  Cardiology:  Kimmy     69 y.o. female with HTN(stable, increased risk stroke, rupture), Hyperlipidemia(stable, increased risk cardiovascular events), Smoker(uncontrolled, increased risk cardiovascular events), COPD(stable, increased risk pulmonary complications) and Atrial fibrillation(stable, increased risk stroke)  Anticoagulation (Xarelto, stable) Hx PE (stable). PVD (LSCA stent 2005, B iliac stents 2008) lost to follow up.  smokes 1 PPD.  Increasing pain LEFT foot, lifestyle limiting. Difficulty ambulating. VAS 10. No TIA stroke amaurosis.  No MI claudication. No other associated signs, symptoms or modifying factors. Presents for vascular evaluation.     2005: LSCA Stent  2008: Bilateral Iliac stents  3/23/2020:  CHELSIE: RIGHT 0.83 Triphasic  LEFT 0.31 Biphasic/Monophasic (DP/PT)    2016: Carotid Duplex: BABS 0-49% LICA 0-49%  2017: Carotid duplex: BABS 0-49% LICA 0-49%  2018: Carotid duplex: BABS 0-49% LICA 0-49%      Past Medical History:   Diagnosis Date   • A-fib (CMS/[facility])    • Anxiety    • Arthritis    • Asthma    • 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 with ulceration (CMS/[facility])     bilateral legs - bilateral iliac stents 2008      • CHF (congestive heart failure) (CMS/[facility])    • Chronic lower back pain    • COPD (chronic obstructive pulmonary disease) (CMS/[facility])    • Essential (primary) hypertension    • GERD  (gastroesophageal reflux disease)    • History of pulmonary embolus (PE)    • Hyperlipidemia    • Insomnia    • Lumbago    • Nicotine dependence    • Occlusion of artery      and stenosis of bilateral carotid arteries - BABS 16-49%, LICA 0-15%   • Other atherosclerosis of native artery of other extremity     LEFT subclavian stent  (occluded)   • Sleep apnea      Past Surgical History:   Procedure Laterality Date   • CARDIAC CATHETERIZATION  2016    No evidence of any obstructive epicardial CAD.Preserved LV systolic function with EF of 55%.   • CENTRAL VENOUS LINE INSERTION  2016    Successful placement of right uppe extremity 6-[place]izen [place] triple lumen PICC line.   • ENDOSCOPY N/A 2018    Procedure: ESOPHAGOGASTRODUODENOSCOPY;  Surgeon: Bin Martin MD;  Location: [facility] ENDOSCOPY;  Service:    • ENDOSCOPY N/A 2018    Procedure: ESOPHAGOGASTRODUODENOSCOPY;  Surgeon: Bin Martin MD;  Location: [facility] ENDOSCOPY;  Service:    • ENDOSCOPY N/A 3/16/2018    Procedure: ESOPHAGOGASTRODUODENOSCOPY possible dilation;  Surgeon: Bin Martin MD;  Location: [facility] ENDOSCOPY;  Service: Gastroenterology   • HYSTERECTOMY     • KYPHOPLASTY N/A 2019    Procedure: KYPHOPLASTY LUMBAR FOUR;  Surgeon: Aba Santa MD;  Location: [facility] OR;  Service: Orthopedic Spine   • TOTAL SHOULDER REPLACEMENT Left    • TRANSESOPHAGEAL ECHOCARDIOGRAM (JACQUES)  2016    With color flow-Mild to moderate [facility].LV systolic function well preserved with Ef of 55-60%.Mitral and AV intact.Diastolic dysfunction   • UPPER GASTROINTESTINAL ENDOSCOPY  2018    Dr. Bin Martin M.D.     Family History   Problem Relation Age of Onset   • Heart disease Other    • Hypertension Other      Social History     Tobacco Use   • Smoking status: Current Every Day Smoker     Packs/day: 1.00     Years: 50.00     Pack years: 50.00     Types: Cigarettes     Last attempt to quit: 2019     Years since quittin.0    • Smokeless tobacco: Never Used   Substance Use Topics   • Alcohol use: No   • Drug use: No       ALLERGIES:   Morphine and related and Penicillins    MEDICATIONS:      Current Outpatient Medications:   •  albuterol (PROVENTIL) (2.5 MG/3ML) 0.083% nebulizer solution, Take 2.5 mg by nebulization Every 8 (Eight) Hours As Needed for Wheezing., Disp: , Rfl:   •  docusate sodium 100 MG capsule, Take 100 mg by mouth 2 (Two) Times a Day. (Patient taking differently: Take 100 mg by mouth Daily As Needed.), Disp: , Rfl:   •  gabapentin (NEURONTIN) 800 MG tablet, Take 800 mg by mouth 2 (Two) Times a Day., Disp: , Rfl:   •  midodrine (PROAMATINE) 5 MG tablet, Take 1 tablet by mouth 3 (Three) Times a Day Before Meals., Disp: 90 tablet, Rfl: 0  •  pantoprazole (PROTONIX) 40 MG EC tablet, Take 1 tablet by mouth Daily., Disp: 30 tablet, Rfl: 5  •  penciclovir (DENAVIR) 1 % cream, Apply 1 application topically to the appropriate area as directed As Needed (fever blisters)., Disp: , Rfl:   •  sertraline (ZOLOFT) 100 MG tablet, Take 100 mg by mouth Daily., Disp: , Rfl:   •  traZODone (DESYREL) 150 MG tablet, Take 1 tablet by mouth Every Night., Disp: , Rfl:   •  XARELTO 20 MG tablet, TAKE 1 TABLET BY MOUTH DAILY., Disp: 30 tablet, Rfl: 6  •  HYDROcodone-acetaminophen (Norco) 5-325 MG per tablet, Take 1-2 tablets by mouth Every 4 (Four) Hours As Needed for Severe Pain  (leg ischemia)., Disp: 36 tablet, Rfl: 0    Review of Systems   Constitution: Positive for malaise/fatigue.   HENT: Negative for nosebleeds.    Eyes: Negative for visual disturbance.   Cardiovascular: Positive for claudication and near-syncope. Negative for cyanosis and leg swelling.   Respiratory: Negative for hemoptysis and shortness of breath.    Hematologic/Lymphatic: Negative for bleeding problem. Does not bruise/bleed easily.   Skin: Positive for color change (LEFT foot dependent rubar). Negative for nail changes.   Musculoskeletal: Positive for back pain.  "Negative for muscle weakness.   Gastrointestinal: Negative for dysphagia, hematemesis and melena.   Genitourinary: Negative for hematuria.   Neurological: Negative for dizziness, focal weakness, light-headedness, loss of balance, numbness, paresthesias and weakness.   Psychiatric/Behavioral: Negative for altered mental status.   All other systems reviewed and are negative.       Objective   Vitals:    03/23/20 0952   BP: 148/84   BP Location: Right arm   Pulse: 75   Temp: 98.1 °F (36.7 °C)   TempSrc: Temporal   SpO2: 97%   Weight: 58.6 kg (129 lb 3.2 oz)   Height: 157.5 cm (62\")     Body mass index is 23.63 kg/m².  Physical Exam   Constitutional: She is oriented to person, place, and time. She appears well-nourished.   HENT:   Head: Atraumatic.   Eyes: EOM are normal.   Neck: Neck supple. Carotid bruit is not present.   Cardiovascular: Normal heart sounds.   Pulses:       Dorsalis pedis pulses are 2+ on the right side, and 1+ on the left side.        Posterior tibial pulses are 2+ on the right side, and 1+ on the left side.   Pulmonary/Chest: Effort normal and breath sounds normal.   Abdominal: Soft. Bowel sounds are normal.   Musculoskeletal: Normal range of motion. She exhibits no edema.   Gait normal   Neurological: She is alert and oriented to person, place, and time.   Skin: Capillary refill takes more than 3 seconds. There is pallor (LEFT foot elevation).   Psychiatric: She has a normal mood and affect. Thought content normal.   Vitals reviewed.    Lab Results   Component Value Date    GLUCOSE 102 (H) 03/03/2020    BUN 8 03/03/2020    CREATININE 0.63 03/03/2020    EGFRIFNONA 94 03/03/2020    BCR 12.7 03/03/2020    K 3.8 03/03/2020    CO2 24.0 03/03/2020    CALCIUM 9.4 03/03/2020    ALBUMIN 4.40 03/02/2020    AST 57 (H) 03/02/2020    ALT 15 03/02/2020     Lab Results   Component Value Date    WBC 10.06 03/03/2020    HGB 12.6 03/03/2020    HCT 37.9 03/03/2020    MCV 83.7 03/03/2020     03/03/2020 "         Assessment & Plan     Independent Review of Radiographic Studies:  Detailed discussion regarding risks, benefits, and treatment plan. Images independently reviewed. Patient understands, agrees, and wishes to proceed with plan.     1. Pain of left lower extremity due to ischemia  VAS-10  Detailed discussion regarding situation, options and plans related to severe claudication, rest pain, ischemic tissue loss.  Studies demonstrate severe peripheral vascular disease.  Requires additional urgent evaluation with arteriography to evaluate options for improving blood flow to feet, healing wounds, and avoiding limb loss. Mona Perales understands risks, including but not limited to: pain, infection, bleeding, nerve or blood vessel injury, need for emergent open operation to restore blood flow.  Benefits: relief of symptoms, reduction in risk of limb loss, improved wound healing.  Options: medical therapy, alternative imaging discussed. Mona Perales understands and wishes to proceed with plan.  Abdominal aortogram, bilateral lower extremity runoff, possible balloon angioplasty or stent scheduled for 3/25/2020 Thank you for the opportunity to participate in this patient's care.   Follow up for results.   - CT Angio Abdominal Aorta Bilateral Iliofem Runoff; Future  - HYDROcodone-acetaminophen (Norco) 5-325 MG per tablet; Take 1-2 tablets by mouth Every 4 (Four) Hours As Needed for Severe Pain  (leg ischemia).  Dispense: 36 tablet; Refill: 0  Reviewed risks, benefits, and habit forming potential and weaning from narcotic medication. Patient understands and wishes to receive prescription.  Prescription written for Norco #36 for post-surgical pain after Jhonny placed on file.     2. Peripheral arterial disease (CMS/HCC)  severe reduction Arterial Flow left Lower Extremity rest pain   Proceed with CTA runoff for leg ischemia   If signs and symptoms of ischemia should occur including but not limited to pale/blue  discoloration of limb, increasing pain with ambulation or at rest, or a non-healing wound. Patient is to notify [facility] center for immediate evaluation.  - CT Angio Abdominal Aorta Bilateral Iliofem Runoff; Future    3. Atherosclerosis of native arteries of extremities with rest pain, left leg (CMS/HCC)   - CT Angio Abdominal Aorta Bilateral Iliofem Runoff; Future    4. Benign essential hypertension  controlled    5. Hypercholesteremia  Consider statin therapy  Will discuss return visit    6. Nicotine abuse  Smoking cessation assistance options offered including behavioral counseling (Smoking Cessation Classes), Nicotine replacement therapy (patches or gum), pharmacologic therapy (Chantix, Wellbutrin). Understands tobacco increases risk of expanding AAA, MI, CVA, PAD, carcinoma. Discussion and question answer period 5-7 minutes.     Time spent with patient 40 out of 40 min face to face evaluating, treating, and discussing findings regarding vascular exam. Coordination of CTA runoff and follow up appointment and education to patient and family regarding treatment options, plan of care, and hoped for outcomes.     BMI Normal.           This document has been electronically signed by DELFINO Garcia on March 23, 2020 11:01

## 2020-03-25 ENCOUNTER — APPOINTMENT (OUTPATIENT)
Dept: CT IMAGING | Facility: HOSPITAL | Age: 70
End: 2020-03-25

## 2020-03-26 ENCOUNTER — HOSPITAL ENCOUNTER (OUTPATIENT)
Dept: ULTRASOUND IMAGING | Facility: HOSPITAL | Age: 70
Discharge: HOME | End: 2020-03-26

## 2020-03-26 ENCOUNTER — HOSPITAL ENCOUNTER (OUTPATIENT)
Dept: CT IMAGING | Facility: HOSPITAL | Age: 70
Discharge: HOME | End: 2020-03-26
Admitting: NURSE PRACTITIONER

## 2020-03-26 ENCOUNTER — READMISSION MANAGEMENT (OUTPATIENT)
Dept: CALL CENTER | Facility: HOSPITAL | Age: 70
End: 2020-03-26

## 2020-03-26 DIAGNOSIS — I70.222 ATHEROSCLEROSIS OF NATIVE ARTERIES OF EXTREMITIES WITH REST PAIN, LEFT LEG (HCC): ICD-10-CM

## 2020-03-26 DIAGNOSIS — I99.8 PAIN OF LEFT LOWER EXTREMITY DUE TO ISCHEMIA: ICD-10-CM

## 2020-03-26 DIAGNOSIS — I73.9 PERIPHERAL ARTERIAL DISEASE (HCC): ICD-10-CM

## 2020-03-26 DIAGNOSIS — M79.605 PAIN OF LEFT LOWER EXTREMITY DUE TO ISCHEMIA: ICD-10-CM

## 2020-03-26 PROCEDURE — 0 IOPAMIDOL PER 1 ML: Performed by: NURSE PRACTITIONER

## 2020-03-26 PROCEDURE — 76937 US GUIDE VASCULAR ACCESS: CPT

## 2020-03-26 PROCEDURE — 75635 CT ANGIO ABDOMINAL ARTERIES: CPT

## 2020-03-26 RX ADMIN — IOPAMIDOL 90 ML: 755 INJECTION, SOLUTION INTRAVENOUS at 13:22

## 2020-03-26 NOTE — OUTREACH NOTE
Medical Week 3 Survey      Responses   [facility] patient discharged from?  [place]   Does the patient have one of the following disease processes/diagnoses(primary or secondary)?  Other   Week 3 attempt successful?  Yes   Call start time  1321   Call end time  1324   Discharge diagnosis  hypotension,  elevated WBCs,  near syncope   Is patient permission given to speak with other caregiver?  No   Meds reviewed with patient/caregiver?  Yes   Is the patient having any side effects they believe may be caused by any medication additions or changes?  No   Does the patient have all medications ordered at discharge?  Yes   Is the patient taking all medications as directed (includes completed medication regime)?  Yes   Does the patient have a primary care provider?   Yes   Comments regarding PCP  Lisa Alejandro APRN PCP   Has the patient kept scheduled appointments due by today?  Yes   Comments  Patient states that she is having an MRI of her leg today.    [facility]    Has home health visited the patient within 72 hours of discharge?  Yes   Psychosocial issues?  No   Did the patient receive a copy of their discharge instructions?  Yes   What is the patient's perception of their health status since discharge?  Improving   Is the patient/caregiver able to teach back signs and symptoms related to disease process for when to call PCP?  Yes   Is the patient/caregiver able to teach back signs and symptoms related to disease process for when to call 911?  Yes   Is the patient/caregiver able to teach back the hierarchy of who to call/visit for symptoms/problems? PCP, Specialist, Home health nurse, Urgent Care, ED, 911  Yes   Week 3 Call Completed?  Yes          Viji Reis RN

## 2020-03-27 ENCOUNTER — TELEPHONE (OUTPATIENT)
Dept: CARDIAC SURGERY | Facility: CLINIC | Age: 70
End: 2020-03-27

## 2020-03-27 DIAGNOSIS — I99.8 PAIN OF LEFT LOWER EXTREMITY DUE TO ISCHEMIA: ICD-10-CM

## 2020-03-27 DIAGNOSIS — M79.605 PAIN OF LEFT LOWER EXTREMITY DUE TO ISCHEMIA: ICD-10-CM

## 2020-03-27 DIAGNOSIS — I70.222 ATHEROSCLEROSIS OF NATIVE ARTERIES OF EXTREMITIES WITH REST PAIN, LEFT LEG (HCC): Primary | ICD-10-CM

## 2020-03-27 RX ORDER — SODIUM CHLORIDE 9 MG/ML
100 INJECTION, SOLUTION INTRAVENOUS CONTINUOUS
Status: CANCELLED | OUTPATIENT
Start: 2020-04-14

## 2020-03-27 RX ORDER — BUPIVACAINE HCL/0.9 % NACL/PF 0.1 %
2 PLASTIC BAG, INJECTION (ML) EPIDURAL ONCE
Status: CANCELLED | OUTPATIENT
Start: 2020-04-14 | End: 2020-03-27

## 2020-03-27 RX ORDER — OXYCODONE HYDROCHLORIDE AND ACETAMINOPHEN 5; 325 MG/1; MG/1
1-2 TABLET ORAL EVERY 4 HOURS PRN
Qty: 36 TABLET | Refills: 0 | Status: SHIPPED | OUTPATIENT
Start: 2020-03-27 | End: 2020-04-08

## 2020-03-27 NOTE — TELEPHONE ENCOUNTER
Ms. Perales notified of CTA runoff findings and need for surgical revascularization to restore flow to foot. Surgical options along with current COVID-19 restrictions discussed. Currently she is experiencing ischemic rest pain of the LEFT lower extremity requiring pain medication which was refilled today. She elected to proceed with scheduling LEFT Fem-Pop AK bypass. Risks and benefits discussed. All questions answered.           This document has been electronically signed by DELFINO Garcia on March 27, 2020 14:20

## 2020-04-03 ENCOUNTER — READMISSION MANAGEMENT (OUTPATIENT)
Dept: CALL CENTER | Facility: HOSPITAL | Age: 70
End: 2020-04-03

## 2020-04-03 NOTE — OUTREACH NOTE
Medical Week 4 Survey      Responses   [facility] patient discharged from?  [place]   Does the patient have one of the following disease processes/diagnoses(primary or secondary)?  Other   Week 4 attempt successful?  Yes   Call start time  1648   Call end time  1652   Meds reviewed with patient/caregiver?  Yes   Is the patient taking all medications as directed (includes completed medication regime)?  Yes   Has the patient kept scheduled appointments due by today?  Yes   Is the patient still receiving Home Health Services?  N/A   What is the patient's perception of their health status since discharge?  Same   Additional teach back comments  Patient having surgery April 14 to remove the blood clot in her foot.   Week 4 Call Completed?  Yes   Would the patient like one additional call?  Yes          Neha Murillo RN

## 2020-04-08 ENCOUNTER — PRE-ADMISSION TESTING (OUTPATIENT)
Dept: PREADMISSION TESTING | Facility: HOSPITAL | Age: 70
End: 2020-04-08

## 2020-04-08 VITALS
HEART RATE: 70 BPM | OXYGEN SATURATION: 95 % | RESPIRATION RATE: 18 BRPM | DIASTOLIC BLOOD PRESSURE: 80 MMHG | HEIGHT: 62 IN | SYSTOLIC BLOOD PRESSURE: 146 MMHG | WEIGHT: 128 LBS | BODY MASS INDEX: 23.55 KG/M2

## 2020-04-08 LAB
ANION GAP SERPL CALCULATED.3IONS-SCNC: 13 MMOL/L (ref 5–15)
BUN BLD-MCNC: 7 MG/DL (ref 8–23)
BUN/CREAT SERPL: 8.6 (ref 7–25)
CALCIUM SPEC-SCNC: 9.3 MG/DL (ref 8.6–10.5)
CHLORIDE SERPL-SCNC: 104 MMOL/L (ref 98–107)
CO2 SERPL-SCNC: 24 MMOL/L (ref 22–29)
CREAT BLD-MCNC: 0.81 MG/DL (ref 0.57–1)
DEPRECATED RDW RBC AUTO: 47.3 FL (ref 37–54)
ERYTHROCYTE [DISTWIDTH] IN BLOOD BY AUTOMATED COUNT: 15.9 % (ref 12.3–15.4)
GFR SERPL CREATININE-BSD FRML MDRD: 71 ML/MIN/1.73
GLUCOSE BLD-MCNC: 78 MG/DL (ref 65–99)
HCT VFR BLD AUTO: 37 % (ref 34–46.6)
HGB BLD-MCNC: 12.4 G/DL (ref 12–15.9)
MCH RBC QN AUTO: 27.7 PG (ref 26.6–33)
MCHC RBC AUTO-ENTMCNC: 33.5 G/DL (ref 31.5–35.7)
MCV RBC AUTO: 82.6 FL (ref 79–97)
PLATELET # BLD AUTO: 226 10*3/MM3 (ref 140–450)
PMV BLD AUTO: 9.3 FL (ref 6–12)
POTASSIUM BLD-SCNC: 4.4 MMOL/L (ref 3.5–5.2)
RBC # BLD AUTO: 4.48 10*6/MM3 (ref 3.77–5.28)
SODIUM BLD-SCNC: 141 MMOL/L (ref 136–145)
WBC NRBC COR # BLD: 7.86 10*3/MM3 (ref 3.4–10.8)

## 2020-04-08 PROCEDURE — 36415 COLL VENOUS BLD VENIPUNCTURE: CPT

## 2020-04-08 PROCEDURE — 80048 BASIC METABOLIC PNL TOTAL CA: CPT | Performed by: THORACIC SURGERY (CARDIOTHORACIC VASCULAR SURGERY)

## 2020-04-08 PROCEDURE — 85027 COMPLETE CBC AUTOMATED: CPT | Performed by: THORACIC SURGERY (CARDIOTHORACIC VASCULAR SURGERY)

## 2020-04-08 RX ORDER — FUROSEMIDE 20 MG/1
20 TABLET ORAL EVERY OTHER DAY
COMMUNITY
End: 2020-04-23 | Stop reason: SDUPTHER

## 2020-04-08 NOTE — DISCHARGE INSTRUCTIONS
[facility]  Pre-op Information and Guidelines    You will be called after 2 p.m. the day before your surgery (Friday for Monday surgery) and notified of your time for arrival and approximate surgery time.  If you have not received a call by 4P.M., please contact Same Day Surgery at [phone] of if outside [facility] call [phone].    Please Follow these Important Safety Guidelines:    • The morning of your procedure, take only the medications listed below with   A sip of water:_____________________________________________       ______________________________________________    • DO NOT eat or drink anything after 12:00 midnight the night before surgery  Specific instructions concerning drinking clear liquids will be discussed during  the pre-surgery instruction call the day before your surgery.    • If you take a blood thinner (ex. Plavix, Coumadin, aspirin), ask your doctor when to stop it before surgery  STOP DATE: _________________    • Only 2 visitors are allowed in patient rooms at a time  Your visitors will be asked to wait in the lobby until the admission process is complete with the exception of a parent with a child and patients in need of special assistance.    • YOU CANNOT DRIVE YOURSELF HOME  You must be accompanied by someone who will be responsible for driving you home after surgery and for your care at home.    • DO NOT chew gum, use breath mints, hard candy, or smoke the day of surgery  • DO NOT drink alcohol for at least 24 hours before your surgery  • DO NOT wear any jewelry and remove all body piercing before coming to the hospital  • DO NOT wear make-up to the hospital  • If you are having surgery on an extremity (arm/leg/foot) remove nail polish/artificial nails on the surgical side  • Clothing, glasses, contacts, dentures, and hairpieces must be removed before surgery  • Bathe the night before or the morning of your surgery and do not use powders/lotions on  skin.

## 2020-04-10 ENCOUNTER — READMISSION MANAGEMENT (OUTPATIENT)
Dept: CALL CENTER | Facility: HOSPITAL | Age: 70
End: 2020-04-10

## 2020-04-10 NOTE — OUTREACH NOTE
Medical Week 5 Survey      Responses   [facility] patient discharged from?  [place]   Does the patient have one of the following disease processes/diagnoses(primary or secondary)?  Other   Week 5 attempt successful?  Yes   Call start time  1527   Call end time  1528   Discharge diagnosis  hypotension,  elevated WBCs,  near syncope   What is the patient's perception of their health status since discharge?  Improving   Is the patient/caregiver able to teach back signs and symptoms related to disease process for when to call PCP?  Yes   Is the patient/caregiver able to teach back signs and symptoms related to disease process for when to call 911?  Yes   Is the patient/caregiver able to teach back the hierarchy of who to call/visit for symptoms/problems? PCP, Specialist, Home health nurse, Urgent Care, ED, 911  Yes   Additional teach back comments  Patient says she is doing well, no questions or concerns at this time.   Graduated  Yes   Did the patient feel the follow up calls were helpful during their recovery period?  Yes   Was the number of calls appropriate?  Yes          Kacey Lewis RN

## 2020-04-14 ENCOUNTER — ANESTHESIA EVENT (OUTPATIENT)
Dept: PERIOP | Facility: HOSPITAL | Age: 70
End: 2020-04-14

## 2020-04-14 ENCOUNTER — ANESTHESIA (OUTPATIENT)
Dept: PERIOP | Facility: HOSPITAL | Age: 70
End: 2020-04-14

## 2020-04-14 ENCOUNTER — HOSPITAL ENCOUNTER (INPATIENT)
Facility: HOSPITAL | Age: 70
LOS: 2 days | Discharge: HOME HEALTH | End: 2020-04-16
Attending: THORACIC SURGERY (CARDIOTHORACIC VASCULAR SURGERY) | Admitting: THORACIC SURGERY (CARDIOTHORACIC VASCULAR SURGERY)

## 2020-04-14 ENCOUNTER — APPOINTMENT (OUTPATIENT)
Dept: GENERAL RADIOLOGY | Facility: HOSPITAL | Age: 70
End: 2020-04-14

## 2020-04-14 DIAGNOSIS — I70.222 ATHEROSCLEROSIS OF NATIVE ARTERIES OF EXTREMITIES WITH REST PAIN, LEFT LEG (HCC): Primary | ICD-10-CM

## 2020-04-14 DIAGNOSIS — I70.222 ATHEROSCLEROSIS OF NATIVE ARTERIES OF EXTREMITIES WITH REST PAIN, LEFT LEG (HCC): ICD-10-CM

## 2020-04-14 LAB
ABO GROUP BLD: NORMAL
BLD GP AB SCN SERPL QL: NEGATIVE
Lab: NORMAL
RH BLD: POSITIVE
T&S EXPIRATION DATE: NORMAL

## 2020-04-14 PROCEDURE — 25010000002 HEPARIN (PORCINE) PER 1000 UNITS: Performed by: NURSE ANESTHETIST, CERTIFIED REGISTERED

## 2020-04-14 PROCEDURE — 04CL0ZZ EXTIRPATION OF MATTER FROM LEFT FEMORAL ARTERY, OPEN APPROACH: ICD-10-PCS | Performed by: THORACIC SURGERY (CARDIOTHORACIC VASCULAR SURGERY)

## 2020-04-14 PROCEDURE — C1725 CATH, TRANSLUMIN NON-LASER: HCPCS | Performed by: THORACIC SURGERY (CARDIOTHORACIC VASCULAR SURGERY)

## 2020-04-14 PROCEDURE — 041L0JL BYPASS LEFT FEMORAL ARTERY TO POPLITEAL ARTERY WITH SYNTHETIC SUBSTITUTE, OPEN APPROACH: ICD-10-PCS | Performed by: THORACIC SURGERY (CARDIOTHORACIC VASCULAR SURGERY)

## 2020-04-14 PROCEDURE — 25010000002 PROPOFOL 10 MG/ML EMULSION: Performed by: NURSE ANESTHETIST, CERTIFIED REGISTERED

## 2020-04-14 PROCEDURE — 86901 BLOOD TYPING SEROLOGIC RH(D): CPT | Performed by: NURSE PRACTITIONER

## 2020-04-14 PROCEDURE — 25010000002 IOPAMIDOL 61 % SOLUTION: Performed by: THORACIC SURGERY (CARDIOTHORACIC VASCULAR SURGERY)

## 2020-04-14 PROCEDURE — 25010000003 CEFAZOLIN PER 500 MG: Performed by: THORACIC SURGERY (CARDIOTHORACIC VASCULAR SURGERY)

## 2020-04-14 PROCEDURE — B41G1ZZ FLUOROSCOPY OF LEFT LOWER EXTREMITY ARTERIES USING LOW OSMOLAR CONTRAST: ICD-10-PCS | Performed by: THORACIC SURGERY (CARDIOTHORACIC VASCULAR SURGERY)

## 2020-04-14 PROCEDURE — 25010000002 HYDROMORPHONE PER 4 MG: Performed by: THORACIC SURGERY (CARDIOTHORACIC VASCULAR SURGERY)

## 2020-04-14 PROCEDURE — 35656 BPG FEMORAL-POPLITEAL: CPT | Performed by: THORACIC SURGERY (CARDIOTHORACIC VASCULAR SURGERY)

## 2020-04-14 PROCEDURE — A4648 IMPLANTABLE TISSUE MARKER: HCPCS | Performed by: THORACIC SURGERY (CARDIOTHORACIC VASCULAR SURGERY)

## 2020-04-14 PROCEDURE — 25010000002 HYDROMORPHONE 1 MG/ML SOLUTION: Performed by: NURSE ANESTHETIST, CERTIFIED REGISTERED

## 2020-04-14 PROCEDURE — 94799 UNLISTED PULMONARY SVC/PX: CPT

## 2020-04-14 PROCEDURE — 25010000002 NEOSTIGMINE 4 MG/4ML SOLUTION PREFILLED SYRINGE: Performed by: NURSE ANESTHETIST, CERTIFIED REGISTERED

## 2020-04-14 PROCEDURE — 25010000002 KETOROLAC TROMETHAMINE PER 15 MG: Performed by: THORACIC SURGERY (CARDIOTHORACIC VASCULAR SURGERY)

## 2020-04-14 PROCEDURE — 86850 RBC ANTIBODY SCREEN: CPT | Performed by: NURSE PRACTITIONER

## 2020-04-14 PROCEDURE — C1894 INTRO/SHEATH, NON-LASER: HCPCS | Performed by: THORACIC SURGERY (CARDIOTHORACIC VASCULAR SURGERY)

## 2020-04-14 PROCEDURE — C1751 CATH, INF, PER/CENT/MIDLINE: HCPCS | Performed by: ANESTHESIOLOGY

## 2020-04-14 PROCEDURE — 25010000002 MIDAZOLAM PER 1 MG: Performed by: NURSE ANESTHETIST, CERTIFIED REGISTERED

## 2020-04-14 PROCEDURE — 25010000002 FENTANYL CITRATE (PF) 100 MCG/2ML SOLUTION: Performed by: NURSE ANESTHETIST, CERTIFIED REGISTERED

## 2020-04-14 PROCEDURE — 25010000002 PROTAMINE SULFATE PER 10 MG: Performed by: NURSE ANESTHETIST, CERTIFIED REGISTERED

## 2020-04-14 PROCEDURE — 25010000002 VANCOMYCIN 1 G RECONSTITUTED SOLUTION: Performed by: THORACIC SURGERY (CARDIOTHORACIC VASCULAR SURGERY)

## 2020-04-14 PROCEDURE — 25010000002 DEXAMETHASONE PER 1 MG: Performed by: NURSE ANESTHETIST, CERTIFIED REGISTERED

## 2020-04-14 PROCEDURE — 76000 FLUOROSCOPY <1 HR PHYS/QHP: CPT

## 2020-04-14 PROCEDURE — 86900 BLOOD TYPING SEROLOGIC ABO: CPT | Performed by: NURSE PRACTITIONER

## 2020-04-14 PROCEDURE — 25010000002 HEPARIN (PORCINE) PER 1000 UNITS: Performed by: THORACIC SURGERY (CARDIOTHORACIC VASCULAR SURGERY)

## 2020-04-14 PROCEDURE — C1769 GUIDE WIRE: HCPCS | Performed by: THORACIC SURGERY (CARDIOTHORACIC VASCULAR SURGERY)

## 2020-04-14 PROCEDURE — 25010000002 CEFAZOLIN PER 500 MG: Performed by: THORACIC SURGERY (CARDIOTHORACIC VASCULAR SURGERY)

## 2020-04-14 PROCEDURE — C1768 GRAFT, VASCULAR: HCPCS | Performed by: THORACIC SURGERY (CARDIOTHORACIC VASCULAR SURGERY)

## 2020-04-14 PROCEDURE — 25010000002 SUCCINYLCHOLINE PER 20 MG: Performed by: NURSE ANESTHETIST, CERTIFIED REGISTERED

## 2020-04-14 PROCEDURE — 94640 AIRWAY INHALATION TREATMENT: CPT

## 2020-04-14 PROCEDURE — 71045 X-RAY EXAM CHEST 1 VIEW: CPT

## 2020-04-14 DEVICE — GRFT VASC PROPAT THNSTRCH REMVRNG6X50X40: Type: IMPLANTABLE DEVICE | Site: ARTERY FEMORAL | Status: FUNCTIONAL

## 2020-04-14 DEVICE — HEMOST ABS SURGICEL SNOW 1X2IN: Type: IMPLANTABLE DEVICE | Site: ARTERY FEMORAL | Status: FUNCTIONAL

## 2020-04-14 DEVICE — CLIP LIGAT VASC HORIZON TI MD BLU 6CT: Type: IMPLANTABLE DEVICE | Site: ARTERY FEMORAL | Status: FUNCTIONAL

## 2020-04-14 RX ORDER — ALBUTEROL SULFATE 2.5 MG/3ML
2.5 SOLUTION RESPIRATORY (INHALATION) EVERY 8 HOURS PRN
Status: DISCONTINUED | OUTPATIENT
Start: 2020-04-14 | End: 2020-04-16 | Stop reason: HOSPADM

## 2020-04-14 RX ORDER — PROPOFOL 10 MG/ML
VIAL (ML) INTRAVENOUS AS NEEDED
Status: DISCONTINUED | OUTPATIENT
Start: 2020-04-14 | End: 2020-04-14 | Stop reason: SURG

## 2020-04-14 RX ORDER — SODIUM CHLORIDE 9 MG/ML
75 INJECTION, SOLUTION INTRAVENOUS CONTINUOUS
Status: DISCONTINUED | OUTPATIENT
Start: 2020-04-14 | End: 2020-04-15

## 2020-04-14 RX ORDER — FAMOTIDINE 10 MG/ML
20 INJECTION, SOLUTION INTRAVENOUS EVERY 12 HOURS SCHEDULED
Status: DISCONTINUED | OUTPATIENT
Start: 2020-04-14 | End: 2020-04-15 | Stop reason: SDUPTHER

## 2020-04-14 RX ORDER — HEPARIN SODIUM 5000 [USP'U]/ML
INJECTION, SOLUTION INTRAVENOUS; SUBCUTANEOUS AS NEEDED
Status: DISCONTINUED | OUTPATIENT
Start: 2020-04-14 | End: 2020-04-14 | Stop reason: HOSPADM

## 2020-04-14 RX ORDER — FENTANYL CITRATE 50 UG/ML
INJECTION, SOLUTION INTRAMUSCULAR; INTRAVENOUS AS NEEDED
Status: DISCONTINUED | OUTPATIENT
Start: 2020-04-14 | End: 2020-04-14 | Stop reason: SURG

## 2020-04-14 RX ORDER — DEXAMETHASONE SODIUM PHOSPHATE 4 MG/ML
INJECTION, SOLUTION INTRA-ARTICULAR; INTRALESIONAL; INTRAMUSCULAR; INTRAVENOUS; SOFT TISSUE AS NEEDED
Status: DISCONTINUED | OUTPATIENT
Start: 2020-04-14 | End: 2020-04-14 | Stop reason: SURG

## 2020-04-14 RX ORDER — GABAPENTIN 400 MG/1
800 CAPSULE ORAL EVERY 12 HOURS SCHEDULED
Status: DISCONTINUED | OUTPATIENT
Start: 2020-04-14 | End: 2020-04-16 | Stop reason: HOSPADM

## 2020-04-14 RX ORDER — ONDANSETRON 4 MG/1
4 TABLET, FILM COATED ORAL EVERY 6 HOURS PRN
Status: DISCONTINUED | OUTPATIENT
Start: 2020-04-14 | End: 2020-04-16 | Stop reason: HOSPADM

## 2020-04-14 RX ORDER — BUPIVACAINE HCL/0.9 % NACL/PF 0.1 %
2 PLASTIC BAG, INJECTION (ML) EPIDURAL ONCE
Status: COMPLETED | OUTPATIENT
Start: 2020-04-14 | End: 2020-04-14

## 2020-04-14 RX ORDER — PROMETHAZINE HYDROCHLORIDE 25 MG/1
25 SUPPOSITORY RECTAL ONCE AS NEEDED
Status: DISCONTINUED | OUTPATIENT
Start: 2020-04-14 | End: 2020-04-14 | Stop reason: HOSPADM

## 2020-04-14 RX ORDER — BUPIVACAINE HCL/0.9 % NACL/PF 0.1 %
2 PLASTIC BAG, INJECTION (ML) EPIDURAL EVERY 8 HOURS
Status: COMPLETED | OUTPATIENT
Start: 2020-04-14 | End: 2020-04-15

## 2020-04-14 RX ORDER — PROMETHAZINE HYDROCHLORIDE 25 MG/1
25 TABLET ORAL ONCE AS NEEDED
Status: DISCONTINUED | OUTPATIENT
Start: 2020-04-14 | End: 2020-04-14 | Stop reason: HOSPADM

## 2020-04-14 RX ORDER — CEFAZOLIN SODIUM 1 G/3ML
INJECTION, POWDER, FOR SOLUTION INTRAMUSCULAR; INTRAVENOUS AS NEEDED
Status: DISCONTINUED | OUTPATIENT
Start: 2020-04-14 | End: 2020-04-14 | Stop reason: HOSPADM

## 2020-04-14 RX ORDER — HEPARIN SODIUM 1000 [USP'U]/ML
INJECTION, SOLUTION INTRAVENOUS; SUBCUTANEOUS AS NEEDED
Status: DISCONTINUED | OUTPATIENT
Start: 2020-04-14 | End: 2020-04-14 | Stop reason: SURG

## 2020-04-14 RX ORDER — MIDODRINE HYDROCHLORIDE 5 MG/1
5 TABLET ORAL
Status: DISCONTINUED | OUTPATIENT
Start: 2020-04-14 | End: 2020-04-14

## 2020-04-14 RX ORDER — AMOXICILLIN 250 MG
2 CAPSULE ORAL 2 TIMES DAILY PRN
Status: DISCONTINUED | OUTPATIENT
Start: 2020-04-14 | End: 2020-04-16 | Stop reason: HOSPADM

## 2020-04-14 RX ORDER — ALBUTEROL SULFATE 2.5 MG/3ML
2.5 SOLUTION RESPIRATORY (INHALATION) ONCE
Status: COMPLETED | OUTPATIENT
Start: 2020-04-14 | End: 2020-04-14

## 2020-04-14 RX ORDER — PROMETHAZINE HYDROCHLORIDE 25 MG/ML
12.5 INJECTION, SOLUTION INTRAMUSCULAR; INTRAVENOUS ONCE AS NEEDED
Status: DISCONTINUED | OUTPATIENT
Start: 2020-04-14 | End: 2020-04-14 | Stop reason: HOSPADM

## 2020-04-14 RX ORDER — FAMOTIDINE 20 MG/1
20 TABLET, FILM COATED ORAL EVERY 12 HOURS SCHEDULED
Status: DISCONTINUED | OUTPATIENT
Start: 2020-04-14 | End: 2020-04-15 | Stop reason: SDUPTHER

## 2020-04-14 RX ORDER — PROTAMINE SULFATE 10 MG/ML
INJECTION, SOLUTION INTRAVENOUS AS NEEDED
Status: DISCONTINUED | OUTPATIENT
Start: 2020-04-14 | End: 2020-04-14 | Stop reason: SURG

## 2020-04-14 RX ORDER — ROCURONIUM BROMIDE 10 MG/ML
INJECTION, SOLUTION INTRAVENOUS AS NEEDED
Status: DISCONTINUED | OUTPATIENT
Start: 2020-04-14 | End: 2020-04-14 | Stop reason: SURG

## 2020-04-14 RX ORDER — FLUMAZENIL 0.1 MG/ML
0.2 INJECTION INTRAVENOUS AS NEEDED
Status: DISCONTINUED | OUTPATIENT
Start: 2020-04-14 | End: 2020-04-14 | Stop reason: HOSPADM

## 2020-04-14 RX ORDER — LIDOCAINE HYDROCHLORIDE 20 MG/ML
INJECTION, SOLUTION INFILTRATION; PERINEURAL AS NEEDED
Status: DISCONTINUED | OUTPATIENT
Start: 2020-04-14 | End: 2020-04-14 | Stop reason: SURG

## 2020-04-14 RX ORDER — ACETAMINOPHEN 500 MG
1000 TABLET ORAL EVERY 6 HOURS SCHEDULED
Status: DISCONTINUED | OUTPATIENT
Start: 2020-04-14 | End: 2020-04-16 | Stop reason: HOSPADM

## 2020-04-14 RX ORDER — SUCCINYLCHOLINE CHLORIDE 20 MG/ML
INJECTION INTRAMUSCULAR; INTRAVENOUS AS NEEDED
Status: DISCONTINUED | OUTPATIENT
Start: 2020-04-14 | End: 2020-04-14 | Stop reason: SURG

## 2020-04-14 RX ORDER — NITROGLYCERIN 20 MG/100ML
5-200 INJECTION INTRAVENOUS
Status: DISCONTINUED | OUTPATIENT
Start: 2020-04-14 | End: 2020-04-15

## 2020-04-14 RX ORDER — MIDODRINE HYDROCHLORIDE 5 MG/1
5 TABLET ORAL
Status: DISCONTINUED | OUTPATIENT
Start: 2020-04-14 | End: 2020-04-16 | Stop reason: HOSPADM

## 2020-04-14 RX ORDER — ACETAMINOPHEN 325 MG/1
650 TABLET ORAL ONCE AS NEEDED
Status: DISCONTINUED | OUTPATIENT
Start: 2020-04-14 | End: 2020-04-14 | Stop reason: HOSPADM

## 2020-04-14 RX ORDER — ALBUTEROL SULFATE 2.5 MG/3ML
2.5 SOLUTION RESPIRATORY (INHALATION)
Status: DISCONTINUED | OUTPATIENT
Start: 2020-04-14 | End: 2020-04-16 | Stop reason: HOSPADM

## 2020-04-14 RX ORDER — ONDANSETRON 2 MG/ML
4 INJECTION INTRAMUSCULAR; INTRAVENOUS ONCE AS NEEDED
Status: DISCONTINUED | OUTPATIENT
Start: 2020-04-14 | End: 2020-04-14 | Stop reason: HOSPADM

## 2020-04-14 RX ORDER — SODIUM CHLORIDE, SODIUM GLUCONATE, SODIUM ACETATE, POTASSIUM CHLORIDE, AND MAGNESIUM CHLORIDE 526; 502; 368; 37; 30 MG/100ML; MG/100ML; MG/100ML; MG/100ML; MG/100ML
INJECTION, SOLUTION INTRAVENOUS CONTINUOUS PRN
Status: DISCONTINUED | OUTPATIENT
Start: 2020-04-14 | End: 2020-04-14 | Stop reason: SURG

## 2020-04-14 RX ORDER — DIPHENHYDRAMINE HYDROCHLORIDE 50 MG/ML
12.5 INJECTION INTRAMUSCULAR; INTRAVENOUS
Status: DISCONTINUED | OUTPATIENT
Start: 2020-04-14 | End: 2020-04-14 | Stop reason: HOSPADM

## 2020-04-14 RX ORDER — VANCOMYCIN HYDROCHLORIDE 1 G/20ML
INJECTION, POWDER, LYOPHILIZED, FOR SOLUTION INTRAVENOUS AS NEEDED
Status: DISCONTINUED | OUTPATIENT
Start: 2020-04-14 | End: 2020-04-14 | Stop reason: HOSPADM

## 2020-04-14 RX ORDER — TRAZODONE HYDROCHLORIDE 150 MG/1
150 TABLET ORAL NIGHTLY
Status: DISCONTINUED | OUTPATIENT
Start: 2020-04-14 | End: 2020-04-16 | Stop reason: HOSPADM

## 2020-04-14 RX ORDER — NEOSTIGMINE METHYLSULFATE 4 MG/4 ML
SYRINGE (ML) INTRAVENOUS AS NEEDED
Status: DISCONTINUED | OUTPATIENT
Start: 2020-04-14 | End: 2020-04-14 | Stop reason: SURG

## 2020-04-14 RX ORDER — ONDANSETRON 2 MG/ML
4 INJECTION INTRAMUSCULAR; INTRAVENOUS EVERY 6 HOURS PRN
Status: DISCONTINUED | OUTPATIENT
Start: 2020-04-14 | End: 2020-04-16 | Stop reason: HOSPADM

## 2020-04-14 RX ORDER — MIDAZOLAM HYDROCHLORIDE 1 MG/ML
INJECTION INTRAMUSCULAR; INTRAVENOUS AS NEEDED
Status: DISCONTINUED | OUTPATIENT
Start: 2020-04-14 | End: 2020-04-14 | Stop reason: SURG

## 2020-04-14 RX ORDER — ACETAMINOPHEN 650 MG/1
650 SUPPOSITORY RECTAL ONCE AS NEEDED
Status: DISCONTINUED | OUTPATIENT
Start: 2020-04-14 | End: 2020-04-14 | Stop reason: HOSPADM

## 2020-04-14 RX ORDER — KETOROLAC TROMETHAMINE 30 MG/ML
30 INJECTION, SOLUTION INTRAMUSCULAR; INTRAVENOUS ONCE
Status: COMPLETED | OUTPATIENT
Start: 2020-04-14 | End: 2020-04-14

## 2020-04-14 RX ORDER — NALOXONE HCL 0.4 MG/ML
0.4 VIAL (ML) INJECTION AS NEEDED
Status: DISCONTINUED | OUTPATIENT
Start: 2020-04-14 | End: 2020-04-14 | Stop reason: HOSPADM

## 2020-04-14 RX ORDER — LABETALOL HYDROCHLORIDE 5 MG/ML
5 INJECTION, SOLUTION INTRAVENOUS
Status: DISCONTINUED | OUTPATIENT
Start: 2020-04-14 | End: 2020-04-14 | Stop reason: HOSPADM

## 2020-04-14 RX ORDER — EPHEDRINE SULFATE 50 MG/ML
5 INJECTION, SOLUTION INTRAVENOUS ONCE AS NEEDED
Status: DISCONTINUED | OUTPATIENT
Start: 2020-04-14 | End: 2020-04-14 | Stop reason: HOSPADM

## 2020-04-14 RX ORDER — BISACODYL 10 MG
10 SUPPOSITORY, RECTAL RECTAL DAILY PRN
Status: DISCONTINUED | OUTPATIENT
Start: 2020-04-14 | End: 2020-04-16 | Stop reason: HOSPADM

## 2020-04-14 RX ORDER — SODIUM CHLORIDE 9 MG/ML
100 INJECTION, SOLUTION INTRAVENOUS CONTINUOUS
Status: DISCONTINUED | OUTPATIENT
Start: 2020-04-14 | End: 2020-04-14 | Stop reason: HOSPADM

## 2020-04-14 RX ORDER — NALOXONE HCL 0.4 MG/ML
0.1 VIAL (ML) INJECTION
Status: DISCONTINUED | OUTPATIENT
Start: 2020-04-14 | End: 2020-04-15

## 2020-04-14 RX ORDER — HYDROMORPHONE HCL IN 0.9% NACL 0.2 MG/ML
PLASTIC BAG, INJECTION (ML) INTRAVENOUS CONTINUOUS
Status: DISCONTINUED | OUTPATIENT
Start: 2020-04-14 | End: 2020-04-15

## 2020-04-14 RX ADMIN — FENTANYL CITRATE 25 MCG: 50 INJECTION, SOLUTION INTRAMUSCULAR; INTRAVENOUS at 12:11

## 2020-04-14 RX ADMIN — PROPOFOL 20 MG: 10 INJECTION, EMULSION INTRAVENOUS at 12:30

## 2020-04-14 RX ADMIN — SODIUM CHLORIDE, SODIUM GLUCONATE, SODIUM ACETATE, POTASSIUM CHLORIDE, AND MAGNESIUM CHLORIDE: 526; 502; 368; 37; 30 INJECTION, SOLUTION INTRAVENOUS at 12:45

## 2020-04-14 RX ADMIN — FENTANYL CITRATE 25 MCG: 50 INJECTION, SOLUTION INTRAMUSCULAR; INTRAVENOUS at 12:46

## 2020-04-14 RX ADMIN — ACETAMINOPHEN 1000 MG: 500 TABLET ORAL at 21:01

## 2020-04-14 RX ADMIN — FENTANYL CITRATE 50 MCG: 50 INJECTION, SOLUTION INTRAMUSCULAR; INTRAVENOUS at 13:15

## 2020-04-14 RX ADMIN — HEPARIN SODIUM 7000 UNITS: 1000 INJECTION INTRAVENOUS; SUBCUTANEOUS at 13:48

## 2020-04-14 RX ADMIN — FENTANYL CITRATE 50 MCG: 50 INJECTION, SOLUTION INTRAMUSCULAR; INTRAVENOUS at 14:20

## 2020-04-14 RX ADMIN — DEXAMETHASONE SODIUM PHOSPHATE 4 MG: 4 INJECTION, SOLUTION INTRAMUSCULAR; INTRAVENOUS at 14:30

## 2020-04-14 RX ADMIN — Medication 2 G: at 13:00

## 2020-04-14 RX ADMIN — MIDODRINE HYDROCHLORIDE 5 MG: 5 TABLET ORAL at 21:10

## 2020-04-14 RX ADMIN — SUCCINYLCHOLINE CHLORIDE 90 MG: 20 INJECTION, SOLUTION INTRAMUSCULAR; INTRAVENOUS at 12:53

## 2020-04-14 RX ADMIN — ROCURONIUM BROMIDE 25 MG: 10 INJECTION INTRAVENOUS at 14:20

## 2020-04-14 RX ADMIN — MIDAZOLAM HYDROCHLORIDE 1 MG: 2 INJECTION, SOLUTION INTRAMUSCULAR; INTRAVENOUS at 12:05

## 2020-04-14 RX ADMIN — PROPOFOL 20 MG: 10 INJECTION, EMULSION INTRAVENOUS at 12:25

## 2020-04-14 RX ADMIN — GABAPENTIN 800 MG: 400 CAPSULE ORAL at 21:06

## 2020-04-14 RX ADMIN — ALBUTEROL SULFATE 2.5 MG: 2.5 SOLUTION RESPIRATORY (INHALATION) at 08:41

## 2020-04-14 RX ADMIN — ROCURONIUM BROMIDE 25 MG: 10 INJECTION INTRAVENOUS at 13:13

## 2020-04-14 RX ADMIN — KETOROLAC TROMETHAMINE 30 MG: 30 INJECTION, SOLUTION INTRAMUSCULAR; INTRAVENOUS at 17:02

## 2020-04-14 RX ADMIN — LIDOCAINE HYDROCHLORIDE 50 MG: 20 INJECTION, SOLUTION INFILTRATION; PERINEURAL at 12:53

## 2020-04-14 RX ADMIN — Medication 2 MG: at 16:35

## 2020-04-14 RX ADMIN — PROPOFOL 50 MG: 10 INJECTION, EMULSION INTRAVENOUS at 16:05

## 2020-04-14 RX ADMIN — ALBUTEROL SULFATE 2.5 MG: 2.5 SOLUTION RESPIRATORY (INHALATION) at 23:00

## 2020-04-14 RX ADMIN — FENTANYL CITRATE 50 MCG: 50 INJECTION, SOLUTION INTRAMUSCULAR; INTRAVENOUS at 15:03

## 2020-04-14 RX ADMIN — MIDAZOLAM HYDROCHLORIDE 1 MG: 2 INJECTION, SOLUTION INTRAMUSCULAR; INTRAVENOUS at 12:08

## 2020-04-14 RX ADMIN — TRAZODONE HYDROCHLORIDE 150 MG: 150 TABLET ORAL at 21:02

## 2020-04-14 RX ADMIN — HYDROMORPHONE HYDROCHLORIDE: 2 INJECTION INTRAMUSCULAR; INTRAVENOUS; SUBCUTANEOUS at 17:43

## 2020-04-14 RX ADMIN — ROCURONIUM BROMIDE 25 MG: 10 INJECTION INTRAVENOUS at 13:50

## 2020-04-14 RX ADMIN — PROPOFOL 100 MG: 10 INJECTION, EMULSION INTRAVENOUS at 12:53

## 2020-04-14 RX ADMIN — PROTAMINE SULFATE 20 MG: 10 INJECTION, SOLUTION INTRAVENOUS at 16:02

## 2020-04-14 RX ADMIN — MIDODRINE HYDROCHLORIDE 5 MG: 5 TABLET ORAL at 21:02

## 2020-04-14 RX ADMIN — SODIUM CHLORIDE 100 ML/HR: 9 INJECTION, SOLUTION INTRAVENOUS at 07:48

## 2020-04-14 RX ADMIN — PROPOFOL 20 MG: 10 INJECTION, EMULSION INTRAVENOUS at 12:15

## 2020-04-14 RX ADMIN — ROCURONIUM BROMIDE 5 MG: 10 INJECTION INTRAVENOUS at 12:54

## 2020-04-14 RX ADMIN — SODIUM CHLORIDE 2 G: 9 INJECTION, SOLUTION INTRAVENOUS at 21:02

## 2020-04-14 RX ADMIN — HEPARIN SODIUM 3000 UNITS: 1000 INJECTION INTRAVENOUS; SUBCUTANEOUS at 14:30

## 2020-04-14 RX ADMIN — FAMOTIDINE 20 MG: 20 TABLET, FILM COATED ORAL at 21:02

## 2020-04-14 RX ADMIN — MIDODRINE HYDROCHLORIDE 5 MG: 5 TABLET ORAL at 21:09

## 2020-04-14 RX ADMIN — PROTAMINE SULFATE 20 MG: 10 INJECTION, SOLUTION INTRAVENOUS at 15:53

## 2020-04-14 RX ADMIN — HEPARIN SODIUM 3000 UNITS: 1000 INJECTION INTRAVENOUS; SUBCUTANEOUS at 14:03

## 2020-04-14 RX ADMIN — SODIUM CHLORIDE 75 ML/HR: 9 INJECTION, SOLUTION INTRAVENOUS at 21:06

## 2020-04-14 RX ADMIN — NITROGLYCERIN 0.5 INCH: 20 OINTMENT TOPICAL at 17:02

## 2020-04-14 RX ADMIN — GLYCOPYRROLATE 0.4 MG: 0.2 INJECTION, SOLUTION INTRAMUSCULAR; INTRAVITREAL at 16:35

## 2020-04-14 RX ADMIN — NITROGLYCERIN 0.5 INCH: 20 OINTMENT TOPICAL at 21:23

## 2020-04-14 RX ADMIN — HYDROMORPHONE HYDROCHLORIDE 0.5 MG: 1 INJECTION, SOLUTION INTRAMUSCULAR; INTRAVENOUS; SUBCUTANEOUS at 17:21

## 2020-04-14 NOTE — ANESTHESIA PROCEDURE NOTES
Airway  Date/Time: 4/14/2020 12:58 PM  Airway not difficult    General Information and Staff    Patient location during procedure: OR    Indications and Patient Condition  Indications for airway management: airway protection    Preoxygenated: yes  MILS maintained throughout  Mask difficulty assessment: 0 - not attempted    Final Airway Details  Final airway type: endotracheal airway      Successful airway: ETT  Cuffed: yes   Successful intubation technique: direct laryngoscopy  Facilitating devices/methods: intubating stylet  Endotracheal tube insertion site: oral  Blade: Caitlin  Blade size: 3  ETT size (mm): 7.0

## 2020-04-14 NOTE — OP NOTE
OPERATIVE NOTE  Mona Perales  1950  4/14/2020    PREOP DIAGNOSES:  PVD (peripheral vascular disease) [I73.9]  Atherosclerosis of native arteries of extremities with rest pain, left leg (CMS/HCC) [I70.222]    POSTOP DIAGNOSES:  PVD (peripheral vascular disease) [I73.9]    PROCEDURE:   LEFT FEMORAL POPLITEAL BYPASS (ABOVE KNEE, 6mm PTFE graft)  LEFT lower extremity arteriogram  LEFTcommon femoral endarterectomy  Negative pressure wound therapy (<50cm2, Prevena)     SURGEON: QUINCY Suh MD FACS RPVI    ASSISTANT: Arley Marshall CFA    ANESTHESIA: General ET     ESTIMATED BLOOD LOSS: 30 ml     SPECIMEN:  None    COMPLICATIONS:  None    DESCRIPTION OF OPERATION: Patient taken to the operating room placed in supine position. General endotacheal anesthesia was induced. Radial arterial line was placed by Anesthesia. Patient prepped and draped in sterile fashion.    Oblique incision was then made in the LEFT groin, dissection carried down to the common femoral artery and its branches which were isolated.  21997 units heparin given to maintain ACT ~300. LEFT common femoral artery cannulated using modified Seldinger technique with ministick.  Exchanged 6Fr sheath.  Glidewire passed into abdominal aorta. LEFT lower extremity arteriogram shows 70% instent LCIA stenosis.  8x40mm conquest balloon placed across instent stenosis LEFT common iliac artery inflated to full effacement x1min. Completion arteriogram with no significant residual stenosis.    Incision made in the LEFT distal thigh, dissection carried down to the above-knee popliteal artery which was 6 mm in diameter and soft distally.  6mm PTFE graft was tunneled subsartorially from the knee to the groin incision using GORE tunneler.  The distal end of the PTFE graft was fashioned for anastomosis. Popliteal artery was isolated with Peter clamps, opened with 11 blade. Distal anastomosis performed end-to-side using CV 6 [place]-Osvaldo suture. Leg was  straightened, minimal slack in the graft. Left lower extremity arteriogram was performed through the graft demonstrating widely patent distal anastomosis, good three-vessel runoff in the tibial vessels. Profunda and SFA were isolated with profunda clamps, proximal common femoral artery was isolated with Z-Clamp, opened with 11 blade and there was severe obstructive plaque. Common femoral endarterectomy was performed with good backbleeding from the profunda. Proximal end of the PTFE graft was fashioned and anastomosed end-to-side to the common femoral artery using CV 6 [place]-Osvaldo suture.     Usual de-airing techniques were employed. Good pulse distal to the anastomosis. Good biphasic signal in the posterior tibial artery and triphasic signal in the dorsalis pedis artery. 40 mg protamine was given with return of ACT to baseline. Hemostasis was obtained. snow was used. Incisions were closed in layers of 3-0 PDS, 3-0 PDS, 4-0 Monocryl in the skin.  Patient had a moderate tissue edema, negative pressure wound therapy was performed using the Prevena wound dressing ([id number]) Patient tolerated procedure well, transferred to PACU stable condition.               This document has been electronically signed by David Suh MD on April 14, 2020 16:30

## 2020-04-14 NOTE — INTERVAL H&P NOTE
H&P reviewed. The patient was examined and there are no changes to the H&P.      Detailed discussion with Mona Perales regarding situation, options and plans. severe lifestyle limiting claudication and ischemic rest pain.  LEFT femoral to popliteal artery bypass is advisable.      Risks including but not limited to Mortality, Major Morbidity 2-3%, bleeding, transfusion, infection, pulmonary (prolonged mechanical ventilation, tracheostomy), renal dysfunction (dialysis), blood vessel, nerve injury.  Benefits:  relief of symptoms, reduction in vascular events, limb loss and hospitalization.  Options:  peripheral angioplasty/stent, medical therapy, amputation discussed.  Understands and wishes to proceed.    LEFT femoral to popliteal artery bypass and common femoral endarterectomy. (above knee).possible LEFT iliac angioplasty stent. Lower extremity arteriogram.  GEN.  SDS.  4/14/2020          This document has been electronically signed by David Suh MD on April 14, 2020 07:27

## 2020-04-14 NOTE — ANESTHESIA PREPROCEDURE EVALUATION
Anesthesia Evaluation     Patient summary reviewed and Nursing notes reviewed   no history of anesthetic complications:  NPO Solid Status: > 8 hours  NPO Liquid Status: > 6 hours           Airway   Mallampati: I  TM distance: >3 FB  Neck ROM: full  No difficulty expected and Small opening  Dental    (+) poor dentition and upper dentures        Pulmonary    (+) a smoker Current Abstained day of surgery, COPD moderate, asthma,decreased breath sounds, wheezes,   (-) shortness of breath, sleep apnea    ROS comment: HOME [id number]: Comparison exam dated May 20, 2019. Cardiac and  pulmonary vasculature are normal. Lungs are clear. Pleural spaces  are normal. No acute osseous abnormality. Stable postsurgical  changes consistent with left reverse total shoulder arthroplasty.     IMPRESSION:  No acute cardiopulmonary abnormality.     Electronically signed by:  Jim Muro MD  3/2/2020 1:08 PM CST  PE comment: Albuterol treatment ordered pre-op.  Cardiovascular     ECG reviewed  PT is on anticoagulation therapy  Rhythm: regular  Rate: normal    (+) hypertension, CAD, murmur, PVD, hyperlipidemia,  carotid artery disease carotid bilateral  (-) valvular problems/murmurs, dysrhythmias, angina, PEREZ, cardiac stents    ROS comment: Normal sinus rhythm  Possible Inferior infarct (cited on or before 18-DEC-2012)  Abnormal ECG  When compared with ECG of 09-APR-2019 22:47,  premature ventricular complexes are no longer present  Inverted T waves have replaced nonspecific T wave abnormality in Lateral  leads  Confirmed by AKRAM     · Estimated EF = 61%.  · Left ventricular systolic function is normal.  · Left ventricular diastolic dysfunction (grade I) consistent with impaired relaxation.  · Mild mitral valve regurgitation is present  · Mild tricuspid valve regurgitation is present.Normal sinus rhythm  Left ventricular hypertrophy with repolarization abnormality  Possible Inferior infarct (cited on or before 18-DEC-2012)  Anterior  infarct , age undetermined  Abnormal ECG  When compared with ECG of 20-MAY-2019 10:30,  Anterior infarct is now present  Confirmed by LUCIEN VELOZ MD ([id number]) on 3/18/2020 6:44:14 AM    Neuro/Psych  (+) headaches (migraines every 2-3 months), dizziness/light headedness, psychiatric history Anxiety and Depression,     (-) seizures, TIA, CVA  GI/Hepatic/Renal/Endo    (+)  GERD well controlled,    (-) hepatitis, liver disease, no renal disease, diabetes    Musculoskeletal         ROS comment: Pain 10/10  Radiculopathy down left leg  Abdominal    Substance History - negative use  (-) alcohol use, drug use     OB/GYN negative ob/gyn ROS         Other   arthritis,      (-) history of cancer                    Anesthesia Plan    ASA 4     general   (Discussed additional peripheral IV,arterial line and patient understands possible complications,risks and agrees.)  intravenous induction     Anesthetic plan, all risks, benefits, and alternatives have been provided, discussed and informed consent has been obtained with: patient.

## 2020-04-14 NOTE — NURSING NOTE
Dr. Suh in  discussed procedure with patient and explain and answer all her questions.  Patient did tell Dr Suh cough and twisted this am felt like broke a rib. Patient asked reschedule patient wanting to proceed with procedure. No new orders.

## 2020-04-14 NOTE — ANESTHESIA POSTPROCEDURE EVALUATION
Patient: Mona Perales    Procedure Summary     Date:  04/14/20 Room / Location:  [facility] OR  / [facility] OR    Anesthesia Start:  1206 Anesthesia Stop:  1655    Procedure:  FEMORAL POPLITEAL BYPASS ABOVE KNEE  (POLYTETRAFLUOROETHYLENE)  LEFT COMMON FEMORAL ARTERY ENDARTERECTOMY PTA LEFT ILIAC ARTERIOGRAM        (c-arm#2 and c-arm table) (Left Thigh) Diagnosis:       Atherosclerosis of native arteries of extremities with rest pain, left leg (CMS/HCC)      (Atherosclerosis of native arteries of extremities with rest pain, left leg (CMS/HCC) [I70.222])    Surgeon:  David Suh MD Provider:  Evelio Funes MD    Anesthesia Type:  general ASA Status:  4          Anesthesia Type: general    Vitals  Vitals Value Taken Time   /86 4/14/2020  4:41 PM   Temp 97.9 °F (36.6 °C) 4/14/2020  4:41 PM   Pulse 122 4/14/2020  4:41 PM   Resp 20 4/14/2020  4:41 PM   SpO2 96 % 4/14/2020  4:41 PM           Post Anesthesia Care and Evaluation    Patient location during evaluation: PACU  Level of consciousness: sleepy but conscious  Pain score: 0  Pain management: adequate  Airway patency: patent  Anesthetic complications: No anesthetic complications  PONV Status: none  Cardiovascular status: acceptable and hemodynamically stable  Respiratory status: acceptable, spontaneous ventilation and oral airway  Hydration status: acceptable    Comments: o2 via simple mask

## 2020-04-14 NOTE — ANESTHESIA PROCEDURE NOTES
Arterial Line      Patient location during procedure: OR  Start time: 4/14/2020 12:15 PM  Stop Time:4/14/2020 12:32 PM       Line placed for hemodynamic monitoring.  Performed By   Anesthesiologist: Evelio Funes MD  Preanesthetic Checklist  Completed: patient identified, site marked, surgical consent, pre-op evaluation, timeout performed, IV checked, risks and benefits discussed and monitors and equipment checked  Arterial Line Prep   Sterile Tech: cap, gloves and mask  Prep: ChloraPrep  Patient monitoring: blood pressure monitoring, continuous pulse oximetry and EKG  Arterial Line Procedure   Laterality:left  Location:  brachial artery  Catheter size: 20 G   Guidance: ultrasound guided  PROCEDURE NOTE/ULTRASOUND INTERPRETATION.  Using ultrasound guidance the potential vascular sites for insertion of the catheter were visualized to determine the patency of the vessel to be used for vascular access.  After selecting the appropriate site for insertion, the needle was visualized under ultrasound being inserted into the brachial artery, followed by ultrasound confirmation of wire and catheter placement. There were no abnormalities seen on ultrasound; an image was taken; and the patient tolerated the procedure with no complications.   Number of attempts: 1  Successful placement: yes  Post Assessment   Dressing Type: occlusive dressing applied and secured with tape.   Complications no  Circ/Move/Sens Assessment: unchanged.   Patient Tolerance: patient tolerated the procedure well with no apparent complications  Additional Notes  Multiple attempts at radial arthur, both right and left.  Dr. Funes successful with left brachial arthur after consultation with Dr. Suh.  Pulse oximetry to left finger with good pattern and 100% sat.

## 2020-04-14 NOTE — ANESTHESIA PROCEDURE NOTES
Central Line      Patient reassessed immediately prior to procedure    Patient location during procedure: OR  Indications: vascular access  Staff  Anesthesiologist: Evelio Funes MD  Preanesthetic Checklist  Completed: patient identified, site marked, surgical consent, pre-op evaluation, timeout performed, IV checked, risks and benefits discussed and monitors and equipment checked  Central Line Prep  Sterile Tech:cap, gown, mask, gloves and sterile barriers  Prep: chloraprep  Patient monitoring: blood pressure monitoring, continuous pulse oximetry and EKG  Central Line Procedure  Laterality:right  Location:internal jugular  Catheter Type:triple lumen  Guidance:ultrasound guided  PROCEDURE NOTE/ULTRASOUND INTERPRETATION.  Using ultrasound guidance the potential vascular sites for insertion of the catheter were visualized to determine the patency of the vessel to be used for vascular access.  After selecting the appropriate site for insertion, the needle was visualized under ultrasound being inserted into the internal jugular vein, followed by ultrasound confirmation of wire and catheter placement. There were no abnormalities seen on ultrasound; an image was taken; and the patient tolerated the procedure with no complications.   Assessment  Post procedure:biopatch applied, occlusive dressing applied and line sutured  Assessement:blood return through all ports and Luis Test  Complications:no  Patient Tolerance:patient tolerated the procedure well with no apparent complications  Additional Notes  Unsuccessful attempts at peripheral IVs.

## 2020-04-15 LAB
ANION GAP SERPL CALCULATED.3IONS-SCNC: 11 MMOL/L (ref 5–15)
BASOPHILS # BLD AUTO: 0.04 10*3/MM3 (ref 0–0.2)
BASOPHILS NFR BLD AUTO: 0.3 % (ref 0–1.5)
BUN BLD-MCNC: 11 MG/DL (ref 8–23)
BUN/CREAT SERPL: 13.9 (ref 7–25)
CALCIUM SPEC-SCNC: 8.2 MG/DL (ref 8.6–10.5)
CHLORIDE SERPL-SCNC: 106 MMOL/L (ref 98–107)
CO2 SERPL-SCNC: 22 MMOL/L (ref 22–29)
CREAT BLD-MCNC: 0.79 MG/DL (ref 0.57–1)
DEPRECATED RDW RBC AUTO: 50.4 FL (ref 37–54)
EOSINOPHIL # BLD AUTO: 0.01 10*3/MM3 (ref 0–0.4)
EOSINOPHIL NFR BLD AUTO: 0.1 % (ref 0.3–6.2)
ERYTHROCYTE [DISTWIDTH] IN BLOOD BY AUTOMATED COUNT: 16.1 % (ref 12.3–15.4)
GFR SERPL CREATININE-BSD FRML MDRD: 73 ML/MIN/1.73
GLUCOSE BLD-MCNC: 118 MG/DL (ref 65–99)
HCT VFR BLD AUTO: 28.1 % (ref 34–46.6)
HGB BLD-MCNC: 9.1 G/DL (ref 12–15.9)
IMM GRANULOCYTES # BLD AUTO: 0.05 10*3/MM3 (ref 0–0.05)
IMM GRANULOCYTES NFR BLD AUTO: 0.3 % (ref 0–0.5)
LYMPHOCYTES # BLD AUTO: 2.27 10*3/MM3 (ref 0.7–3.1)
LYMPHOCYTES NFR BLD AUTO: 15.9 % (ref 19.6–45.3)
MCH RBC QN AUTO: 28.1 PG (ref 26.6–33)
MCHC RBC AUTO-ENTMCNC: 32.4 G/DL (ref 31.5–35.7)
MCV RBC AUTO: 86.7 FL (ref 79–97)
MONOCYTES # BLD AUTO: 1.71 10*3/MM3 (ref 0.1–0.9)
MONOCYTES NFR BLD AUTO: 12 % (ref 5–12)
NEUTROPHILS # BLD AUTO: 10.22 10*3/MM3 (ref 1.7–7)
NEUTROPHILS NFR BLD AUTO: 71.4 % (ref 42.7–76)
NRBC BLD AUTO-RTO: 0 /100 WBC (ref 0–0.2)
PLATELET # BLD AUTO: 243 10*3/MM3 (ref 140–450)
PMV BLD AUTO: 9.1 FL (ref 6–12)
POTASSIUM BLD-SCNC: 4.8 MMOL/L (ref 3.5–5.2)
RBC # BLD AUTO: 3.24 10*6/MM3 (ref 3.77–5.28)
SODIUM BLD-SCNC: 139 MMOL/L (ref 136–145)
WBC NRBC COR # BLD: 14.3 10*3/MM3 (ref 3.4–10.8)

## 2020-04-15 PROCEDURE — 25010000002 HYDROMORPHONE 1 MG/ML SOLUTION: Performed by: NURSE PRACTITIONER

## 2020-04-15 PROCEDURE — 80048 BASIC METABOLIC PNL TOTAL CA: CPT | Performed by: THORACIC SURGERY (CARDIOTHORACIC VASCULAR SURGERY)

## 2020-04-15 PROCEDURE — 99024 POSTOP FOLLOW-UP VISIT: CPT | Performed by: NURSE PRACTITIONER

## 2020-04-15 PROCEDURE — 94799 UNLISTED PULMONARY SVC/PX: CPT

## 2020-04-15 PROCEDURE — 94760 N-INVAS EAR/PLS OXIMETRY 1: CPT

## 2020-04-15 PROCEDURE — 85025 COMPLETE CBC W/AUTO DIFF WBC: CPT | Performed by: THORACIC SURGERY (CARDIOTHORACIC VASCULAR SURGERY)

## 2020-04-15 PROCEDURE — 25010000002 HYDROMORPHONE HCL-NACL 6-0.9 MG/30ML-% SOLUTION PREFILLED SYRINGE: Performed by: THORACIC SURGERY (CARDIOTHORACIC VASCULAR SURGERY)

## 2020-04-15 PROCEDURE — 25010000002 CEFAZOLIN PER 500 MG: Performed by: THORACIC SURGERY (CARDIOTHORACIC VASCULAR SURGERY)

## 2020-04-15 RX ORDER — NICOTINE 21 MG/24HR
1 PATCH, TRANSDERMAL 24 HOURS TRANSDERMAL
Status: DISCONTINUED | OUTPATIENT
Start: 2020-04-15 | End: 2020-04-16 | Stop reason: HOSPADM

## 2020-04-15 RX ORDER — OXYCODONE HYDROCHLORIDE 5 MG/1
10 TABLET ORAL EVERY 4 HOURS PRN
Status: DISCONTINUED | OUTPATIENT
Start: 2020-04-15 | End: 2020-04-16 | Stop reason: HOSPADM

## 2020-04-15 RX ORDER — CLOPIDOGREL BISULFATE 75 MG/1
75 TABLET ORAL DAILY
Status: DISCONTINUED | OUTPATIENT
Start: 2020-04-15 | End: 2020-04-16 | Stop reason: HOSPADM

## 2020-04-15 RX ORDER — OXYCODONE HYDROCHLORIDE 5 MG/1
5 TABLET ORAL EVERY 4 HOURS PRN
Status: DISCONTINUED | OUTPATIENT
Start: 2020-04-15 | End: 2020-04-16 | Stop reason: HOSPADM

## 2020-04-15 RX ORDER — ATORVASTATIN CALCIUM 20 MG/1
20 TABLET, FILM COATED ORAL NIGHTLY
Status: DISCONTINUED | OUTPATIENT
Start: 2020-04-15 | End: 2020-04-16 | Stop reason: HOSPADM

## 2020-04-15 RX ORDER — FAMOTIDINE 20 MG/1
20 TABLET, FILM COATED ORAL EVERY 12 HOURS
Status: DISCONTINUED | OUTPATIENT
Start: 2020-04-15 | End: 2020-04-16 | Stop reason: HOSPADM

## 2020-04-15 RX ADMIN — HYDROMORPHONE HYDROCHLORIDE: 2 INJECTION INTRAMUSCULAR; INTRAVENOUS; SUBCUTANEOUS at 08:31

## 2020-04-15 RX ADMIN — TRAZODONE HYDROCHLORIDE 150 MG: 150 TABLET ORAL at 20:58

## 2020-04-15 RX ADMIN — GABAPENTIN 800 MG: 400 CAPSULE ORAL at 20:58

## 2020-04-15 RX ADMIN — ATORVASTATIN CALCIUM 20 MG: 20 TABLET, FILM COATED ORAL at 20:58

## 2020-04-15 RX ADMIN — ALBUTEROL SULFATE 2.5 MG: 2.5 SOLUTION RESPIRATORY (INHALATION) at 06:40

## 2020-04-15 RX ADMIN — FAMOTIDINE 20 MG: 20 TABLET ORAL at 12:07

## 2020-04-15 RX ADMIN — ALBUTEROL SULFATE 2.5 MG: 2.5 SOLUTION RESPIRATORY (INHALATION) at 13:58

## 2020-04-15 RX ADMIN — HYDROMORPHONE HYDROCHLORIDE 0.4 MG: 1 INJECTION, SOLUTION INTRAMUSCULAR; INTRAVENOUS; SUBCUTANEOUS at 20:59

## 2020-04-15 RX ADMIN — MIDODRINE HYDROCHLORIDE 5 MG: 5 TABLET ORAL at 12:08

## 2020-04-15 RX ADMIN — FAMOTIDINE 20 MG: 20 TABLET ORAL at 20:58

## 2020-04-15 RX ADMIN — SERTRALINE HYDROCHLORIDE 100 MG: 50 TABLET ORAL at 12:07

## 2020-04-15 RX ADMIN — ACETAMINOPHEN 1000 MG: 500 TABLET ORAL at 16:47

## 2020-04-15 RX ADMIN — HYDROMORPHONE HYDROCHLORIDE 0.4 MG: 1 INJECTION, SOLUTION INTRAMUSCULAR; INTRAVENOUS; SUBCUTANEOUS at 16:40

## 2020-04-15 RX ADMIN — NITROGLYCERIN 0.5 INCH: 20 OINTMENT TOPICAL at 20:59

## 2020-04-15 RX ADMIN — NICOTINE 1 PATCH: 14 PATCH, EXTENDED RELEASE TRANSDERMAL at 12:08

## 2020-04-15 RX ADMIN — ACETAMINOPHEN 1000 MG: 500 TABLET ORAL at 12:08

## 2020-04-15 RX ADMIN — OXYCODONE HYDROCHLORIDE 10 MG: 5 TABLET ORAL at 16:13

## 2020-04-15 RX ADMIN — NITROGLYCERIN 0.5 INCH: 20 OINTMENT TOPICAL at 12:09

## 2020-04-15 RX ADMIN — CLOPIDOGREL BISULFATE 75 MG: 75 TABLET ORAL at 12:07

## 2020-04-15 RX ADMIN — SODIUM CHLORIDE 2 G: 9 INJECTION, SOLUTION INTRAVENOUS at 05:25

## 2020-04-15 RX ADMIN — OXYCODONE HYDROCHLORIDE 10 MG: 5 TABLET ORAL at 12:08

## 2020-04-15 RX ADMIN — ACETAMINOPHEN 1000 MG: 500 TABLET ORAL at 05:25

## 2020-04-15 RX ADMIN — GABAPENTIN 800 MG: 400 CAPSULE ORAL at 12:08

## 2020-04-15 NOTE — PROGRESS NOTES
"  Cardiothoracic - Vascular Surgery Daily Note        LOS: 1 day   Patient Care Team:  Lisa Alejandro APRN as PCP - General (Family Medicine)  Kris Peres MD (Inactive) as Consulting Physician (Cardiology)    POD#1 LEFT FEMORAL POPLITEAL BYPASS (ABOVE KNEE, 6mm PTFE graft)  LEFT lower extremity arteriogram  LEFTcommon femoral endarterectomy    Chief Complaint: PVD      Subjective     The following portions of the patient's history were reviewed and updated as appropriate: allergies, current medications, past family history, past medical history, past social history, past surgical history and problem list.     Subjective:  Symptoms:  Stable.    Diet:  Adequate intake.    Activity level: Impaired due to pain.    Pain:  She complains of pain that is moderate.  Pain is well controlled and requiring pain medication.          Objective     Vital Signs  Temp:  [97.3 °F (36.3 °C)-99.8 °F (37.7 °C)] 98.7 °F (37.1 °C)  Heart Rate:  [] 82  Resp:  [16-20] 18  BP: ()/(52-93) 105/57  Body mass index is 22.18 kg/m².    Intake/Output Summary (Last 24 hours) at 4/15/2020 0854  Last data filed at 4/15/2020 0600  Gross per 24 hour   Intake 3637.43 ml   Output 1449 ml   Net 2188.43 ml     No intake/output data recorded.    Wt Readings from Last 3 Encounters:   04/14/20 55 kg (121 lb 4.1 oz)   03/23/20 58.6 kg (129 lb 3.2 oz)   03/04/20 58.3 kg (128 lb 9.6 oz)         Physical Exam   Objective:  General Appearance:  Comfortable.    Vital signs: (most recent): Blood pressure 105/57, pulse 82, temperature 98.7 °F (37.1 °C), resp. rate 18, height 157.5 cm (62\"), weight 55 kg (121 lb 4.1 oz), SpO2 94 %, not currently breastfeeding.  Vital signs are normal.  No fever.    Output: Producing urine and no stool output.    HEENT: Normal HEENT exam.    Lungs:  Normal effort and normal respiratory rate.  Breath sounds clear to auscultation.    Heart: Normal rate.    Abdomen: Abdomen is soft.  Bowel sounds are normal.   "   Extremities: Decreased range of motion.    Pulses: (Strong DP/PT-LLE)    Neurological: Patient is alert and oriented to person, place and time.    Skin:  Warm and dry.  ((L) AK: CDI, Prineo intact  (L) groin: Provena Intact)            Results Review:    Lab Results   Component Value Date    WBC 14.30 (H) 04/15/2020    HGB 9.1 (L) 04/15/2020    HCT 28.1 (L) 04/15/2020    MCV 86.7 04/15/2020     04/15/2020     Lab Results   Component Value Date    GLUCOSE 118 (H) 04/15/2020    BUN 11 04/15/2020    CREATININE 0.79 04/15/2020    EGFRIFNONA 73 04/15/2020    BCR 13.9 04/15/2020    K 4.8 04/15/2020    CO2 22.0 04/15/2020    CALCIUM 8.2 (L) 04/15/2020    ALBUMIN 4.40 03/02/2020    AST 57 (H) 03/02/2020    ALT 15 03/02/2020       Calcium Calcium   Date/Time Value Ref Range Status   04/15/2020 0600 8.2 (L) 8.6 - 10.5 mg/dL Final      Magnesium No results found for: MG     Imaging Results (Last 24 Hours)     Procedure Component Value Units Date/Time    FL C Arm During Surgery [[id number]] Resulted:  04/15/20 0815     Updated:  04/15/20 0815    XR Chest 1 View [[id number]] Collected:  04/14/20 1716     Updated:  04/14/20 1731    Narrative:         PORTABLE CHEST    HISTORY: Central line placement.    Portable AP upright film of the chest was obtained at 5:10 PM.  COMPARISON: None    FINDINGS:   Right internal jugular catheter now in place with tip projected  over the junction of the superior vena cava and the right atrium.  No pneumothorax.  Linear atelectasis lung bases.  Old granulomatous disease is present.  The heart is not enlarged.  The pulmonary vasculature is not increased.  No pleural effusion.  No acute osseous abnormality.  Degenerative changes are present in the thoracic spine.  Left reverse total shoulder prosthesis.      Impression:       CONCLUSION:  Right internal jugular catheter now in place with tip projected  over the junction of the superior vena cava and the right atrium.  No pneumothorax.  Linear  atelectasis lung bases.    [id number]    Electronically signed by:  Jason Marie MD  4/14/2020 5:30 PM CDT  Workstation: [id number]                                acetaminophen 1,000 mg Oral Q6H   albuterol 2.5 mg Nebulization Q8H - RT   famotidine 20 mg Oral Q12H   gabapentin 800 mg Oral Q12H   midodrine 5 mg Oral Daily   nitroglycerin 0.5 inch Topical Q12H   sertraline 100 mg Oral Daily   traZODone 150 mg Oral Nightly       HYDROmorphone HCl-NaCl     nitroglycerin 5-200 mcg/min    sodium chloride 75 mL/hr Last Rate: 75 mL/hr (04/14/20 2106)             ASSESSMENT/PLAN       Atherosclerosis of native arteries of extremities with rest pain, left leg (CMS/HCC)      Assessment & Plan    Stable Post Op LEFT FEM-OP AK: Progressing well    Severe PVD: PLAVIX, add Statin    Paroxysmal AFib: Currently NSR. Restart Xarelto tomorrow.    Respiratory: O2 support.wean. Incentive.     Tobacco Dependence: Nicotine patch    Pain: Scheduled Tylenol. DC PCA. Start OxyIR.    Rehab: OOB chair. Ambulate    Disposition: Transfer 3W telemetry                This document has been electronically signed by DELFINO Garcia on April 15, 2020 08:54

## 2020-04-16 VITALS
HEART RATE: 100 BPM | SYSTOLIC BLOOD PRESSURE: 170 MMHG | DIASTOLIC BLOOD PRESSURE: 80 MMHG | OXYGEN SATURATION: 91 % | BODY MASS INDEX: 23.96 KG/M2 | WEIGHT: 130.2 LBS | RESPIRATION RATE: 18 BRPM | TEMPERATURE: 97.6 F | HEIGHT: 62 IN

## 2020-04-16 PROCEDURE — 94760 N-INVAS EAR/PLS OXIMETRY 1: CPT

## 2020-04-16 PROCEDURE — 94799 UNLISTED PULMONARY SVC/PX: CPT

## 2020-04-16 PROCEDURE — 99024 POSTOP FOLLOW-UP VISIT: CPT | Performed by: NURSE PRACTITIONER

## 2020-04-16 PROCEDURE — 25010000002 HYDROMORPHONE 1 MG/ML SOLUTION: Performed by: NURSE PRACTITIONER

## 2020-04-16 RX ORDER — OXYCODONE HYDROCHLORIDE AND ACETAMINOPHEN 5; 325 MG/1; MG/1
1-2 TABLET ORAL EVERY 4 HOURS PRN
Qty: 36 TABLET | Refills: 0 | Status: SHIPPED | OUTPATIENT
Start: 2020-04-16 | End: 2020-04-23 | Stop reason: SDUPTHER

## 2020-04-16 RX ORDER — CLOPIDOGREL BISULFATE 75 MG/1
75 TABLET ORAL DAILY
Qty: 30 TABLET | Refills: 0 | Status: SHIPPED | OUTPATIENT
Start: 2020-04-16 | End: 2020-04-23 | Stop reason: ALTCHOICE

## 2020-04-16 RX ORDER — ATORVASTATIN CALCIUM 20 MG/1
20 TABLET, FILM COATED ORAL NIGHTLY
Qty: 30 TABLET | Refills: 0 | Status: SHIPPED | OUTPATIENT
Start: 2020-04-16 | End: 2020-05-22 | Stop reason: SDUPTHER

## 2020-04-16 RX ADMIN — MIDODRINE HYDROCHLORIDE 5 MG: 5 TABLET ORAL at 07:34

## 2020-04-16 RX ADMIN — ACETAMINOPHEN 1000 MG: 500 TABLET ORAL at 00:11

## 2020-04-16 RX ADMIN — NICOTINE 1 PATCH: 14 PATCH, EXTENDED RELEASE TRANSDERMAL at 07:35

## 2020-04-16 RX ADMIN — HYDROMORPHONE HYDROCHLORIDE 0.4 MG: 1 INJECTION, SOLUTION INTRAMUSCULAR; INTRAVENOUS; SUBCUTANEOUS at 03:11

## 2020-04-16 RX ADMIN — FAMOTIDINE 20 MG: 20 TABLET ORAL at 07:34

## 2020-04-16 RX ADMIN — OXYCODONE HYDROCHLORIDE 10 MG: 5 TABLET ORAL at 07:33

## 2020-04-16 RX ADMIN — ACETAMINOPHEN 1000 MG: 500 TABLET ORAL at 12:47

## 2020-04-16 RX ADMIN — ALBUTEROL SULFATE 2.5 MG: 2.5 SOLUTION RESPIRATORY (INHALATION) at 06:50

## 2020-04-16 RX ADMIN — NITROGLYCERIN 0.5 INCH: 20 OINTMENT TOPICAL at 07:35

## 2020-04-16 RX ADMIN — SERTRALINE HYDROCHLORIDE 100 MG: 50 TABLET ORAL at 07:34

## 2020-04-16 RX ADMIN — CLOPIDOGREL BISULFATE 75 MG: 75 TABLET ORAL at 07:34

## 2020-04-16 RX ADMIN — ACETAMINOPHEN 1000 MG: 500 TABLET ORAL at 05:48

## 2020-04-16 RX ADMIN — HYDROMORPHONE HYDROCHLORIDE 0.4 MG: 1 INJECTION, SOLUTION INTRAMUSCULAR; INTRAVENOUS; SUBCUTANEOUS at 08:42

## 2020-04-16 RX ADMIN — OXYCODONE HYDROCHLORIDE 10 MG: 5 TABLET ORAL at 12:47

## 2020-04-16 RX ADMIN — GABAPENTIN 800 MG: 400 CAPSULE ORAL at 07:34

## 2020-04-16 NOTE — DISCHARGE PLACEMENT REQUEST
"Filomena Perales (69 y.o. Female)     Date of Birth Social Security Number Address Home Phone MRN    [id number]  [id number] [facility] [address] [phone] [id number]    Alevism Marital Status          Protestant        Admission Date Admission Type Admitting Provider Attending Provider Department, Room/Bed    4/14/20 Elective David Suh MD [place], Thomas Mark, MD [address], 309/1    Discharge Date Discharge Disposition Discharge Destination         Home or Self Care              Attending Provider:  David Suh MD    Allergies:  Morphine And Related, Penicillins    Isolation:  None   Infection:  None   Code Status:  CPR    Ht:  157.5 cm (62\")   Wt:  59.1 kg (130 lb 3.2 oz)    Admission Cmt:  None   Principal Problem:  Atherosclerosis of native arteries of extremities with rest pain, left leg (CMS/HCC) [I70.222] More...                 Active Insurance as of 4/14/2020     Primary Coverage     Payor Plan Insurance Group Employer/Plan Group    HUMANA MEDICARE REPLACEMENT HUMANA MEDICARE REPLACEMENT [id number]     Payor Plan Address Payor Plan Phone Number Payor Plan Fax Number Effective Dates    [address] [address] [phone]  10/1/2019 - None Entered    [facility] [id number]       Subscriber Name Subscriber Birth Date Member ID       FILOMENA PERALES 1950 [id number]                 Emergency Contacts      (Rel.) Home Phone Work Phone Mobile Phone    MARIA EUGENIA LONG (Sister) [phone] -- [phone]    maia pope (Daughter) -- -- [phone]               Discharge Summary      Jacki Polanco APRN at 04/16/20 1115          CVTS DISCHARGE SUMMARY  Date of Admission: 4/14/2020  6:51 AM  Date of Discharge:  4/16/2020    Admission Diagnosis:   Atherosclerosis of native arteries of extremities with rest pain, left leg (CMS/HCC) [I70.222]    Discharge Diagnosis:   Post-Op Diagnosis Codes:     * Atherosclerosis of " "native arteries of extremities with rest pain, left leg (CMS/[facility]) [I70.222]    Consults:   Consults     No orders found from 3/16/2020 to 4/15/2020.          Procedures Performed  Procedure(s): LEFT  FEMORAL POPLITEAL BYPASS ABOVE KNEE  (POLYTETRAFLUOROETHYLENE)  LEFT COMMON FEMORAL ARTERY ENDARTERECTOMY PTA LEFT ILIAC ARTERIOGRAM        (c-arm#2 and c-arm table)       Discharge Medications     Discharge Medications      New Medications      Instructions Start Date   atorvastatin 20 MG tablet  Commonly known as:  LIPITOR   20 mg, Oral, Nightly      clopidogrel 75 MG tablet  Commonly known as:  PLAVIX   75 mg, Oral, Daily      oxyCODONE-acetaminophen 5-325 MG per tablet  Commonly known as:  Percocet   1-2 tablets, Oral, Every 4 Hours PRN         Changes to Medications      Instructions Start Date   docusate sodium 100 MG capsule  What changed:    · when to take this  · reasons to take this   100 mg, Oral, 2 Times Daily      midodrine 5 MG tablet  Commonly known as:  PROAMATINE  What changed:  when to take this   5 mg, Oral, 3 Times Daily Before Meals         Continue These Medications      Instructions Start Date   albuterol (2.5 MG/3ML) 0.083% nebulizer solution  Commonly known as:  PROVENTIL   2.5 mg, Nebulization, Every 8 Hours PRN      Denavir 1 % cream  Generic drug:  penciclovir   1 application, Topical, As Needed      furosemide 20 MG tablet  Commonly known as:  LASIX   20 mg, Oral, Every Other Day, \"EVERY OTHER DAY\"      gabapentin 800 MG tablet  Commonly known as:  NEURONTIN   800 mg, Oral, 2 Times Daily      pantoprazole 40 MG EC tablet  Commonly known as:  Protonix   40 mg, Oral, Daily      sertraline 100 MG tablet  Commonly known as:  ZOLOFT   100 mg, Oral, Daily      traZODone 150 MG tablet  Commonly known as:  DESYREL   150 mg, Oral, Nightly      Xarelto 20 MG tablet  Generic drug:  rivaroxaban   20 mg, Oral, Daily           Medical Management: PLAVIX,STATIN Reviewed risks, benefits, and habit forming " potential and weaning from narcotic medication. Patient understands and wishes to receive prescription.  Prescription written for Percocet #36 for post-surgical pain after Jhonny placed on file.    Discharge Diet:   Diet Instructions     Diet: Cardiac      Discharge Diet:  Cardiac          Discharge Disposition: Home in stable condition with follow up appointments with CVTS Nurse Practitioner 1 week, Dr. Suh/[place] 2 weeks, Cardiology/PCP as scheduled.     History of Present Illness/Hospital Course:   69 y.o. female with HTN(stable, increased risk stroke, rupture), Hyperlipidemia(stable, increased risk cardiovascular events), Smoker(uncontrolled, increased risk cardiovascular events), COPD(stable, increased risk pulmonary complications) and Atrial fibrillation(stable, increased risk stroke)  Anticoagulation (Xarelto, stable) Hx PE (stable). PVD (LSCA stent 2005, B iliac stents 2008) lost to follow up.  smokes 1 PPD.  Increasing pain LEFT foot, lifestyle limiting. Difficulty ambulating. VAS 10. No TIA stroke amaurosis.  No MI claudication. No other associated signs, symptoms or modifying factors. Presents for vascular evaluation.      2005: LSCA Stent  2008: Bilateral Iliac stents  3/23/2020:  CHELSIE: RIGHT 0.83 Triphasic  LEFT 0.31 Biphasic/Monophasic (DP/PT)     Further findings per Aortogram with runoff demonstrated no endovascular solution for revascularization.  Adequate preop studies were completed and the patient was scheduled electively. Operative day Mona Perales admitted through [facility], taken to operating suite and placed under general anesthesia.  Underwent said procedure without complications or difficulty. They were weaned easily from mechanical ventilation and extubated in the recovery room placed on oxygen per nasal cannula and further transferred to CCU for further care and monitoring. Mona Perales remained hemodynamically and neurovascularly stable immediately postop.  POD1 they were out of  "the bed to the chair tolerating breakfast pain controlled with Percocet and stable for transfer to 3W telemetry.  Mona Perales continued with ambulation progressing well.  Operative incisions clean, dry, intact. Neurovascular status remained stable for dc home on POD 2.     Vital Signs  Temp:  [97.7 °F (36.5 °C)-98.8 °F (37.1 °C)] 98.3 °F (36.8 °C)  Heart Rate:  [] 75  Resp:  [15-20] 18  BP: (116-180)/(50-84) 118/50      04/14/20  0708 04/14/20  1818 04/16/20  0557   Weight: 55 kg (121 lb 4.1 oz) 55 kg (121 lb 4.1 oz) 59.1 kg (130 lb 3.2 oz)       Pertinent Test Results:  Lab Results   Component Value Date    WBC 14.30 (H) 04/15/2020    HGB 9.1 (L) 04/15/2020    HCT 28.1 (L) 04/15/2020    MCV 86.7 04/15/2020     04/15/2020     Lab Results   Component Value Date    GLUCOSE 118 (H) 04/15/2020    BUN 11 04/15/2020    CREATININE 0.79 04/15/2020    EGFRIFNONA 73 04/15/2020    BCR 13.9 04/15/2020    K 4.8 04/15/2020    CO2 22.0 04/15/2020    CALCIUM 8.2 (L) 04/15/2020    ALBUMIN 4.40 03/02/2020    AST 57 (H) 03/02/2020    ALT 15 03/02/2020       Physical Exam:  Physical Exam   General Appearance:  Comfortable.    Vital signs: (most recent): Blood pressure 118/50, pulse 75, temperature 98.3 °F (36.8 °C), temperature source Oral, resp. rate 18, height 157.5 cm (62\"), weight 59.1 kg (130 lb 3.2 oz), SpO2 96 %, not currently breastfeeding.  Vital signs are normal.  No fever.    Output: Producing urine and producing stool.    HEENT: Normal HEENT exam.    Lungs:  Normal effort and normal respiratory rate.  Breath sounds clear to auscultation.    Heart: Normal rate.    Abdomen: Abdomen is soft.  Bowel sounds are normal.     Extremities: Decreased range of motion.    Pulses: (Strong DP/PT-LLE)    Neurological: Patient is alert and oriented to person, place and time.    Skin:  Warm and dry.  ((L) AK: CDI, Prineo intact  (L) groin: Provena Intact)    Additional Instructions for the Follow-ups that You Need to " Schedule     Discharge Follow-up with Specialty: Vascular Surgery; 1 Week   As directed      Specialty:  Vascular Surgery    Follow Up:  1 Week    Follow Up Details:  Jacki Polanco APRN 4/23/2020 1:00         Referral to Home Health   As directed      Face to Face Visit Date:  4/16/2020    Follow-up provider for Plan of Care?:  I will be treating the patient on an ongoing basis.  Please send me the Plan of Care for signature.    Follow-up provider:  SEBASTIAN REYEZ [[id number]]    Reason/Clinical Findings:  post op    Describe mobility limitations that make leaving home difficult:  surgical restrictions    Nursing/Therapeutic Services Requested:  Skilled Nursing    Skilled nursing orders:  Cardiopulmonary assessments Neurovascular assessments    Frequency:  1 Week 1               Discharge Instructions: Discharge instructions include no heavy lifting anything greater than 10lbs for approximately 4 weeks.  No sex or driving for 2-4 weeks. Printed information given to the patient with advancement of activities weekly.  Risks and benefits of narcotic medications and weaning postoperatively have been discussed. Clean operative site with antibacterial soap/water, pat dry. Keep open to air unless draining, then may apply dry dressing.  No ointments or creams unless prescribed by provider. Signs and symptoms of infection including drainage from operative site, redness, swelling, with associated fever and/or chills notify Heart and Vascular center immediately for wound check. If signs and symptoms of ischemia should occur including but not limited to pale/blue discoloration of limb, increasing pain with ambulation or at rest, or a non-healing wound. Patient is to notify Heart and Vascular center for immediate evaluation.  Patient verbalizes understanding of discharge instructions, all questions are answered, follow up appointments have been made, they are discharged home in stable condition.           Follow-up  Appointments  No future appointments.    Test Results Pending at Discharge             This document has been electronically signed by DELFINO Garcia on April 16, 2020 11:16      Time: Discharge 60 min      Electronically signed by Jacki Polanco APRN at 04/16/20 1110

## 2020-04-16 NOTE — DISCHARGE SUMMARY
"CVTS DISCHARGE SUMMARY  Date of Admission: 4/14/2020  6:51 AM  Date of Discharge:  4/16/2020    Admission Diagnosis:   Atherosclerosis of native arteries of extremities with rest pain, left leg (CMS/[facility]) [I70.222]    Discharge Diagnosis:   Post-Op Diagnosis Codes:     * Atherosclerosis of native arteries of extremities with rest pain, left leg (CMS/HCC) [I70.222]    Consults:   Consults     No orders found from 3/16/2020 to 4/15/2020.          Procedures Performed  Procedure(s): LEFT  FEMORAL POPLITEAL BYPASS ABOVE KNEE  (POLYTETRAFLUOROETHYLENE)  LEFT COMMON FEMORAL ARTERY ENDARTERECTOMY PTA LEFT ILIAC ARTERIOGRAM        (c-arm#2 and c-arm table)       Discharge Medications     Discharge Medications      New Medications      Instructions Start Date   atorvastatin 20 MG tablet  Commonly known as:  LIPITOR   20 mg, Oral, Nightly      clopidogrel 75 MG tablet  Commonly known as:  PLAVIX   75 mg, Oral, Daily      oxyCODONE-acetaminophen 5-325 MG per tablet  Commonly known as:  Percocet   1-2 tablets, Oral, Every 4 Hours PRN         Changes to Medications      Instructions Start Date   docusate sodium 100 MG capsule  What changed:    · when to take this  · reasons to take this   100 mg, Oral, 2 Times Daily      midodrine 5 MG tablet  Commonly known as:  PROAMATINE  What changed:  when to take this   5 mg, Oral, 3 Times Daily Before Meals         Continue These Medications      Instructions Start Date   albuterol (2.5 MG/3ML) 0.083% nebulizer solution  Commonly known as:  PROVENTIL   2.5 mg, Nebulization, Every 8 Hours PRN      Denavir 1 % cream  Generic drug:  penciclovir   1 application, Topical, As Needed      furosemide 20 MG tablet  Commonly known as:  LASIX   20 mg, Oral, Every Other Day, \"EVERY OTHER DAY\"      gabapentin 800 MG tablet  Commonly known as:  NEURONTIN   800 mg, Oral, 2 Times Daily      pantoprazole 40 MG EC tablet  Commonly known as:  Protonix   40 mg, Oral, Daily      sertraline 100 MG " tablet  Commonly known as:  ZOLOFT   100 mg, Oral, Daily      traZODone 150 MG tablet  Commonly known as:  DESYREL   150 mg, Oral, Nightly      Xarelto 20 MG tablet  Generic drug:  rivaroxaban   20 mg, Oral, Daily           Medical Management: PLAVIX,STATIN Reviewed risks, benefits, and habit forming potential and weaning from narcotic medication. Patient understands and wishes to receive prescription.  Prescription written for Percocet #36 for post-surgical pain after Jhonny placed on file.    Discharge Diet:   Diet Instructions     Diet: Cardiac      Discharge Diet:  Cardiac          Discharge Disposition: Home in stable condition with follow up appointments with CVTS Nurse Practitioner 1 week, Dr. Suh/[place] 2 weeks, Cardiology/PCP as scheduled.     History of Present Illness/Hospital Course:   69 y.o. female with HTN(stable, increased risk stroke, rupture), Hyperlipidemia(stable, increased risk cardiovascular events), Smoker(uncontrolled, increased risk cardiovascular events), COPD(stable, increased risk pulmonary complications) and Atrial fibrillation(stable, increased risk stroke)  Anticoagulation (Xarelto, stable) Hx PE (stable). PVD (LSCA stent 2005, B iliac stents 2008) lost to follow up.  smokes 1 PPD.  Increasing pain LEFT foot, lifestyle limiting. Difficulty ambulating. VAS 10. No TIA stroke amaurosis.  No MI claudication. No other associated signs, symptoms or modifying factors. Presents for vascular evaluation.      2005: LSCA Stent  2008: Bilateral Iliac stents  3/23/2020:  CHELSIE: RIGHT 0.83 Triphasic  LEFT 0.31 Biphasic/Monophasic (DP/PT)     Further findings per Aortogram with runoff demonstrated no endovascular solution for revascularization.  Adequate preop studies were completed and the patient was scheduled electively. Operative day Mona Perales admitted through [facility], taken to operating suite and placed under general anesthesia.  Underwent said procedure without complications or  "difficulty. They were weaned easily from mechanical ventilation and extubated in the recovery room placed on oxygen per nasal cannula and further transferred to CCU for further care and monitoring. Mona Perales remained hemodynamically and neurovascularly stable immediately postop.  POD1 they were out of the bed to the chair tolerating breakfast pain controlled with Percocet and stable for transfer to 3 telemetry.  Mona Perales continued with ambulation progressing well.  Operative incisions clean, dry, intact. Neurovascular status remained stable for dc home on POD 2.     Vital Signs  Temp:  [97.7 °F (36.5 °C)-98.8 °F (37.1 °C)] 98.3 °F (36.8 °C)  Heart Rate:  [] 75  Resp:  [15-20] 18  BP: (116-180)/(50-84) 118/50      04/14/20  0708 04/14/20  1818 04/16/20  0557   Weight: 55 kg (121 lb 4.1 oz) 55 kg (121 lb 4.1 oz) 59.1 kg (130 lb 3.2 oz)       Pertinent Test Results:  Lab Results   Component Value Date    WBC 14.30 (H) 04/15/2020    HGB 9.1 (L) 04/15/2020    HCT 28.1 (L) 04/15/2020    MCV 86.7 04/15/2020     04/15/2020     Lab Results   Component Value Date    GLUCOSE 118 (H) 04/15/2020    BUN 11 04/15/2020    CREATININE 0.79 04/15/2020    EGFRIFNONA 73 04/15/2020    BCR 13.9 04/15/2020    K 4.8 04/15/2020    CO2 22.0 04/15/2020    CALCIUM 8.2 (L) 04/15/2020    ALBUMIN 4.40 03/02/2020    AST 57 (H) 03/02/2020    ALT 15 03/02/2020       Physical Exam:  Physical Exam   General Appearance:  Comfortable.    Vital signs: (most recent): Blood pressure 118/50, pulse 75, temperature 98.3 °F (36.8 °C), temperature source Oral, resp. rate 18, height 157.5 cm (62\"), weight 59.1 kg (130 lb 3.2 oz), SpO2 96 %, not currently breastfeeding.  Vital signs are normal.  No fever.    Output: Producing urine and producing stool.    HEENT: Normal HEENT exam.    Lungs:  Normal effort and normal respiratory rate.  Breath sounds clear to auscultation.    Heart: Normal rate.    Abdomen: Abdomen is soft.  Bowel " sounds are normal.     Extremities: Decreased range of motion.    Pulses: (Strong DP/PT-LLE)    Neurological: Patient is alert and oriented to person, place and time.    Skin:  Warm and dry.  ((L) AK: CDI, Prineo intact  (L) groin: Provena Intact)    Additional Instructions for the Follow-ups that You Need to Schedule     Discharge Follow-up with Specialty: Vascular Surgery; 1 Week   As directed      Specialty:  Vascular Surgery    Follow Up:  1 Week    Follow Up Details:  Jacki Polanco APRN 4/23/2020 1:00         Referral to Home Health   As directed      Face to Face Visit Date:  4/16/2020    Follow-up provider for Plan of Care?:  I will be treating the patient on an ongoing basis.  Please send me the Plan of Care for signature.    Follow-up provider:  SEBASTIAN REYEZ [[id number]]    Reason/Clinical Findings:  post op    Describe mobility limitations that make leaving home difficult:  surgical restrictions    Nursing/Therapeutic Services Requested:  Skilled Nursing    Skilled nursing orders:  Cardiopulmonary assessments Neurovascular assessments    Frequency:  1 Week 1               Discharge Instructions: Discharge instructions include no heavy lifting anything greater than 10lbs for approximately 4 weeks.  No sex or driving for 2-4 weeks. Printed information given to the patient with advancement of activities weekly.  Risks and benefits of narcotic medications and weaning postoperatively have been discussed. Clean operative site with antibacterial soap/water, pat dry. Keep open to air unless draining, then may apply dry dressing.  No ointments or creams unless prescribed by provider. Signs and symptoms of infection including drainage from operative site, redness, swelling, with associated fever and/or chills notify Heart and Vascular center immediately for wound check. If signs and symptoms of ischemia should occur including but not limited to pale/blue discoloration of limb, increasing pain with ambulation or  at rest, or a non-healing wound. Patient is to notify Heart and Vascular center for immediate evaluation.  Patient verbalizes understanding of discharge instructions, all questions are answered, follow up appointments have been made, they are discharged home in stable condition.           Follow-up Appointments  No future appointments.    Test Results Pending at Discharge             This document has been electronically signed by DELFINO Garcia on April 16, 2020 11:16      Time: Discharge 60 min

## 2020-04-16 NOTE — PLAN OF CARE
Pt resting comfortably, VSS, will continue to monitor.  Problem: Patient Care Overview  Goal: Plan of Care Review  Outcome: Ongoing (interventions implemented as appropriate)  Flowsheets  Taken 4/16/2020 0438  Progress: improving  Taken 4/15/2020 2011  Plan of Care Reviewed With: patient

## 2020-04-16 NOTE — PROGRESS NOTES
"  Cardiothoracic - Vascular Surgery Daily Note        LOS: 2 days   Patient Care Team:  Lisa Alejandro APRN as PCP - General (Family Medicine)  Kris Peres MD (Inactive) as Consulting Physician (Cardiology)    POD#2 LEFT FEMORAL POPLITEAL BYPASS (ABOVE KNEE, 6mm PTFE graft)  LEFT lower extremity arteriogram  LEFTcommon femoral endarterectomy    Chief Complaint: PVD      Subjective     The following portions of the patient's history were reviewed and updated as appropriate: allergies, current medications, past family history, past medical history, past social history, past surgical history and problem list.     Subjective:  Symptoms:  Stable.    Diet:  Adequate intake.    Activity level: Impaired due to pain.    Pain:  She complains of pain that is moderate.  Pain is well controlled and requiring pain medication.          Objective     Vital Signs  Temp:  [97.7 °F (36.5 °C)-98.8 °F (37.1 °C)] 98.3 °F (36.8 °C)  Heart Rate:  [] 75  Resp:  [15-20] 18  BP: (116-180)/(50-84) 118/50  Body mass index is 23.81 kg/m².    Intake/Output Summary (Last 24 hours) at 4/16/2020 1106  Last data filed at 4/16/2020 0600  Gross per 24 hour   Intake 800 ml   Output 2000 ml   Net -1200 ml     No intake/output data recorded.    Wt Readings from Last 3 Encounters:   04/16/20 59.1 kg (130 lb 3.2 oz)   03/23/20 58.6 kg (129 lb 3.2 oz)   03/04/20 58.3 kg (128 lb 9.6 oz)         Physical Exam   Objective:  General Appearance:  Comfortable.    Vital signs: (most recent): Blood pressure 118/50, pulse 75, temperature 98.3 °F (36.8 °C), temperature source Oral, resp. rate 18, height 157.5 cm (62\"), weight 59.1 kg (130 lb 3.2 oz), SpO2 96 %, not currently breastfeeding.  Vital signs are normal.  No fever.    Output: Producing urine and producing stool.    HEENT: Normal HEENT exam.    Lungs:  Normal effort and normal respiratory rate.  Breath sounds clear to auscultation.    Heart: Normal rate.    Abdomen: Abdomen is soft.  Bowel " sounds are normal.     Extremities: Decreased range of motion.    Pulses: (Strong DP/PT-LLE)    Neurological: Patient is alert and oriented to person, place and time.    Skin:  Warm and dry.  ((L) AK: CDI, Prineo intact  (L) groin: Provena Intact)              Results Review:    Lab Results   Component Value Date    WBC 14.30 (H) 04/15/2020    HGB 9.1 (L) 04/15/2020    HCT 28.1 (L) 04/15/2020    MCV 86.7 04/15/2020     04/15/2020     Lab Results   Component Value Date    GLUCOSE 118 (H) 04/15/2020    BUN 11 04/15/2020    CREATININE 0.79 04/15/2020    EGFRIFNONA 73 04/15/2020    BCR 13.9 04/15/2020    K 4.8 04/15/2020    CO2 22.0 04/15/2020    CALCIUM 8.2 (L) 04/15/2020    ALBUMIN 4.40 03/02/2020    AST 57 (H) 03/02/2020    ALT 15 03/02/2020       Calcium Calcium   Date/Time Value Ref Range Status   04/15/2020 0600 8.2 (L) 8.6 - 10.5 mg/dL Final      Magnesium No results found for: MG     Imaging Results (Last 24 Hours)     ** No results found for the last 24 hours. **                                  acetaminophen 1,000 mg Oral Q6H   albuterol 2.5 mg Nebulization Q8H - RT   atorvastatin 20 mg Oral Nightly   clopidogrel 75 mg Oral Daily   famotidine 20 mg Oral Q12H   gabapentin 800 mg Oral Q12H   midodrine 5 mg Oral Daily   nicotine 1 patch Transdermal Q24H   nitroglycerin 0.5 inch Topical Q12H   sertraline 100 mg Oral Daily   traZODone 150 mg Oral Nightly                ASSESSMENT/PLAN       Atherosclerosis of native arteries of extremities with rest pain, left leg (CMS/HCC)      Assessment & Plan    Stable Post Op LEFT FEM-OP AK: Progressing well    Severe PVD: PLAVIX,  Statin    Paroxysmal AFib: Currently NSR. Restart Xarelto     Respiratory: O2 support.wean. Incentive.     Tobacco Dependence: Nicotine patch    Pain: Scheduled Tylenol.  OxyIR.    Rehab: OOB chair. Ambulate    Disposition: DC Home              This document has been electronically signed by DELFINO Garcia on April 16, 2020 11:06

## 2020-04-16 NOTE — PLAN OF CARE
Problem: Patient Care Overview  Goal: Plan of Care Review  Outcome: Ongoing (interventions implemented as appropriate)  Flowsheets (Taken 4/15/2020 1950)  Plan of Care Reviewed With: patient  Outcome Summary: pt states that she has spoke to her daughter and sister today since [place] to 3w and she will talk to them again later tonight; no new needs; pain meds adjusted and pt states that they seem to be helping better

## 2020-04-16 NOTE — DISCHARGE PLACEMENT REQUEST
"Filomena Beltran (69 y.o. Female)     Date of Birth Social Security Number Address Home Phone MRN    [id number]  [id number] [facility] [phone] [id number]    Synagogue Marital Status          Anglican        Admission Date Admission Type Admitting Provider Attending Provider Department, Room/Bed    4/14/20 Elective David Suh MD [place], Thomas Mark, MD [address], 309/1    Discharge Date Discharge Disposition Discharge Destination         Home or Self Care              Attending Provider:  David Suh MD    Allergies:  Morphine And Related, Penicillins    Isolation:  None   Infection:  None   Code Status:  CPR    Ht:  157.5 cm (62\")   Wt:  59.1 kg (130 lb 3.2 oz)    Admission Cmt:  None   Principal Problem:  Atherosclerosis of native arteries of extremities with rest pain, left leg (CMS/HCC) [I70.222] More...                 Active Insurance as of 4/14/2020     Primary Coverage     Payor Plan Insurance Group Employer/Plan Group    HUMANA MEDICARE REPLACEMENT HUMANA MEDICARE REPLACEMENT [id number]     Payor Plan Address Payor Plan Phone Number Payor Plan Fax Number Effective Dates    [address] [address] [phone]  10/1/2019 - None Entered    [facility] [id number]       Subscriber Name Subscriber Birth Date Member ID       FILOMENA BELTRAN 1950 [id number]                 Emergency Contacts      (Rel.) Home Phone Work Phone Mobile Phone    MECREDESMARIA EUGENIA (Sister) [phone] -- [phone]    nikomaia (Daughter) -- -- [phone]            Insurance Information                HUMANA MEDICARE REPLACEMENT/HUMANA MEDICARE REPLACEMENT Phone: [phone]    Subscriber: Filomena Beltran Subscriber#: [id number]    Group#: [id number] Precert#:              History & Physical      Daivd Suh MD at 04/14/20 0723          H&P reviewed. The patient was examined and there are no changes to the H&P.  "     Detailed discussion with Mona Perales regarding situation, options and plans. severe lifestyle limiting claudication and ischemic rest pain.  LEFT femoral to popliteal artery bypass is advisable.      Risks including but not limited to Mortality, Major Morbidity 2-3%, bleeding, transfusion, infection, pulmonary (prolonged mechanical ventilation, tracheostomy), renal dysfunction (dialysis), blood vessel, nerve injury.  Benefits:  relief of symptoms, reduction in vascular events, limb loss and hospitalization.  Options:  peripheral angioplasty/stent, medical therapy, amputation discussed.  Understands and wishes to proceed.    LEFT femoral to popliteal artery bypass and common femoral endarterectomy. (above knee).possible LEFT iliac angioplasty stent. Lower extremity arteriogram.  GEN.  SDS.  4/14/2020          This document has been electronically signed by David Suh MD on April 14, 2020 07:27            Electronically signed by David Suh MD at 04/14/20 0727   Source Note          CVTS Office Progress Note       Subjective   Patient ID: Mona Perales is a 69 y.o. female is here today for follow-up.    Chief Complaint:    Chief Complaint   Patient presents with   • Anticoagulation   • Peripheral Vascular Disease       The following portions of the patient's history were reviewed and updated as appropriate: allergies, current medications, past family history, past medical history, past social history, past surgical history and problem list.  Recent images independently reviewed.  Available laboratory values reviewed.    PCP:  Lisa Alejandro APRN  Cardiology:  Kimmy     69 y.o. female with HTN(stable, increased risk stroke, rupture), Hyperlipidemia(stable, increased risk cardiovascular events), Smoker(uncontrolled, increased risk cardiovascular events), COPD(stable, increased risk pulmonary complications) and Atrial fibrillation(stable, increased risk stroke)  Anticoagulation  (Xarelto, stable) Hx PE (stable). PVD (LSCA stent 2005, B iliac stents 2008) lost to follow up.  smokes 1 PPD.  Increasing pain LEFT foot, lifestyle limiting. Difficulty ambulating. VAS 10. No TIA stroke amaurosis.  No MI claudication. No other associated signs, symptoms or modifying factors. Presents for vascular evaluation.     2005: LSCA Stent  2008: Bilateral Iliac stents  3/23/2020:  CHELSIE: RIGHT 0.83 Triphasic  LEFT 0.31 Biphasic/Monophasic (DP/PT)    2016: Carotid Duplex: BABS 0-49% LICA 0-49%  2017: Carotid duplex: BABS 0-49% LICA 0-49%  2018: Carotid duplex: BABS 0-49% LICA 0-49%      Past Medical History:   Diagnosis Date   • A-fib (CMS/[facility])    • Anxiety    • Arthritis    • Asthma    • 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 with ulceration (CMS/[facility])     bilateral legs - bilateral iliac stents 2008      • CHF (congestive heart failure) (CMS/[facility])    • Chronic lower back pain    • COPD (chronic obstructive pulmonary disease) (CMS/[facility])    • Essential (primary) hypertension    • GERD (gastroesophageal reflux disease)    • History of pulmonary embolus (PE)    • Hyperlipidemia    • Insomnia    • Lumbago    • Nicotine dependence    • Occlusion of artery      and stenosis of bilateral carotid arteries - BABS 16-49%, LICA 0-15%   • Other atherosclerosis of native artery of other extremity     LEFT subclavian stent 2005 (occluded)   • Sleep apnea      Past Surgical History:   Procedure Laterality Date   • CARDIAC CATHETERIZATION  04/06/2016    No evidence of any obstructive epicardial CAD.Preserved LV systolic function with EF of 55%.   • CENTRAL VENOUS LINE INSERTION  04/07/2016    Successful placement of right uppe extremity 6-Bengali triple lumen PICC line.   • ENDOSCOPY N/A 1/12/2018    Procedure: ESOPHAGOGASTRODUODENOSCOPY;  Surgeon: Bin Oh MD;  Location: [facility] ENDOSCOPY;  Service:    • ENDOSCOPY N/A 1/17/2018    Procedure: ESOPHAGOGASTRODUODENOSCOPY;  Surgeon: Bin Oh MD;   Location: [facility] ENDOSCOPY;  Service:    • ENDOSCOPY N/A 3/16/2018    Procedure: ESOPHAGOGASTRODUODENOSCOPY possible dilation;  Surgeon: Bin Martin MD;  Location: [facility] ENDOSCOPY;  Service: Gastroenterology   • HYSTERECTOMY     • KYPHOPLASTY N/A 2019    Procedure: KYPHOPLASTY LUMBAR FOUR;  Surgeon: Aba Santa MD;  Location: [facility] OR;  Service: Orthopedic Spine   • TOTAL SHOULDER REPLACEMENT Left    • TRANSESOPHAGEAL ECHOCARDIOGRAM (JACQUES)  2016    With color flow-Mild to moderate [facility].LV systolic function well preserved with Ef of 55-60%.Mitral and AV intact.Diastolic dysfunction   • UPPER GASTROINTESTINAL ENDOSCOPY  2018    Dr. Bin Martin M.D.     Family History   Problem Relation Age of Onset   • Heart disease Other    • Hypertension Other      Social History     Tobacco Use   • Smoking status: Current Every Day Smoker     Packs/day: 1.00     Years: 50.00     Pack years: 50.00     Types: Cigarettes     Last attempt to quit: 2019     Years since quittin.0   • Smokeless tobacco: Never Used   Substance Use Topics   • Alcohol use: No   • Drug use: No       ALLERGIES:   Morphine and related and Penicillins    MEDICATIONS:      Current Outpatient Medications:   •  albuterol (PROVENTIL) (2.5 MG/3ML) 0.083% nebulizer solution, Take 2.5 mg by nebulization Every 8 (Eight) Hours As Needed for Wheezing., Disp: , Rfl:   •  docusate sodium 100 MG capsule, Take 100 mg by mouth 2 (Two) Times a Day. (Patient taking differently: Take 100 mg by mouth Daily As Needed.), Disp: , Rfl:   •  gabapentin (NEURONTIN) 800 MG tablet, Take 800 mg by mouth 2 (Two) Times a Day., Disp: , Rfl:   •  midodrine (PROAMATINE) 5 MG tablet, Take 1 tablet by mouth 3 (Three) Times a Day Before Meals., Disp: 90 tablet, Rfl: 0  •  pantoprazole (PROTONIX) 40 MG EC tablet, Take 1 tablet by mouth Daily., Disp: 30 tablet, Rfl: 5  •  penciclovir (DENAVIR) 1 % cream, Apply 1 application topically to the  "appropriate area as directed As Needed (fever blisters)., Disp: , Rfl:   •  sertraline (ZOLOFT) 100 MG tablet, Take 100 mg by mouth Daily., Disp: , Rfl:   •  traZODone (DESYREL) 150 MG tablet, Take 1 tablet by mouth Every Night., Disp: , Rfl:   •  XARELTO 20 MG tablet, TAKE 1 TABLET BY MOUTH DAILY., Disp: 30 tablet, Rfl: 6  •  HYDROcodone-acetaminophen (Norco) 5-325 MG per tablet, Take 1-2 tablets by mouth Every 4 (Four) Hours As Needed for Severe Pain  (leg ischemia)., Disp: 36 tablet, Rfl: 0    Review of Systems   Constitution: Positive for malaise/fatigue.   HENT: Negative for nosebleeds.    Eyes: Negative for visual disturbance.   Cardiovascular: Positive for claudication and near-syncope. Negative for cyanosis and leg swelling.   Respiratory: Negative for hemoptysis and shortness of breath.    Hematologic/Lymphatic: Negative for bleeding problem. Does not bruise/bleed easily.   Skin: Positive for color change (LEFT foot dependent rubar). Negative for nail changes.   Musculoskeletal: Positive for back pain. Negative for muscle weakness.   Gastrointestinal: Negative for dysphagia, hematemesis and melena.   Genitourinary: Negative for hematuria.   Neurological: Negative for dizziness, focal weakness, light-headedness, loss of balance, numbness, paresthesias and weakness.   Psychiatric/Behavioral: Negative for altered mental status.   All other systems reviewed and are negative.       Objective   Vitals:    03/23/20 0952   BP: 148/84   BP Location: Right arm   Pulse: 75   Temp: 98.1 °F (36.7 °C)   TempSrc: Temporal   SpO2: 97%   Weight: 58.6 kg (129 lb 3.2 oz)   Height: 157.5 cm (62\")     Body mass index is 23.63 kg/m².  Physical Exam   Constitutional: She is oriented to person, place, and time. She appears well-nourished.   HENT:   Head: Atraumatic.   Eyes: EOM are normal.   Neck: Neck supple. Carotid bruit is not present.   Cardiovascular: Normal heart sounds.   Pulses:       Dorsalis pedis pulses are 2+ on the " right side, and 1+ on the left side.        Posterior tibial pulses are 2+ on the right side, and 1+ on the left side.   Pulmonary/Chest: Effort normal and breath sounds normal.   Abdominal: Soft. Bowel sounds are normal.   Musculoskeletal: Normal range of motion. She exhibits no edema.   Gait normal   Neurological: She is alert and oriented to person, place, and time.   Skin: Capillary refill takes more than 3 seconds. There is pallor (LEFT foot elevation).   Psychiatric: She has a normal mood and affect. Thought content normal.   Vitals reviewed.    Lab Results   Component Value Date    GLUCOSE 102 (H) 03/03/2020    BUN 8 03/03/2020    CREATININE 0.63 03/03/2020    EGFRIFNONA 94 03/03/2020    BCR 12.7 03/03/2020    K 3.8 03/03/2020    CO2 24.0 03/03/2020    CALCIUM 9.4 03/03/2020    ALBUMIN 4.40 03/02/2020    AST 57 (H) 03/02/2020    ALT 15 03/02/2020     Lab Results   Component Value Date    WBC 10.06 03/03/2020    HGB 12.6 03/03/2020    HCT 37.9 03/03/2020    MCV 83.7 03/03/2020     03/03/2020         Assessment & Plan     Independent Review of Radiographic Studies:  Detailed discussion regarding risks, benefits, and treatment plan. Images independently reviewed. Patient understands, agrees, and wishes to proceed with plan.     1. Pain of left lower extremity due to ischemia  VAS-10  Detailed discussion regarding situation, options and plans related to severe claudication, rest pain, ischemic tissue loss.  Studies demonstrate severe peripheral vascular disease.  Requires additional urgent evaluation with arteriography to evaluate options for improving blood flow to feet, healing wounds, and avoiding limb loss. Mona Perales understands risks, including but not limited to: pain, infection, bleeding, nerve or blood vessel injury, need for emergent open operation to restore blood flow.  Benefits: relief of symptoms, reduction in risk of limb loss, improved wound healing.  Options: medical therapy,  alternative imaging discussed. Mona Perales understands and wishes to proceed with plan.  Abdominal aortogram, bilateral lower extremity runoff, possible balloon angioplasty or stent scheduled for 3/25/2020 Thank you for the opportunity to participate in this patient's care.   Follow up for results.   - CT Angio Abdominal Aorta Bilateral Iliofem Runoff; Future  - HYDROcodone-acetaminophen (Norco) 5-325 MG per tablet; Take 1-2 tablets by mouth Every 4 (Four) Hours As Needed for Severe Pain  (leg ischemia).  Dispense: 36 tablet; Refill: 0  Reviewed risks, benefits, and habit forming potential and weaning from narcotic medication. Patient understands and wishes to receive prescription.  Prescription written for Norco #36 for post-surgical pain after Jhonny placed on file.     2. Peripheral arterial disease (CMS/HCC)  severe reduction Arterial Flow left Lower Extremity rest pain   Proceed with CTA runoff for leg ischemia   If signs and symptoms of ischemia should occur including but not limited to pale/blue discoloration of limb, increasing pain with ambulation or at rest, or a non-healing wound. Patient is to notify [facility] for immediate evaluation.  - CT Angio Abdominal Aorta Bilateral Iliofem Runoff; Future    3. Atherosclerosis of native arteries of extremities with rest pain, left leg (CMS/HCC)   - CT Angio Abdominal Aorta Bilateral Iliofem Runoff; Future    4. Benign essential hypertension  controlled    5. Hypercholesteremia  Consider statin therapy  Will discuss return visit    6. Nicotine abuse  Smoking cessation assistance options offered including behavioral counseling (Smoking Cessation Classes), Nicotine replacement therapy (patches or gum), pharmacologic therapy (Chantix, Wellbutrin). Understands tobacco increases risk of expanding AAA, MI, CVA, PAD, carcinoma. Discussion and question answer period 5-7 minutes.     Time spent with patient 40 out of 40 min face to face evaluating,  treating, and discussing findings regarding vascular exam. Coordination of CTA runoff and follow up appointment and education to patient and family regarding treatment options, plan of care, and hoped for outcomes.     BMI Normal.           This document has been electronically signed by DELFINO Garcia on March 23, 2020 11:01      Electronically signed by Jacki Polanco APRN at 03/23/20 1527             Jacki Polanco APRN at 03/23/20 1000          CVTS Office Progress Note       Subjective   Patient ID: Mona Perales is a 69 y.o. female is here today for follow-up.    Chief Complaint:    Chief Complaint   Patient presents with   • Anticoagulation   • Peripheral Vascular Disease       The following portions of the patient's history were reviewed and updated as appropriate: allergies, current medications, past family history, past medical history, past social history, past surgical history and problem list.  Recent images independently reviewed.  Available laboratory values reviewed.    PCP:  Lisa Alejandro APRN  Cardiology:  Kimmy     69 y.o. female with HTN(stable, increased risk stroke, rupture), Hyperlipidemia(stable, increased risk cardiovascular events), Smoker(uncontrolled, increased risk cardiovascular events), COPD(stable, increased risk pulmonary complications) and Atrial fibrillation(stable, increased risk stroke)  Anticoagulation (Xarelto, stable) Hx PE (stable). PVD (LSCA stent 2005, B iliac stents 2008) lost to follow up.  smokes 1 PPD.  Increasing pain LEFT foot, lifestyle limiting. Difficulty ambulating. VAS 10. No TIA stroke amaurosis.  No MI claudication. No other associated signs, symptoms or modifying factors. Presents for vascular evaluation.     2005: LSCA Stent  2008: Bilateral Iliac stents  3/23/2020:  CHELSIE: RIGHT 0.83 Triphasic  LEFT 0.31 Biphasic/Monophasic (DP/PT)    2016: Carotid Duplex: BABS 0-49% LICA 0-49%  2017: Carotid duplex: BABS 0-49% LICA 0-49%  2018:  Carotid duplex: BABS 0-49% LICA 0-49%      Past Medical History:   Diagnosis Date   • A-fib (CMS/[facility])    • Anxiety    • Arthritis    • Asthma    • 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 with ulceration (CMS/[facility])     bilateral legs - bilateral iliac stents 2008      • CHF (congestive heart failure) (CMS/[facility])    • Chronic lower back pain    • COPD (chronic obstructive pulmonary disease) (CMS/[facility])    • Essential (primary) hypertension    • GERD (gastroesophageal reflux disease)    • History of pulmonary embolus (PE)    • Hyperlipidemia    • Insomnia    • Lumbago    • Nicotine dependence    • Occlusion of artery      and stenosis of bilateral carotid arteries - BABS 16-49%, LICA 0-15%   • Other atherosclerosis of native artery of other extremity     LEFT subclavian stent 2005 (occluded)   • Sleep apnea      Past Surgical History:   Procedure Laterality Date   • CARDIAC CATHETERIZATION  04/06/2016    No evidence of any obstructive epicardial CAD.Preserved LV systolic function with EF of 55%.   • CENTRAL VENOUS LINE INSERTION  04/07/2016    Successful placement of right uppe extremity 6-Scottish triple lumen PICC line.   • ENDOSCOPY N/A 1/12/2018    Procedure: ESOPHAGOGASTRODUODENOSCOPY;  Surgeon: Bin Oh MD;  Location: [facility] ENDOSCOPY;  Service:    • ENDOSCOPY N/A 1/17/2018    Procedure: ESOPHAGOGASTRODUODENOSCOPY;  Surgeon: Bin Oh MD;  Location: [facility] ENDOSCOPY;  Service:    • ENDOSCOPY N/A 3/16/2018    Procedure: ESOPHAGOGASTRODUODENOSCOPY possible dilation;  Surgeon: Bin Oh MD;  Location: [facility] ENDOSCOPY;  Service: Gastroenterology   • HYSTERECTOMY     • KYPHOPLASTY N/A 5/23/2019    Procedure: KYPHOPLASTY LUMBAR FOUR;  Surgeon: Aba Santa MD;  Location: [facility] OR;  Service: Orthopedic Spine   • TOTAL SHOULDER REPLACEMENT Left    • TRANSESOPHAGEAL ECHOCARDIOGRAM (JACQUES)  04/07/2016    With color flow-Mild to moderate [facility].LV systolic function well preserved with  Ef of 55-60%.Mitral and AV intact.Diastolic dysfunction   • UPPER GASTROINTESTINAL ENDOSCOPY  2018    Dr. Bin Martin M.D.     Family History   Problem Relation Age of Onset   • Heart disease Other    • Hypertension Other      Social History     Tobacco Use   • Smoking status: Current Every Day Smoker     Packs/day: 1.00     Years: 50.00     Pack years: 50.00     Types: Cigarettes     Last attempt to quit: 2019     Years since quittin.0   • Smokeless tobacco: Never Used   Substance Use Topics   • Alcohol use: No   • Drug use: No       ALLERGIES:   Morphine and related and Penicillins    MEDICATIONS:      Current Outpatient Medications:   •  albuterol (PROVENTIL) (2.5 MG/3ML) 0.083% nebulizer solution, Take 2.5 mg by nebulization Every 8 (Eight) Hours As Needed for Wheezing., Disp: , Rfl:   •  docusate sodium 100 MG capsule, Take 100 mg by mouth 2 (Two) Times a Day. (Patient taking differently: Take 100 mg by mouth Daily As Needed.), Disp: , Rfl:   •  gabapentin (NEURONTIN) 800 MG tablet, Take 800 mg by mouth 2 (Two) Times a Day., Disp: , Rfl:   •  midodrine (PROAMATINE) 5 MG tablet, Take 1 tablet by mouth 3 (Three) Times a Day Before Meals., Disp: 90 tablet, Rfl: 0  •  pantoprazole (PROTONIX) 40 MG EC tablet, Take 1 tablet by mouth Daily., Disp: 30 tablet, Rfl: 5  •  penciclovir (DENAVIR) 1 % cream, Apply 1 application topically to the appropriate area as directed As Needed (fever blisters)., Disp: , Rfl:   •  sertraline (ZOLOFT) 100 MG tablet, Take 100 mg by mouth Daily., Disp: , Rfl:   •  traZODone (DESYREL) 150 MG tablet, Take 1 tablet by mouth Every Night., Disp: , Rfl:   •  XARELTO 20 MG tablet, TAKE 1 TABLET BY MOUTH DAILY., Disp: 30 tablet, Rfl: 6  •  HYDROcodone-acetaminophen (Norco) 5-325 MG per tablet, Take 1-2 tablets by mouth Every 4 (Four) Hours As Needed for Severe Pain  (leg ischemia)., Disp: 36 tablet, Rfl: 0    Review of Systems   Constitution: Positive for malaise/fatigue.  "  HENT: Negative for nosebleeds.    Eyes: Negative for visual disturbance.   Cardiovascular: Positive for claudication and near-syncope. Negative for cyanosis and leg swelling.   Respiratory: Negative for hemoptysis and shortness of breath.    Hematologic/Lymphatic: Negative for bleeding problem. Does not bruise/bleed easily.   Skin: Positive for color change (LEFT foot dependent rubar). Negative for nail changes.   Musculoskeletal: Positive for back pain. Negative for muscle weakness.   Gastrointestinal: Negative for dysphagia, hematemesis and melena.   Genitourinary: Negative for hematuria.   Neurological: Negative for dizziness, focal weakness, light-headedness, loss of balance, numbness, paresthesias and weakness.   Psychiatric/Behavioral: Negative for altered mental status.   All other systems reviewed and are negative.       Objective   Vitals:    03/23/20 0952   BP: 148/84   BP Location: Right arm   Pulse: 75   Temp: 98.1 °F (36.7 °C)   TempSrc: Temporal   SpO2: 97%   Weight: 58.6 kg (129 lb 3.2 oz)   Height: 157.5 cm (62\")     Body mass index is 23.63 kg/m².  Physical Exam   Constitutional: She is oriented to person, place, and time. She appears well-nourished.   HENT:   Head: Atraumatic.   Eyes: EOM are normal.   Neck: Neck supple. Carotid bruit is not present.   Cardiovascular: Normal heart sounds.   Pulses:       Dorsalis pedis pulses are 2+ on the right side, and 1+ on the left side.        Posterior tibial pulses are 2+ on the right side, and 1+ on the left side.   Pulmonary/Chest: Effort normal and breath sounds normal.   Abdominal: Soft. Bowel sounds are normal.   Musculoskeletal: Normal range of motion. She exhibits no edema.   Gait normal   Neurological: She is alert and oriented to person, place, and time.   Skin: Capillary refill takes more than 3 seconds. There is pallor (LEFT foot elevation).   Psychiatric: She has a normal mood and affect. Thought content normal.   Vitals reviewed.    Lab " Results   Component Value Date    GLUCOSE 102 (H) 03/03/2020    BUN 8 03/03/2020    CREATININE 0.63 03/03/2020    EGFRIFNONA 94 03/03/2020    BCR 12.7 03/03/2020    K 3.8 03/03/2020    CO2 24.0 03/03/2020    CALCIUM 9.4 03/03/2020    ALBUMIN 4.40 03/02/2020    AST 57 (H) 03/02/2020    ALT 15 03/02/2020     Lab Results   Component Value Date    WBC 10.06 03/03/2020    HGB 12.6 03/03/2020    HCT 37.9 03/03/2020    MCV 83.7 03/03/2020     03/03/2020         Assessment & Plan     Independent Review of Radiographic Studies:  Detailed discussion regarding risks, benefits, and treatment plan. Images independently reviewed. Patient understands, agrees, and wishes to proceed with plan.     1. Pain of left lower extremity due to ischemia  VAS-10  Detailed discussion regarding situation, options and plans related to severe claudication, rest pain, ischemic tissue loss.  Studies demonstrate severe peripheral vascular disease.  Requires additional urgent evaluation with arteriography to evaluate options for improving blood flow to feet, healing wounds, and avoiding limb loss. Mona Marthakristen Perales understands risks, including but not limited to: pain, infection, bleeding, nerve or blood vessel injury, need for emergent open operation to restore blood flow.  Benefits: relief of symptoms, reduction in risk of limb loss, improved wound healing.  Options: medical therapy, alternative imaging discussed. Mona Thomas Simeonpina understands and wishes to proceed with plan.  Abdominal aortogram, bilateral lower extremity runoff, possible balloon angioplasty or stent scheduled for 3/25/2020 Thank you for the opportunity to participate in this patient's care.   Follow up for results.   - CT Angio Abdominal Aorta Bilateral Iliofem Runoff; Future  - HYDROcodone-acetaminophen (Norco) 5-325 MG per tablet; Take 1-2 tablets by mouth Every 4 (Four) Hours As Needed for Severe Pain  (leg ischemia).  Dispense: 36 tablet; Refill: 0  Reviewed risks,  benefits, and habit forming potential and weaning from narcotic medication. Patient understands and wishes to receive prescription.  Prescription written for Norco #36 for post-surgical pain after Jhonny placed on file.     2. Peripheral arterial disease (CMS/HCC)  severe reduction Arterial Flow left Lower Extremity rest pain   Proceed with CTA runoff for leg ischemia   If signs and symptoms of ischemia should occur including but not limited to pale/blue discoloration of limb, increasing pain with ambulation or at rest, or a non-healing wound. Patient is to notify Heart and Vascular center for immediate evaluation.  - CT Angio Abdominal Aorta Bilateral Iliofem Runoff; Future    3. Atherosclerosis of native arteries of extremities with rest pain, left leg (CMS/HCC)   - CT Angio Abdominal Aorta Bilateral Iliofem Runoff; Future    4. Benign essential hypertension  controlled    5. Hypercholesteremia  Consider statin therapy  Will discuss return visit    6. Nicotine abuse  Smoking cessation assistance options offered including behavioral counseling (Smoking Cessation Classes), Nicotine replacement therapy (patches or gum), pharmacologic therapy (Chantix, Wellbutrin). Understands tobacco increases risk of expanding AAA, MI, CVA, PAD, carcinoma. Discussion and question answer period 5-7 minutes.     Time spent with patient 40 out of 40 min face to face evaluating, treating, and discussing findings regarding vascular exam. Coordination of CTA runoff and follow up appointment and education to patient and family regarding treatment options, plan of care, and hoped for outcomes.     BMI Normal.           This document has been electronically signed by DELFINO Garcia on March 23, 2020 11:01      Electronically signed by Jacki Polanco APRN at 03/23/20 1527         Current Facility-Administered Medications   Medication Dose Route Frequency Provider Last Rate Last Dose   • acetaminophen (TYLENOL) tablet 1,000 mg   1,000 mg Oral Q6H Jacki Polanco, APRN   1,000 mg at 04/16/20 0548   • albuterol (PROVENTIL) nebulizer solution 0.083% 2.5 mg/3mL  2.5 mg Nebulization Q8H PRN Sherri, Jacki Hernandez, APRN       • albuterol (PROVENTIL) nebulizer solution 0.083% 2.5 mg/3mL  2.5 mg Nebulization Q8H - RT Jacki Polanco, APRN   2.5 mg at 04/16/20 0650   • atorvastatin (LIPITOR) tablet 20 mg  20 mg Oral Nightly Jacki Polanco, APRN   20 mg at 04/15/20 2058   • bisacodyl (DULCOLAX) suppository 10 mg  10 mg Rectal Daily PRN Sherri, Jacki Hernandez, APRN       • clopidogrel (PLAVIX) tablet 75 mg  75 mg Oral Daily Jacki Polanco, APRN   75 mg at 04/16/20 0734   • famotidine (PEPCID) tablet 20 mg  20 mg Oral Q12H Jacki Polanco, APRN   20 mg at 04/16/20 0734   • gabapentin (NEURONTIN) capsule 800 mg  800 mg Oral Q12H Jacki Polanco, APRN   800 mg at 04/16/20 0734   • HYDROmorphone (DILAUDID) injection 0.4 mg  0.4 mg Intravenous Q4H PRN Jacki Polanco, APRN   0.4 mg at 04/16/20 0842   • magnesium hydroxide (MILK OF MAGNESIA) suspension 2400 mg/10mL 10 mL  10 mL Oral Daily PRN Sherri, Jacki Hernandez, APRN       • midodrine (PROAMATINE) tablet 5 mg  5 mg Oral Daily Sherri, Jacki Hernandez, APRN   5 mg at 04/16/20 0734   • nicotine (NICODERM CQ) 14 MG/24HR patch 1 patch  1 patch Transdermal Q24H Jacki Polanco, APRN   1 patch at 04/16/20 0735   • nitroglycerin (NITROSTAT) ointment 0.5 inch  0.5 inch Topical Q12H Jacki Polanco, APRN   0.5 inch at 04/16/20 0735   • ondansetron (ZOFRAN) tablet 4 mg  4 mg Oral Q6H PRN Jacki Polanco APRN        Or   • ondansetron (ZOFRAN) injection 4 mg  4 mg Intravenous Q6H PRN Jacki Polanco APRN       • oxyCODONE (ROXICODONE) immediate release tablet 10 mg  10 mg Oral Q4H PRN Jacki Polanco APRN   10 mg at 04/16/20 0733   • oxyCODONE (ROXICODONE) immediate release tablet 5 mg  5 mg Oral Q4H PRN Jacki Polanco APRN       • sennosides-docusate (PERICOLACE)  8.6-50 MG per tablet 2 tablet  2 tablet Oral BID PRN Niles Polanco, APRN       • sertraline (ZOLOFT) tablet 100 mg  100 mg Oral Daily Niles Polanco APRN   100 mg at 20 0734   • traZODone (DESYREL) tablet 150 mg  150 mg Oral Nightly Niles Polanco APRN   150 mg at 04/15/20 2058     [address] [address]  Phone:  [phone]  Fax:   Date: 2020      Referral to Home Health     Patient:  Mona Perales MRN:  [id number]   [address] [id number] :  1950  SSN:    Phone: [phone] Sex:  F      INSURANCE PAYOR PLAN GROUP # SUBSCRIBER ID   Primary:    [employer] MEDICARE REPLACEMENT [id number]      Referring Provider Information:  NILES POLANCO Phone: [phone] Fax:       Referral Information:   # Visits:  1 Referral Type: Home Health [42]   Urgency:  Routine Referral Reason: Specialty Services Required   Start Date: 2020 End Date:  To be determined by Insurer   Diagnosis: Atherosclerosis of native arteries of extremities with rest pain, left leg (CMS/HCC) (I70.222 [ICD-10-CM] 440.22 [ICD-9-CM])      Refer to Dept: [facility]  Refer to Provider:   Refer to Facility:       Face to Face Visit Date: 2020  Follow-up provider for Plan of Care? I will be treating the patient on an ongoing basis.  Please send me the Plan of Care for signature.  Follow-up provider: SEBASTIAN REYEZ [[id number]]  Reason/Clinical Findings: post op  Describe mobility limitations that make leaving home difficult: surgical restrictions  Nursing/Therapeutic Services Requested: Skilled Nursing  Skilled nursing orders: Cardiopulmonary assessments  Skilled nursing orders: Neurovascular assessments  Frequency: 1 Week 1     This document serves as a request of services and does not constitute Insurance authorization or approval of services.  To determine eligibility, please contact  the members Insurance carrier to verify and review coverage.     If you have medical questions regarding this request for services. Please contact [address] at [phone] during normal business hours.       Authorizing Provider:Jacki Polanco APRN  Authorizing Provider's NPI: [id number]  Order Entered By: Jacki Polanco APRN 4/16/2020 11:15 AM     Electronically signed by: Jacki Polanco APRN 4/16/2020 11:15 AM

## 2020-04-17 ENCOUNTER — READMISSION MANAGEMENT (OUTPATIENT)
Dept: CALL CENTER | Facility: HOSPITAL | Age: 70
End: 2020-04-17

## 2020-04-17 NOTE — OUTREACH NOTE
Prep Survey      Responses   [facility] facility patient discharged from?  [place]   Is LACE score < 7 ?  No   Eligibility  Readm Mgmt   Discharge diagnosis  Atherosclerosis of native arteries of extremitied with rest pain, left leg, s/p Fem-Pop bypass above knee, left common femoral aratery. endarterectomy PTA left iliac arteriogram   Does the patient have one of the following disease processes/diagnoses(primary or secondary)?  General Surgery   Does the patient have Home health ordered?  Yes   [facility]    Is there a DME ordered?  No   Comments regarding appointments  See AVS   Prep survey completed?  Yes          Viji García RN

## 2020-04-17 NOTE — PAYOR COMM NOTE
"Margarita Bowens  [facility]  (P)[phone]  (F)[phone]      auth#[id number]      Filomena Perales (69 y.o. Female)     Date of Birth Social Security Number Address Home Phone MRN    1950  [address] [facility] [address] [phone] [id number]    Jewish Marital Status          Christianity        Admission Date Admission Type Admitting Provider Attending Provider Department, Room/Bed    4/14/20 Elective David Suh MD  [facility] [address] [place], 309/1    Discharge Date Discharge Disposition Discharge Destination        4/16/2020 Home or Self Care              Attending Provider:  (none)   Allergies:  Morphine And Related, Penicillins    Isolation:  None   Infection:  None   Code Status:  Prior    Ht:  157.5 cm (62\")   Wt:  59.1 kg (130 lb 3.2 oz)    Admission Cmt:  None   Principal Problem:  Atherosclerosis of native arteries of extremities with rest pain, left leg (CMS/HCC) [I70.222] More...                 Active Insurance as of 4/14/2020     Primary Coverage     Payor Plan Insurance Group Employer/Plan Group    HUMANA MEDICARE REPLACEMENT HU[employer]NA MEDICARE REPLACEMENT [id number]     Payor Plan Address Payor Plan Phone Number Payor Plan Fax Number Effective Dates    [address] [address] [phone]  10/1/2019 - None Entered    [facility] [address]       Subscriber Name Subscriber Birth Date Member ID       FILOMENA PERALES 1950 [id number]                 Emergency Contacts      (Rel.) Home Phone Work Phone Mobile Phone    MARIA EUGENIA LONG (Sister) [phone] -- [phone]    maia pope (Daughter) -- -- [phone]               Discharge Summary      Jacki Polanco APRN at 04/16/20 1115     Attestation signed by David Suh MD at 04/16/20 1317    I have personally performed face to face diagnostic evaluation of this patient including portions of history and physical exam.      Current management discussed " "in detail with patient.          This document has been electronically signed by David Suh MD on April 16, 2020 13:17  .  .                      CVTS DISCHARGE SUMMARY  Date of Admission: 4/14/2020  6:51 AM  Date of Discharge:  4/16/2020    Admission Diagnosis:   Atherosclerosis of native arteries of extremities with rest pain, left leg (CMS/HCC) [I70.222]    Discharge Diagnosis:   Post-Op Diagnosis Codes:     * Atherosclerosis of native arteries of extremities with rest pain, left leg (CMS/HCC) [I70.222]    Consults:   Consults     No orders found from 3/16/2020 to 4/15/2020.          Procedures Performed  Procedure(s): LEFT  FEMORAL POPLITEAL BYPASS ABOVE KNEE  (POLYTETRAFLUOROETHYLENE)  LEFT COMMON FEMORAL ARTERY ENDARTERECTOMY PTA LEFT ILIAC ARTERIOGRAM        (c-arm#2 and c-arm table)       Discharge Medications     Discharge Medications      New Medications      Instructions Start Date   atorvastatin 20 MG tablet  Commonly known as:  LIPITOR   20 mg, Oral, Nightly      clopidogrel 75 MG tablet  Commonly known as:  PLAVIX   75 mg, Oral, Daily      oxyCODONE-acetaminophen 5-325 MG per tablet  Commonly known as:  Percocet   1-2 tablets, Oral, Every 4 Hours PRN         Changes to Medications      Instructions Start Date   docusate sodium 100 MG capsule  What changed:    · when to take this  · reasons to take this   100 mg, Oral, 2 Times Daily      midodrine 5 MG tablet  Commonly known as:  PROAMATINE  What changed:  when to take this   5 mg, Oral, 3 Times Daily Before Meals         Continue These Medications      Instructions Start Date   albuterol (2.5 MG/3ML) 0.083% nebulizer solution  Commonly known as:  PROVENTIL   2.5 mg, Nebulization, Every 8 Hours PRN      Denavir 1 % cream  Generic drug:  penciclovir   1 application, Topical, As Needed      furosemide 20 MG tablet  Commonly known as:  LASIX   20 mg, Oral, Every Other Day, \"EVERY OTHER DAY\"      gabapentin 800 MG tablet  Commonly known as:  " NEURONTIN   800 mg, Oral, 2 Times Daily      pantoprazole 40 MG EC tablet  Commonly known as:  Protonix   40 mg, Oral, Daily      sertraline 100 MG tablet  Commonly known as:  ZOLOFT   100 mg, Oral, Daily      traZODone 150 MG tablet  Commonly known as:  DESYREL   150 mg, Oral, Nightly      Xarelto 20 MG tablet  Generic drug:  rivaroxaban   20 mg, Oral, Daily           Medical Management: PLAVIX,STATIN Reviewed risks, benefits, and habit forming potential and weaning from narcotic medication. Patient understands and wishes to receive prescription.  Prescription written for Percocet #36 for post-surgical pain after Jhonny placed on file.    Discharge Diet:   Diet Instructions     Diet: Cardiac      Discharge Diet:  Cardiac          Discharge Disposition: Home in stable condition with follow up appointments with CVTS Nurse Practitioner 1 week, Dr. Suh/[place] 2 weeks, Cardiology/PCP as scheduled.     History of Present Illness/Hospital Course:   69 y.o. female with HTN(stable, increased risk stroke, rupture), Hyperlipidemia(stable, increased risk cardiovascular events), Smoker(uncontrolled, increased risk cardiovascular events), COPD(stable, increased risk pulmonary complications) and Atrial fibrillation(stable, increased risk stroke)  Anticoagulation (Xarelto, stable) Hx PE (stable). PVD (LSCA stent 2005, B iliac stents 2008) lost to follow up.  smokes 1 PPD.  Increasing pain LEFT foot, lifestyle limiting. Difficulty ambulating. VAS 10. No TIA stroke amaurosis.  No MI claudication. No other associated signs, symptoms or modifying factors. Presents for vascular evaluation.      2005: LSCA Stent  2008: Bilateral Iliac stents  3/23/2020:  CHELSIE: RIGHT 0.83 Triphasic  LEFT 0.31 Biphasic/Monophasic (DP/PT)     Further findings per Aortogram with runoff demonstrated no endovascular solution for revascularization.  Adequate preop studies were completed and the patient was scheduled electively. Operative day Mona Thomas  "Angella admitted through [facility], taken to operating suite and placed under general anesthesia.  Underwent said procedure without complications or difficulty. They were weaned easily from mechanical ventilation and extubated in the recovery room placed on oxygen per nasal cannula and further transferred to CCU for further care and monitoring. Mona Perales remained hemodynamically and neurovascularly stable immediately postop.  POD1 they were out of the bed to the chair tolerating breakfast pain controlled with Percocet and stable for transfer to 3W telemetry.  Mona Perales continued with ambulation progressing well.  Operative incisions clean, dry, intact. Neurovascular status remained stable for dc home on POD 2.     Vital Signs  Temp:  [97.7 °F (36.5 °C)-98.8 °F (37.1 °C)] 98.3 °F (36.8 °C)  Heart Rate:  [] 75  Resp:  [15-20] 18  BP: (116-180)/(50-84) 118/50      04/14/20  0708 04/14/20  1818 04/16/20  0557   Weight: 55 kg (121 lb 4.1 oz) 55 kg (121 lb 4.1 oz) 59.1 kg (130 lb 3.2 oz)       Pertinent Test Results:  Lab Results   Component Value Date    WBC 14.30 (H) 04/15/2020    HGB 9.1 (L) 04/15/2020    HCT 28.1 (L) 04/15/2020    MCV 86.7 04/15/2020     04/15/2020     Lab Results   Component Value Date    GLUCOSE 118 (H) 04/15/2020    BUN 11 04/15/2020    CREATININE 0.79 04/15/2020    EGFRIFNONA 73 04/15/2020    BCR 13.9 04/15/2020    K 4.8 04/15/2020    CO2 22.0 04/15/2020    CALCIUM 8.2 (L) 04/15/2020    ALBUMIN 4.40 03/02/2020    AST 57 (H) 03/02/2020    ALT 15 03/02/2020       Physical Exam:  Physical Exam   General Appearance:  Comfortable.    Vital signs: (most recent): Blood pressure 118/50, pulse 75, temperature 98.3 °F (36.8 °C), temperature source Oral, resp. rate 18, height 157.5 cm (62\"), weight 59.1 kg (130 lb 3.2 oz), SpO2 96 %, not currently breastfeeding.  Vital signs are normal.  No fever.    Output: Producing urine and producing stool.    HEENT: Normal HEENT exam.    Lungs:  " Normal effort and normal respiratory rate.  Breath sounds clear to auscultation.    Heart: Normal rate.    Abdomen: Abdomen is soft.  Bowel sounds are normal.     Extremities: Decreased range of motion.    Pulses: (Strong DP/PT-LLE)    Neurological: Patient is alert and oriented to person, place and time.    Skin:  Warm and dry.  ((L) AK: CDI, Prineo intact  (L) groin: Provena Intact)    Additional Instructions for the Follow-ups that You Need to Schedule     Discharge Follow-up with Specialty: Vascular Surgery; 1 Week   As directed      Specialty:  Vascular Surgery    Follow Up:  1 Week    Follow Up Details:  Jacki Polanco APRN 4/23/2020 1:00         Referral to Home Health   As directed      Face to Face Visit Date:  4/16/2020    Follow-up provider for Plan of Care?:  I will be treating the patient on an ongoing basis.  Please send me the Plan of Care for signature.    Follow-up provider:  SEBASTIAN REYEZ [[id number]]    Reason/Clinical Findings:  post op    Describe mobility limitations that make leaving home difficult:  surgical restrictions    Nursing/Therapeutic Services Requested:  Skilled Nursing    Skilled nursing orders:  Cardiopulmonary assessments Neurovascular assessments    Frequency:  1 Week 1               Discharge Instructions: Discharge instructions include no heavy lifting anything greater than 10lbs for approximately 4 weeks.  No sex or driving for 2-4 weeks. Printed information given to the patient with advancement of activities weekly.  Risks and benefits of narcotic medications and weaning postoperatively have been discussed. Clean operative site with antibacterial soap/water, pat dry. Keep open to air unless draining, then may apply dry dressing.  No ointments or creams unless prescribed by provider. Signs and symptoms of infection including drainage from operative site, redness, swelling, with associated fever and/or chills notify Heart and Vascular center immediately for wound check. If  signs and symptoms of ischemia should occur including but not limited to pale/blue discoloration of limb, increasing pain with ambulation or at rest, or a non-healing wound. Patient is to notify Heart and Vascular center for immediate evaluation.  Patient verbalizes understanding of discharge instructions, all questions are answered, follow up appointments have been made, they are discharged home in stable condition.           Follow-up Appointments  No future appointments.    Test Results Pending at Discharge             This document has been electronically signed by DELFINO Garcia on April 16, 2020 11:16      Time: Discharge 60 min      Electronically signed by David Reyez MD at 04/16/20 1317       Discharge Order (From admission, onward)     Start     Ordered    04/16/20 1111  Discharge patient  Once     Expected Discharge Date:  04/16/20    Discharge Disposition:  Home or Self Care    Physician of Record for Attribution - Please select from Treatment Team:  DAVID REYEZ [[id number]]    Review needed by CMO to determine Physician of Record:  No       Question Answer Comment   Physician of Record for Attribution - Please select from Treatment Team DAVID REYEZ    Review needed by CMO to determine Physician of Record No        04/16/20 7972

## 2020-04-20 ENCOUNTER — READMISSION MANAGEMENT (OUTPATIENT)
Dept: CALL CENTER | Facility: HOSPITAL | Age: 70
End: 2020-04-20

## 2020-04-20 NOTE — OUTREACH NOTE
General Surgery Week 1 Survey      Responses   [facility] patient discharged from?  [facility]   Does the patient have one of the following disease processes/diagnoses(primary or secondary)?  General Surgery   Is there a successful TCM telephone encounter documented?  No   Week 1 attempt successful?  Yes   Call start time  1124   Call end time  1133   Discharge diagnosis  Atherosclerosis of native arteries of extremitied with rest pain, left leg, s/p Fem-Pop bypass above knee, left common femoral aratery. endarterectomy PTA left iliac arteriogram   Meds reviewed with patient/caregiver?  Yes   Is the patient having any side effects they believe may be caused by any medication additions or changes?  No   Is the patient taking all medications as directed (includes completed medication regime)?  Yes   Does the patient have a follow up appointment scheduled with their surgeon?  Yes   Comments  Pt will have appointment on April 23, 2020.   What is the Home health agency?   HH continues to visit.   Additional teach back comments  Pt reports doing well but does state she has some lower extremity edema but will make surgeon aware.   Week 1 call completed?  Yes          Navya Hernandez RN

## 2020-04-23 ENCOUNTER — OFFICE VISIT (OUTPATIENT)
Dept: CARDIAC SURGERY | Facility: CLINIC | Age: 70
End: 2020-04-23

## 2020-04-23 VITALS
DIASTOLIC BLOOD PRESSURE: 64 MMHG | HEIGHT: 62 IN | WEIGHT: 126 LBS | HEART RATE: 100 BPM | SYSTOLIC BLOOD PRESSURE: 112 MMHG | BODY MASS INDEX: 23.19 KG/M2 | OXYGEN SATURATION: 94 %

## 2020-04-23 DIAGNOSIS — Z09 FOLLOW-UP SURGERY CARE: Primary | ICD-10-CM

## 2020-04-23 DIAGNOSIS — I73.9 PVD (PERIPHERAL VASCULAR DISEASE) WITH CLAUDICATION (HCC): ICD-10-CM

## 2020-04-23 DIAGNOSIS — I70.222 ATHEROSCLEROSIS OF NATIVE ARTERIES OF EXTREMITIES WITH REST PAIN, LEFT LEG (HCC): ICD-10-CM

## 2020-04-23 DIAGNOSIS — E78.00 HYPERCHOLESTEREMIA: ICD-10-CM

## 2020-04-23 DIAGNOSIS — Z79.01 CURRENT USE OF LONG TERM ANTICOAGULATION: ICD-10-CM

## 2020-04-23 DIAGNOSIS — Z72.0 NICOTINE ABUSE: ICD-10-CM

## 2020-04-23 PROCEDURE — 99024 POSTOP FOLLOW-UP VISIT: CPT | Performed by: NURSE PRACTITIONER

## 2020-04-23 RX ORDER — CLOPIDOGREL BISULFATE 75 MG/1
75 TABLET ORAL DAILY
Qty: 30 TABLET | Refills: 11 | Status: SHIPPED | OUTPATIENT
Start: 2020-04-23 | End: 2021-07-26 | Stop reason: SDUPTHER

## 2020-04-23 RX ORDER — FUROSEMIDE 20 MG/1
20 TABLET ORAL DAILY
Start: 2020-04-23 | End: 2020-06-12 | Stop reason: SDUPTHER

## 2020-04-23 RX ORDER — OXYCODONE HYDROCHLORIDE AND ACETAMINOPHEN 5; 325 MG/1; MG/1
1-2 TABLET ORAL EVERY 4 HOURS PRN
Qty: 36 TABLET | Refills: 0 | Status: SHIPPED | OUTPATIENT
Start: 2020-04-23 | End: 2020-05-01 | Stop reason: SDUPTHER

## 2020-04-23 NOTE — PATIENT INSTRUCTIONS
Stable Post Op LEFT Fem-Pop Bypass:  Progressing well  Signs and symptoms of infection including drainage from operative site, redness, swelling, with associated fever and/or chills notify Heart and Vascular center immediately for wound check.    Keep LEFT leg elevated when sitting    Medical Management PLAVIX,STATIN  Signs and symptoms of infection including drainage from operative site, redness, swelling, with associated fever and/or chills notify Heart and Vascular center immediately for wound check.    Return 4 weeks- CHELSIE, Graft Survey    Smoking cessation advised.  Tobacco increases risk of expanding AAA, MI, CVA, PAD, carcinoma.

## 2020-04-24 NOTE — PROGRESS NOTES
CVTS Office Progress Note       Subjective   Patient ID: Mona Perales is a 69 y.o. female is here today for follow-up.    Chief Complaint:    Chief Complaint   Patient presents with   • Peripheral Vascular Disease     f/u LEFT fem-pop 4/14/2020   • Wound Check       The following portions of the patient's history were reviewed and updated as appropriate: allergies, current medications, past family history, past medical history, past social history, past surgical history and problem list.  Recent images independently reviewed.  Available laboratory values reviewed.    PCP:  Lisa Alejandro APRN  Cardiology:  Kimmy     69 y.o. female with HTN(stable, increased risk stroke, rupture), Hyperlipidemia(stable, increased risk cardiovascular events), Smoker(uncontrolled, increased risk cardiovascular events), COPD(stable, increased risk pulmonary complications) and Atrial fibrillation(stable, increased risk stroke)  Anticoagulation (Xarelto, stable) Hx PE (stable). PVD (LSCA stent 2005, B iliac stents 2008) lost to follow up.  smokes 1 PPD.  Increasing pain LEFT foot, lifestyle limiting. Difficulty ambulating. VAS 10. No TIA stroke amaurosis.  No MI claudication. No other associated signs, symptoms or modifying factors. Presents for surgical follow up. Progressing well.     2005: LSCA Stent  2008: Bilateral Iliac stents  3/23/2020:  CHELSIE: RIGHT 0.83 Triphasic  LEFT 0.31 Biphasic/Monophasic (DP/PT)  3/26/2020: CTA Runoff: Occlusion of the proximal left superficial femoral artery with  reconstitution of the distal left SFA. The popliteal artery  appears occluded distally, with reconstitution of the arteries of  the trifurcation. The left calf vessels are patent, as above.Patent left common iliac and proximal left external iliac stents. There appears to be mild luminal narrowing within the  left external iliac stent. Stenosis of the origins of the superior mesenteric artery and  bilateral renal arteries as above.  Irregular narrowing of the right superficial femoral artery,  as described above without occlusion. The right popliteal artery  and the right calf arteries are patent, as above.  4/14/2020: LEFT Femoral-popliteal Bypass PTFE    2016: Carotid Duplex: BABS 0-49% LICA 0-49%  2017: Carotid duplex: BABS 0-49% LICA 0-49%  2018: Carotid duplex: BABS 0-49% LICA 0-49%      Past Medical History:   Diagnosis Date   • A-fib (CMS/[facility])    • Anxiety    • Arthritis    • Asthma    • 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 with ulceration (CMS/[facility])     bilateral legs - bilateral iliac stents 2008      • CHF (congestive heart failure) (CMS/[facility])    • Chronic lower back pain    • COPD (chronic obstructive pulmonary disease) (CMS/[facility])    • Essential (primary) hypertension    • GERD (gastroesophageal reflux disease)    • History of pulmonary embolus (PE)    • Hyperlipidemia    • Insomnia    • Lumbago    • Nicotine dependence    • Occlusion of artery      and stenosis of bilateral carotid arteries - BABS 16-49%, LICA 0-15%   • Other atherosclerosis of native artery of other extremity     LEFT subclavian stent 2005 (occluded)   • Sleep apnea      Past Surgical History:   Procedure Laterality Date   • CARDIAC CATHETERIZATION  04/06/2016    No evidence of any obstructive epicardial CAD.Preserved LV systolic function with EF of 55%.   • CENTRAL VENOUS LINE INSERTION  04/07/2016    Successful placement of right uppe extremity 6-[place] triple lumen PICC line.   • ENDOSCOPY N/A 1/12/2018    Procedure: ESOPHAGOGASTRODUODENOSCOPY;  Surgeon: Bin Oh MD;  Location: [facility] ENDOSCOPY;  Service:    • ENDOSCOPY N/A 1/17/2018    Procedure: ESOPHAGOGASTRODUODENOSCOPY;  Surgeon: Bin Oh MD;  Location: [facility] ENDOSCOPY;  Service:    • ENDOSCOPY N/A 3/16/2018    Procedure: ESOPHAGOGASTRODUODENOSCOPY possible dilation;  Surgeon: Bin Oh MD;  Location: [facility] ENDOSCOPY;  Service: Gastroenterology   • FEMORAL POPLITEAL  BYPASS Left 2020    Procedure: FEMORAL POPLITEAL BYPASS ABOVE KNEE  (POLYTETRAFLUOROETHYLENE)  LEFT COMMON FEMORAL ARTERY ENDARTERECTOMY PTA LEFT ILIAC ARTERIOGRAM        (c-arm#2 and c-arm table);  Surgeon: David Suh MD;  Location: [facility];  Service: Vascular;  Laterality: Left;   • HYSTERECTOMY     • KYPHOPLASTY N/A 2019    Procedure: KYPHOPLASTY LUMBAR FOUR;  Surgeon: Aba Santa MD;  Location: [facility];  Service: Orthopedic Spine   • TOTAL SHOULDER REPLACEMENT Left    • TRANSESOPHAGEAL ECHOCARDIOGRAM (JACQUES)  2016    With color flow-Mild to moderate [facility].LV systolic function well preserved with Ef of 55-60%.Mitral and AV intact.Diastolic dysfunction   • UPPER GASTROINTESTINAL ENDOSCOPY  2018    Dr. Bin Martin M.D.     Family History   Problem Relation Age of Onset   • Heart disease Other    • Hypertension Other      Social History     Tobacco Use   • Smoking status: Current Every Day Smoker     Packs/day: 1.00     Years: 50.00     Pack years: 50.00     Types: Cigarettes     Last attempt to quit: 2019     Years since quittin.1   • Smokeless tobacco: Never Used   Substance Use Topics   • Alcohol use: No   • Drug use: No       ALLERGIES:   Morphine and related and Penicillins    MEDICATIONS:      Current Outpatient Medications:   •  albuterol (PROVENTIL) (2.5 MG/3ML) 0.083% nebulizer solution, Take 2.5 mg by nebulization Every 8 (Eight) Hours As Needed for Wheezing., Disp: , Rfl:   •  atorvastatin (LIPITOR) 20 MG tablet, Take 1 tablet by mouth Every Night., Disp: 30 tablet, Rfl: 0  •  docusate sodium 100 MG capsule, Take 100 mg by mouth 2 (Two) Times a Day. (Patient taking differently: Take 100 mg by mouth Daily As Needed.), Disp: , Rfl:   •  furosemide (LASIX) 20 MG tablet, Take 1 tablet by mouth Daily., Disp: , Rfl:   •  gabapentin (NEURONTIN) 800 MG tablet, Take 800 mg by mouth 2 (Two) Times a Day., Disp: , Rfl:   •  midodrine (PROAMATINE) 5 MG  tablet, Take 1 tablet by mouth 3 (Three) Times a Day Before Meals. (Patient taking differently: Take 5 mg by mouth Daily.), Disp: 90 tablet, Rfl: 0  •  oxyCODONE-acetaminophen (Percocet) 5-325 MG per tablet, Take 1-2 tablets by mouth Every 4 (Four) Hours As Needed for Severe Pain (surgical pain)., Disp: 36 tablet, Rfl: 0  •  pantoprazole (PROTONIX) 40 MG EC tablet, Take 1 tablet by mouth Daily., Disp: 30 tablet, Rfl: 5  •  penciclovir (DENAVIR) 1 % cream, Apply 1 application topically to the appropriate area as directed As Needed (fever blisters)., Disp: , Rfl:   •  sertraline (ZOLOFT) 100 MG tablet, Take 100 mg by mouth Daily., Disp: , Rfl:   •  traZODone (DESYREL) 150 MG tablet, Take 1 tablet by mouth Every Night., Disp: , Rfl:   •  XARELTO 20 MG tablet, TAKE 1 TABLET BY MOUTH DAILY., Disp: 30 tablet, Rfl: 6  •  clopidogrel (PLAVIX) 75 MG tablet, Take 1 tablet by mouth Daily., Disp: 30 tablet, Rfl: 11    Review of Systems   Constitution: Positive for malaise/fatigue.   HENT: Negative for nosebleeds.    Eyes: Negative for visual disturbance.   Cardiovascular: Positive for claudication and near-syncope. Negative for cyanosis and leg swelling.   Respiratory: Negative for hemoptysis and shortness of breath.    Hematologic/Lymphatic: Negative for bleeding problem. Does not bruise/bleed easily.   Skin: Positive for color change (LEFT foot dependent rubar). Negative for nail changes.   Musculoskeletal: Positive for back pain. Negative for muscle weakness.   Gastrointestinal: Negative for dysphagia, hematemesis and melena.   Genitourinary: Negative for hematuria.   Neurological: Negative for dizziness, focal weakness, light-headedness, loss of balance, numbness, paresthesias and weakness.   Psychiatric/Behavioral: Negative for altered mental status.   All other systems reviewed and are negative.       Objective   Vitals:    04/23/20 1303   BP: 112/64   BP Location: Left arm   Pulse: 100   SpO2: 94%   Weight: 57.2 kg (126  "lb)   Height: 157.5 cm (62\")     Body mass index is 23.05 kg/m².  Physical Exam   Constitutional: She is oriented to person, place, and time. She appears well-nourished.   HENT:   Head: Atraumatic.   Eyes: EOM are normal.   Neck: Neck supple. Carotid bruit is not present.   Cardiovascular: Normal heart sounds.   Pulses:       Dorsalis pedis pulses are 2+ on the right side, and 2+ on the left side.        Posterior tibial pulses are 2+ on the right side, and 2+ on the left side.   Pulmonary/Chest: Effort normal and breath sounds normal.   Abdominal: Soft. Bowel sounds are normal.   Musculoskeletal: Normal range of motion. She exhibits no edema.   Gait normal   Neurological: She is alert and oriented to person, place, and time.   Skin: Skin is warm and intact. Capillary refill takes less than 2 seconds.   Incisions CDI, well approximated.   Psychiatric: She has a normal mood and affect. Thought content normal.   Vitals reviewed.    Lab Results   Component Value Date    GLUCOSE 118 (H) 04/15/2020    BUN 11 04/15/2020    CREATININE 0.79 04/15/2020    EGFRIFNONA 73 04/15/2020    BCR 13.9 04/15/2020    K 4.8 04/15/2020    CO2 22.0 04/15/2020    CALCIUM 8.2 (L) 04/15/2020    ALBUMIN 4.40 03/02/2020    AST 57 (H) 03/02/2020    ALT 15 03/02/2020     Lab Results   Component Value Date    WBC 14.30 (H) 04/15/2020    HGB 9.1 (L) 04/15/2020    HCT 28.1 (L) 04/15/2020    MCV 86.7 04/15/2020     04/15/2020         Assessment & Plan     Independent Review of Radiographic Studies:  Detailed discussion regarding risks, benefits, and treatment plan. Images independently reviewed. Patient understands, agrees, and wishes to proceed with plan.     1. Atherosclerosis of native arteries of extremities with rest pain, left leg (CMS/HCC)  Medical Management PLAVIX,STATIN  Signs and symptoms of infection including drainage from operative site, redness, swelling, with associated fever and/or chills notify Heart and Vascular center " immediately for wound check.    Return 4 weeks- CHELSIE, Graft Survey  - Doppler Ankle Brachial Index Single Level CAR; Future  - Duplex Lower Extremity Art / Grafts - Left CAR; Future  - clopidogrel (PLAVIX) 75 MG tablet; Take 1 tablet by mouth Daily.  Dispense: 30 tablet; Refill: 11    2. Follow-up surgery care  Stable Post Op LEFT Fem-Pop Bypass:  Progressing well  Signs and symptoms of infection including drainage from operative site, redness, swelling, with associated fever and/or chills notify Heart and Vascular center immediately for wound check.    Keep LEFT leg elevated when sitting  Office will call 1 week for telephone visit.  Reviewed risks, benefits, and habit forming potential and weaning from narcotic medication. Patient understands and wishes to receive prescription.  Weaning protocol discussed. - oxyCODONE-acetaminophen (Percocet) 5-325 MG per tablet; Take 1-2 tablets by mouth Every 4 (Four) Hours As Needed for Severe Pain (surgical pain).  Dispense: 36 tablet; Refill: 0  - furosemide (LASIX) 20 MG tablet; Take 1 tablet by mouth Daily.    3. PVD (peripheral vascular disease) with claudication (CMS/HCC)      4. Nicotine abuse  Smoking cessation assistance options offered including behavioral counseling (Smoking Cessation Classes), Nicotine replacement therapy (patches or gum), pharmacologic therapy (Chantix, Wellbutrin). Understands tobacco increases risk of expanding AAA, MI, CVA, PAD, carcinoma. Discussion and question answer period 5-7 minutes.     5. Hypercholesteremia  Lipid-lowering therapy has been proven beneficial in patients with cardio-vascular disease. Current guidelines recommend statin treatment for all patients with PAD,CAD and carotid stenosis. Statins are beneficial in preventing cardiovascular events, increasing functional capacity and lower the risk of adverse limb loss in PAD. Statins decrease the progression of plaque formation and may improve peripheral vessel lining, and aid in  reversing atherosclerosis.       6. Current use of long term anticoagulation  Notify provider if you experience excessive bleeding from the nose, cuts, gums, rectum, urinary tract, or vagina. Reddish or brown urine or stool. Vomiting of blood or hemorrhoidal bleeding. If major injury occurs present to the Emergency Department. Xarelto.            This document has been electronically signed by DELFINO Garcia on April 24, 2020 14:21

## 2020-04-27 ENCOUNTER — READMISSION MANAGEMENT (OUTPATIENT)
Dept: CALL CENTER | Facility: HOSPITAL | Age: 70
End: 2020-04-27

## 2020-04-27 NOTE — OUTREACH NOTE
General Surgery Week 2 Survey      Responses   [facility] patient discharged from?  [place]   Does the patient have one of the following disease processes/diagnoses(primary or secondary)?  General Surgery   Week 2 attempt successful?  Yes   Call start time  1608   Call end time  1610   Discharge diagnosis  Atherosclerosis of native arteries of extremitied with rest pain, left leg, s/p Fem-Pop bypass above knee, left common femoral aratery. endarterectomy PTA left iliac arteriogram   Meds reviewed with patient/caregiver?  Yes   Is the patient taking all medications as directed (includes completed medication regime)?  Yes   Has the patient kept scheduled appointments due by today?  Yes   What is the Home health agency?   HH continues to visit.   Home health comments  HH still coming in   Psychosocial issues?  No   What is the patient's perception of their health status since discharge?  Improving   Is the patient/caregiver able to teach back signs and symptoms of incisional infection?  Increased redness, swelling or pain at the incisonal site, Increased drainage or bleeding, Incisional warmth, Pus or odor from incision, Fever   Is the patient/caregiver able to teach back the hierarchy of who to call/visit for symptoms/problems? PCP, Specialist, Home health nurse, Urgent Care, ED, 911  Yes   Additional teach back comments  Doing well and not having much discomfort   Week 2 call completed?  Yes          Laura Syed RN

## 2020-05-01 ENCOUNTER — OFFICE VISIT (OUTPATIENT)
Dept: CARDIAC SURGERY | Facility: CLINIC | Age: 70
End: 2020-05-01

## 2020-05-01 DIAGNOSIS — Z09 FOLLOW-UP SURGERY CARE: ICD-10-CM

## 2020-05-01 DIAGNOSIS — I73.9 PVD (PERIPHERAL VASCULAR DISEASE) WITH CLAUDICATION (HCC): Primary | ICD-10-CM

## 2020-05-01 PROCEDURE — 99024 POSTOP FOLLOW-UP VISIT: CPT | Performed by: NURSE PRACTITIONER

## 2020-05-01 RX ORDER — OXYCODONE HYDROCHLORIDE AND ACETAMINOPHEN 5; 325 MG/1; MG/1
1-2 TABLET ORAL EVERY 4 HOURS PRN
Qty: 36 TABLET | Refills: 0 | Status: SHIPPED | OUTPATIENT
Start: 2020-05-01 | End: 2020-05-22

## 2020-05-01 NOTE — PROGRESS NOTES
Telephone visit due to COVID-19 precautions. Patient reports progressing well with daily activities. No reported problems with incision(s). Reports clean/dry/intact. Surgical pain continues requests refill for percocet. Will plan follow up as scheduled with vascular testing. Instructed to notify office if any problems arise and will be promptly seen in clinic. Patient verbalized understanding.     Reviewed risks, benefits, and habit forming potential and weaning from narcotic medication. Patient understands and wishes to receive prescription.  Prescription written for Percocet #36 for post-surgical pain after Jhonny placed on file. Will need to transition off narcotics with this prescriptions.     You have chosen to receive care through a telephone visit. Do you consent to use a telephone visit for your medical care today? Yes    Time: 10 minutes    1. Follow-up surgery care  - oxyCODONE-acetaminophen (Percocet) 5-325 MG per tablet; Take 1-2 tablets by mouth Every 4 (Four) Hours As Needed for Severe Pain (surgical pain).  Dispense: 36 tablet; Refill: 0    2. PVD (peripheral vascular disease) with claudication (CMS/[facility])  Keep scheduled follow up with vascular testing.             This document has been electronically signed by DELFINO Garcia on May 1, 2020 13:15

## 2020-05-04 ENCOUNTER — READMISSION MANAGEMENT (OUTPATIENT)
Dept: CALL CENTER | Facility: HOSPITAL | Age: 70
End: 2020-05-04

## 2020-05-04 NOTE — OUTREACH NOTE
General Surgery Week 3 Survey      Responses   [facility] patient discharged from?  [place]   Does the patient have one of the following disease processes/diagnoses(primary or secondary)?  General Surgery   Week 3 attempt successful?  Yes   Call start time  1659   Call end time  1700   Discharge diagnosis  Atherosclerosis of native arteries of extremitied with rest pain, left leg, s/p Fem-Pop bypass above knee, left common femoral aratery. endarterectomy PTA left iliac arteriogram   Meds reviewed with patient/caregiver?  Yes   Is the patient having any side effects they believe may be caused by any medication additions or changes?  No   Does the patient have all medications related to this admission filled (includes all antibiotics, pain medications, etc.)  Yes   Is the patient taking all medications as directed (includes completed medication regime)?  Yes   Does the patient have a follow up appointment scheduled with their surgeon?  Yes   Has the patient kept scheduled appointments due by today?  Yes   Psychosocial issues?  No   Did the patient receive a copy of their discharge instructions?  Yes   What is the patient's perception of their health status since discharge?  Improving   Nursing interventions  Nurse provided patient education   Is the patient /caregiver able to teach back basic post-op care?  Lifting as instructed by MD in discharge instructions, Do not remove steri-strips, Keep incision areas clean,dry and protected, No tub bath, swimming, or hot tub until instructed by MD, Take showers only when approved by MD-sponge bathe until then, Drive as instructed by MD in discharge instructions   Is the patient/caregiver able to teach back signs and symptoms of incisional infection?  Increased redness, swelling or pain at the incisonal site, Fever, Incisional warmth, Increased drainage or bleeding, Pus or odor from incision   Is the patient/caregiver able to teach back steps to recovery at home?   Set small, achievable goals for return to baseline health, Rest and rebuild strength, gradually increase activity, Make a list of questions for surgeon's appointment   Is the patient/caregiver able to teach back the hierarchy of who to call/visit for symptoms/problems? PCP, Specialist, Home health nurse, Urgent Care, ED, 911  Yes   Additional teach back comments  Doing well and not having much discomfort   Week 3 call completed?  Yes          Jimmie Julio RN

## 2020-05-13 ENCOUNTER — READMISSION MANAGEMENT (OUTPATIENT)
Dept: CALL CENTER | Facility: HOSPITAL | Age: 70
End: 2020-05-13

## 2020-05-13 NOTE — OUTREACH NOTE
General Surgery Week 4 Survey      Responses   [facility] patient discharged from?  [place]   Does the patient have one of the following disease processes/diagnoses(primary or secondary)?  General Surgery   Week 4 attempt successful?  Yes   Call start time  1621   Call end time  1622   Discharge diagnosis  Atherosclerosis of native arteries of extremitied with rest pain, left leg, s/p Fem-Pop bypass above knee, left common femoral aratery. endarterectomy PTA left iliac arteriogram   Is the patient taking all medications as directed (includes completed medication regime)?  Yes   Has the patient kept scheduled appointments due by today?  Yes   Is the patient still receiving Home Health Services?  Yes   What is the patient's perception of their health status since discharge?  Improving   Is the patient/caregiver able to teach back steps to recovery at home?  Set small, achievable goals for return to baseline health, Rest and rebuild strength, gradually increase activity   Week 4 call completed?  Yes   Would the patient like one additional call?  No   Graduated  Yes   Did the patient feel the follow up calls were helpful during their recovery period?  Yes   Was the number of calls appropriate?  Yes          Geetha Short RN

## 2020-05-22 ENCOUNTER — OFFICE VISIT (OUTPATIENT)
Dept: CARDIAC SURGERY | Facility: CLINIC | Age: 70
End: 2020-05-22

## 2020-05-22 VITALS
OXYGEN SATURATION: 97 % | HEART RATE: 80 BPM | HEIGHT: 62 IN | WEIGHT: 131 LBS | DIASTOLIC BLOOD PRESSURE: 68 MMHG | BODY MASS INDEX: 24.11 KG/M2 | SYSTOLIC BLOOD PRESSURE: 126 MMHG | TEMPERATURE: 97.7 F

## 2020-05-22 DIAGNOSIS — E78.00 HYPERCHOLESTEREMIA: ICD-10-CM

## 2020-05-22 DIAGNOSIS — I25.10 ATHEROSCLEROSIS OF NATIVE CORONARY ARTERY OF NATIVE HEART WITHOUT ANGINA PECTORIS: ICD-10-CM

## 2020-05-22 DIAGNOSIS — Z72.0 NICOTINE ABUSE: ICD-10-CM

## 2020-05-22 DIAGNOSIS — Z79.01 CURRENT USE OF LONG TERM ANTICOAGULATION: ICD-10-CM

## 2020-05-22 DIAGNOSIS — I73.9 PERIPHERAL VASCULAR DISEASE (HCC): ICD-10-CM

## 2020-05-22 DIAGNOSIS — Z09 FOLLOW-UP SURGERY CARE: Primary | ICD-10-CM

## 2020-05-22 PROCEDURE — 99024 POSTOP FOLLOW-UP VISIT: CPT | Performed by: NURSE PRACTITIONER

## 2020-05-22 RX ORDER — ATORVASTATIN CALCIUM 20 MG/1
20 TABLET, FILM COATED ORAL NIGHTLY
Qty: 30 TABLET | Refills: 11 | Status: SHIPPED | OUTPATIENT
Start: 2020-05-22 | End: 2021-08-25

## 2020-05-22 RX ORDER — CYCLOBENZAPRINE HCL 5 MG
5 TABLET ORAL 3 TIMES DAILY PRN
Qty: 90 TABLET | Refills: 2 | Status: SHIPPED | OUTPATIENT
Start: 2020-05-22 | End: 2021-08-25

## 2020-05-22 RX ORDER — AMITRIPTYLINE HYDROCHLORIDE 25 MG/1
25 TABLET, FILM COATED ORAL NIGHTLY
COMMUNITY
Start: 2020-05-08

## 2020-05-22 NOTE — PATIENT INSTRUCTIONS
mild reduction Arterial Flow left Lower Extremity improved   Patent LEFT graft  Medical Management: XARELTO,PLAVIX,STATIN   If signs and symptoms of ischemia should occur including but not limited to pale/blue discoloration of limb, increasing pain with ambulation or at rest, or a non-healing wound. Patient is to notify Heart and Vascular center for immediate evaluation.   Return 3 mos    Smoking cessation advised.  Tobacco increases risk of expanding AAA, MI, CVA, PAD, carcinoma.

## 2020-05-27 NOTE — PROGRESS NOTES
CVTS Office Progress Note       Subjective   Patient ID: Mona Perales is a 69 y.o. female is here today for follow-up.    Chief Complaint:    Chief Complaint   Patient presents with   • Peripheral Vascular Disease       The following portions of the patient's history were reviewed and updated as appropriate: allergies, current medications, past family history, past medical history, past social history, past surgical history and problem list.  Recent images independently reviewed.  Available laboratory values reviewed.    PCP:  Lisa Alejandro APRN  Cardiology:  Kimmy     69 y.o. female with HTN(stable, increased risk stroke, rupture), Hyperlipidemia(stable, increased risk cardiovascular events), Smoker(uncontrolled, increased risk cardiovascular events), COPD(stable, increased risk pulmonary complications) and Atrial fibrillation(stable, increased risk stroke)  Anticoagulation (Xarelto, stable) Hx PE (stable). PVD (LSCA stent 2005, B iliac stents 2008) lost to follow up.  smokes 1 PPD.  Increasing pain LEFT foot, lifestyle limiting. Difficulty ambulating. VAS 10. No TIA stroke amaurosis.  No MI claudication. No other associated signs, symptoms or modifying factors. Presents for surgical follow up. Progressing well.     2005: LSCA Stent  2008: Bilateral Iliac stents  3/23/2020:  CHELSIE: RIGHT 0.83 Triphasic  LEFT 0.31 Biphasic/Monophasic (DP/PT)  3/26/2020: CTA Runoff: Occlusion of the proximal left superficial femoral artery with  reconstitution of the distal left SFA. The popliteal artery  appears occluded distally, with reconstitution of the arteries of  the trifurcation. The left calf vessels are patent, as above.Patent left common iliac and proximal left external iliac stents. There appears to be mild luminal narrowing within the  left external iliac stent. Stenosis of the origins of the superior mesenteric artery and  bilateral renal arteries as above. Irregular narrowing of the right superficial femoral  artery,  as described above without occlusion. The right popliteal artery  and the right calf arteries are patent, as above.  4/14/2020: LEFT Femoral-popliteal Bypass PTFE  5/22/2020:  CHELSIE: RIGHT 1.1 Triphasic LEFT 0.79 Tri/Biphasic (PT/DP)   Patent LEFT graft. mPSV 237cm/s (native) Triphasic    2016: Carotid Duplex: BABS 0-49% LICA 0-49%  2017: Carotid duplex: BABS 0-49% LICA 0-49%  2018: Carotid duplex: BABS 0-49% LICA 0-49%      Past Medical History:   Diagnosis Date   • A-fib (CMS/[facility])    • Anxiety    • Arthritis    • Asthma    • 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 with ulceration (CMS/[facility])     bilateral legs - bilateral iliac stents 2008      • CHF (congestive heart failure) (CMS/[facility])    • Chronic lower back pain    • COPD (chronic obstructive pulmonary disease) (CMS/[facility])    • Essential (primary) hypertension    • GERD (gastroesophageal reflux disease)    • History of pulmonary embolus (PE)    • Hyperlipidemia    • Insomnia    • Lumbago    • Nicotine dependence    • Occlusion of artery      and stenosis of bilateral carotid arteries - BABS 16-49%, LICA 0-15%   • Other atherosclerosis of native artery of other extremity     LEFT subclavian stent 2005 (occluded)   • Sleep apnea      Past Surgical History:   Procedure Laterality Date   • CARDIAC CATHETERIZATION  04/06/2016    No evidence of any obstructive epicardial CAD.Preserved LV systolic function with EF of 55%.   • CENTRAL VENOUS LINE INSERTION  04/07/2016    Successful placement of right uppe extremity 6-[place] triple lumen PICC line.   • ENDOSCOPY N/A 1/12/2018    Procedure: ESOPHAGOGASTRODUODENOSCOPY;  Surgeon: Bin Oh MD;  Location: [facility] ENDOSCOPY;  Service:    • ENDOSCOPY N/A 1/17/2018    Procedure: ESOPHAGOGASTRODUODENOSCOPY;  Surgeon: Bin Oh MD;  Location: [facility] ENDOSCOPY;  Service:    • ENDOSCOPY N/A 3/16/2018    Procedure: ESOPHAGOGASTRODUODENOSCOPY possible dilation;  Surgeon: Bin Oh MD;  Location:   [facility] ENDOSCOPY;  Service: Gastroenterology   • FEMORAL POPLITEAL BYPASS Left 2020    Procedure: FEMORAL POPLITEAL BYPASS ABOVE KNEE  (POLYTETRAFLUOROETHYLENE)  LEFT COMMON FEMORAL ARTERY ENDARTERECTOMY PTA LEFT ILIAC ARTERIOGRAM        (c-arm#2 and c-arm table);  Surgeon: David Suh MD;  Location: [facility];  Service: Vascular;  Laterality: Left;   • HYSTERECTOMY     • KYPHOPLASTY N/A 2019    Procedure: KYPHOPLASTY LUMBAR FOUR;  Surgeon: Aba Santa MD;  Location: [facility];  Service: Orthopedic Spine   • TOTAL SHOULDER REPLACEMENT Left    • TRANSESOPHAGEAL ECHOCARDIOGRAM (JACQUES)  2016    With color flow-Mild to moderate [facility].LV systolic function well preserved with Ef of 55-60%.Mitral and AV intact.Diastolic dysfunction   • UPPER GASTROINTESTINAL ENDOSCOPY  2018    Dr. Bin Martin M.D.     Family History   Problem Relation Age of Onset   • Heart disease Other    • Hypertension Other      Social History     Tobacco Use   • Smoking status: Current Every Day Smoker     Packs/day: 1.00     Years: 50.00     Pack years: 50.00     Types: Cigarettes     Last attempt to quit: 2019     Years since quittin.2   • Smokeless tobacco: Never Used   Substance Use Topics   • Alcohol use: No   • Drug use: No       ALLERGIES:   Morphine and related and Penicillins    MEDICATIONS:      Current Outpatient Medications:   •  albuterol (PROVENTIL) (2.5 MG/3ML) 0.083% nebulizer solution, Take 2.5 mg by nebulization Every 8 (Eight) Hours As Needed for Wheezing., Disp: , Rfl:   •  atorvastatin (LIPITOR) 20 MG tablet, Take 1 tablet by mouth Every Night., Disp: 30 tablet, Rfl: 11  •  clopidogrel (PLAVIX) 75 MG tablet, Take 1 tablet by mouth Daily., Disp: 30 tablet, Rfl: 11  •  docusate sodium 100 MG capsule, Take 100 mg by mouth 2 (Two) Times a Day. (Patient taking differently: Take 100 mg by mouth Daily As Needed.), Disp: , Rfl:   •  furosemide (LASIX) 20 MG tablet, Take 1 tablet by  mouth Daily., Disp: , Rfl:   •  gabapentin (NEURONTIN) 800 MG tablet, Take 800 mg by mouth 2 (Two) Times a Day., Disp: , Rfl:   •  midodrine (PROAMATINE) 5 MG tablet, Take 1 tablet by mouth 3 (Three) Times a Day Before Meals. (Patient taking differently: Take 5 mg by mouth Daily.), Disp: 90 tablet, Rfl: 0  •  pantoprazole (PROTONIX) 40 MG EC tablet, Take 1 tablet by mouth Daily., Disp: 30 tablet, Rfl: 5  •  penciclovir (DENAVIR) 1 % cream, Apply 1 application topically to the appropriate area as directed As Needed (fever blisters)., Disp: , Rfl:   •  sertraline (ZOLOFT) 100 MG tablet, Take 100 mg by mouth Daily., Disp: , Rfl:   •  traZODone (DESYREL) 150 MG tablet, Take 1 tablet by mouth Every Night., Disp: , Rfl:   •  XARELTO 20 MG tablet, TAKE 1 TABLET BY MOUTH DAILY., Disp: 30 tablet, Rfl: 6  •  amitriptyline (ELAVIL) 25 MG tablet, TAKE 1 TABLET (25 MG TOTAL) BY MOUTH NIGHTLY., Disp: , Rfl:   •  cyclobenzaprine (FLEXERIL) 5 MG tablet, Take 1 tablet by mouth 3 (Three) Times a Day As Needed for Muscle Spasms., Disp: 90 tablet, Rfl: 2    Review of Systems   Constitution: Positive for malaise/fatigue.   HENT: Negative for nosebleeds.    Eyes: Negative for visual disturbance.   Cardiovascular: Positive for claudication and near-syncope. Negative for cyanosis and leg swelling.   Respiratory: Negative for hemoptysis and shortness of breath.    Hematologic/Lymphatic: Negative for bleeding problem. Does not bruise/bleed easily.   Skin: Positive for color change. Negative for nail changes.   Musculoskeletal: Positive for back pain. Negative for muscle weakness.   Gastrointestinal: Negative for dysphagia, hematemesis and melena.   Genitourinary: Negative for hematuria.   Neurological: Negative for dizziness, focal weakness, light-headedness, loss of balance, numbness, paresthesias and weakness.   Psychiatric/Behavioral: Negative for altered mental status.   All other systems reviewed and are negative.       Objective  "  Vitals:    05/22/20 0854 05/22/20 0905   BP: 128/70 126/68   BP Location: Right arm Left arm   Patient Position: Sitting Sitting   Cuff Size: Adult Adult   Pulse: 80    Temp: 97.7 °F (36.5 °C)    SpO2: 97%    Weight: 59.4 kg (131 lb)    Height: 157.5 cm (62\")      Body mass index is 23.96 kg/m².  Physical Exam   Constitutional: She is oriented to person, place, and time. She appears well-nourished.   HENT:   Head: Atraumatic.   Eyes: EOM are normal.   Neck: Neck supple. Carotid bruit is not present.   Cardiovascular: Normal heart sounds.   Pulses:       Dorsalis pedis pulses are 2+ on the right side, and 2+ on the left side.        Posterior tibial pulses are 2+ on the right side, and 2+ on the left side.   Pulmonary/Chest: Effort normal and breath sounds normal.   Abdominal: Soft. Bowel sounds are normal.   Musculoskeletal: Normal range of motion. She exhibits no edema.   Gait normal   Neurological: She is alert and oriented to person, place, and time.   Skin: Skin is warm and intact. Capillary refill takes less than 2 seconds.   Incisions CDI, well approximated.   Psychiatric: She has a normal mood and affect. Thought content normal.   Vitals reviewed.    Lab Results   Component Value Date    GLUCOSE 118 (H) 04/15/2020    BUN 11 04/15/2020    CREATININE 0.79 04/15/2020    EGFRIFNONA 73 04/15/2020    BCR 13.9 04/15/2020    K 4.8 04/15/2020    CO2 22.0 04/15/2020    CALCIUM 8.2 (L) 04/15/2020    ALBUMIN 4.40 03/02/2020    AST 57 (H) 03/02/2020    ALT 15 03/02/2020     Lab Results   Component Value Date    WBC 14.30 (H) 04/15/2020    HGB 9.1 (L) 04/15/2020    HCT 28.1 (L) 04/15/2020    MCV 86.7 04/15/2020     04/15/2020         Assessment & Plan     Independent Review of Radiographic Studies:  Detailed discussion regarding risks, benefits, and treatment plan. Images independently reviewed. Patient understands, agrees, and wishes to proceed with plan.     1. Follow-up surgery care  Progressing well  Improved " mobility    2. Peripheral vascular disease (CMS/HCC)  mild reduction Arterial Flow left Lower Extremity improved   Patent LEFT graft  Medical Management: XARELTO,PLAVIX,STATIN   If signs and symptoms of ischemia should occur including but not limited to pale/blue discoloration of limb, increasing pain with ambulation or at rest, or a non-healing wound. Patient is to notify [facility] V[facility] center for immediate evaluation.   Return 3 mos  - Doppler Ankle Brachial Index Single Level CAR; Future  - cyclobenzaprine (FLEXERIL) 5 MG tablet; Take 1 tablet by mouth 3 (Three) Times a Day As Needed for Muscle Spasms.  Dispense: 90 tablet; Refill: 2    3. Current use of long term anticoagulation  Notify provider if you experience excessive bleeding from the nose, cuts, gums, rectum, urinary tract, or vagina. Reddish or brown urine or stool. Vomiting of blood or hemorrhoidal bleeding. If major injury occurs present to the Emergency Department.      4. Hypercholesteremia  Lipid-lowering therapy has been proven beneficial in patients with cardio-vascular disease. Current guidelines recommend statin treatment for all patients with PAD,CAD and carotid stenosis. Statins are beneficial in preventing cardiovascular events, increasing functional capacity and lower the risk of adverse limb loss in PAD. Statins decrease the progression of plaque formation and may improve peripheral vessel lining, and aid in reversing atherosclerosis.  - atorvastatin (LIPITOR) 20 MG tablet; Take 1 tablet by mouth Every Night.  Dispense: 30 tablet; Refill: 11    5. Nicotine abuse  Smoking cessation assistance options offered including behavioral counseling (Smoking Cessation Classes), Nicotine replacement therapy (patches or gum), pharmacologic therapy (Chantix, Wellbutrin). Understands tobacco increases risk of expanding AAA, MI, CVA, PAD, carcinoma. Discussion and question answer period 5-7 minutes.     6. Atherosclerosis of native coronary artery of  native heart without angina pectoris  - Ambulatory Referral to Cardiology          This document has been electronically signed by DELFINO Garcia on May 27, 2020 12:05

## 2020-06-12 DIAGNOSIS — Z09 FOLLOW-UP SURGERY CARE: ICD-10-CM

## 2020-06-12 RX ORDER — FUROSEMIDE 20 MG/1
20 TABLET ORAL DAILY
Qty: 30 TABLET | Refills: 1 | Status: SHIPPED | OUTPATIENT
Start: 2020-06-12 | End: 2022-11-17 | Stop reason: HOSPADM

## 2020-08-25 ENCOUNTER — OFFICE VISIT (OUTPATIENT)
Dept: CARDIAC SURGERY | Facility: CLINIC | Age: 70
End: 2020-08-25

## 2020-08-25 VITALS
SYSTOLIC BLOOD PRESSURE: 133 MMHG | BODY MASS INDEX: 26.13 KG/M2 | WEIGHT: 142 LBS | DIASTOLIC BLOOD PRESSURE: 76 MMHG | OXYGEN SATURATION: 95 % | HEIGHT: 62 IN | HEART RATE: 88 BPM

## 2020-08-25 DIAGNOSIS — I70.222 ATHEROSCLEROSIS OF NATIVE ARTERIES OF EXTREMITIES WITH REST PAIN, LEFT LEG (HCC): ICD-10-CM

## 2020-08-25 DIAGNOSIS — I73.9 PERIPHERAL VASCULAR DISEASE (HCC): Primary | ICD-10-CM

## 2020-08-25 DIAGNOSIS — E78.00 HYPERCHOLESTEREMIA: ICD-10-CM

## 2020-08-25 DIAGNOSIS — I10 BENIGN ESSENTIAL HYPERTENSION: ICD-10-CM

## 2020-08-25 PROCEDURE — 99215 OFFICE O/P EST HI 40 MIN: CPT | Performed by: NURSE PRACTITIONER

## 2020-08-25 NOTE — PROGRESS NOTES
CVTS Office Progress Note       Subjective   Patient ID: Mona Perales is a 69 y.o. female is here today for follow-up.    Chief Complaint:    Chief Complaint   Patient presents with   • Peripheral Vascular Disease       The following portions of the patient's history were reviewed and updated as appropriate: allergies, current medications, past family history, past medical history, past social history, past surgical history and problem list.  Recent images independently reviewed.  Available laboratory values reviewed.    PCP:  Lisa Alejandro APRN  Cardiology:  Kimmy     69 y.o. female with HTN(stable, increased risk stroke, rupture), Hyperlipidemia(stable, increased risk cardiovascular events), Smoker(uncontrolled, increased risk cardiovascular events), COPD(stable, increased risk pulmonary complications) and Atrial fibrillation(stable, increased risk stroke)  Anticoagulation (Xarelto, stable) Hx PE (stable). PVD (LSCA stent 2005, B iliac stents 2008) lost to follow up.  smokes 1 PPD.  Increasing pain LEFT foot, lifestyle limiting. Difficulty ambulating. VAS 10. No TIA stroke amaurosis.  No MI claudication. No other associated signs, symptoms or modifying factors. Pharmacy discontinued Xarelto (5/2020). Worsening LEFT lower extremity pain similar to pre-surgery approx 1 month.     2005: LSCA Stent  2008: Bilateral Iliac stents  3/23/2020:  CHELSIE: RIGHT 0.83 Triphasic  LEFT 0.31 Biphasic/Monophasic (DP/PT)  3/26/2020: CTA Runoff: Occlusion of the proximal left superficial femoral artery with  reconstitution of the distal left SFA. The popliteal artery  appears occluded distally, with reconstitution of the arteries of  the trifurcation. The left calf vessels are patent, as above.Patent left common iliac and proximal left external iliac stents. There appears to be mild luminal narrowing within the  left external iliac stent. Stenosis of the origins of the superior mesenteric artery and  bilateral renal arteries  as above. Irregular narrowing of the right superficial femoral artery,  as described above without occlusion. The right popliteal artery  and the right calf arteries are patent, as above.  4/14/2020: LEFT Femoral-popliteal Bypass PTFE  5/22/2020:  CHELSIE: RIGHT 1.1 Triphasic LEFT 0.79 Tri/Biphasic (PT/DP)   Patent LEFT graft. mPSV 237cm/s (native) Triphasic  8/25/2020:  CHELSIE: RIGHT 0.67 Tri/Biphasic (DP/PT) LEFT 0.52 Tri/Biphasic (PT/DP). Patent LEFT Fem-Pop graft. Native prx (333cm/s) Triphasic.       2016: Carotid Duplex: BABS 0-49% LICA 0-49%  2017: Carotid duplex: BABS 0-49% LICA 0-49%  2018: Carotid duplex: BABS 0-49% LICA 0-49%      Past Medical History:   Diagnosis Date   • A-fib (CMS/[facility])    • Anxiety    • Arthritis    • Asthma    • 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 with ulceration (CMS/[facility])     bilateral legs - bilateral iliac stents 2008      • CHF (congestive heart failure) (CMS/[facility])    • Chronic lower back pain    • COPD (chronic obstructive pulmonary disease) (CMS/[facility])    • Essential (primary) hypertension    • GERD (gastroesophageal reflux disease)    • History of pulmonary embolus (PE)    • Hyperlipidemia    • Insomnia    • Lumbago    • Nicotine dependence    • Occlusion of artery      and stenosis of bilateral carotid arteries - BABS 16-49%, LICA 0-15%   • Other atherosclerosis of native artery of other extremity     LEFT subclavian stent 2005 (occluded)   • Sleep apnea      Past Surgical History:   Procedure Laterality Date   • CARDIAC CATHETERIZATION  04/06/2016    No evidence of any obstructive epicardial CAD.Preserved LV systolic function with EF of 55%.   • CENTRAL VENOUS LINE INSERTION  04/07/2016    Successful placement of right uppe extremity 6-Polish triple lumen PICC line.   • ENDOSCOPY N/A 1/12/2018    Procedure: ESOPHAGOGASTRODUODENOSCOPY;  Surgeon: Bin Oh MD;  Location: [facility] ENDOSCOPY;  Service:    • ENDOSCOPY N/A 1/17/2018    Procedure:  ESOPHAGOGASTRODUODENOSCOPY;  Surgeon: Bin Martin MD;  Location: [facility] ENDOSCOPY;  Service:    • ENDOSCOPY N/A 3/16/2018    Procedure: ESOPHAGOGASTRODUODENOSCOPY possible dilation;  Surgeon: Bin Martin MD;  Location: [facility] ENDOSCOPY;  Service: Gastroenterology   • FEMORAL POPLITEAL BYPASS Left 2020    Procedure: FEMORAL POPLITEAL BYPASS ABOVE KNEE  (POLYTETRAFLUOROETHYLENE)  LEFT COMMON FEMORAL ARTERY ENDARTERECTOMY PTA LEFT ILIAC ARTERIOGRAM        (c-arm#2 and c-arm table);  Surgeon: David Suh MD;  Location: [facility] OR;  Service: Vascular;  Laterality: Left;   • HYSTERECTOMY     • KYPHOPLASTY N/A 2019    Procedure: KYPHOPLASTY LUMBAR FOUR;  Surgeon: Aba Santa MD;  Location: [facility] OR;  Service: Orthopedic Spine   • TOTAL SHOULDER REPLACEMENT Left    • TRANSESOPHAGEAL ECHOCARDIOGRAM (JACQUES)  2016    With color flow-Mild to moderate [facility].LV systolic function well preserved with Ef of 55-60%.Mitral and AV intact.Diastolic dysfunction   • UPPER GASTROINTESTINAL ENDOSCOPY  2018    Dr. Bin Martin M.D.     Family History   Problem Relation Age of Onset   • Heart disease Other    • Hypertension Other      Social History     Tobacco Use   • Smoking status: Current Every Day Smoker     Packs/day: 1.00     Years: 50.00     Pack years: 50.00     Types: Cigarettes     Last attempt to quit: 2019     Years since quittin.5   • Smokeless tobacco: Never Used   Substance Use Topics   • Alcohol use: No   • Drug use: No       ALLERGIES:   Morphine and related and Penicillins    MEDICATIONS:      Current Outpatient Medications:   •  albuterol (PROVENTIL) (2.5 MG/3ML) 0.083% nebulizer solution, Take 2.5 mg by nebulization Every 8 (Eight) Hours As Needed for Wheezing., Disp: , Rfl:   •  amitriptyline (ELAVIL) 25 MG tablet, TAKE 1 TABLET (25 MG TOTAL) BY MOUTH NIGHTLY., Disp: , Rfl:   •  atorvastatin (LIPITOR) 20 MG tablet, Take 1 tablet by mouth Every Night.,  Disp: 30 tablet, Rfl: 11  •  clopidogrel (PLAVIX) 75 MG tablet, Take 1 tablet by mouth Daily., Disp: 30 tablet, Rfl: 11  •  cyclobenzaprine (FLEXERIL) 5 MG tablet, Take 1 tablet by mouth 3 (Three) Times a Day As Needed for Muscle Spasms., Disp: 90 tablet, Rfl: 2  •  docusate sodium 100 MG capsule, Take 100 mg by mouth 2 (Two) Times a Day. (Patient taking differently: Take 100 mg by mouth Daily As Needed.), Disp: , Rfl:   •  furosemide (LASIX) 20 MG tablet, Take 1 tablet by mouth Daily., Disp: 30 tablet, Rfl: 1  •  gabapentin (NEURONTIN) 800 MG tablet, Take 800 mg by mouth 2 (Two) Times a Day., Disp: , Rfl:   •  midodrine (PROAMATINE) 5 MG tablet, Take 1 tablet by mouth 3 (Three) Times a Day Before Meals. (Patient taking differently: Take 5 mg by mouth Daily.), Disp: 90 tablet, Rfl: 0  •  pantoprazole (PROTONIX) 40 MG EC tablet, Take 1 tablet by mouth Daily., Disp: 30 tablet, Rfl: 5  •  penciclovir (DENAVIR) 1 % cream, Apply 1 application topically to the appropriate area as directed As Needed (fever blisters)., Disp: , Rfl:   •  sertraline (ZOLOFT) 100 MG tablet, Take 100 mg by mouth Daily., Disp: , Rfl:   •  traZODone (DESYREL) 150 MG tablet, Take 1 tablet by mouth Every Night., Disp: , Rfl:   •  XARELTO 20 MG tablet, TAKE 1 TABLET BY MOUTH DAILY., Disp: 30 tablet, Rfl: 6    Review of Systems   Constitution: Positive for malaise/fatigue.   HENT: Negative for nosebleeds.    Eyes: Negative for visual disturbance.   Cardiovascular: Positive for claudication and near-syncope. Negative for cyanosis and leg swelling.   Respiratory: Negative for hemoptysis and shortness of breath.    Hematologic/Lymphatic: Negative for bleeding problem. Does not bruise/bleed easily.   Skin: Positive for color change. Negative for nail changes.   Musculoskeletal: Positive for back pain. Negative for muscle weakness.   Gastrointestinal: Negative for dysphagia, hematemesis and melena.   Genitourinary: Negative for hematuria.   Neurological:  "Negative for dizziness, focal weakness, light-headedness, loss of balance, numbness, paresthesias and weakness.   Psychiatric/Behavioral: Negative for altered mental status.   All other systems reviewed and are negative.       Objective   Vitals:    08/25/20 1050   BP: 133/76   BP Location: Right arm   Patient Position: Sitting   Cuff Size: Adult   Pulse: 88   SpO2: 95%   Weight: 64.4 kg (142 lb)   Height: 157.5 cm (62\")     Body mass index is 25.97 kg/m².  Physical Exam   Constitutional: She is oriented to person, place, and time. She appears well-nourished.   HENT:   Head: Atraumatic.   Eyes: EOM are normal.   Neck: Neck supple. Carotid bruit is not present.   Cardiovascular: Normal heart sounds.   Pulses:       Dorsalis pedis pulses are 1+ on the right side, and 1+ on the left side.        Posterior tibial pulses are 1+ on the right side, and 1+ on the left side.   Pulmonary/Chest: Effort normal and breath sounds normal.   Abdominal: Soft. Bowel sounds are normal.   Musculoskeletal: Normal range of motion. She exhibits no edema.   Gait slow-limp   Neurological: She is alert and oriented to person, place, and time.   Skin: Skin is warm and intact. Capillary refill takes less than 2 seconds.   Psychiatric: She has a normal mood and affect. Thought content normal.   Vitals reviewed.          Assessment & Plan     Independent Review of Radiographic Studies:  Detailed discussion regarding risks, benefits, and treatment plan. Images independently reviewed. Patient understands, agrees, and wishes to proceed with plan.     1. Peripheral vascular disease (CMS/HCC)  severe reduction Arterial Flow left Lower Extremity worsened   Moderate reduction right lower extremity worsened  LEFT graft patent  Medical Management: PLAVIX,STATIN restart XARELTO  If signs and symptoms of ischemia should occur including but not limited to pale/blue discoloration of limb, increasing pain with ambulation or at rest, or a non-healing wound. " Patient is to notify [facility] center for immediate evaluation.   Consider Angiogram LEFT lower extremity-Discuss with Vkiash  Will call patient with plan.  - Duplex Lower Extremity Art / Grafts - Left CAR; Future    2. Atherosclerosis of native arteries of extremities with rest pain, left leg (CMS/HCC)  Detailed discussion regarding situation, options and plans related to severe claudication, rest pain, ischemic tissue loss.  Studies demonstrate severe peripheral vascular disease.  Requires additional urgent evaluation with arteriography to evaluate options for improving blood flow to feet, healing wounds, and avoiding limb loss. Mona Perales understands risks, including but not limited to: pain, infection, bleeding, nerve or blood vessel injury, need for emergent open operation to restore blood flow.  Benefits: relief of symptoms, reduction in risk of limb loss, improved wound healing.  Options: medical therapy, alternative imaging discussed. Mona Perales understands and wishes to proceed with plan.  Abdominal aortogram, bilateral lower extremity runoff, possible balloon angioplasty or stent scheduled for 9/9/2020. Thank you for the opportunity to participate in this patient's care.     - Provide NPO Instructions to Patient; Future  - Follow Anesthesia Guidelines / Protocol; Standing  - Obtain Informed Consent; Standing  - Case Request; Standing  - Basic Metabolic Panel; Standing  - CBC & Differential; Standing  - sodium chloride 0.9 % infusion  - clindamycin (CLEOCIN) 900 mg in dextrose (D5W) 5 % 100 mL IVPB  - Case Request      3. Benign essential hypertension  Controlled.     4. Hypercholesteremia  Lipid-lowering therapy has been proven beneficial in patients with cardio-vascular disease. Current guidelines recommend statin treatment for all patients with PAD,CAD and carotid stenosis. Statins are beneficial in preventing cardiovascular events, increasing functional capacity and lower the  risk of adverse limb loss in PAD. Statins decrease the progression of plaque formation and may improve peripheral vessel lining, and aid in reversing atherosclerosis.    Time spent with patient 40 out of 40 min face to face evaluating, treating, and discussing findings regarding vascular exam and studies. Coordination of Vascular Interpretation/Scheduling Angio/COVID testing and education to patient and family regarding treatment options, plan of care, and hoped for outcomes.     Smoking cessation assistance options offered including behavioral counseling (Smoking Cessation Classes), Nicotine replacement therapy (patches or gum), pharmacologic therapy (Chantix, Wellbutrin). Understands tobacco increases risk of expanding AAA, MI, CVA, PAD, carcinoma. Discussion and question answer period 5-7 minutes.           This document has been electronically signed by DELFINO Garcia on August 26, 2020 11:42

## 2020-08-25 NOTE — PATIENT INSTRUCTIONS
severe reduction Arterial Flow left Lower Extremity worsened   Moderate reduction right lower extremity worsened  LEFT graft patent  Medical Management: PLAVIX,STATIN restart XARELTO  If signs and symptoms of ischemia should occur including but not limited to pale/blue discoloration of limb, increasing pain with ambulation or at rest, or a non-healing wound. Patient is to notify [facility] Vascular center for immediate evaluation.   Consider Angiogram LEFT lower extremity-Discuss with Vikash  Will call patient with plan.  control  Continued Smoking cessation advised.  Tobacco increases risk of expanding AAA, MI, CVA, PAD, carcinoma.

## 2020-08-26 ENCOUNTER — TRANSCRIBE ORDERS (OUTPATIENT)
Dept: GENERAL RADIOLOGY | Facility: HOSPITAL | Age: 70
End: 2020-08-26

## 2020-08-26 DIAGNOSIS — Z01.818 PRE-OP TESTING: Primary | ICD-10-CM

## 2020-08-26 RX ORDER — SODIUM CHLORIDE 9 MG/ML
100 INJECTION, SOLUTION INTRAVENOUS CONTINUOUS
Status: CANCELLED | OUTPATIENT
Start: 2020-09-09

## 2020-08-26 RX ORDER — CLINDAMYCIN PHOSPHATE 900 MG/50ML
900 INJECTION INTRAVENOUS ONCE
Status: CANCELLED | OUTPATIENT
Start: 2020-09-09 | End: 2020-08-26

## 2020-09-06 ENCOUNTER — LAB (OUTPATIENT)
Dept: LAB | Facility: HOSPITAL | Age: 70
End: 2020-09-06

## 2020-09-06 DIAGNOSIS — Z01.818 PRE-OP TESTING: ICD-10-CM

## 2020-09-06 LAB — SARS-COV-2 N GENE RESP QL NAA+PROBE: NOT DETECTED

## 2020-09-06 PROCEDURE — 87635 SARS-COV-2 COVID-19 AMP PRB: CPT | Performed by: THORACIC SURGERY (CARDIOTHORACIC VASCULAR SURGERY)

## 2020-09-06 PROCEDURE — C9803 HOPD COVID-19 SPEC COLLECT: HCPCS

## 2020-09-06 PROCEDURE — C9803 HOPD COVID-19 SPEC COLLECT: HCPCS | Performed by: THORACIC SURGERY (CARDIOTHORACIC VASCULAR SURGERY)

## 2020-09-09 ENCOUNTER — APPOINTMENT (OUTPATIENT)
Dept: ULTRASOUND IMAGING | Facility: HOSPITAL | Age: 70
End: 2020-09-09

## 2020-09-09 ENCOUNTER — HOSPITAL ENCOUNTER (OUTPATIENT)
Facility: HOSPITAL | Age: 70
Setting detail: HOSPITAL OUTPATIENT SURGERY
Discharge: HOME | End: 2020-09-09
Attending: THORACIC SURGERY (CARDIOTHORACIC VASCULAR SURGERY) | Admitting: THORACIC SURGERY (CARDIOTHORACIC VASCULAR SURGERY)

## 2020-09-09 VITALS
HEIGHT: 62 IN | WEIGHT: 140.43 LBS | SYSTOLIC BLOOD PRESSURE: 139 MMHG | RESPIRATION RATE: 20 BRPM | HEART RATE: 101 BPM | DIASTOLIC BLOOD PRESSURE: 70 MMHG | TEMPERATURE: 98.8 F | OXYGEN SATURATION: 94 % | BODY MASS INDEX: 25.84 KG/M2

## 2020-09-09 DIAGNOSIS — I73.9 PVD (PERIPHERAL VASCULAR DISEASE) WITH CLAUDICATION (HCC): Primary | ICD-10-CM

## 2020-09-09 DIAGNOSIS — I70.222 ATHEROSCLEROSIS OF NATIVE ARTERIES OF EXTREMITIES WITH REST PAIN, LEFT LEG (HCC): ICD-10-CM

## 2020-09-09 LAB
ANION GAP SERPL CALCULATED.3IONS-SCNC: 12 MMOL/L (ref 5–15)
BASOPHILS # BLD AUTO: 0.11 10*3/MM3 (ref 0–0.2)
BASOPHILS NFR BLD AUTO: 0.7 % (ref 0–1.5)
BUN SERPL-MCNC: 7 MG/DL (ref 8–23)
BUN/CREAT SERPL: 10.6 (ref 7–25)
CALCIUM SPEC-SCNC: 9.1 MG/DL (ref 8.6–10.5)
CHLORIDE SERPL-SCNC: 100 MMOL/L (ref 98–107)
CO2 SERPL-SCNC: 27 MMOL/L (ref 22–29)
CREAT SERPL-MCNC: 0.66 MG/DL (ref 0.57–1)
DEPRECATED RDW RBC AUTO: 54.1 FL (ref 37–54)
EOSINOPHIL # BLD AUTO: 0.22 10*3/MM3 (ref 0–0.4)
EOSINOPHIL NFR BLD AUTO: 1.4 % (ref 0.3–6.2)
ERYTHROCYTE [DISTWIDTH] IN BLOOD BY AUTOMATED COUNT: 19.5 % (ref 12.3–15.4)
GFR SERPL CREATININE-BSD FRML MDRD: 89 ML/MIN/1.73
GLUCOSE SERPL-MCNC: 100 MG/DL (ref 65–99)
HCT VFR BLD AUTO: 36.1 % (ref 34–46.6)
HGB BLD-MCNC: 11.5 G/DL (ref 12–15.9)
IMM GRANULOCYTES # BLD AUTO: 0.05 10*3/MM3 (ref 0–0.05)
IMM GRANULOCYTES NFR BLD AUTO: 0.3 % (ref 0–0.5)
LYMPHOCYTES # BLD AUTO: 3.87 10*3/MM3 (ref 0.7–3.1)
LYMPHOCYTES NFR BLD AUTO: 25 % (ref 19.6–45.3)
MCH RBC QN AUTO: 25.2 PG (ref 26.6–33)
MCHC RBC AUTO-ENTMCNC: 31.9 G/DL (ref 31.5–35.7)
MCV RBC AUTO: 79 FL (ref 79–97)
MONOCYTES # BLD AUTO: 2.07 10*3/MM3 (ref 0.1–0.9)
MONOCYTES NFR BLD AUTO: 13.3 % (ref 5–12)
NEUTROPHILS NFR BLD AUTO: 59.3 % (ref 42.7–76)
NEUTROPHILS NFR BLD AUTO: 9.19 10*3/MM3 (ref 1.7–7)
NRBC BLD AUTO-RTO: 0 /100 WBC (ref 0–0.2)
PLATELET # BLD AUTO: 308 10*3/MM3 (ref 140–450)
PMV BLD AUTO: 8.8 FL (ref 6–12)
POTASSIUM SERPL-SCNC: 4.2 MMOL/L (ref 3.5–5.2)
RBC # BLD AUTO: 4.57 10*6/MM3 (ref 3.77–5.28)
SODIUM SERPL-SCNC: 139 MMOL/L (ref 136–145)
WBC # BLD AUTO: 15.51 10*3/MM3 (ref 3.4–10.8)

## 2020-09-09 PROCEDURE — C1760 CLOSURE DEV, VASC: HCPCS | Performed by: THORACIC SURGERY (CARDIOTHORACIC VASCULAR SURGERY)

## 2020-09-09 PROCEDURE — C1887 CATHETER, GUIDING: HCPCS | Performed by: THORACIC SURGERY (CARDIOTHORACIC VASCULAR SURGERY)

## 2020-09-09 PROCEDURE — C1769 GUIDE WIRE: HCPCS | Performed by: THORACIC SURGERY (CARDIOTHORACIC VASCULAR SURGERY)

## 2020-09-09 PROCEDURE — 76937 US GUIDE VASCULAR ACCESS: CPT

## 2020-09-09 PROCEDURE — C1894 INTRO/SHEATH, NON-LASER: HCPCS | Performed by: THORACIC SURGERY (CARDIOTHORACIC VASCULAR SURGERY)

## 2020-09-09 PROCEDURE — 85025 COMPLETE CBC W/AUTO DIFF WBC: CPT | Performed by: NURSE PRACTITIONER

## 2020-09-09 PROCEDURE — P9041 ALBUMIN (HUMAN),5%, 50ML: HCPCS | Performed by: THORACIC SURGERY (CARDIOTHORACIC VASCULAR SURGERY)

## 2020-09-09 PROCEDURE — 25010000002 IOPAMIDOL 61 % SOLUTION: Performed by: THORACIC SURGERY (CARDIOTHORACIC VASCULAR SURGERY)

## 2020-09-09 PROCEDURE — 25010000002 MIDAZOLAM PER 1 MG: Performed by: THORACIC SURGERY (CARDIOTHORACIC VASCULAR SURGERY)

## 2020-09-09 PROCEDURE — 75716 ARTERY X-RAYS ARMS/LEGS: CPT | Performed by: THORACIC SURGERY (CARDIOTHORACIC VASCULAR SURGERY)

## 2020-09-09 PROCEDURE — 25010000002 FENTANYL CITRATE (PF) 100 MCG/2ML SOLUTION: Performed by: THORACIC SURGERY (CARDIOTHORACIC VASCULAR SURGERY)

## 2020-09-09 PROCEDURE — 80048 BASIC METABOLIC PNL TOTAL CA: CPT | Performed by: NURSE PRACTITIONER

## 2020-09-09 PROCEDURE — 25010000002 HEPARIN (PORCINE) PER 1000 UNITS: Performed by: THORACIC SURGERY (CARDIOTHORACIC VASCULAR SURGERY)

## 2020-09-09 PROCEDURE — 25010000002 ALBUMIN HUMAN 5% PER 50 ML: Performed by: THORACIC SURGERY (CARDIOTHORACIC VASCULAR SURGERY)

## 2020-09-09 PROCEDURE — 37220 PR REVASCULARIZATION ILIAC ARTERY ANGIOP 1ST VSL: CPT | Performed by: THORACIC SURGERY (CARDIOTHORACIC VASCULAR SURGERY)

## 2020-09-09 PROCEDURE — C1725 CATH, TRANSLUMIN NON-LASER: HCPCS | Performed by: THORACIC SURGERY (CARDIOTHORACIC VASCULAR SURGERY)

## 2020-09-09 RX ORDER — FENTANYL CITRATE 50 UG/ML
INJECTION, SOLUTION INTRAMUSCULAR; INTRAVENOUS
Status: DISCONTINUED
Start: 2020-09-09 | End: 2020-09-09 | Stop reason: HOSPADM

## 2020-09-09 RX ORDER — FENTANYL CITRATE 50 UG/ML
INJECTION, SOLUTION INTRAMUSCULAR; INTRAVENOUS AS NEEDED
Status: DISCONTINUED | OUTPATIENT
Start: 2020-09-09 | End: 2020-09-09 | Stop reason: HOSPADM

## 2020-09-09 RX ORDER — ALBUMIN, HUMAN INJ 5% 5 %
500 SOLUTION INTRAVENOUS ONCE
Status: COMPLETED | OUTPATIENT
Start: 2020-09-09 | End: 2020-09-09

## 2020-09-09 RX ORDER — CLINDAMYCIN PHOSPHATE 900 MG/50ML
900 INJECTION INTRAVENOUS ONCE
Status: COMPLETED | OUTPATIENT
Start: 2020-09-09 | End: 2020-09-09

## 2020-09-09 RX ORDER — MIDAZOLAM HYDROCHLORIDE 1 MG/ML
INJECTION INTRAMUSCULAR; INTRAVENOUS AS NEEDED
Status: DISCONTINUED | OUTPATIENT
Start: 2020-09-09 | End: 2020-09-09 | Stop reason: HOSPADM

## 2020-09-09 RX ORDER — SODIUM CHLORIDE 9 MG/ML
100 INJECTION, SOLUTION INTRAVENOUS CONTINUOUS
Status: DISCONTINUED | OUTPATIENT
Start: 2020-09-09 | End: 2020-09-09 | Stop reason: HOSPADM

## 2020-09-09 RX ORDER — MIDAZOLAM HYDROCHLORIDE 1 MG/ML
INJECTION INTRAMUSCULAR; INTRAVENOUS
Status: DISCONTINUED
Start: 2020-09-09 | End: 2020-09-09 | Stop reason: HOSPADM

## 2020-09-09 RX ORDER — HEPARIN SODIUM 1000 [USP'U]/ML
INJECTION, SOLUTION INTRAVENOUS; SUBCUTANEOUS AS NEEDED
Status: DISCONTINUED | OUTPATIENT
Start: 2020-09-09 | End: 2020-09-09 | Stop reason: HOSPADM

## 2020-09-09 RX ORDER — HEPARIN SODIUM 1000 [USP'U]/ML
INJECTION, SOLUTION INTRAVENOUS; SUBCUTANEOUS
Status: DISCONTINUED
Start: 2020-09-09 | End: 2020-09-09 | Stop reason: HOSPADM

## 2020-09-09 RX ORDER — ACETAMINOPHEN 325 MG/1
650 TABLET ORAL EVERY 4 HOURS PRN
Status: CANCELLED | OUTPATIENT
Start: 2020-09-09

## 2020-09-09 RX ORDER — ALBUTEROL SULFATE 2.5 MG/3ML
2.5 SOLUTION RESPIRATORY (INHALATION) EVERY 4 HOURS PRN
COMMUNITY

## 2020-09-09 RX ADMIN — CLINDAMYCIN IN 5 PERCENT DEXTROSE 900 MG: 18 INJECTION, SOLUTION INTRAVENOUS at 14:15

## 2020-09-09 RX ADMIN — ALBUMIN HUMAN 500 ML: 0.05 INJECTION, SOLUTION INTRAVENOUS at 16:22

## 2020-09-09 NOTE — NURSING NOTE
Dr Suh at bedside speaking to patient and family. Orders received for Albumin. Placed order and requested dose.

## 2020-09-09 NOTE — NURSING NOTE
Call placed to angio and spoke to Jimena. States Dr Suh is still in surgery. Made aware of low bp and states will let him know. Attempt to reach Jacki while awaiting Dr Suh unsuccessful. Pt remains asymptomatic at this time.

## 2020-09-09 NOTE — NURSING NOTE
Patient was told to be at [facility] at 0600 9/9/2020 for procedure at 0800. Dr. Suh seen patient during admission to let her know her procedure is not until 1300. He told patient she can leave and come back between [id number].

## 2020-09-10 DIAGNOSIS — Z09 FOLLOW-UP SURGERY CARE: ICD-10-CM

## 2020-09-10 DIAGNOSIS — Z09 FOLLOW-UP SURGERY CARE: Primary | ICD-10-CM

## 2020-09-10 RX ORDER — HYDROCODONE BITARTRATE AND ACETAMINOPHEN 10; 325 MG/1; MG/1
1 TABLET ORAL EVERY 4 HOURS PRN
Qty: 40 TABLET | Refills: 0 | Status: SHIPPED | OUTPATIENT
Start: 2020-09-10 | End: 2020-09-22

## 2020-09-10 RX ORDER — OXYCODONE HYDROCHLORIDE AND ACETAMINOPHEN 5; 325 MG/1; MG/1
TABLET ORAL
Qty: 36 TABLET | Refills: 0 | OUTPATIENT
Start: 2020-09-10

## 2020-09-22 ENCOUNTER — OFFICE VISIT (OUTPATIENT)
Dept: CARDIAC SURGERY | Facility: CLINIC | Age: 70
End: 2020-09-22

## 2020-09-22 VITALS
SYSTOLIC BLOOD PRESSURE: 100 MMHG | DIASTOLIC BLOOD PRESSURE: 79 MMHG | HEIGHT: 62 IN | HEART RATE: 103 BPM | OXYGEN SATURATION: 98 % | WEIGHT: 145 LBS | BODY MASS INDEX: 26.68 KG/M2

## 2020-09-22 DIAGNOSIS — Z72.0 NICOTINE ABUSE: ICD-10-CM

## 2020-09-22 DIAGNOSIS — Z79.01 CURRENT USE OF LONG TERM ANTICOAGULATION: ICD-10-CM

## 2020-09-22 DIAGNOSIS — J44.9 CHRONIC OBSTRUCTIVE PULMONARY DISEASE, UNSPECIFIED COPD TYPE (HCC): ICD-10-CM

## 2020-09-22 DIAGNOSIS — I10 BENIGN ESSENTIAL HYPERTENSION: ICD-10-CM

## 2020-09-22 DIAGNOSIS — I73.9 PERIPHERAL VASCULAR DISEASE (HCC): Primary | ICD-10-CM

## 2020-09-22 DIAGNOSIS — E78.00 HYPERCHOLESTEREMIA: ICD-10-CM

## 2020-09-22 PROCEDURE — 99214 OFFICE O/P EST MOD 30 MIN: CPT | Performed by: NURSE PRACTITIONER

## 2020-09-22 PROCEDURE — 99406 BEHAV CHNG SMOKING 3-10 MIN: CPT | Performed by: NURSE PRACTITIONER

## 2020-09-22 NOTE — PATIENT INSTRUCTIONS
moderate reduction Arterial Flow left Lower Extremity improved  Medical Management: XARELTO,PLAVIX,STATIN   Medication Assistance: Evaluate for low income program (information given)   If signs and symptoms of ischemia should occur including but not limited to pale/blue discoloration of limb, increasing pain with ambulation or at rest, or a non-healing wound. Patient is to notify [facility] Vascular center for immediate evaluation.   Return 3 mos    Smoking Cessation Information

## 2020-09-22 NOTE — PROGRESS NOTES
CVTS Office Progress Note       Subjective   Patient ID: Mona Perales is a 70 y.o. female is here today for follow-up.    Chief Complaint:    Chief Complaint   Patient presents with   • Peripheral Vascular Disease       The following portions of the patient's history were reviewed and updated as appropriate: allergies, current medications, past family history, past medical history, past social history, past surgical history and problem list.  Recent images independently reviewed.  Available laboratory values reviewed.    PCP:  Lisa Alejandro APRN  Cardiology:  Kimmy     70 y.o. female with HTN(stable, increased risk stroke, rupture), Hyperlipidemia(stable, increased risk cardiovascular events), Smoker(uncontrolled, increased risk cardiovascular events), COPD(stable, increased risk pulmonary complications) and Atrial fibrillation(stable, increased risk stroke)  Anticoagulation (Xarelto, stable) Hx PE (stable). PVD (LSCA stent 2005, B iliac stents 2008) lost to follow up.  smokes 1 PPD.  Increasing pain LEFT foot, lifestyle limiting. Difficulty ambulating. VAS 10. No TIA stroke amaurosis.  No MI claudication. No other associated signs, symptoms or modifying factors. Pharmacy discontinued Xarelto (5/2020). Worsening LEFT lower extremity pain similar to pre-surgery approx 1 month. Underwent additional angio procedure. Returns for follow up. Reports improvement in leg pain.     2005: LSCA Stent  2008: Bilateral Iliac stents  3/23/2020:  CHELSIE: RIGHT 0.83 Triphasic  LEFT 0.31 Biphasic/Monophasic (DP/PT)  3/26/2020: CTA Runoff: Occlusion of the proximal left superficial femoral artery with  reconstitution of the distal left SFA. The popliteal artery  appears occluded distally, with reconstitution of the arteries of  the trifurcation. The left calf vessels are patent, as above.Patent left common iliac and proximal left external iliac stents. There appears to be mild luminal narrowing within the  left external iliac  stent. Stenosis of the origins of the superior mesenteric artery and  bilateral renal arteries as above. Irregular narrowing of the right superficial femoral artery,  as described above without occlusion. The right popliteal artery  and the right calf arteries are patent, as above.  4/14/2020: LEFT Femoral-popliteal Bypass PTFE  5/22/2020:  CHELSIE: RIGHT 1.1 Triphasic LEFT 0.79 Tri/Biphasic (PT/DP)   Patent LEFT graft. mPSV 237cm/s (native) Triphasic  8/25/2020:  CHELSIE: RIGHT 0.67 Tri/Biphasic (DP/PT) LEFT 0.52 Tri/Biphasic (PT/DP). Patent LEFT Fem-Pop graft. Native prx (333cm/s) Triphasic.   9/9/2020: LEFT/RIGHT OBDULIA PTA  9/22/2020:  CHELSIE: RIGHT 1.2 Triphasic LEFT 0.81 Biphasic      2016: Carotid Duplex: BABS 0-49% LICA 0-49%  2017: Carotid duplex: BABS 0-49% LICA 0-49%  2018: Carotid duplex: BABS 0-49% LICA 0-49%      Past Medical History:   Diagnosis Date   • A-fib (CMS/[facility])    • Anxiety    • Arthritis    • Asthma    • 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 with ulceration (CMS/[facility])     bilateral legs - bilateral iliac stents 2008      • CHF (congestive heart failure) (CMS/[facility])    • Chronic lower back pain    • COPD (chronic obstructive pulmonary disease) (CMS/[facility])    • Essential (primary) hypertension    • GERD (gastroesophageal reflux disease)    • History of pulmonary embolus (PE)    • Hyperlipidemia    • Insomnia    • Lumbago    • Nicotine dependence    • Occlusion of artery      and stenosis of bilateral carotid arteries - BABS 16-49%, LICA 0-15%   • Other atherosclerosis of native artery of other extremity     LEFT subclavian stent 2005 (occluded)   • Sleep apnea      Past Surgical History:   Procedure Laterality Date   • CARDIAC CATHETERIZATION  04/06/2016    No evidence of any obstructive epicardial CAD.Preserved LV systolic function with EF of 55%.   • CENTRAL VENOUS LINE INSERTION  04/07/2016    Successful placement of right uppe extremity 6-[place] triple lumen PICC line.   • ENDOSCOPY N/A  2018    Procedure: ESOPHAGOGASTRODUODENOSCOPY;  Surgeon: Bin Martin MD;  Location: [facility] ENDOSCOPY;  Service:    • ENDOSCOPY N/A 2018    Procedure: ESOPHAGOGASTRODUODENOSCOPY;  Surgeon: Bin Martin MD;  Location: [facility] ENDOSCOPY;  Service:    • ENDOSCOPY N/A 3/16/2018    Procedure: ESOPHAGOGASTRODUODENOSCOPY possible dilation;  Surgeon: Bin Martin MD;  Location: [facility] ENDOSCOPY;  Service: Gastroenterology   • FEMORAL POPLITEAL BYPASS Left 2020    Procedure: FEMORAL POPLITEAL BYPASS ABOVE KNEE  (POLYTETRAFLUOROETHYLENE)  LEFT COMMON FEMORAL ARTERY ENDARTERECTOMY PTA LEFT ILIAC ARTERIOGRAM        (c-arm#2 and c-arm table);  Surgeon: David Suh MD;  Location: [facility] OR;  Service: Vascular;  Laterality: Left;   • HYSTERECTOMY     • INTERVENTIONAL RADIOLOGY PROCEDURE Left 2020    Procedure: IR angiogram extremity left;  Surgeon: David Suh MD;  Location: [facility] ANGIO INVASIVE LOCATION;  Service: Interventional Radiology;  Laterality: Left;   • KYPHOPLASTY N/A 2019    Procedure: KYPHOPLASTY LUMBAR FOUR;  Surgeon: Aba Santa MD;  Location: [facility] OR;  Service: Orthopedic Spine   • TOTAL SHOULDER REPLACEMENT Left    • TRANSESOPHAGEAL ECHOCARDIOGRAM (JACQUES)  2016    With color flow-Mild to moderate [facility].LV systolic function well preserved with Ef of 55-60%.Mitral and AV intact.Diastolic dysfunction   • UPPER GASTROINTESTINAL ENDOSCOPY  2018    Dr. Bin Martin M.D.     Family History   Problem Relation Age of Onset   • Heart disease Other    • Hypertension Other      Social History     Tobacco Use   • Smoking status: smokes     Packs/day: 1.00     Years: 50.00     Pack years: 50.00     Types: Cigarettes     Quit date: 2019     Years since quittin.5   • Smokeless tobacco: Never Used   Substance Use Topics   • Alcohol use: No   • Drug use: No       ALLERGIES:   Morphine and related and  Penicillins    MEDICATIONS:      Current Outpatient Medications:   •  albuterol (PROVENTIL) (2.5 MG/3ML) 0.083% nebulizer solution, Take 2.5 mg by nebulization Every 4 (Four) Hours As Needed for Wheezing., Disp: , Rfl:   •  ALBUTEROL SULFATE HFA IN, Inhale 2 puffs Every 4 (Four) Hours., Disp: , Rfl:   •  amitriptyline (ELAVIL) 25 MG tablet, TAKE 1 TABLET (25 MG TOTAL) BY MOUTH NIGHTLY., Disp: , Rfl:   •  atorvastatin (LIPITOR) 20 MG tablet, Take 1 tablet by mouth Every Night., Disp: 30 tablet, Rfl: 11  •  clopidogrel (PLAVIX) 75 MG tablet, Take 1 tablet by mouth Daily., Disp: 30 tablet, Rfl: 11  •  cyclobenzaprine (FLEXERIL) 5 MG tablet, Take 1 tablet by mouth 3 (Three) Times a Day As Needed for Muscle Spasms., Disp: 90 tablet, Rfl: 2  •  docusate sodium 100 MG capsule, Take 100 mg by mouth 2 (Two) Times a Day. (Patient taking differently: Take 100 mg by mouth Daily As Needed.), Disp: , Rfl:   •  furosemide (LASIX) 20 MG tablet, Take 1 tablet by mouth Daily., Disp: 30 tablet, Rfl: 1  •  gabapentin (NEURONTIN) 800 MG tablet, Take 800 mg by mouth 2 (Two) Times a Day., Disp: , Rfl:   •  pantoprazole (PROTONIX) 40 MG EC tablet, Take 1 tablet by mouth Daily., Disp: 30 tablet, Rfl: 5  •  penciclovir (DENAVIR) 1 % cream, Apply 1 application topically to the appropriate area as directed As Needed (fever blisters)., Disp: , Rfl:   •  rivaroxaban (Xarelto) 20 MG tablet, Take 1 tablet by mouth Daily With Dinner., Disp: 30 tablet, Rfl: 6  •  sertraline (ZOLOFT) 100 MG tablet, Take 100 mg by mouth Daily., Disp: , Rfl:   •  traZODone (DESYREL) 150 MG tablet, Take 1 tablet by mouth Every Night., Disp: , Rfl:     Review of Systems   Constitution: Positive for malaise/fatigue.   HENT: Negative for nosebleeds.    Eyes: Negative for visual disturbance.   Cardiovascular: Positive for claudication and near-syncope. Negative for cyanosis and leg swelling.   Respiratory: Negative for hemoptysis and shortness of breath.   "  Hematologic/Lymphatic: Negative for bleeding problem. Does not bruise/bleed easily.   Skin: Positive for color change. Negative for nail changes.   Musculoskeletal: Positive for back pain. Negative for muscle weakness.   Gastrointestinal: Negative for dysphagia, hematemesis and melena.   Genitourinary: Negative for hematuria.   Neurological: Negative for dizziness, focal weakness, light-headedness, loss of balance, numbness, paresthesias and weakness.   Psychiatric/Behavioral: Negative for altered mental status.   All other systems reviewed and are negative.       Objective   Vitals:    09/22/20 0837   BP: 100/79   BP Location: Left arm   Patient Position: Sitting   Cuff Size: Adult   Pulse: 103   SpO2: 98%   Weight: 65.8 kg (145 lb)   Height: 157.5 cm (62\")     Body mass index is 26.52 kg/m².  Physical Exam   Constitutional: She is oriented to person, place, and time.   HENT:   Head: Atraumatic.   Neck: Neck supple. Carotid bruit is not present.   Cardiovascular: Normal heart sounds.   Pulses:       Dorsalis pedis pulses are 2+ on the right side and 1+ on the left side.        Posterior tibial pulses are 2+ on the right side and 1+ on the left side.   Pulmonary/Chest: Effort normal and breath sounds normal.   Abdominal: Soft. Bowel sounds are normal.   Musculoskeletal: Normal range of motion.      Comments: Gait slow   Neurological: She is alert and oriented to person, place, and time.   Skin: Skin is warm. Capillary refill takes less than 2 seconds.   Psychiatric: Thought content normal.   Vitals reviewed.          Assessment & Plan     Independent Review of Radiographic Studies:  Detailed discussion regarding risks, benefits, and treatment plan. Images independently reviewed. Patient understands, agrees, and wishes to proceed with plan.     1. Peripheral vascular disease (CMS/HCC)  moderate reduction Arterial Flow left Lower Extremity improved  Medical Management: XARELTO,PLAVIX,STATIN   Medication Assistance: " Evaluate for low income program (information given)   If signs and symptoms of ischemia should occur including but not limited to pale/blue discoloration of limb, increasing pain with ambulation or at rest, or a non-healing wound. Patient is to notify Heart and Vascular center for immediate evaluation.   Return 3 mos  - Doppler Ankle Brachial Index Single Level CAR; Future  - Duplex Lower Extremity Art / Grafts - Left CAR; Future    2. Benign essential hypertension  Controlled.     3. Hypercholesteremia  Lipid-lowering therapy has been proven beneficial in patients with cardio-vascular disease. Current guidelines recommend statin treatment for all patients with PAD,CAD and carotid stenosis. Statins are beneficial in preventing cardiovascular events, increasing functional capacity and lower the risk of adverse limb loss in PAD. Statins decrease the progression of plaque formation and may improve peripheral vessel lining, and aid in reversing atherosclerosis.       4. Chronic obstructive pulmonary disease, unspecified COPD type (CMS/HCC)      5. Nicotine abuse  Smoking cessation assistance options offered including behavioral counseling (Smoking Cessation Classes), Nicotine replacement therapy (patches or gum), pharmacologic therapy (Chantix, Wellbutrin). Understands tobacco increases risk of expanding AAA, MI, CVA, PAD, carcinoma. Discussion and question answer period 5-7 minutes. Mona Perales  reports that she quit smoking about 19 months ago. Her smoking use included cigarettes. She has a 50.00 pack-year smoking history. She has never used smokeless tobacco.. I have educated her on the risk of diseases from using tobacco products such as cancer, COPD and heart diease.     I advised her to quit and she is willing to quit. We have discussed the following method/s for tobacco cessation:  Education Material Counseling OTC Cessation Products.  Together we have set a quit date for 3 month FU appointment.  She will  follow up with me in 3 months or sooner to check on her progress.    I spent 10 minutes counseling the patient.      6. Current use of long term anticoagulation  Anticoagulation: Xarelto  Notify provider if you experience excessive bleeding from the nose, cuts, gums, rectum, urinary tract, or vagina. Reddish or brown urine or stool. Vomiting of blood or hemorrhoidal bleeding. If major injury occurs present to the Emergency Department.                This document has been electronically signed by DELFINO Garcia on September 22, 2020 11:02 CDT

## 2021-01-29 ENCOUNTER — OFFICE VISIT (OUTPATIENT)
Dept: CARDIAC SURGERY | Facility: CLINIC | Age: 71
End: 2021-01-29

## 2021-01-29 VITALS
OXYGEN SATURATION: 96 % | TEMPERATURE: 97.8 F | DIASTOLIC BLOOD PRESSURE: 79 MMHG | HEART RATE: 84 BPM | SYSTOLIC BLOOD PRESSURE: 121 MMHG | HEIGHT: 62 IN | BODY MASS INDEX: 27.42 KG/M2 | WEIGHT: 149 LBS

## 2021-01-29 DIAGNOSIS — M79.605 PAIN OF LEFT LOWER EXTREMITY: ICD-10-CM

## 2021-01-29 DIAGNOSIS — I73.9 PERIPHERAL VASCULAR DISEASE (HCC): Primary | ICD-10-CM

## 2021-01-29 DIAGNOSIS — I10 BENIGN ESSENTIAL HYPERTENSION: ICD-10-CM

## 2021-01-29 DIAGNOSIS — E78.00 HYPERCHOLESTEREMIA: ICD-10-CM

## 2021-01-29 DIAGNOSIS — J44.9 CHRONIC OBSTRUCTIVE PULMONARY DISEASE, UNSPECIFIED COPD TYPE (HCC): ICD-10-CM

## 2021-01-29 PROCEDURE — 99214 OFFICE O/P EST MOD 30 MIN: CPT | Performed by: NURSE PRACTITIONER

## 2021-01-29 RX ORDER — TRAMADOL HYDROCHLORIDE 50 MG/1
50 TABLET ORAL EVERY 6 HOURS PRN
Qty: 40 TABLET | Refills: 0 | Status: SHIPPED | OUTPATIENT
Start: 2021-01-29 | End: 2021-09-21 | Stop reason: HOSPADM

## 2021-01-29 RX ORDER — CILOSTAZOL 100 MG/1
100 TABLET ORAL
Qty: 60 TABLET | Refills: 11 | Status: SHIPPED | OUTPATIENT
Start: 2021-01-29 | End: 2021-07-26 | Stop reason: SDUPTHER

## 2021-07-26 ENCOUNTER — LAB (OUTPATIENT)
Dept: LAB | Facility: HOSPITAL | Age: 71
End: 2021-07-26

## 2021-07-26 ENCOUNTER — OFFICE VISIT (OUTPATIENT)
Dept: CARDIAC SURGERY | Facility: CLINIC | Age: 71
End: 2021-07-26

## 2021-07-26 VITALS
HEIGHT: 62 IN | HEART RATE: 64 BPM | SYSTOLIC BLOOD PRESSURE: 118 MMHG | TEMPERATURE: 98.2 F | BODY MASS INDEX: 24.77 KG/M2 | WEIGHT: 134.6 LBS | OXYGEN SATURATION: 93 % | DIASTOLIC BLOOD PRESSURE: 76 MMHG

## 2021-07-26 DIAGNOSIS — I65.23 BILATERAL CAROTID ARTERY STENOSIS: ICD-10-CM

## 2021-07-26 DIAGNOSIS — I73.9 PERIPHERAL VASCULAR DISEASE (HCC): ICD-10-CM

## 2021-07-26 DIAGNOSIS — I70.222 ATHEROSCLEROSIS OF NATIVE ARTERIES OF EXTREMITIES WITH REST PAIN, LEFT LEG (HCC): ICD-10-CM

## 2021-07-26 DIAGNOSIS — J44.9 CHRONIC OBSTRUCTIVE PULMONARY DISEASE, UNSPECIFIED COPD TYPE (HCC): ICD-10-CM

## 2021-07-26 DIAGNOSIS — I10 BENIGN ESSENTIAL HYPERTENSION: Primary | ICD-10-CM

## 2021-07-26 LAB
ALBUMIN SERPL-MCNC: 4 G/DL (ref 3.5–5.2)
ALBUMIN/GLOB SERPL: 1.1 G/DL
ALP SERPL-CCNC: 119 U/L (ref 39–117)
ALT SERPL W P-5'-P-CCNC: <5 U/L (ref 1–33)
ANION GAP SERPL CALCULATED.3IONS-SCNC: 13 MMOL/L (ref 5–15)
AST SERPL-CCNC: 21 U/L (ref 1–32)
BILIRUB SERPL-MCNC: 0.3 MG/DL (ref 0–1.2)
BUN SERPL-MCNC: 18 MG/DL (ref 8–23)
BUN/CREAT SERPL: 22 (ref 7–25)
CALCIUM SPEC-SCNC: 9.1 MG/DL (ref 8.6–10.5)
CHLORIDE SERPL-SCNC: 95 MMOL/L (ref 98–107)
CO2 SERPL-SCNC: 22 MMOL/L (ref 22–29)
CREAT SERPL-MCNC: 0.82 MG/DL (ref 0.57–1)
GFR SERPL CREATININE-BSD FRML MDRD: 69 ML/MIN/1.73
GLOBULIN UR ELPH-MCNC: 3.8 GM/DL
GLUCOSE SERPL-MCNC: 97 MG/DL (ref 65–99)
POTASSIUM SERPL-SCNC: 4.4 MMOL/L (ref 3.5–5.2)
PROT SERPL-MCNC: 7.8 G/DL (ref 6–8.5)
SODIUM SERPL-SCNC: 130 MMOL/L (ref 136–145)

## 2021-07-26 PROCEDURE — 36415 COLL VENOUS BLD VENIPUNCTURE: CPT

## 2021-07-26 PROCEDURE — 80053 COMPREHEN METABOLIC PANEL: CPT

## 2021-07-26 PROCEDURE — 99215 OFFICE O/P EST HI 40 MIN: CPT | Performed by: NURSE PRACTITIONER

## 2021-07-26 RX ORDER — CLOPIDOGREL BISULFATE 75 MG/1
75 TABLET ORAL DAILY
Qty: 30 TABLET | Refills: 11 | Status: ON HOLD | OUTPATIENT
Start: 2021-07-26 | End: 2022-02-20

## 2021-07-26 RX ORDER — CILOSTAZOL 100 MG/1
100 TABLET ORAL
Qty: 60 TABLET | Refills: 11 | Status: SHIPPED | OUTPATIENT
Start: 2021-07-26 | End: 2022-05-02 | Stop reason: SDUPTHER

## 2021-07-26 NOTE — PATIENT INSTRUCTIONS
moderate-severe reduction Arterial Flow right Lower Extremity worsened   Moderate reduction arterial flow left lower extremity  Proceed with CTA Runoff  Office will call results  Medical Management: XARELTO,Restart PLAVIX,STATIN   If signs and symptoms of ischemia should occur including but not limited to pale/blue discoloration of limb, increasing pain with ambulation or at rest, or a non-healing wound. Patient is to notify Heart and Vascular center for immediate evaluation.     Saline Nose Spray twice daily

## 2021-07-26 NOTE — PROGRESS NOTES
CVTS Office Progress Note       Subjective   Patient ID: Mona Perales is a 70 y.o. female is here today for follow-up.    Chief Complaint:    Chief Complaint   Patient presents with   • Peripheral Vascular Disease       The following portions of the patient's history were reviewed and updated as appropriate: allergies, current medications, past family history, past medical history, past social history, past surgical history and problem list.  Recent images independently reviewed.  Available laboratory values reviewed.    PCP:  Lisa Alejandro APRN  Cardiology:  Kimmy     70 y.o. female with HTN(stable, increased risk stroke, rupture), Hyperlipidemia(stable, increased risk cardiovascular events), Smoker(uncontrolled, increased risk cardiovascular events), COPD(stable, increased risk pulmonary complications) and Atrial fibrillation(stable, increased risk stroke)  Anticoagulation (Xarelto, stable) Hx PE (stable). PVD (LSCA stent 2005, B iliac stents 2008) lost to follow up.  former smoker and Recent Cessation.  Increasing pain LEFT foot, lifestyle limiting. Difficulty ambulating. VAS 10. No TIA stroke amaurosis.  No MI claudication. No other associated signs, symptoms or modifying factors. Pharmacy discontinued Xarelto (5/2020). Worsening LEFT lower extremity pain similar to pre-surgery approx 1 month. Underwent additional angio procedure. Improved. Confused on medications, off Plavix x 2 months. Now with worsening LEFT lower extremity pain.  Returns for follow up.      2005: LSCA Stent  2008: Bilateral Iliac stents  3/23/2020:  CHELSIE: RIGHT 0.83 Triphasic  LEFT 0.31 Biphasic/Monophasic (DP/PT)  3/26/2020: CTA Runoff: Occlusion of the proximal left superficial femoral artery with  reconstitution of the distal left SFA. The popliteal artery  appears occluded distally, with reconstitution of the arteries of  the trifurcation. The left calf vessels are patent, as above.Patent left common iliac and proximal left  external iliac stents. There appears to be mild luminal narrowing within the  left external iliac stent. Stenosis of the origins of the superior mesenteric artery and  bilateral renal arteries as above. Irregular narrowing of the right superficial femoral artery,  as described above without occlusion. The right popliteal artery  and the right calf arteries are patent, as above.  4/14/2020: LEFT Femoral-popliteal Bypass PTFE  5/22/2020:  CHELSIE: RIGHT 1.1 Triphasic LEFT 0.79 Tri/Biphasic (PT/DP)   Patent LEFT graft. mPSV 237cm/s (native) Triphasic  8/25/2020:  CHELSIE: RIGHT 0.67 Tri/Biphasic (DP/PT) LEFT 0.52 Tri/Biphasic (PT/DP). Patent LEFT Fem-Pop graft. Native prx (333cm/s) Triphasic.   9/9/2020: LEFT/RIGHT OBDULIA PTA  9/22/2020:  CHELSIE: RIGHT 1.2 Triphasic LEFT 0.81 Biphasic  1/29/2021: CHELSIE: RIGHT 0.92 Tripahsic LEFT 0.75 Tri/Biphasic (PT/DP) Patent PTFE graft LEFT native [id number].   7/26/21: CHELSIE: RIGHT 0.62 Biphasic LEFT 0.85 Tri/Biphasic (PT/DP)     2016: Carotid Duplex: BABS 0-49% LICA 0-49%  2017: Carotid duplex: BABS 0-49% LICA 0-49%  2018: Carotid duplex: BABS 0-49% LICA 0-49%      Past Medical History:   Diagnosis Date   • A-fib (CMS/[facility])    • Anxiety    • Arthritis    • Asthma    • 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 with ulceration (CMS/[facility])     bilateral legs - bilateral iliac stents 2008      • CHF (congestive heart failure) (CMS/[facility])    • Chronic lower back pain    • COPD (chronic obstructive pulmonary disease) (CMS/[facility])    • Essential (primary) hypertension    • GERD (gastroesophageal reflux disease)    • History of pulmonary embolus (PE)    • Hyperlipidemia    • Insomnia    • Lumbago    • Nicotine dependence    • Occlusion of artery      and stenosis of bilateral carotid arteries - BABS 16-49%, LICA 0-15%   • Other atherosclerosis of native artery of other extremity     LEFT subclavian stent 2005 (occluded)   • Sleep apnea      Past Surgical History:   Procedure Laterality Date   • CARDIAC  CATHETERIZATION  04/06/2016    No evidence of any obstructive epicardial CAD.Preserved LV systolic function with EF of 55%.   • CENTRAL VENOUS LINE INSERTION  04/07/2016    Successful placement of right uppe extremity 6-[place]yr[place] triple lumen PICC line.   • ENDOSCOPY N/A 1/12/2018    Procedure: ESOPHAGOGASTRODUODENOSCOPY;  Surgeon: Bin Martin MD;  Location: [facility] ENDOSCOPY;  Service:    • ENDOSCOPY N/A 1/17/2018    Procedure: ESOPHAGOGASTRODUODENOSCOPY;  Surgeon: Bin Martin MD;  Location: [facility] ENDOSCOPY;  Service:    • ENDOSCOPY N/A 3/16/2018    Procedure: ESOPHAGOGASTRODUODENOSCOPY possible dilation;  Surgeon: Bin Martin MD;  Location: [facility] ENDOSCOPY;  Service: Gastroenterology   • FEMORAL POPLITEAL BYPASS Left 4/14/2020    Procedure: FEMORAL POPLITEAL BYPASS ABOVE KNEE  (POLYTETRAFLUOROETHYLENE)  LEFT COMMON FEMORAL ARTERY ENDARTERECTOMY PTA LEFT ILIAC ARTERIOGRAM        (c-arm#2 and c-arm table);  Surgeon: David Suh MD;  Location: [facility] OR;  Service: Vascular;  Laterality: Left;   • HYSTERECTOMY     • INTERVENTIONAL RADIOLOGY PROCEDURE Left 9/9/2020    Procedure: IR angiogram extremity left;  Surgeon: David Suh MD;  Location: [facility] ANGIO INVASIVE LOCATION;  Service: Interventional Radiology;  Laterality: Left;   • KYPHOPLASTY N/A 5/23/2019    Procedure: KYPHOPLASTY LUMBAR FOUR;  Surgeon: Aba Santa MD;  Location: [facility] OR;  Service: Orthopedic Spine   • TOTAL SHOULDER REPLACEMENT Left    • TRANSESOPHAGEAL ECHOCARDIOGRAM (JACQUES)  04/07/2016    With color flow-Mild to moderate [facility].LV systolic function well preserved with Ef of 55-60%.Mitral and AV intact.Diastolic dysfunction   • UPPER GASTROINTESTINAL ENDOSCOPY  01/17/2018    Dr. Bin Martin M.D.     Family History   Problem Relation Age of Onset   • Heart disease Other    • Hypertension Other      Social History     Socioeconomic History   • Marital status:      Spouse name: Not on file    • Number of children: Not on file   • Years of education: Not on file   • Highest education level: Not on file   Tobacco Use   • Smoking status: Former Smoker     Years: 50.00     Types: Cigarettes     Quit date: 2019     Years since quittin.4   • Smokeless tobacco: Never Used   Substance and Sexual Activity   • Alcohol use: No   • Drug use: No   • Sexual activity: Defer         ALLERGIES:   Morphine and related and Penicillins    MEDICATIONS:      Current Outpatient Medications:   •  albuterol (PROVENTIL) (2.5 MG/3ML) 0.083% nebulizer solution, Take 2.5 mg by nebulization Every 4 (Four) Hours As Needed for Wheezing., Disp: , Rfl:   •  ALBUTEROL SULFATE HFA IN, Inhale 2 puffs Every 4 (Four) Hours., Disp: , Rfl:   •  amitriptyline (ELAVIL) 25 MG tablet, TAKE 1 TABLET (25 MG TOTAL) BY MOUTH NIGHTLY., Disp: , Rfl:   •  atorvastatin (LIPITOR) 20 MG tablet, Take 1 tablet by mouth Every Night., Disp: 30 tablet, Rfl: 11  •  cilostazol (PLETAL) 100 MG tablet, Take 1 tablet by mouth 2 (Two) Times a Day Before Meals., Disp: 60 tablet, Rfl: 11  •  cyclobenzaprine (FLEXERIL) 5 MG tablet, Take 1 tablet by mouth 3 (Three) Times a Day As Needed for Muscle Spasms., Disp: 90 tablet, Rfl: 2  •  furosemide (LASIX) 20 MG tablet, Take 1 tablet by mouth Daily., Disp: 30 tablet, Rfl: 1  •  gabapentin (NEURONTIN) 800 MG tablet, Take 800 mg by mouth 2 (Two) Times a Day., Disp: , Rfl:   •  pantoprazole (PROTONIX) 40 MG EC tablet, Take 1 tablet by mouth Daily., Disp: 30 tablet, Rfl: 5  •  penciclovir (DENAVIR) 1 % cream, Apply 1 application topically to the appropriate area as directed As Needed (fever blisters)., Disp: , Rfl:   •  rivaroxaban (Xarelto) 20 MG tablet, Take 1 tablet by mouth Daily With Dinner., Disp: 30 tablet, Rfl: 6  •  sertraline (ZOLOFT) 100 MG tablet, Take 100 mg by mouth Daily., Disp: , Rfl:   •  traMADol (Ultram) 50 MG tablet, Take 1 tablet by mouth Every 6 (Six) Hours As Needed for Moderate Pain ., Disp: 40  "tablet, Rfl: 0  •  traZODone (DESYREL) 150 MG tablet, Take 1 tablet by mouth Every Night., Disp: , Rfl:   •  clopidogrel (PLAVIX) 75 MG tablet, Take 1 tablet by mouth Daily., Disp: 30 tablet, Rfl: 11  •  docusate sodium 100 MG capsule, Take 100 mg by mouth 2 (Two) Times a Day. (Patient taking differently: Take 100 mg by mouth Daily As Needed.), Disp: , Rfl:     Review of Systems   Constitutional: Positive for malaise/fatigue.   HENT: Positive for nosebleeds.    Eyes: Negative for visual disturbance.   Cardiovascular: Positive for claudication and near-syncope. Negative for cyanosis and leg swelling.   Respiratory: Negative for hemoptysis and shortness of breath.    Hematologic/Lymphatic: Negative for bleeding problem. Does not bruise/bleed easily.   Skin: Positive for color change. Negative for nail changes.   Musculoskeletal: Positive for back pain. Negative for muscle weakness.   Gastrointestinal: Negative for dysphagia, hematemesis and melena.   Genitourinary: Negative for hematuria.   Neurological: Positive for dizziness and loss of balance. Negative for focal weakness, light-headedness, numbness, paresthesias and weakness.   Psychiatric/Behavioral: Negative for altered mental status.   All other systems reviewed and are negative.       Objective   Vitals:    07/26/21 1028   BP: 118/76   BP Location: Right arm   Pulse: 64   Temp: 98.2 °F (36.8 °C)   TempSrc: Infrared   SpO2: 93%   Weight: 61.1 kg (134 lb 9.6 oz)   Height: 157.5 cm (62\")     Body mass index is 24.62 kg/m².  Physical Exam   Constitutional: She is oriented to person, place, and time.   HENT:   Head: Atraumatic.   Neck: Carotid bruit is not present.   Cardiovascular: Normal heart sounds.   Pulses:       Dorsalis pedis pulses are 1+ on the right side and 1+ on the left side.        Posterior tibial pulses are 1+ on the right side and 1+ on the left side.   Pulmonary/Chest: Effort normal and breath sounds normal.   Abdominal: Soft. Bowel sounds are " normal.   Neurological: She is alert and oriented to person, place, and time. Gait (WC) abnormal.   Skin: Skin is warm. Capillary refill takes 2 to 3 seconds.   Psychiatric: Thought content normal.   Vitals reviewed.          Assessment & Plan     Independent Review of Radiographic Studies:  Detailed discussion regarding risks, benefits, and treatment plan. Images independently reviewed. Patient understands, agrees, and wishes to proceed with plan.     1. Peripheral vascular disease (CMS/HCC)  moderate-severe reduction Arterial Flow right Lower Extremity worsened   Moderate reduction arterial flow left lower extremity  Proceed with CTA Runoff  Office will call results  Medical Management: XARELTO,Restart PLAVIX,STATIN   If signs and symptoms of ischemia should occur including but not limited to pale/blue discoloration of limb, increasing pain with ambulation or at rest, or a non-healing wound. Patient is to notify [facility] center for immediate evaluation.   - cilostazol (PLETAL) 100 MG tablet; Take 1 tablet by mouth 2 (Two) Times a Day Before Meals.  Dispense: 60 tablet; Refill: 11  - CT Angio Abdominal Aorta Bilateral Iliofem Runoff; Future  - Comprehensive Metabolic Panel; Future    2. Atherosclerosis of native arteries of extremities with rest pain, left leg (CMS/HCC)  - clopidogrel (PLAVIX) 75 MG tablet; Take 1 tablet by mouth Daily.  Dispense: 30 tablet; Refill: 11  - CT Angio Abdominal Aorta Bilateral Iliofem Runoff; Future    3. Benign essential hypertension  Controlled.       4. Chronic obstructive pulmonary disease, unspecified COPD type (CMS/HCC)  Controlled.     5. Bilateral carotid artery stenosis  If you should experience any neurological symptoms including but not limited to visual or speech disturbances confusion, seizures, or weakness of limbs of one side of your body notify [facility] Vascular center immediately for evaluation or if after hours present to the nearest Emergency Department.       Samples of Xarelto 20mg daily #12 were given to the patient.    Quantity 12 boxes  Lot # [id number]  Exp Date:  2/23    Notify provider if you experience excessive bleeding from the nose, cuts, gums, rectum, urinary tract, or vagina. Reddish or brown urine or stool. Vomiting of blood or hemorrhoidal bleeding. If major injury occurs present to the Emergency Department.       Patient's Body mass index is 24.62 kg/m². indicating that she is within normal range (BMI 18.5-24.9). No BMI management plan needed..       Advance Care Planning discussed and  Informational packet given to patient. Advance Care Plan on file: No    Time spent 40 minutes in face to face evaluation, reviewing of medical history, tests, and procedures. Independent interpretation of vascular studies, ordering additional tests, documentation, and coordination of care.   Counseling and education with the patient and family regarding treatment options, plan of care, and hoped for outcomes. All questions answered.           This document has been electronically signed by DELFINO Garcia on July 26, 2021 11:11 CDT

## 2021-07-29 ENCOUNTER — HOSPITAL ENCOUNTER (OUTPATIENT)
Dept: ULTRASOUND IMAGING | Facility: HOSPITAL | Age: 71
Discharge: HOME | End: 2021-07-29

## 2021-07-29 ENCOUNTER — HOSPITAL ENCOUNTER (OUTPATIENT)
Dept: INTERVENTIONAL RADIOLOGY/VASCULAR | Facility: HOSPITAL | Age: 71
Discharge: HOME | End: 2021-07-29

## 2021-07-29 ENCOUNTER — HOSPITAL ENCOUNTER (OUTPATIENT)
Dept: CT IMAGING | Facility: HOSPITAL | Age: 71
Discharge: HOME | End: 2021-07-29

## 2021-07-29 DIAGNOSIS — I73.9 PERIPHERAL VASCULAR DISEASE (HCC): ICD-10-CM

## 2021-07-29 DIAGNOSIS — I70.222 ATHEROSCLEROSIS OF NATIVE ARTERIES OF EXTREMITIES WITH REST PAIN, LEFT LEG (HCC): ICD-10-CM

## 2021-07-29 PROCEDURE — 75635 CT ANGIO ABDOMINAL ARTERIES: CPT

## 2021-07-29 PROCEDURE — 76937 US GUIDE VASCULAR ACCESS: CPT

## 2021-07-29 PROCEDURE — 36410 VNPNXR 3YR/> PHY/QHP DX/THER: CPT

## 2021-07-29 PROCEDURE — C1751 CATH, INF, PER/CENT/MIDLINE: HCPCS

## 2021-07-29 PROCEDURE — 0 IOPAMIDOL PER 1 ML: Performed by: NURSE PRACTITIONER

## 2021-07-29 RX ORDER — SODIUM CHLORIDE 9 MG/ML
100 INJECTION, SOLUTION INTRAVENOUS
Status: DISCONTINUED | OUTPATIENT
Start: 2021-07-29 | End: 2021-07-30 | Stop reason: HOSPADM

## 2021-07-29 RX ADMIN — IOPAMIDOL 95 ML: 755 INJECTION, SOLUTION INTRAVENOUS at 12:59

## 2021-07-29 NOTE — PROGRESS NOTES
TWO PATIENT IDENTIFIERS WERE USED. THE PATIENT WAS DRAPED AND PREPPED IN USUAL STERILE TECHNIQUE. ULTRASOUND WAS USED TO LOCALIZE THERIGHT BASILIC VEIN. AT THAT POINT, THE SKIN WAS ANESTHETIZED WITH 2% LIDOCAINE. A 21 GAUGE NEEDLE WAS INSERTED INTO THERIGHT BASILIC VEIN AT THAT POINT AN 0.018 WIRE WAS INSERTED THROUG THE NEEDLE AND THE NEEDLE WAS REMOVED. A 4FR. CATHETER WAS PLACED OVER THE WIRE INTO THE VEIN. THE WIRE WAS REMOVED. CATHETER WAS FLUSHED WITH NORMAL SALINE AND SECURED WITH A TEGADERM. THIS WAS DONE in U/S. PATIENT TOLERATED PROCEDURE WELL.    IMPRESSION: SUCCESSFUL PLACEMENT OF MIDLINE      Annalise Pena RN  7/29/2021  12:06 CDT

## 2021-08-05 ENCOUNTER — TELEPHONE (OUTPATIENT)
Dept: CARDIAC SURGERY | Facility: CLINIC | Age: 71
End: 2021-08-05

## 2021-08-05 NOTE — TELEPHONE ENCOUNTER
CTA Runoff reviewed with Dr. George and results called to patient. No intervention indicated for LEFT lower extremity pain. RIGHT lower extremity will require RIGHT CFA Endarterectomy / Fem-Pop Bypass AK PTFE for revascularization. Patient continues to report bilateral lower extremity pain. Findings and plan discussed at length. Tentative schedule will notify patient of plan. She verbalized understanding.           This document has been electronically signed by DELFINO Garcia on August 5, 2021 14:02 CDT

## 2021-08-16 ENCOUNTER — TELEPHONE (OUTPATIENT)
Dept: CARDIAC SURGERY | Facility: CLINIC | Age: 71
End: 2021-08-16

## 2021-08-16 ENCOUNTER — PREP FOR SURGERY (OUTPATIENT)
Dept: OTHER | Facility: HOSPITAL | Age: 71
End: 2021-08-16

## 2021-08-16 DIAGNOSIS — I73.9 PVD (PERIPHERAL VASCULAR DISEASE) (HCC): Primary | ICD-10-CM

## 2021-08-16 RX ORDER — SODIUM CHLORIDE 0.9 % (FLUSH) 0.9 %
10 SYRINGE (ML) INJECTION AS NEEDED
Status: CANCELLED | OUTPATIENT
Start: 2021-09-17

## 2021-08-16 RX ORDER — BUPIVACAINE HCL/0.9 % NACL/PF 0.1 %
2 PLASTIC BAG, INJECTION (ML) EPIDURAL ONCE
Status: CANCELLED | OUTPATIENT
Start: 2021-09-17 | End: 2021-08-16

## 2021-08-16 RX ORDER — SODIUM CHLORIDE 9 MG/ML
100 INJECTION, SOLUTION INTRAVENOUS CONTINUOUS
Status: CANCELLED | OUTPATIENT
Start: 2021-09-17

## 2021-08-16 RX ORDER — SODIUM CHLORIDE 0.9 % (FLUSH) 0.9 %
10 SYRINGE (ML) INJECTION EVERY 12 HOURS SCHEDULED
Status: CANCELLED | OUTPATIENT
Start: 2021-09-17

## 2021-08-25 ENCOUNTER — PRE-ADMISSION TESTING (OUTPATIENT)
Dept: PREADMISSION TESTING | Facility: HOSPITAL | Age: 71
End: 2021-08-25

## 2021-08-25 VITALS
OXYGEN SATURATION: 92 % | RESPIRATION RATE: 20 BRPM | HEIGHT: 62 IN | WEIGHT: 138 LBS | DIASTOLIC BLOOD PRESSURE: 82 MMHG | SYSTOLIC BLOOD PRESSURE: 148 MMHG | BODY MASS INDEX: 25.4 KG/M2 | HEART RATE: 65 BPM

## 2021-08-25 DIAGNOSIS — I73.9 PVD (PERIPHERAL VASCULAR DISEASE) (HCC): ICD-10-CM

## 2021-08-25 LAB
ABO GROUP BLD: NORMAL
ANION GAP SERPL CALCULATED.3IONS-SCNC: 10 MMOL/L (ref 5–15)
BLD GP AB SCN SERPL QL: NEGATIVE
BUN SERPL-MCNC: 13 MG/DL (ref 8–23)
BUN/CREAT SERPL: 17.6 (ref 7–25)
CALCIUM SPEC-SCNC: 9.2 MG/DL (ref 8.6–10.5)
CHLORIDE SERPL-SCNC: 96 MMOL/L (ref 98–107)
CO2 SERPL-SCNC: 26 MMOL/L (ref 22–29)
CREAT SERPL-MCNC: 0.74 MG/DL (ref 0.57–1)
DEPRECATED RDW RBC AUTO: 49.5 FL (ref 37–54)
ERYTHROCYTE [DISTWIDTH] IN BLOOD BY AUTOMATED COUNT: 16 % (ref 12.3–15.4)
GFR SERPL CREATININE-BSD FRML MDRD: 78 ML/MIN/1.73
GLUCOSE SERPL-MCNC: 94 MG/DL (ref 65–99)
HCT VFR BLD AUTO: 37.1 % (ref 34–46.6)
HGB BLD-MCNC: 12.3 G/DL (ref 12–15.9)
Lab: NORMAL
MCH RBC QN AUTO: 28.1 PG (ref 26.6–33)
MCHC RBC AUTO-ENTMCNC: 33.2 G/DL (ref 31.5–35.7)
MCV RBC AUTO: 84.9 FL (ref 79–97)
PLATELET # BLD AUTO: 340 10*3/MM3 (ref 140–450)
PMV BLD AUTO: 8.8 FL (ref 6–12)
POTASSIUM SERPL-SCNC: 3.7 MMOL/L (ref 3.5–5.2)
RBC # BLD AUTO: 4.37 10*6/MM3 (ref 3.77–5.28)
RH BLD: POSITIVE
SODIUM SERPL-SCNC: 132 MMOL/L (ref 136–145)
T&S EXPIRATION DATE: NORMAL
WBC # BLD AUTO: 10.98 10*3/MM3 (ref 3.4–10.8)

## 2021-08-25 PROCEDURE — 36415 COLL VENOUS BLD VENIPUNCTURE: CPT

## 2021-08-25 PROCEDURE — 80048 BASIC METABOLIC PNL TOTAL CA: CPT

## 2021-08-25 PROCEDURE — 86850 RBC ANTIBODY SCREEN: CPT

## 2021-08-25 PROCEDURE — 93010 ELECTROCARDIOGRAM REPORT: CPT | Performed by: INTERNAL MEDICINE

## 2021-08-25 PROCEDURE — 85027 COMPLETE CBC AUTOMATED: CPT

## 2021-08-25 PROCEDURE — 86901 BLOOD TYPING SEROLOGIC RH(D): CPT

## 2021-08-25 PROCEDURE — 86900 BLOOD TYPING SEROLOGIC ABO: CPT

## 2021-08-25 PROCEDURE — 93005 ELECTROCARDIOGRAM TRACING: CPT

## 2021-08-25 RX ORDER — AMLODIPINE BESYLATE 10 MG/1
10 TABLET ORAL NIGHTLY
COMMUNITY
End: 2022-02-25 | Stop reason: HOSPADM

## 2021-08-25 RX ORDER — BUDESONIDE AND FORMOTEROL FUMARATE DIHYDRATE 160; 4.5 UG/1; UG/1
2 AEROSOL RESPIRATORY (INHALATION) 2 TIMES DAILY PRN
COMMUNITY

## 2021-08-25 RX ORDER — PRAVASTATIN SODIUM 20 MG
20 TABLET ORAL NIGHTLY
COMMUNITY
End: 2022-05-02 | Stop reason: SDUPTHER

## 2021-08-25 RX ORDER — ERGOCALCIFEROL 1.25 MG/1
50000 CAPSULE ORAL WEEKLY
COMMUNITY

## 2021-08-25 RX ORDER — ONDANSETRON 4 MG/1
4 TABLET, FILM COATED ORAL EVERY 8 HOURS PRN
COMMUNITY

## 2021-08-27 ENCOUNTER — TELEPHONE (OUTPATIENT)
Dept: CARDIAC SURGERY | Facility: CLINIC | Age: 71
End: 2021-08-27

## 2021-08-27 NOTE — TELEPHONE ENCOUNTER
Spoke to pt to inform of procedure being postponed due to hospital being at capacity. Informed we would contact her with new procedure date asap, she verbalized understanding.

## 2021-08-31 ENCOUNTER — TELEPHONE (OUTPATIENT)
Dept: CARDIAC SURGERY | Facility: CLINIC | Age: 71
End: 2021-08-31

## 2021-08-31 NOTE — TELEPHONE ENCOUNTER
Spoke to patient regarding rescheduling procedure (cancelled due to no beds in hospital) Offered 9/17/21, patient stated that date would work for her. Informed of COVID testing appt 9/14/21 at 9:00am, stop plavix 9/14/21 and stop xarelto 9/15/21 per Dr. Suh. Pt verbalized understanding.

## 2021-09-14 ENCOUNTER — LAB (OUTPATIENT)
Dept: LAB | Facility: HOSPITAL | Age: 71
End: 2021-09-14

## 2021-09-14 DIAGNOSIS — Z01.818 PREOP TESTING: Primary | ICD-10-CM

## 2021-09-14 LAB — SARS-COV-2 N GENE RESP QL NAA+PROBE: NOT DETECTED

## 2021-09-14 PROCEDURE — C9803 HOPD COVID-19 SPEC COLLECT: HCPCS

## 2021-09-14 PROCEDURE — 87635 SARS-COV-2 COVID-19 AMP PRB: CPT

## 2021-09-17 ENCOUNTER — HOSPITAL ENCOUNTER (INPATIENT)
Facility: HOSPITAL | Age: 71
LOS: 4 days | Discharge: HOME HEALTH | End: 2021-09-21
Attending: THORACIC SURGERY (CARDIOTHORACIC VASCULAR SURGERY) | Admitting: THORACIC SURGERY (CARDIOTHORACIC VASCULAR SURGERY)

## 2021-09-17 ENCOUNTER — ANESTHESIA EVENT (OUTPATIENT)
Dept: PERIOP | Facility: HOSPITAL | Age: 71
End: 2021-09-17

## 2021-09-17 ENCOUNTER — ANESTHESIA (OUTPATIENT)
Dept: PERIOP | Facility: HOSPITAL | Age: 71
End: 2021-09-17

## 2021-09-17 ENCOUNTER — APPOINTMENT (OUTPATIENT)
Dept: GENERAL RADIOLOGY | Facility: HOSPITAL | Age: 71
End: 2021-09-17

## 2021-09-17 DIAGNOSIS — J44.1 COPD WITH EXACERBATION (HCC): ICD-10-CM

## 2021-09-17 DIAGNOSIS — I70.222 ATHEROSCLEROSIS OF NATIVE ARTERIES OF EXTREMITIES WITH REST PAIN, LEFT LEG (HCC): Primary | ICD-10-CM

## 2021-09-17 DIAGNOSIS — I73.9 PVD (PERIPHERAL VASCULAR DISEASE) (HCC): ICD-10-CM

## 2021-09-17 DIAGNOSIS — Z74.09 IMPAIRED FUNCTIONAL MOBILITY, BALANCE, GAIT, AND ENDURANCE: ICD-10-CM

## 2021-09-17 LAB
ABO GROUP BLD: NORMAL
ALBUMIN SERPL-MCNC: 4.4 G/DL (ref 3.5–5.2)
ALBUMIN/GLOB SERPL: 1.3 G/DL
ALP SERPL-CCNC: 111 U/L (ref 39–117)
ALT SERPL W P-5'-P-CCNC: 9 U/L (ref 1–33)
AMPHET+METHAMPHET UR QL: NEGATIVE
AMPHETAMINES UR QL: NEGATIVE
ANION GAP SERPL CALCULATED.3IONS-SCNC: 12 MMOL/L (ref 5–15)
AST SERPL-CCNC: 17 U/L (ref 1–32)
BARBITURATES UR QL SCN: NEGATIVE
BENZODIAZ UR QL SCN: NEGATIVE
BILIRUB SERPL-MCNC: 0.3 MG/DL (ref 0–1.2)
BLD GP AB SCN SERPL QL: NEGATIVE
BUN SERPL-MCNC: 13 MG/DL (ref 8–23)
BUN/CREAT SERPL: 16 (ref 7–25)
BUPRENORPHINE SERPL-MCNC: NEGATIVE NG/ML
CALCIUM SPEC-SCNC: 9 MG/DL (ref 8.6–10.5)
CANNABINOIDS SERPL QL: POSITIVE
CHLORIDE SERPL-SCNC: 100 MMOL/L (ref 98–107)
CO2 SERPL-SCNC: 25 MMOL/L (ref 22–29)
COCAINE UR QL: NEGATIVE
CREAT SERPL-MCNC: 0.81 MG/DL (ref 0.57–1)
DEPRECATED RDW RBC AUTO: 49.1 FL (ref 37–54)
ERYTHROCYTE [DISTWIDTH] IN BLOOD BY AUTOMATED COUNT: 15.3 % (ref 12.3–15.4)
GFR SERPL CREATININE-BSD FRML MDRD: 70 ML/MIN/1.73
GLOBULIN UR ELPH-MCNC: 3.5 GM/DL
GLUCOSE SERPL-MCNC: 96 MG/DL (ref 65–99)
HCT VFR BLD AUTO: 38 % (ref 34–46.6)
HGB BLD-MCNC: 12.2 G/DL (ref 12–15.9)
Lab: NORMAL
MCH RBC QN AUTO: 28.3 PG (ref 26.6–33)
MCHC RBC AUTO-ENTMCNC: 32.1 G/DL (ref 31.5–35.7)
MCV RBC AUTO: 88.2 FL (ref 79–97)
METHADONE UR QL SCN: NEGATIVE
OPIATES UR QL: NEGATIVE
OXYCODONE UR QL SCN: NEGATIVE
PCP UR QL SCN: NEGATIVE
PLATELET # BLD AUTO: 342 10*3/MM3 (ref 140–450)
PMV BLD AUTO: 9.1 FL (ref 6–12)
POTASSIUM SERPL-SCNC: 3.8 MMOL/L (ref 3.5–5.2)
PROPOXYPH UR QL: NEGATIVE
PROT SERPL-MCNC: 7.9 G/DL (ref 6–8.5)
RBC # BLD AUTO: 4.31 10*6/MM3 (ref 3.77–5.28)
RH BLD: POSITIVE
SODIUM SERPL-SCNC: 137 MMOL/L (ref 136–145)
T&S EXPIRATION DATE: NORMAL
TRICYCLICS UR QL SCN: POSITIVE
WBC # BLD AUTO: 9.88 10*3/MM3 (ref 3.4–10.8)

## 2021-09-17 PROCEDURE — 76000 FLUOROSCOPY <1 HR PHYS/QHP: CPT

## 2021-09-17 PROCEDURE — C1889 IMPLANT/INSERT DEVICE, NOC: HCPCS | Performed by: THORACIC SURGERY (CARDIOTHORACIC VASCULAR SURGERY)

## 2021-09-17 PROCEDURE — A4648 IMPLANTABLE TISSUE MARKER: HCPCS | Performed by: THORACIC SURGERY (CARDIOTHORACIC VASCULAR SURGERY)

## 2021-09-17 PROCEDURE — 25010000002 IOPAMIDOL 61 % SOLUTION: Performed by: THORACIC SURGERY (CARDIOTHORACIC VASCULAR SURGERY)

## 2021-09-17 PROCEDURE — 25010000002 PROTAMINE SULFATE PER 10 MG: Performed by: NURSE ANESTHETIST, CERTIFIED REGISTERED

## 2021-09-17 PROCEDURE — 35656 BPG FEMORAL-POPLITEAL: CPT | Performed by: PHYSICIAN ASSISTANT

## 2021-09-17 PROCEDURE — 94640 AIRWAY INHALATION TREATMENT: CPT

## 2021-09-17 PROCEDURE — 25010000002 HYDROMORPHONE HCL-NACL 6-0.9 MG/30ML-% SOLUTION PREFILLED SYRINGE: Performed by: THORACIC SURGERY (CARDIOTHORACIC VASCULAR SURGERY)

## 2021-09-17 PROCEDURE — C1768 GRAFT, VASCULAR: HCPCS | Performed by: THORACIC SURGERY (CARDIOTHORACIC VASCULAR SURGERY)

## 2021-09-17 PROCEDURE — 86901 BLOOD TYPING SEROLOGIC RH(D): CPT | Performed by: ANESTHESIOLOGY

## 2021-09-17 PROCEDURE — 04CK0ZZ EXTIRPATION OF MATTER FROM RIGHT FEMORAL ARTERY, OPEN APPROACH: ICD-10-PCS | Performed by: THORACIC SURGERY (CARDIOTHORACIC VASCULAR SURGERY)

## 2021-09-17 PROCEDURE — 80306 DRUG TEST PRSMV INSTRMNT: CPT | Performed by: ANESTHESIOLOGY

## 2021-09-17 PROCEDURE — 86900 BLOOD TYPING SEROLOGIC ABO: CPT | Performed by: ANESTHESIOLOGY

## 2021-09-17 PROCEDURE — 25010000002 PHENYLEPHRINE 10 MG/ML SOLUTION: Performed by: NURSE ANESTHETIST, CERTIFIED REGISTERED

## 2021-09-17 PROCEDURE — 25010000003 LIDOCAINE 1 % SOLUTION: Performed by: NURSE ANESTHETIST, CERTIFIED REGISTERED

## 2021-09-17 PROCEDURE — 86850 RBC ANTIBODY SCREEN: CPT | Performed by: ANESTHESIOLOGY

## 2021-09-17 PROCEDURE — 25010000002 NEOSTIGMINE 10 MG/10ML SOLUTION: Performed by: NURSE ANESTHETIST, CERTIFIED REGISTERED

## 2021-09-17 PROCEDURE — 25010000002 ONDANSETRON PER 1 MG: Performed by: NURSE ANESTHETIST, CERTIFIED REGISTERED

## 2021-09-17 PROCEDURE — 35656 BPG FEMORAL-POPLITEAL: CPT | Performed by: THORACIC SURGERY (CARDIOTHORACIC VASCULAR SURGERY)

## 2021-09-17 PROCEDURE — 25010000002 HYDROMORPHONE 1 MG/ML SOLUTION: Performed by: NURSE ANESTHETIST, CERTIFIED REGISTERED

## 2021-09-17 PROCEDURE — 25010000002 CEFAZOLIN PER 500 MG: Performed by: THORACIC SURGERY (CARDIOTHORACIC VASCULAR SURGERY)

## 2021-09-17 PROCEDURE — 25010000002 HEPARIN (PORCINE) PER 1000 UNITS: Performed by: THORACIC SURGERY (CARDIOTHORACIC VASCULAR SURGERY)

## 2021-09-17 PROCEDURE — 85027 COMPLETE CBC AUTOMATED: CPT | Performed by: ANESTHESIOLOGY

## 2021-09-17 PROCEDURE — 25010000002 HEPARIN (PORCINE) PER 1000 UNITS: Performed by: NURSE ANESTHETIST, CERTIFIED REGISTERED

## 2021-09-17 PROCEDURE — 041K0JL BYPASS RIGHT FEMORAL ARTERY TO POPLITEAL ARTERY WITH SYNTHETIC SUBSTITUTE, OPEN APPROACH: ICD-10-PCS | Performed by: THORACIC SURGERY (CARDIOTHORACIC VASCULAR SURGERY)

## 2021-09-17 PROCEDURE — 80053 COMPREHEN METABOLIC PANEL: CPT | Performed by: ANESTHESIOLOGY

## 2021-09-17 PROCEDURE — S0260 H&P FOR SURGERY: HCPCS | Performed by: THORACIC SURGERY (CARDIOTHORACIC VASCULAR SURGERY)

## 2021-09-17 PROCEDURE — 25010000002 PROPOFOL 10 MG/ML EMULSION: Performed by: NURSE ANESTHETIST, CERTIFIED REGISTERED

## 2021-09-17 PROCEDURE — 25010000002 FENTANYL CITRATE (PF) 50 MCG/ML SOLUTION: Performed by: NURSE ANESTHETIST, CERTIFIED REGISTERED

## 2021-09-17 PROCEDURE — 25010000002 KETOROLAC TROMETHAMINE PER 15 MG: Performed by: THORACIC SURGERY (CARDIOTHORACIC VASCULAR SURGERY)

## 2021-09-17 PROCEDURE — 25010000002 VANCOMYCIN 1 G RECONSTITUTED SOLUTION: Performed by: THORACIC SURGERY (CARDIOTHORACIC VASCULAR SURGERY)

## 2021-09-17 PROCEDURE — B41F1ZZ FLUOROSCOPY OF RIGHT LOWER EXTREMITY ARTERIES USING LOW OSMOLAR CONTRAST: ICD-10-PCS | Performed by: THORACIC SURGERY (CARDIOTHORACIC VASCULAR SURGERY)

## 2021-09-17 DEVICE — HEMOST ABS SURGIFOAM 8X6.25CM 10MM: Type: IMPLANTABLE DEVICE | Site: GROIN | Status: FUNCTIONAL

## 2021-09-17 DEVICE — HEMOST ABS SURGICEL SNOW 1X2IN: Type: IMPLANTABLE DEVICE | Site: GROIN | Status: FUNCTIONAL

## 2021-09-17 DEVICE — CLIP LIGAT VASC HORIZON TI MD BLU 6CT: Type: IMPLANTABLE DEVICE | Site: GROIN | Status: FUNCTIONAL

## 2021-09-17 DEVICE — GRFT VASC PROPAT THNSTRCH REMVRNG6X50X40: Type: IMPLANTABLE DEVICE | Site: ARTERY FEMORAL | Status: FUNCTIONAL

## 2021-09-17 RX ORDER — SODIUM CHLORIDE 0.9 % (FLUSH) 0.9 %
10 SYRINGE (ML) INJECTION EVERY 12 HOURS SCHEDULED
Status: DISCONTINUED | OUTPATIENT
Start: 2021-09-17 | End: 2021-09-17 | Stop reason: HOSPADM

## 2021-09-17 RX ORDER — BISACODYL 10 MG
10 SUPPOSITORY, RECTAL RECTAL DAILY PRN
Status: DISCONTINUED | OUTPATIENT
Start: 2021-09-17 | End: 2021-09-21 | Stop reason: HOSPADM

## 2021-09-17 RX ORDER — TRAZODONE HYDROCHLORIDE 150 MG/1
150 TABLET ORAL NIGHTLY
Status: DISCONTINUED | OUTPATIENT
Start: 2021-09-17 | End: 2021-09-21 | Stop reason: HOSPADM

## 2021-09-17 RX ORDER — PROPOFOL 10 MG/ML
VIAL (ML) INTRAVENOUS AS NEEDED
Status: DISCONTINUED | OUTPATIENT
Start: 2021-09-17 | End: 2021-09-17 | Stop reason: SURG

## 2021-09-17 RX ORDER — HYDROMORPHONE HCL IN 0.9% NACL 0.2 MG/ML
PLASTIC BAG, INJECTION (ML) INTRAVENOUS CONTINUOUS
Status: DISCONTINUED | OUTPATIENT
Start: 2021-09-17 | End: 2021-09-18

## 2021-09-17 RX ORDER — SODIUM CHLORIDE, SODIUM GLUCONATE, SODIUM ACETATE, POTASSIUM CHLORIDE AND MAGNESIUM CHLORIDE 526; 502; 368; 37; 30 MG/100ML; MG/100ML; MG/100ML; MG/100ML; MG/100ML
INJECTION, SOLUTION INTRAVENOUS CONTINUOUS PRN
Status: DISCONTINUED | OUTPATIENT
Start: 2021-09-17 | End: 2021-09-17 | Stop reason: SURG

## 2021-09-17 RX ORDER — SODIUM CHLORIDE 9 MG/ML
75 INJECTION, SOLUTION INTRAVENOUS CONTINUOUS
Status: DISCONTINUED | OUTPATIENT
Start: 2021-09-17 | End: 2021-09-18

## 2021-09-17 RX ORDER — ACETAMINOPHEN 325 MG/1
650 TABLET ORAL EVERY 4 HOURS PRN
Status: DISCONTINUED | OUTPATIENT
Start: 2021-09-17 | End: 2021-09-21 | Stop reason: HOSPADM

## 2021-09-17 RX ORDER — EPHEDRINE SULFATE 50 MG/ML
5 INJECTION, SOLUTION INTRAVENOUS ONCE AS NEEDED
Status: DISCONTINUED | OUTPATIENT
Start: 2021-09-17 | End: 2021-09-17 | Stop reason: HOSPADM

## 2021-09-17 RX ORDER — ONDANSETRON 2 MG/ML
4 INJECTION INTRAMUSCULAR; INTRAVENOUS EVERY 6 HOURS PRN
Status: DISCONTINUED | OUTPATIENT
Start: 2021-09-17 | End: 2021-09-21 | Stop reason: HOSPADM

## 2021-09-17 RX ORDER — AMITRIPTYLINE HYDROCHLORIDE 25 MG/1
25 TABLET, FILM COATED ORAL NIGHTLY
Status: DISCONTINUED | OUTPATIENT
Start: 2021-09-17 | End: 2021-09-21 | Stop reason: HOSPADM

## 2021-09-17 RX ORDER — HEPARIN SODIUM 1000 [USP'U]/ML
INJECTION, SOLUTION INTRAVENOUS; SUBCUTANEOUS AS NEEDED
Status: DISCONTINUED | OUTPATIENT
Start: 2021-09-17 | End: 2021-09-17 | Stop reason: SURG

## 2021-09-17 RX ORDER — VANCOMYCIN HYDROCHLORIDE 1 G/20ML
INJECTION, POWDER, LYOPHILIZED, FOR SOLUTION INTRAVENOUS AS NEEDED
Status: DISCONTINUED | OUTPATIENT
Start: 2021-09-17 | End: 2021-09-17 | Stop reason: HOSPADM

## 2021-09-17 RX ORDER — PHENYLEPHRINE HYDROCHLORIDE 10 MG/ML
INJECTION INTRAVENOUS AS NEEDED
Status: DISCONTINUED | OUTPATIENT
Start: 2021-09-17 | End: 2021-09-17 | Stop reason: SURG

## 2021-09-17 RX ORDER — MEPERIDINE HYDROCHLORIDE 25 MG/ML
12.5 INJECTION INTRAMUSCULAR; INTRAVENOUS; SUBCUTANEOUS
Status: DISCONTINUED | OUTPATIENT
Start: 2021-09-17 | End: 2021-09-17 | Stop reason: HOSPADM

## 2021-09-17 RX ORDER — NALOXONE HCL 0.4 MG/ML
0.4 VIAL (ML) INJECTION AS NEEDED
Status: DISCONTINUED | OUTPATIENT
Start: 2021-09-17 | End: 2021-09-17 | Stop reason: HOSPADM

## 2021-09-17 RX ORDER — ACETAMINOPHEN 650 MG/1
650 SUPPOSITORY RECTAL ONCE AS NEEDED
Status: DISCONTINUED | OUTPATIENT
Start: 2021-09-17 | End: 2021-09-17 | Stop reason: HOSPADM

## 2021-09-17 RX ORDER — NITROGLYCERIN 40 MG/100ML
5-200 INJECTION INTRAVENOUS
Status: DISCONTINUED | OUTPATIENT
Start: 2021-09-17 | End: 2021-09-20

## 2021-09-17 RX ORDER — ALBUTEROL SULFATE 2.5 MG/3ML
2.5 SOLUTION RESPIRATORY (INHALATION) EVERY 4 HOURS PRN
Status: DISCONTINUED | OUTPATIENT
Start: 2021-09-17 | End: 2021-09-21 | Stop reason: HOSPADM

## 2021-09-17 RX ORDER — BUPIVACAINE HCL/0.9 % NACL/PF 0.1 %
2 PLASTIC BAG, INJECTION (ML) EPIDURAL ONCE
Status: COMPLETED | OUTPATIENT
Start: 2021-09-17 | End: 2021-09-17

## 2021-09-17 RX ORDER — BUDESONIDE AND FORMOTEROL FUMARATE DIHYDRATE 160; 4.5 UG/1; UG/1
2 AEROSOL RESPIRATORY (INHALATION)
Status: DISCONTINUED | OUTPATIENT
Start: 2021-09-17 | End: 2021-09-21 | Stop reason: HOSPADM

## 2021-09-17 RX ORDER — CLINDAMYCIN PHOSPHATE 900 MG/50ML
900 INJECTION INTRAVENOUS EVERY 8 HOURS
Status: COMPLETED | OUTPATIENT
Start: 2021-09-17 | End: 2021-09-18

## 2021-09-17 RX ORDER — LIDOCAINE HYDROCHLORIDE 10 MG/ML
INJECTION, SOLUTION INFILTRATION; PERINEURAL AS NEEDED
Status: DISCONTINUED | OUTPATIENT
Start: 2021-09-17 | End: 2021-09-17 | Stop reason: SURG

## 2021-09-17 RX ORDER — SODIUM CHLORIDE 9 MG/ML
100 INJECTION, SOLUTION INTRAVENOUS CONTINUOUS
Status: DISCONTINUED | OUTPATIENT
Start: 2021-09-17 | End: 2021-09-17 | Stop reason: HOSPADM

## 2021-09-17 RX ORDER — AMOXICILLIN 250 MG
2 CAPSULE ORAL 2 TIMES DAILY PRN
Status: DISCONTINUED | OUTPATIENT
Start: 2021-09-17 | End: 2021-09-20

## 2021-09-17 RX ORDER — FLUMAZENIL 0.1 MG/ML
0.2 INJECTION INTRAVENOUS AS NEEDED
Status: DISCONTINUED | OUTPATIENT
Start: 2021-09-17 | End: 2021-09-17 | Stop reason: HOSPADM

## 2021-09-17 RX ORDER — DIPHENHYDRAMINE HYDROCHLORIDE 50 MG/ML
12.5 INJECTION INTRAMUSCULAR; INTRAVENOUS
Status: DISCONTINUED | OUTPATIENT
Start: 2021-09-17 | End: 2021-09-17 | Stop reason: HOSPADM

## 2021-09-17 RX ORDER — ONDANSETRON 4 MG/1
4 TABLET, FILM COATED ORAL EVERY 6 HOURS PRN
Status: DISCONTINUED | OUTPATIENT
Start: 2021-09-17 | End: 2021-09-21 | Stop reason: HOSPADM

## 2021-09-17 RX ORDER — CILOSTAZOL 100 MG/1
100 TABLET ORAL
Status: DISCONTINUED | OUTPATIENT
Start: 2021-09-18 | End: 2021-09-21 | Stop reason: HOSPADM

## 2021-09-17 RX ORDER — NEOSTIGMINE METHYLSULFATE 1 MG/ML
INJECTION, SOLUTION INTRAVENOUS AS NEEDED
Status: DISCONTINUED | OUTPATIENT
Start: 2021-09-17 | End: 2021-09-17 | Stop reason: SURG

## 2021-09-17 RX ORDER — ACETAMINOPHEN 325 MG/1
650 TABLET ORAL ONCE AS NEEDED
Status: DISCONTINUED | OUTPATIENT
Start: 2021-09-17 | End: 2021-09-17 | Stop reason: HOSPADM

## 2021-09-17 RX ORDER — DIPHENHYDRAMINE HYDROCHLORIDE 50 MG/ML
25 INJECTION INTRAMUSCULAR; INTRAVENOUS EVERY 6 HOURS PRN
Status: DISCONTINUED | OUTPATIENT
Start: 2021-09-17 | End: 2021-09-20

## 2021-09-17 RX ORDER — AMLODIPINE BESYLATE 10 MG/1
10 TABLET ORAL NIGHTLY
Status: DISCONTINUED | OUTPATIENT
Start: 2021-09-17 | End: 2021-09-21 | Stop reason: HOSPADM

## 2021-09-17 RX ORDER — IPRATROPIUM BROMIDE AND ALBUTEROL SULFATE 2.5; .5 MG/3ML; MG/3ML
3 SOLUTION RESPIRATORY (INHALATION) ONCE
Status: DISCONTINUED | OUTPATIENT
Start: 2021-09-17 | End: 2021-09-17 | Stop reason: HOSPADM

## 2021-09-17 RX ORDER — GABAPENTIN 400 MG/1
800 CAPSULE ORAL EVERY 8 HOURS SCHEDULED
Status: DISCONTINUED | OUTPATIENT
Start: 2021-09-17 | End: 2021-09-21 | Stop reason: HOSPADM

## 2021-09-17 RX ORDER — KETOROLAC TROMETHAMINE 15 MG/ML
15 INJECTION, SOLUTION INTRAMUSCULAR; INTRAVENOUS ONCE
Status: COMPLETED | OUTPATIENT
Start: 2021-09-17 | End: 2021-09-17

## 2021-09-17 RX ORDER — SODIUM CHLORIDE 9 MG/ML
INJECTION, SOLUTION INTRAVENOUS CONTINUOUS PRN
Status: DISCONTINUED | OUTPATIENT
Start: 2021-09-17 | End: 2021-09-17 | Stop reason: SURG

## 2021-09-17 RX ORDER — CLOPIDOGREL BISULFATE 75 MG/1
75 TABLET ORAL DAILY
Status: DISCONTINUED | OUTPATIENT
Start: 2021-09-18 | End: 2021-09-21 | Stop reason: HOSPADM

## 2021-09-17 RX ORDER — NALOXONE HCL 0.4 MG/ML
0.1 VIAL (ML) INJECTION
Status: DISCONTINUED | OUTPATIENT
Start: 2021-09-17 | End: 2021-09-21 | Stop reason: HOSPADM

## 2021-09-17 RX ORDER — ALBUTEROL SULFATE 2.5 MG/3ML
2.5 SOLUTION RESPIRATORY (INHALATION)
Status: DISPENSED | OUTPATIENT
Start: 2021-09-17 | End: 2021-09-21

## 2021-09-17 RX ORDER — BUDESONIDE AND FORMOTEROL FUMARATE DIHYDRATE 160; 4.5 UG/1; UG/1
2 AEROSOL RESPIRATORY (INHALATION)
Status: DISCONTINUED | OUTPATIENT
Start: 2021-09-17 | End: 2021-09-17

## 2021-09-17 RX ORDER — PROMETHAZINE HYDROCHLORIDE 25 MG/1
25 TABLET ORAL ONCE AS NEEDED
Status: DISCONTINUED | OUTPATIENT
Start: 2021-09-17 | End: 2021-09-17 | Stop reason: HOSPADM

## 2021-09-17 RX ORDER — HEPARIN SODIUM 5000 [USP'U]/ML
INJECTION, SOLUTION INTRAVENOUS; SUBCUTANEOUS AS NEEDED
Status: DISCONTINUED | OUTPATIENT
Start: 2021-09-17 | End: 2021-09-17 | Stop reason: HOSPADM

## 2021-09-17 RX ORDER — PROTAMINE SULFATE 10 MG/ML
INJECTION, SOLUTION INTRAVENOUS AS NEEDED
Status: DISCONTINUED | OUTPATIENT
Start: 2021-09-17 | End: 2021-09-17 | Stop reason: SURG

## 2021-09-17 RX ORDER — PROMETHAZINE HYDROCHLORIDE 25 MG/1
25 SUPPOSITORY RECTAL ONCE AS NEEDED
Status: DISCONTINUED | OUTPATIENT
Start: 2021-09-17 | End: 2021-09-17 | Stop reason: HOSPADM

## 2021-09-17 RX ORDER — ONDANSETRON 2 MG/ML
4 INJECTION INTRAMUSCULAR; INTRAVENOUS ONCE AS NEEDED
Status: COMPLETED | OUTPATIENT
Start: 2021-09-17 | End: 2021-09-17

## 2021-09-17 RX ORDER — FENTANYL CITRATE 50 UG/ML
INJECTION, SOLUTION INTRAMUSCULAR; INTRAVENOUS AS NEEDED
Status: DISCONTINUED | OUTPATIENT
Start: 2021-09-17 | End: 2021-09-17 | Stop reason: SURG

## 2021-09-17 RX ORDER — SODIUM CHLORIDE 0.9 % (FLUSH) 0.9 %
10 SYRINGE (ML) INJECTION AS NEEDED
Status: DISCONTINUED | OUTPATIENT
Start: 2021-09-17 | End: 2021-09-17 | Stop reason: HOSPADM

## 2021-09-17 RX ORDER — TRAMADOL HYDROCHLORIDE 50 MG/1
50 TABLET ORAL EVERY 6 HOURS PRN
Status: DISCONTINUED | OUTPATIENT
Start: 2021-09-17 | End: 2021-09-21 | Stop reason: HOSPADM

## 2021-09-17 RX ORDER — PRAVASTATIN SODIUM 20 MG
20 TABLET ORAL NIGHTLY
Status: DISCONTINUED | OUTPATIENT
Start: 2021-09-17 | End: 2021-09-21 | Stop reason: HOSPADM

## 2021-09-17 RX ORDER — BACITRACIN 50000 [IU]/1
INJECTION, POWDER, FOR SOLUTION INTRAMUSCULAR AS NEEDED
Status: DISCONTINUED | OUTPATIENT
Start: 2021-09-17 | End: 2021-09-17 | Stop reason: HOSPADM

## 2021-09-17 RX ORDER — ONDANSETRON 4 MG/1
4 TABLET, FILM COATED ORAL EVERY 8 HOURS PRN
Status: DISCONTINUED | OUTPATIENT
Start: 2021-09-17 | End: 2021-09-17 | Stop reason: SDUPTHER

## 2021-09-17 RX ORDER — PANTOPRAZOLE SODIUM 40 MG/1
40 TABLET, DELAYED RELEASE ORAL DAILY
Status: DISCONTINUED | OUTPATIENT
Start: 2021-09-18 | End: 2021-09-21 | Stop reason: HOSPADM

## 2021-09-17 RX ORDER — ROCURONIUM BROMIDE 10 MG/ML
INJECTION, SOLUTION INTRAVENOUS AS NEEDED
Status: DISCONTINUED | OUTPATIENT
Start: 2021-09-17 | End: 2021-09-17 | Stop reason: SURG

## 2021-09-17 RX ORDER — EPHEDRINE SULFATE 50 MG/ML
INJECTION, SOLUTION INTRAVENOUS AS NEEDED
Status: DISCONTINUED | OUTPATIENT
Start: 2021-09-17 | End: 2021-09-17 | Stop reason: SURG

## 2021-09-17 RX ORDER — FUROSEMIDE 20 MG/1
20 TABLET ORAL DAILY
Status: DISCONTINUED | OUTPATIENT
Start: 2021-09-18 | End: 2021-09-19

## 2021-09-17 RX ADMIN — PHENYLEPHRINE HYDROCHLORIDE 100 MCG: 10 INJECTION INTRAVENOUS at 14:10

## 2021-09-17 RX ADMIN — PROTAMINE SULFATE 20 MG: 10 INJECTION, SOLUTION INTRAVENOUS at 15:18

## 2021-09-17 RX ADMIN — PHENYLEPHRINE HYDROCHLORIDE 200 MCG: 10 INJECTION INTRAVENOUS at 13:19

## 2021-09-17 RX ADMIN — PHENYLEPHRINE HYDROCHLORIDE 200 MCG: 10 INJECTION INTRAVENOUS at 14:50

## 2021-09-17 RX ADMIN — SODIUM CHLORIDE: 900 INJECTION, SOLUTION INTRAVENOUS at 14:21

## 2021-09-17 RX ADMIN — ONDANSETRON 4 MG: 2 INJECTION INTRAMUSCULAR; INTRAVENOUS at 17:10

## 2021-09-17 RX ADMIN — PHENYLEPHRINE HYDROCHLORIDE 100 MCG: 10 INJECTION INTRAVENOUS at 14:28

## 2021-09-17 RX ADMIN — KETOROLAC TROMETHAMINE 15 MG: 15 INJECTION, SOLUTION INTRAMUSCULAR; INTRAVENOUS at 16:47

## 2021-09-17 RX ADMIN — PHENYLEPHRINE HYDROCHLORIDE 200 MCG: 10 INJECTION INTRAVENOUS at 14:45

## 2021-09-17 RX ADMIN — SODIUM CHLORIDE 100 ML/HR: 900 INJECTION, SOLUTION INTRAVENOUS at 10:06

## 2021-09-17 RX ADMIN — EPHEDRINE SULFATE 10 MG: 50 INJECTION INTRAVENOUS at 14:18

## 2021-09-17 RX ADMIN — PHENYLEPHRINE HYDROCHLORIDE 100 MCG: 10 INJECTION INTRAVENOUS at 13:27

## 2021-09-17 RX ADMIN — ROCURONIUM BROMIDE 10 MG: 50 INJECTION INTRAVENOUS at 13:33

## 2021-09-17 RX ADMIN — FENTANYL CITRATE 100 MCG: 50 INJECTION, SOLUTION INTRAMUSCULAR; INTRAVENOUS at 12:33

## 2021-09-17 RX ADMIN — ALBUTEROL SULFATE 2.5 MG: 2.5 SOLUTION RESPIRATORY (INHALATION) at 23:15

## 2021-09-17 RX ADMIN — FENTANYL CITRATE 150 MCG: 50 INJECTION, SOLUTION INTRAMUSCULAR; INTRAVENOUS at 13:14

## 2021-09-17 RX ADMIN — HYDROMORPHONE HYDROCHLORIDE 0.5 MG: 1 INJECTION, SOLUTION INTRAMUSCULAR; INTRAVENOUS; SUBCUTANEOUS at 18:00

## 2021-09-17 RX ADMIN — PROPOFOL 100 MG: 10 INJECTION, EMULSION INTRAVENOUS at 12:35

## 2021-09-17 RX ADMIN — AMITRIPTYLINE HYDROCHLORIDE 25 MG: 25 TABLET, FILM COATED ORAL at 20:50

## 2021-09-17 RX ADMIN — NEOSTIGMINE METHYLSULFATE 1 MG: 1 INJECTION, SOLUTION INTRAVENOUS at 15:49

## 2021-09-17 RX ADMIN — GLYCOPYRROLATE 0.2 MG: 0.2 INJECTION, SOLUTION INTRAMUSCULAR; INTRAVITREAL at 15:49

## 2021-09-17 RX ADMIN — CLINDAMYCIN IN 5 PERCENT DEXTROSE 900 MG: 18 INJECTION, SOLUTION INTRAVENOUS at 20:55

## 2021-09-17 RX ADMIN — AMLODIPINE BESYLATE 10 MG: 10 TABLET ORAL at 20:52

## 2021-09-17 RX ADMIN — HYDROMORPHONE HYDROCHLORIDE: 2 INJECTION, SOLUTION INTRAMUSCULAR; INTRAVENOUS; SUBCUTANEOUS at 18:38

## 2021-09-17 RX ADMIN — ROCURONIUM BROMIDE 10 MG: 50 INJECTION INTRAVENOUS at 14:10

## 2021-09-17 RX ADMIN — TRAZODONE HYDROCHLORIDE 150 MG: 150 TABLET ORAL at 20:51

## 2021-09-17 RX ADMIN — PHENYLEPHRINE HYDROCHLORIDE 100 MCG: 10 INJECTION INTRAVENOUS at 14:18

## 2021-09-17 RX ADMIN — Medication 2 G: at 12:42

## 2021-09-17 RX ADMIN — ROCURONIUM BROMIDE 10 MG: 50 INJECTION INTRAVENOUS at 14:45

## 2021-09-17 RX ADMIN — PHENYLEPHRINE HYDROCHLORIDE 200 MCG: 10 INJECTION INTRAVENOUS at 13:57

## 2021-09-17 RX ADMIN — BUDESONIDE AND FORMOTEROL FUMARATE DIHYDRATE 2 PUFF: 160; 4.5 AEROSOL RESPIRATORY (INHALATION) at 23:15

## 2021-09-17 RX ADMIN — PHENYLEPHRINE HYDROCHLORIDE 200 MCG: 10 INJECTION INTRAVENOUS at 14:40

## 2021-09-17 RX ADMIN — SODIUM CHLORIDE, SODIUM GLUCONATE, SODIUM ACETATE, POTASSIUM CHLORIDE AND MAGNESIUM CHLORIDE: 526; 502; 368; 37; 30 INJECTION, SOLUTION INTRAVENOUS at 14:28

## 2021-09-17 RX ADMIN — PHENYLEPHRINE HYDROCHLORIDE 200 MCG: 10 INJECTION INTRAVENOUS at 13:22

## 2021-09-17 RX ADMIN — EPHEDRINE SULFATE 10 MG: 50 INJECTION INTRAVENOUS at 14:05

## 2021-09-17 RX ADMIN — PHENYLEPHRINE HYDROCHLORIDE 100 MCG: 10 INJECTION INTRAVENOUS at 14:23

## 2021-09-17 RX ADMIN — ROCURONIUM BROMIDE 40 MG: 50 INJECTION INTRAVENOUS at 12:35

## 2021-09-17 RX ADMIN — PHENYLEPHRINE HYDROCHLORIDE 100 MCG: 10 INJECTION INTRAVENOUS at 14:16

## 2021-09-17 RX ADMIN — PHENYLEPHRINE HYDROCHLORIDE 200 MCG: 10 INJECTION INTRAVENOUS at 13:08

## 2021-09-17 RX ADMIN — EPHEDRINE SULFATE 10 MG: 50 INJECTION INTRAVENOUS at 13:44

## 2021-09-17 RX ADMIN — HYDROMORPHONE HYDROCHLORIDE 0.5 MG: 1 INJECTION, SOLUTION INTRAMUSCULAR; INTRAVENOUS; SUBCUTANEOUS at 18:11

## 2021-09-17 RX ADMIN — NITROGLYCERIN 0.5 INCH: 20 OINTMENT TOPICAL at 20:22

## 2021-09-17 RX ADMIN — EPHEDRINE SULFATE 10 MG: 50 INJECTION INTRAVENOUS at 13:27

## 2021-09-17 RX ADMIN — SODIUM CHLORIDE: 9 INJECTION, SOLUTION INTRAVENOUS at 12:56

## 2021-09-17 RX ADMIN — PRAVASTATIN SODIUM 20 MG: 20 TABLET ORAL at 20:51

## 2021-09-17 RX ADMIN — HEPARIN SODIUM 8000 UNITS: 1000 INJECTION, SOLUTION INTRAVENOUS; SUBCUTANEOUS at 14:05

## 2021-09-17 RX ADMIN — EPHEDRINE SULFATE 10 MG: 50 INJECTION INTRAVENOUS at 14:23

## 2021-09-17 RX ADMIN — LIDOCAINE HYDROCHLORIDE 50 MG: 10 INJECTION, SOLUTION INFILTRATION; PERINEURAL at 12:35

## 2021-09-17 RX ADMIN — GABAPENTIN 800 MG: 400 CAPSULE ORAL at 21:29

## 2021-09-17 NOTE — ANESTHESIA PROCEDURE NOTES
Airway  Urgency: elective    Date/Time: 9/17/2021 12:40 PM  Airway not difficult    General Information and Staff    Patient location during procedure: OR  CRNA: Kirt Burnham CRNA    Indications and Patient Condition  Indications for airway management: airway protection    Preoxygenated: yes  MILS not maintained throughout  Mask difficulty assessment: 2 - vent by mask + OA or adjuvant +/- NMBA    Final Airway Details  Final airway type: endotracheal airway      Successful airway: ETT  Cuffed: yes   Successful intubation technique: video laryngoscopy  Endotracheal tube insertion site: oral  Blade: Bates  Blade size: 4  ETT size (mm): 7.0  Cormack-Lehane Classification: grade I - full view of glottis  Placement verified by: chest auscultation, capnometry and palpation of cuff   Measured from: lips  ETT/EBT  to lips (cm): 20  Number of attempts at approach: 1  Assessment: lips, teeth, and gum same as pre-op and atraumatic intubation

## 2021-09-17 NOTE — ANESTHESIA PREPROCEDURE EVALUATION
Anesthesia Evaluation     Patient summary reviewed and Nursing notes reviewed   no history of anesthetic complications:  NPO Solid Status: > 8 hours  NPO Liquid Status: > 8 hours           Airway   Mallampati: I  TM distance: >3 FB  Neck ROM: full  No difficulty expected and Small opening  Dental    (+) poor dentition and upper dentures        Pulmonary    (+) a smoker Current Abstained day of surgery, COPD moderate, asthma,home oxygen, decreased breath sounds, wheezes,   (-) shortness of breath, sleep apnea    ROS comment: HOME [id number]: Comparison exam dated May 20, 2019. Cardiac and  pulmonary vasculature are normal. Lungs are clear. Pleural spaces  are normal. No acute osseous abnormality. Stable postsurgical  changes consistent with left reverse total shoulder arthroplasty.     IMPRESSION:  No acute cardiopulmonary abnormality.     Electronically signed by:  Jim Muro MD  3/2/2020 1:08 PM CST  PE comment: Albuterol treatment ordered pre-op.  Cardiovascular   Exercise tolerance: poor (<4 METS)    ECG reviewed  PT is on anticoagulation therapy  Rhythm: regular  Rate: normal    (+) hypertension, valvular problems/murmurs MR and TI, CAD, CHF Diastolic >=55%, murmur, PVD, hyperlipidemia,  carotid artery disease carotid bilateral  (-) past MI, dysrhythmias, angina, PEREZ, cardiac stents, DVT    ROS comment: EKG 8/25/2021:  Sinus rhythm with occasional Premature ventricular complexes  Possible Left atrial enlargement  Inferior infarct (cited on or before 18-DEC-2012)  Possible Anterior infarct (cited on or before 06-APR-2016)  Abnormal ECG    TTE 2/22/2019:  · Estimated EF = 61%.  · Left ventricular systolic function is normal.  · Left ventricular diastolic dysfunction (grade I) consistent with impaired relaxation.  · Mild mitral valve regurgitation is present  · Mild tricuspid valve regurgitation is present.      Normal sinus rhythm  Possible Inferior infarct (cited on or before 18-DEC-2012)  Abnormal ECG  When  compared with ECG of 09-APR-2019 22:47,  premature ventricular complexes are no longer present  Inverted T waves have replaced nonspecific T wave abnormality in Lateral  leads  Confirmed by AKRAM     · Estimated EF = 61%.  · Left ventricular systolic function is normal.  · Left ventricular diastolic dysfunction (grade I) consistent with impaired relaxation.  · Mild mitral valve regurgitation is present  · Mild tricuspid valve regurgitation is present.Normal sinus rhythm  Left ventricular hypertrophy with repolarization abnormality  Possible Inferior infarct (cited on or before 18-DEC-2012)  Anterior infarct , age undetermined  Abnormal ECG  When compared with ECG of 20-MAY-2019 10:30,  Anterior infarct is now present  Confirmed by LUCIEN VELOZ MD ([id number]) on 3/18/2020 6:44:14 AM    Neuro/Psych  (+) headaches (migraines every 2-3 months), dizziness/light headedness, psychiatric history Anxiety and Depression,     (-) seizures, TIA, CVA  GI/Hepatic/Renal/Endo    (+)  GERD well controlled,    (-) hepatitis, liver disease, no renal disease, diabetes    Musculoskeletal         ROS comment: Pain 10/10  Radiculopathy down left leg  Abdominal    Substance History - negative use  (-) alcohol use, drug use     OB/GYN negative ob/gyn ROS         Other   arthritis,      (-) history of cancer  ROS/Med Hx Other: Hgb=12.3    Pt takes plavix & xarelto for stents in her lower extremities last taken (9/14/2021)    Hx of COPD: uses albuterol inhaler once a week. Uses symbicort daily. On 2LNC as needed per pt. Currently on 2LNC.     Hx of GERD controlled on daily protonix    Allergic to morphine (hallucinations)    B/L US of carotids:  Less than 50% diameter reduction stenosis each internal carotid  artery.                  Anesthesia Plan    ASA 4     general   (Discussed additional peripheral IV,arterial line and patient understands possible complications,risks and agrees.    Duoneb ordered in preop)  intravenous induction      Anesthetic plan, all risks, benefits, and alternatives have been provided, discussed and informed consent has been obtained with: patient.  Use of blood products discussed with patient  Consented to blood products.   Plan discussed with CRNA.

## 2021-09-17 NOTE — ANESTHESIA POSTPROCEDURE EVALUATION
Patient: Mona Perales    Procedure Summary     Date: 09/17/21 Room / Location: [facility] OR  / [facility] OR    Anesthesia Start: 1229 Anesthesia Stop: 1621    Procedure: RIGHT common femoral endarterectomy FEMORAL POPLITEAL BYPASS (above the knee polytetrafluoroethylene  lower extremity arteriogram              (c-arm#2 and c-arm table) (Right Thigh) Diagnosis:       PVD (peripheral vascular disease) (CMS/HCC)      (PVD (peripheral vascular disease) (CMS/HCC) [I73.9])    Surgeons: David Suh MD Provider: Ana Paula Diehl DO    Anesthesia Type: general ASA Status: 4          Anesthesia Type: general    Vitals  Vitals Value Taken Time   /61 09/17/21 1602   Temp 97.4 °F (36.3 °C) 09/17/21 1602   Pulse 92 09/17/21 1602   Resp 20 09/17/21 1602   SpO2 93 % 09/17/21 1602           Post Anesthesia Care and Evaluation    Patient location during evaluation: PACU  Patient participation: complete - patient participated  Level of consciousness: awake and alert  Pain score: 0  Pain management: adequate  Airway patency: patent  Anesthetic complications: No anesthetic complications  PONV Status: none  Cardiovascular status: acceptable  Respiratory status: acceptable  Hydration status: acceptable    Comments: ---------------------------               09/17/21                      1602         ---------------------------   BP:          126/61         Pulse:         92           Resp:          20           Temp:   97.4 °F (36.3 °C)   SpO2:          93%         ---------------------------

## 2021-09-17 NOTE — OP NOTE
OPERATIVE NOTE  Mona Perales  1950  9/17/2021    PREOP DIAGNOSES:  PVD (peripheral vascular disease) [I73.9]  PVD (peripheral vascular disease) (CMS/HCC) [I73.9]    POSTOP DIAGNOSES:  PVD (peripheral vascular disease) [I73.9]    PROCEDURE:   RIGHT FEMORAL POPLITEAL BYPASS (ABOVE KNEE, 6mm PTFE graft)  RIGHT lower extremity arteriogram  RIGHTcommon femoral endarterectomy  Negative pressure wound therapy (>50cm2, Prevena)     SURGEON: QUINCY Suh MD FACS RPVI    ASSISTANT: Arley Marshall CFA provided critical assistance during the case including suctioning, exposure, retraction, and reduction of blood loss.    ANESTHESIA: General ET     ESTIMATED BLOOD LOSS: 50 ml     SPECIMEN:  None    COMPLICATIONS:  None    DESCRIPTION OF OPERATION: Patient taken to the operating room placed in supine position. General endotracheal anesthesia was induced. Radial arterial line was placed by Anesthesia. Patient prepped and draped in sterile fashion.    Incision made in the RIGHT distal thigh, dissection carried down to the above-knee popliteal artery which was 6 mm in diameter and soft distally. Oblique incision was then made in the RIGHT groin, dissection carried down to the common femoral artery and its branches which were isolated. 6 mm PTFE graft was tunneled subsartorially from the knee to the groin incision using GORE tunneler. 8000 units heparin given to maintain ACT ~300. The distal end of the PTFE graft was fashioned for anastomosis. Popliteal artery was isolated with Peter clamps, opened with 11 blade. Distal anastomosis performed end-to-side using CV 6 Greenwood-Osvaldo suture. Leg was straightened, minimal slack in the graft. Left lower extremity arteriogram was performed through the graft demonstrating widely patent distal anastomosis, good three-vessel runoff in the tibial vessels. Profunda and SFA were isolated with profunda clamps, proximal common femoral artery was isolated with Z-Clamp, opened with 11  blade and there was moderate to severe obstructive plaque. Common femoral endarterectomy was performed with good backbleeding from the profunda. Proximal end of the PTFE graft was fashioned and anastomosed end-to-side to the common femoral artery using CV 6 Korbel-Osvaldo suture.     Usual de-airing techniques were employed. Good pulse distal to the anastomosis. Good biphasic signal in the posterior tibial artery and triphasic signal in the dorsalis pedis artery. 20 mg protamine was given with return of ACT to baseline. Hemostasis was obtained. Griffin was used. Incisions were closed in layers of 3-0 PDS, 3-0 PDS, 4-0 Monocryl in the skin.  Patient had a moderate tissue edema, negative pressure wound therapy was performed using the Prevena wound dressing([id number]). Patient tolerated procedure well and was transferred to PACU stable condition.               This document has been electronically signed by David Suh MD on September 17, 2021 15:55 CDT

## 2021-09-17 NOTE — H&P
Mona Perales is a 70 y.o. female is here today for follow-up.     Chief Complaint:        Chief Complaint   Patient presents with   • Peripheral Vascular Disease         The following portions of the patient's history were reviewed and updated as appropriate: allergies, current medications, past family history, past medical history, past social history, past surgical history and problem list.  Recent images independently reviewed.  Available laboratory values reviewed.     PCP:  Lisa Alejandro APRN  Cardiology:  Kimmy      70 y.o. female with HTN(stable, increased risk stroke, rupture), Hyperlipidemia(stable, increased risk cardiovascular events), Smoker(uncontrolled, increased risk cardiovascular events), COPD(stable, increased risk pulmonary complications) and Atrial fibrillation(stable, increased risk stroke)  Anticoagulation (Xarelto, stable) Hx PE (stable). PVD (LSCA stent 2005, B iliac stents 2008) lost to follow up.  former smoker and Recent Cessation.  Increasing pain LEFT foot, lifestyle limiting. Difficulty ambulating. VAS 10. No TIA stroke amaurosis.  No MI claudication. No other associated signs, symptoms or modifying factors. Pharmacy discontinued Xarelto (5/2020). Worsening LEFT lower extremity pain similar to pre-surgery approx 1 month. Underwent additional angio procedure. Improved. Confused on medications, off Plavix x 2 months. Now with worsening LEFT lower extremity pain.  Returns for follow up.       2005: LSCA Stent  2008: Bilateral Iliac stents  3/23/2020:  CHELSIE: RIGHT 0.83 Triphasic  LEFT 0.31 Biphasic/Monophasic (DP/PT)  3/26/2020: CTA Runoff: Occlusion of the proximal left superficial femoral artery with  reconstitution of the distal left SFA. The popliteal artery  appears occluded distally, with reconstitution of the arteries of  the trifurcation. The left calf vessels are patent, as above.Patent left common iliac and proximal left external iliac stents. There appears to be mild  luminal narrowing within the  left external iliac stent. Stenosis of the origins of the superior mesenteric artery and  bilateral renal arteries as above. Irregular narrowing of the right superficial femoral artery,  as described above without occlusion. The right popliteal artery  and the right calf arteries are patent, as above.  4/14/2020: LEFT Femoral-popliteal Bypass PTFE  5/22/2020:  CHELSIE: RIGHT 1.1 Triphasic LEFT 0.79 Tri/Biphasic (PT/DP)   Patent LEFT graft. mPSV 237cm/s (native) Triphasic  8/25/2020:  CHELSIE: RIGHT 0.67 Tri/Biphasic (DP/PT) LEFT 0.52 Tri/Biphasic (PT/DP). Patent LEFT Fem-Pop graft. Native prx (333cm/s) Triphasic.   9/9/2020: LEFT/RIGHT OBDULIA PTA  9/22/2020:  CHELSIE: RIGHT 1.2 Triphasic LEFT 0.81 Biphasic  1/29/2021: CHELSIE: RIGHT 0.92 Tripahsic LEFT 0.75 Tri/Biphasic (PT/DP) Patent PTFE graft LEFT native [id number].   7/26/21: CHELSIE: RIGHT 0.62 Biphasic LEFT 0.85 Tri/Biphasic (PT/DP)      2016: Carotid Duplex: BABS 0-49% LICA 0-49%  2017: Carotid duplex: BABS 0-49% LICA 0-49%  2018: Carotid duplex: BABS 0-49% LICA 0-49%        Medical History        Past Medical History:   Diagnosis Date   • A-fib (CMS/[facility])     • Anxiety     • Arthritis     • Asthma     • 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 with ulceration (CMS/[facility])       bilateral legs - bilateral iliac stents 2008      • CHF (congestive heart failure) (CMS/[facility])     • Chronic lower back pain     • COPD (chronic obstructive pulmonary disease) (CMS/[facility])     • Essential (primary) hypertension     • GERD (gastroesophageal reflux disease)     • History of pulmonary embolus (PE)     • Hyperlipidemia     • Insomnia     • Lumbago     • Nicotine dependence     • Occlusion of artery        and stenosis of bilateral carotid arteries - BABS 16-49%, LICA 0-15%   • Other atherosclerosis of native artery of other extremity       LEFT subclavian stent 2005 (occluded)   • Sleep apnea           Surgical History         Past Surgical History:   Procedure  Laterality Date   • CARDIAC CATHETERIZATION   04/06/2016     No evidence of any obstructive epicardial CAD.Preserved LV systolic function with EF of 55%.   • CENTRAL VENOUS LINE INSERTION   04/07/2016     Successful placement of right uppe extremity 6-Algerian triple lumen PICC line.   • ENDOSCOPY N/A 1/12/2018     Procedure: ESOPHAGOGASTRODUODENOSCOPY;  Surgeon: Bin Martin MD;  Location: [facility] ENDOSCOPY;  Service:    • ENDOSCOPY N/A 1/17/2018     Procedure: ESOPHAGOGASTRODUODENOSCOPY;  Surgeon: Bin Martin MD;  Location: [facility] ENDOSCOPY;  Service:    • ENDOSCOPY N/A 3/16/2018     Procedure: ESOPHAGOGASTRODUODENOSCOPY possible dilation;  Surgeon: Bin Martin MD;  Location: [facility] ENDOSCOPY;  Service: Gastroenterology   • FEMORAL POPLITEAL BYPASS Left 4/14/2020     Procedure: FEMORAL POPLITEAL BYPASS ABOVE KNEE  (POLYTETRAFLUOROETHYLENE)  LEFT COMMON FEMORAL ARTERY ENDARTERECTOMY PTA LEFT ILIAC ARTERIOGRAM        (c-arm#2 and c-arm table);  Surgeon: David Suh MD;  Location: [facility] OR;  Service: Vascular;  Laterality: Left;   • HYSTERECTOMY       • INTERVENTIONAL RADIOLOGY PROCEDURE Left 9/9/2020     Procedure: IR angiogram extremity left;  Surgeon: David Suh MD;  Location: [facility] ANGIO INVASIVE LOCATION;  Service: Interventional Radiology;  Laterality: Left;   • KYPHOPLASTY N/A 5/23/2019     Procedure: KYPHOPLASTY LUMBAR FOUR;  Surgeon: Aba Santa MD;  Location: [facility] OR;  Service: Orthopedic Spine   • TOTAL SHOULDER REPLACEMENT Left     • TRANSESOPHAGEAL ECHOCARDIOGRAM (JACQUES)   04/07/2016     With color flow-Mild to moderate CLV[facility].LV systolic function well preserved with Ef of 55-60%.Mitral and AV intact.Diastolic dysfunction   • UPPER GASTROINTESTINAL ENDOSCOPY   01/17/2018     Dr. Bin Martin M.D.               Family History   Problem Relation Age of Onset   • Heart disease Other     • Hypertension Other        Social History   Social History             Socioeconomic History   • Marital status:        Spouse name: Not on file   • Number of children: Not on file   • Years of education: Not on file   • Highest education level: Not on file   Tobacco Use   • Smoking status: Former Smoker       Years: 50.00       Types: Cigarettes       Quit date: 2019       Years since quittin.4   • Smokeless tobacco: Never Used   Substance and Sexual Activity   • Alcohol use: No   • Drug use: No   • Sexual activity: Defer               ALLERGIES:   Morphine and related and Penicillins     MEDICATIONS:        Current Outpatient Medications:   •  albuterol (PROVENTIL) (2.5 MG/3ML) 0.083% nebulizer solution, Take 2.5 mg by nebulization Every 4 (Four) Hours As Needed for Wheezing., Disp: , Rfl:   •  ALBUTEROL SULFATE HFA IN, Inhale 2 puffs Every 4 (Four) Hours., Disp: , Rfl:   •  amitriptyline (ELAVIL) 25 MG tablet, TAKE 1 TABLET (25 MG TOTAL) BY MOUTH NIGHTLY., Disp: , Rfl:   •  atorvastatin (LIPITOR) 20 MG tablet, Take 1 tablet by mouth Every Night., Disp: 30 tablet, Rfl: 11  •  cilostazol (PLETAL) 100 MG tablet, Take 1 tablet by mouth 2 (Two) Times a Day Before Meals., Disp: 60 tablet, Rfl: 11  •  cyclobenzaprine (FLEXERIL) 5 MG tablet, Take 1 tablet by mouth 3 (Three) Times a Day As Needed for Muscle Spasms., Disp: 90 tablet, Rfl: 2  •  furosemide (LASIX) 20 MG tablet, Take 1 tablet by mouth Daily., Disp: 30 tablet, Rfl: 1  •  gabapentin (NEURONTIN) 800 MG tablet, Take 800 mg by mouth 2 (Two) Times a Day., Disp: , Rfl:   •  pantoprazole (PROTONIX) 40 MG EC tablet, Take 1 tablet by mouth Daily., Disp: 30 tablet, Rfl: 5  •  penciclovir (DENAVIR) 1 % cream, Apply 1 application topically to the appropriate area as directed As Needed (fever blisters)., Disp: , Rfl:   •  rivaroxaban (Xarelto) 20 MG tablet, Take 1 tablet by mouth Daily With Dinner., Disp: 30 tablet, Rfl: 6  •  sertraline (ZOLOFT) 100 MG tablet, Take 100 mg by mouth Daily., Disp: , Rfl:   •  traMADol  "(Ultram) 50 MG tablet, Take 1 tablet by mouth Every 6 (Six) Hours As Needed for Moderate Pain ., Disp: 40 tablet, Rfl: 0  •  traZODone (DESYREL) 150 MG tablet, Take 1 tablet by mouth Every Night., Disp: , Rfl:   •  clopidogrel (PLAVIX) 75 MG tablet, Take 1 tablet by mouth Daily., Disp: 30 tablet, Rfl: 11  •  docusate sodium 100 MG capsule, Take 100 mg by mouth 2 (Two) Times a Day. (Patient taking differently: Take 100 mg by mouth Daily As Needed.), Disp: , Rfl:      Review of Systems   Constitutional: Positive for malaise/fatigue.   HENT: Positive for nosebleeds.    Eyes: Negative for visual disturbance.   Cardiovascular: Positive for claudication and near-syncope. Negative for cyanosis and leg swelling.   Respiratory: Negative for hemoptysis and shortness of breath.    Hematologic/Lymphatic: Negative for bleeding problem. Does not bruise/bleed easily.   Skin: Positive for color change. Negative for nail changes.   Musculoskeletal: Positive for back pain. Negative for muscle weakness.   Gastrointestinal: Negative for dysphagia, hematemesis and melena.   Genitourinary: Negative for hematuria.   Neurological: Positive for dizziness and loss of balance. Negative for focal weakness, light-headedness, numbness, paresthesias and weakness.   Psychiatric/Behavioral: Negative for altered mental status.   All other systems reviewed and are negative.              Objective      Vitals       Vitals:     07/26/21 1028   BP: 118/76   BP Location: Right arm   Pulse: 64   Temp: 98.2 °F (36.8 °C)   TempSrc: Infrared   SpO2: 93%   Weight: 61.1 kg (134 lb 9.6 oz)   Height: 157.5 cm (62\")         Body mass index is 24.62 kg/m².  Physical Exam   Constitutional: She is oriented to person, place, and time.   HENT:   Head: Atraumatic.   Neck: Carotid bruit is not present.   Cardiovascular: Normal heart sounds.   Pulses:       Dorsalis pedis pulses are 1+ on the right side and 1+ on the left side.        Posterior tibial pulses are 1+ on " the right side and 1+ on the left side.   Pulmonary/Chest: Effort normal and breath sounds normal.   Abdominal: Soft. Bowel sounds are normal.   Neurological: She is alert and oriented to person, place, and time. Gait (WC) abnormal.   Skin: Skin is warm. Capillary refill takes 2 to 3 seconds.   Psychiatric: Thought content normal.   Vitals reviewed.              Assessment & Plan      Independent Review of Radiographic Studies:  Detailed discussion regarding risks, benefits, and treatment plan. Images independently reviewed. Patient understands, agrees, and wishes to proceed with plan.      1. Peripheral vascular disease (CMS/HCC)  moderate-severe reduction Arterial Flow right Lower Extremity worsened   Moderate reduction arterial flow left lower extremity  Detailed discussion with Mona Perales regarding situation, options and plans. ischemic rest pain.  RIGHT femoral to popliteal artery bypass and common femoral endarterectomy is advisable.      Risks including but not limited to Mortality, Major Morbidity 2-3%, bleeding, transfusion, infection, pulmonary (prolonged mechanical ventilation, tracheostomy), renal dysfunction (dialysis), blood vessel, nerve injury.  Benefits:  relief of symptoms, reduction in vascular events, limb loss and hospitalization.  Options:  peripheral angioplasty/stent, medical therapy, amputation discussed.  Understands and wishes to proceed.    RIGHT femoral to popliteal artery bypass and common femoral endarterectomy. (above knee and PTFE). Lower extremity arteriogram.  GEN.  SDS.  9/17/2021    2. Atherosclerosis of native arteries of extremities with rest pain, left leg (CMS/HCC)  - clopidogrel (PLAVIX) 75 MG daily     3. Benign essential hypertension  Controlled.      4. Chronic obstructive pulmonary disease, unspecified COPD type (CMS/HCC)  Controlled.      5. Bilateral carotid artery stenosis  If you should experience any neurological symptoms including but not limited to visual  or speech disturbances confusion, seizures, or weakness of limbs of one side of your body notify [facility] center immediately for evaluation or if after hours present to the nearest Emergency Department.       Samples of Xarelto 20mg daily #12 were given to the patient.     Quantity 12 boxes  Lot # [id number]  Exp Date:  2/23     Notify provider if you experience excessive bleeding from the nose, cuts, gums, rectum, urinary tract, or vagina. Reddish or brown urine or stool. Vomiting of blood or hemorrhoidal bleeding. If major injury occurs present to the Emergency Department.       Patient's Body mass index is 24.62 kg/m². indicating that she is within normal range (BMI 18.5-24.9). No BMI management plan needed..         Advance Care Planning discussed and  Informational packet given to patient. Advance Care Plan on file: No             This document has been electronically signed by David Suh MD on September 17, 2021 09:12 CDT

## 2021-09-18 LAB
ANION GAP SERPL CALCULATED.3IONS-SCNC: 9 MMOL/L (ref 5–15)
BASOPHILS # BLD AUTO: 0.04 10*3/MM3 (ref 0–0.2)
BASOPHILS NFR BLD AUTO: 0.3 % (ref 0–1.5)
BUN SERPL-MCNC: 10 MG/DL (ref 8–23)
BUN/CREAT SERPL: 12.3 (ref 7–25)
CALCIUM SPEC-SCNC: 7.1 MG/DL (ref 8.6–10.5)
CHLORIDE SERPL-SCNC: 104 MMOL/L (ref 98–107)
CO2 SERPL-SCNC: 22 MMOL/L (ref 22–29)
CREAT SERPL-MCNC: 0.81 MG/DL (ref 0.57–1)
DEPRECATED RDW RBC AUTO: 51.5 FL (ref 37–54)
EOSINOPHIL # BLD AUTO: 0.02 10*3/MM3 (ref 0–0.4)
EOSINOPHIL NFR BLD AUTO: 0.1 % (ref 0.3–6.2)
ERYTHROCYTE [DISTWIDTH] IN BLOOD BY AUTOMATED COUNT: 15.8 % (ref 12.3–15.4)
GFR SERPL CREATININE-BSD FRML MDRD: 70 ML/MIN/1.73
GLUCOSE SERPL-MCNC: 140 MG/DL (ref 65–99)
HCT VFR BLD AUTO: 27.5 % (ref 34–46.6)
HGB BLD-MCNC: 8.7 G/DL (ref 12–15.9)
IMM GRANULOCYTES # BLD AUTO: 0.07 10*3/MM3 (ref 0–0.05)
IMM GRANULOCYTES NFR BLD AUTO: 0.5 % (ref 0–0.5)
LYMPHOCYTES # BLD AUTO: 2 10*3/MM3 (ref 0.7–3.1)
LYMPHOCYTES NFR BLD AUTO: 14.1 % (ref 19.6–45.3)
MCH RBC QN AUTO: 28.2 PG (ref 26.6–33)
MCHC RBC AUTO-ENTMCNC: 31.6 G/DL (ref 31.5–35.7)
MCV RBC AUTO: 89.3 FL (ref 79–97)
MONOCYTES # BLD AUTO: 1.45 10*3/MM3 (ref 0.1–0.9)
MONOCYTES NFR BLD AUTO: 10.2 % (ref 5–12)
NEUTROPHILS NFR BLD AUTO: 10.61 10*3/MM3 (ref 1.7–7)
NEUTROPHILS NFR BLD AUTO: 74.8 % (ref 42.7–76)
NRBC BLD AUTO-RTO: 0 /100 WBC (ref 0–0.2)
PLATELET # BLD AUTO: 246 10*3/MM3 (ref 140–450)
PMV BLD AUTO: 9.1 FL (ref 6–12)
POTASSIUM SERPL-SCNC: 4.1 MMOL/L (ref 3.5–5.2)
RBC # BLD AUTO: 3.08 10*6/MM3 (ref 3.77–5.28)
SODIUM SERPL-SCNC: 135 MMOL/L (ref 136–145)
WBC # BLD AUTO: 14.19 10*3/MM3 (ref 3.4–10.8)

## 2021-09-18 PROCEDURE — 25010000002 FUROSEMIDE PER 20 MG: Performed by: THORACIC SURGERY (CARDIOTHORACIC VASCULAR SURGERY)

## 2021-09-18 PROCEDURE — 94799 UNLISTED PULMONARY SVC/PX: CPT

## 2021-09-18 PROCEDURE — P9041 ALBUMIN (HUMAN),5%, 50ML: HCPCS | Performed by: THORACIC SURGERY (CARDIOTHORACIC VASCULAR SURGERY)

## 2021-09-18 PROCEDURE — 25010000002 ALBUMIN HUMAN 5% PER 50 ML: Performed by: THORACIC SURGERY (CARDIOTHORACIC VASCULAR SURGERY)

## 2021-09-18 PROCEDURE — 85025 COMPLETE CBC W/AUTO DIFF WBC: CPT | Performed by: THORACIC SURGERY (CARDIOTHORACIC VASCULAR SURGERY)

## 2021-09-18 PROCEDURE — 80048 BASIC METABOLIC PNL TOTAL CA: CPT | Performed by: THORACIC SURGERY (CARDIOTHORACIC VASCULAR SURGERY)

## 2021-09-18 PROCEDURE — 94760 N-INVAS EAR/PLS OXIMETRY 1: CPT

## 2021-09-18 PROCEDURE — 99024 POSTOP FOLLOW-UP VISIT: CPT | Performed by: THORACIC SURGERY (CARDIOTHORACIC VASCULAR SURGERY)

## 2021-09-18 RX ORDER — FUROSEMIDE 10 MG/ML
40 INJECTION INTRAMUSCULAR; INTRAVENOUS ONCE
Status: COMPLETED | OUTPATIENT
Start: 2021-09-18 | End: 2021-09-18

## 2021-09-18 RX ORDER — ALBUMIN, HUMAN INJ 5% 5 %
250 SOLUTION INTRAVENOUS ONCE
Status: COMPLETED | OUTPATIENT
Start: 2021-09-18 | End: 2021-09-18

## 2021-09-18 RX ORDER — HYDROCODONE BITARTRATE AND ACETAMINOPHEN 5; 325 MG/1; MG/1
1 TABLET ORAL EVERY 6 HOURS PRN
Status: DISCONTINUED | OUTPATIENT
Start: 2021-09-18 | End: 2021-09-21 | Stop reason: HOSPADM

## 2021-09-18 RX ADMIN — PANTOPRAZOLE SODIUM 40 MG: 40 TABLET, DELAYED RELEASE ORAL at 08:42

## 2021-09-18 RX ADMIN — TRAMADOL HYDROCHLORIDE 50 MG: 50 TABLET, FILM COATED ORAL at 12:53

## 2021-09-18 RX ADMIN — AMITRIPTYLINE HYDROCHLORIDE 25 MG: 25 TABLET, FILM COATED ORAL at 20:00

## 2021-09-18 RX ADMIN — GABAPENTIN 800 MG: 400 CAPSULE ORAL at 13:38

## 2021-09-18 RX ADMIN — SERTRALINE 100 MG: 50 TABLET, FILM COATED ORAL at 08:42

## 2021-09-18 RX ADMIN — ALBUMIN HUMAN 250 ML: 0.05 INJECTION, SOLUTION INTRAVENOUS at 05:50

## 2021-09-18 RX ADMIN — SODIUM CHLORIDE 75 ML/HR: 900 INJECTION, SOLUTION INTRAVENOUS at 00:15

## 2021-09-18 RX ADMIN — BUDESONIDE AND FORMOTEROL FUMARATE DIHYDRATE 2 PUFF: 160; 4.5 AEROSOL RESPIRATORY (INHALATION) at 06:53

## 2021-09-18 RX ADMIN — CLOPIDOGREL BISULFATE 75 MG: 75 TABLET ORAL at 08:42

## 2021-09-18 RX ADMIN — ALBUTEROL SULFATE 2.5 MG: 2.5 SOLUTION RESPIRATORY (INHALATION) at 14:07

## 2021-09-18 RX ADMIN — ALBUTEROL SULFATE 2.5 MG: 2.5 SOLUTION RESPIRATORY (INHALATION) at 06:45

## 2021-09-18 RX ADMIN — ALBUTEROL SULFATE 2.5 MG: 2.5 SOLUTION RESPIRATORY (INHALATION) at 22:50

## 2021-09-18 RX ADMIN — CILOSTAZOL 100 MG: 100 TABLET ORAL at 08:42

## 2021-09-18 RX ADMIN — CILOSTAZOL 100 MG: 100 TABLET ORAL at 17:28

## 2021-09-18 RX ADMIN — FUROSEMIDE 40 MG: 10 INJECTION, SOLUTION INTRAMUSCULAR; INTRAVENOUS at 14:45

## 2021-09-18 RX ADMIN — HYDROCODONE BITARTRATE AND ACETAMINOPHEN 1 TABLET: 5; 325 TABLET ORAL at 20:00

## 2021-09-18 RX ADMIN — PRAVASTATIN SODIUM 20 MG: 20 TABLET ORAL at 20:00

## 2021-09-18 RX ADMIN — NITROGLYCERIN 0.5 INCH: 20 OINTMENT TOPICAL at 08:43

## 2021-09-18 RX ADMIN — TRAMADOL HYDROCHLORIDE 50 MG: 50 TABLET, FILM COATED ORAL at 23:05

## 2021-09-18 RX ADMIN — FUROSEMIDE 20 MG: 20 TABLET ORAL at 08:42

## 2021-09-18 RX ADMIN — BUDESONIDE AND FORMOTEROL FUMARATE DIHYDRATE 2 PUFF: 160; 4.5 AEROSOL RESPIRATORY (INHALATION) at 22:50

## 2021-09-18 RX ADMIN — AMLODIPINE BESYLATE 10 MG: 10 TABLET ORAL at 20:00

## 2021-09-18 RX ADMIN — GABAPENTIN 800 MG: 400 CAPSULE ORAL at 21:33

## 2021-09-18 RX ADMIN — SODIUM CHLORIDE 75 ML/HR: 900 INJECTION, SOLUTION INTRAVENOUS at 13:55

## 2021-09-18 RX ADMIN — CLINDAMYCIN IN 5 PERCENT DEXTROSE 900 MG: 18 INJECTION, SOLUTION INTRAVENOUS at 05:28

## 2021-09-18 RX ADMIN — NITROGLYCERIN 0.5 INCH: 20 OINTMENT TOPICAL at 20:01

## 2021-09-18 NOTE — PLAN OF CARE
Goal Outcome Evaluation:           Progress: improving  Outcome Summary: pt up in chair. ruiz and arthur out. sleep apnea noted thru night 02 NC on. one dose albumin given this AM r/t b/p. PCA pump in use

## 2021-09-18 NOTE — PAYOR COMM NOTE
"Lady Farr RN, [facility]  Cm phone [phone]  Cm fax [phone]  INPATIENT ADMISSION        Filomena Perales (71 y.o. Female)     Date of Birth Social Security Number Address Home Phone MRN    1950  [id number] [address] [facility] [address] [phone] [id number]    Anabaptism Marital Status          Mandaeism        Admission Date Admission Type Admitting Provider Attending Provider Department, Room/Bed    9/17/21 Elective David Suh MD [place], Thomas Mark, MD [facility] CRITICAL CARE STEPDOWN, 02/A    Discharge Date Discharge Disposition Discharge Destination                       Attending Provider: David Suh MD    Allergies: Morphine And Related, Penicillins    Isolation: None   Infection: None   Code Status: CPR    Ht: 157.5 cm (62.01\")   Wt: 71.4 kg (157 lb 6.4 oz)    Admission Cmt: None   Principal Problem: PVD (peripheral vascular disease) (CMS/[facility]) [I73.9]                 Active Insurance as of 9/17/2021     Primary Coverage     Payor Plan Insurance Group Employer/Plan Group    [facility] [employer] MEDICARE REPLACEMENT      Payor Plan Address Payor Plan Phone Number Payor Plan Fax Number Effective Dates    [address] [address] [phone]  5/1/2021 - None Entered    [facility] [id number]       Subscriber Name Subscriber Birth Date Member ID       FILOMENA PERALES 1950 [id number]                 Emergency Contacts      (Rel.) Home Phone Work Phone Mobile Phone    Yulisa Porter (Friend) -- -- [phone]    MARIA EUGENIA LONG (Sister) [phone] -- [phone]               History & Physical      David Suh MD at 09/17/21 0910          Filomena Perales is a 70 y.o. female is here today for follow-up.     Chief Complaint:        Chief Complaint   Patient presents with   • Peripheral Vascular Disease         The following portions of the patient's history were reviewed and " updated as appropriate: allergies, current medications, past family history, past medical history, past social history, past surgical history and problem list.  Recent images independently reviewed.  Available laboratory values reviewed.     PCP:  Lisa Alejandro APRN  Cardiology:  Kimmy      70 y.o. female with HTN(stable, increased risk stroke, rupture), Hyperlipidemia(stable, increased risk cardiovascular events), Smoker(uncontrolled, increased risk cardiovascular events), COPD(stable, increased risk pulmonary complications) and Atrial fibrillation(stable, increased risk stroke)  Anticoagulation (Xarelto, stable) Hx PE (stable). PVD (LSCA stent 2005, B iliac stents 2008) lost to follow up.  former smoker and Recent Cessation.  Increasing pain LEFT foot, lifestyle limiting. Difficulty ambulating. VAS 10. No TIA stroke amaurosis.  No MI claudication. No other associated signs, symptoms or modifying factors. Pharmacy discontinued Xarelto (5/2020). Worsening LEFT lower extremity pain similar to pre-surgery approx 1 month. Underwent additional angio procedure. Improved. Confused on medications, off Plavix x 2 months. Now with worsening LEFT lower extremity pain.  Returns for follow up.       2005: LSCA Stent  2008: Bilateral Iliac stents  3/23/2020:  CHELSIE: RIGHT 0.83 Triphasic  LEFT 0.31 Biphasic/Monophasic (DP/PT)  3/26/2020: CTA Runoff: Occlusion of the proximal left superficial femoral artery with  reconstitution of the distal left SFA. The popliteal artery  appears occluded distally, with reconstitution of the arteries of  the trifurcation. The left calf vessels are patent, as above.Patent left common iliac and proximal left external iliac stents. There appears to be mild luminal narrowing within the  left external iliac stent. Stenosis of the origins of the superior mesenteric artery and  bilateral renal arteries as above. Irregular narrowing of the right superficial femoral artery,  as described above without  occlusion. The right popliteal artery  and the right calf arteries are patent, as above.  4/14/2020: LEFT Femoral-popliteal Bypass PTFE  5/22/2020:  CHELSIE: RIGHT 1.1 Triphasic LEFT 0.79 Tri/Biphasic (PT/DP)   Patent LEFT graft. mPSV 237cm/s (native) Triphasic  8/25/2020:  CHELSIE: RIGHT 0.67 Tri/Biphasic (DP/PT) LEFT 0.52 Tri/Biphasic (PT/DP). Patent LEFT Fem-Pop graft. Native prx (333cm/s) Triphasic.   9/9/2020: LEFT/RIGHT OBDULIA PTA  9/22/2020:  CHELSIE: RIGHT 1.2 Triphasic LEFT 0.81 Biphasic  1/29/2021: CHELSIE: RIGHT 0.92 Tripahsic LEFT 0.75 Tri/Biphasic (PT/DP) Patent PTFE graft LEFT native [id number].   7/26/21: CHELSIE: RIGHT 0.62 Biphasic LEFT 0.85 Tri/Biphasic (PT/DP)      2016: Carotid Duplex: BABS 0-49% LICA 0-49%  2017: Carotid duplex: BABS 0-49% LICA 0-49%  2018: Carotid duplex: BABS 0-49% LICA 0-49%        Medical History        Past Medical History:   Diagnosis Date   • A-fib (CMS/[facility])     • Anxiety     • Arthritis     • Asthma     • 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 with ulceration (CMS/[facility])       bilateral legs - bilateral iliac stents 2008      • CHF (congestive heart failure) (CMS/[facility])     • Chronic lower back pain     • COPD (chronic obstructive pulmonary disease) (CMS/[facility])     • Essential (primary) hypertension     • GERD (gastroesophageal reflux disease)     • History of pulmonary embolus (PE)     • Hyperlipidemia     • Insomnia     • Lumbago     • Nicotine dependence     • Occlusion of artery        and stenosis of bilateral carotid arteries - BABS 16-49%, LICA 0-15%   • Other atherosclerosis of native artery of other extremity       LEFT subclavian stent 2005 (occluded)   • Sleep apnea           Surgical History         Past Surgical History:   Procedure Laterality Date   • CARDIAC CATHETERIZATION   04/06/2016     No evidence of any obstructive epicardial CAD.Preserved LV systolic function with EF of 55%.   • CENTRAL VENOUS LINE INSERTION   04/07/2016     Successful placement of right uppe  extremity 6-Barbadian triple lumen PICC line.   • ENDOSCOPY N/A 1/12/2018     Procedure: ESOPHAGOGASTRODUODENOSCOPY;  Surgeon: Bin Martin MD;  Location: [facility] ENDOSCOPY;  Service:    • ENDOSCOPY N/A 1/17/2018     Procedure: ESOPHAGOGASTRODUODENOSCOPY;  Surgeon: Bin Martin MD;  Location: [facility] ENDOSCOPY;  Service:    • ENDOSCOPY N/A 3/16/2018     Procedure: ESOPHAGOGASTRODUODENOSCOPY possible dilation;  Surgeon: Bin Martin MD;  Location: [facility] ENDOSCOPY;  Service: Gastroenterology   • FEMORAL POPLITEAL BYPASS Left 4/14/2020     Procedure: FEMORAL POPLITEAL BYPASS ABOVE KNEE  (POLYTETRAFLUOROETHYLENE)  LEFT COMMON FEMORAL ARTERY ENDARTERECTOMY PTA LEFT ILIAC ARTERIOGRAM        (c-arm#2 and c-arm table);  Surgeon: David Suh MD;  Location: [facility] OR;  Service: Vascular;  Laterality: Left;   • HYSTERECTOMY       • INTERVENTIONAL RADIOLOGY PROCEDURE Left 9/9/2020     Procedure: IR angiogram extremity left;  Surgeon: David Suh MD;  Location: [facility] ANGIO INVASIVE LOCATION;  Service: Interventional Radiology;  Laterality: Left;   • KYPHOPLASTY N/A 5/23/2019     Procedure: KYPHOPLASTY LUMBAR FOUR;  Surgeon: Aba Santa MD;  Location: [facility] OR;  Service: Orthopedic Spine   • TOTAL SHOULDER REPLACEMENT Left     • TRANSESOPHAGEAL ECHOCARDIOGRAM (JACQUES)   04/07/2016     With color flow-Mild to moderate CLVH.LV systolic function well preserved with Ef of 55-60%.Mitral and AV intact.Diastolic dysfunction   • UPPER GASTROINTESTINAL ENDOSCOPY   01/17/2018     Dr. Bin Martin M.D.               Family History   Problem Relation Age of Onset   • Heart disease Other     • Hypertension Other        Social History   Social History            Socioeconomic History   • Marital status:        Spouse name: Not on file   • Number of children: Not on file   • Years of education: Not on file   • Highest education level: Not on file   Tobacco Use   • Smoking status: Former  Smoker       Years: 50.00       Types: Cigarettes       Quit date: 2019       Years since quittin.4   • Smokeless tobacco: Never Used   Substance and Sexual Activity   • Alcohol use: No   • Drug use: No   • Sexual activity: Defer               ALLERGIES:   Morphine and related and Penicillins     MEDICATIONS:        Current Outpatient Medications:   •  albuterol (PROVENTIL) (2.5 MG/3ML) 0.083% nebulizer solution, Take 2.5 mg by nebulization Every 4 (Four) Hours As Needed for Wheezing., Disp: , Rfl:   •  ALBUTEROL SULFATE HFA IN, Inhale 2 puffs Every 4 (Four) Hours., Disp: , Rfl:   •  amitriptyline (ELAVIL) 25 MG tablet, TAKE 1 TABLET (25 MG TOTAL) BY MOUTH NIGHTLY., Disp: , Rfl:   •  atorvastatin (LIPITOR) 20 MG tablet, Take 1 tablet by mouth Every Night., Disp: 30 tablet, Rfl: 11  •  cilostazol (PLETAL) 100 MG tablet, Take 1 tablet by mouth 2 (Two) Times a Day Before Meals., Disp: 60 tablet, Rfl: 11  •  cyclobenzaprine (FLEXERIL) 5 MG tablet, Take 1 tablet by mouth 3 (Three) Times a Day As Needed for Muscle Spasms., Disp: 90 tablet, Rfl: 2  •  furosemide (LASIX) 20 MG tablet, Take 1 tablet by mouth Daily., Disp: 30 tablet, Rfl: 1  •  gabapentin (NEURONTIN) 800 MG tablet, Take 800 mg by mouth 2 (Two) Times a Day., Disp: , Rfl:   •  pantoprazole (PROTONIX) 40 MG EC tablet, Take 1 tablet by mouth Daily., Disp: 30 tablet, Rfl: 5  •  penciclovir (DENAVIR) 1 % cream, Apply 1 application topically to the appropriate area as directed As Needed (fever blisters)., Disp: , Rfl:   •  rivaroxaban (Xarelto) 20 MG tablet, Take 1 tablet by mouth Daily With Dinner., Disp: 30 tablet, Rfl: 6  •  sertraline (ZOLOFT) 100 MG tablet, Take 100 mg by mouth Daily., Disp: , Rfl:   •  traMADol (Ultram) 50 MG tablet, Take 1 tablet by mouth Every 6 (Six) Hours As Needed for Moderate Pain ., Disp: 40 tablet, Rfl: 0  •  traZODone (DESYREL) 150 MG tablet, Take 1 tablet by mouth Every Night., Disp: , Rfl:   •  clopidogrel (PLAVIX) 75 MG  "tablet, Take 1 tablet by mouth Daily., Disp: 30 tablet, Rfl: 11  •  docusate sodium 100 MG capsule, Take 100 mg by mouth 2 (Two) Times a Day. (Patient taking differently: Take 100 mg by mouth Daily As Needed.), Disp: , Rfl:      Review of Systems   Constitutional: Positive for malaise/fatigue.   HENT: Positive for nosebleeds.    Eyes: Negative for visual disturbance.   Cardiovascular: Positive for claudication and near-syncope. Negative for cyanosis and leg swelling.   Respiratory: Negative for hemoptysis and shortness of breath.    Hematologic/Lymphatic: Negative for bleeding problem. Does not bruise/bleed easily.   Skin: Positive for color change. Negative for nail changes.   Musculoskeletal: Positive for back pain. Negative for muscle weakness.   Gastrointestinal: Negative for dysphagia, hematemesis and melena.   Genitourinary: Negative for hematuria.   Neurological: Positive for dizziness and loss of balance. Negative for focal weakness, light-headedness, numbness, paresthesias and weakness.   Psychiatric/Behavioral: Negative for altered mental status.   All other systems reviewed and are negative.              Objective      Vitals       Vitals:     07/26/21 1028   BP: 118/76   BP Location: Right arm   Pulse: 64   Temp: 98.2 °F (36.8 °C)   TempSrc: Infrared   SpO2: 93%   Weight: 61.1 kg (134 lb 9.6 oz)   Height: 157.5 cm (62\")         Body mass index is 24.62 kg/m².  Physical Exam   Constitutional: She is oriented to person, place, and time.   HENT:   Head: Atraumatic.   Neck: Carotid bruit is not present.   Cardiovascular: Normal heart sounds.   Pulses:       Dorsalis pedis pulses are 1+ on the right side and 1+ on the left side.        Posterior tibial pulses are 1+ on the right side and 1+ on the left side.   Pulmonary/Chest: Effort normal and breath sounds normal.   Abdominal: Soft. Bowel sounds are normal.   Neurological: She is alert and oriented to person, place, and time. Gait (WC) abnormal.   Skin: " Skin is warm. Capillary refill takes 2 to 3 seconds.   Psychiatric: Thought content normal.   Vitals reviewed.              Assessment & Plan      Independent Review of Radiographic Studies:  Detailed discussion regarding risks, benefits, and treatment plan. Images independently reviewed. Patient understands, agrees, and wishes to proceed with plan.      1. Peripheral vascular disease (CMS/HCC)  moderate-severe reduction Arterial Flow right Lower Extremity worsened   Moderate reduction arterial flow left lower extremity  Detailed discussion with Mona Perales regarding situation, options and plans. ischemic rest pain.  RIGHT femoral to popliteal artery bypass and common femoral endarterectomy is advisable.      Risks including but not limited to Mortality, Major Morbidity 2-3%, bleeding, transfusion, infection, pulmonary (prolonged mechanical ventilation, tracheostomy), renal dysfunction (dialysis), blood vessel, nerve injury.  Benefits:  relief of symptoms, reduction in vascular events, limb loss and hospitalization.  Options:  peripheral angioplasty/stent, medical therapy, amputation discussed.  Understands and wishes to proceed.    RIGHT femoral to popliteal artery bypass and common femoral endarterectomy. (above knee and PTFE). Lower extremity arteriogram.  GEN.  SDS.  9/17/2021    2. Atherosclerosis of native arteries of extremities with rest pain, left leg (CMS/HCC)  - clopidogrel (PLAVIX) 75 MG daily     3. Benign essential hypertension  Controlled.      4. Chronic obstructive pulmonary disease, unspecified COPD type (CMS/HCC)  Controlled.      5. Bilateral carotid artery stenosis  If you should experience any neurological symptoms including but not limited to visual or speech disturbances confusion, seizures, or weakness of limbs of one side of your body notify Heart and Vascular center immediately for evaluation or if after hours present to the nearest Emergency Department.       Samples of Xarelto 20mg  daily #12 were given to the patient.     Quantity 12 boxes  Lot # [id number]  Exp Date:  2/23     Notify provider if you experience excessive bleeding from the nose, cuts, gums, rectum, urinary tract, or vagina. Reddish or brown urine or stool. Vomiting of blood or hemorrhoidal bleeding. If major injury occurs present to the Emergency Department.       Patient's Body mass index is 24.62 kg/m². indicating that she is within normal range (BMI 18.5-24.9). No BMI management plan needed..         Advance Care Planning discussed and  Informational packet given to patient. Advance Care Plan on file: No             This document has been electronically signed by David Suh MD on September 17, 2021 09:12 CDT        Electronically signed by David Suh MD at 09/17/21 0912          Operative/Procedure Notes (last 24 hours) (Notes from 09/16/21 2224 through 09/17/21 2224)      David Suh MD at 09/17/21 1230              OPERATIVE NOTE  Mona Perales  1950  9/17/2021    PREOP DIAGNOSES:  PVD (peripheral vascular disease) [I73.9]  PVD (peripheral vascular disease) (CMS/[facility]) [I73.9]    POSTOP DIAGNOSES:  PVD (peripheral vascular disease) [I73.9]    PROCEDURE:   RIGHT FEMORAL POPLITEAL BYPASS (ABOVE KNEE, 6mm PTFE graft)  RIGHT lower extremity arteriogram  RIGHTcommon femoral endarterectomy  Negative pressure wound therapy (>50cm2, Prevena)     SURGEON: QUINCY Suh MD FACS RPVI    ASSISTANT: Arley Marshall [facility] provided critical assistance during the case including suctioning, exposure, retraction, and reduction of blood loss.    ANESTHESIA: General ET     ESTIMATED BLOOD LOSS: 50 ml     SPECIMEN:  None    COMPLICATIONS:  None    DESCRIPTION OF OPERATION: Patient taken to the operating room placed in supine position. General endotracheal anesthesia was induced. Radial arterial line was placed by Anesthesia. Patient prepped and draped in sterile fashion.    Incision made in the RIGHT  distal thigh, dissection carried down to the above-knee popliteal artery which was 6 mm in diameter and soft distally. Oblique incision was then made in the RIGHT groin, dissection carried down to the common femoral artery and its branches which were isolated. 6 mm PTFE graft was tunneled subsartorially from the knee to the groin incision using GORE tunneler. 8000 units heparin given to maintain ACT ~300. The distal end of the PTFE graft was fashioned for anastomosis. Popliteal artery was isolated with Peter clamps, opened with 11 blade. Distal anastomosis performed end-to-side using CV 6 Trimble-Osvaldo suture. Leg was straightened, minimal slack in the graft. Left lower extremity arteriogram was performed through the graft demonstrating widely patent distal anastomosis, good three-vessel runoff in the tibial vessels. Profunda and SFA were isolated with profunda clamps, proximal common femoral artery was isolated with Z-Clamp, opened with 11 blade and there was moderate to severe obstructive plaque. Common femoral endarterectomy was performed with good backbleeding from the profunda. Proximal end of the PTFE graft was fashioned and anastomosed end-to-side to the common femoral artery using CV 6 Trimble-Osvaldo suture.     Usual de-airing techniques were employed. Good pulse distal to the anastomosis. Good biphasic signal in the posterior tibial artery and triphasic signal in the dorsalis pedis artery. 20 mg protamine was given with return of ACT to baseline. Hemostasis was obtained. Griffin was used. Incisions were closed in layers of 3-0 PDS, 3-0 PDS, 4-0 Monocryl in the skin.  Patient had a moderate tissue edema, negative pressure wound therapy was performed using the Prevena wound dressing([id number]). Patient tolerated procedure well and was transferred to PACU stable condition.               This document has been electronically signed by David Suh MD on September 17, 2021 15:55 CDT         Electronically signed  by David Suh MD at 09/17/21 1600       Physician Progress Notes (last 24 hours) (Notes from 09/16/21 2224 through 09/17/21 2224)    No notes of this type exist for this encounter.

## 2021-09-18 NOTE — PROGRESS NOTES
CTVS DAILY NOTE     LOS: 1 day   POD# 1  Femoral-popliteal bypass    Patient Care Team:  Anahi Camacho APRN as PCP - General (Nurse Practitioner)  Kris Peres MD (Inactive) as Consulting Physician (Cardiology)    Chief Complaint: Soreness incision site    Subjective  Doing well ate her entire breakfast  No complaints of shortness of breath just some tenderness in the incision sites    Vital Signs  Temp:  [97.4 °F (36.3 °C)-98 °F (36.7 °C)] 97.8 °F (36.6 °C)  Heart Rate:  [71-98] 79  Resp:  [18-22] 18  BP: ()/(40-87) 101/57  Arterial Line BP: (36-62)/(32-50) 54/41  Body mass index is 28.62 kg/m².    Intake/Output Summary (Last 24 hours) at 9/18/2021 1054  Last data filed at 9/18/2021 0600  Gross per 24 hour   Intake 3581.25 ml   Output 1058 ml   Net 2523.25 ml     No intake/output data recorded.    Wt Readings from Last 3 Encounters:   09/18/21 71 kg (156 lb 8.4 oz)   08/25/21 62.6 kg (138 lb)   07/26/21 61.1 kg (134 lb 9.6 oz)       Objective  Incisions:  Clean and dry.  General: WDWN female alert and oriented x3  HEENT: NC NT PERRLA  Chest: Symmetric expansion crackles appreciated on cough  Neurologic: Alert oriented x3 no asymmetry    Results Review:      WBC No results found for: WBCS   HGB Hemoglobin   Date/Time Value Ref Range Status   09/18/2021 0544 8.7 (L) 12.0 - 15.9 g/dL Final   09/17/2021 1020 12.2 12.0 - 15.9 g/dL Final      HCT Hematocrit   Date/Time Value Ref Range Status   09/18/2021 0544 27.5 (L) 34.0 - 46.6 % Final   09/17/2021 1020 38.0 34.0 - 46.6 % Final      Platlets No results found for: LABPLAT     PT/INR:  No results found for: PROTIME/No results found for: INR    Sodium Sodium   Date/Time Value Ref Range Status   09/18/2021 0544 135 (L) 136 - 145 mmol/L Final   09/17/2021 1020 137 136 - 145 mmol/L Final      Potassium Potassium   Date/Time Value Ref Range Status   09/18/2021 0544 4.1 3.5 - 5.2 mmol/L Final   09/17/2021 1020 3.8 3.5 - 5.2 mmol/L Final      Chloride  Chloride   Date/Time Value Ref Range Status   09/18/2021 0544 104 98 - 107 mmol/L Final   09/17/2021 1020 100 98 - 107 mmol/L Final      Bicarbonate No results found for: PLASMABICARB   BUN BUN   Date/Time Value Ref Range Status   09/18/2021 0544 10 8 - 23 mg/dL Final   09/17/2021 1020 13 8 - 23 mg/dL Final      Creatinine Creatinine   Date/Time Value Ref Range Status   09/18/2021 0544 0.81 0.57 - 1.00 mg/dL Final   09/17/2021 1020 0.81 0.57 - 1.00 mg/dL Final      Calcium Calcium   Date/Time Value Ref Range Status   09/18/2021 0544 7.1 (L) 8.6 - 10.5 mg/dL Final   09/17/2021 1020 9.0 8.6 - 10.5 mg/dL Final      Magnesium No results found for: MG     Imaging Results (Last 72 Hours)     Procedure Component Value Units Date/Time    FL C Arm During Surgery [[id number]] Resulted: 09/17/21 1523     Updated: 09/17/21 1523            albuterol, 2.5 mg, Nebulization, Q8H - RT  amitriptyline, 25 mg, Oral, Nightly  amLODIPine, 10 mg, Oral, Nightly  budesonide-formoterol, 2 puff, Inhalation, BID - RT  cilostazol, 100 mg, Oral, BID AC  clopidogrel, 75 mg, Oral, Daily  furosemide, 20 mg, Oral, Daily  gabapentin, 800 mg, Oral, Q8H  nitroglycerin, 0.5 inch, Topical, Q12H  pantoprazole, 40 mg, Oral, Daily  pravastatin, 20 mg, Oral, Nightly  sertraline, 100 mg, Oral, Daily  traZODone, 150 mg, Oral, Nightly      HYDROmorphone HCl-NaCl,   nitroglycerin, 5-200 mcg/min, Last Rate: Stopped (09/17/21 2019)  sodium chloride, 75 mL/hr, Last Rate: 75 mL/hr (09/18/21 0015)          Patient Active Problem List   Diagnosis Code   • Anxiety F41.9   • CAD (coronary atherosclerotic disease) I25.10   • Carotid stenosis I65.29   • COPD (chronic obstructive pulmonary disease) (CMS/[facility]) J44.9   • COPD with exacerbation (CMS/[facility]) J44.1   • Depressive disorder, not elsewhere classified F32.9   • Benign essential hypertension I10   • Hypercholesteremia E78.00   • Insomnia G47.00   • Notalgia M54.9   • Osteoporosis M81.0   • Other screening mammogram  Z12.31   • RUQ abdominal pain R10.11   • PVD (peripheral vascular disease) with claudication (CMS/HCC) I73.9   • Dizziness R42   • Nicotine abuse Z72.0   • Dysphagia R13.10   • Epigastric pain R10.13   • Pain of right hand M79.641   • Closed displaced fracture of shaft of fifth metacarpal bone of right hand S62.326A   • Cause of injury, fall W19.XXXA   • Displaced fracture of shaft of fifth metacarpal bone of right hand with routine healing S62.326D   • Fall W19.XXXA   • Syncope R55   • Acute respiratory failure with hypoxia (CMS/HCC) J96.01   • (HFpEF) heart failure with preserved ejection fraction (CMS/HCC) I50.30   • Hypotension I95.9   • Elevated WBCs D72.829   • Near syncope R55   • Atherosclerosis of native coronary artery of native heart without angina pectoris I25.10   • Atherosclerosis of native arteries of extremities with rest pain, left leg (CMS/HCC) I70.222   • PVD (peripheral vascular disease) (CMS/HCC) I73.9         Assessment & Plan   Peripheral vascular disease status post femoral-popliteal bypass   -Increase activity will transfer to stepdown   -Meds adjusted  Hypertension  COPD on home O2 2 L   -Wean O2 down to 2 L as tolerated maintaining greater than 92%   -Increase ambulation PT OT  Bilateral carotid artery stenosis    Madhu De Leon MD  09/18/21  10:54 CDT

## 2021-09-18 NOTE — SIGNIFICANT NOTE
Pt placed on high flow nasal canula @ 15L to maintain sats at 93%. Pt in no distress BBS Course wheezes and rales

## 2021-09-19 ENCOUNTER — APPOINTMENT (OUTPATIENT)
Dept: ULTRASOUND IMAGING | Facility: HOSPITAL | Age: 71
End: 2021-09-19

## 2021-09-19 ENCOUNTER — APPOINTMENT (OUTPATIENT)
Dept: CT IMAGING | Facility: HOSPITAL | Age: 71
End: 2021-09-19

## 2021-09-19 LAB
ALBUMIN SERPL-MCNC: 3.6 G/DL (ref 3.5–5.2)
ALBUMIN/GLOB SERPL: 1.2 G/DL
ALP SERPL-CCNC: 82 U/L (ref 39–117)
ALT SERPL W P-5'-P-CCNC: 10 U/L (ref 1–33)
ANION GAP SERPL CALCULATED.3IONS-SCNC: 13 MMOL/L (ref 5–15)
ANISOCYTOSIS BLD QL: NORMAL
AST SERPL-CCNC: 28 U/L (ref 1–32)
BASOPHILS # BLD AUTO: 0.07 10*3/MM3 (ref 0–0.2)
BASOPHILS NFR BLD AUTO: 0.4 % (ref 0–1.5)
BILIRUB SERPL-MCNC: 0.5 MG/DL (ref 0–1.2)
BUN SERPL-MCNC: 8 MG/DL (ref 8–23)
BUN/CREAT SERPL: 10.7 (ref 7–25)
CALCIUM SPEC-SCNC: 8.1 MG/DL (ref 8.6–10.5)
CHLORIDE SERPL-SCNC: 99 MMOL/L (ref 98–107)
CO2 SERPL-SCNC: 23 MMOL/L (ref 22–29)
CREAT SERPL-MCNC: 0.75 MG/DL (ref 0.57–1)
DEPRECATED RDW RBC AUTO: 49.8 FL (ref 37–54)
EOSINOPHIL # BLD AUTO: 0.1 10*3/MM3 (ref 0–0.4)
EOSINOPHIL NFR BLD AUTO: 0.6 % (ref 0.3–6.2)
ERYTHROCYTE [DISTWIDTH] IN BLOOD BY AUTOMATED COUNT: 15.2 % (ref 12.3–15.4)
GFR SERPL CREATININE-BSD FRML MDRD: 77 ML/MIN/1.73
GLOBULIN UR ELPH-MCNC: 3.1 GM/DL
GLUCOSE SERPL-MCNC: 94 MG/DL (ref 65–99)
HCT VFR BLD AUTO: 28.1 % (ref 34–46.6)
HGB BLD-MCNC: 8.9 G/DL (ref 12–15.9)
HYPOCHROMIA BLD QL: NORMAL
IMM GRANULOCYTES # BLD AUTO: 0.13 10*3/MM3 (ref 0–0.05)
IMM GRANULOCYTES NFR BLD AUTO: 0.8 % (ref 0–0.5)
LYMPHOCYTES # BLD AUTO: 4.06 10*3/MM3 (ref 0.7–3.1)
LYMPHOCYTES NFR BLD AUTO: 25.5 % (ref 19.6–45.3)
MCH RBC QN AUTO: 28.5 PG (ref 26.6–33)
MCHC RBC AUTO-ENTMCNC: 31.7 G/DL (ref 31.5–35.7)
MCV RBC AUTO: 90.1 FL (ref 79–97)
MONOCYTES # BLD AUTO: 2.38 10*3/MM3 (ref 0.1–0.9)
MONOCYTES NFR BLD AUTO: 14.9 % (ref 5–12)
NEUTROPHILS NFR BLD AUTO: 57.8 % (ref 42.7–76)
NEUTROPHILS NFR BLD AUTO: 9.21 10*3/MM3 (ref 1.7–7)
NRBC BLD AUTO-RTO: 0 /100 WBC (ref 0–0.2)
PLATELET # BLD AUTO: 193 10*3/MM3 (ref 140–450)
PMV BLD AUTO: 9.1 FL (ref 6–12)
POTASSIUM SERPL-SCNC: 4.1 MMOL/L (ref 3.5–5.2)
PROT SERPL-MCNC: 6.7 G/DL (ref 6–8.5)
RBC # BLD AUTO: 3.12 10*6/MM3 (ref 3.77–5.28)
SMALL PLATELETS BLD QL SMEAR: ADEQUATE
SODIUM SERPL-SCNC: 135 MMOL/L (ref 136–145)
WBC # BLD AUTO: 15.95 10*3/MM3 (ref 3.4–10.8)
WBC MORPH BLD: NORMAL

## 2021-09-19 PROCEDURE — 25010000002 FUROSEMIDE PER 20 MG: Performed by: THORACIC SURGERY (CARDIOTHORACIC VASCULAR SURGERY)

## 2021-09-19 PROCEDURE — 94799 UNLISTED PULMONARY SVC/PX: CPT

## 2021-09-19 PROCEDURE — 97162 PT EVAL MOD COMPLEX 30 MIN: CPT

## 2021-09-19 PROCEDURE — 85025 COMPLETE CBC W/AUTO DIFF WBC: CPT | Performed by: THORACIC SURGERY (CARDIOTHORACIC VASCULAR SURGERY)

## 2021-09-19 PROCEDURE — 93926 LOWER EXTREMITY STUDY: CPT

## 2021-09-19 PROCEDURE — 94760 N-INVAS EAR/PLS OXIMETRY 1: CPT

## 2021-09-19 PROCEDURE — 80053 COMPREHEN METABOLIC PANEL: CPT | Performed by: THORACIC SURGERY (CARDIOTHORACIC VASCULAR SURGERY)

## 2021-09-19 PROCEDURE — 85007 BL SMEAR W/DIFF WBC COUNT: CPT | Performed by: THORACIC SURGERY (CARDIOTHORACIC VASCULAR SURGERY)

## 2021-09-19 PROCEDURE — 93971 EXTREMITY STUDY: CPT

## 2021-09-19 PROCEDURE — 99024 POSTOP FOLLOW-UP VISIT: CPT | Performed by: THORACIC SURGERY (CARDIOTHORACIC VASCULAR SURGERY)

## 2021-09-19 PROCEDURE — 74176 CT ABD & PELVIS W/O CONTRAST: CPT

## 2021-09-19 RX ORDER — FUROSEMIDE 10 MG/ML
40 INJECTION INTRAMUSCULAR; INTRAVENOUS EVERY 6 HOURS
Status: COMPLETED | OUTPATIENT
Start: 2021-09-19 | End: 2021-09-19

## 2021-09-19 RX ADMIN — GABAPENTIN 800 MG: 400 CAPSULE ORAL at 14:51

## 2021-09-19 RX ADMIN — HYDROCODONE BITARTRATE AND ACETAMINOPHEN 1 TABLET: 5; 325 TABLET ORAL at 20:07

## 2021-09-19 RX ADMIN — NITROGLYCERIN 0.5 INCH: 20 OINTMENT TOPICAL at 20:07

## 2021-09-19 RX ADMIN — CILOSTAZOL 100 MG: 100 TABLET ORAL at 07:42

## 2021-09-19 RX ADMIN — CILOSTAZOL 100 MG: 100 TABLET ORAL at 17:06

## 2021-09-19 RX ADMIN — TRAZODONE HYDROCHLORIDE 150 MG: 150 TABLET ORAL at 20:07

## 2021-09-19 RX ADMIN — AMITRIPTYLINE HYDROCHLORIDE 25 MG: 25 TABLET, FILM COATED ORAL at 20:07

## 2021-09-19 RX ADMIN — HYDROCODONE BITARTRATE AND ACETAMINOPHEN 1 TABLET: 5; 325 TABLET ORAL at 09:52

## 2021-09-19 RX ADMIN — SERTRALINE 100 MG: 50 TABLET, FILM COATED ORAL at 09:34

## 2021-09-19 RX ADMIN — HYDROCODONE BITARTRATE AND ACETAMINOPHEN 1 TABLET: 5; 325 TABLET ORAL at 03:13

## 2021-09-19 RX ADMIN — GABAPENTIN 800 MG: 400 CAPSULE ORAL at 22:19

## 2021-09-19 RX ADMIN — GABAPENTIN 800 MG: 400 CAPSULE ORAL at 05:24

## 2021-09-19 RX ADMIN — TRAMADOL HYDROCHLORIDE 50 MG: 50 TABLET, FILM COATED ORAL at 05:24

## 2021-09-19 RX ADMIN — FUROSEMIDE 20 MG: 10 INJECTION INTRAMUSCULAR; INTRAVENOUS at 11:56

## 2021-09-19 RX ADMIN — FUROSEMIDE 40 MG: 10 INJECTION INTRAMUSCULAR; INTRAVENOUS at 17:06

## 2021-09-19 RX ADMIN — AMLODIPINE BESYLATE 10 MG: 10 TABLET ORAL at 20:07

## 2021-09-19 RX ADMIN — PRAVASTATIN SODIUM 20 MG: 20 TABLET ORAL at 20:07

## 2021-09-19 RX ADMIN — PANTOPRAZOLE SODIUM 40 MG: 40 TABLET, DELAYED RELEASE ORAL at 09:33

## 2021-09-19 RX ADMIN — ALBUTEROL SULFATE 2.5 MG: 2.5 SOLUTION RESPIRATORY (INHALATION) at 15:25

## 2021-09-19 RX ADMIN — TRAMADOL HYDROCHLORIDE 50 MG: 50 TABLET, FILM COATED ORAL at 17:18

## 2021-09-19 RX ADMIN — CLOPIDOGREL BISULFATE 75 MG: 75 TABLET ORAL at 09:34

## 2021-09-19 RX ADMIN — NITROGLYCERIN 0.5 INCH: 20 OINTMENT TOPICAL at 09:33

## 2021-09-19 RX ADMIN — ALBUTEROL SULFATE 2.5 MG: 2.5 SOLUTION RESPIRATORY (INHALATION) at 23:10

## 2021-09-19 RX ADMIN — FUROSEMIDE 20 MG: 20 TABLET ORAL at 09:34

## 2021-09-19 RX ADMIN — BUDESONIDE AND FORMOTEROL FUMARATE DIHYDRATE 2 PUFF: 160; 4.5 AEROSOL RESPIRATORY (INHALATION) at 23:10

## 2021-09-19 NOTE — PROGRESS NOTES
CTVS DAILY NOTE     LOS: 2 days   POD# 2  Femoral-popliteal bypass    Patient Care Team:  Anahi Camacho APRN as PCP - General (Nurse Practitioner)  Kris Peres MD (Inactive) as Consulting Physician (Cardiology)    Chief Complaint: Pain in the right lower quadrant and swelling in the legs    Subjective     Pain in the right lower quadrant and suprapubic area mid abdominal  Swelling right leg  Overall does not feel well    Vital Signs  Temp:  [97.5 °F (36.4 °C)-98.9 °F (37.2 °C)] 97.9 °F (36.6 °C)  Heart Rate:  [] 94  Resp:  [18-20] 20  BP: ()/(51-92) 111/57  Body mass index is 28.62 kg/m².    Intake/Output Summary (Last 24 hours) at 9/19/2021 1020  Last data filed at 9/19/2021 0500  Gross per 24 hour   Intake 1888 ml   Output 2250 ml   Net -362 ml     No intake/output data recorded.    Wt Readings from Last 3 Encounters:   09/18/21 71 kg (156 lb 8.4 oz)   08/25/21 62.6 kg (138 lb)   07/26/21 61.1 kg (134 lb 9.6 oz)       Objective  Incisions:  Clean and dry.  General: WDWN female alert and oriented x3  HEENT: NC NT PERRLA  Chest: Symmetric expansion bilateral wheezes and rales  Neurologic: Alert oriented x3 no asymmetry      Results Review:      WBC No results found for: WBCS   HGB Hemoglobin   Date/Time Value Ref Range Status   09/19/2021 0759 8.9 (L) 12.0 - 15.9 g/dL Final   09/18/2021 0544 8.7 (L) 12.0 - 15.9 g/dL Final   09/17/2021 1020 12.2 12.0 - 15.9 g/dL Final      HCT Hematocrit   Date/Time Value Ref Range Status   09/19/2021 0759 28.1 (L) 34.0 - 46.6 % Final   09/18/2021 0544 27.5 (L) 34.0 - 46.6 % Final   09/17/2021 1020 38.0 34.0 - 46.6 % Final      Platlets No results found for: LABPLAT     PT/INR:  No results found for: PROTIME/No results found for: INR    Sodium Sodium   Date/Time Value Ref Range Status   09/18/2021 0544 135 (L) 136 - 145 mmol/L Final   09/17/2021 1020 137 136 - 145 mmol/L Final      Potassium Potassium   Date/Time Value Ref Range Status   09/18/2021 0544 4.1  3.5 - 5.2 mmol/L Final   09/17/2021 1020 3.8 3.5 - 5.2 mmol/L Final      Chloride Chloride   Date/Time Value Ref Range Status   09/18/2021 0544 104 98 - 107 mmol/L Final   09/17/2021 1020 100 98 - 107 mmol/L Final      Bicarbonate No results found for: PLASMABICARB   BUN BUN   Date/Time Value Ref Range Status   09/18/2021 0544 10 8 - 23 mg/dL Final   09/17/2021 1020 13 8 - 23 mg/dL Final      Creatinine Creatinine   Date/Time Value Ref Range Status   09/18/2021 0544 0.81 0.57 - 1.00 mg/dL Final   09/17/2021 1020 0.81 0.57 - 1.00 mg/dL Final      Calcium Calcium   Date/Time Value Ref Range Status   09/18/2021 0544 7.1 (L) 8.6 - 10.5 mg/dL Final   09/17/2021 1020 9.0 8.6 - 10.5 mg/dL Final      Magnesium No results found for: MG     Imaging Results (Last 72 Hours)     Procedure Component Value Units Date/Time    US Arterial Doppler Lower Extremity Right [[id number]] Collected: 09/19/21 0611     Updated: 09/19/21 0919    Addenda:         ADDENDUM   ADDENDUM #1       Additional images were obtained of the patient's femoropopliteal  bypass graft.  The graft is patent.  Proximal anastomosis is unremarkable.  Distal anastomosis obscured by bandage.    Electronically signed by:  Jason Marie MD  9/19/2021 9:18 AM CDT  Workstation: [id number]    Signed: 09/19/21 0918 by Margareth Marie MD    Narrative:      Ultrasound duplex right lower extremity arteries    HISTORY: Swelling. Peripheral vascular disease.     FINDINGS:  Duplex ultrasound of the arteries of each lower extremity  performed. Doppler waveforms and velocities obtained.    No occlusion demonstrated.  Triphasic waveforms common femoral artery.  Monophasic waveforms remaining arteries.    Peak systolic velocities are as follows in cm/sec:          Right      CFA      93.5       Prof       63.7       Prox SFA    68.8       Mid SFA    36.5       Dist SFA    *       Prox pop    72.7     Dist pop    63.7       Prox erin    52.5       Mid erin    30.7       Dist erin     36.8       DPA      68.9       Prox PTA    94.7       Mid PTA    37.7       Dist PTA    59.5       Prox TESS    64.0       Mid TESS    46.4       Dist TESS    34.3         ^ Obscured by bandage.      Impression:      CONCLUSION:  As above.    [id number]    Electronically signed by:  Jason Marie MD  9/19/2021 7:37 AM CDT  Workstation: [id number]    US Venous Doppler Lower Extremity Right (duplex) [[id number]] Collected: 09/19/21 0611     Updated: 09/19/21 0727    Narrative:        Ultrasound venous duplex right lower extremity    HISTORY: Edema . Peripheral vascular disease.    Duplex ultrasound of the deep venous system of the right lower  extremity was performed    FINDINGS:  Distal right femoral vein obscured by bandage at surgical site.  Real-time images of the remaining veins demonstrate normal  compressibility without evidence of intraluminal thrombus.  Doppler of the remaining veins shows phasic flow and  augmentation.  Color Doppler of the remaining veins also reveals venous patency.      Impression:      CONCLUSION:  No ultrasound evidence of deep venous thrombosis of the right  lower extremity.  Note is made that the distal right femoral vein is obscured by  bandage at surgical site.    [id number]    Electronically signed by:  Jason Marie MD  9/19/2021 7:26 AM CDT  Workstation: [id number]    FL C Arm During Surgery [[id number]] Resulted: 09/17/21 1523     Updated: 09/17/21 1523            albuterol, 2.5 mg, Nebulization, Q8H - RT  amitriptyline, 25 mg, Oral, Nightly  amLODIPine, 10 mg, Oral, Nightly  budesonide-formoterol, 2 puff, Inhalation, BID - RT  cilostazol, 100 mg, Oral, BID AC  clopidogrel, 75 mg, Oral, Daily  furosemide, 20 mg, Oral, Daily  gabapentin, 800 mg, Oral, Q8H  nitroglycerin, 0.5 inch, Topical, Q12H  pantoprazole, 40 mg, Oral, Daily  pravastatin, 20 mg, Oral, Nightly  sertraline, 100 mg, Oral, Daily  traZODone, 150 mg, Oral, Nightly      nitroglycerin, 5-200 mcg/min, Last Rate: Stopped (09/17/21  2019)          Patient Active Problem List   Diagnosis Code   • Anxiety F41.9   • CAD (coronary atherosclerotic disease) I25.10   • Carotid stenosis I65.29   • COPD (chronic obstructive pulmonary disease) (CMS/[facility]) J44.9   • COPD with exacerbation (CMS/[facility]) J44.1   • Depressive disorder, not elsewhere classified F32.9   • Benign essential hypertension I10   • Hypercholesteremia E78.00   • Insomnia G47.00   • Notalgia M54.9   • Osteoporosis M81.0   • Other screening mammogram Z12.31   • RUQ abdominal pain R10.11   • PVD (peripheral vascular disease) with claudication (CMS/[facility]) I73.9   • Dizziness R42   • Nicotine abuse Z72.0   • Dysphagia R13.10   • Epigastric pain R10.13   • Pain of right hand M79.641   • Closed displaced fracture of shaft of fifth metacarpal bone of right hand S62.326A   • Cause of injury, fall W19.XXXA   • Displaced fracture of shaft of fifth metacarpal bone of right hand with routine healing S62.326D   • Fall W19.XXXA   • Syncope R55   • Acute respiratory failure with hypoxia (CMS/[facility]) J96.01   • (HFpEF) heart failure with preserved ejection fraction (CMS/[facility]) I50.30   • Hypotension I95.9   • Elevated WBCs D72.829   • Near syncope R55   • Atherosclerosis of native coronary artery of native heart without angina pectoris I25.10   • Atherosclerosis of native arteries of extremities with rest pain, left leg (CMS/[facility]) I70.222   • PVD (peripheral vascular disease) (CMS/[facility]) I73.9         Assessment & Plan     Peripheral vascular disease status post femoral-popliteal bypass            -Meds adjusted  Hypertension  COPD on home O2 2 L              -Wean O2 down to 2 L as tolerated maintaining greater than 92%              -Increase ambulation PT OT  Bilateral carotid artery stenosis  Pulmonary   -Requiring 15 L bilateral wheezing and rales   -Diuresis as needed  Abdominal pain etiology is uncertain   -CT abdomen and pelvis pending  Swelling right leg   -Venous duplex negative for DVT   -Arterial duplex  graft patency present no hematoma around  Proximal anastomosis    Madhu De Leon MD  09/19/21  10:20 CDT

## 2021-09-19 NOTE — PLAN OF CARE
Goal Outcome Evaluation:      Pt's v/s remain baseline throughout the day. UOP adequate via ruiz. Pt afebrile. Pt a/o. Pt's right femoral site swollen and warm to the touch. MD aware. Will cont to monitor pt.

## 2021-09-19 NOTE — PLAN OF CARE
Problem: Adult Inpatient Plan of Care  Goal: Plan of Care Review  Recent Flowsheet Documentation  Taken 9/19/2021 1320 by Sherri Mane PT  Plan of Care Reviewed With: patient  Outcome Summary: PT evaluaiton completed. Pt pleasant and agreeable to therapy. Pt transferred with min assistance supine to sit and min assistance with RW sit to stand to bed to chair. Pt ambulated 5 ft to chair with RW and min assistance of 1. Function limited by decreased strength, balance, and tolerance for functional mobility and activities. Pt will benefit from PT to regain lost function as pt improves medically. Anticipate home with 24/7 care at discharge with HH therapy.   Goal Outcome Evaluation:  Plan of Care Reviewed With: patient           Outcome Summary: PT evaluaiton completed. Pt pleasant and agreeable to therapy. Pt transferred with min assistance supine to sit and min assistance with RW sit to stand to bed to chair. Pt ambulated 5 ft to chair with RW and min assistance of 1. Function limited by decreased strength, balance, and tolerance for functional mobility and activities. Pt will benefit from PT to regain lost function as pt improves medically. Anticipate home with 24/7 care at discharge with HH therapy.

## 2021-09-19 NOTE — THERAPY EVALUATION
Patient Name: Mona Perales  : 1950    MRN: [id number]                              Today's Date: 2021       Admit Date: 2021    Visit Dx:     ICD-10-CM ICD-9-CM   1. PVD (peripheral vascular disease) (CMS/[facility])  I73.9 443.9   2. Impaired functional mobility, balance, gait, and endurance  Z74.09 V49.89     Patient Active Problem List   Diagnosis   • Anxiety   • CAD (coronary atherosclerotic disease)   • Carotid stenosis   • COPD (chronic obstructive pulmonary disease) (CMS/[facility])   • COPD with exacerbation (CMS/[facility])   • Depressive disorder, not elsewhere classified   • Benign essential hypertension   • Hypercholesteremia   • Insomnia   • Notalgia   • Osteoporosis   • Other screening mammogram   • RUQ abdominal pain   • PVD (peripheral vascular disease) with claudication (CMS/[facility])   • Dizziness   • Nicotine abuse   • Dysphagia   • Epigastric pain   • Pain of right hand   • Closed displaced fracture of shaft of fifth metacarpal bone of right hand   • Cause of injury, fall   • Displaced fracture of shaft of fifth metacarpal bone of right hand with routine healing   • Fall   • Syncope   • Acute respiratory failure with hypoxia (CMS/[facility])   • (HFpEF) heart failure with preserved ejection fraction (CMS/[facility])   • Hypotension   • Elevated WBCs   • Near syncope   • Atherosclerosis of native coronary artery of native heart without angina pectoris   • Atherosclerosis of native arteries of extremities with rest pain, left leg (CMS/[facility])   • PVD (peripheral vascular disease) (CMS/[facility])     Past Medical History:   Diagnosis Date   • A-fib (CMS/[facility])    • Anxiety    • Arthritis    • Asthma    • 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 with ulceration (CMS/[facility])     bilateral legs - bilateral iliac stents       • CHF (congestive heart failure) (CMS/[facility])    • Chronic lower back pain    • COPD (chronic obstructive pulmonary disease) (CMS/[facility])    • Essential (primary) hypertension    • GERD  (gastroesophageal reflux disease)    • History of pulmonary embolus (PE)    • Hyperlipidemia    • Insomnia    • Lumbago    • Nicotine dependence    • Occlusion of artery      and stenosis of bilateral carotid arteries - BABS 16-49%, LICA 0-15%   • Other atherosclerosis of native artery of other extremity     LEFT subclavian stent 2005 (occluded)   • Sleep apnea      Past Surgical History:   Procedure Laterality Date   • CARDIAC CATHETERIZATION  04/06/2016    No evidence of any obstructive epicardial CAD.Preserved LV systolic function with EF of 55%.   • CENTRAL VENOUS LINE INSERTION  04/07/2016    Successful placement of right uppe extremity 6-[place] triple lumen PICC line.   • ENDOSCOPY N/A 1/12/2018    Procedure: ESOPHAGOGASTRODUODENOSCOPY;  Surgeon: Bin Oh MD;  Location: [facility] ENDOSCOPY;  Service:    • ENDOSCOPY N/A 1/17/2018    Procedure: ESOPHAGOGASTRODUODENOSCOPY;  Surgeon: Bin Oh MD;  Location: [facility] ENDOSCOPY;  Service:    • ENDOSCOPY N/A 3/16/2018    Procedure: ESOPHAGOGASTRODUODENOSCOPY possible dilation;  Surgeon: Bin Oh MD;  Location: [facility] ENDOSCOPY;  Service: Gastroenterology   • FEMORAL POPLITEAL BYPASS Left 4/14/2020    Procedure: FEMORAL POPLITEAL BYPASS ABOVE KNEE  (POLYTETRAFLUOROETHYLENE)  LEFT COMMON FEMORAL ARTERY ENDARTERECTOMY PTA LEFT ILIAC ARTERIOGRAM        (c-arm#2 and c-arm table);  Surgeon: David Suh MD;  Location: [facility] OR;  Service: Vascular;  Laterality: Left;   • HYSTERECTOMY     • INTERVENTIONAL RADIOLOGY PROCEDURE Left 9/9/2020    Procedure: IR angiogram extremity left;  Surgeon: David Suh MD;  Location: [facility] ANGIO INVASIVE LOCATION;  Service: Interventional Radiology;  Laterality: Left;   • KYPHOPLASTY N/A 5/23/2019    Procedure: KYPHOPLASTY LUMBAR FOUR;  Surgeon: Aba Santa MD;  Location: [facility] OR;  Service: Orthopedic Spine   • TOTAL SHOULDER REPLACEMENT Left    • TRANSESOPHAGEAL ECHOCARDIOGRAM (JACQUES)   04/07/2016    With color flow-Mild to moderate [facility].LV systolic function well preserved with Ef of 55-60%.Mitral and AV intact.Diastolic dysfunction   • UPPER GASTROINTESTINAL ENDOSCOPY  01/17/2018    Dr. Bin Martin M.D.     General Information     Row Name 09/19/21 1320          Physical Therapy Time and Intention    Document Type  evaluation  -     Mode of Treatment  individual therapy;physical therapy  -     Row Name 09/19/21 1320          General Information    Patient Profile Reviewed  yes  -JANES     Prior Level of Function  independent:;all household mobility;transfer;gait;ADL's;dependent:;driving dtrs do cleaning laundry, pt does some cooking  -     Existing Precautions/Restrictions  fall  -     Row Name 09/19/21 1320          Living Environment    Lives With  child(lima), adult pt reports she lives with her 2 dtrs  -     Row Name 09/19/21 1320          Home Main Entrance    Number of Stairs, Main Entrance  one  -     Stair Railings, Main Entrance  railing on left side (ascending)  -     Row Name 09/19/21 1320          Cognition    Orientation Status (Cognition)  oriented to;person;place;situation knew currnet year;thought month was August  -     Row Name 09/19/21 1320          Safety Issues, Functional Mobility    Safety Issues Affecting Function (Mobility)  insight into deficits/self-awareness;positioning of assistive device  -     Impairments Affecting Function (Mobility)  endurance/activity tolerance;balance;strength  -       User Key  (r) = Recorded By, (t) = Taken By, (c) = Cosigned By    Initials Name Provider Type     Sherri Mane, PT Physical Therapist        Mobility     Row Name 09/19/21 1320          Bed Mobility    Bed Mobility  supine-sit;scooting/bridging  -     Scooting/Bridging [place] (Bed Mobility)  minimum assist (75% patient effort)  -     Supine-Sit [place] (Bed Mobility)  minimum assist (75% patient effort)  -     Assistive Device (Bed  Mobility)  head of bed elevated;bed rails  -[facility] Name 09/19/21 1320          Bed-Chair Transfer    Bed-Chair [place] (Transfers)  minimum assist (75% patient effort);verbal cues;nonverbal cues (demo/gesture)  -     Assistive Device (Bed-Chair Transfers)  walker, front-wheeled  -[facility] Name 09/19/21 1320          Sit-Stand Transfer    Sit-Stand [place] (Transfers)  minimum assist (75% patient effort)  -     Assistive Device (Sit-Stand Transfers)  walker, front-wheeled  -[facility] Name 09/19/21 1320          Gait/Stairs (Locomotion)    [place] Level (Gait)  minimum assist (75% patient effort);nonverbal cues (demo/gesture);verbal cues  -     Assistive Device (Gait)  walker, front-wheeled  -     Distance in Feet (Gait)  5ft  -       User Key  (r) = Recorded By, (t) = Taken By, (c) = Cosigned By    Initials Name Provider Type    JANES Sherri Mane PT Physical Therapist        Obj/Interventions     [facility] Name 09/19/21 1320          Range of Motion Comprehensive    Comment, General Range of Motion  BUE: AROM WFL except decreased endrange shoulder flexion;  BLE: AROM WFL increased pain with RLE s/p surgery  -[facility] Name 09/19/21 1320          Strength Comprehensive (MMT)    Comment, General Manual Muscle Testing (MMT) Assessment  BUE: 4/5 grossly; BLE: RLE 4/5 ankle/foot, 3/5 knee, hip; LLE: 4/5 ankle/foot, knee, hip  -JANES     Row Name 09/19/21 1320          Balance    Balance Assessment  sitting static balance;sitting dynamic balance;standing static balance;standing dynamic balance  -     Static Sitting Balance  WFL  -     Dynamic Sitting Balance  mild impairment  -     Static Standing Balance  mild impairment  -     Dynamic Standing Balance  mild impairment  -JANES     Row Name 09/19/21 1320          Sensory Assessment (Somatosensory)    Sensory Assessment (Somatosensory)  sensation intact to light touch  -       User Key  (r) = Recorded By, (t) = Taken By, (c) = Cosigned By     Initials Name Provider Type    Sherri Guzman, PT Physical Therapist        Goals/Plan     Row Name 09/19/21 1320          Bed Mobility Goal 1 (PT)    Activity/Assistive Device (Bed Mobility Goal 1, PT)  sit to supine;supine to sit  -JANES     [place] Level/Cues Needed (Bed Mobility Goal 1, PT)  standby assist;independent  -JANES     Time Frame (Bed Mobility Goal 1, PT)  by discharge  -JANES     Progress/Outcomes (Bed Mobility Goal 1, PT)  goal not met  -     Row Name 09/19/21 1320          Transfer Goal 1 (PT)    Activity/Assistive Device (Transfer Goal 1, PT)  sit-to-stand/stand-to-sit;bed-to-chair/chair-to-bed  -JANES     [place] Level/Cues Needed (Transfer Goal 1, PT)  standby assist;modified independence  -JANES     Time Frame (Transfer Goal 1, PT)  by discharge  -JANES     Row Name 09/19/21 1320          Gait Training Goal 1 (PT)    Activity/Assistive Device (Gait Training Goal 1, PT)  gait (walking locomotion);walker, rolling;decrease fall risk;increase endurance/gait distance  -JANES     [place] Level (Gait Training Goal 1, PT)  standby assist;modified independence  -JANES     Distance (Gait Training Goal 1, PT)  50ft or more x2  -JANES     Time Frame (Gait Training Goal 1, PT)  by discharge  -JANES     Progress/Outcome (Gait Training Goal 1, PT)  goal not met  -     Row Name 09/19/21 1320          Stairs Goal 1 (PT)    Activity/Assistive Device (Stairs Goal 1, PT)  ascending stairs;descending stairs;using handrail, left  -JANES     [place] Level/Cues Needed (Stairs Goal 1, PT)  standby assist;modified independence  -JANES     Time Frame (Stairs Goal 1, PT)  by discharge  -JANES     Progress/Outcome (Stairs Goal 1, PT)  goal not met  -       User Key  (r) = Recorded By, (t) = Taken By, (c) = Cosigned By    Initials Name Provider Type    Sherri Guzman, PT Physical Therapist        Clinical Impression     Row Name 09/19/21 1320          Pain    Additional Documentation  Pain Scale: Numbers Pre/Post-Treatment (Group)   -     Row Name 09/19/21 1320          Pain Scale: Numbers Pre/Post-Treatment    Pretreatment Pain Rating  9/10  -     Posttreatment Pain Rating  10/10  -     Pain Location - Orientation  lower  -     Pain Location  abdomen  -     Pre/Posttreatment Pain Comment  Rn reports pain med not due for a couple of hours  -     Pain Intervention(s)  Repositioned;Rest;Ambulation/increased activity;Elevated  -[facility] Name 09/19/21 1320          Plan of Care Review    Plan of Care Reviewed With  patient  -     Outcome Summary  PT evaluaiton completed. Pt pleasant and agreeable to therapy. Pt transferred with min assistance supine to sit and min assistance with RW sit to stand to bed to chair. Pt ambulated 5 ft to chair with RW and min assistance of 1. Function limited by decreased strength, balance, and tolerance for functional mobility and activities. Pt will benefit from PT to regain lost function as pt improves medically. Anticipate home with 24/7 care at discharge with  therapy.  -[facility] Name 09/19/21 1320          Therapy Assessment/Plan (PT)    Patient/Family Therapy Goals Statement (PT)  return to [facility]  -     Rehab Potential (PT)  good, to achieve stated therapy goals  -     Criteria for Skilled Interventions Met (PT)  yes;meets criteria;skilled treatment is necessary  -     Predicted Duration of Therapy Intervention (PT)  until discharge or goals met  -[facility] Name 09/19/21 1320          Vital Signs    Pre Systolic BP Rehab  101  -     Pre Treatment Diastolic BP  55  -JANES     Post Systolic BP Rehab  113  -     Post Treatment Diastolic BP  55  -     Pretreatment Heart Rate (beats/min)  89  -     Intratreatment Heart Rate (beats/min)  92  -     Posttreatment Heart Rate (beats/min)  93  -     Pre SpO2 (%)  100  -JANES     O2 Delivery Pre Treatment  supplemental O2 3lpm  -     Intra SpO2 (%)  96  -     O2 Delivery Intra Treatment  supplemental O2  -     Post SpO2 (%)  98  -JANES      O2 Delivery Post Treatment  supplemental O2  -JANES     Pre Patient Position  Supine  -JANES     Intra Patient Position  Sitting  -JANES     Post Patient Position  Sitting  -JANES     Row Name 09/19/21 1320          Positioning and Restraints    Pre-Treatment Position  in bed  -JANES     Post Treatment Position  chair  -JANES     In Chair  reclined;call light within reach;encouraged to call for assist;patient within staff view  -JANES       User Key  (r) = Recorded By, (t) = Taken By, (c) = Cosigned By    Initials Name Provider Type    Sherri Guzman PT Physical Therapist        Outcome Measures     Row Name 09/19/21 1320          How much help from another person do you currently need...    Turning from your back to your side while in flat bed without using bedrails?  3  -JANES     Moving from lying on back to sitting on the side of a flat bed without bedrails?  3  -JANES     Moving to and from a bed to a chair (including a wheelchair)?  3  -JANES     Standing up from a chair using your arms (e.g., wheelchair, bedside chair)?  3  -JANES     Climbing 3-5 steps with a railing?  2  -JANES     To walk in hospital room?  3  -JANES     AM-PAC 6 Clicks Score (PT)  17  -JANES     Row Name 09/19/21 1320          Functional Assessment    Outcome Measure Options  AM-PAC 6 Clicks Basic Mobility (PT)  -       User Key  (r) = Recorded By, (t) = Taken By, (c) = Cosigned By    Initials Name Provider Type    Sherri Guzman PT Physical Therapist                       Physical Therapy Education                 Title: PT OT SLP Therapies (Not Started)     Topic: Physical Therapy (In Progress)     Point: Mobility training (In Progress)     Learning Progress Summary           Patient Acceptance, E, NR by JANES at 9/19/2021 1408                   Point: Home exercise program (Not Started)     Learner Progress:  Not documented in this visit.          Point: Body mechanics (In Progress)     Learning Progress Summary           Patient Acceptance, E, NR by JANES at 9/19/2021  1408                   Point: Precautions (In Progress)     Learning Progress Summary           Patient Acceptance, E, NR by  at 9/19/2021 1408                               User Key     Initials Effective Dates Name Provider Type Discipline     06/16/21 -  Sherri Mane PT Physical Therapist PT              PT Recommendation and Plan  Planned Therapy Interventions (PT): balance training, bed mobility training, gait training, home exercise program, patient/family education, stair training, strengthening, transfer training  Plan of Care Reviewed With: patient  Outcome Summary: PT evaluaiton completed. Pt pleasant and agreeable to therapy. Pt transferred with min assistance supine to sit and min assistance with RW sit to stand to bed to chair. Pt ambulated 5 ft to chair with RW and min assistance of 1. Function limited by decreased strength, balance, and tolerance for functional mobility and activities. Pt will benefit from PT to regain lost function as pt improves medically. Anticipate home with 24/7 care at discharge with  therapy.     Time Calculation:   PT Charges     Row Name 09/19/21 1413             Time Calculation    Start Time  1320  -      Stop Time  1413  -      Time Calculation (min)  53 min  -      PT Received On  09/19/21  -      PT Goal Re-Cert Due Date  10/02/21  -         Time Calculation- PT    Total Timed Code Minutes- PT  0 minute(s)  -         Untimed Charges    PT Eval/Re-eval Minutes  53  -JANES         Total Minutes    Untimed Charges Total Minutes  53  -JANES       Total Minutes  53  -JANES        User Key  (r) = Recorded By, (t) = Taken By, (c) = Cosigned By    Initials Name Provider Type     Sherri Mane, PT Physical Therapist        Therapy Charges for Today     Code Description Service Date Service Provider Modifiers Qty    [id number] HC PT EVAL MOD COMPLEXITY 4 9/19/2021 Sherri Mane, PT GP 1          PT G-Codes  Outcome Measure Options: AM-PAC 6 Clicks Basic Mobility  (PT)  AM-PAC 6 Clicks Score (PT): 17    Sherri Mane, PT  9/19/2021

## 2021-09-20 LAB — POTASSIUM SERPL-SCNC: 3.4 MMOL/L (ref 3.5–5.2)

## 2021-09-20 PROCEDURE — 84132 ASSAY OF SERUM POTASSIUM: CPT | Performed by: NURSE PRACTITIONER

## 2021-09-20 PROCEDURE — 94799 UNLISTED PULMONARY SVC/PX: CPT

## 2021-09-20 PROCEDURE — 97530 THERAPEUTIC ACTIVITIES: CPT

## 2021-09-20 PROCEDURE — 99024 POSTOP FOLLOW-UP VISIT: CPT | Performed by: NURSE PRACTITIONER

## 2021-09-20 RX ORDER — POLYETHYLENE GLYCOL 3350 17 G/17G
17 POWDER, FOR SOLUTION ORAL DAILY
Status: DISCONTINUED | OUTPATIENT
Start: 2021-09-20 | End: 2021-09-21 | Stop reason: HOSPADM

## 2021-09-20 RX ORDER — AMOXICILLIN 250 MG
2 CAPSULE ORAL 2 TIMES DAILY
Status: DISCONTINUED | OUTPATIENT
Start: 2021-09-20 | End: 2021-09-21 | Stop reason: HOSPADM

## 2021-09-20 RX ORDER — TAMSULOSIN HYDROCHLORIDE 0.4 MG/1
0.4 CAPSULE ORAL DAILY
Status: DISCONTINUED | OUTPATIENT
Start: 2021-09-20 | End: 2021-09-21 | Stop reason: HOSPADM

## 2021-09-20 RX ORDER — POTASSIUM CHLORIDE 750 MG/1
40 CAPSULE, EXTENDED RELEASE ORAL ONCE
Status: COMPLETED | OUTPATIENT
Start: 2021-09-20 | End: 2021-09-20

## 2021-09-20 RX ADMIN — AMITRIPTYLINE HYDROCHLORIDE 25 MG: 25 TABLET, FILM COATED ORAL at 21:44

## 2021-09-20 RX ADMIN — NITROGLYCERIN 0.5 INCH: 20 OINTMENT TOPICAL at 08:09

## 2021-09-20 RX ADMIN — ALBUTEROL SULFATE 2.5 MG: 2.5 SOLUTION RESPIRATORY (INHALATION) at 06:58

## 2021-09-20 RX ADMIN — RIVAROXABAN 20 MG: 10 TABLET, FILM COATED ORAL at 18:09

## 2021-09-20 RX ADMIN — CILOSTAZOL 100 MG: 100 TABLET ORAL at 08:09

## 2021-09-20 RX ADMIN — CILOSTAZOL 100 MG: 100 TABLET ORAL at 18:48

## 2021-09-20 RX ADMIN — HYDROCODONE BITARTRATE AND ACETAMINOPHEN 1 TABLET: 5; 325 TABLET ORAL at 13:14

## 2021-09-20 RX ADMIN — POLYETHYLENE GLYCOL 3350 17 G: 17 POWDER, FOR SOLUTION ORAL at 10:01

## 2021-09-20 RX ADMIN — BUDESONIDE AND FORMOTEROL FUMARATE DIHYDRATE 2 PUFF: 160; 4.5 AEROSOL RESPIRATORY (INHALATION) at 22:47

## 2021-09-20 RX ADMIN — CLOPIDOGREL BISULFATE 75 MG: 75 TABLET ORAL at 08:09

## 2021-09-20 RX ADMIN — TAMSULOSIN HYDROCHLORIDE 0.4 MG: 0.4 CAPSULE ORAL at 10:01

## 2021-09-20 RX ADMIN — GABAPENTIN 800 MG: 400 CAPSULE ORAL at 15:14

## 2021-09-20 RX ADMIN — DOCUSATE SODIUM 50 MG AND SENNOSIDES 8.6 MG 2 TABLET: 8.6; 5 TABLET, FILM COATED ORAL at 10:01

## 2021-09-20 RX ADMIN — ALBUTEROL SULFATE 2.5 MG: 2.5 SOLUTION RESPIRATORY (INHALATION) at 22:47

## 2021-09-20 RX ADMIN — TRAZODONE HYDROCHLORIDE 150 MG: 150 TABLET ORAL at 21:44

## 2021-09-20 RX ADMIN — SERTRALINE 100 MG: 50 TABLET, FILM COATED ORAL at 08:09

## 2021-09-20 RX ADMIN — AMLODIPINE BESYLATE 10 MG: 10 TABLET ORAL at 21:44

## 2021-09-20 RX ADMIN — HYDROCODONE BITARTRATE AND ACETAMINOPHEN 1 TABLET: 5; 325 TABLET ORAL at 05:40

## 2021-09-20 RX ADMIN — HYDROCODONE BITARTRATE AND ACETAMINOPHEN 1 TABLET: 5; 325 TABLET ORAL at 18:23

## 2021-09-20 RX ADMIN — DOCUSATE SODIUM 50 MG AND SENNOSIDES 8.6 MG 2 TABLET: 8.6; 5 TABLET, FILM COATED ORAL at 21:44

## 2021-09-20 RX ADMIN — GABAPENTIN 800 MG: 400 CAPSULE ORAL at 05:40

## 2021-09-20 RX ADMIN — PANTOPRAZOLE SODIUM 40 MG: 40 TABLET, DELAYED RELEASE ORAL at 10:01

## 2021-09-20 RX ADMIN — NITROGLYCERIN 0.5 INCH: 20 OINTMENT TOPICAL at 21:44

## 2021-09-20 RX ADMIN — PRAVASTATIN SODIUM 20 MG: 20 TABLET ORAL at 21:44

## 2021-09-20 RX ADMIN — POTASSIUM CHLORIDE 40 MEQ: 750 CAPSULE, EXTENDED RELEASE ORAL at 10:01

## 2021-09-20 RX ADMIN — GABAPENTIN 800 MG: 400 CAPSULE ORAL at 21:44

## 2021-09-20 RX ADMIN — BUDESONIDE AND FORMOTEROL FUMARATE DIHYDRATE 2 PUFF: 160; 4.5 AEROSOL RESPIRATORY (INHALATION) at 07:07

## 2021-09-20 NOTE — PAYOR COMM NOTE
"[facility]#[id number]  Jimena Green RN  [facility]  [phone]  Fax [phone]    Filomena Perales (71 y.o. Female)     Date of Birth Social Security Number Address Home Phone MRN    1950  [id number] [address] [phone] [id number]    Zoroastrianism Marital Status          Yarsanism        Admission Date Admission Type Admitting Provider Attending Provider Department, Room/Bed    9/17/21 Elective David Suh MD [place], Thomas Mark, MD [facility] CRITICAL CARE STEPDOWN, 02/A    Discharge Date Discharge Disposition Discharge Destination                       Attending Provider: David Suh MD    Allergies: Morphine And Related, Penicillins    Isolation: None   Infection: None   Code Status: CPR    Ht: 157.5 cm (62.01\")   Wt: 72.1 kg (158 lb 15.2 oz)    Admission Cmt: None   Principal Problem: PVD (peripheral vascular disease) (CMS/[facility]) [I73.9]                 Active Insurance as of 9/17/2021     Primary Coverage     Payor Plan Insurance Group Employer/Plan Group    [facility] MEDICARE REPLACEMENT [employer] MEDICARE REPLACEMENT      Payor Plan Address Payor Plan Phone Number Payor Plan Fax Number Effective Dates    [address] [address] [phone]  5/1/2021 - None Entered    [facility] [id number]       Subscriber Name Subscriber Birth Date Member ID       FILOMENA PERALES 1950 [id number]                 Emergency Contacts      (Rel.) Home Phone Work Phone Mobile Phone    Yulisa Porter (Friend) [phone] -- [phone]    MARIA EUGENIA LONG (Sister) [phone] -- [phone]            Vital Signs (last day)     Date/Time   Temp   Temp src   Pulse   Resp   BP   Patient Position   SpO2    09/20/21 0809   --   --   104   --   102/59   --   --    09/20/21 0707   --   --   94   20   --   --   100    09/20/21 0658   --   --   94   20   --   --   92    09/20/21 0600   --   --   97   --   123/61   --   91    09/20/21 0500   --   " --   91   --   104/56   --   97    09/20/21 0400   98.7 (37.1)   Oral   94   --   103/56   --   95    09/20/21 0300   --   --   92   --   95/53   --   100    09/20/21 0204   --   --   100   --   114/62   --   97    09/20/21 0200   --   --   97   --   --   --   98    09/20/21 0100   --   --   106   --   98/53   --   99    09/20/21 0000   97.4 (36.3)   --   112   --   99/53   --   94    09/19/21 2310   --   --   107   22   --   --   91    09/19/21 2300   --   --   108   --   90/53   --   91    09/19/21 2200   --   --   104   --   (!) 84/54   --   95    09/19/21 2100   --   --   107   --   106/58   --   94    09/19/21 2000   98.7 (37.1)   Oral   100   18   108/59   Lying   97    09/19/21 1900   --   --   106   --   107/59   --   93    09/19/21 1800   --   --   112   --   --   --   96    09/19/21 1700   --   --   99   --   119/56   --   93    09/19/21 1600   98.4 (36.9)   Oral   101   16   114/82   Lying   97    09/19/21 1531   --   --   92   16   --   --   100    09/19/21 1525   --   --   93   16   --   --   95    09/19/21 1500   --   --   91   --   111/55   --   95    09/19/21 1400   --   --   91   --   104/52   --   98    09/19/21 1300   --   --   95   --   --   --   93    09/19/21 1200   98.2 (36.8)   Oral   84   20   (!) 89/53   Lying   95    09/19/21 1100   --   --   89   --   (!) 84/52   --   97    09/19/21 1000   --   --   94   --   111/57   --   93    09/19/21 0900   --   --   90   --   110/57   --   91    09/19/21 0800   97.9 (36.6)   Oral   90   20   111/57   Lying   94    09/19/21 0700   --   --   88   --   (!) 87/52   --   93    09/19/21 0500   --   --   96   --   99/51   --   92    09/19/21 0400   98.8 (37.1)   Oral   95   --   107/55   --   94    09/19/21 0300   --   --   93   --   107/59   --   99    09/19/21 0200   --   --   96   --   105/55   --   100    09/19/21 0100   --   --   103   --   104/56   --   95    09/19/21 0000   98.1 (36.7)   --   109   --   107/77   --   99              Lines, Drains &  Airways    Active LDAs     Name:   Placement date:   Placement time:   Site:   Days:    Peripheral IV 09/17/21 1345 Left Antecubital   09/17/21    1345    Antecubital   2    Urethral Catheter Non-latex 16 Fr.   09/18/21    1344     1                Current Facility-Administered Medications   Medication Dose Route Frequency Provider Last Rate Last Admin   • acetaminophen (TYLENOL) tablet 650 mg  650 mg Oral Q4H PRN David Suh MD       • albuterol (PROVENTIL) nebulizer solution 0.083% 2.5 mg/3mL  2.5 mg Nebulization Q4H PRN David Suh MD       • albuterol (PROVENTIL) nebulizer solution 0.083% 2.5 mg/3mL  2.5 mg Nebulization Q8H - RT Madhu De Leon MD   2.5 mg at 09/20/21 0658   • amitriptyline (ELAVIL) tablet 25 mg  25 mg Oral Nightly David Suh MD   25 mg at 09/19/21 2007   • amLODIPine (NORVASC) tablet 10 mg  10 mg Oral Nightly David Suh MD   10 mg at 09/19/21 2007   • bisacodyl (DULCOLAX) suppository 10 mg  10 mg Rectal Daily PRN David Suh MD       • budesonide-formoterol (SYMBICORT) 160-4.5 MCG/ACT inhaler 2 puff  2 puff Inhalation BID - RT David Suh MD   2 puff at 09/20/21 0707   • cilostazol (PLETAL) tablet 100 mg  100 mg Oral BID AC David Suh MD   100 mg at 09/20/21 0809   • clopidogrel (PLAVIX) tablet 75 mg  75 mg Oral Daily David Suh MD   75 mg at 09/20/21 0809   • diphenhydrAMINE (BENADRYL) injection 25 mg  25 mg Intravenous Q6H PRN David Suh MD       • gabapentin (NEURONTIN) capsule 800 mg  800 mg Oral Q8H David Suh MD   800 mg at 09/20/21 0540   • HYDROcodone-acetaminophen (NORCO) 5-325 MG per tablet 1 tablet  1 tablet Oral Q6H PRN Madhu De Leon MD   1 tablet at 09/20/21 0540   • magnesium hydroxide (MILK OF MAGNESIA) suspension 2400 mg/10mL 10 mL  10 mL Oral Daily PRN David Suh MD       • naloxone (NARCAN) injection 0.1 mg  0.1 mg Intravenous Q5 Min  PRN David Suh MD       • nitroglycerin (NITROSTAT) ointment 0.5 inch  0.5 inch Topical Q12H David Suh MD   0.5 inch at 09/20/21 0809   • ondansetron (ZOFRAN) tablet 4 mg  4 mg Oral Q6H PRN David Suh MD        Or   • ondansetron (ZOFRAN) injection 4 mg  4 mg Intravenous Q6H PRN David Suh MD       • pantoprazole (PROTONIX) EC tablet 40 mg  40 mg Oral Daily David Suh MD   40 mg at 09/19/21 0933   • polyethylene glycol (MIRALAX) packet 17 g  17 g Oral Daily Jacki Polanco APRN       • pravastatin (PRAVACHOL) tablet 20 mg  20 mg Oral Nightly David Suh MD   20 mg at 09/19/21 2007   • rivaroxaban (XARELTO) tablet 20 mg  20 mg Oral Daily With Dinner Jacki Polanco APRN       • sennosides-docusate (PERICOLACE) 8.6-50 MG per tablet 2 tablet  2 tablet Oral BID Jacki Polanco APRN       • sertraline (ZOLOFT) tablet 100 mg  100 mg Oral Daily David Suh MD   100 mg at 09/20/21 0809   • tamsulosin (FLOMAX) 24 hr capsule 0.4 mg  0.4 mg Oral Daily Jacki Polanco APRN       • traMADol (ULTRAM) tablet 50 mg  50 mg Oral Q6H PRN David Suh MD   50 mg at 09/19/21 1718   • traZODone (DESYREL) tablet 150 mg  150 mg Oral Nightly David Suh MD   150 mg at 09/19/21 2007        Physician Progress Notes (last 48 hours) (Notes from 09/18/21 0912 through 09/20/21 0912)      Jacki Polanco APRN at 09/20/21 0832     Attestation signed by David Suh MD at 09/20/21 0900    I have reviewed this documentation and agree.                    CTVS DAILY NOTE     LOS: 3 days   POD# 3  RIGHT Femoral-popliteal bypass    Patient Care Team:  Lambdin, [place] F, APRN as PCP - General (Nurse Practitioner)  Kris Peres MD (Inactive) as Consulting Physician (Cardiology)    Chief Complaint: PVD    Subjective:  Symptoms:  Stable.    Diet:  Adequate intake.    Activity level: Impaired due to  "weakness.    Pain:  She complains of pain that is moderate.  Pain is well controlled and requiring pain medication.           Vital Signs  Temp:  [97.4 °F (36.3 °C)-98.7 °F (37.1 °C)] 98.7 °F (37.1 °C)  Heart Rate:  [] 104  Resp:  [16-22] 20  BP: ()/(52-82) 102/59  Body mass index is 29.07 kg/m².    Intake/Output Summary (Last 24 hours) at 9/20/2021 0835  Last data filed at 9/20/2021 0500  Gross per 24 hour   Intake 960 ml   Output 3100 ml   Net -2140 ml     No intake/output data recorded.    Wt Readings from Last 3 Encounters:   09/20/21 72.1 kg (158 lb 15.2 oz)   08/25/21 62.6 kg (138 lb)   07/26/21 61.1 kg (134 lb 9.6 oz)       Objective:  General Appearance:  Comfortable.    Vital signs: (most recent): Blood pressure 102/59, pulse 104, temperature 98.7 °F (37.1 °C), temperature source Oral, resp. rate 20, height 157.5 cm (62.01\"), weight 72.1 kg (158 lb 15.2 oz), SpO2 100 %, not currently breastfeeding.  Vital signs are normal.    Output: Producing urine (DC ruiz) and no stool output.    HEENT: Normal HEENT exam.    Lungs:  Normal effort and normal respiratory rate.  There are wheezes.  (O2 NC 2L )  Heart: Normal rate.  Regular rhythm.    Abdomen: Abdomen is soft.  Hypoactive bowel sounds.     Extremities: Decreased range of motion.    Pulses: Distal pulses are intact.  (Palpable)    Neurological: Patient is alert and oriented to person, place and time.    Skin:  Warm and dry.  ((R) Groin: Prevena Intact  (R) AK: CDI)            Results Review:      WBC No results found for: WBCS   HGB Hemoglobin   Date/Time Value Ref Range Status   09/19/2021 0759 8.9 (L) 12.0 - 15.9 g/dL Final   09/18/2021 0544 8.7 (L) 12.0 - 15.9 g/dL Final   09/17/2021 1020 12.2 12.0 - 15.9 g/dL Final      HCT Hematocrit   Date/Time Value Ref Range Status   09/19/2021 0759 28.1 (L) 34.0 - 46.6 % Final   09/18/2021 0544 27.5 (L) 34.0 - 46.6 % Final   09/17/2021 1020 38.0 34.0 - 46.6 % Final      Platlets No results found for: " LABPLAT     PT/INR:  No results found for: PROTIME/No results found for: INR    Sodium Sodium   Date/Time Value Ref Range Status   09/19/2021 1259 135 (L) 136 - 145 mmol/L Final   09/18/2021 0544 135 (L) 136 - 145 mmol/L Final   09/17/2021 1020 137 136 - 145 mmol/L Final      Potassium Potassium   Date/Time Value Ref Range Status   09/19/2021 1259 4.1 3.5 - 5.2 mmol/L Final     Comment:     Slight hemolysis detected by analyzer. Results may be affected.   09/18/2021 0544 4.1 3.5 - 5.2 mmol/L Final   09/17/2021 1020 3.8 3.5 - 5.2 mmol/L Final      Chloride Chloride   Date/Time Value Ref Range Status   09/19/2021 1259 99 98 - 107 mmol/L Final   09/18/2021 0544 104 98 - 107 mmol/L Final   09/17/2021 1020 100 98 - 107 mmol/L Final      Bicarbonate No results found for: PLASMABICARB   BUN BUN   Date/Time Value Ref Range Status   09/19/2021 1259 8 8 - 23 mg/dL Final   09/18/2021 0544 10 8 - 23 mg/dL Final   09/17/2021 1020 13 8 - 23 mg/dL Final      Creatinine Creatinine   Date/Time Value Ref Range Status   09/19/2021 1259 0.75 0.57 - 1.00 mg/dL Final   09/18/2021 0544 0.81 0.57 - 1.00 mg/dL Final   09/17/2021 1020 0.81 0.57 - 1.00 mg/dL Final      Calcium Calcium   Date/Time Value Ref Range Status   09/19/2021 1259 8.1 (L) 8.6 - 10.5 mg/dL Final   09/18/2021 0544 7.1 (L) 8.6 - 10.5 mg/dL Final   09/17/2021 1020 9.0 8.6 - 10.5 mg/dL Final      Magnesium No results found for: MG     Imaging Results (Last 72 Hours)     Procedure Component Value Units Date/Time    CT Abdomen Pelvis Without Contrast [[id number]] Collected: 09/19/21 1034     Updated: 09/19/21 1805    Narrative:        CT Abdomen and Pelvis Without Contrast    History: Edema and swelling around surgical site. Recent femoral  popliteal bypass graft.    Axials spiral scans of the abdomen and pelvis were obtained  without contrast.  Coronal and sagital reconstructions were  preformed.      This exam was performed according to our departmental  dose-optimization  program, which includes automated exposure  control, adjustment of the mA and/or kV according to patient size  and/or use of iterative reconstruction technique.    DLP: 383.90    Comparison: July 29, 2021    Findings:   Scans of the lung bases demonstrate linear atelectasis and  dependent atelectasis.  Cardiomegaly.  Small pericardial effusion.  Coronary artery calcifications.    No acute osseous abnormality.  Degenerative changes and degenerative disc disease thoracic and  lumbar spine.  L4 vertebroplasty.  Old ununited right rib fractures.  Old fracture deformity left inferior pubic ramus.    The liver is unremarkable.  The gallbladder is unremarkable.  Splenic granulomata.  The pancreas is unremarkable.  1.9 cm right adrenal adenoma.  Minimal hypertrophy left gland.    Renal artery calcifications, greater on the left.  No renal or ureteral calculi.  No renal mass.  No hydronephrosis.    Amezquita catheter in urinary bladder with associated air in the  urinary bladder.  No pelvic mass.  Hysterectomy.    2.9 cm ectasia infrarenal abdominal aortic.  Dense atherosclerotic calcifications abdominal aorta and iliac  arteries.  Bilateral femoral popliteal bypass grafts.  Subacute edema in each lateral gluteal region and the anterior  aspect of each thigh.    No adenopathy.    No bowel obstruction.  Diverticulosis of the colon.  Some gaseous distention of the colon and air-fluid levels in the  colon suggesting colonic adynamic ileus.  No free air.  No free fluid.    No hernia.      Impression:      Conclusion:  Cardiomegaly.  Small pericardial effusion.  Coronary artery calcifications.  1.9 cm right adrenal adenoma.  Minimal hypertrophy left gland.  Renal artery calcifications, greater on the left.  Amezquita catheter in urinary bladder with associated air in the  urinary bladder.  Hysterectomy.  2.9 cm ectasia infrarenal abdominal aortic.  Dense atherosclerotic calcifications abdominal aorta and iliac  arteries.  Bilateral  femoral popliteal bypass grafts.  Subacute edema in each lateral gluteal region and the anterior  aspect of each thigh.  Diverticulosis of the colon.  Some gaseous distention of the colon and air-fluid levels in the  colon suggesting colonic adynamic ileus.  Degenerative changes and degenerative disc disease thoracic and  lumbar spine.  L4 vertebroplasty.  Old ununited right rib fractures.  Old fracture deformity left inferior pubic ramus.    [id number]    Electronically signed by:  Jason Marie MD  9/19/2021 6:04 PM CDT  Workstation: [id number]    US Arterial Doppler Lower Extremity Right [[id number]] Collected: 09/19/21 0611     Updated: 09/19/21 0919    Addenda:         ADDENDUM   ADDENDUM #1       Additional images were obtained of the patient's femoropopliteal  bypass graft.  The graft is patent.  Proximal anastomosis is unremarkable.  Distal anastomosis obscured by bandage.    Electronically signed by:  Jason Marie MD  9/19/2021 9:18 AM CDT  Workstation: [id number]    Signed: 09/19/21 0918 by Margareth Marie MD    Narrative:      Ultrasound duplex right lower extremity arteries    HISTORY: Swelling. Peripheral vascular disease.     FINDINGS:  Duplex ultrasound of the arteries of each lower extremity  performed. Doppler waveforms and velocities obtained.    No occlusion demonstrated.  Triphasic waveforms common femoral artery.  Monophasic waveforms remaining arteries.    Peak systolic velocities are as follows in cm/sec:          Right      CFA      93.5       Prof       63.7       Prox SFA    68.8       Mid SFA    36.5       Dist SFA    *       Prox pop    72.7     Dist pop    63.7       Prox erin    52.5       Mid erin    30.7       Dist erin    36.8       DPA      68.9       Prox PTA    94.7       Mid PTA    37.7       Dist PTA    59.5       Prox TESS    64.0       Mid TESS    46.4       Dist TESS    34.3         ^ Obscured by bandage.      Impression:      CONCLUSION:  As above.    [id number]    Electronically signed  by:  Jason Marie MD  9/19/2021 7:37 AM CDT  Workstation: [id number]    US Venous Doppler Lower Extremity Right (duplex) [[id number]] Collected: 09/19/21 0611     Updated: 09/19/21 0727    Narrative:        Ultrasound venous duplex right lower extremity    HISTORY: Edema . Peripheral vascular disease.    Duplex ultrasound of the deep venous system of the right lower  extremity was performed    FINDINGS:  Distal right femoral vein obscured by bandage at surgical site.  Real-time images of the remaining veins demonstrate normal  compressibility without evidence of intraluminal thrombus.  Doppler of the remaining veins shows phasic flow and  augmentation.  Color Doppler of the remaining veins also reveals venous patency.      Impression:      CONCLUSION:  No ultrasound evidence of deep venous thrombosis of the right  lower extremity.  Note is made that the distal right femoral vein is obscured by  bandage at surgical site.    [id number]    Electronically signed by:  Jason Marie MD  9/19/2021 7:26 AM CDT  Workstation: [id number]    FL C Arm During Surgery [[id number]] Resulted: 09/17/21 1523     Updated: 09/17/21 1523            albuterol, 2.5 mg, Nebulization, Q8H - RT  amitriptyline, 25 mg, Oral, Nightly  amLODIPine, 10 mg, Oral, Nightly  budesonide-formoterol, 2 puff, Inhalation, BID - RT  cilostazol, 100 mg, Oral, BID AC  clopidogrel, 75 mg, Oral, Daily  gabapentin, 800 mg, Oral, Q8H  nitroglycerin, 0.5 inch, Topical, Q12H  pantoprazole, 40 mg, Oral, Daily  polyethylene glycol, 17 g, Oral, Daily  pravastatin, 20 mg, Oral, Nightly  rivaroxaban, 20 mg, Oral, Daily With Dinner  sennosides-docusate, 2 tablet, Oral, BID  sertraline, 100 mg, Oral, Daily  tamsulosin, 0.4 mg, Oral, Daily  traZODone, 150 mg, Oral, Nightly             Patient Active Problem List   Diagnosis Code   • Anxiety F41.9   • CAD (coronary atherosclerotic disease) I25.10   • Carotid stenosis I65.29   • COPD (chronic obstructive pulmonary disease) (CMS/[facility])  J44.9   • COPD with exacerbation (CMS/[facility]) J44.1   • Depressive disorder, not elsewhere classified F32.9   • Benign essential hypertension I10   • Hypercholesteremia E78.00   • Insomnia G47.00   • Notalgia M54.9   • Osteoporosis M81.0   • Other screening mammogram Z12.31   • RUQ abdominal pain R10.11   • PVD (peripheral vascular disease) with claudication (CMS/[facility]) I73.9   • Dizziness R42   • Nicotine abuse Z72.0   • Dysphagia R13.10   • Epigastric pain R10.13   • Pain of right hand M79.641   • Closed displaced fracture of shaft of fifth metacarpal bone of right hand S62.326A   • Cause of injury, fall W19.XXXA   • Displaced fracture of shaft of fifth metacarpal bone of right hand with routine healing S62.326D   • Fall W19.XXXA   • Syncope R55   • Acute respiratory failure with hypoxia (CMS/[facility]) J96.01   • (HFpEF) heart failure with preserved ejection fraction (CMS/[facility]) I50.30   • Hypotension I95.9   • Elevated WBCs D72.829   • Near syncope R55   • Atherosclerosis of native coronary artery of native heart without angina pectoris I25.10   • Atherosclerosis of native arteries of extremities with rest pain, left leg (CMS/[facility]) I70.222   • PVD (peripheral vascular disease) (CMS/[facility]) I73.9         Assessment & Plan     Stable Post Op RIGHT Fem-Pop AK: Improved. Bowel regimen. DC joseph. Flomax.    Peripheral vascular disease status post femoral-popliteal bypass           -Medical Management: Plavix, Statin, Pletal. Restart Xarelto    Hypertension   -controlled    COPD on home O2 2 L              -Wean O2 down to 2 L as tolerated maintaining greater than 92%              -Increase ambulation PT OT    Bilateral carotid artery stenosis   -asymptomatic    Abdominal pain etiology is uncertain   -Resolved   -bowel regimen    Pain Control: Norco PRN    Disp: Increase activity. DC planning 1-2 days            This document has been electronically signed by DELFINO Garcia on September 20, 2021 08:38 CDT                  Electronically signed by David Suh MD at 09/20/21 0900     Madhu De Leon MD at 09/19/21 1020          CTVS DAILY NOTE     LOS: 2 days   POD# 2  Femoral-popliteal bypass    Patient Care Team:  Anahi Camacho APRN as PCP - General (Nurse Practitioner)  Kris Peres MD (Inactive) as Consulting Physician (Cardiology)    Chief Complaint: Pain in the right lower quadrant and swelling in the legs    Subjective     Pain in the right lower quadrant and suprapubic area mid abdominal  Swelling right leg  Overall does not feel well    Vital Signs  Temp:  [97.5 °F (36.4 °C)-98.9 °F (37.2 °C)] 97.9 °F (36.6 °C)  Heart Rate:  [] 94  Resp:  [18-20] 20  BP: ()/(51-92) 111/57  Body mass index is 28.62 kg/m².    Intake/Output Summary (Last 24 hours) at 9/19/2021 1020  Last data filed at 9/19/2021 0500  Gross per 24 hour   Intake 1888 ml   Output 2250 ml   Net -362 ml     No intake/output data recorded.    Wt Readings from Last 3 Encounters:   09/18/21 71 kg (156 lb 8.4 oz)   08/25/21 62.6 kg (138 lb)   07/26/21 61.1 kg (134 lb 9.6 oz)       Objective  Incisions:  Clean and dry.  General: WDWN female alert and oriented x3  HEENT: NC NT PERRLA  Chest: Symmetric expansion bilateral wheezes and rales  Neurologic: Alert oriented x3 no asymmetry      Results Review:      WBC No results found for: WBCS   HGB Hemoglobin   Date/Time Value Ref Range Status   09/19/2021 0759 8.9 (L) 12.0 - 15.9 g/dL Final   09/18/2021 0544 8.7 (L) 12.0 - 15.9 g/dL Final   09/17/2021 1020 12.2 12.0 - 15.9 g/dL Final      HCT Hematocrit   Date/Time Value Ref Range Status   09/19/2021 0759 28.1 (L) 34.0 - 46.6 % Final   09/18/2021 0544 27.5 (L) 34.0 - 46.6 % Final   09/17/2021 1020 38.0 34.0 - 46.6 % Final      Platlets No results found for: LABPLAT     PT/INR:  No results found for: PROTIME/No results found for: INR    Sodium Sodium   Date/Time Value Ref Range Status   09/18/2021 0544 135 (L) 136 - 145 mmol/L  Final   09/17/2021 1020 137 136 - 145 mmol/L Final      Potassium Potassium   Date/Time Value Ref Range Status   09/18/2021 0544 4.1 3.5 - 5.2 mmol/L Final   09/17/2021 1020 3.8 3.5 - 5.2 mmol/L Final      Chloride Chloride   Date/Time Value Ref Range Status   09/18/2021 0544 104 98 - 107 mmol/L Final   09/17/2021 1020 100 98 - 107 mmol/L Final      Bicarbonate No results found for: PLASMABICARB   BUN BUN   Date/Time Value Ref Range Status   09/18/2021 0544 10 8 - 23 mg/dL Final   09/17/2021 1020 13 8 - 23 mg/dL Final      Creatinine Creatinine   Date/Time Value Ref Range Status   09/18/2021 0544 0.81 0.57 - 1.00 mg/dL Final   09/17/2021 1020 0.81 0.57 - 1.00 mg/dL Final      Calcium Calcium   Date/Time Value Ref Range Status   09/18/2021 0544 7.1 (L) 8.6 - 10.5 mg/dL Final   09/17/2021 1020 9.0 8.6 - 10.5 mg/dL Final      Magnesium No results found for: MG     Imaging Results (Last 72 Hours)     Procedure Component Value Units Date/Time    US Arterial Doppler Lower Extremity Right [[id number]] Collected: 09/19/21 0611     Updated: 09/19/21 0919    Addenda:         ADDENDUM   ADDENDUM #1       Additional images were obtained of the patient's femoropopliteal  bypass graft.  The graft is patent.  Proximal anastomosis is unremarkable.  Distal anastomosis obscured by bandage.    Electronically signed by:  Jason Marie MD  9/19/2021 9:18 AM CDT  Workstation: [id number]    Signed: 09/19/21 0918 by Margareth Marie MD    Narrative:      Ultrasound duplex right lower extremity arteries    HISTORY: Swelling. Peripheral vascular disease.     FINDINGS:  Duplex ultrasound of the arteries of each lower extremity  performed. Doppler waveforms and velocities obtained.    No occlusion demonstrated.  Triphasic waveforms common femoral artery.  Monophasic waveforms remaining arteries.    Peak systolic velocities are as follows in cm/sec:          Right      CFA      93.5       Prof       63.7       Prox SFA    68.8       Mid SFA     36.5       Dist SFA    *       Prox pop    72.7     Dist pop    63.7       Prox erin    52.5       Mid erin    30.7       Dist erin    36.8       DPA      68.9       Prox PTA    94.7       Mid PTA    37.7       Dist PTA    59.5       Prox TESS    64.0       Mid TESS    46.4       Dist TESS    34.3         ^ Obscured by bandage.      Impression:      CONCLUSION:  As above.    [id number]    Electronically signed by:  Jason Marie MD  9/19/2021 7:37 AM CDT  Workstation: [id number]    US Venous Doppler Lower Extremity Right (duplex) [[id number]] Collected: 09/19/21 0611     Updated: 09/19/21 0727    Narrative:        Ultrasound venous duplex right lower extremity    HISTORY: Edema . Peripheral vascular disease.    Duplex ultrasound of the deep venous system of the right lower  extremity was performed    FINDINGS:  Distal right femoral vein obscured by bandage at surgical site.  Real-time images of the remaining veins demonstrate normal  compressibility without evidence of intraluminal thrombus.  Doppler of the remaining veins shows phasic flow and  augmentation.  Color Doppler of the remaining veins also reveals venous patency.      Impression:      CONCLUSION:  No ultrasound evidence of deep venous thrombosis of the right  lower extremity.  Note is made that the distal right femoral vein is obscured by  bandage at surgical site.    [id number]    Electronically signed by:  Jason Marie MD  9/19/2021 7:26 AM CDT  Workstation: [id number]    FL C Arm During Surgery [[id number]] Resulted: 09/17/21 1523     Updated: 09/17/21 1523            albuterol, 2.5 mg, Nebulization, Q8H - RT  amitriptyline, 25 mg, Oral, Nightly  amLODIPine, 10 mg, Oral, Nightly  budesonide-formoterol, 2 puff, Inhalation, BID - RT  cilostazol, 100 mg, Oral, BID AC  clopidogrel, 75 mg, Oral, Daily  furosemide, 20 mg, Oral, Daily  gabapentin, 800 mg, Oral, Q8H  nitroglycerin, 0.5 inch, Topical, Q12H  pantoprazole, 40 mg, Oral, Daily  pravastatin, 20 mg, Oral,  Nightly  sertraline, 100 mg, Oral, Daily  traZODone, 150 mg, Oral, Nightly      nitroglycerin, 5-200 mcg/min, Last Rate: Stopped (09/17/21 2019)          Patient Active Problem List   Diagnosis Code   • Anxiety F41.9   • CAD (coronary atherosclerotic disease) I25.10   • Carotid stenosis I65.29   • COPD (chronic obstructive pulmonary disease) (CMS/[facility]) J44.9   • COPD with exacerbation (CMS/[facility]) J44.1   • Depressive disorder, not elsewhere classified F32.9   • Benign essential hypertension I10   • Hypercholesteremia E78.00   • Insomnia G47.00   • Notalgia M54.9   • Osteoporosis M81.0   • Other screening mammogram Z12.31   • RUQ abdominal pain R10.11   • PVD (peripheral vascular disease) with claudication (CMS/[facility]) I73.9   • Dizziness R42   • Nicotine abuse Z72.0   • Dysphagia R13.10   • Epigastric pain R10.13   • Pain of right hand M79.641   • Closed displaced fracture of shaft of fifth metacarpal bone of right hand S62.326A   • Cause of injury, fall W19.XXXA   • Displaced fracture of shaft of fifth metacarpal bone of right hand with routine healing S62.326D   • Fall W19.XXXA   • Syncope R55   • Acute respiratory failure with hypoxia (CMS/[facility]) J96.01   • (HFpEF) heart failure with preserved ejection fraction (CMS/[facility]) I50.30   • Hypotension I95.9   • Elevated WBCs D72.829   • Near syncope R55   • Atherosclerosis of native coronary artery of native heart without angina pectoris I25.10   • Atherosclerosis of native arteries of extremities with rest pain, left leg (CMS/[facility]) I70.222   • PVD (peripheral vascular disease) (CMS/[facility]) I73.9         Assessment & Plan     Peripheral vascular disease status post femoral-popliteal bypass            -Meds adjusted  Hypertension  COPD on home O2 2 L              -Wean O2 down to 2 L as tolerated maintaining greater than 92%              -Increase ambulation PT OT  Bilateral carotid artery stenosis  Pulmonary   -Requiring 15 L bilateral wheezing and rales   -Diuresis as  needed  Abdominal pain etiology is uncertain   -CT abdomen and pelvis pending  Swelling right leg   -Venous duplex negative for DVT   -Arterial duplex graft patency present no hematoma around  Proximal anastomosis    Madhu De Leon MD  09/19/21  10:20 CDT              Electronically signed by Madhu De Leon MD at 09/19/21 1034     Madhu De Leon MD at 09/18/21 1054          CTVS DAILY NOTE     LOS: 1 day   POD# 1  Femoral-popliteal bypass    Patient Care Team:  Anahi Camacho APRN as PCP - General (Nurse Practitioner)  Kris Peres MD (Inactive) as Consulting Physician (Cardiology)    Chief Complaint: Soreness incision site    Subjective  Doing well ate her entire breakfast  No complaints of shortness of breath just some tenderness in the incision sites    Vital Signs  Temp:  [97.4 °F (36.3 °C)-98 °F (36.7 °C)] 97.8 °F (36.6 °C)  Heart Rate:  [71-98] 79  Resp:  [18-22] 18  BP: ()/(40-87) 101/57  Arterial Line BP: (36-62)/(32-50) 54/41  Body mass index is 28.62 kg/m².    Intake/Output Summary (Last 24 hours) at 9/18/2021 1054  Last data filed at 9/18/2021 0600  Gross per 24 hour   Intake 3581.25 ml   Output 1058 ml   Net 2523.25 ml     No intake/output data recorded.    Wt Readings from Last 3 Encounters:   09/18/21 71 kg (156 lb 8.4 oz)   08/25/21 62.6 kg (138 lb)   07/26/21 61.1 kg (134 lb 9.6 oz)       Objective  Incisions:  Clean and dry.  General: WDWN female alert and oriented x3  HEENT: NC NT PERRLA  Chest: Symmetric expansion crackles appreciated on cough  Neurologic: Alert oriented x3 no asymmetry    Results Review:      WBC No results found for: WBCS   HGB Hemoglobin   Date/Time Value Ref Range Status   09/18/2021 0544 8.7 (L) 12.0 - 15.9 g/dL Final   09/17/2021 1020 12.2 12.0 - 15.9 g/dL Final      HCT Hematocrit   Date/Time Value Ref Range Status   09/18/2021 0544 27.5 (L) 34.0 - 46.6 % Final   09/17/2021 1020 38.0 34.0 - 46.6 % Final      Platlets No results found for: LABPLAT      PT/INR:  No results found for: PROTIME/No results found for: INR    Sodium Sodium   Date/Time Value Ref Range Status   09/18/2021 0544 135 (L) 136 - 145 mmol/L Final   09/17/2021 1020 137 136 - 145 mmol/L Final      Potassium Potassium   Date/Time Value Ref Range Status   09/18/2021 0544 4.1 3.5 - 5.2 mmol/L Final   09/17/2021 1020 3.8 3.5 - 5.2 mmol/L Final      Chloride Chloride   Date/Time Value Ref Range Status   09/18/2021 0544 104 98 - 107 mmol/L Final   09/17/2021 1020 100 98 - 107 mmol/L Final      Bicarbonate No results found for: PLASMABICARB   BUN BUN   Date/Time Value Ref Range Status   09/18/2021 0544 10 8 - 23 mg/dL Final   09/17/2021 1020 13 8 - 23 mg/dL Final      Creatinine Creatinine   Date/Time Value Ref Range Status   09/18/2021 0544 0.81 0.57 - 1.00 mg/dL Final   09/17/2021 1020 0.81 0.57 - 1.00 mg/dL Final      Calcium Calcium   Date/Time Value Ref Range Status   09/18/2021 0544 7.1 (L) 8.6 - 10.5 mg/dL Final   09/17/2021 1020 9.0 8.6 - 10.5 mg/dL Final      Magnesium No results found for: MG     Imaging Results (Last 72 Hours)     Procedure Component Value Units Date/Time    FL C Arm During Surgery [[id number]] Resulted: 09/17/21 1523     Updated: 09/17/21 1523            albuterol, 2.5 mg, Nebulization, Q8H - RT  amitriptyline, 25 mg, Oral, Nightly  amLODIPine, 10 mg, Oral, Nightly  budesonide-formoterol, 2 puff, Inhalation, BID - RT  cilostazol, 100 mg, Oral, BID AC  clopidogrel, 75 mg, Oral, Daily  furosemide, 20 mg, Oral, Daily  gabapentin, 800 mg, Oral, Q8H  nitroglycerin, 0.5 inch, Topical, Q12H  pantoprazole, 40 mg, Oral, Daily  pravastatin, 20 mg, Oral, Nightly  sertraline, 100 mg, Oral, Daily  traZODone, 150 mg, Oral, Nightly      HYDROmorphone HCl-NaCl,   nitroglycerin, 5-200 mcg/min, Last Rate: Stopped (09/17/21 2019)  sodium chloride, 75 mL/hr, Last Rate: 75 mL/hr (09/18/21 0015)          Patient Active Problem List   Diagnosis Code   • Anxiety F41.9   • CAD (coronary  atherosclerotic disease) I25.10   • Carotid stenosis I65.29   • COPD (chronic obstructive pulmonary disease) (CMS/[facility]) J44.9   • COPD with exacerbation (CMS/[facility]) J44.1   • Depressive disorder, not elsewhere classified F32.9   • Benign essential hypertension I10   • Hypercholesteremia E78.00   • Insomnia G47.00   • Notalgia M54.9   • Osteoporosis M81.0   • Other screening mammogram Z12.31   • RUQ abdominal pain R10.11   • PVD (peripheral vascular disease) with claudication (CMS/[facility]) I73.9   • Dizziness R42   • Nicotine abuse Z72.0   • Dysphagia R13.10   • Epigastric pain R10.13   • Pain of right hand M79.641   • Closed displaced fracture of shaft of fifth metacarpal bone of right hand S62.326A   • Cause of injury, fall W19.XXXA   • Displaced fracture of shaft of fifth metacarpal bone of right hand with routine healing S62.326D   • Fall W19.XXXA   • Syncope R55   • Acute respiratory failure with hypoxia (CMS/[facility]) J96.01   • (HFpEF) heart failure with preserved ejection fraction (CMS/[facility]) I50.30   • Hypotension I95.9   • Elevated WBCs D72.829   • Near syncope R55   • Atherosclerosis of native coronary artery of native heart without angina pectoris I25.10   • Atherosclerosis of native arteries of extremities with rest pain, left leg (CMS/[facility]) I70.222   • PVD (peripheral vascular disease) (CMS/[facility]) I73.9         Assessment & Plan   Peripheral vascular disease status post femoral-popliteal bypass   -Increase activity will transfer to stepdown   -Meds adjusted  Hypertension  COPD on home O2 2 L   -Wean O2 down to 2 L as tolerated maintaining greater than 92%   -Increase ambulation PT OT  Bilateral carotid artery stenosis    Madhu De Leon MD  09/18/21  10:54 CDT              Electronically signed by Madhu De Leon MD at 09/18/21 5747

## 2021-09-20 NOTE — PLAN OF CARE
Goal Outcome Evaluation:  Plan of Care Reviewed With: patient        Progress: improving  Outcome Summary: Pt. with good effort with mobility. Pt. transferred Everardo sup-sit, SBA EOB, sit-stand-sit kkia Mcgee. 18' F-B/ 12' with RW CGA/Everardo, vss during mobility this p.m. Cont. to mobilize as pt. able

## 2021-09-20 NOTE — THERAPY TREATMENT NOTE
Acute Care - Physical Therapy Treatment Note  [facility]     Patient Name: Mona Perales  : 1950  MRN: [id number]  Today's Date: 2021      Visit Dx:     ICD-10-CM ICD-9-CM   1. PVD (peripheral vascular disease) (CMS/[facility])  I73.9 443.9   2. Impaired functional mobility, balance, gait, and endurance  Z74.09 V49.89     Patient Active Problem List   Diagnosis   • Anxiety   • CAD (coronary atherosclerotic disease)   • Carotid stenosis   • COPD (chronic obstructive pulmonary disease) (CMS/H[facility])   • COPD with exacerbation (CMS/[facility])   • Depressive disorder, not elsewhere classified   • Benign essential hypertension   • Hypercholesteremia   • Insomnia   • Notalgia   • Osteoporosis   • Other screening mammogram   • RUQ abdominal pain   • PVD (peripheral vascular disease) with claudication (CMS/[facility])   • Dizziness   • Nicotine abuse   • Dysphagia   • Epigastric pain   • Pain of right hand   • Closed displaced fracture of shaft of fifth metacarpal bone of right hand   • Cause of injury, fall   • Displaced fracture of shaft of fifth metacarpal bone of right hand with routine healing   • Fall   • Syncope   • Acute respiratory failure with hypoxia (CMS/[facility])   • (HFpEF) heart failure with preserved ejection fraction (CMS/[facility])   • Hypotension   • Elevated WBCs   • Near syncope   • Atherosclerosis of native coronary artery of native heart without angina pectoris   • Atherosclerosis of native arteries of extremities with rest pain, left leg (CMS/[facility])   • PVD (peripheral vascular disease) (CMS/[facility])     Past Medical History:   Diagnosis Date   • A-fib (CMS/[facility])    • Anxiety    • Arthritis    • Asthma    • 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 with ulceration (CMS/[facility])     bilateral legs - bilateral iliac stents       • CHF (congestive heart failure) (CMS/HCC)    • Chronic lower back pain    • COPD (chronic obstructive pulmonary disease) (CMS/[facility])    • Essential (primary) hypertension    • GERD  (gastroesophageal reflux disease)    • History of pulmonary embolus (PE)    • Hyperlipidemia    • Insomnia    • Lumbago    • Nicotine dependence    • Occlusion of artery      and stenosis of bilateral carotid arteries - BABS 16-49%, LICA 0-15%   • Other atherosclerosis of native artery of other extremity     LEFT subclavian stent 2005 (occluded)   • Sleep apnea      Past Surgical History:   Procedure Laterality Date   • CARDIAC CATHETERIZATION  04/06/2016    No evidence of any obstructive epicardial CAD.Preserved LV systolic function with EF of 55%.   • CENTRAL VENOUS LINE INSERTION  04/07/2016    Successful placement of right uppe extremity 6-[place] triple lumen PICC line.   • ENDOSCOPY N/A 1/12/2018    Procedure: ESOPHAGOGASTRODUODENOSCOPY;  Surgeon: Bin Oh MD;  Location: [facility] ENDOSCOPY;  Service:    • ENDOSCOPY N/A 1/17/2018    Procedure: ESOPHAGOGASTRODUODENOSCOPY;  Surgeon: Bin Oh MD;  Location: [facility] ENDOSCOPY;  Service:    • ENDOSCOPY N/A 3/16/2018    Procedure: ESOPHAGOGASTRODUODENOSCOPY possible dilation;  Surgeon: Bin Oh MD;  Location: [facility] ENDOSCOPY;  Service: Gastroenterology   • FEMORAL POPLITEAL BYPASS Left 4/14/2020    Procedure: FEMORAL POPLITEAL BYPASS ABOVE KNEE  (POLYTETRAFLUOROETHYLENE)  LEFT COMMON FEMORAL ARTERY ENDARTERECTOMY PTA LEFT ILIAC ARTERIOGRAM        (c-arm#2 and c-arm table);  Surgeon: David Suh MD;  Location: [facility] OR;  Service: Vascular;  Laterality: Left;   • HYSTERECTOMY     • INTERVENTIONAL RADIOLOGY PROCEDURE Left 9/9/2020    Procedure: IR angiogram extremity left;  Surgeon: David Suh MD;  Location: [facility] ANGIO INVASIVE LOCATION;  Service: Interventional Radiology;  Laterality: Left;   • KYPHOPLASTY N/A 5/23/2019    Procedure: KYPHOPLASTY LUMBAR FOUR;  Surgeon: Aba Santa MD;  Location: [facility] OR;  Service: Orthopedic Spine   • TOTAL SHOULDER REPLACEMENT Left    • TRANSESOPHAGEAL ECHOCARDIOGRAM (JACQUES)   04/07/2016    With color flow-Mild to moderate [facility].LV systolic function well preserved with Ef of 55-60%.Mitral and AV intact.Diastolic dysfunction   • UPPER GASTROINTESTINAL ENDOSCOPY  01/17/2018    Dr. Bin Martin M.D.        PT Assessment (last 12 hours)      PT Evaluation and Treatment     [facility] Name 09/20/21 133          Physical Therapy Time and Intention    Subjective Information  no complaints  -     Document Type  therapy note (daily note)  -     Mode of Treatment  individual therapy;physical therapy  -     Patient Effort  good  -[facility] Name 09/20/21 1334          General Information    Patient Profile Reviewed  yes  -     Existing Precautions/Restrictions  fall  -[facility] Name 09/20/21 1335          Cognition    Orientation Status (Cognition)  oriented to;person;place;situation  -[facility] Name 09/20/21 3814          Pain Scale: Numbers Pre/Post-Treatment    Pretreatment Pain Rating  10/10  -     Posttreatment Pain Rating  10/10  -     Pain Location - Side  Right  -     Pain Location  groin  -[facility] Name 09/20/21 2652          Pain Scale: Word Pre/Post-Treatment    Pain Intervention(s)  Repositioned;Ambulation/increased activity  -[facility] Name 09/20/21 1425          Bed Mobility    Bed Mobility  supine-sit;scooting/bridging;sit-supine  -     Scooting/Bridging [place] (Bed Mobility)  maximum assist (25% patient effort)  -     Supine-Sit [place] (Bed Mobility)  minimum assist (75% patient effort)  -     Sit-Supine [place] (Bed Mobility)  minimum assist (75% patient effort)  -     Assistive Device (Bed Mobility)  head of bed elevated;bed rails  -[facility] Name 09/20/21 3147          Transfers    Bed-Chair [place] (Transfers)  --  -     Assistive Device (Bed-Chair Transfers)  --  -     Sit-Stand [place] (Transfers)  minimum assist (75% patient effort)  -[facility] Name 09/20/21 3315          Sit-Stand Transfer    Assistive Device (Sit-Stand  Transfers)  walker, front-wheeled  -JA     Row Name 09/20/21 1338          Gait/Stairs (Locomotion)    [place] Level (Gait)  contact guard;verbal cues;nonverbal cues (demo/gesture)  -     Assistive Device (Gait)  walker, front-wheeled  -KIM     Distance in Feet (Gait)  18' F-B, 12' with RW   -JA     Row Name 09/20/21 1338          Safety Issues, Functional Mobility    Impairments Affecting Function (Mobility)  endurance/activity tolerance;balance;strength  -JA     Row Name             Wound 09/17/21 1433 Right groin Incision    Wound - Properties Group Placement Date: 09/17/21  -CF Placement Time: 1433  -CF Present on Hospital Admission: N  -CF Side: Right  -CF Location: groin  -CF Primary Wound Type: Incision  -CF    Retired Wound - Properties Group Date first assessed: 09/17/21  -CF Time first assessed: 1433  -CF Present on Hospital Admission: N  -CF Side: Right  -CF Location: groin  -CF Primary Wound Type: Incision  -CF    Row Name             Wound 09/17/21 1438 Right knee    Wound - Properties Group Placement Date: 09/17/21  -CF Placement Time: 1438  -CF Present on Hospital Admission: N  -CF Side: Right  -CF Orientation: --  -CF Location: knee  -CF    Retired Wound - Properties Group Date first assessed: 09/17/21  -CF Time first assessed: 1438  -CF Present on Hospital Admission: N  -CF Side: Right  -CF Location: knee  -CF    Row Name             NPWT (Negative Pressure Wound Therapy) 09/17/21 1446 Right Groin    NPWT (Negative Pressure Wound Therapy) - Properties Group Placement Date: 09/17/21  -CF Placement Time: 1446  -CF Location: Right Groin  -CF    Retired NPWT (Negative Pressure Wound Therapy) - Properties Group Placement Date: 09/17/21  -CF Placement Time: 1446  -CF Location: Right Groin  -CF    Row Name 09/20/21 1338          Vital Signs    Pre Systolic BP Rehab  133  -JA     Pre Treatment Diastolic BP  76  -JA     Pretreatment Heart Rate (beats/min)  104  -JA     Intratreatment Heart Rate  (beats/min)  124  -JA     Posttreatment Heart Rate (beats/min)  105  -JA     Pre SpO2 (%)  93  -JA     O2 Delivery Pre Treatment  supplemental O2  -JA     Intra SpO2 (%)  98  -JA     O2 Delivery Intra Treatment  supplemental O2  -JA     Post SpO2 (%)  93  -JA     O2 Delivery Post Treatment  supplemental O2  -JA     Pre Patient Position  Supine  -JA     Intra Patient Position  Standing  -JA     Post Patient Position  Supine  -     Row Name 09/20/21 1338          Bed Mobility Goal 1 (PT)    Activity/Assistive Device (Bed Mobility Goal 1, PT)  sit to supine;supine to sit  -     [place] Level/Cues Needed (Bed Mobility Goal 1, PT)  standby assist;independent  -     Time Frame (Bed Mobility Goal 1, PT)  by discharge  -     Progress/Outcomes (Bed Mobility Goal 1, PT)  goal not met  -     Row Name 09/20/21 1338          Transfer Goal 1 (PT)    Activity/Assistive Device (Transfer Goal 1, PT)  sit-to-stand/stand-to-sit;bed-to-chair/chair-to-bed  -     [place] Level/Cues Needed (Transfer Goal 1, PT)  standby assist;modified independence  -     Time Frame (Transfer Goal 1, PT)  by discharge  -     Row Name 09/20/21 1338          Gait Training Goal 1 (PT)    Activity/Assistive Device (Gait Training Goal 1, PT)  gait (walking locomotion);walker, rolling;decrease fall risk;increase endurance/gait distance  -     [place] Level (Gait Training Goal 1, PT)  standby assist;modified independence  -     Distance (Gait Training Goal 1, PT)  50ft or more x2  -     Time Frame (Gait Training Goal 1, PT)  by discharge  -     Progress/Outcome (Gait Training Goal 1, PT)  goal not met  -     Row Name 09/20/21 1338          Stairs Goal 1 (PT)    Activity/Assistive Device (Stairs Goal 1, PT)  ascending stairs;descending stairs;using handrail, left  -     [place] Level/Cues Needed (Stairs Goal 1, PT)  standby assist;modified independence  -     Time Frame (Stairs Goal 1, PT)  by discharge  -      Progress/Outcome (Stairs Goal 1, PT)  goal not met  -KIM     Row Name 09/20/21 1338          Positioning and Restraints    Pre-Treatment Position  in bed  -     Post Treatment Position  bed  -     In Bed  supine;call light within reach;encouraged to call for assist;exit alarm on  -KIM     Row Name 09/20/21 1338          Therapy Assessment/Plan (PT)    Rehab Potential (PT)  good, to achieve stated therapy goals  -     Criteria for Skilled Interventions Met (PT)  yes;meets criteria;skilled treatment is necessary  -[facility] Name 09/20/21 1338          Progress Summary (PT)    Progress Toward Functional Goals (PT)  progress toward functional goals as expected  -       User Key  (r) = Recorded By, (t) = Taken By, (c) = Cosigned By    Initials Name Provider Type    Moe Rosado PTA Physical Therapy Assistant    Caty Loera, RN Registered Nurse        Physical Therapy Education                 Title: PT OT SLP Therapies (Not Started)     Topic: Physical Therapy (In Progress)     Point: Mobility training (In Progress)     Learning Progress Summary           Patient Acceptance, E, NR by  at 9/19/2021 1408                   Point: Home exercise program (Not Started)     Learner Progress:  Not documented in this visit.          Point: Body mechanics (In Progress)     Learning Progress Summary           Patient Acceptance, E, NR by  at 9/19/2021 1408                   Point: Precautions (In Progress)     Learning Progress Summary           Patient Acceptance, E, NR by  at 9/19/2021 1408                               User Key     Initials Effective Dates Name Provider Type Discipline     06/16/21 -  Sherri Mane, PT Physical Therapist PT              PT Recommendation and Plan  Anticipated Discharge Disposition (PT): home with 24/7 care, home with home health  Therapy Frequency (PT): daily  Progress Summary (PT)  Progress Toward Functional Goals (PT): progress toward functional goals as  expected  Plan of Care Reviewed With: patient  Progress: improving  Outcome Summary: Pt. with good effort with mobility. Pt. transferred Everardo sup-sit, SBA EOB, sit-stand-sit Everardo, amb. 18' F-B/ 12' with RW CGA/Ever[place], vss during mobility this p.m. Cont. to mobilize as pt. able       Time Calculation:   PT Charges     Row Name 09/20/21 1607             Time Calculation    Start Time  1338  -JA      Stop Time  1408  -JA      Time Calculation (min)  30 min  -JA         Time Calculation- PT    Total Timed Code Minutes- PT  30 minute(s)  -JA         Timed Charges    58592 - PT Therapeutic Activity Minutes  30  -JA         Total Minutes    Timed Charges Total Minutes  30  -JA       Total Minutes  30  -JA        User Key  (r) = Recorded By, (t) = Taken By, (c) = Cosigned By    Initials Name Provider Type    Moe Rosado PTA Physical Therapy Assistant        Therapy Charges for Today     Code Description Service Date Service Provider Modifiers Qty    [id number] HC PT THERAPEUTIC ACT EA 15 MIN 9/20/2021 Moe Hearn PTA GP 2          PT G-Codes  Outcome Measure Options: AM-PAC 6 Clicks Basic Mobility (PT)  AM-PAC 6 Clicks Score (PT): 17    Moe Hearn PTA  9/20/2021

## 2021-09-20 NOTE — PROGRESS NOTES
"CTVS DAILY NOTE     LOS: 3 days   POD# 3  RIGHT Femoral-popliteal bypass    Patient Care Team:  Anahi Camacho APRN as PCP - General (Nurse Practitioner)  Kris Peres MD (Inactive) as Consulting Physician (Cardiology)    Chief Complaint: PVD    Subjective:  Symptoms:  Stable.    Diet:  Adequate intake.    Activity level: Impaired due to weakness.    Pain:  She complains of pain that is moderate.  Pain is well controlled and requiring pain medication.           Vital Signs  Temp:  [97.4 °F (36.3 °C)-98.7 °F (37.1 °C)] 98.7 °F (37.1 °C)  Heart Rate:  [] 104  Resp:  [16-22] 20  BP: ()/(52-82) 102/59  Body mass index is 29.07 kg/m².    Intake/Output Summary (Last 24 hours) at 9/20/2021 0835  Last data filed at 9/20/2021 0500  Gross per 24 hour   Intake 960 ml   Output 3100 ml   Net -2140 ml     No intake/output data recorded.    Wt Readings from Last 3 Encounters:   09/20/21 72.1 kg (158 lb 15.2 oz)   08/25/21 62.6 kg (138 lb)   07/26/21 61.1 kg (134 lb 9.6 oz)       Objective:  General Appearance:  Comfortable.    Vital signs: (most recent): Blood pressure 102/59, pulse 104, temperature 98.7 °F (37.1 °C), temperature source Oral, resp. rate 20, height 157.5 cm (62.01\"), weight 72.1 kg (158 lb 15.2 oz), SpO2 100 %, not currently breastfeeding.  Vital signs are normal.    Output: Producing urine (DC ruiz) and no stool output.    HEENT: Normal HEENT exam.    Lungs:  Normal effort and normal respiratory rate.  There are wheezes.  (O2 NC 2L )  Heart: Normal rate.  Regular rhythm.    Abdomen: Abdomen is soft.  Hypoactive bowel sounds.     Extremities: Decreased range of motion.    Pulses: Distal pulses are intact.  (Palpable)    Neurological: Patient is alert and oriented to person, place and time.    Skin:  Warm and dry.  ((R) Groin: Prevena Intact  (R) AK: CDI)            Results Review:      WBC No results found for: WBCS   HGB Hemoglobin   Date/Time Value Ref Range Status   09/19/2021 0759 8.9 " (L) 12.0 - 15.9 g/dL Final   09/18/2021 0544 8.7 (L) 12.0 - 15.9 g/dL Final   09/17/2021 1020 12.2 12.0 - 15.9 g/dL Final      HCT Hematocrit   Date/Time Value Ref Range Status   09/19/2021 0759 28.1 (L) 34.0 - 46.6 % Final   09/18/2021 0544 27.5 (L) 34.0 - 46.6 % Final   09/17/2021 1020 38.0 34.0 - 46.6 % Final      Platlets No results found for: LABPLAT     PT/INR:  No results found for: PROTIME/No results found for: INR    Sodium Sodium   Date/Time Value Ref Range Status   09/19/2021 1259 135 (L) 136 - 145 mmol/L Final   09/18/2021 0544 135 (L) 136 - 145 mmol/L Final   09/17/2021 1020 137 136 - 145 mmol/L Final      Potassium Potassium   Date/Time Value Ref Range Status   09/19/2021 1259 4.1 3.5 - 5.2 mmol/L Final     Comment:     Slight hemolysis detected by analyzer. Results may be affected.   09/18/2021 0544 4.1 3.5 - 5.2 mmol/L Final   09/17/2021 1020 3.8 3.5 - 5.2 mmol/L Final      Chloride Chloride   Date/Time Value Ref Range Status   09/19/2021 1259 99 98 - 107 mmol/L Final   09/18/2021 0544 104 98 - 107 mmol/L Final   09/17/2021 1020 100 98 - 107 mmol/L Final      Bicarbonate No results found for: PLASMABICARB   BUN BUN   Date/Time Value Ref Range Status   09/19/2021 1259 8 8 - 23 mg/dL Final   09/18/2021 0544 10 8 - 23 mg/dL Final   09/17/2021 1020 13 8 - 23 mg/dL Final      Creatinine Creatinine   Date/Time Value Ref Range Status   09/19/2021 1259 0.75 0.57 - 1.00 mg/dL Final   09/18/2021 0544 0.81 0.57 - 1.00 mg/dL Final   09/17/2021 1020 0.81 0.57 - 1.00 mg/dL Final      Calcium Calcium   Date/Time Value Ref Range Status   09/19/2021 1259 8.1 (L) 8.6 - 10.5 mg/dL Final   09/18/2021 0544 7.1 (L) 8.6 - 10.5 mg/dL Final   09/17/2021 1020 9.0 8.6 - 10.5 mg/dL Final      Magnesium No results found for: MG     Imaging Results (Last 72 Hours)     Procedure Component Value Units Date/Time    CT Abdomen Pelvis Without Contrast [[id number]] Collected: 09/19/21 1034     Updated: 09/19/21 1805    Narrative:         CT Abdomen and Pelvis Without Contrast    History: Edema and swelling around surgical site. Recent femoral  popliteal bypass graft.    Axials spiral scans of the abdomen and pelvis were obtained  without contrast.  Coronal and sagital reconstructions were  preformed.      This exam was performed according to our departmental  dose-optimization program, which includes automated exposure  control, adjustment of the mA and/or kV according to patient size  and/or use of iterative reconstruction technique.    DLP: 383.90    Comparison: July 29, 2021    Findings:   Scans of the lung bases demonstrate linear atelectasis and  dependent atelectasis.  Cardiomegaly.  Small pericardial effusion.  Coronary artery calcifications.    No acute osseous abnormality.  Degenerative changes and degenerative disc disease thoracic and  lumbar spine.  L4 vertebroplasty.  Old ununited right rib fractures.  Old fracture deformity left inferior pubic ramus.    The liver is unremarkable.  The gallbladder is unremarkable.  Splenic granulomata.  The pancreas is unremarkable.  1.9 cm right adrenal adenoma.  Minimal hypertrophy left gland.    Renal artery calcifications, greater on the left.  No renal or ureteral calculi.  No renal mass.  No hydronephrosis.    Amezquita catheter in urinary bladder with associated air in the  urinary bladder.  No pelvic mass.  Hysterectomy.    2.9 cm ectasia infrarenal abdominal aortic.  Dense atherosclerotic calcifications abdominal aorta and iliac  arteries.  Bilateral femoral popliteal bypass grafts.  Subacute edema in each lateral gluteal region and the anterior  aspect of each thigh.    No adenopathy.    No bowel obstruction.  Diverticulosis of the colon.  Some gaseous distention of the colon and air-fluid levels in the  colon suggesting colonic adynamic ileus.  No free air.  No free fluid.    No hernia.      Impression:      Conclusion:  Cardiomegaly.  Small pericardial effusion.  Coronary artery  calcifications.  1.9 cm right adrenal adenoma.  Minimal hypertrophy left gland.  Renal artery calcifications, greater on the left.  Amezquita catheter in urinary bladder with associated air in the  urinary bladder.  Hysterectomy.  2.9 cm ectasia infrarenal abdominal aortic.  Dense atherosclerotic calcifications abdominal aorta and iliac  arteries.  Bilateral femoral popliteal bypass grafts.  Subacute edema in each lateral gluteal region and the anterior  aspect of each thigh.  Diverticulosis of the colon.  Some gaseous distention of the colon and air-fluid levels in the  colon suggesting colonic adynamic ileus.  Degenerative changes and degenerative disc disease thoracic and  lumbar spine.  L4 vertebroplasty.  Old ununited right rib fractures.  Old fracture deformity left inferior pubic ramus.    [id number]    Electronically signed by:  Jason Marie MD  9/19/2021 6:04 PM CDT  Workstation: [id number]    US Arterial Doppler Lower Extremity Right [[id number]] Collected: 09/19/21 0611     Updated: 09/19/21 0919    Addenda:         ADDENDUM   ADDENDUM #1       Additional images were obtained of the patient's femoropopliteal  bypass graft.  The graft is patent.  Proximal anastomosis is unremarkable.  Distal anastomosis obscured by bandage.    Electronically signed by:  Jason Marie MD  9/19/2021 9:18 AM CDT  Workstation: [id number]    Signed: 09/19/21 0918 by Margareth Marie MD    Narrative:      Ultrasound duplex right lower extremity arteries    HISTORY: Swelling. Peripheral vascular disease.     FINDINGS:  Duplex ultrasound of the arteries of each lower extremity  performed. Doppler waveforms and velocities obtained.    No occlusion demonstrated.  Triphasic waveforms common femoral artery.  Monophasic waveforms remaining arteries.    Peak systolic velocities are as follows in cm/sec:          Right      CFA      93.5       Prof       63.7       Prox SFA    68.8       Mid SFA    36.5       Dist SFA    *       Prox pop    72.7      Dist pop    63.7       Prox erin    52.5       Mid erin    30.7       Dist erin    36.8       DPA      68.9       Prox PTA    94.7       Mid PTA    37.7       Dist PTA    59.5       Prox TESS    64.0       Mid TESS    46.4       Dist TESS    34.3         ^ Obscured by bandage.      Impression:      CONCLUSION:  As above.    [id number]    Electronically signed by:  Jason Marie MD  9/19/2021 7:37 AM CDT  Workstation: [id number]    US Venous Doppler Lower Extremity Right (duplex) [[id number]] Collected: 09/19/21 0611     Updated: 09/19/21 0727    Narrative:        Ultrasound venous duplex right lower extremity    HISTORY: Edema . Peripheral vascular disease.    Duplex ultrasound of the deep venous system of the right lower  extremity was performed    FINDINGS:  Distal right femoral vein obscured by bandage at surgical site.  Real-time images of the remaining veins demonstrate normal  compressibility without evidence of intraluminal thrombus.  Doppler of the remaining veins shows phasic flow and  augmentation.  Color Doppler of the remaining veins also reveals venous patency.      Impression:      CONCLUSION:  No ultrasound evidence of deep venous thrombosis of the right  lower extremity.  Note is made that the distal right femoral vein is obscured by  bandage at surgical site.    [id number]    Electronically signed by:  Jason Marie MD  9/19/2021 7:26 AM CDT  Workstation: [id number]    FL C Arm During Surgery [[id number]] Resulted: 09/17/21 1523     Updated: 09/17/21 1523            albuterol, 2.5 mg, Nebulization, Q8H - RT  amitriptyline, 25 mg, Oral, Nightly  amLODIPine, 10 mg, Oral, Nightly  budesonide-formoterol, 2 puff, Inhalation, BID - RT  cilostazol, 100 mg, Oral, BID AC  clopidogrel, 75 mg, Oral, Daily  gabapentin, 800 mg, Oral, Q8H  nitroglycerin, 0.5 inch, Topical, Q12H  pantoprazole, 40 mg, Oral, Daily  polyethylene glycol, 17 g, Oral, Daily  pravastatin, 20 mg, Oral, Nightly  rivaroxaban, 20 mg, Oral, Daily With  Dinner  sennosides-docusate, 2 tablet, Oral, BID  sertraline, 100 mg, Oral, Daily  tamsulosin, 0.4 mg, Oral, Daily  traZODone, 150 mg, Oral, Nightly             Patient Active Problem List   Diagnosis Code   • Anxiety F41.9   • CAD (coronary atherosclerotic disease) I25.10   • Carotid stenosis I65.29   • COPD (chronic obstructive pulmonary disease) (CMS/[facility]) J44.9   • COPD with exacerbation (CMS/[facility]) J44.1   • Depressive disorder, not elsewhere classified F32.9   • Benign essential hypertension I10   • Hypercholesteremia E78.00   • Insomnia G47.00   • Notalgia M54.9   • Osteoporosis M81.0   • Other screening mammogram Z12.31   • RUQ abdominal pain R10.11   • PVD (peripheral vascular disease) with claudication (CMS/[facility]) I73.9   • Dizziness R42   • Nicotine abuse Z72.0   • Dysphagia R13.10   • Epigastric pain R10.13   • Pain of right hand M79.641   • Closed displaced fracture of shaft of fifth metacarpal bone of right hand S62.326A   • Cause of injury, fall W19.XXXA   • Displaced fracture of shaft of fifth metacarpal bone of right hand with routine healing S62.326D   • Fall W19.XXXA   • Syncope R55   • Acute respiratory failure with hypoxia (CMS/[facility]) J96.01   • (HFpEF) heart failure with preserved ejection fraction (CMS/[facility]) I50.30   • Hypotension I95.9   • Elevated WBCs D72.829   • Near syncope R55   • Atherosclerosis of native coronary artery of native heart without angina pectoris I25.10   • Atherosclerosis of native arteries of extremities with rest pain, left leg (CMS/[facility]) I70.222   • PVD (peripheral vascular disease) (CMS/[facility]) I73.9         Assessment & Plan     Stable Post Op RIGHT Fem-Pop AK: Improved. Bowel regimen. MELANI ruiz. Flomax.    Peripheral vascular disease status post femoral-popliteal bypass           -Medical Management: Plavix, Statin, Pletal. Restart Xarelto    Hypertension   -controlled    COPD on home O2 2 L              -Wean O2 down to 2 L as tolerated maintaining greater than 92%               -Increase ambulation PT OT    Bilateral carotid artery stenosis   -asymptomatic    Abdominal pain etiology is uncertain   -Resolved   -bowel regimen    Pain Control: Norco PRN    Disp: Increase activity. DC planning 1-2 days            This document has been electronically signed by DELFINO Garcia on September 20, 2021 08:38 CDT

## 2021-09-21 ENCOUNTER — HOME HEALTH ADMISSION (OUTPATIENT)
Dept: HOME HEALTH SERVICES | Facility: HOME HEALTHCARE | Age: 71
End: 2021-09-21

## 2021-09-21 ENCOUNTER — READMISSION MANAGEMENT (OUTPATIENT)
Dept: CALL CENTER | Facility: HOSPITAL | Age: 71
End: 2021-09-21

## 2021-09-21 VITALS
HEIGHT: 62 IN | BODY MASS INDEX: 26.2 KG/M2 | RESPIRATION RATE: 18 BRPM | DIASTOLIC BLOOD PRESSURE: 65 MMHG | SYSTOLIC BLOOD PRESSURE: 135 MMHG | HEART RATE: 99 BPM | TEMPERATURE: 97.5 F | OXYGEN SATURATION: 91 % | WEIGHT: 142.4 LBS

## 2021-09-21 PROCEDURE — 94664 DEMO&/EVAL PT USE INHALER: CPT

## 2021-09-21 PROCEDURE — 99024 POSTOP FOLLOW-UP VISIT: CPT | Performed by: NURSE PRACTITIONER

## 2021-09-21 PROCEDURE — 94799 UNLISTED PULMONARY SVC/PX: CPT

## 2021-09-21 PROCEDURE — 94760 N-INVAS EAR/PLS OXIMETRY 1: CPT

## 2021-09-21 RX ORDER — HYDROCODONE BITARTRATE AND ACETAMINOPHEN 5; 325 MG/1; MG/1
1 TABLET ORAL EVERY 6 HOURS PRN
Qty: 30 TABLET | Refills: 0 | Status: SHIPPED | OUTPATIENT
Start: 2021-09-21 | End: 2021-09-28

## 2021-09-21 RX ADMIN — HYDROCODONE BITARTRATE AND ACETAMINOPHEN 1 TABLET: 5; 325 TABLET ORAL at 13:04

## 2021-09-21 RX ADMIN — HYDROCODONE BITARTRATE AND ACETAMINOPHEN 1 TABLET: 5; 325 TABLET ORAL at 04:06

## 2021-09-21 RX ADMIN — TAMSULOSIN HYDROCHLORIDE 0.4 MG: 0.4 CAPSULE ORAL at 08:47

## 2021-09-21 RX ADMIN — BUDESONIDE AND FORMOTEROL FUMARATE DIHYDRATE 2 PUFF: 160; 4.5 AEROSOL RESPIRATORY (INHALATION) at 08:53

## 2021-09-21 RX ADMIN — ALBUTEROL SULFATE 2.5 MG: 2.5 SOLUTION RESPIRATORY (INHALATION) at 08:53

## 2021-09-21 RX ADMIN — POLYETHYLENE GLYCOL 3350 17 G: 17 POWDER, FOR SOLUTION ORAL at 08:48

## 2021-09-21 RX ADMIN — DOCUSATE SODIUM 50 MG AND SENNOSIDES 8.6 MG 2 TABLET: 8.6; 5 TABLET, FILM COATED ORAL at 08:47

## 2021-09-21 RX ADMIN — CILOSTAZOL 100 MG: 100 TABLET ORAL at 08:51

## 2021-09-21 RX ADMIN — SERTRALINE 100 MG: 50 TABLET, FILM COATED ORAL at 08:47

## 2021-09-21 RX ADMIN — NITROGLYCERIN 0.5 INCH: 20 OINTMENT TOPICAL at 08:47

## 2021-09-21 RX ADMIN — GABAPENTIN 800 MG: 400 CAPSULE ORAL at 05:02

## 2021-09-21 RX ADMIN — CLOPIDOGREL BISULFATE 75 MG: 75 TABLET ORAL at 08:47

## 2021-09-21 RX ADMIN — PANTOPRAZOLE SODIUM 40 MG: 40 TABLET, DELAYED RELEASE ORAL at 08:47

## 2021-09-21 NOTE — PLAN OF CARE
Goal Outcome Evaluation:            Pt sleeping well through PM shift.  SBP slightly low (98) at midnight v/s check, otherwise VSS. RLE good pulses, warm, <3 sec cap refill. Incision intact, mild/trace edema to RLE. Wound vac clean, dry, intact.

## 2021-09-21 NOTE — SIGNIFICANT NOTE
09/21/21 1120 09/21/21 1125   Oxygen Therapy   SpO2 (!) 86 %  (at rest) 91 %  (at rest)   Device (Oxygen Therapy) room air nasal cannula   Flow (L/min)  --  2

## 2021-09-21 NOTE — DISCHARGE INSTRUCTIONS
Discharge Instructions: Discharge instructions include no heavy lifting anything greater than 10lbs for approximately 4 weeks.  No sex or driving for 2-4 weeks. Printed information given to the patient with advancement of activities weekly.  Risks and benefits of narcotic medications and weaning postoperatively have been discussed. Clean operative site with antibacterial soap/water, pat dry. Keep open to air unless draining, then may apply dry dressing.  No ointments or creams unless prescribed by provider. For signs and symptoms of infection including drainage from operative site, redness, swelling, with associated fever and/or chills notify [facility] immediately for wound check. If signs and symptoms of ischemia should occur including but not limited to pale/blue discoloration of limb, increasing pain with ambulation or at rest, or a non-healing wound the patient is to notify [facility] for immediate evaluation.

## 2021-09-21 NOTE — DISCHARGE PLACEMENT REQUEST
"Filomena Perales (71 y.o. Female)     Date of Birth Social Security Number Address Home Phone MRN    [id number]  [id number] [address] [phone] [id number]    [place] Marital Status          Cheondoism        Admission Date Admission Type Admitting Provider Attending Provider Department, Room/Bed    9/17/21 Elective David Suh MD [place], Thomas Mark, MD [address], [facility]/1    Discharge Date Discharge Disposition Discharge Destination         Home or Self Care              Attending Provider: David Suh MD    Allergies: Morphine And Related, Penicillins    Isolation: None   Infection: None   Code Status: CPR    Ht: 157.5 cm (62\")   Wt: 64.6 kg (142 lb 6.4 oz)    Admission Cmt: None   Principal Problem: PVD (peripheral vascular disease) (CMS/[facility]) [I73.9]                 Active Insurance as of 9/17/2021     Primary Coverage     Payor Plan Insurance Group Employer/Plan Group    [facility] MEDICARE REPLACEMENT [employer] MEDICARE REPLACEMENT      Payor Plan Address Payor Plan Phone Number Payor Plan Fax Number Effective Dates    [address] [address] [phone]  5/1/2021 - None Entered    [facility] [address]       Subscriber Name Subscriber Birth Date Member ID       FILOMENA PERALES 1950 [id number]                 Emergency Contacts      (Rel.) Home Phone Work Phone Mobile Phone    Yulisa Porter (Friend) [phone] -- [phone]    MARIA EUGENIA LONG (Sister) [phone] -- [phone]            Insurance Information                [facility]/[employer] MEDICARE REPLACEMENT Phone: [phone]    Subscriber: Filomena Perales Subscriber#: [id number]    Group#:  Precert#:           "

## 2021-09-21 NOTE — PROGRESS NOTES
"CTVS DAILY NOTE     LOS: 4 days   POD# 4  RIGHT Femoral-popliteal bypass    Patient Care Team:  Anahi Camacho APRN as PCP - General (Nurse Practitioner)  Kris Peres MD (Inactive) as Consulting Physician (Cardiology)    Chief Complaint: PVD    Subjective:  Symptoms:  Stable.    Diet:  Adequate intake.    Activity level: Impaired due to weakness.    Pain:  She complains of pain that is moderate.  Pain is well controlled and requiring pain medication.           Vital Signs  Temp:  [97 °F (36.1 °C)-98 °F (36.7 °C)] 97.5 °F (36.4 °C)  Heart Rate:  [] 83  Resp:  [16-18] 16  BP: ()/(50-77) 132/58  Body mass index is 26.05 kg/m².    Intake/Output Summary (Last 24 hours) at 9/21/2021 0859  Last data filed at 9/21/2021 0800  Gross per 24 hour   Intake 1720 ml   Output 350 ml   Net 1370 ml     I/O this shift:  In: 1240 [P.O.:1240]  Out: -     Wt Readings from Last 3 Encounters:   09/21/21 64.6 kg (142 lb 6.4 oz)   08/25/21 62.6 kg (138 lb)   07/26/21 61.1 kg (134 lb 9.6 oz)       Objective:  General Appearance:  Comfortable.    Vital signs: (most recent): Blood pressure 132/58, pulse 83, temperature 97.5 °F (36.4 °C), temperature source Temporal, resp. rate 16, height 157.5 cm (62\"), weight 64.6 kg (142 lb 6.4 oz), SpO2 (!) (P) 78 %, not currently breastfeeding.  Vital signs are normal.    Output: Producing urine (DC ruiz) and no stool output.    HEENT: Normal HEENT exam.    Lungs:  Normal effort and normal respiratory rate.  There are wheezes.  (O2 NC 2L )  Heart: Normal rate.  Regular rhythm.    Abdomen: Abdomen is soft.  Hypoactive bowel sounds.     Extremities: Decreased range of motion.    Pulses: Distal pulses are intact.  (Palpable)    Neurological: Patient is alert and oriented to person, place and time.    Skin:  Warm and dry.  ((R) Groin: Prevena Intact  (R) AK: CDI)            Results Review:      WBC No results found for: WBCS   HGB Hemoglobin   Date/Time Value Ref Range Status "   09/19/2021 0759 8.9 (L) 12.0 - 15.9 g/dL Final      HCT Hematocrit   Date/Time Value Ref Range Status   09/19/2021 0759 28.1 (L) 34.0 - 46.6 % Final      Platlets No results found for: LABPLAT     PT/INR:  No results found for: PROTIME/No results found for: INR    Sodium Sodium   Date/Time Value Ref Range Status   09/19/2021 1259 135 (L) 136 - 145 mmol/L Final      Potassium Potassium   Date/Time Value Ref Range Status   09/20/2021 0825 3.4 (L) 3.5 - 5.2 mmol/L Final     Comment:     Slight hemolysis detected by analyzer. Results may be affected.   09/19/2021 1259 4.1 3.5 - 5.2 mmol/L Final     Comment:     Slight hemolysis detected by analyzer. Results may be affected.      Chloride Chloride   Date/Time Value Ref Range Status   09/19/2021 1259 99 98 - 107 mmol/L Final      Bicarbonate No results found for: PLASMABICARB   BUN BUN   Date/Time Value Ref Range Status   09/19/2021 1259 8 8 - 23 mg/dL Final      Creatinine Creatinine   Date/Time Value Ref Range Status   09/19/2021 1259 0.75 0.57 - 1.00 mg/dL Final      Calcium Calcium   Date/Time Value Ref Range Status   09/19/2021 1259 8.1 (L) 8.6 - 10.5 mg/dL Final      Magnesium No results found for: MG     Imaging Results (Last 72 Hours)     Procedure Component Value Units Date/Time    CT Abdomen Pelvis Without Contrast [[id number]] Collected: 09/19/21 1034     Updated: 09/19/21 1805    Narrative:        CT Abdomen and Pelvis Without Contrast    History: Edema and swelling around surgical site. Recent femoral  popliteal bypass graft.    Axials spiral scans of the abdomen and pelvis were obtained  without contrast.  Coronal and sagital reconstructions were  preformed.      This exam was performed according to our departmental  dose-optimization program, which includes automated exposure  control, adjustment of the mA and/or kV according to patient size  and/or use of iterative reconstruction technique.    DLP: 383.90    Comparison: July 29, 2021    Findings:   Scans  of the lung bases demonstrate linear atelectasis and  dependent atelectasis.  Cardiomegaly.  Small pericardial effusion.  Coronary artery calcifications.    No acute osseous abnormality.  Degenerative changes and degenerative disc disease thoracic and  lumbar spine.  L4 vertebroplasty.  Old ununited right rib fractures.  Old fracture deformity left inferior pubic ramus.    The liver is unremarkable.  The gallbladder is unremarkable.  Splenic granulomata.  The pancreas is unremarkable.  1.9 cm right adrenal adenoma.  Minimal hypertrophy left gland.    Renal artery calcifications, greater on the left.  No renal or ureteral calculi.  No renal mass.  No hydronephrosis.    Amezquita catheter in urinary bladder with associated air in the  urinary bladder.  No pelvic mass.  Hysterectomy.    2.9 cm ectasia infrarenal abdominal aortic.  Dense atherosclerotic calcifications abdominal aorta and iliac  arteries.  Bilateral femoral popliteal bypass grafts.  Subacute edema in each lateral gluteal region and the anterior  aspect of each thigh.    No adenopathy.    No bowel obstruction.  Diverticulosis of the colon.  Some gaseous distention of the colon and air-fluid levels in the  colon suggesting colonic adynamic ileus.  No free air.  No free fluid.    No hernia.      Impression:      Conclusion:  Cardiomegaly.  Small pericardial effusion.  Coronary artery calcifications.  1.9 cm right adrenal adenoma.  Minimal hypertrophy left gland.  Renal artery calcifications, greater on the left.  Amezquita catheter in urinary bladder with associated air in the  urinary bladder.  Hysterectomy.  2.9 cm ectasia infrarenal abdominal aortic.  Dense atherosclerotic calcifications abdominal aorta and iliac  arteries.  Bilateral femoral popliteal bypass grafts.  Subacute edema in each lateral gluteal region and the anterior  aspect of each thigh.  Diverticulosis of the colon.  Some gaseous distention of the colon and air-fluid levels in the  colon  suggesting colonic adynamic ileus.  Degenerative changes and degenerative disc disease thoracic and  lumbar spine.  L4 vertebroplasty.  Old ununited right rib fractures.  Old fracture deformity left inferior pubic ramus.    [id number]    Electronically signed by:  Jason Marie MD  9/19/2021 6:04 PM CDT  Workstation: [id number]    US Arterial Doppler Lower Extremity Right [[id number]] Collected: 09/19/21 0611     Updated: 09/19/21 0919    Addenda:         ADDENDUM   ADDENDUM #1       Additional images were obtained of the patient's femoropopliteal  bypass graft.  The graft is patent.  Proximal anastomosis is unremarkable.  Distal anastomosis obscured by bandage.    Electronically signed by:  Jason Marie MD  9/19/2021 9:18 AM CDT  Workstation: [id number]    Signed: 09/19/21 0918 by Margareth Marie MD    Narrative:      Ultrasound duplex right lower extremity arteries    HISTORY: Swelling. Peripheral vascular disease.     FINDINGS:  Duplex ultrasound of the arteries of each lower extremity  performed. Doppler waveforms and velocities obtained.    No occlusion demonstrated.  Triphasic waveforms common femoral artery.  Monophasic waveforms remaining arteries.    Peak systolic velocities are as follows in cm/sec:          Right      CFA      93.5       Prof       63.7       Prox SFA    68.8       Mid SFA    36.5       Dist SFA    *       Prox pop    72.7     Dist pop    63.7       Prox erin    52.5       Mid erin    30.7       Dist erin    36.8       DPA      68.9       Prox PTA    94.7       Mid PTA    37.7       Dist PTA    59.5       Prox TESS    64.0       Mid TESS    46.4       Dist TESS    34.3         ^ Obscured by bandage.      Impression:      CONCLUSION:  As above.    [id number]    Electronically signed by:  Jason Marie MD  9/19/2021 7:37 AM CDT  Workstation: [id number]    US Venous Doppler Lower Extremity Right (duplex) [[id number]] Collected: 09/19/21 0611     Updated: 09/19/21 0727    Narrative:        Ultrasound venous  duplex right lower extremity    HISTORY: Edema . Peripheral vascular disease.    Duplex ultrasound of the deep venous system of the right lower  extremity was performed    FINDINGS:  Distal right femoral vein obscured by bandage at surgical site.  Real-time images of the remaining veins demonstrate normal  compressibility without evidence of intraluminal thrombus.  Doppler of the remaining veins shows phasic flow and  augmentation.  Color Doppler of the remaining veins also reveals venous patency.      Impression:      CONCLUSION:  No ultrasound evidence of deep venous thrombosis of the right  lower extremity.  Note is made that the distal right femoral vein is obscured by  bandage at surgical site.    [id number]    Electronically signed by:  Jason Marie MD  9/19/2021 7:26 AM CDT  Workstation: [id number]            albuterol, 2.5 mg, Nebulization, Q8H - RT  amitriptyline, 25 mg, Oral, Nightly  amLODIPine, 10 mg, Oral, Nightly  budesonide-formoterol, 2 puff, Inhalation, BID - RT  cilostazol, 100 mg, Oral, BID AC  clopidogrel, 75 mg, Oral, Daily  gabapentin, 800 mg, Oral, Q8H  nitroglycerin, 0.5 inch, Topical, Q12H  pantoprazole, 40 mg, Oral, Daily  polyethylene glycol, 17 g, Oral, Daily  pravastatin, 20 mg, Oral, Nightly  rivaroxaban, 20 mg, Oral, Daily With Dinner  sennosides-docusate, 2 tablet, Oral, BID  sertraline, 100 mg, Oral, Daily  tamsulosin, 0.4 mg, Oral, Daily  traZODone, 150 mg, Oral, Nightly             Patient Active Problem List   Diagnosis Code   • Anxiety F41.9   • CAD (coronary atherosclerotic disease) I25.10   • Carotid stenosis I65.29   • COPD (chronic obstructive pulmonary disease) (CMS/[facility]) J44.9   • COPD with exacerbation (CMS/[facility]) J44.1   • Depressive disorder, not elsewhere classified F32.9   • Benign essential hypertension I10   • Hypercholesteremia E78.00   • Insomnia G47.00   • Notalgia M54.9   • Osteoporosis M81.0   • Other screening mammogram Z12.31   • RUQ abdominal pain R10.11   • PVD  (peripheral vascular disease) with claudication (CMS/[facility]) I73.9   • Dizziness R42   • Nicotine abuse Z72.0   • Dysphagia R13.10   • Epigastric pain R10.13   • Pain of right hand M79.641   • Closed displaced fracture of shaft of fifth metacarpal bone of right hand S62.326A   • Cause of injury, fall W19.XXXA   • Displaced fracture of shaft of fifth metacarpal bone of right hand with routine healing S62.326D   • Fall W19.XXXA   • Syncope R55   • Acute respiratory failure with hypoxia (CMS/[facility]) J96.01   • (HFpEF) heart failure with preserved ejection fraction (CMS/[facility]) I50.30   • Hypotension I95.9   • Elevated WBCs D72.829   • Near syncope R55   • Atherosclerosis of native coronary artery of native heart without angina pectoris I25.10   • Atherosclerosis of native arteries of extremities with rest pain, left leg (CMS/[facility]) I70.222   • PVD (peripheral vascular disease) (CMS/[facility]) I73.9         Assessment & Plan     Stable Post Op RIGHT Fem-Pop AK: Improved.     Peripheral vascular disease status post femoral-popliteal bypass           -Medical Management: Plavix, Statin, Pletal.  Xarelto    Hypertension   -controlled    COPD on home O2 2 L              -Wean O2 down to 2 L as tolerated maintaining greater than 92%              -Increase ambulation PT OT    Bilateral carotid artery stenosis   -asymptomatic    Abdominal pain etiology is uncertain   -Resolved   -bowel regimen    Pain Control: Norco PRN    Disp: Increase activity. DC planning             This document has been electronically signed by DELFINO Garcia on September 21, 2021 08:59 CDT

## 2021-09-21 NOTE — SIGNIFICANT NOTE
RT arrived to room pt had o2 off. Pt in no distress but sats 78% on RA. Placed pt back on 2.5L nc and pt recovered and was 92% prior to starting breathing tx.

## 2021-09-21 NOTE — DISCHARGE SUMMARY
CVTS DISCHARGE SUMMARY  Date of Admission: 9/17/2021  8:58 AM  Date of Discharge:  9/21/2021    Admission Diagnosis:   PVD (peripheral vascular disease) (CMS/[facility]) [I73.9]    Discharge Diagnosis:   Post-Op Diagnosis Codes:     * PVD (peripheral vascular disease) (CMS/[facility]) [I73.9]  Active Hospital Problems    Diagnosis  POA   • **PVD (peripheral vascular disease) (CMS/[facility]) [I73.9]  Yes   • COPD (chronic obstructive pulmonary disease) (CMS/[facility]) [J44.9]  Yes   • Hypercholesteremia [E78.00]  Yes   • Benign essential hypertension [I10]  Yes      Resolved Hospital Problems   No resolved problems to display.       Consults:   Consults     No orders found from 8/19/2021 to 9/18/2021.          Procedures Performed  Procedure(s):  RIGHT common femoral endarterectomy FEMORAL POPLITEAL BYPASS (above the knee polytetrafluoroethylene  lower extremity arteriogram              (c-arm#2 and c-arm table)       Discharge Medications     Discharge Medications      New Medications      Instructions Start Date   HYDROcodone-acetaminophen 5-325 MG per tablet  Commonly known as: NORCO   1 tablet, Oral, Every 6 Hours PRN         Continue These Medications      Instructions Start Date   albuterol (2.5 MG/3ML) 0.083% nebulizer solution  Commonly known as: PROVENTIL   2.5 mg, Nebulization, Every 4 Hours PRN      ALBUTEROL SULFATE HFA IN   2 puffs, Inhalation, Every 4 Hours PRN      amitriptyline 25 MG tablet  Commonly known as: ELAVIL   25 mg, Oral, Nightly      amLODIPine 10 MG tablet  Commonly known as: NORVASC   10 mg, Oral, Nightly      budesonide-formoterol 160-4.5 MCG/ACT inhaler  Commonly known as: SYMBICORT   2 puffs, Inhalation, 2 Times Daily PRN      cilostazol 100 MG tablet  Commonly known as: PLETAL   100 mg, Oral, 2 Times Daily Before Meals      clopidogrel 75 MG tablet  Commonly known as: PLAVIX   75 mg, Oral, Daily      Denavir 1 % cream  Generic drug: penciclovir   1 application, Topical, As Needed      furosemide 20 MG  tablet  Commonly known as: LASIX   20 mg, Oral, Daily      gabapentin 800 MG tablet  Commonly known as: NEURONTIN   800 mg, Oral, 3 Times Daily      ondansetron 4 MG tablet  Commonly known as: ZOFRAN   4 mg, Oral, Every 8 Hours PRN      pantoprazole 40 MG EC tablet  Commonly known as: Protonix   40 mg, Oral, Daily      pravastatin 20 MG tablet  Commonly known as: PRAVACHOL   20 mg, Oral, Nightly      rivaroxaban 20 MG tablet  Commonly known as: Xarelto   20 mg, Oral, Daily With Dinner      sertraline 100 MG tablet  Commonly known as: ZOLOFT   100 mg, Oral, Daily      traZODone 150 MG tablet  Commonly known as: DESYREL   150 mg, Oral, Nightly      vitamin D 1.25 MG (33881 UT) capsule capsule  Commonly known as: ERGOCALCIFEROL   50,000 Units, Oral, Weekly         Stop These Medications    traMADol 50 MG tablet  Commonly known as: Ultram          Medical Management: Xarelto, Plavix, Statin Reviewed risks, benefits, and habit forming potential and weaning from narcotic medication. Patient understands and wishes to receive prescription.  Prescription written for Norco for post-surgical pain after Jhonny placed on file.    Discharge Diet:   Diet Instructions     Diet: Cardiac      Discharge Diet: Cardiac          Discharge Disposition: Home in stable condition with follow up appointments with CVTS Nurse Practitioner 1 week, Dr. Suh 2 weeks, Cardiology/PCP as scheduled.     History of Present Illness/Hospital Course:   70 y.o. female with HTN(stable, increased risk stroke, rupture), Hyperlipidemia(stable, increased risk cardiovascular events), Smoker(uncontrolled, increased risk cardiovascular events), COPD(stable, increased risk pulmonary complications) and Atrial fibrillation(stable, increased risk stroke)  Anticoagulation (Xarelto, stable) Hx PE (stable). PVD (LSCA stent 2005, B iliac stents 2008) lost to follow up.  former smoker and Recent Cessation.  Increasing pain LEFT foot, lifestyle limiting. Difficulty  ambulating. VAS 10. No TIA stroke amaurosis.  No MI claudication. No other associated signs, symptoms or modifying factors. Pharmacy discontinued Xarelto (5/2020). Worsening LEFT lower extremity pain similar to pre-surgery approx 1 month. Underwent additional angio procedure. Improved. Confused on medications, off Plavix x 2 months. Now with worsening LEFT lower extremity pain.  Returns for follow up.       2005: LSCA Stent  2008: Bilateral Iliac stents  3/23/2020:  CHELSIE: RIGHT 0.83 Triphasic  LEFT 0.31 Biphasic/Monophasic (DP/PT)  3/26/2020: CTA Runoff: Occlusion of the proximal left superficial femoral artery with  reconstitution of the distal left SFA. The popliteal artery  appears occluded distally, with reconstitution of the arteries of  the trifurcation. The left calf vessels are patent, as above.Patent left common iliac and proximal left external iliac stents. There appears to be mild luminal narrowing within the  left external iliac stent. Stenosis of the origins of the superior mesenteric artery and  bilateral renal arteries as above. Irregular narrowing of the right superficial femoral artery,  as described above without occlusion. The right popliteal artery  and the right calf arteries are patent, as above.  4/14/2020: LEFT Femoral-popliteal Bypass PTFE  5/22/2020:  CHELSIE: RIGHT 1.1 Triphasic LEFT 0.79 Tri/Biphasic (PT/DP)   Patent LEFT graft. mPSV 237cm/s (native) Triphasic  8/25/2020:  CHELSIE: RIGHT 0.67 Tri/Biphasic (DP/PT) LEFT 0.52 Tri/Biphasic (PT/DP). Patent LEFT Fem-Pop graft. Native prx (333cm/s) Triphasic.   9/9/2020: LEFT/RIGHT OBDULIA PTA  9/22/2020:  CHELSIE: RIGHT 1.2 Triphasic LEFT 0.81 Biphasic  1/29/2021: CHELSIE: RIGHT 0.92 Tripahsic LEFT 0.75 Tri/Biphasic (PT/DP) Patent PTFE graft LEFT native [id number].   7/26/21: CHELSIE: RIGHT 0.62 Biphasic LEFT 0.85 Tri/Biphasic (PT/DP)     Adequate preop studies were completed and the patient was scheduled electively. Operative day Mona Thomas Angella admitted through  "SDS, taken to operating suite and placed under general anesthesia.  Underwent said procedure without complications or difficulty. The patient was weaned easily from mechanical ventilation and extubated in the recovery room placed on oxygen per nasal cannula and further transferred to CCU for further care and monitoring. Aggressive diuresis and high flow O2 required post op. Successful O2 weaning to home nasal cannula requirements.  Mona Perales remained hemodynamically and neurovascularly stable immediately postop.  POD1 the patient was out of the bed to the chair tolerating breakfast pain controlled with Nroco and stable for transfer to 3W telemetry.  Mona Carnesilt continued with PT/OT progressing well.  Operative incisions clean, dry, intact. Neurovascular status remained stable for discharge home on POD 4.     Vital Signs  Temp:  [97 °F (36.1 °C)-98 °F (36.7 °C)] 97.5 °F (36.4 °C)  Heart Rate:  [] 85  Resp:  [16-18] 18  BP: ()/(50-77) 132/58      09/20/21  0500 09/20/21  1546 09/21/21  0400   Weight: 72.1 kg (158 lb 15.2 oz) 64.6 kg (142 lb 6 oz) 64.6 kg (142 lb 6.4 oz)       Pertinent Test Results:  Lab Results   Component Value Date    WBC 15.95 (H) 09/19/2021    HGB 8.9 (L) 09/19/2021    HCT 28.1 (L) 09/19/2021    MCV 90.1 09/19/2021     09/19/2021     Lab Results   Component Value Date    GLUCOSE 94 09/19/2021    BUN 8 09/19/2021    CREATININE 0.75 09/19/2021    EGFRIFNONA 77 09/19/2021    BCR 10.7 09/19/2021    K 3.4 (L) 09/20/2021    CO2 23.0 09/19/2021    CALCIUM 8.1 (L) 09/19/2021    ALBUMIN 3.60 09/19/2021    AST 28 09/19/2021    ALT 10 09/19/2021       Physical Exam:  Physical Exam   General Appearance:  Comfortable.    Vital signs: (most recent): Blood pressure 132/58, pulse 83, temperature 97.5 °F (36.4 °C), temperature source Temporal, resp. rate 16, height 157.5 cm (62\"), weight 64.6 kg (142 lb 6.4 oz), SpO2 (!) (P) 78 %, not currently breastfeeding.  Vital signs are " normal.    Output: Producing urine (DC ruiz) and no stool output.    HEENT: Normal HEENT exam.    Lungs:  Normal effort and normal respiratory rate.  There are wheezes.  (O2 NC 2L )  Heart: Normal rate.  Regular rhythm.    Abdomen: Abdomen is soft.  Hypoactive bowel sounds.     Extremities: Decreased range of motion.    Pulses: Distal pulses are intact.  (Palpable)    Neurological: Patient is alert and oriented to person, place and time.    Skin:  Warm and dry.  ((R) Groin: Prevena Intact  (R) AK: CDI)    Additional Instructions for the Follow-ups that You Need to Schedule     Ambulatory Referral to Home Health   As directed      Face to Face Visit Date: 9/21/2021    Follow-up provider for Plan of Care?: I will be treating the patient on an ongoing basis.  Please send me the Plan of Care for signature.    Follow-up provider: SEBASTIAN REYEZ [[id number]]    Reason/Clinical Findings: post op fem-pop/deconditioning    Describe mobility limitations that make leaving home difficult: surgical restrictions    Nursing/Therapeutic Services Requested: Skilled Nursing Physical Therapy Occupational Therapy    Skilled nursing orders: Neurovascular assessments Cardiopulmonary assessments    PT orders: Strengthening    Occupational orders: Activities of daily living Strengthening    Frequency: 1 Week 1         Discharge Follow-up with PCP   As directed       Currently Documented PCP:    Anahi Camacho APRN    PCP Phone Number:    [phone]     Follow Up Details: 2 weeks         Discharge Follow-up with Specialty: Vascular Surgery; 1 Week   As directed      Specialty: Vascular Surgery    Follow Up: 1 Week    Follow Up Details: Dong SALEH as scheduled               Discharge Instructions: Discharge instructions include no heavy lifting anything greater than 10lbs for approximately 4 weeks.  No sex or driving for 2-4 weeks. Printed information given to the patient with advancement of activities weekly.  Risks and  benefits of narcotic medications and weaning postoperatively have been discussed. Clean operative site with antibacterial soap/water, pat dry. Keep open to air unless draining, then may apply dry dressing.  No ointments or creams unless prescribed by provider. For signs and symptoms of infection including drainage from operative site, redness, swelling, with associated fever and/or chills notify [facility] immediately for wound check. If signs and symptoms of ischemia should occur including but not limited to pale/blue discoloration of limb, increasing pain with ambulation or at rest, or a non-healing wound the patient is to notify [facility] for immediate evaluation. Patient verbalizes understanding of discharge instructions, all questions are answered, follow-up appointments have been made. The patient is discharged home in stable condition.         Follow-up Appointments  Future Appointments   Date Time Provider Department Center   9/27/2021  9:00 AM Sarthak Luz APRN [facility] None       Test Results Pending at Discharge        Time: Discharge 60 min           This document has been electronically signed by DELFINO Garcia on September 21, 2021 09:07 CDT

## 2021-09-21 NOTE — DISCHARGE PLACEMENT REQUEST
"Filomena Beltran (71 y.o. Female)     Date of Birth Social Security Number Address Home Phone MRN    [id number]  [id number] [address] [phone] [id number]    Restorationist Marital Status          Muslim        Admission Date Admission Type Admitting Provider Attending Provider Department, Room/Bed    9/17/21 Elective David Suh MD [place], Thomas Mark, MD [address], [facility]ly [facility], [facility]/1    Discharge Date Discharge Disposition Discharge Destination         Home or Self Care              Attending Provider: David Suh MD    Allergies: Morphine And Related, Penicillins    Isolation: None   Infection: None   Code Status: CPR    Ht: 157.5 cm (62\")   Wt: 64.6 kg (142 lb 6.4 oz)    Admission Cmt: None   Principal Problem: PVD (peripheral vascular disease) (CMS/[facility]) [I73.9]                 Active Insurance as of 9/17/2021     Primary Coverage     Payor Plan Insurance Group Employer/Plan Group    [facility] MEDICARE REPLACEMENT [employer] MEDICARE REPLACEMENT      Payor Plan Address Payor Plan Phone Number Payor Plan Fax Number Effective Dates    [address] [address] [phone]  5/1/2021 - None Entered    [facility] [address]       Subscriber Name Subscriber Birth Date Member ID       FILOMENA BELTRAN [id number]                 Emergency Contacts      (Rel.) Home Phone Work Phone Mobile Phone    Yulisa Porter (Friend) [phone] -- [phone]    MERCEDESMARIA EUGENIA (Sister) [phone] -- [phone]            Insurance Information                [facility] MEDICARE REPLACEMENT/[employer]LL[employer] MEDICARE REPLACEMENT Phone: [phone]    Subscriber: Filomena Beltran Subscriber#: [id number]    Group#:  Precert#:              History & Physical      David Suh MD at 09/17/21 0910          Filomena Beltran is a 70 y.o. female is here today for follow-up.     Chief Complaint:        Chief Complaint "   Patient presents with   • Peripheral Vascular Disease         The following portions of the patient's history were reviewed and updated as appropriate: allergies, current medications, past family history, past medical history, past social history, past surgical history and problem list.  Recent images independently reviewed.  Available laboratory values reviewed.     PCP:  Lisa Alejandro APRN  Cardiology:  Kimmy      70 y.o. female with HTN(stable, increased risk stroke, rupture), Hyperlipidemia(stable, increased risk cardiovascular events), Smoker(uncontrolled, increased risk cardiovascular events), COPD(stable, increased risk pulmonary complications) and Atrial fibrillation(stable, increased risk stroke)  Anticoagulation (Xarelto, stable) Hx PE (stable). PVD (LSCA stent 2005, B iliac stents 2008) lost to follow up.  former smoker and Recent Cessation.  Increasing pain LEFT foot, lifestyle limiting. Difficulty ambulating. VAS 10. No TIA stroke amaurosis.  No MI claudication. No other associated signs, symptoms or modifying factors. Pharmacy discontinued Xarelto (5/2020). Worsening LEFT lower extremity pain similar to pre-surgery approx 1 month. Underwent additional angio procedure. Improved. Confused on medications, off Plavix x 2 months. Now with worsening LEFT lower extremity pain.  Returns for follow up.       2005: LSCA Stent  2008: Bilateral Iliac stents  3/23/2020:  CHELSIE: RIGHT 0.83 Triphasic  LEFT 0.31 Biphasic/Monophasic (DP/PT)  3/26/2020: CTA Runoff: Occlusion of the proximal left superficial femoral artery with  reconstitution of the distal left SFA. The popliteal artery  appears occluded distally, with reconstitution of the arteries of  the trifurcation. The left calf vessels are patent, as above.Patent left common iliac and proximal left external iliac stents. There appears to be mild luminal narrowing within the  left external iliac stent. Stenosis of the origins of the superior mesenteric artery  and  bilateral renal arteries as above. Irregular narrowing of the right superficial femoral artery,  as described above without occlusion. The right popliteal artery  and the right calf arteries are patent, as above.  4/14/2020: LEFT Femoral-popliteal Bypass PTFE  5/22/2020:  CHELSIE: RIGHT 1.1 Triphasic LEFT 0.79 Tri/Biphasic (PT/DP)   Patent LEFT graft. mPSV 237cm/s (native) Triphasic  8/25/2020:  CHELSIE: RIGHT 0.67 Tri/Biphasic (DP/PT) LEFT 0.52 Tri/Biphasic (PT/DP). Patent LEFT Fem-Pop graft. Native prx (333cm/s) Triphasic.   9/9/2020: LEFT/RIGHT OBDULIA PTA  9/22/2020:  CHELSIE: RIGHT 1.2 Triphasic LEFT 0.81 Biphasic  1/29/2021: CHELSIE: RIGHT 0.92 Tripahsic LEFT 0.75 Tri/Biphasic (PT/DP) Patent PTFE graft LEFT native [id number].   7/26/21: CHELSIE: RIGHT 0.62 Biphasic LEFT 0.85 Tri/Biphasic (PT/DP)      2016: Carotid Duplex: BABS 0-49% LICA 0-49%  2017: Carotid duplex: BABS 0-49% LICA 0-49%  2018: Carotid duplex: BABS 0-49% LICA 0-49%        Medical History        Past Medical History:   Diagnosis Date   • A-fib (CMS/[facility])     • Anxiety     • Arthritis     • Asthma     • 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 with ulceration (CMS/[facility])       bilateral legs - bilateral iliac stents 2008      • CHF (congestive heart failure) (CMS/[facility])     • Chronic lower back pain     • COPD (chronic obstructive pulmonary disease) (CMS/[facility])     • Essential (primary) hypertension     • GERD (gastroesophageal reflux disease)     • History of pulmonary embolus (PE)     • Hyperlipidemia     • Insomnia     • Lumbago     • Nicotine dependence     • Occlusion of artery        and stenosis of bilateral carotid arteries - BABS 16-49%, LICA 0-15%   • Other atherosclerosis of native artery of other extremity       LEFT subclavian stent 2005 (occluded)   • Sleep apnea           Surgical History         Past Surgical History:   Procedure Laterality Date   • CARDIAC CATHETERIZATION   04/06/2016     No evidence of any obstructive epicardial  CAD.Preserved LV systolic function with EF of 55%.   • CENTRAL VENOUS LINE INSERTION   04/07/2016     Successful placement of right uppe extremity 6-Lithuanian triple lumen PICC line.   • ENDOSCOPY N/A 1/12/2018     Procedure: ESOPHAGOGASTRODUODENOSCOPY;  Surgeon: Bin Martin MD;  Location: [facility] ENDOSCOPY;  Service:    • ENDOSCOPY N/A 1/17/2018     Procedure: ESOPHAGOGASTRODUODENOSCOPY;  Surgeon: Bin Martin MD;  Location: [facility] ENDOSCOPY;  Service:    • ENDOSCOPY N/A 3/16/2018     Procedure: ESOPHAGOGASTRODUODENOSCOPY possible dilation;  Surgeon: Bin Martin MD;  Location: [facility] ENDOSCOPY;  Service: Gastroenterology   • FEMORAL POPLITEAL BYPASS Left 4/14/2020     Procedure: FEMORAL POPLITEAL BYPASS ABOVE KNEE  (POLYTETRAFLUOROETHYLENE)  LEFT COMMON FEMORAL ARTERY ENDARTERECTOMY PTA LEFT ILIAC ARTERIOGRAM        (c-arm#2 and c-arm table);  Surgeon: David Suh MD;  Location: [facility] OR;  Service: Vascular;  Laterality: Left;   • HYSTERECTOMY       • INTERVENTIONAL RADIOLOGY PROCEDURE Left 9/9/2020     Procedure: IR angiogram extremity left;  Surgeon: David Suh MD;  Location: [facility] ANGIO INVASIVE LOCATION;  Service: Interventional Radiology;  Laterality: Left;   • KYPHOPLASTY N/A 5/23/2019     Procedure: KYPHOPLASTY LUMBAR FOUR;  Surgeon: Aba Santa MD;  Location: [facility] OR;  Service: Orthopedic Spine   • TOTAL SHOULDER REPLACEMENT Left     • TRANSESOPHAGEAL ECHOCARDIOGRAM (JACQUES)   04/07/2016     With color flow-Mild to moderate [facility].LV systolic function well preserved with Ef of 55-60%.Mitral and AV intact.Diastolic dysfunction   • UPPER GASTROINTESTINAL ENDOSCOPY   01/17/2018     Dr. Bin Martin M.D.               Family History   Problem Relation Age of Onset   • Heart disease Other     • Hypertension Other        Social History   Social History            Socioeconomic History   • Marital status:        Spouse name: Not on file   • Number of  children: Not on file   • Years of education: Not on file   • Highest education level: Not on file   Tobacco Use   • Smoking status: Former Smoker       Years: 50.00       Types: Cigarettes       Quit date: 2019       Years since quittin.4   • Smokeless tobacco: Never Used   Substance and Sexual Activity   • Alcohol use: No   • Drug use: No   • Sexual activity: Defer               ALLERGIES:   Morphine and related and Penicillins     MEDICATIONS:        Current Outpatient Medications:   •  albuterol (PROVENTIL) (2.5 MG/3ML) 0.083% nebulizer solution, Take 2.5 mg by nebulization Every 4 (Four) Hours As Needed for Wheezing., Disp: , Rfl:   •  ALBUTEROL SULFATE HFA IN, Inhale 2 puffs Every 4 (Four) Hours., Disp: , Rfl:   •  amitriptyline (ELAVIL) 25 MG tablet, TAKE 1 TABLET (25 MG TOTAL) BY MOUTH NIGHTLY., Disp: , Rfl:   •  atorvastatin (LIPITOR) 20 MG tablet, Take 1 tablet by mouth Every Night., Disp: 30 tablet, Rfl: 11  •  cilostazol (PLETAL) 100 MG tablet, Take 1 tablet by mouth 2 (Two) Times a Day Before Meals., Disp: 60 tablet, Rfl: 11  •  cyclobenzaprine (FLEXERIL) 5 MG tablet, Take 1 tablet by mouth 3 (Three) Times a Day As Needed for Muscle Spasms., Disp: 90 tablet, Rfl: 2  •  furosemide (LASIX) 20 MG tablet, Take 1 tablet by mouth Daily., Disp: 30 tablet, Rfl: 1  •  gabapentin (NEURONTIN) 800 MG tablet, Take 800 mg by mouth 2 (Two) Times a Day., Disp: , Rfl:   •  pantoprazole (PROTONIX) 40 MG EC tablet, Take 1 tablet by mouth Daily., Disp: 30 tablet, Rfl: 5  •  penciclovir (DENAVIR) 1 % cream, Apply 1 application topically to the appropriate area as directed As Needed (fever blisters)., Disp: , Rfl:   •  rivaroxaban (Xarelto) 20 MG tablet, Take 1 tablet by mouth Daily With Dinner., Disp: 30 tablet, Rfl: 6  •  sertraline (ZOLOFT) 100 MG tablet, Take 100 mg by mouth Daily., Disp: , Rfl:   •  traMADol (Ultram) 50 MG tablet, Take 1 tablet by mouth Every 6 (Six) Hours As Needed for Moderate Pain ., Disp:  "40 tablet, Rfl: 0  •  traZODone (DESYREL) 150 MG tablet, Take 1 tablet by mouth Every Night., Disp: , Rfl:   •  clopidogrel (PLAVIX) 75 MG tablet, Take 1 tablet by mouth Daily., Disp: 30 tablet, Rfl: 11  •  docusate sodium 100 MG capsule, Take 100 mg by mouth 2 (Two) Times a Day. (Patient taking differently: Take 100 mg by mouth Daily As Needed.), Disp: , Rfl:      Review of Systems   Constitutional: Positive for malaise/fatigue.   HENT: Positive for nosebleeds.    Eyes: Negative for visual disturbance.   Cardiovascular: Positive for claudication and near-syncope. Negative for cyanosis and leg swelling.   Respiratory: Negative for hemoptysis and shortness of breath.    Hematologic/Lymphatic: Negative for bleeding problem. Does not bruise/bleed easily.   Skin: Positive for color change. Negative for nail changes.   Musculoskeletal: Positive for back pain. Negative for muscle weakness.   Gastrointestinal: Negative for dysphagia, hematemesis and melena.   Genitourinary: Negative for hematuria.   Neurological: Positive for dizziness and loss of balance. Negative for focal weakness, light-headedness, numbness, paresthesias and weakness.   Psychiatric/Behavioral: Negative for altered mental status.   All other systems reviewed and are negative.              Objective      Vitals       Vitals:     07/26/21 1028   BP: 118/76   BP Location: Right arm   Pulse: 64   Temp: 98.2 °F (36.8 °C)   TempSrc: Infrared   SpO2: 93%   Weight: 61.1 kg (134 lb 9.6 oz)   Height: 157.5 cm (62\")         Body mass index is 24.62 kg/m².  Physical Exam   Constitutional: She is oriented to person, place, and time.   HENT:   Head: Atraumatic.   Neck: Carotid bruit is not present.   Cardiovascular: Normal heart sounds.   Pulses:       Dorsalis pedis pulses are 1+ on the right side and 1+ on the left side.        Posterior tibial pulses are 1+ on the right side and 1+ on the left side.   Pulmonary/Chest: Effort normal and breath sounds normal. "   Abdominal: Soft. Bowel sounds are normal.   Neurological: She is alert and oriented to person, place, and time. Gait (WC) abnormal.   Skin: Skin is warm. Capillary refill takes 2 to 3 seconds.   Psychiatric: Thought content normal.   Vitals reviewed.              Assessment & Plan      Independent Review of Radiographic Studies:  Detailed discussion regarding risks, benefits, and treatment plan. Images independently reviewed. Patient understands, agrees, and wishes to proceed with plan.      1. Peripheral vascular disease (CMS/HCC)  moderate-severe reduction Arterial Flow right Lower Extremity worsened   Moderate reduction arterial flow left lower extremity  Detailed discussion with Mona Perales regarding situation, options and plans. ischemic rest pain.  RIGHT femoral to popliteal artery bypass and common femoral endarterectomy is advisable.      Risks including but not limited to Mortality, Major Morbidity 2-3%, bleeding, transfusion, infection, pulmonary (prolonged mechanical ventilation, tracheostomy), renal dysfunction (dialysis), blood vessel, nerve injury.  Benefits:  relief of symptoms, reduction in vascular events, limb loss and hospitalization.  Options:  peripheral angioplasty/stent, medical therapy, amputation discussed.  Understands and wishes to proceed.    RIGHT femoral to popliteal artery bypass and common femoral endarterectomy. (above knee and PTFE). Lower extremity arteriogram.  GEN.  SDS.  9/17/2021    2. Atherosclerosis of native arteries of extremities with rest pain, left leg (CMS/HCC)  - clopidogrel (PLAVIX) 75 MG daily     3. Benign essential hypertension  Controlled.      4. Chronic obstructive pulmonary disease, unspecified COPD type (CMS/HCC)  Controlled.      5. Bilateral carotid artery stenosis  If you should experience any neurological symptoms including but not limited to visual or speech disturbances confusion, seizures, or weakness of limbs of one side of your body notify  [facility] center immediately for evaluation or if after hours present to the nearest Emergency Department.       Samples of Xarelto 20mg daily #12 were given to the patient.     Quantity 12 boxes  Lot # [id number]  Exp Date:  2/23     Notify provider if you experience excessive bleeding from the nose, cuts, gums, rectum, urinary tract, or vagina. Reddish or brown urine or stool. Vomiting of blood or hemorrhoidal bleeding. If major injury occurs present to the Emergency Department.       Patient's Body mass index is 24.62 kg/m². indicating that she is within normal range (BMI 18.5-24.9). No BMI management plan needed..         Advance Care Planning discussed and  Informational packet given to patient. Advance Care Plan on file: No             This document has been electronically signed by David Suh MD on September 17, 2021 09:12 CDT        Electronically signed by David Suh MD at 09/17/21 0912     09/21/21 1120 09/21/21 1125   Oxygen Therapy   SpO2 (!) 86 %  (at rest) 91 %  (at rest)   Device (Oxygen Therapy) room air nasal cannula   Flow (L/min)  --  2         Oxygen Therapy [EQ60] (Order [id number])  Order  Date: 9/21/2021 Department: [address] Ordering/Authorizing: Jacki Polanco APRN   Order History  Outpatient  Date/Time Action Taken User Additional Information   09/21/21 1145 Sign Jacki Polanco APRN    Order Details    Frequency Duration Priority Order Class   None None Routine External   Start Date/Time    Start Date   09/21/21   Order Information    Order Date Service Start Date Start Time   09/21/21 Cardiothoracic Surgery 09/21/21    Reference Links    Associated Reports   View Encounter   Order Questions    Question Answer Comment   Delivery Modality Nasal Cannula    Liters Per Minute: 2    Duration: Continuous    Equipment  Oxygen Concentrator &  &  Portable Gaseous Oxygen System & Portable Oxygen Contents  Gaseous &  Conserving Regulator    Length of Need (99 Months = Lifetime) 99 Months = Lifetime    Note:  Custom values to enter length of need for DME          Source Order Set / Preference List    Preference List   [facility] FACILITY PROCEDURES [[id number]]   Clinical Indications     ICD-10-CM ICD-9-CM   COPD with exacerbation (CMS/[facility])     J44.1 491.21   Reprint Order Requisition    Oxygen Therapy (Order #[id number]) on 9/21/21   Encounter    View Encounter          Order Provider Info        Office phone Pager E-mail   Ordering User Jacki Polanco APRN [phone] -- --   Authorizing Provider Jacki Polanco APRN [phone] -- --   Attending Provider David Suh [phone] -- --   Linked Charges    No charges linked to this procedure   Tracking Reports  Cosign Tracking Order Transmittal Tracking   Authorized by:  DELFINO Garcia  (NPI: [id number])       Lab Component SmartPhrase Guide    Oxygen Therapy (Order #[id number]) on 9/21/21

## 2021-09-22 ENCOUNTER — HOME CARE VISIT (OUTPATIENT)
Dept: HOME HEALTH SERVICES | Facility: CLINIC | Age: 71
End: 2021-09-22

## 2021-09-22 NOTE — OUTREACH NOTE
Prep Survey      Responses   [facility] patient discharged from?  [place]   Is LACE score < 7 ?  No   Emergency Room discharge w/ pulse ox?  No   Eligibility  Readm Mgmt   Discharge diagnosis  PVD (peripheral vascular disease)    Does the patient have one of the following disease processes/diagnoses(primary or secondary)?  Other   Does the patient have Home health ordered?  Yes   What is the Home health agency?   [facility]   Is there a DME ordered?  No   Comments regarding appointments  Appts needed   General alerts for this patient  COPD and HTN   Prep survey completed?  Yes          Elnei Pryor RN

## 2021-09-22 NOTE — CASE COMMUNICATION
PATIENT REFUSED ADMISSION TO HOME CARE. HER FRIEND REPORTS THEY HAVE 6 DOGS IN THE HOME SHE CANT DO REHAB, SHE CANT HAVE NURSING COME IN AND ONE DOG IS A PIT BULL AND THEY DONT WANT ANYONE IN THE HOME. SHE REPORTS THEY ARE SEEING THE DOCTOR AND SHE HAS TAKEN CARE OF HER BROTHER AND KNOWS WHAT TO DO.

## 2021-09-22 NOTE — PAYOR COMM NOTE
"Amairani Kaminski   [facility]  phone [phone]  fax [phone]    Auth# [id number]    Mona Perales (71 y.o. Female)     Date of Birth Social Security Number Address Home Phone MRN    1950  [id number] [address] [phone] [id number]    Catholic Marital Status          Synagogue        Admission Date Admission Type Admitting Provider Attending Provider Department, Room/Bed    9/17/21 Elective David Suh MD  [address], 334/1    Discharge Date Discharge Disposition Discharge Destination        9/21/2021 Home or Self Care              Attending Provider: (none)   Allergies: Morphine And Related, Penicillins    Isolation: None   Infection: None   Code Status: Prior    Ht: 157.5 cm (62\")   Wt: 64.6 kg (142 lb 6.4 oz)    Admission Cmt: None   Principal Problem: PVD (peripheral vascular disease) (HCC) [I73.9]                 Active Insurance as of 9/17/2021     Primary Coverage     Payor Plan Insurance Group Employer/Plan Group    [facility] MEDICARE REPLACEMENT [employer] MEDICARE REPLACEMENT      Payor Plan Address Payor Plan Phone Number Payor Plan Fax Number Effective Dates    [address] [address] [phone]  5/1/2021 - None Entered    [facility] [id number]       Subscriber Name Subscriber Birth Date Member ID       MONA PERALES 1950 [id number]                 Emergency Contacts      (Rel.) Home Phone Work Phone Mobile Phone    Yulisa Porter (Friend) [phone] -- [phone]    MERCEDES,MARIA EUGENIA (Sister) [phone] -- [phone]               Discharge Summary      Jacki Polanco APRN at 09/21/21 0907     Attestation signed by David Suh MD at 09/21/21 0916    I have reviewed this documentation and agree.                    CVTS DISCHARGE SUMMARY  Date of Admission: 9/17/2021  8:58 AM  Date of Discharge:  9/21/2021    Admission Diagnosis:   PVD (peripheral " vascular disease) (CMS/[facility]) [I73.9]    Discharge Diagnosis:   Post-Op Diagnosis Codes:     * PVD (peripheral vascular disease) (CMS/[facility]) [I73.9]  Active Hospital Problems    Diagnosis  POA   • **PVD (peripheral vascular disease) (CMS/[facility]) [I73.9]  Yes   • COPD (chronic obstructive pulmonary disease) (CMS/[facility]) [J44.9]  Yes   • Hypercholesteremia [E78.00]  Yes   • Benign essential hypertension [I10]  Yes      Resolved Hospital Problems   No resolved problems to display.       Consults:   Consults     No orders found from 8/19/2021 to 9/18/2021.          Procedures Performed  Procedure(s):  RIGHT common femoral endarterectomy FEMORAL POPLITEAL BYPASS (above the knee polytetrafluoroethylene  lower extremity arteriogram              (c-arm#2 and c-arm table)       Discharge Medications     Discharge Medications      New Medications      Instructions Start Date   HYDROcodone-acetaminophen 5-325 MG per tablet  Commonly known as: NORCO   1 tablet, Oral, Every 6 Hours PRN         Continue These Medications      Instructions Start Date   albuterol (2.5 MG/3ML) 0.083% nebulizer solution  Commonly known as: PROVENTIL   2.5 mg, Nebulization, Every 4 Hours PRN      ALBUTEROL SULFATE HFA IN   2 puffs, Inhalation, Every 4 Hours PRN      amitriptyline 25 MG tablet  Commonly known as: ELAVIL   25 mg, Oral, Nightly      amLODIPine 10 MG tablet  Commonly known as: NORVASC   10 mg, Oral, Nightly      budesonide-formoterol 160-4.5 MCG/ACT inhaler  Commonly known as: SYMBICORT   2 puffs, Inhalation, 2 Times Daily PRN      cilostazol 100 MG tablet  Commonly known as: PLETAL   100 mg, Oral, 2 Times Daily Before Meals      clopidogrel 75 MG tablet  Commonly known as: PLAVIX   75 mg, Oral, Daily      Denavir 1 % cream  Generic drug: penciclovir   1 application, Topical, As Needed      furosemide 20 MG tablet  Commonly known as: LASIX   20 mg, Oral, Daily      gabapentin 800 MG tablet  Commonly known as: NEURONTIN   800 mg, Oral, 3 Times  Daily      ondansetron 4 MG tablet  Commonly known as: ZOFRAN   4 mg, Oral, Every 8 Hours PRN      pantoprazole 40 MG EC tablet  Commonly known as: Protonix   40 mg, Oral, Daily      pravastatin 20 MG tablet  Commonly known as: PRAVACHOL   20 mg, Oral, Nightly      rivaroxaban 20 MG tablet  Commonly known as: Xarelto   20 mg, Oral, Daily With Dinner      sertraline 100 MG tablet  Commonly known as: ZOLOFT   100 mg, Oral, Daily      traZODone 150 MG tablet  Commonly known as: DESYREL   150 mg, Oral, Nightly      vitamin D 1.25 MG (75796 UT) capsule capsule  Commonly known as: ERGOCALCIFEROL   50,000 Units, Oral, Weekly         Stop These Medications    traMADol 50 MG tablet  Commonly known as: Ultram          Medical Management: Xarelto, Plavix, Statin Reviewed risks, benefits, and habit forming potential and weaning from narcotic medication. Patient understands and wishes to receive prescription.  Prescription written for Norco for post-surgical pain after Jhonny placed on file.    Discharge Diet:   Diet Instructions     Diet: Cardiac      Discharge Diet: Cardiac          Discharge Disposition: Home in stable condition with follow up appointments with CVTS Nurse Practitioner 1 week, Dr. Suh 2 weeks, Cardiology/PCP as scheduled.     History of Present Illness/Hospital Course:   70 y.o. female with HTN(stable, increased risk stroke, rupture), Hyperlipidemia(stable, increased risk cardiovascular events), Smoker(uncontrolled, increased risk cardiovascular events), COPD(stable, increased risk pulmonary complications) and Atrial fibrillation(stable, increased risk stroke)  Anticoagulation (Xarelto, stable) Hx PE (stable). PVD (LSCA stent 2005, B iliac stents 2008) lost to follow up.  former smoker and Recent Cessation.  Increasing pain LEFT foot, lifestyle limiting. Difficulty ambulating. VAS 10. No TIA stroke amaurosis.  No MI claudication. No other associated signs, symptoms or modifying factors. Pharmacy  discontinued Xarelto (5/2020). Worsening LEFT lower extremity pain similar to pre-surgery approx 1 month. Underwent additional angio procedure. Improved. Confused on medications, off Plavix x 2 months. Now with worsening LEFT lower extremity pain.  Returns for follow up.       2005: LSCA Stent  2008: Bilateral Iliac stents  3/23/2020:  CHELSIE: RIGHT 0.83 Triphasic  LEFT 0.31 Biphasic/Monophasic (DP/PT)  3/26/2020: CTA Runoff: Occlusion of the proximal left superficial femoral artery with  reconstitution of the distal left SFA. The popliteal artery  appears occluded distally, with reconstitution of the arteries of  the trifurcation. The left calf vessels are patent, as above.Patent left common iliac and proximal left external iliac stents. There appears to be mild luminal narrowing within the  left external iliac stent. Stenosis of the origins of the superior mesenteric artery and  bilateral renal arteries as above. Irregular narrowing of the right superficial femoral artery,  as described above without occlusion. The right popliteal artery  and the right calf arteries are patent, as above.  4/14/2020: LEFT Femoral-popliteal Bypass PTFE  5/22/2020:  CHELSIE: RIGHT 1.1 Triphasic LEFT 0.79 Tri/Biphasic (PT/DP)   Patent LEFT graft. mPSV 237cm/s (native) Triphasic  8/25/2020:  CHELSIE: RIGHT 0.67 Tri/Biphasic (DP/PT) LEFT 0.52 Tri/Biphasic (PT/DP). Patent LEFT Fem-Pop graft. Native prx (333cm/s) Triphasic.   9/9/2020: LEFT/RIGHT OBDULIA PTA  9/22/2020:  CHELSIE: RIGHT 1.2 Triphasic LEFT 0.81 Biphasic  1/29/2021: CHELSIE: RIGHT 0.92 Tripahsic LEFT 0.75 Tri/Biphasic (PT/DP) Patent PTFE graft LEFT native [id number].   7/26/21: CHELSIE: RIGHT 0.62 Biphasic LEFT 0.85 Tri/Biphasic (PT/DP)     Adequate preop studies were completed and the patient was scheduled electively. Operative day Mona Perales admitted through [facility], taken to operating suite and placed under general anesthesia.  Underwent said procedure without complications or difficulty. The  "patient was weaned easily from mechanical ventilation and extubated in the recovery room placed on oxygen per nasal cannula and further transferred to CCU for further care and monitoring. Aggressive diuresis and high flow O2 required post op. Successful O2 weaning to home nasal cannula requirements.  Mona Perales remained hemodynamically and neurovascularly stable immediately postop.  POD1 the patient was out of the bed to the chair tolerating breakfast pain controlled with Nroco and stable for transfer to 3W telemetry.  Mona Hendrixt continued with PT/OT progressing well.  Operative incisions clean, dry, intact. Neurovascular status remained stable for discharge home on POD 4.     Vital Signs  Temp:  [97 °F (36.1 °C)-98 °F (36.7 °C)] 97.5 °F (36.4 °C)  Heart Rate:  [] 85  Resp:  [16-18] 18  BP: ()/(50-77) 132/58      09/20/21  0500 09/20/21  1546 09/21/21  0400   Weight: 72.1 kg (158 lb 15.2 oz) 64.6 kg (142 lb 6 oz) 64.6 kg (142 lb 6.4 oz)       Pertinent Test Results:  Lab Results   Component Value Date    WBC 15.95 (H) 09/19/2021    HGB 8.9 (L) 09/19/2021    HCT 28.1 (L) 09/19/2021    MCV 90.1 09/19/2021     09/19/2021     Lab Results   Component Value Date    GLUCOSE 94 09/19/2021    BUN 8 09/19/2021    CREATININE 0.75 09/19/2021    EGFRIFNONA 77 09/19/2021    BCR 10.7 09/19/2021    K 3.4 (L) 09/20/2021    CO2 23.0 09/19/2021    CALCIUM 8.1 (L) 09/19/2021    ALBUMIN 3.60 09/19/2021    AST 28 09/19/2021    ALT 10 09/19/2021       Physical Exam:  Physical Exam   General Appearance:  Comfortable.    Vital signs: (most recent): Blood pressure 132/58, pulse 83, temperature 97.5 °F (36.4 °C), temperature source Temporal, resp. rate 16, height 157.5 cm (62\"), weight 64.6 kg (142 lb 6.4 oz), SpO2 (!) (P) 78 %, not currently breastfeeding.  Vital signs are normal.    Output: Producing urine (DC ruiz) and no stool output.    HEENT: Normal HEENT exam.    Lungs:  Normal effort and normal " respiratory rate.  There are wheezes.  (O2 NC 2L )  Heart: Normal rate.  Regular rhythm.    Abdomen: Abdomen is soft.  Hypoactive bowel sounds.     Extremities: Decreased range of motion.    Pulses: Distal pulses are intact.  (Palpable)    Neurological: Patient is alert and oriented to person, place and time.    Skin:  Warm and dry.  ((R) Groin: Prevena Intact  (R) AK: CDI)    Additional Instructions for the Follow-ups that You Need to Schedule     Ambulatory Referral to Home Health   As directed      Face to Face Visit Date: 9/21/2021    Follow-up provider for Plan of Care?: I will be treating the patient on an ongoing basis.  Please send me the Plan of Care for signature.    Follow-up provider: SEBASTIAN REYEZ [[id number]]    Reason/Clinical Findings: post op fem-pop/deconditioning    Describe mobility limitations that make leaving home difficult: surgical restrictions    Nursing/Therapeutic Services Requested: Skilled Nursing Physical Therapy Occupational Therapy    Skilled nursing orders: Neurovascular assessments Cardiopulmonary assessments    PT orders: Strengthening    Occupational orders: Activities of daily living Strengthening    Frequency: 1 Week 1         Discharge Follow-up with PCP   As directed       Currently Documented PCP:    Anahi Camacho APRN    PCP Phone Number:    [phone]     Follow Up Details: 2 weeks         Discharge Follow-up with Specialty: Vascular Surgery; 1 Week   As directed      Specialty: Vascular Surgery    Follow Up: 1 Week    Follow Up Details: Dong SALEH as scheduled               Discharge Instructions: Discharge instructions include no heavy lifting anything greater than 10lbs for approximately 4 weeks.  No sex or driving for 2-4 weeks. Printed information given to the patient with advancement of activities weekly.  Risks and benefits of narcotic medications and weaning postoperatively have been discussed. Clean operative site with antibacterial soap/water, pat  dry. Keep open to air unless draining, then may apply dry dressing.  No ointments or creams unless prescribed by provider. For signs and symptoms of infection including drainage from operative site, redness, swelling, with associated fever and/or chills notify [facility] immediately for wound check. If signs and symptoms of ischemia should occur including but not limited to pale/blue discoloration of limb, increasing pain with ambulation or at rest, or a non-healing wound the patient is to notify [facility] for immediate evaluation. Patient verbalizes understanding of discharge instructions, all questions are answered, follow-up appointments have been made. The patient is discharged home in stable condition.         Follow-up Appointments  Future Appointments   Date Time Provider Department Center   9/27/2021  9:00 AM Sarthak Luz APRN [facility] None       Test Results Pending at Discharge        Time: Discharge 60 min           This document has been electronically signed by DELFINO Garcia on September 21, 2021 09:07 CDT        Electronically signed by David Suh MD at 09/21/21 0919

## 2021-09-24 ENCOUNTER — HOME CARE VISIT (OUTPATIENT)
Dept: HOME HEALTH SERVICES | Facility: CLINIC | Age: 71
End: 2021-09-24

## 2021-09-24 ENCOUNTER — READMISSION MANAGEMENT (OUTPATIENT)
Dept: CALL CENTER | Facility: HOSPITAL | Age: 71
End: 2021-09-24

## 2021-09-24 VITALS
HEART RATE: 98 BPM | SYSTOLIC BLOOD PRESSURE: 134 MMHG | TEMPERATURE: 97.1 F | DIASTOLIC BLOOD PRESSURE: 80 MMHG | OXYGEN SATURATION: 95 % | RESPIRATION RATE: 18 BRPM

## 2021-09-24 VITALS
TEMPERATURE: 98.7 F | OXYGEN SATURATION: 95 % | RESPIRATION RATE: 18 BRPM | SYSTOLIC BLOOD PRESSURE: 128 MMHG | HEART RATE: 108 BPM | DIASTOLIC BLOOD PRESSURE: 80 MMHG

## 2021-09-24 PROCEDURE — G0299 HHS/HOSPICE OF RN EA 15 MIN: HCPCS

## 2021-09-24 PROCEDURE — G0151 HHCP-SERV OF PT,EA 15 MIN: HCPCS

## 2021-09-24 NOTE — OUTREACH NOTE
Medical Week 1 Survey      Responses   [facility] patient discharged from?  [place]   Does the patient have one of the following disease processes/diagnoses(primary or secondary)?  Other   Week 1 attempt successful?  Yes   Call start time  1125   Call end time  1128   General alerts for this patient  COPD and HTN   Discharge diagnosis  PVD (peripheral vascular disease)    Is patient permission given to speak with other caregiver?  Yes   List who call center can speak with  spoke with Yulisa Dalton reviewed with patient/caregiver?  Yes   Is the patient having any side effects they believe may be caused by any medication additions or changes?  No   Does the patient have all medications ordered at discharge?  Yes   Is the patient taking all medications as directed (includes completed medication regime)?  Yes   Does the patient have a primary care provider?   Yes   Does the patient have an appointment with their PCP within 7 days of discharge?  Yes   Comments regarding PCP  Lisa Alejandro APRN PCP   Has the patient kept scheduled appointments due by today?  N/A   What is the Home health agency?   [facility]   Has home health visited the patient within 72 hours of discharge?  Call prior to 72 hours [will be first visit today]   Psychosocial issues?  No   Did the patient receive a copy of their discharge instructions?  Yes   Nursing interventions  Reviewed instructions with patient   What is the patient's perception of their health status since discharge?  Improving   Is the patient/caregiver able to teach back signs and symptoms related to disease process for when to call PCP?  Yes   Is the patient/caregiver able to teach back signs and symptoms related to disease process for when to call 911?  Yes   Is the patient/caregiver able to teach back the hierarchy of who to call/visit for symptoms/problems? PCP, Specialist, Home health nurse, Urgent Care, ED, 911  Yes   If the patient is a current smoker, are they able  to teach back resources for cessation?  Smoking cessation medications, [id number], Smoking cessation support groups, Smoking cessation classes   Week 1 call completed?  Yes   Wrap up additional comments  States she is slowly improving.  is set to come out this afternoon. Denies any needs at this time.          Danyell Hernández RN

## 2021-09-24 NOTE — HOME HEALTH
PATIENT WAS HOSPITALIZED ON 9/17/2021 SECONDARY TO VASCULAR SURGERY. PATIENT WAS HAVING ISSUES WITH HER RIGHT LE AND SHE WAS FOUND TO HAVE BLOOD CLOT PRESENT IN RIGHT LE AROUND HER GROIN REGION. SHE HAS HISTORY OF THE SAME ISSUES IN HER LEFT LE AND THEY FOUND THE PROBLEM IN HER RIGHT LE. SHE WAS ADMITTED 9/17/2021 AND WAS DISCHARGED HOME 9/21/2021. SHE NOW HAS GOOD CIRCULATION IN RIGHT LE PER MD FOLLOWING THE PROCEDURE.     PMHX:

## 2021-09-27 ENCOUNTER — OFFICE VISIT (OUTPATIENT)
Dept: CARDIAC SURGERY | Facility: CLINIC | Age: 71
End: 2021-09-27

## 2021-09-27 VITALS
DIASTOLIC BLOOD PRESSURE: 72 MMHG | HEART RATE: 102 BPM | OXYGEN SATURATION: 94 % | BODY MASS INDEX: 26.5 KG/M2 | HEIGHT: 62 IN | WEIGHT: 144 LBS | SYSTOLIC BLOOD PRESSURE: 116 MMHG | TEMPERATURE: 98 F

## 2021-09-27 DIAGNOSIS — I73.9 PERIPHERAL VASCULAR DISEASE (HCC): Primary | ICD-10-CM

## 2021-09-27 DIAGNOSIS — I65.23 BILATERAL CAROTID ARTERY STENOSIS: ICD-10-CM

## 2021-09-27 DIAGNOSIS — Z48.812 SURGICAL AFTERCARE, CIRCULATORY SYSTEM: ICD-10-CM

## 2021-09-27 DIAGNOSIS — E78.00 HYPERCHOLESTEREMIA: ICD-10-CM

## 2021-09-27 DIAGNOSIS — R53.81 PHYSICAL DECONDITIONING: ICD-10-CM

## 2021-09-27 PROCEDURE — 99024 POSTOP FOLLOW-UP VISIT: CPT | Performed by: NURSE PRACTITIONER

## 2021-09-27 RX ORDER — OXYCODONE HYDROCHLORIDE AND ACETAMINOPHEN 5; 325 MG/1; MG/1
2 TABLET ORAL EVERY 4 HOURS PRN
Qty: 36 TABLET | Refills: 0 | Status: SHIPPED | OUTPATIENT
Start: 2021-09-27 | End: 2022-05-02

## 2021-09-27 NOTE — PATIENT INSTRUCTIONS
Instructions: notify provider for uncontrolled pain, shortness of breath, fever or chills, purulent drainage or swelling from the surgical incision site.    Business hours call [phone]    After business hours may call hospital and speak to surgeon on call [phone]

## 2021-09-28 PROBLEM — R53.81 PHYSICAL DECONDITIONING: Status: ACTIVE | Noted: 2021-09-28

## 2021-09-28 NOTE — PROGRESS NOTES
CVTS Office Progress Note       Subjective   Patient ID: Mona Perales is a 71 y.o. female is here today for follow-up.    Chief Complaint:    Chief Complaint   Patient presents with   • Peripheral Vascular Disease       The following portions of the patient's history were reviewed and updated as appropriate: allergies, current medications, past family history, past medical history, past social history, past surgical history and problem list.  Recent images independently reviewed.  Available laboratory values reviewed.    PCP:  Anahi Camacho APRN  Cardiology:  Kimmy     71 y.o. female with HTN(stable, increased risk stroke, rupture), Hyperlipidemia(stable, increased risk cardiovascular events), Smoker(uncontrolled, increased risk cardiovascular events), COPD(stable, increased risk pulmonary complications) and Atrial fibrillation(stable, increased risk stroke)  Anticoagulation (Xarelto, stable) Hx PE (stable). PVD (LSCA stent 2005, B iliac stents 2008) lost to follow up.  former smoker and Recent Cessation.  Increasing pain LEFT foot, lifestyle limiting. Difficulty ambulating. VAS 10. No TIA stroke amaurosis.  No MI claudication. No other associated signs, symptoms or modifying factors. Pharmacy discontinued Xarelto (5/2020). Worsening LEFT lower extremity pain similar to pre-surgery approx 1 month. Underwent additional angio procedure. Improved. Confused on medications, off Plavix x 2 months. Now with worsening RIGHT lower extremity pain.   Underwent R surgical revascularization, progressed slowly post op, refused rehab placement, went home with , no planning outpatient admission to rehab.  Pain partial control, mobility limited. Reports today in post surgical follow up via wheel chair.    2005: LSCA Stent  2008: Bilateral Iliac stents  3/23/2020:  CHELSIE: RIGHT 0.83 Triphasic  LEFT 0.31 Biphasic/Monophasic (DP/PT)  3/26/2020: CTA Runoff: Occlusion of the proximal left superficial femoral artery  with  reconstitution of the distal left SFA. The popliteal artery  appears occluded distally, with reconstitution of the arteries of  the trifurcation. The left calf vessels are patent, as above.Patent left common iliac and proximal left external iliac stents. There appears to be mild luminal narrowing within the  left external iliac stent. Stenosis of the origins of the superior mesenteric artery and  bilateral renal arteries as above. Irregular narrowing of the right superficial femoral artery,  as described above without occlusion. The right popliteal artery  and the right calf arteries are patent, as above.  4/14/2020: LEFT Femoral-popliteal Bypass PTFE  5/22/2020:  CHELSIE: RIGHT 1.1 Triphasic LEFT 0.79 Tri/Biphasic (PT/DP)   Patent LEFT graft. mPSV 237cm/s (native) Triphasic  8/25/2020:  CHELSIE: RIGHT 0.67 Tri/Biphasic (DP/PT) LEFT 0.52 Tri/Biphasic (PT/DP). Patent LEFT Fem-Pop graft. Native prx (333cm/s) Triphasic.   9/9/2020: LEFT/RIGHT OBDULIA PTA  9/22/2020:  CHELSIE: RIGHT 1.2 Triphasic LEFT 0.81 Biphasic  1/29/2021: CHELSIE: RIGHT 0.92 Tripahsic LEFT 0.75 Tri/Biphasic (PT/DP) Patent PTFE graft LEFT native [id number].   7/26/21: CHELSIE: RIGHT 0.62 Biphasic LEFT 0.85 Tri/Biphasic (PT/DP)   9/2021 R Fem-Pop AK PTFE, R CFA endart    2016: Carotid Duplex: BABS 0-49% LICA 0-49%  2017: Carotid duplex: BABS 0-49% LICA 0-49%  2018: Carotid duplex: BABS 0-49% LICA 0-49%      Past Medical History:   Diagnosis Date   • A-fib (CMS/[facility])    • Anxiety    • Arthritis    • Asthma    • 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 with ulceration (CMS/[facility])     bilateral legs - bilateral iliac stents 2008      • CHF (congestive heart failure) (CMS/[facility])    • Chronic lower back pain    • COPD (chronic obstructive pulmonary disease) (CMS/[facility])    • Essential (primary) hypertension    • GERD (gastroesophageal reflux disease)    • History of pulmonary embolus (PE)    • Hyperlipidemia    • Insomnia    • Lumbago    • Nicotine dependence    •  Occlusion of artery      and stenosis of bilateral carotid arteries - BABS 16-49%, LICA 0-15%   • Other atherosclerosis of native artery of other extremity     LEFT subclavian stent 2005 (occluded)   • Sleep apnea      Past Surgical History:   Procedure Laterality Date   • CARDIAC CATHETERIZATION  04/06/2016    No evidence of any obstructive epicardial CAD.Preserved LV systolic function with EF of 55%.   • CENTRAL VENOUS LINE INSERTION  04/07/2016    Successful placement of right uppe extremity 6-[place] triple lumen PICC line.   • ENDOSCOPY N/A 1/12/2018    Procedure: ESOPHAGOGASTRODUODENOSCOPY;  Surgeon: Bin Oh MD;  Location: [facility] ENDOSCOPY;  Service:    • ENDOSCOPY N/A 1/17/2018    Procedure: ESOPHAGOGASTRODUODENOSCOPY;  Surgeon: Bin Oh MD;  Location: [facility] ENDOSCOPY;  Service:    • ENDOSCOPY N/A 3/16/2018    Procedure: ESOPHAGOGASTRODUODENOSCOPY possible dilation;  Surgeon: Bin Oh MD;  Location: [facility] ENDOSCOPY;  Service: Gastroenterology   • FEMORAL POPLITEAL BYPASS Left 4/14/2020    Procedure: FEMORAL POPLITEAL BYPASS ABOVE KNEE  (POLYTETRAFLUOROETHYLENE)  LEFT COMMON FEMORAL ARTERY ENDARTERECTOMY PTA LEFT ILIAC ARTERIOGRAM        (c-arm#2 and c-arm table);  Surgeon: David Suh MD;  Location: [facility] OR;  Service: Vascular;  Laterality: Left;   • FEMORAL POPLITEAL BYPASS Right 9/17/2021    Procedure: RIGHT common femoral endarterectomy FEMORAL POPLITEAL BYPASS (above the knee polytetrafluoroethylene  lower extremity arteriogram              (c-arm#2 and c-arm table);  Surgeon: David Suh MD;  Location: [facility] OR;  Service: Vascular;  Laterality: Right;   • HYSTERECTOMY     • INTERVENTIONAL RADIOLOGY PROCEDURE Left 9/9/2020    Procedure: IR angiogram extremity left;  Surgeon: David Suh MD;  Location: [facility] ANGIO INVASIVE LOCATION;  Service: Interventional Radiology;  Laterality: Left;   • KYPHOPLASTY N/A 5/23/2019    Procedure: KYPHOPLASTY  LUMBAR FOUR;  Surgeon: Aba Santa MD;  Location: [facility];  Service: Orthopedic Spine   • TOTAL SHOULDER REPLACEMENT Left    • TRANSESOPHAGEAL ECHOCARDIOGRAM (JACQUES)  04/07/2016    With color flow-Mild to moderate [facility].LV systolic function well preserved with Ef of 55-60%.Mitral and AV intact.Diastolic dysfunction   • UPPER GASTROINTESTINAL ENDOSCOPY  01/17/2018    Dr. Bin Martin M.D.     Family History   Problem Relation Age of Onset   • Heart disease Other    • Hypertension Other      Social History     Socioeconomic History   • Marital status:      Spouse name: Not on file   • Number of children: Not on file   • Years of education: Not on file   • Highest education level: Not on file   Tobacco Use   • Smoking status: Current Every Day Smoker     Packs/day: 1.00     Years: 50.00     Pack years: 50.00     Types: Cigarettes   • Smokeless tobacco: Never Used   Vaping Use   • Vaping Use: Never used   Substance and Sexual Activity   • Alcohol use: No   • Drug use: No   • Sexual activity: Defer         ALLERGIES:   Morphine and related and Penicillins    MEDICATIONS:      Current Outpatient Medications:   •  albuterol (PROVENTIL) (2.5 MG/3ML) 0.083% nebulizer solution, Take 2.5 mg by nebulization Every 4 (Four) Hours As Needed for Wheezing., Disp: , Rfl:   •  ALBUTEROL SULFATE HFA IN, Inhale 2 puffs Every 4 (Four) Hours As Needed., Disp: , Rfl:   •  amitriptyline (ELAVIL) 25 MG tablet, Take 25 mg by mouth Every Night., Disp: , Rfl:   •  amLODIPine (NORVASC) 10 MG tablet, Take 10 mg by mouth Every Night., Disp: , Rfl:   •  budesonide-formoterol (SYMBICORT) 160-4.5 MCG/ACT inhaler, Inhale 2 puffs 2 (Two) Times a Day As Needed., Disp: , Rfl:   •  cilostazol (PLETAL) 100 MG tablet, Take 1 tablet by mouth 2 (Two) Times a Day Before Meals., Disp: 60 tablet, Rfl: 11  •  clopidogrel (PLAVIX) 75 MG tablet, Take 1 tablet by mouth Daily., Disp: 30 tablet, Rfl: 11  •  furosemide (LASIX) 20 MG tablet,  Take 1 tablet by mouth Daily., Disp: 30 tablet, Rfl: 1  •  gabapentin (NEURONTIN) 800 MG tablet, Take 800 mg by mouth 3 (Three) Times a Day., Disp: , Rfl:   •  HYDROcodone-acetaminophen (NORCO) 5-325 MG per tablet, Take 1 tablet by mouth Every 6 (Six) Hours As Needed for Moderate Pain for up to 4 days., Disp: 30 tablet, Rfl: 0  •  ondansetron (ZOFRAN) 4 MG tablet, Take 4 mg by mouth Every 8 (Eight) Hours As Needed for Nausea or Vomiting., Disp: , Rfl:   •  pantoprazole (PROTONIX) 40 MG EC tablet, Take 1 tablet by mouth Daily., Disp: 30 tablet, Rfl: 5  •  penciclovir (DENAVIR) 1 % cream, Apply 1 application topically to the appropriate area as directed As Needed (fever blisters)., Disp: , Rfl:   •  pravastatin (PRAVACHOL) 20 MG tablet, Take 20 mg by mouth Every Night., Disp: , Rfl:   •  rivaroxaban (Xarelto) 20 MG tablet, Take 1 tablet by mouth Daily With Dinner., Disp: 30 tablet, Rfl: 6  •  sertraline (ZOLOFT) 100 MG tablet, Take 100 mg by mouth Daily., Disp: , Rfl:   •  traZODone (DESYREL) 150 MG tablet, Take 1 tablet by mouth Every Night., Disp: , Rfl:   •  vitamin D (ERGOCALCIFEROL) 1.25 MG (01382 UT) capsule capsule, Take 50,000 Units by mouth 1 (One) Time Per Week., Disp: , Rfl:   •  oxyCODONE-acetaminophen (PERCOCET) 5-325 MG per tablet, Take 2 tablets by mouth Every 4 (Four) Hours As Needed (as neeeded for post surgical pain)., Disp: 36 tablet, Rfl: 0    Review of Systems   Constitutional: Positive for malaise/fatigue.   HENT: Positive for nosebleeds.    Eyes: Negative for visual disturbance.   Cardiovascular: Positive for claudication and near-syncope. Negative for cyanosis and leg swelling.   Respiratory: Negative for hemoptysis and shortness of breath.    Hematologic/Lymphatic: Negative for bleeding problem. Does not bruise/bleed easily.   Skin: Positive for color change. Negative for nail changes.   Musculoskeletal: Positive for back pain. Negative for muscle weakness.   Gastrointestinal: Negative for  "dysphagia, hematemesis and melena.   Genitourinary: Negative for hematuria.   Neurological: Positive for dizziness and loss of balance. Negative for focal weakness, light-headedness, numbness, paresthesias and weakness.   Psychiatric/Behavioral: Negative for altered mental status.   All other systems reviewed and are negative.       Objective   Vitals:    09/27/21 0910   BP: 116/72   BP Location: Right arm   Pulse: 102   Temp: 98 °F (36.7 °C)   TempSrc: Infrared   SpO2: 94%   Weight: 65.3 kg (144 lb)   Height: 157.5 cm (62\")     Body mass index is 26.34 kg/m².  Physical Exam   Constitutional: She is oriented to person, place, and time.   HENT:   Head: Atraumatic.   Neck: Carotid bruit is not present.   Cardiovascular: Normal heart sounds.   Pulses:       Dorsalis pedis pulses are 2+ on the right side and 1+ on the left side.        Posterior tibial pulses are 2+ on the right side and 1+ on the left side.   Pulmonary/Chest: Effort normal and breath sounds normal.   Abdominal: Soft. Bowel sounds are normal.   Neurological: She is alert and oriented to person, place, and time. Gait (WC) abnormal.   Skin: Skin is warm. Capillary refill takes 2 to 3 seconds.   Psychiatric: Thought content normal.   Vitals reviewed.          Assessment & Plan     Independent Review of Radiographic Studies:     1. Peripheral vascular disease (CMS/HCC)  Improved distal perfusion RLE.  Strong triphasic signals on exam.     Return 5 weeks repeat vascular studies, pending admission to [facility] for rehabilitation.     Elevate RLE     Xarelto, plavix, statin    - Doppler Ankle Brachial Index Single Level CAR; Future  - Duplex Lower Extremity Art / Grafts - Right CAR; Future  - oxyCODONE-acetaminophen (PERCOCET) 5-325 MG per tablet; Take 2 tablets by mouth Every 4 (Four) Hours As Needed (as neeeded for post surgical pain).  Dispense: 36 tablet; Refill: 0    2. Surgical aftercare, circulatory system  Clean operative site with antibacterial " soap/water, pat dry. Keep open to air unless draining, then may apply dry dressing.  No ointments or creams unless prescribed by provider.    All incisions CDI    Reviewed risks, benefits, and habit forming potential and weaning from narcotic medication. Patient understands and wishes to receive prescription.  Prescription written for percocet for post-surgical pain after Jhonny placed on file.    - Doppler Ankle Brachial Index Single Level CAR; Future  - Duplex Lower Extremity Art / Grafts - Right CAR; Future  - oxyCODONE-acetaminophen (PERCOCET) 5-325 MG per tablet; Take 2 tablets by mouth Every 4 (Four) Hours As Needed (as neeeded for post surgical pain).  Dispense: 36 tablet; Refill: 0    3. Bilateral carotid artery stenosis  Mild bilateral asymptomatic disease      4. Hypercholesteremia  Lipid-lowering therapy has been proven beneficial in patients with cardio-vascular disease. Current guidelines recommend statin treatment for all patients with PAD,CAD and carotid stenosis. Statins are beneficial in preventing cardiovascular events, increasing functional capacity and lower the risk of adverse limb loss in PAD. Statins decrease the progression of plaque formation and may improve peripheral vessel lining, and aid in reversing atherosclerosis.    5. Physical deconditioning  She is pending admission to [facility] for rehab placement as outpatient.             This document has been electronically signed by DELFINO Hendricks on September 28, 2021 09:48 CDT

## 2021-09-29 ENCOUNTER — HOME CARE VISIT (OUTPATIENT)
Dept: HOME HEALTH SERVICES | Facility: CLINIC | Age: 71
End: 2021-09-29

## 2021-09-29 PROCEDURE — G0180 MD CERTIFICATION HHA PATIENT: HCPCS | Performed by: NURSE PRACTITIONER

## 2021-09-30 ENCOUNTER — READMISSION MANAGEMENT (OUTPATIENT)
Dept: CALL CENTER | Facility: HOSPITAL | Age: 71
End: 2021-09-30

## 2021-09-30 LAB
QT INTERVAL: 356 MS
QTC INTERVAL: 459 MS

## 2021-09-30 NOTE — OUTREACH NOTE
Medical Week 2 Survey      Responses   [facility] patient discharged from?  [place]   Does the patient have one of the following disease processes/diagnoses(primary or secondary)?  Other   Week 2 attempt successful?  No   Unsuccessful attempts  Attempt 1          Kacey Lewis RN

## 2021-10-01 ENCOUNTER — HOME CARE VISIT (OUTPATIENT)
Dept: HOME HEALTH SERVICES | Facility: CLINIC | Age: 71
End: 2021-10-01

## 2021-10-01 DIAGNOSIS — R53.81 PHYSICAL DECONDITIONING: ICD-10-CM

## 2021-10-01 DIAGNOSIS — I73.9 PERIPHERAL VASCULAR DISEASE (HCC): Primary | ICD-10-CM

## 2021-10-01 DIAGNOSIS — Z48.812 SURGICAL AFTERCARE, CIRCULATORY SYSTEM: ICD-10-CM

## 2021-10-04 ENCOUNTER — READMISSION MANAGEMENT (OUTPATIENT)
Dept: CALL CENTER | Facility: HOSPITAL | Age: 71
End: 2021-10-04

## 2021-10-04 ENCOUNTER — HOME CARE VISIT (OUTPATIENT)
Dept: HOME HEALTH SERVICES | Facility: CLINIC | Age: 71
End: 2021-10-04

## 2021-10-04 NOTE — OUTREACH NOTE
Medical Week 2 Survey      Responses   [facility] patient discharged from?  [place]   Does the patient have one of the following disease processes/diagnoses(primary or secondary)?  Acute MI (STEMI,NSTEMI)   Week 2 attempt successful?  No   Unsuccessful attempts  Attempt 2          Bobbi Gutierrez LPN

## 2021-10-05 NOTE — CASE COMMUNICATION
PATIENT REFUSED PT VISIT MONDAY 10/4/2021 CLAIMS SHE WANTS TO GO TO SNF AND DOESN'T WANT TO DO VISITS RIGHT NOW WAITING PLACEMENT THERE. PATIENT INFORMATION WAS GIVEN TO [facility] UPON PT ADMISSION TO HOME HEALTH AND ACCORDING TO PATIENT WAS ALSO SENT BY MD FRIDAY. PLAN TO FOLLOW UP NEXT WEEK WITH PATIENT IF SHE IS NOT PLACED

## 2021-10-06 ENCOUNTER — HOME CARE VISIT (OUTPATIENT)
Dept: HOME HEALTH SERVICES | Facility: CLINIC | Age: 71
End: 2021-10-06

## 2021-10-06 NOTE — CASE COMMUNICATION
Patient missed a SN visit from [facility] on 10/6/21.    Reason: Unable to locate patient; no answer to phone      For your records only.   As per home health protocol, MD must be notified of missed/cancelled visits; therefore the prescribed frequency was not met.

## 2021-10-12 ENCOUNTER — READMISSION MANAGEMENT (OUTPATIENT)
Dept: CALL CENTER | Facility: HOSPITAL | Age: 71
End: 2021-10-12

## 2021-10-12 NOTE — OUTREACH NOTE
Medical Week 3 Survey      Responses   [facility] patient discharged from? [place]   Does the patient have one of the following disease processes/diagnoses(primary or secondary)? Acute MI (STEMI,NSTEMI)   Week 3 attempt successful? No   Unsuccessful attempts Attempt 1          Jany Baker RN

## 2021-10-15 ENCOUNTER — HOME CARE VISIT (OUTPATIENT)
Dept: HOME HEALTH SERVICES | Facility: CLINIC | Age: 71
End: 2021-10-15

## 2021-10-18 ENCOUNTER — READMISSION MANAGEMENT (OUTPATIENT)
Dept: CALL CENTER | Facility: HOSPITAL | Age: 71
End: 2021-10-18

## 2021-10-18 NOTE — OUTREACH NOTE
Medical Week 3 Survey      Responses   [facility] patient discharged from? [place]   Does the patient have one of the following disease processes/diagnoses(primary or secondary)? Acute MI (STEMI,NSTEMI)   Week 3 attempt successful? No   Unsuccessful attempts Attempt 2          Savana Kruse RN

## 2021-10-18 NOTE — CASE COMMUNICATION
Pt missed a physical therapy appt on 10/15/21 due to patient out of town. PT to resume services the week of 10/18/21.

## 2021-10-21 ENCOUNTER — HOSPITAL ENCOUNTER (OUTPATIENT)
Dept: ULTRASOUND IMAGING | Facility: HOSPITAL | Age: 71
Discharge: HOME | End: 2021-10-21
Admitting: NURSE PRACTITIONER

## 2021-10-21 DIAGNOSIS — M79.604 RIGHT LEG PAIN: ICD-10-CM

## 2021-10-21 PROCEDURE — 93971 EXTREMITY STUDY: CPT

## 2021-10-22 ENCOUNTER — HOME CARE VISIT (OUTPATIENT)
Dept: HOME HEALTH SERVICES | Facility: HOME HEALTHCARE | Age: 71
End: 2021-10-22

## 2021-10-22 NOTE — CASE COMMUNICATION
"Please route to Josue SALEH    Patient has refused all visits since admission to agency. Patient called this date and asked if she would like to continue her home care services. Patient states \" No I do not need you to come see me. I am doing fine,\" Patient to be discharged from agency without a visit per her request."

## 2022-02-19 ENCOUNTER — APPOINTMENT (OUTPATIENT)
Dept: ULTRASOUND IMAGING | Facility: HOSPITAL | Age: 72
End: 2022-02-19

## 2022-02-19 ENCOUNTER — DOCUMENTATION (OUTPATIENT)
Dept: FAMILY MEDICINE CLINIC | Facility: CLINIC | Age: 72
End: 2022-02-19

## 2022-02-19 ENCOUNTER — HOSPITAL ENCOUNTER (INPATIENT)
Facility: HOSPITAL | Age: 72
LOS: 3 days | Discharge: HOME | End: 2022-02-25
Attending: FAMILY MEDICINE | Admitting: INTERNAL MEDICINE

## 2022-02-19 ENCOUNTER — APPOINTMENT (OUTPATIENT)
Dept: GENERAL RADIOLOGY | Facility: HOSPITAL | Age: 72
End: 2022-02-19

## 2022-02-19 ENCOUNTER — APPOINTMENT (OUTPATIENT)
Dept: CT IMAGING | Facility: HOSPITAL | Age: 72
End: 2022-02-19

## 2022-02-19 DIAGNOSIS — Z78.9 IMPAIRED MOBILITY AND ADLS: ICD-10-CM

## 2022-02-19 DIAGNOSIS — I95.9 HYPOTENSION, UNSPECIFIED HYPOTENSION TYPE: ICD-10-CM

## 2022-02-19 DIAGNOSIS — J44.9 CHRONIC OBSTRUCTIVE PULMONARY DISEASE, UNSPECIFIED COPD TYPE: ICD-10-CM

## 2022-02-19 DIAGNOSIS — R53.81 PHYSICAL DECONDITIONING: ICD-10-CM

## 2022-02-19 DIAGNOSIS — Z74.09 IMPAIRED MOBILITY AND ADLS: ICD-10-CM

## 2022-02-19 DIAGNOSIS — I69.30 CHRONIC CEREBROVASCULAR ACCIDENT (CVA): ICD-10-CM

## 2022-02-19 DIAGNOSIS — R42 DIZZINESS: Primary | ICD-10-CM

## 2022-02-19 DIAGNOSIS — Z74.09 IMPAIRED FUNCTIONAL MOBILITY, BALANCE, GAIT, AND ENDURANCE: ICD-10-CM

## 2022-02-19 DIAGNOSIS — I70.222 ATHEROSCLEROSIS OF NATIVE ARTERIES OF EXTREMITIES WITH REST PAIN, LEFT LEG: ICD-10-CM

## 2022-02-19 LAB
ALBUMIN SERPL-MCNC: 4.1 G/DL (ref 3.5–5.2)
ALBUMIN/GLOB SERPL: 1.3 G/DL
ALP SERPL-CCNC: 107 U/L (ref 39–117)
ALT SERPL W P-5'-P-CCNC: 9 U/L (ref 1–33)
ANION GAP SERPL CALCULATED.3IONS-SCNC: 14 MMOL/L (ref 5–15)
AST SERPL-CCNC: 21 U/L (ref 1–32)
BASOPHILS # BLD AUTO: 0.1 10*3/MM3 (ref 0–0.2)
BASOPHILS NFR BLD AUTO: 0.7 % (ref 0–1.5)
BILIRUB SERPL-MCNC: 0.6 MG/DL (ref 0–1.2)
BILIRUB UR QL STRIP: NEGATIVE
BUN SERPL-MCNC: 4 MG/DL (ref 8–23)
BUN/CREAT SERPL: 6 (ref 7–25)
CALCIUM SPEC-SCNC: 8.7 MG/DL (ref 8.6–10.5)
CHLORIDE SERPL-SCNC: 100 MMOL/L (ref 98–107)
CLARITY UR: CLEAR
CO2 SERPL-SCNC: 25 MMOL/L (ref 22–29)
COLOR UR: YELLOW
CREAT SERPL-MCNC: 0.67 MG/DL (ref 0.57–1)
DEPRECATED RDW RBC AUTO: 63.8 FL (ref 37–54)
EOSINOPHIL # BLD AUTO: 0.15 10*3/MM3 (ref 0–0.4)
EOSINOPHIL NFR BLD AUTO: 1.1 % (ref 0.3–6.2)
ERYTHROCYTE [DISTWIDTH] IN BLOOD BY AUTOMATED COUNT: 22.9 % (ref 12.3–15.4)
FLUAV RNA RESP QL NAA+PROBE: NOT DETECTED
FLUBV RNA RESP QL NAA+PROBE: NOT DETECTED
GFR SERPL CREATININE-BSD FRML MDRD: 87 ML/MIN/1.73
GLOBULIN UR ELPH-MCNC: 3.2 GM/DL
GLUCOSE SERPL-MCNC: 95 MG/DL (ref 65–99)
GLUCOSE UR STRIP-MCNC: NEGATIVE MG/DL
HCT VFR BLD AUTO: 29.1 % (ref 34–46.6)
HGB BLD-MCNC: 9.5 G/DL (ref 12–15.9)
HGB UR QL STRIP.AUTO: NEGATIVE
HOLD SPECIMEN: NORMAL
IMM GRANULOCYTES # BLD AUTO: 0.07 10*3/MM3 (ref 0–0.05)
IMM GRANULOCYTES NFR BLD AUTO: 0.5 % (ref 0–0.5)
KETONES UR QL STRIP: NEGATIVE
LEUKOCYTE ESTERASE UR QL STRIP.AUTO: NEGATIVE
LYMPHOCYTES # BLD AUTO: 3.24 10*3/MM3 (ref 0.7–3.1)
LYMPHOCYTES NFR BLD AUTO: 24 % (ref 19.6–45.3)
MAGNESIUM SERPL-MCNC: 1.6 MG/DL (ref 1.6–2.4)
MCH RBC QN AUTO: 26.6 PG (ref 26.6–33)
MCHC RBC AUTO-ENTMCNC: 32.6 G/DL (ref 31.5–35.7)
MCV RBC AUTO: 81.5 FL (ref 79–97)
MONOCYTES # BLD AUTO: 1.44 10*3/MM3 (ref 0.1–0.9)
MONOCYTES NFR BLD AUTO: 10.7 % (ref 5–12)
NEUTROPHILS NFR BLD AUTO: 63 % (ref 42.7–76)
NEUTROPHILS NFR BLD AUTO: 8.49 10*3/MM3 (ref 1.7–7)
NITRITE UR QL STRIP: NEGATIVE
NRBC BLD AUTO-RTO: 0 /100 WBC (ref 0–0.2)
NT-PROBNP SERPL-MCNC: 392.1 PG/ML (ref 0–900)
PH UR STRIP.AUTO: 7.5 [PH] (ref 5–9)
PLATELET # BLD AUTO: 349 10*3/MM3 (ref 140–450)
PMV BLD AUTO: 8.8 FL (ref 6–12)
POTASSIUM SERPL-SCNC: 4.1 MMOL/L (ref 3.5–5.2)
PROT SERPL-MCNC: 7.3 G/DL (ref 6–8.5)
PROT UR QL STRIP: NEGATIVE
RBC # BLD AUTO: 3.57 10*6/MM3 (ref 3.77–5.28)
SARS-COV-2 RNA RESP QL NAA+PROBE: NOT DETECTED
SODIUM SERPL-SCNC: 139 MMOL/L (ref 136–145)
SP GR UR STRIP: 1.01 (ref 1–1.03)
TROPONIN T SERPL-MCNC: <0.01 NG/ML (ref 0–0.03)
UROBILINOGEN UR QL STRIP: NORMAL
WBC NRBC COR # BLD: 13.49 10*3/MM3 (ref 3.4–10.8)

## 2022-02-19 PROCEDURE — 81003 URINALYSIS AUTO W/O SCOPE: CPT | Performed by: PHYSICIAN ASSISTANT

## 2022-02-19 PROCEDURE — 93005 ELECTROCARDIOGRAM TRACING: CPT | Performed by: PHYSICIAN ASSISTANT

## 2022-02-19 PROCEDURE — 80053 COMPREHEN METABOLIC PANEL: CPT | Performed by: PHYSICIAN ASSISTANT

## 2022-02-19 PROCEDURE — 87636 SARSCOV2 & INF A&B AMP PRB: CPT | Performed by: PHYSICIAN ASSISTANT

## 2022-02-19 PROCEDURE — G0378 HOSPITAL OBSERVATION PER HR: HCPCS

## 2022-02-19 PROCEDURE — 93880 EXTRACRANIAL BILAT STUDY: CPT

## 2022-02-19 PROCEDURE — 83880 ASSAY OF NATRIURETIC PEPTIDE: CPT | Performed by: PHYSICIAN ASSISTANT

## 2022-02-19 PROCEDURE — 93010 ELECTROCARDIOGRAM REPORT: CPT | Performed by: INTERNAL MEDICINE

## 2022-02-19 PROCEDURE — 36415 COLL VENOUS BLD VENIPUNCTURE: CPT

## 2022-02-19 PROCEDURE — 85025 COMPLETE CBC W/AUTO DIFF WBC: CPT | Performed by: PHYSICIAN ASSISTANT

## 2022-02-19 PROCEDURE — 84484 ASSAY OF TROPONIN QUANT: CPT | Performed by: PHYSICIAN ASSISTANT

## 2022-02-19 PROCEDURE — 99285 EMERGENCY DEPT VISIT HI MDM: CPT

## 2022-02-19 PROCEDURE — 71045 X-RAY EXAM CHEST 1 VIEW: CPT

## 2022-02-19 PROCEDURE — 25010000002 KETOROLAC TROMETHAMINE PER 15 MG: Performed by: PHYSICIAN ASSISTANT

## 2022-02-19 PROCEDURE — 93970 EXTREMITY STUDY: CPT

## 2022-02-19 PROCEDURE — 70450 CT HEAD/BRAIN W/O DYE: CPT

## 2022-02-19 PROCEDURE — 83735 ASSAY OF MAGNESIUM: CPT | Performed by: PHYSICIAN ASSISTANT

## 2022-02-19 RX ORDER — IPRATROPIUM BROMIDE AND ALBUTEROL SULFATE 2.5; .5 MG/3ML; MG/3ML
3 SOLUTION RESPIRATORY (INHALATION) EVERY 6 HOURS PRN
Status: DISCONTINUED | OUTPATIENT
Start: 2022-02-19 | End: 2022-02-25 | Stop reason: HOSPADM

## 2022-02-19 RX ORDER — CLOPIDOGREL BISULFATE 75 MG/1
75 TABLET ORAL DAILY
Status: DISCONTINUED | OUTPATIENT
Start: 2022-02-20 | End: 2022-02-25 | Stop reason: HOSPADM

## 2022-02-19 RX ORDER — SODIUM CHLORIDE, SODIUM LACTATE, POTASSIUM CHLORIDE, CALCIUM CHLORIDE 600; 310; 30; 20 MG/100ML; MG/100ML; MG/100ML; MG/100ML
50 INJECTION, SOLUTION INTRAVENOUS CONTINUOUS
Status: DISCONTINUED | OUTPATIENT
Start: 2022-02-19 | End: 2022-02-20

## 2022-02-19 RX ORDER — AMITRIPTYLINE HYDROCHLORIDE 25 MG/1
25 TABLET, FILM COATED ORAL NIGHTLY
Status: DISCONTINUED | OUTPATIENT
Start: 2022-02-19 | End: 2022-02-25 | Stop reason: HOSPADM

## 2022-02-19 RX ORDER — OXYCODONE HYDROCHLORIDE AND ACETAMINOPHEN 5; 325 MG/1; MG/1
2 TABLET ORAL EVERY 6 HOURS PRN
Status: DISCONTINUED | OUTPATIENT
Start: 2022-02-19 | End: 2022-02-25 | Stop reason: HOSPADM

## 2022-02-19 RX ORDER — SODIUM CHLORIDE 0.9 % (FLUSH) 0.9 %
10 SYRINGE (ML) INJECTION AS NEEDED
Status: DISCONTINUED | OUTPATIENT
Start: 2022-02-19 | End: 2022-02-25 | Stop reason: HOSPADM

## 2022-02-19 RX ORDER — AMOXICILLIN 250 MG
2 CAPSULE ORAL DAILY PRN
Status: DISCONTINUED | OUTPATIENT
Start: 2022-02-19 | End: 2022-02-25 | Stop reason: HOSPADM

## 2022-02-19 RX ORDER — GABAPENTIN 400 MG/1
800 CAPSULE ORAL EVERY 8 HOURS SCHEDULED
Status: DISCONTINUED | OUTPATIENT
Start: 2022-02-19 | End: 2022-02-25 | Stop reason: HOSPADM

## 2022-02-19 RX ORDER — PRAVASTATIN SODIUM 20 MG
20 TABLET ORAL NIGHTLY
Status: DISCONTINUED | OUTPATIENT
Start: 2022-02-19 | End: 2022-02-25 | Stop reason: HOSPADM

## 2022-02-19 RX ORDER — KETOROLAC TROMETHAMINE 15 MG/ML
15 INJECTION, SOLUTION INTRAMUSCULAR; INTRAVENOUS ONCE
Status: COMPLETED | OUTPATIENT
Start: 2022-02-19 | End: 2022-02-19

## 2022-02-19 RX ORDER — TRAZODONE HYDROCHLORIDE 150 MG/1
150 TABLET ORAL NIGHTLY
Status: DISCONTINUED | OUTPATIENT
Start: 2022-02-19 | End: 2022-02-25 | Stop reason: HOSPADM

## 2022-02-19 RX ORDER — BUDESONIDE AND FORMOTEROL FUMARATE DIHYDRATE 160; 4.5 UG/1; UG/1
2 AEROSOL RESPIRATORY (INHALATION)
Status: DISCONTINUED | OUTPATIENT
Start: 2022-02-19 | End: 2022-02-25 | Stop reason: HOSPADM

## 2022-02-19 RX ORDER — PANTOPRAZOLE SODIUM 40 MG/1
40 TABLET, DELAYED RELEASE ORAL DAILY
Status: DISCONTINUED | OUTPATIENT
Start: 2022-02-20 | End: 2022-02-25 | Stop reason: HOSPADM

## 2022-02-19 RX ORDER — HEPARIN SODIUM 5000 [USP'U]/ML
5000 INJECTION, SOLUTION INTRAVENOUS; SUBCUTANEOUS EVERY 12 HOURS SCHEDULED
Status: DISCONTINUED | OUTPATIENT
Start: 2022-02-20 | End: 2022-02-19

## 2022-02-19 RX ORDER — SODIUM CHLORIDE 0.9 % (FLUSH) 0.9 %
10 SYRINGE (ML) INJECTION EVERY 12 HOURS SCHEDULED
Status: DISCONTINUED | OUTPATIENT
Start: 2022-02-19 | End: 2022-02-25 | Stop reason: HOSPADM

## 2022-02-19 RX ORDER — ACETAMINOPHEN 325 MG/1
650 TABLET ORAL EVERY 6 HOURS PRN
Status: DISCONTINUED | OUTPATIENT
Start: 2022-02-19 | End: 2022-02-25 | Stop reason: HOSPADM

## 2022-02-19 RX ORDER — CILOSTAZOL 100 MG/1
100 TABLET ORAL
Status: DISCONTINUED | OUTPATIENT
Start: 2022-02-20 | End: 2022-02-19

## 2022-02-19 RX ORDER — BUTALBITAL, ACETAMINOPHEN AND CAFFEINE 50; 325; 40 MG/1; MG/1; MG/1
1 TABLET ORAL EVERY 6 HOURS PRN
Status: DISCONTINUED | OUTPATIENT
Start: 2022-02-19 | End: 2022-02-25 | Stop reason: HOSPADM

## 2022-02-19 RX ADMIN — PRAVASTATIN SODIUM 20 MG: 20 TABLET ORAL at 23:58

## 2022-02-19 RX ADMIN — SODIUM CHLORIDE, PRESERVATIVE FREE 10 ML: 5 INJECTION INTRAVENOUS at 23:10

## 2022-02-19 RX ADMIN — SODIUM CHLORIDE 1000 ML: 9 INJECTION, SOLUTION INTRAVENOUS at 19:06

## 2022-02-19 RX ADMIN — GABAPENTIN 800 MG: 400 CAPSULE ORAL at 23:09

## 2022-02-19 RX ADMIN — TRAZODONE HYDROCHLORIDE 150 MG: 150 TABLET ORAL at 23:09

## 2022-02-19 RX ADMIN — BUTALBITAL, ACETAMINOPHEN, AND CAFFEINE 1 TABLET: 50; 325; 40 TABLET ORAL at 23:10

## 2022-02-19 RX ADMIN — SODIUM CHLORIDE, POTASSIUM CHLORIDE, SODIUM LACTATE AND CALCIUM CHLORIDE 50 ML/HR: 600; 310; 30; 20 INJECTION, SOLUTION INTRAVENOUS at 23:56

## 2022-02-19 RX ADMIN — KETOROLAC TROMETHAMINE 15 MG: 15 INJECTION, SOLUTION INTRAMUSCULAR; INTRAVENOUS at 20:18

## 2022-02-19 RX ADMIN — BUDESONIDE AND FORMOTEROL FUMARATE DIHYDRATE 2 PUFF: 160; 4.5 AEROSOL RESPIRATORY (INHALATION) at 23:57

## 2022-02-19 NOTE — PROGRESS NOTES
Time of phone conversation: 12:39 CST 2/19/2022       Mona Perales is a 71 y.o. y.o. female  who contacted service team physician over the weekend.  Patient reports a possible muscular knot or strain in the lower limb.  Patient is concerned for blood clot.  Patient advised to go to the emergency department for further investigation.     At the end of our phone conversation Mona Perales was happy, content and will attend the ED for further investigation. The patient will contact me should they have any queries or concerns.     Signature  Papa Tellez MD  [facility]  [address] [place], [place] [address]  Office: [phone]

## 2022-02-20 ENCOUNTER — APPOINTMENT (OUTPATIENT)
Dept: CARDIOLOGY | Facility: HOSPITAL | Age: 72
End: 2022-02-20

## 2022-02-20 ENCOUNTER — APPOINTMENT (OUTPATIENT)
Dept: MRI IMAGING | Facility: HOSPITAL | Age: 72
End: 2022-02-20

## 2022-02-20 LAB
ALBUMIN SERPL-MCNC: 3.6 G/DL (ref 3.5–5.2)
ALBUMIN/GLOB SERPL: 1.2 G/DL
ALP SERPL-CCNC: 106 U/L (ref 39–117)
ALT SERPL W P-5'-P-CCNC: 8 U/L (ref 1–33)
ANION GAP SERPL CALCULATED.3IONS-SCNC: 13 MMOL/L (ref 5–15)
AST SERPL-CCNC: 15 U/L (ref 1–32)
BH CV ECHO MEAS - ACS: 1.8 CM
BH CV ECHO MEAS - AO MAX PG (FULL): 2.8 MMHG
BH CV ECHO MEAS - AO MAX PG: 8.1 MMHG
BH CV ECHO MEAS - AO MEAN PG (FULL): 2 MMHG
BH CV ECHO MEAS - AO MEAN PG: 4 MMHG
BH CV ECHO MEAS - AO ROOT AREA (BSA CORRECTED): 1.8
BH CV ECHO MEAS - AO ROOT AREA: 7.1 CM^2
BH CV ECHO MEAS - AO ROOT DIAM: 3 CM
BH CV ECHO MEAS - AO V2 MAX: 142 CM/SEC
BH CV ECHO MEAS - AO V2 MEAN: 85.2 CM/SEC
BH CV ECHO MEAS - AO V2 VTI: 26.2 CM
BH CV ECHO MEAS - ASC AORTA: 2.5 CM
BH CV ECHO MEAS - AVA(I,A): 2.3 CM^2
BH CV ECHO MEAS - AVA(I,D): 2.3 CM^2
BH CV ECHO MEAS - AVA(V,A): 2.1 CM^2
BH CV ECHO MEAS - AVA(V,D): 2.1 CM^2
BH CV ECHO MEAS - BSA(HAYCOCK): 1.7 M^2
BH CV ECHO MEAS - BSA: 1.7 M^2
BH CV ECHO MEAS - BZI_BMI: 26.3 KILOGRAMS/M^2
BH CV ECHO MEAS - BZI_METRIC_HEIGHT: 157.5 CM
BH CV ECHO MEAS - BZI_METRIC_WEIGHT: 65.3 KG
BH CV ECHO MEAS - EDV(CUBED): 94.8 ML
BH CV ECHO MEAS - EDV(MOD-SP2): 55.8 ML
BH CV ECHO MEAS - EDV(MOD-SP4): 46.1 ML
BH CV ECHO MEAS - EDV(TEICH): 95.4 ML
BH CV ECHO MEAS - EF(CUBED): 61.8 %
BH CV ECHO MEAS - EF(MOD-SP2): 66.5 %
BH CV ECHO MEAS - EF(MOD-SP4): 73.8 %
BH CV ECHO MEAS - EF(TEICH): 53.4 %
BH CV ECHO MEAS - ESV(CUBED): 36.3 ML
BH CV ECHO MEAS - ESV(MOD-SP2): 18.7 ML
BH CV ECHO MEAS - ESV(MOD-SP4): 12.1 ML
BH CV ECHO MEAS - ESV(TEICH): 44.5 ML
BH CV ECHO MEAS - FS: 27.4 %
BH CV ECHO MEAS - IVS/LVPW: 1.3
BH CV ECHO MEAS - IVSD: 1.2 CM
BH CV ECHO MEAS - LA DIMENSION: 3.6 CM
BH CV ECHO MEAS - LA/AO: 1.2
BH CV ECHO MEAS - LV DIASTOLIC VOL/BSA (35-75): 27.7 ML/M^2
BH CV ECHO MEAS - LV MASS(C)D: 164.2 GRAMS
BH CV ECHO MEAS - LV MASS(C)DI: 98.8 GRAMS/M^2
BH CV ECHO MEAS - LV MAX PG: 5.3 MMHG
BH CV ECHO MEAS - LV MEAN PG: 2 MMHG
BH CV ECHO MEAS - LV SYSTOLIC VOL/BSA (12-30): 7.3 ML/M^2
BH CV ECHO MEAS - LV V1 MAX: 115 CM/SEC
BH CV ECHO MEAS - LV V1 MEAN: 65.9 CM/SEC
BH CV ECHO MEAS - LV V1 VTI: 23.4 CM
BH CV ECHO MEAS - LVIDD: 4.6 CM
BH CV ECHO MEAS - LVIDS: 3.3 CM
BH CV ECHO MEAS - LVLD AP2: 7.2 CM
BH CV ECHO MEAS - LVLD AP4: 7 CM
BH CV ECHO MEAS - LVLS AP2: 5.9 CM
BH CV ECHO MEAS - LVLS AP4: 5.6 CM
BH CV ECHO MEAS - LVOT AREA (M): 2.5 CM^2
BH CV ECHO MEAS - LVOT AREA: 2.5 CM^2
BH CV ECHO MEAS - LVOT DIAM: 1.8 CM
BH CV ECHO MEAS - LVPWD: 0.91 CM
BH CV ECHO MEAS - MR MAX PG: 54.2 MMHG
BH CV ECHO MEAS - MR MAX VEL: 368 CM/SEC
BH CV ECHO MEAS - MV A MAX VEL: 85.7 CM/SEC
BH CV ECHO MEAS - MV DEC SLOPE: 652 CM/SEC^2
BH CV ECHO MEAS - MV E MAX VEL: 117 CM/SEC
BH CV ECHO MEAS - MV E/A: 1.4
BH CV ECHO MEAS - MV MAX PG: 5.9 MMHG
BH CV ECHO MEAS - MV MEAN PG: 2 MMHG
BH CV ECHO MEAS - MV P1/2T MAX VEL: 128 CM/SEC
BH CV ECHO MEAS - MV P1/2T: 57.5 MSEC
BH CV ECHO MEAS - MV V2 MAX: 121 CM/SEC
BH CV ECHO MEAS - MV V2 MEAN: 60.2 CM/SEC
BH CV ECHO MEAS - MV V2 VTI: 32.6 CM
BH CV ECHO MEAS - MVA P1/2T LCG: 1.7 CM^2
BH CV ECHO MEAS - MVA(P1/2T): 3.8 CM^2
BH CV ECHO MEAS - MVA(VTI): 1.8 CM^2
BH CV ECHO MEAS - PA MAX PG: 6.2 MMHG
BH CV ECHO MEAS - PA V2 MAX: 124 CM/SEC
BH CV ECHO MEAS - RAP SYSTOLE: 20 MMHG
BH CV ECHO MEAS - RVDD: 2.6 CM
BH CV ECHO MEAS - RVSP: 41.7 MMHG
BH CV ECHO MEAS - SI(AO): 111.4 ML/M^2
BH CV ECHO MEAS - SI(CUBED): 35.2 ML/M^2
BH CV ECHO MEAS - SI(LVOT): 35.8 ML/M^2
BH CV ECHO MEAS - SI(MOD-SP2): 22.3 ML/M^2
BH CV ECHO MEAS - SI(MOD-SP4): 20.5 ML/M^2
BH CV ECHO MEAS - SI(TEICH): 30.6 ML/M^2
BH CV ECHO MEAS - SV(AO): 185.2 ML
BH CV ECHO MEAS - SV(CUBED): 58.6 ML
BH CV ECHO MEAS - SV(LVOT): 59.5 ML
BH CV ECHO MEAS - SV(MOD-SP2): 37.1 ML
BH CV ECHO MEAS - SV(MOD-SP4): 34 ML
BH CV ECHO MEAS - SV(TEICH): 50.9 ML
BH CV ECHO MEAS - TR MAX VEL: 233 CM/SEC
BILIRUB SERPL-MCNC: 0.5 MG/DL (ref 0–1.2)
BUN SERPL-MCNC: 4 MG/DL (ref 8–23)
BUN/CREAT SERPL: 4.8 (ref 7–25)
CALCIUM SPEC-SCNC: 8.3 MG/DL (ref 8.6–10.5)
CHLORIDE SERPL-SCNC: 104 MMOL/L (ref 98–107)
CO2 SERPL-SCNC: 26 MMOL/L (ref 22–29)
CREAT SERPL-MCNC: 0.84 MG/DL (ref 0.57–1)
DEPRECATED RDW RBC AUTO: 67.5 FL (ref 37–54)
ERYTHROCYTE [DISTWIDTH] IN BLOOD BY AUTOMATED COUNT: 23.2 % (ref 12.3–15.4)
FOLATE SERPL-MCNC: 11.7 NG/ML (ref 4.78–24.2)
GFR SERPL CREATININE-BSD FRML MDRD: 67 ML/MIN/1.73
GLOBULIN UR ELPH-MCNC: 3 GM/DL
GLUCOSE SERPL-MCNC: 91 MG/DL (ref 65–99)
HBA1C MFR BLD: 5.3 % (ref 4.8–5.6)
HCT VFR BLD AUTO: 29.4 % (ref 34–46.6)
HGB BLD-MCNC: 9.4 G/DL (ref 12–15.9)
IRON 24H UR-MRATE: 36 MCG/DL (ref 37–145)
IRON SATN MFR SERPL: 8 % (ref 20–50)
LV EF 2D ECHO EST: 63 %
MAGNESIUM SERPL-MCNC: 1.6 MG/DL (ref 1.6–2.4)
MAXIMAL PREDICTED HEART RATE: 151 BPM
MCH RBC QN AUTO: 26.5 PG (ref 26.6–33)
MCHC RBC AUTO-ENTMCNC: 32 G/DL (ref 31.5–35.7)
MCV RBC AUTO: 82.8 FL (ref 79–97)
PLATELET # BLD AUTO: 328 10*3/MM3 (ref 140–450)
PMV BLD AUTO: 9 FL (ref 6–12)
POTASSIUM SERPL-SCNC: 3.4 MMOL/L (ref 3.5–5.2)
POTASSIUM SERPL-SCNC: 4.7 MMOL/L (ref 3.5–5.2)
PROT SERPL-MCNC: 6.6 G/DL (ref 6–8.5)
QT INTERVAL: 412 MS
QT INTERVAL: 460 MS
QTC INTERVAL: 440 MS
QTC INTERVAL: 475 MS
RBC # BLD AUTO: 3.55 10*6/MM3 (ref 3.77–5.28)
SODIUM SERPL-SCNC: 143 MMOL/L (ref 136–145)
STRESS TARGET HR: 128 BPM
T4 FREE SERPL-MCNC: 1.14 NG/DL (ref 0.93–1.7)
TIBC SERPL-MCNC: 471 MCG/DL (ref 298–536)
TRANSFERRIN SERPL-MCNC: 316 MG/DL (ref 200–360)
TSH SERPL DL<=0.05 MIU/L-ACNC: 1.93 UIU/ML (ref 0.27–4.2)
VIT B12 BLD-MCNC: 715 PG/ML (ref 211–946)
WBC NRBC COR # BLD: 7.92 10*3/MM3 (ref 3.4–10.8)

## 2022-02-20 PROCEDURE — 94799 UNLISTED PULMONARY SVC/PX: CPT

## 2022-02-20 PROCEDURE — 70551 MRI BRAIN STEM W/O DYE: CPT

## 2022-02-20 PROCEDURE — 93005 ELECTROCARDIOGRAM TRACING: CPT | Performed by: INTERNAL MEDICINE

## 2022-02-20 PROCEDURE — 83735 ASSAY OF MAGNESIUM: CPT | Performed by: INTERNAL MEDICINE

## 2022-02-20 PROCEDURE — G0378 HOSPITAL OBSERVATION PER HR: HCPCS

## 2022-02-20 PROCEDURE — 93306 TTE W/DOPPLER COMPLETE: CPT | Performed by: INTERNAL MEDICINE

## 2022-02-20 PROCEDURE — 82607 VITAMIN B-12: CPT | Performed by: INTERNAL MEDICINE

## 2022-02-20 PROCEDURE — 83036 HEMOGLOBIN GLYCOSYLATED A1C: CPT | Performed by: INTERNAL MEDICINE

## 2022-02-20 PROCEDURE — 25010000002 NA FERRIC GLUC CPLX PER 12.5 MG: Performed by: INTERNAL MEDICINE

## 2022-02-20 PROCEDURE — 84439 ASSAY OF FREE THYROXINE: CPT | Performed by: INTERNAL MEDICINE

## 2022-02-20 PROCEDURE — 85027 COMPLETE CBC AUTOMATED: CPT | Performed by: INTERNAL MEDICINE

## 2022-02-20 PROCEDURE — 97162 PT EVAL MOD COMPLEX 30 MIN: CPT

## 2022-02-20 PROCEDURE — 83540 ASSAY OF IRON: CPT | Performed by: INTERNAL MEDICINE

## 2022-02-20 PROCEDURE — 84443 ASSAY THYROID STIM HORMONE: CPT | Performed by: INTERNAL MEDICINE

## 2022-02-20 PROCEDURE — 84466 ASSAY OF TRANSFERRIN: CPT | Performed by: INTERNAL MEDICINE

## 2022-02-20 PROCEDURE — 94640 AIRWAY INHALATION TREATMENT: CPT

## 2022-02-20 PROCEDURE — 36415 COLL VENOUS BLD VENIPUNCTURE: CPT | Performed by: INTERNAL MEDICINE

## 2022-02-20 PROCEDURE — 25010000002 MAGNESIUM SULFATE 2 GM/50ML SOLUTION: Performed by: INTERNAL MEDICINE

## 2022-02-20 PROCEDURE — 84132 ASSAY OF SERUM POTASSIUM: CPT | Performed by: INTERNAL MEDICINE

## 2022-02-20 PROCEDURE — 93306 TTE W/DOPPLER COMPLETE: CPT

## 2022-02-20 PROCEDURE — 93010 ELECTROCARDIOGRAM REPORT: CPT | Performed by: INTERNAL MEDICINE

## 2022-02-20 PROCEDURE — 97166 OT EVAL MOD COMPLEX 45 MIN: CPT

## 2022-02-20 PROCEDURE — 82746 ASSAY OF FOLIC ACID SERUM: CPT | Performed by: INTERNAL MEDICINE

## 2022-02-20 PROCEDURE — 80053 COMPREHEN METABOLIC PANEL: CPT | Performed by: INTERNAL MEDICINE

## 2022-02-20 PROCEDURE — 94760 N-INVAS EAR/PLS OXIMETRY 1: CPT

## 2022-02-20 PROCEDURE — 25010000002 FUROSEMIDE PER 20 MG: Performed by: INTERNAL MEDICINE

## 2022-02-20 RX ORDER — POTASSIUM CHLORIDE 7.45 MG/ML
10 INJECTION INTRAVENOUS
Status: DISCONTINUED | OUTPATIENT
Start: 2022-02-20 | End: 2022-02-25 | Stop reason: HOSPADM

## 2022-02-20 RX ORDER — FUROSEMIDE 10 MG/ML
20 INJECTION INTRAMUSCULAR; INTRAVENOUS DAILY
Status: DISCONTINUED | OUTPATIENT
Start: 2022-02-20 | End: 2022-02-22

## 2022-02-20 RX ORDER — MAGNESIUM SULFATE HEPTAHYDRATE 40 MG/ML
2 INJECTION, SOLUTION INTRAVENOUS ONCE
Status: COMPLETED | OUTPATIENT
Start: 2022-02-20 | End: 2022-02-20

## 2022-02-20 RX ORDER — NICOTINE 21 MG/24HR
1 PATCH, TRANSDERMAL 24 HOURS TRANSDERMAL
Status: DISCONTINUED | OUTPATIENT
Start: 2022-02-20 | End: 2022-02-25 | Stop reason: HOSPADM

## 2022-02-20 RX ORDER — POTASSIUM CHLORIDE 750 MG/1
40 CAPSULE, EXTENDED RELEASE ORAL AS NEEDED
Status: DISCONTINUED | OUTPATIENT
Start: 2022-02-20 | End: 2022-02-25 | Stop reason: HOSPADM

## 2022-02-20 RX ORDER — POTASSIUM CHLORIDE 1.5 G/1.77G
40 POWDER, FOR SOLUTION ORAL AS NEEDED
Status: DISCONTINUED | OUTPATIENT
Start: 2022-02-20 | End: 2022-02-25 | Stop reason: HOSPADM

## 2022-02-20 RX ADMIN — SERTRALINE 100 MG: 50 TABLET, FILM COATED ORAL at 09:05

## 2022-02-20 RX ADMIN — MAGNESIUM SULFATE 2 G: 2 INJECTION INTRAVENOUS at 12:32

## 2022-02-20 RX ADMIN — OXYCODONE HYDROCHLORIDE AND ACETAMINOPHEN 2 TABLET: 5; 325 TABLET ORAL at 14:15

## 2022-02-20 RX ADMIN — FUROSEMIDE 20 MG: 10 INJECTION, SOLUTION INTRAMUSCULAR; INTRAVENOUS at 12:32

## 2022-02-20 RX ADMIN — TRAZODONE HYDROCHLORIDE 150 MG: 150 TABLET ORAL at 20:22

## 2022-02-20 RX ADMIN — APIXABAN 5 MG: 5 TABLET, FILM COATED ORAL at 09:05

## 2022-02-20 RX ADMIN — GABAPENTIN 800 MG: 400 CAPSULE ORAL at 14:15

## 2022-02-20 RX ADMIN — SODIUM CHLORIDE, PRESERVATIVE FREE 10 ML: 5 INJECTION INTRAVENOUS at 20:27

## 2022-02-20 RX ADMIN — PANTOPRAZOLE SODIUM 40 MG: 40 TABLET, DELAYED RELEASE ORAL at 09:05

## 2022-02-20 RX ADMIN — APIXABAN 5 MG: 5 TABLET, FILM COATED ORAL at 20:23

## 2022-02-20 RX ADMIN — CLOPIDOGREL BISULFATE 75 MG: 75 TABLET ORAL at 09:05

## 2022-02-20 RX ADMIN — POTASSIUM CHLORIDE 40 MEQ: 750 CAPSULE, EXTENDED RELEASE ORAL at 14:15

## 2022-02-20 RX ADMIN — OXYCODONE HYDROCHLORIDE AND ACETAMINOPHEN 2 TABLET: 5; 325 TABLET ORAL at 20:22

## 2022-02-20 RX ADMIN — BUDESONIDE AND FORMOTEROL FUMARATE DIHYDRATE 2 PUFF: 160; 4.5 AEROSOL RESPIRATORY (INHALATION) at 21:29

## 2022-02-20 RX ADMIN — POTASSIUM CHLORIDE 40 MEQ: 750 CAPSULE, EXTENDED RELEASE ORAL at 18:30

## 2022-02-20 RX ADMIN — BUDESONIDE AND FORMOTEROL FUMARATE DIHYDRATE 2 PUFF: 160; 4.5 AEROSOL RESPIRATORY (INHALATION) at 07:09

## 2022-02-20 RX ADMIN — Medication 1 PATCH: at 12:32

## 2022-02-20 RX ADMIN — SODIUM CHLORIDE, PRESERVATIVE FREE 10 ML: 5 INJECTION INTRAVENOUS at 10:49

## 2022-02-20 RX ADMIN — AMITRIPTYLINE HYDROCHLORIDE 25 MG: 25 TABLET, FILM COATED ORAL at 20:25

## 2022-02-20 RX ADMIN — PRAVASTATIN SODIUM 20 MG: 20 TABLET ORAL at 20:28

## 2022-02-20 RX ADMIN — GABAPENTIN 800 MG: 400 CAPSULE ORAL at 05:57

## 2022-02-20 RX ADMIN — SODIUM CHLORIDE 125 MG: 9 INJECTION, SOLUTION INTRAVENOUS at 13:37

## 2022-02-21 ENCOUNTER — APPOINTMENT (OUTPATIENT)
Dept: CT IMAGING | Facility: HOSPITAL | Age: 72
End: 2022-02-21

## 2022-02-21 ENCOUNTER — APPOINTMENT (OUTPATIENT)
Dept: INTERVENTIONAL RADIOLOGY/VASCULAR | Facility: HOSPITAL | Age: 72
End: 2022-02-21

## 2022-02-21 ENCOUNTER — APPOINTMENT (OUTPATIENT)
Dept: ULTRASOUND IMAGING | Facility: HOSPITAL | Age: 72
End: 2022-02-21

## 2022-02-21 LAB
ANION GAP SERPL CALCULATED.3IONS-SCNC: 7 MMOL/L (ref 5–15)
BASOPHILS # BLD AUTO: 0.07 10*3/MM3 (ref 0–0.2)
BASOPHILS NFR BLD AUTO: 0.8 % (ref 0–1.5)
BUN SERPL-MCNC: 10 MG/DL (ref 8–23)
BUN/CREAT SERPL: 11.1 (ref 7–25)
CALCIUM SPEC-SCNC: 8.3 MG/DL (ref 8.6–10.5)
CHLORIDE SERPL-SCNC: 107 MMOL/L (ref 98–107)
CO2 SERPL-SCNC: 27 MMOL/L (ref 22–29)
CREAT SERPL-MCNC: 0.9 MG/DL (ref 0.57–1)
DEPRECATED RDW RBC AUTO: 70.3 FL (ref 37–54)
EOSINOPHIL # BLD AUTO: 0.29 10*3/MM3 (ref 0–0.4)
EOSINOPHIL NFR BLD AUTO: 3.4 % (ref 0.3–6.2)
ERYTHROCYTE [DISTWIDTH] IN BLOOD BY AUTOMATED COUNT: 23.3 % (ref 12.3–15.4)
GFR SERPL CREATININE-BSD FRML MDRD: 62 ML/MIN/1.73
GLUCOSE SERPL-MCNC: 103 MG/DL (ref 65–99)
HCT VFR BLD AUTO: 25.2 % (ref 34–46.6)
HGB BLD-MCNC: 7.8 G/DL (ref 12–15.9)
IMM GRANULOCYTES # BLD AUTO: 0.02 10*3/MM3 (ref 0–0.05)
IMM GRANULOCYTES NFR BLD AUTO: 0.2 % (ref 0–0.5)
LYMPHOCYTES # BLD AUTO: 2.58 10*3/MM3 (ref 0.7–3.1)
LYMPHOCYTES NFR BLD AUTO: 30.2 % (ref 19.6–45.3)
MAGNESIUM SERPL-MCNC: 2.2 MG/DL (ref 1.6–2.4)
MCH RBC QN AUTO: 26.3 PG (ref 26.6–33)
MCHC RBC AUTO-ENTMCNC: 31 G/DL (ref 31.5–35.7)
MCV RBC AUTO: 84.8 FL (ref 79–97)
MONOCYTES # BLD AUTO: 1.11 10*3/MM3 (ref 0.1–0.9)
MONOCYTES NFR BLD AUTO: 13 % (ref 5–12)
NEUTROPHILS NFR BLD AUTO: 4.46 10*3/MM3 (ref 1.7–7)
NEUTROPHILS NFR BLD AUTO: 52.4 % (ref 42.7–76)
NRBC BLD AUTO-RTO: 0 /100 WBC (ref 0–0.2)
PLATELET # BLD AUTO: 303 10*3/MM3 (ref 140–450)
PMV BLD AUTO: 9 FL (ref 6–12)
POTASSIUM SERPL-SCNC: 4.1 MMOL/L (ref 3.5–5.2)
RBC # BLD AUTO: 2.97 10*6/MM3 (ref 3.77–5.28)
SODIUM SERPL-SCNC: 141 MMOL/L (ref 136–145)
WBC NRBC COR # BLD: 8.53 10*3/MM3 (ref 3.4–10.8)

## 2022-02-21 PROCEDURE — G0378 HOSPITAL OBSERVATION PER HR: HCPCS

## 2022-02-21 PROCEDURE — 70498 CT ANGIOGRAPHY NECK: CPT

## 2022-02-21 PROCEDURE — 97110 THERAPEUTIC EXERCISES: CPT

## 2022-02-21 PROCEDURE — 25010000002 FUROSEMIDE PER 20 MG: Performed by: INTERNAL MEDICINE

## 2022-02-21 PROCEDURE — 0 IOPAMIDOL PER 1 ML: Performed by: INTERNAL MEDICINE

## 2022-02-21 PROCEDURE — 94799 UNLISTED PULMONARY SVC/PX: CPT

## 2022-02-21 PROCEDURE — 70496 CT ANGIOGRAPHY HEAD: CPT

## 2022-02-21 PROCEDURE — 83735 ASSAY OF MAGNESIUM: CPT | Performed by: INTERNAL MEDICINE

## 2022-02-21 PROCEDURE — 97530 THERAPEUTIC ACTIVITIES: CPT

## 2022-02-21 PROCEDURE — 25010000002 NA FERRIC GLUC CPLX PER 12.5 MG: Performed by: INTERNAL MEDICINE

## 2022-02-21 PROCEDURE — 97116 GAIT TRAINING THERAPY: CPT

## 2022-02-21 PROCEDURE — 94760 N-INVAS EAR/PLS OXIMETRY 1: CPT

## 2022-02-21 PROCEDURE — C1751 CATH, INF, PER/CENT/MIDLINE: HCPCS

## 2022-02-21 PROCEDURE — 36410 VNPNXR 3YR/> PHY/QHP DX/THER: CPT

## 2022-02-21 PROCEDURE — 80048 BASIC METABOLIC PNL TOTAL CA: CPT | Performed by: INTERNAL MEDICINE

## 2022-02-21 PROCEDURE — 85025 COMPLETE CBC W/AUTO DIFF WBC: CPT | Performed by: INTERNAL MEDICINE

## 2022-02-21 PROCEDURE — 76937 US GUIDE VASCULAR ACCESS: CPT

## 2022-02-21 RX ADMIN — FUROSEMIDE 20 MG: 10 INJECTION, SOLUTION INTRAMUSCULAR; INTRAVENOUS at 08:39

## 2022-02-21 RX ADMIN — APIXABAN 5 MG: 5 TABLET, FILM COATED ORAL at 08:37

## 2022-02-21 RX ADMIN — RIVAROXABAN 20 MG: 10 TABLET, FILM COATED ORAL at 17:20

## 2022-02-21 RX ADMIN — OXYCODONE HYDROCHLORIDE AND ACETAMINOPHEN 2 TABLET: 5; 325 TABLET ORAL at 19:35

## 2022-02-21 RX ADMIN — AMITRIPTYLINE HYDROCHLORIDE 25 MG: 25 TABLET, FILM COATED ORAL at 21:19

## 2022-02-21 RX ADMIN — IOPAMIDOL 90 ML: 755 INJECTION, SOLUTION INTRAVENOUS at 15:08

## 2022-02-21 RX ADMIN — GABAPENTIN 800 MG: 400 CAPSULE ORAL at 14:07

## 2022-02-21 RX ADMIN — TRAZODONE HYDROCHLORIDE 150 MG: 150 TABLET ORAL at 21:18

## 2022-02-21 RX ADMIN — OXYCODONE HYDROCHLORIDE AND ACETAMINOPHEN 2 TABLET: 5; 325 TABLET ORAL at 05:57

## 2022-02-21 RX ADMIN — OXYCODONE HYDROCHLORIDE AND ACETAMINOPHEN 2 TABLET: 5; 325 TABLET ORAL at 14:07

## 2022-02-21 RX ADMIN — BUDESONIDE AND FORMOTEROL FUMARATE DIHYDRATE 2 PUFF: 160; 4.5 AEROSOL RESPIRATORY (INHALATION) at 06:56

## 2022-02-21 RX ADMIN — Medication 1 PATCH: at 08:40

## 2022-02-21 RX ADMIN — SODIUM CHLORIDE, PRESERVATIVE FREE 10 ML: 5 INJECTION INTRAVENOUS at 21:20

## 2022-02-21 RX ADMIN — SERTRALINE 100 MG: 50 TABLET, FILM COATED ORAL at 08:38

## 2022-02-21 RX ADMIN — BUDESONIDE AND FORMOTEROL FUMARATE DIHYDRATE 2 PUFF: 160; 4.5 AEROSOL RESPIRATORY (INHALATION) at 21:00

## 2022-02-21 RX ADMIN — PANTOPRAZOLE SODIUM 40 MG: 40 TABLET, DELAYED RELEASE ORAL at 08:37

## 2022-02-21 RX ADMIN — GABAPENTIN 800 MG: 400 CAPSULE ORAL at 21:18

## 2022-02-21 RX ADMIN — CLOPIDOGREL BISULFATE 75 MG: 75 TABLET ORAL at 08:38

## 2022-02-21 RX ADMIN — GABAPENTIN 800 MG: 400 CAPSULE ORAL at 05:51

## 2022-02-21 RX ADMIN — SODIUM CHLORIDE, PRESERVATIVE FREE 10 ML: 5 INJECTION INTRAVENOUS at 08:39

## 2022-02-21 RX ADMIN — PRAVASTATIN SODIUM 20 MG: 20 TABLET ORAL at 21:19

## 2022-02-22 ENCOUNTER — APPOINTMENT (OUTPATIENT)
Dept: GENERAL RADIOLOGY | Facility: HOSPITAL | Age: 72
End: 2022-02-22

## 2022-02-22 LAB
ANION GAP SERPL CALCULATED.3IONS-SCNC: 9 MMOL/L (ref 5–15)
BASOPHILS # BLD AUTO: 0.04 10*3/MM3 (ref 0–0.2)
BASOPHILS NFR BLD AUTO: 0.5 % (ref 0–1.5)
BUN SERPL-MCNC: 10 MG/DL (ref 8–23)
BUN/CREAT SERPL: 13.2 (ref 7–25)
CALCIUM SPEC-SCNC: 8.5 MG/DL (ref 8.6–10.5)
CHLORIDE SERPL-SCNC: 104 MMOL/L (ref 98–107)
CO2 SERPL-SCNC: 24 MMOL/L (ref 22–29)
CREAT SERPL-MCNC: 0.76 MG/DL (ref 0.57–1)
DEPRECATED RDW RBC AUTO: 66.7 FL (ref 37–54)
EOSINOPHIL # BLD AUTO: 0.35 10*3/MM3 (ref 0–0.4)
EOSINOPHIL NFR BLD AUTO: 4.2 % (ref 0.3–6.2)
ERYTHROCYTE [DISTWIDTH] IN BLOOD BY AUTOMATED COUNT: 22.6 % (ref 12.3–15.4)
GFR SERPL CREATININE-BSD FRML MDRD: 75 ML/MIN/1.73
GLUCOSE SERPL-MCNC: 89 MG/DL (ref 65–99)
HCT VFR BLD AUTO: 24.7 % (ref 34–46.6)
HGB BLD-MCNC: 7.8 G/DL (ref 12–15.9)
IMM GRANULOCYTES # BLD AUTO: 0.03 10*3/MM3 (ref 0–0.05)
IMM GRANULOCYTES NFR BLD AUTO: 0.4 % (ref 0–0.5)
LYMPHOCYTES # BLD AUTO: 2.11 10*3/MM3 (ref 0.7–3.1)
LYMPHOCYTES NFR BLD AUTO: 25.5 % (ref 19.6–45.3)
MCH RBC QN AUTO: 26.2 PG (ref 26.6–33)
MCHC RBC AUTO-ENTMCNC: 31.6 G/DL (ref 31.5–35.7)
MCV RBC AUTO: 82.9 FL (ref 79–97)
MONOCYTES # BLD AUTO: 0.8 10*3/MM3 (ref 0.1–0.9)
MONOCYTES NFR BLD AUTO: 9.7 % (ref 5–12)
NEUTROPHILS NFR BLD AUTO: 4.94 10*3/MM3 (ref 1.7–7)
NEUTROPHILS NFR BLD AUTO: 59.7 % (ref 42.7–76)
NRBC BLD AUTO-RTO: 0 /100 WBC (ref 0–0.2)
PLATELET # BLD AUTO: 267 10*3/MM3 (ref 140–450)
PMV BLD AUTO: 9.2 FL (ref 6–12)
POTASSIUM SERPL-SCNC: 4.3 MMOL/L (ref 3.5–5.2)
RBC # BLD AUTO: 2.98 10*6/MM3 (ref 3.77–5.28)
SODIUM SERPL-SCNC: 137 MMOL/L (ref 136–145)
WBC NRBC COR # BLD: 8.27 10*3/MM3 (ref 3.4–10.8)

## 2022-02-22 PROCEDURE — 36415 COLL VENOUS BLD VENIPUNCTURE: CPT | Performed by: INTERNAL MEDICINE

## 2022-02-22 PROCEDURE — 80048 BASIC METABOLIC PNL TOTAL CA: CPT | Performed by: INTERNAL MEDICINE

## 2022-02-22 PROCEDURE — 97110 THERAPEUTIC EXERCISES: CPT

## 2022-02-22 PROCEDURE — 25010000002 ALBUMIN HUMAN 25% PER 50 ML: Performed by: INTERNAL MEDICINE

## 2022-02-22 PROCEDURE — 25010000002 ONDANSETRON PER 1 MG: Performed by: INTERNAL MEDICINE

## 2022-02-22 PROCEDURE — P9047 ALBUMIN (HUMAN), 25%, 50ML: HCPCS | Performed by: INTERNAL MEDICINE

## 2022-02-22 PROCEDURE — 71046 X-RAY EXAM CHEST 2 VIEWS: CPT

## 2022-02-22 PROCEDURE — 85025 COMPLETE CBC W/AUTO DIFF WBC: CPT

## 2022-02-22 PROCEDURE — 94799 UNLISTED PULMONARY SVC/PX: CPT

## 2022-02-22 PROCEDURE — 25010000002 NA FERRIC GLUC CPLX PER 12.5 MG: Performed by: INTERNAL MEDICINE

## 2022-02-22 RX ORDER — MIDODRINE HYDROCHLORIDE 5 MG/1
5 TABLET ORAL
Status: DISCONTINUED | OUTPATIENT
Start: 2022-02-22 | End: 2022-02-23

## 2022-02-22 RX ORDER — FUROSEMIDE 20 MG/1
20 TABLET ORAL DAILY
Status: DISCONTINUED | OUTPATIENT
Start: 2022-02-23 | End: 2022-02-25 | Stop reason: HOSPADM

## 2022-02-22 RX ORDER — ONDANSETRON 2 MG/ML
4 INJECTION INTRAMUSCULAR; INTRAVENOUS EVERY 6 HOURS PRN
Status: DISCONTINUED | OUTPATIENT
Start: 2022-02-22 | End: 2022-02-25 | Stop reason: HOSPADM

## 2022-02-22 RX ORDER — ALBUMIN (HUMAN) 12.5 G/50ML
25 SOLUTION INTRAVENOUS ONCE
Status: COMPLETED | OUTPATIENT
Start: 2022-02-22 | End: 2022-02-22

## 2022-02-22 RX ADMIN — SODIUM CHLORIDE, PRESERVATIVE FREE 10 ML: 5 INJECTION INTRAVENOUS at 08:45

## 2022-02-22 RX ADMIN — AMITRIPTYLINE HYDROCHLORIDE 25 MG: 25 TABLET, FILM COATED ORAL at 21:40

## 2022-02-22 RX ADMIN — SODIUM CHLORIDE 125 MG: 9 INJECTION, SOLUTION INTRAVENOUS at 08:50

## 2022-02-22 RX ADMIN — PRAVASTATIN SODIUM 20 MG: 20 TABLET ORAL at 21:40

## 2022-02-22 RX ADMIN — SODIUM CHLORIDE, PRESERVATIVE FREE 10 ML: 5 INJECTION INTRAVENOUS at 21:44

## 2022-02-22 RX ADMIN — TRAZODONE HYDROCHLORIDE 150 MG: 150 TABLET ORAL at 21:40

## 2022-02-22 RX ADMIN — GABAPENTIN 800 MG: 400 CAPSULE ORAL at 21:40

## 2022-02-22 RX ADMIN — MIDODRINE HYDROCHLORIDE 5 MG: 5 TABLET ORAL at 11:38

## 2022-02-22 RX ADMIN — ONDANSETRON 4 MG: 2 INJECTION INTRAMUSCULAR; INTRAVENOUS at 15:55

## 2022-02-22 RX ADMIN — SERTRALINE 100 MG: 50 TABLET, FILM COATED ORAL at 08:46

## 2022-02-22 RX ADMIN — BUTALBITAL, ACETAMINOPHEN, AND CAFFEINE 1 TABLET: 50; 325; 40 TABLET ORAL at 19:38

## 2022-02-22 RX ADMIN — SODIUM CHLORIDE, POTASSIUM CHLORIDE, SODIUM LACTATE AND CALCIUM CHLORIDE 250 ML: 600; 310; 30; 20 INJECTION, SOLUTION INTRAVENOUS at 04:52

## 2022-02-22 RX ADMIN — BUDESONIDE AND FORMOTEROL FUMARATE DIHYDRATE 2 PUFF: 160; 4.5 AEROSOL RESPIRATORY (INHALATION) at 07:14

## 2022-02-22 RX ADMIN — BUTALBITAL, ACETAMINOPHEN, AND CAFFEINE 1 TABLET: 50; 325; 40 TABLET ORAL at 08:50

## 2022-02-22 RX ADMIN — OXYCODONE HYDROCHLORIDE AND ACETAMINOPHEN 2 TABLET: 5; 325 TABLET ORAL at 01:28

## 2022-02-22 RX ADMIN — MIDODRINE HYDROCHLORIDE 5 MG: 5 TABLET ORAL at 10:14

## 2022-02-22 RX ADMIN — Medication 1 PATCH: at 08:43

## 2022-02-22 RX ADMIN — ALBUMIN HUMAN 25 G: 0.25 SOLUTION INTRAVENOUS at 12:20

## 2022-02-22 RX ADMIN — OXYCODONE HYDROCHLORIDE AND ACETAMINOPHEN 2 TABLET: 5; 325 TABLET ORAL at 16:31

## 2022-02-22 RX ADMIN — OXYCODONE HYDROCHLORIDE AND ACETAMINOPHEN 2 TABLET: 5; 325 TABLET ORAL at 22:44

## 2022-02-22 RX ADMIN — RIVAROXABAN 20 MG: 10 TABLET, FILM COATED ORAL at 17:04

## 2022-02-22 RX ADMIN — BUDESONIDE AND FORMOTEROL FUMARATE DIHYDRATE 2 PUFF: 160; 4.5 AEROSOL RESPIRATORY (INHALATION) at 20:50

## 2022-02-22 RX ADMIN — CLOPIDOGREL BISULFATE 75 MG: 75 TABLET ORAL at 08:46

## 2022-02-22 RX ADMIN — MIDODRINE HYDROCHLORIDE 5 MG: 5 TABLET ORAL at 16:31

## 2022-02-22 RX ADMIN — PANTOPRAZOLE SODIUM 40 MG: 40 TABLET, DELAYED RELEASE ORAL at 08:46

## 2022-02-23 LAB
ANION GAP SERPL CALCULATED.3IONS-SCNC: 9 MMOL/L (ref 5–15)
BUN SERPL-MCNC: 13 MG/DL (ref 8–23)
BUN/CREAT SERPL: 16.9 (ref 7–25)
CALCIUM SPEC-SCNC: 9 MG/DL (ref 8.6–10.5)
CHLORIDE SERPL-SCNC: 105 MMOL/L (ref 98–107)
CO2 SERPL-SCNC: 23 MMOL/L (ref 22–29)
CREAT SERPL-MCNC: 0.77 MG/DL (ref 0.57–1)
GFR SERPL CREATININE-BSD FRML MDRD: 74 ML/MIN/1.73
GLUCOSE SERPL-MCNC: 81 MG/DL (ref 65–99)
POTASSIUM SERPL-SCNC: 5.2 MMOL/L (ref 3.5–5.2)
SODIUM SERPL-SCNC: 137 MMOL/L (ref 136–145)

## 2022-02-23 PROCEDURE — 80048 BASIC METABOLIC PNL TOTAL CA: CPT | Performed by: INTERNAL MEDICINE

## 2022-02-23 PROCEDURE — 97530 THERAPEUTIC ACTIVITIES: CPT

## 2022-02-23 PROCEDURE — 36415 COLL VENOUS BLD VENIPUNCTURE: CPT | Performed by: INTERNAL MEDICINE

## 2022-02-23 PROCEDURE — 94799 UNLISTED PULMONARY SVC/PX: CPT

## 2022-02-23 PROCEDURE — 97110 THERAPEUTIC EXERCISES: CPT

## 2022-02-23 PROCEDURE — 25010000002 NA FERRIC GLUC CPLX PER 12.5 MG: Performed by: INTERNAL MEDICINE

## 2022-02-23 PROCEDURE — 94760 N-INVAS EAR/PLS OXIMETRY 1: CPT

## 2022-02-23 RX ORDER — MIDODRINE HYDROCHLORIDE 5 MG/1
10 TABLET ORAL
Status: DISCONTINUED | OUTPATIENT
Start: 2022-02-23 | End: 2022-02-25 | Stop reason: HOSPADM

## 2022-02-23 RX ADMIN — GABAPENTIN 800 MG: 400 CAPSULE ORAL at 14:11

## 2022-02-23 RX ADMIN — OXYCODONE HYDROCHLORIDE AND ACETAMINOPHEN 2 TABLET: 5; 325 TABLET ORAL at 05:51

## 2022-02-23 RX ADMIN — MIDODRINE HYDROCHLORIDE 5 MG: 5 TABLET ORAL at 07:53

## 2022-02-23 RX ADMIN — PRAVASTATIN SODIUM 20 MG: 20 TABLET ORAL at 20:16

## 2022-02-23 RX ADMIN — BUDESONIDE AND FORMOTEROL FUMARATE DIHYDRATE 2 PUFF: 160; 4.5 AEROSOL RESPIRATORY (INHALATION) at 19:22

## 2022-02-23 RX ADMIN — MIDODRINE HYDROCHLORIDE 5 MG: 5 TABLET ORAL at 11:31

## 2022-02-23 RX ADMIN — AMITRIPTYLINE HYDROCHLORIDE 25 MG: 25 TABLET, FILM COATED ORAL at 20:12

## 2022-02-23 RX ADMIN — PANTOPRAZOLE SODIUM 40 MG: 40 TABLET, DELAYED RELEASE ORAL at 08:31

## 2022-02-23 RX ADMIN — OXYCODONE HYDROCHLORIDE AND ACETAMINOPHEN 2 TABLET: 5; 325 TABLET ORAL at 11:31

## 2022-02-23 RX ADMIN — SERTRALINE 100 MG: 50 TABLET, FILM COATED ORAL at 08:31

## 2022-02-23 RX ADMIN — MIDODRINE HYDROCHLORIDE 10 MG: 5 TABLET ORAL at 17:30

## 2022-02-23 RX ADMIN — FUROSEMIDE 20 MG: 20 TABLET ORAL at 08:30

## 2022-02-23 RX ADMIN — RIVAROXABAN 20 MG: 10 TABLET, FILM COATED ORAL at 17:30

## 2022-02-23 RX ADMIN — SODIUM CHLORIDE 125 MG: 9 INJECTION, SOLUTION INTRAVENOUS at 09:58

## 2022-02-23 RX ADMIN — Medication 1 PATCH: at 08:31

## 2022-02-23 RX ADMIN — SODIUM CHLORIDE, PRESERVATIVE FREE 10 ML: 5 INJECTION INTRAVENOUS at 09:58

## 2022-02-23 RX ADMIN — BUTALBITAL, ACETAMINOPHEN, AND CAFFEINE 1 TABLET: 50; 325; 40 TABLET ORAL at 20:12

## 2022-02-23 RX ADMIN — TRAZODONE HYDROCHLORIDE 150 MG: 150 TABLET ORAL at 20:12

## 2022-02-23 RX ADMIN — OXYCODONE HYDROCHLORIDE AND ACETAMINOPHEN 2 TABLET: 5; 325 TABLET ORAL at 17:43

## 2022-02-23 RX ADMIN — BUDESONIDE AND FORMOTEROL FUMARATE DIHYDRATE 2 PUFF: 160; 4.5 AEROSOL RESPIRATORY (INHALATION) at 06:50

## 2022-02-23 RX ADMIN — GABAPENTIN 800 MG: 400 CAPSULE ORAL at 05:52

## 2022-02-23 RX ADMIN — SODIUM CHLORIDE, PRESERVATIVE FREE 10 ML: 5 INJECTION INTRAVENOUS at 20:16

## 2022-02-23 RX ADMIN — GABAPENTIN 800 MG: 400 CAPSULE ORAL at 21:15

## 2022-02-23 RX ADMIN — CLOPIDOGREL BISULFATE 75 MG: 75 TABLET ORAL at 08:31

## 2022-02-24 ENCOUNTER — APPOINTMENT (OUTPATIENT)
Dept: GENERAL RADIOLOGY | Facility: HOSPITAL | Age: 72
End: 2022-02-24

## 2022-02-24 ENCOUNTER — HOME HEALTH ADMISSION (OUTPATIENT)
Dept: HOME HEALTH SERVICES | Facility: HOME HEALTHCARE | Age: 72
End: 2022-02-24

## 2022-02-24 PROBLEM — R42 DIZZINESS: Status: RESOLVED | Noted: 2017-07-06 | Resolved: 2022-02-24

## 2022-02-24 PROBLEM — I95.1 ORTHOSTATIC HYPOTENSION: Status: ACTIVE | Noted: 2022-02-24

## 2022-02-24 PROBLEM — I95.1 ORTHOSTATIC HYPOTENSION: Status: RESOLVED | Noted: 2022-02-24 | Resolved: 2022-02-24
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
## 2022-02-24 PROCEDURE — 94664 DEMO&/EVAL PT USE INHALER: CPT

## 2022-02-24 PROCEDURE — 97110 THERAPEUTIC EXERCISES: CPT

## 2022-02-24 PROCEDURE — 94799 UNLISTED PULMONARY SVC/PX: CPT

## 2022-02-24 PROCEDURE — 71045 X-RAY EXAM CHEST 1 VIEW: CPT

## 2022-02-24 PROCEDURE — 25010000002 NA FERRIC GLUC CPLX PER 12.5 MG: Performed by: INTERNAL MEDICINE

## 2022-02-24 PROCEDURE — 97535 SELF CARE MNGMENT TRAINING: CPT

## 2022-02-24 PROCEDURE — 94760 N-INVAS EAR/PLS OXIMETRY 1: CPT

## 2022-02-24 RX ORDER — MIDODRINE HYDROCHLORIDE 10 MG/1
10 TABLET ORAL
Qty: 90 TABLET | Refills: 0 | Status: SHIPPED | OUTPATIENT
Start: 2022-02-24 | End: 2022-02-24

## 2022-02-24 RX ORDER — MIDODRINE HYDROCHLORIDE 10 MG/1
10 TABLET ORAL
Qty: 90 TABLET | Refills: 0 | Status: SHIPPED | OUTPATIENT
Start: 2022-02-24 | End: 2022-11-17 | Stop reason: HOSPADM

## 2022-02-24 RX ORDER — CLOPIDOGREL BISULFATE 75 MG/1
75 TABLET ORAL DAILY
Qty: 30 TABLET | Refills: 11 | Status: SHIPPED | OUTPATIENT
Start: 2022-02-24 | End: 2022-05-02 | Stop reason: SDUPTHER

## 2022-02-24 RX ADMIN — BUDESONIDE AND FORMOTEROL FUMARATE DIHYDRATE 2 PUFF: 160; 4.5 AEROSOL RESPIRATORY (INHALATION) at 08:07

## 2022-02-24 RX ADMIN — GABAPENTIN 800 MG: 400 CAPSULE ORAL at 21:14

## 2022-02-24 RX ADMIN — SODIUM CHLORIDE 125 MG: 9 INJECTION, SOLUTION INTRAVENOUS at 09:31

## 2022-02-24 RX ADMIN — OXYCODONE HYDROCHLORIDE AND ACETAMINOPHEN 2 TABLET: 5; 325 TABLET ORAL at 20:00

## 2022-02-24 RX ADMIN — RIVAROXABAN 20 MG: 10 TABLET, FILM COATED ORAL at 17:20

## 2022-02-24 RX ADMIN — Medication 1 PATCH: at 08:29

## 2022-02-24 RX ADMIN — OXYCODONE HYDROCHLORIDE AND ACETAMINOPHEN 2 TABLET: 5; 325 TABLET ORAL at 10:54

## 2022-02-24 RX ADMIN — GABAPENTIN 800 MG: 400 CAPSULE ORAL at 14:14

## 2022-02-24 RX ADMIN — AMITRIPTYLINE HYDROCHLORIDE 25 MG: 25 TABLET, FILM COATED ORAL at 21:14

## 2022-02-24 RX ADMIN — GABAPENTIN 800 MG: 400 CAPSULE ORAL at 05:29

## 2022-02-24 RX ADMIN — CLOPIDOGREL BISULFATE 75 MG: 75 TABLET ORAL at 08:29

## 2022-02-24 RX ADMIN — TRAZODONE HYDROCHLORIDE 150 MG: 150 TABLET ORAL at 21:14

## 2022-02-24 RX ADMIN — PRAVASTATIN SODIUM 20 MG: 20 TABLET ORAL at 21:14

## 2022-02-24 RX ADMIN — FUROSEMIDE 20 MG: 20 TABLET ORAL at 08:29

## 2022-02-24 RX ADMIN — MIDODRINE HYDROCHLORIDE 10 MG: 5 TABLET ORAL at 10:51

## 2022-02-24 RX ADMIN — OXYCODONE HYDROCHLORIDE AND ACETAMINOPHEN 2 TABLET: 5; 325 TABLET ORAL at 03:17

## 2022-02-24 RX ADMIN — SODIUM CHLORIDE, PRESERVATIVE FREE 10 ML: 5 INJECTION INTRAVENOUS at 09:36

## 2022-02-24 RX ADMIN — MIDODRINE HYDROCHLORIDE 10 MG: 5 TABLET ORAL at 17:20

## 2022-02-24 RX ADMIN — BUDESONIDE AND FORMOTEROL FUMARATE DIHYDRATE 2 PUFF: 160; 4.5 AEROSOL RESPIRATORY (INHALATION) at 19:13

## 2022-02-24 RX ADMIN — SERTRALINE 100 MG: 50 TABLET, FILM COATED ORAL at 08:29

## 2022-02-24 RX ADMIN — MIDODRINE HYDROCHLORIDE 10 MG: 5 TABLET ORAL at 07:46

## 2022-02-24 RX ADMIN — PANTOPRAZOLE SODIUM 40 MG: 40 TABLET, DELAYED RELEASE ORAL at 08:29

## 2022-02-25 VITALS
OXYGEN SATURATION: 97 % | RESPIRATION RATE: 18 BRPM | WEIGHT: 132.4 LBS | BODY MASS INDEX: 24.37 KG/M2 | DIASTOLIC BLOOD PRESSURE: 60 MMHG | SYSTOLIC BLOOD PRESSURE: 131 MMHG | HEART RATE: 57 BPM | TEMPERATURE: 97.6 F | HEIGHT: 62 IN

## 2022-02-25 PROCEDURE — 94799 UNLISTED PULMONARY SVC/PX: CPT

## 2022-02-25 PROCEDURE — 97535 SELF CARE MNGMENT TRAINING: CPT

## 2022-02-25 PROCEDURE — 97110 THERAPEUTIC EXERCISES: CPT

## 2022-02-25 PROCEDURE — 94760 N-INVAS EAR/PLS OXIMETRY 1: CPT

## 2022-02-25 RX ADMIN — OXYCODONE HYDROCHLORIDE AND ACETAMINOPHEN 2 TABLET: 5; 325 TABLET ORAL at 10:23

## 2022-02-25 RX ADMIN — CLOPIDOGREL BISULFATE 75 MG: 75 TABLET ORAL at 07:57

## 2022-02-25 RX ADMIN — MIDODRINE HYDROCHLORIDE 10 MG: 5 TABLET ORAL at 07:56

## 2022-02-25 RX ADMIN — RIVAROXABAN 20 MG: 10 TABLET, FILM COATED ORAL at 16:48

## 2022-02-25 RX ADMIN — GABAPENTIN 800 MG: 400 CAPSULE ORAL at 06:12

## 2022-02-25 RX ADMIN — SERTRALINE 100 MG: 50 TABLET, FILM COATED ORAL at 07:57

## 2022-02-25 RX ADMIN — Medication 1 PATCH: at 07:55

## 2022-02-25 RX ADMIN — PANTOPRAZOLE SODIUM 40 MG: 40 TABLET, DELAYED RELEASE ORAL at 07:57

## 2022-02-25 RX ADMIN — SODIUM CHLORIDE, PRESERVATIVE FREE 10 ML: 5 INJECTION INTRAVENOUS at 07:58

## 2022-02-25 RX ADMIN — BUDESONIDE AND FORMOTEROL FUMARATE DIHYDRATE 2 PUFF: 160; 4.5 AEROSOL RESPIRATORY (INHALATION) at 06:52

## 2022-02-25 RX ADMIN — GABAPENTIN 800 MG: 400 CAPSULE ORAL at 14:30

## 2022-02-25 RX ADMIN — MIDODRINE HYDROCHLORIDE 10 MG: 5 TABLET ORAL at 10:23

## 2022-02-25 RX ADMIN — MIDODRINE HYDROCHLORIDE 10 MG: 5 TABLET ORAL at 16:48

## 2022-02-25 RX ADMIN — OXYCODONE HYDROCHLORIDE AND ACETAMINOPHEN 2 TABLET: 5; 325 TABLET ORAL at 16:48

## 2022-02-26 ENCOUNTER — READMISSION MANAGEMENT (OUTPATIENT)
Dept: CALL CENTER | Facility: HOSPITAL | Age: 72
End: 2022-02-26

## 2022-02-26 NOTE — OUTREACH NOTE
Prep Survey      Responses   [facility] discharged from? [place]   Is LACE score < 7 ? No   Emergency Room discharge w/ pulse ox? No   Eligibility Readm Mgmt   Discharge diagnosis CHF   Does the patient have one of the following disease processes/diagnoses(primary or secondary)? CHF   Does the patient have Home health ordered? Yes   What is the Home health agency?  [facility]   Is there a DME ordered? Yes   What DME was ordered? [facility] for O2   Prep survey completed? Yes          Melba Bills RN

## 2022-02-27 ENCOUNTER — HOME CARE VISIT (OUTPATIENT)
Dept: HOME HEALTH SERVICES | Facility: CLINIC | Age: 72
End: 2022-02-27

## 2022-02-27 PROCEDURE — G0299 HHS/HOSPICE OF RN EA 15 MIN: HCPCS

## 2022-02-28 VITALS
TEMPERATURE: 98 F | RESPIRATION RATE: 24 BRPM | DIASTOLIC BLOOD PRESSURE: 72 MMHG | HEART RATE: 67 BPM | SYSTOLIC BLOOD PRESSURE: 118 MMHG | OXYGEN SATURATION: 98 %

## 2022-03-01 ENCOUNTER — READMISSION MANAGEMENT (OUTPATIENT)
Dept: CALL CENTER | Facility: HOSPITAL | Age: 72
End: 2022-03-01

## 2022-03-01 NOTE — OUTREACH NOTE
CHF Week 1 Survey      Responses   [facility] patient discharged from? [place]   Does the patient have one of the following disease processes/diagnoses(primary or secondary)? CHF   CHF Week 1 attempt successful? Yes   Call start time 1717   Call end time 1723   Is patient permission given to speak with other caregiver? Yes   List who call center can speak with Renee Malave   Meds reviewed with patient/caregiver? Yes   Is the patient having any side effects they believe may be caused by any medication additions or changes? No   Does the patient have all medications ordered at discharge? Yes   Is the patient taking all medications as directed (includes completed medication regime)? Yes   Medication comments xarelto samples    Does the patient have a primary care provider?  Yes   Does the patient have an appointment with their PCP within 7 days of discharge? No   Comments regarding PCP Patric Camacho   What is preventing the patient from scheduling follow up appointments within 7 days of discharge? Haven't had time   Nursing Interventions Educated patient on importance of making appointment   Has the patient kept scheduled appointments due by today? N/A   Comments Sherri Hematologist to be made   What is the Home health agency?  [facility]   Has home health visited the patient within 72 hours of discharge? Yes   Has all DME been delivered? Yes   Pulse Ox monitoring Intermittent   Pulse Ox device source Patient   O2 Sat comments 4 liters 100%   O2 Sat: education provided Sat levels   Comments  comes, walker used and 4 liters oxygen   Did the patient receive a copy of their discharge instructions? Yes   Nursing interventions Reviewed instructions with patient,  Educated on MyChart   What is the patient's perception of their health status since discharge? Improving   Nursing interventions Nurse provided patient education   Is the patient weighing daily? Yes   Does the patient have scales? Yes    Daily weight interventions Education provided on importance of daily weight   Is the patient able to teach back Heart Failure diet management? Yes   Is the patient able to teach back Heart Failure Zones? Yes  [green zone]   If the patient is a current smoker, are they able to teach back resources for cessation? Not a smoker   Is the patient/caregiver able to teach back the hierarchy of who to call/visit for symptoms/problems? PCP, Specialist, Home health nurse, Urgent Care, ED, 911 Yes    CHF Week 1 call completed? Yes   Wrap up additional comments Pt. states she will call [facility] offices tomorow for appts. is actually going to hematology tomorrow to get samples xarelto. denies questions no new issues, feelign well today          Dede Cao, RN

## 2022-03-04 ENCOUNTER — HOME CARE VISIT (OUTPATIENT)
Dept: HOME HEALTH SERVICES | Facility: CLINIC | Age: 72
End: 2022-03-04

## 2022-03-04 VITALS
HEART RATE: 90 BPM | TEMPERATURE: 97.5 F | DIASTOLIC BLOOD PRESSURE: 84 MMHG | RESPIRATION RATE: 22 BRPM | OXYGEN SATURATION: 98 % | SYSTOLIC BLOOD PRESSURE: 160 MMHG

## 2022-03-04 PROCEDURE — G0493 RN CARE EA 15 MIN HH/HOSPICE: HCPCS

## 2022-03-07 ENCOUNTER — HOME CARE VISIT (OUTPATIENT)
Dept: HOME HEALTH SERVICES | Facility: CLINIC | Age: 72
End: 2022-03-07

## 2022-03-09 ENCOUNTER — READMISSION MANAGEMENT (OUTPATIENT)
Dept: CALL CENTER | Facility: HOSPITAL | Age: 72
End: 2022-03-09

## 2022-03-09 NOTE — OUTREACH NOTE
CHF Week 2 Survey    Flowsheet Row Responses   [facility] patient discharged from? [place]   Does the patient have one of the following disease processes/diagnoses(primary or secondary)? CHF   Week 2 attempt successful? Yes   Call start time 1426   Call end time 1432   Discharge diagnosis CHF   Person spoke with today (if not patient) and relationship Patient   Meds reviewed with patient/caregiver? Yes   Is the patient having any side effects they believe may be caused by any medication additions or changes? No   Does the patient have all medications ordered at discharge? Yes   Is the patient taking all medications as directed (includes completed medication regime)? Yes   Does the patient have a primary care provider?  Yes   Does the patient have an appointment with their PCP within 7 days of discharge? Yes   Has the patient kept scheduled appointments due by today? N/A   What is the Home health agency?  [facility]   Has home health visited the patient within 72 hours of discharge? Yes   What DME was ordered? [facility] for O2   Has all DME been delivered? Yes   Pulse Ox monitoring Intermittent   O2 Sat comments 100% RA   Psychosocial issues? No   What is the patient's perception of their health status since discharge? Improving   Nursing interventions Nurse provided patient education   Is the patient weighing daily? Yes   Does the patient have scales? Yes   Daily weight interventions Education provided on importance of daily weight   Is the patient able to teach back Heart Failure diet management? Yes   Is the patient able to teach back Heart Failure Zones? Yes  [Green]   Is the patient able to teach back signs and symptoms of worsening condition? (i.e. weight gain, shortness of air, etc.) Yes   CHF Week 2 call completed? Yes   Wrap up additional comments Pt states she is doing better,  pt denies SOB, weight gain, or swelling in extremities. Pt advised to call, and make fu appt with  cardiologist,  pt verbalized understanding.           AMOR DAVIS - Registered Nurse

## 2022-03-10 ENCOUNTER — HOME CARE VISIT (OUTPATIENT)
Dept: HOME HEALTH SERVICES | Facility: CLINIC | Age: 72
End: 2022-03-10

## 2022-03-10 PROCEDURE — G0493 RN CARE EA 15 MIN HH/HOSPICE: HCPCS

## 2022-03-14 ENCOUNTER — HOME CARE VISIT (OUTPATIENT)
Dept: HOME HEALTH SERVICES | Facility: CLINIC | Age: 72
End: 2022-03-14

## 2022-03-14 PROCEDURE — G0493 RN CARE EA 15 MIN HH/HOSPICE: HCPCS

## 2022-03-17 ENCOUNTER — READMISSION MANAGEMENT (OUTPATIENT)
Dept: CALL CENTER | Facility: HOSPITAL | Age: 72
End: 2022-03-17

## 2022-03-17 NOTE — OUTREACH NOTE
CHF Week 3 Survey    Flowsheet Row Responses   [facility] patient discharged from? [place]   Does the patient have one of the following disease processes/diagnoses(primary or secondary)? CHF   Week 3 attempt successful? Yes   Call start time 1550   Call end time 1553   Discharge diagnosis CHF   Meds reviewed with patient/caregiver? Yes   Is the patient having any side effects they believe may be caused by any medication additions or changes? No   Does the patient have all medications ordered at discharge? Yes   Is the patient taking all medications as directed (includes completed medication regime)? Yes   Does the patient have a primary care provider?  Yes   Does the patient have an appointment with their PCP within 7 days of discharge? Greater than 7 days   What is preventing the patient from scheduling follow up appointments within 7 days of discharge? Earlier appointment not available   Nursing Interventions Verified appointment date/time/provider   Has the patient kept scheduled appointments due by today? N/A   What is the Home health agency?  [facility]   Has home health visited the patient within 72 hours of discharge? Yes   Pulse Ox monitoring Intermittent   Pulse Ox device source Patient   O2 Sat comments % on RA   O2 Sat: education provided When to seek care   Psychosocial issues? No   Did the patient receive a copy of their discharge instructions? Yes   Nursing interventions Reviewed instructions with patient   What is the patient's perception of their health status since discharge? Improving   Nursing interventions Nurse provided patient education   Is the patient weighing daily? Yes   Does the patient have scales? Yes   Is the patient able to teach back Heart Failure diet management? Yes   Is the patient able to teach back Heart Failure Zones? Yes   Is the patient able to teach back signs and symptoms of worsening condition? (i.e. weight gain, shortness of air, etc.) Yes   Is the  patient/caregiver able to teach back the hierarchy of who to call/visit for symptoms/problems? PCP, Specialist, Home health nurse, Urgent Care, ED, 911 Yes   CHF Week 3 call completed? Yes          CAROLYN DAVIS - Registered Nurse

## 2022-03-22 DIAGNOSIS — I73.9 PERIPHERAL VASCULAR DISEASE: Primary | ICD-10-CM

## 2022-03-24 ENCOUNTER — HOME CARE VISIT (OUTPATIENT)
Dept: HOME HEALTH SERVICES | Facility: CLINIC | Age: 72
End: 2022-03-24

## 2022-03-24 ENCOUNTER — HOME CARE VISIT (OUTPATIENT)
Dept: HOME HEALTH SERVICES | Facility: HOME HEALTHCARE | Age: 72
End: 2022-03-24

## 2022-03-28 ENCOUNTER — READMISSION MANAGEMENT (OUTPATIENT)
Dept: CALL CENTER | Facility: HOSPITAL | Age: 72
End: 2022-03-28

## 2022-03-28 NOTE — OUTREACH NOTE
CHF Week 4 Survey    Flowsheet Row Responses   [facility] patient discharged from? [place]   Does the patient have one of the following disease processes/diagnoses(primary or secondary)? CHF   Week 4 attempt successful? Yes   Call start time 1657   Call end time 1700   Discharge diagnosis CHF   Person spoke with today (if not patient) and relationship Patient   Meds reviewed with patient/caregiver? Yes   Is the patient having any side effects they believe may be caused by any medication additions or changes? No   Is the patient taking all medications as directed (includes completed medication regime)? Yes   Has the patient kept scheduled appointments due by today? N/A   Is the patient still receiving Home Health Services? N/A   Pulse Ox monitoring Intermittent   Pulse Ox device source Patient   O2 Sat comments 100% RA   O2 Sat: education provided Sat levels, Monitoring frequency, When to seek care   Psychosocial issues? No   What is the patient's perception of their health status since discharge? Improving   Nursing interventions Nurse provided patient education   Is the patient weighing daily? Yes   Does the patient have scales? Yes   Daily weight interventions Education provided on importance of daily weight   Is the patient able to teach back Heart Failure diet management? Yes   Is the patient able to teach back Heart Failure Zones? Yes   Is the patient able to teach back signs and symptoms of worsening condition? (i.e. weight gain, shortness of air, etc.) Yes   Week 4 Call Completed? Yes   Would the patient like one additional call? No   Graduated Yes   Is the patient interested in additional calls from an ambulatory ?  NOTE:  applies to high risk patients requiring additional follow-up. No   Did the patient feel the follow up calls were helpful during their recovery period? Yes   Was the number of calls appropriate? Yes   Does the patient have an Advance Directive or Living Will? No   Is  the patient/caregiver familiar with Advance Care Planning? No   Would the patient like more information on Advance Care Planning? No   Wrap up additional comments Pt states she is doing ok. Pt states she is having soft stool. Pt was educated on the BRAT diet. Pt verbalized understanding.           AMOR DAVIS - Registered Nurse

## 2022-03-30 ENCOUNTER — HOME CARE VISIT (OUTPATIENT)
Dept: HOME HEALTH SERVICES | Facility: CLINIC | Age: 72
End: 2022-03-30

## 2022-03-30 VITALS
TEMPERATURE: 97.8 F | HEART RATE: 70 BPM | RESPIRATION RATE: 20 BRPM | SYSTOLIC BLOOD PRESSURE: 136 MMHG | HEART RATE: 88 BPM | TEMPERATURE: 98 F | DIASTOLIC BLOOD PRESSURE: 78 MMHG | OXYGEN SATURATION: 96 % | RESPIRATION RATE: 20 BRPM | DIASTOLIC BLOOD PRESSURE: 78 MMHG | SYSTOLIC BLOOD PRESSURE: 128 MMHG | OXYGEN SATURATION: 96 %

## 2022-03-30 PROCEDURE — G0299 HHS/HOSPICE OF RN EA 15 MIN: HCPCS

## 2022-03-31 VITALS
OXYGEN SATURATION: 98 % | TEMPERATURE: 98.6 F | HEART RATE: 76 BPM | DIASTOLIC BLOOD PRESSURE: 80 MMHG | SYSTOLIC BLOOD PRESSURE: 132 MMHG | RESPIRATION RATE: 16 BRPM

## 2022-04-08 ENCOUNTER — HOME CARE VISIT (OUTPATIENT)
Dept: HOME HEALTH SERVICES | Facility: CLINIC | Age: 72
End: 2022-04-08

## 2022-04-08 PROCEDURE — G0299 HHS/HOSPICE OF RN EA 15 MIN: HCPCS

## 2022-04-10 VITALS
SYSTOLIC BLOOD PRESSURE: 124 MMHG | HEART RATE: 64 BPM | RESPIRATION RATE: 18 BRPM | DIASTOLIC BLOOD PRESSURE: 76 MMHG | TEMPERATURE: 98.7 F

## 2022-04-12 NOTE — PROGRESS NOTES
"Pharmacokinetics by Pharmacy - Vancomycin    Mona Perales is a 67 y.o. female  [Ht: 157.5 cm (62\"); Wt: 55 kg (121 lb 3.2 oz)]    Estimated Creatinine Clearance: 60.9 mL/min (by C-G formula based on Cr of 0.57).   Lab Results   Component Value Date    CREATININE 0.57 01/12/2018    CREATININE 0.53 01/11/2018    CREATININE 0.54 01/10/2018      Lab Results   Component Value Date    WBC 22.18 (H) 01/12/2018    WBC 25.07 (H) 01/11/2018    WBC 18.77 (H) 01/10/2018      Temp Readings from Last 1 Encounters:   01/12/18 98.8 °F (37.1 °C) (Temporal Artery )    No results found for: VANCOPEAK, VANCOTROUGH, VANCORANDOM      Culture Results:  Microbiology Results (last 10 days)       Procedure Component Value - Date/Time      Blood Culture - Blood, [[id number]]  (Normal) Collected:  01/09/18 2258    Lab Status:  Preliminary result Specimen:  Blood from Arm, Left Updated:  01/11/18 2331     Blood Culture No growth at 2 days    Blood Culture - Blood, [[id number]]  (Normal) Collected:  01/09/18 1646    Lab Status:  Preliminary result Specimen:  Blood from Blood, Arterial Line Updated:  01/11/18 2246     Blood Culture No growth at 2 days               Indication for use: Pneumonia    Current Vancomycin Dose:  750 mg IVPB every 12 hours, day 2 of therapy.      Assessment/Plan:  Peak and trough levels ordered after today's dose. Pharmacy will continue to monitor renal function and adjust dose accordingly.    Bienvenido Siddiqui III, [facility]   01/12/18 9:45 AM    " Pt ambulated with assistance had some dizziness.       Katherin Harry RN  04/12/22 0901

## 2022-05-02 ENCOUNTER — OFFICE VISIT (OUTPATIENT)
Dept: CARDIAC SURGERY | Facility: CLINIC | Age: 72
End: 2022-05-02

## 2022-05-02 VITALS
OXYGEN SATURATION: 97 % | HEIGHT: 63 IN | DIASTOLIC BLOOD PRESSURE: 64 MMHG | SYSTOLIC BLOOD PRESSURE: 118 MMHG | TEMPERATURE: 97.7 F | BODY MASS INDEX: 23.11 KG/M2 | HEART RATE: 90 BPM | WEIGHT: 130.4 LBS

## 2022-05-02 DIAGNOSIS — I70.222 ATHEROSCLEROSIS OF NATIVE ARTERIES OF EXTREMITIES WITH REST PAIN, LEFT LEG: ICD-10-CM

## 2022-05-02 DIAGNOSIS — B35.1 PAIN DUE TO ONYCHOMYCOSIS OF TOENAILS OF BOTH FEET: ICD-10-CM

## 2022-05-02 DIAGNOSIS — M79.674 PAIN DUE TO ONYCHOMYCOSIS OF TOENAILS OF BOTH FEET: ICD-10-CM

## 2022-05-02 DIAGNOSIS — I65.23 BILATERAL CAROTID ARTERY STENOSIS: ICD-10-CM

## 2022-05-02 DIAGNOSIS — E78.00 HYPERCHOLESTEREMIA: ICD-10-CM

## 2022-05-02 DIAGNOSIS — M79.675 PAIN DUE TO ONYCHOMYCOSIS OF TOENAILS OF BOTH FEET: ICD-10-CM

## 2022-05-02 DIAGNOSIS — I73.9 PERIPHERAL VASCULAR DISEASE: Primary | ICD-10-CM

## 2022-05-02 DIAGNOSIS — I10 BENIGN ESSENTIAL HYPERTENSION: ICD-10-CM

## 2022-05-02 DIAGNOSIS — Z72.0 NICOTINE ABUSE: ICD-10-CM

## 2022-05-02 DIAGNOSIS — Z79.01 CURRENT USE OF LONG TERM ANTICOAGULATION: ICD-10-CM

## 2022-05-02 PROCEDURE — 99215 OFFICE O/P EST HI 40 MIN: CPT | Performed by: NURSE PRACTITIONER

## 2022-05-02 RX ORDER — CLOPIDOGREL BISULFATE 75 MG/1
75 TABLET ORAL DAILY
Qty: 90 TABLET | Refills: 3 | Status: SHIPPED | OUTPATIENT
Start: 2022-05-02

## 2022-05-02 RX ORDER — PRAVASTATIN SODIUM 20 MG
20 TABLET ORAL NIGHTLY
Qty: 90 TABLET | Refills: 3 | Status: SHIPPED | OUTPATIENT
Start: 2022-05-02

## 2022-05-02 RX ORDER — CILOSTAZOL 100 MG/1
100 TABLET ORAL
Qty: 180 TABLET | Refills: 3 | Status: SHIPPED | OUTPATIENT
Start: 2022-05-02 | End: 2023-05-02

## 2022-05-02 NOTE — PATIENT INSTRUCTIONS
mild reduction Arterial Flow right Lower Extremity improved  Right graft patent  Moderate reduction left lower extremity stable  Medical Management: XARELTO,PLAVIX,STATIN   If signs and symptoms of ischemia should occur including but not limited to pale/blue discoloration of limb, increasing pain with ambulation or at rest, or a non-healing wound. Patient is to notify Heart and Vascular center for immediate evaluation.   Return 3 mos -CHELSIE    Moderate reduction left upper extremity  If signs and symptoms of ischemia should occur including but not limited to pale/blue discoloration of limb, increasing pain with ambulation or at rest, or a non-healing wound. Patient is to notify Heart and Vascular center for immediate evaluation.    Podiatry referral: toenail assessment/pain  Referral coordinator will call with appointment      Smoking cessation advised.  Tobacco increases risk of expanding AAA, MI, CVA, PAD, carcinoma.    Repeat RBI 6 mos

## 2022-05-02 NOTE — PROGRESS NOTES
CVTS Office Progress Note       Subjective   Patient ID: Mona Perales is a 71 y.o. female is here today for follow-up.    Chief Complaint:    Chief Complaint   Patient presents with   • Peripheral Vascular Disease       The following portions of the patient's history were reviewed and updated as appropriate: allergies, current medications, past family history, past medical history, past social history, past surgical history and problem list.  Recent images independently reviewed.  Available laboratory values reviewed.    PCP:  Anahi Camacho APRN  Cardiology:  Kimmy     71 y.o. female with HTN(stable, increased risk stroke, rupture), Hyperlipidemia(stable, increased risk cardiovascular events), Smoker(uncontrolled, increased risk cardiovascular events), COPD(stable, increased risk pulmonary complications) and Atrial fibrillation(stable, increased risk stroke)  Anticoagulation (Xarelto, stable) Hx PE (stable). PVD (LSCA stent 2005, B iliac stents 2008) lost to follow up. Chronic Pain.  smokes 1 PPD.  Increasing pain LEFT foot, lifestyle limiting. Difficulty ambulatingNo TIA stroke amaurosis.  No MI claudication. No other associated signs, symptoms or modifying factors. Pharmacy discontinued Xarelto (5/2020). Worsening LEFT lower extremity pain similar to pre-surgery approx 1 month. Underwent additional angio procedure. Improved. Confused on medications, off Plavix x 2 months. Now with worsening RIGHT lower extremity pain.   Underwent R surgical revascularization, progressed slowly post op, refused rehab placement, went home with , no planning outpatient admission to rehab.  Pain partial control, mobility limited. Missed several followup appointments. Returns today for vascular evaluation    2005: LSCA Stent  5/2022 RBI: RIGHT 1.1 Triphasic LEFT 0.7 Biphasic     2008: Bilateral Iliac stents  3/23/2020:  CHELSIE: RIGHT 0.83 Triphasic  LEFT 0.31 Biphasic/Monophasic (DP/PT)  3/26/2020: CTA Runoff: Occlusion of  the proximal left superficial femoral artery with  reconstitution of the distal left SFA. The popliteal artery  appears occluded distally, with reconstitution of the arteries of  the trifurcation. The left calf vessels are patent, as above.Patent left common iliac and proximal left external iliac stents. There appears to be mild luminal narrowing within the  left external iliac stent. Stenosis of the origins of the superior mesenteric artery and  bilateral renal arteries as above. Irregular narrowing of the right superficial femoral artery,  as described above without occlusion. The right popliteal artery  and the right calf arteries are patent, as above.  4/14/2020: LEFT Femoral-popliteal Bypass PTFE  5/22/2020:  CHELSIE: RIGHT 1.1 Triphasic LEFT 0.79 Tri/Biphasic (PT/DP)   Patent LEFT graft. mPSV 237cm/s (native) Triphasic  8/25/2020:  CHELSIE: RIGHT 0.67 Tri/Biphasic (DP/PT) LEFT 0.52 Tri/Biphasic (PT/DP). Patent LEFT Fem-Pop graft. Native prx (333cm/s) Triphasic.   9/9/2020: LEFT/RIGHT OBDULIA PTA  9/22/2020:  CHELSIE: RIGHT 1.2 Triphasic LEFT 0.81 Biphasic  1/29/2021: CHELSIE: RIGHT 0.92 Tripahsic LEFT 0.75 Tri/Biphasic (PT/DP) Patent PTFE graft LEFT native prn391so/s.   7/26/21: CHELSIE: RIGHT 0.62 Biphasic LEFT 0.85 Tri/Biphasic (PT/DP)   9/2021 R Fem-Pop AK PTFE, R CFA endart  5/2022: CHELSIE: RIGHT 0.88 Triphasic LEFT 0.67 Tri/Biphasic (PT/DP). Patent RIGHT graft (255cm/s inflow)     2016: Carotid Duplex: BABS 0-49% LICA 0-49%  2017: Carotid duplex: BABS 0-49% LICA 0-49%  2018: Carotid duplex: BABS 0-49% LICA 0-49%      Past Medical History:   Diagnosis Date   • A-fib ([facility])    • Anxiety    • Arthritis    • Asthma    • 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 with ulceration ([facility])     bilateral legs - bilateral iliac stents 2008      • CHF (congestive heart failure) ([facility])    • Chronic lower back pain    • COPD (chronic obstructive pulmonary disease) ([facility])    • Essential (primary) hypertension    • GERD (gastroesophageal  reflux disease)    • History of pulmonary embolus (PE)    • Hyperlipidemia    • Insomnia    • Lumbago    • Nicotine dependence    • Occlusion of artery      and stenosis of bilateral carotid arteries - BABS 16-49%, LICA 0-15%   • Other atherosclerosis of native artery of other extremity     LEFT subclavian stent 2005 (occluded)   • Sleep apnea      Past Surgical History:   Procedure Laterality Date   • CARDIAC CATHETERIZATION  04/06/2016    No evidence of any obstructive epicardial CAD.Preserved LV systolic function with EF of 55%.   • CENTRAL VENOUS LINE INSERTION  04/07/2016    Successful placement of right uppe extremity 6-Setswana triple lumen PICC line.   • ENDOSCOPY N/A 1/12/2018    Procedure: ESOPHAGOGASTRODUODENOSCOPY;  Surgeon: Bin Oh MD;  Location: [facility] ENDOSCOPY;  Service:    • ENDOSCOPY N/A 1/17/2018    Procedure: ESOPHAGOGASTRODUODENOSCOPY;  Surgeon: Bin Oh MD;  Location: [facility] ENDOSCOPY;  Service:    • ENDOSCOPY N/A 3/16/2018    Procedure: ESOPHAGOGASTRODUODENOSCOPY possible dilation;  Surgeon: Bin Oh MD;  Location: [facility] ENDOSCOPY;  Service: Gastroenterology   • FEMORAL POPLITEAL BYPASS Left 4/14/2020    Procedure: FEMORAL POPLITEAL BYPASS ABOVE KNEE  (POLYTETRAFLUOROETHYLENE)  LEFT COMMON FEMORAL ARTERY ENDARTERECTOMY PTA LEFT ILIAC ARTERIOGRAM        (c-arm#2 and c-arm table);  Surgeon: Dvaid Suh MD;  Location: [facility] OR;  Service: Vascular;  Laterality: Left;   • FEMORAL POPLITEAL BYPASS Right 9/17/2021    Procedure: RIGHT common femoral endarterectomy FEMORAL POPLITEAL BYPASS (above the knee polytetrafluoroethylene  lower extremity arteriogram              (c-arm#2 and c-arm table);  Surgeon: David Suh MD;  Location: [facility] OR;  Service: Vascular;  Laterality: Right;   • HYSTERECTOMY     • INTERVENTIONAL RADIOLOGY PROCEDURE Left 9/9/2020    Procedure: IR angiogram extremity left;  Surgeon: David Suh MD;  Location: [facility] ANGIO  INVASIVE LOCATION;  Service: Interventional Radiology;  Laterality: Left;   • KYPHOPLASTY N/A 5/23/2019    Procedure: KYPHOPLASTY LUMBAR FOUR;  Surgeon: Aba Santa MD;  Location: [facility] OR;  Service: Orthopedic Spine   • TOTAL SHOULDER REPLACEMENT Left    • TRANSESOPHAGEAL ECHOCARDIOGRAM (JACQUES)  04/07/2016    With color flow-Mild to moderate [facility].LV systolic function well preserved with Ef of 55-60%.Mitral and AV intact.Diastolic dysfunction   • UPPER GASTROINTESTINAL ENDOSCOPY  01/17/2018    Dr. Bin Martin M.D.     Family History   Problem Relation Age of Onset   • Heart disease Other    • Hypertension Other      Social History     Socioeconomic History   • Marital status:    Tobacco Use   • Smoking status: Current Every Day Smoker     Packs/day: 1.00     Years: 50.00     Pack years: 50.00     Types: Cigarettes   • Smokeless tobacco: Never Used   Vaping Use   • Vaping Use: Never used   Substance and Sexual Activity   • Alcohol use: No   • Drug use: No   • Sexual activity: Defer         ALLERGIES:   Morphine and related and Penicillins    MEDICATIONS:      Current Outpatient Medications:   •  ALBUTEROL SULFATE HFA IN, Inhale 2 puffs Every 4 (Four) Hours As Needed., Disp: , Rfl:   •  amitriptyline (ELAVIL) 25 MG tablet, Take 25 mg by mouth Every Night., Disp: , Rfl:   •  budesonide-formoterol (SYMBICORT) 160-4.5 MCG/ACT inhaler, Inhale 2 puffs 2 (Two) Times a Day As Needed., Disp: , Rfl:   •  cilostazol (PLETAL) 100 MG tablet, Take 1 tablet by mouth 2 (Two) Times a Day Before Meals., Disp: 180 tablet, Rfl: 3  •  clopidogrel (PLAVIX) 75 MG tablet, Take 1 tablet by mouth Daily., Disp: 90 tablet, Rfl: 3  •  furosemide (LASIX) 20 MG tablet, Take 1 tablet by mouth Daily., Disp: 30 tablet, Rfl: 1  •  gabapentin (NEURONTIN) 800 MG tablet, Take 800 mg by mouth 3 (Three) Times a Day., Disp: , Rfl:   •  midodrine (PROAMATINE) 10 MG tablet, Take 1 tablet by mouth 3 (Three) Times a Day Before Meals.,  Disp: 90 tablet, Rfl: 0  •  O2 (OXYGEN), Inhale 4 L/min As Needed (shortness of air)., Disp: , Rfl:   •  ondansetron (ZOFRAN) 4 MG tablet, Take 4 mg by mouth Every 8 (Eight) Hours As Needed for Nausea or Vomiting., Disp: , Rfl:   •  penciclovir (DENAVIR) 1 % cream, Apply 1 application topically to the appropriate area as directed As Needed (fever blisters)., Disp: , Rfl:   •  pravastatin (PRAVACHOL) 20 MG tablet, Take 1 tablet by mouth Every Night., Disp: 90 tablet, Rfl: 3  •  rivaroxaban (Xarelto) 20 MG tablet, Take 1 tablet by mouth Daily With Dinner., Disp: 30 tablet, Rfl: 6  •  sertraline (ZOLOFT) 100 MG tablet, Take 100 mg by mouth Daily., Disp: , Rfl:   •  traZODone (DESYREL) 150 MG tablet, Take 1 tablet by mouth Every Night., Disp: , Rfl:   •  vitamin D (ERGOCALCIFEROL) 1.25 MG (96135 UT) capsule capsule, Take 50,000 Units by mouth 1 (One) Time Per Week., Disp: , Rfl:   •  albuterol (PROVENTIL) (2.5 MG/3ML) 0.083% nebulizer solution, Take 2.5 mg by nebulization Every 4 (Four) Hours As Needed for Wheezing., Disp: , Rfl:   •  pantoprazole (PROTONIX) 40 MG EC tablet, Take 1 tablet by mouth Daily., Disp: 30 tablet, Rfl: 5    Review of Systems   Constitutional: Positive for malaise/fatigue.   HENT: Positive for nosebleeds.    Eyes: Negative for visual disturbance.   Cardiovascular: Positive for claudication and near-syncope. Negative for cyanosis and leg swelling.   Respiratory: Negative for hemoptysis and shortness of breath.    Hematologic/Lymphatic: Negative for bleeding problem. Does not bruise/bleed easily.   Skin: Positive for color change. Negative for nail changes.   Musculoskeletal: Positive for back pain. Negative for muscle weakness.   Gastrointestinal: Negative for dysphagia, hematemesis and melena.   Genitourinary: Negative for hematuria.   Neurological: Positive for dizziness and loss of balance. Negative for focal weakness, light-headedness, numbness, paresthesias and weakness.  "  Psychiatric/Behavioral: Negative for altered mental status.   All other systems reviewed and are negative.       Objective   Vitals:    05/02/22 1351   BP: 118/64   BP Location: Left arm   Pulse: 90   Temp: 97.7 °F (36.5 °C)   TempSrc: Infrared   SpO2: 97%   Weight: 59.1 kg (130 lb 6.4 oz)   Height: 160 cm (63\")     Body mass index is 23.1 kg/m².  Physical Exam   Constitutional: She is oriented to person, place, and time.   HENT:   Head: Atraumatic.   Neck: Carotid bruit is not present.   Cardiovascular: Normal heart sounds.   Pulses:       Dorsalis pedis pulses are 2+ on the right side and 1+ on the left side.        Posterior tibial pulses are 2+ on the right side and 1+ on the left side.   Pulmonary/Chest: Effort normal and breath sounds normal.   Abdominal: Soft. Bowel sounds are normal.     Vascular Status -  Her right foot exhibits normal foot vasculature . Her left foot exhibits abnormal foot vasculature .  Skin Integrity  -  Her right foot skin is intact.Her left foot skin is intact..  Neurological: She is alert and oriented to person, place, and time. Gait normal.   Skin: Skin is warm. Capillary refill takes 2 to 3 seconds.   Psychiatric: Thought content normal.   Vitals reviewed.          Assessment & Plan     Independent Review of Radiographic Studies:   Detailed discussion regarding risks, benefits, and treatment plan. Images independently reviewed. Patient understands, agrees, and wishes to proceed with plan.      1. Peripheral vascular disease (HCC)  mild reduction Arterial Flow right Lower Extremity improved  Right graft patent  Moderate reduction left lower extremity stable  Medical Management: XARELTO,PLAVIX,STATIN   If signs and symptoms of ischemia should occur including but not limited to pale/blue discoloration of limb, increasing pain with ambulation or at rest, or a non-healing wound. Patient is to notify Heart and Vascular center for immediate evaluation.   Return 3 mos -CHELSIE  - Doppler " Ankle Brachial Index Single Level CAR; Future  - Duplex Lower Extremity Art / Grafts - Left CAR; Future  - rivaroxaban (Xarelto) 20 MG tablet; Take 1 tablet by mouth Daily With Dinner.  Dispense: 30 tablet; Refill: 6  - pravastatin (PRAVACHOL) 20 MG tablet; Take 1 tablet by mouth Every Night.  Dispense: 90 tablet; Refill: 3  - clopidogrel (PLAVIX) 75 MG tablet; Take 1 tablet by mouth Daily.  Dispense: 90 tablet; Refill: 3  - cilostazol (PLETAL) 100 MG tablet; Take 1 tablet by mouth 2 (Two) Times a Day Before Meals.  Dispense: 180 tablet; Refill: 3    Moderate reduction left upper extremity  If signs and symptoms of ischemia should occur including but not limited to pale/blue discoloration of limb, increasing pain with ambulation or at rest, or a non-healing wound. Patient is to notify Heart and Vascular center for immediate evaluation.    2. Bilateral carotid artery stenosis  If you should experience any neurological symptoms including but not limited to visual or speech disturbances confusion, seizures, or weakness of limbs of one side of your body notify Heart and Vascular center immediately for evaluation or if after hours present to the nearest Emergency Department.    - Duplex Carotid Ultrasound CAR; Future    3. Hypercholesteremia  Lipid-lowering therapy has been proven beneficial in patients with cardio-vascular disease. Current guidelines recommend statin treatment for all patients with PAD,CAD and carotid stenosis. Statins are beneficial in preventing cardiovascular events, increasing functional capacity and lower the risk of adverse limb loss in PAD. Statins decrease the progression of plaque formation and may improve peripheral vessel lining, and aid in reversing atherosclerosis.       4. Benign essential hypertension  controlled    5. Nicotine abuse  Smoking cessation assistance options offered including behavioral counseling (Smoking Cessation Classes), Nicotine replacement therapy (patches or gum),  pharmacologic therapy (Chantix, Wellbutrin). Understands tobacco increases risk of expanding AAA, MI, CVA, PAD, carcinoma. Discussion and question answer period 5-7 minutes. Mona Perales  reports that she has been smoking cigarettes. She has a 50.00 pack-year smoking history. She has never used smokeless tobacco.. I have educated her on the risk of diseases from using tobacco products such as cancer, COPD and heart disease.     I advised her to quit and she is not willing to quit.    I spent 8 minutes counseling the patient.      6. Current use of long term anticoagulation  Xarelto  Lifelong  Notify provider if you experience excessive bleeding from the nose, cuts, gums, rectum, urinary tract, or vagina. Reddish or brown urine or stool. Vomiting of blood or hemorrhoidal bleeding. If major injury occurs present to the Emergency Department.      7. Atherosclerosis of native arteries of extremities with rest pain, left leg (HCC)  - clopidogrel (PLAVIX) 75 MG tablet; Take 1 tablet by mouth Daily.  Dispense: 90 tablet; Refill: 3    8. Pain due to onychomycosis of toenails of both feet  - Ambulatory Referral to Podiatry       Advance Care Planning discussed and  Informational packet given to patient. Advance Care Plan on file: No    Your BMI is Body mass index is 23.1 kg/m². and falls within  Normal: 18.5-24.9kg/m2. BMI is strongly correlated with increased health risks. Review options for weight management, heart healthy diet, and exercise programs.     Time spent 40 minutes in face to face evaluation, reviewing of medical history, tests, and procedures. Independent interpretation of vascular studies, ordering additional tests, documentation, and coordination of care.   Counseling and education with the patient and family regarding treatment options, plan of care, and hoped for outcomes. All questions answered.           This document has been electronically signed by DELFINO Garcia on May 2, 2022 15:08  CDT

## 2022-11-01 ENCOUNTER — APPOINTMENT (OUTPATIENT)
Dept: GENERAL RADIOLOGY | Facility: HOSPITAL | Age: 72
End: 2022-11-01

## 2022-11-01 ENCOUNTER — HOSPITAL ENCOUNTER (INPATIENT)
Facility: HOSPITAL | Age: 72
LOS: 16 days | Discharge: NURSING, REHAB OR LONG-TERM CARE | End: 2022-11-17
Attending: EMERGENCY MEDICINE

## 2022-11-01 DIAGNOSIS — R10.13 EPIGASTRIC PAIN: ICD-10-CM

## 2022-11-01 DIAGNOSIS — Z12.31 OTHER SCREENING MAMMOGRAM: ICD-10-CM

## 2022-11-01 DIAGNOSIS — J44.9 CHRONIC OBSTRUCTIVE PULMONARY DISEASE, UNSPECIFIED COPD TYPE: ICD-10-CM

## 2022-11-01 DIAGNOSIS — R13.10 DYSPHAGIA, UNSPECIFIED TYPE: ICD-10-CM

## 2022-11-01 DIAGNOSIS — I10 BENIGN ESSENTIAL HYPERTENSION: ICD-10-CM

## 2022-11-01 DIAGNOSIS — F41.9 ANXIETY: ICD-10-CM

## 2022-11-01 DIAGNOSIS — R55 NEAR SYNCOPE: ICD-10-CM
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
## 2022-11-01 DIAGNOSIS — Z72.0 NICOTINE ABUSE: ICD-10-CM

## 2022-11-01 DIAGNOSIS — B35.1 PAIN DUE TO ONYCHOMYCOSIS OF TOENAILS OF BOTH FEET: ICD-10-CM

## 2022-11-01 DIAGNOSIS — I25.10 ATHEROSCLEROSIS OF NATIVE CORONARY ARTERY OF NATIVE HEART WITHOUT ANGINA PECTORIS: ICD-10-CM

## 2022-11-01 DIAGNOSIS — J96.01 ACUTE RESPIRATORY FAILURE WITH HYPOXIA: ICD-10-CM

## 2022-11-01 DIAGNOSIS — Z74.09 IMPAIRED FUNCTIONAL MOBILITY, BALANCE, GAIT, AND ENDURANCE: ICD-10-CM

## 2022-11-01 DIAGNOSIS — I65.23 BILATERAL CAROTID ARTERY STENOSIS: ICD-10-CM

## 2022-11-01 DIAGNOSIS — I25.119 ATHEROSCLEROSIS OF NATIVE CORONARY ARTERY OF NATIVE HEART WITH ANGINA PECTORIS: ICD-10-CM

## 2022-11-01 DIAGNOSIS — Z78.9 IMPAIRED MOBILITY AND ADLS: ICD-10-CM

## 2022-11-01 DIAGNOSIS — I70.222 ATHEROSCLEROSIS OF NATIVE ARTERIES OF EXTREMITIES WITH REST PAIN, LEFT LEG: ICD-10-CM

## 2022-11-01 DIAGNOSIS — M79.675 PAIN DUE TO ONYCHOMYCOSIS OF TOENAILS OF BOTH FEET: ICD-10-CM

## 2022-11-01 DIAGNOSIS — M79.674 PAIN DUE TO ONYCHOMYCOSIS OF TOENAILS OF BOTH FEET: ICD-10-CM

## 2022-11-01 DIAGNOSIS — I73.9 PVD (PERIPHERAL VASCULAR DISEASE) WITH CLAUDICATION: ICD-10-CM

## 2022-11-01 DIAGNOSIS — Z74.09 IMPAIRED MOBILITY AND ADLS: ICD-10-CM

## 2022-11-01 DIAGNOSIS — J44.1 COPD WITH EXACERBATION: ICD-10-CM

## 2022-11-01 DIAGNOSIS — E78.00 HYPERCHOLESTEREMIA: ICD-10-CM

## 2022-11-01 DIAGNOSIS — J44.1 CHRONIC OBSTRUCTIVE PULMONARY DISEASE WITH ACUTE EXACERBATION: ICD-10-CM

## 2022-11-01 DIAGNOSIS — M79.641 PAIN OF RIGHT HAND: ICD-10-CM

## 2022-11-01 DIAGNOSIS — S62.326D CLOSED DISPLACED FRACTURE OF SHAFT OF FIFTH METACARPAL BONE OF RIGHT HAND WITH ROUTINE HEALING, SUBSEQUENT ENCOUNTER: ICD-10-CM

## 2022-11-01 DIAGNOSIS — R53.81 PHYSICAL DECONDITIONING: ICD-10-CM

## 2022-11-01 DIAGNOSIS — I73.9 PVD (PERIPHERAL VASCULAR DISEASE): ICD-10-CM

## 2022-11-01 DIAGNOSIS — I21.3 ST ELEVATION MYOCARDIAL INFARCTION (STEMI), UNSPECIFIED ARTERY: Primary | ICD-10-CM

## 2022-11-01 DIAGNOSIS — R55 SYNCOPE, UNSPECIFIED SYNCOPE TYPE: ICD-10-CM

## 2022-11-01 DIAGNOSIS — R10.11 RUQ ABDOMINAL PAIN: ICD-10-CM

## 2022-11-01 LAB
ALBUMIN SERPL-MCNC: 3.2 G/DL (ref 3.5–5.2)
ALBUMIN/GLOB SERPL: 0.8 G/DL
ALP SERPL-CCNC: 99 U/L (ref 39–117)
ALT SERPL W P-5'-P-CCNC: 6 U/L (ref 1–33)
ANION GAP SERPL CALCULATED.3IONS-SCNC: 10 MMOL/L (ref 5–15)
APTT PPP: 31.8 SECONDS (ref 20–40.3)
AST SERPL-CCNC: 11 U/L (ref 1–32)
BASOPHILS # BLD AUTO: 0.06 10*3/MM3 (ref 0–0.2)
BASOPHILS NFR BLD AUTO: 0.3 % (ref 0–1.5)
BILIRUB SERPL-MCNC: 0.4 MG/DL (ref 0–1.2)
BUN SERPL-MCNC: 6 MG/DL (ref 8–23)
BUN/CREAT SERPL: 8.5 (ref 7–25)
CALCIUM SPEC-SCNC: 8.7 MG/DL (ref 8.6–10.5)
CHLORIDE SERPL-SCNC: 100 MMOL/L (ref 98–107)
CO2 SERPL-SCNC: 28 MMOL/L (ref 22–29)
CREAT SERPL-MCNC: 0.71 MG/DL (ref 0.57–1)
CRP SERPL-MCNC: 12.6 MG/DL (ref 0–0.5)
DEPRECATED RDW RBC AUTO: 43.8 FL (ref 37–54)
EGFRCR SERPLBLD CKD-EPI 2021: 91.6 ML/MIN/1.73
EOSINOPHIL # BLD AUTO: 0.01 10*3/MM3 (ref 0–0.4)
EOSINOPHIL NFR BLD AUTO: 0.1 % (ref 0.3–6.2)
ERYTHROCYTE [DISTWIDTH] IN BLOOD BY AUTOMATED COUNT: 13.4 % (ref 12.3–15.4)
FLUAV RNA RESP QL NAA+PROBE: NOT DETECTED
FLUBV RNA RESP QL NAA+PROBE: NOT DETECTED
GLOBULIN UR ELPH-MCNC: 3.9 GM/DL
GLUCOSE SERPL-MCNC: 105 MG/DL (ref 65–99)
HCT VFR BLD AUTO: 36 % (ref 34–46.6)
HGB BLD-MCNC: 11.7 G/DL (ref 12–15.9)
HOLD SPECIMEN: NORMAL
HOLD SPECIMEN: NORMAL
HYPOCHROMIA BLD QL: NORMAL
IMM GRANULOCYTES # BLD AUTO: 0.1 10*3/MM3 (ref 0–0.05)
IMM GRANULOCYTES NFR BLD AUTO: 0.5 % (ref 0–0.5)
INR PPP: 1.33 (ref 0.8–1.2)
LYMPHOCYTES # BLD AUTO: 0.93 10*3/MM3 (ref 0.7–3.1)
LYMPHOCYTES NFR BLD AUTO: 5 % (ref 19.6–45.3)
MCH RBC QN AUTO: 29.2 PG (ref 26.6–33)
MCHC RBC AUTO-ENTMCNC: 32.5 G/DL (ref 31.5–35.7)
MCV RBC AUTO: 89.8 FL (ref 79–97)
MONOCYTES # BLD AUTO: 0.57 10*3/MM3 (ref 0.1–0.9)
MONOCYTES NFR BLD AUTO: 3.1 % (ref 5–12)
NEUTROPHILS NFR BLD AUTO: 16.87 10*3/MM3 (ref 1.7–7)
NEUTROPHILS NFR BLD AUTO: 91 % (ref 42.7–76)
NRBC BLD AUTO-RTO: 0 /100 WBC (ref 0–0.2)
NT-PROBNP SERPL-MCNC: 1798 PG/ML (ref 0–900)
PLAT MORPH BLD: NORMAL
PLATELET # BLD AUTO: 283 10*3/MM3 (ref 140–450)
PMV BLD AUTO: 9.4 FL (ref 6–12)
POTASSIUM SERPL-SCNC: 3.5 MMOL/L (ref 3.5–5.2)
PROCALCITONIN SERPL-MCNC: 0.04 NG/ML (ref 0–0.25)
PROT SERPL-MCNC: 7.1 G/DL (ref 6–8.5)
PROTHROMBIN TIME: 16.5 SECONDS (ref 11.1–15.3)
RBC # BLD AUTO: 4.01 10*6/MM3 (ref 3.77–5.28)
SARS-COV-2 RNA RESP QL NAA+PROBE: NOT DETECTED
SODIUM SERPL-SCNC: 138 MMOL/L (ref 136–145)
WBC MORPH BLD: NORMAL
WBC NRBC COR # BLD: 18.54 10*3/MM3 (ref 3.4–10.8)

## 2022-11-01 PROCEDURE — 71045 X-RAY EXAM CHEST 1 VIEW: CPT

## 2022-11-01 PROCEDURE — 86140 C-REACTIVE PROTEIN: CPT | Performed by: INTERNAL MEDICINE

## 2022-11-01 PROCEDURE — B2111ZZ FLUOROSCOPY OF MULTIPLE CORONARY ARTERIES USING LOW OSMOLAR CONTRAST: ICD-10-PCS | Performed by: INTERNAL MEDICINE

## 2022-11-01 PROCEDURE — 25010000002 HEPARIN (PORCINE) PER 1000 UNITS: Performed by: INTERNAL MEDICINE

## 2022-11-01 PROCEDURE — 93010 ELECTROCARDIOGRAM REPORT: CPT | Performed by: INTERNAL MEDICINE

## 2022-11-01 PROCEDURE — 36556 INSERT NON-TUNNEL CV CATH: CPT | Performed by: INTERNAL MEDICINE

## 2022-11-01 PROCEDURE — B2151ZZ FLUOROSCOPY OF LEFT HEART USING LOW OSMOLAR CONTRAST: ICD-10-PCS | Performed by: INTERNAL MEDICINE

## 2022-11-01 PROCEDURE — 94799 UNLISTED PULMONARY SVC/PX: CPT

## 2022-11-01 PROCEDURE — 85730 THROMBOPLASTIN TIME PARTIAL: CPT | Performed by: INTERNAL MEDICINE

## 2022-11-01 PROCEDURE — 80053 COMPREHEN METABOLIC PANEL: CPT | Performed by: INTERNAL MEDICINE

## 2022-11-01 PROCEDURE — 85025 COMPLETE CBC W/AUTO DIFF WBC: CPT | Performed by: EMERGENCY MEDICINE

## 2022-11-01 PROCEDURE — 84145 PROCALCITONIN (PCT): CPT | Performed by: INTERNAL MEDICINE

## 2022-11-01 PROCEDURE — C1894 INTRO/SHEATH, NON-LASER: HCPCS | Performed by: INTERNAL MEDICINE

## 2022-11-01 PROCEDURE — 83880 ASSAY OF NATRIURETIC PEPTIDE: CPT | Performed by: INTERNAL MEDICINE

## 2022-11-01 PROCEDURE — 85610 PROTHROMBIN TIME: CPT | Performed by: INTERNAL MEDICINE

## 2022-11-01 PROCEDURE — 93458 L HRT ARTERY/VENTRICLE ANGIO: CPT | Performed by: INTERNAL MEDICINE

## 2022-11-01 PROCEDURE — 85007 BL SMEAR W/DIFF WBC COUNT: CPT | Performed by: EMERGENCY MEDICINE

## 2022-11-01 PROCEDURE — 94640 AIRWAY INHALATION TREATMENT: CPT

## 2022-11-01 PROCEDURE — 25010000002 AZITHROMYCIN PER 500 MG: Performed by: INTERNAL MEDICINE

## 2022-11-01 PROCEDURE — 87636 SARSCOV2 & INF A&B AMP PRB: CPT | Performed by: EMERGENCY MEDICINE

## 2022-11-01 PROCEDURE — 4A023N7 MEASUREMENT OF CARDIAC SAMPLING AND PRESSURE, LEFT HEART, PERCUTANEOUS APPROACH: ICD-10-PCS | Performed by: INTERNAL MEDICINE

## 2022-11-01 PROCEDURE — 99285 EMERGENCY DEPT VISIT HI MDM: CPT

## 2022-11-01 PROCEDURE — B41F1ZZ FLUOROSCOPY OF RIGHT LOWER EXTREMITY ARTERIES USING LOW OSMOLAR CONTRAST: ICD-10-PCS | Performed by: INTERNAL MEDICINE

## 2022-11-01 PROCEDURE — 93005 ELECTROCARDIOGRAM TRACING: CPT | Performed by: EMERGENCY MEDICINE

## 2022-11-01 PROCEDURE — 25010000002 FENTANYL CITRATE (PF) 50 MCG/ML SOLUTION: Performed by: INTERNAL MEDICINE

## 2022-11-01 PROCEDURE — C1887 CATHETER, GUIDING: HCPCS | Performed by: INTERNAL MEDICINE

## 2022-11-01 PROCEDURE — C1760 CLOSURE DEV, VASC: HCPCS | Performed by: INTERNAL MEDICINE

## 2022-11-01 PROCEDURE — 94760 N-INVAS EAR/PLS OXIMETRY 1: CPT

## 2022-11-01 PROCEDURE — 0 IOPAMIDOL PER 1 ML: Performed by: INTERNAL MEDICINE

## 2022-11-01 PROCEDURE — 99222 1ST HOSP IP/OBS MODERATE 55: CPT | Performed by: INTERNAL MEDICINE

## 2022-11-01 PROCEDURE — C1769 GUIDE WIRE: HCPCS | Performed by: INTERNAL MEDICINE

## 2022-11-01 PROCEDURE — 25010000002 CEFTRIAXONE PER 250 MG: Performed by: INTERNAL MEDICINE

## 2022-11-01 RX ORDER — SODIUM CHLORIDE 0.9 % (FLUSH) 0.9 %
10 SYRINGE (ML) INJECTION AS NEEDED
Status: DISCONTINUED | OUTPATIENT
Start: 2022-11-01 | End: 2022-11-17 | Stop reason: HOSPADM

## 2022-11-01 RX ORDER — ALBUTEROL SULFATE 2.5 MG/3ML
2.5 SOLUTION RESPIRATORY (INHALATION) EVERY 4 HOURS PRN
Status: DISCONTINUED | OUTPATIENT
Start: 2022-11-01 | End: 2022-11-01

## 2022-11-01 RX ORDER — SODIUM CHLORIDE 0.9 % (FLUSH) 0.9 %
10 SYRINGE (ML) INJECTION EVERY 12 HOURS SCHEDULED
Status: DISCONTINUED | OUTPATIENT
Start: 2022-11-01 | End: 2022-11-17 | Stop reason: HOSPADM

## 2022-11-01 RX ORDER — ASPIRIN 81 MG/1
324 TABLET, CHEWABLE ORAL ONCE
Status: COMPLETED | OUTPATIENT
Start: 2022-11-01 | End: 2022-11-01

## 2022-11-01 RX ORDER — CLOPIDOGREL BISULFATE 75 MG/1
75 TABLET ORAL DAILY
Status: DISCONTINUED | OUTPATIENT
Start: 2022-11-02 | End: 2022-11-17 | Stop reason: HOSPADM

## 2022-11-01 RX ORDER — ACETAMINOPHEN 650 MG/1
650 SUPPOSITORY RECTAL EVERY 4 HOURS PRN
Status: DISCONTINUED | OUTPATIENT
Start: 2022-11-01 | End: 2022-11-17 | Stop reason: HOSPADM

## 2022-11-01 RX ORDER — ACETAMINOPHEN 160 MG/5ML
650 SOLUTION ORAL EVERY 4 HOURS PRN
Status: DISCONTINUED | OUTPATIENT
Start: 2022-11-01 | End: 2022-11-03 | Stop reason: SDUPTHER

## 2022-11-01 RX ORDER — AMITRIPTYLINE HYDROCHLORIDE 25 MG/1
25 TABLET, FILM COATED ORAL NIGHTLY
Status: DISCONTINUED | OUTPATIENT
Start: 2022-11-01 | End: 2022-11-17 | Stop reason: HOSPADM

## 2022-11-01 RX ORDER — FENTANYL CITRATE 50 UG/ML
INJECTION, SOLUTION INTRAMUSCULAR; INTRAVENOUS
Status: DISCONTINUED | OUTPATIENT
Start: 2022-11-01 | End: 2022-11-01 | Stop reason: HOSPADM

## 2022-11-01 RX ORDER — CILOSTAZOL 100 MG/1
100 TABLET ORAL
Status: DISCONTINUED | OUTPATIENT
Start: 2022-11-02 | End: 2022-11-17 | Stop reason: HOSPADM

## 2022-11-01 RX ORDER — ONDANSETRON 2 MG/ML
4 INJECTION INTRAMUSCULAR; INTRAVENOUS EVERY 6 HOURS PRN
Status: DISCONTINUED | OUTPATIENT
Start: 2022-11-01 | End: 2022-11-17 | Stop reason: HOSPADM

## 2022-11-01 RX ORDER — BUDESONIDE AND FORMOTEROL FUMARATE DIHYDRATE 160; 4.5 UG/1; UG/1
2 AEROSOL RESPIRATORY (INHALATION) 2 TIMES DAILY PRN
Status: DISCONTINUED | OUTPATIENT
Start: 2022-11-01 | End: 2022-11-03 | Stop reason: SDUPTHER

## 2022-11-01 RX ORDER — FUROSEMIDE 20 MG/1
20 TABLET ORAL DAILY
Status: DISCONTINUED | OUTPATIENT
Start: 2022-11-02 | End: 2022-11-02

## 2022-11-01 RX ORDER — BUDESONIDE AND FORMOTEROL FUMARATE DIHYDRATE 160; 4.5 UG/1; UG/1
2 AEROSOL RESPIRATORY (INHALATION)
Status: DISCONTINUED | OUTPATIENT
Start: 2022-11-01 | End: 2022-11-17 | Stop reason: HOSPADM

## 2022-11-01 RX ORDER — MIDODRINE HYDROCHLORIDE 5 MG/1
10 TABLET ORAL
Status: DISCONTINUED | OUTPATIENT
Start: 2022-11-01 | End: 2022-11-02

## 2022-11-01 RX ORDER — SODIUM CHLORIDE 9 MG/ML
75 INJECTION, SOLUTION INTRAVENOUS CONTINUOUS
Status: ACTIVE | OUTPATIENT
Start: 2022-11-01 | End: 2022-11-01

## 2022-11-01 RX ORDER — IPRATROPIUM BROMIDE AND ALBUTEROL SULFATE 2.5; .5 MG/3ML; MG/3ML
3 SOLUTION RESPIRATORY (INHALATION) ONCE
Status: DISCONTINUED | OUTPATIENT
Start: 2022-11-01 | End: 2022-11-02

## 2022-11-01 RX ORDER — PANTOPRAZOLE SODIUM 40 MG/1
40 TABLET, DELAYED RELEASE ORAL DAILY
Status: DISCONTINUED | OUTPATIENT
Start: 2022-11-01 | End: 2022-11-17 | Stop reason: HOSPADM

## 2022-11-01 RX ORDER — IPRATROPIUM BROMIDE AND ALBUTEROL SULFATE 2.5; .5 MG/3ML; MG/3ML
3 SOLUTION RESPIRATORY (INHALATION)
Status: DISCONTINUED | OUTPATIENT
Start: 2022-11-01 | End: 2022-11-02

## 2022-11-01 RX ORDER — LIDOCAINE HYDROCHLORIDE 20 MG/ML
INJECTION, SOLUTION INFILTRATION; PERINEURAL
Status: DISCONTINUED | OUTPATIENT
Start: 2022-11-01 | End: 2022-11-01 | Stop reason: HOSPADM

## 2022-11-01 RX ORDER — ENOXAPARIN SODIUM 100 MG/ML
40 INJECTION SUBCUTANEOUS DAILY
Status: DISCONTINUED | OUTPATIENT
Start: 2022-11-02 | End: 2022-11-02

## 2022-11-01 RX ORDER — ACETAMINOPHEN 325 MG/1
650 TABLET ORAL EVERY 4 HOURS PRN
Status: DISCONTINUED | OUTPATIENT
Start: 2022-11-01 | End: 2022-11-03

## 2022-11-01 RX ORDER — PRAVASTATIN SODIUM 20 MG
20 TABLET ORAL NIGHTLY
Status: DISCONTINUED | OUTPATIENT
Start: 2022-11-01 | End: 2022-11-17 | Stop reason: HOSPADM

## 2022-11-01 RX ORDER — ONDANSETRON 4 MG/1
4 TABLET, FILM COATED ORAL EVERY 8 HOURS PRN
Status: DISCONTINUED | OUTPATIENT
Start: 2022-11-01 | End: 2022-11-17 | Stop reason: HOSPADM

## 2022-11-01 RX ORDER — GABAPENTIN 400 MG/1
400 CAPSULE ORAL EVERY 8 HOURS SCHEDULED
Status: DISCONTINUED | OUTPATIENT
Start: 2022-11-01 | End: 2022-11-17 | Stop reason: HOSPADM

## 2022-11-01 RX ORDER — SODIUM CHLORIDE 9 MG/ML
INJECTION, SOLUTION INTRAVENOUS
Status: COMPLETED
Start: 2022-11-01 | End: 2022-11-03

## 2022-11-01 RX ORDER — ACETAMINOPHEN 325 MG/1
650 TABLET ORAL EVERY 4 HOURS PRN
Status: DISCONTINUED | OUTPATIENT
Start: 2022-11-01 | End: 2022-11-17 | Stop reason: HOSPADM

## 2022-11-01 RX ADMIN — GABAPENTIN 400 MG: 400 CAPSULE ORAL at 21:42

## 2022-11-01 RX ADMIN — GABAPENTIN 400 MG: 400 CAPSULE ORAL at 17:41

## 2022-11-01 RX ADMIN — MIDODRINE HYDROCHLORIDE 10 MG: 5 TABLET ORAL at 17:40

## 2022-11-01 RX ADMIN — ASPIRIN 324 MG: 81 TABLET, CHEWABLE ORAL at 15:24

## 2022-11-01 RX ADMIN — AMITRIPTYLINE HYDROCHLORIDE 25 MG: 25 TABLET, FILM COATED ORAL at 21:42

## 2022-11-01 RX ADMIN — AZITHROMYCIN DIHYDRATE 500 MG: 500 INJECTION, POWDER, LYOPHILIZED, FOR SOLUTION INTRAVENOUS at 22:47

## 2022-11-01 RX ADMIN — CEFTRIAXONE SODIUM 1 G: 1 INJECTION, POWDER, FOR SOLUTION INTRAMUSCULAR; INTRAVENOUS at 21:00

## 2022-11-01 RX ADMIN — TRAZODONE HYDROCHLORIDE 150 MG: 100 TABLET ORAL at 21:42

## 2022-11-01 RX ADMIN — SERTRALINE HYDROCHLORIDE 100 MG: 50 TABLET ORAL at 17:40

## 2022-11-01 RX ADMIN — IPRATROPIUM BROMIDE AND ALBUTEROL SULFATE 3 ML: .5; 3 SOLUTION RESPIRATORY (INHALATION) at 19:15

## 2022-11-01 RX ADMIN — PRAVASTATIN SODIUM 20 MG: 20 TABLET ORAL at 21:42

## 2022-11-01 RX ADMIN — PANTOPRAZOLE SODIUM 40 MG: 40 TABLET, DELAYED RELEASE ORAL at 17:40

## 2022-11-01 NOTE — ED NOTES
Cath Team at bedside. Cath Team made aware that Marcy, RN, Vane-RN, Margarita-RN have attempted IV insertion x7 times total unsuccessful. Alex, Cath Team RN stated they would start a Central Line in Cath Lab. Cath Team transporting patient to Cath Lab at this time.

## 2022-11-01 NOTE — PLAN OF CARE
Goal Outcome Evaluation:   Pt received from cath lab. Pt on 4L NC. Afebrile. Low grade temp noted. Pt on strict bedrest until right groin line is removed. Pt to remain flat 6 hours after removal of line per . One IV attempted. Awaiting iv placement in order to remove line.

## 2022-11-01 NOTE — ED PROVIDER NOTES
Subjective     Chest Pain  Pain location:  R chest  Pain quality: aching and sharp    Pain radiates to:  Does not radiate  Pain severity:  Moderate  Onset quality:  Sudden  Duration:  1 week  Timing:  Constant  Progression:  Waxing and waning  Chronicity:  New  Context comment:  Recent fall with injury of the right ribs.  The patient is history of COPD and has had increased dyspnea and has been using her oxygen at home.  Wet cough.  Came in today for worsening shortness of breath and chest discomfort.  Relieved by:  Nothing  Worsened by:  Coughing and deep breathing  Ineffective treatments:  None tried  Associated symptoms: cough and shortness of breath    Associated symptoms: no abdominal pain, no altered mental status, no anxiety, no back pain, no diaphoresis, no dizziness, no fever, no lower extremity edema, no palpitations and no vomiting    Risk factors comment:  Multiple cardiac risk factors and history of peripheral vascular disease and carotid vascular disease.      Review of Systems   Constitutional: Negative for diaphoresis and fever.   Respiratory: Positive for cough and shortness of breath.    Cardiovascular: Positive for chest pain. Negative for palpitations.   Gastrointestinal: Negative for abdominal pain and vomiting.   Musculoskeletal: Negative for back pain.   Neurological: Negative for dizziness.   All other systems reviewed and are negative.      Past Medical History:   Diagnosis Date   • A-fib ([facility])    • Anxiety    • Arthritis    • Asthma    • 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 with ulceration ([facility])     bilateral legs - bilateral iliac stents 2008      • CHF (congestive heart failure) ([facility])    • Chronic lower back pain    • COPD (chronic obstructive pulmonary disease) ([facility])    • Essential (primary) hypertension    • GERD (gastroesophageal reflux disease)    • History of pulmonary embolus (PE)    • Hyperlipidemia    • Insomnia    • Lumbago    • Nicotine dependence    • Occlusion  of artery      and stenosis of bilateral carotid arteries - BABS 16-49%, LICA 0-15%   • Other atherosclerosis of native artery of other extremity     LEFT subclavian stent 2005 (occluded)   • Sleep apnea        Allergies   Allergen Reactions   • Morphine And Related Itching, Confusion and Hallucinations   • Penicillins Rash       Past Surgical History:   Procedure Laterality Date   • CARDIAC CATHETERIZATION  04/06/2016    No evidence of any obstructive epicardial CAD.Preserved LV systolic function with EF of 55%.   • CENTRAL VENOUS LINE INSERTION  04/07/2016    Successful placement of right uppe extremity 6-Jamaican triple lumen PICC line.   • ENDOSCOPY N/A 1/12/2018    Procedure: ESOPHAGOGASTRODUODENOSCOPY;  Surgeon: Bin Oh MD;  Location: [facility] ENDOSCOPY;  Service:    • ENDOSCOPY N/A 1/17/2018    Procedure: ESOPHAGOGASTRODUODENOSCOPY;  Surgeon: Bin Oh MD;  Location: [facility] ENDOSCOPY;  Service:    • ENDOSCOPY N/A 3/16/2018    Procedure: ESOPHAGOGASTRODUODENOSCOPY possible dilation;  Surgeon: Bin Oh MD;  Location: [facility] ENDOSCOPY;  Service: Gastroenterology   • FEMORAL POPLITEAL BYPASS Left 4/14/2020    Procedure: FEMORAL POPLITEAL BYPASS ABOVE KNEE  (POLYTETRAFLUOROETHYLENE)  LEFT COMMON FEMORAL ARTERY ENDARTERECTOMY PTA LEFT ILIAC ARTERIOGRAM        (c-arm#2 and c-arm table);  Surgeon: David Suh MD;  Location: [facility] OR;  Service: Vascular;  Laterality: Left;   • FEMORAL POPLITEAL BYPASS Right 9/17/2021    Procedure: RIGHT common femoral endarterectomy FEMORAL POPLITEAL BYPASS (above the knee polytetrafluoroethylene  lower extremity arteriogram              (c-arm#2 and c-arm table);  Surgeon: David Suh MD;  Location: [facility] OR;  Service: Vascular;  Laterality: Right;   • HYSTERECTOMY     • INTERVENTIONAL RADIOLOGY PROCEDURE Left 9/9/2020    Procedure: IR angiogram extremity left;  Surgeon: David Suh MD;  Location: [facility] ANGIO INVASIVE LOCATION;   Service: Interventional Radiology;  Laterality: Left;   • KYPHOPLASTY N/A 5/23/2019    Procedure: KYPHOPLASTY LUMBAR FOUR;  Surgeon: Aba Santa MD;  Location: [facility];  Service: Orthopedic Spine   • TOTAL SHOULDER REPLACEMENT Left    • TRANSESOPHAGEAL ECHOCARDIOGRAM (JACQUES)  04/07/2016    With color flow-Mild to moderate [facility].LV systolic function well preserved with Ef of 55-60%.Mitral and AV intact.Diastolic dysfunction   • UPPER GASTROINTESTINAL ENDOSCOPY  01/17/2018    Dr. Bin Martin M.D.       Family History   Problem Relation Age of Onset   • Heart disease Other    • Hypertension Other        Social History     Socioeconomic History   • Marital status:    Tobacco Use   • Smoking status: Every Day     Packs/day: 1.00     Years: 50.00     Pack years: 50.00     Types: Cigarettes   • Smokeless tobacco: Never   Vaping Use   • Vaping Use: Never used   Substance and Sexual Activity   • Alcohol use: No   • Drug use: No   • Sexual activity: Defer           Objective   Physical Exam  Vitals and nursing note reviewed.   Constitutional:       Appearance: She is not ill-appearing.   HENT:      Head: Normocephalic.   Neck:      Vascular: No JVD.   Cardiovascular:      Rate and Rhythm: Normal rate and regular rhythm.   Pulmonary:      Effort: Pulmonary effort is normal.      Breath sounds: Wheezing and rales present.   Chest:      Chest wall: No tenderness.   Musculoskeletal:      Right lower leg: No tenderness. No edema.      Left lower leg: No tenderness. No edema.   Skin:     General: Skin is warm and dry.   Neurological:      Mental Status: She is alert and oriented to person, place, and time.   Psychiatric:         Behavior: Behavior normal.         Procedures           ED Course                                           MDM  Number of Diagnoses or Management Options     Amount and/or Complexity of Data Reviewed  Clinical lab tests: reviewed  Tests in the medicine section of CPT®:  reviewed  Decide to obtain previous medical records or to obtain history from someone other than the patient: yes  Review and summarize past medical records: yes  Discuss the patient with other providers: yes      The patient's history seems concerning for possible rib fracture COPD exacerbation and pneumonia.  Nonetheless her initial EKG is concerning for STEMI.  Case discussed with Dr. Enzo Lozoya who has come to see the patient in the emergency department and plans on taking her to Cath Lab.    EKG interpretation: Interpreted by myself.  Normal sinus rhythm.  Normal P wave and NC interval.  Possible left ventricular hypertrophy.  Normal QTc interval.  ST segments are elevated by 1 mm in leads II and III with reciprocal changes in leads I and aVL.  These changes represent new findings compared to baseline EKG.      Final diagnoses:   ST elevation myocardial infarction (STEMI), unspecified artery (HCC)   Chronic obstructive pulmonary disease with acute exacerbation (HCC)       ED Disposition  ED Disposition     ED Disposition   Decision to Admit    Condition   --    Comment   Level of Care: Telemetry [5]   Diagnosis: ST elevation myocardial infarction (STEMI), unspecified artery (HCC) [[id number]]   Certification: I Certify That Inpatient Hospital Services Are Medically Necessary For Greater Than 2 Midnights               No follow-up provider specified.       Medication List      No changes were made to your prescriptions during this visit.          Bin Gaines,   11/01/22 173

## 2022-11-01 NOTE — H&P
[facility] Cardiology  HISTORY AND PHYSICAL  Mona Perales  72 y.o. female    Chief complaint -  Chest pain    History of Present Illness:    This is a 72-year-old female with history of hypertension, hyperlipidemia, COPD, active tobacco use, history of pulmonary embolism on chronic anticoagulation, history of significant vascular disease with bilateral femoropopliteal bypasses, history of carotid disease who has presented to the hospital with chest pain.  Patient reported she had a fall after losing balance a week ago, since then she has been having pain on the right side of the chest.  It does get with exertion.  Chest pain is right-sided appears pleuritic in character, she had an EKG performed which showed inferior ST elevation MI.  On my visit she was lying comfortably in bed without any acute distress.    In the setting of concerning EKG with chest pain urgent cardiac cath was recommended for further evaluation.    [facility] Pre-Op:    Invasive coronary angiography was recommended to the patient.  The patientdenies  bleeding issues. The patient reports use of antiplatelet agents.  The patient denies  CKD. The patient denies  contrast allergy. The patient denies  use of diabetes medications.    Pre-Cath Surgical History: NA    The indications, risks/benefits and alternatives of diagnostic left heart cardiac catheterization, angiography, conscious sedation, and possible blood transfusion were discussed in detail with the patient. The potential complications of 1/2000 chance of death, 1/1000 chance of heart attack or stroke, 1/500 chance of bleeding or clotting of the femoral artery, and 1/500 chance of allergic reaction to contrast were discussed. We also reviewed possible complications of infection and kidney dysfunction. If PCI were performed and intra-coronary stents indicated, we discussed the details about AISHA. This included a review of the risks of the infrequent, but relatively higher  incidence of late thrombosis with AISHA. The importance of maintaining a consistent daily regimen of aspirin and an additional anti-platelet agent for as long as directed after implantation was emphasized. No contraindications were found. The patient  appeared to understand and agreed to the above.  -Left heart catheterization, Coronary angiography, Graft angiography, In-situ LIMA angiography, Left ventriculography, Intravascular ultrasound, Optical coherence tomography, Flow wire, Balloon Angioplasty, Coronary stent, Graft stent, Aortography, Iliofemoral angiography, Device closure, femoral artery, Intra-aortic balloon pump, Impella LV assist device implantation, Transvenous pacemaker, Pericardiocentesis and Subclavian angiography      ASA Class: III  Mallampati Score: II      Contraindications to DAPT: None              Allergies   Allergen Reactions   • Morphine And Related Itching, Confusion and Hallucinations   • Penicillins Rash         Past Medical History:   Diagnosis Date   • A-fib ([facility])    • Anxiety    • Arthritis    • Asthma    • 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 with ulceration ([facility])     bilateral legs - bilateral iliac stents 2008      • CHF (congestive heart failure) ([facility])    • Chronic lower back pain    • COPD (chronic obstructive pulmonary disease) ([facility])    • Essential (primary) hypertension    • GERD (gastroesophageal reflux disease)    • History of pulmonary embolus (PE)    • Hyperlipidemia    • Insomnia    • Lumbago    • Nicotine dependence    • Occlusion of artery      and stenosis of bilateral carotid arteries - BABS 16-49%, LICA 0-15%   • Other atherosclerosis of native artery of other extremity     LEFT subclavian stent 2005 (occluded)   • Sleep apnea          Past Surgical History:   Procedure Laterality Date   • CARDIAC CATHETERIZATION  04/06/2016    No evidence of any obstructive epicardial CAD.Preserved LV systolic function with EF of 55%.   • CENTRAL VENOUS LINE  INSERTION  04/07/2016    Successful placement of right uppe extremity 6-Finnish triple lumen PICC line.   • ENDOSCOPY N/A 1/12/2018    Procedure: ESOPHAGOGASTRODUODENOSCOPY;  Surgeon: Bin Martin MD;  Location: [facility] ENDOSCOPY;  Service:    • ENDOSCOPY N/A 1/17/2018    Procedure: ESOPHAGOGASTRODUODENOSCOPY;  Surgeon: Bin Martin MD;  Location: [facility] ENDOSCOPY;  Service:    • ENDOSCOPY N/A 3/16/2018    Procedure: ESOPHAGOGASTRODUODENOSCOPY possible dilation;  Surgeon: Bin Martin MD;  Location: [facility] ENDOSCOPY;  Service: Gastroenterology   • FEMORAL POPLITEAL BYPASS Left 4/14/2020    Procedure: FEMORAL POPLITEAL BYPASS ABOVE KNEE  (POLYTETRAFLUOROETHYLENE)  LEFT COMMON FEMORAL ARTERY ENDARTERECTOMY PTA LEFT ILIAC ARTERIOGRAM        (c-arm#2 and c-arm table);  Surgeon: David Suh MD;  Location: [facility] OR;  Service: Vascular;  Laterality: Left;   • FEMORAL POPLITEAL BYPASS Right 9/17/2021    Procedure: RIGHT common femoral endarterectomy FEMORAL POPLITEAL BYPASS (above the knee polytetrafluoroethylene  lower extremity arteriogram              (c-arm#2 and c-arm table);  Surgeon: David Suh MD;  Location: [facility] OR;  Service: Vascular;  Laterality: Right;   • HYSTERECTOMY     • INTERVENTIONAL RADIOLOGY PROCEDURE Left 9/9/2020    Procedure: IR angiogram extremity left;  Surgeon: David Suh MD;  Location: [facility] ANGIO INVASIVE LOCATION;  Service: Interventional Radiology;  Laterality: Left;   • KYPHOPLASTY N/A 5/23/2019    Procedure: KYPHOPLASTY LUMBAR FOUR;  Surgeon: Aba Santa MD;  Location: [facility] OR;  Service: Orthopedic Spine   • TOTAL SHOULDER REPLACEMENT Left    • TRANSESOPHAGEAL ECHOCARDIOGRAM (JACQUES)  04/07/2016    With color flow-Mild to moderate [facility].LV systolic function well preserved with Ef of 55-60%.Mitral and AV intact.Diastolic dysfunction   • UPPER GASTROINTESTINAL ENDOSCOPY  01/17/2018    Dr. Bin Martin M.D.         Family History    Problem Relation Age of Onset   • Heart disease Other    • Hypertension Other          Social History     Socioeconomic History   • Marital status:    Tobacco Use   • Smoking status: Every Day     Packs/day: 1.00     Years: 50.00     Pack years: 50.00     Types: Cigarettes   • Smokeless tobacco: Never   Vaping Use   • Vaping Use: Never used   Substance and Sexual Activity   • Alcohol use: No   • Drug use: No   • Sexual activity: Defer         Prior to Admission medications    Medication Sig Start Date End Date Taking? Authorizing Provider   albuterol (PROVENTIL) (2.5 MG/3ML) 0.083% nebulizer solution Take 2.5 mg by nebulization Every 4 (Four) Hours As Needed for Wheezing.    Miguelito Chatman MD   ALBUTEROL SULFATE HFA IN Inhale 2 puffs Every 4 (Four) Hours As Needed.    Miguelito Chatman MD   amitriptyline (ELAVIL) 25 MG tablet Take 25 mg by mouth Every Night. 5/8/20   Miguelito Chatman MD   budesonide-formoterol (SYMBICORT) 160-4.5 MCG/ACT inhaler Inhale 2 puffs 2 (Two) Times a Day As Needed.    Miguelito Chatman MD   cilostazol (PLETAL) 100 MG tablet Take 1 tablet by mouth 2 (Two) Times a Day Before Meals. 5/2/22 5/2/23  Jacki Polanco APRN   clopidogrel (PLAVIX) 75 MG tablet Take 1 tablet by mouth Daily. 5/2/22   Jacki Polanco APRN   furosemide (LASIX) 20 MG tablet Take 1 tablet by mouth Daily. 6/12/20   Jacki Polanco APRN   gabapentin (NEURONTIN) 800 MG tablet Take 800 mg by mouth 3 (Three) Times a Day.    Miguelito Chatman MD   midodrine (PROAMATINE) 10 MG tablet Take 1 tablet by mouth 3 (Three) Times a Day Before Meals. 2/24/22   Hunter Pacheco, DO   O2 (OXYGEN) Inhale 4 L/min As Needed (shortness of air). 9/6/21   Miguelito Chatman MD   ondansetron (ZOFRAN) 4 MG tablet Take 4 mg by mouth Every 8 (Eight) Hours As Needed for Nausea or Vomiting.    Miguelito Chatman MD   pantoprazole (PROTONIX) 40 MG EC tablet Take 1 tablet by mouth Daily. 2/5/18  "  Mary Shen APRN   penciclovir (DENAVIR) 1 % cream Apply 1 application topically to the appropriate area as directed As Needed (fever blisters).    Provider, MD Miguelito   pravastatin (PRAVACHOL) 20 MG tablet Take 1 tablet by mouth Every Night. 5/2/22   Jacki Polanco APRN   rivaroxaban (Xarelto) 20 MG tablet Take 1 tablet by mouth Daily With Dinner. 5/2/22   Jacki Polanco APRN   sertraline (ZOLOFT) 100 MG tablet Take 100 mg by mouth Daily.    Provider, MD Miguelito   traZODone (DESYREL) 150 MG tablet Take 1 tablet by mouth Every Night. 2/26/19   Donn Triana MD   vitamin D (ERGOCALCIFEROL) 1.25 MG (10600 UT) capsule capsule Take 50,000 Units by mouth 1 (One) Time Per Week.    Provider, MD Miguelito         Review of Systems:     Constitution: Denies any fatigue, fever or chills.  HENT: Denies any headache, hearing impairment.  Eyes: Denies any blurring of vision, or photophobia.  Cardiovascular:  As per history of present illness.   Respiratory system: Denies any COPD, shortness of breath.  Endocrine:  No history of hyperlipidemia, diabetes.  Musculoskeletal:  No history of arthritis with musculoskeletal problems.  Gastrointestinal: No nausea, vomiting, or melena.  Genitourinary: No dysuria or hematuria.  Neurological: No history of seizure disorder, stroke, or memory problems.    Psychiatric/Behavioral: No history of depression, bipolar disorder or schizophrenia .    Hematological: No history of easy bruising.    ROS          OBJECTIVE:    /78 (BP Location: Left arm, Patient Position: Sitting)   Pulse 77   Temp 98.9 °F (37.2 °C) (Oral)   Resp 24   Ht 157.5 cm (62\")   Wt 56.2 kg (124 lb)   SpO2 93%   BMI 22.68 kg/m²       Physical Exam:   Constitutional: Patient is oriented to person, place, and time.   Skin: Warm and dry.  Well developed and nourished in no acute distress .  Head: Normocephalic and atraumatic.   Eyes: Pupils are equal, round, and reactive to light.   Neck: " Neck supple. No bruit in the carotids, no elevation of JVD.  Cardiovascular: [place] in the fifth intercostal space, regular rate, and rhythm,  S1 greater than S2, no S3 or S4, no gallop.  Pulmonary/Chest: Air entry is equal on both sides.  No wheezing or crackles.  Abdominal: Soft.  No hepatosplenomegaly, bowel sounds are present.  Musculoskeletal: No kyphoscoliosis.  Neurological: Alert and oriented to person, place, and time.  Cranial nerves are intact. No motor or sensory deficit.  Extremities: No edema, no radial femoral delay.  Psychiatric: Normal mood and affect. Behavior is normal.      Lab Results (last 24 hours)     Procedure Component Value Units Date/Time    COVID-19 and FLU A/B PCR - Swab, Nasopharynx [[id number]]  (Normal) Collected: 11/01/22 1514    Specimen: Swab from Nasopharynx Updated: 11/01/22 1545     COVID19 Not Detected     Influenza A PCR Not Detected     Influenza B PCR Not Detected    Narrative:      Fact sheet for providers: https://www.fda.gov/media/821322/download    Fact sheet for patients: https://www.fda.gov/media/461068/download    Test performed by PCR.          Results for orders placed during the hospital encounter of 02/19/22    Adult Transthoracic Echo Complete W/ Cont if Necessary Per Protocol    Interpretation Summary  · Left ventricular wall thickness is consistent with mild septal asymmetric hypertrophy.  · There is a small (<1cm) pericardial effusion adjacent to the right atrium. The effusion is fluid filled. There is no evidence of cardiac tamponade.  · Estimated left ventricular EF = 63% Left ventricular ejection fraction appears to be 61 - 65%. Left ventricular systolic function is normal.      The ASCVD Risk score ([place] DK, et al., 2019) failed to calculate for the following reasons:    The patient has a prior MI or stroke diagnosis          A/P:    1.  Chest pain:  Initial EKG concerning for ST elevations in inferior leads.  Cardiac cath without any evidence of  obstructive coronary artery disease.  We will check CRP since she has complaining of pleuritic chest pain.  Trend troponins.  Will get echocardiogram  Chest x-ray without rib fractures.  Right lung base does appear to have signs of early pneumonia.  Will consult hospitalist team.  She is on anticoagulation and has been compliant,  low clinical suspicion for PE.  Venous line was placed with ultrasound since she did not have any IV access.  DC  When she has peripheral IVs are not working.    2.  Peripheral vascular disease.  Extensive history of peripheral vascular disease with bilateral femoropopliteal bypass.   We will resume Plavix and cilostazol.    3.  Pulmonary embolism:  Prior history of PE.  Resume anticoagulation tomorrow.      4.  COPD: Resume home inhalers.  Will consult hospitalist team.            BMI is within normal parameters. No other follow-up for BMI required.      Mona Perales  reports that she has been smoking cigarettes. She has a 50.00 pack-year smoking history. She has never used smokeless tobacco.. I have educated her on the risk of diseases from using tobacco products such as cancer, COPD and heart disease.     I advised her to quit and she is not willing to quit.    I spent 3  minutes counseling the patient.           Neftali Haywood MD  11/1/2022  16:36 CDT      Part of this note may be an electronic transcription/translation of spoken language to printed text using the Dragon Dictation System.

## 2022-11-02 ENCOUNTER — APPOINTMENT (OUTPATIENT)
Dept: GENERAL RADIOLOGY | Facility: HOSPITAL | Age: 72
End: 2022-11-02

## 2022-11-02 ENCOUNTER — APPOINTMENT (OUTPATIENT)
Dept: INTERVENTIONAL RADIOLOGY/VASCULAR | Facility: HOSPITAL | Age: 72
End: 2022-11-02

## 2022-11-02 ENCOUNTER — APPOINTMENT (OUTPATIENT)
Dept: ULTRASOUND IMAGING | Facility: HOSPITAL | Age: 72
End: 2022-11-02

## 2022-11-02 ENCOUNTER — APPOINTMENT (OUTPATIENT)
Dept: CARDIOLOGY | Facility: HOSPITAL | Age: 72
End: 2022-11-02

## 2022-11-02 LAB
ANION GAP SERPL CALCULATED.3IONS-SCNC: 11 MMOL/L (ref 5–15)
BH CV ECHO MEAS - ACS: 2.15 CM
BH CV ECHO MEAS - AO MAX PG: 4.7 MMHG
BH CV ECHO MEAS - AO MEAN PG: 2.8 MMHG
BH CV ECHO MEAS - AO ROOT DIAM: 2.7 CM
BH CV ECHO MEAS - AO V2 MAX: 108.6 CM/SEC
BH CV ECHO MEAS - AO V2 VTI: 26.3 CM
BH CV ECHO MEAS - AVA(I,D): 2.26 CM2
BH CV ECHO MEAS - EDV(CUBED): 91.5 ML
BH CV ECHO MEAS - EDV(MOD-SP2): 73.9 ML
BH CV ECHO MEAS - EDV(MOD-SP4): 79.1 ML
BH CV ECHO MEAS - EF(MOD-SP2): 70.4 %
BH CV ECHO MEAS - EF(MOD-SP4): 70.9 %
BH CV ECHO MEAS - ESV(CUBED): 45 ML
BH CV ECHO MEAS - ESV(MOD-SP2): 21.9 ML
BH CV ECHO MEAS - ESV(MOD-SP4): 23 ML
BH CV ECHO MEAS - FS: 21 %
BH CV ECHO MEAS - IVS/LVPW: 1.27 CM
BH CV ECHO MEAS - IVSD: 1.24 CM
BH CV ECHO MEAS - LA DIMENSION: 3.2 CM
BH CV ECHO MEAS - LAT PEAK E' VEL: 5.7 CM/SEC
BH CV ECHO MEAS - LV DIASTOLIC VOL/BSA (35-75): 48.3 CM2
BH CV ECHO MEAS - LV MASS(C)D: 177.3 GRAMS
BH CV ECHO MEAS - LV MAX PG: 3.5 MMHG
BH CV ECHO MEAS - LV MEAN PG: 1.83 MMHG
BH CV ECHO MEAS - LV SYSTOLIC VOL/BSA (12-30): 14 CM2
BH CV ECHO MEAS - LV V1 MAX: 93 CM/SEC
BH CV ECHO MEAS - LV V1 VTI: 24.4 CM
BH CV ECHO MEAS - LVIDD: 4.5 CM
BH CV ECHO MEAS - LVIDS: 3.6 CM
BH CV ECHO MEAS - LVOT AREA: 2.44 CM2
BH CV ECHO MEAS - LVOT DIAM: 1.76 CM
BH CV ECHO MEAS - LVPWD: 0.98 CM
BH CV ECHO MEAS - MED PEAK E' VEL: 4.8 CM/SEC
BH CV ECHO MEAS - MR MAX PG: 80.9 MMHG
BH CV ECHO MEAS - MR MAX VEL: 449.6 CM/SEC
BH CV ECHO MEAS - MV A MAX VEL: 64.3 CM/SEC
BH CV ECHO MEAS - MV DEC SLOPE: 684.3 CM/SEC2
BH CV ECHO MEAS - MV E MAX VEL: 90 CM/SEC
BH CV ECHO MEAS - MV E/A: 1.4
BH CV ECHO MEAS - MV MAX PG: 5.2 MMHG
BH CV ECHO MEAS - MV MEAN PG: 1.9 MMHG
BH CV ECHO MEAS - MV P1/2T: 41.3 MSEC
BH CV ECHO MEAS - MV V2 VTI: 27.9 CM
BH CV ECHO MEAS - MVA(P1/2T): 5.3 CM2
BH CV ECHO MEAS - MVA(VTI): 2.14 CM2
BH CV ECHO MEAS - PA V2 MAX: 61.5 CM/SEC
BH CV ECHO MEAS - RAP SYSTOLE: 10 MMHG
BH CV ECHO MEAS - RVDD: 2.17 CM
BH CV ECHO MEAS - RVSP: 44.7 MMHG
BH CV ECHO MEAS - SI(MOD-SP2): 31.8 ML/M2
BH CV ECHO MEAS - SI(MOD-SP4): 34.3 ML/M2
BH CV ECHO MEAS - SV(LVOT): 59.6 ML
BH CV ECHO MEAS - SV(MOD-SP2): 52 ML
BH CV ECHO MEAS - SV(MOD-SP4): 56.1 ML
BH CV ECHO MEAS - TAPSE (>1.6): 2.35 CM
BH CV ECHO MEAS - TR MAX PG: 34.7 MMHG
BH CV ECHO MEAS - TR MAX VEL: 294.4 CM/SEC
BH CV ECHO MEASUREMENTS AVERAGE E/E' RATIO: 17.14
BUN SERPL-MCNC: 9 MG/DL (ref 8–23)
BUN/CREAT SERPL: 15.8 (ref 7–25)
CALCIUM SPEC-SCNC: 8.5 MG/DL (ref 8.6–10.5)
CHLORIDE SERPL-SCNC: 102 MMOL/L (ref 98–107)
CO2 SERPL-SCNC: 25 MMOL/L (ref 22–29)
CREAT SERPL-MCNC: 0.57 MG/DL (ref 0.57–1)
D-LACTATE SERPL-SCNC: 0.9 MMOL/L (ref 0.5–2)
DEPRECATED RDW RBC AUTO: 43.3 FL (ref 37–54)
EGFRCR SERPLBLD CKD-EPI 2021: 97.9 ML/MIN/1.73
ERYTHROCYTE [DISTWIDTH] IN BLOOD BY AUTOMATED COUNT: 13.2 % (ref 12.3–15.4)
ERYTHROCYTE [SEDIMENTATION RATE] IN BLOOD: 40 MM/HR (ref 0–30)
GLUCOSE SERPL-MCNC: 121 MG/DL (ref 65–99)
HCT VFR BLD AUTO: 34.9 % (ref 34–46.6)
HGB BLD-MCNC: 11.4 G/DL (ref 12–15.9)
LEFT ATRIUM VOLUME INDEX: 18.8 ML/M2
MAXIMAL PREDICTED HEART RATE: 150 BPM
MCH RBC QN AUTO: 29 PG (ref 26.6–33)
MCHC RBC AUTO-ENTMCNC: 32.7 G/DL (ref 31.5–35.7)
MCV RBC AUTO: 88.8 FL (ref 79–97)
PLATELET # BLD AUTO: 267 10*3/MM3 (ref 140–450)
PMV BLD AUTO: 9.3 FL (ref 6–12)
POTASSIUM SERPL-SCNC: 4.2 MMOL/L (ref 3.5–5.2)
QT INTERVAL: 416 MS
QT INTERVAL: 438 MS
QTC INTERVAL: 458 MS
QTC INTERVAL: 475 MS
RBC # BLD AUTO: 3.93 10*6/MM3 (ref 3.77–5.28)
SODIUM SERPL-SCNC: 138 MMOL/L (ref 136–145)
STRESS TARGET HR: 128 BPM
TROPONIN T SERPL-MCNC: <0.01 NG/ML (ref 0–0.03)
WBC NRBC COR # BLD: 15.95 10*3/MM3 (ref 3.4–10.8)

## 2022-11-02 PROCEDURE — 94799 UNLISTED PULMONARY SVC/PX: CPT

## 2022-11-02 PROCEDURE — 93005 ELECTROCARDIOGRAM TRACING: CPT | Performed by: INTERNAL MEDICINE

## 2022-11-02 PROCEDURE — 76937 US GUIDE VASCULAR ACCESS: CPT

## 2022-11-02 PROCEDURE — 84484 ASSAY OF TROPONIN QUANT: CPT | Performed by: INTERNAL MEDICINE

## 2022-11-02 PROCEDURE — 93306 TTE W/DOPPLER COMPLETE: CPT

## 2022-11-02 PROCEDURE — 93306 TTE W/DOPPLER COMPLETE: CPT | Performed by: INTERNAL MEDICINE

## 2022-11-02 PROCEDURE — 85652 RBC SED RATE AUTOMATED: CPT | Performed by: NURSE PRACTITIONER

## 2022-11-02 PROCEDURE — 94760 N-INVAS EAR/PLS OXIMETRY 1: CPT

## 2022-11-02 PROCEDURE — 25010000002 PERFLUTREN (DEFINITY) 8.476 MG IN SODIUM CHLORIDE (PF) 0.9 % 10 ML INJECTION: Performed by: INTERNAL MEDICINE

## 2022-11-02 PROCEDURE — 05HY33Z INSERTION OF INFUSION DEVICE INTO UPPER VEIN, PERCUTANEOUS APPROACH: ICD-10-PCS | Performed by: RADIOLOGY

## 2022-11-02 PROCEDURE — 25010000002 METHYLPREDNISOLONE PER 125 MG: Performed by: INTERNAL MEDICINE

## 2022-11-02 PROCEDURE — 85027 COMPLETE CBC AUTOMATED: CPT | Performed by: INTERNAL MEDICINE

## 2022-11-02 PROCEDURE — 36410 VNPNXR 3YR/> PHY/QHP DX/THER: CPT

## 2022-11-02 PROCEDURE — 25010000002 AZITHROMYCIN PER 500 MG: Performed by: INTERNAL MEDICINE

## 2022-11-02 PROCEDURE — 99233 SBSQ HOSP IP/OBS HIGH 50: CPT | Performed by: INTERNAL MEDICINE

## 2022-11-02 PROCEDURE — 80048 BASIC METABOLIC PNL TOTAL CA: CPT | Performed by: INTERNAL MEDICINE

## 2022-11-02 PROCEDURE — 83605 ASSAY OF LACTIC ACID: CPT | Performed by: INTERNAL MEDICINE

## 2022-11-02 PROCEDURE — 93010 ELECTROCARDIOGRAM REPORT: CPT | Performed by: INTERNAL MEDICINE

## 2022-11-02 PROCEDURE — 71045 X-RAY EXAM CHEST 1 VIEW: CPT

## 2022-11-02 PROCEDURE — 36415 COLL VENOUS BLD VENIPUNCTURE: CPT | Performed by: INTERNAL MEDICINE

## 2022-11-02 PROCEDURE — 25010000002 CEFTRIAXONE PER 250 MG: Performed by: INTERNAL MEDICINE

## 2022-11-02 PROCEDURE — C1751 CATH, INF, PER/CENT/MIDLINE: HCPCS

## 2022-11-02 PROCEDURE — 87040 BLOOD CULTURE FOR BACTERIA: CPT | Performed by: INTERNAL MEDICINE

## 2022-11-02 PROCEDURE — 25010000002 FUROSEMIDE PER 20 MG: Performed by: INTERNAL MEDICINE

## 2022-11-02 RX ORDER — METHYLPREDNISOLONE SODIUM SUCCINATE 125 MG/2ML
80 INJECTION, POWDER, LYOPHILIZED, FOR SOLUTION INTRAMUSCULAR; INTRAVENOUS EVERY 6 HOURS
Status: DISCONTINUED | OUTPATIENT
Start: 2022-11-02 | End: 2022-11-02

## 2022-11-02 RX ORDER — FUROSEMIDE 10 MG/ML
40 INJECTION INTRAMUSCULAR; INTRAVENOUS ONCE
Status: COMPLETED | OUTPATIENT
Start: 2022-11-02 | End: 2022-11-02

## 2022-11-02 RX ORDER — IPRATROPIUM BROMIDE AND ALBUTEROL SULFATE 2.5; .5 MG/3ML; MG/3ML
3 SOLUTION RESPIRATORY (INHALATION)
Status: DISCONTINUED | OUTPATIENT
Start: 2022-11-02 | End: 2022-11-17 | Stop reason: HOSPADM

## 2022-11-02 RX ORDER — SODIUM CHLORIDE 0.9 % (FLUSH) 0.9 %
20 SYRINGE (ML) INJECTION AS NEEDED
Status: DISCONTINUED | OUTPATIENT
Start: 2022-11-02 | End: 2022-11-17 | Stop reason: HOSPADM

## 2022-11-02 RX ORDER — SODIUM CHLORIDE 0.9 % (FLUSH) 0.9 %
10 SYRINGE (ML) INJECTION EVERY 12 HOURS SCHEDULED
Status: DISCONTINUED | OUTPATIENT
Start: 2022-11-02 | End: 2022-11-17 | Stop reason: HOSPADM

## 2022-11-02 RX ORDER — METHYLPREDNISOLONE SODIUM SUCCINATE 125 MG/2ML
80 INJECTION, POWDER, LYOPHILIZED, FOR SOLUTION INTRAMUSCULAR; INTRAVENOUS EVERY 6 HOURS
Status: DISCONTINUED | OUTPATIENT
Start: 2022-11-02 | End: 2022-11-05

## 2022-11-02 RX ORDER — IBUPROFEN 600 MG/1
600 TABLET ORAL EVERY 8 HOURS SCHEDULED
Status: DISCONTINUED | OUTPATIENT
Start: 2022-11-02 | End: 2022-11-02

## 2022-11-02 RX ORDER — SODIUM CHLORIDE 0.9 % (FLUSH) 0.9 %
10 SYRINGE (ML) INJECTION AS NEEDED
Status: DISCONTINUED | OUTPATIENT
Start: 2022-11-02 | End: 2022-11-17 | Stop reason: HOSPADM

## 2022-11-02 RX ADMIN — CILOSTAZOL 100 MG: 100 TABLET ORAL at 18:01

## 2022-11-02 RX ADMIN — CEFTRIAXONE SODIUM 1 G: 1 INJECTION, POWDER, FOR SOLUTION INTRAMUSCULAR; INTRAVENOUS at 18:01

## 2022-11-02 RX ADMIN — AMITRIPTYLINE HYDROCHLORIDE 25 MG: 25 TABLET, FILM COATED ORAL at 21:06

## 2022-11-02 RX ADMIN — PANTOPRAZOLE SODIUM 40 MG: 40 TABLET, DELAYED RELEASE ORAL at 09:00

## 2022-11-02 RX ADMIN — ACETAMINOPHEN 650 MG: 325 TABLET, FILM COATED ORAL at 16:17

## 2022-11-02 RX ADMIN — RIVAROXABAN 20 MG: 10 TABLET, FILM COATED ORAL at 18:01

## 2022-11-02 RX ADMIN — PRAVASTATIN SODIUM 20 MG: 20 TABLET ORAL at 21:06

## 2022-11-02 RX ADMIN — GABAPENTIN 400 MG: 400 CAPSULE ORAL at 21:06

## 2022-11-02 RX ADMIN — FUROSEMIDE 40 MG: 10 INJECTION, SOLUTION INTRAMUSCULAR; INTRAVENOUS at 12:44

## 2022-11-02 RX ADMIN — TRAZODONE HYDROCHLORIDE 150 MG: 100 TABLET ORAL at 21:06

## 2022-11-02 RX ADMIN — IPRATROPIUM BROMIDE AND ALBUTEROL SULFATE 3 ML: 2.5; .5 SOLUTION RESPIRATORY (INHALATION) at 15:12

## 2022-11-02 RX ADMIN — PERFLUTREN 1.5 ML: 6.52 INJECTION, SUSPENSION INTRAVENOUS at 14:35

## 2022-11-02 RX ADMIN — GABAPENTIN 400 MG: 400 CAPSULE ORAL at 14:29

## 2022-11-02 RX ADMIN — AZITHROMYCIN DIHYDRATE 500 MG: 500 INJECTION, POWDER, LYOPHILIZED, FOR SOLUTION INTRAVENOUS at 21:06

## 2022-11-02 RX ADMIN — GABAPENTIN 400 MG: 400 CAPSULE ORAL at 06:34

## 2022-11-02 RX ADMIN — METHYLPREDNISOLONE SODIUM SUCCINATE 80 MG: 125 INJECTION, POWDER, FOR SOLUTION INTRAMUSCULAR; INTRAVENOUS at 12:44

## 2022-11-02 RX ADMIN — IPRATROPIUM BROMIDE AND ALBUTEROL SULFATE 3 ML: 2.5; .5 SOLUTION RESPIRATORY (INHALATION) at 12:07

## 2022-11-02 RX ADMIN — IPRATROPIUM BROMIDE AND ALBUTEROL SULFATE 3 ML: 2.5; .5 SOLUTION RESPIRATORY (INHALATION) at 19:18

## 2022-11-02 RX ADMIN — Medication 10 ML: at 21:07

## 2022-11-02 RX ADMIN — IPRATROPIUM BROMIDE AND ALBUTEROL SULFATE 3 ML: .5; 3 SOLUTION RESPIRATORY (INHALATION) at 07:42

## 2022-11-02 RX ADMIN — CILOSTAZOL 100 MG: 100 TABLET ORAL at 06:33

## 2022-11-02 RX ADMIN — METHYLPREDNISOLONE SODIUM SUCCINATE 80 MG: 125 INJECTION, POWDER, FOR SOLUTION INTRAMUSCULAR; INTRAVENOUS at 18:01

## 2022-11-02 RX ADMIN — SERTRALINE HYDROCHLORIDE 100 MG: 50 TABLET ORAL at 09:00

## 2022-11-02 RX ADMIN — Medication 10 ML: at 21:06

## 2022-11-02 RX ADMIN — CLOPIDOGREL BISULFATE 75 MG: 75 TABLET ORAL at 08:59

## 2022-11-02 RX ADMIN — MIDODRINE HYDROCHLORIDE 10 MG: 5 TABLET ORAL at 06:34

## 2022-11-02 RX ADMIN — FUROSEMIDE 20 MG: 20 TABLET ORAL at 09:00

## 2022-11-02 NOTE — PLAN OF CARE
Goal Outcome Evaluation:   Pt is alert and oriented x 4. NSR- chika throughout the night. Hypertensive at times. Venous sheath pulled and PIV inserted. PIV infiltrated. Loss of IV access. MD on call aware. Orders placed for midline to be inserted later today.

## 2022-11-02 NOTE — PROGRESS NOTES
TWO PATIENT IDENTIFIERS WERE USED. THE PATIENT WAS DRAPED WITH A FULL BODY DRAPE AND THE PATIENT'S RIGHT ARM WAS PREPPED WITH CHLORA PREP. ULTRASOUND WAS USED TO LOCALIZE THERIGHT BASILIC VEIN. SUBCUTANEOUS TISSUE AT THE CATHETER SITE WAS INFILTRATED WITH 2% LIDOCAINE. UNDER ULTRASOUND GUIDANCE, THE VEIN WAS ACCESSED WITH A 21 GAUGE  NEEDLE. AN 0.018 WIRE WAS THEN THREADED THROUGH THE NEEDLE. THE 21 GAUGE NEEDLE WAS REMOVED AND A 4 [place] SHEATH WAS PLACED OVER THE WIRE INTO THE VEIN.THE MIDLINE CATHETER WAS TRIMMED TO 20CM. THE MIDLINE CATHETER WAS THEN PLACED OVER THE WIRE INTO THE VEIN, THE SHEATH WAS PEELED AWAY, WIRE WAS REMOVED. CATHETER WAS FLUSHED WITH NORMAL SALINE AND CATHETER TIP APPLIED. BIOPATCH PLACED. CATHETER SECURED WITH STAT LOCK AND TEGADERM. PATIENT TOLERATED PROCEDURE WELL. THIS WAS DONE AT BEDSIDE      IMPRESSION:SUCCESSFUL PLACEMENT OF DUAL LUMEN MIDLINE.           Airam Moreno  11/2/2022  10:45 CDT

## 2022-11-02 NOTE — CONSULTS
"Adult Nutrition  Assessment/PES    Patient Name:  Mona Perales  YOB: 1950  MRN: [id number]  Admit Date:  11/1/2022    Assessment Date:  11/2/2022    Comments:    Pt presents with a hx of PVD, CHF, and COPD.  She is admitted with CAP and Acute resp failure.  She reports a good appetite and was eating well pta.  She does report a mild wt loss of a couple of pounds.  (Pt is also on Lasix).  Will add Supplements, milk and ice cream to optimize her oral intake.  Increased energy expenditure with COPD and pneumonia.  Rd will monitor POC and po intake.       Reason for Assessment     Row Name 11/02/22 1144          Reason for Assessment    Reason For Assessment identified at risk by screening criteria     Diagnosis pulmonary disease     Identified At Risk by Screening Criteria no indicators present                Nutrition/Diet History     Row Name 11/02/22 1144          Nutrition/Diet History    Typical Intake (Food/Fluid/EN/PN) Pt eating breakfast.  She reports a good appetite and denies any Gi distress.  Eating well pta but does report a wt loss of a couple of pounds lately.  Drinks milk                Labs/Tests/Procedures/Meds     Row Name 11/02/22 1144          Labs/Procedures/Meds    Lab Results Reviewed reviewed, pertinent        Diagnostic Tests/Procedures    Diagnostic Test/Procedure Reviewed reviewed, pertinent        Medications    Pertinent Medications Reviewed reviewed, pertinent                  Estimated/Assessed Needs - Anthropometrics     Row Name 11/02/22 1145 11/02/22 0444       Anthropometrics    Weight -- 63.2 kg (139 lb 4.8 oz)    Height for Calculation 1.575 m (5' 2\") --    Weight for Calculation 63 kg (139 lb) --       Estimated/Assessed Needs    Additional Documentation Additional Requirements (Group);Fluid Requirements (Group);Modified [place] Equation (Group);Estimated Calorie Needs (Group);KCAL/KG (Group);[place]-St. Jeor Equation (Group);Protein Requirements (Group) -- "       Estimated Calorie Needs    Estimated Calorie Need Method kcal/kg --       KCAL/KG    KCAL/KG 25 Kcal/Kg (kcal) --    25 Kcal/Kg (kcal) 1576.25 --       [place]-St. Jeor Equation    RMR ([place]-St. Jeor Equation) 1103.877 --       Protein Requirements    Weight Used For Protein Calculations 63 kg (139 lb) --    Est Protein Requirement Amount (gms/kg) 1.0 gm protein --    Estimated Protein Requirements (gms/day) 63.05 --       Fluid Requirements    Fluid Requirements (mL/day) 1500 --    Estimated Fluid Requirement Method RDA Method --    RDA Method (mL) 1500 --               Nutrition Prescription Ordered     Row Name 11/02/22 1146          Nutrition Prescription PO    Common Modifiers Cardiac;Consistent Carbohydrate                Evaluation of Received Nutrient/Fluid Intake     Row Name 11/02/22 1146          PO Evaluation    Number of Days PO Intake Evaluated Insufficient Data                   Problem/Interventions:   Problem 1     Row Name 11/02/22 1147          Nutrition Diagnoses Problem 1    Problem 1 Increased Nutrient Needs     Macronutrient Kcal;Protein     Etiology (related to) Medical Diagnosis     Pulmonary/Critical Care Respiratory distress;Pneumonia     Other Comment Hypermetabolic state                      Intervention Goal     Row Name 11/02/22 1148          Intervention Goal    General Meet nutritional needs for age/condition;Reduce/improve symptoms     PO Tolerate PO;Meet estimated needs     Weight Maintain weight                Nutrition Intervention     Row Name 11/02/22 1148          Nutrition Intervention    RD/Tech Action Follow Tx progress;Care plan reviewd;Encourage intake;Recommend/ordered;Advise alternate selection;Advise available snack;Interview for preference;Menu provided     Recommended/Ordered Supplement                Nutrition Prescription     Row Name 11/02/22 1148          Nutrition Prescription PO    PO Prescription Begin/change supplement     Supplement Milk;Magic Cup      Supplement Frequency 2 times a day;3 times a day     New PO Prescription Ordered? Yes                Education/Evaluation     Row Name 11/02/22 1148          Education    Education Education topics;Advised regarding habits/behavior     Education Topics Basic nutrition;Protein     Advised Regarding Habits/Behavior Increased nutrient density;Snacks;Use supplement;Food choices        Monitor/Evaluation    Monitor Per protocol;I&O;PO intake;Supplement intake;Pertinent labs;Weight;Skin status;Symptoms     Education Follow-up Reinforce PRN                 Electronically signed by:  Jessy Krishnan RD  11/02/22 11:51 CDT

## 2022-11-02 NOTE — PAYOR COMM NOTE
"[facility]  Case Managment Extender   Herminia Red  (P) [phone]  (F) [phone]      [employer] Provider ID#[id number]      Filomena Perales (72 y.o. Female)     Date of Birth   1950    Social Security Number       Address   [address] [place] [address]    Home Phone   [phone]    MRN   [id number]       Scientology   [facility]    Marital Status                               Admission Date   11/1/22    Admission Type   Emergency    Admitting Provider   Neftali Haywood MD    Attending Provider   Neftali Haywood MD    Department, Room/Bed   [facility] CRITICAL CARE STEPDOWN, 07/A       Discharge Date       Discharge Disposition       Discharge Destination                               Attending Provider: Neftali Haywood MD    Allergies: Morphine And Related, Penicillins    Isolation: None   Infection: None   Code Status: CPR    Ht: 157.5 cm (62.01\")   Wt: 63.2 kg (139 lb 4.8 oz)    Admission Cmt: None   Principal Problem: ST elevation myocardial infarction (STEMI), unspecified artery (HCC) [I21.3]                 Active Insurance as of 11/1/2022     Primary Coverage     Payor Plan Insurance Group Employer/Plan Group    [facility] [employer] MEDICARE REPLACEMENT [id number]     Payor Plan Address Payor Plan Phone Number Payor Plan Fax Number Effective Dates    [address] [address] [phone]  5/1/2021 - None Entered    [facility] [id number]       Subscriber Name Subscriber Birth Date Member ID       FILOMENA PERALES 1950 [id number]                 Emergency Contacts      (Rel.) Home Phone Work Phone Mobile Phone    Renee Li (Grandchild) -- -- [phone]    Meena Marie (Friend) -- -- [phone]               History & Physical      Danish Flores MD at 11/01/22 [facility]                [facility] Medicine Consult  Consults    Date of Admission: " 11/1/2022  Date of Consult: 11/01/22    Primary Care Physician: Anahi Camacho APRN  Referring Physician:  Bin Gaines DO      Chief Complaint/Reason for Consultation: nausea, upper back pain, chills    HPI:Patient with history of PVD fell over a week ago on her upper right back; she has been complaining since then of upper back pain, chills, unquantified fever, nausea, chills and dry cough.   Today she went to see her PCP and after auscultation of her lungs she sent her here  On ED, she had EKG and concerned for ST elevation, cardiology was consulted and patient taken to the cath lab  We were consulted for likelihood of pneumonia and medical management    Past Medical History:  has a past medical history of A-fib ([facility]), Anxiety, Arthritis, Asthma, 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 with ulceration ([facility]), CHF (congestive heart failure) ([facility]), Chronic lower back pain, COPD (chronic obstructive pulmonary disease) (HCC), Essential (primary) hypertension, GERD (gastroesophageal reflux disease), History of pulmonary embolus (PE), Hyperlipidemia, Insomnia, Lumbago, Nicotine dependence, Occlusion of artery, Other atherosclerosis of native artery of other extremity, and Sleep apnea.    Past Surgical History:  has a past surgical history that includes Cardiac catheterization (04/06/2016); Central venous catheter insertion (04/07/2016); transesophageal echocardiogram (yanet) (04/07/2016); Total shoulder replacement (Left); Hysterectomy; Esophagogastroduodenoscopy (N/A, 1/12/2018); Esophagogastroduodenoscopy (N/A, 1/17/2018); Upper gastrointestinal endoscopy (01/17/2018); Esophagogastroduodenoscopy (N/A, 3/16/2018); Kyphoplasty (N/A, 5/23/2019); femoral popliteal bypass (Left, 4/14/2020); Interventional radiology procedure (Left, 9/9/2020); and femoral popliteal bypass (Right, 9/17/2021).    Family History: family history includes Heart disease in an other family member; Hypertension in an other family  member.   Mom  at 83 y/o of old age  Dad  at 79 y/o with lung cancer    Social History:  reports that she has been smoking cigarettes. She has a 50.00 pack-year smoking history. She has never used smokeless tobacco. She reports that she does not drink alcohol and does not use drugs.     MARC is her daughter Stefania Li  She is full code    Allergies:   Allergies   Allergen Reactions   • Morphine And Related Itching, Confusion and Hallucinations   • Penicillins Rash       Medications: Scheduled Meds:amitriptyline, 25 mg, Oral, Nightly  budesonide-formoterol, 2 puff, Inhalation, BID - RT  [START ON 2022] cilostazol, 100 mg, Oral, BID AC  [START ON 2022] clopidogrel, 75 mg, Oral, Daily  [START ON 2022] furosemide, 20 mg, Oral, Daily  gabapentin, 400 mg, Oral, Q8H  ipratropium-albuterol, 3 mL, Nebulization, Once  midodrine, 10 mg, Oral, TID AC  pantoprazole, 40 mg, Oral, Daily  pravastatin, 20 mg, Oral, Nightly  sertraline, 100 mg, Oral, Daily  sodium chloride, , ,   traZODone, 150 mg, Oral, Nightly      Continuous Infusions:sodium chloride, 75 mL/hr      PRN Meds:.•  acetaminophen  •  albuterol  •  budesonide-formoterol  •  ondansetron    Review of Systems:  Review of Systems   Constitutional: Positive for activity change.   HENT: Negative.    Respiratory: Positive for cough and shortness of breath.    Cardiovascular: Negative for chest pain, palpitations and leg swelling.   Gastrointestinal: Positive for nausea.   Endocrine: Negative.    Genitourinary: Negative.    Musculoskeletal: Positive for back pain.   Neurological: Negative.    Psychiatric/Behavioral: Negative.       Otherwise complete ROS is negative except as mentioned above.    Physical Exam:   Temp:  [98.9 °F (37.2 °C)] 98.9 °F (37.2 °C)  Heart Rate:  [77] 77  Resp:  [24] 24  BP: (147)/(78) 147/78  Physical Exam  HENT:      Head: Normocephalic.   Eyes:      Extraocular Movements: Extraocular movements intact.   Cardiovascular:      Rate  and Rhythm: Normal rate and regular rhythm.      Heart sounds: Normal heart sounds.   Pulmonary:      Breath sounds: Rales present.   Abdominal:      General: Bowel sounds are normal.      Palpations: Abdomen is soft.   Musculoskeletal:         General: Normal range of motion.      Cervical back: Normal range of motion and neck supple.   Neurological:      General: No focal deficit present.      Mental Status: She is alert. Mental status is at baseline.   Psychiatric:         Mood and Affect: Mood normal.         Behavior: Behavior normal.           Results Reviewed:  I have personally reviewed current lab, radiology, and data and agree with results.  Lab Results (last 24 hours)     Procedure Component Value Units Date/Time    Extra Tubes [[id number]] Collected: 11/01/22 1700    Specimen: Blood, Venous Line Updated: 11/01/22 1707    Narrative:      The following orders were created for panel order Extra Tubes.  Procedure                               Abnormality         Status                     ---------                               -----------         ------                     Gold Top - SST[[id number]]                                   In process                 Gold Top - SST[[id number]]                                   In process                   Please view results for these tests on the individual orders.    Gold Top - SST [[id number]] Collected: 11/01/22 1700    Specimen: Blood Updated: 11/01/22 1707    Gold Top - SST [[id number]] Collected: 11/01/22 1705    Specimen: Blood Updated: 11/01/22 1707    CBC & Differential [[id number]] Collected: 11/01/22 1705    Specimen: Blood Updated: 11/01/22 1705    Narrative:      The following orders were created for panel order CBC & Differential.  Procedure                               Abnormality         Status                     ---------                               -----------         ------                     CBC Auto Differential[[id number]]                             In process                   Please view results for these tests on the individual orders.    Comprehensive Metabolic Panel [[id number]] Collected: 11/01/22 1705    Specimen: Blood Updated: 11/01/22 1705    BNP [[id number]] Collected: 11/01/22 1705    Specimen: Blood Updated: 11/01/22 1705    CBC Auto Differential [[id number]] Collected: 11/01/22 1705    Specimen: Blood Updated: 11/01/22 1705    Protime-INR [[id number]] Collected: 11/01/22 1705    Specimen: Blood Updated: 11/01/22 1705    aPTT [[id number]] Collected: 11/01/22 1705    Specimen: Blood Updated: 11/01/22 1705    C-reactive Protein [[id number]] Collected: 11/01/22 1705    Specimen: Blood Updated: 11/01/22 1705    COVID-19 and FLU A/B PCR - Swab, Nasopharynx [[id number]]  (Normal) Collected: 11/01/22 1514    Specimen: Swab from Nasopharynx Updated: 11/01/22 1545     COVID19 Not Detected     Influenza A PCR Not Detected     Influenza B PCR Not Detected    Narrative:      Fact sheet for providers: https://www.fda.gov/media/164335/download    Fact sheet for patients: https://www.fda.gov/media/877766/download    Test performed by PCR.        Imaging Results (Last 24 Hours)     Procedure Component Value Units Date/Time    XR Chest 1 View [[id number]] Collected: 11/01/22 1525     Updated: 11/01/22 1549    Narrative:        PROCEDURE: Single chest view portable    REASON FOR EXAM:soa, I21.3 ST elevation (STEMI) myocardial  infarction of unspecified site J44.1 Chronic obstructive  pulmonary disease with (acute) exacerbation    FINDINGS: Comparison exam dated February 24, 2022. Multiple leads  overlie the chest. Cardiomegaly. Right lung base small  groundglass opacity. A few scattered calcified lung parenchymal  granulomas consistent with old granulomatous disease. Lungs are  otherwise clear. Pleural spaces are normal. No acute osseous  abnormality. Stable left shoulder reverse total arthroplasty.      Impression:      1.  Cardiomegaly.  2.  Right lung base small  groundglass opacity suspicious for  early mild atypical pulmonary edema versus early pneumonia or  subsegmental atelectasis.  3.  Evidence of old granulomatous disease.    Electronically signed by:  Jim Muro MD  11/1/2022 3:47 PM CDT  Workstation: [id number]          Assessment and Plan    EKG changes     Patient was taken to the cath lab and no PCI done, likely non flow CAD disease    Right lung base CAP     It is suspected; blood cultures, lactic acid, procalcitonin ordered, along with rocephin and azithromycin and bronchodilators    Acute respiratory failure with hypoxemia     Due to above     Continue with oxygen as needed and will wean as needed    Chronic medical problems     Essential hypertension     Peripheral vascular disease      Hyperlipidemia     DAMION     Chronic recurrent depression     I would like to thank Dr Haywood for involving us in the care of Mona Perales.  We will continue to follow while she is hospitalized.    Danish Flores MD            Electronically signed by Danish Flores MD at 11/01/22 5679     Neftali Haywood MD at 11/01/22 1535            [facility]  HISTORY AND PHYSICAL  Mona Perales  72 y.o. female    Chief complaint -  Chest pain    History of Present Illness:    This is a 72-year-old female with history of hypertension, hyperlipidemia, COPD, active tobacco use, history of pulmonary embolism on chronic anticoagulation, history of significant vascular disease with bilateral femoropopliteal bypasses, history of carotid disease who has presented to the hospital with chest pain.  Patient reported she had a fall after losing balance a week ago, since then she has been having pain on the right side of the chest.  It does get with exertion.  Chest pain is right-sided appears pleuritic in character, she had an EKG performed which showed inferior ST elevation MI.  On my visit she was lying comfortably in bed without any acute distress.    In  the setting of concerning EKG with chest pain urgent cardiac cath was recommended for further evaluation.    [facility] Pre-Op:    Invasive coronary angiography was recommended to the patient.  The patientdenies  bleeding issues. The patient reports use of antiplatelet agents.  The patient denies  CKD. The patient denies  contrast allergy. The patient denies  use of diabetes medications.    Pre-Cath Surgical History: NA    The indications, risks/benefits and alternatives of diagnostic left heart cardiac catheterization, angiography, conscious sedation, and possible blood transfusion were discussed in detail with the patient. The potential complications of 1/2000 chance of death, 1/1000 chance of heart attack or stroke, 1/500 chance of bleeding or clotting of the femoral artery, and 1/500 chance of allergic reaction to contrast were discussed. We also reviewed possible complications of infection and kidney dysfunction. If PCI were performed and intra-coronary stents indicated, we discussed the details about AISHA. This included a review of the risks of the infrequent, but relatively higher incidence of late thrombosis with AISHA. The importance of maintaining a consistent daily regimen of aspirin and an additional anti-platelet agent for as long as directed after implantation was emphasized. No contraindications were found. The patient  appeared to understand and agreed to the above.  -Left heart catheterization, Coronary angiography, Graft angiography, In-situ LIMA angiography, Left ventriculography, Intravascular ultrasound, Optical coherence tomography, Flow wire, Balloon Angioplasty, Coronary stent, Graft stent, Aortography, Iliofemoral angiography, Device closure, femoral artery, Intra-aortic balloon pump, Impella LV assist device implantation, Transvenous pacemaker, Pericardiocentesis and Subclavian angiography      ASA Class: III  Mallampati Score: II      Contraindications to DAPT: None              Allergies   Allergen  Reactions   • Morphine And Related Itching, Confusion and Hallucinations   • Penicillins Rash         Past Medical History:   Diagnosis Date   • A-fib ([facility])    • Anxiety    • Arthritis    • Asthma    • 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 with ulceration ([facility])     bilateral legs - bilateral iliac stents 2008      • CHF (congestive heart failure) ([facility])    • Chronic lower back pain    • COPD (chronic obstructive pulmonary disease) ([facility])    • Essential (primary) hypertension    • GERD (gastroesophageal reflux disease)    • History of pulmonary embolus (PE)    • Hyperlipidemia    • Insomnia    • Lumbago    • Nicotine dependence    • Occlusion of artery      and stenosis of bilateral carotid arteries - BABS 16-49%, LICA 0-15%   • Other atherosclerosis of native artery of other extremity     LEFT subclavian stent 2005 (occluded)   • Sleep apnea          Past Surgical History:   Procedure Laterality Date   • CARDIAC CATHETERIZATION  04/06/2016    No evidence of any obstructive epicardial CAD.Preserved LV systolic function with EF of 55%.   • CENTRAL VENOUS LINE INSERTION  04/07/2016    Successful placement of right uppe extremity 6-Mosotho triple lumen PICC line.   • ENDOSCOPY N/A 1/12/2018    Procedure: ESOPHAGOGASTRODUODENOSCOPY;  Surgeon: Bin Oh MD;  Location: [facility] ENDOSCOPY;  Service:    • ENDOSCOPY N/A 1/17/2018    Procedure: ESOPHAGOGASTRODUODENOSCOPY;  Surgeon: Bin Oh MD;  Location: [facility] ENDOSCOPY;  Service:    • ENDOSCOPY N/A 3/16/2018    Procedure: ESOPHAGOGASTRODUODENOSCOPY possible dilation;  Surgeon: Bin Oh MD;  Location: [facility] ENDOSCOPY;  Service: Gastroenterology   • FEMORAL POPLITEAL BYPASS Left 4/14/2020    Procedure: FEMORAL POPLITEAL BYPASS ABOVE KNEE  (POLYTETRAFLUOROETHYLENE)  LEFT COMMON FEMORAL ARTERY ENDARTERECTOMY PTA LEFT ILIAC ARTERIOGRAM        (c-arm#2 and c-arm table);  Surgeon: David Suh MD;  Location: [facility] OR;  Service: Vascular;   Laterality: Left;   • FEMORAL POPLITEAL BYPASS Right 9/17/2021    Procedure: RIGHT common femoral endarterectomy FEMORAL POPLITEAL BYPASS (above the knee polytetrafluoroethylene  lower extremity arteriogram              (c-arm#2 and c-arm table);  Surgeon: David Suh MD;  Location: [facility] OR;  Service: Vascular;  Laterality: Right;   • HYSTERECTOMY     • INTERVENTIONAL RADIOLOGY PROCEDURE Left 9/9/2020    Procedure: IR angiogram extremity left;  Surgeon: David Suh MD;  Location: [facility] ANGIO INVASIVE LOCATION;  Service: Interventional Radiology;  Laterality: Left;   • KYPHOPLASTY N/A 5/23/2019    Procedure: KYPHOPLASTY LUMBAR FOUR;  Surgeon: Aba Santa MD;  Location: [facility] OR;  Service: Orthopedic Spine   • TOTAL SHOULDER REPLACEMENT Left    • TRANSESOPHAGEAL ECHOCARDIOGRAM (JACQUES)  04/07/2016    With color flow-Mild to moderate [facility].LV systolic function well preserved with Ef of 55-60%.Mitral and AV intact.Diastolic dysfunction   • UPPER GASTROINTESTINAL ENDOSCOPY  01/17/2018    Dr. Bin Martin M.D.         Family History   Problem Relation Age of Onset   • Heart disease Other    • Hypertension Other          Social History     Socioeconomic History   • Marital status:    Tobacco Use   • Smoking status: Every Day     Packs/day: 1.00     Years: 50.00     Pack years: 50.00     Types: Cigarettes   • Smokeless tobacco: Never   Vaping Use   • Vaping Use: Never used   Substance and Sexual Activity   • Alcohol use: No   • Drug use: No   • Sexual activity: Defer         Prior to Admission medications    Medication Sig Start Date End Date Taking? Authorizing Provider   albuterol (PROVENTIL) (2.5 MG/3ML) 0.083% nebulizer solution Take 2.5 mg by nebulization Every 4 (Four) Hours As Needed for Wheezing.    ProviderMiguelito MD   ALBUTEROL SULFATE HFA IN Inhale 2 puffs Every 4 (Four) Hours As Needed.    ProviderMiguelito MD   amitriptyline (ELAVIL) 25 MG tablet Take  25 mg by mouth Every Night. 5/8/20   Miguelito Chatman MD   budesonide-formoterol (SYMBICORT) 160-4.5 MCG/ACT inhaler Inhale 2 puffs 2 (Two) Times a Day As Needed.    Miguelito Chatman MD   cilostazol (PLETAL) 100 MG tablet Take 1 tablet by mouth 2 (Two) Times a Day Before Meals. 5/2/22 5/2/23  Jacki Polanco APRN   clopidogrel (PLAVIX) 75 MG tablet Take 1 tablet by mouth Daily. 5/2/22   Jacki Polanco APRN   furosemide (LASIX) 20 MG tablet Take 1 tablet by mouth Daily. 6/12/20   Jacki Polanco APRN   gabapentin (NEURONTIN) 800 MG tablet Take 800 mg by mouth 3 (Three) Times a Day.    Miguelito Chatman MD   midodrine (PROAMATINE) 10 MG tablet Take 1 tablet by mouth 3 (Three) Times a Day Before Meals. 2/24/22   Hunter Pacheco, DO   O2 (OXYGEN) Inhale 4 L/min As Needed (shortness of air). 9/6/21   Miguelito Chatman MD   ondansetron (ZOFRAN) 4 MG tablet Take 4 mg by mouth Every 8 (Eight) Hours As Needed for Nausea or Vomiting.    Miguelito Chatman MD   pantoprazole (PROTONIX) 40 MG EC tablet Take 1 tablet by mouth Daily. 2/5/18   Mary Shen APRN   penciclovir (DENAVIR) 1 % cream Apply 1 application topically to the appropriate area as directed As Needed (fever blisters).    Miguelito Chatman MD   pravastatin (PRAVACHOL) 20 MG tablet Take 1 tablet by mouth Every Night. 5/2/22   Jacki Polanco APRN   rivaroxaban (Xarelto) 20 MG tablet Take 1 tablet by mouth Daily With Dinner. 5/2/22   Jacki Polanco APRN   sertraline (ZOLOFT) 100 MG tablet Take 100 mg by mouth Daily.    Miguelito Chatman MD   traZODone (DESYREL) 150 MG tablet Take 1 tablet by mouth Every Night. 2/26/19   Donn Triana MD   vitamin D (ERGOCALCIFEROL) 1.25 MG (87859 UT) capsule capsule Take 50,000 Units by mouth 1 (One) Time Per Week.    Provider, Miguelito, MD         Review of Systems:     Constitution: Denies any fatigue, fever or chills.  HENT: Denies any headache, hearing  "impairment.  Eyes: Denies any blurring of vision, or photophobia.  Cardiovascular:  As per history of present illness.   Respiratory system: Denies any COPD, shortness of breath.  Endocrine:  No history of hyperlipidemia, diabetes.  Musculoskeletal:  No history of arthritis with musculoskeletal problems.  Gastrointestinal: No nausea, vomiting, or melena.  Genitourinary: No dysuria or hematuria.  Neurological: No history of seizure disorder, stroke, or memory problems.    Psychiatric/Behavioral: No history of depression, bipolar disorder or schizophrenia .    Hematological: No history of easy bruising.    ROS          OBJECTIVE:    /78 (BP Location: Left arm, Patient Position: Sitting)   Pulse 77   Temp 98.9 °F (37.2 °C) (Oral)   Resp 24   Ht 157.5 cm (62\")   Wt 56.2 kg (124 lb)   SpO2 93%   BMI 22.68 kg/m²       Physical Exam:   Constitutional: Patient is oriented to person, place, and time.   Skin: Warm and dry.  Well developed and nourished in no acute distress .  Head: Normocephalic and atraumatic.   Eyes: Pupils are equal, round, and reactive to light.   Neck: Neck supple. No bruit in the carotids, no elevation of JVD.  Cardiovascular: Vermilion in the fifth intercostal space, regular rate, and rhythm,  S1 greater than S2, no S3 or S4, no gallop.  Pulmonary/Chest: Air entry is equal on both sides.  No wheezing or crackles.  Abdominal: Soft.  No hepatosplenomegaly, bowel sounds are present.  Musculoskeletal: No kyphoscoliosis.  Neurological: Alert and oriented to person, place, and time.  Cranial nerves are intact. No motor or sensory deficit.  Extremities: No edema, no radial femoral delay.  Psychiatric: Normal mood and affect. Behavior is normal.      Lab Results (last 24 hours)     Procedure Component Value Units Date/Time    COVID-19 and FLU A/B PCR - Swab, Nasopharynx [[id number]]  (Normal) Collected: 11/01/22 1514    Specimen: Swab from Nasopharynx Updated: 11/01/22 1545     COVID19 Not Detected     " Influenza A PCR Not Detected     Influenza B PCR Not Detected    Narrative:      Fact sheet for providers: https://www.fda.gov/media/649131/download    Fact sheet for patients: https://www.fda.gov/media/434915/download    Test performed by PCR.          Results for orders placed during the hospital encounter of 02/19/22    Adult Transthoracic Echo Complete W/ Cont if Necessary Per Protocol    Interpretation Summary  · Left ventricular wall thickness is consistent with mild septal asymmetric hypertrophy.  · There is a small (<1cm) pericardial effusion adjacent to the right atrium. The effusion is fluid filled. There is no evidence of cardiac tamponade.  · Estimated left ventricular EF = 63% Left ventricular ejection fraction appears to be 61 - 65%. Left ventricular systolic function is normal.      The ASCVD Risk score (Genoveva MONTEMAYOR, et al., 2019) failed to calculate for the following reasons:    The patient has a prior MI or stroke diagnosis          A/P:    1.  Chest pain:  Initial EKG concerning for ST elevations in inferior leads.  Cardiac cath without any evidence of obstructive coronary artery disease.  We will check CRP since she has complaining of pleuritic chest pain.  Trend troponins.  Will get echocardiogram  Chest x-ray without rib fractures.  Right lung base does appear to have signs of early pneumonia.  Will consult hospitalist team.  She is on anticoagulation and has been compliant,  low clinical suspicion for PE.  Venous line was placed with ultrasound since she did not have any IV access.  DC  When she has peripheral IVs are not working.    2.  Peripheral vascular disease.  Extensive history of peripheral vascular disease with bilateral femoropopliteal bypass.   We will resume Plavix and cilostazol.    3.  Pulmonary embolism:  Prior history of PE.  Resume anticoagulation tomorrow.      4.  COPD: Resume home inhalers.  Will consult hospitalist team.            BMI is within normal parameters. No other  follow-up for BMI required.      Mona Perales  reports that she has been smoking cigarettes. She has a 50.00 pack-year smoking history. She has never used smokeless tobacco.. I have educated her on the risk of diseases from using tobacco products such as cancer, COPD and heart disease.     I advised her to quit and she is not willing to quit.    I spent 3  minutes counseling the patient.           Neftali Haywood MD  11/1/2022  16:36 CDT      Part of this note may be an electronic transcription/translation of spoken language to printed text using the Dragon Dictation System.          Electronically signed by Neftali Haywood MD at 11/01/22 1641          Emergency Department Notes      Bin Gaines DO at 11/01/22 1517          Subjective     Chest Pain  Pain location:  R chest  Pain quality: aching and sharp    Pain radiates to:  Does not radiate  Pain severity:  Moderate  Onset quality:  Sudden  Duration:  1 week  Timing:  Constant  Progression:  Waxing and waning  Chronicity:  New  Context comment:  Recent fall with injury of the right ribs.  The patient is history of COPD and has had increased dyspnea and has been using her oxygen at home.  Wet cough.  Came in today for worsening shortness of breath and chest discomfort.  Relieved by:  Nothing  Worsened by:  Coughing and deep breathing  Ineffective treatments:  None tried  Associated symptoms: cough and shortness of breath    Associated symptoms: no abdominal pain, no altered mental status, no anxiety, no back pain, no diaphoresis, no dizziness, no fever, no lower extremity edema, no palpitations and no vomiting    Risk factors comment:  Multiple cardiac risk factors and history of peripheral vascular disease and carotid vascular disease.      Review of Systems   Constitutional: Negative for diaphoresis and fever.   Respiratory: Positive for cough and shortness of breath.    Cardiovascular: Positive for chest pain. Negative for palpitations.    Gastrointestinal: Negative for abdominal pain and vomiting.   Musculoskeletal: Negative for back pain.   Neurological: Negative for dizziness.   All other systems reviewed and are negative.      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)     bilateral legs - bilateral iliac stents 2008      • CHF (congestive heart failure) ([facility])    • Chronic lower back pain    • COPD (chronic obstructive pulmonary disease) ([facility])    • Essential (primary) hypertension    • GERD (gastroesophageal reflux disease)    • History of pulmonary embolus (PE)    • Hyperlipidemia    • Insomnia    • Lumbago    • Nicotine dependence    • Occlusion of artery      and stenosis of bilateral carotid arteries - BABS 16-49%, LICA 0-15%   • Other atherosclerosis of native artery of other extremity     LEFT subclavian stent 2005 (occluded)   • Sleep apnea        Allergies   Allergen Reactions   • Morphine And Related Itching, Confusion and Hallucinations   • Penicillins Rash       Past Surgical History:   Procedure Laterality Date   • CARDIAC CATHETERIZATION  04/06/2016    No evidence of any obstructive epicardial CAD.Preserved LV systolic function with EF of 55%.   • CENTRAL VENOUS LINE INSERTION  04/07/2016    Successful placement of right uppe extremity 6-Yi triple lumen PICC line.   • ENDOSCOPY N/A 1/12/2018    Procedure: ESOPHAGOGASTRODUODENOSCOPY;  Surgeon: Bin Oh MD;  Location: [facility] ENDOSCOPY;  Service:    • ENDOSCOPY N/A 1/17/2018    Procedure: ESOPHAGOGASTRODUODENOSCOPY;  Surgeon: Bin Oh MD;  Location: [facility] ENDOSCOPY;  Service:    • ENDOSCOPY N/A 3/16/2018    Procedure: ESOPHAGOGASTRODUODENOSCOPY possible dilation;  Surgeon: Bin Oh MD;  Location: [facility] ENDOSCOPY;  Service: Gastroenterology   • FEMORAL POPLITEAL BYPASS Left 4/14/2020    Procedure: FEMORAL POPLITEAL BYPASS ABOVE  KNEE  (POLYTETRAFLUOROETHYLENE)  LEFT COMMON FEMORAL ARTERY ENDARTERECTOMY PTA LEFT ILIAC ARTERIOGRAM        (c-arm#2 and c-arm table);  Surgeon: David Suh MD;  Location: [facility];  Service: Vascular;  Laterality: Left;   • FEMORAL POPLITEAL BYPASS Right 9/17/2021    Procedure: RIGHT common femoral endarterectomy FEMORAL POPLITEAL BYPASS (above the knee polytetrafluoroethylene  lower extremity arteriogram              (c-arm#2 and c-arm table);  Surgeon: David Suh MD;  Location: [facility] OR;  Service: Vascular;  Laterality: Right;   • HYSTERECTOMY     • INTERVENTIONAL RADIOLOGY PROCEDURE Left 9/9/2020    Procedure: IR angiogram extremity left;  Surgeon: David Suh MD;  Location: [facility] ANGIO INVASIVE LOCATION;  Service: Interventional Radiology;  Laterality: Left;   • KYPHOPLASTY N/A 5/23/2019    Procedure: KYPHOPLASTY LUMBAR FOUR;  Surgeon: Aba Santa MD;  Location: [facility] OR;  Service: Orthopedic Spine   • TOTAL SHOULDER REPLACEMENT Left    • TRANSESOPHAGEAL ECHOCARDIOGRAM (JACQUES)  04/07/2016    With color flow-Mild to moderate [facility].LV systolic function well preserved with Ef of 55-60%.Mitral and AV intact.Diastolic dysfunction   • UPPER GASTROINTESTINAL ENDOSCOPY  01/17/2018    Dr. Bin Martin M.D.       Family History   Problem Relation Age of Onset   • Heart disease Other    • Hypertension Other        Social History     Socioeconomic History   • Marital status:    Tobacco Use   • Smoking status: Every Day     Packs/day: 1.00     Years: 50.00     Pack years: 50.00     Types: Cigarettes   • Smokeless tobacco: Never   Vaping Use   • Vaping Use: Never used   Substance and Sexual Activity   • Alcohol use: No   • Drug use: No   • Sexual activity: Defer           Objective   Physical Exam  Vitals and nursing note reviewed.   Constitutional:       Appearance: She is not ill-appearing.   HENT:      Head: Normocephalic.   Neck:      Vascular: No JVD.    Cardiovascular:      Rate and Rhythm: Normal rate and regular rhythm.   Pulmonary:      Effort: Pulmonary effort is normal.      Breath sounds: Wheezing and rales present.   Chest:      Chest wall: No tenderness.   Musculoskeletal:      Right lower leg: No tenderness. No edema.      Left lower leg: No tenderness. No edema.   Skin:     General: Skin is warm and dry.   Neurological:      Mental Status: She is alert and oriented to person, place, and time.   Psychiatric:         Behavior: Behavior normal.         Procedures          ED Course                                           MDM  Number of Diagnoses or Management Options     Amount and/or Complexity of Data Reviewed  Clinical lab tests: reviewed  Tests in the medicine section of CPT®: reviewed  Decide to obtain previous medical records or to obtain history from someone other than the patient: yes  Review and summarize past medical records: yes  Discuss the patient with other providers: yes      The patient's history seems concerning for possible rib fracture COPD exacerbation and pneumonia.  Nonetheless her initial EKG is concerning for STEMI.  Case discussed with Dr. Enzo Lozoya who has come to see the patient in the emergency department and plans on taking her to Cath Lab.    EKG interpretation: Interpreted by myself.  Normal sinus rhythm.  Normal P wave and NV interval.  Possible left ventricular hypertrophy.  Normal QTc interval.  ST segments are elevated by 1 mm in leads II and III with reciprocal changes in leads I and aVL.  These changes represent new findings compared to baseline EKG.      Final diagnoses:   ST elevation myocardial infarction (STEMI), unspecified artery (HCC)   Chronic obstructive pulmonary disease with acute exacerbation (HCC)       ED Disposition  ED Disposition     ED Disposition   Decision to Admit    Condition   --    Comment   Level of Care: Telemetry [5]   Diagnosis: ST elevation myocardial infarction (STEMI), unspecified artery  ([facility]) [[id number]]   Certification: I Certify That Inpatient Hospital Services Are Medically Necessary For Greater Than 2 Midnights               No follow-up provider specified.       Medication List      No changes were made to your prescriptions during this visit.          Bin Gaines DO  11/01/22 1736      Electronically signed by Bin Gaines DO at 11/01/22 1736     Cyndee Calle RN at 11/01/22 1534        Midline team called after multiple unsuccessful attempts at PIV.     Electronically signed by Cyndee Calle RN at 11/01/22 1535     Cyndee Calle RN at 11/01/22 1538        Cath Team at bedside. Cath Team made aware that Marcy, RN, Vane-RN, Margarita-RN have attempted IV insertion x7 times total unsuccessful. Alex, Cath Team RN stated they would start a Central Line in Cath Lab. Cath Team transporting patient to Cath Lab at this time.    Electronically signed by Cyndee Calle RN at 11/01/22 1541         Lab Results (last 24 hours)     Procedure Component Value Units Date/Time    Basic Metabolic Panel [[id number]]  (Abnormal) Collected: 11/02/22 0350    Specimen: Blood Updated: 11/02/22 0505     Glucose 121 mg/dL      BUN 9 mg/dL      Creatinine 0.57 mg/dL      Sodium 138 mmol/L      Potassium 4.2 mmol/L      Chloride 102 mmol/L      CO2 25.0 mmol/L      Calcium 8.5 mg/dL      BUN/Creatinine Ratio 15.8     Anion Gap 11.0 mmol/L      eGFR 97.9 mL/min/1.73      Comment: National Kidney Foundation and American Society of Nephrology (ASN) Task Force recommended calculation based on the Chronic Kidney Disease Epidemiology Collaboration (CKD-EPI) equation refit without adjustment for race.       Narrative:      GFR Normal >60  Chronic Kidney Disease <60  Kidney Failure <15      Troponin [[id number]]  (Normal) Collected: 11/02/22 0350    Specimen: Blood Updated: 11/02/22 0430     Troponin T <0.010 ng/mL     Narrative:      Troponin T Reference Range:  <= 0.03 ng/mL-   Negative for AMI  >0.03 ng/mL-      Abnormal for myocardial necrosis.  Clinicians would have to utilize clinical acumen, EKG, Troponin and serial changes to determine if it is an Acute Myocardial Infarction or myocardial injury due to an underlying chronic condition.       Results may be falsely decreased if patient taking Biotin.      CBC (No Diff) [[id number]]  (Abnormal) Collected: 11/02/22 0350    Specimen: Blood Updated: 11/02/22 0409     WBC 15.95 10*3/mm3      RBC 3.93 10*6/mm3      Hemoglobin 11.4 g/dL      Hematocrit 34.9 %      MCV 88.8 fL      MCH 29.0 pg      MCHC 32.7 g/dL      RDW 13.2 %      RDW-SD 43.3 fl      MPV 9.3 fL      Platelets 267 10*3/mm3     Troponin [[id number]]  (Normal) Collected: 11/02/22 0031    Specimen: Blood Updated: 11/02/22 0103     Troponin T <0.010 ng/mL     Narrative:      Troponin T Reference Range:  <= 0.03 ng/mL-   Negative for AMI  >0.03 ng/mL-     Abnormal for myocardial necrosis.  Clinicians would have to utilize clinical acumen, EKG, Troponin and serial changes to determine if it is an Acute Myocardial Infarction or myocardial injury due to an underlying chronic condition.       Results may be falsely decreased if patient taking Biotin.      Lactic Acid, Plasma [[id number]]  (Normal) Collected: 11/02/22 0031    Specimen: Blood Updated: 11/02/22 0058     Lactate 0.9 mmol/L     Blood Culture - Blood, Arm, Left [[id number]] Collected: 11/02/22 0031    Specimen: Blood from Arm, Left Updated: 11/02/22 0040    Blood Culture - Blood, Arm, Right [[id number]] Collected: 11/02/22 0035    Specimen: Blood from Arm, Right Updated: 11/02/22 0039    CBC & Differential [[id number]]  (Abnormal) Collected: 11/01/22 1705    Specimen: Blood Updated: 11/01/22 1851    Narrative:      The following orders were created for panel order CBC & Differential.  Procedure                               Abnormality         Status                     ---------                               -----------         ------                     CBC Auto  "Differential[[id number]]        Abnormal            Final result               Scan Slide[[id number]]                                       Final result                 Please view results for these tests on the individual orders.    Scan Slide [[id number]] Collected: 11/01/22 1705    Specimen: Blood Updated: 11/01/22 1851     Hypochromia Slight/1+     WBC Morphology Normal     Platelet Morphology Normal    Procalcitonin [[id number]]  (Normal) Collected: 11/01/22 1705    Specimen: Blood Updated: 11/01/22 1837     Procalcitonin 0.04 ng/mL     Narrative:      As a Marker for Sepsis (Non-Neonates):    1. <0.5 ng/mL represents a low risk of severe sepsis and/or septic shock.  2. >2 ng/mL represents a high risk of severe sepsis and/or septic shock.    As a Marker for Lower Respiratory Tract Infections that require antibiotic therapy:    PCT on Admission    Antibiotic Therapy       6-12 Hrs later    >0.5                Strongly Recommended  >0.25 - <0.5        Recommended   0.1 - 0.25          Discouraged              Remeasure/reassess PCT  <0.1                Strongly Discouraged     Remeasure/reassess PCT    As 28 day mortality risk marker: \"Change in Procalcitonin Result\" (>80% or <=80%) if Day 0 (or Day 1) and Day 4 values are available. Refer to http://www.[email or web address]-pct-calculator.com    Change in PCT <=80%  A decrease of PCT levels below or equal to 80% defines a positive change in PCT test result representing a higher risk for 28-day all-cause mortality of patients diagnosed with severe sepsis for septic shock.    Change in PCT >80%  A decrease of PCT levels of more than 80% defines a negative change in PCT result representing a lower risk for 28-day all-cause mortality of patients diagnosed with severe sepsis or septic shock.       Extra Tubes [[id number]] Collected: 11/01/22 1700    Specimen: Blood Updated: 11/01/22 1817    Narrative:      The following orders were created for panel order Extra Tubes.  Procedure         "                       Abnormality         Status                     ---------                               -----------         ------                     Gold Top - SST[[id number]]                                   Final result               Gold Top - SST[[id number]]                                   Final result                 Please view results for these tests on the individual orders.    Gold Top - SST [[id number]] Collected: 11/01/22 1705    Specimen: Blood Updated: 11/01/22 1817     Extra Tube Hold for add-ons.     Comment: Auto resulted.       Gold Top - SST [[id number]] Collected: 11/01/22 1700    Specimen: Blood Updated: 11/01/22 1801     Extra Tube Hold for add-ons.     Comment: Auto resulted.       Comprehensive Metabolic Panel [[id number]]  (Abnormal) Collected: 11/01/22 1705    Specimen: Blood Updated: 11/01/22 1728     Glucose 105 mg/dL      BUN 6 mg/dL      Creatinine 0.71 mg/dL      Sodium 138 mmol/L      Potassium 3.5 mmol/L      Chloride 100 mmol/L      CO2 28.0 mmol/L      Calcium 8.7 mg/dL      Total Protein 7.1 g/dL      Albumin 3.20 g/dL      ALT (SGPT) 6 U/L      AST (SGOT) 11 U/L      Alkaline Phosphatase 99 U/L      Total Bilirubin 0.4 mg/dL      Globulin 3.9 gm/dL      A/G Ratio 0.8 g/dL      BUN/Creatinine Ratio 8.5     Anion Gap 10.0 mmol/L      eGFR 91.6 mL/min/1.73      Comment: National Kidney Foundation and American Society of Nephrology (ASN) Task Force recommended calculation based on the Chronic Kidney Disease Epidemiology Collaboration (CKD-EPI) equation refit without adjustment for race.       Narrative:      GFR Normal >60  Chronic Kidney Disease <60  Kidney Failure <15      C-reactive Protein [[id number]]  (Abnormal) Collected: 11/01/22 1705    Specimen: Blood Updated: 11/01/22 1728     C-Reactive Protein 12.60 mg/dL     aPTT [[id number]]  (Normal) Collected: 11/01/22 1705    Specimen: Blood Updated: 11/01/22 1728     PTT 31.8 seconds     Narrative:      The recommended Heparin  therapeutic range is 68-97 seconds.    Protime-INR [[id number]]  (Abnormal) Collected: 11/01/22 1705    Specimen: Blood Updated: 11/01/22 1728     Protime 16.5 Seconds      INR 1.33    Narrative:      Therapeutic range for most indications is 2.0-3.0 INR,  or 2.5-3.5 for mechanical heart valves.    BNP [[id number]]  (Abnormal) Collected: 11/01/22 1705    Specimen: Blood Updated: 11/01/22 1726     proBNP 1,798.0 pg/mL     Narrative:      Among patients with dyspnea, NT-proBNP is highly sensitive for the detection of acute congestive heart failure. In addition NT-proBNP of <300 pg/ml effectively rules out acute congestive heart failure with 99% negative predictive value.    Results may be falsely decreased if patient taking Biotin.      CBC Auto Differential [[id number]]  (Abnormal) Collected: 11/01/22 1705    Specimen: Blood Updated: 11/01/22 1722     WBC 18.54 10*3/mm3      RBC 4.01 10*6/mm3      Hemoglobin 11.7 g/dL      Hematocrit 36.0 %      MCV 89.8 fL      MCH 29.2 pg      MCHC 32.5 g/dL      RDW 13.4 %      RDW-SD 43.8 fl      MPV 9.4 fL      Platelets 283 10*3/mm3      Neutrophil % 91.0 %      Lymphocyte % 5.0 %      Monocyte % 3.1 %      Eosinophil % 0.1 %      Basophil % 0.3 %      Immature Grans % 0.5 %      Neutrophils, Absolute 16.87 10*3/mm3      Lymphocytes, Absolute 0.93 10*3/mm3      Monocytes, Absolute 0.57 10*3/mm3      Eosinophils, Absolute 0.01 10*3/mm3      Basophils, Absolute 0.06 10*3/mm3      Immature Grans, Absolute 0.10 10*3/mm3      nRBC 0.0 /100 WBC     COVID-19 and FLU A/B PCR - Swab, Nasopharynx [[id number]]  (Normal) Collected: 11/01/22 1514    Specimen: Swab from Nasopharynx Updated: 11/01/22 1545     COVID19 Not Detected     Influenza A PCR Not Detected     Influenza B PCR Not Detected    Narrative:      Fact sheet for providers: https://www.fda.gov/media/044849/download    Fact sheet for patients: https://www.fda.gov/media/078487/download    Test performed by PCR.        Imaging Results  (Last 24 Hours)     Procedure Component Value Units Date/Time    XR Chest 1 View [[id number]] Collected: 11/02/22 0806     Updated: 11/02/22 0831    Narrative:      EXAM: XR CHEST 1 VIEW     HISTORY: pna, I21.3 ST elevation (STEMI) myocardial infarction of  unspecified site J44.1 Chronic obstructive pulmonary disease with  (acute) exacerbation.    COMPARISON: November 1, 2022    FINDINGS:    Heart: Large    Mediastinum: Normal contours    Lungs: Bibasilar glass opacities without significant interval  change. Probable small layering effusions.    Bones: Left shoulder arthroplasty. Osseous demineralization.  Prior resection left distal clavicle.    Abdomen: Visualized upper abdomen is unremarkable      Impression:        Cardiomegaly.    Probable pulmonary edema, unchanged    Electronically signed by:  Pascual Canela DO  11/2/2022 8:29 AM  CDT Workstation: [id number]    XR Chest 1 View [[id number]] Collected: 11/01/22 1525     Updated: 11/01/22 1549    Narrative:        PROCEDURE: Single chest view portable    REASON FOR EXAM:soa, I21.3 ST elevation (STEMI) myocardial  infarction of unspecified site J44.1 Chronic obstructive  pulmonary disease with (acute) exacerbation    FINDINGS: Comparison exam dated February 24, 2022. Multiple leads  overlie the chest. Cardiomegaly. Right lung base small  groundglass opacity. A few scattered calcified lung parenchymal  granulomas consistent with old granulomatous disease. Lungs are  otherwise clear. Pleural spaces are normal. No acute osseous  abnormality. Stable left shoulder reverse total arthroplasty.      Impression:      1.  Cardiomegaly.  2.  Right lung base small groundglass opacity suspicious for  early mild atypical pulmonary edema versus early pneumonia or  subsegmental atelectasis.  3.  Evidence of old granulomatous disease.    Electronically signed by:  Jim Muro MD  11/1/2022 3:47 PM CDT  Workstation: [id number]        ECG/EMG Results (last 24 hours)     Procedure  Component Value Units Date/Time    SCANNED EKG [[id number]] Resulted: 11/01/22     Updated: 11/01/22 2103    SCANNED EKG [[id number]] Resulted: 11/01/22     Updated: 11/01/22 2103    ECG 12 Lead [[id number]] Collected: 11/02/22 0717     Updated: 11/02/22 0724     QT Interval 438 ms      QTC Interval 475 ms     Narrative:      Test Reason : Post cath  Blood Pressure :   */*   mmHG  Vent. Rate :  71 BPM     Atrial Rate :  71 BPM     P-R Int : 144 ms          QRS Dur :  96 ms      QT Int : 438 ms       P-R-T Axes :  64  -9 121 degrees     QTc Int : 475 ms    Sinus rhythm with marked sinus arrhythmia  Possible Left atrial enlargement  Inferior infarct (cited on or before 18-DEC-2012)  Possible Anterior infarct (cited on or before 06-APR-2016)  ST & T wave abnormality, consider lateral ischemia  Abnormal ECG  When compared with ECG of 01-NOV-2022 15:04,  No significant change was found    Referred By:            Confirmed By:     ECG 12 Lead Chest Pain [[id number]] Collected: 11/01/22 1501     Updated: 11/02/22 0753     QT Interval 416 ms      QTC Interval 458 ms     Narrative:      Test Reason : Chest Pain  Blood Pressure :   */*   mmHG  Vent. Rate :  73 BPM     Atrial Rate :  73 BPM     P-R Int : 134 ms          QRS Dur :  90 ms      QT Int : 416 ms       P-R-T Axes :  59 -22 116 degrees     QTc Int : 458 ms    Normal sinus rhythm  Possible Left atrial enlargement  Left ventricular hypertrophy with repolarization abnormality  Inferior infarct (cited on or before 18-DEC-2012)  Anterior infarct (cited on or before 06-APR-2016)  Abnormal ECG  When compared with ECG of 20-FEB-2022 04:53,  Questionable change in initial forces of Inferior leads  Nonspecific T wave abnormality no longer evident in Inferior leads    Referred By:            Confirmed By:           Physician Progress Notes (last 24 hours)  Notes from 11/01/22 0919 through 11/02/22 0919   No notes of this type exist for this encounter.         Medical Student Notes  (last 24 hours)  Notes from 11/01/22 0919 through 11/02/22 0919   No notes of this type exist for this encounter.         Consult Notes (last 24 hours)  Notes from 11/01/22 0919 through 11/02/22 0919   No notes of this type exist for this encounter.

## 2022-11-02 NOTE — PLAN OF CARE
Goal Outcome Evaluation:  Plan of Care Reviewed With: caregiver, patient        Progress: no change  Outcome Evaluation: Nutrition New Assessment:  pt admitted with Communtity required CAP/Resp distress.  Reports a good appetite and a mild wt loss of a couple of pounds lately. Will add supplements and monitor POC

## 2022-11-02 NOTE — PROGRESS NOTES
[facility] Medicine Services  INPATIENT PROGRESS NOTE    Length of Stay: 1  Date of Admission: 11/1/2022  Primary Care Physician: Anahi Camacho APRN    Subjective   (S) admitted yesterday for pneumonia symptoms; taken to the cath lab for EKG changes;   Today she became more hypoxemic with wheezing, bronchospasm  She denies fever    Review of Systems   All other systems reviewed and are negative.       All pertinent negatives and positives are as above. All other systems have been reviewed and are negative unless otherwise stated.     Prior to Admission medications    Medication Sig Start Date End Date Taking? Authorizing Provider   albuterol (PROVENTIL) (2.5 MG/3ML) 0.083% nebulizer solution Take 2.5 mg by nebulization Every 4 (Four) Hours As Needed for Wheezing.   Yes Miguelito Chatman MD   ALBUTEROL SULFATE HFA IN Inhale 2 puffs Every 4 (Four) Hours As Needed.   Yes Miguelito Chatman MD   budesonide-formoterol (SYMBICORT) 160-4.5 MCG/ACT inhaler Inhale 2 puffs 2 (Two) Times a Day As Needed.   Yes Miguelito Chatman MD   furosemide (LASIX) 20 MG tablet Take 1 tablet by mouth Daily. 6/12/20  Yes Jacki Polanco APRN   gabapentin (NEURONTIN) 800 MG tablet Take 800 mg by mouth 3 (Three) Times a Day.   Yes Miguelito Chatman MD   midodrine (PROAMATINE) 10 MG tablet Take 1 tablet by mouth 3 (Three) Times a Day Before Meals.  Patient taking differently: Take 1 tablet by mouth Daily. 2/24/22  Yes Hunter Pacheco, DO   O2 (OXYGEN) Inhale 4 L/min As Needed (shortness of air). 9/6/21  Yes Miguelito Chatman MD   ondansetron (ZOFRAN) 4 MG tablet Take 4 mg by mouth Every 8 (Eight) Hours As Needed for Nausea or Vomiting.   Yes Miguelito Chatman MD   pantoprazole (PROTONIX) 40 MG EC tablet Take 1 tablet by mouth Daily. 2/5/18  Yes Mary Shen APRN   pravastatin (PRAVACHOL) 20 MG tablet Take 1 tablet by mouth Every Night. 5/2/22  Yes Jacki Polanco  DELFINO Hernandez   rivaroxaban (Xarelto) 20 MG tablet Take 1 tablet by mouth Daily With Dinner. 5/2/22  Yes Jacki Polanco APRN   sertraline (ZOLOFT) 100 MG tablet Take 100 mg by mouth Daily.   Yes ProviderMiguelito MD   traZODone (DESYREL) 150 MG tablet Take 1 tablet by mouth Every Night. 2/26/19  Yes Donn Triana MD   vitamin D (ERGOCALCIFEROL) 1.25 MG (40261 UT) capsule capsule Take 50,000 Units by mouth 1 (One) Time Per Week.   Yes ProviderMiguelito MD   amitriptyline (ELAVIL) 25 MG tablet Take 25 mg by mouth Every Night. 5/8/20   Miguelito Chatman MD   cilostazol (PLETAL) 100 MG tablet Take 1 tablet by mouth 2 (Two) Times a Day Before Meals. 5/2/22 5/2/23  Jacki Polanco APRN   clopidogrel (PLAVIX) 75 MG tablet Take 1 tablet by mouth Daily. 5/2/22   Jacki Polanco APRN   penciclovir (DENAVIR) 1 % cream Apply 1 application topically to the appropriate area as directed As Needed (fever blisters).    ProviderMiguelito MD       amitriptyline, 25 mg, Oral, Nightly  azithromycin, 500 mg, Intravenous, Q24H  budesonide-formoterol, 2 puff, Inhalation, BID - RT  cefTRIAXone, 1 g, Intravenous, Q24H  cilostazol, 100 mg, Oral, BID AC  clopidogrel, 75 mg, Oral, Daily  gabapentin, 400 mg, Oral, Q8H  ipratropium-albuterol, 3 mL, Nebulization, Q4H While Awake  methylPREDNISolone sodium succinate, 80 mg, Intravenous, Q6H  pantoprazole, 40 mg, Oral, Daily  pravastatin, 20 mg, Oral, Nightly  rivaroxaban, 20 mg, Oral, Daily With Dinner  sertraline, 100 mg, Oral, Daily  sodium chloride, 10 mL, Intravenous, Q12H  sodium chloride, 10 mL, Intravenous, Q12H  sodium chloride, 10 mL, Intravenous, Q12H  sodium chloride, 10 mL, Intravenous, Q12H  sodium chloride, , ,   traZODone, 150 mg, Oral, Nightly           Objective    Temp:  [97.9 °F (36.6 °C)-99.8 °F (37.7 °C)] 98.9 °F (37.2 °C)  Heart Rate:  [] 60  Resp:  [16-24] 16  BP: (111-196)/() 152/83    Physical Exam  Constitutional:        Appearance: She is ill-appearing.   HENT:      Head: Normocephalic.      Nose: Nose normal.      Mouth/Throat:      Mouth: Mucous membranes are moist.   Eyes:      Extraocular Movements: Extraocular movements intact.   Cardiovascular:      Rate and Rhythm: Normal rate and regular rhythm.      Heart sounds: Normal heart sounds.   Pulmonary:      Effort: Respiratory distress present.      Breath sounds: Wheezing and rales present.   Abdominal:      General: Bowel sounds are normal.      Palpations: Abdomen is soft.   Musculoskeletal:         General: Normal range of motion.      Cervical back: Neck supple.   Skin:     General: Skin is warm.   Neurological:      General: No focal deficit present.      Mental Status: She is alert. Mental status is at baseline.   Psychiatric:         Mood and Affect: Mood normal.         Behavior: Behavior normal.         Results Review:  I have reviewed the labs, radiology results, and diagnostic studies.    Laboratory Data:   Results from last 7 days   Lab Units 11/02/22  0350 11/01/22  1705   SODIUM mmol/L 138 138   POTASSIUM mmol/L 4.2 3.5   CHLORIDE mmol/L 102 100   CO2 mmol/L 25.0 28.0   BUN mg/dL 9 6*   CREATININE mg/dL 0.57 0.71   GLUCOSE mg/dL 121* 105*   CALCIUM mg/dL 8.5* 8.7   BILIRUBIN mg/dL  --  0.4   ALK PHOS U/L  --  99   ALT (SGPT) U/L  --  6   AST (SGOT) U/L  --  11   ANION GAP mmol/L 11.0 10.0     Estimated Creatinine Clearance: 80.2 mL/min (by C-G formula based on SCr of 0.57 mg/dL).          Results from last 7 days   Lab Units 11/02/22  0350 11/01/22  1705   WBC 10*3/mm3 15.95* 18.54*   HEMOGLOBIN g/dL 11.4* 11.7*   HEMATOCRIT % 34.9 36.0   PLATELETS 10*3/mm3 267 283     Results from last 7 days   Lab Units 11/01/22  1705   INR  1.33*       Culture Data:   No results found for: BLOODCX  No results found for: URINECX  No results found for: RESPCX  No results found for: WOUNDCX  No results found for: STOOLCX  No components found for: BODYFLD    Radiology Data:    Imaging Results (Last 24 Hours)     Procedure Component Value Units Date/Time    US Guided Vascular Access [[id number]] Collected: 11/02/22 1042     Updated: 11/02/22 1415    Narrative:      PROCEDURE: Ultrasound Guidance Vascular Access    HISTORY: access, I21.3 ST elevation (STEMI) myocardial infarction  of unspecified site J44.1 Chronic obstructive pulmonary disease  with (acute) exacerbation    FINDINGS:  Realtime ultrasound imaging was utilized to visualize needle  entry into the patent right basilic vein during midline catheter   placement and a permanent image was stored for permanent  recording and reporting.       Impression:      Imaging obtained during vascular access device placement under  ultrasound guidance as described above     Electronically signed by:  Corey Arteaga MD  11/2/2022 2:13 PM CDT  Workstation: [id number]    IR Insert Midline Without Port Pump 5 Plus [[id number]] Resulted: 11/02/22 1046     Updated: 11/02/22 1046    Narrative:      This procedure was auto-finalized with no dictation required.    XR Chest 1 View [[id number]] Collected: 11/02/22 0806     Updated: 11/02/22 0831    Narrative:      EXAM: XR CHEST 1 VIEW     HISTORY: pna, I21.3 ST elevation (STEMI) myocardial infarction of  unspecified site J44.1 Chronic obstructive pulmonary disease with  (acute) exacerbation.    COMPARISON: November 1, 2022    FINDINGS:    Heart: Large    Mediastinum: Normal contours    Lungs: Bibasilar glass opacities without significant interval  change. Probable small layering effusions.    Bones: Left shoulder arthroplasty. Osseous demineralization.  Prior resection left distal clavicle.    Abdomen: Visualized upper abdomen is unremarkable      Impression:        Cardiomegaly.    Probable pulmonary edema, unchanged    Electronically signed by:  Pascual Canela DO  11/2/2022 8:29 AM  CDT Workstation: [id number]    XR Chest 1 View [[id number]] Collected: 11/01/22 1525     Updated: 11/01/22 1543     Narrative:        PROCEDURE: Single chest view portable    REASON FOR EXAM:soa, I21.3 ST elevation (STEMI) myocardial  infarction of unspecified site J44.1 Chronic obstructive  pulmonary disease with (acute) exacerbation    FINDINGS: Comparison exam dated February 24, 2022. Multiple leads  overlie the chest. Cardiomegaly. Right lung base small  groundglass opacity. A few scattered calcified lung parenchymal  granulomas consistent with old granulomatous disease. Lungs are  otherwise clear. Pleural spaces are normal. No acute osseous  abnormality. Stable left shoulder reverse total arthroplasty.      Impression:      1.  Cardiomegaly.  2.  Right lung base small groundglass opacity suspicious for  early mild atypical pulmonary edema versus early pneumonia or  subsegmental atelectasis.  3.  Evidence of old granulomatous disease.    Electronically signed by:  Jim Muro MD  11/1/2022 3:47 PM CDT  Workstation: [id number]          I have reviewed the patient's current medications.     Assessment/Plan   EKG changes     Patient was taken to the cath lab and no PCI done, likely non flow CAD disease     Right lung base CAP     It is suspected; rocephin and azithromycin 2/7; continue with current treatment     Acute respiratory failure with hypoxemia     Due to above     increased her oxygen requirements due to below; if this persists for another 24 hours, a CTA chest would be ordered (can't do it today due to yesterday she has received IV contrast)    COPD AE     Increase frequency bronchodilators and added iv and inhaled steroids; monitor    Chronic medical problems     Essential hypertension     Peripheral vascular disease      Hyperlipidemia     DAMION     Chronic recurrent depression        Danish Flores MD

## 2022-11-02 NOTE — PROGRESS NOTES
"  [facility] Cardiology Progress Note   LOS: 1 day   Patient Care Team:  Anahi Camacho APRN as PCP - General (Nurse Practitioner)  Kris Peres MD (Inactive) as Consulting Physician (Cardiology)    Chief Complaint   Patient presents with   • Shortness of Breath   • Rib Pain         Subjective     Interval History:   Patient Denies: chest pain, PND, orthopnea, dizziness and syncope  Patient Complaints: shortness of air and chest pain with deep inspiration, coughing  History taken from: patient chart RN    Hypertensive at times. HR stable, showing NSR without overnight ectopy or arrhythmia. O2 sats in low range. Today, O2 sats 79-95, now on 6L/NC. Uses O2 prn at home. Sitting up in bed looking at newspaper. No acute distress    Echo pending due to lost IV access     Objective     Vital Sign Min/Max for last 24 hours  Temp  Min: 97.9 °F (36.6 °C)  Max: 99.8 °F (37.7 °C)   BP  Min: 111/65  Max: 196/103   Pulse  Min: 55  Max: 106   Resp  Min: 18  Max: 24   SpO2  Min: 79 %  Max: 95 %   Flow (L/min)  Min: 2  Max: 6   Weight  Min: 56.2 kg (124 lb)  Max: 66.2 kg (145 lb 15.1 oz)     Flowsheet Rows    Flowsheet Row First Filed Value   Admission Height 157.5 cm (62\") Documented at 11/01/2022 1500   Admission Weight 56.2 kg (124 lb) Documented at 11/01/2022 1500            11/01/22  1500 11/01/22  1708 11/02/22  0444   Weight: 56.2 kg (124 lb) 66.2 kg (145 lb 15.1 oz) 63.2 kg (139 lb 4.8 oz)       Physical Exam:  Physical Exam  Vitals and nursing note reviewed.   Constitutional:       Appearance: She is well-developed and well-groomed. She is not diaphoretic.      Comments: Chronically ill   HENT:      Head: Normocephalic and atraumatic.   Eyes:      General:         Right eye: No discharge.      Conjunctiva/sclera: Conjunctivae normal.   Neck:      Vascular: No JVD.      Trachea: Trachea normal.   Cardiovascular:      Rate and Rhythm: Normal rate and regular rhythm.      Pulses: Normal pulses.      Heart sounds: Normal " heart sounds.     No gallop.   Pulmonary:      Effort: Pulmonary effort is normal. No respiratory distress.      Breath sounds: Wheezing and rhonchi present.   Abdominal:      General: Bowel sounds are normal. There is no distension.      Palpations: Abdomen is soft.   Musculoskeletal:      Right lower leg: No edema.      Left lower leg: No edema.   Skin:     General: Skin is warm and dry.      Capillary Refill: Capillary refill takes less than 2 seconds.      Coloration: Skin is not pale.   Neurological:      Mental Status: She is alert and oriented to person, place, and time.   Psychiatric:         Attention and Perception: Attention normal.         Mood and Affect: Mood normal.         Speech: Speech normal.         Thought Content: Thought content normal.          Results Reviewed by myself:     Results from last 7 days   Lab Units 11/02/22  0350 11/01/22  1705   SODIUM mmol/L 138 138   POTASSIUM mmol/L 4.2 3.5   CHLORIDE mmol/L 102 100   CO2 mmol/L 25.0 28.0   BUN mg/dL 9 6*   CREATININE mg/dL 0.57 0.71   CALCIUM mg/dL 8.5* 8.7   BILIRUBIN mg/dL  --  0.4   ALK PHOS U/L  --  99   ALT (SGPT) U/L  --  6   AST (SGOT) U/L  --  11   GLUCOSE mg/dL 121* 105*       Estimated Creatinine Clearance: 80.2 mL/min (by C-G formula based on SCr of 0.57 mg/dL).                Results from last 7 days   Lab Units 11/02/22  0350 11/01/22  1705   WBC 10*3/mm3 15.95* 18.54*   HEMOGLOBIN g/dL 11.4* 11.7*   PLATELETS 10*3/mm3 267 283       Results from last 7 days   Lab Units 11/01/22  1705   INR  1.33*     Lab Results   Component Value Date    PROBNP 1,798.0 (H) 11/01/2022       I/O last 3 completed shifts:  In: 250 [IV Piggyback:250]  Out: 350 [Urine:350]    Cardiographics:  ECG/EMG Results (last 24 hours)     Procedure Component Value Units Date/Time    SCANNED EKG [[id number]] Resulted: 11/01/22     Updated: 11/01/22 2103    SCANNED EKG [[id number]] Resulted: 11/01/22     Updated: 11/01/22 2103    ECG 12 Lead [[id number]] Collected:  11/02/22 0717     Updated: 11/02/22 0724     QT Interval 438 ms      QTC Interval 475 ms     Narrative:      Test Reason : Post cath  Blood Pressure :   */*   mmHG  Vent. Rate :  71 BPM     Atrial Rate :  71 BPM     P-R Int : 144 ms          QRS Dur :  96 ms      QT Int : 438 ms       P-R-T Axes :  64  -9 121 degrees     QTc Int : 475 ms    Sinus rhythm with marked sinus arrhythmia  Possible Left atrial enlargement  Inferior infarct (cited on or before 18-DEC-2012)  Possible Anterior infarct (cited on or before 06-APR-2016)  ST & T wave abnormality, consider lateral ischemia  Abnormal ECG  When compared with ECG of 01-NOV-2022 15:04,  No significant change was found    Referred By:            Confirmed By:     ECG 12 Lead Chest Pain [[id number]] Collected: 11/01/22 1501     Updated: 11/02/22 0753     QT Interval 416 ms      QTC Interval 458 ms     Narrative:      Test Reason : Chest Pain  Blood Pressure :   */*   mmHG  Vent. Rate :  73 BPM     Atrial Rate :  73 BPM     P-R Int : 134 ms          QRS Dur :  90 ms      QT Int : 416 ms       P-R-T Axes :  59 -22 116 degrees     QTc Int : 458 ms    Normal sinus rhythm  Possible Left atrial enlargement  Left ventricular hypertrophy with repolarization abnormality  Inferior infarct (cited on or before 18-DEC-2012)  Anterior infarct (cited on or before 06-APR-2016)  Abnormal ECG  When compared with ECG of 20-FEB-2022 04:53,  Questionable change in initial forces of Inferior leads  Nonspecific T wave abnormality no longer evident in Inferior leads    Referred By:            Confirmed By:           Results for orders placed during the hospital encounter of 02/19/22    Adult Transthoracic Echo Complete W/ Cont if Necessary Per Protocol    Interpretation Summary  · Left ventricular wall thickness is consistent with mild septal asymmetric hypertrophy.  · There is a small (<1cm) pericardial effusion adjacent to the right atrium. The effusion is fluid filled. There is no evidence  of cardiac tamponade.  · Estimated left ventricular EF = 63% Left ventricular ejection fraction appears to be 61 - 65%. Left ventricular systolic function is normal.      XR Chest 1 View    Result Date: 11/2/2022  Cardiomegaly. Probable pulmonary edema, unchanged Electronically signed by:  Pascual Canela DO  11/2/2022 8:29 AM CDT Workstation: [id number]    XR Chest 1 View    Result Date: 11/1/2022  1.  Cardiomegaly. 2.  Right lung base small groundglass opacity suspicious for early mild atypical pulmonary edema versus early pneumonia or subsegmental atelectasis. 3.  Evidence of old granulomatous disease. Electronically signed by:  Jim Muro MD  11/1/2022 3:47 PM CDT Workstation: [id number]      Medication Review:     Current Facility-Administered Medications:   •  acetaminophen (TYLENOL) tablet 650 mg, 650 mg, Oral, Q4H PRN **OR** acetaminophen (TYLENOL) 160 MG/5ML solution 650 mg, 650 mg, Oral, Q4H PRN **OR** acetaminophen (TYLENOL) suppository 650 mg, 650 mg, Rectal, Q4H PRN, Danish Flores MD  •  acetaminophen (TYLENOL) tablet 650 mg, 650 mg, Oral, Q4H PRN, Neftali Haywood MD  •  amitriptyline (ELAVIL) tablet 25 mg, 25 mg, Oral, Nightly, Neftali Haywood MD, 25 mg at 11/01/22 2142  •  azithromycin (ZITHROMAX) 500 mg in sodium chloride 0.9 % 250 mL IVPB, 500 mg, Intravenous, Q24H, Danish Flores MD, 500 mg at 11/01/22 2247  •  budesonide-formoterol (SYMBICORT) 160-4.5 MCG/ACT inhaler 2 puff, 2 puff, Inhalation, BID - RT, Neftali Haywood MD  •  budesonide-formoterol (SYMBICORT) 160-4.5 MCG/ACT inhaler 2 puff, 2 puff, Inhalation, BID PRN, Neftali Haywood MD  •  cefTRIAXone (ROCEPHIN) 1 g/100 mL 0.9% NS (MBP), 1 g, Intravenous, Q24H, Danish Flores MD, 1 g at 11/01/22 2100  •  cilostazol (PLETAL) tablet 100 mg, 100 mg, Oral, BID AC, Neftali Haywood MD, 100 mg at 11/02/22 0633  •  clopidogrel (PLAVIX) tablet 75 mg, 75 mg, Oral, Daily, Neftali Haywood MD, 75 mg at 11/02/22 0859  •  furosemide (LASIX)  tablet 20 mg, 20 mg, Oral, Daily, Neftali Haywood MD, 20 mg at 11/02/22 0900  •  gabapentin (NEURONTIN) capsule 400 mg, 400 mg, Oral, Q8H, Neftali Haywood MD, 400 mg at 11/02/22 0634  •  ipratropium-albuterol (DUO-NEB) nebulizer solution 3 mL, 3 mL, Nebulization, Once, Neftali Haywood MD  •  ipratropium-albuterol (DUO-NEB) nebulizer solution 3 mL, 3 mL, Nebulization, 4x Daily - RT, Danish Flores MD, 3 mL at 11/02/22 0742  •  ondansetron (ZOFRAN) injection 4 mg, 4 mg, Intravenous, Q6H PRN, Danish Flores MD  •  ondansetron (ZOFRAN) tablet 4 mg, 4 mg, Oral, Q8H PRN, Neftali Haywood MD  •  pantoprazole (PROTONIX) EC tablet 40 mg, 40 mg, Oral, Daily, Neftali Haywood MD, 40 mg at 11/02/22 0900  •  pravastatin (PRAVACHOL) tablet 20 mg, 20 mg, Oral, Nightly, Neftali Haywood MD, 20 mg at 11/01/22 2142  •  sertraline (ZOLOFT) tablet 100 mg, 100 mg, Oral, Daily, Neftali Haywood MD, 100 mg at 11/02/22 0900  •  sodium chloride 0.9 % flush 10 mL, 10 mL, Intravenous, Q12H, Danish Flores MD  •  sodium chloride 0.9 % flush 10 mL, 10 mL, Intravenous, PRN, Danish Flores MD  •  sodium chloride 0.9 % flush 10 mL, 10 mL, Intravenous, Q12H, Behroozi, Saeid, MD  •  sodium chloride 0.9 % flush 10 mL, 10 mL, Intravenous, Q12H, Behroozi, Saeid, MD  •  sodium chloride 0.9 % flush 10 mL, 10 mL, Intravenous, Q12H, Behroozi, Saeid, MD  •  sodium chloride 0.9 % flush 10 mL, 10 mL, Intravenous, PRN, Behroozi, Saeid, MD  •  sodium chloride 0.9 % flush 20 mL, 20 mL, Intravenous, PRN, Behroozi, Saeid, MD  •  sodium chloride 0.9 % infusion  - ADS Override Pull, , , ,   •  traZODone (DESYREL) tablet 150 mg, 150 mg, Oral, Nightly, [place] MD, 150 mg at 11/01/22 4410    Patient's recent labs and results as included in the subjective data were reviewed by me. Any pertinent outside data will be included.      Assessment & Plan       ST elevation myocardial infarction (STEMI), unspecified artery (HCC)    1. ST elevation on EKG.  Came in to with increasing SOA, and EKG showed ST elevation. Was taken urgently to the Cardiac Cath lab by Dr. Haywood . No occlusive CAD noted. Troponin's x2 normal range. Obtain CRP yesterday while is elevated at 12.6. proBNP elevated at 1798.  Sodium 138,     CP upon taking deep breath only. Still complaining of SOA with Cough. PE: with bilateral ronchi, euvolemic otherwise.    -Echo pending and not completed due to no IV access. Last echo in 2/22 showed <1 cm small pericardial effusion. Preserved LVEF 61-65%.  Hemodynamics stable.     -possible pericarditis. Start ibuprofen 600 mg every 8 hours. Colchicine not at this time. Interferes with statin and antibiotic.     2. Acute respiratory failure with hypoxemia. Needing more oxygen this am. Now on 6L.min  Has home O2 as needed.    -Primary managing     Plan for disposition:Continue current location. We will continue to follow.       This document has been electronically signed by DELFINO Funes on November 2, 2022 09:01 CDT   Electronically signed by DELFINO Funes, 11/02/22, 9:01 AM CDT.    I personally saw and examined Mona Perales after the APRN.  I personally performed a history and physical examination of the patient.  I personally reviewed independent findings and plan of care.  I discussed management with the APRN.  I agree with the APRN's documentation.    Subjective: No acute issues at night.  Continues to have intermittent pleuritic right-sided chest pain with breathing.    Vitals:    11/02/22 0742 11/02/22 1048 11/02/22 1050 11/02/22 1207   BP:       BP Location:       Patient Position:       Pulse: 74   79   Resp: 18   16   Temp:       TempSrc:       SpO2: 95% (!) 84% 91% 90%   Weight:       Height:         Right groin without evidence of hematoma or bruit.  Significant expiratory wheezes bilaterally with decreased air entry.    1.  Chest pain:  Secondary to pneumonia and recent injury on the right side.  Cardiac cath was  without evidence of obstructive coronary artery disease.  CRP elevated in the setting of pneumonia.  Follow-up echocardiogram  Troponins have been negative.  Has been on anticoagulation for previous PE.    Management of COPD and pneumonia per primary team.    Thank you for the consult we will continue to follow.            Electronically signed by Neftali Haywood MD, 11/02/22, 12:56 PM CDT.

## 2022-11-02 NOTE — PLAN OF CARE
Goal Outcome Evaluation:  Plan of Care Reviewed With: patient        Progress: no change       Pt complains of high-level pain in left flank. Medicated with Tylenol. Pt gets SOA when up to the bathroom. Vital signs stable.

## 2022-11-03 ENCOUNTER — APPOINTMENT (OUTPATIENT)
Dept: CT IMAGING | Facility: HOSPITAL | Age: 72
End: 2022-11-03

## 2022-11-03 LAB
ANION GAP SERPL CALCULATED.3IONS-SCNC: 8 MMOL/L (ref 5–15)
BASOPHILS # BLD AUTO: 0.02 10*3/MM3 (ref 0–0.2)
BASOPHILS NFR BLD AUTO: 0.1 % (ref 0–1.5)
BUN SERPL-MCNC: 17 MG/DL (ref 8–23)
BUN/CREAT SERPL: 27.4 (ref 7–25)
CALCIUM SPEC-SCNC: 9.3 MG/DL (ref 8.6–10.5)
CHLORIDE SERPL-SCNC: 99 MMOL/L (ref 98–107)
CO2 SERPL-SCNC: 30 MMOL/L (ref 22–29)
CREAT SERPL-MCNC: 0.62 MG/DL (ref 0.57–1)
D-LACTATE SERPL-SCNC: 3.1 MMOL/L (ref 0.5–2)
DEPRECATED RDW RBC AUTO: 43.2 FL (ref 37–54)
EGFRCR SERPLBLD CKD-EPI 2021: 95.9 ML/MIN/1.73
EOSINOPHIL # BLD AUTO: 0.01 10*3/MM3 (ref 0–0.4)
EOSINOPHIL NFR BLD AUTO: 0.1 % (ref 0.3–6.2)
ERYTHROCYTE [DISTWIDTH] IN BLOOD BY AUTOMATED COUNT: 13.1 % (ref 12.3–15.4)
GLUCOSE SERPL-MCNC: 135 MG/DL (ref 65–99)
HCT VFR BLD AUTO: 33.9 % (ref 34–46.6)
HGB BLD-MCNC: 10.9 G/DL (ref 12–15.9)
IMM GRANULOCYTES # BLD AUTO: 0.11 10*3/MM3 (ref 0–0.05)
IMM GRANULOCYTES NFR BLD AUTO: 0.6 % (ref 0–0.5)
LYMPHOCYTES # BLD AUTO: 1.01 10*3/MM3 (ref 0.7–3.1)
LYMPHOCYTES NFR BLD AUTO: 6 % (ref 19.6–45.3)
MAGNESIUM SERPL-MCNC: 1.6 MG/DL (ref 1.6–2.4)
MCH RBC QN AUTO: 29.1 PG (ref 26.6–33)
MCHC RBC AUTO-ENTMCNC: 32.2 G/DL (ref 31.5–35.7)
MCV RBC AUTO: 90.6 FL (ref 79–97)
MONOCYTES # BLD AUTO: 0.36 10*3/MM3 (ref 0.1–0.9)
MONOCYTES NFR BLD AUTO: 2.1 % (ref 5–12)
NEUTROPHILS NFR BLD AUTO: 15.44 10*3/MM3 (ref 1.7–7)
NEUTROPHILS NFR BLD AUTO: 91.1 % (ref 42.7–76)
NRBC BLD AUTO-RTO: 0 /100 WBC (ref 0–0.2)
PLATELET # BLD AUTO: 302 10*3/MM3 (ref 140–450)
PMV BLD AUTO: 9.5 FL (ref 6–12)
POTASSIUM SERPL-SCNC: 4.1 MMOL/L (ref 3.5–5.2)
RBC # BLD AUTO: 3.74 10*6/MM3 (ref 3.77–5.28)
SODIUM SERPL-SCNC: 137 MMOL/L (ref 136–145)
WBC NRBC COR # BLD: 16.95 10*3/MM3 (ref 3.4–10.8)

## 2022-11-03 PROCEDURE — 94799 UNLISTED PULMONARY SVC/PX: CPT

## 2022-11-03 PROCEDURE — 85027 COMPLETE CBC AUTOMATED: CPT | Performed by: INTERNAL MEDICINE

## 2022-11-03 PROCEDURE — 25010000002 AMPICILLIN-SULBACTAM PER 1.5 G: Performed by: INTERNAL MEDICINE

## 2022-11-03 PROCEDURE — 0 MAGNESIUM SULFATE 4 GM/100ML SOLUTION: Performed by: INTERNAL MEDICINE

## 2022-11-03 PROCEDURE — 99233 SBSQ HOSP IP/OBS HIGH 50: CPT | Performed by: INTERNAL MEDICINE

## 2022-11-03 PROCEDURE — 94664 DEMO&/EVAL PT USE INHALER: CPT

## 2022-11-03 PROCEDURE — 97162 PT EVAL MOD COMPLEX 30 MIN: CPT

## 2022-11-03 PROCEDURE — 84145 PROCALCITONIN (PCT): CPT | Performed by: INTERNAL MEDICINE

## 2022-11-03 PROCEDURE — 25010000002 ONDANSETRON PER 1 MG: Performed by: INTERNAL MEDICINE

## 2022-11-03 PROCEDURE — 0 IOPAMIDOL PER 1 ML: Performed by: INTERNAL MEDICINE

## 2022-11-03 PROCEDURE — 25010000002 MEROPENEM PER 100 MG: Performed by: INTERNAL MEDICINE

## 2022-11-03 PROCEDURE — 80053 COMPREHEN METABOLIC PANEL: CPT | Performed by: INTERNAL MEDICINE

## 2022-11-03 PROCEDURE — 25010000002 METHYLPREDNISOLONE PER 125 MG: Performed by: INTERNAL MEDICINE

## 2022-11-03 PROCEDURE — 83735 ASSAY OF MAGNESIUM: CPT | Performed by: INTERNAL MEDICINE

## 2022-11-03 PROCEDURE — 85025 COMPLETE CBC W/AUTO DIFF WBC: CPT | Performed by: INTERNAL MEDICINE

## 2022-11-03 PROCEDURE — 71275 CT ANGIOGRAPHY CHEST: CPT

## 2022-11-03 PROCEDURE — 83605 ASSAY OF LACTIC ACID: CPT | Performed by: INTERNAL MEDICINE

## 2022-11-03 PROCEDURE — 94760 N-INVAS EAR/PLS OXIMETRY 1: CPT

## 2022-11-03 RX ORDER — OXYCODONE HYDROCHLORIDE AND ACETAMINOPHEN 5; 325 MG/1; MG/1
1 TABLET ORAL EVERY 8 HOURS PRN
Status: DISCONTINUED | OUTPATIENT
Start: 2022-11-03 | End: 2022-11-05

## 2022-11-03 RX ORDER — ACETAMINOPHEN 325 MG/1
650 TABLET ORAL EVERY 4 HOURS PRN
Status: DISCONTINUED | OUTPATIENT
Start: 2022-11-03 | End: 2022-11-17 | Stop reason: HOSPADM

## 2022-11-03 RX ORDER — MIDODRINE HYDROCHLORIDE 5 MG/1
10 TABLET ORAL ONCE
Status: COMPLETED | OUTPATIENT
Start: 2022-11-04 | End: 2022-11-03

## 2022-11-03 RX ORDER — MAGNESIUM SULFATE HEPTAHYDRATE 40 MG/ML
4 INJECTION, SOLUTION INTRAVENOUS ONCE
Status: COMPLETED | OUTPATIENT
Start: 2022-11-03 | End: 2022-11-03

## 2022-11-03 RX ORDER — AMPICILLIN AND SULBACTAM 2; 1 G/1; G/1
3 INJECTION, POWDER, FOR SOLUTION INTRAMUSCULAR; INTRAVENOUS EVERY 6 HOURS SCHEDULED
Status: DISCONTINUED | OUTPATIENT
Start: 2022-11-03 | End: 2022-11-03

## 2022-11-03 RX ADMIN — METHYLPREDNISOLONE SODIUM SUCCINATE 80 MG: 125 INJECTION, POWDER, FOR SOLUTION INTRAMUSCULAR; INTRAVENOUS at 18:32

## 2022-11-03 RX ADMIN — GABAPENTIN 400 MG: 400 CAPSULE ORAL at 05:55

## 2022-11-03 RX ADMIN — CLOPIDOGREL BISULFATE 75 MG: 75 TABLET ORAL at 09:34

## 2022-11-03 RX ADMIN — PRAVASTATIN SODIUM 20 MG: 20 TABLET ORAL at 22:42

## 2022-11-03 RX ADMIN — SODIUM CHLORIDE 500 ML: 9 INJECTION, SOLUTION INTRAVENOUS at 23:26

## 2022-11-03 RX ADMIN — ONDANSETRON 4 MG: 2 INJECTION INTRAMUSCULAR; INTRAVENOUS at 03:35

## 2022-11-03 RX ADMIN — IPRATROPIUM BROMIDE AND ALBUTEROL SULFATE 3 ML: 2.5; .5 SOLUTION RESPIRATORY (INHALATION) at 07:56

## 2022-11-03 RX ADMIN — IPRATROPIUM BROMIDE AND ALBUTEROL SULFATE 3 ML: 2.5; .5 SOLUTION RESPIRATORY (INHALATION) at 20:09

## 2022-11-03 RX ADMIN — CILOSTAZOL 100 MG: 100 TABLET ORAL at 17:21

## 2022-11-03 RX ADMIN — MEROPENEM 1 G: 1 INJECTION, POWDER, FOR SOLUTION INTRAVENOUS at 22:42

## 2022-11-03 RX ADMIN — Medication 10 ML: at 09:34

## 2022-11-03 RX ADMIN — MAGNESIUM SULFATE 4 G: 4 INJECTION INTRAVENOUS at 13:36

## 2022-11-03 RX ADMIN — METHYLPREDNISOLONE SODIUM SUCCINATE 80 MG: 125 INJECTION, POWDER, FOR SOLUTION INTRAMUSCULAR; INTRAVENOUS at 13:39

## 2022-11-03 RX ADMIN — PANTOPRAZOLE SODIUM 40 MG: 40 TABLET, DELAYED RELEASE ORAL at 09:33

## 2022-11-03 RX ADMIN — ACETAMINOPHEN 650 MG: 325 TABLET, FILM COATED ORAL at 17:22

## 2022-11-03 RX ADMIN — GABAPENTIN 400 MG: 400 CAPSULE ORAL at 13:37

## 2022-11-03 RX ADMIN — SODIUM CHLORIDE 250 ML: 9 INJECTION, SOLUTION INTRAVENOUS at 20:31

## 2022-11-03 RX ADMIN — METHYLPREDNISOLONE SODIUM SUCCINATE 80 MG: 125 INJECTION, POWDER, FOR SOLUTION INTRAMUSCULAR; INTRAVENOUS at 00:51

## 2022-11-03 RX ADMIN — SODIUM CHLORIDE 3 G: 900 INJECTION INTRAVENOUS at 14:52

## 2022-11-03 RX ADMIN — Medication 10 ML: at 09:35

## 2022-11-03 RX ADMIN — SERTRALINE HYDROCHLORIDE 100 MG: 50 TABLET ORAL at 09:33

## 2022-11-03 RX ADMIN — ACETAMINOPHEN 650 MG: 325 TABLET, FILM COATED ORAL at 09:33

## 2022-11-03 RX ADMIN — CILOSTAZOL 100 MG: 100 TABLET ORAL at 09:34

## 2022-11-03 RX ADMIN — BUDESONIDE AND FORMOTEROL FUMARATE DIHYDRATE 2 PUFF: 160; 4.5 AEROSOL RESPIRATORY (INHALATION) at 07:56

## 2022-11-03 RX ADMIN — METHYLPREDNISOLONE SODIUM SUCCINATE 80 MG: 125 INJECTION, POWDER, FOR SOLUTION INTRAMUSCULAR; INTRAVENOUS at 06:00

## 2022-11-03 RX ADMIN — MIDODRINE HYDROCHLORIDE 10 MG: 5 TABLET ORAL at 23:26

## 2022-11-03 RX ADMIN — IOPAMIDOL 55 ML: 755 INJECTION, SOLUTION INTRAVENOUS at 11:16

## 2022-11-03 RX ADMIN — RIVAROXABAN 20 MG: 10 TABLET, FILM COATED ORAL at 17:21

## 2022-11-03 RX ADMIN — IPRATROPIUM BROMIDE AND ALBUTEROL SULFATE 3 ML: 2.5; .5 SOLUTION RESPIRATORY (INHALATION) at 15:03

## 2022-11-03 RX ADMIN — Medication 10 ML: at 22:41

## 2022-11-03 RX ADMIN — SODIUM CHLORIDE 250 ML: 9 INJECTION, SOLUTION INTRAVENOUS at 20:34

## 2022-11-03 RX ADMIN — Medication 10 ML: at 22:40

## 2022-11-03 NOTE — PLAN OF CARE
Goal Outcome Evaluation:  Plan of Care Reviewed With: patient           Outcome Evaluation: Initial PT evaluation complete.  Patient is alert and cooperative, reports 10/10 R flank pain from recent fall but does not limit therapy session.  She requires SPV with bed mobility and transfers, CGA with gait, ambulating 15'x1 with FWW, slow sky, good use of walker. BP 91/52, SpO2 94% on highflow after gait. Patient has a 4WW at home, has assistance available from grand daughter; may need HHPT upon discharge, pending progress with I/P PT.  Goals established, continue skilled I/P PT.

## 2022-11-03 NOTE — PROGRESS NOTES
[facility] Cardiology  INPATIENT PROGRESS NOTE    Name: Mona Perales  Age/Sex: 72 y.o. female  :  1950        PCP: Anahi Camacho APRN    Vital Signs  Temp:  [98 °F (36.7 °C)-98.3 °F (36.8 °C)] 98 °F (36.7 °C)  Heart Rate:  [51-81] 51  Resp:  [16-18] 16  BP: ()/(53-71) 99/60  Body mass index is 22.46 kg/m².      Subjective    Continues to have high oxygen requirements.  Right-sided chest pain with breathing.      Patient Active Problem List   Diagnosis   • Anxiety   • CAD (coronary atherosclerotic disease)   • Carotid stenosis   • COPD (chronic obstructive pulmonary disease) ([facility])   • COPD with exacerbation ([facility])   • Depressive disorder, not elsewhere classified   • Benign essential hypertension   • Hypercholesteremia   • Insomnia   • Notalgia   • Osteoporosis   • Other screening mammogram   • RUQ abdominal pain   • PVD (peripheral vascular disease) with claudication ([facility])   • Nicotine abuse   • Dysphagia   • Epigastric pain   • Pain of right hand   • Closed displaced fracture of shaft of fifth metacarpal bone of right hand   • Cause of injury, fall   • Displaced fracture of shaft of fifth metacarpal bone of right hand with routine healing   • Syncope   • Acute respiratory failure with hypoxia ([facility])   • (HFpEF) heart failure with preserved ejection fraction ([facility])   • Hypotension   • Elevated WBCs   • Near syncope   • Atherosclerosis of native coronary artery of native heart without angina pectoris   • Atherosclerosis of native arteries of extremities with rest pain, left leg ([facility])   • PVD (peripheral vascular disease) ([facility])   • Physical deconditioning   • Pain due to onychomycosis of toenails of both feet   • ST elevation myocardial infarction (STEMI), unspecified artery ([facility])       Past Medical History:   Diagnosis Date   • A-fib ([facility])    • Anxiety    • Arthritis    • Asthma    • 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 with ulceration ([facility])     bilateral legs  - bilateral iliac stents 2008      • CHF (congestive heart failure) ([facility])    • Chronic lower back pain    • COPD (chronic obstructive pulmonary disease) ([facility])    • Essential (primary) hypertension    • GERD (gastroesophageal reflux disease)    • History of pulmonary embolus (PE)    • Hyperlipidemia    • Insomnia    • Lumbago    • Nicotine dependence    • Occlusion of artery      and stenosis of bilateral carotid arteries - BABS 16-49%, LICA 0-15%   • Other atherosclerosis of native artery of other extremity     LEFT subclavian stent 2005 (occluded)   • Sleep apnea        Current Facility-Administered Medications   Medication Dose Route Frequency Provider Last Rate Last Admin   • acetaminophen (TYLENOL) tablet 650 mg  650 mg Oral Q4H PRN Danish Flores MD        Or   • acetaminophen (TYLENOL) 160 MG/5ML solution 650 mg  650 mg Oral Q4H PRN Danish Floers MD        Or   • acetaminophen (TYLENOL) suppository 650 mg  650 mg Rectal Q4H PRN Danish Flores MD       • acetaminophen (TYLENOL) tablet 650 mg  650 mg Oral Q4H PRN Neftali Haywood MD   650 mg at 11/03/22 0933   • amitriptyline (ELAVIL) tablet 25 mg  25 mg Oral Nightly Neftali Haywood MD   25 mg at 11/02/22 2106   • ampicillin-sulbactam 3 g/100 mL 0.9% NS IVPB  3 g Intravenous Q6H Danish Flores MD       • budesonide-formoterol (SYMBICORT) 160-4.5 MCG/ACT inhaler 2 puff  2 puff Inhalation BID - RT Neftali Haywood MD   2 puff at 11/03/22 0756   • budesonide-formoterol (SYMBICORT) 160-4.5 MCG/ACT inhaler 2 puff  2 puff Inhalation BID PRN Neftali Haywood MD       • cilostazol (PLETAL) tablet 100 mg  100 mg Oral BID AC Neftali Haywood MD   100 mg at 11/03/22 0934   • clopidogrel (PLAVIX) tablet 75 mg  75 mg Oral Daily Neftali Haywood MD   75 mg at 11/03/22 0934   • gabapentin (NEURONTIN) capsule 400 mg  400 mg Oral Q8H Neftali Haywood MD   400 mg at 11/03/22 1337   • ipratropium-albuterol (DUO-NEB) nebulizer solution 3 mL  3 mL Nebulization Q4H While  Awake Danish Flores MD   3 mL at 11/03/22 0756   • methylPREDNISolone sodium succinate (SOLU-Medrol) injection 80 mg  80 mg Intravenous Q6H Danish Flores MD   80 mg at 11/03/22 1339   • ondansetron (ZOFRAN) injection 4 mg  4 mg Intravenous Q6H PRN Danish Flores MD   4 mg at 11/03/22 0335   • ondansetron (ZOFRAN) tablet 4 mg  4 mg Oral Q8H PRN Neftali Haywood MD       • pantoprazole (PROTONIX) EC tablet 40 mg  40 mg Oral Daily Neftali Haywood MD   40 mg at 11/03/22 0933   • pravastatin (PRAVACHOL) tablet 20 mg  20 mg Oral Nightly Neftali Haywood MD   20 mg at 11/02/22 2106   • rivaroxaban (XARELTO) tablet 20 mg  20 mg Oral Daily With Dinner Neftali Haywood MD   20 mg at 11/02/22 1801   • sertraline (ZOLOFT) tablet 100 mg  100 mg Oral Daily Neftali Haywood MD   100 mg at 11/03/22 0933   • sodium chloride 0.9 % flush 10 mL  10 mL Intravenous Q12H Danish Flores MD   10 mL at 11/02/22 2107   • sodium chloride 0.9 % flush 10 mL  10 mL Intravenous PRN Danish Flores MD       • sodium chloride 0.9 % flush 10 mL  10 mL Intravenous Q12H Behroozi, Saeid, MD   10 mL at 11/03/22 0935   • sodium chloride 0.9 % flush 10 mL  10 mL Intravenous Q12H Behroozi, Saeid, MD   10 mL at 11/03/22 0934   • sodium chloride 0.9 % flush 10 mL  10 mL Intravenous Q12H Behroozi, Saeid, MD   10 mL at 11/03/22 0934   • sodium chloride 0.9 % flush 10 mL  10 mL Intravenous PRN Behroozi, Saeid, MD       • sodium chloride 0.9 % flush 20 mL  20 mL Intravenous PRN Behroozi, Saeid, MD       • sodium chloride 0.9 % infusion  - ADS Override Pull            • traZODone (DESYREL) tablet 150 mg  150 mg Oral Nightly Neftali Haywood MD   150 mg at 11/02/22 2106       Past Surgical History:   Procedure Laterality Date   • CARDIAC CATHETERIZATION  04/06/2016    No evidence of any obstructive epicardial CAD.Preserved LV systolic function with EF of 55%.   • CARDIAC CATHETERIZATION N/A 11/1/2022    Procedure: Left Heart Cath;  Surgeon:  Neftali Haywood MD;  Location: [facility] CATH INVASIVE LOCATION;  Service: Cardiology;  Laterality: N/A;   • CENTRAL VENOUS LINE INSERTION  04/07/2016    Successful placement of right uppe extremity 6-Icelandic triple lumen PICC line.   • ENDOSCOPY N/A 1/12/2018    Procedure: ESOPHAGOGASTRODUODENOSCOPY;  Surgeon: Bin Oh MD;  Location: [facility] ENDOSCOPY;  Service:    • ENDOSCOPY N/A 1/17/2018    Procedure: ESOPHAGOGASTRODUODENOSCOPY;  Surgeon: Bin Oh MD;  Location: [facility] ENDOSCOPY;  Service:    • ENDOSCOPY N/A 3/16/2018    Procedure: ESOPHAGOGASTRODUODENOSCOPY possible dilation;  Surgeon: Bin Oh MD;  Location: [facility] ENDOSCOPY;  Service: Gastroenterology   • FEMORAL POPLITEAL BYPASS Left 4/14/2020    Procedure: FEMORAL POPLITEAL BYPASS ABOVE KNEE  (POLYTETRAFLUOROETHYLENE)  LEFT COMMON FEMORAL ARTERY ENDARTERECTOMY PTA LEFT ILIAC ARTERIOGRAM        (c-arm#2 and c-arm table);  Surgeon: David Suh MD;  Location: [facility] OR;  Service: Vascular;  Laterality: Left;   • FEMORAL POPLITEAL BYPASS Right 9/17/2021    Procedure: RIGHT common femoral endarterectomy FEMORAL POPLITEAL BYPASS (above the knee polytetrafluoroethylene  lower extremity arteriogram              (c-arm#2 and c-arm table);  Surgeon: David Suh MD;  Location: [facility] OR;  Service: Vascular;  Laterality: Right;   • HYSTERECTOMY     • INTERVENTIONAL RADIOLOGY PROCEDURE Left 9/9/2020    Procedure: IR angiogram extremity left;  Surgeon: David Suh MD;  Location: [facility] ANGIO INVASIVE LOCATION;  Service: Interventional Radiology;  Laterality: Left;   • KYPHOPLASTY N/A 5/23/2019    Procedure: KYPHOPLASTY LUMBAR FOUR;  Surgeon: Aba Santa MD;  Location: [facility] OR;  Service: Orthopedic Spine   • TOTAL SHOULDER REPLACEMENT Left    • TRANSESOPHAGEAL ECHOCARDIOGRAM (JACQUES)  04/07/2016    With color flow-Mild to moderate [facility].LV systolic function well preserved with Ef of 55-60%.Mitral and AV  intact.Diastolic dysfunction   • UPPER GASTROINTESTINAL ENDOSCOPY  01/17/2018    Dr. Bin Martin M.D.       Social History     Socioeconomic History   • Marital status:    Tobacco Use   • Smoking status: Every Day     Packs/day: 1.00     Years: 50.00     Pack years: 50.00     Types: Cigarettes   • Smokeless tobacco: Never   Vaping Use   • Vaping Use: Never used   Substance and Sexual Activity   • Alcohol use: No   • Drug use: No   • Sexual activity: Defer       O/E    Decreased air entry with expiratory wheezes.  1+ S2 heart sounds    Lab Results (last 24 hours)     Procedure Component Value Units Date/Time    Troponin [[id number]]  (Normal) Collected: 11/02/22 1546    Specimen: Blood Updated: 11/02/22 1630     Troponin T <0.010 ng/mL     Narrative:      Troponin T Reference Range:  <= 0.03 ng/mL-   Negative for AMI  >0.03 ng/mL-     Abnormal for myocardial necrosis.  Clinicians would have to utilize clinical acumen, EKG, Troponin and serial changes to determine if it is an Acute Myocardial Infarction or myocardial injury due to an underlying chronic condition.       Results may be falsely decreased if patient taking Biotin.      CBC & Differential [[id number]]  (Abnormal) Collected: 11/03/22 0638    Specimen: Blood Updated: 11/03/22 0701    Narrative:      The following orders were created for panel order CBC & Differential.  Procedure                               Abnormality         Status                     ---------                               -----------         ------                     CBC Auto Differential[[id number]]        Abnormal            Final result                 Please view results for these tests on the individual orders.    Basic Metabolic Panel [[id number]]  (Abnormal) Collected: 11/03/22 0638    Specimen: Blood Updated: 11/03/22 0718     Glucose 135 mg/dL      BUN 17 mg/dL      Creatinine 0.62 mg/dL      Sodium 137 mmol/L      Potassium 4.1 mmol/L      Chloride 99 mmol/L      CO2  30.0 mmol/L      Calcium 9.3 mg/dL      BUN/Creatinine Ratio 27.4     Anion Gap 8.0 mmol/L      eGFR 95.9 mL/min/1.73      Comment: National Kidney Foundation and American Society of Nephrology (ASN) Task Force recommended calculation based on the Chronic Kidney Disease Epidemiology Collaboration (CKD-EPI) equation refit without adjustment for race.       Narrative:      GFR Normal >60  Chronic Kidney Disease <60  Kidney Failure <15      Magnesium [[id number]]  (Normal) Collected: 11/03/22 0638    Specimen: Blood Updated: 11/03/22 0717     Magnesium 1.6 mg/dL     CBC Auto Differential [[id number]]  (Abnormal) Collected: 11/03/22 0638    Specimen: Blood Updated: 11/03/22 0701     WBC 16.95 10*3/mm3      RBC 3.74 10*6/mm3      Hemoglobin 10.9 g/dL      Hematocrit 33.9 %      MCV 90.6 fL      MCH 29.1 pg      MCHC 32.2 g/dL      RDW 13.1 %      RDW-SD 43.2 fl      MPV 9.5 fL      Platelets 302 10*3/mm3      Neutrophil % 91.1 %      Lymphocyte % 6.0 %      Monocyte % 2.1 %      Eosinophil % 0.1 %      Basophil % 0.1 %      Immature Grans % 0.6 %      Neutrophils, Absolute 15.44 10*3/mm3      Lymphocytes, Absolute 1.01 10*3/mm3      Monocytes, Absolute 0.36 10*3/mm3      Eosinophils, Absolute 0.01 10*3/mm3      Basophils, Absolute 0.02 10*3/mm3      Immature Grans, Absolute 0.11 10*3/mm3      nRBC 0.0 /100 WBC             A/P    1.  Chest pain:  Secondary to pneumonia and recent injury in the right side.  Cardiac cath without evidence of obstructive coronary artery disease.  ED chest without PE, evidence of aspiration pneumonia.  Troponins have been negative.     Management of COPD and pneumonia per primary team.    We will sign off at this point.  Please call with questions.          BMI is within normal parameters. No other follow-up for BMI required.      Mona Carnesgeorge  reports that she has been smoking cigarettes. She has a 50.00 pack-year smoking history. She has never used smokeless tobacco.. I have educated her  on the risk of diseases from using tobacco products such as cancer, COPD and heart disease.     I advised her to quit and she is not willing to quit.    I spent 3  minutes counseling the patient.                       This document has been electronically signed by Neftali Haywood MD on November 3, 2022 14:09 CDT         Part of this note may be an electronic transcription/translation of spoken language to printed text using the Dragon Dictation System.

## 2022-11-03 NOTE — PROGRESS NOTES
[facility] Medicine Services  INPATIENT PROGRESS NOTE    Length of Stay: 2  Date of Admission: 11/1/2022  Primary Care Physician: Anahi Camacho APRN    Subjective   (S) admitted yesterday for pneumonia symptoms; taken to the cath lab for EKG changes;    still with high oxygen requirements but feeling better     Review of Systems   All other systems reviewed and are negative.       All pertinent negatives and positives are as above. All other systems have been reviewed and are negative unless otherwise stated.     Prior to Admission medications    Medication Sig Start Date End Date Taking? Authorizing Provider   albuterol (PROVENTIL) (2.5 MG/3ML) 0.083% nebulizer solution Take 2.5 mg by nebulization Every 4 (Four) Hours As Needed for Wheezing.   Yes Miguelito Chatman MD   ALBUTEROL SULFATE HFA IN Inhale 2 puffs Every 4 (Four) Hours As Needed.   Yes Miguelito Chatman MD   budesonide-formoterol (SYMBICORT) 160-4.5 MCG/ACT inhaler Inhale 2 puffs 2 (Two) Times a Day As Needed.   Yes Miguelito Chatman MD   furosemide (LASIX) 20 MG tablet Take 1 tablet by mouth Daily. 6/12/20  Yes Jacki Polanco APRN   gabapentin (NEURONTIN) 800 MG tablet Take 800 mg by mouth 3 (Three) Times a Day.   Yes Miguelito Chatman MD   midodrine (PROAMATINE) 10 MG tablet Take 1 tablet by mouth 3 (Three) Times a Day Before Meals.  Patient taking differently: Take 1 tablet by mouth Daily. 2/24/22  Yes Hunter Pacheco, DO   O2 (OXYGEN) Inhale 4 L/min As Needed (shortness of air). 9/6/21  Yes Miguelito Chatman MD   ondansetron (ZOFRAN) 4 MG tablet Take 4 mg by mouth Every 8 (Eight) Hours As Needed for Nausea or Vomiting.   Yes Miguelito Chatman MD   pantoprazole (PROTONIX) 40 MG EC tablet Take 1 tablet by mouth Daily. 2/5/18  Yes Mary Shen APRN   pravastatin (PRAVACHOL) 20 MG tablet Take 1 tablet by mouth Every Night. 5/2/22  Yes Jacki Polanco APRN    rivaroxaban (Xarelto) 20 MG tablet Take 1 tablet by mouth Daily With Dinner. 5/2/22  Yes Jacki Polanco APRN   sertraline (ZOLOFT) 100 MG tablet Take 100 mg by mouth Daily.   Yes ProviderMiguelito MD   traZODone (DESYREL) 150 MG tablet Take 1 tablet by mouth Every Night. 2/26/19  Yes Donn Triana MD   vitamin D (ERGOCALCIFEROL) 1.25 MG (20435 UT) capsule capsule Take 50,000 Units by mouth 1 (One) Time Per Week.   Yes ProviderMiguelito MD   amitriptyline (ELAVIL) 25 MG tablet Take 25 mg by mouth Every Night. 5/8/20   Miguelito Chatman MD   cilostazol (PLETAL) 100 MG tablet Take 1 tablet by mouth 2 (Two) Times a Day Before Meals. 5/2/22 5/2/23  Jacki Polanco APRN   clopidogrel (PLAVIX) 75 MG tablet Take 1 tablet by mouth Daily. 5/2/22   Jacki Polanco APRN   penciclovir (DENAVIR) 1 % cream Apply 1 application topically to the appropriate area as directed As Needed (fever blisters).    Provider, MD Miguelito       amitriptyline, 25 mg, Oral, Nightly  azithromycin, 500 mg, Intravenous, Q24H  budesonide-formoterol, 2 puff, Inhalation, BID - RT  cefTRIAXone, 1 g, Intravenous, Q24H  cilostazol, 100 mg, Oral, BID AC  clopidogrel, 75 mg, Oral, Daily  gabapentin, 400 mg, Oral, Q8H  ipratropium-albuterol, 3 mL, Nebulization, Q4H While Awake  magnesium sulfate, 4 g, Intravenous, Once  methylPREDNISolone sodium succinate, 80 mg, Intravenous, Q6H  pantoprazole, 40 mg, Oral, Daily  pravastatin, 20 mg, Oral, Nightly  rivaroxaban, 20 mg, Oral, Daily With Dinner  sertraline, 100 mg, Oral, Daily  sodium chloride, 10 mL, Intravenous, Q12H  sodium chloride, 10 mL, Intravenous, Q12H  sodium chloride, 10 mL, Intravenous, Q12H  sodium chloride, 10 mL, Intravenous, Q12H  sodium chloride, , ,   traZODone, 150 mg, Oral, Nightly           Objective    Temp:  [98 °F (36.7 °C)-98.9 °F (37.2 °C)] 98 °F (36.7 °C)  Heart Rate:  [51-81] 51  Resp:  [16-20] 16  BP: ()/(53-89) 99/60    Physical  Exam  Constitutional:       Comments: Ill but better appearing   HENT:      Head: Normocephalic.      Nose: Nose normal.      Mouth/Throat:      Mouth: Mucous membranes are moist.   Eyes:      Extraocular Movements: Extraocular movements intact.   Cardiovascular:      Rate and Rhythm: Normal rate and regular rhythm.      Heart sounds: Normal heart sounds.   Pulmonary:      Effort: Respiratory distress present.      Breath sounds: Rales present.   Abdominal:      General: Bowel sounds are normal.      Palpations: Abdomen is soft.   Musculoskeletal:         General: Normal range of motion.      Cervical back: Neck supple.   Skin:     General: Skin is warm.   Neurological:      General: No focal deficit present.      Mental Status: She is alert. Mental status is at baseline.   Psychiatric:         Mood and Affect: Mood normal.         Behavior: Behavior normal.         Results Review:  I have reviewed the labs, radiology results, and diagnostic studies.    Laboratory Data:   Results from last 7 days   Lab Units 11/03/22 0638 11/02/22 0350 11/01/22  1705   SODIUM mmol/L 137 138 138   POTASSIUM mmol/L 4.1 4.2 3.5   CHLORIDE mmol/L 99 102 100   CO2 mmol/L 30.0* 25.0 28.0   BUN mg/dL 17 9 6*   CREATININE mg/dL 0.62 0.57 0.71   GLUCOSE mg/dL 135* 121* 105*   CALCIUM mg/dL 9.3 8.5* 8.7   BILIRUBIN mg/dL  --   --  0.4   ALK PHOS U/L  --   --  99   ALT (SGPT) U/L  --   --  6   AST (SGOT) U/L  --   --  11   ANION GAP mmol/L 8.0 11.0 10.0     Estimated Creatinine Clearance: 74.2 mL/min (by C-G formula based on SCr of 0.62 mg/dL).  Results from last 7 days   Lab Units 11/03/22  0638   MAGNESIUM mg/dL 1.6         Results from last 7 days   Lab Units 11/03/22  0638 11/02/22  0350 11/01/22  1705   WBC 10*3/mm3 16.95* 15.95* 18.54*   HEMOGLOBIN g/dL 10.9* 11.4* 11.7*   HEMATOCRIT % 33.9* 34.9 36.0   PLATELETS 10*3/mm3 302 267 283     Results from last 7 days   Lab Units 11/01/22  1705   INR  1.33*       Culture Data:   Blood  Culture   Date Value Ref Range Status   11/02/2022 No growth at 24 hours  Preliminary   11/02/2022 No growth at 24 hours  Preliminary     No results found for: URINECX  No results found for: RESPCX  No results found for: WOUNDCX  No results found for: STOOLCX  No components found for: BODYFLD    Radiology Data:   Imaging Results (Last 24 Hours)     Procedure Component Value Units Date/Time    CT Angiogram Chest [[id number]] Collected: 11/03/22 1105     Updated: 11/03/22 1204    Narrative:      EXAM: CTA Chest      COMPARISON: CT February 24, 2019. Chest x-ray, November 2, 2022.     HISTORY: Pulmonary embolism (PE) suspected, unknown D-dimer,  I21.3 ST elevation (STEMI) myocardial infarction of unspecified  site J44.1 Chronic obstructive pulmonary disease with (acute)  exacerbation Z74.09 Other reduced mobility    TECHNIQUE: Multiple contiguous noncontrast axial images are  obtained.    This exam was performed according to our departmental dose  optimization program, which includes automated exposure control,  adjustment of the mA and/or KV according to patient size and/or  use of iterative reconstruction technique.    Reconstructed MIP images were obtained in multiple obliquities.  No 3-D surface rendered images were obtained for this exam.  Computer generated 3-D reconstructions/MIPS were performed     FINDINGS:    Thyroid: Normal    Esophagus: normal    Aorta: Atherosclerotic calcifications. Occluded left subclavian  artery    Pulmonary arteries: No visualized pulmonary embolus.     Adenopathy: None    Heart: Enlarged.    Lungs: Bibasilar dependent consolidation. Mild septal thickening.  Subpleural reticulation. Right middle lobe and lingular  consolidation and scattered groundglass opacities. No significant  effusion. Motion artifact.    Chest wall: Normal    Upper abdomen: Bilateral adrenal thickening. Bilateral renal  vascular calcifications     Bones: Multiple subacute to chronic left rib fractures.  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 Left shoulder arthroplasty.  Irregularity of the sternum on the lateral view may be due to  motion or nondisplaced fracture.      Impression:        Bibasilar dependent opacities suggests aspiration.    Mild superimposed edema.    No evidence of pulmonary embolus.     Electronically signed by:  Pascual Canela DO  11/3/2022 12:01 PM  CDT Workstation: [id number]    US Guided Vascular Access [[id number]] Collected: 11/02/22 1042     Updated: 11/02/22 1415    Narrative:      PROCEDURE: Ultrasound Guidance Vascular Access    HISTORY: access, I21.3 ST elevation (STEMI) myocardial infarction  of unspecified site J44.1 Chronic obstructive pulmonary disease  with (acute) exacerbation    FINDINGS:  Realtime ultrasound imaging was utilized to visualize needle  entry into the patent right basilic vein during midline catheter   placement and a permanent image was stored for permanent  recording and reporting.       Impression:      Imaging obtained during vascular access device placement under  ultrasound guidance as described above     Electronically signed by:  Corey Arteaga MD  11/2/2022 2:13 PM CDT  Workstation: [id number]          I have reviewed the patient's current medications.     Assessment/Plan   EKG changes     Patient was taken to the cath lab and no PCI done, likely non flow CAD disease     Bibasilar aspiration PNA (POA)     based on CTA will change antibiotics to unasyn; continue with bronchodilators    Acute respiratory failure with hypoxemia     Due to above     increased her oxygen requirements due to below;      CTA chest seen    COPD AE     On bronchodilators and iv and inhaled steroids; monitor    Chronic medical problems     Essential hypertension     Peripheral vascular disease      Hyperlipidemia     DAMION     Chronic recurrent depression      Disposition: likely home with C in 3 to 4 days  Danish Flores MD

## 2022-11-03 NOTE — PLAN OF CARE
Goal Outcome Evaluation:  Plan of Care Reviewed With: patient        Progress: no change     Pt continues to report high-level flank pain. Percocet was added to her med list, but her blood pressure and heart rate are consistently too low to administer an opoid. PRN Tylenol has helped. Pt continues on 11L high-flow oxygen. Her IV antibiotic regiment was changed today. Her appetite has improved.

## 2022-11-03 NOTE — THERAPY EVALUATION
Patient Name: Mona Perales  : 1950    MRN: [id number]                              Today's Date: 11/3/2022       Admit Date: 2022    Visit Dx:     ICD-10-CM ICD-9-CM   1. ST elevation myocardial infarction (STEMI), unspecified artery ([facility])  I21.3 410.90   2. Chronic obstructive pulmonary disease with acute exacerbation ([facility])  J44.1 491.21   3. Impaired functional mobility, balance, gait, and endurance  Z74.09 V49.89     Patient Active Problem List   Diagnosis   • Anxiety   • CAD (coronary atherosclerotic disease)   • Carotid stenosis   • COPD (chronic obstructive pulmonary disease) ([facility])   • COPD with exacerbation ([facility])   • Depressive disorder, not elsewhere classified   • Benign essential hypertension   • Hypercholesteremia   • Insomnia   • Notalgia   • Osteoporosis   • Other screening mammogram   • RUQ abdominal pain   • PVD (peripheral vascular disease) with claudication ([facility])   • Nicotine abuse   • Dysphagia   • Epigastric pain   • Pain of right hand   • Closed displaced fracture of shaft of fifth metacarpal bone of right hand   • Cause of injury, fall   • Displaced fracture of shaft of fifth metacarpal bone of right hand with routine healing   • Syncope   • Acute respiratory failure with hypoxia ([facility])   • (HFpEF) heart failure with preserved ejection fraction ([facility])   • Hypotension   • Elevated WBCs   • Near syncope   • Atherosclerosis of native coronary artery of native heart without angina pectoris   • Atherosclerosis of native arteries of extremities with rest pain, left leg ([facility])   • PVD (peripheral vascular disease) ([facility])   • Physical deconditioning   • Pain due to onychomycosis of toenails of both feet   • ST elevation myocardial infarction (STEMI), unspecified artery ([facility])     Past Medical History:   Diagnosis Date   • A-fib ([facility])    • Anxiety    • Arthritis    • Asthma    • 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 with ulceration ([facility])     bilateral legs - bilateral iliac  stents 2008      • CHF (congestive heart failure) ([facility])    • Chronic lower back pain    • COPD (chronic obstructive pulmonary disease) ([facility])    • Essential (primary) hypertension    • GERD (gastroesophageal reflux disease)    • History of pulmonary embolus (PE)    • Hyperlipidemia    • Insomnia    • Lumbago    • Nicotine dependence    • Occlusion of artery      and stenosis of bilateral carotid arteries - BABS 16-49%, LICA 0-15%   • Other atherosclerosis of native artery of other extremity     LEFT subclavian stent 2005 (occluded)   • Sleep apnea      Past Surgical History:   Procedure Laterality Date   • CARDIAC CATHETERIZATION  04/06/2016    No evidence of any obstructive epicardial CAD.Preserved LV systolic function with EF of 55%.   • CARDIAC CATHETERIZATION N/A 11/1/2022    Procedure: Left Heart Cath;  Surgeon: Neftali Haywood MD;  Location: [facility] CATH INVASIVE LOCATION;  Service: Cardiology;  Laterality: N/A;   • CENTRAL VENOUS LINE INSERTION  04/07/2016    Successful placement of right uppe extremity 6-[place] triple lumen PICC line.   • ENDOSCOPY N/A 1/12/2018    Procedure: ESOPHAGOGASTRODUODENOSCOPY;  Surgeon: Bin Oh MD;  Location: [facility] ENDOSCOPY;  Service:    • ENDOSCOPY N/A 1/17/2018    Procedure: ESOPHAGOGASTRODUODENOSCOPY;  Surgeon: Bin Oh MD;  Location: [facility] ENDOSCOPY;  Service:    • ENDOSCOPY N/A 3/16/2018    Procedure: ESOPHAGOGASTRODUODENOSCOPY possible dilation;  Surgeon: Bin Oh MD;  Location: [facility] ENDOSCOPY;  Service: Gastroenterology   • FEMORAL POPLITEAL BYPASS Left 4/14/2020    Procedure: FEMORAL POPLITEAL BYPASS ABOVE KNEE  (POLYTETRAFLUOROETHYLENE)  LEFT COMMON FEMORAL ARTERY ENDARTERECTOMY PTA LEFT ILIAC ARTERIOGRAM        (c-arm#2 and c-arm table);  Surgeon: David Suh MD;  Location: [facility] OR;  Service: Vascular;  Laterality: Left;   • FEMORAL POPLITEAL BYPASS Right 9/17/2021    Procedure: RIGHT common femoral endarterectomy FEMORAL POPLITEAL  BYPASS (above the knee polytetrafluoroethylene  lower extremity arteriogram              (c-arm#2 and c-arm table);  Surgeon: David Suh MD;  Location: [facility] OR;  Service: Vascular;  Laterality: Right;   • HYSTERECTOMY     • INTERVENTIONAL RADIOLOGY PROCEDURE Left 9/9/2020    Procedure: IR angiogram extremity left;  Surgeon: David Suh MD;  Location: [facility] ANGIO INVASIVE LOCATION;  Service: Interventional Radiology;  Laterality: Left;   • KYPHOPLASTY N/A 5/23/2019    Procedure: KYPHOPLASTY LUMBAR FOUR;  Surgeon: Aba Santa MD;  Location: [facility] OR;  Service: Orthopedic Spine   • TOTAL SHOULDER REPLACEMENT Left    • TRANSESOPHAGEAL ECHOCARDIOGRAM (JACQUES)  04/07/2016    With color flow-Mild to moderate [facility].LV systolic function well preserved with Ef of 55-60%.Mitral and AV intact.Diastolic dysfunction   • UPPER GASTROINTESTINAL ENDOSCOPY  01/17/2018    Dr. Bin Martin M.D.      General Information     Row Name 11/03/22 1030          Physical Therapy Time and Intention    Document Type evaluation  -CZ     Mode of Treatment physical therapy  -CZ     Row Name 11/03/22 1030          General Information    Patient Profile Reviewed yes  -CZ     Prior Level of Function independent:;all household mobility  -CZ     Existing Precautions/Restrictions fall;oxygen therapy device and L/min  -CZ     Barriers to Rehab medically complex  -CZ     Row Name 11/03/22 1030          Living Environment    People in Home grandchild(lima)  -CZ     Row Name 11/03/22 1030          Home Main Entrance    Number of Stairs, Main Entrance none  -CZ     Row Name 11/03/22 1030          Stairs Within Home, Primary    Stairs, Within Home, Primary Lives with grand daughter, supplemental O2 PRN.  Ambulation with 4WW. Recent fall while ambulating, fell onto walker, R sided rib pain.  -CZ     Number of Stairs, Within Home, Primary none  -CZ     Row Name 11/03/22 1030          Cognition    Orientation Status  (Cognition) oriented x 4  -CZ     Row Name 11/03/22 1030          Safety Issues, Functional Mobility    Impairments Affecting Function (Mobility) strength;endurance/activity tolerance;pain  -CZ           User Key  (r) = Recorded By, (t) = Taken By, (c) = Cosigned By    Initials Name Provider Type    CZ Hakeem Wolfe, PT Physical Therapist               Mobility     Row Name 11/03/22 1030          Bed Mobility    Bed Mobility supine-sit  -CZ     Supine-Sit [place] (Bed Mobility) supervision  -CZ     Assistive Device (Bed Mobility) head of bed elevated;bed rails  -CZ     Row Name 11/03/22 1030          Sit-Stand Transfer    Sit-Stand [place] (Transfers) supervision  -CZ     Assistive Device (Sit-Stand Transfers) walker, front-wheeled  -CZ     Row Name 11/03/22 1030          Gait/Stairs (Locomotion)    [place] Level (Gait) contact guard  -     Assistive Device (Gait) walker, front-wheeled  -     Distance in Feet (Gait) 15'x1.  -CZ     Comment, (Gait/Stairs) Slow sky, good use of walker.  -CZ           User Key  (r) = Recorded By, (t) = Taken By, (c) = Cosigned By    Initials Name Provider Type    CZ Hakeem Wolfe, PT Physical Therapist               Obj/Interventions     Row Name 11/03/22 1030          Range of Motion Comprehensive    General Range of Motion bilateral lower extremity ROM WFL  -     Row Name 11/03/22 1030          Strength Comprehensive (MMT)    General Manual Muscle Testing (MMT) Assessment other (see comments)  -CZ     Comment, General Manual Muscle Testing (MMT) Assessment BLEs: 3+/5 grossly.  -CZ     Row Name 11/03/22 1030          Sensory Assessment (Somatosensory)    Sensory Assessment (Somatosensory) LE sensation intact  -CZ           User Key  (r) = Recorded By, (t) = Taken By, (c) = Cosigned By    Initials Name Provider Type    CZ Hakeem Wolfe, PT Physical Therapist               Goals/Plan     Row Name 11/03/22 1030          Bed Mobility Goal 1 (PT)     Activity/Assistive Device (Bed Mobility Goal 1, PT) sit to supine/supine to sit  -CZ     [place] Level/Cues Needed (Bed Mobility Goal 1, PT) independent  -CZ     Time Frame (Bed Mobility Goal 1, PT) by discharge  -CZ     Strategies/Barriers (Bed Mobility Goal 1, PT) HOB flat, no bed rails.  -CZ     Progress/Outcomes (Bed Mobility Goal 1, PT) goal not met  -CZ     Row Name 11/03/22 1030          Transfer Goal 1 (PT)    Activity/Assistive Device (Transfer Goal 1, PT) sit-to-stand/stand-to-sit;bed-to-chair/chair-to-bed;walker, rolling  -CZ     [place] Level/Cues Needed (Transfer Goal 1, PT) modified independence  -CZ     Time Frame (Transfer Goal 1, PT) by discharge  -CZ     Strategies/Barriers (Transfers Goal 1, PT) Maintain SpO2 >90%.  -CZ     Progress/Outcome (Transfer Goal 1, PT) goal not met  -CZ     Row Name 11/03/22 1030          Gait Training Goal 1 (PT)    Activity/Assistive Device (Gait Training Goal 1, PT) walker, rolling  -CZ     [place] Level (Gait Training Goal 1, PT) modified independence  -CZ     Distance (Gait Training Goal 1, PT) 25'x2.  -CZ     Time Frame (Gait Training Goal 1, PT) by discharge  -CZ     Strategies/Barriers (Gait Training Goal 1, PT) Maintain SpO2 >90%.  -CZ     Progress/Outcome (Gait Training Goal 1, PT) goal not met  -CZ     Row Name 11/03/22 1030          Problem Specific Goal 1 (PT)    Problem Specific Goal 1 (PT) Score 25/28 on Tinetti fall risk assessment.  -CZ     Time Frame (Problem Specific Goal 1, PT) by discharge  -CZ     Strategies/Barriers (Problem Specific Goal 1, PT) Maintain SpO2 >90%.  -CZ     Progress/Outcome (Problem Specific Goal 1, PT) goal not met  -CZ     Row Name 11/03/22 1030          Therapy Assessment/Plan (PT)    Planned Therapy Interventions (PT) balance training;bed mobility training;gait training;patient/family education;transfer training;strengthening;stretching;ROM (range of motion)  -CZ           User Key  (r) = Recorded By, (t) = Taken  By, (c) = Cosigned By    Initials Name Provider Type    CZ Hakeem Wolfe, PT Physical Therapist               Clinical Impression     Row Name 11/03/22 1030          Pain    Pretreatment Pain Rating 10/10  -CZ     Pain Location - Side/Orientation Right  -CZ     Pain Location - flank  -CZ     Pain Intervention(s) Repositioned;Ambulation/increased activity;Distraction  -CZ     Row Name 11/03/22 1030          Plan of Care Review    Plan of Care Reviewed With patient  -CZ     Outcome Evaluation Initial PT evaluation complete.  Patient is alert and cooperative, reports 10/10 R flank pain from recent fall but does not limit therapy session.  She requires SPV with bed mobility and transfers, CGA with gait, ambulating 15'x1 with FWW, slow sky, good use of walker. BP 91/52, SpO2 94% on highflow after gait. Patient has a 4WW at home, has assistance available from grand daughter; may need HHPT upon discharge, pending progress with I/P PT.  Goals established, continue skilled I/P PT.  -CZ     Row Name 11/03/22 1030          Therapy Assessment/Plan (PT)    Rehab Potential (PT) good, to achieve stated therapy goals  -CZ     Criteria for Skilled Interventions Met (PT) yes;skilled treatment is necessary  -CZ     Therapy Frequency (PT) other (see comments)  5-7 days/week  -CZ     Row Name 11/03/22 1030          Vital Signs    Pre Systolic BP Rehab 91  -CZ     Pre Treatment Diastolic BP 52  -CZ     Pretreatment Heart Rate (beats/min) 64  -CZ     Posttreatment Heart Rate (beats/min) 87  -CZ     Pre SpO2 (%) 94  -CZ     O2 Delivery Pre Treatment hi-flow  -CZ     Post SpO2 (%) 94  -CZ     O2 Delivery Post Treatment hi-flow  -CZ     Pre Patient Position Supine  -CZ     Post Patient Position Sitting  -CZ     Row Name 11/03/22 1030          Positioning and Restraints    Pre-Treatment Position in bed  -CZ     Post Treatment Position other  -CZ     Other Position with other staff  Transported to imaging.  -CZ           User Key  (r) =  Recorded By, (t) = Taken By, (c) = Cosigned By    Initials Name Provider Type    CZ Hakeem Wolfe, PT Physical Therapist               Outcome Measures     Row Name 11/03/22 1030 11/03/22 0740       How much help from another person do you currently need...    Turning from your back to your side while in flat bed without using bedrails? 3  -CZ 3  -LB    Moving from lying on back to sitting on the side of a flat bed without bedrails? 3  -CZ 3  -LB    Moving to and from a bed to a chair (including a wheelchair)? 3  -CZ 3  -LB    Standing up from a chair using your arms (e.g., wheelchair, bedside chair)? 3  -CZ 3  -LB    Climbing 3-5 steps with a railing? 3  -CZ 3  -LB    To walk in hospital room? 3  -CZ 3  -LB    AM-PAC 6 Clicks Score (PT) 18  -CZ 18  -LB    Highest level of mobility 6 --> Walked 10 steps or more  -CZ 6 --> Walked 10 steps or more  -LB    Row Name 11/03/22 1030          Functional Assessment    Outcome Measure Options AM-PAC 6 Clicks Basic Mobility (PT);Tinetti  -CZ           User Key  (r) = Recorded By, (t) = Taken By, (c) = Cosigned By    Initials Name Provider Type    Laila Montero, RN Registered Nurse    Hakeem Gutierrez, PT Physical Therapist                             Physical Therapy Education     Title: PT OT SLP Therapies (In Progress)     Topic: Physical Therapy (In Progress)     Point: Mobility training (Done)     Learning Progress Summary           Patient Acceptance, E, VU by MAXIMINO at 11/3/2022 1100    Comment: PT POC, rehab process.                   Point: Home exercise program (Not Started)     Learner Progress:  Not documented in this visit.          Point: Body mechanics (Not Started)     Learner Progress:  Not documented in this visit.          Point: Precautions (Not Started)     Learner Progress:  Not documented in this visit.                      User Key     Initials Effective Dates Name Provider Type [facility] 09/18/22 -  Hakeem Wolfe, PT Physical Therapist  PT              PT Recommendation and Plan  Planned Therapy Interventions (PT): balance training, bed mobility training, gait training, patient/family education, transfer training, strengthening, stretching, ROM (range of motion)  Plan of Care Reviewed With: patient  Outcome Evaluation: Initial PT evaluation complete.  Patient is alert and cooperative, reports 10/10 R flank pain from recent fall but does not limit therapy session.  She requires SPV with bed mobility and transfers, CGA with gait, ambulating 15'x1 with FWW, slow sky, good use of walker. BP 91/52, SpO2 94% on highflow after gait. Patient has a 4WW at home, has assistance available from grand daughter; may need HHPT upon discharge, pending progress with I/P PT.  Goals established, continue skilled I/P PT.     Time Calculation:    PT Charges     Row Name 11/03/22 1105             Time Calculation    Start Time 1030  -CZ      Stop Time 1100  -CZ      Time Calculation (min) 30 min  -CZ      PT Received On 11/03/22  -CZ      PT Goal Re-Cert Due Date 11/16/22  -CZ         Untimed Charges    PT Eval/Re-eval Minutes 30  -CZ         Total Minutes    Untimed Charges Total Minutes 30  -CZ       Total Minutes 30  -CZ            User Key  (r) = Recorded By, (t) = Taken By, (c) = Cosigned By    Initials Name Provider Type    CZ Hakeem Wolfe, PT Physical Therapist              Therapy Charges for Today     Code Description Service Date Service Provider Modifiers Qty    [id number]  PT EVAL MOD COMPLEXITY 2 11/3/2022 Hakeem Wolfe, PT GP 1          PT G-Codes  Outcome Measure Options: AM-PAC 6 Clicks Basic Mobility (PT), Jaunetti  AM-PAC 6 Clicks Score (PT): 18    Hakeem Wolfe PT  11/3/2022

## 2022-11-04 LAB
ALBUMIN SERPL-MCNC: 2.9 G/DL (ref 3.5–5.2)
ALBUMIN/GLOB SERPL: 0.9 G/DL
ALP SERPL-CCNC: 78 U/L (ref 39–117)
ALT SERPL W P-5'-P-CCNC: 7 U/L (ref 1–33)
ANION GAP SERPL CALCULATED.3IONS-SCNC: 12 MMOL/L (ref 5–15)
ANION GAP SERPL CALCULATED.3IONS-SCNC: 8 MMOL/L (ref 5–15)
AST SERPL-CCNC: 14 U/L (ref 1–32)
BASOPHILS # BLD AUTO: 0.05 10*3/MM3 (ref 0–0.2)
BASOPHILS NFR BLD AUTO: 0.2 % (ref 0–1.5)
BILIRUB SERPL-MCNC: <0.2 MG/DL (ref 0–1.2)
BUN SERPL-MCNC: 19 MG/DL (ref 8–23)
BUN SERPL-MCNC: 19 MG/DL (ref 8–23)
BUN/CREAT SERPL: 26.4 (ref 7–25)
BUN/CREAT SERPL: 26.8 (ref 7–25)
CALCIUM SPEC-SCNC: 8.5 MG/DL (ref 8.6–10.5)
CALCIUM SPEC-SCNC: 8.7 MG/DL (ref 8.6–10.5)
CHLORIDE SERPL-SCNC: 101 MMOL/L (ref 98–107)
CHLORIDE SERPL-SCNC: 99 MMOL/L (ref 98–107)
CO2 SERPL-SCNC: 27 MMOL/L (ref 22–29)
CO2 SERPL-SCNC: 30 MMOL/L (ref 22–29)
CREAT SERPL-MCNC: 0.71 MG/DL (ref 0.57–1)
CREAT SERPL-MCNC: 0.72 MG/DL (ref 0.57–1)
D-LACTATE SERPL-SCNC: 1.4 MMOL/L (ref 0.5–2)
D-LACTATE SERPL-SCNC: 2 MMOL/L (ref 0.5–2)
DEPRECATED RDW RBC AUTO: 45.4 FL (ref 37–54)
DEPRECATED RDW RBC AUTO: 45.8 FL (ref 37–54)
EGFRCR SERPLBLD CKD-EPI 2021: 90.1 ML/MIN/1.73
EGFRCR SERPLBLD CKD-EPI 2021: 91.6 ML/MIN/1.73
EOSINOPHIL # BLD AUTO: 0 10*3/MM3 (ref 0–0.4)
EOSINOPHIL NFR BLD AUTO: 0 % (ref 0.3–6.2)
ERYTHROCYTE [DISTWIDTH] IN BLOOD BY AUTOMATED COUNT: 13.5 % (ref 12.3–15.4)
ERYTHROCYTE [DISTWIDTH] IN BLOOD BY AUTOMATED COUNT: 13.5 % (ref 12.3–15.4)
GLOBULIN UR ELPH-MCNC: 3.2 GM/DL
GLUCOSE SERPL-MCNC: 147 MG/DL (ref 65–99)
GLUCOSE SERPL-MCNC: 152 MG/DL (ref 65–99)
HCT VFR BLD AUTO: 31.7 % (ref 34–46.6)
HCT VFR BLD AUTO: 32 % (ref 34–46.6)
HGB BLD-MCNC: 10.3 G/DL (ref 12–15.9)
HGB BLD-MCNC: 10.3 G/DL (ref 12–15.9)
IMM GRANULOCYTES # BLD AUTO: 0.21 10*3/MM3 (ref 0–0.05)
IMM GRANULOCYTES NFR BLD AUTO: 1 % (ref 0–0.5)
LYMPHOCYTES # BLD AUTO: 1.05 10*3/MM3 (ref 0.7–3.1)
LYMPHOCYTES NFR BLD AUTO: 5.1 % (ref 19.6–45.3)
MCH RBC QN AUTO: 29.7 PG (ref 26.6–33)
MCH RBC QN AUTO: 29.9 PG (ref 26.6–33)
MCHC RBC AUTO-ENTMCNC: 32.2 G/DL (ref 31.5–35.7)
MCHC RBC AUTO-ENTMCNC: 32.5 G/DL (ref 31.5–35.7)
MCV RBC AUTO: 91.9 FL (ref 79–97)
MCV RBC AUTO: 92.2 FL (ref 79–97)
MONOCYTES # BLD AUTO: 0.92 10*3/MM3 (ref 0.1–0.9)
MONOCYTES NFR BLD AUTO: 4.5 % (ref 5–12)
NEUTROPHILS NFR BLD AUTO: 18.2 10*3/MM3 (ref 1.7–7)
NEUTROPHILS NFR BLD AUTO: 89.2 % (ref 42.7–76)
NRBC BLD AUTO-RTO: 0 /100 WBC (ref 0–0.2)
PLATELET # BLD AUTO: 281 10*3/MM3 (ref 140–450)
PLATELET # BLD AUTO: 313 10*3/MM3 (ref 140–450)
PMV BLD AUTO: 9.5 FL (ref 6–12)
PMV BLD AUTO: 9.6 FL (ref 6–12)
POTASSIUM SERPL-SCNC: 3.9 MMOL/L (ref 3.5–5.2)
POTASSIUM SERPL-SCNC: 4 MMOL/L (ref 3.5–5.2)
PROCALCITONIN SERPL-MCNC: 0.03 NG/ML (ref 0–0.25)
PROT SERPL-MCNC: 6.1 G/DL (ref 6–8.5)
RBC # BLD AUTO: 3.45 10*6/MM3 (ref 3.77–5.28)
RBC # BLD AUTO: 3.47 10*6/MM3 (ref 3.77–5.28)
SODIUM SERPL-SCNC: 138 MMOL/L (ref 136–145)
SODIUM SERPL-SCNC: 139 MMOL/L (ref 136–145)
WBC NRBC COR # BLD: 19.25 10*3/MM3 (ref 3.4–10.8)
WBC NRBC COR # BLD: 20.43 10*3/MM3 (ref 3.4–10.8)

## 2022-11-04 PROCEDURE — 94760 N-INVAS EAR/PLS OXIMETRY 1: CPT

## 2022-11-04 PROCEDURE — 80048 BASIC METABOLIC PNL TOTAL CA: CPT | Performed by: INTERNAL MEDICINE

## 2022-11-04 PROCEDURE — 97116 GAIT TRAINING THERAPY: CPT

## 2022-11-04 PROCEDURE — 97110 THERAPEUTIC EXERCISES: CPT

## 2022-11-04 PROCEDURE — 94799 UNLISTED PULMONARY SVC/PX: CPT

## 2022-11-04 PROCEDURE — 83605 ASSAY OF LACTIC ACID: CPT | Performed by: INTERNAL MEDICINE

## 2022-11-04 PROCEDURE — 25010000002 METHYLPREDNISOLONE PER 125 MG: Performed by: INTERNAL MEDICINE

## 2022-11-04 PROCEDURE — 85025 COMPLETE CBC W/AUTO DIFF WBC: CPT | Performed by: INTERNAL MEDICINE

## 2022-11-04 PROCEDURE — 25010000002 MEROPENEM PER 100 MG: Performed by: INTERNAL MEDICINE

## 2022-11-04 RX ORDER — BENZONATATE 100 MG/1
200 CAPSULE ORAL EVERY 4 HOURS PRN
Status: DISCONTINUED | OUTPATIENT
Start: 2022-11-04 | End: 2022-11-17 | Stop reason: HOSPADM

## 2022-11-04 RX ADMIN — SERTRALINE HYDROCHLORIDE 100 MG: 50 TABLET ORAL at 08:12

## 2022-11-04 RX ADMIN — Medication 10 ML: at 20:31

## 2022-11-04 RX ADMIN — TRAZODONE HYDROCHLORIDE 150 MG: 100 TABLET ORAL at 20:30

## 2022-11-04 RX ADMIN — Medication 10 ML: at 08:12

## 2022-11-04 RX ADMIN — AMITRIPTYLINE HYDROCHLORIDE 25 MG: 25 TABLET, FILM COATED ORAL at 20:50

## 2022-11-04 RX ADMIN — MEROPENEM 1 G: 1 INJECTION, POWDER, FOR SOLUTION INTRAVENOUS at 04:09

## 2022-11-04 RX ADMIN — IPRATROPIUM BROMIDE AND ALBUTEROL SULFATE 3 ML: 2.5; .5 SOLUTION RESPIRATORY (INHALATION) at 15:57

## 2022-11-04 RX ADMIN — CLOPIDOGREL BISULFATE 75 MG: 75 TABLET ORAL at 08:11

## 2022-11-04 RX ADMIN — GABAPENTIN 400 MG: 400 CAPSULE ORAL at 05:37

## 2022-11-04 RX ADMIN — PANTOPRAZOLE SODIUM 40 MG: 40 TABLET, DELAYED RELEASE ORAL at 08:12

## 2022-11-04 RX ADMIN — METHYLPREDNISOLONE SODIUM SUCCINATE 80 MG: 125 INJECTION, POWDER, FOR SOLUTION INTRAMUSCULAR; INTRAVENOUS at 08:12

## 2022-11-04 RX ADMIN — MEROPENEM 1 G: 1 INJECTION, POWDER, FOR SOLUTION INTRAVENOUS at 20:31

## 2022-11-04 RX ADMIN — Medication 10 ML: at 08:16

## 2022-11-04 RX ADMIN — IPRATROPIUM BROMIDE AND ALBUTEROL SULFATE 3 ML: 2.5; .5 SOLUTION RESPIRATORY (INHALATION) at 19:34

## 2022-11-04 RX ADMIN — RIVAROXABAN 20 MG: 10 TABLET, FILM COATED ORAL at 17:09

## 2022-11-04 RX ADMIN — MEROPENEM 1 G: 1 INJECTION, POWDER, FOR SOLUTION INTRAVENOUS at 11:45

## 2022-11-04 RX ADMIN — METHYLPREDNISOLONE SODIUM SUCCINATE 80 MG: 125 INJECTION, POWDER, FOR SOLUTION INTRAMUSCULAR; INTRAVENOUS at 01:01

## 2022-11-04 RX ADMIN — CILOSTAZOL 100 MG: 100 TABLET ORAL at 17:09

## 2022-11-04 RX ADMIN — METHYLPREDNISOLONE SODIUM SUCCINATE 80 MG: 125 INJECTION, POWDER, FOR SOLUTION INTRAMUSCULAR; INTRAVENOUS at 18:04

## 2022-11-04 RX ADMIN — CILOSTAZOL 100 MG: 100 TABLET ORAL at 08:12

## 2022-11-04 RX ADMIN — IPRATROPIUM BROMIDE AND ALBUTEROL SULFATE 3 ML: 2.5; .5 SOLUTION RESPIRATORY (INHALATION) at 11:21

## 2022-11-04 RX ADMIN — PRAVASTATIN SODIUM 20 MG: 20 TABLET ORAL at 20:30

## 2022-11-04 RX ADMIN — Medication 10 ML: at 08:17

## 2022-11-04 RX ADMIN — BENZONATATE 200 MG: 100 CAPSULE ORAL at 19:26

## 2022-11-04 RX ADMIN — IPRATROPIUM BROMIDE AND ALBUTEROL SULFATE 3 ML: 2.5; .5 SOLUTION RESPIRATORY (INHALATION) at 07:32

## 2022-11-04 RX ADMIN — METHYLPREDNISOLONE SODIUM SUCCINATE 80 MG: 125 INJECTION, POWDER, FOR SOLUTION INTRAMUSCULAR; INTRAVENOUS at 13:33

## 2022-11-04 RX ADMIN — OXYCODONE HYDROCHLORIDE AND ACETAMINOPHEN 1 TABLET: 5; 325 TABLET ORAL at 17:09

## 2022-11-04 RX ADMIN — GABAPENTIN 400 MG: 400 CAPSULE ORAL at 22:19

## 2022-11-04 RX ADMIN — GABAPENTIN 400 MG: 400 CAPSULE ORAL at 13:33

## 2022-11-04 NOTE — THERAPY TREATMENT NOTE
Acute Care - Physical Therapy Treatment Note  [facility]     Patient Name: Mona Perales  : 1950  MRN: [id number]  Today's Date: 2022      Visit Dx:     ICD-10-CM ICD-9-CM   1. ST elevation myocardial infarction (STEMI), unspecified artery (Formerly [facility])  I21.3 410.90   2. Chronic obstructive pulmonary disease with acute exacerbation (Formerly [facility])  J44.1 491.21   3. Impaired functional mobility, balance, gait, and endurance  Z74.09 V49.89     Patient Active Problem List   Diagnosis   • Anxiety   • CAD (coronary atherosclerotic disease)   • Carotid stenosis   • COPD (chronic obstructive pulmonary disease) (Formerly [facility])   • COPD with exacerbation (Formerly [facility])   • Depressive disorder, not elsewhere classified   • Benign essential hypertension   • Hypercholesteremia   • Insomnia   • Notalgia   • Osteoporosis   • Other screening mammogram   • RUQ abdominal pain   • PVD (peripheral vascular disease) with claudication (Formerly [facility])   • Nicotine abuse   • Dysphagia   • Epigastric pain   • Pain of right hand   • Closed displaced fracture of shaft of fifth metacarpal bone of right hand   • Cause of injury, fall   • Displaced fracture of shaft of fifth metacarpal bone of right hand with routine healing   • Syncope   • Acute respiratory failure with hypoxia (Formerly [facility])   • (HFpEF) heart failure with preserved ejection fraction (Formerly [facility])   • Hypotension   • Elevated WBCs   • Near syncope   • Atherosclerosis of native coronary artery of native heart without angina pectoris   • Atherosclerosis of native arteries of extremities with rest pain, left leg ([facility]ly [facility])   • PVD (peripheral vascular disease) (Formerly [facility])   • Physical deconditioning   • Pain due to onychomycosis of toenails of both feet   • ST elevation myocardial infarction (STEMI), unspecified artery (Formerly [facility])     Past Medical History:   Diagnosis Date   • A-fib (Formerly [facility])    • Anxiety    • Arthritis    • Asthma    • 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 with ulceration (Formerly [facility])     bilateral legs -  bilateral iliac stents 2008      • CHF (congestive heart failure) (Formerly [facility])    • Chronic lower back pain    • COPD (chronic obstructive pulmonary disease) ([facility]ly [facility])    • Essential (primary) hypertension    • GERD (gastroesophageal reflux disease)    • History of pulmonary embolus (PE)    • Hyperlipidemia    • Insomnia    • Lumbago    • Nicotine dependence    • Occlusion of artery      and stenosis of bilateral carotid arteries - BABS 16-49%, LICA 0-15%   • Other atherosclerosis of native artery of other extremity     LEFT subclavian stent 2005 (occluded)   • Sleep apnea      Past Surgical History:   Procedure Laterality Date   • CARDIAC CATHETERIZATION  04/06/2016    No evidence of any obstructive epicardial CAD.Preserved LV systolic function with EF of 55%.   • CARDIAC CATHETERIZATION N/A 11/1/2022    Procedure: Left Heart Cath;  Surgeon: Neftali Haywood MD;  Location: [facility] CATH INVASIVE LOCATION;  Service: Cardiology;  Laterality: N/A;   • CENTRAL VENOUS LINE INSERTION  04/07/2016    Successful placement of right uppe extremity 6-Angolan triple lumen PICC line.   • ENDOSCOPY N/A 1/12/2018    Procedure: ESOPHAGOGASTRODUODENOSCOPY;  Surgeon: Bin Oh MD;  Location: [facility] ENDOSCOPY;  Service:    • ENDOSCOPY N/A 1/17/2018    Procedure: ESOPHAGOGASTRODUODENOSCOPY;  Surgeon: Bin Oh MD;  Location: [facility] ENDOSCOPY;  Service:    • ENDOSCOPY N/A 3/16/2018    Procedure: ESOPHAGOGASTRODUODENOSCOPY possible dilation;  Surgeon: Bin Oh MD;  Location: [facility] ENDOSCOPY;  Service: Gastroenterology   • FEMORAL POPLITEAL BYPASS Left 4/14/2020    Procedure: FEMORAL POPLITEAL BYPASS ABOVE KNEE  (POLYTETRAFLUOROETHYLENE)  LEFT COMMON FEMORAL ARTERY ENDARTERECTOMY PTA LEFT ILIAC ARTERIOGRAM        (c-arm#2 and c-arm table);  Surgeon: David Suh MD;  Location: [facility] OR;  Service: Vascular;  Laterality: Left;   • FEMORAL POPLITEAL BYPASS Right 9/17/2021    Procedure: RIGHT common femoral endarterectomy  FEMORAL POPLITEAL BYPASS (above the knee polytetrafluoroethylene  lower extremity arteriogram              (c-arm#2 and c-arm table);  Surgeon: David Suh MD;  Location: [facility] OR;  Service: Vascular;  Laterality: Right;   • HYSTERECTOMY     • INTERVENTIONAL RADIOLOGY PROCEDURE Left 9/9/2020    Procedure: IR angiogram extremity left;  Surgeon: David Suh MD;  Location: [facility] ANGIO INVASIVE LOCATION;  Service: Interventional Radiology;  Laterality: Left;   • KYPHOPLASTY N/A 5/23/2019    Procedure: KYPHOPLASTY LUMBAR FOUR;  Surgeon: Aba Santa MD;  Location: [facility] OR;  Service: Orthopedic Spine   • TOTAL SHOULDER REPLACEMENT Left    • TRANSESOPHAGEAL ECHOCARDIOGRAM (JACQUES)  04/07/2016    With color flow-Mild to moderate [facility].LV systolic function well preserved with Ef of 55-60%.Mitral and AV intact.Diastolic dysfunction   • UPPER GASTROINTESTINAL ENDOSCOPY  01/17/2018    Dr. Bin Martin M.D.     PT Assessment (last 12 hours)     PT Evaluation and Treatment     Row Name 11/04/22 2566          Physical Therapy Time and Intention    Subjective Information complains of;pain  -LN     Document Type therapy note (daily note)  -LN     Mode of Treatment physical therapy  -LN     Row Name 11/04/22 4966          General Information    Patient Profile Reviewed yes  -LN     Equipment Currently Used at Home shower chair  has RW, but not sure where it is located  -LN     Existing Precautions/Restrictions fall;oxygen therapy device and L/min  -LN     Row Name 11/04/22 1336          Home Use of Assistive/Adaptive Equipment    Equipment Currently Used at Home cane, straight;wheelchair;walker, standard  -LN     Row Name 11/04/22 6866          Pain    Pretreatment Pain Rating 9/10  -LN     Posttreatment Pain Rating 9/10  -LN     Pain Location - Side/Orientation Right  lateral ribs  -LN     Pain Intervention(s) Repositioned  meds given earlier  -LN     Row Name 11/04/22 4196           Cognition    Orientation Status (Cognition) oriented x 4  -[facility] Name 11/04/22 1336          Range of Motion Comprehensive    General Range of Motion bilateral lower extremity ROM WFL  -[facility] Name 11/04/22 1336          Strength Comprehensive (MMT)    General Manual Muscle Testing (MMT) Assessment other (see comments)  -[facility] Name 11/04/22 1336          Bed Mobility    Bed Mobility supine-sit  -LN     Supine-Sit [place] (Bed Mobility) supervision  -     Assistive Device (Bed Mobility) head of bed elevated;bed rails  -[facility] Name 11/04/22 1336          Bed-Chair Transfer    Bed-Chair [place] (Transfers) contact guard  -     Assistive Device (Bed-Chair Transfers) walker, front-wheeled  -[facility] Name 11/04/22 1336          Sit-Stand Transfer    Sit-Stand [place] (Transfers) supervision  -     Assistive Device (Sit-Stand Transfers) walker, front-wheeled  -[facility] Name 11/04/22 1336          Stand-Sit Transfer    Stand-Sit [place] (Transfers) supervision  -     Assistive Device (Stand-Sit Transfers) walker, front-wheeled  -[facility] Name 11/04/22 1336          Gait/Stairs (Locomotion)    [place] Level (Gait) contact guard  -     Assistive Device (Gait) walker, front-wheeled  -     Distance in Feet (Gait) 14  -LN     Deviations/Abnormal Patterns (Gait) gait speed decreased;stride length decreased  -[facility] Name 11/04/22 1336          Safety Issues, Functional Mobility    Impairments Affecting Function (Mobility) strength;endurance/activity tolerance;pain  -[facility] Name 11/04/22 1336          Motor Skills    Therapeutic Exercise hip;knee;ankle  -[facility] Name 11/04/22 1336          Hip (Therapeutic Exercise)    Hip (Therapeutic Exercise) AROM (active range of motion)  -     Hip AROM (Therapeutic Exercise) bilateral;flexion;aBduction;aDduction  -[facility] Name 11/04/22 1336          Knee (Therapeutic Exercise)    Knee (Therapeutic Exercise) AROM  (active range of motion)  -LN     Knee AROM (Therapeutic Exercise) bilateral;LAQ (long arc quad)  -LN     Row Name 11/04/22 1336          Ankle (Therapeutic Exercise)    Ankle (Therapeutic Exercise) AROM (active range of motion)  -LN     Ankle AROM (Therapeutic Exercise) bilateral;dorsiflexion;plantarflexion  -LN     Row Name 11/04/22 1336          Respiratory WDL    Respiratory WDL breath sounds;cough  -LN     Rhythm/Pattern, Respiratory shortness of breath  -LN     Cough Frequency frequent  -LN     Cough Type nonproductive  -LN     Row Name 11/04/22 1336          Breath Sounds    Breath Sounds All Fields  -LN     All Lung Fields Breath Sounds wheezes, expiratory;aeration increased  -LN     Row Name 11/04/22 1336          Skin WDL    Skin WDL X  -LN     Skin Color/Characteristics without discoloration  -LN     Skin Temperature warm  -LN     Skin Moisture dry  -LN     Skin Elasticity quick return to original state  -LN     Skin Integrity puncture;bruised (ecchymotic)  -LN     Row Name 11/04/22 1336          Coping    Observed Emotional State calm;cooperative  -LN     Verbalized Emotional State acceptance  -LN     Family/Support Persons family  -LN     Involvement in Care not present at bedside  -LN     Row Name 11/04/22 1336          Plan of Care Review    Plan of Care Reviewed With patient  -LN     Outcome Evaluation sup-sit-stand-sit sba of 1;amb 14' with rw and cga of 1-sats 94-97% with activity;bp's taken in r le  -LN     Row Name 11/04/22 1336          Vital Signs    Pre Systolic BP Rehab 105  -LN     Pre Treatment Diastolic BP 57  -LN     Post Systolic BP Rehab 120  -LN     Post Treatment Diastolic BP 59  -LN     Pretreatment Heart Rate (beats/min) 67  -LN     Intratreatment Heart Rate (beats/min) 68  -LN     Posttreatment Heart Rate (beats/min) 68  -LN     Pre SpO2 (%) 92  -LN     O2 Delivery Pre Treatment hi-flow  -LN     Intra SpO2 (%) 97  -LN     Post SpO2 (%) 98  -LN     Pre Patient Position Supine  -LN      Intra Patient Position Standing  -LN     Post Patient Position Sitting  -LN     Row Name 11/04/22 1336          Positioning and Restraints    Post Treatment Position chair  -LN     In Chair reclined;call light within reach;encouraged to call for assist;exit alarm on;legs elevated  -LN     Row Name 11/04/22 1336          Therapy Assessment/Plan (PT)    Rehab Potential (PT) good, to achieve stated therapy goals  -LN     Criteria for Skilled Interventions Met (PT) yes;skilled treatment is necessary  -LN     Therapy Frequency (PT) other (see comments)  5-7 days/week  -LN     Row Name 11/04/22 1336          Bed Mobility Goal 1 (PT)    Activity/Assistive Device (Bed Mobility Goal 1, PT) sit to supine/supine to sit  -LN     [place] Level/Cues Needed (Bed Mobility Goal 1, PT) independent  -LN     Time Frame (Bed Mobility Goal 1, PT) by discharge  -LN     Strategies/Barriers (Bed Mobility Goal 1, PT) HOB flat, no bed rails.  -LN     Progress/Outcomes (Bed Mobility Goal 1, PT) goal not met  -LN     Row Name 11/04/22 1336          Transfer Goal 1 (PT)    Activity/Assistive Device (Transfer Goal 1, PT) sit-to-stand/stand-to-sit;bed-to-chair/chair-to-bed;walker, rolling  -LN     [place] Level/Cues Needed (Transfer Goal 1, PT) modified independence  -LN     Time Frame (Transfer Goal 1, PT) by discharge  -LN     Strategies/Barriers (Transfers Goal 1, PT) Maintain SpO2 >90%.  -LN     Progress/Outcome (Transfer Goal 1, PT) goal not met  -LN     Row Name 11/04/22 1336          Gait Training Goal 1 (PT)    Activity/Assistive Device (Gait Training Goal 1, PT) walker, rolling  -LN     [place] Level (Gait Training Goal 1, PT) modified independence  -LN     Distance (Gait Training Goal 1, PT) 25'x2.  -LN     Time Frame (Gait Training Goal 1, PT) by discharge  -LN     Strategies/Barriers (Gait Training Goal 1, PT) Maintain SpO2 >90%.  -LN     Progress/Outcome (Gait Training Goal 1, PT) goal not met  -LN     Row Name  11/04/22 1336          Problem Specific Goal 1 (PT)    Problem Specific Goal 1 (PT) Score 25/28 on Tinetti fall risk assessment.  -LN     Time Frame (Problem Specific Goal 1, PT) by discharge  -LN     Strategies/Barriers (Problem Specific Goal 1, PT) Maintain SpO2 >90%.  -LN     Progress/Outcome (Problem Specific Goal 1, PT) goal not met  -LN           User Key  (r) = Recorded By, (t) = Taken By, (c) = Cosigned By    Initials Name Provider Type    LN Torri Sanchez PTA Physical Therapist Assistant                Physical Therapy Education     Title: PT OT SLP Therapies (In Progress)     Topic: Physical Therapy (In Progress)     Point: Mobility training (Done)     Learning Progress Summary           Patient Acceptance, E, VU by  at 11/3/2022 1100    Comment: PT POC, rehab process.                   Point: Home exercise program (Not Started)     Learner Progress:  Not documented in this visit.          Point: Body mechanics (Not Started)     Learner Progress:  Not documented in this visit.          Point: Precautions (Not Started)     Learner Progress:  Not documented in this visit.                      User Key     Initials Effective Dates Name Provider Type Discipline     09/18/22 -  Hakeem Wolfe, PT Physical Therapist PT              PT Recommendation and Plan  Anticipated Discharge Disposition (PT): home with assist, home with home health  Therapy Frequency (PT): other (see comments) (5-7 days/week)  Plan of Care Reviewed With: patient  Outcome Evaluation: sup-sit-stand-sit sba of 1;amb 14' with rw and cga of 1-sats 94-97% with activity;bp's taken in r le   Outcome Measures     Row Name 11/04/22 9396             How much help from another person do you currently need...    Turning from your back to your side while in flat bed without using bedrails? 4  -LN      Moving from lying on back to sitting on the side of a flat bed without bedrails? 3  -LN      Moving to and from a bed to a chair (including a  wheelchair)? 3  -LN      Standing up from a chair using your arms (e.g., wheelchair, bedside chair)? 3  -LN      Climbing 3-5 steps with a railing? 3  -LN      To walk in hospital room? 3  -LN      AM-PAC 6 Clicks Score (PT) 19  -LN         Functional Assessment    Outcome Measure Options AM-PAC 6 Clicks Basic Mobility (PT);[place]etti  -LN            User Key  (r) = Recorded By, (t) = Taken By, (c) = Cosigned By    Initials Name Provider Type    Torri Vogel PTA Physical Therapist Assistant                 Time Calculation:    PT Charges     Row Name 11/04/22 1431             Time Calculation    Start Time 1336  -LN      Stop Time 1405  -LN      Time Calculation (min) 29 min  -LN      PT Received On 11/04/22  -LN         Time Calculation- PT    Total Timed Code Minutes- PT 29 minute(s)  -LN            User Key  (r) = Recorded By, (t) = Taken By, (c) = Cosigned By    Initials Name Provider Type    Torri Vogel PTA Physical Therapist Assistant              Therapy Charges for Today     Code Description Service Date Service Provider Modifiers Qty    05998424735 HC GAIT TRAINING EA 15 MIN 11/4/2022 Torri Sanchez PTA GP 1    39785163110 HC PT THER PROC EA 15 MIN 11/4/2022 Torri Sanchez PTA GP 1          PT G-Codes  Outcome Measure Options: AM-PAC 6 Clicks Basic Mobility (PT), [place]etti  AM-PAC 6 Clicks Score (PT): 19    Torri Sanchez PTA  11/4/2022

## 2022-11-04 NOTE — PLAN OF CARE
Goal Outcome Evaluation:  Plan of Care Reviewed With: caregiver, patient        Progress: improving  Outcome Evaluation: Nutrition F/U:  Po intake ~75% average.  Drinking milk as supplement.  Will continue to monitor

## 2022-11-04 NOTE — PLAN OF CARE
Goal Outcome Evaluation:  Plan of Care Reviewed With: patient           Outcome Evaluation: sup-sit-stand-sit sba of 1;amb 14' with rw and cga of 1-sats 94-97% with activity;bp's taken in r le

## 2022-11-04 NOTE — PROGRESS NOTES
[facility] Medicine Services  INPATIENT PROGRESS NOTE    Length of Stay: 3  Date of Admission: 11/1/2022  Primary Care Physician: Anahi Camacho APRN    Subjective   (S) admitted yesterday for pneumonia symptoms; taken to the cath lab for EKG changes;   Presented last night with hypotension around 7.30 pm; called RN to give 2 boluses of 250 ml NS; blood pressure starting to increase but then dropped again. Unasyn was discontinued. Talked to pharmacy to start her on meropenen since she is allergic to PNC. She did not present other symptoms. Nocturnist gave her also another bolus of 500 and one dose of 10 mg of midodrine, which she takes at home but was on hold due to high blood pressure    Review of Systems   All other systems reviewed and are negative.       All pertinent negatives and positives are as above. All other systems have been reviewed and are negative unless otherwise stated.     Prior to Admission medications    Medication Sig Start Date End Date Taking? Authorizing Provider   albuterol (PROVENTIL) (2.5 MG/3ML) 0.083% nebulizer solution Take 2.5 mg by nebulization Every 4 (Four) Hours As Needed for Wheezing.   Yes Miguelito Chatman MD   ALBUTEROL SULFATE HFA IN Inhale 2 puffs Every 4 (Four) Hours As Needed.   Yes Miguelito Chatman MD   budesonide-formoterol (SYMBICORT) 160-4.5 MCG/ACT inhaler Inhale 2 puffs 2 (Two) Times a Day As Needed.   Yes Miguelito Chatman MD   furosemide (LASIX) 20 MG tablet Take 1 tablet by mouth Daily. 6/12/20  Yes Jacki Polanco APRN   gabapentin (NEURONTIN) 800 MG tablet Take 800 mg by mouth 3 (Three) Times a Day.   Yes ProviderMiguelito MD   midodrine (PROAMATINE) 10 MG tablet Take 1 tablet by mouth 3 (Three) Times a Day Before Meals.  Patient taking differently: Take 1 tablet by mouth Daily. 2/24/22  Yes Hunter Pacheco, DO   O2 (OXYGEN) Inhale 4 L/min As Needed (shortness of air). 9/6/21  Yes  ProviderMiguelito MD   ondansetron (ZOFRAN) 4 MG tablet Take 4 mg by mouth Every 8 (Eight) Hours As Needed for Nausea or Vomiting.   Yes Miguelito Chatman MD   pantoprazole (PROTONIX) 40 MG EC tablet Take 1 tablet by mouth Daily. 2/5/18  Yes Mary Shen APRN   pravastatin (PRAVACHOL) 20 MG tablet Take 1 tablet by mouth Every Night. 5/2/22  Yes Jacki Polanco APRN   rivaroxaban (Xarelto) 20 MG tablet Take 1 tablet by mouth Daily With Dinner. 5/2/22  Yes Jacki Polanco APRN   sertraline (ZOLOFT) 100 MG tablet Take 100 mg by mouth Daily.   Yes Miguelito Chatman MD   traZODone (DESYREL) 150 MG tablet Take 1 tablet by mouth Every Night. 2/26/19  Yes Donn Triana MD   vitamin D (ERGOCALCIFEROL) 1.25 MG (93310 UT) capsule capsule Take 50,000 Units by mouth 1 (One) Time Per Week.   Yes Miguelito Chatman MD   amitriptyline (ELAVIL) 25 MG tablet Take 25 mg by mouth Every Night. 5/8/20   Miguelito Chatman MD   cilostazol (PLETAL) 100 MG tablet Take 1 tablet by mouth 2 (Two) Times a Day Before Meals. 5/2/22 5/2/23  Jacki Polanco APRN   clopidogrel (PLAVIX) 75 MG tablet Take 1 tablet by mouth Daily. 5/2/22   Jacki Polanco APRN   penciclovir (DENAVIR) 1 % cream Apply 1 application topically to the appropriate area as directed As Needed (fever blisters).    ProviderMiguelito MD       amitriptyline, 25 mg, Oral, Nightly  budesonide-formoterol, 2 puff, Inhalation, BID - RT  cilostazol, 100 mg, Oral, BID AC  clopidogrel, 75 mg, Oral, Daily  gabapentin, 400 mg, Oral, Q8H  ipratropium-albuterol, 3 mL, Nebulization, Q4H While Awake  meropenem, 1 g, Intravenous, Q8H  methylPREDNISolone sodium succinate, 80 mg, Intravenous, Q6H  pantoprazole, 40 mg, Oral, Daily  pravastatin, 20 mg, Oral, Nightly  rivaroxaban, 20 mg, Oral, Daily With Dinner  sertraline, 100 mg, Oral, Daily  sodium chloride, 10 mL, Intravenous, Q12H  sodium chloride, 10 mL, Intravenous, Q12H  sodium chloride, 10 mL,  Intravenous, Q12H  sodium chloride, 10 mL, Intravenous, Q12H  traZODone, 150 mg, Oral, Nightly           Objective    Temp:  [97.7 °F (36.5 °C)-98.2 °F (36.8 °C)] 98 °F (36.7 °C)  Heart Rate:  [] 56  Resp:  [16-18] 18  BP: ()/(44-60) 131/58    Physical Exam  Constitutional:       Appearance: She is ill-appearing.      Comments: She has no complains; cough persists when she takes deep breaths   HENT:      Head: Normocephalic.      Nose: Nose normal.      Mouth/Throat:      Mouth: Mucous membranes are moist.   Eyes:      Extraocular Movements: Extraocular movements intact.   Cardiovascular:      Rate and Rhythm: Normal rate and regular rhythm.      Heart sounds: Normal heart sounds.   Pulmonary:      Effort: Respiratory distress present.      Breath sounds: Rales present.      Comments: cough  Abdominal:      General: Bowel sounds are normal.      Palpations: Abdomen is soft.   Musculoskeletal:         General: Normal range of motion.      Cervical back: Neck supple.   Skin:     General: Skin is warm.   Neurological:      General: No focal deficit present.      Mental Status: She is alert. Mental status is at baseline.   Psychiatric:         Mood and Affect: Mood normal.         Behavior: Behavior normal.         Results Review:  I have reviewed the labs, radiology results, and diagnostic studies.    Laboratory Data:   Results from last 7 days   Lab Units 11/04/22  0235 11/03/22  2326 11/03/22  0638 11/02/22  0350 11/01/22  1705   SODIUM mmol/L 139 138 137   < > 138   POTASSIUM mmol/L 4.0 3.9 4.1   < > 3.5   CHLORIDE mmol/L 101 99 99   < > 100   CO2 mmol/L 30.0* 27.0 30.0*   < > 28.0   BUN mg/dL 19 19 17   < > 6*   CREATININE mg/dL 0.71 0.72 0.62   < > 0.71   GLUCOSE mg/dL 147* 152* 135*   < > 105*   CALCIUM mg/dL 8.5* 8.7 9.3   < > 8.7   BILIRUBIN mg/dL  --  <0.2  --   --  0.4   ALK PHOS U/L  --  78  --   --  99   ALT (SGPT) U/L  --  7  --   --  6   AST (SGOT) U/L  --  14  --   --  11   ANION GAP mmol/L  8.0 12.0 8.0   < > 10.0    < > = values in this interval not displayed.     Estimated Creatinine Clearance: 68.9 mL/min (by C-G formula based on SCr of 0.71 mg/dL).  Results from last 7 days   Lab Units 11/03/22  0638   MAGNESIUM mg/dL 1.6         Results from last 7 days   Lab Units 11/04/22  0235 11/03/22  2326 11/03/22  0638 11/02/22  0350 11/01/22  1705   WBC 10*3/mm3 20.43* 19.25* 16.95* 15.95* 18.54*   HEMOGLOBIN g/dL 10.3* 10.3* 10.9* 11.4* 11.7*   HEMATOCRIT % 31.7* 32.0* 33.9* 34.9 36.0   PLATELETS 10*3/mm3 313 281 302 267 283     Results from last 7 days   Lab Units 11/01/22  1705   INR  1.33*       Culture Data:   Blood Culture   Date Value Ref Range Status   11/02/2022 No growth at 2 days  Preliminary   11/02/2022 No growth at 2 days  Preliminary     No results found for: URINECX  No results found for: RESPCX  No results found for: WOUNDCX  No results found for: STOOLCX  No components found for: BODYFLD    Radiology Data:   Imaging Results (Last 24 Hours)     Procedure Component Value Units Date/Time    CT Angiogram Chest [[id number]] Collected: 11/03/22 1105     Updated: 11/03/22 1204    Narrative:      EXAM: CTA Chest      COMPARISON: CT February 24, 2019. Chest x-ray, November 2, 2022.     HISTORY: Pulmonary embolism (PE) suspected, unknown D-dimer,  I21.3 ST elevation (STEMI) myocardial infarction of unspecified  site J44.1 Chronic obstructive pulmonary disease with (acute)  exacerbation Z74.09 Other reduced mobility    TECHNIQUE: Multiple contiguous noncontrast axial images are  obtained.    This exam was performed according to our departmental dose  optimization program, which includes automated exposure control,  adjustment of the mA and/or KV according to patient size and/or  use of iterative reconstruction technique.    Reconstructed MIP images were obtained in multiple obliquities.  No 3-D surface rendered images were obtained for this exam.  Computer generated 3-D reconstructions/MIPS were  performed     FINDINGS:    Thyroid: Normal    Esophagus: normal    Aorta: Atherosclerotic calcifications. Occluded left subclavian  artery    Pulmonary arteries: No visualized pulmonary embolus.     Adenopathy: None    Heart: Enlarged.    Lungs: Bibasilar dependent consolidation. Mild septal thickening.  Subpleural reticulation. Right middle lobe and lingular  consolidation and scattered groundglass opacities. No significant  effusion. Motion artifact.    Chest wall: Normal    Upper abdomen: Bilateral adrenal thickening. Bilateral renal  vascular calcifications     Bones: Multiple subacute to chronic left rib fractures. 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 Left shoulder arthroplasty.  Irregularity of the sternum on the lateral view may be due to  motion or nondisplaced fracture.      Impression:        Bibasilar dependent opacities suggests aspiration.    Mild superimposed edema.    No evidence of pulmonary embolus.     Electronically signed by:  Pascual Canela DO  11/3/2022 12:01 PM  CDT Workstation: [id number]          I have reviewed the patient's current medications.     Assessment/Plan     Bibasilar aspiration PNA (POA)     hard to know if she presented an allergic reaction to PNC, she only presented hypotension, no other symptoms. To be on the safe side, will discontinue. She responded well to NS total 1L.      We changed the antibiotics to meropenen after discussing it with pharmacy     Lactic acid normalized now; expect her to improve from now on    Acute respiratory failure with hypoxemia     In the setting of home oxygen 2 L; on 10 L now     Due to above; wean as able      COPD AE     On bronchodilators and iv and inhaled steroids; monitor    EKG changes     Patient was taken to the cath lab and no PCI done, likely non flow CAD disease    Tobacco use disorder     Advised her to stop smoking    Physical deconditioning     PT and OT on the case    Chronic medical problems     Essential hypertension      Peripheral vascular disease      Hyperlipidemia     DAMION     Chronic recurrent depression      Disposition: likely home with C in 3 to 4 days    Danish Flores MD

## 2022-11-04 NOTE — PLAN OF CARE
Goal Outcome Evaluation:  Plan of Care Reviewed With: patient        Progress: no change   At 1928 BP read 76/53.  notified. NS bolus and ordered to hold Amitriptyline, Gabapentin, and Trazadone. After bolus BP read 88/52. BP dropped back to 72/48. Bps were taken in the left arm, and doppler read systolic of 70. Dr. Behroozi notified. NS bolus and midodrine given. Pt remained non symptomatic. Rechecked BP in left leg and right forearm, and read normal BP. Labs ordered. Lactate resulted at 3.1. Dr. Behroozi made aware. Will cont with pt care.

## 2022-11-04 NOTE — PROGRESS NOTES
Nutrition Services    Patient Name:  Moan Perales  YOB: 1950  MRN: [id number]  Admit Date:  11/1/2022    Pt currently eating well with intake ~75% average.  She is drinking milk with all meals.  continues to be managed for Bibasilar Aspiration Pneumonia &  COPD exacerbation.  EKG changes noted--pt went to the cath lab--no PCI done.  RD provided menu suggestions and alternatives.  Rd will continue to monitor po intake and POC.      Meds: Elavil; Solumedrol; Protonix; Xarelto;  Labs: Gluc 147; Alb 2.90; Crp 12.60      Electronically signed by:  Jessy Krishnan RD  11/04/22 15:28 CDT

## 2022-11-05 LAB
ALBUMIN SERPL-MCNC: 3.1 G/DL (ref 3.5–5.2)
ALBUMIN/GLOB SERPL: 0.9 G/DL
ALP SERPL-CCNC: 81 U/L (ref 39–117)
ALT SERPL W P-5'-P-CCNC: 10 U/L (ref 1–33)
ANION GAP SERPL CALCULATED.3IONS-SCNC: 6 MMOL/L (ref 5–15)
AST SERPL-CCNC: 15 U/L (ref 1–32)
BILIRUB SERPL-MCNC: <0.2 MG/DL (ref 0–1.2)
BUN SERPL-MCNC: 19 MG/DL (ref 8–23)
BUN/CREAT SERPL: 28.4 (ref 7–25)
CALCIUM SPEC-SCNC: 9.1 MG/DL (ref 8.6–10.5)
CHLORIDE SERPL-SCNC: 103 MMOL/L (ref 98–107)
CO2 SERPL-SCNC: 31 MMOL/L (ref 22–29)
CREAT SERPL-MCNC: 0.67 MG/DL (ref 0.57–1)
DEPRECATED RDW RBC AUTO: 43.9 FL (ref 37–54)
EGFRCR SERPLBLD CKD-EPI 2021: 94.2 ML/MIN/1.73
ERYTHROCYTE [DISTWIDTH] IN BLOOD BY AUTOMATED COUNT: 13.4 % (ref 12.3–15.4)
GLOBULIN UR ELPH-MCNC: 3.3 GM/DL
GLUCOSE SERPL-MCNC: 143 MG/DL (ref 65–99)
HCT VFR BLD AUTO: 33.9 % (ref 34–46.6)
HGB BLD-MCNC: 11.1 G/DL (ref 12–15.9)
MAGNESIUM SERPL-MCNC: 1.9 MG/DL (ref 1.6–2.4)
MCH RBC QN AUTO: 29.8 PG (ref 26.6–33)
MCHC RBC AUTO-ENTMCNC: 32.7 G/DL (ref 31.5–35.7)
MCV RBC AUTO: 90.9 FL (ref 79–97)
PLATELET # BLD AUTO: 320 10*3/MM3 (ref 140–450)
PMV BLD AUTO: 9.3 FL (ref 6–12)
POTASSIUM SERPL-SCNC: 5.1 MMOL/L (ref 3.5–5.2)
PROT SERPL-MCNC: 6.4 G/DL (ref 6–8.5)
RBC # BLD AUTO: 3.73 10*6/MM3 (ref 3.77–5.28)
SODIUM SERPL-SCNC: 140 MMOL/L (ref 136–145)
WBC NRBC COR # BLD: 16.52 10*3/MM3 (ref 3.4–10.8)

## 2022-11-05 PROCEDURE — 94799 UNLISTED PULMONARY SVC/PX: CPT

## 2022-11-05 PROCEDURE — 25010000002 METHYLPREDNISOLONE PER 125 MG: Performed by: INTERNAL MEDICINE

## 2022-11-05 PROCEDURE — 25010000002 MEROPENEM PER 100 MG: Performed by: INTERNAL MEDICINE

## 2022-11-05 PROCEDURE — 83735 ASSAY OF MAGNESIUM: CPT | Performed by: INTERNAL MEDICINE

## 2022-11-05 PROCEDURE — 85027 COMPLETE CBC AUTOMATED: CPT | Performed by: INTERNAL MEDICINE

## 2022-11-05 PROCEDURE — 94760 N-INVAS EAR/PLS OXIMETRY 1: CPT

## 2022-11-05 PROCEDURE — 80053 COMPREHEN METABOLIC PANEL: CPT | Performed by: INTERNAL MEDICINE

## 2022-11-05 RX ORDER — OXYCODONE HYDROCHLORIDE AND ACETAMINOPHEN 5; 325 MG/1; MG/1
2 TABLET ORAL EVERY 6 HOURS PRN
Status: DISCONTINUED | OUTPATIENT
Start: 2022-11-05 | End: 2022-11-17 | Stop reason: HOSPADM

## 2022-11-05 RX ORDER — METHYLPREDNISOLONE SODIUM SUCCINATE 125 MG/2ML
60 INJECTION, POWDER, LYOPHILIZED, FOR SOLUTION INTRAMUSCULAR; INTRAVENOUS EVERY 8 HOURS
Status: DISCONTINUED | OUTPATIENT
Start: 2022-11-05 | End: 2022-11-06

## 2022-11-05 RX ADMIN — Medication 10 ML: at 08:27

## 2022-11-05 RX ADMIN — METHYLPREDNISOLONE SODIUM SUCCINATE 80 MG: 125 INJECTION, POWDER, FOR SOLUTION INTRAMUSCULAR; INTRAVENOUS at 06:43

## 2022-11-05 RX ADMIN — MEROPENEM 1 G: 1 INJECTION, POWDER, FOR SOLUTION INTRAVENOUS at 20:32

## 2022-11-05 RX ADMIN — BENZONATATE 200 MG: 100 CAPSULE ORAL at 06:09

## 2022-11-05 RX ADMIN — IPRATROPIUM BROMIDE AND ALBUTEROL SULFATE 3 ML: 2.5; .5 SOLUTION RESPIRATORY (INHALATION) at 14:40

## 2022-11-05 RX ADMIN — Medication 10 ML: at 20:34

## 2022-11-05 RX ADMIN — SERTRALINE HYDROCHLORIDE 100 MG: 50 TABLET ORAL at 08:19

## 2022-11-05 RX ADMIN — MEROPENEM 1 G: 1 INJECTION, POWDER, FOR SOLUTION INTRAVENOUS at 11:03

## 2022-11-05 RX ADMIN — BUDESONIDE AND FORMOTEROL FUMARATE DIHYDRATE 2 PUFF: 160; 4.5 AEROSOL RESPIRATORY (INHALATION) at 19:24

## 2022-11-05 RX ADMIN — METHYLPREDNISOLONE SODIUM SUCCINATE 80 MG: 125 INJECTION, POWDER, FOR SOLUTION INTRAMUSCULAR; INTRAVENOUS at 02:13

## 2022-11-05 RX ADMIN — PANTOPRAZOLE SODIUM 40 MG: 40 TABLET, DELAYED RELEASE ORAL at 08:19

## 2022-11-05 RX ADMIN — MEROPENEM 1 G: 1 INJECTION, POWDER, FOR SOLUTION INTRAVENOUS at 05:13

## 2022-11-05 RX ADMIN — GABAPENTIN 400 MG: 400 CAPSULE ORAL at 22:38

## 2022-11-05 RX ADMIN — PRAVASTATIN SODIUM 20 MG: 20 TABLET ORAL at 20:32

## 2022-11-05 RX ADMIN — CILOSTAZOL 100 MG: 100 TABLET ORAL at 17:09

## 2022-11-05 RX ADMIN — CLOPIDOGREL BISULFATE 75 MG: 75 TABLET ORAL at 08:19

## 2022-11-05 RX ADMIN — Medication 10 ML: at 22:39

## 2022-11-05 RX ADMIN — METHYLPREDNISOLONE SODIUM SUCCINATE 60 MG: 125 INJECTION, POWDER, FOR SOLUTION INTRAMUSCULAR; INTRAVENOUS at 15:13

## 2022-11-05 RX ADMIN — CILOSTAZOL 100 MG: 100 TABLET ORAL at 08:19

## 2022-11-05 RX ADMIN — IPRATROPIUM BROMIDE AND ALBUTEROL SULFATE 3 ML: 2.5; .5 SOLUTION RESPIRATORY (INHALATION) at 19:24

## 2022-11-05 RX ADMIN — Medication 10 ML: at 20:33

## 2022-11-05 RX ADMIN — METHYLPREDNISOLONE SODIUM SUCCINATE 60 MG: 125 INJECTION, POWDER, FOR SOLUTION INTRAMUSCULAR; INTRAVENOUS at 22:38

## 2022-11-05 RX ADMIN — IPRATROPIUM BROMIDE AND ALBUTEROL SULFATE 3 ML: 2.5; .5 SOLUTION RESPIRATORY (INHALATION) at 10:34

## 2022-11-05 RX ADMIN — RIVAROXABAN 20 MG: 10 TABLET, FILM COATED ORAL at 17:09

## 2022-11-05 RX ADMIN — OXYCODONE HYDROCHLORIDE AND ACETAMINOPHEN 1 TABLET: 5; 325 TABLET ORAL at 08:25

## 2022-11-05 RX ADMIN — GABAPENTIN 400 MG: 400 CAPSULE ORAL at 06:53

## 2022-11-05 RX ADMIN — Medication 10 ML: at 08:19

## 2022-11-05 RX ADMIN — AMITRIPTYLINE HYDROCHLORIDE 25 MG: 25 TABLET, FILM COATED ORAL at 20:32

## 2022-11-05 RX ADMIN — IPRATROPIUM BROMIDE AND ALBUTEROL SULFATE 3 ML: 2.5; .5 SOLUTION RESPIRATORY (INHALATION) at 07:21

## 2022-11-05 RX ADMIN — OXYCODONE HYDROCHLORIDE AND ACETAMINOPHEN 2 TABLET: 5; 325 TABLET ORAL at 20:39

## 2022-11-05 RX ADMIN — TRAZODONE HYDROCHLORIDE 150 MG: 100 TABLET ORAL at 20:32

## 2022-11-05 RX ADMIN — GABAPENTIN 400 MG: 400 CAPSULE ORAL at 15:13

## 2022-11-05 NOTE — SIGNIFICANT NOTE
"   11/05/22 1519   OTHER   Discipline physical therapy assistant   Rehab Time/Intention   Session Not Performed patient/family declined treatment  (checked on pt twice: In AM she states she was up in chair and just returned to bed, in PM she states she already worked w/ \"the other therapy.\"  Explained to pt there are 2 therapy disciplines that would work w/ her, pt defers today)     "

## 2022-11-05 NOTE — PROGRESS NOTES
[facility] Medicine Services  INPATIENT PROGRESS NOTE    Length of Stay: 4  Date of Admission: 11/1/2022  Primary Care Physician: Anahi Camacho APRN    Subjective   Patient seen and evaluated today, reports pleuritic chest pain and pain on coughing.  She is afebrile and normotensive, however requiring high flow oxygen 6 L today wean down from 9 L yesterday.    Objective    Temp:  [97.3 °F (36.3 °C)-98.3 °F (36.8 °C)] 97.8 °F (36.6 °C)  Heart Rate:  [] 72  Resp:  [16-18] 18  BP: ()/(54-66) 99/55    Physical Exam:   Temp:  [97.3 °F (36.3 °C)-98.3 °F (36.8 °C)] 97.8 °F (36.6 °C)  Heart Rate:  [] 72  Resp:  [16-18] 18  BP: ()/(54-66) 99/55  Physical Exam  General: NC/AT, appears stated age, alert and oriented x3 on high flow oxygen.  HEENT: PERRLA, EOMI, no scleral icterus, no conjunctival injection,   NECK:  Neck is supple, no JVD, no supraclavicular lymphadenopathy  CV: S1 S2 RRR, no murmurs, no gallops, no heaves, pulses palpable.  LUNG: Fine bibasilar Rales  ABD: Nondistended, nontender, bowel sounds present, no guarding or rebound, organomegaly not appreciated.   EXT: FROM, strength is intact, no pitting edema, no joint effusion, no calf tenderness.  Pulses palpable  Neuro: CN 2-12, grossly intact,no  focal weakness appreciated  Skin: Warm and dry, no rash or lesions.      Results Review:  I have reviewed the labs, radiology results, and diagnostic studies.    Laboratory Data:   Results from last 7 days   Lab Units 11/05/22  0544 11/04/22  0235 11/03/22  2326 11/02/22  0350 11/01/22  1705   SODIUM mmol/L 140 139 138   < > 138   POTASSIUM mmol/L 5.1 4.0 3.9   < > 3.5   CHLORIDE mmol/L 103 101 99   < > 100   CO2 mmol/L 31.0* 30.0* 27.0   < > 28.0   BUN mg/dL 19 19 19   < > 6*   CREATININE mg/dL 0.67 0.71 0.72   < > 0.71   GLUCOSE mg/dL 143* 147* 152*   < > 105*   CALCIUM mg/dL 9.1 8.5* 8.7   < > 8.7   BILIRUBIN mg/dL <0.2  --  <0.2  --   0.4   ALK PHOS U/L 81  --  78  --  99   ALT (SGPT) U/L 10  --  7  --  6   AST (SGOT) U/L 15  --  14  --  11   ANION GAP mmol/L 6.0 8.0 12.0   < > 10.0    < > = values in this interval not displayed.     Estimated Creatinine Clearance: 74.1 mL/min (by C-G formula based on SCr of 0.67 mg/dL).  Results from last 7 days   Lab Units 11/05/22  0544 11/03/22  0638   MAGNESIUM mg/dL 1.9 1.6         Results from last 7 days   Lab Units 11/05/22  0544 11/04/22  0235 11/03/22  2326 11/03/22  0638 11/02/22  0350   WBC 10*3/mm3 16.52* 20.43* 19.25* 16.95* 15.95*   HEMOGLOBIN g/dL 11.1* 10.3* 10.3* 10.9* 11.4*   HEMATOCRIT % 33.9* 31.7* 32.0* 33.9* 34.9   PLATELETS 10*3/mm3 320 313 281 302 267     Results from last 7 days   Lab Units 11/01/22  1705   INR  1.33*       Culture Data:   No results found for: BLOODCX  No results found for: URINECX  No results found for: RESPCX  No results found for: WOUNDCX  No results found for: STOOLCX  No components found for: BODYFLD    Radiology Data:   Imaging Results (Last 24 Hours)     ** No results found for the last 24 hours. **          I have reviewed the patient's current medications.     Assessment/Plan     Active Hospital Problems    Diagnosis    • COPD with exacerbation (HCC)    Acute on chronic respiratory failure with hypoxia  PVD  Leukocytosis  Chronic back pain  Paroxysmal A. fib  COPD exacerbation          11/05/2022  Plan:    -Continue oxygen supplementation, currently on high flow 6 L, wean as tolerated.  - Continue breathing treatments with DuoNeb and budesonide treatment  - Continue HCAP coverage with meropenem.  Blood cultures negative over 3 days.  - Continue Solu-Medrol 60 mg IV every 8 hours  - Continue anticoagulation with Xarelto  - Continue treatment plan for management of comorbid condition    Disposition pending patient's continued improvement with decreasing oxygen requirement and returned to baseline.          Jolanta Stanley MD

## 2022-11-05 NOTE — PLAN OF CARE
Problem: Fall Injury Risk  Goal: Absence of Fall and Fall-Related Injury  11/5/2022 0349 by Marnie Cano RN  Outcome: Ongoing, Progressing   Goal Outcome Evaluation:           Progress: improving  Outcome Evaluation: Pt rested well overnight; ABX administered; no complaints; VSS

## 2022-11-06 LAB
ANION GAP SERPL CALCULATED.3IONS-SCNC: 8 MMOL/L (ref 5–15)
BACTERIA SPEC AEROBE CULT: NORMAL
BACTERIA SPEC AEROBE CULT: NORMAL
BASOPHILS # BLD AUTO: 0.02 10*3/MM3 (ref 0–0.2)
BASOPHILS NFR BLD AUTO: 0.1 % (ref 0–1.5)
BUN SERPL-MCNC: 23 MG/DL (ref 8–23)
BUN/CREAT SERPL: 35.4 (ref 7–25)
CALCIUM SPEC-SCNC: 8.7 MG/DL (ref 8.6–10.5)
CHLORIDE SERPL-SCNC: 103 MMOL/L (ref 98–107)
CO2 SERPL-SCNC: 30 MMOL/L (ref 22–29)
CREAT SERPL-MCNC: 0.65 MG/DL (ref 0.57–1)
DEPRECATED RDW RBC AUTO: 46 FL (ref 37–54)
EGFRCR SERPLBLD CKD-EPI 2021: 94.9 ML/MIN/1.73
EOSINOPHIL # BLD AUTO: 0 10*3/MM3 (ref 0–0.4)
EOSINOPHIL NFR BLD AUTO: 0 % (ref 0.3–6.2)
ERYTHROCYTE [DISTWIDTH] IN BLOOD BY AUTOMATED COUNT: 13.5 % (ref 12.3–15.4)
GLUCOSE SERPL-MCNC: 132 MG/DL (ref 65–99)
HCT VFR BLD AUTO: 33.9 % (ref 34–46.6)
HGB BLD-MCNC: 10.9 G/DL (ref 12–15.9)
IMM GRANULOCYTES # BLD AUTO: 0.2 10*3/MM3 (ref 0–0.05)
IMM GRANULOCYTES NFR BLD AUTO: 1.4 % (ref 0–0.5)
LYMPHOCYTES # BLD AUTO: 1.1 10*3/MM3 (ref 0.7–3.1)
LYMPHOCYTES NFR BLD AUTO: 7.8 % (ref 19.6–45.3)
MCH RBC QN AUTO: 30.1 PG (ref 26.6–33)
MCHC RBC AUTO-ENTMCNC: 32.2 G/DL (ref 31.5–35.7)
MCV RBC AUTO: 93.6 FL (ref 79–97)
MONOCYTES # BLD AUTO: 0.54 10*3/MM3 (ref 0.1–0.9)
MONOCYTES NFR BLD AUTO: 3.8 % (ref 5–12)
NEUTROPHILS NFR BLD AUTO: 12.18 10*3/MM3 (ref 1.7–7)
NEUTROPHILS NFR BLD AUTO: 86.9 % (ref 42.7–76)
NRBC BLD AUTO-RTO: 0 /100 WBC (ref 0–0.2)
PLATELET # BLD AUTO: 339 10*3/MM3 (ref 140–450)
PMV BLD AUTO: 9.8 FL (ref 6–12)
POTASSIUM SERPL-SCNC: 5.1 MMOL/L (ref 3.5–5.2)
RBC # BLD AUTO: 3.62 10*6/MM3 (ref 3.77–5.28)
SODIUM SERPL-SCNC: 141 MMOL/L (ref 136–145)
WBC NRBC COR # BLD: 14.04 10*3/MM3 (ref 3.4–10.8)

## 2022-11-06 PROCEDURE — 94799 UNLISTED PULMONARY SVC/PX: CPT

## 2022-11-06 PROCEDURE — 25010000002 METHYLPREDNISOLONE PER 125 MG: Performed by: INTERNAL MEDICINE

## 2022-11-06 PROCEDURE — 25010000002 FUROSEMIDE PER 20 MG: Performed by: INTERNAL MEDICINE

## 2022-11-06 PROCEDURE — 25010000002 MEROPENEM PER 100 MG: Performed by: INTERNAL MEDICINE

## 2022-11-06 PROCEDURE — 25010000002 METHYLPREDNISOLONE PER 40 MG: Performed by: INTERNAL MEDICINE

## 2022-11-06 PROCEDURE — 94760 N-INVAS EAR/PLS OXIMETRY 1: CPT

## 2022-11-06 PROCEDURE — 80048 BASIC METABOLIC PNL TOTAL CA: CPT | Performed by: INTERNAL MEDICINE

## 2022-11-06 PROCEDURE — 97110 THERAPEUTIC EXERCISES: CPT

## 2022-11-06 PROCEDURE — 85025 COMPLETE CBC W/AUTO DIFF WBC: CPT | Performed by: INTERNAL MEDICINE

## 2022-11-06 PROCEDURE — 97116 GAIT TRAINING THERAPY: CPT

## 2022-11-06 RX ORDER — GUAIFENESIN 600 MG/1
600 TABLET, EXTENDED RELEASE ORAL EVERY 12 HOURS SCHEDULED
Status: DISCONTINUED | OUTPATIENT
Start: 2022-11-06 | End: 2022-11-17 | Stop reason: HOSPADM

## 2022-11-06 RX ORDER — FUROSEMIDE 10 MG/ML
40 INJECTION INTRAMUSCULAR; INTRAVENOUS DAILY
Status: DISCONTINUED | OUTPATIENT
Start: 2022-11-06 | End: 2022-11-10

## 2022-11-06 RX ORDER — METHYLPREDNISOLONE SODIUM SUCCINATE 40 MG/ML
40 INJECTION, POWDER, LYOPHILIZED, FOR SOLUTION INTRAMUSCULAR; INTRAVENOUS EVERY 8 HOURS
Status: COMPLETED | OUTPATIENT
Start: 2022-11-06 | End: 2022-11-08

## 2022-11-06 RX ORDER — POLYETHYLENE GLYCOL 3350 17 G/17G
17 POWDER, FOR SOLUTION ORAL DAILY
Status: DISCONTINUED | OUTPATIENT
Start: 2022-11-07 | End: 2022-11-17 | Stop reason: HOSPADM

## 2022-11-06 RX ORDER — AMOXICILLIN 250 MG
2 CAPSULE ORAL 2 TIMES DAILY PRN
Status: DISCONTINUED | OUTPATIENT
Start: 2022-11-06 | End: 2022-11-17 | Stop reason: HOSPADM

## 2022-11-06 RX ORDER — CETIRIZINE HYDROCHLORIDE 10 MG/1
10 TABLET ORAL DAILY
Status: DISCONTINUED | OUTPATIENT
Start: 2022-11-06 | End: 2022-11-17 | Stop reason: HOSPADM

## 2022-11-06 RX ADMIN — PRAVASTATIN SODIUM 20 MG: 20 TABLET ORAL at 20:57

## 2022-11-06 RX ADMIN — BENZONATATE 200 MG: 100 CAPSULE ORAL at 06:37

## 2022-11-06 RX ADMIN — BUDESONIDE AND FORMOTEROL FUMARATE DIHYDRATE 2 PUFF: 160; 4.5 AEROSOL RESPIRATORY (INHALATION) at 07:01

## 2022-11-06 RX ADMIN — METHYLPREDNISOLONE SODIUM SUCCINATE 40 MG: 40 INJECTION, POWDER, FOR SOLUTION INTRAMUSCULAR; INTRAVENOUS at 23:04

## 2022-11-06 RX ADMIN — TRAZODONE HYDROCHLORIDE 150 MG: 100 TABLET ORAL at 21:04

## 2022-11-06 RX ADMIN — MEROPENEM 1 G: 1 INJECTION, POWDER, FOR SOLUTION INTRAVENOUS at 20:13

## 2022-11-06 RX ADMIN — Medication 10 ML: at 23:04

## 2022-11-06 RX ADMIN — GUAIFENESIN 600 MG: 600 TABLET, EXTENDED RELEASE ORAL at 11:53

## 2022-11-06 RX ADMIN — CILOSTAZOL 100 MG: 100 TABLET ORAL at 17:13

## 2022-11-06 RX ADMIN — GABAPENTIN 400 MG: 400 CAPSULE ORAL at 06:24

## 2022-11-06 RX ADMIN — PANTOPRAZOLE SODIUM 40 MG: 40 TABLET, DELAYED RELEASE ORAL at 08:24

## 2022-11-06 RX ADMIN — METHYLPREDNISOLONE SODIUM SUCCINATE 40 MG: 40 INJECTION, POWDER, FOR SOLUTION INTRAMUSCULAR; INTRAVENOUS at 15:44

## 2022-11-06 RX ADMIN — MEROPENEM 1 G: 1 INJECTION, POWDER, FOR SOLUTION INTRAVENOUS at 11:02

## 2022-11-06 RX ADMIN — OXYCODONE HYDROCHLORIDE AND ACETAMINOPHEN 2 TABLET: 5; 325 TABLET ORAL at 11:53

## 2022-11-06 RX ADMIN — IPRATROPIUM BROMIDE AND ALBUTEROL SULFATE 3 ML: 2.5; .5 SOLUTION RESPIRATORY (INHALATION) at 14:37

## 2022-11-06 RX ADMIN — Medication 10 ML: at 23:05

## 2022-11-06 RX ADMIN — OXYCODONE HYDROCHLORIDE AND ACETAMINOPHEN 2 TABLET: 5; 325 TABLET ORAL at 05:12

## 2022-11-06 RX ADMIN — CLOPIDOGREL BISULFATE 75 MG: 75 TABLET ORAL at 08:24

## 2022-11-06 RX ADMIN — METHYLPREDNISOLONE SODIUM SUCCINATE 60 MG: 125 INJECTION, POWDER, FOR SOLUTION INTRAMUSCULAR; INTRAVENOUS at 06:24

## 2022-11-06 RX ADMIN — GUAIFENESIN 600 MG: 600 TABLET, EXTENDED RELEASE ORAL at 20:57

## 2022-11-06 RX ADMIN — MEROPENEM 1 G: 1 INJECTION, POWDER, FOR SOLUTION INTRAVENOUS at 05:12

## 2022-11-06 RX ADMIN — FUROSEMIDE 40 MG: 10 INJECTION, SOLUTION INTRAMUSCULAR; INTRAVENOUS at 13:37

## 2022-11-06 RX ADMIN — GABAPENTIN 400 MG: 400 CAPSULE ORAL at 22:29

## 2022-11-06 RX ADMIN — IPRATROPIUM BROMIDE AND ALBUTEROL SULFATE 3 ML: 2.5; .5 SOLUTION RESPIRATORY (INHALATION) at 10:41

## 2022-11-06 RX ADMIN — BUDESONIDE AND FORMOTEROL FUMARATE DIHYDRATE 2 PUFF: 160; 4.5 AEROSOL RESPIRATORY (INHALATION) at 20:14

## 2022-11-06 RX ADMIN — GABAPENTIN 400 MG: 400 CAPSULE ORAL at 15:44

## 2022-11-06 RX ADMIN — MAGNESIUM HYDROXIDE 10 ML: 2400 SUSPENSION ORAL at 21:08

## 2022-11-06 RX ADMIN — IPRATROPIUM BROMIDE AND ALBUTEROL SULFATE 3 ML: 2.5; .5 SOLUTION RESPIRATORY (INHALATION) at 23:08

## 2022-11-06 RX ADMIN — IPRATROPIUM BROMIDE AND ALBUTEROL SULFATE 3 ML: 2.5; .5 SOLUTION RESPIRATORY (INHALATION) at 20:14

## 2022-11-06 RX ADMIN — Medication 10 ML: at 20:58

## 2022-11-06 RX ADMIN — RIVAROXABAN 20 MG: 10 TABLET, FILM COATED ORAL at 17:13

## 2022-11-06 RX ADMIN — SERTRALINE HYDROCHLORIDE 100 MG: 50 TABLET ORAL at 08:24

## 2022-11-06 RX ADMIN — CILOSTAZOL 100 MG: 100 TABLET ORAL at 08:24

## 2022-11-06 RX ADMIN — IPRATROPIUM BROMIDE AND ALBUTEROL SULFATE 3 ML: 2.5; .5 SOLUTION RESPIRATORY (INHALATION) at 07:02

## 2022-11-06 RX ADMIN — CETIRIZINE HYDROCHLORIDE 10 MG: 10 TABLET, FILM COATED ORAL at 11:53

## 2022-11-06 RX ADMIN — AMITRIPTYLINE HYDROCHLORIDE 25 MG: 25 TABLET, FILM COATED ORAL at 21:08

## 2022-11-06 NOTE — PLAN OF CARE
Goal Outcome Evaluation:           Progress: improving  Outcome Evaluation: Pt resting at this time; no changes overnight; remains on 6 liters of oxygen high flow; VSS

## 2022-11-06 NOTE — PROGRESS NOTES
[facility] Medicine Services  INPATIENT PROGRESS NOTE    Length of Stay: 5  Date of Admission: 11/1/2022  Primary Care Physician: Anahi Camacho APRN    Subjective   Patient seen and evaluated today, reports no issues or concerns. Her oxygen supplementation have been weaned down to 4L from 6L with O2 saturating >92%.    Objective    Temp:  [97 °F (36.1 °C)-97.8 °F (36.6 °C)] 97.6 °F (36.4 °C)  Heart Rate:  [55-90] 63  Resp:  [18] 18  BP: ()/(51-69) 132/66    Physical Exam:   Temp:  [97 °F (36.1 °C)-97.8 °F (36.6 °C)] 97.6 °F (36.4 °C)  Heart Rate:  [55-90] 63  Resp:  [18] 18  BP: ()/(51-69) 132/66  Physical Exam  General: NC/AT, appears stated age, alert and oriented x3 on high flow oxygen.  HEENT: PERRLA, EOMI, no scleral icterus, no conjunctival injection,   NECK:  Neck is supple, no JVD, no supraclavicular lymphadenopathy  CV: S1 S2 RRR, no murmurs, no gallops, no heaves, pulses palpable.  LUNG: Fine bibasilar Rales  ABD: Nondistended, nontender, bowel sounds present, no guarding or rebound, organomegaly not appreciated.   EXT: FROM, strength is intact, no pitting edema, no joint effusion, no calf tenderness.  Pulses palpable  Neuro: CN 2-12, grossly intact,no  focal weakness appreciated  Skin: Warm and dry, no rash or lesions.      Results Review:  I have reviewed the labs, radiology results, and diagnostic studies.    Laboratory Data:   Results from last 7 days   Lab Units 11/06/22  0657 11/05/22  0544 11/04/22  0235 11/03/22  2326 11/02/22  0350 11/01/22  1705   SODIUM mmol/L 141 140 139 138   < > 138   POTASSIUM mmol/L 5.1 5.1 4.0 3.9   < > 3.5   CHLORIDE mmol/L 103 103 101 99   < > 100   CO2 mmol/L 30.0* 31.0* 30.0* 27.0   < > 28.0   BUN mg/dL 23 19 19 19   < > 6*   CREATININE mg/dL 0.65 0.67 0.71 0.72   < > 0.71   GLUCOSE mg/dL 132* 143* 147* 152*   < > 105*   CALCIUM mg/dL 8.7 9.1 8.5* 8.7   < > 8.7   BILIRUBIN mg/dL  --  <0.2  --  <0.2   --  0.4   ALK PHOS U/L  --  81  --  78  --  99   ALT (SGPT) U/L  --  10  --  7  --  6   AST (SGOT) U/L  --  15  --  14  --  11   ANION GAP mmol/L 8.0 6.0 8.0 12.0   < > 10.0    < > = values in this interval not displayed.     Estimated Creatinine Clearance: 69.4 mL/min (by C-G formula based on SCr of 0.65 mg/dL).  Results from last 7 days   Lab Units 11/05/22  0544 11/03/22  0638   MAGNESIUM mg/dL 1.9 1.6         Results from last 7 days   Lab Units 11/06/22  0525 11/05/22  0544 11/04/22  0235 11/03/22  2326 11/03/22  0638   WBC 10*3/mm3 14.04* 16.52* 20.43* 19.25* 16.95*   HEMOGLOBIN g/dL 10.9* 11.1* 10.3* 10.3* 10.9*   HEMATOCRIT % 33.9* 33.9* 31.7* 32.0* 33.9*   PLATELETS 10*3/mm3 339 320 313 281 302     Results from last 7 days   Lab Units 11/01/22  1705   INR  1.33*       Culture Data:   No results found for: BLOODCX  No results found for: URINECX  No results found for: RESPCX  No results found for: WOUNDCX  No results found for: STOOLCX  No components found for: BODYFLD    Radiology Data:   Imaging Results (Last 24 Hours)     ** No results found for the last 24 hours. **          I have reviewed the patient's current medications.     Assessment/Plan     Active Hospital Problems    Diagnosis    • COPD with exacerbation (HCC)    Acute on chronic respiratory failure with hypoxia  PVD  Leukocytosis  Chronic back pain  Paroxysmal A. fib  COPD exacerbation          11/05/2022  Plan:    -Continue oxygen supplementation, currently on high flow 6 L, wean as tolerated.  - Continue breathing treatments with DuoNeb and budesonide treatment  - Continue HCAP coverage with meropenem.  Blood cultures negative over 3 days.  - Continue Solu-Medrol 60 mg IV every 8 hours  - Continue anticoagulation with Xarelto  - Continue treatment plan for management of comorbid condition    _____________________________________________________________________    11/06/2022  Plan:    -Continue oxygen supplementation, currently on high flow 4  L, wean as tolerated.  - Continue breathing treatments with DuoNeb and budesonide treatment  - Continue HCAP coverage with meropenem.  Blood cultures negative  - Continue Solu-Medrol 40 mg IV every 8 hours  - Continue anticoagulation with Xarelto  --Give a dose of Lasix 40mg IV  X1.  --Started on Zyrtec 10mg daily  --Started on Mucinex DM 600mg BID  - Continue treatment plan for management of comorbid condition    Disposition pending patient's continued improvement with decreasing oxygen requirement and returned to baseline.    DVT and GI prophylaxis  Full code      Jolanta Stanley MD

## 2022-11-06 NOTE — THERAPY TREATMENT NOTE
Acute Care - Physical Therapy Treatment Note  [facility]     Patient Name: Mona Perales  : 1950  MRN: [id number]  Today's Date: 2022      Visit Dx:     ICD-10-CM ICD-9-CM   1. ST elevation myocardial infarction (STEMI), unspecified artery ([facility])  I21.3 410.90   2. Chronic obstructive pulmonary disease with acute exacerbation ([facility])  J44.1 491.21   3. Impaired functional mobility, balance, gait, and endurance  Z74.09 V49.89     Patient Active Problem List   Diagnosis   • Anxiety   • CAD (coronary atherosclerotic disease)   • Carotid stenosis   • COPD (chronic obstructive pulmonary disease) ([facility])   • COPD with exacerbation ([facility])   • Depressive disorder, not elsewhere classified   • Benign essential hypertension   • Hypercholesteremia   • Insomnia   • Notalgia   • Osteoporosis   • Other screening mammogram   • RUQ abdominal pain   • PVD (peripheral vascular disease) with claudication ([facility])   • Nicotine abuse   • Dysphagia   • Epigastric pain   • Pain of right hand   • Closed displaced fracture of shaft of fifth metacarpal bone of right hand   • Cause of injury, fall   • Displaced fracture of shaft of fifth metacarpal bone of right hand with routine healing   • Syncope   • Acute respiratory failure with hypoxia ([facility])   • (HFpEF) heart failure with preserved ejection fraction ([facility])   • Hypotension   • Elevated WBCs   • Near syncope   • Atherosclerosis of native coronary artery of native heart without angina pectoris   • Atherosclerosis of native arteries of extremities with rest pain, left leg ([facility])   • PVD (peripheral vascular disease) ([facility])   • Physical deconditioning   • Pain due to onychomycosis of toenails of both feet   • ST elevation myocardial infarction (STEMI), unspecified artery ([facility])     Past Medical History:   Diagnosis Date   • A-fib ([facility])    • Anxiety    • Arthritis    • Asthma    • 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 with ulceration ([facility])     bilateral legs -  bilateral iliac stents 2008      • CHF (congestive heart failure) ([facility])    • Chronic lower back pain    • COPD (chronic obstructive pulmonary disease) ([facility])    • Essential (primary) hypertension    • GERD (gastroesophageal reflux disease)    • History of pulmonary embolus (PE)    • Hyperlipidemia    • Insomnia    • Lumbago    • Nicotine dependence    • Occlusion of artery      and stenosis of bilateral carotid arteries - BABS 16-49%, LICA 0-15%   • Other atherosclerosis of native artery of other extremity     LEFT subclavian stent 2005 (occluded)   • Sleep apnea      Past Surgical History:   Procedure Laterality Date   • CARDIAC CATHETERIZATION  04/06/2016    No evidence of any obstructive epicardial CAD.Preserved LV systolic function with EF of 55%.   • CARDIAC CATHETERIZATION N/A 11/1/2022    Procedure: Left Heart Cath;  Surgeon: Neftali Haywood MD;  Location: [facility] CATH INVASIVE LOCATION;  Service: Cardiology;  Laterality: N/A;   • CENTRAL VENOUS LINE INSERTION  04/07/2016    Successful placement of right uppe extremity 6-Anguillan triple lumen PICC line.   • ENDOSCOPY N/A 1/12/2018    Procedure: ESOPHAGOGASTRODUODENOSCOPY;  Surgeon: Bin Oh MD;  Location: [facility] ENDOSCOPY;  Service:    • ENDOSCOPY N/A 1/17/2018    Procedure: ESOPHAGOGASTRODUODENOSCOPY;  Surgeon: Bin Oh MD;  Location: [facility] ENDOSCOPY;  Service:    • ENDOSCOPY N/A 3/16/2018    Procedure: ESOPHAGOGASTRODUODENOSCOPY possible dilation;  Surgeon: Bin Oh MD;  Location: [facility] ENDOSCOPY;  Service: Gastroenterology   • FEMORAL POPLITEAL BYPASS Left 4/14/2020    Procedure: FEMORAL POPLITEAL BYPASS ABOVE KNEE  (POLYTETRAFLUOROETHYLENE)  LEFT COMMON FEMORAL ARTERY ENDARTERECTOMY PTA LEFT ILIAC ARTERIOGRAM        (c-arm#2 and c-arm table);  Surgeon: David Suh MD;  Location: [facility] OR;  Service: Vascular;  Laterality: Left;   • FEMORAL POPLITEAL BYPASS Right 9/17/2021    Procedure: RIGHT common femoral endarterectomy  FEMORAL POPLITEAL BYPASS (above the knee polytetrafluoroethylene  lower extremity arteriogram              (c-arm#2 and c-arm table);  Surgeon: David Suh MD;  Location: [facility] OR;  Service: Vascular;  Laterality: Right;   • HYSTERECTOMY     • INTERVENTIONAL RADIOLOGY PROCEDURE Left 9/9/2020    Procedure: IR angiogram extremity left;  Surgeon: David Suh MD;  Location: [facility] ANGIO INVASIVE LOCATION;  Service: Interventional Radiology;  Laterality: Left;   • KYPHOPLASTY N/A 5/23/2019    Procedure: KYPHOPLASTY LUMBAR FOUR;  Surgeon: Aba Santa MD;  Location: [facility] OR;  Service: Orthopedic Spine   • TOTAL SHOULDER REPLACEMENT Left    • TRANSESOPHAGEAL ECHOCARDIOGRAM (JACQUES)  04/07/2016    With color flow-Mild to moderate [facility].LV systolic function well preserved with Ef of 55-60%.Mitral and AV intact.Diastolic dysfunction   • UPPER GASTROINTESTINAL ENDOSCOPY  01/17/2018    Dr. Bin Martin M.D.     PT Assessment (last 12 hours)     PT Evaluation and Treatment     Row Name 11/06/22 0918          Physical Therapy Time and Intention    Subjective Information complains of;pain  -JW     Document Type therapy note (daily note)  -     Mode of Treatment physical therapy  -     Row Name 11/06/22 0918          General Information    Patient Profile Reviewed yes  -     Equipment Currently Used at Home shower chair  has RW, but not sure where it is located  -     Existing Precautions/Restrictions fall;oxygen therapy device and L/min  -     Row Name 11/06/22 0918          Home Use of Assistive/Adaptive Equipment    Equipment Currently Used at Home cane, straight;wheelchair;walker, standard  -     Row Name 11/06/22 0918          Pain    Pretreatment Pain Rating 8/10  -     Posttreatment Pain Rating 8/10  -     Pain Location lower  -     Pain Location - back  -     Row Name 11/06/22 0918          Cognition    Orientation Status (Cognition) oriented x 4  -     Row Name  11/06/22 0918          Range of Motion Comprehensive    General Range of Motion --  -     Row Name 11/06/22 0918          Strength Comprehensive (MMT)    General Manual Muscle Testing (MMT) Assessment --  -     Row Name 11/06/22 0918          Bed Mobility    Bed Mobility supine-sit;sit-supine  -     Supine-Sit Burnett (Bed Mobility) standby assist  -     Sit-Supine Burnett (Bed Mobility) standby assist  -     Assistive Device (Bed Mobility) head of bed elevated;bed rails  -     Row Name 11/06/22 0918          Transfers    Transfers toilet transfer  -     Row Name 11/06/22 0918          Bed-Chair Transfer    Bed-Chair Burnett (Transfers) --  -     Assistive Device (Bed-Chair Transfers) --  -     Row Name 11/06/22 0918          Sit-Stand Transfer    Sit-Stand Burnett (Transfers) supervision  -     Assistive Device (Sit-Stand Transfers) walker, front-wheeled  -     Row Name 11/06/22 0918          Stand-Sit Transfer    Stand-Sit Burnett (Transfers) supervision  -     Assistive Device (Stand-Sit Transfers) walker, front-wheeled  -     Row Name 11/06/22 0918          Toilet Transfer    Type (Toilet Transfer) sit-stand;stand-sit  -     Burnett Level (Toilet Transfer) standby assist  -     Assistive Device (Toilet Transfer) commode;walker, front-wheeled  -     Row Name 11/06/22 0918          Gait/Stairs (Locomotion)    Burnett Level (Gait) contact guard;standby assist  -     Assistive Device (Gait) walker, front-wheeled  [facility]     Distance in Feet (Gait) 24', 6' x 2  -     Deviations/Abnormal Patterns (Gait) gait speed decreased;stride length decreased  -     Row Name 11/06/22 0918          Safety Issues, Functional Mobility    Impairments Affecting Function (Mobility) strength;endurance/activity tolerance;pain  -[facility] Name 11/06/22 0918          Hip (Therapeutic Exercise)    Hip (Therapeutic Exercise) isometric exercises  -     Hip AROM  (Therapeutic Exercise) bilateral;flexion;sitting  -     Hip Isometrics (Therapeutic Exercise) bilateral;aDduction;sitting  -     Row Name 11/06/22 0918          Knee (Therapeutic Exercise)    Knee AROM (Therapeutic Exercise) bilateral;LAQ (long arc quad);sitting  -     Row Name 11/06/22 0918          Ankle (Therapeutic Exercise)    Ankle AROM (Therapeutic Exercise) bilateral;dorsiflexion;plantarflexion;sitting;20 repititions  -     Row Name 11/06/22 0918          Respiratory WDL    Respiratory WDL --  -JW     Rhythm/Pattern, Respiratory --  -JW     Cough Frequency --  -JW     Cough Type --  -     Row Name 11/06/22 0918          Breath Sounds    Breath Sounds --  -JW     All Lung Fields Breath Sounds --  -     Row Name 11/06/22 0918          Skin WDL    Skin WDL --  -JW     Skin Color/Characteristics --  -JW     Skin Temperature --  -JW     Skin Moisture --  -JW     Skin Elasticity --  -JW     Skin Integrity --  -     Row Name 11/06/22 0918          Coping    Observed Emotional State --  -JW     Verbalized Emotional State --  -JW     Family/Support Persons --  -JW     Involvement in Care --  -     Row Name 11/06/22 0918          Plan of Care Review    Plan of Care Reviewed With patient  -     Outcome Evaluation pt t/fers sup<>sit, sit<>stand and toilet t/danny w/ SBA and RW, pt ambulates ~6' x 2, 24' w/ RW w/ SBA/CGA, pt performs B LE seated ther ex 15-20 reps, O2 sats drop to 89% w/ gt and toilet t/danny  -     Row Name 11/06/22 0918          Vital Signs    Pretreatment Heart Rate (beats/min) 73  -JW     Intratreatment Heart Rate (beats/min) 89  -     Posttreatment Heart Rate (beats/min) 71  -     Pre SpO2 (%) 97  -JW     O2 Delivery Pre Treatment hi-flow  -JW     Intra SpO2 (%) 89  -     O2 Delivery Intra Treatment hi-flow  -JW     Post SpO2 (%) 94  -JW     O2 Delivery Post Treatment hi-flow  -JW     Pre Patient Position Supine  -JW     Intra Patient Position Standing  -JW     Post Patient  Position Supine  -     Row Name 11/06/22 0918          Positioning and Restraints    Pre-Treatment Position in bed  -JW     Post Treatment Position bed  -JW     In Bed fowlers;call light within reach;encouraged to call for assist;exit alarm on  -     Row Name 11/06/22 0918          Therapy Assessment/Plan (PT)    Rehab Potential (PT) good, to achieve stated therapy goals  -     Criteria for Skilled Interventions Met (PT) yes;skilled treatment is necessary  -     Therapy Frequency (PT) other (see comments)  5-7 days/week  -     Row Name 11/06/22 0918          Bed Mobility Goal 1 (PT)    Activity/Assistive Device (Bed Mobility Goal 1, PT) sit to supine/supine to sit  -JW     Citrus Level/Cues Needed (Bed Mobility Goal 1, PT) independent  -JW     Time Frame (Bed Mobility Goal 1, PT) by discharge  -JW     Strategies/Barriers (Bed Mobility Goal 1, PT) HOB flat, no bed rails.  -JW     Progress/Outcomes (Bed Mobility Goal 1, PT) goal not met  -     Row Name 11/06/22 0918          Transfer Goal 1 (PT)    Activity/Assistive Device (Transfer Goal 1, PT) sit-to-stand/stand-to-sit;bed-to-chair/chair-to-bed;walker, rolling  -JW     Citrus Level/Cues Needed (Transfer Goal 1, PT) modified independence  -JW     Time Frame (Transfer Goal 1, PT) by discharge  -JW     Strategies/Barriers (Transfers Goal 1, PT) Maintain SpO2 >90%.  -JW     Progress/Outcome (Transfer Goal 1, PT) goal not met  -     Row Name 11/06/22 0918          Gait Training Goal 1 (PT)    Activity/Assistive Device (Gait Training Goal 1, PT) walker, rolling  -JW     Citrus Level (Gait Training Goal 1, PT) modified independence  -JW     Distance (Gait Training Goal 1, PT) 25'x2.  -JW     Time Frame (Gait Training Goal 1, PT) by discharge  -JW     Strategies/Barriers (Gait Training Goal 1, PT) Maintain SpO2 >90%.  -JW     Progress/Outcome (Gait Training Goal 1, PT) goal not met  -     Row Name 11/06/22 0918          Problem Specific  Goal 1 (PT)    Problem Specific Goal 1 (PT) Score 25/28 on Tinetti fall risk assessment.  -JW     Time Frame (Problem Specific Goal 1, PT) by discharge  -JW     Strategies/Barriers (Problem Specific Goal 1, PT) Maintain SpO2 >90%.  -JW     Progress/Outcome (Problem Specific Goal 1, PT) goal not met  -JW           User Key  (r) = Recorded By, (t) = Taken By, (c) = Cosigned By    Initials Name Provider Type    JW Danyell Shipley, PTA Physical Therapist Assistant                Physical Therapy Education     Title: PT OT SLP Therapies (In Progress)     Topic: Physical Therapy (In Progress)     Point: Mobility training (Done)     Learning Progress Summary           Patient Acceptance, E, VU by  at 11/3/2022 1100    Comment: PT POC, rehab process.                   Point: Home exercise program (Not Started)     Learner Progress:  Not documented in this visit.          Point: Body mechanics (Not Started)     Learner Progress:  Not documented in this visit.          Point: Precautions (Not Started)     Learner Progress:  Not documented in this visit.                      User Key     Initials Effective Dates Name Provider Type Discipline     09/18/22 -  Hakeem Wolfe, PT Physical Therapist PT              PT Recommendation and Plan  Anticipated Discharge Disposition (PT): home with assist, home with home health  Therapy Frequency (PT): other (see comments) (5-7 days/week)  Plan of Care Reviewed With: patient  Outcome Evaluation: pt t/fers sup<>sit, sit<>stand and toilet t/danny w/ SBA and RW, pt ambulates ~6' x 2, 24' w/ RW w/ SBA/CGA, pt performs B LE seated ther ex 15-20 reps, O2 sats drop to 89% w/ gt and toilet t/danny   Outcome Measures     Row Name 11/04/22 8476             How much help from another person do you currently need...    Turning from your back to your side while in flat bed without using bedrails? 4  -LN      Moving from lying on back to sitting on the side of a flat bed without bedrails? 3   -LN      Moving to and from a bed to a chair (including a wheelchair)? 3  -LN      Standing up from a chair using your arms (e.g., wheelchair, bedside chair)? 3  -LN      Climbing 3-5 steps with a railing? 3  -LN      To walk in hospital room? 3  -LN      AM-PAC 6 Clicks Score (PT) 19  -LN         Functional Assessment    Outcome Measure Options AM-PAC 6 Clicks Basic Mobility (PT);Tinetti  -LN            User Key  (r) = Recorded By, (t) = Taken By, (c) = Cosigned By    Initials Name Provider Type    Torri Vogel PTA Physical Therapist Assistant                 Time Calculation:    PT Charges     Row Name 11/06/22 0918             Time Calculation    Start Time 0918  -      Stop Time 0941  -      Time Calculation (min) 23 min  -      PT Received On 11/06/22  -         Time Calculation- PT    Total Timed Code Minutes- PT 23 minute(s)  -            User Key  (r) = Recorded By, (t) = Taken By, (c) = Cosigned By    Initials Name Provider Type     Danyell Shipley PTA Physical Therapist Assistant              Therapy Charges for Today     Code Description Service Date Service Provider Modifiers Qty    50878018919 HC PT THER SUPP EA 15 MIN 11/5/2022 Danyell Shipley, PTA GP 1    48906929869 HC PT THER PROC EA 15 MIN 11/6/2022 Danyell Shipley, MIREILLE GP 1    66449532506 HC GAIT TRAINING EA 15 MIN 11/6/2022 Danyell Shipley, PTA GP 1          PT G-Codes  Outcome Measure Options: AM-PAC 6 Clicks Basic Mobility (PT), Jaunetti  AM-PAC 6 Clicks Score (PT): 21    Danyell Shipley PTA  11/6/2022

## 2022-11-06 NOTE — PLAN OF CARE
Goal Outcome Evaluation:  Plan of Care Reviewed With: patient           Outcome Evaluation: pt t/fers sup<>sit, sit<>stand and toilet t/danny w/ SBA and RW, pt ambulates ~6' x 2, 24' w/ RW w/ SBA/CGA, pt performs B LE seated ther ex 15-20 reps, O2 sats drop to 89% w/ gt and toilet t/danny

## 2022-11-07 LAB
ANION GAP SERPL CALCULATED.3IONS-SCNC: 5 MMOL/L (ref 5–15)
BASOPHILS # BLD AUTO: 0.02 10*3/MM3 (ref 0–0.2)
BASOPHILS NFR BLD AUTO: 0.1 % (ref 0–1.5)
BUN SERPL-MCNC: 24 MG/DL (ref 8–23)
BUN/CREAT SERPL: 32.4 (ref 7–25)
CALCIUM SPEC-SCNC: 8.6 MG/DL (ref 8.6–10.5)
CHLORIDE SERPL-SCNC: 98 MMOL/L (ref 98–107)
CO2 SERPL-SCNC: 36 MMOL/L (ref 22–29)
CREAT SERPL-MCNC: 0.74 MG/DL (ref 0.57–1)
DEPRECATED RDW RBC AUTO: 45.1 FL (ref 37–54)
EGFRCR SERPLBLD CKD-EPI 2021: 87.2 ML/MIN/1.73
EOSINOPHIL # BLD AUTO: 0 10*3/MM3 (ref 0–0.4)
EOSINOPHIL NFR BLD AUTO: 0 % (ref 0.3–6.2)
ERYTHROCYTE [DISTWIDTH] IN BLOOD BY AUTOMATED COUNT: 13.3 % (ref 12.3–15.4)
GLUCOSE SERPL-MCNC: 128 MG/DL (ref 65–99)
HCT VFR BLD AUTO: 33.6 % (ref 34–46.6)
HGB BLD-MCNC: 10.6 G/DL (ref 12–15.9)
IMM GRANULOCYTES # BLD AUTO: 0.18 10*3/MM3 (ref 0–0.05)
IMM GRANULOCYTES NFR BLD AUTO: 1.3 % (ref 0–0.5)
LYMPHOCYTES # BLD AUTO: 1.23 10*3/MM3 (ref 0.7–3.1)
LYMPHOCYTES NFR BLD AUTO: 8.7 % (ref 19.6–45.3)
MCH RBC QN AUTO: 29 PG (ref 26.6–33)
MCHC RBC AUTO-ENTMCNC: 31.5 G/DL (ref 31.5–35.7)
MCV RBC AUTO: 92.1 FL (ref 79–97)
MONOCYTES # BLD AUTO: 0.73 10*3/MM3 (ref 0.1–0.9)
MONOCYTES NFR BLD AUTO: 5.1 % (ref 5–12)
NEUTROPHILS NFR BLD AUTO: 12.02 10*3/MM3 (ref 1.7–7)
NEUTROPHILS NFR BLD AUTO: 84.8 % (ref 42.7–76)
NRBC BLD AUTO-RTO: 0 /100 WBC (ref 0–0.2)
PLATELET # BLD AUTO: 321 10*3/MM3 (ref 140–450)
PMV BLD AUTO: 9.4 FL (ref 6–12)
POTASSIUM SERPL-SCNC: 4.7 MMOL/L (ref 3.5–5.2)
RBC # BLD AUTO: 3.65 10*6/MM3 (ref 3.77–5.28)
SODIUM SERPL-SCNC: 139 MMOL/L (ref 136–145)
WBC NRBC COR # BLD: 14.18 10*3/MM3 (ref 3.4–10.8)

## 2022-11-07 PROCEDURE — 25010000002 METHYLPREDNISOLONE PER 40 MG: Performed by: INTERNAL MEDICINE

## 2022-11-07 PROCEDURE — 25010000002 MEROPENEM PER 100 MG: Performed by: INTERNAL MEDICINE

## 2022-11-07 PROCEDURE — 80048 BASIC METABOLIC PNL TOTAL CA: CPT | Performed by: INTERNAL MEDICINE

## 2022-11-07 PROCEDURE — 85025 COMPLETE CBC W/AUTO DIFF WBC: CPT | Performed by: INTERNAL MEDICINE

## 2022-11-07 PROCEDURE — 94799 UNLISTED PULMONARY SVC/PX: CPT

## 2022-11-07 PROCEDURE — 25010000002 FUROSEMIDE PER 20 MG: Performed by: INTERNAL MEDICINE

## 2022-11-07 RX ADMIN — POLYETHYLENE GLYCOL 3350 17 G: 17 POWDER, FOR SOLUTION ORAL at 09:00

## 2022-11-07 RX ADMIN — CETIRIZINE HYDROCHLORIDE 10 MG: 10 TABLET, FILM COATED ORAL at 09:00

## 2022-11-07 RX ADMIN — CLOPIDOGREL BISULFATE 75 MG: 75 TABLET ORAL at 09:00

## 2022-11-07 RX ADMIN — GUAIFENESIN 600 MG: 600 TABLET, EXTENDED RELEASE ORAL at 09:00

## 2022-11-07 RX ADMIN — GUAIFENESIN 600 MG: 600 TABLET, EXTENDED RELEASE ORAL at 21:01

## 2022-11-07 RX ADMIN — SERTRALINE HYDROCHLORIDE 100 MG: 50 TABLET ORAL at 09:00

## 2022-11-07 RX ADMIN — METHYLPREDNISOLONE SODIUM SUCCINATE 40 MG: 40 INJECTION, POWDER, FOR SOLUTION INTRAMUSCULAR; INTRAVENOUS at 22:09

## 2022-11-07 RX ADMIN — PRAVASTATIN SODIUM 20 MG: 20 TABLET ORAL at 21:01

## 2022-11-07 RX ADMIN — CILOSTAZOL 100 MG: 100 TABLET ORAL at 17:50

## 2022-11-07 RX ADMIN — MEROPENEM 1 G: 1 INJECTION, POWDER, FOR SOLUTION INTRAVENOUS at 21:00

## 2022-11-07 RX ADMIN — TRAZODONE HYDROCHLORIDE 150 MG: 100 TABLET ORAL at 21:01

## 2022-11-07 RX ADMIN — AMITRIPTYLINE HYDROCHLORIDE 25 MG: 25 TABLET, FILM COATED ORAL at 21:01

## 2022-11-07 RX ADMIN — OXYCODONE HYDROCHLORIDE AND ACETAMINOPHEN 2 TABLET: 5; 325 TABLET ORAL at 09:16

## 2022-11-07 RX ADMIN — Medication 10 ML: at 21:02

## 2022-11-07 RX ADMIN — MEROPENEM 1 G: 1 INJECTION, POWDER, FOR SOLUTION INTRAVENOUS at 04:43

## 2022-11-07 RX ADMIN — BUDESONIDE AND FORMOTEROL FUMARATE DIHYDRATE 2 PUFF: 160; 4.5 AEROSOL RESPIRATORY (INHALATION) at 07:04

## 2022-11-07 RX ADMIN — GABAPENTIN 400 MG: 400 CAPSULE ORAL at 06:14

## 2022-11-07 RX ADMIN — OXYCODONE HYDROCHLORIDE AND ACETAMINOPHEN 2 TABLET: 5; 325 TABLET ORAL at 19:56

## 2022-11-07 RX ADMIN — PANTOPRAZOLE SODIUM 40 MG: 40 TABLET, DELAYED RELEASE ORAL at 09:00

## 2022-11-07 RX ADMIN — DOCUSATE SODIUM 50 MG AND SENNOSIDES 8.6 MG 2 TABLET: 8.6; 5 TABLET, FILM COATED ORAL at 19:56

## 2022-11-07 RX ADMIN — METHYLPREDNISOLONE SODIUM SUCCINATE 40 MG: 40 INJECTION, POWDER, FOR SOLUTION INTRAMUSCULAR; INTRAVENOUS at 14:04

## 2022-11-07 RX ADMIN — MEROPENEM 1 G: 1 INJECTION, POWDER, FOR SOLUTION INTRAVENOUS at 11:13

## 2022-11-07 RX ADMIN — RIVAROXABAN 20 MG: 10 TABLET, FILM COATED ORAL at 17:50

## 2022-11-07 RX ADMIN — IPRATROPIUM BROMIDE AND ALBUTEROL SULFATE 3 ML: 2.5; .5 SOLUTION RESPIRATORY (INHALATION) at 11:01

## 2022-11-07 RX ADMIN — METHYLPREDNISOLONE SODIUM SUCCINATE 40 MG: 40 INJECTION, POWDER, FOR SOLUTION INTRAMUSCULAR; INTRAVENOUS at 06:14

## 2022-11-07 RX ADMIN — GABAPENTIN 400 MG: 400 CAPSULE ORAL at 14:03

## 2022-11-07 RX ADMIN — Medication 10 ML: at 09:01

## 2022-11-07 RX ADMIN — IPRATROPIUM BROMIDE AND ALBUTEROL SULFATE 3 ML: 2.5; .5 SOLUTION RESPIRATORY (INHALATION) at 19:27

## 2022-11-07 RX ADMIN — IPRATROPIUM BROMIDE AND ALBUTEROL SULFATE 3 ML: 2.5; .5 SOLUTION RESPIRATORY (INHALATION) at 14:40

## 2022-11-07 RX ADMIN — FUROSEMIDE 40 MG: 10 INJECTION, SOLUTION INTRAMUSCULAR; INTRAVENOUS at 09:00

## 2022-11-07 RX ADMIN — IPRATROPIUM BROMIDE AND ALBUTEROL SULFATE 3 ML: 2.5; .5 SOLUTION RESPIRATORY (INHALATION) at 06:57

## 2022-11-07 RX ADMIN — GABAPENTIN 400 MG: 400 CAPSULE ORAL at 21:01

## 2022-11-07 RX ADMIN — CILOSTAZOL 100 MG: 100 TABLET ORAL at 09:00

## 2022-11-07 NOTE — PLAN OF CARE
Goal Outcome Evaluation:   Patient denies chest pain at this time. Miralax started today. Bed alarm refusal form signed. VSS. Patient on 4L of O2.         Progress: no change

## 2022-11-07 NOTE — PLAN OF CARE
Goal Outcome Evaluation:           Progress: improving  Outcome Evaluation: Pt oxygen demand has improved to 4 liters on NC; VSS

## 2022-11-08 LAB
ANION GAP SERPL CALCULATED.3IONS-SCNC: 5 MMOL/L (ref 5–15)
BASOPHILS # BLD AUTO: 0.02 10*3/MM3 (ref 0–0.2)
BASOPHILS NFR BLD AUTO: 0.1 % (ref 0–1.5)
BUN SERPL-MCNC: 26 MG/DL (ref 8–23)
BUN/CREAT SERPL: 41.3 (ref 7–25)
CALCIUM SPEC-SCNC: 8.5 MG/DL (ref 8.6–10.5)
CHLORIDE SERPL-SCNC: 97 MMOL/L (ref 98–107)
CO2 SERPL-SCNC: 35 MMOL/L (ref 22–29)
CREAT SERPL-MCNC: 0.63 MG/DL (ref 0.57–1)
DEPRECATED RDW RBC AUTO: 42.8 FL (ref 37–54)
EGFRCR SERPLBLD CKD-EPI 2021: 95.6 ML/MIN/1.73
EOSINOPHIL # BLD AUTO: 0 10*3/MM3 (ref 0–0.4)
EOSINOPHIL NFR BLD AUTO: 0 % (ref 0.3–6.2)
ERYTHROCYTE [DISTWIDTH] IN BLOOD BY AUTOMATED COUNT: 13.2 % (ref 12.3–15.4)
GLUCOSE SERPL-MCNC: 144 MG/DL (ref 65–99)
HCT VFR BLD AUTO: 32.8 % (ref 34–46.6)
HGB BLD-MCNC: 10.5 G/DL (ref 12–15.9)
IMM GRANULOCYTES # BLD AUTO: 0.2 10*3/MM3 (ref 0–0.05)
IMM GRANULOCYTES NFR BLD AUTO: 1.5 % (ref 0–0.5)
LYMPHOCYTES # BLD AUTO: 1.45 10*3/MM3 (ref 0.7–3.1)
LYMPHOCYTES NFR BLD AUTO: 10.8 % (ref 19.6–45.3)
MCH RBC QN AUTO: 28.5 PG (ref 26.6–33)
MCHC RBC AUTO-ENTMCNC: 32 G/DL (ref 31.5–35.7)
MCV RBC AUTO: 89.1 FL (ref 79–97)
MONOCYTES # BLD AUTO: 0.82 10*3/MM3 (ref 0.1–0.9)
MONOCYTES NFR BLD AUTO: 6.1 % (ref 5–12)
NEUTROPHILS NFR BLD AUTO: 10.97 10*3/MM3 (ref 1.7–7)
NEUTROPHILS NFR BLD AUTO: 81.5 % (ref 42.7–76)
NRBC BLD AUTO-RTO: 0 /100 WBC (ref 0–0.2)
PLATELET # BLD AUTO: 333 10*3/MM3 (ref 140–450)
PMV BLD AUTO: 9.5 FL (ref 6–12)
POTASSIUM SERPL-SCNC: 5.2 MMOL/L (ref 3.5–5.2)
RBC # BLD AUTO: 3.68 10*6/MM3 (ref 3.77–5.28)
SODIUM SERPL-SCNC: 137 MMOL/L (ref 136–145)
WBC NRBC COR # BLD: 13.46 10*3/MM3 (ref 3.4–10.8)

## 2022-11-08 PROCEDURE — 25010000002 MEROPENEM PER 100 MG: Performed by: INTERNAL MEDICINE

## 2022-11-08 PROCEDURE — 94799 UNLISTED PULMONARY SVC/PX: CPT

## 2022-11-08 PROCEDURE — 25010000002 METHYLPREDNISOLONE PER 40 MG: Performed by: INTERNAL MEDICINE

## 2022-11-08 PROCEDURE — 80048 BASIC METABOLIC PNL TOTAL CA: CPT | Performed by: INTERNAL MEDICINE

## 2022-11-08 PROCEDURE — 85025 COMPLETE CBC W/AUTO DIFF WBC: CPT | Performed by: INTERNAL MEDICINE

## 2022-11-08 PROCEDURE — 97110 THERAPEUTIC EXERCISES: CPT

## 2022-11-08 PROCEDURE — 97116 GAIT TRAINING THERAPY: CPT

## 2022-11-08 PROCEDURE — 25010000002 FUROSEMIDE PER 20 MG: Performed by: INTERNAL MEDICINE

## 2022-11-08 PROCEDURE — 94760 N-INVAS EAR/PLS OXIMETRY 1: CPT

## 2022-11-08 RX ORDER — PREDNISONE 20 MG/1
40 TABLET ORAL EVERY 12 HOURS SCHEDULED
Status: DISCONTINUED | OUTPATIENT
Start: 2022-11-09 | End: 2022-11-10

## 2022-11-08 RX ADMIN — CLOPIDOGREL BISULFATE 75 MG: 75 TABLET ORAL at 08:02

## 2022-11-08 RX ADMIN — GABAPENTIN 400 MG: 400 CAPSULE ORAL at 21:48

## 2022-11-08 RX ADMIN — PRAVASTATIN SODIUM 20 MG: 20 TABLET ORAL at 20:07

## 2022-11-08 RX ADMIN — MEROPENEM 1 G: 1 INJECTION, POWDER, FOR SOLUTION INTRAVENOUS at 04:27

## 2022-11-08 RX ADMIN — OXYCODONE HYDROCHLORIDE AND ACETAMINOPHEN 2 TABLET: 5; 325 TABLET ORAL at 14:15

## 2022-11-08 RX ADMIN — METHYLPREDNISOLONE SODIUM SUCCINATE 40 MG: 40 INJECTION, POWDER, FOR SOLUTION INTRAMUSCULAR; INTRAVENOUS at 21:48

## 2022-11-08 RX ADMIN — GABAPENTIN 400 MG: 400 CAPSULE ORAL at 05:46

## 2022-11-08 RX ADMIN — DOCUSATE SODIUM 50 MG AND SENNOSIDES 8.6 MG 2 TABLET: 8.6; 5 TABLET, FILM COATED ORAL at 21:48

## 2022-11-08 RX ADMIN — IPRATROPIUM BROMIDE AND ALBUTEROL SULFATE 3 ML: 2.5; .5 SOLUTION RESPIRATORY (INHALATION) at 16:37

## 2022-11-08 RX ADMIN — GUAIFENESIN 600 MG: 600 TABLET, EXTENDED RELEASE ORAL at 20:07

## 2022-11-08 RX ADMIN — GUAIFENESIN 600 MG: 600 TABLET, EXTENDED RELEASE ORAL at 08:01

## 2022-11-08 RX ADMIN — IPRATROPIUM BROMIDE AND ALBUTEROL SULFATE 3 ML: 2.5; .5 SOLUTION RESPIRATORY (INHALATION) at 20:42

## 2022-11-08 RX ADMIN — Medication 10 ML: at 20:08

## 2022-11-08 RX ADMIN — Medication 10 ML: at 08:02

## 2022-11-08 RX ADMIN — POLYETHYLENE GLYCOL 3350 17 G: 17 POWDER, FOR SOLUTION ORAL at 08:01

## 2022-11-08 RX ADMIN — FUROSEMIDE 40 MG: 10 INJECTION, SOLUTION INTRAMUSCULAR; INTRAVENOUS at 08:02

## 2022-11-08 RX ADMIN — MEROPENEM 1 G: 1 INJECTION, POWDER, FOR SOLUTION INTRAVENOUS at 20:07

## 2022-11-08 RX ADMIN — IPRATROPIUM BROMIDE AND ALBUTEROL SULFATE 3 ML: 2.5; .5 SOLUTION RESPIRATORY (INHALATION) at 07:05

## 2022-11-08 RX ADMIN — BUDESONIDE AND FORMOTEROL FUMARATE DIHYDRATE 2 PUFF: 160; 4.5 AEROSOL RESPIRATORY (INHALATION) at 07:05

## 2022-11-08 RX ADMIN — GABAPENTIN 400 MG: 400 CAPSULE ORAL at 14:09

## 2022-11-08 RX ADMIN — CILOSTAZOL 100 MG: 100 TABLET ORAL at 08:01

## 2022-11-08 RX ADMIN — METHYLPREDNISOLONE SODIUM SUCCINATE 40 MG: 40 INJECTION, POWDER, FOR SOLUTION INTRAMUSCULAR; INTRAVENOUS at 14:09

## 2022-11-08 RX ADMIN — IPRATROPIUM BROMIDE AND ALBUTEROL SULFATE 3 ML: 2.5; .5 SOLUTION RESPIRATORY (INHALATION) at 10:37

## 2022-11-08 RX ADMIN — AMITRIPTYLINE HYDROCHLORIDE 25 MG: 25 TABLET, FILM COATED ORAL at 20:07

## 2022-11-08 RX ADMIN — PANTOPRAZOLE SODIUM 40 MG: 40 TABLET, DELAYED RELEASE ORAL at 08:02

## 2022-11-08 RX ADMIN — RIVAROXABAN 20 MG: 10 TABLET, FILM COATED ORAL at 17:14

## 2022-11-08 RX ADMIN — METHYLPREDNISOLONE SODIUM SUCCINATE 40 MG: 40 INJECTION, POWDER, FOR SOLUTION INTRAMUSCULAR; INTRAVENOUS at 05:50

## 2022-11-08 RX ADMIN — TRAZODONE HYDROCHLORIDE 150 MG: 100 TABLET ORAL at 20:07

## 2022-11-08 RX ADMIN — OXYCODONE HYDROCHLORIDE AND ACETAMINOPHEN 2 TABLET: 5; 325 TABLET ORAL at 20:07

## 2022-11-08 RX ADMIN — MEROPENEM 1 G: 1 INJECTION, POWDER, FOR SOLUTION INTRAVENOUS at 11:14

## 2022-11-08 RX ADMIN — SERTRALINE HYDROCHLORIDE 100 MG: 50 TABLET ORAL at 08:02

## 2022-11-08 RX ADMIN — BUDESONIDE AND FORMOTEROL FUMARATE DIHYDRATE 2 PUFF: 160; 4.5 AEROSOL RESPIRATORY (INHALATION) at 20:42

## 2022-11-08 RX ADMIN — CETIRIZINE HYDROCHLORIDE 10 MG: 10 TABLET, FILM COATED ORAL at 08:02

## 2022-11-08 RX ADMIN — CILOSTAZOL 100 MG: 100 TABLET ORAL at 17:13

## 2022-11-08 RX ADMIN — OXYCODONE HYDROCHLORIDE AND ACETAMINOPHEN 2 TABLET: 5; 325 TABLET ORAL at 05:45

## 2022-11-08 NOTE — THERAPY TREATMENT NOTE
Acute Care - Physical Therapy Treatment Note  [facility]     Patient Name: Mona Perales  : 1950  MRN: [id number]  Today's Date: 2022      Visit Dx:     ICD-10-CM ICD-9-CM   1. ST elevation myocardial infarction (STEMI), unspecified artery ([facility])  I21.3 410.90   2. Chronic obstructive pulmonary disease with acute exacerbation ([facility])  J44.1 491.21   3. Impaired functional mobility, balance, gait, and endurance  Z74.09 V49.89     Patient Active Problem List   Diagnosis   • Anxiety   • CAD (coronary atherosclerotic disease)   • Carotid stenosis   • COPD (chronic obstructive pulmonary disease) ([facility])   • COPD with exacerbation ([facility])   • Depressive disorder, not elsewhere classified   • Benign essential hypertension   • Hypercholesteremia   • Insomnia   • Notalgia   • Osteoporosis   • Other screening mammogram   • RUQ abdominal pain   • PVD (peripheral vascular disease) with claudication ([facility])   • Nicotine abuse   • Dysphagia   • Epigastric pain   • Pain of right hand   • Closed displaced fracture of shaft of fifth metacarpal bone of right hand   • Cause of injury, fall   • Displaced fracture of shaft of fifth metacarpal bone of right hand with routine healing   • Syncope   • Acute respiratory failure with hypoxia ([facility])   • (HFpEF) heart failure with preserved ejection fraction ([facility])   • Hypotension   • Elevated WBCs   • Near syncope   • Atherosclerosis of native coronary artery of native heart without angina pectoris   • Atherosclerosis of native arteries of extremities with rest pain, left leg ([facility])   • PVD (peripheral vascular disease) ([facility])   • Physical deconditioning   • Pain due to onychomycosis of toenails of both feet   • ST elevation myocardial infarction (STEMI), unspecified artery ([facility])     Past Medical History:   Diagnosis Date   • A-fib ([facility])    • Anxiety    • Arthritis    • Asthma    • 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 with ulceration ([facility])     bilateral legs -  bilateral iliac stents 2008      • CHF (congestive heart failure) ([facility])    • Chronic lower back pain    • COPD (chronic obstructive pulmonary disease) ([facility])    • Essential (primary) hypertension    • GERD (gastroesophageal reflux disease)    • History of pulmonary embolus (PE)    • Hyperlipidemia    • Insomnia    • Lumbago    • Nicotine dependence    • Occlusion of artery      and stenosis of bilateral carotid arteries - BABS 16-49%, LICA 0-15%   • Other atherosclerosis of native artery of other extremity     LEFT subclavian stent 2005 (occluded)   • Sleep apnea      Past Surgical History:   Procedure Laterality Date   • CARDIAC CATHETERIZATION  04/06/2016    No evidence of any obstructive epicardial CAD.Preserved LV systolic function with EF of 55%.   • CARDIAC CATHETERIZATION N/A 11/1/2022    Procedure: Left Heart Cath;  Surgeon: Neftali Haywood MD;  Location: [facility] CATH INVASIVE LOCATION;  Service: Cardiology;  Laterality: N/A;   • CENTRAL VENOUS LINE INSERTION  04/07/2016    Successful placement of right uppe extremity 6-[place] triple lumen PICC line.   • ENDOSCOPY N/A 1/12/2018    Procedure: ESOPHAGOGASTRODUODENOSCOPY;  Surgeon: Bin Oh MD;  Location: [facility] ENDOSCOPY;  Service:    • ENDOSCOPY N/A 1/17/2018    Procedure: ESOPHAGOGASTRODUODENOSCOPY;  Surgeon: Bin Oh MD;  Location: [facility] ENDOSCOPY;  Service:    • ENDOSCOPY N/A 3/16/2018    Procedure: ESOPHAGOGASTRODUODENOSCOPY possible dilation;  Surgeon: Bin Oh MD;  Location: [facility] ENDOSCOPY;  Service: Gastroenterology   • FEMORAL POPLITEAL BYPASS Left 4/14/2020    Procedure: FEMORAL POPLITEAL BYPASS ABOVE KNEE  (POLYTETRAFLUOROETHYLENE)  LEFT COMMON FEMORAL ARTERY ENDARTERECTOMY PTA LEFT ILIAC ARTERIOGRAM        (c-arm#2 and c-arm table);  Surgeon: David Suh MD;  Location: [facility] OR;  Service: Vascular;  Laterality: Left;   • FEMORAL POPLITEAL BYPASS Right 9/17/2021    Procedure: RIGHT common femoral endarterectomy  FEMORAL POPLITEAL BYPASS (above the knee polytetrafluoroethylene  lower extremity arteriogram              (c-arm#2 and c-arm table);  Surgeon: David Suh MD;  Location: [facility] OR;  Service: Vascular;  Laterality: Right;   • HYSTERECTOMY     • INTERVENTIONAL RADIOLOGY PROCEDURE Left 9/9/2020    Procedure: IR angiogram extremity left;  Surgeon: David Suh MD;  Location: [facility] ANGIO INVASIVE LOCATION;  Service: Interventional Radiology;  Laterality: Left;   • KYPHOPLASTY N/A 5/23/2019    Procedure: KYPHOPLASTY LUMBAR FOUR;  Surgeon: Aba Santa MD;  Location: [facility] OR;  Service: Orthopedic Spine   • TOTAL SHOULDER REPLACEMENT Left    • TRANSESOPHAGEAL ECHOCARDIOGRAM (JACQUES)  04/07/2016    With color flow-Mild to moderate [facility].LV systolic function well preserved with Ef of 55-60%.Mitral and AV intact.Diastolic dysfunction   • UPPER GASTROINTESTINAL ENDOSCOPY  01/17/2018    Dr. Bin Martin M.D.     PT Assessment (last 12 hours)     PT Evaluation and Treatment     Row Name 11/08/22 1050          Physical Therapy Time and Intention    Subjective Information no complaints  -     Document Type therapy note (daily note)  -     Mode of Treatment physical therapy  -     Row Name 11/08/22 1050          General Information    Patient Profile Reviewed yes  -     Equipment Currently Used at Home shower chair  has RW, but not sure where it is located  -     Existing Precautions/Restrictions fall;oxygen therapy device and L/min  -     Row Name 11/08/22 1050          Home Use of Assistive/Adaptive Equipment    Equipment Currently Used at Home cane, straight;wheelchair;walker, standard  -     Row Name 11/08/22 1050          Pain    Pretreatment Pain Rating 8/10  -     Posttreatment Pain Rating 8/10  -     Pain Location - Side/Orientation Right  ribs  -     Pain Intervention(s) Medication (See MAR);Ambulation/increased activity  -     Row Name 11/08/22 1050           Cognition    Orientation Status (Cognition) oriented x 4  -     Row Name 11/08/22 1050          Bed Mobility    Bed Mobility supine-sit;sit-supine  -     Supine-Sit [place] (Bed Mobility) modified independence  -     Sit-Supine [place] (Bed Mobility) modified independence  -     Assistive Device (Bed Mobility) head of bed elevated;bed rails  -     Row Name 11/08/22 1050          Transfers    Transfers toilet transfer  -     Row Name 11/08/22 1050          Sit-Stand Transfer    Sit-Stand [place] (Transfers) supervision  -     Assistive Device (Sit-Stand Transfers) walker, front-wheeled  -     Row Name 11/08/22 1050          Stand-Sit Transfer    Stand-Sit [place] (Transfers) supervision  -     Assistive Device (Stand-Sit Transfers) walker, front-wheeled  -     Row Name 11/08/22 1050          Toilet Transfer    Type (Toilet Transfer) sit-stand;stand-sit  -     [place] Level (Toilet Transfer) standby assist  -     Assistive Device (Toilet Transfer) commode;walker, front-wheeled  -     Row Name 11/08/22 1050          Gait/Stairs (Locomotion)    [place] Level (Gait) contact guard;standby assist  -     [facility]Gait[facility]     Distance in Feet (Gait) 24' x 2  -     Deviations/Abnormal Patterns (Gait) gait speed decreased;stride length decreased  -[facility] Name 11/08/22 1050          Safety Issues, Functional Mobility    Impairments Affecting Function (Mobility) strength;endurance/activity tolerance;pain  -[facility] Name 11/08/22 1050          Hip (Therapeutic Exercise)    Hip AROM (Therapeutic Exercise) bilateral;flexion;sitting;15 repititions  -     Hip Isometrics (Therapeutic Exercise) bilateral;aDduction;sitting  x 15  -     Row Name 11/08/22 1050          Knee (Therapeutic Exercise)    Knee AROM (Therapeutic Exercise) bilateral;LAQ (long arc quad);sitting;15 repititions  -[facility] Name 11/08/22 1050          Ankle  (Therapeutic Exercise)    Ankle AROM (Therapeutic Exercise) bilateral;dorsiflexion;plantarflexion;sitting;20 repititions  -     Row Name 11/08/22 1050          Plan of Care Review    Plan of Care Reviewed With patient  -     Outcome Evaluation pt t/fers sup<>sit w/ Mod Ind, sit<>stand and toilet t/danny w/ SBA w/ RW, pt ambulates ~24' x 2 w/ RW w/ CGA/SBA, pt performs B LE seated ther ex x 15 reps  -     Row Name 11/08/22 1050          Vital Signs    Pretreatment Heart Rate (beats/min) 65  -JW     Posttreatment Heart Rate (beats/min) 76  -JW     Pre SpO2 (%) 92  -JW     O2 Delivery Pre Treatment hi-flow  -JW     Post SpO2 (%) 99  -JW     O2 Delivery Post Treatment hi-flow  -JW     Pre Patient Position Supine  -JW     Intra Patient Position Standing  -JW     Post Patient Position Supine  -     Row Name 11/08/22 1050          Positioning and Restraints    Pre-Treatment Position in bed  -JW     Post Treatment Position bed  -JW     In Bed fowlers;call light within reach;encouraged to call for assist  -     Row Name 11/08/22 1050          Therapy Assessment/Plan (PT)    Rehab Potential (PT) good, to achieve stated therapy goals  -     Criteria for Skilled Interventions Met (PT) yes;skilled treatment is necessary  -     Therapy Frequency (PT) other (see comments)  5-7 days/week  -     Row Name 11/08/22 1050          Bed Mobility Goal 1 (PT)    Activity/Assistive Device (Bed Mobility Goal 1, PT) sit to supine/supine to sit  -     [place] Level/Cues Needed (Bed Mobility Goal 1, PT) independent  -     Time Frame (Bed Mobility Goal 1, PT) by discharge  -     Strategies/Barriers (Bed Mobility Goal 1, PT) HOB flat, no bed rails.  -     Progress/Outcomes (Bed Mobility Goal 1, PT) goal not met  -     Row Name 11/08/22 1050          Transfer Goal 1 (PT)    Activity/Assistive Device (Transfer Goal 1, PT) sit-to-stand/stand-to-sit;bed-to-chair/chair-to-bed;walker, rolling  -     [place] Level/Cues  Needed (Transfer Goal 1, PT) modified independence  -JW     Time Frame (Transfer Goal 1, PT) by discharge  -JW     Strategies/Barriers (Transfers Goal 1, PT) Maintain SpO2 >90%.  -JW     Progress/Outcome (Transfer Goal 1, PT) goal not met  -     Row Name 11/08/22 1050          Gait Training Goal 1 (PT)    Activity/Assistive Device (Gait Training Goal 1, PT) walker, rolling  -JW     [place] Level (Gait Training Goal 1, PT) modified independence  -JW     Distance (Gait Training Goal 1, PT) 25'x2.  -JW     Time Frame (Gait Training Goal 1, PT) by discharge  -     Strategies/Barriers (Gait Training Goal 1, PT) Maintain SpO2 >90%.  -JW     Progress/Outcome (Gait Training Goal 1, PT) goal not met  -     Row Name 11/08/22 1050          Problem Specific Goal 1 (PT)    Problem Specific Goal 1 (PT) Score 25/28 on Tinetti fall risk assessment.  -JW     Time Frame (Problem Specific Goal 1, PT) by discharge  -     Strategies/Barriers (Problem Specific Goal 1, PT) Maintain SpO2 >90%.  -     Progress/Outcome (Problem Specific Goal 1, PT) goal not met  -           User Key  (r) = Recorded By, (t) = Taken By, (c) = Cosigned By    Initials Name Provider Type    Danyell Landin, PTA Physical Therapist Assistant                Physical Therapy Education     Title: PT OT SLP Therapies (In Progress)     Topic: Physical Therapy (In Progress)     Point: Mobility training (Done)     Learning Progress Summary           Patient Acceptance, E, VU by MAXIMINO at 11/3/2022 1100    Comment: PT POC, rehab process.                   Point: Home exercise program (Not Started)     Learner Progress:  Not documented in this visit.          Point: Body mechanics (Not Started)     Learner Progress:  Not documented in this visit.          Point: Precautions (Not Started)     Learner Progress:  Not documented in this visit.                      User Key     Initials Effective Dates Name Provider Type [facility]    MAXIMINO 09/18/22 -   Hakeem Wolfe, PT Physical Therapist PT              PT Recommendation and Plan  Anticipated Discharge Disposition (PT): home with assist, home with home health  Therapy Frequency (PT): other (see comments) (5-7 days/week)  Plan of Care Reviewed With: patient  Outcome Evaluation: pt t/fers sup<>sit w/ Mod Ind, sit<>stand and toilet t/danny w/ SBA w/ RW, pt ambulates ~24' x 2 w/ RW w/ CGA/SBA, pt performs B LE seated ther ex x 15 reps       Time Calculation:    PT Charges     Row Name 11/08/22 1050             Time Calculation    Start Time 1050  -      Stop Time 1113  -      Time Calculation (min) 23 min  -      PT Received On 11/08/22  -         Time Calculation- PT    Total Timed Code Minutes- PT 23 minute(s)  -            User Key  (r) = Recorded By, (t) = Taken By, (c) = Cosigned By    Initials Name Provider Type     Danyell Shipley, PTA Physical Therapist Assistant              Therapy Charges for Today     Code Description Service Date Service Provider Modifiers Qty    31165849916 HC PT THER PROC EA 15 MIN 11/8/2022 Danyell Shipley, PTA GP 1    88308715032 HC GAIT TRAINING EA 15 MIN 11/8/2022 Danyell Shipley, PTA GP 1          PT G-Codes  Outcome Measure Options: AM-PAC 6 Clicks Basic Mobility (PT), [place]etti  AM-PAC 6 Clicks Score (PT): 21    Danyell Shipley PTA  11/8/2022

## 2022-11-08 NOTE — PROGRESS NOTES
Adult Nutrition  Assessment/PES    Patient Name:  Mona Perales  YOB: 1950  MRN: [id number]  Admit Date:  11/1/2022    Assessment Date:  11/8/2022    Comments:  Pt continues to eat well with intake ~85% average.  Good acceptance/intake  of supplements.  She continues to be managed for COPD exacerbation and A/C REsp failure.  Increased energy expenditure with COPD.  Will monitor     Reason for Assessment     Row Name 11/08/22 1651          Reason for Assessment    Reason For Assessment follow-up protocol                Nutrition/Diet History     Row Name 11/08/22 1651          Nutrition/Diet History    Typical Intake (Food/Fluid/EN/PN) Pt reports that she continues to eat well.  Hoping to go home soon.                Labs/Tests/Procedures/Meds     Row Name 11/08/22 1651          Labs/Procedures/Meds    Lab Results Reviewed reviewed, pertinent        Diagnostic Tests/Procedures    Diagnostic Test/Procedure Reviewed reviewed, pertinent     Diagnostic Test/Procedures Comments Supplementa; 02        Medications    Pertinent Medications Reviewed reviewed, pertinent     Pertinent Medications Comments Elavil; LAsix; Xarelto                    Nutrition Prescription Ordered     Row Name 11/08/22 1652          Nutrition Prescription PO    Current PO Diet Regular     Supplement Milk;Ice Cream     Supplement Frequency 2 times a day;3 times a day     Common Modifiers Cardiac;Consistent Carbohydrate                Evaluation of Received Nutrient/Fluid Intake     Row Name 11/08/22 1652          PO Evaluation    Number of Meals 5     % PO Intake 85% average                   Problem/Interventions:   Problem 1     Row Name 11/08/22 1653          Nutrition Diagnoses Problem 1    Problem 1 Increased Nutrient Needs     Macronutrient Kcal;Protein     Etiology (related to) Medical Diagnosis     Pulmonary/Critical Care Respiratory distress;Pneumonia;COPD     Signs/Symptoms (evidenced by) PO Intake     Percent (%)  intake recorded 85 %     Over number of meals 5     Other Comment Hypermetabolic state                      Intervention Goal     Row Name 11/08/22 1653          Intervention Goal    General Meet nutritional needs for age/condition;Reduce/improve symptoms;Maintain nutrition     PO Tolerate PO;Maintain intake;Meet estimated needs     Weight Maintain weight                Nutrition Intervention     Row Name 11/08/22 1653          Nutrition Intervention    RD/Tech Action Follow Tx progress;Supplement provided;Advise alternate selection;Advise available snack;Interview for preference;Menu provided;Care plan reviewd;Encourage intake                  Education/Evaluation     Row Name 11/08/22 1654          Education    Education Education topics;Advised regarding habits/behavior     Education Topics Basic nutrition;Protein     Advised Regarding Habits/Behavior Increased nutrient density;Snacks;Use supplement;Food choices        Monitor/Evaluation    Monitor Per protocol;I&O;PO intake;Supplement intake;Pertinent labs;Weight;Skin status;Symptoms     Education Follow-up Reinforce PRN                 Electronically signed by:  Jessy Krishnan RD  11/08/22 16:55 CST

## 2022-11-08 NOTE — PLAN OF CARE
Goal Outcome Evaluation:   Patient on 6L of O2. Patient denies chest pain at this time. VSS.         Progress: no change

## 2022-11-08 NOTE — PLAN OF CARE
Goal Outcome Evaluation:  Plan of Care Reviewed With: caregiver, patient        Progress: improving  Outcome Evaluation: Nutrition F/U:  Pt continues to have good po intake, ~85% average.  Continues to drink milk with meals.  Will monitor

## 2022-11-08 NOTE — PAYOR COMM NOTE
"    Yen Romero RN [facility]  [phone]      Phone  [phone]      Fax  Cont. Stay REview      Filomena Perales (72 y.o. Female)     Date of Birth   1950    Social Security Number       Address   [address] [place] [address]    Home Phone   [phone]    MRN   [id number]       Congregational   Zoroastrian    Marital Status                               Admission Date   11/1/22    Admission Type   Emergency    Admitting Provider   Danish Flores MD    Attending Provider   Danish Flores MD    Department, Room/Bed   [facility] [place], 321/1       Discharge Date       Discharge Disposition       Discharge Destination                               Attending Provider: Danish Flores MD    Allergies: Morphine And Related, Penicillins    Isolation: None   Infection: None   Code Status: CPR    Ht: 157.5 cm (62\")   Wt: 61.6 kg (135 lb 12.8 oz)    Admission Cmt: None   Principal Problem: None                Active Insurance as of 11/1/2022     Primary Coverage     Payor Plan Insurance Group Employer/Plan Group    [facility] MEDICARE REPLACEMENT [employer] MEDICARE REPLACEMENT [id number]     Payor Plan Address Payor Plan Phone Number Payor Plan Fax Number Effective Dates    [address] [address] [phone]  5/1/2021 - None Entered    [facility] [id number]       Subscriber Name Subscriber Birth Date Member ID       FILOMENA PERALES 1950 [id number]                 Emergency Contacts      (Rel.) Home Phone Work Phone Mobile Phone    GuillermoRenee (Grandchild) -- -- [phone]    Meena Marie (Friend) -- -- [phone]            Vital Signs (last day)     Date/Time Temp Temp src Pulse Resp BP Patient Position SpO2    11/08/22 1137 -- -- 81 16 95/52 Lying 91    11/08/22 1046 -- -- 74 18 -- -- --    11/08/22 1037 -- -- 79 20 -- -- 91    11/08/22 0803 98.1 (36.7) Temporal 68 18 135/71 Sitting 90    " 11/08/22 0728 -- -- 62 -- -- -- --    11/08/22 0705 -- -- 74 18 -- -- 95    11/08/22 0325 98.2 (36.8) Oral 70 18 139/64 Lying 96    11/07/22 2350 -- -- 70 -- -- -- --    11/07/22 2316 97.2 (36.2) Oral 78 18 112/56 Lying 92    11/07/22 1938 -- -- 76 -- -- -- --    11/07/22 1927 -- -- 81 18 -- -- 90    11/07/22 1917 98.3 (36.8) Oral 79 18 123/60 Lying 92    11/07/22 1523 -- -- 76 -- -- -- --    11/07/22 1440 -- -- 83 -- -- -- 93    11/07/22 1356 -- -- 71 -- -- -- --    11/07/22 1108 97.1 (36.2) Temporal 74 18 137/63 Sitting 94    11/07/22 1101 -- -- 74 -- -- -- 98    11/07/22 0745 97.7 (36.5) Temporal 72 18 138/63 Sitting 95    11/07/22 0704 -- -- 86 18 -- Lying 95    11/07/22 0657 -- -- 82 18 -- Lying 94    11/07/22 0311 97.7 (36.5) Infrared 86 18 116/55 Lying 96    11/07/22 0058 -- -- 74 -- -- -- --          Oxygen Therapy (last day)     Date/Time SpO2 Device (Oxygen Therapy) Flow (L/min) Oxygen Concentration (%) ETCO2 (mmHg)    11/08/22 1137 91 nasal cannula -- -- --    11/08/22 1037 91 room air -- -- --    11/08/22 0815 -- humidified;nasal cannula 6 -- --    11/08/22 0803 90 humidified;nasal cannula 6 -- --    11/08/22 0705 95 nasal cannula;humidified 4 -- --    11/08/22 0325 96 nasal cannula;humidified 4 -- --    11/07/22 2316 92 nasal cannula;humidified 4 -- --    11/07/22 2015 -- nasal cannula;humidified 4 -- --    11/07/22 1938 -- nasal cannula;humidified 4 -- --    11/07/22 1927 90 nasal cannula;humidified 4 -- --    11/07/22 1917 92 humidified;nasal cannula 4 -- --    11/07/22 1440 93 nasal cannula 4 -- --    11/07/22 1108 94 nasal cannula 4 -- --    11/07/22 1101 98 nasal cannula 4 -- --    11/07/22 0916 -- nasal cannula 4 -- --    11/07/22 0745 95 nasal cannula 4 -- --    11/07/22 0704 95 nasal cannula 4 -- --    11/07/22 0657 94 nasal cannula 4 -- --    11/07/22 0311 96 -- -- -- --          Current Facility-Administered Medications   Medication Dose Route Frequency Provider Last Rate Last Admin   •  acetaminophen (TYLENOL) tablet 650 mg  650 mg Oral Q4H PRN Danish Flores MD   650 mg at 11/03/22 1722    Or   • acetaminophen (TYLENOL) suppository 650 mg  650 mg Rectal Q4H PRN Danish Flores MD       • acetaminophen (TYLENOL) tablet 650 mg  650 mg Oral Q4H PRN Danish Flores MD       • amitriptyline (ELAVIL) tablet 25 mg  25 mg Oral Nightly Neftali Haywood MD   25 mg at 11/07/22 2101   • benzonatate (TESSALON) capsule 200 mg  200 mg Oral Q4H PRN Danish Flores MD   200 mg at 11/06/22 0637   • budesonide-formoterol (SYMBICORT) 160-4.5 MCG/ACT inhaler 2 puff  2 puff Inhalation BID - RT Neftali Haywood MD   2 puff at 11/08/22 0705   • cetirizine (zyrTEC) tablet 10 mg  10 mg Oral Daily Jolanta Stanley MD   10 mg at 11/08/22 0802   • cilostazol (PLETAL) tablet 100 mg  100 mg Oral BID AC Neftali Haywood MD   100 mg at 11/08/22 0801   • clopidogrel (PLAVIX) tablet 75 mg  75 mg Oral Daily Neftali Haywood MD   75 mg at 11/08/22 0802   • furosemide (LASIX) injection 40 mg  40 mg Intravenous Daily Jolanta Stanley MD   40 mg at 11/08/22 0802   • gabapentin (NEURONTIN) capsule 400 mg  400 mg Oral Q8H Neftali Haywood MD   400 mg at 11/08/22 0546   • guaiFENesin (MUCINEX) 12 hr tablet 600 mg  600 mg Oral Q12H Jolanta Stanley MD   600 mg at 11/08/22 0801   • ipratropium-albuterol (DUO-NEB) nebulizer solution 3 mL  3 mL Nebulization Q4H While Awake Danish Flores MD   3 mL at 11/08/22 1037   • meropenem (MERREM) 1 g/100 mL 0.9% NS (mbp)  1 g Intravenous Q8H Danish Flores MD   1 g at 11/08/22 1114   • methylPREDNISolone sodium succinate (SOLU-Medrol) injection 40 mg  40 mg Intravenous Q8H Jolanta Stanley MD   40 mg at 11/08/22 0550   • ondansetron (ZOFRAN) injection 4 mg  4 mg Intravenous Q6H PRN Danish Flores MD   4 mg at 11/03/22 0335   • ondansetron (ZOFRAN) tablet 4 mg  4 mg Oral Q8H PRN Neftali Haywood MD       • oxyCODONE-acetaminophen (PERCOCET) 5-325 MG per tablet 2  tablet  2 tablet Oral Q6H PRN Jolanta Stanley MD   2 tablet at 11/08/22 0545   • pantoprazole (PROTONIX) EC tablet 40 mg  40 mg Oral Daily Neftali Haywood MD   40 mg at 11/08/22 0802   • polyethylene glycol (MIRALAX) packet 17 g  17 g Oral Daily Behroozi, Saeid, MD   17 g at 11/08/22 0801   • pravastatin (PRAVACHOL) tablet 20 mg  20 mg Oral Nightly Neftali Haywood MD   20 mg at 11/07/22 2101   • rivaroxaban (XARELTO) tablet 20 mg  20 mg Oral Daily With Dinner Neftali Haywood MD   20 mg at 11/07/22 1750   • sennosides-docusate (PERICOLACE) 8.6-50 MG per tablet 2 tablet  2 tablet Oral BID PRN Behroozi, Saeid, MD   2 tablet at 11/07/22 1956   • sertraline (ZOLOFT) tablet 100 mg  100 mg Oral Daily Neftali Haywood MD   100 mg at 11/08/22 0802   • sodium chloride 0.9 % flush 10 mL  10 mL Intravenous Q12H Danish Flores MD   10 mL at 11/07/22 2102   • sodium chloride 0.9 % flush 10 mL  10 mL Intravenous PRN Danish Flores MD       • sodium chloride 0.9 % flush 10 mL  10 mL Intravenous Q12H Behroozi, Saeid, MD   10 mL at 11/07/22 2102   • sodium chloride 0.9 % flush 10 mL  10 mL Intravenous Q12H Behroozi, Saeid, MD   10 mL at 11/07/22 2102   • sodium chloride 0.9 % flush 10 mL  10 mL Intravenous Q12H Behroozi, Saeid, MD   10 mL at 11/08/22 0802   • sodium chloride 0.9 % flush 10 mL  10 mL Intravenous PRN Behroozi, Saeid, MD       • sodium chloride 0.9 % flush 20 mL  20 mL Intravenous PRN Behroozi, Saeid, MD       • traZODone (DESYREL) tablet 150 mg  150 mg Oral Nightly Neftali Haywood MD   150 mg at 11/07/22 2101        Physician Progress Notes (last 48 hours)      Jolanta Stanley MD at 11/06/22 1513              [facility] Medicine Services  INPATIENT PROGRESS NOTE    Length of Stay: 5  Date of Admission: 11/1/2022  Primary Care Physician: Anahi Camacho APRN    Subjective   Patient seen and evaluated today, reports no issues or concerns. Her oxygen  supplementation have been weaned down to 4L from 6L with O2 saturating >92%.    Objective    Temp:  [97 °F (36.1 °C)-97.8 °F (36.6 °C)] 97.6 °F (36.4 °C)  Heart Rate:  [55-90] 63  Resp:  [18] 18  BP: ()/(51-69) 132/66    Physical Exam:   Temp:  [97 °F (36.1 °C)-97.8 °F (36.6 °C)] 97.6 °F (36.4 °C)  Heart Rate:  [55-90] 63  Resp:  [18] 18  BP: ()/(51-69) 132/66  Physical Exam  General: NC/AT, appears stated age, alert and oriented x3 on high flow oxygen.  HEENT: PERRLA, EOMI, no scleral icterus, no conjunctival injection,   NECK:  Neck is supple, no JVD, no supraclavicular lymphadenopathy  CV: S1 S2 RRR, no murmurs, no gallops, no heaves, pulses palpable.  LUNG: Fine bibasilar Rales  ABD: Nondistended, nontender, bowel sounds present, no guarding or rebound, organomegaly not appreciated.   EXT: FROM, strength is intact, no pitting edema, no joint effusion, no calf tenderness.  Pulses palpable  Neuro: CN 2-12, grossly intact,no  focal weakness appreciated  Skin: Warm and dry, no rash or lesions.      Results Review:  I have reviewed the labs, radiology results, and diagnostic studies.    Laboratory Data:   Results from last 7 days   Lab Units 11/06/22  0657 11/05/22  0544 11/04/22  0235 11/03/22  2326 11/02/22  0350 11/01/22  1705   SODIUM mmol/L 141 140 139 138   < > 138   POTASSIUM mmol/L 5.1 5.1 4.0 3.9   < > 3.5   CHLORIDE mmol/L 103 103 101 99   < > 100   CO2 mmol/L 30.0* 31.0* 30.0* 27.0   < > 28.0   BUN mg/dL 23 19 19 19   < > 6*   CREATININE mg/dL 0.65 0.67 0.71 0.72   < > 0.71   GLUCOSE mg/dL 132* 143* 147* 152*   < > 105*   CALCIUM mg/dL 8.7 9.1 8.5* 8.7   < > 8.7   BILIRUBIN mg/dL  --  <0.2  --  <0.2  --  0.4   ALK PHOS U/L  --  81  --  78  --  99   ALT (SGPT) U/L  --  10  --  7  --  6   AST (SGOT) U/L  --  15  --  14  --  11   ANION GAP mmol/L 8.0 6.0 8.0 12.0   < > 10.0    < > = values in this interval not displayed.     Estimated Creatinine Clearance: 69.4 mL/min (by C-G formula based on SCr  of 0.65 mg/dL).  Results from last 7 days   Lab Units 11/05/22  0544 11/03/22  0638   MAGNESIUM mg/dL 1.9 1.6         Results from last 7 days   Lab Units 11/06/22  0525 11/05/22  0544 11/04/22  0235 11/03/22  2326 11/03/22  0638   WBC 10*3/mm3 14.04* 16.52* 20.43* 19.25* 16.95*   HEMOGLOBIN g/dL 10.9* 11.1* 10.3* 10.3* 10.9*   HEMATOCRIT % 33.9* 33.9* 31.7* 32.0* 33.9*   PLATELETS 10*3/mm3 339 320 313 281 302     Results from last 7 days   Lab Units 11/01/22  1705   INR  1.33*       Culture Data:   No results found for: BLOODCX  No results found for: URINECX  No results found for: RESPCX  No results found for: WOUNDCX  No results found for: STOOLCX  No components found for: BODYFLD    Radiology Data:   Imaging Results (Last 24 Hours)     ** No results found for the last 24 hours. **          I have reviewed the patient's current medications.     Assessment/Plan     Active Hospital Problems    Diagnosis    • COPD with exacerbation (HCC)    Acute on chronic respiratory failure with hypoxia  PVD  Leukocytosis  Chronic back pain  Paroxysmal A. fib  COPD exacerbation          11/05/2022  Plan:    -Continue oxygen supplementation, currently on high flow 6 L, wean as tolerated.  - Continue breathing treatments with DuoNeb and budesonide treatment  - Continue HCAP coverage with meropenem.  Blood cultures negative over 3 days.  - Continue Solu-Medrol 60 mg IV every 8 hours  - Continue anticoagulation with Xarelto  - Continue treatment plan for management of comorbid condition    _____________________________________________________________________    11/06/2022  Plan:    -Continue oxygen supplementation, currently on high flow 4 L, wean as tolerated.  - Continue breathing treatments with DuoNeb and budesonide treatment  - Continue HCAP coverage with meropenem.  Blood cultures negative  - Continue Solu-Medrol 40 mg IV every 8 hours  - Continue anticoagulation with Xarelto  --Give a dose of Lasix 40mg IV  X1.  --Started on  Zyrtec 10mg daily  --Started on Mucinex DM 600mg BID  - Continue treatment plan for management of comorbid condition    Disposition pending patient's continued improvement with decreasing oxygen requirement and returned to baseline.    DVT and GI prophylaxis  Full code      Jolanta Stanley MD    Electronically signed by Jolanta Stanley MD at 11/06/22 4079       Medical Student Notes (last 24 hours)  Notes from 11/07/22 1142 through 11/08/22 1142   No notes of this type exist for this encounter.         Consult Notes (last 24 hours)  Notes from 11/07/22 1142 through 11/08/22 1142   No notes of this type exist for this encounter.

## 2022-11-08 NOTE — PLAN OF CARE
Goal Outcome Evaluation:  Plan of Care Reviewed With: patient           Outcome Evaluation: pt t/fers sup<>sit w/ Mod Ind, sit<>stand and toilet t/danny w/ SBA w/ RW, pt ambulates ~24' x 2 w/ RW w/ CGA/SBA, pt performs B LE seated ther ex x 15 reps

## 2022-11-08 NOTE — PROGRESS NOTES
[facility] Medicine Services  INPATIENT PROGRESS NOTE    Length of Stay: 6  Date of Admission: 11/1/2022  Primary Care Physician: Anahi Camacho APRN    Subjective   Patient seen and evaluated today, reports no issues or concerns. Her oxygen supplementation have been weaned down to 4L from 6L with O2 saturating >92%.    Objective    Temp:  [97.1 °F (36.2 °C)-98.3 °F (36.8 °C)] 98.3 °F (36.8 °C)  Heart Rate:  [71-86] 76  Resp:  [17-18] 18  BP: ()/(50-63) 123/60    Physical Exam:   Temp:  [97.1 °F (36.2 °C)-98.3 °F (36.8 °C)] 98.3 °F (36.8 °C)  Heart Rate:  [71-86] 76  Resp:  [17-18] 18  BP: ()/(50-63) 123/60  Physical Exam  General: NC/AT, appears stated age, alert and oriented x3 on high flow oxygen.  HEENT: PERRLA, EOMI, no scleral icterus, no conjunctival injection,   NECK:  Neck is supple, no JVD, no supraclavicular lymphadenopathy  CV: S1 S2 RRR, no murmurs, no gallops, no heaves, pulses palpable.  LUNG: Fine bibasilar Rales  ABD: Nondistended, nontender, bowel sounds present, no guarding or rebound, organomegaly not appreciated.   EXT: FROM, strength is intact, no pitting edema, no joint effusion, no calf tenderness.  Pulses palpable  Neuro: CN 2-12, grossly intact,no  focal weakness appreciated  Skin: Warm and dry, no rash or lesions.      Results Review:  I have reviewed the labs, radiology results, and diagnostic studies.    Laboratory Data:   Results from last 7 days   Lab Units 11/07/22  0513 11/06/22  0657 11/05/22  0544 11/04/22  0235 11/03/22  2326 11/02/22  0350 11/01/22  1705   SODIUM mmol/L 139 141 140   < > 138   < > 138   POTASSIUM mmol/L 4.7 5.1 5.1   < > 3.9   < > 3.5   CHLORIDE mmol/L 98 103 103   < > 99   < > 100   CO2 mmol/L 36.0* 30.0* 31.0*   < > 27.0   < > 28.0   BUN mg/dL 24* 23 19   < > 19   < > 6*   CREATININE mg/dL 0.74 0.65 0.67   < > 0.72   < > 0.71   GLUCOSE mg/dL 128* 132* 143*   < > 152*   < > 105*   CALCIUM mg/dL  8.6 8.7 9.1   < > 8.7   < > 8.7   BILIRUBIN mg/dL  --   --  <0.2  --  <0.2  --  0.4   ALK PHOS U/L  --   --  81  --  78  --  99   ALT (SGPT) U/L  --   --  10  --  7  --  6   AST (SGOT) U/L  --   --  15  --  14  --  11   ANION GAP mmol/L 5.0 8.0 6.0   < > 12.0   < > 10.0    < > = values in this interval not displayed.     Estimated Creatinine Clearance: 61.1 mL/min (by C-G formula based on SCr of 0.74 mg/dL).  Results from last 7 days   Lab Units 11/05/22  0544 11/03/22  0638   MAGNESIUM mg/dL 1.9 1.6         Results from last 7 days   Lab Units 11/07/22  0513 11/06/22  0525 11/05/22  0544 11/04/22  0235 11/03/22  2326   WBC 10*3/mm3 14.18* 14.04* 16.52* 20.43* 19.25*   HEMOGLOBIN g/dL 10.6* 10.9* 11.1* 10.3* 10.3*   HEMATOCRIT % 33.6* 33.9* 33.9* 31.7* 32.0*   PLATELETS 10*3/mm3 321 339 320 313 281     Results from last 7 days   Lab Units 11/01/22  1705   INR  1.33*       Culture Data:   No results found for: BLOODCX  No results found for: URINECX  No results found for: RESPCX  No results found for: WOUNDCX  No results found for: STOOLCX  No components found for: BODYFLD    Radiology Data:   Imaging Results (Last 24 Hours)     ** No results found for the last 24 hours. **          I have reviewed the patient's current medications.     Assessment/Plan     Active Hospital Problems    Diagnosis    • COPD with exacerbation (HCC)    Acute on chronic respiratory failure with hypoxia  PVD  Leukocytosis  Chronic back pain  Paroxysmal A. fib  COPD exacerbation          11/05/2022  Plan:    -Continue oxygen supplementation, currently on high flow 6 L, wean as tolerated.  - Continue breathing treatments with DuoNeb and budesonide treatment  - Continue HCAP coverage with meropenem.  Blood cultures negative over 3 days.  - Continue Solu-Medrol 60 mg IV every 8 hours  - Continue anticoagulation with Xarelto  - Continue treatment plan for management of comorbid  condition    _____________________________________________________________________    11/06/2022  Plan:    -Continue oxygen supplementation, currently on high flow 4 L, wean as tolerated.  - Continue breathing treatments with DuoNeb and budesonide treatment  - Continue HCAP coverage with meropenem.  Blood cultures negative  - Continue Solu-Medrol 40 mg IV every 8 hours  - Continue anticoagulation with Xarelto  --Give a dose of Lasix 40mg IV  X1.  --Started on Zyrtec 10mg daily  --Started on Mucinex DM 600mg BID  - Continue treatment plan for management of comorbid condition    Disposition pending patient's continued improvement with decreasing oxygen requirement and returned to baseline.        _____________________________________________________________________    11/07/2022  Plan:    -Continue oxygen supplementation, currently on high flow 4 L, wean as tolerated.  Patient's baseline is 2 L home O2  - Continue breathing treatments with DuoNeb and budesonide treatment  - Continue HCAP coverage with meropenem.  Blood cultures negative  - Continue Solu-Medrol 40 mg IV every 8 hours  - Continue anticoagulation with Xarelto  --Give a dose of Lasix 40mg IV  X1.  -- Continue Zyrtec 10mg daily  -- Continue Mucinex DM 600mg BID  - Continue treatment plan for management of comorbid condition    Disposition pending patient's continued improvement with decreasing oxygen requirement and returned to baseline.    DVT and GI prophylaxis  Full code            Jolanta Stanley MD

## 2022-11-09 LAB
ANION GAP SERPL CALCULATED.3IONS-SCNC: 6 MMOL/L (ref 5–15)
BASOPHILS # BLD AUTO: 0.02 10*3/MM3 (ref 0–0.2)
BASOPHILS NFR BLD AUTO: 0.1 % (ref 0–1.5)
BUN SERPL-MCNC: 26 MG/DL (ref 8–23)
BUN/CREAT SERPL: 32.5 (ref 7–25)
CALCIUM SPEC-SCNC: 8.5 MG/DL (ref 8.6–10.5)
CHLORIDE SERPL-SCNC: 97 MMOL/L (ref 98–107)
CO2 SERPL-SCNC: 35 MMOL/L (ref 22–29)
CREAT SERPL-MCNC: 0.8 MG/DL (ref 0.57–1)
DEPRECATED RDW RBC AUTO: 43.6 FL (ref 37–54)
EGFRCR SERPLBLD CKD-EPI 2021: 79.4 ML/MIN/1.73
EOSINOPHIL # BLD AUTO: 0 10*3/MM3 (ref 0–0.4)
EOSINOPHIL NFR BLD AUTO: 0 % (ref 0.3–6.2)
ERYTHROCYTE [DISTWIDTH] IN BLOOD BY AUTOMATED COUNT: 13.4 % (ref 12.3–15.4)
GLUCOSE SERPL-MCNC: 129 MG/DL (ref 65–99)
HCT VFR BLD AUTO: 32.5 % (ref 34–46.6)
HGB BLD-MCNC: 10.9 G/DL (ref 12–15.9)
IMM GRANULOCYTES # BLD AUTO: 0.27 10*3/MM3 (ref 0–0.05)
IMM GRANULOCYTES NFR BLD AUTO: 2 % (ref 0–0.5)
LYMPHOCYTES # BLD AUTO: 1.62 10*3/MM3 (ref 0.7–3.1)
LYMPHOCYTES NFR BLD AUTO: 11.9 % (ref 19.6–45.3)
MCH RBC QN AUTO: 29.8 PG (ref 26.6–33)
MCHC RBC AUTO-ENTMCNC: 33.5 G/DL (ref 31.5–35.7)
MCV RBC AUTO: 88.8 FL (ref 79–97)
MONOCYTES # BLD AUTO: 0.99 10*3/MM3 (ref 0.1–0.9)
MONOCYTES NFR BLD AUTO: 7.3 % (ref 5–12)
NEUTROPHILS NFR BLD AUTO: 10.74 10*3/MM3 (ref 1.7–7)
NEUTROPHILS NFR BLD AUTO: 78.7 % (ref 42.7–76)
NRBC BLD AUTO-RTO: 0 /100 WBC (ref 0–0.2)
PLATELET # BLD AUTO: 328 10*3/MM3 (ref 140–450)
PMV BLD AUTO: 9.3 FL (ref 6–12)
POTASSIUM SERPL-SCNC: 5 MMOL/L (ref 3.5–5.2)
RBC # BLD AUTO: 3.66 10*6/MM3 (ref 3.77–5.28)
SODIUM SERPL-SCNC: 138 MMOL/L (ref 136–145)
WBC NRBC COR # BLD: 13.64 10*3/MM3 (ref 3.4–10.8)

## 2022-11-09 PROCEDURE — 94799 UNLISTED PULMONARY SVC/PX: CPT

## 2022-11-09 PROCEDURE — 80048 BASIC METABOLIC PNL TOTAL CA: CPT | Performed by: INTERNAL MEDICINE

## 2022-11-09 PROCEDURE — 25010000002 FUROSEMIDE PER 20 MG: Performed by: INTERNAL MEDICINE

## 2022-11-09 PROCEDURE — 63710000001 PREDNISONE PER 1 MG: Performed by: INTERNAL MEDICINE

## 2022-11-09 PROCEDURE — 85025 COMPLETE CBC W/AUTO DIFF WBC: CPT | Performed by: INTERNAL MEDICINE

## 2022-11-09 PROCEDURE — 97530 THERAPEUTIC ACTIVITIES: CPT

## 2022-11-09 PROCEDURE — 94760 N-INVAS EAR/PLS OXIMETRY 1: CPT

## 2022-11-09 PROCEDURE — 97116 GAIT TRAINING THERAPY: CPT

## 2022-11-09 PROCEDURE — 97110 THERAPEUTIC EXERCISES: CPT

## 2022-11-09 RX ADMIN — GUAIFENESIN 600 MG: 600 TABLET, EXTENDED RELEASE ORAL at 08:35

## 2022-11-09 RX ADMIN — GABAPENTIN 400 MG: 400 CAPSULE ORAL at 05:01

## 2022-11-09 RX ADMIN — OXYCODONE HYDROCHLORIDE AND ACETAMINOPHEN 2 TABLET: 5; 325 TABLET ORAL at 13:57

## 2022-11-09 RX ADMIN — CILOSTAZOL 100 MG: 100 TABLET ORAL at 17:12

## 2022-11-09 RX ADMIN — GABAPENTIN 400 MG: 400 CAPSULE ORAL at 21:08

## 2022-11-09 RX ADMIN — IPRATROPIUM BROMIDE AND ALBUTEROL SULFATE 3 ML: 2.5; .5 SOLUTION RESPIRATORY (INHALATION) at 07:27

## 2022-11-09 RX ADMIN — IPRATROPIUM BROMIDE AND ALBUTEROL SULFATE 3 ML: 2.5; .5 SOLUTION RESPIRATORY (INHALATION) at 18:59

## 2022-11-09 RX ADMIN — PRAVASTATIN SODIUM 20 MG: 20 TABLET ORAL at 20:38

## 2022-11-09 RX ADMIN — PREDNISONE 40 MG: 20 TABLET ORAL at 08:35

## 2022-11-09 RX ADMIN — Medication 10 ML: at 20:39

## 2022-11-09 RX ADMIN — IPRATROPIUM BROMIDE AND ALBUTEROL SULFATE 3 ML: 2.5; .5 SOLUTION RESPIRATORY (INHALATION) at 11:11

## 2022-11-09 RX ADMIN — Medication 10 ML: at 08:44

## 2022-11-09 RX ADMIN — CILOSTAZOL 100 MG: 100 TABLET ORAL at 08:36

## 2022-11-09 RX ADMIN — CLOPIDOGREL BISULFATE 75 MG: 75 TABLET ORAL at 08:35

## 2022-11-09 RX ADMIN — GUAIFENESIN 600 MG: 600 TABLET, EXTENDED RELEASE ORAL at 20:38

## 2022-11-09 RX ADMIN — TRAZODONE HYDROCHLORIDE 150 MG: 100 TABLET ORAL at 20:38

## 2022-11-09 RX ADMIN — OXYCODONE HYDROCHLORIDE AND ACETAMINOPHEN 2 TABLET: 5; 325 TABLET ORAL at 05:01

## 2022-11-09 RX ADMIN — CETIRIZINE HYDROCHLORIDE 10 MG: 10 TABLET, FILM COATED ORAL at 08:35

## 2022-11-09 RX ADMIN — Medication 10 ML: at 08:36

## 2022-11-09 RX ADMIN — SERTRALINE HYDROCHLORIDE 100 MG: 50 TABLET ORAL at 08:35

## 2022-11-09 RX ADMIN — PANTOPRAZOLE SODIUM 40 MG: 40 TABLET, DELAYED RELEASE ORAL at 08:35

## 2022-11-09 RX ADMIN — IPRATROPIUM BROMIDE AND ALBUTEROL SULFATE 3 ML: 2.5; .5 SOLUTION RESPIRATORY (INHALATION) at 15:52

## 2022-11-09 RX ADMIN — OXYCODONE HYDROCHLORIDE AND ACETAMINOPHEN 2 TABLET: 5; 325 TABLET ORAL at 21:08

## 2022-11-09 RX ADMIN — AMITRIPTYLINE HYDROCHLORIDE 25 MG: 25 TABLET, FILM COATED ORAL at 20:38

## 2022-11-09 RX ADMIN — BUDESONIDE AND FORMOTEROL FUMARATE DIHYDRATE 2 PUFF: 160; 4.5 AEROSOL RESPIRATORY (INHALATION) at 07:27

## 2022-11-09 RX ADMIN — BUDESONIDE AND FORMOTEROL FUMARATE DIHYDRATE 2 PUFF: 160; 4.5 AEROSOL RESPIRATORY (INHALATION) at 18:59

## 2022-11-09 RX ADMIN — RIVAROXABAN 20 MG: 10 TABLET, FILM COATED ORAL at 17:12

## 2022-11-09 RX ADMIN — GABAPENTIN 400 MG: 400 CAPSULE ORAL at 15:13

## 2022-11-09 RX ADMIN — POLYETHYLENE GLYCOL 3350 17 G: 17 POWDER, FOR SOLUTION ORAL at 08:44

## 2022-11-09 RX ADMIN — PREDNISONE 40 MG: 20 TABLET ORAL at 20:38

## 2022-11-09 RX ADMIN — FUROSEMIDE 40 MG: 10 INJECTION, SOLUTION INTRAMUSCULAR; INTRAVENOUS at 08:36

## 2022-11-09 NOTE — PLAN OF CARE
Goal Outcome Evaluation:  Plan of Care Reviewed With: patient        Progress: improving  Outcome Evaluation: patient demonstrates independence with all aspects of bed mobility with HOB flat, no bedrails, and no safety concerns. She transfers from bed to commode with supervision with no AD, but does need cueing for getting turned all the way before she attempts to sit. she ambulates in her room short distances with no AD and supervision, in hallway with no AD, she cruises using the handrail and reaches for objects wtih Contact guard assist. Discussed the need to utilizer her walker more often for increased safety with gait. completed 45 feet of gait with RW with improved safety and is able to complete with supervision. discussed the use of cold packs for her acute pain from her recent fall vs use of heat and is agreeable to attempt cold packs. has no significant change in pain post treatment. vitals remain stable.

## 2022-11-09 NOTE — PROGRESS NOTES
[facility] Medicine Services  INPATIENT PROGRESS NOTE    Length of Stay: 8  Date of Admission: 11/1/2022  Primary Care Physician: Anahi Camacho APRN    Subjective   Patient seen and evaluated today, reports no issues or concerns.  Patient on 3L NC and not in distress. No issues reported overnight      Objective    Temp:  [96.5 °F (35.8 °C)-97.6 °F (36.4 °C)] 97.6 °F (36.4 °C)  Heart Rate:  [62-88] 68  Resp:  [18-20] 20  BP: (106-127)/(51-67) 124/66    Physical Exam:   Temp:  [96.5 °F (35.8 °C)-97.6 °F (36.4 °C)] 97.6 °F (36.4 °C)  Heart Rate:  [62-88] 68  Resp:  [18-20] 20  BP: (106-127)/(51-67) 124/66  Physical Exam  General: NC/AT, appears stated age, alert and oriented x3 on high flow oxygen.  HEENT: PERRLA, EOMI, no scleral icterus, no conjunctival injection,   NECK:  Neck is supple, no JVD, no supraclavicular lymphadenopathy  CV: S1 S2 RRR, no murmurs, no gallops, no heaves, pulses palpable.  LUNG: Fine coarse breath sounds  ABD: Nondistended, nontender, bowel sounds present, no guarding or rebound, organomegaly not appreciated.   EXT: FROM, strength is intact, no pitting edema, no joint effusion, no calf tenderness.  Pulses palpable  Neuro: CN 2-12, grossly intact,no  focal weakness appreciated  Skin: Warm and dry, no rash or lesions.      Results Review:  I have reviewed the labs, radiology results, and diagnostic studies.    Laboratory Data:   Results from last 7 days   Lab Units 11/09/22  0626 11/08/22  0510 11/07/22  0513 11/06/22  0657 11/05/22  0544 11/04/22  0235 11/03/22  2326   SODIUM mmol/L 138 137 139   < > 140   < > 138   POTASSIUM mmol/L 5.0 5.2 4.7   < > 5.1   < > 3.9   CHLORIDE mmol/L 97* 97* 98   < > 103   < > 99   CO2 mmol/L 35.0* 35.0* 36.0*   < > 31.0*   < > 27.0   BUN mg/dL 26* 26* 24*   < > 19   < > 19   CREATININE mg/dL 0.80 0.63 0.74   < > 0.67   < > 0.72   GLUCOSE mg/dL 129* 144* 128*   < > 143*   < > 152*   CALCIUM mg/dL 8.5*  8.5* 8.6   < > 9.1   < > 8.7   BILIRUBIN mg/dL  --   --   --   --  <0.2  --  <0.2   ALK PHOS U/L  --   --   --   --  81  --  78   ALT (SGPT) U/L  --   --   --   --  10  --  7   AST (SGOT) U/L  --   --   --   --  15  --  14   ANION GAP mmol/L 6.0 5.0 5.0   < > 6.0   < > 12.0    < > = values in this interval not displayed.     Estimated Creatinine Clearance: 56.3 mL/min (by C-G formula based on SCr of 0.8 mg/dL).  Results from last 7 days   Lab Units 11/05/22  0544 11/03/22  0638   MAGNESIUM mg/dL 1.9 1.6         Results from last 7 days   Lab Units 11/09/22  0626 11/08/22  0510 11/07/22  0513 11/06/22  0525 11/05/22  0544   WBC 10*3/mm3 13.64* 13.46* 14.18* 14.04* 16.52*   HEMOGLOBIN g/dL 10.9* 10.5* 10.6* 10.9* 11.1*   HEMATOCRIT % 32.5* 32.8* 33.6* 33.9* 33.9*   PLATELETS 10*3/mm3 328 333 321 339 320           Culture Data:   No results found for: BLOODCX  No results found for: URINECX  No results found for: RESPCX  No results found for: WOUNDCX  No results found for: STOOLCX  No components found for: BODYFLD    Radiology Data:   Imaging Results (Last 24 Hours)     ** No results found for the last 24 hours. **          I have reviewed the patient's current medications.     Assessment/Plan     Active Hospital Problems    Diagnosis    • COPD with exacerbation (HCC)    Acute on chronic respiratory failure with hypoxia  PVD  Leukocytosis  Chronic back pain  Paroxysmal A. fib  COPD exacerbation          11/05/2022  Plan:    -Continue oxygen supplementation, currently on high flow 6 L, wean as tolerated.  - Continue breathing treatments with DuoNeb and budesonide treatment  - Continue HCAP coverage with meropenem.  Blood cultures negative over 3 days.  - Continue Solu-Medrol 60 mg IV every 8 hours  - Continue anticoagulation with Xarelto  - Continue treatment plan for management of comorbid condition    _____________________________________________________________________    11/06/2022  Plan:    -Continue oxygen  supplementation, currently on high flow 4 L, wean as tolerated.  - Continue breathing treatments with DuoNeb and budesonide treatment  - Continue HCAP coverage with meropenem.  Blood cultures negative  - Continue Solu-Medrol 40 mg IV every 8 hours  - Continue anticoagulation with Xarelto  --Give a dose of Lasix 40mg IV  X1.  --Started on Zyrtec 10mg daily  --Started on Mucinex DM 600mg BID  - Continue treatment plan for management of comorbid condition    Disposition pending patient's continued improvement with decreasing oxygen requirement and returned to baseline.        _____________________________________________________________________    11/07/2022  Plan:    -Continue oxygen supplementation, currently on high flow 4 L, wean as tolerated.  Patient's baseline is 2 L home O2  - Continue breathing treatments with DuoNeb and budesonide treatment  - Continue HCAP coverage with meropenem.  Blood cultures negative  - Continue Solu-Medrol 40 mg IV every 8 hours  - Continue anticoagulation with Xarelto  --Give a dose of Lasix 40mg IV  X1.  -- Continue Zyrtec 10mg daily  -- Continue Mucinex DM 600mg BID  - Continue treatment plan for management of comorbid condition    Disposition pending patient's continued improvement with decreasing oxygen requirement and returned to baseline.        _____________________________________________________________________    11/08/2022  Plan:    -Continue oxygen supplementation, currently on high flow 4 L, wean as tolerated.  Patient's baseline is 2 L home O2  - Continue breathing treatments with DuoNeb and budesonide treatment  - Continue HCAP coverage with meropenem, day 5.  Blood cultures negative  - Steroids changed to prednisone 40 mg p.o. every 12 starting tomorrow.  - Continue anticoagulation with Xarelto  -- Continue Lasix 40 mg IV daily  -- Continue Zyrtec 10mg daily  -- Continue Mucinex DM 600mg BID  - Continue treatment plan for management of comorbid  condition      _____________________________________________________________________    11/09/2022  Plan:    -Continue oxygen supplementation, currently on 3 L, wean as tolerated.  Patient's baseline is 2 L home O2  - Continue breathing treatments with DuoNeb and budesonide treatment  - Continue HCAP coverage with meropenem, day 6.  Blood cultures negative  -Continue Prednisone 40 mg p.o. every 12 hours  - Continue anticoagulation with Xarelto  --Continue Lasix 40 mg IV daily  -- Continue Zyrtec 10mg daily  -- Continue Mucinex DM 600mg BID  - Continue treatment plan for management of comorbid condition    Patient is almost down to her baseline O2 requirement of 2L.   Possible d/c home tomorrow with continue home O2 with treatment completion of her PNA.     DVT and GI prophylaxis  Full code             Jolanta Stanley MD

## 2022-11-09 NOTE — PROGRESS NOTES
[facility] Medicine Services  INPATIENT PROGRESS NOTE    Length of Stay: 7  Date of Admission: 11/1/2022  Primary Care Physician: Anahi Camacho APRN    Subjective   Patient seen and evaluated today, reports no issues or concerns.  Patient continues to need 4 L high flow oxygen. She is reported to desaturate on exertion. However she is able to move from the bed to the bathroom with 4 L oxygen.      Objective    Temp:  [97.2 °F (36.2 °C)-98.3 °F (36.8 °C)] 98.1 °F (36.7 °C)  Heart Rate:  [62-81] 63  Resp:  [16-20] 18  BP: ()/(52-71) 118/59    Physical Exam:   Temp:  [97.2 °F (36.2 °C)-98.3 °F (36.8 °C)] 98.1 °F (36.7 °C)  Heart Rate:  [62-81] 63  Resp:  [16-20] 18  BP: ()/(52-71) 118/59  Physical Exam  General: NC/AT, appears stated age, alert and oriented x3 on high flow oxygen.  HEENT: PERRLA, EOMI, no scleral icterus, no conjunctival injection,   NECK:  Neck is supple, no JVD, no supraclavicular lymphadenopathy  CV: S1 S2 RRR, no murmurs, no gallops, no heaves, pulses palpable.  LUNG: Fine bibasilar Rales  ABD: Nondistended, nontender, bowel sounds present, no guarding or rebound, organomegaly not appreciated.   EXT: FROM, strength is intact, no pitting edema, no joint effusion, no calf tenderness.  Pulses palpable  Neuro: CN 2-12, grossly intact,no  focal weakness appreciated  Skin: Warm and dry, no rash or lesions.      Results Review:  I have reviewed the labs, radiology results, and diagnostic studies.    Laboratory Data:   Results from last 7 days   Lab Units 11/08/22  0510 11/07/22  0513 11/06/22  0657 11/05/22  0544 11/04/22  0235 11/03/22  2326   SODIUM mmol/L 137 139 141 140   < > 138   POTASSIUM mmol/L 5.2 4.7 5.1 5.1   < > 3.9   CHLORIDE mmol/L 97* 98 103 103   < > 99   CO2 mmol/L 35.0* 36.0* 30.0* 31.0*   < > 27.0   BUN mg/dL 26* 24* 23 19   < > 19   CREATININE mg/dL 0.63 0.74 0.65 0.67   < > 0.72   GLUCOSE mg/dL 144* 128* 132* 143*    < > 152*   CALCIUM mg/dL 8.5* 8.6 8.7 9.1   < > 8.7   BILIRUBIN mg/dL  --   --   --  <0.2  --  <0.2   ALK PHOS U/L  --   --   --  81  --  78   ALT (SGPT) U/L  --   --   --  10  --  7   AST (SGOT) U/L  --   --   --  15  --  14   ANION GAP mmol/L 5.0 5.0 8.0 6.0   < > 12.0    < > = values in this interval not displayed.     Estimated Creatinine Clearance: 71.8 mL/min (by C-G formula based on SCr of 0.63 mg/dL).  Results from last 7 days   Lab Units 11/05/22  0544 11/03/22  0638   MAGNESIUM mg/dL 1.9 1.6         Results from last 7 days   Lab Units 11/08/22  0510 11/07/22  0513 11/06/22  0525 11/05/22  0544 11/04/22  0235   WBC 10*3/mm3 13.46* 14.18* 14.04* 16.52* 20.43*   HEMOGLOBIN g/dL 10.5* 10.6* 10.9* 11.1* 10.3*   HEMATOCRIT % 32.8* 33.6* 33.9* 33.9* 31.7*   PLATELETS 10*3/mm3 333 321 339 320 313           Culture Data:   No results found for: BLOODCX  No results found for: URINECX  No results found for: RESPCX  No results found for: WOUNDCX  No results found for: STOOLCX  No components found for: BODYFLD    Radiology Data:   Imaging Results (Last 24 Hours)     ** No results found for the last 24 hours. **          I have reviewed the patient's current medications.     Assessment/Plan     Active Hospital Problems    Diagnosis    • COPD with exacerbation (HCC)    Acute on chronic respiratory failure with hypoxia  PVD  Leukocytosis  Chronic back pain  Paroxysmal A. fib  COPD exacerbation          11/05/2022  Plan:    -Continue oxygen supplementation, currently on high flow 6 L, wean as tolerated.  - Continue breathing treatments with DuoNeb and budesonide treatment  - Continue HCAP coverage with meropenem.  Blood cultures negative over 3 days.  - Continue Solu-Medrol 60 mg IV every 8 hours  - Continue anticoagulation with Xarelto  - Continue treatment plan for management of comorbid condition    _____________________________________________________________________    11/06/2022  Plan:    -Continue oxygen  supplementation, currently on high flow 4 L, wean as tolerated.  - Continue breathing treatments with DuoNeb and budesonide treatment  - Continue HCAP coverage with meropenem.  Blood cultures negative  - Continue Solu-Medrol 40 mg IV every 8 hours  - Continue anticoagulation with Xarelto  --Give a dose of Lasix 40mg IV  X1.  --Started on Zyrtec 10mg daily  --Started on Mucinex DM 600mg BID  - Continue treatment plan for management of comorbid condition    Disposition pending patient's continued improvement with decreasing oxygen requirement and returned to baseline.        _____________________________________________________________________    11/07/2022  Plan:    -Continue oxygen supplementation, currently on high flow 4 L, wean as tolerated.  Patient's baseline is 2 L home O2  - Continue breathing treatments with DuoNeb and budesonide treatment  - Continue HCAP coverage with meropenem.  Blood cultures negative  - Continue Solu-Medrol 40 mg IV every 8 hours  - Continue anticoagulation with Xarelto  --Give a dose of Lasix 40mg IV  X1.  -- Continue Zyrtec 10mg daily  -- Continue Mucinex DM 600mg BID  - Continue treatment plan for management of comorbid condition    Disposition pending patient's continued improvement with decreasing oxygen requirement and returned to baseline.        _____________________________________________________________________    11/08/2022  Plan:    -Continue oxygen supplementation, currently on high flow 4 L, wean as tolerated.  Patient's baseline is 2 L home O2  - Continue breathing treatments with DuoNeb and budesonide treatment  - Continue HCAP coverage with meropenem, day 5.  Blood cultures negative  - Steroids changed to prednisone 40 mg p.o. every 12 starting tomorrow.  - Continue anticoagulation with Xarelto  -- Continue Lasix 40 mg IV daily  -- Continue Zyrtec 10mg daily  -- Continue Mucinex DM 600mg BID  - Continue treatment plan for management of comorbid condition    Possible  discharge to SNF for rehab with oxygen supplementation    DVT and GI prophylaxis  Full code          Jolanta Stanley MD

## 2022-11-09 NOTE — DISCHARGE PLACEMENT REQUEST
"Filomena Perales (72 y.o. Female)     Date of Birth   1950    Social Security Number       Address   [address] [place] [address]    Home Phone   [phone]    MRN   [id number]       Caodaism   Sabianism    Marital Status                               Admission Date   11/1/22    Admission Type   Emergency    Admitting Provider   Danish Flores MD    Attending Provider   Danish Flores MD    Department, Room/Bed   [address], 321/1       Discharge Date       Discharge Disposition       Discharge Destination                               Attending Provider: Danish Flores MD    Allergies: Morphine And Related, Penicillins    Isolation: None   Infection: None   Code Status: CPR    Ht: 157.5 cm (62\")   Wt: 61.1 kg (134 lb 12.8 oz)    Admission Cmt: None   Principal Problem: None                Active Insurance as of 11/1/2022     Primary Coverage     Payor Plan Insurance Group Employer/Plan Group    [facility] [id number]     Payor Plan Address Payor Plan Phone Number Payor Plan Fax Number Effective Dates    [address] [address] [phone]  5/1/2021 - None Entered    [facility] [id number]       Subscriber Name Subscriber Birth Date Member ID       FILOMENA PERALES 1950 [id number]                 Emergency Contacts      (Rel.) Home Phone Work Phone Mobile Phone    GuillermoMeryRenee (Grandchild) -- -- [phone]    Meena Marie (Friend) -- -- [phone]              "

## 2022-11-09 NOTE — THERAPY TREATMENT NOTE
Patient Name: Mona Perales  : 1950    MRN: [id number]                              Today's Date: 2022     Physical Therapy Treatment Note       Admit Date: 2022    Visit Dx:     ICD-10-CM ICD-9-CM   1. ST elevation myocardial infarction (STEMI), unspecified artery ([facility])  I21.3 410.90   2. Chronic obstructive pulmonary disease with acute exacerbation ([facility])  J44.1 491.21   3. Impaired functional mobility, balance, gait, and endurance  Z74.09 V49.89     Patient Active Problem List   Diagnosis   • Anxiety   • CAD (coronary atherosclerotic disease)   • Carotid stenosis   • COPD (chronic obstructive pulmonary disease) ([facility])   • COPD with exacerbation ([facility])   • Depressive disorder, not elsewhere classified   • Benign essential hypertension   • Hypercholesteremia   • Insomnia   • Notalgia   • Osteoporosis   • Other screening mammogram   • RUQ abdominal pain   • PVD (peripheral vascular disease) with claudication ([facility])   • Nicotine abuse   • Dysphagia   • Epigastric pain   • Pain of right hand   • Closed displaced fracture of shaft of fifth metacarpal bone of right hand   • Cause of injury, fall   • Displaced fracture of shaft of fifth metacarpal bone of right hand with routine healing   • Syncope   • Acute respiratory failure with hypoxia ([facility])   • (HFpEF) heart failure with preserved ejection fraction ([facility])   • Hypotension   • Elevated WBCs   • Near syncope   • Atherosclerosis of native coronary artery of native heart without angina pectoris   • Atherosclerosis of native arteries of extremities with rest pain, left leg ([facility])   • PVD (peripheral vascular disease) ([facility])   • Physical deconditioning   • Pain due to onychomycosis of toenails of both feet   • ST elevation myocardial infarction (STEMI), unspecified artery ([facility])     Past Medical History:   Diagnosis Date   • A-fib ([facility])    • Anxiety    • Arthritis    • Asthma    • 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 with ulceration ([facility])      bilateral legs - bilateral iliac stents 2008      • CHF (congestive heart failure) ([facility])    • Chronic lower back pain    • COPD (chronic obstructive pulmonary disease) ([facility])    • Essential (primary) hypertension    • GERD (gastroesophageal reflux disease)    • History of pulmonary embolus (PE)    • Hyperlipidemia    • Insomnia    • Lumbago    • Nicotine dependence    • Occlusion of artery      and stenosis of bilateral carotid arteries - BABS 16-49%, LICA 0-15%   • Other atherosclerosis of native artery of other extremity     LEFT subclavian stent 2005 (occluded)   • Sleep apnea      Past Surgical History:   Procedure Laterality Date   • CARDIAC CATHETERIZATION  04/06/2016    No evidence of any obstructive epicardial CAD.Preserved LV systolic function with EF of 55%.   • CARDIAC CATHETERIZATION N/A 11/1/2022    Procedure: Left Heart Cath;  Surgeon: Neftali Haywood MD;  Location: [facility] CATH INVASIVE LOCATION;  Service: Cardiology;  Laterality: N/A;   • CENTRAL VENOUS LINE INSERTION  04/07/2016    Successful placement of right uppe extremity 6-[place] triple lumen PICC line.   • ENDOSCOPY N/A 1/12/2018    Procedure: ESOPHAGOGASTRODUODENOSCOPY;  Surgeon: Bin Oh MD;  Location: [facility] ENDOSCOPY;  Service:    • ENDOSCOPY N/A 1/17/2018    Procedure: ESOPHAGOGASTRODUODENOSCOPY;  Surgeon: Bin Oh MD;  Location: [facility] ENDOSCOPY;  Service:    • ENDOSCOPY N/A 3/16/2018    Procedure: ESOPHAGOGASTRODUODENOSCOPY possible dilation;  Surgeon: Bin Oh MD;  Location: [facility] ENDOSCOPY;  Service: Gastroenterology   • FEMORAL POPLITEAL BYPASS Left 4/14/2020    Procedure: FEMORAL POPLITEAL BYPASS ABOVE KNEE  (POLYTETRAFLUOROETHYLENE)  LEFT COMMON FEMORAL ARTERY ENDARTERECTOMY PTA LEFT ILIAC ARTERIOGRAM        (c-arm#2 and c-arm table);  Surgeon: David Suh MD;  Location: [facility] OR;  Service: Vascular;  Laterality: Left;   • FEMORAL POPLITEAL BYPASS Right 9/17/2021    Procedure: RIGHT common femoral  endarterectomy FEMORAL POPLITEAL BYPASS (above the knee polytetrafluoroethylene  lower extremity arteriogram              (c-arm#2 and c-arm table);  Surgeon: David Suh MD;  Location: [facility] OR;  Service: Vascular;  Laterality: Right;   • HYSTERECTOMY     • INTERVENTIONAL RADIOLOGY PROCEDURE Left 9/9/2020    Procedure: IR angiogram extremity left;  Surgeon: David Suh MD;  Location: [facility] ANGIO INVASIVE LOCATION;  Service: Interventional Radiology;  Laterality: Left;   • KYPHOPLASTY N/A 5/23/2019    Procedure: KYPHOPLASTY LUMBAR FOUR;  Surgeon: Aba Santa MD;  Location: [facility] OR;  Service: Orthopedic Spine   • TOTAL SHOULDER REPLACEMENT Left    • TRANSESOPHAGEAL ECHOCARDIOGRAM (JACQUES)  04/07/2016    With color flow-Mild to moderate [facility].LV systolic function well preserved with Ef of 55-60%.Mitral and AV intact.Diastolic dysfunction   • UPPER GASTROINTESTINAL ENDOSCOPY  01/17/2018    Dr. Bin Martin M.D.      General Information     Row Name 11/09/22 0918          Physical Therapy Time and Intention    Document Type therapy note (daily note)  -     Mode of Treatment physical therapy;individual therapy  -     Row Name 11/09/22 0918          General Information    Patient Profile Reviewed yes  -     Existing Precautions/Restrictions fall;oxygen therapy device and L/min  3 L/min  -     Row Name 11/09/22 0918          Cognition    Orientation Status (Cognition) oriented x 4  -     Row Name 11/09/22 0918          Safety Issues, Functional Mobility    Impairments Affecting Function (Mobility) strength;endurance/activity tolerance;pain  -           User Key  (r) = Recorded By, (t) = Taken By, (c) = Cosigned By    Initials Name Provider Type     Nunu Johnson PTA Physical Therapist Assistant               Mobility     Row Name 11/09/22 0935          Bed Mobility    Bed Mobility bed mobility (all) activities  -     All Activities, [place] (Bed Mobility)  independent  -     Supine-Sit [place] (Bed Mobility) --  -     Comment, (Bed Mobility) Independent with all aspects of bed mobility with HOB flat and no bedrails, no safety concerns completing  -     Row Name 11/09/22 0935          Bed-Chair Transfer    Bed-Chair [place] (Transfers) supervision;verbal cues  -     Assistive Device (Bed-Chair Transfers) walker, front-wheeled  -     Comment, (Bed-Chair Transfer) bed to commode  -     Row Name 11/09/22 0935          Sit-Stand Transfer    Sit-Stand [place] (Transfers) standby assist  -     Comment, (Sit-Stand Transfer) has no difficulty with sit to stand, but needs cueing for safety with stand to sit to make sure she is positioned appropriately in front of the surface she is going to sit on.  -     Row Name 11/09/22 0935          Gait/Stairs (Locomotion)    [place] Level (Gait) standby assist;contact guard;1 person to manage equipment  -     Assistive Device (Gait) walker, front-wheeled;other (see comments)  -     Distance in Feet (Gait) 8 feet (no AD and in room)+ 24 feet (no AD but cruises holding to the railing in hallway + clinician to manage O2 tank)+ 45 feet (with FWW and clinician to manage O2 tank)  -     Deviations/Abnormal Patterns (Gait) gait speed decreased  -           User Key  (r) = Recorded By, (t) = Taken By, (c) = Cosigned By    Initials Name Provider Type     Nunu Johnson PTA Physical Therapist Assistant               Obj/Interventions     Row Name 11/09/22 0925          Motor Skills    Therapeutic Exercise ankle;knee;hip  -     Row Name 11/09/22 0925          Hip (Therapeutic Exercise)    Hip AROM (Therapeutic Exercise) bilateral;aBduction;sitting;20 repititions  -     Hip Isometrics (Therapeutic Exercise) bilateral;aDduction;sitting;10 repetitions;2 sets;5 second hold  -     Row Name 11/09/22 0925          Knee (Therapeutic Exercise)    Knee (Therapeutic Exercise) isometric  exercises;strengthening exercise  -     Knee Isometrics (Therapeutic Exercise) bilateral;quad sets;sitting;10 repetitions;5 second hold;2 sets  -     Knee Strengthening (Therapeutic Exercise) bilateral;LAQ (long arc quad);sitting;20 repititions;5 second hold;marching while seated  -     Row Name 11/09/22 0925          Ankle (Therapeutic Exercise)    Ankle AROM (Therapeutic Exercise) bilateral;dorsiflexion;plantarflexion;20 repititions;sitting  -           User Key  (r) = Recorded By, (t) = Taken By, (c) = Cosigned By    Initials Name Provider Type     Nunu Johnson PTA Physical Therapist Assistant               Goals/Plan     Row Name 11/09/22 0924          Bed Mobility Goal 1 (PT)    Activity/Assistive Device (Bed Mobility Goal 1, PT) sit to supine/supine to sit   -     [place] Level/Cues Needed (Bed Mobility Goal 1, PT) independent   -     Time Frame (Bed Mobility Goal 1, PT) by discharge   -     Strategies/Barriers (Bed Mobility Goal 1, PT) HOB flat, no bed rails.   -     Progress/Outcomes (Bed Mobility Goal 1, PT) goal met   -     Row Name 11/09/22 0924          Transfer Goal 1 (PT)    Activity/Assistive Device (Transfer Goal 1, PT) sit-to-stand/stand-to-sit;bed-to-chair/chair-to-bed;walker, rolling  -     [place] Level/Cues Needed (Transfer Goal 1, PT) modified independence  -MH     Time Frame (Transfer Goal 1, PT) by discharge  -     Strategies/Barriers (Transfers Goal 1, PT) Maintain SpO2 >90%.  -     Progress/Outcome (Transfer Goal 1, PT) goal not met;continuing progress toward goal  -     Row Name 11/09/22 0924          Gait Training Goal 1 (PT)    Activity/Assistive Device (Gait Training Goal 1, PT) walker, rolling  -     [place] Level (Gait Training Goal 1, PT) modified independence  -MH     Distance (Gait Training Goal 1, PT) 25'x2.  -     Time Frame (Gait Training Goal 1, PT) by discharge  -     Strategies/Barriers (Gait Training Goal 1, PT) Maintain  "SpO2 >90%.  -     Progress/Outcome (Gait Training Goal 1, PT) goal not met;continuing progress toward goal  -     Row Name 11/09/22 0924          Problem Specific Goal 1 (PT)    Problem Specific Goal 1 (PT) Score 25/28 on Tinetti fall risk assessment.  -     Time Frame (Problem Specific Goal 1, PT) by discharge  -     Strategies/Barriers (Problem Specific Goal 1, PT) Maintain SpO2 >90%.  -     Progress/Outcome (Problem Specific Goal 1, PT) goal not met  -           User Key  (r) = Recorded By, (t) = Taken By, (c) = Cosigned By    Initials Name Provider Type     Nunu Johnson PTA Physical Therapist Assistant               Clinical Impression     Row Name 11/09/22 0920          Pain    Pretreatment Pain Rating 8/10  -     Posttreatment Pain Rating 9/10  -     Pain Location - Side/Orientation Right  -     Pain Location - flank  -     Pre/Posttreatment Pain Comment initially states that her pain in her right flank feels better post treatment but when asked to rate her pain, she states \"between and 8 and a 9\" and defers further cold pack  -     Pain Intervention(s) Repositioned;Ambulation/increased activity;Cold pack  -     Row Name 11/09/22 0920          Plan of Care Review    Plan of Care Reviewed With patient  -     Progress improving  -     Outcome Evaluation patient demonstrates independence with all aspects of bed mobility with HOB flat, no bedrails, and no safety concerns. She transfers from bed to commode with supervision with no AD, but does need cueing for getting turned all the way before she attempts to sit. she ambulates in her room short distances with no AD and supervision, in hallway with no AD, she cruises using the handrail and reaches for objects [facility] Contact guard assist. Discussed the need to utilizer her walker more often for increased safety with gait. completed 45 feet of gait with RW with improved safety and is able to complete with supervision. discussed the use " of cold packs for her acute pain from her recent fall vs use of heat and is agreeable to attempt cold packs. has no significant change in pain post treatment. vitals remain stable.  -     Row Name 11/09/22 0920          Vital Signs    Pre Systolic BP Rehab 108  -MH     Pre Treatment Diastolic BP 65  -MH     Post Systolic BP Rehab 120  -MH     Post Treatment Diastolic BP 69  -MH     Pretreatment Heart Rate (beats/min) 66  -MH     Posttreatment Heart Rate (beats/min) 61  -MH     Pre SpO2 (%) 92  -MH     O2 Delivery Pre Treatment nasal cannula  3 L/min  -MH     Post SpO2 (%) 96  -MH     O2 Delivery Post Treatment nasal cannula  3 L/min  -MH     Pre Patient Position Sitting  long sitting  -     Row Name 11/09/22 0920          Positioning and Restraints    Pre-Treatment Position in bed  -     Post Treatment Position bed  -     In Bed fowlers;call light within reach;encouraged to call for assist  -           User Key  (r) = Recorded By, (t) = Taken By, (c) = Cosigned By    Initials Name Provider Type    Nunu Green PTA Physical Therapist Assistant               Outcome Measures     Row Name 11/09/22 1216          How much help from another person do you currently need...    Turning from your back to your side while in flat bed without using bedrails? 4  -MH     Moving from lying on back to sitting on the side of a flat bed without bedrails? 4  -MH     Moving to and from a bed to a chair (including a wheelchair)? 4  -MH     Standing up from a chair using your arms (e.g., wheelchair, bedside chair)? 4  -MH     Climbing 3-5 steps with a railing? 3  -MH     To walk in hospital room? 4  -MH     AM-PAC 6 Clicks Score (PT) 23  -     Highest level of mobility 7 --> Walked 25 feet or more  -           User Key  (r) = Recorded By, (t) = Taken By, (c) = Cosigned By    Initials Name Provider Type    Nunu Green PTA Physical Therapist Assistant                             Physical Therapy Education      Title: PT OT SLP Therapies (In Progress)     Topic: Physical Therapy (In Progress)     Point: Mobility training (Done)     Learning Progress Summary           Patient Acceptance, E, VU by MAXIMINO at 11/3/2022 1100    Comment: PT POC, rehab process.                   Point: Home exercise program (Not Started)     Learner Progress:  Not documented in this visit.          Point: Body mechanics (Not Started)     Learner Progress:  Not documented in this visit.          Point: Precautions (Not Started)     Learner Progress:  Not documented in this visit.                      User Key     Initials Effective Dates Name Provider Type Discipline     09/18/22 -  Hakeem Wolfe, PT Physical Therapist PT              PT Recommendation and Plan     Plan of Care Reviewed With: patient  Progress: improving  Outcome Evaluation: patient demonstrates independence with all aspects of bed mobility with HOB flat, no bedrails, and no safety concerns. She transfers from bed to commode with supervision with no AD, but does need cueing for getting turned all the way before she attempts to sit. she ambulates in her room short distances with no AD and supervision, in hallway with no AD, she cruises using the handrail and reaches for objects wtih Contact guard assist. Discussed the need to utilizer her walker more often for increased safety with gait. completed 45 feet of gait with RW with improved safety and is able to complete with supervision. discussed the use of cold packs for her acute pain from her recent fall vs use of heat and is agreeable to attempt cold packs. has no significant change in pain post treatment. vitals remain stable.     Time Calculation:    PT Charges     Row Name 11/09/22 0924             Time Calculation    Start Time 0915  -      Stop Time 1001  -      Time Calculation (min) 46 min  -         Time Calculation- PT    Total Timed Code Minutes- PT 46 minute(s)  -         Timed Charges    29635 - PT Therapeutic  Exercise Minutes 18  -MH      51165 - Gait Training Minutes  10  -      25413 - PT Therapeutic Activity Minutes 18  -MH         Total Minutes    Timed Charges Total Minutes 46  -MH       Total Minutes 46  -MH            User Key  (r) = Recorded By, (t) = Taken By, (c) = Cosigned By    Initials Name Provider Type    Nunu Green PTA Physical Therapist Assistant              Therapy Charges for Today     Code Description Service Date Service Provider Modifiers Qty    96915286342 HC PT THER PROC EA 15 MIN 11/9/2022 Nunu Johnson PTA GP 1    06668236235 HC GAIT TRAINING EA 15 MIN 11/9/2022 Nunu Johnson PTA GP 1    [id number] HC PT THERAPEUTIC ACT EA 15 MIN 11/9/2022 Nunu Johnson PTA GP 1          PT G-Codes  Outcome Measure Options: AM-PAC 6 Clicks Basic Mobility (PT), [place]  AM-PAC 6 Clicks Score (PT): 23    Nunu Johnson PTA  11/9/2022

## 2022-11-10 LAB
ANION GAP SERPL CALCULATED.3IONS-SCNC: 9 MMOL/L (ref 5–15)
BASOPHILS # BLD AUTO: 0.02 10*3/MM3 (ref 0–0.2)
BASOPHILS NFR BLD AUTO: 0.1 % (ref 0–1.5)
BUN SERPL-MCNC: 30 MG/DL (ref 8–23)
BUN/CREAT SERPL: 41.7 (ref 7–25)
CALCIUM SPEC-SCNC: 8.9 MG/DL (ref 8.6–10.5)
CHLORIDE SERPL-SCNC: 95 MMOL/L (ref 98–107)
CO2 SERPL-SCNC: 34 MMOL/L (ref 22–29)
CREAT SERPL-MCNC: 0.72 MG/DL (ref 0.57–1)
DEPRECATED RDW RBC AUTO: 44.6 FL (ref 37–54)
EGFRCR SERPLBLD CKD-EPI 2021: 90.1 ML/MIN/1.73
EOSINOPHIL # BLD AUTO: 0 10*3/MM3 (ref 0–0.4)
EOSINOPHIL NFR BLD AUTO: 0 % (ref 0.3–6.2)
ERYTHROCYTE [DISTWIDTH] IN BLOOD BY AUTOMATED COUNT: 13.7 % (ref 12.3–15.4)
GLUCOSE SERPL-MCNC: 158 MG/DL (ref 65–99)
HCT VFR BLD AUTO: 34.6 % (ref 34–46.6)
HGB BLD-MCNC: 11.6 G/DL (ref 12–15.9)
IMM GRANULOCYTES # BLD AUTO: 0.26 10*3/MM3 (ref 0–0.05)
IMM GRANULOCYTES NFR BLD AUTO: 1.9 % (ref 0–0.5)
LYMPHOCYTES # BLD AUTO: 1.68 10*3/MM3 (ref 0.7–3.1)
LYMPHOCYTES NFR BLD AUTO: 12.5 % (ref 19.6–45.3)
MCH RBC QN AUTO: 30.2 PG (ref 26.6–33)
MCHC RBC AUTO-ENTMCNC: 33.5 G/DL (ref 31.5–35.7)
MCV RBC AUTO: 90.1 FL (ref 79–97)
MONOCYTES # BLD AUTO: 0.86 10*3/MM3 (ref 0.1–0.9)
MONOCYTES NFR BLD AUTO: 6.4 % (ref 5–12)
NEUTROPHILS NFR BLD AUTO: 10.59 10*3/MM3 (ref 1.7–7)
NEUTROPHILS NFR BLD AUTO: 79.1 % (ref 42.7–76)
NRBC BLD AUTO-RTO: 0 /100 WBC (ref 0–0.2)
PLATELET # BLD AUTO: 318 10*3/MM3 (ref 140–450)
PMV BLD AUTO: 9.5 FL (ref 6–12)
POTASSIUM SERPL-SCNC: 5.9 MMOL/L (ref 3.5–5.2)
RBC # BLD AUTO: 3.84 10*6/MM3 (ref 3.77–5.28)
SODIUM SERPL-SCNC: 138 MMOL/L (ref 136–145)
WBC NRBC COR # BLD: 13.41 10*3/MM3 (ref 3.4–10.8)

## 2022-11-10 PROCEDURE — 97110 THERAPEUTIC EXERCISES: CPT

## 2022-11-10 PROCEDURE — 63710000001 PREDNISONE PER 1 MG: Performed by: INTERNAL MEDICINE

## 2022-11-10 PROCEDURE — 94799 UNLISTED PULMONARY SVC/PX: CPT

## 2022-11-10 PROCEDURE — 97116 GAIT TRAINING THERAPY: CPT

## 2022-11-10 PROCEDURE — 94664 DEMO&/EVAL PT USE INHALER: CPT

## 2022-11-10 PROCEDURE — 25010000002 FUROSEMIDE PER 20 MG: Performed by: INTERNAL MEDICINE

## 2022-11-10 PROCEDURE — 80048 BASIC METABOLIC PNL TOTAL CA: CPT | Performed by: INTERNAL MEDICINE

## 2022-11-10 PROCEDURE — 94760 N-INVAS EAR/PLS OXIMETRY 1: CPT

## 2022-11-10 PROCEDURE — 85025 COMPLETE CBC W/AUTO DIFF WBC: CPT | Performed by: INTERNAL MEDICINE

## 2022-11-10 RX ORDER — FUROSEMIDE 40 MG/1
40 TABLET ORAL DAILY
Status: DISCONTINUED | OUTPATIENT
Start: 2022-11-11 | End: 2022-11-17 | Stop reason: HOSPADM

## 2022-11-10 RX ORDER — PREDNISONE 20 MG/1
40 TABLET ORAL
Status: DISCONTINUED | OUTPATIENT
Start: 2022-11-11 | End: 2022-11-15

## 2022-11-10 RX ADMIN — AMITRIPTYLINE HYDROCHLORIDE 25 MG: 25 TABLET, FILM COATED ORAL at 20:27

## 2022-11-10 RX ADMIN — GABAPENTIN 400 MG: 400 CAPSULE ORAL at 06:12

## 2022-11-10 RX ADMIN — GABAPENTIN 400 MG: 400 CAPSULE ORAL at 20:27

## 2022-11-10 RX ADMIN — Medication 10 ML: at 20:27

## 2022-11-10 RX ADMIN — OXYCODONE HYDROCHLORIDE AND ACETAMINOPHEN 2 TABLET: 5; 325 TABLET ORAL at 09:08

## 2022-11-10 RX ADMIN — OXYCODONE HYDROCHLORIDE AND ACETAMINOPHEN 2 TABLET: 5; 325 TABLET ORAL at 16:10

## 2022-11-10 RX ADMIN — IPRATROPIUM BROMIDE AND ALBUTEROL SULFATE 3 ML: 2.5; .5 SOLUTION RESPIRATORY (INHALATION) at 15:39

## 2022-11-10 RX ADMIN — PREDNISONE 40 MG: 20 TABLET ORAL at 09:08

## 2022-11-10 RX ADMIN — SODIUM ZIRCONIUM CYCLOSILICATE 5 G: 5 POWDER, FOR SUSPENSION ORAL at 11:07

## 2022-11-10 RX ADMIN — CILOSTAZOL 100 MG: 100 TABLET ORAL at 06:34

## 2022-11-10 RX ADMIN — GABAPENTIN 400 MG: 400 CAPSULE ORAL at 13:31

## 2022-11-10 RX ADMIN — SERTRALINE HYDROCHLORIDE 100 MG: 50 TABLET ORAL at 09:08

## 2022-11-10 RX ADMIN — POLYETHYLENE GLYCOL 3350 17 G: 17 POWDER, FOR SOLUTION ORAL at 09:09

## 2022-11-10 RX ADMIN — TRAZODONE HYDROCHLORIDE 150 MG: 100 TABLET ORAL at 20:27

## 2022-11-10 RX ADMIN — FUROSEMIDE 40 MG: 10 INJECTION, SOLUTION INTRAMUSCULAR; INTRAVENOUS at 09:09

## 2022-11-10 RX ADMIN — IPRATROPIUM BROMIDE AND ALBUTEROL SULFATE 3 ML: 2.5; .5 SOLUTION RESPIRATORY (INHALATION) at 19:23

## 2022-11-10 RX ADMIN — CLOPIDOGREL BISULFATE 75 MG: 75 TABLET ORAL at 09:08

## 2022-11-10 RX ADMIN — BUDESONIDE AND FORMOTEROL FUMARATE DIHYDRATE 2 PUFF: 160; 4.5 AEROSOL RESPIRATORY (INHALATION) at 19:23

## 2022-11-10 RX ADMIN — RIVAROXABAN 20 MG: 10 TABLET, FILM COATED ORAL at 17:19

## 2022-11-10 RX ADMIN — GUAIFENESIN 600 MG: 600 TABLET, EXTENDED RELEASE ORAL at 09:08

## 2022-11-10 RX ADMIN — IPRATROPIUM BROMIDE AND ALBUTEROL SULFATE 3 ML: 2.5; .5 SOLUTION RESPIRATORY (INHALATION) at 08:06

## 2022-11-10 RX ADMIN — GUAIFENESIN 600 MG: 600 TABLET, EXTENDED RELEASE ORAL at 20:27

## 2022-11-10 RX ADMIN — IPRATROPIUM BROMIDE AND ALBUTEROL SULFATE 3 ML: 2.5; .5 SOLUTION RESPIRATORY (INHALATION) at 10:56

## 2022-11-10 RX ADMIN — CETIRIZINE HYDROCHLORIDE 10 MG: 10 TABLET, FILM COATED ORAL at 09:08

## 2022-11-10 RX ADMIN — PRAVASTATIN SODIUM 20 MG: 20 TABLET ORAL at 20:27

## 2022-11-10 RX ADMIN — CILOSTAZOL 100 MG: 100 TABLET ORAL at 17:19

## 2022-11-10 RX ADMIN — PANTOPRAZOLE SODIUM 40 MG: 40 TABLET, DELAYED RELEASE ORAL at 09:08

## 2022-11-10 RX ADMIN — BUDESONIDE AND FORMOTEROL FUMARATE DIHYDRATE 2 PUFF: 160; 4.5 AEROSOL RESPIRATORY (INHALATION) at 08:13

## 2022-11-10 NOTE — PAYOR COMM NOTE
"    Yen Romero RN [facility]  [phone]       Phone  [phone]       Fax  Cont. Stay Review      Filomena Perales (72 y.o. Female)     Date of Birth   1950    Social Security Number       Address   [address] [place] [address]    Home Phone   [phone]    MRN   [id number]       Christian   Scientologist    Marital Status                               Admission Date   11/1/22    Admission Type   Emergency    Admitting Provider   Danish Flores MD    Attending Provider   Danish Flores MD    Department, Room/Bed   [facility] 3 [place], 321/1       Discharge Date       Discharge Disposition       Discharge Destination                               Attending Provider: Danish Flores MD    Allergies: Morphine And Related, Penicillins    Isolation: None   Infection: None   Code Status: CPR    Ht: 157.5 cm (62\")   Wt: 59.8 kg (131 lb 12.8 oz)    Admission Cmt: None   Principal Problem: None                Active Insurance as of 11/1/2022     Primary Coverage     Payor Plan Insurance Group Employer/Plan Group    [id number]     Payor Plan Address Payor Plan Phone Number Payor Plan Fax Number Effective Dates    [address] [address] [phone]  5/1/2021 - None Entered    [facility] [id number]       Subscriber Name Subscriber Birth Date Member ID       FILOMENA PERALES 1950 [id number]                 Emergency Contacts      (Rel.) Home Phone Work Phone Mobile Phone    Renee Li (Grandchild) -- -- [phone]    Meena Marie (Friend) -- -- [phone]            Vital Signs (last day)     Date/Time Temp Temp src Pulse Resp BP Patient Position SpO2    11/10/22 1105 -- -- 63 18 -- -- --    11/10/22 1056 -- -- 68 18 -- -- 92    11/10/22 0817 97.3 (36.3) Oral 64 18 124/81 Lying 92    11/10/22 0813 -- -- 71 18 -- -- --    11/10/22 0806 -- " -- 76 18 -- -- 91    11/10/22 0800 -- -- 61 -- -- -- --    11/10/22 0342 98.2 (36.8) Temporal 71 18 125/57 Lying 94    11/10/22 0057 -- -- 61 -- -- -- --    11/09/22 2335 97.2 (36.2) Temporal 71 16 99/50 Lying 94    11/09/22 1923 97.2 (36.2) Temporal 86 18 100/71 Sitting 90    11/09/22 1859 -- -- 82 20 -- -- 90    11/09/22 1552 -- -- 68 20 -- -- 91    11/09/22 1537 97.6 (36.4) Oral 70 18 124/66 Lying 91    11/09/22 1531 -- -- 68 -- -- -- --    11/09/22 1300 -- -- -- -- 106/51 Lying --    11/09/22 1203 -- -- -- -- -- -- 90    11/09/22 1201 -- -- -- -- -- -- 85    11/09/22 1159 -- -- -- -- -- -- 91    11/09/22 1118 -- -- 68 -- -- -- --    11/09/22 1111 -- -- 72 20 -- -- 91    11/09/22 0802 -- -- 62 -- -- -- --    11/09/22 0752 97 (36.1) Oral 77 18 127/67 Lying 95    11/09/22 0735 -- -- 88 -- -- -- --    11/09/22 0727 -- -- 78 18 -- -- 92    11/09/22 0306 96.5 (35.8) Infrared 76 18 123/58 Lying 97    11/09/22 0149 -- -- 65 -- -- -- --          Oxygen Therapy (last day)     Date/Time SpO2 Device (Oxygen Therapy) Flow (L/min) Oxygen Concentration (%) ETCO2 (mmHg)    11/10/22 1105 -- humidified;nasal cannula -- -- --    11/10/22 1056 92 humidified;nasal cannula 3 -- --    11/10/22 0817 92 humidified;nasal cannula 3 -- --    11/10/22 0813 -- humidified;nasal cannula 3 -- --    11/10/22 0806 91 humidified;nasal cannula 3 -- --    11/10/22 0342 94 -- -- -- --    11/09/22 2335 94 -- -- -- --    11/09/22 1923 90 -- -- -- --    11/09/22 1907 -- humidified;nasal cannula 3 -- --    11/09/22 1859 90 humidified;nasal cannula 3 -- --    11/09/22 1552 91 nasal cannula 3 -- --    11/09/22 1537 91 humidified;nasal cannula 3 -- --    11/09/22 1203 90 nasal cannula;humidified 3 -- --    11/09/22 1201 85 room air -- -- --    11/09/22 1159 91 nasal cannula;humidified 3 -- --    11/09/22 1118 -- nasal cannula;humidified 3 -- --    11/09/22 1111 91 nasal cannula;humidified 3 -- --    11/09/22 0752 95 nasal cannula;humidified 3 -- --     11/09/22 0735 -- nasal cannula 3 -- --    11/09/22 0727 92 nasal cannula 3 -- --    11/09/22 0306 97 -- -- -- --          Current Facility-Administered Medications   Medication Dose Route Frequency Provider Last Rate Last Admin   • acetaminophen (TYLENOL) tablet 650 mg  650 mg Oral Q4H PRN Danish Flores MD   650 mg at 11/03/22 1722    Or   • acetaminophen (TYLENOL) suppository 650 mg  650 mg Rectal Q4H PRN Danish Flores MD       • acetaminophen (TYLENOL) tablet 650 mg  650 mg Oral Q4H PRN Danish Flores MD       • amitriptyline (ELAVIL) tablet 25 mg  25 mg Oral Nightly Neftali Haywood MD   25 mg at 11/09/22 2038   • benzonatate (TESSALON) capsule 200 mg  200 mg Oral Q4H PRN Danish Flores MD   200 mg at 11/06/22 0637   • budesonide-formoterol (SYMBICORT) 160-4.5 MCG/ACT inhaler 2 puff  2 puff Inhalation BID - RT Neftali Haywood MD   2 puff at 11/10/22 0813   • cetirizine (zyrTEC) tablet 10 mg  10 mg Oral Daily Jolanta Stanley MD   10 mg at 11/10/22 0908   • cilostazol (PLETAL) tablet 100 mg  100 mg Oral BID AC Neftali Haywood MD   100 mg at 11/10/22 0634   • clopidogrel (PLAVIX) tablet 75 mg  75 mg Oral Daily Neftali Haywood MD   75 mg at 11/10/22 0908   • furosemide (LASIX) injection 40 mg  40 mg Intravenous Daily Jolanta Stanley MD   40 mg at 11/10/22 0909   • gabapentin (NEURONTIN) capsule 400 mg  400 mg Oral Q8H Neftali Haywood MD   400 mg at 11/10/22 0612   • guaiFENesin (MUCINEX) 12 hr tablet 600 mg  600 mg Oral Q12H Jolanta Stanley MD   600 mg at 11/10/22 0908   • ipratropium-albuterol (DUO-NEB) nebulizer solution 3 mL  3 mL Nebulization Q4H While Awake Danish Flores MD   3 mL at 11/10/22 1056   • ondansetron (ZOFRAN) injection 4 mg  4 mg Intravenous Q6H PRN Danish Flores MD   4 mg at 11/03/22 0335   • ondansetron (ZOFRAN) tablet 4 mg  4 mg Oral Q8H PRN Neftali Haywood MD       • oxyCODONE-acetaminophen (PERCOCET) 5-325 MG per tablet 2 tablet  2 tablet Oral Q6H  Jolanta De Luna MD   2 tablet at 11/10/22 0908   • pantoprazole (PROTONIX) EC tablet 40 mg  40 mg Oral Daily Neftali Haywood MD   40 mg at 11/10/22 0908   • polyethylene glycol (MIRALAX) packet 17 g  17 g Oral Daily Behroozi, Saeid, MD   17 g at 11/10/22 0909   • pravastatin (PRAVACHOL) tablet 20 mg  20 mg Oral Nightly Neftali Haywood MD   20 mg at 11/09/22 2038   • predniSONE (DELTASONE) tablet 40 mg  40 mg Oral Q12H Jolanta Stanley MD   40 mg at 11/10/22 0908   • rivaroxaban (XARELTO) tablet 20 mg  20 mg Oral Daily With Dinner Neftali Haywood MD   20 mg at 11/09/22 1712   • sennosides-docusate (PERICOLACE) 8.6-50 MG per tablet 2 tablet  2 tablet Oral BID PRN Behroozi, Saeid, MD   2 tablet at 11/08/22 2148   • sertraline (ZOLOFT) tablet 100 mg  100 mg Oral Daily Neftali Haywood MD   100 mg at 11/10/22 0908   • sodium chloride 0.9 % flush 10 mL  10 mL Intravenous Q12H Danish Flores MD   10 mL at 11/09/22 0844   • sodium chloride 0.9 % flush 10 mL  10 mL Intravenous PRN Danish Flores MD       • sodium chloride 0.9 % flush 10 mL  10 mL Intravenous Q12H Behroozi, Saeid, MD   10 mL at 11/09/22 0844   • sodium chloride 0.9 % flush 10 mL  10 mL Intravenous Q12H Behroozi, Saeid, MD   10 mL at 11/09/22 2039   • sodium chloride 0.9 % flush 10 mL  10 mL Intravenous Q12H Behroozi, Saeid, MD   10 mL at 11/09/22 2039   • sodium chloride 0.9 % flush 10 mL  10 mL Intravenous PRN Behroozi, Saeid, MD       • sodium chloride 0.9 % flush 20 mL  20 mL Intravenous PRN Behroozi, Saeid, MD       • traZODone (DESYREL) tablet 150 mg  150 mg Oral Nightly Neftali Haywood MD   150 mg at 11/09/22 2038        Physician Progress Notes (last 48 hours)      Jolanta Stanley MD at 11/09/22 1729              [facility] Medicine Services  INPATIENT PROGRESS NOTE    Length of Stay: 8  Date of Admission: 11/1/2022  Primary Care Physician: Anahi Camacho APRN    Subjective    Patient seen and evaluated today, reports no issues or concerns.  Patient on 3L NC and not in distress. No issues reported overnight      Objective    Temp:  [96.5 °F (35.8 °C)-97.6 °F (36.4 °C)] 97.6 °F (36.4 °C)  Heart Rate:  [62-88] 68  Resp:  [18-20] 20  BP: (106-127)/(51-67) 124/66    Physical Exam:   Temp:  [96.5 °F (35.8 °C)-97.6 °F (36.4 °C)] 97.6 °F (36.4 °C)  Heart Rate:  [62-88] 68  Resp:  [18-20] 20  BP: (106-127)/(51-67) 124/66  Physical Exam  General: NC/AT, appears stated age, alert and oriented x3 on high flow oxygen.  HEENT: PERRLA, EOMI, no scleral icterus, no conjunctival injection,   NECK:  Neck is supple, no JVD, no supraclavicular lymphadenopathy  CV: S1 S2 RRR, no murmurs, no gallops, no heaves, pulses palpable.  LUNG: Fine coarse breath sounds  ABD: Nondistended, nontender, bowel sounds present, no guarding or rebound, organomegaly not appreciated.   EXT: FROM, strength is intact, no pitting edema, no joint effusion, no calf tenderness.  Pulses palpable  Neuro: CN 2-12, grossly intact,no  focal weakness appreciated  Skin: Warm and dry, no rash or lesions.      Results Review:  I have reviewed the labs, radiology results, and diagnostic studies.    Laboratory Data:   Results from last 7 days   Lab Units 11/09/22  0626 11/08/22  0510 11/07/22  0513 11/06/22  0657 11/05/22  0544 11/04/22  0235 11/03/22  2326   SODIUM mmol/L 138 137 139   < > 140   < > 138   POTASSIUM mmol/L 5.0 5.2 4.7   < > 5.1   < > 3.9   CHLORIDE mmol/L 97* 97* 98   < > 103   < > 99   CO2 mmol/L 35.0* 35.0* 36.0*   < > 31.0*   < > 27.0   BUN mg/dL 26* 26* 24*   < > 19   < > 19   CREATININE mg/dL 0.80 0.63 0.74   < > 0.67   < > 0.72   GLUCOSE mg/dL 129* 144* 128*   < > 143*   < > 152*   CALCIUM mg/dL 8.5* 8.5* 8.6   < > 9.1   < > 8.7   BILIRUBIN mg/dL  --   --   --   --  <0.2  --  <0.2   ALK PHOS U/L  --   --   --   --  81  --  78   ALT (SGPT) U/L  --   --   --   --  10  --  7   AST (SGOT) U/L  --   --   --   --  15  --  14    ANION GAP mmol/L 6.0 5.0 5.0   < > 6.0   < > 12.0    < > = values in this interval not displayed.     Estimated Creatinine Clearance: 56.3 mL/min (by C-G formula based on SCr of 0.8 mg/dL).  Results from last 7 days   Lab Units 11/05/22  0544 11/03/22  0638   MAGNESIUM mg/dL 1.9 1.6         Results from last 7 days   Lab Units 11/09/22  0626 11/08/22  0510 11/07/22  0513 11/06/22  0525 11/05/22  0544   WBC 10*3/mm3 13.64* 13.46* 14.18* 14.04* 16.52*   HEMOGLOBIN g/dL 10.9* 10.5* 10.6* 10.9* 11.1*   HEMATOCRIT % 32.5* 32.8* 33.6* 33.9* 33.9*   PLATELETS 10*3/mm3 328 333 321 339 320           Culture Data:   No results found for: BLOODCX  No results found for: URINECX  No results found for: RESPCX  No results found for: WOUNDCX  No results found for: STOOLCX  No components found for: BODYFLD    Radiology Data:   Imaging Results (Last 24 Hours)     ** No results found for the last 24 hours. **          I have reviewed the patient's current medications.     Assessment/Plan     Active Hospital Problems    Diagnosis    • COPD with exacerbation (HCC)    Acute on chronic respiratory failure with hypoxia  PVD  Leukocytosis  Chronic back pain  Paroxysmal A. fib  COPD exacerbation          11/05/2022  Plan:    -Continue oxygen supplementation, currently on high flow 6 L, wean as tolerated.  - Continue breathing treatments with DuoNeb and budesonide treatment  - Continue HCAP coverage with meropenem.  Blood cultures negative over 3 days.  - Continue Solu-Medrol 60 mg IV every 8 hours  - Continue anticoagulation with Xarelto  - Continue treatment plan for management of comorbid condition    _____________________________________________________________________    11/06/2022  Plan:    -Continue oxygen supplementation, currently on high flow 4 L, wean as tolerated.  - Continue breathing treatments with DuoNeb and budesonide treatment  - Continue HCAP coverage with meropenem.  Blood cultures negative  - Continue Solu-Medrol 40 mg  IV every 8 hours  - Continue anticoagulation with Xarelto  --Give a dose of Lasix 40mg IV  X1.  --Started on Zyrtec 10mg daily  --Started on Mucinex DM 600mg BID  - Continue treatment plan for management of comorbid condition    Disposition pending patient's continued improvement with decreasing oxygen requirement and returned to baseline.        _____________________________________________________________________    11/07/2022  Plan:    -Continue oxygen supplementation, currently on high flow 4 L, wean as tolerated.  Patient's baseline is 2 L home O2  - Continue breathing treatments with DuoNeb and budesonide treatment  - Continue HCAP coverage with meropenem.  Blood cultures negative  - Continue Solu-Medrol 40 mg IV every 8 hours  - Continue anticoagulation with Xarelto  --Give a dose of Lasix 40mg IV  X1.  -- Continue Zyrtec 10mg daily  -- Continue Mucinex DM 600mg BID  - Continue treatment plan for management of comorbid condition    Disposition pending patient's continued improvement with decreasing oxygen requirement and returned to baseline.        _____________________________________________________________________    11/08/2022  Plan:    -Continue oxygen supplementation, currently on high flow 4 L, wean as tolerated.  Patient's baseline is 2 L home O2  - Continue breathing treatments with DuoNeb and budesonide treatment  - Continue HCAP coverage with meropenem, day 5.  Blood cultures negative  - Steroids changed to prednisone 40 mg p.o. every 12 starting tomorrow.  - Continue anticoagulation with Xarelto  -- Continue Lasix 40 mg IV daily  -- Continue Zyrtec 10mg daily  -- Continue Mucinex DM 600mg BID  - Continue treatment plan for management of comorbid condition      _____________________________________________________________________    11/09/2022  Plan:    -Continue oxygen supplementation, currently on 3 L, wean as tolerated.  Patient's baseline is 2 L home O2  - Continue breathing treatments with  DuoNeb and budesonide treatment  - Continue HCAP coverage with meropenem, day 6.  Blood cultures negative  -Continue Prednisone 40 mg p.o. every 12 hours  - Continue anticoagulation with Xarelto  --Continue Lasix 40 mg IV daily  -- Continue Zyrtec 10mg daily  -- Continue Mucinex DM 600mg BID  - Continue treatment plan for management of comorbid condition    Patient is almost down to her baseline O2 requirement of 2L.   Possible d/c home tomorrow with continue home O2 with treatment completion of her PNA.     DVT and GI prophylaxis  Full code             Jolanta Stanley MD    Electronically signed by Jolanta Stanley MD at 11/09/22 0512     Jolanta Stanley MD at 11/08/22 1810              [facility] Medicine Services  INPATIENT PROGRESS NOTE    Length of Stay: 7  Date of Admission: 11/1/2022  Primary Care Physician: Anahi Camacho APRN    Subjective   Patient seen and evaluated today, reports no issues or concerns.  Patient continues to need 4 L high flow oxygen. She is reported to desaturate on exertion. However she is able to move from the bed to the bathroom with 4 L oxygen.      Objective    Temp:  [97.2 °F (36.2 °C)-98.3 °F (36.8 °C)] 98.1 °F (36.7 °C)  Heart Rate:  [62-81] 63  Resp:  [16-20] 18  BP: ()/(52-71) 118/59    Physical Exam:   Temp:  [97.2 °F (36.2 °C)-98.3 °F (36.8 °C)] 98.1 °F (36.7 °C)  Heart Rate:  [62-81] 63  Resp:  [16-20] 18  BP: ()/(52-71) 118/59  Physical Exam  General: NC/AT, appears stated age, alert and oriented x3 on high flow oxygen.  HEENT: PERRLA, EOMI, no scleral icterus, no conjunctival injection,   NECK:  Neck is supple, no JVD, no supraclavicular lymphadenopathy  CV: S1 S2 RRR, no murmurs, no gallops, no heaves, pulses palpable.  LUNG: Fine bibasilar Rales  ABD: Nondistended, nontender, bowel sounds present, no guarding or rebound, organomegaly not appreciated.   EXT: FROM, strength is intact, no pitting  edema, no joint effusion, no calf tenderness.  Pulses palpable  Neuro: CN 2-12, grossly intact,no  focal weakness appreciated  Skin: Warm and dry, no rash or lesions.      Results Review:  I have reviewed the labs, radiology results, and diagnostic studies.    Laboratory Data:   Results from last 7 days   Lab Units 11/08/22  0510 11/07/22  0513 11/06/22  0657 11/05/22 0544 11/04/22 0235 11/03/22  2326   SODIUM mmol/L 137 139 141 140   < > 138   POTASSIUM mmol/L 5.2 4.7 5.1 5.1   < > 3.9   CHLORIDE mmol/L 97* 98 103 103   < > 99   CO2 mmol/L 35.0* 36.0* 30.0* 31.0*   < > 27.0   BUN mg/dL 26* 24* 23 19   < > 19   CREATININE mg/dL 0.63 0.74 0.65 0.67   < > 0.72   GLUCOSE mg/dL 144* 128* 132* 143*   < > 152*   CALCIUM mg/dL 8.5* 8.6 8.7 9.1   < > 8.7   BILIRUBIN mg/dL  --   --   --  <0.2  --  <0.2   ALK PHOS U/L  --   --   --  81  --  78   ALT (SGPT) U/L  --   --   --  10  --  7   AST (SGOT) U/L  --   --   --  15  --  14   ANION GAP mmol/L 5.0 5.0 8.0 6.0   < > 12.0    < > = values in this interval not displayed.     Estimated Creatinine Clearance: 71.8 mL/min (by C-G formula based on SCr of 0.63 mg/dL).  Results from last 7 days   Lab Units 11/05/22  0544 11/03/22  0638   MAGNESIUM mg/dL 1.9 1.6         Results from last 7 days   Lab Units 11/08/22  0510 11/07/22  0513 11/06/22  0525 11/05/22 0544 11/04/22  0235   WBC 10*3/mm3 13.46* 14.18* 14.04* 16.52* 20.43*   HEMOGLOBIN g/dL 10.5* 10.6* 10.9* 11.1* 10.3*   HEMATOCRIT % 32.8* 33.6* 33.9* 33.9* 31.7*   PLATELETS 10*3/mm3 333 321 339 320 313           Culture Data:   No results found for: BLOODCX  No results found for: URINECX  No results found for: RESPCX  No results found for: WOUNDCX  No results found for: STOOLCX  No components found for: BODYFLD    Radiology Data:   Imaging Results (Last 24 Hours)     ** No results found for the last 24 hours. **          I have reviewed the patient's current medications.     Assessment/Plan     Active Hospital Problems     Diagnosis    • COPD with exacerbation (HCC)    Acute on chronic respiratory failure with hypoxia  PVD  Leukocytosis  Chronic back pain  Paroxysmal A. fib  COPD exacerbation          11/05/2022  Plan:    -Continue oxygen supplementation, currently on high flow 6 L, wean as tolerated.  - Continue breathing treatments with DuoNeb and budesonide treatment  - Continue HCAP coverage with meropenem.  Blood cultures negative over 3 days.  - Continue Solu-Medrol 60 mg IV every 8 hours  - Continue anticoagulation with Xarelto  - Continue treatment plan for management of comorbid condition    _____________________________________________________________________    11/06/2022  Plan:    -Continue oxygen supplementation, currently on high flow 4 L, wean as tolerated.  - Continue breathing treatments with DuoNeb and budesonide treatment  - Continue HCAP coverage with meropenem.  Blood cultures negative  - Continue Solu-Medrol 40 mg IV every 8 hours  - Continue anticoagulation with Xarelto  --Give a dose of Lasix 40mg IV  X1.  --Started on Zyrtec 10mg daily  --Started on Mucinex DM 600mg BID  - Continue treatment plan for management of comorbid condition    Disposition pending patient's continued improvement with decreasing oxygen requirement and returned to baseline.        _____________________________________________________________________    11/07/2022  Plan:    -Continue oxygen supplementation, currently on high flow 4 L, wean as tolerated.  Patient's baseline is 2 L home O2  - Continue breathing treatments with DuoNeb and budesonide treatment  - Continue HCAP coverage with meropenem.  Blood cultures negative  - Continue Solu-Medrol 40 mg IV every 8 hours  - Continue anticoagulation with Xarelto  --Give a dose of Lasix 40mg IV  X1.  -- Continue Zyrtec 10mg daily  -- Continue Mucinex DM 600mg BID  - Continue treatment plan for management of comorbid condition    Disposition pending patient's continued improvement with  decreasing oxygen requirement and returned to baseline.        _____________________________________________________________________    11/08/2022  Plan:    -Continue oxygen supplementation, currently on high flow 4 L, wean as tolerated.  Patient's baseline is 2 L home O2  - Continue breathing treatments with DuoNeb and budesonide treatment  - Continue HCAP coverage with meropenem, day 5.  Blood cultures negative  - Steroids changed to prednisone 40 mg p.o. every 12 starting tomorrow.  - Continue anticoagulation with Xarelto  -- Continue Lasix 40 mg IV daily  -- Continue Zyrtec 10mg daily  -- Continue Mucinex DM 600mg BID  - Continue treatment plan for management of comorbid condition    Possible discharge to SNF for rehab with oxygen supplementation    DVT and GI prophylaxis  Full code          Jolanta Stanley MD    Electronically signed by Jolanta Stanley MD at 11/08/22 1819       Medical Student Notes (last 24 hours)  Notes from 11/09/22 1129 through 11/10/22 1129   No notes of this type exist for this encounter.         Consult Notes (last 24 hours)  Notes from 11/09/22 1129 through 11/10/22 1129   No notes of this type exist for this encounter.

## 2022-11-10 NOTE — THERAPY TREATMENT NOTE
Acute Care - Physical Therapy Treatment Note  [facility]     Patient Name: Mona Perales  : 1950  MRN: [id number]  Today's Date: 11/10/2022      Visit Dx:     ICD-10-CM ICD-9-CM   1. ST elevation myocardial infarction (STEMI), unspecified artery ([facility])  I21.3 410.90   2. Chronic obstructive pulmonary disease with acute exacerbation ([facility])  J44.1 491.21   3. Impaired functional mobility, balance, gait, and endurance  Z74.09 V49.89     Patient Active Problem List   Diagnosis   • Anxiety   • CAD (coronary atherosclerotic disease)   • Carotid stenosis   • COPD (chronic obstructive pulmonary disease) ([facility])   • COPD with exacerbation ([facility])   • Depressive disorder, not elsewhere classified   • Benign essential hypertension   • Hypercholesteremia   • Insomnia   • Notalgia   • Osteoporosis   • Other screening mammogram   • RUQ abdominal pain   • PVD (peripheral vascular disease) with claudication ([facility])   • Nicotine abuse   • Dysphagia   • Epigastric pain   • Pain of right hand   • Closed displaced fracture of shaft of fifth metacarpal bone of right hand   • Cause of injury, fall   • Displaced fracture of shaft of fifth metacarpal bone of right hand with routine healing   • Syncope   • Acute respiratory failure with hypoxia ([facility])   • (HFpEF) heart failure with preserved ejection fraction ([facility])   • Hypotension   • Elevated WBCs   • Near syncope   • Atherosclerosis of native coronary artery of native heart without angina pectoris   • Atherosclerosis of native arteries of extremities with rest pain, left leg ([facility])   • PVD (peripheral vascular disease) ([facility])   • Physical deconditioning   • Pain due to onychomycosis of toenails of both feet   • ST elevation myocardial infarction (STEMI), unspecified artery ([facility])     Past Medical History:   Diagnosis Date   • A-fib ([facility])    • Anxiety    • Arthritis    • Asthma    • 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 with ulceration ([facility])     bilateral legs -  bilateral iliac stents 2008      • CHF (congestive heart failure) ([facility])    • Chronic lower back pain    • COPD (chronic obstructive pulmonary disease) ([facility])    • Essential (primary) hypertension    • GERD (gastroesophageal reflux disease)    • History of pulmonary embolus (PE)    • Hyperlipidemia    • Insomnia    • Lumbago    • Nicotine dependence    • Occlusion of artery      and stenosis of bilateral carotid arteries - BABS 16-49%, LICA 0-15%   • Other atherosclerosis of native artery of other extremity     LEFT subclavian stent 2005 (occluded)   • Sleep apnea      Past Surgical History:   Procedure Laterality Date   • CARDIAC CATHETERIZATION  04/06/2016    No evidence of any obstructive epicardial CAD.Preserved LV systolic function with EF of 55%.   • CARDIAC CATHETERIZATION N/A 11/1/2022    Procedure: Left Heart Cath;  Surgeon: Neftali Haywood MD;  Location: [facility] CATH INVASIVE LOCATION;  Service: Cardiology;  Laterality: N/A;   • CENTRAL VENOUS LINE INSERTION  04/07/2016    Successful placement of right uppe extremity 6-[place] triple lumen PICC line.   • ENDOSCOPY N/A 1/12/2018    Procedure: ESOPHAGOGASTRODUODENOSCOPY;  Surgeon: Bin Oh MD;  Location: [facility] ENDOSCOPY;  Service:    • ENDOSCOPY N/A 1/17/2018    Procedure: ESOPHAGOGASTRODUODENOSCOPY;  Surgeon: Bin Oh MD;  Location: [facility] ENDOSCOPY;  Service:    • ENDOSCOPY N/A 3/16/2018    Procedure: ESOPHAGOGASTRODUODENOSCOPY possible dilation;  Surgeon: Bin Oh MD;  Location: [facility] ENDOSCOPY;  Service: Gastroenterology   • FEMORAL POPLITEAL BYPASS Left 4/14/2020    Procedure: FEMORAL POPLITEAL BYPASS ABOVE KNEE  (POLYTETRAFLUOROETHYLENE)  LEFT COMMON FEMORAL ARTERY ENDARTERECTOMY PTA LEFT ILIAC ARTERIOGRAM        (c-arm#2 and c-arm table);  Surgeon: David Suh MD;  Location: [facility] OR;  Service: Vascular;  Laterality: Left;   • FEMORAL POPLITEAL BYPASS Right 9/17/2021    Procedure: RIGHT common femoral endarterectomy  FEMORAL POPLITEAL BYPASS (above the knee polytetrafluoroethylene  lower extremity arteriogram              (c-arm#2 and c-arm table);  Surgeon: David Suh MD;  Location: [facility] OR;  Service: Vascular;  Laterality: Right;   • HYSTERECTOMY     • INTERVENTIONAL RADIOLOGY PROCEDURE Left 9/9/2020    Procedure: IR angiogram extremity left;  Surgeon: David Suh MD;  Location: [facility] ANGIO INVASIVE LOCATION;  Service: Interventional Radiology;  Laterality: Left;   • KYPHOPLASTY N/A 5/23/2019    Procedure: KYPHOPLASTY LUMBAR FOUR;  Surgeon: Aba Santa MD;  Location: [facility] OR;  Service: Orthopedic Spine   • TOTAL SHOULDER REPLACEMENT Left    • TRANSESOPHAGEAL ECHOCARDIOGRAM (JACQUES)  04/07/2016    With color flow-Mild to moderate [facility].LV systolic function well preserved with Ef of 55-60%.Mitral and AV intact.Diastolic dysfunction   • UPPER GASTROINTESTINAL ENDOSCOPY  01/17/2018    Dr. Bin Martin M.D.     PT Assessment (last 12 hours)     PT Evaluation and Treatment     [facility] Name 11/10/22 1020          Physical Therapy Time and Intention    Subjective Information no complaints  -     Document Type therapy note (daily note)  -     Mode of Treatment physical therapy;individual therapy  -[facility] Name 11/10/22 1020          General Information    Patient Profile Reviewed yes  -     Existing Precautions/Restrictions fall;oxygen therapy device and L/min  3 L/min  -[facility] Name 11/10/22 1020          Pain    Pretreatment Pain Rating 0/10 - no pain  -     Posttreatment Pain Rating 0/10 - no pain  -[facility] Name 11/10/22 1020          Cognition    Orientation Status (Cognition) oriented x 4  -[facility] Name 11/10/22 1020          Bed Mobility    Supine-Sit [place] (Bed Mobility) modified independence  -     Sit-Supine [place] (Bed Mobility) modified independence  -     Assistive Device (Bed Mobility) head of bed elevated;bed rails  -[facility] Name 11/10/22  1020          Sit-Stand Transfer    Sit-Stand [place] (Transfers) modified independence  -     Assistive Device (Sit-Stand Transfers) walker, front-wheeled  -     Row Name 11/10/22 1020          Stand-Sit Transfer    Stand-Sit [place] (Transfers) modified independence  -     Assistive Device (Stand-Sit Transfers) walker, front-wheeled  -     Row Name 11/10/22 1020          Toilet Transfer    Type (Toilet Transfer) sit-stand;stand-sit  -     [place] Level (Toilet Transfer) modified independence  -     Assistive Device (Toilet Transfer) commode  -[facility] Name 11/10/22 1020          Gait/Stairs (Locomotion)    [place] Level (Gait) standby assist;contact guard;1 person to manage equipment  -     Assistive Device (Gait) walker, front-wheeled  [facility]     Distance in Feet (Gait) 6' x 2 no AD, ~110' w/ RW in hallway  -[facility] Name 11/10/22 1020          Safety Issues, Functional Mobility    Impairments Affecting Function (Mobility) strength;endurance/activity tolerance;pain  -[facility] Name 11/10/22 1020          Hip (Therapeutic Exercise)    Hip AROM (Therapeutic Exercise) bilateral;flexion;sitting;20 repititions  [facility]     Hip Isometrics (Therapeutic Exercise) bilateral;aDduction;sitting  x 20  -[facility] Name 11/10/22 1020          Knee (Therapeutic Exercise)    Knee Strengthening (Therapeutic Exercise) bilateral;LAQ (long arc quad);sitting;20 repititions  -[facility] Name 11/10/22 1020          Ankle (Therapeutic Exercise)    Ankle AROM (Therapeutic Exercise) bilateral;plantarflexion;dorsiflexion;sitting;20 repititions  -[facility] Name 11/10/22 1020          Plan of Care Review    Plan of Care Reviewed With patient  -     Outcome Evaluation pt had removed O2 for toilet t/danny earlier and forgot to put on, pt's O2 83% on RA, put nasal cannula back and O2 increased to 93% >3', pt demsontrates Mod Ind bed mobility, sit<>stands, toilet t/danny, ambulates ~ in hallway w/ RW ~110' w/  O2>90%, performs B LE seated ther ex x 20 reps  -     Row Name 11/10/22 1020          Vital Signs    Pretreatment Heart Rate (beats/min) 65  -JW     Intratreatment Heart Rate (beats/min) 63  -JW     Posttreatment Heart Rate (beats/min) 67  -JW     Pre SpO2 (%) 83  -JW     O2 Delivery Pre Treatment room air  -JW     Intra SpO2 (%) 93  -JW     O2 Delivery Intra Treatment supplemental O2  -JW     Post SpO2 (%) 94  -JW     O2 Delivery Post Treatment supplemental O2  -JW     Pre Patient Position Supine  -JW     Intra Patient Position Standing  -JW     Post Patient Position Supine  -JW     Row Name 11/10/22 1020          Positioning and Restraints    Pre-Treatment Position in bed  -JW     Post Treatment Position bed  -JW     In Bed supine;call light within reach;encouraged to call for assist  -     Row Name 11/10/22 1020          Bed Mobility Goal 1 (PT)    Activity/Assistive Device (Bed Mobility Goal 1, PT) sit to supine/supine to sit  -JW     [place] Level/Cues Needed (Bed Mobility Goal 1, PT) independent  -JW     Time Frame (Bed Mobility Goal 1, PT) by discharge  -JW     Strategies/Barriers (Bed Mobility Goal 1, PT) HOB flat, no bed rails.  -JW     Progress/Outcomes (Bed Mobility Goal 1, PT) goal met  -     Row Name 11/10/22 1020          Transfer Goal 1 (PT)    Activity/Assistive Device (Transfer Goal 1, PT) sit-to-stand/stand-to-sit;bed-to-chair/chair-to-bed;walker, rolling  -JW     [place] Level/Cues Needed (Transfer Goal 1, PT) modified independence  -JW     Time Frame (Transfer Goal 1, PT) by discharge  -JW     Strategies/Barriers (Transfers Goal 1, PT) Maintain SpO2 >90%.  -JW     Progress/Outcome (Transfer Goal 1, PT) goal not met;continuing progress toward goal  -     Row Name 11/10/22 1020          Gait Training Goal 1 (PT)    Activity/Assistive Device (Gait Training Goal 1, PT) walker, rolling  -JW     [place] Level (Gait Training Goal 1, PT) modified independence  -JW     Distance  (Gait Training Goal 1, PT) 25'x2.  -     Time Frame (Gait Training Goal 1, PT) by discharge  -     Strategies/Barriers (Gait Training Goal 1, PT) Maintain SpO2 >90%.  -     Progress/Outcome (Gait Training Goal 1, PT) goal not met;continuing progress toward goal  -     Row Name 11/10/22 1020          Problem Specific Goal 1 (PT)    Problem Specific Goal 1 (PT) Score 25/28 on Tinetti fall risk assessment.  -     Time Frame (Problem Specific Goal 1, PT) by discharge  -     Strategies/Barriers (Problem Specific Goal 1, PT) Maintain SpO2 >90%.  -     Progress/Outcome (Problem Specific Goal 1, PT) goal not met  -           User Key  (r) = Recorded By, (t) = Taken By, (c) = Cosigned By    Initials Name Provider Type     Danyell Shipley, PTA Physical Therapist Assistant                Physical Therapy Education     Title: PT OT SLP Therapies (In Progress)     Topic: Physical Therapy (In Progress)     Point: Mobility training (Done)     Learning Progress Summary           Patient Acceptance, E, VU by  at 11/3/2022 1100    Comment: PT POC, rehab process.                   Point: Home exercise program (Not Started)     Learner Progress:  Not documented in this visit.          Point: Body mechanics (Not Started)     Learner Progress:  Not documented in this visit.          Point: Precautions (Not Started)     Learner Progress:  Not documented in this visit.                      User Key     Initials Effective Dates Name Provider Type [facility] 09/18/22 -  Hakeem Wolfe, PT Physical Therapist PT              PT Recommendation and Plan  Anticipated Discharge Disposition (PT): home with assist, home with home health  Therapy Frequency (PT): other (see comments) (5-7 days/week)  Plan of Care Reviewed With: patient  Outcome Evaluation: pt had removed O2 for toilet t/danny earlier and forgot to put on, pt's O2 83% on RA, put nasal cannula back and O2 increased to 93% >3', pt demsontrates Mod Ind bed  mobility, sit<>stands, toilet t/danny, ambulates ~ in hallway w/ RW ~110' w/ O2>90%, performs B LE seated ther ex x 20 reps       Time Calculation:    PT Charges     Row Name 11/10/22 1020             Time Calculation    Start Time 1020  -      Stop Time 1044  -      Time Calculation (min) 24 min  -      PT Received On 11/10/22  -         Time Calculation- PT    Total Timed Code Minutes- PT 24 minute(s)  -            User Key  (r) = Recorded By, (t) = Taken By, (c) = Cosigned By    Initials Name Provider Type    Danyell Landin PTA Physical Therapist Assistant              Therapy Charges for Today     Code Description Service Date Service Provider Modifiers Qty    12713018385 HC PT THER PROC EA 15 MIN 11/10/2022 Danyell Shipley PTA GP 1    09878141617 HC GAIT TRAINING EA 15 MIN 11/10/2022 Danyell Shipley PTA GP 1          PT G-Codes  Outcome Measure Options: AM-PAC 6 Clicks Basic Mobility (PT), [place]  AM-PAC 6 Clicks Score (PT): 23    Danyell Shipley PTA  11/10/2022

## 2022-11-10 NOTE — PROGRESS NOTES
[facility] Medicine Services  INPATIENT PROGRESS NOTE    Length of Stay: 9  Date of Admission: 11/1/2022  Primary Care Physician: Anahi Camacho APRN    Subjective   Patient seen and evaluated today, reports no issues or concerns.  Patient is weaned down to 2 L oxygen by nasal cannula.  Patient's baseline is 2 L.      Objective    Temp:  [97.2 °F (36.2 °C)-98.2 °F (36.8 °C)] 98 °F (36.7 °C)  Heart Rate:  [61-86] 64  Resp:  [16-20] 18  BP: ()/(50-81) 119/58    Physical Exam:   Temp:  [97.2 °F (36.2 °C)-98.2 °F (36.8 °C)] 98 °F (36.7 °C)  Heart Rate:  [61-86] 64  Resp:  [16-20] 18  BP: ()/(50-81) 119/58  Physical Exam  General: NC/AT, appears stated age, alert and oriented x3 on high flow oxygen.  HEENT: PERRLA, EOMI, no scleral icterus, no conjunctival injection,   NECK:  Neck is supple, no JVD, no supraclavicular lymphadenopathy  CV: S1 S2 RRR, no murmurs, no gallops, no heaves, pulses palpable.  LUNG: Fine coarse breath sounds, good air entry bilaterally  ABD: Nondistended, nontender, bowel sounds present, no guarding or rebound, organomegaly not appreciated.   EXT: FROM, strength is intact, no pitting edema, no joint effusion, no calf tenderness.  Pulses palpable  Neuro: CN 2-12, grossly intact,no  focal weakness appreciated  Skin: Warm and dry, no rash or lesions.      Results Review:  I have reviewed the labs, radiology results, and diagnostic studies.    Laboratory Data:   Results from last 7 days   Lab Units 11/10/22  0919 11/09/22  0626 11/08/22  0510 11/06/22  0657 11/05/22  0544 11/04/22  0235 11/03/22  2326   SODIUM mmol/L 138 138 137   < > 140   < > 138   POTASSIUM mmol/L 5.9* 5.0 5.2   < > 5.1   < > 3.9   CHLORIDE mmol/L 95* 97* 97*   < > 103   < > 99   CO2 mmol/L 34.0* 35.0* 35.0*   < > 31.0*   < > 27.0   BUN mg/dL 30* 26* 26*   < > 19   < > 19   CREATININE mg/dL 0.72 0.80 0.63   < > 0.67   < > 0.72   GLUCOSE mg/dL 158* 129* 144*   < >  143*   < > 152*   CALCIUM mg/dL 8.9 8.5* 8.5*   < > 9.1   < > 8.7   BILIRUBIN mg/dL  --   --   --   --  <0.2  --  <0.2   ALK PHOS U/L  --   --   --   --  81  --  78   ALT (SGPT) U/L  --   --   --   --  10  --  7   AST (SGOT) U/L  --   --   --   --  15  --  14   ANION GAP mmol/L 9.0 6.0 5.0   < > 6.0   < > 12.0    < > = values in this interval not displayed.     Estimated Creatinine Clearance: 68.6 mL/min (by C-G formula based on SCr of 0.72 mg/dL).  Results from last 7 days   Lab Units 11/05/22  0544   MAGNESIUM mg/dL 1.9         Results from last 7 days   Lab Units 11/10/22  0639 11/09/22  0626 11/08/22  0510 11/07/22  0513 11/06/22  0525   WBC 10*3/mm3 13.41* 13.64* 13.46* 14.18* 14.04*   HEMOGLOBIN g/dL 11.6* 10.9* 10.5* 10.6* 10.9*   HEMATOCRIT % 34.6 32.5* 32.8* 33.6* 33.9*   PLATELETS 10*3/mm3 318 328 333 321 339           Culture Data:   No results found for: BLOODCX  No results found for: URINECX  No results found for: RESPCX  No results found for: WOUNDCX  No results found for: STOOLCX  No components found for: BODYFLD    Radiology Data:   Imaging Results (Last 24 Hours)     ** No results found for the last 24 hours. **          I have reviewed the patient's current medications.     Assessment/Plan     Active Hospital Problems    Diagnosis    • COPD with exacerbation (HCC)    Acute on chronic respiratory failure with hypoxia  PVD  Leukocytosis  Chronic back pain  Paroxysmal A. fib  COPD exacerbation          11/05/2022  Plan:    -Continue oxygen supplementation, currently on high flow 6 L, wean as tolerated.  - Continue breathing treatments with DuoNeb and budesonide treatment  - Continue HCAP coverage with meropenem.  Blood cultures negative over 3 days.  - Continue Solu-Medrol 60 mg IV every 8 hours  - Continue anticoagulation with Xarelto  - Continue treatment plan for management of comorbid condition    _____________________________________________________________________    11/06/2022  Plan:     -Continue oxygen supplementation, currently on high flow 4 L, wean as tolerated.  - Continue breathing treatments with DuoNeb and budesonide treatment  - Continue HCAP coverage with meropenem.  Blood cultures negative  - Continue Solu-Medrol 40 mg IV every 8 hours  - Continue anticoagulation with Xarelto  --Give a dose of Lasix 40mg IV  X1.  --Started on Zyrtec 10mg daily  --Started on Mucinex DM 600mg BID  - Continue treatment plan for management of comorbid condition    Disposition pending patient's continued improvement with decreasing oxygen requirement and returned to baseline.        _____________________________________________________________________    11/07/2022  Plan:    -Continue oxygen supplementation, currently on high flow 4 L, wean as tolerated.  Patient's baseline is 2 L home O2  - Continue breathing treatments with DuoNeb and budesonide treatment  - Continue HCAP coverage with meropenem.  Blood cultures negative  - Continue Solu-Medrol 40 mg IV every 8 hours  - Continue anticoagulation with Xarelto  --Give a dose of Lasix 40mg IV  X1.  -- Continue Zyrtec 10mg daily  -- Continue Mucinex DM 600mg BID  - Continue treatment plan for management of comorbid condition    Disposition pending patient's continued improvement with decreasing oxygen requirement and returned to baseline.        _____________________________________________________________________    11/08/2022  Plan:    -Continue oxygen supplementation, currently on high flow 4 L, wean as tolerated.  Patient's baseline is 2 L home O2  - Continue breathing treatments with DuoNeb and budesonide treatment  - Continue HCAP coverage with meropenem, day 5.  Blood cultures negative  - Steroids changed to prednisone 40 mg p.o. every 12 starting tomorrow.  - Continue anticoagulation with Xarelto  -- Continue Lasix 40 mg IV daily  -- Continue Zyrtec 10mg daily  -- Continue Mucinex DM 600mg BID  - Continue treatment plan for management of comorbid  condition      _____________________________________________________________________    11/09/2022  Plan:    -Continue oxygen supplementation, currently on 3 L, wean as tolerated.  Patient's baseline is 2 L home O2  - Continue breathing treatments with DuoNeb and budesonide treatment  - Continue HCAP coverage with meropenem, day 6.  Blood cultures negative  -Continue Prednisone 40 mg p.o. every 12 hours  - Continue anticoagulation with Xarelto  --Continue Lasix 40 mg IV daily  -- Continue Zyrtec 10mg daily  -- Continue Mucinex DM 600mg BID  - Continue treatment plan for management of comorbid condition    Patient is almost down to her baseline O2 requirement of 2L.   Possible d/c home tomorrow with continue home O2 with treatment completion of her PNA.         _____________________________________________________________________    11/10/2022  Plan:    -Continue oxygen supplementation, patient down to her baseline of 2 L NC.  -Continue breathing treatments with DuoNeb and budesonide treatment  -Completed meropenem 6 days for HCAP. Blood cultures negative  -Taper Prednisone 40 mg p.o. every 12 hours to 40mg daily  -Continue anticoagulation with Xarelto  --Switch Lasix to p.o. 40 mg daily  --Continue Zyrtec 10mg daily  --Continue Mucinex DM 600mg BID  --She had elevated potassium of 5.9, Lokelma 5 g given x1.  -Continue treatment plan for management of comorbid condition    Awaiting placement to Rehab.     DVT and GI prophylaxis  Full code             Jolanta Stanley MD

## 2022-11-10 NOTE — PLAN OF CARE
Goal Outcome Evaluation:  Plan of Care Reviewed With: patient           Outcome Evaluation: pt had removed O2 for toilet t/danny earlier and forgot to put on, pt's O2 83% on RA, put nasal cannula back and O2 increased to 93% >3', pt demsontrates Mod Ind bed mobility, sit<>stands, toilet t/danny, ambulates ~ in hallway w/ RW ~110' w/ O2>90%, performs B LE seated ther ex x 20 reps

## 2022-11-11 LAB
ANION GAP SERPL CALCULATED.3IONS-SCNC: 6 MMOL/L (ref 5–15)
BASOPHILS # BLD AUTO: 0.02 10*3/MM3 (ref 0–0.2)
BASOPHILS NFR BLD AUTO: 0.1 % (ref 0–1.5)
BUN SERPL-MCNC: 33 MG/DL (ref 8–23)
BUN/CREAT SERPL: 41.8 (ref 7–25)
CALCIUM SPEC-SCNC: 8.5 MG/DL (ref 8.6–10.5)
CHLORIDE SERPL-SCNC: 93 MMOL/L (ref 98–107)
CO2 SERPL-SCNC: 39 MMOL/L (ref 22–29)
CREAT SERPL-MCNC: 0.79 MG/DL (ref 0.57–1)
DEPRECATED RDW RBC AUTO: 42.6 FL (ref 37–54)
EGFRCR SERPLBLD CKD-EPI 2021: 80.6 ML/MIN/1.73
EOSINOPHIL # BLD AUTO: 0.01 10*3/MM3 (ref 0–0.4)
EOSINOPHIL NFR BLD AUTO: 0.1 % (ref 0.3–6.2)
ERYTHROCYTE [DISTWIDTH] IN BLOOD BY AUTOMATED COUNT: 13.5 % (ref 12.3–15.4)
GLUCOSE SERPL-MCNC: 89 MG/DL (ref 65–99)
HCT VFR BLD AUTO: 32.9 % (ref 34–46.6)
HGB BLD-MCNC: 10.9 G/DL (ref 12–15.9)
IMM GRANULOCYTES # BLD AUTO: 0.2 10*3/MM3 (ref 0–0.05)
IMM GRANULOCYTES NFR BLD AUTO: 1.5 % (ref 0–0.5)
LYMPHOCYTES # BLD AUTO: 3.2 10*3/MM3 (ref 0.7–3.1)
LYMPHOCYTES NFR BLD AUTO: 23.3 % (ref 19.6–45.3)
MCH RBC QN AUTO: 29 PG (ref 26.6–33)
MCHC RBC AUTO-ENTMCNC: 33.1 G/DL (ref 31.5–35.7)
MCV RBC AUTO: 87.5 FL (ref 79–97)
MONOCYTES # BLD AUTO: 1.33 10*3/MM3 (ref 0.1–0.9)
MONOCYTES NFR BLD AUTO: 9.7 % (ref 5–12)
NEUTROPHILS NFR BLD AUTO: 65.3 % (ref 42.7–76)
NEUTROPHILS NFR BLD AUTO: 8.96 10*3/MM3 (ref 1.7–7)
NRBC BLD AUTO-RTO: 0 /100 WBC (ref 0–0.2)
PLATELET # BLD AUTO: 307 10*3/MM3 (ref 140–450)
PMV BLD AUTO: 9.1 FL (ref 6–12)
POTASSIUM SERPL-SCNC: 5.1 MMOL/L (ref 3.5–5.2)
RBC # BLD AUTO: 3.76 10*6/MM3 (ref 3.77–5.28)
SODIUM SERPL-SCNC: 138 MMOL/L (ref 136–145)
WBC NRBC COR # BLD: 13.72 10*3/MM3 (ref 3.4–10.8)

## 2022-11-11 PROCEDURE — 97116 GAIT TRAINING THERAPY: CPT

## 2022-11-11 PROCEDURE — 94799 UNLISTED PULMONARY SVC/PX: CPT

## 2022-11-11 PROCEDURE — 94760 N-INVAS EAR/PLS OXIMETRY 1: CPT

## 2022-11-11 PROCEDURE — 80048 BASIC METABOLIC PNL TOTAL CA: CPT | Performed by: INTERNAL MEDICINE

## 2022-11-11 PROCEDURE — 85025 COMPLETE CBC W/AUTO DIFF WBC: CPT | Performed by: INTERNAL MEDICINE

## 2022-11-11 PROCEDURE — 63710000001 PREDNISONE PER 1 MG: Performed by: INTERNAL MEDICINE

## 2022-11-11 PROCEDURE — 97110 THERAPEUTIC EXERCISES: CPT

## 2022-11-11 RX ADMIN — GABAPENTIN 400 MG: 400 CAPSULE ORAL at 14:10

## 2022-11-11 RX ADMIN — CILOSTAZOL 100 MG: 100 TABLET ORAL at 09:07

## 2022-11-11 RX ADMIN — GUAIFENESIN 600 MG: 600 TABLET, EXTENDED RELEASE ORAL at 09:06

## 2022-11-11 RX ADMIN — Medication 10 ML: at 20:23

## 2022-11-11 RX ADMIN — IPRATROPIUM BROMIDE AND ALBUTEROL SULFATE 3 ML: 2.5; .5 SOLUTION RESPIRATORY (INHALATION) at 19:36

## 2022-11-11 RX ADMIN — BUDESONIDE AND FORMOTEROL FUMARATE DIHYDRATE 2 PUFF: 160; 4.5 AEROSOL RESPIRATORY (INHALATION) at 19:36

## 2022-11-11 RX ADMIN — PANTOPRAZOLE SODIUM 40 MG: 40 TABLET, DELAYED RELEASE ORAL at 09:07

## 2022-11-11 RX ADMIN — RIVAROXABAN 20 MG: 10 TABLET, FILM COATED ORAL at 17:19

## 2022-11-11 RX ADMIN — PREDNISONE 40 MG: 20 TABLET ORAL at 09:07

## 2022-11-11 RX ADMIN — AMITRIPTYLINE HYDROCHLORIDE 25 MG: 25 TABLET, FILM COATED ORAL at 20:21

## 2022-11-11 RX ADMIN — TRAZODONE HYDROCHLORIDE 150 MG: 100 TABLET ORAL at 20:21

## 2022-11-11 RX ADMIN — Medication 10 ML: at 22:13

## 2022-11-11 RX ADMIN — CILOSTAZOL 100 MG: 100 TABLET ORAL at 17:19

## 2022-11-11 RX ADMIN — CLOPIDOGREL BISULFATE 75 MG: 75 TABLET ORAL at 09:07

## 2022-11-11 RX ADMIN — OXYCODONE HYDROCHLORIDE AND ACETAMINOPHEN 2 TABLET: 5; 325 TABLET ORAL at 05:32

## 2022-11-11 RX ADMIN — Medication 10 ML: at 09:08

## 2022-11-11 RX ADMIN — CETIRIZINE HYDROCHLORIDE 10 MG: 10 TABLET, FILM COATED ORAL at 09:07

## 2022-11-11 RX ADMIN — GABAPENTIN 400 MG: 400 CAPSULE ORAL at 05:29

## 2022-11-11 RX ADMIN — Medication 10 ML: at 09:07

## 2022-11-11 RX ADMIN — IPRATROPIUM BROMIDE AND ALBUTEROL SULFATE 3 ML: 2.5; .5 SOLUTION RESPIRATORY (INHALATION) at 15:02

## 2022-11-11 RX ADMIN — PRAVASTATIN SODIUM 20 MG: 20 TABLET ORAL at 20:21

## 2022-11-11 RX ADMIN — GUAIFENESIN 600 MG: 600 TABLET, EXTENDED RELEASE ORAL at 20:21

## 2022-11-11 RX ADMIN — Medication 10 ML: at 20:27

## 2022-11-11 RX ADMIN — OXYCODONE HYDROCHLORIDE AND ACETAMINOPHEN 2 TABLET: 5; 325 TABLET ORAL at 17:22

## 2022-11-11 RX ADMIN — SERTRALINE HYDROCHLORIDE 100 MG: 50 TABLET ORAL at 09:06

## 2022-11-11 RX ADMIN — FUROSEMIDE 40 MG: 40 TABLET ORAL at 09:06

## 2022-11-11 RX ADMIN — OXYCODONE HYDROCHLORIDE AND ACETAMINOPHEN 2 TABLET: 5; 325 TABLET ORAL at 11:31

## 2022-11-11 RX ADMIN — GABAPENTIN 400 MG: 400 CAPSULE ORAL at 20:21

## 2022-11-11 RX ADMIN — IPRATROPIUM BROMIDE AND ALBUTEROL SULFATE 3 ML: 2.5; .5 SOLUTION RESPIRATORY (INHALATION) at 11:42

## 2022-11-11 NOTE — DISCHARGE PLACEMENT REQUEST
"Filomena Perales (72 y.o. Female)     Date of Birth   1950    Social Security Number       Address   [address] [place] [address]    Home Phone   [phone]    MRN   [id number]       Jainism   Mandaen    Marital Status                               Admission Date   11/1/22    Admission Type   Emergency    Admitting Provider   Danish Flores MD    Attending Provider   Danish Flores MD    Department, Room/Bed   [address], 321/1       Discharge Date       Discharge Disposition       Discharge Destination                               Attending Provider: Danish Flores MD    Allergies: Morphine And Related, Penicillins    Isolation: None   Infection: None   Code Status: CPR    Ht: 157.5 cm (62\")   Wt: 58.9 kg (129 lb 12.8 oz)    Admission Cmt: None   Principal Problem: None                Active Insurance as of 11/1/2022     Primary Coverage     Payor Plan Insurance Group Employer/Plan Group    [facility] MEDICARE REPLACEMENT [employer] MEDICARE REPLACEMENT [id number]     Payor Plan Address Payor Plan Phone Number Payor Plan Fax Number Effective Dates    [address] [address] [phone]  5/1/2021 - None Entered    [facility] [id number]       Subscriber Name Subscriber Birth Date Member ID       FILOMENA PERALES 1950 [id number]                 Emergency Contacts      (Rel.) Home Phone Work Phone Mobile Phone    Renee Li (Grandchild) -- -- [phone]    Meena Marie (Friend) -- -- [phone]            Insurance Information                [facility] MEDICARE REPLACEMENT/[employer]LL[employer] MEDICARE REPLACEMENT Phone: [phone]    Subscriber: Filomena Perales Subscriber#: [id number]    Group#: [id number] Precert#: --          "

## 2022-11-11 NOTE — PROGRESS NOTES
[facility] Medicine Services  INPATIENT PROGRESS NOTE    Length of Stay: 10  Date of Admission: 11/1/2022  Primary Care Physician: Anahi Camacho APRN    Subjective   Chief Complaint: Shortness of air  HPI:    Patient reports breathing is slowly improving, has been continued on supplemental oxygen.Is currently on 3 L at baseline is 2 L for her    Review of Systems   Constitutional: Negative for fever.   HENT: Negative for congestion.    Eyes: Negative for discharge.   Respiratory: Positive for shortness of breath.    Cardiovascular: Negative for chest pain.   Gastrointestinal: Negative for abdominal pain.   Endocrine: Negative for polyuria.   Genitourinary: Negative for difficulty urinating.   Musculoskeletal: Negative for arthralgias.   Skin: Negative for color change.   Allergic/Immunologic: Negative for environmental allergies.   Neurological: Negative for dizziness.   Hematological: Negative for adenopathy.   Psychiatric/Behavioral: Negative for agitation.        All pertinent negatives and positives are as above. All other systems have been reviewed and are negative unless otherwise stated.     Objective    Temp:  [96.2 °F (35.7 °C)-98 °F (36.7 °C)] 97.7 °F (36.5 °C)  Heart Rate:  [56-75] 66  Resp:  [16-20] 20  BP: (102-115)/(54-67) 107/67  Physical Exam  Constitutional:       General: She is not in acute distress.     Appearance: She is well-developed. She is not diaphoretic.   HENT:      Head: Normocephalic and atraumatic.   Cardiovascular:      Rate and Rhythm: Normal rate.   Pulmonary:      Effort: Pulmonary effort is normal. No respiratory distress.      Breath sounds: No wheezing.   Abdominal:      General: There is no distension.      Palpations: Abdomen is soft.   Musculoskeletal:         General: Normal range of motion.   Skin:     General: Skin is warm and dry.   Neurological:      Mental Status: She is alert.      Cranial Nerves: No cranial nerve deficit.    Psychiatric:         Behavior: Behavior normal.         Thought Content: Thought content normal.         Judgment: Judgment normal.             Results Review:  I have reviewed the labs, radiology results, and diagnostic studies.    Laboratory Data:   Lab Results (last 24 hours)     Procedure Component Value Units Date/Time    Basic Metabolic Panel [[id number]]  (Abnormal) Collected: 11/11/22 0510    Specimen: Blood Updated: 11/11/22 0634     Glucose 89 mg/dL      BUN 33 mg/dL      Creatinine 0.79 mg/dL      Sodium 138 mmol/L      Potassium 5.1 mmol/L      Chloride 93 mmol/L      CO2 39.0 mmol/L      Calcium 8.5 mg/dL      BUN/Creatinine Ratio 41.8     Anion Gap 6.0 mmol/L      eGFR 80.6 mL/min/1.73      Comment: National Kidney Foundation and American Society of Nephrology (ASN) Task Force recommended calculation based on the Chronic Kidney Disease Epidemiology Collaboration (CKD-EPI) equation refit without adjustment for race.       Narrative:      GFR Normal >60  Chronic Kidney Disease <60  Kidney Failure <15      CBC & Differential [[id number]]  (Abnormal) Collected: 11/11/22 0510    Specimen: Blood Updated: 11/11/22 0548    Narrative:      The following orders were created for panel order CBC & Differential.  Procedure                               Abnormality         Status                     ---------                               -----------         ------                     CBC Auto Differential[[id number]]        Abnormal            Final result                 Please view results for these tests on the individual orders.    CBC Auto Differential [[id number]]  (Abnormal) Collected: 11/11/22 0510    Specimen: Blood Updated: 11/11/22 0548     WBC 13.72 10*3/mm3      RBC 3.76 10*6/mm3      Hemoglobin 10.9 g/dL      Hematocrit 32.9 %      MCV 87.5 fL      MCH 29.0 pg      MCHC 33.1 g/dL      RDW 13.5 %      RDW-SD 42.6 fl      MPV 9.1 fL      Platelets 307 10*3/mm3      Neutrophil % 65.3 %      Lymphocyte %  23.3 %      Monocyte % 9.7 %      Eosinophil % 0.1 %      Basophil % 0.1 %      Immature Grans % 1.5 %      Neutrophils, Absolute 8.96 10*3/mm3      Lymphocytes, Absolute 3.20 10*3/mm3      Monocytes, Absolute 1.33 10*3/mm3      Eosinophils, Absolute 0.01 10*3/mm3      Basophils, Absolute 0.02 10*3/mm3      Immature Grans, Absolute 0.20 10*3/mm3      nRBC 0.0 /100 WBC           Culture Data:   No results found for: BLOODCX  No results found for: URINECX  No results found for: RESPCX  No results found for: WOUNDCX  No results found for: STOOLCX  No components found for: BODYFLD    Radiology Data:   Imaging Results (Last 24 Hours)     ** No results found for the last 24 hours. **          I have reviewed the patient's current medications.     Assessment/Plan     Active Hospital Problems    Diagnosis    • COPD with exacerbation (HCC)        COPD exacerbation-continue with nebulizer treatment-continue with supplemental oxygen    Acute on chronic respiratory failure with hypoxia-patient is on 3 L supplemental oxygen right now, we will continue to wean as tolerated, baseline is 2 L for her    Leukocytosis-continue monitoring WBC count    Paroxysmal atrial fibrillation-continue monitoring heart rate    Anticoagulation-continue with Xarelto    I confirmed that the patient's Advance Care Plan is present, code status is documented, or surrogate decision maker is listed in the patient's medical record.           Herman Espinosa MD   11/11/22   12:27 CST

## 2022-11-11 NOTE — PLAN OF CARE
Goal Outcome Evaluation:  Plan of Care Reviewed With: patient           Outcome Evaluation: pt w/ Mod Ind bed mobility, sit<>stand, toilet and chair to bed t/fers, ambulates ~6' x 2, 14' w/ no AD in room, ambulates ~142' w/ RW w/ CGA, performs B LE seated ther ex x 20 reps, O2 sats >90% during tx

## 2022-11-11 NOTE — THERAPY TREATMENT NOTE
Acute Care - Physical Therapy Treatment Note  [facility]     Patient Name: Mona Perales  : 1950  MRN: [id number]  Today's Date: 2022      Visit Dx:     ICD-10-CM ICD-9-CM   1. ST elevation myocardial infarction (STEMI), unspecified artery ([facility])  I21.3 410.90   2. Chronic obstructive pulmonary disease with acute exacerbation ([facility])  J44.1 491.21   3. Impaired functional mobility, balance, gait, and endurance  Z74.09 V49.89     Patient Active Problem List   Diagnosis   • Anxiety   • CAD (coronary atherosclerotic disease)   • Carotid stenosis   • COPD (chronic obstructive pulmonary disease) ([facility])   • COPD with exacerbation ([facility])   • Depressive disorder, not elsewhere classified   • Benign essential hypertension   • Hypercholesteremia   • Insomnia   • Notalgia   • Osteoporosis   • Other screening mammogram   • RUQ abdominal pain   • PVD (peripheral vascular disease) with claudication ([facility])   • Nicotine abuse   • Dysphagia   • Epigastric pain   • Pain of right hand   • Closed displaced fracture of shaft of fifth metacarpal bone of right hand   • Cause of injury, fall   • Displaced fracture of shaft of fifth metacarpal bone of right hand with routine healing   • Syncope   • Acute respiratory failure with hypoxia ([facility])   • (HFpEF) heart failure with preserved ejection fraction ([facility])   • Hypotension   • Elevated WBCs   • Near syncope   • Atherosclerosis of native coronary artery of native heart without angina pectoris   • Atherosclerosis of native arteries of extremities with rest pain, left leg ([facility])   • PVD (peripheral vascular disease) ([facility])   • Physical deconditioning   • Pain due to onychomycosis of toenails of both feet   • ST elevation myocardial infarction (STEMI), unspecified artery ([facility])     Past Medical History:   Diagnosis Date   • A-fib ([facility])    • Anxiety    • Arthritis    • Asthma    • 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 with ulceration ([facility])     bilateral legs -  bilateral iliac stents 2008      • CHF (congestive heart failure) ([facility])    • Chronic lower back pain    • COPD (chronic obstructive pulmonary disease) ([facility])    • Essential (primary) hypertension    • GERD (gastroesophageal reflux disease)    • History of pulmonary embolus (PE)    • Hyperlipidemia    • Insomnia    • Lumbago    • Nicotine dependence    • Occlusion of artery      and stenosis of bilateral carotid arteries - BABS 16-49%, LICA 0-15%   • Other atherosclerosis of native artery of other extremity     LEFT subclavian stent 2005 (occluded)   • Sleep apnea      Past Surgical History:   Procedure Laterality Date   • CARDIAC CATHETERIZATION  04/06/2016    No evidence of any obstructive epicardial CAD.Preserved LV systolic function with EF of 55%.   • CARDIAC CATHETERIZATION N/A 11/1/2022    Procedure: Left Heart Cath;  Surgeon: Neftali Haywood MD;  Location: [facility] CATH INVASIVE LOCATION;  Service: Cardiology;  Laterality: N/A;   • CENTRAL VENOUS LINE INSERTION  04/07/2016    Successful placement of right uppe extremity 6-[place] triple lumen PICC line.   • ENDOSCOPY N/A 1/12/2018    Procedure: ESOPHAGOGASTRODUODENOSCOPY;  Surgeon: Bin Oh MD;  Location: [facility] ENDOSCOPY;  Service:    • ENDOSCOPY N/A 1/17/2018    Procedure: ESOPHAGOGASTRODUODENOSCOPY;  Surgeon: Bin Oh MD;  Location: [facility] ENDOSCOPY;  Service:    • ENDOSCOPY N/A 3/16/2018    Procedure: ESOPHAGOGASTRODUODENOSCOPY possible dilation;  Surgeon: Bin Oh MD;  Location: [facility] ENDOSCOPY;  Service: Gastroenterology   • FEMORAL POPLITEAL BYPASS Left 4/14/2020    Procedure: FEMORAL POPLITEAL BYPASS ABOVE KNEE  (POLYTETRAFLUOROETHYLENE)  LEFT COMMON FEMORAL ARTERY ENDARTERECTOMY PTA LEFT ILIAC ARTERIOGRAM        (c-arm#2 and c-arm table);  Surgeon: David Suh MD;  Location: [facility] OR;  Service: Vascular;  Laterality: Left;   • FEMORAL POPLITEAL BYPASS Right 9/17/2021    Procedure: RIGHT common femoral endarterectomy  FEMORAL POPLITEAL BYPASS (above the knee polytetrafluoroethylene  lower extremity arteriogram              (c-arm#2 and c-arm table);  Surgeon: David Suh MD;  Location: [facility] [place] OR;  Service: Vascular;  Laterality: Right;   • HYSTERECTOMY     • INTERVENTIONAL RADIOLOGY PROCEDURE Left 9/9/2020    Procedure: IR angiogram extremity left;  Surgeon: David Suh MD;  Location: [facility] [place] ANGIO INVASIVE LOCATION;  Service: Interventional Radiology;  Laterality: Left;   • KYPHOPLASTY N/A 5/23/2019    Procedure: KYPHOPLASTY LUMBAR FOUR;  Surgeon: Aba Santa MD;  Location: [facility] [place] OR;  Service: Orthopedic Spine   • TOTAL SHOULDER REPLACEMENT Left    • TRANSESOPHAGEAL ECHOCARDIOGRAM ([facility]ACQUES)  04/07/2016    With color flow-Mild to moderate [facility].LV systolic function well preserved with Ef of 55-60%.Mitral and AV intact.Diastolic dysfunction   • UPPER GASTROINTESTINAL ENDOSCOPY  01/17/2018    Dr. Bin Martin M.D.     PT Assessment (last 12 hours)     PT Evaluation and Treatment     [facility] Name 11/11/22 1005          Physical Therapy Time and Intention    Subjective Information complains of;pain  -     Document Type therapy note (daily note)  -     Mode of Treatment physical therapy;individual therapy  -[facility] Name 11/11/22 1005          General Information    Patient Profile Reviewed yes  -     Existing Precautions/Restrictions fall;oxygen therapy device and L/min  3 L/min  -[facility] Name 11/11/22 1005          Pain    Pretreatment Pain Rating 8/10  -     Posttreatment Pain Rating 8/10  -     Pre/Posttreatment Pain Comment R ribs and low back  -     Pain Intervention(s) Medication (See MAR);Ambulation/increased activity  -     Row Name 11/11/22 1005          Cognition    Orientation Status (Cognition) oriented x 4  -[facility] Name 11/11/22 1005          Bed Mobility    Supine-Sit [place] (Bed Mobility) modified independence  -     Sit-Supine [place] (Bed Mobility)  modified independence  -     Assistive Device (Bed Mobility) head of bed elevated;bed rails  -     Row Name 11/11/22 1005          Sit-Stand Transfer    Sit-Stand [place] (Transfers) modified independence  -     Assistive Device (Sit-Stand Transfers) walker, front-wheeled  -     Row Name 11/11/22 1005          Stand-Sit Transfer    Stand-Sit [place] (Transfers) modified independence  -     Assistive Device (Stand-Sit Transfers) walker, front-wheeled  -     Row Name 11/11/22 1005          Toilet Transfer    Type (Toilet Transfer) sit-stand;stand-sit  -     [place] Level (Toilet Transfer) modified independence  -     Assistive Device (Toilet Transfer) commode  -     Row Name 11/11/22 1005          Gait/Stairs (Locomotion)    [place] Level (Gait) standby assist;contact guard  -     Assistive Device (Gait) walker, front-wheeled  -     Distance in Feet (Gait) 6' x 2, 14' no AD, 142' w/ RW  -[facility] Name 11/11/22 1005          Safety Issues, Functional Mobility    Impairments Affecting Function (Mobility) strength;endurance/activity tolerance;pain  -[facility] Name 11/11/22 1005          Hip (Therapeutic Exercise)    Hip AROM (Therapeutic Exercise) bilateral;flexion;sitting;20 repititions  [facility]     Hip Isometrics (Therapeutic Exercise) bilateral;aDduction;sitting  x 20  -[facility] Name 11/11/22 1005          Knee (Therapeutic Exercise)    Knee Strengthening (Therapeutic Exercise) bilateral;LAQ (long arc quad);sitting;20 repititions  -[facility] Name 11/11/22 1005          Ankle (Therapeutic Exercise)    Ankle AROM (Therapeutic Exercise) bilateral;dorsiflexion;plantarflexion;sitting;20 repititions  -[facility] Name 11/11/22 1005          Plan of Care Review    Plan of Care Reviewed With patient  -     Outcome Evaluation pt w/ Mod Ind bed mobility, sit<>stand, toilet and chair to bed t/fers, ambulates ~6' x 2, 14' w/ no AD in room, ambulates ~142' w/ RW w/ CGA, performs B LE  seated ther ex x 20 reps, O2 sats >90% during tx  -JW     Row Name 11/11/22 1005          Vital Signs    Pretreatment Heart Rate (beats/min) 66  -JW     Posttreatment Heart Rate (beats/min) 72  -JW     Pre SpO2 (%) 94  -JW     O2 Delivery Pre Treatment supplemental O2  -JW     Post SpO2 (%) 92  -JW     O2 Delivery Post Treatment supplemental O2  -JW     Pre Patient Position Supine  -JW     Intra Patient Position Standing  -JW     Post Patient Position Supine  -JW     Row Name 11/11/22 1005          Positioning and Restraints    Pre-Treatment Position in bed  -JW     Post Treatment Position bed  -JW     In Bed fowlers;call light within reach;encouraged to call for assist  -JW     Row Name 11/11/22 1005          Bed Mobility Goal 1 (PT)    Activity/Assistive Device (Bed Mobility Goal 1, PT) sit to supine/supine to sit  -JW     [place] Level/Cues Needed (Bed Mobility Goal 1, PT) independent  -JW     Time Frame (Bed Mobility Goal 1, PT) by discharge  -JW     Strategies/Barriers (Bed Mobility Goal 1, PT) HOB flat, no bed rails.  -JW     Progress/Outcomes (Bed Mobility Goal 1, PT) goal met  -JW     Row Name 11/11/22 1005          Transfer Goal 1 (PT)    Activity/Assistive Device (Transfer Goal 1, PT) sit-to-stand/stand-to-sit;bed-to-chair/chair-to-bed;walker, rolling  -JW     [place] Level/Cues Needed (Transfer Goal 1, PT) modified independence  -JW     Time Frame (Transfer Goal 1, PT) by discharge  -JW     Strategies/Barriers (Transfers Goal 1, PT) Maintain SpO2 >90%.  -JW     Progress/Outcome (Transfer Goal 1, PT) goal partially met  -JW     Row Name 11/11/22 1005          Gait Training Goal 1 (PT)    Activity/Assistive Device (Gait Training Goal 1, PT) walker, rolling  -JW     [place] Level (Gait Training Goal 1, PT) modified independence  -JW     Distance (Gait Training Goal 1, PT) 25'x2.  -JW     Time Frame (Gait Training Goal 1, PT) by discharge  -JW     Strategies/Barriers (Gait Training Goal 1, PT)  Maintain SpO2 >90%.  -     Progress/Outcome (Gait Training Goal 1, PT) goal not met  -     Row Name 11/11/22 1005          Problem Specific Goal 1 (PT)    Problem Specific Goal 1 (PT) Score 25/28 on [place]etti fall risk assessment.  -     Time Frame (Problem Specific Goal 1, PT) by discharge  -     Strategies/Barriers (Problem Specific Goal 1, PT) Maintain SpO2 >90%.  -     Progress/Outcome (Problem Specific Goal 1, PT) goal not met  -           User Key  (r) = Recorded By, (t) = Taken By, (c) = Cosigned By    Initials Name Provider Type    JW Danyell Shipley, PTA Physical Therapist Assistant                Physical Therapy Education     Title: PT OT SLP Therapies (In Progress)     Topic: Physical Therapy (In Progress)     Point: Mobility training (Done)     Learning Progress Summary           Patient Acceptance, E, VU by  at 11/3/2022 1100    Comment: PT POC, rehab process.                   Point: Home exercise program (Not Started)     Learner Progress:  Not documented in this visit.          Point: Body mechanics (Not Started)     Learner Progress:  Not documented in this visit.          Point: Precautions (Not Started)     Learner Progress:  Not documented in this visit.                      User Key     Initials Effective Dates Name Provider Type Discipline     09/18/22 -  Hakeem Wolfe, PT Physical Therapist PT              PT Recommendation and Plan  Anticipated Discharge Disposition (PT): home with assist, home with home health  Therapy Frequency (PT): other (see comments) (5-7 days/week)  Plan of Care Reviewed With: patient  Outcome Evaluation: pt w/ Mod Ind bed mobility, sit<>stand, toilet and chair to bed t/fers, ambulates ~6' x 2, 14' w/ no AD in room, ambulates ~142' w/ RW w/ CGA, performs B LE seated ther ex x 20 reps, O2 sats >90% during tx   Outcome Measures     Row Name 11/11/22 1400             How much help from another person do you currently need...    Turning from your  back to your side while in flat bed without using bedrails? 4  -JW      Moving from lying on back to sitting on the side of a flat bed without bedrails? 4  -JW      Moving to and from a bed to a chair (including a wheelchair)? 4  -JW      Standing up from a chair using your arms (e.g., wheelchair, bedside chair)? 4  -JW      Climbing 3-5 steps with a railing? 3  -JW      To walk in hospital room? 4  -JW      AM-PAC 6 Clicks Score (PT) 23  -JW         Functional Assessment    Outcome Measure Options AM-PAC 6 Clicks Basic Mobility (PT)  -            User Key  (r) = Recorded By, (t) = Taken By, (c) = Cosigned By    Initials Name Provider Type    Danyell Landin PTA Physical Therapist Assistant                 Time Calculation:    PT Charges     Row Name 11/11/22 1005             Time Calculation    Start Time 1005  -JW      Stop Time 1029  -JW      Time Calculation (min) 24 min  -JW      PT Received On 11/11/22  -JW         Time Calculation- PT    Total Timed Code Minutes- PT 24 minute(s)  -            User Key  (r) = Recorded By, (t) = Taken By, (c) = Cosigned By    Initials Name Provider Type    Danyell Landin PTA Physical Therapist Assistant              Therapy Charges for Today     Code Description Service Date Service Provider Modifiers Qty    38426347753 HC PT THER PROC EA 15 MIN 11/10/2022 Danyell Shipley, PTA GP 1    54193026049 HC GAIT TRAINING EA 15 MIN 11/10/2022 Danyell Shipley, PTA GP 1    36172061092 HC GAIT TRAINING EA 15 MIN 11/11/2022 Danyell Shipley, PTA GP 1    71469836560 HC PT THER PROC EA 15 MIN 11/11/2022 Danyell Shipley, PTA GP 1          PT G-Codes  Outcome Measure Options: AM-PAC 6 Clicks Basic Mobility (PT)  AM-PAC 6 Clicks Score (PT): 23    Danyell Shipley PTA  11/11/2022

## 2022-11-12 LAB
ANION GAP SERPL CALCULATED.3IONS-SCNC: 6 MMOL/L (ref 5–15)
BASOPHILS # BLD AUTO: 0.06 10*3/MM3 (ref 0–0.2)
BASOPHILS NFR BLD AUTO: 0.3 % (ref 0–1.5)
BUN SERPL-MCNC: 26 MG/DL (ref 8–23)
BUN/CREAT SERPL: 36.1 (ref 7–25)
CALCIUM SPEC-SCNC: 8.7 MG/DL (ref 8.6–10.5)
CHLORIDE SERPL-SCNC: 93 MMOL/L (ref 98–107)
CO2 SERPL-SCNC: 36 MMOL/L (ref 22–29)
CREAT SERPL-MCNC: 0.72 MG/DL (ref 0.57–1)
DEPRECATED RDW RBC AUTO: 41.3 FL (ref 37–54)
EGFRCR SERPLBLD CKD-EPI 2021: 90.1 ML/MIN/1.73
EOSINOPHIL # BLD AUTO: 0.03 10*3/MM3 (ref 0–0.4)
EOSINOPHIL NFR BLD AUTO: 0.2 % (ref 0.3–6.2)
ERYTHROCYTE [DISTWIDTH] IN BLOOD BY AUTOMATED COUNT: 13.4 % (ref 12.3–15.4)
GLUCOSE SERPL-MCNC: 94 MG/DL (ref 65–99)
HCT VFR BLD AUTO: 29.7 % (ref 34–46.6)
HGB BLD-MCNC: 10.4 G/DL (ref 12–15.9)
IMM GRANULOCYTES # BLD AUTO: 0.29 10*3/MM3 (ref 0–0.05)
IMM GRANULOCYTES NFR BLD AUTO: 1.5 % (ref 0–0.5)
LYMPHOCYTES # BLD AUTO: 3.68 10*3/MM3 (ref 0.7–3.1)
LYMPHOCYTES NFR BLD AUTO: 19.5 % (ref 19.6–45.3)
MCH RBC QN AUTO: 30.1 PG (ref 26.6–33)
MCHC RBC AUTO-ENTMCNC: 35 G/DL (ref 31.5–35.7)
MCV RBC AUTO: 86.1 FL (ref 79–97)
MONOCYTES # BLD AUTO: 1.54 10*3/MM3 (ref 0.1–0.9)
MONOCYTES NFR BLD AUTO: 8.2 % (ref 5–12)
NEUTROPHILS NFR BLD AUTO: 13.28 10*3/MM3 (ref 1.7–7)
NEUTROPHILS NFR BLD AUTO: 70.3 % (ref 42.7–76)
NRBC BLD AUTO-RTO: 0 /100 WBC (ref 0–0.2)
PLATELET # BLD AUTO: 275 10*3/MM3 (ref 140–450)
PMV BLD AUTO: 9.2 FL (ref 6–12)
POTASSIUM SERPL-SCNC: 4.9 MMOL/L (ref 3.5–5.2)
RBC # BLD AUTO: 3.45 10*6/MM3 (ref 3.77–5.28)
SODIUM SERPL-SCNC: 135 MMOL/L (ref 136–145)
WBC NRBC COR # BLD: 18.88 10*3/MM3 (ref 3.4–10.8)

## 2022-11-12 PROCEDURE — 94799 UNLISTED PULMONARY SVC/PX: CPT

## 2022-11-12 PROCEDURE — 85025 COMPLETE CBC W/AUTO DIFF WBC: CPT | Performed by: INTERNAL MEDICINE

## 2022-11-12 PROCEDURE — 94760 N-INVAS EAR/PLS OXIMETRY 1: CPT

## 2022-11-12 PROCEDURE — 80048 BASIC METABOLIC PNL TOTAL CA: CPT | Performed by: INTERNAL MEDICINE

## 2022-11-12 PROCEDURE — 63710000001 PREDNISONE PER 1 MG: Performed by: INTERNAL MEDICINE

## 2022-11-12 RX ADMIN — RIVAROXABAN 20 MG: 10 TABLET, FILM COATED ORAL at 17:25

## 2022-11-12 RX ADMIN — CILOSTAZOL 100 MG: 100 TABLET ORAL at 17:25

## 2022-11-12 RX ADMIN — BUDESONIDE AND FORMOTEROL FUMARATE DIHYDRATE 2 PUFF: 160; 4.5 AEROSOL RESPIRATORY (INHALATION) at 06:58

## 2022-11-12 RX ADMIN — GABAPENTIN 400 MG: 400 CAPSULE ORAL at 05:16

## 2022-11-12 RX ADMIN — IPRATROPIUM BROMIDE AND ALBUTEROL SULFATE 3 ML: 2.5; .5 SOLUTION RESPIRATORY (INHALATION) at 06:57

## 2022-11-12 RX ADMIN — GUAIFENESIN 600 MG: 600 TABLET, EXTENDED RELEASE ORAL at 08:02

## 2022-11-12 RX ADMIN — Medication 10 ML: at 20:16

## 2022-11-12 RX ADMIN — CETIRIZINE HYDROCHLORIDE 10 MG: 10 TABLET, FILM COATED ORAL at 08:03

## 2022-11-12 RX ADMIN — CILOSTAZOL 100 MG: 100 TABLET ORAL at 08:02

## 2022-11-12 RX ADMIN — PANTOPRAZOLE SODIUM 40 MG: 40 TABLET, DELAYED RELEASE ORAL at 08:02

## 2022-11-12 RX ADMIN — PRAVASTATIN SODIUM 20 MG: 20 TABLET ORAL at 20:15

## 2022-11-12 RX ADMIN — GABAPENTIN 400 MG: 400 CAPSULE ORAL at 20:15

## 2022-11-12 RX ADMIN — Medication 10 ML: at 08:02

## 2022-11-12 RX ADMIN — PREDNISONE 40 MG: 20 TABLET ORAL at 08:03

## 2022-11-12 RX ADMIN — OXYCODONE HYDROCHLORIDE AND ACETAMINOPHEN 2 TABLET: 5; 325 TABLET ORAL at 05:16

## 2022-11-12 RX ADMIN — IPRATROPIUM BROMIDE AND ALBUTEROL SULFATE 3 ML: 2.5; .5 SOLUTION RESPIRATORY (INHALATION) at 10:55

## 2022-11-12 RX ADMIN — OXYCODONE HYDROCHLORIDE AND ACETAMINOPHEN 2 TABLET: 5; 325 TABLET ORAL at 17:33

## 2022-11-12 RX ADMIN — TRAZODONE HYDROCHLORIDE 150 MG: 100 TABLET ORAL at 20:15

## 2022-11-12 RX ADMIN — Medication 10 ML: at 08:05

## 2022-11-12 RX ADMIN — SERTRALINE HYDROCHLORIDE 100 MG: 50 TABLET ORAL at 08:03

## 2022-11-12 RX ADMIN — IPRATROPIUM BROMIDE AND ALBUTEROL SULFATE 3 ML: 2.5; .5 SOLUTION RESPIRATORY (INHALATION) at 14:40

## 2022-11-12 RX ADMIN — BUDESONIDE AND FORMOTEROL FUMARATE DIHYDRATE 2 PUFF: 160; 4.5 AEROSOL RESPIRATORY (INHALATION) at 19:58

## 2022-11-12 RX ADMIN — IPRATROPIUM BROMIDE AND ALBUTEROL SULFATE 3 ML: 2.5; .5 SOLUTION RESPIRATORY (INHALATION) at 19:58

## 2022-11-12 RX ADMIN — GUAIFENESIN 600 MG: 600 TABLET, EXTENDED RELEASE ORAL at 20:15

## 2022-11-12 RX ADMIN — AMITRIPTYLINE HYDROCHLORIDE 25 MG: 25 TABLET, FILM COATED ORAL at 20:15

## 2022-11-12 RX ADMIN — CLOPIDOGREL BISULFATE 75 MG: 75 TABLET ORAL at 08:03

## 2022-11-12 NOTE — PROGRESS NOTES
[facility] Medicine Services  INPATIENT PROGRESS NOTE    Length of Stay: 11  Date of Admission: 11/1/2022  Primary Care Physician: Anahi Camacho APRN    Subjective   Chief Complaint: Shortness of air  HPI:    Still having some shortness of air although has been improving.  Has been on 3 L of oxygen here.  She does wear oxygen at home.    Review of Systems   Constitutional: Negative for fever.   HENT: Negative for congestion.    Respiratory: Positive for shortness of breath.    Cardiovascular: Negative for chest pain.   Gastrointestinal: Negative for abdominal pain.   Endocrine: Negative for polyuria.   Genitourinary: Negative for difficulty urinating.   Musculoskeletal: Negative for arthralgias.   Skin: Negative for color change.   Allergic/Immunologic: Negative for food allergies.   Neurological: Negative for weakness.   Hematological: Negative for adenopathy.   Psychiatric/Behavioral: Negative for agitation.        All pertinent negatives and positives are as above. All other systems have been reviewed and are negative unless otherwise stated.     Objective    Temp:  [97.5 °F (36.4 °C)-98.1 °F (36.7 °C)] 97.8 °F (36.6 °C)  Heart Rate:  [61-74] 73  Resp:  [18-20] 18  BP: ()/(54-95) 92/54  Physical Exam  Vitals and nursing note reviewed.   Constitutional:       General: She is not in acute distress.     Appearance: She is well-developed. She is not diaphoretic.   HENT:      Head: Normocephalic and atraumatic.   Cardiovascular:      Rate and Rhythm: Normal rate.   Pulmonary:      Effort: Pulmonary effort is normal. No respiratory distress.      Breath sounds: No wheezing.   Abdominal:      General: There is no distension.      Palpations: Abdomen is soft.   Musculoskeletal:         General: Normal range of motion.   Skin:     General: Skin is warm and dry.   Neurological:      Mental Status: She is alert.      Cranial Nerves: No cranial nerve deficit.    Psychiatric:         Behavior: Behavior normal.         Thought Content: Thought content normal.         Judgment: Judgment normal.             Results Review:  I have reviewed the labs, radiology results, and diagnostic studies.    Laboratory Data:   Lab Results (last 24 hours)     Procedure Component Value Units Date/Time    Basic Metabolic Panel [[id number]]  (Abnormal) Collected: 11/12/22 0626    Specimen: Blood Updated: 11/12/22 0655     Glucose 94 mg/dL      BUN 26 mg/dL      Creatinine 0.72 mg/dL      Sodium 135 mmol/L      Potassium 4.9 mmol/L      Chloride 93 mmol/L      CO2 36.0 mmol/L      Calcium 8.7 mg/dL      BUN/Creatinine Ratio 36.1     Anion Gap 6.0 mmol/L      eGFR 90.1 mL/min/1.73      Comment: National Kidney Foundation and American Society of Nephrology (ASN) Task Force recommended calculation based on the Chronic Kidney Disease Epidemiology Collaboration (CKD-EPI) equation refit without adjustment for race.       Narrative:      GFR Normal >60  Chronic Kidney Disease <60  Kidney Failure <15      CBC & Differential [[id number]]  (Abnormal) Collected: 11/12/22 0626    Specimen: Blood Updated: 11/12/22 0640    Narrative:      The following orders were created for panel order CBC & Differential.  Procedure                               Abnormality         Status                     ---------                               -----------         ------                     CBC Auto Differential[[id number]]        Abnormal            Final result                 Please view results for these tests on the individual orders.    CBC Auto Differential [[id number]]  (Abnormal) Collected: 11/12/22 0626    Specimen: Blood Updated: 11/12/22 0640     WBC 18.88 10*3/mm3      RBC 3.45 10*6/mm3      Hemoglobin 10.4 g/dL      Hematocrit 29.7 %      MCV 86.1 fL      MCH 30.1 pg      MCHC 35.0 g/dL      RDW 13.4 %      RDW-SD 41.3 fl      MPV 9.2 fL      Platelets 275 10*3/mm3      Neutrophil % 70.3 %      Lymphocyte %  19.5 %      Monocyte % 8.2 %      Eosinophil % 0.2 %      Basophil % 0.3 %      Immature Grans % 1.5 %      Neutrophils, Absolute 13.28 10*3/mm3      Lymphocytes, Absolute 3.68 10*3/mm3      Monocytes, Absolute 1.54 10*3/mm3      Eosinophils, Absolute 0.03 10*3/mm3      Basophils, Absolute 0.06 10*3/mm3      Immature Grans, Absolute 0.29 10*3/mm3      nRBC 0.0 /100 WBC           Culture Data:   No results found for: BLOODCX  No results found for: URINECX  No results found for: RESPCX  No results found for: WOUNDCX  No results found for: STOOLCX  No components found for: BODYFLD    Radiology Data:   Imaging Results (Last 24 Hours)     ** No results found for the last 24 hours. **          I have reviewed the patient's current medications.     Assessment/Plan     Active Hospital Problems    Diagnosis    • COPD with exacerbation (HCC)        COPD exacerbation-continue with nebulizer treatment-continue with supplemental oxygen  Continue with Symbicort as well.  Continue with prednisone p.o.     Acute on chronic respiratory failure with hypoxia-patient is on 3 L supplemental oxygen right now, we will continue to wean as tolerated, baseline is 2 L for her       Leukocytosis-continue monitoring WBC count     Paroxysmal atrial fibrillation-continue monitoring heart rate     Anticoagulation-continue with Xarelto     I confirmed that the patient's Advance Care Plan is present, code status is documented, or surrogate decision maker is listed in the patient's medical record.             Herman Espinosa MD   11/12/22   13:04 CST

## 2022-11-12 NOTE — PLAN OF CARE
Goal Outcome Evaluation:              Outcome Evaluation: Pt reports no pain at this time. Will continue to monitor.

## 2022-11-12 NOTE — PLAN OF CARE
Goal Outcome Evaluation:  Plan of Care Reviewed With: patient        Progress: no change     Pt continues to work with physical therapy. Pain medicated. Vital signs stable.

## 2022-11-13 LAB
ANION GAP SERPL CALCULATED.3IONS-SCNC: 8 MMOL/L (ref 5–15)
BASOPHILS # BLD AUTO: 0.02 10*3/MM3 (ref 0–0.2)
BASOPHILS NFR BLD AUTO: 0.1 % (ref 0–1.5)
BUN SERPL-MCNC: 30 MG/DL (ref 8–23)
BUN/CREAT SERPL: 38 (ref 7–25)
CALCIUM SPEC-SCNC: 8.9 MG/DL (ref 8.6–10.5)
CHLORIDE SERPL-SCNC: 95 MMOL/L (ref 98–107)
CO2 SERPL-SCNC: 32 MMOL/L (ref 22–29)
CREAT SERPL-MCNC: 0.79 MG/DL (ref 0.57–1)
DEPRECATED RDW RBC AUTO: 46.5 FL (ref 37–54)
EGFRCR SERPLBLD CKD-EPI 2021: 80.6 ML/MIN/1.73
EOSINOPHIL # BLD AUTO: 0.03 10*3/MM3 (ref 0–0.4)
EOSINOPHIL NFR BLD AUTO: 0.2 % (ref 0.3–6.2)
ERYTHROCYTE [DISTWIDTH] IN BLOOD BY AUTOMATED COUNT: 13.9 % (ref 12.3–15.4)
GLUCOSE SERPL-MCNC: 108 MG/DL (ref 65–99)
HCT VFR BLD AUTO: 32.6 % (ref 34–46.6)
HGB BLD-MCNC: 10.4 G/DL (ref 12–15.9)
IMM GRANULOCYTES # BLD AUTO: 0.19 10*3/MM3 (ref 0–0.05)
IMM GRANULOCYTES NFR BLD AUTO: 1.2 % (ref 0–0.5)
LYMPHOCYTES # BLD AUTO: 3.3 10*3/MM3 (ref 0.7–3.1)
LYMPHOCYTES NFR BLD AUTO: 20.1 % (ref 19.6–45.3)
MCH RBC QN AUTO: 29.4 PG (ref 26.6–33)
MCHC RBC AUTO-ENTMCNC: 31.9 G/DL (ref 31.5–35.7)
MCV RBC AUTO: 92.1 FL (ref 79–97)
MONOCYTES # BLD AUTO: 1.51 10*3/MM3 (ref 0.1–0.9)
MONOCYTES NFR BLD AUTO: 9.2 % (ref 5–12)
NEUTROPHILS NFR BLD AUTO: 11.38 10*3/MM3 (ref 1.7–7)
NEUTROPHILS NFR BLD AUTO: 69.2 % (ref 42.7–76)
NRBC BLD AUTO-RTO: 0 /100 WBC (ref 0–0.2)
PLATELET # BLD AUTO: 274 10*3/MM3 (ref 140–450)
PMV BLD AUTO: 9.1 FL (ref 6–12)
POTASSIUM SERPL-SCNC: 5 MMOL/L (ref 3.5–5.2)
RBC # BLD AUTO: 3.54 10*6/MM3 (ref 3.77–5.28)
SODIUM SERPL-SCNC: 135 MMOL/L (ref 136–145)
WBC NRBC COR # BLD: 16.43 10*3/MM3 (ref 3.4–10.8)

## 2022-11-13 PROCEDURE — 85025 COMPLETE CBC W/AUTO DIFF WBC: CPT | Performed by: INTERNAL MEDICINE

## 2022-11-13 PROCEDURE — 97116 GAIT TRAINING THERAPY: CPT

## 2022-11-13 PROCEDURE — 80048 BASIC METABOLIC PNL TOTAL CA: CPT | Performed by: INTERNAL MEDICINE

## 2022-11-13 PROCEDURE — 97530 THERAPEUTIC ACTIVITIES: CPT

## 2022-11-13 PROCEDURE — 94799 UNLISTED PULMONARY SVC/PX: CPT

## 2022-11-13 PROCEDURE — 63710000001 PREDNISONE PER 1 MG: Performed by: INTERNAL MEDICINE

## 2022-11-13 PROCEDURE — 94760 N-INVAS EAR/PLS OXIMETRY 1: CPT

## 2022-11-13 RX ORDER — MIDODRINE HYDROCHLORIDE 5 MG/1
5 TABLET ORAL
Status: DISCONTINUED | OUTPATIENT
Start: 2022-11-13 | End: 2022-11-17 | Stop reason: HOSPADM

## 2022-11-13 RX ADMIN — GUAIFENESIN 600 MG: 600 TABLET, EXTENDED RELEASE ORAL at 08:04

## 2022-11-13 RX ADMIN — GABAPENTIN 400 MG: 400 CAPSULE ORAL at 05:41

## 2022-11-13 RX ADMIN — Medication 10 ML: at 20:45

## 2022-11-13 RX ADMIN — PRAVASTATIN SODIUM 20 MG: 20 TABLET ORAL at 20:43

## 2022-11-13 RX ADMIN — IPRATROPIUM BROMIDE AND ALBUTEROL SULFATE 3 ML: 2.5; .5 SOLUTION RESPIRATORY (INHALATION) at 20:08

## 2022-11-13 RX ADMIN — OXYCODONE HYDROCHLORIDE AND ACETAMINOPHEN 2 TABLET: 5; 325 TABLET ORAL at 21:49

## 2022-11-13 RX ADMIN — OXYCODONE HYDROCHLORIDE AND ACETAMINOPHEN 2 TABLET: 5; 325 TABLET ORAL at 08:11

## 2022-11-13 RX ADMIN — PANTOPRAZOLE SODIUM 40 MG: 40 TABLET, DELAYED RELEASE ORAL at 08:04

## 2022-11-13 RX ADMIN — OXYCODONE HYDROCHLORIDE AND ACETAMINOPHEN 2 TABLET: 5; 325 TABLET ORAL at 15:58

## 2022-11-13 RX ADMIN — AMITRIPTYLINE HYDROCHLORIDE 25 MG: 25 TABLET, FILM COATED ORAL at 20:43

## 2022-11-13 RX ADMIN — BUDESONIDE AND FORMOTEROL FUMARATE DIHYDRATE 2 PUFF: 160; 4.5 AEROSOL RESPIRATORY (INHALATION) at 07:28

## 2022-11-13 RX ADMIN — CILOSTAZOL 100 MG: 100 TABLET ORAL at 08:04

## 2022-11-13 RX ADMIN — ACETAMINOPHEN 650 MG: 325 TABLET, FILM COATED ORAL at 09:39

## 2022-11-13 RX ADMIN — CLOPIDOGREL BISULFATE 75 MG: 75 TABLET ORAL at 08:04

## 2022-11-13 RX ADMIN — TRAZODONE HYDROCHLORIDE 150 MG: 100 TABLET ORAL at 20:44

## 2022-11-13 RX ADMIN — FUROSEMIDE 40 MG: 40 TABLET ORAL at 08:04

## 2022-11-13 RX ADMIN — IPRATROPIUM BROMIDE AND ALBUTEROL SULFATE 3 ML: 2.5; .5 SOLUTION RESPIRATORY (INHALATION) at 15:47

## 2022-11-13 RX ADMIN — Medication 10 ML: at 08:04

## 2022-11-13 RX ADMIN — GABAPENTIN 400 MG: 400 CAPSULE ORAL at 20:43

## 2022-11-13 RX ADMIN — CILOSTAZOL 100 MG: 100 TABLET ORAL at 17:06

## 2022-11-13 RX ADMIN — IPRATROPIUM BROMIDE AND ALBUTEROL SULFATE 3 ML: 2.5; .5 SOLUTION RESPIRATORY (INHALATION) at 07:28

## 2022-11-13 RX ADMIN — PREDNISONE 40 MG: 20 TABLET ORAL at 08:04

## 2022-11-13 RX ADMIN — MIDODRINE HYDROCHLORIDE 5 MG: 5 TABLET ORAL at 11:05

## 2022-11-13 RX ADMIN — MIDODRINE HYDROCHLORIDE 5 MG: 5 TABLET ORAL at 17:06

## 2022-11-13 RX ADMIN — RIVAROXABAN 20 MG: 10 TABLET, FILM COATED ORAL at 17:06

## 2022-11-13 RX ADMIN — CETIRIZINE HYDROCHLORIDE 10 MG: 10 TABLET, FILM COATED ORAL at 08:04

## 2022-11-13 RX ADMIN — GUAIFENESIN 600 MG: 600 TABLET, EXTENDED RELEASE ORAL at 20:43

## 2022-11-13 RX ADMIN — IPRATROPIUM BROMIDE AND ALBUTEROL SULFATE 3 ML: 2.5; .5 SOLUTION RESPIRATORY (INHALATION) at 11:23

## 2022-11-13 RX ADMIN — POLYETHYLENE GLYCOL 3350 17 G: 17 POWDER, FOR SOLUTION ORAL at 08:04

## 2022-11-13 RX ADMIN — BUDESONIDE AND FORMOTEROL FUMARATE DIHYDRATE 2 PUFF: 160; 4.5 AEROSOL RESPIRATORY (INHALATION) at 20:08

## 2022-11-13 RX ADMIN — SERTRALINE HYDROCHLORIDE 100 MG: 50 TABLET ORAL at 08:04

## 2022-11-13 NOTE — PLAN OF CARE
Goal Outcome Evaluation:  Plan of Care Reviewed With: patient        Progress: no change  Outcome Evaluation: Pt. agreeable to tx. and was mod (I) for bed mobility and cga for transfers.  Pt. amb. 20' in room with RW and sba/cga and then performed seated LE therex eob.  Post therex pt. amb. 6' x 2 to and from the bathroom with RW and sba/cga.   No new goals met and pt. would benefit from continued skilled PT.

## 2022-11-13 NOTE — PROGRESS NOTES
[facility] Medicine Services  INPATIENT PROGRESS NOTE    Length of Stay: 12  Date of Admission: 11/1/2022  Primary Care Physician: Anahi Camacho APRN    Subjective   Chief Complaint: Shortness of air  HPI:    Has shortness of air.  Also has some dizziness.  Has been on supplemental oxygen.    Review of Systems   Constitutional: Negative for fever.   HENT: Negative for congestion.    Respiratory: Positive for shortness of breath.    Gastrointestinal: Negative for abdominal pain.   Endocrine: Negative for polyuria.   Genitourinary: Negative for dysuria.   Musculoskeletal: Negative for arthralgias.   Skin: Negative for color change.   Neurological: Negative for dizziness.   Hematological: Negative for adenopathy.   Psychiatric/Behavioral: Negative for agitation.        All pertinent negatives and positives are as above. All other systems have been reviewed and are negative unless otherwise stated.     Objective    Temp:  [97.1 °F (36.2 °C)-98.3 °F (36.8 °C)] 97.1 °F (36.2 °C)  Heart Rate:  [59-80] 73  Resp:  [16-18] 18  BP: ()/(54-74) 112/65  Physical Exam  Constitutional:       General: She is not in acute distress.     Appearance: She is well-developed. She is not diaphoretic.   HENT:      Head: Normocephalic and atraumatic.   Cardiovascular:      Rate and Rhythm: Normal rate.   Pulmonary:      Effort: Pulmonary effort is normal. No respiratory distress.      Breath sounds: No wheezing.   Abdominal:      General: There is no distension.      Palpations: Abdomen is soft.   Musculoskeletal:         General: Normal range of motion.   Skin:     General: Skin is warm and dry.   Neurological:      Mental Status: She is alert.      Cranial Nerves: No cranial nerve deficit.   Psychiatric:         Behavior: Behavior normal.         Thought Content: Thought content normal.         Judgment: Judgment normal.             Results Review:  I have reviewed the labs, radiology  results, and diagnostic studies.    Laboratory Data:   Lab Results (last 24 hours)     Procedure Component Value Units Date/Time    Basic Metabolic Panel [[id number]]  (Abnormal) Collected: 11/13/22 0839    Specimen: Blood Updated: 11/13/22 0933     Glucose 108 mg/dL      BUN 30 mg/dL      Creatinine 0.79 mg/dL      Sodium 135 mmol/L      Potassium 5.0 mmol/L      Comment: Slight hemolysis detected by analyzer. Results may be affected.        Chloride 95 mmol/L      CO2 32.0 mmol/L      Calcium 8.9 mg/dL      BUN/Creatinine Ratio 38.0     Anion Gap 8.0 mmol/L      eGFR 80.6 mL/min/1.73      Comment: National Kidney Foundation and American Society of Nephrology (ASN) Task Force recommended calculation based on the Chronic Kidney Disease Epidemiology Collaboration (CKD-EPI) equation refit without adjustment for race.       Narrative:      GFR Normal >60  Chronic Kidney Disease <60  Kidney Failure <15      CBC & Differential [[id number]]  (Abnormal) Collected: 11/13/22 0839    Specimen: Blood Updated: 11/13/22 0929    Narrative:      The following orders were created for panel order CBC & Differential.  Procedure                               Abnormality         Status                     ---------                               -----------         ------                     CBC Auto Differential[[id number]]        Abnormal            Final result                 Please view results for these tests on the individual orders.    CBC Auto Differential [[id number]]  (Abnormal) Collected: 11/13/22 0839    Specimen: Blood Updated: 11/13/22 0929     WBC 16.43 10*3/mm3      RBC 3.54 10*6/mm3      Hemoglobin 10.4 g/dL      Hematocrit 32.6 %      MCV 92.1 fL      MCH 29.4 pg      MCHC 31.9 g/dL      RDW 13.9 %      RDW-SD 46.5 fl      MPV 9.1 fL      Platelets 274 10*3/mm3      Neutrophil % 69.2 %      Lymphocyte % 20.1 %      Monocyte % 9.2 %      Eosinophil % 0.2 %      Basophil % 0.1 %      Immature Grans % 1.2 %      Neutrophils,  Absolute 11.38 10*3/mm3      Lymphocytes, Absolute 3.30 10*3/mm3      Monocytes, Absolute 1.51 10*3/mm3      Eosinophils, Absolute 0.03 10*3/mm3      Basophils, Absolute 0.02 10*3/mm3      Immature Grans, Absolute 0.19 10*3/mm3      nRBC 0.0 /100 WBC           Culture Data:   No results found for: BLOODCX  No results found for: URINECX  No results found for: RESPCX  No results found for: WOUNDCX  No results found for: STOOLCX  No components found for: BODYFLD    Radiology Data:   Imaging Results (Last 24 Hours)     ** No results found for the last 24 hours. **          I have reviewed the patient's current medications.     Assessment/Plan     Active Hospital Problems    Diagnosis    • COPD with exacerbation (HCC)        COPD exacerbation-continue with nebulizer treatment-continue with supplemental oxygen  Continue with Symbicort as well.  Continue with prednisone p.o.    Hypotension/dizziness-Home midodrine has been restarted     Acute on chronic respiratory failure with hypoxia-patient is on 3 L supplemental oxygen right now, we will continue to wean as tolerated, baseline is 2 L for her        Leukocytosis-continue monitoring WBC count     Paroxysmal atrial fibrillation-continue monitoring heart rate     Anticoagulation-continue with Xarelto    Disposition-awaiting rehab placement.     I confirmed that the patient's Advance Care Plan is present, code status is documented, or surrogate decision maker is listed in the patient's medical record.           Herman Espinosa MD   11/13/22   11:15 CST

## 2022-11-13 NOTE — PLAN OF CARE
Goal Outcome Evaluation:              Outcome Evaluation: Pt has been in pain; medicated. Pt has been dizzy while standing up this AM; MD aware and ordered midodrine. Will continue to monitor.

## 2022-11-13 NOTE — THERAPY TREATMENT NOTE
Acute Care - Physical Therapy Treatment Note  [facility]     Patient Name: Mona Perales  : 1950  MRN: [id number]  Today's Date: 2022      Visit Dx:     ICD-10-CM ICD-9-CM   1. ST elevation myocardial infarction (STEMI), unspecified artery ([facility])  I21.3 410.90   2. Chronic obstructive pulmonary disease with acute exacerbation ([facility])  J44.1 491.21   3. Impaired functional mobility, balance, gait, and endurance  Z74.09 V49.89     Patient Active Problem List   Diagnosis   • Anxiety   • CAD (coronary atherosclerotic disease)   • Carotid stenosis   • COPD (chronic obstructive pulmonary disease) ([facility])   • COPD with exacerbation ([facility])   • Depressive disorder, not elsewhere classified   • Benign essential hypertension   • Hypercholesteremia   • Insomnia   • Notalgia   • Osteoporosis   • Other screening mammogram   • RUQ abdominal pain   • PVD (peripheral vascular disease) with claudication ([facility])   • Nicotine abuse   • Dysphagia   • Epigastric pain   • Pain of right hand   • Closed displaced fracture of shaft of fifth metacarpal bone of right hand   • Cause of injury, fall   • Displaced fracture of shaft of fifth metacarpal bone of right hand with routine healing   • Syncope   • Acute respiratory failure with hypoxia ([facility])   • (HFpEF) heart failure with preserved ejection fraction ([facility])   • Hypotension   • Elevated WBCs   • Near syncope   • Atherosclerosis of native coronary artery of native heart without angina pectoris   • Atherosclerosis of native arteries of extremities with rest pain, left leg ([facility])   • PVD (peripheral vascular disease) ([facility])   • Physical deconditioning   • Pain due to onychomycosis of toenails of both feet   • ST elevation myocardial infarction (STEMI), unspecified artery ([facility])     Past Medical History:   Diagnosis Date   • A-fib ([facility])    • Anxiety    • Arthritis    • Asthma    • 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 with ulceration ([facility])     bilateral legs -  bilateral iliac stents 2008      • CHF (congestive heart failure) ([facility])    • Chronic lower back pain    • COPD (chronic obstructive pulmonary disease) ([facility])    • Essential (primary) hypertension    • GERD (gastroesophageal reflux disease)    • History of pulmonary embolus (PE)    • Hyperlipidemia    • Insomnia    • Lumbago    • Nicotine dependence    • Occlusion of artery      and stenosis of bilateral carotid arteries - BABS 16-49%, LICA 0-15%   • Other atherosclerosis of native artery of other extremity     LEFT subclavian stent 2005 (occluded)   • Sleep apnea      Past Surgical History:   Procedure Laterality Date   • CARDIAC CATHETERIZATION  04/06/2016    No evidence of any obstructive epicardial CAD.Preserved LV systolic function with EF of 55%.   • CARDIAC CATHETERIZATION N/A 11/1/2022    Procedure: Left Heart Cath;  Surgeon: Neftali Haywood MD;  Location: [facility] CATH INVASIVE LOCATION;  Service: Cardiology;  Laterality: N/A;   • CENTRAL VENOUS LINE INSERTION  04/07/2016    Successful placement of right uppe extremity 6-[place] triple lumen PICC line.   • ENDOSCOPY N/A 1/12/2018    Procedure: ESOPHAGOGASTRODUODENOSCOPY;  Surgeon: Bin Oh MD;  Location: [facility] ENDOSCOPY;  Service:    • ENDOSCOPY N/A 1/17/2018    Procedure: ESOPHAGOGASTRODUODENOSCOPY;  Surgeon: Bin Oh MD;  Location: [facility] ENDOSCOPY;  Service:    • ENDOSCOPY N/A 3/16/2018    Procedure: ESOPHAGOGASTRODUODENOSCOPY possible dilation;  Surgeon: Bin Oh MD;  Location: [facility] ENDOSCOPY;  Service: Gastroenterology   • FEMORAL POPLITEAL BYPASS Left 4/14/2020    Procedure: FEMORAL POPLITEAL BYPASS ABOVE KNEE  (POLYTETRAFLUOROETHYLENE)  LEFT COMMON FEMORAL ARTERY ENDARTERECTOMY PTA LEFT ILIAC ARTERIOGRAM        (c-arm#2 and c-arm table);  Surgeon: David Suh MD;  Location: [facility] OR;  Service: Vascular;  Laterality: Left;   • FEMORAL POPLITEAL BYPASS Right 9/17/2021    Procedure: RIGHT common femoral endarterectomy  FEMORAL POPLITEAL BYPASS (above the knee polytetrafluoroethylene  lower extremity arteriogram              (c-arm#2 and c-arm table);  Surgeon: David Suh MD;  Location: [facility] OR;  Service: Vascular;  Laterality: Right;   • HYSTERECTOMY     • INTERVENTIONAL RADIOLOGY PROCEDURE Left 9/9/2020    Procedure: IR angiogram extremity left;  Surgeon: David Suh MD;  Location: [facility] ANGIO INVASIVE LOCATION;  Service: Interventional Radiology;  Laterality: Left;   • KYPHOPLASTY N/A 5/23/2019    Procedure: KYPHOPLASTY LUMBAR FOUR;  Surgeon: Aba Santa MD;  Location: [facility] OR;  Service: Orthopedic Spine   • TOTAL SHOULDER REPLACEMENT Left    • TRANSESOPHAGEAL ECHOCARDIOGRAM (JACQUES)  04/07/2016    With color flow-Mild to moderate [facility].LV systolic function well preserved with Ef of 55-60%.Mitral and AV intact.Diastolic dysfunction   • UPPER GASTROINTESTINAL ENDOSCOPY  01/17/2018    Dr. Bin Martin M.D.     PT Assessment (last 12 hours)     PT Evaluation and Treatment     Row Name 11/13/22 1005          Physical Therapy Time and Intention    Subjective Information complains of;pain  -CA     Row Name 11/13/22 1005          General Information    Existing Precautions/Restrictions fall;oxygen therapy device and L/min  -CA     Row Name 11/13/22 1005          Pain    Pretreatment Pain Rating 8/10  -CA     Posttreatment Pain Rating 8/10  -CA     Pain Location - Side/Orientation Right  -CA     Pain Location lower  -CA     Pain Location - flank  -CA     Row Name 11/13/22 1005          Cognition    Orientation Status (Cognition) oriented x 4  -CA     Row Name 11/13/22 1005          Bed Mobility    Bed Mobility supine-sit;sit-supine;scooting/bridging  -CA     Scooting/Bridging [place] (Bed Mobility) independent  -CA     Supine-Sit [place] (Bed Mobility) modified independence  -CA     Sit-Supine [place] (Bed Mobility) modified independence  -CA     Assistive Device (Bed Mobility)  head of bed elevated  -CA     Row Name 11/13/22 1005          Transfers    Transfers sit-stand transfer;stand-sit transfer;toilet transfer  -CA     Row Name 11/13/22 1005          Sit-Stand Transfer    Sit-Stand [place] (Transfers) contact guard  -CA     Assistive Device (Sit-Stand Transfers) walker, front-wheeled  -CA     Row Name 11/13/22 1005          Stand-Sit Transfer    Stand-Sit [place] (Transfers) contact guard  -CA     Assistive Device (Stand-Sit Transfers) walker, front-wheeled  -CA     Row Name 11/13/22 1005          Toilet Transfer    Type (Toilet Transfer) sit-stand;stand-sit  -CA     [place] Level (Toilet Transfer) standby assist  -CA     Assistive Device (Toilet Transfer) commode chair  -CA     Row Name 11/13/22 1005          Gait/Stairs (Locomotion)    [place] Level (Gait) standby assist;contact guard  -CA     Assistive Device (Gait) walker, front-wheeled  -CA     Distance in Feet (Gait) 20' and 6' x 2  -CA     Deviations/Abnormal Patterns (Gait) gait speed decreased  -CA     Comment, (Gait/Stairs) pt. was a little unsteady with gait  -CA     Row Name 11/13/22 1005          Motor Skills    Therapeutic Exercise hip;knee;ankle  -CA     Row Name 11/13/22 1005          Hip (Therapeutic Exercise)    Hip (Therapeutic Exercise) AROM (active range of motion)  -CA     Hip AROM (Therapeutic Exercise) bilateral;flexion;extension  -CA     Row Name 11/13/22 1005          Knee (Therapeutic Exercise)    Knee (Therapeutic Exercise) AROM (active range of motion)  -CA     Knee AROM (Therapeutic Exercise) bilateral;LAQ (long arc quad)  -CA     Row Name 11/13/22 1005          Ankle (Therapeutic Exercise)    Ankle (Therapeutic Exercise) AROM (active range of motion)  -CA     Ankle AROM (Therapeutic Exercise) bilateral;dorsiflexion;plantarflexion  -CA     Row Name 11/13/22 1005          Vital Signs    Pre Systolic BP Rehab 127  -CA     Pre Treatment Diastolic BP 60  -CA     Post Treatment Diastolic BP  79  -CA     Pre Patient Position Supine  -CA     Intra Patient Position Standing  -CA     Post Patient Position Supine  -CA     Row Name 11/13/22 1005          Positioning and Restraints    Pre-Treatment Position in bed  -CA     Post Treatment Position bed  -CA     In Bed supine;call light within reach;encouraged to call for assist;exit alarm on  -CA           User Key  (r) = Recorded By, (t) = Taken By, (c) = Cosigned By    Initials Name Provider Type    CA Kamar Velazquez, PTA Physical Therapist Assistant                Physical Therapy Education     Title: PT OT SLP Therapies (In Progress)     Topic: Physical Therapy (In Progress)     Point: Mobility training (Done)     Learning Progress Summary           Patient Acceptance, E, VU by  at 11/3/2022 1100    Comment: PT POC, rehab process.                   Point: Home exercise program (Not Started)     Learner Progress:  Not documented in this visit.          Point: Body mechanics (Not Started)     Learner Progress:  Not documented in this visit.          Point: Precautions (Not Started)     Learner Progress:  Not documented in this visit.                      User Key     Initials Effective Dates Name Provider Type Discipline     09/18/22 -  Hakeem Wolfe, PT Physical Therapist PT              PT Recommendation and Plan     Plan of Care Reviewed With: patient  Progress: no change  Outcome Evaluation: Pt. agreeable to tx. and was mod (I) for bed mobility and cga for transfers.  Pt. amb. 20' in room with RW and sba/cga and then performed seated LE therex eob.  Post therex pt. amb. 6' x 2 to and from the bathroom with RW and sba/cga.   No new goals met and pt. would benefit from continued skilled PT.   Outcome Measures     Row Name 11/13/22 1005 11/11/22 1400          How much help from another person do you currently need...    Turning from your back to your side while in flat bed without using bedrails? 4  -CA 4  -JW     Moving from lying on back to sitting on  the side of a flat bed without bedrails? 4  -CA 4  -JW     Moving to and from a bed to a chair (including a wheelchair)? 4  -CA 4  -JW     Standing up from a chair using your arms (e.g., wheelchair, bedside chair)? 4  -CA 4  -JW     Climbing 3-5 steps with a railing? 3  -CA 3  -JW     To walk in hospital room? 3  -CA 4  -JW     AM-PAC 6 Clicks Score (PT) 22  -CA 23  -JW        Functional Assessment    Outcome Measure Options AM-PAC 6 Clicks Basic Mobility (PT)  -CA AM-PAC 6 Clicks Basic Mobility (PT)  -JW           User Key  (r) = Recorded By, (t) = Taken By, (c) = Cosigned By    Initials Name Provider Type    Danyell Landin, [place] Physical Therapist Assistant    Kamar Parker PTA Physical Therapist Assistant                 Time Calculation:    PT Charges     Row Name 11/13/22 1204             Time Calculation    Start Time 1005  -CA      Stop Time 1030  -CA      Time Calculation (min) 25 min  -CA      PT Received On 11/13/22  -CA         Time Calculation- PT    Total Timed Code Minutes- PT 25 minute(s)  -CA            User Key  (r) = Recorded By, (t) = Taken By, (c) = Cosigned By    Initials Name Provider Type    Kamar Parker PTA Physical Therapist Assistant              Therapy Charges for Today     Code Description Service Date Service Provider Modifiers Qty    92767750808 HC GAIT TRAINING EA 15 MIN 11/13/2022 Kamar Velazquez PTA GP 1    11241559604 HC PT THERAPEUTIC ACT EA 15 MIN 11/13/2022 Kamar Velazquez PTA GP 1          PT G-Codes  Outcome Measure Options: AM-PAC 6 Clicks Basic Mobility (PT)  AM-PAC 6 Clicks Score (PT): 22    Kamar Velazquez PTA  11/13/2022

## 2022-11-14 LAB
ANION GAP SERPL CALCULATED.3IONS-SCNC: 5 MMOL/L (ref 5–15)
BASOPHILS # BLD AUTO: 0.02 10*3/MM3 (ref 0–0.2)
BASOPHILS NFR BLD AUTO: 0.1 % (ref 0–1.5)
BUN SERPL-MCNC: 35 MG/DL (ref 8–23)
BUN/CREAT SERPL: 37.6 (ref 7–25)
CALCIUM SPEC-SCNC: 8.6 MG/DL (ref 8.6–10.5)
CHLORIDE SERPL-SCNC: 96 MMOL/L (ref 98–107)
CO2 SERPL-SCNC: 35 MMOL/L (ref 22–29)
CREAT SERPL-MCNC: 0.93 MG/DL (ref 0.57–1)
DEPRECATED RDW RBC AUTO: 47 FL (ref 37–54)
EGFRCR SERPLBLD CKD-EPI 2021: 66.3 ML/MIN/1.73
EOSINOPHIL # BLD AUTO: 0.02 10*3/MM3 (ref 0–0.4)
EOSINOPHIL NFR BLD AUTO: 0.1 % (ref 0.3–6.2)
ERYTHROCYTE [DISTWIDTH] IN BLOOD BY AUTOMATED COUNT: 14.2 % (ref 12.3–15.4)
GLUCOSE SERPL-MCNC: 91 MG/DL (ref 65–99)
HCT VFR BLD AUTO: 31.1 % (ref 34–46.6)
HGB BLD-MCNC: 9.9 G/DL (ref 12–15.9)
IMM GRANULOCYTES # BLD AUTO: 0.16 10*3/MM3 (ref 0–0.05)
IMM GRANULOCYTES NFR BLD AUTO: 1 % (ref 0–0.5)
LYMPHOCYTES # BLD AUTO: 2.85 10*3/MM3 (ref 0.7–3.1)
LYMPHOCYTES NFR BLD AUTO: 18.6 % (ref 19.6–45.3)
MCH RBC QN AUTO: 29.3 PG (ref 26.6–33)
MCHC RBC AUTO-ENTMCNC: 31.8 G/DL (ref 31.5–35.7)
MCV RBC AUTO: 92 FL (ref 79–97)
MONOCYTES # BLD AUTO: 1.34 10*3/MM3 (ref 0.1–0.9)
MONOCYTES NFR BLD AUTO: 8.8 % (ref 5–12)
NEUTROPHILS NFR BLD AUTO: 10.92 10*3/MM3 (ref 1.7–7)
NEUTROPHILS NFR BLD AUTO: 71.4 % (ref 42.7–76)
NRBC BLD AUTO-RTO: 0 /100 WBC (ref 0–0.2)
PLATELET # BLD AUTO: 269 10*3/MM3 (ref 140–450)
PMV BLD AUTO: 9.3 FL (ref 6–12)
POTASSIUM SERPL-SCNC: 5 MMOL/L (ref 3.5–5.2)
RBC # BLD AUTO: 3.38 10*6/MM3 (ref 3.77–5.28)
SODIUM SERPL-SCNC: 136 MMOL/L (ref 136–145)
WBC NRBC COR # BLD: 15.31 10*3/MM3 (ref 3.4–10.8)

## 2022-11-14 PROCEDURE — 94760 N-INVAS EAR/PLS OXIMETRY 1: CPT

## 2022-11-14 PROCEDURE — 63710000001 PREDNISONE PER 1 MG: Performed by: INTERNAL MEDICINE

## 2022-11-14 PROCEDURE — 80048 BASIC METABOLIC PNL TOTAL CA: CPT | Performed by: INTERNAL MEDICINE

## 2022-11-14 PROCEDURE — 85025 COMPLETE CBC W/AUTO DIFF WBC: CPT | Performed by: INTERNAL MEDICINE

## 2022-11-14 PROCEDURE — 94799 UNLISTED PULMONARY SVC/PX: CPT

## 2022-11-14 PROCEDURE — 94664 DEMO&/EVAL PT USE INHALER: CPT

## 2022-11-14 RX ADMIN — IPRATROPIUM BROMIDE AND ALBUTEROL SULFATE 3 ML: 2.5; .5 SOLUTION RESPIRATORY (INHALATION) at 15:19

## 2022-11-14 RX ADMIN — CILOSTAZOL 100 MG: 100 TABLET ORAL at 08:56

## 2022-11-14 RX ADMIN — OXYCODONE HYDROCHLORIDE AND ACETAMINOPHEN 2 TABLET: 5; 325 TABLET ORAL at 21:00

## 2022-11-14 RX ADMIN — Medication 10 ML: at 20:55

## 2022-11-14 RX ADMIN — IPRATROPIUM BROMIDE AND ALBUTEROL SULFATE 3 ML: 2.5; .5 SOLUTION RESPIRATORY (INHALATION) at 18:58

## 2022-11-14 RX ADMIN — PANTOPRAZOLE SODIUM 40 MG: 40 TABLET, DELAYED RELEASE ORAL at 08:55

## 2022-11-14 RX ADMIN — MIDODRINE HYDROCHLORIDE 5 MG: 5 TABLET ORAL at 08:54

## 2022-11-14 RX ADMIN — OXYCODONE HYDROCHLORIDE AND ACETAMINOPHEN 2 TABLET: 5; 325 TABLET ORAL at 13:31

## 2022-11-14 RX ADMIN — IPRATROPIUM BROMIDE AND ALBUTEROL SULFATE 3 ML: 2.5; .5 SOLUTION RESPIRATORY (INHALATION) at 11:23

## 2022-11-14 RX ADMIN — PRAVASTATIN SODIUM 20 MG: 20 TABLET ORAL at 20:55

## 2022-11-14 RX ADMIN — OXYCODONE HYDROCHLORIDE AND ACETAMINOPHEN 2 TABLET: 5; 325 TABLET ORAL at 06:28

## 2022-11-14 RX ADMIN — GABAPENTIN 400 MG: 400 CAPSULE ORAL at 20:56

## 2022-11-14 RX ADMIN — RIVAROXABAN 20 MG: 10 TABLET, FILM COATED ORAL at 17:49

## 2022-11-14 RX ADMIN — GUAIFENESIN 600 MG: 600 TABLET, EXTENDED RELEASE ORAL at 20:54

## 2022-11-14 RX ADMIN — Medication 10 ML: at 08:58

## 2022-11-14 RX ADMIN — AMITRIPTYLINE HYDROCHLORIDE 25 MG: 25 TABLET, FILM COATED ORAL at 20:55

## 2022-11-14 RX ADMIN — GABAPENTIN 400 MG: 400 CAPSULE ORAL at 06:28

## 2022-11-14 RX ADMIN — GUAIFENESIN 600 MG: 600 TABLET, EXTENDED RELEASE ORAL at 08:54

## 2022-11-14 RX ADMIN — CETIRIZINE HYDROCHLORIDE 10 MG: 10 TABLET, FILM COATED ORAL at 08:53

## 2022-11-14 RX ADMIN — PREDNISONE 40 MG: 20 TABLET ORAL at 08:52

## 2022-11-14 RX ADMIN — BUDESONIDE AND FORMOTEROL FUMARATE DIHYDRATE 2 PUFF: 160; 4.5 AEROSOL RESPIRATORY (INHALATION) at 07:44

## 2022-11-14 RX ADMIN — GABAPENTIN 400 MG: 400 CAPSULE ORAL at 13:25

## 2022-11-14 RX ADMIN — SERTRALINE HYDROCHLORIDE 100 MG: 50 TABLET ORAL at 08:52

## 2022-11-14 RX ADMIN — CILOSTAZOL 100 MG: 100 TABLET ORAL at 17:49

## 2022-11-14 RX ADMIN — FUROSEMIDE 40 MG: 40 TABLET ORAL at 08:53

## 2022-11-14 RX ADMIN — POLYETHYLENE GLYCOL 3350 17 G: 17 POWDER, FOR SOLUTION ORAL at 08:56

## 2022-11-14 RX ADMIN — Medication 10 ML: at 08:57

## 2022-11-14 RX ADMIN — BENZONATATE 200 MG: 100 CAPSULE ORAL at 21:00

## 2022-11-14 RX ADMIN — CLOPIDOGREL BISULFATE 75 MG: 75 TABLET ORAL at 08:55

## 2022-11-14 RX ADMIN — BUDESONIDE AND FORMOTEROL FUMARATE DIHYDRATE 2 PUFF: 160; 4.5 AEROSOL RESPIRATORY (INHALATION) at 18:58

## 2022-11-14 RX ADMIN — IPRATROPIUM BROMIDE AND ALBUTEROL SULFATE 3 ML: 2.5; .5 SOLUTION RESPIRATORY (INHALATION) at 07:35

## 2022-11-14 RX ADMIN — Medication 10 ML: at 20:56

## 2022-11-14 RX ADMIN — TRAZODONE HYDROCHLORIDE 150 MG: 100 TABLET ORAL at 20:55

## 2022-11-14 NOTE — PROGRESS NOTES
Adult Nutrition  Assessment/PES    Patient Name:  Mona Perales  YOB: 1950  MRN: [id number]  Admit Date:  11/1/2022    Assessment Date:  11/14/2022    Comments:  Pt continues to be managed for COPD exacerbation.  Excelleint intake at this time, ~100% intake of meals.  She continues on Lasix and Prednisone.  Her wt is down from admit most likely due t inadequate energy intake and hypermetabolic state.  Her wt is down from admit--with--But I am unsreu of her actual body wt--129# on 11/2 and #  Will continue with supplements and monitor her oral intake/POC.  Discussed Basic Nutritional needs and good food/beverage choices for increasing protein in the diet.  Supplements can be used to optimize protein and calories in the diet.       Reason for Assessment     Row Name 11/14/22 1347          Reason for Assessment    Reason For Assessment follow-up protocol                Nutrition/Diet History     Row Name 11/14/22 1347          Nutrition/Diet History    Typical Intake (Food/Fluid/EN/PN) Pt continues to eat well with no complaints or requests.  She continues to drink milk with meals.                Labs/Tests/Procedures/Meds     Row Name 11/14/22 1348          Labs/Procedures/Meds    Lab Results Reviewed reviewed, pertinent     Lab Results Comments Bun 35        Diagnostic Tests/Procedures    Diagnostic Test/Procedure Reviewed reviewed, pertinent     Diagnostic Test/Procedures Comments Supplemental 02        Medications    Pertinent Medications Reviewed reviewed, pertinent     Pertinent Medications Comments Elavil; Lasix; Prednisone; Xarelto                    Nutrition Prescription Ordered     Row Name 11/14/22 1349          Nutrition Prescription PO    Current PO Diet Regular     Supplement Milk;Ice Cream     Supplement Frequency 2 times a day;3 times a day     Common Modifiers Cardiac;Consistent Carbohydrate                Evaluation of Received Nutrient/Fluid Intake     Row Name 11/14/22  1350          PO Evaluation    Number of Meals 6     % PO Intake 100% average                   Problem/Interventions:   Problem 1     Row Name 11/14/22 1351          Nutrition Diagnoses Problem 1    Problem 1 Increased Nutrient Needs     Macronutrient Kcal;Protein     Etiology (related to) Medical Diagnosis     Pulmonary/Critical Care Respiratory distress;Pneumonia;COPD     Signs/Symptoms (evidenced by) PO Intake     Percent (%) intake recorded 100 %     Over number of meals 6     Other Comment Hypermetabolic state                      Intervention Goal     Row Name 11/14/22 1351          Intervention Goal    General Meet nutritional needs for age/condition;Reduce/improve symptoms     PO Tolerate PO;Meet estimated needs;Maintain intake     Weight Maintain weight                Nutrition Intervention     Row Name 11/14/22 1352          Nutrition Intervention    RD/Tech Action Follow Tx progress;Encourage intake;Recommend/ordered;Advise alternate selection;Advise available snack;Interview for preference;Menu provided                  Education/Evaluation     Row Name 11/14/22 1355          Education    Education Education topics;Advised regarding habits/behavior     Education Topics Basic nutrition;Protein     Advised Regarding Habits/Behavior Increased nutrient density;Use supplement;Food choices        Monitor/Evaluation    Monitor Skin status;I&O;PO intake;Supplement intake;Pertinent labs;Weight;Symptoms;Per protocol     Education Follow-up Reinforce PRN                 Electronically signed by:  Jessy Krishnan RD  11/14/22 13:55 CST

## 2022-11-14 NOTE — PROGRESS NOTES
[facility] Medicine Services  INPATIENT PROGRESS NOTE    Length of Stay: 13  Date of Admission: 11/1/2022  Primary Care Physician: Anahi Camacho APRN    Subjective   Chief Complaint: Shortness of air  HPI: 72-year-old female with COPD, admitted and treated with COPD exacerbation with acute on chronic respiratory failure, improved from medical standpoint however with significant debility and generalized weakness, awaiting placement.      Subjective:  Has had ongoing dyspnea, but no worsening shortness of breath noted today.  No chest pain.  Resting comfortably with no other complaints.  Generally weak.        ROS  14 point review of systems completed  All pertinent negatives and positives are as above. All other systems have been reviewed and are negative unless otherwise stated.     Objective    Temp:  [97 °F (36.1 °C)-97.8 °F (36.6 °C)] 97.8 °F (36.6 °C)  Heart Rate:  [62-78] 66  Resp:  [18-20] 18  BP: ()/(52-76) 109/61  Physical Exam  Constitutional:       General: She is not in acute distress.     Appearance: She is well-developed. She is not diaphoretic.   HENT:      Head: Normocephalic and atraumatic.   Eyes:      General: No scleral icterus.  Cardiovascular:      Rate and Rhythm: Normal rate.   Pulmonary:      Effort: Pulmonary effort is normal. No respiratory distress.      Breath sounds: No wheezing.   Abdominal:      General: There is no distension.   Musculoskeletal:         General: Normal range of motion.   Skin:     General: Skin is warm and dry.   Neurological:      Mental Status: She is alert.   Psychiatric:         Behavior: Behavior normal.         Thought Content: Thought content normal.             Results Review:  I have reviewed the labs, radiology results, and diagnostic studies.    Laboratory Data:   Lab Results (last 24 hours)     Procedure Component Value Units Date/Time    CBC & Differential [[id number]]  (Abnormal) Collected: 11/14/22  0541    Specimen: Blood Updated: 11/14/22 0659    Narrative:      The following orders were created for panel order CBC & Differential.  Procedure                               Abnormality         Status                     ---------                               -----------         ------                     CBC Auto Differential[[id number]]        Abnormal            Final result                 Please view results for these tests on the individual orders.    CBC Auto Differential [[id number]]  (Abnormal) Collected: 11/14/22 0541    Specimen: Blood Updated: 11/14/22 0659     WBC 15.31 10*3/mm3      RBC 3.38 10*6/mm3      Hemoglobin 9.9 g/dL      Hematocrit 31.1 %      MCV 92.0 fL      MCH 29.3 pg      MCHC 31.8 g/dL      RDW 14.2 %      RDW-SD 47.0 fl      MPV 9.3 fL      Platelets 269 10*3/mm3      Neutrophil % 71.4 %      Lymphocyte % 18.6 %      Monocyte % 8.8 %      Eosinophil % 0.1 %      Basophil % 0.1 %      Immature Grans % 1.0 %      Neutrophils, Absolute 10.92 10*3/mm3      Lymphocytes, Absolute 2.85 10*3/mm3      Monocytes, Absolute 1.34 10*3/mm3      Eosinophils, Absolute 0.02 10*3/mm3      Basophils, Absolute 0.02 10*3/mm3      Immature Grans, Absolute 0.16 10*3/mm3      nRBC 0.0 /100 WBC     Basic Metabolic Panel [[id number]]  (Abnormal) Collected: 11/14/22 0541    Specimen: Blood Updated: 11/14/22 0636     Glucose 91 mg/dL      BUN 35 mg/dL      Creatinine 0.93 mg/dL      Sodium 136 mmol/L      Potassium 5.0 mmol/L      Comment: Slight hemolysis detected by analyzer. Results may be affected.        Chloride 96 mmol/L      CO2 35.0 mmol/L      Calcium 8.6 mg/dL      BUN/Creatinine Ratio 37.6     Anion Gap 5.0 mmol/L      eGFR 66.3 mL/min/1.73      Comment: National Kidney Foundation and American Society of Nephrology (ASN) Task Force recommended calculation based on the Chronic Kidney Disease Epidemiology Collaboration (CKD-EPI) equation refit without adjustment for race.       Narrative:      GFR  Normal >60  Chronic Kidney Disease <60  Kidney Failure <15            Culture Data:   No results found for: BLOODCX  No results found for: URINECX  No results found for: RESPCX  No results found for: WOUNDCX  No results found for: STOOLCX  No components found for: BODYFLD    Radiology Data:   Imaging Results (Last 24 Hours)     ** No results found for the last 24 hours. **          I have reviewed the patient's current medications.     Assessment/Plan     Active Hospital Problems    Diagnosis    • COPD with exacerbation (HCC)        COPD exacerbation  Submental oxygen as needed to maintain adequate gas exchange, wean as able, baseline 2 L  Bronchodilators  Wean steroids    Hypotension/dizziness- improved, continue midodrine     Paroxysmal atrial fibrillation- rate control     Anticoagulation- Xarelto    Debility  PT/OT    Disposition-awaiting rehab placement.        Michele Nunn MD   11/14/22   13:39 CST

## 2022-11-14 NOTE — PLAN OF CARE
Goal Outcome Evaluation:  Plan of Care Reviewed With: caregiver, patient        Progress: no change  Outcome Evaluation: Nutrition F/U:  Pt continues to be managed for COPD exacerbation.  Excellent po intake. 100%, Will continue to montior POC and continue currnt nutrition regimen

## 2022-11-14 NOTE — PAYOR COMM NOTE
"    Yen Romero RN [facility]  [phone]     Phone  [phone]     Fax  Cont. Stay Review      Filomena Perales (72 y.o. Female)     Date of Birth   1950    Social Security Number       Address   [address] [place] [address]    Home Phone   [phone]    MRN   [id number]       Mandaeism   [facility]    Marital Status                               Admission Date   11/1/22    Admission Type   Emergency    Admitting Provider       Attending Provider   Michele Nunn MD    Department, Room/Bed   [address], 321/1       Discharge Date       Discharge Disposition       Discharge Destination                               Attending Provider: Michele Nunn MD    Allergies: Morphine And Related, Penicillins    Isolation: None   Infection: None   Code Status: CPR    Ht: 157.5 cm (62\")   Wt: 59.7 kg (131 lb 9.6 oz)    Admission Cmt: None   Principal Problem: None                Active Insurance as of 11/1/2022     Primary Coverage     Payor Plan Insurance Group Employer/Plan Group    [facility] MEDICARE REPLACEMENT [employer] MEDICARE REPLACEMENT [id number]     Payor Plan Address Payor Plan Phone Number Payor Plan Fax Number Effective Dates    [address] [address] [phone]  5/1/2021 - None Entered    [facility] [id number]       Subscriber Name Subscriber Birth Date Member ID       FILOMENA PERALES 1950 [id number]                 Emergency Contacts      (Rel.) Home Phone Work Phone Mobile Phone    Renee Li (Grandchild) -- -- [phone]    Meena Marie (Friend) -- -- [phone]            Vital Signs (last day)     Date/Time Temp Temp src Pulse Resp BP Patient Position SpO2    11/14/22 0916 97.3 (36.3) Temporal 64 18 167/76 Lying 97    11/14/22 0751 -- -- 62 -- -- -- --    11/14/22 0744 -- -- 65 18 -- -- --    11/14/22 0735 -- -- 68 18 -- -- 92    11/14/22 0302 97.8 (36.6) Temporal 65 18 " 107/57 Lying 95    11/13/22 2347 -- -- 67 -- -- -- --    11/13/22 2302 97.8 (36.6) Temporal 72 18 99/55 Lying 94    11/13/22 2020 -- -- 76 -- -- -- --    11/13/22 2008 -- -- 76 18 -- -- 93    11/13/22 1912 97.7 (36.5) Temporal 77 20 102/64 Lying 93    11/13/22 1555 -- -- 72 -- -- -- --    11/13/22 1547 -- -- 78 20 -- -- 92    11/13/22 1540 97 (36.1) Temporal 73 18 107/52 Sitting 94    11/13/22 1519 -- -- 67 -- -- -- --    11/13/22 1435 -- -- -- -- 91/53 Sitting --    11/13/22 1148 97 (36.1) Temporal 77 20 92/54 Sitting 92    11/13/22 1123 -- -- 68 18 -- -- 91    11/13/22 0836 -- -- 73 -- -- -- --    11/13/22 0751 97.1 (36.2) Temporal 65 18 112/65 Sitting 93    11/13/22 0739 -- -- 72 -- -- -- --    11/13/22 0728 -- -- 68 18 -- -- 91    11/13/22 0520 -- -- 59 -- -- -- --    11/13/22 0340 98 (36.7) Temporal 63 16 115/56 Lying 96          Oxygen Therapy (last day)     Date/Time SpO2 Device (Oxygen Therapy) Flow (L/min) Oxygen Concentration (%) ETCO2 (mmHg)    11/14/22 0916 97 nasal cannula;humidified 3 -- --    11/14/22 0744 -- nasal cannula 3 -- --    11/14/22 0735 92 nasal cannula 3 -- --    11/14/22 0302 95 nasal cannula;humidified 3 -- --    11/13/22 2302 94 nasal cannula;humidified 3 -- --    11/13/22 2020 -- nasal cannula;humidified 3 -- --    11/13/22 2008 93 nasal cannula;humidified 3 -- --    11/13/22 1912 93 nasal cannula 3 -- --    11/13/22 1555 -- nasal cannula 3 -- --    11/13/22 1547 92 nasal cannula 3 -- --    11/13/22 1540 94 nasal cannula;humidified 3 -- --    11/13/22 1148 92 nasal cannula;humidified 3 -- --    11/13/22 1129 -- nasal cannula 3 -- --    11/13/22 1123 91 nasal cannula 3 -- --    11/13/22 0751 93 nasal cannula;humidified 3 -- --    11/13/22 0739 -- nasal cannula;humidified 3 -- --    11/13/22 0728 91 nasal cannula;humidified 3 -- --    11/13/22 0340 96 nasal cannula;humidified 3 -- --          Current Facility-Administered Medications   Medication Dose Route Frequency Provider Last Rate  Last Admin   • acetaminophen (TYLENOL) tablet 650 mg  650 mg Oral Q4H PRN Danish Flores MD   650 mg at 11/13/22 0939    Or   • acetaminophen (TYLENOL) suppository 650 mg  650 mg Rectal Q4H PRN Danish Flores MD       • acetaminophen (TYLENOL) tablet 650 mg  650 mg Oral Q4H PRN Danish Flores MD       • amitriptyline (ELAVIL) tablet 25 mg  25 mg Oral Nightly Neftali Haywood MD   25 mg at 11/13/22 2043   • benzonatate (TESSALON) capsule 200 mg  200 mg Oral Q4H PRN Danish Flores MD   200 mg at 11/06/22 0637   • budesonide-formoterol (SYMBICORT) 160-4.5 MCG/ACT inhaler 2 puff  2 puff Inhalation BID - RT Neftali Haywood MD   2 puff at 11/14/22 0744   • cetirizine (zyrTEC) tablet 10 mg  10 mg Oral Daily Jolanta Stanley MD   10 mg at 11/14/22 0853   • cilostazol (PLETAL) tablet 100 mg  100 mg Oral BID AC Neftali Haywood MD   100 mg at 11/14/22 0856   • clopidogrel (PLAVIX) tablet 75 mg  75 mg Oral Daily Neftali Haywood MD   75 mg at 11/14/22 0855   • furosemide (LASIX) tablet 40 mg  40 mg Oral Daily Jolanta Stanley MD   40 mg at 11/14/22 0853   • gabapentin (NEURONTIN) capsule 400 mg  400 mg Oral Q8H Neftali Haywood MD   400 mg at 11/14/22 0628   • guaiFENesin (MUCINEX) 12 hr tablet 600 mg  600 mg Oral Q12H Jolanta Stanley MD   600 mg at 11/14/22 0854   • ipratropium-albuterol (DUO-NEB) nebulizer solution 3 mL  3 mL Nebulization Q4H While Awake Danish Flores MD   3 mL at 11/14/22 0735   • midodrine (PROAMATINE) tablet 5 mg  5 mg Oral TID AC Herman Espinosa MD   5 mg at 11/14/22 0854   • ondansetron (ZOFRAN) injection 4 mg  4 mg Intravenous Q6H PRN Danish Flores MD   4 mg at 11/03/22 0335   • ondansetron (ZOFRAN) tablet 4 mg  4 mg Oral Q8H PRN Neftali Haywood MD       • oxyCODONE-acetaminophen (PERCOCET) 5-325 MG per tablet 2 tablet  2 tablet Oral Q6H PRN Jolanta Stanley MD   2 tablet at 11/14/22 0628   • pantoprazole (PROTONIX) EC tablet 40 mg  40 mg Oral Daily Sultan  MD Neftali   40 mg at 11/14/22 0855   • polyethylene glycol (MIRALAX) packet 17 g  17 g Oral Daily Behroozi, Saeid, MD   17 g at 11/14/22 0856   • pravastatin (PRAVACHOL) tablet 20 mg  20 mg Oral Nightly Neftali Haywood MD   20 mg at 11/13/22 2043   • predniSONE (DELTASONE) tablet 40 mg  40 mg Oral Daily With Breakfast Jolanta Stanley MD   40 mg at 11/14/22 0852   • rivaroxaban (XARELTO) tablet 20 mg  20 mg Oral Daily With Dinner Neftali Haywood MD   20 mg at 11/13/22 1706   • sennosides-docusate (PERICOLACE) 8.6-50 MG per tablet 2 tablet  2 tablet Oral BID PRN Behroozi, Saeid, MD   2 tablet at 11/08/22 2148   • sertraline (ZOLOFT) tablet 100 mg  100 mg Oral Daily Neftali Haywood MD   100 mg at 11/14/22 0852   • sodium chloride 0.9 % flush 10 mL  10 mL Intravenous Q12H Danish Flores MD   10 mL at 11/14/22 0858   • sodium chloride 0.9 % flush 10 mL  10 mL Intravenous PRN Danish Flores MD       • sodium chloride 0.9 % flush 10 mL  10 mL Intravenous Q12H Behroozi, Saeid, MD   10 mL at 11/14/22 0858   • sodium chloride 0.9 % flush 10 mL  10 mL Intravenous Q12H Behroozi, Saeid, MD   10 mL at 11/14/22 0858   • sodium chloride 0.9 % flush 10 mL  10 mL Intravenous Q12H Behroozi, Saeid, MD   10 mL at 11/14/22 0857   • sodium chloride 0.9 % flush 10 mL  10 mL Intravenous PRN Behroozi, Saeid, MD       • sodium chloride 0.9 % flush 20 mL  20 mL Intravenous PRN Behroozi, Saeid, MD       • traZODone (DESYREL) tablet 150 mg  150 mg Oral Nightly Neftali Haywood MD   150 mg at 11/13/22 2044        Physician Progress Notes (last 48 hours)      Herman Espinosa MD at 11/13/22 1115              [facility] Medicine Services  INPATIENT PROGRESS NOTE    Length of Stay: 12  Date of Admission: 11/1/2022  Primary Care Physician: Anahi Camacho APRN    Subjective   Chief Complaint: Shortness of air  HPI:    Has shortness of air.  Also has some dizziness.  Has been on supplemental  oxygen.    Review of Systems   Constitutional: Negative for fever.   HENT: Negative for congestion.    Respiratory: Positive for shortness of breath.    Gastrointestinal: Negative for abdominal pain.   Endocrine: Negative for polyuria.   Genitourinary: Negative for dysuria.   Musculoskeletal: Negative for arthralgias.   Skin: Negative for color change.   Neurological: Negative for dizziness.   Hematological: Negative for adenopathy.   Psychiatric/Behavioral: Negative for agitation.        All pertinent negatives and positives are as above. All other systems have been reviewed and are negative unless otherwise stated.     Objective    Temp:  [97.1 °F (36.2 °C)-98.3 °F (36.8 °C)] 97.1 °F (36.2 °C)  Heart Rate:  [59-80] 73  Resp:  [16-18] 18  BP: ()/(54-74) 112/65  Physical Exam  Constitutional:       General: She is not in acute distress.     Appearance: She is well-developed. She is not diaphoretic.   HENT:      Head: Normocephalic and atraumatic.   Cardiovascular:      Rate and Rhythm: Normal rate.   Pulmonary:      Effort: Pulmonary effort is normal. No respiratory distress.      Breath sounds: No wheezing.   Abdominal:      General: There is no distension.      Palpations: Abdomen is soft.   Musculoskeletal:         General: Normal range of motion.   Skin:     General: Skin is warm and dry.   Neurological:      Mental Status: She is alert.      Cranial Nerves: No cranial nerve deficit.   Psychiatric:         Behavior: Behavior normal.         Thought Content: Thought content normal.         Judgment: Judgment normal.             Results Review:  I have reviewed the labs, radiology results, and diagnostic studies.    Laboratory Data:   Lab Results (last 24 hours)     Procedure Component Value Units Date/Time    Basic Metabolic Panel [[id number]]  (Abnormal) Collected: 11/13/22 0839    Specimen: Blood Updated: 11/13/22 0933     Glucose 108 mg/dL      BUN 30 mg/dL      Creatinine 0.79 mg/dL      Sodium 135  mmol/L      Potassium 5.0 mmol/L      Comment: Slight hemolysis detected by analyzer. Results may be affected.        Chloride 95 mmol/L      CO2 32.0 mmol/L      Calcium 8.9 mg/dL      BUN/Creatinine Ratio 38.0     Anion Gap 8.0 mmol/L      eGFR 80.6 mL/min/1.73      Comment: National Kidney Foundation and American Society of Nephrology (ASN) Task Force recommended calculation based on the Chronic Kidney Disease Epidemiology Collaboration (CKD-EPI) equation refit without adjustment for race.       Narrative:      GFR Normal >60  Chronic Kidney Disease <60  Kidney Failure <15      CBC & Differential [[id number]]  (Abnormal) Collected: 11/13/22 0839    Specimen: Blood Updated: 11/13/22 0929    Narrative:      The following orders were created for panel order CBC & Differential.  Procedure                               Abnormality         Status                     ---------                               -----------         ------                     CBC Auto Differential[[id number]]        Abnormal            Final result                 Please view results for these tests on the individual orders.    CBC Auto Differential [[id number]]  (Abnormal) Collected: 11/13/22 0839    Specimen: Blood Updated: 11/13/22 0929     WBC 16.43 10*3/mm3      RBC 3.54 10*6/mm3      Hemoglobin 10.4 g/dL      Hematocrit 32.6 %      MCV 92.1 fL      MCH 29.4 pg      MCHC 31.9 g/dL      RDW 13.9 %      RDW-SD 46.5 fl      MPV 9.1 fL      Platelets 274 10*3/mm3      Neutrophil % 69.2 %      Lymphocyte % 20.1 %      Monocyte % 9.2 %      Eosinophil % 0.2 %      Basophil % 0.1 %      Immature Grans % 1.2 %      Neutrophils, Absolute 11.38 10*3/mm3      Lymphocytes, Absolute 3.30 10*3/mm3      Monocytes, Absolute 1.51 10*3/mm3      Eosinophils, Absolute 0.03 10*3/mm3      Basophils, Absolute 0.02 10*3/mm3      Immature Grans, Absolute 0.19 10*3/mm3      nRBC 0.0 /100 WBC           Culture Data:   No results found for: BLOODCX  No results found  for: URINECX  No results found for: RESPCX  No results found for: WOUNDCX  No results found for: STOOLCX  No components found for: BODYFLD    Radiology Data:   Imaging Results (Last 24 Hours)     ** No results found for the last 24 hours. **          I have reviewed the patient's current medications.     Assessment/Plan     Active Hospital Problems    Diagnosis    • COPD with exacerbation (HCC)        COPD exacerbation-continue with nebulizer treatment-continue with supplemental oxygen  Continue with Symbicort as well.  Continue with prednisone p.o.    Hypotension/dizziness-Home midodrine has been restarted     Acute on chronic respiratory failure with hypoxia-patient is on 3 L supplemental oxygen right now, we will continue to wean as tolerated, baseline is 2 L for her        Leukocytosis-continue monitoring WBC count     Paroxysmal atrial fibrillation-continue monitoring heart rate     Anticoagulation-continue with Xarelto    Disposition-awaiting rehab placement.     I confirmed that the patient's Advance Care Plan is present, code status is documented, or surrogate decision maker is listed in the patient's medical record.           Herman Espinosa MD   11/13/22   11:15 CST      Electronically signed by Herman Espinosa MD at 11/13/22 1118     Herman Espinosa MD at 11/12/22 1304              [facility] Medicine Services  INPATIENT PROGRESS NOTE    Length of Stay: 11  Date of Admission: 11/1/2022  Primary Care Physician: Anahi Camacho APRN    Subjective   Chief Complaint: Shortness of air  HPI:    Still having some shortness of air although has been improving.  Has been on 3 L of oxygen here.  She does wear oxygen at home.    Review of Systems   Constitutional: Negative for fever.   HENT: Negative for congestion.    Respiratory: Positive for shortness of breath.    Cardiovascular: Negative for chest pain.   Gastrointestinal: Negative for abdominal pain.   Endocrine:  Negative for polyuria.   Genitourinary: Negative for difficulty urinating.   Musculoskeletal: Negative for arthralgias.   Skin: Negative for color change.   Allergic/Immunologic: Negative for food allergies.   Neurological: Negative for weakness.   Hematological: Negative for adenopathy.   Psychiatric/Behavioral: Negative for agitation.        All pertinent negatives and positives are as above. All other systems have been reviewed and are negative unless otherwise stated.     Objective    Temp:  [97.5 °F (36.4 °C)-98.1 °F (36.7 °C)] 97.8 °F (36.6 °C)  Heart Rate:  [61-74] 73  Resp:  [18-20] 18  BP: ()/(54-95) 92/54  Physical Exam  Vitals and nursing note reviewed.   Constitutional:       General: She is not in acute distress.     Appearance: She is well-developed. She is not diaphoretic.   HENT:      Head: Normocephalic and atraumatic.   Cardiovascular:      Rate and Rhythm: Normal rate.   Pulmonary:      Effort: Pulmonary effort is normal. No respiratory distress.      Breath sounds: No wheezing.   Abdominal:      General: There is no distension.      Palpations: Abdomen is soft.   Musculoskeletal:         General: Normal range of motion.   Skin:     General: Skin is warm and dry.   Neurological:      Mental Status: She is alert.      Cranial Nerves: No cranial nerve deficit.   Psychiatric:         Behavior: Behavior normal.         Thought Content: Thought content normal.         Judgment: Judgment normal.             Results Review:  I have reviewed the labs, radiology results, and diagnostic studies.    Laboratory Data:   Lab Results (last 24 hours)     Procedure Component Value Units Date/Time    Basic Metabolic Panel [[id number]]  (Abnormal) Collected: 11/12/22 0626    Specimen: Blood Updated: 11/12/22 0655     Glucose 94 mg/dL      BUN 26 mg/dL      Creatinine 0.72 mg/dL      Sodium 135 mmol/L      Potassium 4.9 mmol/L      Chloride 93 mmol/L      CO2 36.0 mmol/L      Calcium 8.7 mg/dL       BUN/Creatinine Ratio 36.1     Anion Gap 6.0 mmol/L      eGFR 90.1 mL/min/1.73      Comment: National Kidney Foundation and American Society of Nephrology (ASN) Task Force recommended calculation based on the Chronic Kidney Disease Epidemiology Collaboration (CKD-EPI) equation refit without adjustment for race.       Narrative:      GFR Normal >60  Chronic Kidney Disease <60  Kidney Failure <15      CBC & Differential [[id number]]  (Abnormal) Collected: 11/12/22 0626    Specimen: Blood Updated: 11/12/22 0640    Narrative:      The following orders were created for panel order CBC & Differential.  Procedure                               Abnormality         Status                     ---------                               -----------         ------                     CBC Auto Differential[[id number]]        Abnormal            Final result                 Please view results for these tests on the individual orders.    CBC Auto Differential [[id number]]  (Abnormal) Collected: 11/12/22 0626    Specimen: Blood Updated: 11/12/22 0640     WBC 18.88 10*3/mm3      RBC 3.45 10*6/mm3      Hemoglobin 10.4 g/dL      Hematocrit 29.7 %      MCV 86.1 fL      MCH 30.1 pg      MCHC 35.0 g/dL      RDW 13.4 %      RDW-SD 41.3 fl      MPV 9.2 fL      Platelets 275 10*3/mm3      Neutrophil % 70.3 %      Lymphocyte % 19.5 %      Monocyte % 8.2 %      Eosinophil % 0.2 %      Basophil % 0.3 %      Immature Grans % 1.5 %      Neutrophils, Absolute 13.28 10*3/mm3      Lymphocytes, Absolute 3.68 10*3/mm3      Monocytes, Absolute 1.54 10*3/mm3      Eosinophils, Absolute 0.03 10*3/mm3      Basophils, Absolute 0.06 10*3/mm3      Immature Grans, Absolute 0.29 10*3/mm3      nRBC 0.0 /100 WBC           Culture Data:   No results found for: BLOODCX  No results found for: URINECX  No results found for: RESPCX  No results found for: WOUNDCX  No results found for: STOOLCX  No components found for: BODYFLD    Radiology Data:   Imaging Results (Last 24  Hours)     ** No results found for the last 24 hours. **          I have reviewed the patient's current medications.     Assessment/Plan     Active Hospital Problems    Diagnosis    • COPD with exacerbation (HCC)        COPD exacerbation-continue with nebulizer treatment-continue with supplemental oxygen  Continue with Symbicort as well.  Continue with prednisone p.o.     Acute on chronic respiratory failure with hypoxia-patient is on 3 L supplemental oxygen right now, we will continue to wean as tolerated, baseline is 2 L for her       Leukocytosis-continue monitoring WBC count     Paroxysmal atrial fibrillation-continue monitoring heart rate     Anticoagulation-continue with Xarelto     I confirmed that the patient's Advance Care Plan is present, code status is documented, or surrogate decision maker is listed in the patient's medical record.             Herman Espinosa MD   11/12/22   13:04 CST      Electronically signed by Herman Espinosa MD at 11/12/22 1306       Medical Student Notes (last 24 hours)  Notes from 11/13/22 1035 through 11/14/22 1035   No notes of this type exist for this encounter.         Consult Notes (last 24 hours)  Notes from 11/13/22 1035 through 11/14/22 1035   No notes of this type exist for this encounter.

## 2022-11-15 LAB
ANION GAP SERPL CALCULATED.3IONS-SCNC: 9 MMOL/L (ref 5–15)
BASOPHILS # BLD AUTO: 0.02 10*3/MM3 (ref 0–0.2)
BASOPHILS NFR BLD AUTO: 0.1 % (ref 0–1.5)
BUN SERPL-MCNC: 33 MG/DL (ref 8–23)
BUN/CREAT SERPL: 36.3 (ref 7–25)
CALCIUM SPEC-SCNC: 8.7 MG/DL (ref 8.6–10.5)
CHLORIDE SERPL-SCNC: 97 MMOL/L (ref 98–107)
CO2 SERPL-SCNC: 32 MMOL/L (ref 22–29)
CREAT SERPL-MCNC: 0.91 MG/DL (ref 0.57–1)
DEPRECATED RDW RBC AUTO: 47.4 FL (ref 37–54)
EGFRCR SERPLBLD CKD-EPI 2021: 68 ML/MIN/1.73
EOSINOPHIL # BLD AUTO: 0.01 10*3/MM3 (ref 0–0.4)
EOSINOPHIL NFR BLD AUTO: 0.1 % (ref 0.3–6.2)
ERYTHROCYTE [DISTWIDTH] IN BLOOD BY AUTOMATED COUNT: 14.3 % (ref 12.3–15.4)
GLUCOSE SERPL-MCNC: 96 MG/DL (ref 65–99)
HCT VFR BLD AUTO: 31.4 % (ref 34–46.6)
HGB BLD-MCNC: 10.1 G/DL (ref 12–15.9)
IMM GRANULOCYTES # BLD AUTO: 0.2 10*3/MM3 (ref 0–0.05)
IMM GRANULOCYTES NFR BLD AUTO: 1.3 % (ref 0–0.5)
LYMPHOCYTES # BLD AUTO: 2.34 10*3/MM3 (ref 0.7–3.1)
LYMPHOCYTES NFR BLD AUTO: 14.8 % (ref 19.6–45.3)
MCH RBC QN AUTO: 29.6 PG (ref 26.6–33)
MCHC RBC AUTO-ENTMCNC: 32.2 G/DL (ref 31.5–35.7)
MCV RBC AUTO: 92.1 FL (ref 79–97)
MONOCYTES # BLD AUTO: 1.11 10*3/MM3 (ref 0.1–0.9)
MONOCYTES NFR BLD AUTO: 7 % (ref 5–12)
NEUTROPHILS NFR BLD AUTO: 12.09 10*3/MM3 (ref 1.7–7)
NEUTROPHILS NFR BLD AUTO: 76.7 % (ref 42.7–76)
NRBC BLD AUTO-RTO: 0 /100 WBC (ref 0–0.2)
PLATELET # BLD AUTO: 274 10*3/MM3 (ref 140–450)
PMV BLD AUTO: 9.4 FL (ref 6–12)
POTASSIUM SERPL-SCNC: 4.8 MMOL/L (ref 3.5–5.2)
RBC # BLD AUTO: 3.41 10*6/MM3 (ref 3.77–5.28)
SODIUM SERPL-SCNC: 138 MMOL/L (ref 136–145)
WBC NRBC COR # BLD: 15.77 10*3/MM3 (ref 3.4–10.8)

## 2022-11-15 PROCEDURE — 94760 N-INVAS EAR/PLS OXIMETRY 1: CPT

## 2022-11-15 PROCEDURE — 63710000001 PREDNISONE PER 1 MG: Performed by: INTERNAL MEDICINE

## 2022-11-15 PROCEDURE — 85025 COMPLETE CBC W/AUTO DIFF WBC: CPT | Performed by: INTERNAL MEDICINE

## 2022-11-15 PROCEDURE — 80048 BASIC METABOLIC PNL TOTAL CA: CPT | Performed by: INTERNAL MEDICINE

## 2022-11-15 PROCEDURE — 97530 THERAPEUTIC ACTIVITIES: CPT

## 2022-11-15 PROCEDURE — 94664 DEMO&/EVAL PT USE INHALER: CPT

## 2022-11-15 PROCEDURE — 94799 UNLISTED PULMONARY SVC/PX: CPT

## 2022-11-15 PROCEDURE — 97166 OT EVAL MOD COMPLEX 45 MIN: CPT

## 2022-11-15 RX ORDER — NICOTINE 21 MG/24HR
1 PATCH, TRANSDERMAL 24 HOURS TRANSDERMAL
Status: DISCONTINUED | OUTPATIENT
Start: 2022-11-16 | End: 2022-11-17 | Stop reason: HOSPADM

## 2022-11-15 RX ORDER — PREDNISONE 10 MG/1
10 TABLET ORAL DAILY
Status: DISCONTINUED | OUTPATIENT
Start: 2022-11-22 | End: 2022-11-17 | Stop reason: HOSPADM

## 2022-11-15 RX ORDER — PREDNISONE 20 MG/1
20 TABLET ORAL DAILY
Status: DISCONTINUED | OUTPATIENT
Start: 2022-11-16 | End: 2022-11-17 | Stop reason: HOSPADM

## 2022-11-15 RX ORDER — PREDNISONE 1 MG/1
5 TABLET ORAL DAILY
Status: DISCONTINUED | OUTPATIENT
Start: 2022-11-25 | End: 2022-11-17 | Stop reason: HOSPADM

## 2022-11-15 RX ADMIN — Medication 10 ML: at 20:56

## 2022-11-15 RX ADMIN — POLYETHYLENE GLYCOL 3350 17 G: 17 POWDER, FOR SOLUTION ORAL at 08:30

## 2022-11-15 RX ADMIN — OXYCODONE HYDROCHLORIDE AND ACETAMINOPHEN 2 TABLET: 5; 325 TABLET ORAL at 08:31

## 2022-11-15 RX ADMIN — MIDODRINE HYDROCHLORIDE 5 MG: 5 TABLET ORAL at 08:30

## 2022-11-15 RX ADMIN — MIDODRINE HYDROCHLORIDE 5 MG: 5 TABLET ORAL at 17:12

## 2022-11-15 RX ADMIN — TRAZODONE HYDROCHLORIDE 150 MG: 100 TABLET ORAL at 20:54

## 2022-11-15 RX ADMIN — GABAPENTIN 400 MG: 400 CAPSULE ORAL at 04:46

## 2022-11-15 RX ADMIN — Medication 10 ML: at 23:08

## 2022-11-15 RX ADMIN — PREDNISONE 40 MG: 20 TABLET ORAL at 08:31

## 2022-11-15 RX ADMIN — PRAVASTATIN SODIUM 20 MG: 20 TABLET ORAL at 20:54

## 2022-11-15 RX ADMIN — Medication 10 ML: at 08:31

## 2022-11-15 RX ADMIN — RIVAROXABAN 20 MG: 10 TABLET, FILM COATED ORAL at 17:12

## 2022-11-15 RX ADMIN — SERTRALINE HYDROCHLORIDE 100 MG: 50 TABLET ORAL at 08:30

## 2022-11-15 RX ADMIN — BUDESONIDE AND FORMOTEROL FUMARATE DIHYDRATE 2 PUFF: 160; 4.5 AEROSOL RESPIRATORY (INHALATION) at 19:45

## 2022-11-15 RX ADMIN — OXYCODONE HYDROCHLORIDE AND ACETAMINOPHEN 2 TABLET: 5; 325 TABLET ORAL at 14:09

## 2022-11-15 RX ADMIN — AMITRIPTYLINE HYDROCHLORIDE 25 MG: 25 TABLET, FILM COATED ORAL at 20:54

## 2022-11-15 RX ADMIN — OXYCODONE HYDROCHLORIDE AND ACETAMINOPHEN 2 TABLET: 5; 325 TABLET ORAL at 20:54

## 2022-11-15 RX ADMIN — GABAPENTIN 400 MG: 400 CAPSULE ORAL at 23:08

## 2022-11-15 RX ADMIN — CETIRIZINE HYDROCHLORIDE 10 MG: 10 TABLET, FILM COATED ORAL at 08:31

## 2022-11-15 RX ADMIN — CILOSTAZOL 100 MG: 100 TABLET ORAL at 17:12

## 2022-11-15 RX ADMIN — IPRATROPIUM BROMIDE AND ALBUTEROL SULFATE 3 ML: 2.5; .5 SOLUTION RESPIRATORY (INHALATION) at 19:45

## 2022-11-15 RX ADMIN — GUAIFENESIN 600 MG: 600 TABLET, EXTENDED RELEASE ORAL at 20:54

## 2022-11-15 RX ADMIN — GUAIFENESIN 600 MG: 600 TABLET, EXTENDED RELEASE ORAL at 08:31

## 2022-11-15 RX ADMIN — GABAPENTIN 400 MG: 400 CAPSULE ORAL at 14:09

## 2022-11-15 RX ADMIN — IPRATROPIUM BROMIDE AND ALBUTEROL SULFATE 3 ML: 2.5; .5 SOLUTION RESPIRATORY (INHALATION) at 23:02

## 2022-11-15 RX ADMIN — CILOSTAZOL 100 MG: 100 TABLET ORAL at 08:43

## 2022-11-15 RX ADMIN — IPRATROPIUM BROMIDE AND ALBUTEROL SULFATE 3 ML: 2.5; .5 SOLUTION RESPIRATORY (INHALATION) at 10:13

## 2022-11-15 RX ADMIN — CLOPIDOGREL BISULFATE 75 MG: 75 TABLET ORAL at 08:30

## 2022-11-15 RX ADMIN — PANTOPRAZOLE SODIUM 40 MG: 40 TABLET, DELAYED RELEASE ORAL at 08:31

## 2022-11-15 RX ADMIN — FUROSEMIDE 40 MG: 40 TABLET ORAL at 08:30

## 2022-11-15 RX ADMIN — MIDODRINE HYDROCHLORIDE 5 MG: 5 TABLET ORAL at 12:27

## 2022-11-15 NOTE — THERAPY EVALUATION
Patient Name: Mona Perales  : 1950    MRN: [id number]                              Today's Date: 11/15/2022       Admit Date: 2022    Visit Dx:     ICD-10-CM ICD-9-CM   1. ST elevation myocardial infarction (STEMI), unspecified artery (Formerly [facility])  I21.3 410.90   2. Chronic obstructive pulmonary disease with acute exacerbation (Formerly [facility])  J44.1 491.21   3. Impaired functional mobility, balance, gait, and endurance  Z74.09 V49.89   4. Impaired mobility and ADLs  Z74.09 V49.89    Z78.9      Patient Active Problem List   Diagnosis   • Anxiety   • CAD (coronary atherosclerotic disease)   • Carotid stenosis   • COPD (chronic obstructive pulmonary disease) ([facility]ly [facility])   • COPD with exacerbation ([facility])   • Depressive disorder, not elsewhere classified   • Benign essential hypertension   • Hypercholesteremia   • Insomnia   • Notalgia   • Osteoporosis   • Other screening mammogram   • RUQ abdominal pain   • PVD (peripheral vascular disease) with claudication ([facility]ly [facility])   • Nicotine abuse   • Dysphagia   • Epigastric pain   • Pain of right hand   • Closed displaced fracture of shaft of fifth metacarpal bone of right hand   • Cause of injury, fall   • Displaced fracture of shaft of fifth metacarpal bone of right hand with routine healing   • Syncope   • Acute respiratory failure with hypoxia ([facility])   • (HFpEF) heart failure with preserved ejection fraction ([facility]ly [facility])   • Hypotension   • Elevated WBCs   • Near syncope   • Atherosclerosis of native coronary artery of native heart without angina pectoris   • Atherosclerosis of native arteries of extremities with rest pain, left leg ([facility])   • PVD (peripheral vascular disease) ([facility])   • Physical deconditioning   • Pain due to onychomycosis of toenails of both feet   • ST elevation myocardial infarction (STEMI), unspecified artery ([facility])     Past Medical History:   Diagnosis Date   • A-fib ([facility])    • Anxiety    • Arthritis    • Asthma    • 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  with ulceration (HCC)     bilateral legs - bilateral iliac stents 2008      • CHF (congestive heart failure) (HCC)    • Chronic lower back pain    • COPD (chronic obstructive pulmonary disease) (HCC)    • Essential (primary) hypertension    • GERD (gastroesophageal reflux disease)    • History of pulmonary embolus (PE)    • Hyperlipidemia    • Insomnia    • Lumbago    • Nicotine dependence    • Occlusion of artery      and stenosis of bilateral carotid arteries - BABS 16-49%, LICA 0-15%   • Other atherosclerosis of native artery of other extremity     LEFT subclavian stent 2005 (occluded)   • Sleep apnea      Past Surgical History:   Procedure Laterality Date   • CARDIAC CATHETERIZATION  04/06/2016    No evidence of any obstructive epicardial CAD.Preserved LV systolic function with EF of 55%.   • CARDIAC CATHETERIZATION N/A 11/1/2022    Procedure: Left Heart Cath;  Surgeon: Neftali Haywood MD;  Location: [facility] CATH INVASIVE LOCATION;  Service: Cardiology;  Laterality: N/A;   • CENTRAL VENOUS LINE INSERTION  04/07/2016    Successful placement of right uppe extremity 6-Kyr[place] triple lumen PICC line.   • ENDOSCOPY N/A 1/12/2018    Procedure: ESOPHAGOGASTRODUODENOSCOPY;  Surgeon: Bin Oh MD;  Location: [facility] ENDOSCOPY;  Service:    • ENDOSCOPY N/A 1/17/2018    Procedure: ESOPHAGOGASTRODUODENOSCOPY;  Surgeon: Bin Oh MD;  Location: [facility] ENDOSCOPY;  Service:    • ENDOSCOPY N/A 3/16/2018    Procedure: ESOPHAGOGASTRODUODENOSCOPY possible dilation;  Surgeon: Bin Oh MD;  Location: [facility] ENDOSCOPY;  Service: Gastroenterology   • FEMORAL POPLITEAL BYPASS Left 4/14/2020    Procedure: FEMORAL POPLITEAL BYPASS ABOVE KNEE  (POLYTETRAFLUOROETHYLENE)  LEFT COMMON FEMORAL ARTERY ENDARTERECTOMY PTA LEFT ILIAC ARTERIOGRAM        (c-arm#2 and c-arm table);  Surgeon: David Suh MD;  Location: [facility] OR;  Service: Vascular;  Laterality: Left;   • FEMORAL POPLITEAL BYPASS Right 9/17/2021     Procedure: RIGHT common femoral endarterectomy FEMORAL POPLITEAL BYPASS (above the knee polytetrafluoroethylene  lower extremity arteriogram              (c-arm#2 and c-arm table);  Surgeon: David Suh MD;  Location: [facility] OR;  Service: Vascular;  Laterality: Right;   • HYSTERECTOMY     • INTERVENTIONAL RADIOLOGY PROCEDURE Left 9/9/2020    Procedure: IR angiogram extremity left;  Surgeon: David Suh MD;  Location: [facility] ANGIO INVASIVE LOCATION;  Service: Interventional Radiology;  Laterality: Left;   • KYPHOPLASTY N/A 5/23/2019    Procedure: KYPHOPLASTY LUMBAR FOUR;  Surgeon: Aba Santa MD;  Location: [facility] OR;  Service: Orthopedic Spine   • TOTAL SHOULDER REPLACEMENT Left    • TRANSESOPHAGEAL ECHOCARDIOGRAM (JACQUES)  04/07/2016    With color flow-Mild to moderate [facility].LV systolic function well preserved with Ef of 55-60%.Mitral and AV intact.Diastolic dysfunction   • UPPER GASTROINTESTINAL ENDOSCOPY  01/17/2018    Dr. Bin Martin M.D.      General Information     Row Name 11/15/22 0810          OT Time and Intention    Document Type evaluation  -SA     Mode of Treatment individual therapy;occupational therapy  -     Row Name 11/15/22 0810          General Information    Patient Profile Reviewed yes  -SA     Prior Level of Function independent:;feeding;grooming;dressing;bathing;dependent:;home management;cooking;cleaning;driving;shopping  -     Existing Precautions/Restrictions fall;oxygen therapy device and L/min  -     Barriers to Rehab medically complex  -     Row Name 11/15/22 0810          Living Environment    People in Home grandchild(lima)  -     Row Name 11/15/22 0810          Home Main Entrance    Number of Stairs, Main Entrance none  -     Row Name 11/15/22 0810          Stairs Within Home, Primary    Stairs, Within Home, Primary Lives with 27 year old granddaughter that helps her clean, cook, drive, and shop. Ambulates with FWW. Recently patient  lost her balance while completing fun mob and fell on R side.  -SA     Number of Stairs, Within Home, Primary none  -     Row Name 11/15/22 0810          Cognition    Orientation Status (Cognition) oriented x 4  -     Row Name 11/15/22 0810          Safety Issues, Functional Mobility    Safety Issues Affecting Function (Mobility) problem-solving;insight into deficits/self-awareness;ability to follow commands;at risk behavior observed  -     Impairments Affecting Function (Mobility) strength;endurance/activity tolerance;pain  -SA           User Key  (r) = Recorded By, (t) = Taken By, (c) = Cosigned By    Initials Name Provider Type    SA Sarah Bernstein OT Occupational Therapist                 Mobility/ADL's     Row Name 11/15/22 0810          Bed Mobility    Bed Mobility supine-sit;sit-supine;scooting/bridging  -     All Activities, [place] (Bed Mobility) independent  -     Scooting/Bridging [place] (Bed Mobility) independent  -SA     Supine-Sit [place] (Bed Mobility) modified independence  -     Sit-Supine [place] (Bed Mobility) modified independence  -     Assistive Device (Bed Mobility) head of bed elevated  -     Row Name 11/15/22 0810          Transfers    Transfers sit-stand transfer;stand-sit transfer;toilet transfer  -     Row Name 11/15/22 0810          Sit-Stand Transfer    Sit-Stand [place] (Transfers) standby assist  -     Assistive Device (Sit-Stand Transfers) walker, front-wheeled  -     Row Name 11/15/22 0810          Stand-Sit Transfer    Stand-Sit [place] (Transfers) standby assist  -     Assistive Device (Stand-Sit Transfers) walker, front-wheeled  -     Row Name 11/15/22 0810          Toilet Transfer    Type (Toilet Transfer) sit-stand;stand-sit  -     [place] Level (Toilet Transfer) standby assist  -     Assistive Device (Toilet Transfer) commode  -     Row Name 11/15/22 0810          Activities of Daily Living    BADL  Assessment/Intervention bathing;upper body dressing;lower body dressing;grooming;feeding;toileting  -SA     Row Name 11/15/22 0810          Bathing Assessment/Intervention    [place] Level (Bathing) lower body;upper body;upper extremities;chest/trunk;distal lower extremities/feet;proximal lower extremities;perineal area;standby assist  -SA     Position (Bathing) edge of bed sitting  -     Row Name 11/15/22 0810          Upper Body Dressing Assessment/Training    [place] Level (Upper Body Dressing) upper body dressing skills;doff;don;other (see comments);set up  hospital gown  -     Position (Upper Body Dressing) edge of bed sitting  -     Row Name 11/15/22 0810          Lower Body Dressing Assessment/Training    [place] Level (Lower Body Dressing) doff;don;socks;standby assist  -     Position (Lower Body Dressing) edge of bed sitting  -     Row Name 11/15/22 0810          Grooming Assessment/Training    [place] Level (Grooming) hair care, combing/brushing;wash face, hands;set up  -     Position (Grooming) edge of bed sitting  -     Row Name 11/15/22 0810          Self-Feeding Assessment/Training    [place] Level (Feeding) feeding skills;liquids to mouth;prepare tray/open items;scoop food and bring to mouth;set up  -     Position (Self-Feeding) edge of bed sitting  -     Row Name 11/15/22 0810          Toileting Assessment/Training    [place] Level (Toileting) adjust/manage clothing;perform perineal hygiene;standby assist  -     Assistive Devices (Toileting) commode  -     Position (Toileting) unsupported sitting;unsupported standing  -           User Key  (r) = Recorded By, (t) = Taken By, (c) = Cosigned By    Initials Name Provider Type    Sarah Restrepo OT Occupational Therapist               Obj/Interventions     Row Name 11/15/22 0810          Sensory Assessment (Somatosensory)    Sensory Assessment (Somatosensory) UE sensation intact  -     Row Name  11/15/22 0810          Range of Motion Comprehensive    General Range of Motion bilateral upper extremity ROM WFL  -SA     Row Name 11/15/22 0810          Strength Comprehensive (MMT)    General Manual Muscle Testing (MMT) Assessment other (see comments)  -     Comment, General Manual Muscle Testing (MMT) Assessment BUE 4/5 grossly  -SA           User Key  (r) = Recorded By, (t) = Taken By, (c) = Cosigned By    Initials Name Provider Type    Sarah Restrepo OT Occupational Therapist               Goals/Plan     Row Name 11/15/22 0810          Transfer Goal 1 (OT)    Activity/Assistive Device (Transfer Goal 1, OT) transfers, all  -SA     [place] Level/Cues Needed (Transfer Goal 1, OT) modified independence  -SA     Time Frame (Transfer Goal 1, OT) long term goal (LTG);by discharge  -SA     Progress/Outcome (Transfer Goal 1, OT) goal not met  -SA     Row Name 11/15/22 0810          Bathing Goal 1 (OT)    Activity/Device (Bathing Goal 1, OT) bathing skills, all  -SA     [place] Level/Cues Needed (Bathing Goal 1, OT) independent  -SA     Time Frame (Bathing Goal 1, OT) long term goal (LTG);by discharge  -SA     Progress/Outcomes (Bathing Goal 1, OT) goal not met  -[facility] Name 11/15/22 0810          Dressing Goal 1 (OT)    Activity/Device (Dressing Goal 1, OT) dressing skills, all  -SA     [place]/Cues Needed (Dressing Goal 1, OT) independent  -SA     Time Frame (Dressing Goal 1, OT) long term goal (LTG);by discharge  -SA     Progress/Outcome (Dressing Goal 1, OT) goal not met  -[facility] Name 11/15/22 0810          Toileting Goal 1 (OT)    Activity/Device (Toileting Goal 1, OT) toileting skills, all  -SA     [place] Level/Cues Needed (Toileting Goal 1, OT) independent  -SA     Time Frame (Toileting Goal 1, OT) long term goal (LTG);by discharge  -SA     Progress/Outcome (Toileting Goal 1, OT) goal not met  -[facility] Name 11/15/22 0810          Problem Specific Goal 1 (OT)    Problem  Specific Goal 1 (OT) Patient will Independently manage O2 line during each treatment session to increase safety when completing transfers and functional mobility.  -     Time Frame (Problem Specific Goal 1, OT) long term goal (LTG);by discharge  -     Progress/Outcome (Problem Specific Goal 1, OT) goal not met  -     Row Name 11/15/22 0810          Therapy Assessment/Plan (OT)    Planned Therapy Interventions (OT) activity tolerance training;manual therapy/joint mobilization;patient/caregiver education/training;adaptive equipment training;neuromuscular control/coordination retraining;ROM/therapeutic exercise;BADL retraining;cognitive/visual perception retraining;occupation/activity based interventions;strengthening exercise;transfer/mobility retraining;edema control/reduction;functional balance retraining;IADL retraining;passive ROM/stretching  -           User Key  (r) = Recorded By, (t) = Taken By, (c) = Cosigned By    Initials Name Provider Type     Sarah Bernstein OT Occupational Therapist               Clinical Impression     Row Name 11/15/22 0810          Pain Assessment    Pretreatment Pain Rating 8/10  -SA     Posttreatment Pain Rating 8/10  -SA     Pain Location - Side/Orientation Left  -SA     Pain Location lower  -SA     Pain Location - flank  -SA     Pain Intervention(s) Nursing Notified;Repositioned;Medication (See MAR)  -     Row Name 11/15/22 0810          Plan of Care Review    Plan of Care Reviewed With patient  -SA     Outcome Evaluation OT eval completed. Patient supine and agreeable to therapy. Pt I with bridging and scooting in  bed. Mod I sup<>sit (HOB elevated). SBA sit<>stand EOB and toilet using FWW. Pt required verbal cues for O2 line management. Pt required SBA to manage clothing at toilet and complete marguerite care. Set up assist required for UB dressing, grooming, and bathing. SBA required for LB dressing and bathing. Set up for feeding seated EOB. Cont skilled IP/OT to address  decreased strength, and independece with ADLs, IADLs, and transfers. Recommended d/c home with assist.  -     Row Name 11/15/22 0810          Therapy Assessment/Plan (OT)    Patient/Family Therapy Goal Statement (OT) return home  -     Rehab Potential (OT) good, to achieve stated therapy goals  -     Criteria for Skilled Therapeutic Interventions Met (OT) yes;meets criteria;skilled treatment is necessary  [facility]     Therapy Frequency (OT) other (see comments)  5-7 d/wk  -     Predicted Duration of Therapy Intervention (OT) until all goals met or d/c from  hospital  -[facility] Name 11/15/22 0810          Therapy Plan Review/Discharge Plan (OT)    Anticipated Discharge Disposition (OT) home with assist  [facility] Name 11/15/22 0810          Vital Signs    Pre Systolic BP Rehab 112  -SA     Pre Treatment Diastolic BP 69  -SA     Pretreatment Heart Rate (beats/min) 66  -SA     Pre SpO2 (%) 92  -SA     O2 Delivery Pre Treatment nasal cannula  3 LPM  -SA     Post SpO2 (%) 92  -SA     O2 Delivery Post Treatment nasal cannula  -SA     Pre Patient Position Supine  -SA     Post Patient Position Sitting  -[facility] Name 11/15/22 0810          Positioning and Restraints    Pre-Treatment Position in bed  -SA     Post Treatment Position bed  -SA     In Bed sitting EOB;call light within reach;encouraged to call for assist;exit alarm on;with [facility]  -           User Key  (r) = Recorded By, (t) = Taken By, (c) = Cosigned By    Initials Name Provider Type    Sarah Restrepo OT Occupational Therapist               Outcome Measures     Row Name 11/15/22 0810          How much help from another is currently needed...    Putting on and taking off regular lower body clothing? 3  -SA     Bathing (including washing, rinsing, and drying) 3  -SA     Toileting (which includes using toilet bed pan or urinal) 3  -SA     Putting on and taking off regular upper body clothing 4  -SA     Taking care of personal grooming (such as brushing  teeth) 4  -     Eating meals 4  -SA     AM-PAC 6 Clicks Score (OT) 21  -     Row Name 11/15/22 0810          Functional Assessment    Outcome Measure Options AM-PAC 6 Clicks Daily Activity (OT)  -           User Key  (r) = Recorded By, (t) = Taken By, (c) = Cosigned By    Initials Name Provider Type     Sarah Bernstein OT Occupational Therapist                Occupational Therapy Education     Title: PT OT SLP Therapies (In Progress)     Topic: Occupational Therapy (In Progress)     Point: ADL training (Done)     Description:   Instruct learner(s) on proper safety adaptation and remediation techniques during self care or transfers.   Instruct in proper use of assistive devices.              Learning Progress Summary           Patient Acceptance, E,TB, VU by  at 11/15/2022 0856    Comment: OT POC, Role of OT, transfer training                   Point: Home exercise program (Not Started)     Description:   Instruct learner(s) on appropriate technique for monitoring, assisting and/or progressing therapeutic exercises/activities.              Learner Progress:  Not documented in this visit.          Point: Precautions (Done)     Description:   Instruct learner(s) on prescribed precautions during self-care and functional transfers.              Learning Progress Summary           Patient Acceptance, E,TB, VU by  at 11/15/2022 0856    Comment: OT POC, Role of OT, transfer training                   Point: Body mechanics (Done)     Description:   Instruct learner(s) on proper positioning and spine alignment during self-care, functional mobility activities and/or exercises.              Learning Progress Summary           Patient Acceptance, E,TB, VU by  at 11/15/2022 0856    Comment: OT POC, Role of OT, transfer training                               User Key     Initials Effective Dates Name Provider Type [facility] 08/11/22 -  Sarah Bernstein OT Occupational Therapist OT              OT Recommendation  and Plan  Planned Therapy Interventions (OT): activity tolerance training, manual therapy/joint mobilization, patient/caregiver education/training, adaptive equipment training, neuromuscular control/coordination retraining, ROM/therapeutic exercise, BADL retraining, cognitive/visual perception retraining, occupation/activity based interventions, strengthening exercise, transfer/mobility retraining, edema control/reduction, functional balance retraining, IADL retraining, passive ROM/stretching  Therapy Frequency (OT): other (see comments) (5-7 d/wk)  Plan of Care Review  Plan of Care Reviewed With: patient  Outcome Evaluation: OT eval completed. Patient supine and agreeable to therapy. Pt I with bridging and scooting in  bed. Mod I sup<>sit (HOB elevated). SBA sit<>stand EOB and toilet using FWW. Pt required verbal cues for O2 line management. Pt required SBA to manage clothing at toilet and complete marguerite care. Set up assist required for UB dressing, grooming, and bathing. SBA required for LB dressing and bathing. Set up for feeding seated EOB. Cont skilled IP/OT to address decreased strength, and independece with ADLs, IADLs, and transfers. Recommended d/c home with assist.     Time Calculation:    Time Calculation- OT     Row Name 11/15/22 0810             Time Calculation- OT    OT Start Time 0810  -SA      OT Stop Time 0858  -SA      OT Time Calculation (min) 48 min  -SA      OT Received On 11/15/22  -SA      OT Goal Re-Cert Due Date 11/28/22  -SA         Untimed Charges    OT Eval/Re-eval Minutes 48  -SA         Total Minutes    Untimed Charges Total Minutes 48  -SA       Total Minutes 48  -SA            User Key  (r) = Recorded By, (t) = Taken By, (c) = Cosigned By    Initials Name Provider Type    SA Sarah Bernstein OT Occupational Therapist              Therapy Charges for Today     Code Description Service Date Service Provider Modifiers Qty    93266767685 HC OT EVAL MOD COMPLEXITY 3 11/15/2022 Day  Sarah, OT GO 1               Sarah Bernstein OT  11/15/2022

## 2022-11-15 NOTE — PLAN OF CARE
Goal Outcome Evaluation:  Plan of Care Reviewed With: patient           Outcome Evaluation: PT re-certification complete.  Patient is alert and cooperative.  She demonstrates (I) with bed mobility, requires SPV with transfers and gait, ambulating 30'x1 with FWW, good use of walker, no LOB, cues to manage O2 tubing. [place] assessed: 21/28 or moderate fall risk; patient encouraged to utilize FWW.  Goals reviewed and updated, continue skilled I/P PT.  Patient would still benefit from rehab prior to discharge home with grand daughter.

## 2022-11-15 NOTE — PROGRESS NOTES
[facility] Medicine Services  INPATIENT PROGRESS NOTE    Length of Stay: 14  Date of Admission: 11/1/2022  Primary Care Physician: Anahi Camacho APRN    Subjective   Chief Complaint: Shortness of air  HPI: 72-year-old female with COPD, admitted and treated with COPD exacerbation with acute on chronic respiratory failure, improved from medical standpoint however with significant debility and generalized weakness, awaiting placement.      Subjective:  No acute events overnight.  No worsening shortness of breath.  No chest pain.  Remains very weak.        ROS  14 point review of systems completed  All pertinent negatives and positives are as above. All other systems have been reviewed and are negative unless otherwise stated.     Objective    Temp:  [96.5 °F (35.8 °C)-98 °F (36.7 °C)] 97.5 °F (36.4 °C)  Heart Rate:  [61-77] 66  Resp:  [16-18] 18  BP: ()/(52-71) 96/55  Physical Exam  Constitutional:       General: She is not in acute distress.     Appearance: She is well-developed. She is not diaphoretic.   HENT:      Head: Normocephalic and atraumatic.   Eyes:      General: No scleral icterus.  Cardiovascular:      Rate and Rhythm: Normal rate.   Pulmonary:      Effort: Pulmonary effort is normal. No respiratory distress.      Breath sounds: No wheezing or rales.   Abdominal:      General: There is no distension.   Musculoskeletal:         General: No swelling.   Skin:     General: Skin is warm and dry.   Neurological:      Mental Status: She is alert.      Motor: Weakness present.   Psychiatric:         Behavior: Behavior normal.             Results Review:  I have reviewed the labs, radiology results, and diagnostic studies.    Laboratory Data:   Lab Results (last 24 hours)     Procedure Component Value Units Date/Time    Basic Metabolic Panel [[id number]]  (Abnormal) Collected: 11/15/22 0516    Specimen: Blood Updated: 11/15/22 0641     Glucose 96 mg/dL      BUN 33  mg/dL      Creatinine 0.91 mg/dL      Sodium 138 mmol/L      Potassium 4.8 mmol/L      Comment: Slight hemolysis detected by analyzer. Results may be affected.        Chloride 97 mmol/L      CO2 32.0 mmol/L      Calcium 8.7 mg/dL      BUN/Creatinine Ratio 36.3     Anion Gap 9.0 mmol/L      eGFR 68.0 mL/min/1.73      Comment: National Kidney Foundation and American Society of Nephrology (ASN) Task Force recommended calculation based on the Chronic Kidney Disease Epidemiology Collaboration (CKD-EPI) equation refit without adjustment for race.       Narrative:      GFR Normal >60  Chronic Kidney Disease <60  Kidney Failure <15      CBC & Differential [[id number]]  (Abnormal) Collected: 11/15/22 0516    Specimen: Blood Updated: 11/15/22 0624    Narrative:      The following orders were created for panel order CBC & Differential.  Procedure                               Abnormality         Status                     ---------                               -----------         ------                     CBC Auto Differential[[id number]]        Abnormal            Final result                 Please view results for these tests on the individual orders.    CBC Auto Differential [[id number]]  (Abnormal) Collected: 11/15/22 0516    Specimen: Blood Updated: 11/15/22 0624     WBC 15.77 10*3/mm3      RBC 3.41 10*6/mm3      Hemoglobin 10.1 g/dL      Hematocrit 31.4 %      MCV 92.1 fL      MCH 29.6 pg      MCHC 32.2 g/dL      RDW 14.3 %      RDW-SD 47.4 fl      MPV 9.4 fL      Platelets 274 10*3/mm3      Neutrophil % 76.7 %      Lymphocyte % 14.8 %      Monocyte % 7.0 %      Eosinophil % 0.1 %      Basophil % 0.1 %      Immature Grans % 1.3 %      Neutrophils, Absolute 12.09 10*3/mm3      Lymphocytes, Absolute 2.34 10*3/mm3      Monocytes, Absolute 1.11 10*3/mm3      Eosinophils, Absolute 0.01 10*3/mm3      Basophils, Absolute 0.02 10*3/mm3      Immature Grans, Absolute 0.20 10*3/mm3      nRBC 0.0 /100 WBC           Culture Data:    No results found for: BLOODCX  No results found for: URINECX  No results found for: RESPCX  No results found for: WOUNDCX  No results found for: STOOLCX  No components found for: BODYFLD    Radiology Data:   Imaging Results (Last 24 Hours)     ** No results found for the last 24 hours. **          I have reviewed the patient's current medications.     Assessment/Plan     Active Hospital Problems    Diagnosis    • COPD with exacerbation (HCC)        COPD exacerbation  --Improved  Supplemental oxygen as needed to maintain adequate gas exchange, wean as able, baseline 2 L  Bronchodilators  -Will taper prednisone    Hypotension/dizziness- improved, continue midodrine     Paroxysmal atrial fibrillation- rate control     Anticoagulation- Xarelto    Debility  PT/OT      Disposition-awaiting rehab placement.        Michele Nunn MD   11/15/22   12:49 CST

## 2022-11-15 NOTE — PLAN OF CARE
Goal Outcome Evaluation:  Plan of Care Reviewed With: patient           Outcome Evaluation: OT eval completed. Patient supine and agreeable to therapy. Pt I with bridging and scooting in  bed. Mod I sup<>sit (HOB elevated). SBA sit<>stand EOB and toilet using FWW. Pt required verbal cues for O2 line management. Pt required SBA to manage clothing at toilet and complete marguerite care. Set up assist required for UB dressing, grooming, and bathing. SBA required for LB dressing and bathing. Set up for feeding seated EOB. Cont skilled IP/OT to address decreased strength, and independece with ADLs, IADLs, and transfers. Recommended d/c home with assist.

## 2022-11-15 NOTE — THERAPY PROGRESS REPORT/RE-CERT
Patient Name: Mona Perales  : 1950    MRN: [id number]                              Today's Date: 11/15/2022       Admit Date: 2022    Visit Dx:     ICD-10-CM ICD-9-CM   1. ST elevation myocardial infarction (STEMI), unspecified artery ([facility])  I21.3 410.90   2. Chronic obstructive pulmonary disease with acute exacerbation ([facility])  J44.1 491.21   3. Impaired functional mobility, balance, gait, and endurance  Z74.09 V49.89   4. Impaired mobility and ADLs  Z74.09 V49.89    Z78.9      Patient Active Problem List   Diagnosis   • Anxiety   • CAD (coronary atherosclerotic disease)   • Carotid stenosis   • COPD (chronic obstructive pulmonary disease) ([facility])   • COPD with exacerbation ([facility])   • Depressive disorder, not elsewhere classified   • Benign essential hypertension   • Hypercholesteremia   • Insomnia   • Notalgia   • Osteoporosis   • Other screening mammogram   • RUQ abdominal pain   • PVD (peripheral vascular disease) with claudication ([facility])   • Nicotine abuse   • Dysphagia   • Epigastric pain   • Pain of right hand   • Closed displaced fracture of shaft of fifth metacarpal bone of right hand   • Cause of injury, fall   • Displaced fracture of shaft of fifth metacarpal bone of right hand with routine healing   • Syncope   • Acute respiratory failure with hypoxia ([facility])   • (HFpEF) heart failure with preserved ejection fraction ([facility])   • Hypotension   • Elevated WBCs   • Near syncope   • Atherosclerosis of native coronary artery of native heart without angina pectoris   • Atherosclerosis of native arteries of extremities with rest pain, left leg ([facility])   • PVD (peripheral vascular disease) ([facility])   • Physical deconditioning   • Pain due to onychomycosis of toenails of both feet   • ST elevation myocardial infarction (STEMI), unspecified artery ([facility])     Past Medical History:   Diagnosis Date   • A-fib ([facility])    • Anxiety    • Arthritis    • Asthma    • 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  with ulceration (HCC)     bilateral legs - bilateral iliac stents 2008      • CHF (congestive heart failure) (HCC)    • Chronic lower back pain    • COPD (chronic obstructive pulmonary disease) (HCC)    • Essential (primary) hypertension    • GERD (gastroesophageal reflux disease)    • History of pulmonary embolus (PE)    • Hyperlipidemia    • Insomnia    • Lumbago    • Nicotine dependence    • Occlusion of artery      and stenosis of bilateral carotid arteries - BABS 16-49%, LICA 0-15%   • Other atherosclerosis of native artery of other extremity     LEFT subclavian stent 2005 (occluded)   • Sleep apnea      Past Surgical History:   Procedure Laterality Date   • CARDIAC CATHETERIZATION  04/06/2016    No evidence of any obstructive epicardial CAD.Preserved LV systolic function with EF of 55%.   • CARDIAC CATHETERIZATION N/A 11/1/2022    Procedure: Left Heart Cath;  Surgeon: Neftali Haywood MD;  Location: [facility] CATH INVASIVE LOCATION;  Service: Cardiology;  Laterality: N/A;   • CENTRAL VENOUS LINE INSERTION  04/07/2016    Successful placement of right uppe extremity 6-Latvian triple lumen PICC line.   • ENDOSCOPY N/A 1/12/2018    Procedure: ESOPHAGOGASTRODUODENOSCOPY;  Surgeon: Bin Oh MD;  Location: [facility] ENDOSCOPY;  Service:    • ENDOSCOPY N/A 1/17/2018    Procedure: ESOPHAGOGASTRODUODENOSCOPY;  Surgeon: Bin Oh MD;  Location: [facility] ENDOSCOPY;  Service:    • ENDOSCOPY N/A 3/16/2018    Procedure: ESOPHAGOGASTRODUODENOSCOPY possible dilation;  Surgeon: Bin Oh MD;  Location: [facility] ENDOSCOPY;  Service: Gastroenterology   • FEMORAL POPLITEAL BYPASS Left 4/14/2020    Procedure: FEMORAL POPLITEAL BYPASS ABOVE KNEE  (POLYTETRAFLUOROETHYLENE)  LEFT COMMON FEMORAL ARTERY ENDARTERECTOMY PTA LEFT ILIAC ARTERIOGRAM        (c-arm#2 and c-arm table);  Surgeon: David Suh MD;  Location: [facility] OR;  Service: Vascular;  Laterality: Left;   • FEMORAL POPLITEAL BYPASS Right 9/17/2021     Procedure: RIGHT common femoral endarterectomy FEMORAL POPLITEAL BYPASS (above the knee polytetrafluoroethylene  lower extremity arteriogram              (c-arm#2 and c-arm table);  Surgeon: David Suh MD;  Location: [facility] OR;  Service: Vascular;  Laterality: Right;   • HYSTERECTOMY     • INTERVENTIONAL RADIOLOGY PROCEDURE Left 9/9/2020    Procedure: IR angiogram extremity left;  Surgeon: David Suh MD;  Location: [facility] ANGIO INVASIVE LOCATION;  Service: Interventional Radiology;  Laterality: Left;   • KYPHOPLASTY N/A 5/23/2019    Procedure: KYPHOPLASTY LUMBAR FOUR;  Surgeon: Aba Santa MD;  Location: [facility] OR;  Service: Orthopedic Spine   • TOTAL SHOULDER REPLACEMENT Left    • TRANSESOPHAGEAL ECHOCARDIOGRAM (JACQUES)  04/07/2016    With color flow-Mild to moderate [facility].LV systolic function well preserved with Ef of 55-60%.Mitral and AV intact.Diastolic dysfunction   • UPPER GASTROINTESTINAL ENDOSCOPY  01/17/2018    Dr. Bni Martin M.D.      General Information     Row Name 11/15/22 1423          Physical Therapy Time and Intention    Document Type progress note/recertification  -CZ     Mode of Treatment physical therapy  -CZ     Row Name 11/15/22 1423          General Information    Patient Profile Reviewed yes  -CZ     Prior Level of Function independent:;all household mobility  -CZ     Existing Precautions/Restrictions fall;oxygen therapy device and L/min  -CZ     Row Name 11/15/22 1423          Cognition    Orientation Status (Cognition) oriented x 4  -CZ           User Key  (r) = Recorded By, (t) = Taken By, (c) = Cosigned By    Initials Name Provider Type    CZ Hakeem Wolfe, PT Physical Therapist               Mobility     Row Name 11/15/22 1423          Bed Mobility    Supine-Sit [place] (Bed Mobility) modified independence  -CZ     Sit-Supine [place] (Bed Mobility) modified independence  -CZ     Assistive Device (Bed Mobility) head of bed elevated   -CZ     Row Name 11/15/22 1423          Sit-Stand Transfer    Sit-Stand [place] (Transfers) supervision  -CZ     Assistive Device (Sit-Stand Transfers) walker, front-wheeled  -CZ     Row Name 11/15/22 1423          Gait/Stairs (Locomotion)    [place] Level (Gait) supervision  -CZ     Assistive Device (Gait) walker, front-wheeled  -CZ     Distance in Feet (Gait) 30'x1.  -CZ     Comment, (Gait/Stairs) Slow sky, good use of walker, no LOB.  -CZ           User Key  (r) = Recorded By, (t) = Taken By, (c) = Cosigned By    Initials Name Provider Type    CZ Hakeem Wolfe, PT Physical Therapist               Obj/Interventions     Row Name 11/15/22 1423          Range of Motion Comprehensive    General Range of Motion bilateral lower extremity ROM WFL  -CZ     Row Name 11/15/22 1423          Strength Comprehensive (MMT)    Comment, General Manual Muscle Testing (MMT) Assessment BLE: 4-/5 grossly.  -CZ     Row Name 11/15/22 1423          Sensory Assessment (Somatosensory)    Sensory Assessment (Somatosensory) other (see comments)  Denies sensation loss in feet, but unsteady standing with eyes closed.  -CZ           User Key  (r) = Recorded By, (t) = Taken By, (c) = Cosigned By    Initials Name Provider Type    CZ Hakeem Wolfe, PT Physical Therapist               Goals/Plan     Row Name 11/15/22 1423          Transfer Goal 1 (PT)    Activity/Assistive Device (Transfer Goal 1, PT) sit-to-stand/stand-to-sit;bed-to-chair/chair-to-bed;walker, rolling  -CZ     [place] Level/Cues Needed (Transfer Goal 1, PT) modified independence  -CZ     Time Frame (Transfer Goal 1, PT) by discharge  -CZ     Strategies/Barriers (Transfers Goal 1, PT) Maintain SpO2 >90%.  -CZ     Progress/Outcome (Transfer Goal 1, PT) goal not met  -CZ     Row Name 11/15/22 1423          Gait Training Goal 1 (PT)    Activity/Assistive Device (Gait Training Goal 1, PT) walker, rolling  -CZ     [place] Level (Gait Training Goal 1, PT)  modified independence  -CZ     Distance (Gait Training Goal 1, PT) 30'x2.  -CZ     Time Frame (Gait Training Goal 1, PT) by discharge  -CZ     Strategies/Barriers (Gait Training Goal 1, PT) Maintain SpO2 >90%.  -CZ     Progress/Outcome (Gait Training Goal 1, PT) goal not met  -CZ     Row Name 11/15/22 1423          Problem Specific Goal 1 (PT)    Problem Specific Goal 1 (PT) Score 25/28 on Tinetti fall risk assessment.  -CZ     Time Frame (Problem Specific Goal 1, PT) by discharge  -CZ     Strategies/Barriers (Problem Specific Goal 1, PT) Maintain SpO2 >90%.  -CZ     Progress/Outcome (Problem Specific Goal 1, PT) goal not met  -CZ     Row Name 11/15/22 1423          Therapy Assessment/Plan (PT)    Planned Therapy Interventions (PT) balance training;gait training;home exercise program;patient/family education;transfer training;strengthening;stretching  -CZ           User Key  (r) = Recorded By, (t) = Taken By, (c) = Cosigned By    Initials Name Provider Type    CZ Hakeem Wolfe, PT Physical Therapist               Clinical Impression     Row Name 11/15/22 1423          Pain    Pretreatment Pain Rating 8/10  -CZ     Posttreatment Pain Rating 8/10  -CZ     Pain Location - Side/Orientation Right  -CZ     Pain Location - flank  -CZ     Pain Intervention(s) Medication (See MAR);Repositioned;Ambulation/increased activity;Distraction  -CZ     Row Name 11/15/22 1423          Plan of Care Review    Plan of Care Reviewed With patient  -CZ     Outcome Evaluation PT re-certification complete.  Patient is alert and cooperative.  She demonstrates (I) with bed mobility, requires SPV with transfers and gait, ambulating 30'x1 with FWW, good use of walker, no LOB, cues to manage O2 tubing. Tinetti assessed: 21/28 or moderate fall risk; patient encouraged to utilize FWW.  Goals reviewed and updated, continue skilled I/P PT.  Patient would still benefit from rehab prior to discharge home with grand daughter.  -CZ     Row Name  "11/15/22 1423          Therapy Assessment/Plan (PT)    Rehab Potential (PT) good, to achieve stated therapy goals  -CZ     Criteria for Skilled Interventions Met (PT) yes;skilled treatment is necessary  -CZ     Therapy Frequency (PT) 5 times/wk  -CZ     Row Name 11/15/22 1423          Positioning and Restraints    Pre-Treatment Position in bed  -CZ     Post Treatment Position bed  -CZ     In Bed supine;call light within reach;encouraged to call for assist;exit alarm on  -CZ           User Key  (r) = Recorded By, (t) = Taken By, (c) = Cosigned By    Initials Name Provider Type    CZ Hakeem Wolfe, PT Physical Therapist               Outcome Measures     Row Name 11/15/22 1423 11/15/22 0830       How much help from another person do you currently need...    Turning from your back to your side while in flat bed without using bedrails? 4  -CZ 4  -HR    Moving from lying on back to sitting on the side of a flat bed without bedrails? 4  -CZ 4  -HR    Moving to and from a bed to a chair (including a wheelchair)? 4  -CZ 4  -HR    Standing up from a chair using your arms (e.g., wheelchair, bedside chair)? 4  -CZ 4  -HR    Climbing 3-5 steps with a railing? 3  -CZ 3  -HR    To walk in hospital room? 3  -CZ 3  -HR    AM-PAC 6 Clicks Score (PT) 22  -CZ 22  -HR    Highest level of mobility 7 --> Walked 25 feet or more  -CZ 7 --> Walked 25 feet or more  -HR    Row Name 11/15/22 1423          Tinetti Assessment    Tinetti Assessment yes  -CZ     Sitting Balance 1  -CZ     Arises 2  -CZ     Attempts to Rise 2  -CZ     Immediate Standing Balance (first 5 sec) 1  -CZ     Standing Balance 2  -CZ     Sternal Nudge (feet close together) 1  -CZ     Eyes Closed (feet close together) 0  -CZ     Turning 360 Degrees- Steps 0  -CZ     Turning 360 Degrees- Steadiness 1  -CZ     Sitting Down 2  -CZ     Tinetti Balance Score 12  -CZ     Gait Initiation (immediate after told \"go\") 1  -CZ     Step Length- Right Swing 1  -CZ     Step Length- " Left Swing 1  -CZ     Foot Clearance- Right Foot 1  -CZ     Foot Clearance- Left Foot 1  -CZ     Step Symmetry 1  -CZ     Step Continuity 1  -CZ     Path (excursion) 1  -CZ     Trunk 0  -CZ     Base of Support 1  -CZ     Gait Score 9  -CZ     Tinetti Total Score 21  -CZ     Tinetti Assistive Device rolling walker  -CZ     Row Name 11/15/22 1423 11/15/22 0810       Functional Assessment    Outcome Measure Options AM-PAC 6 Clicks Basic Mobility (PT);T[place]etti  -CZ AM-PAC 6 Clicks Daily Activity (OT)  -          User Key  (r) = Recorded By, (t) = Taken By, (c) = Cosigned By    Initials Name Provider Type    HR Charlotte Heaton, RN Registered Nurse     Hakeem Wolfe, PT Physical Therapist    Sarah Restrepo, OT Occupational Therapist                             Physical Therapy Education     Title: PT OT SLP Therapies (In Progress)     Topic: Physical Therapy (In Progress)     Point: Mobility training (Done)     Learning Progress Summary           Patient Acceptance, E, VU by  at 11/3/2022 1100    Comment: PT POC, rehab process.                   Point: Home exercise program (Not Started)     Learner Progress:  Not documented in this visit.          Point: Body mechanics (Not Started)     Learner Progress:  Not documented in this visit.          Point: Precautions (Done)     Learning Progress Summary           Patient Acceptance, E, VU by  at 11/15/2022 1441    Comment: [place] assessed: 21/28 or moderate fall risk. Encourage use of FWW.                               User Key     Initials Effective Dates Name Provider Type Discipline     09/18/22 -  Hakeem Wolfe, PT Physical Therapist PT              PT Recommendation and Plan  Planned Therapy Interventions (PT): balance training, gait training, home exercise program, patient/family education, transfer training, strengthening, stretching  Plan of Care Reviewed With: patient  Outcome Evaluation: PT re-certification complete.  Patient is alert and  cooperative.  She demonstrates (I) with bed mobility, requires SPV with transfers and gait, ambulating 30'x1 with FWW, good use of walker, no LOB, cues to manage O2 tubing. Tinetti assessed: 21/28 or moderate fall risk; patient encouraged to utilize FWW.  Goals reviewed and updated, continue skilled I/P PT.  Patient would still benefit from rehab prior to discharge home with grand daughter.     Time Calculation:    PT Charges     Row Name 11/15/22 1457             Time Calculation    Start Time 1423  -CZ      Stop Time 1447  -CZ      Time Calculation (min) 24 min  -CZ      PT Received On 11/15/22  -CZ      PT Goal Re-Cert Due Date 11/28/22  -CZ         Time Calculation- PT    Total Timed Code Minutes- PT 24 minute(s)  -CZ         Timed Charges    43737 - PT Therapeutic Activity Minutes 24  -CZ         Total Minutes    Timed Charges Total Minutes 24  -CZ       Total Minutes 24  -CZ            User Key  (r) = Recorded By, (t) = Taken By, (c) = Cosigned By    Initials Name Provider Type    CZ Hakeem Wolfe, PT Physical Therapist              Therapy Charges for Today     Code Description Service Date Service Provider Modifiers Qty    [id number]  PT THERAPEUTIC ACT EA 15 MIN 11/15/2022 Hakeem Wolfe, PT GP 2          PT G-Codes  Outcome Measure Options: AM-PAC 6 Clicks Basic Mobility (PT), Tinetti  AM-PAC 6 Clicks Score (PT): 22  AM-PAC 6 Clicks Score (OT): 21  Tinetti Total Score: 21  PT Discharge Summary  Anticipated Discharge Disposition (PT): skilled nursing facility    Hakeem Wolfe PT  11/15/2022

## 2022-11-15 NOTE — PLAN OF CARE
Goal Outcome Evaluation:  Plan of Care Reviewed With: patient        Progress: improving  Outcome Evaluation: VSS; currently on home oxygen at 2LPM via nasal cannula; urine output adequate; treated for rib pain with prn percocet;

## 2022-11-16 LAB
ANION GAP SERPL CALCULATED.3IONS-SCNC: 8 MMOL/L (ref 5–15)
BASOPHILS # BLD AUTO: 0.01 10*3/MM3 (ref 0–0.2)
BASOPHILS NFR BLD AUTO: 0.1 % (ref 0–1.5)
BUN SERPL-MCNC: 35 MG/DL (ref 8–23)
BUN/CREAT SERPL: 38.5 (ref 7–25)
CALCIUM SPEC-SCNC: 8.9 MG/DL (ref 8.6–10.5)
CHLORIDE SERPL-SCNC: 97 MMOL/L (ref 98–107)
CO2 SERPL-SCNC: 32 MMOL/L (ref 22–29)
CREAT SERPL-MCNC: 0.91 MG/DL (ref 0.57–1)
DEPRECATED RDW RBC AUTO: 47 FL (ref 37–54)
EGFRCR SERPLBLD CKD-EPI 2021: 68 ML/MIN/1.73
EOSINOPHIL # BLD AUTO: 0.02 10*3/MM3 (ref 0–0.4)
EOSINOPHIL NFR BLD AUTO: 0.1 % (ref 0.3–6.2)
ERYTHROCYTE [DISTWIDTH] IN BLOOD BY AUTOMATED COUNT: 14.6 % (ref 12.3–15.4)
GLUCOSE SERPL-MCNC: 89 MG/DL (ref 65–99)
HCT VFR BLD AUTO: 28.9 % (ref 34–46.6)
HGB BLD-MCNC: 9.6 G/DL (ref 12–15.9)
IMM GRANULOCYTES # BLD AUTO: 0.19 10*3/MM3 (ref 0–0.05)
IMM GRANULOCYTES NFR BLD AUTO: 1.3 % (ref 0–0.5)
LYMPHOCYTES # BLD AUTO: 2.88 10*3/MM3 (ref 0.7–3.1)
LYMPHOCYTES NFR BLD AUTO: 19.5 % (ref 19.6–45.3)
MCH RBC QN AUTO: 30.1 PG (ref 26.6–33)
MCHC RBC AUTO-ENTMCNC: 33.2 G/DL (ref 31.5–35.7)
MCV RBC AUTO: 90.6 FL (ref 79–97)
MONOCYTES # BLD AUTO: 1.37 10*3/MM3 (ref 0.1–0.9)
MONOCYTES NFR BLD AUTO: 9.3 % (ref 5–12)
NEUTROPHILS NFR BLD AUTO: 10.3 10*3/MM3 (ref 1.7–7)
NEUTROPHILS NFR BLD AUTO: 69.7 % (ref 42.7–76)
NRBC BLD AUTO-RTO: 0 /100 WBC (ref 0–0.2)
PLATELET # BLD AUTO: 169 10*3/MM3 (ref 140–450)
PMV BLD AUTO: 10.2 FL (ref 6–12)
POTASSIUM SERPL-SCNC: 4.8 MMOL/L (ref 3.5–5.2)
RBC # BLD AUTO: 3.19 10*6/MM3 (ref 3.77–5.28)
SODIUM SERPL-SCNC: 137 MMOL/L (ref 136–145)
WBC NRBC COR # BLD: 14.77 10*3/MM3 (ref 3.4–10.8)

## 2022-11-16 PROCEDURE — 94799 UNLISTED PULMONARY SVC/PX: CPT

## 2022-11-16 PROCEDURE — 97116 GAIT TRAINING THERAPY: CPT

## 2022-11-16 PROCEDURE — 94760 N-INVAS EAR/PLS OXIMETRY 1: CPT

## 2022-11-16 PROCEDURE — 97535 SELF CARE MNGMENT TRAINING: CPT

## 2022-11-16 PROCEDURE — 97530 THERAPEUTIC ACTIVITIES: CPT

## 2022-11-16 PROCEDURE — 97110 THERAPEUTIC EXERCISES: CPT

## 2022-11-16 PROCEDURE — 80048 BASIC METABOLIC PNL TOTAL CA: CPT | Performed by: INTERNAL MEDICINE

## 2022-11-16 PROCEDURE — 85025 COMPLETE CBC W/AUTO DIFF WBC: CPT | Performed by: INTERNAL MEDICINE

## 2022-11-16 PROCEDURE — 63710000001 PREDNISONE PER 1 MG: Performed by: STUDENT IN AN ORGANIZED HEALTH CARE EDUCATION/TRAINING PROGRAM

## 2022-11-16 RX ADMIN — CLOPIDOGREL BISULFATE 75 MG: 75 TABLET ORAL at 08:18

## 2022-11-16 RX ADMIN — Medication 10 ML: at 08:19

## 2022-11-16 RX ADMIN — RIVAROXABAN 20 MG: 10 TABLET, FILM COATED ORAL at 17:42

## 2022-11-16 RX ADMIN — GABAPENTIN 400 MG: 400 CAPSULE ORAL at 05:39

## 2022-11-16 RX ADMIN — CILOSTAZOL 100 MG: 100 TABLET ORAL at 17:42

## 2022-11-16 RX ADMIN — MIDODRINE HYDROCHLORIDE 5 MG: 5 TABLET ORAL at 08:19

## 2022-11-16 RX ADMIN — BUDESONIDE AND FORMOTEROL FUMARATE DIHYDRATE 2 PUFF: 160; 4.5 AEROSOL RESPIRATORY (INHALATION) at 07:58

## 2022-11-16 RX ADMIN — TRAZODONE HYDROCHLORIDE 150 MG: 100 TABLET ORAL at 20:51

## 2022-11-16 RX ADMIN — Medication 10 ML: at 20:51

## 2022-11-16 RX ADMIN — MIDODRINE HYDROCHLORIDE 5 MG: 5 TABLET ORAL at 17:42

## 2022-11-16 RX ADMIN — OXYCODONE HYDROCHLORIDE AND ACETAMINOPHEN 2 TABLET: 5; 325 TABLET ORAL at 20:50

## 2022-11-16 RX ADMIN — OXYCODONE HYDROCHLORIDE AND ACETAMINOPHEN 2 TABLET: 5; 325 TABLET ORAL at 13:50

## 2022-11-16 RX ADMIN — Medication 10 ML: at 20:52

## 2022-11-16 RX ADMIN — PREDNISONE 20 MG: 20 TABLET ORAL at 08:17

## 2022-11-16 RX ADMIN — MIDODRINE HYDROCHLORIDE 5 MG: 5 TABLET ORAL at 10:47

## 2022-11-16 RX ADMIN — GABAPENTIN 400 MG: 400 CAPSULE ORAL at 13:43

## 2022-11-16 RX ADMIN — GABAPENTIN 400 MG: 400 CAPSULE ORAL at 22:43

## 2022-11-16 RX ADMIN — PANTOPRAZOLE SODIUM 40 MG: 40 TABLET, DELAYED RELEASE ORAL at 08:17

## 2022-11-16 RX ADMIN — IPRATROPIUM BROMIDE AND ALBUTEROL SULFATE 3 ML: 2.5; .5 SOLUTION RESPIRATORY (INHALATION) at 11:08

## 2022-11-16 RX ADMIN — NICOTINE 1 PATCH: 21 PATCH, EXTENDED RELEASE TRANSDERMAL at 08:20

## 2022-11-16 RX ADMIN — GUAIFENESIN 600 MG: 600 TABLET, EXTENDED RELEASE ORAL at 20:51

## 2022-11-16 RX ADMIN — FUROSEMIDE 40 MG: 40 TABLET ORAL at 08:17

## 2022-11-16 RX ADMIN — CETIRIZINE HYDROCHLORIDE 10 MG: 10 TABLET, FILM COATED ORAL at 08:18

## 2022-11-16 RX ADMIN — PRAVASTATIN SODIUM 20 MG: 20 TABLET ORAL at 20:51

## 2022-11-16 RX ADMIN — Medication 10 ML: at 08:18

## 2022-11-16 RX ADMIN — AMITRIPTYLINE HYDROCHLORIDE 25 MG: 25 TABLET, FILM COATED ORAL at 20:51

## 2022-11-16 RX ADMIN — IPRATROPIUM BROMIDE AND ALBUTEROL SULFATE 3 ML: 2.5; .5 SOLUTION RESPIRATORY (INHALATION) at 19:54

## 2022-11-16 RX ADMIN — IPRATROPIUM BROMIDE AND ALBUTEROL SULFATE 3 ML: 2.5; .5 SOLUTION RESPIRATORY (INHALATION) at 14:49

## 2022-11-16 RX ADMIN — POLYETHYLENE GLYCOL 3350 17 G: 17 POWDER, FOR SOLUTION ORAL at 08:18

## 2022-11-16 RX ADMIN — BUDESONIDE AND FORMOTEROL FUMARATE DIHYDRATE 2 PUFF: 160; 4.5 AEROSOL RESPIRATORY (INHALATION) at 19:56

## 2022-11-16 RX ADMIN — OXYCODONE HYDROCHLORIDE AND ACETAMINOPHEN 2 TABLET: 5; 325 TABLET ORAL at 05:39

## 2022-11-16 RX ADMIN — GUAIFENESIN 600 MG: 600 TABLET, EXTENDED RELEASE ORAL at 08:18

## 2022-11-16 RX ADMIN — SERTRALINE HYDROCHLORIDE 100 MG: 50 TABLET ORAL at 08:18

## 2022-11-16 RX ADMIN — CILOSTAZOL 100 MG: 100 TABLET ORAL at 08:18

## 2022-11-16 RX ADMIN — IPRATROPIUM BROMIDE AND ALBUTEROL SULFATE 3 ML: 2.5; .5 SOLUTION RESPIRATORY (INHALATION) at 07:57

## 2022-11-16 NOTE — PAYOR COMM NOTE
"    Yen Romero RN [facility]  [phone]       Phone  [phone]       Fax  Cont. Stay Review      Filomena Perales (72 y.o. Female)     Date of Birth   1950    Social Security Number       Address   [address] [place] [address]    Home Phone   [phone]    MRN   [id number]       Catholic   Baptism    Marital Status                               Admission Date   11/1/22    Admission Type   Emergency    Admitting Provider       Attending Provider   Michele Nunn MD    Department, Room/Bed   [facility] [address] [place], 321/1       Discharge Date       Discharge Disposition       Discharge Destination                               Attending Provider: Michele Nunn MD    Allergies: Morphine And Related, Penicillins    Isolation: None   Infection: None   Code Status: CPR    Ht: 157.5 cm (62\")   Wt: 57.4 kg (126 lb 8 oz)    Admission Cmt: None   Principal Problem: None                Active Insurance as of 11/1/2022     Primary Coverage     Payor Plan Insurance Group Employer/Plan Group    [facility] MEDICARE REPLACEMENT [employer] MEDICARE REPLACEMENT [id number]     Payor Plan Address Payor Plan Phone Number Payor Plan Fax Number Effective Dates    [address] [address] [phone]  5/1/2021 - None Entered    [facility] [id number]       Subscriber Name Subscriber Birth Date Member ID       FILOMENA PERALES 1950 [id number]                 Emergency Contacts      (Rel.) Home Phone Work Phone Mobile Phone    Renee Li (Grandchild) -- -- [phone]    Meena Marie (Friend) -- -- [phone]            Vital Signs (last day)     Date/Time Temp Temp src Pulse Resp BP Patient Position SpO2    11/16/22 0810 98.3 (36.8) Temporal 67 18 121/69 Lying 97    11/16/22 0805 -- -- 66 18 -- -- --    11/16/22 0757 -- -- 68 18 -- -- 92    11/16/22 0312 97.6 (36.4) Temporal 65 18 122/58 Lying 91    11/16/22 0058 -- " -- 60 -- -- -- --    11/15/22 2315 98 (36.7) Temporal 64 18 118/59 Lying 93    11/15/22 2302 -- -- 64 19 -- -- 93    11/15/22 1955 97.6 (36.4) Temporal 63 18 123/61 Lying 92    11/15/22 1945 -- -- 69 19 -- -- 93    11/15/22 1658 -- -- 61 -- -- -- --    11/15/22 1509 97.8 (36.6) Temporal 62 18 133/60 Lying 93    11/15/22 1134 97.5 (36.4) Temporal 66 18 96/55 Lying 90    11/15/22 1013 -- -- 64 18 -- -- 94    11/15/22 0737 -- -- 65 -- -- -- --    11/15/22 0720 97.9 (36.6) Temporal 76 18 108/58 Lying 92    11/15/22 0500 -- -- 68 16 -- -- 94    11/15/22 0330 96.5 (35.8) Oral 74 16 112/59 Lying 96    11/15/22 0111 -- -- 66 -- -- -- --          Oxygen Therapy (last day)     Date/Time SpO2 Device (Oxygen Therapy) Flow (L/min) Oxygen Concentration (%) ETCO2 (mmHg)    11/16/22 0834 -- nasal cannula 3 -- --    11/16/22 0810 97 nasal cannula -- -- --    11/16/22 0757 92 nasal cannula 3 -- --    11/16/22 0312 91 nasal cannula 3 -- --    11/15/22 2315 93 nasal cannula 3 -- --    11/15/22 2302 93 nasal cannula 3 -- --    11/15/22 1955 92 nasal cannula 3 -- --    11/15/22 1945 93 nasal cannula -- 3 --    11/15/22 1509 93 nasal cannula;humidified 3 -- --    11/15/22 1134 90 nasal cannula;humidified 3 -- --    11/15/22 1013 94 nasal cannula;humidified 3 -- --    11/15/22 0720 92 nasal cannula;humidified 3 -- --    11/15/22 0500 94 nasal cannula;humidified 2 -- --    11/15/22 0330 96 nasal cannula 3 -- --          Current Facility-Administered Medications   Medication Dose Route Frequency Provider Last Rate Last Admin   • acetaminophen (TYLENOL) tablet 650 mg  650 mg Oral Q4H PRN Danish Flores MD   650 mg at 11/13/22 0939    Or   • acetaminophen (TYLENOL) suppository 650 mg  650 mg Rectal Q4H PRN Danish Flores MD       • acetaminophen (TYLENOL) tablet 650 mg  650 mg Oral Q4H PRN Danish Flores MD       • amitriptyline (ELAVIL) tablet 25 mg  25 mg Oral Nightly Neftali Haywood MD   25 mg at 11/15/22 2054   •  benzonatate (TESSALON) capsule 200 mg  200 mg Oral Q4H PRN Danish Flores MD   200 mg at 11/14/22 2100   • budesonide-formoterol (SYMBICORT) 160-4.5 MCG/ACT inhaler 2 puff  2 puff Inhalation BID - RT Neftali Haywood MD   2 puff at 11/16/22 0758   • cetirizine (zyrTEC) tablet 10 mg  10 mg Oral Daily Jolanta Stanley MD   10 mg at 11/16/22 0818   • cilostazol (PLETAL) tablet 100 mg  100 mg Oral BID AC Neftali Haywood MD   100 mg at 11/16/22 0818   • clopidogrel (PLAVIX) tablet 75 mg  75 mg Oral Daily Neftali Haywood MD   75 mg at 11/16/22 0818   • furosemide (LASIX) tablet 40 mg  40 mg Oral Daily Jolanta Stanley MD   40 mg at 11/16/22 0817   • gabapentin (NEURONTIN) capsule 400 mg  400 mg Oral Q8H Neftali Haywood MD   400 mg at 11/16/22 0539   • guaiFENesin (MUCINEX) 12 hr tablet 600 mg  600 mg Oral Q12H Jolanta Stanley MD   600 mg at 11/16/22 0818   • ipratropium-albuterol (DUO-NEB) nebulizer solution 3 mL  3 mL Nebulization Q4H While Awake Danish Flores MD   3 mL at 11/16/22 0757   • midodrine (PROAMATINE) tablet 5 mg  5 mg Oral TID AC Herman Espinosa MD   5 mg at 11/16/22 0819   • nicotine (NICODERM CQ) 21 MG/24HR patch 1 patch  1 patch Transdermal Q24H Michael Collier MD   1 patch at 11/16/22 0820   • ondansetron (ZOFRAN) injection 4 mg  4 mg Intravenous Q6H PRN Danish Flores MD   4 mg at 11/03/22 0335   • ondansetron (ZOFRAN) tablet 4 mg  4 mg Oral Q8H PRN Neftali Haywood MD       • oxyCODONE-acetaminophen (PERCOCET) 5-325 MG per tablet 2 tablet  2 tablet Oral Q6H PRN Jolanta Stanley MD   2 tablet at 11/16/22 0539   • pantoprazole (PROTONIX) EC tablet 40 mg  40 mg Oral Daily Neftali Haywood MD   40 mg at 11/16/22 0817   • polyethylene glycol (MIRALAX) packet 17 g  17 g Oral Daily Behroozi, Saeid, MD   17 g at 11/16/22 0818   • pravastatin (PRAVACHOL) tablet 20 mg  20 mg Oral Nightly Neftali Haywood MD   20 mg at 11/15/22 2054   • predniSONE (DELTASONE) tablet 20 mg  20 mg  Oral Daily Michele Nunn MD   20 mg at 11/16/22 0817    Followed by   • [START ON 11/19/2022] predniSONE (DELTASONE) tablet 15 mg  15 mg Oral Daily Michele Nunn MD        Followed by   • [START ON 11/22/2022] predniSONE (DELTASONE) tablet 10 mg  10 mg Oral Daily Michele Nunn MD        Followed by   • [START ON 11/25/2022] predniSONE (DELTASONE) tablet 5 mg  5 mg Oral Daily Michele Nunn MD       • rivaroxaban (XARELTO) tablet 20 mg  20 mg Oral Daily With Dinner Neftali Haywood MD   20 mg at 11/15/22 1712   • sennosides-docusate (PERICOLACE) 8.6-50 MG per tablet 2 tablet  2 tablet Oral BID PRN Behroozi, Saeid, MD   2 tablet at 11/08/22 2148   • sertraline (ZOLOFT) tablet 100 mg  100 mg Oral Daily Neftali Haywood MD   100 mg at 11/16/22 0818   • sodium chloride 0.9 % flush 10 mL  10 mL Intravenous Q12H Danish Folres MD   10 mL at 11/16/22 0819   • sodium chloride 0.9 % flush 10 mL  10 mL Intravenous PRN Danish Flores MD       • sodium chloride 0.9 % flush 10 mL  10 mL Intravenous Q12H Behroozi, Saeid, MD   10 mL at 11/16/22 0819   • sodium chloride 0.9 % flush 10 mL  10 mL Intravenous Q12H Behroozi, Saeid, MD   10 mL at 11/16/22 0819   • sodium chloride 0.9 % flush 10 mL  10 mL Intravenous Q12H Behroozi, Saeid, MD   10 mL at 11/16/22 0818   • sodium chloride 0.9 % flush 10 mL  10 mL Intravenous PRN Behroozi, Saeid, MD       • sodium chloride 0.9 % flush 20 mL  20 mL Intravenous PRN Behroozi, Saeid, MD       • traZODone (DESYREL) tablet 150 mg  150 mg Oral Nightly Neftali Haywood MD   150 mg at 11/15/22 2054        Physician Progress Notes (last 48 hours)      Michele Nunn MD at 11/15/22 1249              [facility] Medicine Services  INPATIENT PROGRESS NOTE    Length of Stay: 14  Date of Admission: 11/1/2022  Primary Care Physician: Anahi Camacho APRN    Subjective   Chief Complaint: Shortness of air  HPI: 72-year-old female with  COPD, admitted and treated with COPD exacerbation with acute on chronic respiratory failure, improved from medical standpoint however with significant debility and generalized weakness, awaiting placement.      Subjective:  No acute events overnight.  No worsening shortness of breath.  No chest pain.  Remains very weak.        ROS  14 point review of systems completed  All pertinent negatives and positives are as above. All other systems have been reviewed and are negative unless otherwise stated.     Objective    Temp:  [96.5 °F (35.8 °C)-98 °F (36.7 °C)] 97.5 °F (36.4 °C)  Heart Rate:  [61-77] 66  Resp:  [16-18] 18  BP: ()/(52-71) 96/55  Physical Exam  Constitutional:       General: She is not in acute distress.     Appearance: She is well-developed. She is not diaphoretic.   HENT:      Head: Normocephalic and atraumatic.   Eyes:      General: No scleral icterus.  Cardiovascular:      Rate and Rhythm: Normal rate.   Pulmonary:      Effort: Pulmonary effort is normal. No respiratory distress.      Breath sounds: No wheezing or rales.   Abdominal:      General: There is no distension.   Musculoskeletal:         General: No swelling.   Skin:     General: Skin is warm and dry.   Neurological:      Mental Status: She is alert.      Motor: Weakness present.   Psychiatric:         Behavior: Behavior normal.             Results Review:  I have reviewed the labs, radiology results, and diagnostic studies.    Laboratory Data:   Lab Results (last 24 hours)     Procedure Component Value Units Date/Time    Basic Metabolic Panel [[id number]]  (Abnormal) Collected: 11/15/22 0516    Specimen: Blood Updated: 11/15/22 0641     Glucose 96 mg/dL      BUN 33 mg/dL      Creatinine 0.91 mg/dL      Sodium 138 mmol/L      Potassium 4.8 mmol/L      Comment: Slight hemolysis detected by analyzer. Results may be affected.        Chloride 97 mmol/L      CO2 32.0 mmol/L      Calcium 8.7 mg/dL      BUN/Creatinine Ratio 36.3     Anion Gap  9.0 mmol/L      eGFR 68.0 mL/min/1.73      Comment: National Kidney Foundation and American Society of Nephrology (ASN) Task Force recommended calculation based on the Chronic Kidney Disease Epidemiology Collaboration (CKD-EPI) equation refit without adjustment for race.       Narrative:      GFR Normal >60  Chronic Kidney Disease <60  Kidney Failure <15      CBC & Differential [[id number]]  (Abnormal) Collected: 11/15/22 0516    Specimen: Blood Updated: 11/15/22 0624    Narrative:      The following orders were created for panel order CBC & Differential.  Procedure                               Abnormality         Status                     ---------                               -----------         ------                     CBC Auto Differential[[id number]]        Abnormal            Final result                 Please view results for these tests on the individual orders.    CBC Auto Differential [[id number]]  (Abnormal) Collected: 11/15/22 0516    Specimen: Blood Updated: 11/15/22 0624     WBC 15.77 10*3/mm3      RBC 3.41 10*6/mm3      Hemoglobin 10.1 g/dL      Hematocrit 31.4 %      MCV 92.1 fL      MCH 29.6 pg      MCHC 32.2 g/dL      RDW 14.3 %      RDW-SD 47.4 fl      MPV 9.4 fL      Platelets 274 10*3/mm3      Neutrophil % 76.7 %      Lymphocyte % 14.8 %      Monocyte % 7.0 %      Eosinophil % 0.1 %      Basophil % 0.1 %      Immature Grans % 1.3 %      Neutrophils, Absolute 12.09 10*3/mm3      Lymphocytes, Absolute 2.34 10*3/mm3      Monocytes, Absolute 1.11 10*3/mm3      Eosinophils, Absolute 0.01 10*3/mm3      Basophils, Absolute 0.02 10*3/mm3      Immature Grans, Absolute 0.20 10*3/mm3      nRBC 0.0 /100 WBC           Culture Data:   No results found for: BLOODCX  No results found for: URINECX  No results found for: RESPCX  No results found for: WOUNDCX  No results found for: STOOLCX  No components found for: BODYFLD    Radiology Data:   Imaging Results (Last 24 Hours)     ** No results found for the last  24 hours. **          I have reviewed the patient's current medications.     Assessment/Plan     Active Hospital Problems    Diagnosis    • COPD with exacerbation (HCC)        COPD exacerbation  --Improved  Supplemental oxygen as needed to maintain adequate gas exchange, wean as able, baseline 2 L  Bronchodilators  -Will taper prednisone    Hypotension/dizziness- improved, continue midodrine     Paroxysmal atrial fibrillation- rate control     Anticoagulation- Xarelto    Debility  PT/OT      Disposition-awaiting rehab placement.        Michele Nunn MD   11/15/22   12:49 CST      Electronically signed by Michele Nunn MD at 11/15/22 1251     Michele Nunn MD at 11/14/22 1339              [facility] Medicine Services  INPATIENT PROGRESS NOTE    Length of Stay: 13  Date of Admission: 11/1/2022  Primary Care Physician: Anahi Camacho APRN    Subjective   Chief Complaint: Shortness of air  HPI: 72-year-old female with COPD, admitted and treated with COPD exacerbation with acute on chronic respiratory failure, improved from medical standpoint however with significant debility and generalized weakness, awaiting placement.      Subjective:  Has had ongoing dyspnea, but no worsening shortness of breath noted today.  No chest pain.  Resting comfortably with no other complaints.  Generally weak.        ROS  14 point review of systems completed  All pertinent negatives and positives are as above. All other systems have been reviewed and are negative unless otherwise stated.     Objective    Temp:  [97 °F (36.1 °C)-97.8 °F (36.6 °C)] 97.8 °F (36.6 °C)  Heart Rate:  [62-78] 66  Resp:  [18-20] 18  BP: ()/(52-76) 109/61  Physical Exam  Constitutional:       General: She is not in acute distress.     Appearance: She is well-developed. She is not diaphoretic.   HENT:      Head: Normocephalic and atraumatic.   Eyes:      General: No scleral icterus.  Cardiovascular:       Rate and Rhythm: Normal rate.   Pulmonary:      Effort: Pulmonary effort is normal. No respiratory distress.      Breath sounds: No wheezing.   Abdominal:      General: There is no distension.   Musculoskeletal:         General: Normal range of motion.   Skin:     General: Skin is warm and dry.   Neurological:      Mental Status: She is alert.   Psychiatric:         Behavior: Behavior normal.         Thought Content: Thought content normal.             Results Review:  I have reviewed the labs, radiology results, and diagnostic studies.    Laboratory Data:   Lab Results (last 24 hours)     Procedure Component Value Units Date/Time    CBC & Differential [[id number]]  (Abnormal) Collected: 11/14/22 0541    Specimen: Blood Updated: 11/14/22 0659    Narrative:      The following orders were created for panel order CBC & Differential.  Procedure                               Abnormality         Status                     ---------                               -----------         ------                     CBC Auto Differential[[id number]]        Abnormal            Final result                 Please view results for these tests on the individual orders.    CBC Auto Differential [[id number]]  (Abnormal) Collected: 11/14/22 0541    Specimen: Blood Updated: 11/14/22 0659     WBC 15.31 10*3/mm3      RBC 3.38 10*6/mm3      Hemoglobin 9.9 g/dL      Hematocrit 31.1 %      MCV 92.0 fL      MCH 29.3 pg      MCHC 31.8 g/dL      RDW 14.2 %      RDW-SD 47.0 fl      MPV 9.3 fL      Platelets 269 10*3/mm3      Neutrophil % 71.4 %      Lymphocyte % 18.6 %      Monocyte % 8.8 %      Eosinophil % 0.1 %      Basophil % 0.1 %      Immature Grans % 1.0 %      Neutrophils, Absolute 10.92 10*3/mm3      Lymphocytes, Absolute 2.85 10*3/mm3      Monocytes, Absolute 1.34 10*3/mm3      Eosinophils, Absolute 0.02 10*3/mm3      Basophils, Absolute 0.02 10*3/mm3      Immature Grans, Absolute 0.16 10*3/mm3      nRBC 0.0 /100 WBC     Basic Metabolic  Panel [[id number]]  (Abnormal) Collected: 11/14/22 0541    Specimen: Blood Updated: 11/14/22 0636     Glucose 91 mg/dL      BUN 35 mg/dL      Creatinine 0.93 mg/dL      Sodium 136 mmol/L      Potassium 5.0 mmol/L      Comment: Slight hemolysis detected by analyzer. Results may be affected.        Chloride 96 mmol/L      CO2 35.0 mmol/L      Calcium 8.6 mg/dL      BUN/Creatinine Ratio 37.6     Anion Gap 5.0 mmol/L      eGFR 66.3 mL/min/1.73      Comment: National Kidney Foundation and American Society of Nephrology (ASN) Task Force recommended calculation based on the Chronic Kidney Disease Epidemiology Collaboration (CKD-EPI) equation refit without adjustment for race.       Narrative:      GFR Normal >60  Chronic Kidney Disease <60  Kidney Failure <15            Culture Data:   No results found for: BLOODCX  No results found for: URINECX  No results found for: RESPCX  No results found for: WOUNDCX  No results found for: STOOLCX  No components found for: BODYFLD    Radiology Data:   Imaging Results (Last 24 Hours)     ** No results found for the last 24 hours. **          I have reviewed the patient's current medications.     Assessment/Plan     Active Hospital Problems    Diagnosis    • COPD with exacerbation (HCC)        COPD exacerbation  Submental oxygen as needed to maintain adequate gas exchange, wean as able, baseline 2 L  Bronchodilators  Wean steroids    Hypotension/dizziness- improved, continue midodrine     Paroxysmal atrial fibrillation- rate control     Anticoagulation- Xarelto    Debility  PT/OT    Disposition-awaiting rehab placement.        Michele Nunn MD   11/14/22   13:39 CST      Electronically signed by Michele Nunn MD at 11/14/22 1343       Medical Student Notes (last 24 hours)  Notes from 11/15/22 1045 through 11/16/22 1045   No notes of this type exist for this encounter.         Consult Notes (last 24 hours)  Notes from 11/15/22 1045 through 11/16/22 1045   No notes of this type  exist for this encounter.

## 2022-11-16 NOTE — THERAPY TREATMENT NOTE
Acute Care - Physical Therapy Treatment Note  [facility]     Patient Name: Mona Perales  : 1950  MRN: [id number]  Today's Date: 2022      Visit Dx:     ICD-10-CM ICD-9-CM   1. ST elevation myocardial infarction (STEMI), unspecified artery ([facility])  I21.3 410.90   2. Chronic obstructive pulmonary disease with acute exacerbation ([facility])  J44.1 491.21   3. Impaired functional mobility, balance, gait, and endurance  Z74.09 V49.89   4. Impaired mobility and ADLs  Z74.09 V49.89    Z78.9      Patient Active Problem List   Diagnosis   • Anxiety   • CAD (coronary atherosclerotic disease)   • Carotid stenosis   • COPD (chronic obstructive pulmonary disease) ([facility])   • COPD with exacerbation ([facility])   • Depressive disorder, not elsewhere classified   • Benign essential hypertension   • Hypercholesteremia   • Insomnia   • Notalgia   • Osteoporosis   • Other screening mammogram   • RUQ abdominal pain   • PVD (peripheral vascular disease) with claudication ([facility])   • Nicotine abuse   • Dysphagia   • Epigastric pain   • Pain of right hand   • Closed displaced fracture of shaft of fifth metacarpal bone of right hand   • Cause of injury, fall   • Displaced fracture of shaft of fifth metacarpal bone of right hand with routine healing   • Syncope   • Acute respiratory failure with hypoxia ([facility])   • (HFpEF) heart failure with preserved ejection fraction ([facility])   • Hypotension   • Elevated WBCs   • Near syncope   • Atherosclerosis of native coronary artery of native heart without angina pectoris   • Atherosclerosis of native arteries of extremities with rest pain, left leg ([facility])   • PVD (peripheral vascular disease) ([facility])   • Physical deconditioning   • Pain due to onychomycosis of toenails of both feet   • ST elevation myocardial infarction (STEMI), unspecified artery ([facility])     Past Medical History:   Diagnosis Date   • A-fib ([facility])    • Anxiety    • Arthritis    • Asthma    • Atherosclerosis of native arteries of the  extremities with ulceration (HCC)     bilateral legs - bilateral iliac stents 2008      • CHF (congestive heart failure) (HCC)    • Chronic lower back pain    • COPD (chronic obstructive pulmonary disease) (HCC)    • Essential (primary) hypertension    • GERD (gastroesophageal reflux disease)    • History of pulmonary embolus (PE)    • Hyperlipidemia    • Insomnia    • Lumbago    • Nicotine dependence    • Occlusion of artery      and stenosis of bilateral carotid arteries - BABS 16-49%, LICA 0-15%   • Other atherosclerosis of native artery of other extremity     LEFT subclavian stent 2005 (occluded)   • Sleep apnea      Past Surgical History:   Procedure Laterality Date   • CARDIAC CATHETERIZATION  04/06/2016    No evidence of any obstructive epicardial CAD.Preserved LV systolic function with EF of 55%.   • CARDIAC CATHETERIZATION N/A 11/1/2022    Procedure: Left Heart Cath;  Surgeon: Neftali Haywood MD;  Location: [facility] CATH INVASIVE LOCATION;  Service: Cardiology;  Laterality: N/A;   • CENTRAL VENOUS LINE INSERTION  04/07/2016    Successful placement of right uppe extremity 6-Sami triple lumen PICC line.   • ENDOSCOPY N/A 1/12/2018    Procedure: ESOPHAGOGASTRODUODENOSCOPY;  Surgeon: Bin Oh MD;  Location: [facility] ENDOSCOPY;  Service:    • ENDOSCOPY N/A 1/17/2018    Procedure: ESOPHAGOGASTRODUODENOSCOPY;  Surgeon: Bin Oh MD;  Location: [facility] ENDOSCOPY;  Service:    • ENDOSCOPY N/A 3/16/2018    Procedure: ESOPHAGOGASTRODUODENOSCOPY possible dilation;  Surgeon: Bin Oh MD;  Location: [facility] ENDOSCOPY;  Service: Gastroenterology   • FEMORAL POPLITEAL BYPASS Left 4/14/2020    Procedure: FEMORAL POPLITEAL BYPASS ABOVE KNEE  (POLYTETRAFLUOROETHYLENE)  LEFT COMMON FEMORAL ARTERY ENDARTERECTOMY PTA LEFT ILIAC ARTERIOGRAM        (c-arm#2 and c-arm table);  Surgeon: David Suh MD;  Location: [facility] OR;  Service: Vascular;  Laterality: Left;   • FEMORAL POPLITEAL BYPASS Right 9/17/2021     Procedure: RIGHT common femoral endarterectomy FEMORAL POPLITEAL BYPASS (above the knee polytetrafluoroethylene  lower extremity arteriogram              (c-arm#2 and c-arm table);  Surgeon: David Suh MD;  Location: [facility] OR;  Service: Vascular;  Laterality: Right;   • HYSTERECTOMY     • INTERVENTIONAL RADIOLOGY PROCEDURE Left 9/9/2020    Procedure: IR angiogram extremity left;  Surgeon: David Suh MD;  Location: [facility] ANGIO INVASIVE LOCATION;  Service: Interventional Radiology;  Laterality: Left;   • KYPHOPLASTY N/A 5/23/2019    Procedure: KYPHOPLASTY LUMBAR FOUR;  Surgeon: Aba Santa MD;  Location: [facility] OR;  Service: Orthopedic Spine   • TOTAL SHOULDER REPLACEMENT Left    • TRANSESOPHAGEAL ECHOCARDIOGRAM (JACQUES)  04/07/2016    With color flow-Mild to moderate [facility].LV systolic function well preserved with Ef of 55-60%.Mitral and AV intact.Diastolic dysfunction   • UPPER GASTROINTESTINAL ENDOSCOPY  01/17/2018    Dr. Bin Martin M.D.     PT Assessment (last 12 hours)     PT Evaluation and Treatment     Row Name 11/16/22 1405 11/16/22 0951       Physical Therapy Time and Intention    Subjective Information no complaints  -TW complains of;pain;dizziness  -TW    Document Type therapy note (daily note)  -TW therapy note (daily note)  -TW    Mode of Treatment physical therapy;individual therapy  -TW physical therapy;individual therapy  -TW    Patient Effort good  -TW adequate  -TW    Row Name 11/16/22 1405 11/16/22 0951       General Information    Patient Profile Reviewed yes  -TW yes  -TW    Patient Observations alert;cooperative;agree to therapy  -TW alert;cooperative;agree to therapy  -TW    Patient/Family/Caregiver Comments/Observations none  -TW none  -TW    General Observations of Patient Pt sitting up in recliner.  -TW Pt sup in bed  -TW    Existing Precautions/Restrictions fall;oxygen therapy device and L/min  -TW fall;oxygen therapy device and L/min  -TW     Row Name 11/16/22 1405 11/16/22 0951       Pain    Pretreatment Pain Rating 8/10  -TW 9/10  -TW    Posttreatment Pain Rating 9/10  -TW 9/10  -TW    Pain Location - back  -TW back  -TW    Row Name 11/16/22 1405 11/16/22 0951       Cognition    Affect/Mental Status (Cognition) WFL  -TW WFL  -TW    Orientation Status (Cognition) oriented x 4  -TW oriented x 4  -TW    Follows Commands (Cognition) WFL  -TW WFL  -TW    Cognitive Function WFL  -TW WFL  -TW    Personal Safety Interventions fall prevention program maintained;gait belt;nonskid shoes/slippers when out of bed  -TW fall prevention program maintained;gait belt;nonskid shoes/slippers when out of bed  -TW    Row Name 11/16/22 1405 11/16/22 0951       Bed Mobility    Supine-Sit Harrison (Bed Mobility) not tested  -TW modified independence  -TW    Sit-Supine [place] (Bed Mobility) modified independence  -TW not tested  -TW    Assistive Device (Bed Mobility) head of bed elevated  -TW head of bed elevated  -TW    Row Name 11/16/22 1405 11/16/22 0951       Sit-Stand Transfer    Sit-Stand Harrison (Transfers) supervision  -TW supervision  -TW    Assistive Device (Sit-Stand Transfers) walker, front-wheeled  -TW walker, front-wheeled  -TW    Row Name 11/16/22 1405 11/16/22 0951       Stand-Sit Transfer    Stand-Sit [place] (Transfers) supervision  -TW standby assist  -TW    Assistive Device (Stand-Sit Transfers) walker, front-wheeled  -TW walker, front-wheeled  -TW    Row Name 11/16/22 1405 11/16/22 0951       Gait/Stairs (Locomotion)    Harrison Level (Gait) contact guard;standby assist  -TW contact guard;minimum assist (75% patient effort)  -TW    Assistive Device (Gait) walker, front-wheeled  -TW walker, front-wheeled  -TW    Distance in Feet (Gait) 14ft  -TW 14ft to recliner  -TW    Deviations/Abnormal Patterns (Gait) gait speed decreased;stride length decreased  -TW --    Comment, (Gait/Stairs) Pt with no LOB this pm.  -TW Pt with LOB and severe  unsteadiness after 10-12ft of gait requiring min of 1 to assist pt to recliner for remainder of distance.  -TW    Row Name 11/16/22 1405 11/16/22 0951       Plan of Care Review    Plan of Care Reviewed With patient  -TW patient  -TW    Progress improving  -TW no change  -TW    Outcome Evaluation Pt up in recliner and agreeable to therapy. Pt able to stand with spv and amb with RW for 14ft with CGA/SBA. Pt with no LOB this pm and with good controll of RW and balance. Pt t/f to sup with mod ind and able to position herself in bed for improved comfort with no c/o voiced about dizziness. Pt left in sup in bed with all needs met. Pt would cont to benefit from therapy upon DC.  -TW Pt agreeable to therapy. Pt able to t/f sup to sit with mod ind. Pt with no c/o voiced other than low back pain. Pt stood with CGA and amb with RW and CGA/Min of 1 for 14ft to recliner. Pt with severe LOB after ~ 10-12ft of gait requiring Min of 1 to assist pt to recliner for remainder of distance. Pt very SOA and states she was dizzy but O2 sats were 92% by the time pulseoximeter gave reading. Pt left up in recliner with all needs met and would cont to benefit from therapy upon DC.  -TW    Row Name 11/16/22 1405 11/16/22 0951       Vital Signs    Pretreatment Heart Rate (beats/min) 70  -TW 62  -TW    Intratreatment Heart Rate (beats/min) 78  -TW 80  -TW    Posttreatment Heart Rate (beats/min) 68  -TW 66  -TW    Pre SpO2 (%) 94  -TW 96  -TW    O2 Delivery Pre Treatment supplemental O2  -TW supplemental O2  -TW    Intra SpO2 (%) 98  -TW 90  -TW    O2 Delivery Intra Treatment supplemental O2  -TW supplemental O2  -TW    Post SpO2 (%) 96  -TW 98  -TW    O2 Delivery Post Treatment supplemental O2  -TW supplemental O2  -TW    Pre Patient Position Sitting  -TW Supine  -TW    Intra Patient Position Standing  -TW Standing  -TW    Post Patient Position Supine  -TW Sitting  -TW    Row Name 11/16/22 1405 11/16/22 0951       Positioning and Restraints     Pre-Treatment Position sitting in chair/recliner  -TW in bed  -TW    Post Treatment Position bed  -TW chair  -TW    In Bed supine;call light within reach;encouraged to call for assist;exit alarm on  -TW --    In Chair -- reclined;call light within reach;encouraged to call for assist;exit alarm on;notified nsg  -TW    Row Name 11/16/22 1405 11/16/22 0951       Therapy Assessment/Plan (PT)    Rehab Potential (PT) good, to achieve stated therapy goals  -TW good, to achieve stated therapy goals  -TW    Row Name 11/16/22 1405 11/16/22 0951       Bed Mobility Goal 1 (PT)    Activity/Assistive Device (Bed Mobility Goal 1, PT) sit to supine/supine to sit  -TW sit to supine/supine to sit  -TW    [place] Level/Cues Needed (Bed Mobility Goal 1, PT) independent  -TW independent  -TW    Time Frame (Bed Mobility Goal 1, PT) by discharge  -TW by discharge  -TW    Strategies/Barriers (Bed Mobility Goal 1, PT) HOB flat, no bed rails.  -TW HOB flat, no bed rails.  -TW    Progress/Outcomes (Bed Mobility Goal 1, PT) goal met   -TW goal met   -TW    Row Name 11/16/22 1405 11/16/22 0951       Transfer Goal 1 (PT)    Activity/Assistive Device (Transfer Goal 1, PT) sit-to-stand/stand-to-sit;bed-to-chair/chair-to-bed;walker, rolling  -TW sit-to-stand/stand-to-sit;bed-to-chair/chair-to-bed;walker, rolling  -TW    [place] Level/Cues Needed (Transfer Goal 1, PT) modified independence  -TW modified independence  -TW    Time Frame (Transfer Goal 1, PT) by discharge  -TW by discharge  -TW    Strategies/Barriers (Transfers Goal 1, PT) Maintain SpO2 >90%.  -TW Maintain SpO2 >90%.  -TW    Progress/Outcome (Transfer Goal 1, PT) goal not met  -TW goal not met  -TW    Row Name 11/16/22 1405 11/16/22 0951       Gait Training Goal 1 (PT)    Activity/Assistive Device (Gait Training Goal 1, PT) walker, rolling  -TW walker, rolling  -TW    [place] Level (Gait Training Goal 1, PT) modified independence  -TW modified independence  -TW     Distance (Gait Training Goal 1, PT) 30'x2.  -TW 30'x2.  -TW    Time Frame (Gait Training Goal 1, PT) by discharge  -TW by discharge  -TW    Strategies/Barriers (Gait Training Goal 1, PT) Maintain SpO2 >90%.  -TW Maintain SpO2 >90%.  -TW    Progress/Outcome (Gait Training Goal 1, PT) goal not met  -TW goal not met  -TW    Row Name 11/16/22 1405 11/16/22 0951       Problem Specific Goal 1 (PT)    Problem Specific Goal 1 (PT) Score 25/28 on Tinetti fall risk assessment.  -TW Score 25/28 on Tinetti fall risk assessment.  -TW    Time Frame (Problem Specific Goal 1, PT) by discharge  -TW by discharge  -TW    Strategies/Barriers (Problem Specific Goal 1, PT) Maintain SpO2 >90%.  -TW Maintain SpO2 >90%.  -TW    Progress/Outcome (Problem Specific Goal 1, PT) goal not met  -TW goal not met  -TW          User Key  (r) = Recorded By, (t) = Taken By, (c) = Cosigned By    Initials Name Provider Type    TW Bassem Lamar, PTA Physical Therapist Assistant                Physical Therapy Education     Title: PT OT SLP Therapies (In Progress)     Topic: Physical Therapy (In Progress)     Point: Mobility training (Done)     Learning Progress Summary           Patient Acceptance, E, VU by  at 11/3/2022 1100    Comment: PT POC, rehab process.                   Point: Home exercise program (Not Started)     Learner Progress:  Not documented in this visit.          Point: Body mechanics (Not Started)     Learner Progress:  Not documented in this visit.          Point: Precautions (Done)     Learning Progress Summary           Patient Acceptance, E, VU by  at 11/15/2022 1441    Comment: Grupo assessed: 21/28 or moderate fall risk. Encourage use of FWW.                               User Key     Initials Effective Dates Name Provider Type [facility] 09/18/22 -  Hakeem Wolfe, PT Physical Therapist PT              PT Recommendation and Plan  Anticipated Discharge Disposition (PT): skilled nursing facility  Plan of Care  Reviewed With: patient  Progress: improving  Outcome Evaluation: Pt up in recliner and agreeable to therapy. Pt able to stand with spv and amb with RW for 14ft with CGA/SBA. Pt with no LOB this pm and with good controll of RW and balance. Pt t/f to sup with mod ind and able to position herself in bed for improved comfort with no c/o voiced about dizziness. Pt left in sup in bed with all needs met. Pt would cont to benefit from therapy upon DC.       Time Calculation:    PT Charges     Row Name 11/16/22 1537 11/16/22 1309          Time Calculation    Start Time 1405  -TW 0951  -TW     Stop Time 1431  -TW 1016  -TW     Time Calculation (min) 26 min  -TW 25 min  -TW        Time Calculation- PT    Total Timed Code Minutes- PT 26 minute(s)  -TW 25 minute(s)  -TW           User Key  (r) = Recorded By, (t) = Taken By, (c) = Cosigned By    Initials Name Provider Type     Bassem Lamar PTA Physical Therapist Assistant              Therapy Charges for Today     Code Description Service Date Service Provider Modifiers Qty    37309674255 HC GAIT TRAINING EA 15 MIN 11/16/2022 Bassem Lamar, MIREILLE GP 1    73830847054 HC PT THERAPEUTIC ACT EA 15 MIN 11/16/2022 Bassem Lamar PTA GP 1    60696975104 HC GAIT TRAINING EA 15 MIN 11/16/2022 Bassem Lamar, MIREILLE GP 1    77872410148 HC PT THERAPEUTIC ACT EA 15 MIN 11/16/2022 Bassem Lamar, MIREILLE GP 1          PT G-Codes  Outcome Measure Options: AM-PAC 6 Clicks Basic Mobility (PT), Tinetti  AM-PAC 6 Clicks Score (PT): 22  AM-PAC 6 Clicks Score (OT): 21  Tinetti Total Score: 21    Bassem Lamar PTA  11/16/2022

## 2022-11-16 NOTE — PLAN OF CARE
Goal Outcome Evaluation:  Plan of Care Reviewed With: patient        Progress: improving  Outcome Evaluation: Pt up in recliner and agreeable to therapy. Pt able to stand with spv and amb with RW for 14ft with CGA/SBA. Pt with no LOB this pm and with good controll of RW and balance. Pt t/f to sup with mod ind and able to position herself in bed for improved comfort with no c/o voiced about dizziness. Pt left in sup in bed with all needs met. Pt would cont to benefit from therapy upon DC.

## 2022-11-16 NOTE — THERAPY TREATMENT NOTE
Patient Name: Mona Perales  : 1950    MRN: [id number]                              Today's Date: 2022       Admit Date: 2022    Visit Dx:     ICD-10-CM ICD-9-CM   1. ST elevation myocardial infarction (STEMI), unspecified artery (formerly [facility])  I21.3 410.90   2. Chronic obstructive pulmonary disease with acute exacerbation (formerly [facility])  J44.1 491.21   3. Impaired functional mobility, balance, gait, and endurance  Z74.09 V49.89   4. Impaired mobility and ADLs  Z74.09 V49.89    Z78.9      Patient Active Problem List   Diagnosis   • Anxiety   • CAD (coronary atherosclerotic disease)   • Carotid stenosis   • COPD (chronic obstructive pulmonary disease) (formerly [facility])   • COPD with exacerbation (formerly [facility])   • Depressive disorder, not elsewhere classified   • Benign essential hypertension   • Hypercholesteremia   • Insomnia   • Notalgia   • Osteoporosis   • Other screening mammogram   • RUQ abdominal pain   • PVD (peripheral vascular disease) with claudication (formerly [facility])   • Nicotine abuse   • Dysphagia   • Epigastric pain   • Pain of right hand   • Closed displaced fracture of shaft of fifth metacarpal bone of right hand   • Cause of injury, fall   • Displaced fracture of shaft of fifth metacarpal bone of right hand with routine healing   • Syncope   • Acute respiratory failure with hypoxia (formerly [facility])   • (HFpEF) heart failure with preserved ejection fraction (formerly [facility])   • Hypotension   • Elevated WBCs   • Near syncope   • Atherosclerosis of native coronary artery of native heart without angina pectoris   • Atherosclerosis of native arteries of extremities with rest pain, left leg (formerly [facility])   • PVD (peripheral vascular disease) (formerly [facility])   • Physical deconditioning   • Pain due to onychomycosis of toenails of both feet   • ST elevation myocardial infarction (STEMI), unspecified artery (formerly [facility])     Past Medical History:   Diagnosis Date   • A-fib (formerly [facility])    • Anxiety    • Arthritis    • Asthma    • 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  with ulceration (HCC)     bilateral legs - bilateral iliac stents 2008      • CHF (congestive heart failure) (HCC)    • Chronic lower back pain    • COPD (chronic obstructive pulmonary disease) (HCC)    • Essential (primary) hypertension    • GERD (gastroesophageal reflux disease)    • History of pulmonary embolus (PE)    • Hyperlipidemia    • Insomnia    • Lumbago    • Nicotine dependence    • Occlusion of artery      and stenosis of bilateral carotid arteries - BABS 16-49%, LICA 0-15%   • Other atherosclerosis of native artery of other extremity     LEFT subclavian stent 2005 (occluded)   • Sleep apnea      Past Surgical History:   Procedure Laterality Date   • CARDIAC CATHETERIZATION  04/06/2016    No evidence of any obstructive epicardial CAD.Preserved LV systolic function with EF of 55%.   • CARDIAC CATHETERIZATION N/A 11/1/2022    Procedure: Left Heart Cath;  Surgeon: Neftali Haywood MD;  Location: [facility] CATH INVASIVE LOCATION;  Service: Cardiology;  Laterality: N/A;   • CENTRAL VENOUS LINE INSERTION  04/07/2016    Successful placement of right uppe extremity 6-German triple lumen PICC line.   • ENDOSCOPY N/A 1/12/2018    Procedure: ESOPHAGOGASTRODUODENOSCOPY;  Surgeon: Bin Oh MD;  Location: [facility] ENDOSCOPY;  Service:    • ENDOSCOPY N/A 1/17/2018    Procedure: ESOPHAGOGASTRODUODENOSCOPY;  Surgeon: Bin Oh MD;  Location: [facility] ENDOSCOPY;  Service:    • ENDOSCOPY N/A 3/16/2018    Procedure: ESOPHAGOGASTRODUODENOSCOPY possible dilation;  Surgeon: Bin Oh MD;  Location: [facility] ENDOSCOPY;  Service: Gastroenterology   • FEMORAL POPLITEAL BYPASS Left 4/14/2020    Procedure: FEMORAL POPLITEAL BYPASS ABOVE KNEE  (POLYTETRAFLUOROETHYLENE)  LEFT COMMON FEMORAL ARTERY ENDARTERECTOMY PTA LEFT ILIAC ARTERIOGRAM        (c-arm#2 and c-arm table);  Surgeon: David Suh MD;  Location: [facility] OR;  Service: Vascular;  Laterality: Left;   • FEMORAL POPLITEAL BYPASS Right 9/17/2021     Procedure: RIGHT common femoral endarterectomy FEMORAL POPLITEAL BYPASS (above the knee polytetrafluoroethylene  lower extremity arteriogram              (c-arm#2 and c-arm table);  Surgeon: David Suh MD;  Location: [facility] OR;  Service: Vascular;  Laterality: Right;   • HYSTERECTOMY     • INTERVENTIONAL RADIOLOGY PROCEDURE Left 9/9/2020    Procedure: IR angiogram extremity left;  Surgeon: David Suh MD;  Location: [facility] ANGIO INVASIVE LOCATION;  Service: Interventional Radiology;  Laterality: Left;   • KYPHOPLASTY N/A 5/23/2019    Procedure: KYPHOPLASTY LUMBAR FOUR;  Surgeon: Aba Santa MD;  Location: [facility] OR;  Service: Orthopedic Spine   • TOTAL SHOULDER REPLACEMENT Left    • TRANSESOPHAGEAL ECHOCARDIOGRAM (JACQUES)  04/07/2016    With color flow-Mild to moderate [facility].LV systolic function well preserved with Ef of 55-60%.Mitral and AV intact.Diastolic dysfunction   • UPPER GASTROINTESTINAL ENDOSCOPY  01/17/2018    Dr. Bin Martin M.D.      General Information     Row Name 11/16/22 0820          OT Time and Intention    Document Type therapy note (daily note)  -CS     Mode of Treatment occupational therapy  -CS     Row Name 11/16/22 0820          General Information    Patient Profile Reviewed yes  -CS     Existing Precautions/Restrictions fall;oxygen therapy device and L/min  -CS     Row Name 11/16/22 0820          Cognition    Orientation Status (Cognition) oriented x 4  -CS     Row Name 11/16/22 0820          Safety Issues, Functional Mobility    Impairments Affecting Function (Mobility) strength;endurance/activity tolerance;pain  -CS           User Key  (r) = Recorded By, (t) = Taken By, (c) = Cosigned By    Initials Name Provider Type    CS Aurora Chew COTA Occupational Therapist Assistant                 Mobility/ADL's     Row Name 11/16/22 0820          Bed Mobility    Supine-Sit [place] (Bed Mobility) modified independence  -CS     Sit-Supine  [place] (Bed Mobility) modified independence  -CS     Assistive Device (Bed Mobility) head of bed elevated  -CS     Row Name 11/16/22 0820          Transfers    Transfers sit-stand transfer;stand-sit transfer;toilet transfer  -CS     Row Name 11/16/22 0820          Bed-Chair Transfer    Bed-Chair [place] (Transfers) supervision  -CS     Assistive Device (Bed-Chair Transfers) other (see comments)  none  -CS     Row Name 11/16/22 0820          Sit-Stand Transfer    Sit-Stand [place] (Transfers) supervision  -CS     Assistive Device (Sit-Stand Transfers) other (see comments)  none  -CS     Row Name 11/16/22 0820          Stand-Sit Transfer    Stand-Sit [place] (Transfers) standby assist  -CS     Assistive Device (Stand-Sit Transfers) other (see comments)  none  -CS     Row Name 11/16/22 0820          Toilet Transfer    Type (Toilet Transfer) sit-stand;stand-sit  -     [place] Level (Toilet Transfer) standby assist  -CS     Assistive Device (Toilet Transfer) commode  -     Row Name 11/16/22 0820          Functional Mobility    Functional Mobility- Ind. Level supervision required  -     Functional Mobility-Distance (Feet) 10  -CS     Row Name 11/16/22 0820          Activities of Daily Living    BADL Assessment/Intervention grooming;feeding  -     Row Name 11/16/22 0820          Grooming Assessment/Training    [place] Level (Grooming) grooming skills;hair care, combing/brushing;oral care regimen;wash face, hands;set up;standby assist  -CS     Position (Grooming) sink side  -     Row Name 11/16/22 0820          Toileting Assessment/Training    [place] Level (Toileting) adjust/manage clothing;perform perineal hygiene;standby assist  -CS     Assistive Devices (Toileting) commode  -CS     Position (Toileting) unsupported sitting;unsupported standing  -           User Key  (r) = Recorded By, (t) = Taken By, (c) = Cosigned By    Initials Name Provider Type    Aurora Whyte,  CHITO Occupational Therapist Assistant               Obj/Interventions     Row Name 11/16/22 0820          Shoulder (Therapeutic Exercise)    Shoulder (Therapeutic Exercise) AROM (active range of motion)  -     Shoulder AROM (Therapeutic Exercise) bilateral;flexion;extension;external rotation;internal rotation  -     Row Name 11/16/22 0820          Elbow/Forearm (Therapeutic Exercise)    Elbow/Forearm (Therapeutic Exercise) AROM (active range of motion)  -     Elbow/Forearm AROM (Therapeutic Exercise) bilateral;flexion;extension;supination;pronation  -     Row Name 11/16/22 0820          Wrist (Therapeutic Exercise)    Wrist (Therapeutic Exercise) AROM (active range of motion)  -     Wrist AROM (Therapeutic Exercise) bilateral;flexion;extension  -     Row Name 11/16/22 0820          Hand (Therapeutic Exercise)    Hand (Therapeutic Exercise) AROM (active range of motion)  -     Hand AROM/AAROM (Therapeutic Exercise) bilateral;finger flexion;finger extension  -     Row Name 11/16/22 0820          Motor Skills    Therapeutic Exercise shoulder;elbow/forearm;wrist;hand  -           User Key  (r) = Recorded By, (t) = Taken By, (c) = Cosigned By    Initials Name Provider Type     Aurora Chew COTA Occupational Therapist Assistant               Goals/Plan     Row Name 11/16/22 0820          Transfer Goal 1 (OT)    Activity/Assistive Device (Transfer Goal 1, OT) transfers, all  -CS     [place] Level/Cues Needed (Transfer Goal 1, OT) modified independence  -CS     Time Frame (Transfer Goal 1, OT) long term goal (LTG);by discharge  -CS     Progress/Outcome (Transfer Goal 1, OT) goal not met  -     Row Name 11/16/22 0820          Bathing Goal 1 (OT)    Activity/Device (Bathing Goal 1, OT) bathing skills, all  -CS     [place] Level/Cues Needed (Bathing Goal 1, OT) independent  -CS     Time Frame (Bathing Goal 1, OT) long term goal (LTG);by discharge  -CS     Progress/Outcomes (Bathing Goal 1,  OT) goal not met  -CS     Row Name 11/16/22 0820          Dressing Goal 1 (OT)    Activity/Device (Dressing Goal 1, OT) dressing skills, all  -CS     [place]/Cues Needed (Dressing Goal 1, OT) independent  -CS     Time Frame (Dressing Goal 1, OT) long term goal (LTG);by discharge  -CS     Progress/Outcome (Dressing Goal 1, OT) goal not met  -CS     Row Name 11/16/22 0820          Toileting Goal 1 (OT)    Activity/Device (Toileting Goal 1, OT) toileting skills, all  -CS     [place] Level/Cues Needed (Toileting Goal 1, OT) independent  -CS     Time Frame (Toileting Goal 1, OT) long term goal (LTG);by discharge  -CS     Progress/Outcome (Toileting Goal 1, OT) goal not met  -CS     Row Name 11/16/22 0820          Problem Specific Goal 1 (OT)    Problem Specific Goal 1 (OT) Patient will Independently manage O2 line during each treatment session to increase safety when completing transfers and functional mobility.  -CS     Time Frame (Problem Specific Goal 1, OT) long term goal (LTG);by discharge  -CS     Progress/Outcome (Problem Specific Goal 1, OT) goal not met  -CS           User Key  (r) = Recorded By, (t) = Taken By, (c) = Cosigned By    Initials Name Provider Type    CS Aurora Chew COTA Occupational Therapist Assistant               Clinical Impression     Row Name 11/16/22 0820          Pain Assessment    Pretreatment Pain Rating 8/10  -CS     Posttreatment Pain Rating 8/10  -CS     Pain Location - Side/Orientation Right  -CS     Pain Location - flank  -CS     Pain Intervention(s) Medication (See MAR);Rest;Distraction  -CS     Row Name 11/16/22 0820          Plan of Care Review    Plan of Care Reviewed With patient  -CS     Progress improving  -CS     Outcome Evaluation Pt tolerated tx well this date. Pt was mod I with bed mobility. Pt was SBA with sit-stand-sit. Pt was SBA with toilet t/f. Pt was SBA with grooming task at sink. Pt gave good effort with UE ther ex. Pt was educated on EC and AE.  Continue OT POC.  -CS     Row Name 11/16/22 0820          Therapy Assessment/Plan (OT)    Rehab Potential (OT) good, to achieve stated therapy goals  -CS     Criteria for Skilled Therapeutic Interventions Met (OT) yes;meets criteria;skilled treatment is necessary  -CS     Row Name 11/16/22 0820          Therapy Plan Review/Discharge Plan (OT)    Anticipated Discharge Disposition (OT) home with assist  -CS     Row Name 11/16/22 0820          Vital Signs    Pre Patient Position Supine  -CS     Intra Patient Position Standing  -CS     Post Patient Position Supine  -CS     Row Name 11/16/22 0820          Positioning and Restraints    Pre-Treatment Position in bed  -CS     Post Treatment Position bed  -CS     In Bed fowlers;call light within reach;encouraged to call for assist;exit alarm on  -CS           User Key  (r) = Recorded By, (t) = Taken By, (c) = Cosigned By    Initials Name Provider Type    Aurora Whyte COTA Occupational Therapist Assistant               Outcome Measures     Row Name 11/16/22 0820          How much help from another is currently needed...    Putting on and taking off regular lower body clothing? 3  -CS     Bathing (including washing, rinsing, and drying) 3  -CS     Toileting (which includes using toilet bed pan or urinal) 3  -CS     Putting on and taking off regular upper body clothing 4  -CS     Taking care of personal grooming (such as brushing teeth) 4  -CS     Eating meals 4  -CS     AM-PAC 6 Clicks Score (OT) 21  -CS           User Key  (r) = Recorded By, (t) = Taken By, (c) = Cosigned By    Initials Name Provider Type    Aurora Whyte COTA Occupational Therapist Assistant                Occupational Therapy Education     Title: PT OT SLP Therapies (In Progress)     Topic: Occupational Therapy (In Progress)     Point: ADL training (Done)     Description:   Instruct learner(s) on proper safety adaptation and remediation techniques during self care or transfers.   Instruct  in proper use of assistive devices.              Learning Progress Summary           Patient Acceptance, E,TB,D,H, VU by  at 11/16/2022 0912    Comment: Pt was educated on EC and AE.    Acceptance, E,TB, VU by  at 11/15/2022 0856    Comment: OT POC, Role of OT, transfer training                   Point: Home exercise program (Not Started)     Description:   Instruct learner(s) on appropriate technique for monitoring, assisting and/or progressing therapeutic exercises/activities.              Learner Progress:  Not documented in this visit.          Point: Precautions (Done)     Description:   Instruct learner(s) on prescribed precautions during self-care and functional transfers.              Learning Progress Summary           Patient Acceptance, E,TB,D,H, VU by  at 11/16/2022 0912    Comment: Pt was educated on EC and AE.    Acceptance, E,TB, VU by  at 11/15/2022 0856    Comment: OT POC, Role of OT, transfer training                   Point: Body mechanics (Done)     Description:   Instruct learner(s) on proper positioning and spine alignment during self-care, functional mobility activities and/or exercises.              Learning Progress Summary           Patient Acceptance, E,TB,D,H, VU by  at 11/16/2022 0912    Comment: Pt was educated on EC and AE.    Acceptance, E,TB, VU by  at 11/15/2022 0856    Comment: OT POC, Role of OT, transfer training                               User Key     Initials Effective Dates Name Provider Type Discipline     06/16/21 -  Aurora Chew COTA Occupational Therapist Assistant OT     08/11/22 -  Sarah Bernstein OT Occupational Therapist OT              OT Recommendation and Plan     Plan of Care Review  Plan of Care Reviewed With: patient  Progress: improving  Outcome Evaluation: Pt tolerated tx well this date. Pt was mod I with bed mobility. Pt was SBA with sit-stand-sit. Pt was SBA with toilet t/f. Pt was SBA with grooming task at sink. Pt gave good effort  with UE ther ex. Pt was educated on EC and AE. Continue OT POC.     Time Calculation:    Time Calculation- OT     Row Name 11/16/22 1314             Time Calculation- OT    OT Start Time 0820  -CS      OT Stop Time 0914  -CS      OT Time Calculation (min) 54 min  -CS      Total Timed Code Minutes- OT 54 minute(s)  -CS      OT Received On 11/16/22  -CS      OT Goal Re-Cert Due Date 11/16/22  -CS         Timed Charges    43875 - OT Therapeutic Exercise Minutes 30  -CS      24996 - OT Self Care/Mgmt Minutes 24  -CS         Total Minutes    Timed Charges Total Minutes 54  -CS       Total Minutes 54  -CS            User Key  (r) = Recorded By, (t) = Taken By, (c) = Cosigned By    Initials Name Provider Type    CS Aurora Chew COTA Occupational Therapist Assistant              Therapy Charges for Today     Code Description Service Date Service Provider Modifiers Qty    01853880920 HC OT THER PROC EA 15 MIN 11/16/2022 Aurora Chew COTA GO 2    11698694872 HC OT SELF CARE/MGMT/TRAIN EA 15 MIN 11/16/2022 Aurora Chew COTA GO 2               CHITO Vilchis  11/16/2022

## 2022-11-16 NOTE — PLAN OF CARE
Goal Outcome Evaluation:  Plan of Care Reviewed With: patient        Progress: improving  Outcome Evaluation: Pt tolerated tx well this date. Pt was mod I with bed mobility. Pt was SBA with sit-stand-sit. Pt was SBA with toilet t/f. Pt was SBA with grooming task at sink. Pt gave good effort with UE ther ex. Pt was educated on EC and AE. Continue OT POC.

## 2022-11-16 NOTE — PROGRESS NOTES
[facility]  INPATIENT PROGRESS NOTE    Length of Stay: 15  Date of Admission: 11/1/2022  Primary Care Physician: Anahi Camacho APRN    Subjective   Chief Complaint: No complaints    HPI: This is a 72-year-old female with past medical history of COPD that presented to [facility] on 11/1/2022 with COPD exacerbation with acute on chronic respiratory failure.  The patient is currently awaiting rehab placement for physical debilitation.    Review of Systems   Constitutional: Positive for activity change and fatigue.   HENT: Negative for ear pain and sore throat.    Eyes: Negative for pain and discharge.   Respiratory: Negative for cough and shortness of breath.    Cardiovascular: Negative for chest pain and palpitations.   Gastrointestinal: Negative for abdominal pain and nausea.   Endocrine: Negative for cold intolerance and heat intolerance.   Genitourinary: Negative for difficulty urinating and dysuria.   Musculoskeletal: Negative for arthralgias and gait problem.   Skin: Negative for color change and rash.   Neurological: Negative for dizziness and weakness.   Psychiatric/Behavioral: Negative for agitation and confusion.        Objective    Temp:  [97.6 °F (36.4 °C)-98.3 °F (36.8 °C)] 98.3 °F (36.8 °C)  Heart Rate:  [60-72] 72  Resp:  [18-19] 18  BP: ()/(51-69) 94/52    Physical Exam  Constitutional:       Appearance: She is well-developed.   HENT:      Head: Normocephalic and atraumatic.   Eyes:      Pupils: Pupils are equal, round, and reactive to light.   Cardiovascular:      Rate and Rhythm: Normal rate and regular rhythm.   Pulmonary:      Effort: Pulmonary effort is normal.      Breath sounds: Normal breath sounds.   Abdominal:      General: Bowel sounds are normal.      Palpations: Abdomen is soft.   Musculoskeletal:         General: Normal range of motion.      Cervical back: Normal range of motion and neck supple.   Skin:     General: Skin is warm and  dry.   Neurological:      Mental Status: She is alert and oriented to person, place, and time.   Psychiatric:         Behavior: Behavior normal.       Results Review:  I have reviewed the labs, radiology results, and diagnostic studies.    Laboratory Data:   Results from last 7 days   Lab Units 11/16/22  0537 11/15/22  0516 11/14/22  0541   SODIUM mmol/L 137 138 136   POTASSIUM mmol/L 4.8 4.8 5.0   CHLORIDE mmol/L 97* 97* 96*   CO2 mmol/L 32.0* 32.0* 35.0*   BUN mg/dL 35* 33* 35*   CREATININE mg/dL 0.91 0.91 0.93   GLUCOSE mg/dL 89 96 91   CALCIUM mg/dL 8.9 8.7 8.6   ANION GAP mmol/L 8.0 9.0 5.0     Estimated Creatinine Clearance: 52.1 mL/min (by C-G formula based on SCr of 0.91 mg/dL).          Results from last 7 days   Lab Units 11/16/22  0537 11/15/22  0516 11/14/22  0541 11/13/22  0839 11/12/22  0626   WBC 10*3/mm3 14.77* 15.77* 15.31* 16.43* 18.88*   HEMOGLOBIN g/dL 9.6* 10.1* 9.9* 10.4* 10.4*   HEMATOCRIT % 28.9* 31.4* 31.1* 32.6* 29.7*   PLATELETS 10*3/mm3 169 274 269 274 275           Culture Data:   No results found for: BLOODCX  No results found for: URINECX  No results found for: RESPCX  No results found for: WOUNDCX  No results found for: STOOLCX  No components found for: BODYFLD    Radiology Data:   Imaging Results (Last 24 Hours)     ** No results found for the last 24 hours. **          I have reviewed the patient's current medications.     Assessment/Plan     Active Hospital Problems    Diagnosis    • COPD with exacerbation (HCC)        Plan:    1.  COPD exacerbation, improved: Patient is currently on 3 L of oxygen.  Continue to taper prednisone.  Continue bronchodilators.  2.  Hypotension/dizziness, improved: Continue midodrine.  3.  Paroxysmal atrial fibrillation, rate controlled: Continue Xarelto for anticoagulation.  4.  Deconditioning: Continue PT/OT.    VTE prophylaxis: Xarelto.    Discharge Planning: I expect patient to be discharged to SNF in 1-2 days.    I confirmed that the patient's  Advance Care Plan is present, code status is documented, or surrogate decision maker is listed in the patient's medical record.      I have utilized all available immediate resources to obtain, update, or review the patient's current medications.         This document has been electronically signed by DELFINO Brewer on November 16, 2022 16:39 CST

## 2022-11-16 NOTE — PLAN OF CARE
Goal Outcome Evaluation:  Plan of Care Reviewed With: patient        Progress: no change  Outcome Evaluation: Pt agreeable to therapy. Pt able to t/f sup to sit with mod ind. Pt with no c/o voiced other than low back pain. Pt stood with CGA and amb with RW and CGA/Min of 1 for 14ft to recliner. Pt with severe LOB after ~ 10-12ft of gait requiring Min of 1 to assist pt to recliner for remainder of distance. Pt very SOA and states she was dizzy but O2 sats were 92% by the time pulseoximeter gave reading. Pt left up in recliner with all needs met and would cont to benefit from therapy upon DC.

## 2022-11-16 NOTE — THERAPY TREATMENT NOTE
Acute Care - Physical Therapy Treatment Note  [facility]     Patient Name: Mona Perales  : 1950  MRN: [id number]  Today's Date: 2022      Visit Dx:     ICD-10-CM ICD-9-CM   1. ST elevation myocardial infarction (STEMI), unspecified artery ([facility])  I21.3 410.90   2. Chronic obstructive pulmonary disease with acute exacerbation ([facility])  J44.1 491.21   3. Impaired functional mobility, balance, gait, and endurance  Z74.09 V49.89   4. Impaired mobility and ADLs  Z74.09 V49.89    Z78.9      Patient Active Problem List   Diagnosis   • Anxiety   • CAD (coronary atherosclerotic disease)   • Carotid stenosis   • COPD (chronic obstructive pulmonary disease) ([facility])   • COPD with exacerbation ([facility])   • Depressive disorder, not elsewhere classified   • Benign essential hypertension   • Hypercholesteremia   • Insomnia   • Notalgia   • Osteoporosis   • Other screening mammogram   • RUQ abdominal pain   • PVD (peripheral vascular disease) with claudication ([facility])   • Nicotine abuse   • Dysphagia   • Epigastric pain   • Pain of right hand   • Closed displaced fracture of shaft of fifth metacarpal bone of right hand   • Cause of injury, fall   • Displaced fracture of shaft of fifth metacarpal bone of right hand with routine healing   • Syncope   • Acute respiratory failure with hypoxia ([facility])   • (HFpEF) heart failure with preserved ejection fraction ([facility])   • Hypotension   • Elevated WBCs   • Near syncope   • Atherosclerosis of native coronary artery of native heart without angina pectoris   • Atherosclerosis of native arteries of extremities with rest pain, left leg ([facility])   • PVD (peripheral vascular disease) ([facility])   • Physical deconditioning   • Pain due to onychomycosis of toenails of both feet   • ST elevation myocardial infarction (STEMI), unspecified artery ([facility])     Past Medical History:   Diagnosis Date   • A-fib ([facility])    • Anxiety    • Arthritis    • Asthma    • Atherosclerosis of native arteries of the  extremities with ulceration (HCC)     bilateral legs - bilateral iliac stents 2008      • CHF (congestive heart failure) (HCC)    • Chronic lower back pain    • COPD (chronic obstructive pulmonary disease) (HCC)    • Essential (primary) hypertension    • GERD (gastroesophageal reflux disease)    • History of pulmonary embolus (PE)    • Hyperlipidemia    • Insomnia    • Lumbago    • Nicotine dependence    • Occlusion of artery      and stenosis of bilateral carotid arteries - BABS 16-49%, LICA 0-15%   • Other atherosclerosis of native artery of other extremity     LEFT subclavian stent 2005 (occluded)   • Sleep apnea      Past Surgical History:   Procedure Laterality Date   • CARDIAC CATHETERIZATION  04/06/2016    No evidence of any obstructive epicardial CAD.Preserved LV systolic function with EF of 55%.   • CARDIAC CATHETERIZATION N/A 11/1/2022    Procedure: Left Heart Cath;  Surgeon: Neftali Haywood MD;  Location: [facility] CATH INVASIVE LOCATION;  Service: Cardiology;  Laterality: N/A;   • CENTRAL VENOUS LINE INSERTION  04/07/2016    Successful placement of right uppe extremity 6-M[place]ese triple lumen PICC line.   • ENDOSCOPY N/A 1/12/2018    Procedure: ESOPHAGOGASTRODUODENOSCOPY;  Surgeon: Bin Oh MD;  Location: [facility] ENDOSCOPY;  Service:    • ENDOSCOPY N/A 1/17/2018    Procedure: ESOPHAGOGASTRODUODENOSCOPY;  Surgeon: Bin Oh MD;  Location: [facility] ENDOSCOPY;  Service:    • ENDOSCOPY N/A 3/16/2018    Procedure: ESOPHAGOGASTRODUODENOSCOPY possible dilation;  Surgeon: Bin Oh MD;  Location: [facility] ENDOSCOPY;  Service: Gastroenterology   • FEMORAL POPLITEAL BYPASS Left 4/14/2020    Procedure: FEMORAL POPLITEAL BYPASS ABOVE KNEE  (POLYTETRAFLUOROETHYLENE)  LEFT COMMON FEMORAL ARTERY ENDARTERECTOMY PTA LEFT ILIAC ARTERIOGRAM        (c-arm#2 and c-arm table);  Surgeon: David Suh MD;  Location: [facility] OR;  Service: Vascular;  Laterality: Left;   • FEMORAL POPLITEAL BYPASS Right 9/17/2021     Procedure: RIGHT common femoral endarterectomy FEMORAL POPLITEAL BYPASS (above the knee polytetrafluoroethylene  lower extremity arteriogram              (c-arm#2 and c-arm table);  Surgeon: David Suh MD;  Location: [facility] OR;  Service: Vascular;  Laterality: Right;   • HYSTERECTOMY     • INTERVENTIONAL RADIOLOGY PROCEDURE Left 9/9/2020    Procedure: IR angiogram extremity left;  Surgeon: David Suh MD;  Location: [facility] ANGIO INVASIVE LOCATION;  Service: Interventional Radiology;  Laterality: Left;   • KYPHOPLASTY N/A 5/23/2019    Procedure: KYPHOPLASTY LUMBAR FOUR;  Surgeon: Aba Santa MD;  Location: [facility] OR;  Service: Orthopedic Spine   • TOTAL SHOULDER REPLACEMENT Left    • TRANSESOPHAGEAL ECHOCARDIOGRAM (JACUQES)  04/07/2016    With color flow-Mild to moderate [facility].LV systolic function well preserved with Ef of 55-60%.Mitral and AV intact.Diastolic dysfunction   • UPPER GASTROINTESTINAL ENDOSCOPY  01/17/2018    Dr. Bin Martin M.D.     PT Assessment (last 12 hours)     PT Evaluation and Treatment     Row Name 11/16/22 0951          Physical Therapy Time and Intention    Subjective Information complains of;pain;dizziness  -TW     Document Type therapy note (daily note)  -TW     Mode of Treatment physical therapy;individual therapy  -TW     Patient Effort adequate  -TW     Row Name 11/16/22 0951          General Information    Patient Profile Reviewed yes  -TW     Patient Observations alert;cooperative;agree to therapy  -TW     Patient/Family/Caregiver Comments/Observations none  -TW     General Observations of Patient Pt sup in bed  -TW     Existing Precautions/Restrictions fall;oxygen therapy device and L/min  -TW     Row Name 11/16/22 0951          Pain    Pretreatment Pain Rating 9/10  -TW     Posttreatment Pain Rating 9/10  -TW     Pain Location - back  -TW     Row Name 11/16/22 0951          Cognition    Affect/Mental Status (Cognition) WFL  -TW      Orientation Status (Cognition) oriented x 4  -TW     Follows Commands (Cognition) WFL  -TW     Cognitive Function WFL  -TW     Personal Safety Interventions fall prevention program maintained;gait belt;nonskid shoes/slippers when out of bed  -TW     Row Name 11/16/22 0951          Bed Mobility    Supine-Sit [place] (Bed Mobility) modified independence  -TW     Sit-Supine [place] (Bed Mobility) not tested  -TW     Assistive Device (Bed Mobility) head of bed elevated  -TW     Row Name 11/16/22 0951          Sit-Stand Transfer    Sit-Stand [place] (Transfers) supervision  -TW     Assistive Device (Sit-Stand Transfers) walker, front-wheeled  -TW     Row Name 11/16/22 0951          Stand-Sit Transfer    Stand-Sit [place] (Transfers) standby assist  -TW     Assistive Device (Stand-Sit Transfers) walker, front-wheeled  -     Row Name 11/16/22 0951          Gait/Stairs (Locomotion)    [place] Level (Gait) contact guard;minimum assist (75% patient effort)  -TW     Assistive Device (Gait) walker, front-wheeled  -     Distance in Feet (Gait) 14ft to recliner  -TW     Comment, (Gait/Stairs) Pt with LOB and severe unsteadiness after 10-12ft of gait requiring min of 1 to assist pt to recliner for remainder of distance.  -     Row Name 11/16/22 0951          Plan of Care Review    Plan of Care Reviewed With patient  -TW     Progress no change  -TW     Outcome Evaluation Pt agreeable to therapy. Pt able to t/f sup to sit with mod ind. Pt with no c/o voiced other than low back pain. Pt stood with CGA and amb with RW and CGA/Min of 1 for 14ft to recliner. Pt with severe LOB after ~ 10-12ft of gait requiring Min of 1 to assist pt to recliner for remainder of distance. Pt very SOA and states she was dizzy but O2 sats were 92% by the time pulseoximeter gave reading. Pt left up in recliner with all needs met and would cont to benefit from therapy upon DC.  -     Row Name 11/16/22 0951          Vital Signs     Pretreatment Heart Rate (beats/min) 62  -TW     Intratreatment Heart Rate (beats/min) 80  -TW     Posttreatment Heart Rate (beats/min) 66  -TW     Pre SpO2 (%) 96  -TW     O2 Delivery Pre Treatment supplemental O2  -TW     Intra SpO2 (%) 90  -TW     O2 Delivery Intra Treatment supplemental O2  -TW     Post SpO2 (%) 98  -TW     O2 Delivery Post Treatment supplemental O2  -TW     Pre Patient Position Supine  -TW     Intra Patient Position Standing  -TW     Post Patient Position Sitting  -TW     Row Name 11/16/22 0951          Positioning and Restraints    Pre-Treatment Position in bed  -TW     Post Treatment Position chair  -TW     In Chair reclined;call light within reach;encouraged to call for assist;exit alarm on;notified nsg  -TW     Row Name 11/16/22 0951          Therapy Assessment/Plan (PT)    Rehab Potential (PT) good, to achieve stated therapy goals  -TW     Row Name 11/16/22 0951          Bed Mobility Goal 1 (PT)    Activity/Assistive Device (Bed Mobility Goal 1, PT) sit to supine/supine to sit  -TW     [place] Level/Cues Needed (Bed Mobility Goal 1, PT) independent  -TW     Time Frame (Bed Mobility Goal 1, PT) by discharge  -TW     Strategies/Barriers (Bed Mobility Goal 1, PT) HOB flat, no bed rails.  -TW     Progress/Outcomes (Bed Mobility Goal 1, PT) goal met   -TW     Row Name 11/16/22 0951          Transfer Goal 1 (PT)    Activity/Assistive Device (Transfer Goal 1, PT) sit-to-stand/stand-to-sit;bed-to-chair/chair-to-bed;walker, rolling  -TW     [place] Level/Cues Needed (Transfer Goal 1, PT) modified independence  -TW     Time Frame (Transfer Goal 1, PT) by discharge  -TW     Strategies/Barriers (Transfers Goal 1, PT) Maintain SpO2 >90%.  -TW     Progress/Outcome (Transfer Goal 1, PT) goal not met  -TW     Row Name 11/16/22 0951          Gait Training Goal 1 (PT)    Activity/Assistive Device (Gait Training Goal 1, PT) walker, rolling  -TW     [place] Level (Gait Training Goal 1, PT)  modified independence  -TW     Distance (Gait Training Goal 1, PT) 30'x2.  -TW     Time Frame (Gait Training Goal 1, PT) by discharge  -TW     Strategies/Barriers (Gait Training Goal 1, PT) Maintain SpO2 >90%.  -TW     Progress/Outcome (Gait Training Goal 1, PT) goal not met  -TW     Row Name 11/16/22 0951          Problem Specific Goal 1 (PT)    Problem Specific Goal 1 (PT) Score 25/28 on Tinetti fall risk assessment.  -TW     Time Frame (Problem Specific Goal 1, PT) by discharge  -TW     Strategies/Barriers (Problem Specific Goal 1, PT) Maintain SpO2 >90%.  -TW     Progress/Outcome (Problem Specific Goal 1, PT) goal not met  -TW           User Key  (r) = Recorded By, (t) = Taken By, (c) = Cosigned By    Initials Name Provider Type     Bassem Lamar, PTA Physical Therapist Assistant                Physical Therapy Education     Title: PT OT SLP Therapies (In Progress)     Topic: Physical Therapy (In Progress)     Point: Mobility training (Done)     Learning Progress Summary           Patient Acceptance, E, VU by  at 11/3/2022 1100    Comment: PT POC, rehab process.                   Point: Home exercise program (Not Started)     Learner Progress:  Not documented in this visit.          Point: Body mechanics (Not Started)     Learner Progress:  Not documented in this visit.          Point: Precautions (Done)     Learning Progress Summary           Patient Acceptance, E, VU by  at 11/15/2022 1441    Comment: [place]etti assessed: 21/28 or moderate fall risk. Encourage use of FWW.                               User Key     Initials Effective Dates Name Provider Type Discipline     09/18/22 -  Hakeem Wolfe, PT Physical Therapist PT              PT Recommendation and Plan  Anticipated Discharge Disposition (PT): skilled nursing facility  Plan of Care Reviewed With: patient  Progress: no change  Outcome Evaluation: Pt agreeable to therapy. Pt able to t/f sup to sit with mod ind. Pt with no c/o voiced other  than low back pain. Pt stood with CGA and amb with RW and CGA/Min of 1 for 14ft to recliner. Pt with severe LOB after ~ 10-12ft of gait requiring Min of 1 to assist pt to recliner for remainder of distance. Pt very SOA and states she was dizzy but O2 sats were 92% by the time pulseoximeter gave reading. Pt left up in recliner with all needs met and would cont to benefit from therapy upon DC.       Time Calculation:    PT Charges     Row Name 11/16/22 1309             Time Calculation    Start Time 0951  -TW      Stop Time 1016  -TW      Time Calculation (min) 25 min  -TW         Time Calculation- PT    Total Timed Code Minutes- PT 25 minute(s)  -TW            User Key  (r) = Recorded By, (t) = Taken By, (c) = Cosigned By    Initials Name Provider Type     Bassem Lamar PTA Physical Therapist Assistant              Therapy Charges for Today     Code Description Service Date Service Provider Modifiers Qty    11536488459 HC GAIT TRAINING EA 15 MIN 11/16/2022 Bassem Lamar PTA GP 1    72017302768 HC PT THERAPEUTIC ACT EA 15 MIN 11/16/2022 Bassem Lamar PTA GP 1          PT G-Codes  Outcome Measure Options: AM-PAC 6 Clicks Basic Mobility (PT), [place]  AM-PAC 6 Clicks Score (PT): 22  AM-PAC 6 Clicks Score (OT): 21  [place] Total Score: 21    Bassem Lamar PTA  11/16/2022

## 2022-11-16 NOTE — NURSING NOTE
Strange odor coming from patient room. Upon inspection it was found that patient was actively smoking a cigarette in room. Pt educated that this facility is a nonsmoking facility about the dangers of smoking around oxygen a very combustible gas security called to confiscated lighter and cigarettes.

## 2022-11-17 VITALS
DIASTOLIC BLOOD PRESSURE: 61 MMHG | BODY MASS INDEX: 24.22 KG/M2 | RESPIRATION RATE: 18 BRPM | TEMPERATURE: 97.5 F | HEART RATE: 68 BPM | HEIGHT: 62 IN | WEIGHT: 131.6 LBS | SYSTOLIC BLOOD PRESSURE: 115 MMHG | OXYGEN SATURATION: 93 %

## 2022-11-17 LAB
ANION GAP SERPL CALCULATED.3IONS-SCNC: 5 MMOL/L (ref 5–15)
BASOPHILS # BLD AUTO: 0.01 10*3/MM3 (ref 0–0.2)
BASOPHILS NFR BLD AUTO: 0.1 % (ref 0–1.5)
BUN SERPL-MCNC: 30 MG/DL (ref 8–23)
BUN/CREAT SERPL: 31.3 (ref 7–25)
CALCIUM SPEC-SCNC: 8.9 MG/DL (ref 8.6–10.5)
CHLORIDE SERPL-SCNC: 97 MMOL/L (ref 98–107)
CO2 SERPL-SCNC: 37 MMOL/L (ref 22–29)
CREAT SERPL-MCNC: 0.96 MG/DL (ref 0.57–1)
DEPRECATED RDW RBC AUTO: 47.6 FL (ref 37–54)
EGFRCR SERPLBLD CKD-EPI 2021: 63.8 ML/MIN/1.73
EOSINOPHIL # BLD AUTO: 0.07 10*3/MM3 (ref 0–0.4)
EOSINOPHIL NFR BLD AUTO: 0.5 % (ref 0.3–6.2)
ERYTHROCYTE [DISTWIDTH] IN BLOOD BY AUTOMATED COUNT: 14.6 % (ref 12.3–15.4)
GLUCOSE SERPL-MCNC: 103 MG/DL (ref 65–99)
HCT VFR BLD AUTO: 32.5 % (ref 34–46.6)
HGB BLD-MCNC: 10.4 G/DL (ref 12–15.9)
IMM GRANULOCYTES # BLD AUTO: 0.13 10*3/MM3 (ref 0–0.05)
IMM GRANULOCYTES NFR BLD AUTO: 0.9 % (ref 0–0.5)
LYMPHOCYTES # BLD AUTO: 3.68 10*3/MM3 (ref 0.7–3.1)
LYMPHOCYTES NFR BLD AUTO: 24.5 % (ref 19.6–45.3)
MCH RBC QN AUTO: 29.4 PG (ref 26.6–33)
MCHC RBC AUTO-ENTMCNC: 32 G/DL (ref 31.5–35.7)
MCV RBC AUTO: 91.8 FL (ref 79–97)
MONOCYTES # BLD AUTO: 1.4 10*3/MM3 (ref 0.1–0.9)
MONOCYTES NFR BLD AUTO: 9.3 % (ref 5–12)
NEUTROPHILS NFR BLD AUTO: 64.7 % (ref 42.7–76)
NEUTROPHILS NFR BLD AUTO: 9.7 10*3/MM3 (ref 1.7–7)
NRBC BLD AUTO-RTO: 0 /100 WBC (ref 0–0.2)
PLATELET # BLD AUTO: 270 10*3/MM3 (ref 140–450)
PMV BLD AUTO: 9.2 FL (ref 6–12)
POTASSIUM SERPL-SCNC: 4.9 MMOL/L (ref 3.5–5.2)
RBC # BLD AUTO: 3.54 10*6/MM3 (ref 3.77–5.28)
SARS-COV-2 RNA RESP QL NAA+PROBE: NOT DETECTED
SODIUM SERPL-SCNC: 139 MMOL/L (ref 136–145)
WBC NRBC COR # BLD: 14.99 10*3/MM3 (ref 3.4–10.8)

## 2022-11-17 PROCEDURE — 94799 UNLISTED PULMONARY SVC/PX: CPT

## 2022-11-17 PROCEDURE — 97535 SELF CARE MNGMENT TRAINING: CPT

## 2022-11-17 PROCEDURE — 97530 THERAPEUTIC ACTIVITIES: CPT

## 2022-11-17 PROCEDURE — 63710000001 PREDNISONE PER 1 MG: Performed by: STUDENT IN AN ORGANIZED HEALTH CARE EDUCATION/TRAINING PROGRAM

## 2022-11-17 PROCEDURE — 87635 SARS-COV-2 COVID-19 AMP PRB: CPT | Performed by: STUDENT IN AN ORGANIZED HEALTH CARE EDUCATION/TRAINING PROGRAM

## 2022-11-17 PROCEDURE — 97116 GAIT TRAINING THERAPY: CPT

## 2022-11-17 PROCEDURE — 80048 BASIC METABOLIC PNL TOTAL CA: CPT | Performed by: INTERNAL MEDICINE

## 2022-11-17 PROCEDURE — 85025 COMPLETE CBC W/AUTO DIFF WBC: CPT | Performed by: INTERNAL MEDICINE

## 2022-11-17 PROCEDURE — 94760 N-INVAS EAR/PLS OXIMETRY 1: CPT

## 2022-11-17 PROCEDURE — 97110 THERAPEUTIC EXERCISES: CPT

## 2022-11-17 RX ORDER — FUROSEMIDE 40 MG/1
40 TABLET ORAL DAILY
Qty: 30 TABLET | Refills: 0 | Status: SHIPPED | OUTPATIENT
Start: 2022-11-18

## 2022-11-17 RX ORDER — NICOTINE 21 MG/24HR
1 PATCH, TRANSDERMAL 24 HOURS TRANSDERMAL
Qty: 28 EACH | Refills: 0 | Status: SHIPPED | OUTPATIENT
Start: 2022-11-18

## 2022-11-17 RX ORDER — PREDNISONE 10 MG/1
TABLET ORAL
Qty: 21 TABLET | Refills: 0 | Status: SHIPPED | OUTPATIENT
Start: 2022-11-17

## 2022-11-17 RX ORDER — AMOXICILLIN 250 MG
2 CAPSULE ORAL 2 TIMES DAILY PRN
Qty: 30 TABLET | Refills: 0 | Status: SHIPPED | OUTPATIENT
Start: 2022-11-17 | End: 2022-11-17 | Stop reason: SDUPTHER

## 2022-11-17 RX ORDER — FUROSEMIDE 40 MG/1
40 TABLET ORAL DAILY
Qty: 30 TABLET | Refills: 0 | Status: SHIPPED | OUTPATIENT
Start: 2022-11-18 | End: 2022-11-17 | Stop reason: SDUPTHER

## 2022-11-17 RX ORDER — AMOXICILLIN 250 MG
2 CAPSULE ORAL 2 TIMES DAILY PRN
Qty: 30 TABLET | Refills: 0 | Status: SHIPPED | OUTPATIENT
Start: 2022-11-17

## 2022-11-17 RX ORDER — PREDNISONE 10 MG/1
TABLET ORAL
Qty: 21 TABLET | Refills: 0 | Status: SHIPPED | OUTPATIENT
Start: 2022-11-17 | End: 2022-11-17 | Stop reason: SDUPTHER

## 2022-11-17 RX ORDER — GABAPENTIN 400 MG/1
400 CAPSULE ORAL EVERY 8 HOURS SCHEDULED
Qty: 9 CAPSULE | Refills: 0 | Status: SHIPPED | OUTPATIENT
Start: 2022-11-17 | End: 2022-11-17 | Stop reason: SDUPTHER

## 2022-11-17 RX ORDER — GABAPENTIN 400 MG/1
400 CAPSULE ORAL EVERY 8 HOURS SCHEDULED
Qty: 9 CAPSULE | Refills: 0 | Status: SHIPPED | OUTPATIENT
Start: 2022-11-17 | End: 2022-11-20

## 2022-11-17 RX ORDER — MIDODRINE HYDROCHLORIDE 5 MG/1
5 TABLET ORAL
Qty: 90 TABLET | Refills: 0 | Status: SHIPPED | OUTPATIENT
Start: 2022-11-17 | End: 2022-11-17 | Stop reason: SDUPTHER

## 2022-11-17 RX ORDER — MIDODRINE HYDROCHLORIDE 5 MG/1
5 TABLET ORAL
Qty: 90 TABLET | Refills: 0 | Status: SHIPPED | OUTPATIENT
Start: 2022-11-17

## 2022-11-17 RX ADMIN — POLYETHYLENE GLYCOL 3350 17 G: 17 POWDER, FOR SOLUTION ORAL at 09:19

## 2022-11-17 RX ADMIN — PREDNISONE 20 MG: 20 TABLET ORAL at 09:19

## 2022-11-17 RX ADMIN — CILOSTAZOL 100 MG: 100 TABLET ORAL at 09:18

## 2022-11-17 RX ADMIN — OXYCODONE HYDROCHLORIDE AND ACETAMINOPHEN 2 TABLET: 5; 325 TABLET ORAL at 12:00

## 2022-11-17 RX ADMIN — FUROSEMIDE 40 MG: 40 TABLET ORAL at 09:18

## 2022-11-17 RX ADMIN — MIDODRINE HYDROCHLORIDE 5 MG: 5 TABLET ORAL at 09:18

## 2022-11-17 RX ADMIN — NICOTINE 1 PATCH: 21 PATCH, EXTENDED RELEASE TRANSDERMAL at 09:20

## 2022-11-17 RX ADMIN — MIDODRINE HYDROCHLORIDE 5 MG: 5 TABLET ORAL at 11:58

## 2022-11-17 RX ADMIN — OXYCODONE HYDROCHLORIDE AND ACETAMINOPHEN 2 TABLET: 5; 325 TABLET ORAL at 05:29

## 2022-11-17 RX ADMIN — Medication 10 ML: at 09:19

## 2022-11-17 RX ADMIN — CETIRIZINE HYDROCHLORIDE 10 MG: 10 TABLET, FILM COATED ORAL at 09:18

## 2022-11-17 RX ADMIN — PANTOPRAZOLE SODIUM 40 MG: 40 TABLET, DELAYED RELEASE ORAL at 09:18

## 2022-11-17 RX ADMIN — IPRATROPIUM BROMIDE AND ALBUTEROL SULFATE 3 ML: 2.5; .5 SOLUTION RESPIRATORY (INHALATION) at 11:14

## 2022-11-17 RX ADMIN — BUDESONIDE AND FORMOTEROL FUMARATE DIHYDRATE 2 PUFF: 160; 4.5 AEROSOL RESPIRATORY (INHALATION) at 07:38

## 2022-11-17 RX ADMIN — IPRATROPIUM BROMIDE AND ALBUTEROL SULFATE 3 ML: 2.5; .5 SOLUTION RESPIRATORY (INHALATION) at 07:30

## 2022-11-17 RX ADMIN — GABAPENTIN 400 MG: 400 CAPSULE ORAL at 05:29

## 2022-11-17 RX ADMIN — GUAIFENESIN 600 MG: 600 TABLET, EXTENDED RELEASE ORAL at 09:18

## 2022-11-17 RX ADMIN — SERTRALINE HYDROCHLORIDE 100 MG: 50 TABLET ORAL at 09:18

## 2022-11-17 RX ADMIN — CLOPIDOGREL BISULFATE 75 MG: 75 TABLET ORAL at 09:19

## 2022-11-17 NOTE — PLAN OF CARE
Goal Outcome Evaluation:  Plan of Care Reviewed With: patient        Progress: improving  Outcome Evaluation: Pt up in recliner and agreeable to therapy. Pt able to stand and amb 14ft in room then 110ft in moura with RW and CGA. Pt desatted to 82% after initial gait trial but was able to maintain good O2 level when cued to PLB throughout second gait. Pt performed B LE ther ex in sitting without c/o voiced and with good saturation throughout. Pt left up in recliner after tx and would cont to benefit from therapy upon DC.

## 2022-11-17 NOTE — PLAN OF CARE
Goal Outcome Evaluation:  Plan of Care Reviewed With: patient        Progress: improving  Outcome Evaluation: OT tx completed. Pt fowlers in bed and agreeable to session. Pt mod I for sup<>sit with HOB elevated. Pt completed toilet t/f to commode with SBA and was supervision for all toileting skills. Pt completed UB andLB bathing while standing at the sink to improve activity tolerance. Pt required s/u for donning/doffing hospital gown while standing. Pt reported she felt dizzy while standing at sink and OT immediately had her sit down. Pulse ox was not getting a read for ~5 minutes, but dizziness subsided in that time frame. After rest break, pt stood up and finished bathing task at sink. Pt HHA for fxn mobility to the chair. Pt educated on energy conservation techniques. 1 goal met. Continue OT POC.

## 2022-11-17 NOTE — DISCHARGE SUMMARY
[facility] Medicine Services  DISCHARGE SUMMARY       Date of Admission: 11/1/2022  Date of Discharge:  11/17/2022  Primary Care Physician: Anahi Camacho APRN    Presenting Problem/History of Present Illness:  Chronic obstructive pulmonary disease with acute exacerbation (HCC) [J44.1]  ST elevation myocardial infarction (STEMI), unspecified artery (HCC) [I21.3]   Final Discharge Diagnoses:  Active Hospital Problems    Diagnosis    • COPD with exacerbation (HCC)        Consults:   Consults     Date and Time Order Name Status Description    11/2/2022  9:56 AM Inpatient Hospitalist Consult            Procedures Performed: Procedure(s):  Left Heart Cath              Pertinent Test Results:   Lab Results (last 24 hours)     Procedure Component Value Units Date/Time    COVID-19,CEPHEID/HUONG,COR/MARIA E/PAD/BERNARDINO/MAD IN-HOUSE(OR EMERGENT/ADD-ON),NP SWAB IN TRANSPORT MEDIA 3-4 HR TAT, RT-PCR - Swab, Nasopharynx [[id number]]  (Normal) Collected: 11/17/22 0931    Specimen: Swab from Nasopharynx Updated: 11/17/22 1009     COVID19 Not Detected    Narrative:      Fact sheet for providers: https://www.fda.gov/media/487608/download     Fact sheet for patients: https://www.fda.gov/media/742805/download  Fact sheet for providers: https://www.fda.gov/media/404847/download    Fact sheet for patients: https://www.fda.gov/media/818673/download    Test performed by PCR.    Basic Metabolic Panel [[id number]]  (Abnormal) Collected: 11/17/22 0541    Specimen: Blood Updated: 11/17/22 0613     Glucose 103 mg/dL      BUN 30 mg/dL      Creatinine 0.96 mg/dL      Sodium 139 mmol/L      Potassium 4.9 mmol/L      Chloride 97 mmol/L      CO2 37.0 mmol/L      Calcium 8.9 mg/dL      BUN/Creatinine Ratio 31.3     Anion Gap 5.0 mmol/L      eGFR 63.8 mL/min/1.73      Comment: National Kidney Foundation and American Society of Nephrology (ASN) Task Force recommended calculation based on the Chronic Kidney Disease Epidemiology Collaboration  (CKD-EPI) equation refit without adjustment for race.       Narrative:      GFR Normal >60  Chronic Kidney Disease <60  Kidney Failure <15      CBC & Differential [[id number]]  (Abnormal) Collected: 11/17/22 0541    Specimen: Blood Updated: 11/17/22 0604    Narrative:      The following orders were created for panel order CBC & Differential.  Procedure                               Abnormality         Status                     ---------                               -----------         ------                     CBC Auto Differential[[id number]]        Abnormal            Final result                 Please view results for these tests on the individual orders.    CBC Auto Differential [[id number]]  (Abnormal) Collected: 11/17/22 0541    Specimen: Blood Updated: 11/17/22 0604     WBC 14.99 10*3/mm3      RBC 3.54 10*6/mm3      Hemoglobin 10.4 g/dL      Hematocrit 32.5 %      MCV 91.8 fL      MCH 29.4 pg      MCHC 32.0 g/dL      RDW 14.6 %      RDW-SD 47.6 fl      MPV 9.2 fL      Platelets 270 10*3/mm3      Neutrophil % 64.7 %      Lymphocyte % 24.5 %      Monocyte % 9.3 %      Eosinophil % 0.5 %      Basophil % 0.1 %      Immature Grans % 0.9 %      Neutrophils, Absolute 9.70 10*3/mm3      Lymphocytes, Absolute 3.68 10*3/mm3      Monocytes, Absolute 1.40 10*3/mm3      Eosinophils, Absolute 0.07 10*3/mm3      Basophils, Absolute 0.01 10*3/mm3      Immature Grans, Absolute 0.13 10*3/mm3      nRBC 0.0 /100 WBC         Imaging Results (Last 24 Hours)     ** No results found for the last 24 hours. **        Chief Complaint on Day of Discharge: No complaints    Hospital Course:  This is a 72-year-old female with past medical history of COPD that presented to [facility] on 11/1/2022 with COPD exacerbation with acute on chronic respiratory failure.  The patient was accepted to [facility] for therapy.  The patient was continued on a oral steroid taper.     Condition on Discharge:  Stable.  "    Physical Exam on Discharge:  /61 (BP Location: Left leg, Patient Position: Lying)   Pulse 68   Temp 97.5 °F (36.4 °C) (Temporal)   Resp 18   Ht 157.5 cm (62\")   Wt 59.7 kg (131 lb 9.6 oz)   SpO2 93%   BMI 24.07 kg/m²   Physical Exam  Constitutional:       Appearance: She is well-developed.   HENT:      Head: Normocephalic and atraumatic.   Eyes:      Pupils: Pupils are equal, round, and reactive to light.   Cardiovascular:      Rate and Rhythm: Normal rate and regular rhythm.   Pulmonary:      Effort: Pulmonary effort is normal.      Breath sounds: Normal breath sounds.   Abdominal:      General: Bowel sounds are normal.      Palpations: Abdomen is soft.   Musculoskeletal:         General: Normal range of motion.      Cervical back: Normal range of motion and neck supple.   Skin:     General: Skin is warm and dry.   Neurological:      Mental Status: She is alert and oriented to person, place, and time.   Psychiatric:         Behavior: Behavior normal.       Discharge Disposition:  Skilled Nursing Facility (DC - External)    Discharge Medications:     Discharge Medications      New Medications      Instructions Start Date   gabapentin 400 MG capsule  Commonly known as: NEURONTIN  Replaces: gabapentin 800 MG tablet   400 mg, Oral, Every 8 Hours Scheduled      nicotine 21 MG/24HR patch  Commonly known as: NICODERM CQ   Place 1 patch on the skin as directed by provider Daily **Remove old patch before applying new patch**   Start Date: November 18, 2022     predniSONE 10 MG (21) dose pack  Commonly known as: DELTASONE   Take as directed on package with food.      sennosides-docusate 8.6-50 MG per tablet  Commonly known as: PERICOLACE   2 tablets, Oral, 2 Times Daily PRN         Changes to Medications      Instructions Start Date   furosemide 40 MG tablet  Commonly known as: LASIX  What changed:   · medication strength  · how much to take   40 mg, Oral, Daily   Start Date: November 18, 2022     midodrine " 5 MG tablet  Commonly known as: PROAMATINE  What changed:   · medication strength  · how much to take   5 mg, Oral, 3 Times Daily Before Meals         Continue These Medications      Instructions Start Date   albuterol (2.5 MG/3ML) 0.083% nebulizer solution  Commonly known as: PROVENTIL   2.5 mg, Nebulization, Every 4 Hours PRN      ALBUTEROL SULFATE HFA IN   2 puffs, Inhalation, Every 4 Hours PRN      amitriptyline 25 MG tablet  Commonly known as: ELAVIL   25 mg, Oral, Nightly      budesonide-formoterol 160-4.5 MCG/ACT inhaler  Commonly known as: SYMBICORT   2 puffs, Inhalation, 2 Times Daily PRN      cilostazol 100 MG tablet  Commonly known as: PLETAL   100 mg, Oral, 2 Times Daily Before Meals      clopidogrel 75 MG tablet  Commonly known as: PLAVIX   75 mg, Oral, Daily      O2  Commonly known as: OXYGEN   4 L/min, Inhalation, As Needed      ondansetron 4 MG tablet  Commonly known as: ZOFRAN   4 mg, Oral, Every 8 Hours PRN      pantoprazole 40 MG EC tablet  Commonly known as: Protonix   40 mg, Oral, Daily      penciclovir 1 % cream  Commonly known as: DENAVIR   1 application, Topical, As Needed      pravastatin 20 MG tablet  Commonly known as: PRAVACHOL   20 mg, Oral, Nightly      rivaroxaban 20 MG tablet  Commonly known as: Xarelto   20 mg, Oral, Daily With Dinner      sertraline 100 MG tablet  Commonly known as: ZOLOFT   100 mg, Oral, Daily      traZODone 150 MG tablet  Commonly known as: DESYREL   150 mg, Oral, Nightly      vitamin D 1.25 MG (03094 UT) capsule capsule  Commonly known as: ERGOCALCIFEROL   50,000 Units, Oral, Weekly         Stop These Medications    gabapentin 800 MG tablet  Commonly known as: NEURONTIN  Replaced by: gabapentin 400 MG capsule            Discharge Diet: diabetic/cardiac    Activity at Discharge: As tolerated    Discharge Care Plan/Instructions: As above.   Follow-up Appointment:   Follow-up Information     Anahi Camacho APRN .    Specialty: Nurse Practitioner  Why: PATIENT  DISCHARGED TO SKILLED NURSING FACILITY  Contact information:  [address] [facility] [address]  [phone]                         This document has been electronically signed by DELFINO Brewer on November 17, 2022 14:38 CST        Time: Greater than 30 minutes.

## 2022-11-17 NOTE — THERAPY TREATMENT NOTE
Patient Name: Mona Perales  : 1950    MRN: [id number]                              Today's Date: 2022       Admit Date: 2022    Visit Dx:     ICD-10-CM ICD-9-CM   1. ST elevation myocardial infarction (STEMI), unspecified artery ([facility])  I21.3 410.90   2. Chronic obstructive pulmonary disease with acute exacerbation ([facility])  J44.1 491.21   3. Impaired functional mobility, balance, gait, and endurance  Z74.09 V49.89   4. Impaired mobility and ADLs  Z74.09 V49.89    Z78.9      Patient Active Problem List   Diagnosis   • Anxiety   • CAD (coronary atherosclerotic disease)   • Carotid stenosis   • COPD (chronic obstructive pulmonary disease) ([facility])   • COPD with exacerbation ([facility])   • Depressive disorder, not elsewhere classified   • Benign essential hypertension   • Hypercholesteremia   • Insomnia   • Notalgia   • Osteoporosis   • Other screening mammogram   • RUQ abdominal pain   • PVD (peripheral vascular disease) with claudication ([facility])   • Nicotine abuse   • Dysphagia   • Epigastric pain   • Pain of right hand   • Closed displaced fracture of shaft of fifth metacarpal bone of right hand   • Cause of injury, fall   • Displaced fracture of shaft of fifth metacarpal bone of right hand with routine healing   • Syncope   • Acute respiratory failure with hypoxia ([facility])   • (HFpEF) heart failure with preserved ejection fraction ([facility])   • Hypotension   • Elevated WBCs   • Near syncope   • Atherosclerosis of native coronary artery of native heart without angina pectoris   • Atherosclerosis of native arteries of extremities with rest pain, left leg ([facility])   • PVD (peripheral vascular disease) ([facility])   • Physical deconditioning   • Pain due to onychomycosis of toenails of both feet   • ST elevation myocardial infarction (STEMI), unspecified artery ([facility])     Past Medical History:   Diagnosis Date   • A-fib ([facility])    • Anxiety    • Arthritis    • Asthma    • 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  with ulceration ([facility])     bilateral legs - bilateral iliac stents 2008      • CHF (congestive heart failure) ([facility])    • Chronic lower back pain    • COPD (chronic obstructive pulmonary disease) ([facility])    • Essential (primary) hypertension    • GERD (gastroesophageal reflux disease)    • History of pulmonary embolus (PE)    • Hyperlipidemia    • Insomnia    • Lumbago    • Nicotine dependence    • Occlusion of artery      and stenosis of bilateral carotid arteries - BABS 16-49%, LICA 0-15%   • Other atherosclerosis of native artery of other extremity     LEFT subclavian stent 2005 (occluded)   • Sleep apnea      Past Surgical History:   Procedure Laterality Date   • CARDIAC CATHETERIZATION  04/06/2016    No evidence of any obstructive epicardial CAD.Preserved LV systolic function with EF of 55%.   • CARDIAC CATHETERIZATION N/A 11/1/2022    Procedure: Left Heart Cath;  Surgeon: Neftali Haywood MD;  Location: [facility] CATH INVASIVE LOCATION;  Service: Cardiology;  Laterality: N/A;   • CENTRAL VENOUS LINE INSERTION  04/07/2016    Successful placement of right uppe extremity 6-[place] triple lumen PICC line.   • ENDOSCOPY N/A 1/12/2018    Procedure: ESOPHAGOGASTRODUODENOSCOPY;  Surgeon: Bin Oh MD;  Location: [facility] ENDOSCOPY;  Service:    • ENDOSCOPY N/A 1/17/2018    Procedure: ESOPHAGOGASTRODUODENOSCOPY;  Surgeon: Bin Oh MD;  Location: [facility] ENDOSCOPY;  Service:    • ENDOSCOPY N/A 3/16/2018    Procedure: ESOPHAGOGASTRODUODENOSCOPY possible dilation;  Surgeon: Bin Oh MD;  Location: [facility] ENDOSCOPY;  Service: Gastroenterology   • FEMORAL POPLITEAL BYPASS Left 4/14/2020    Procedure: FEMORAL POPLITEAL BYPASS ABOVE KNEE  (POLYTETRAFLUOROETHYLENE)  LEFT COMMON FEMORAL ARTERY ENDARTERECTOMY PTA LEFT ILIAC ARTERIOGRAM        (c-arm#2 and c-arm table);  Surgeon: David Suh MD;  Location: [facility] OR;  Service: Vascular;  Laterality: Left;   • FEMORAL POPLITEAL BYPASS Right 9/17/2021     Procedure: RIGHT common femoral endarterectomy FEMORAL POPLITEAL BYPASS (above the knee polytetrafluoroethylene  lower extremity arteriogram              (c-arm#2 and c-arm table);  Surgeon: David Suh MD;  Location: [facility] OR;  Service: Vascular;  Laterality: Right;   • HYSTERECTOMY     • INTERVENTIONAL RADIOLOGY PROCEDURE Left 9/9/2020    Procedure: IR angiogram extremity left;  Surgeon: David Suh MD;  Location: [facility] ANGIO INVASIVE LOCATION;  Service: Interventional Radiology;  Laterality: Left;   • KYPHOPLASTY N/A 5/23/2019    Procedure: KYPHOPLASTY LUMBAR FOUR;  Surgeon: Aba Santa MD;  Location: [facility] OR;  Service: Orthopedic Spine   • TOTAL SHOULDER REPLACEMENT Left    • TRANSESOPHAGEAL ECHOCARDIOGRAM ([facility]QU[facility])  04/07/2016    With color flow-Mild to moderate [facility].LV systolic function well preserved with Ef of 55-60%.Mitral and AV intact.Diastolic dysfunction   • UPPER GASTROINTESTINAL ENDOSCOPY  01/17/2018    Dr. Bin Martin M.D.      General Information     Row Name 11/17/22 0840          OT Time and Intention    Document Type therapy note (daily note)  -     Mode of Treatment occupational therapy;individual therapy  -     Row Name 11/17/22 0840          General Information    Patient Profile Reviewed yes  -     Existing Precautions/Restrictions fall;oxygen therapy device and L/min  -     Row Name 11/17/22 0840          Cognition    Orientation Status (Cognition) oriented x 4  -     Row Name 11/17/22 0840          Safety Issues, Functional Mobility    Impairments Affecting Function (Mobility) strength;endurance/activity tolerance  -           User Key  (r) = Recorded By, (t) = Taken By, (c) = Cosigned By    Initials Name Provider Type    Leann Love OT Occupational Therapist                 Mobility/ADL's     Row Name 11/17/22 0840          Bed Mobility    Bed Mobility supine-sit  -     Supine-Sit [place] (Bed Mobility) modified  independence  -     Assistive Device (Bed Mobility) head of bed elevated  -     Row Name 11/17/22 0840          Transfers    Transfers sit-stand transfer;toilet transfer  -     Row Name 11/17/22 0840          Sit-Stand Transfer    Sit-Stand [place] (Transfers) standby assist  -     Row Name 11/17/22 0840          Toilet Transfer    Type (Toilet Transfer) stand-sit  -     [place] Level (Toilet Transfer) standby assist  -     Assistive Device (Toilet Transfer) commode  -     Row Name 11/17/22 0840          Activities of Daily Living    BADL Assessment/Intervention upper body dressing;toileting;bathing  -     Row Name 11/17/22 0840          Bathing Assessment/Intervention    [place] Level (Bathing) lower body;upper body;upper extremities;chest/trunk;distal lower extremities/feet;proximal lower extremities;perineal area;standby assist  -     Position (Bathing) sink side  -     Row Name 11/17/22 0840          Upper Body Dressing Assessment/Training    [place] Level (Upper Body Dressing) upper body dressing skills;doff;don;other (see comments);set up  -     Position (Upper Body Dressing) supported standing  -     Row Name 11/17/22 0840          Toileting Assessment/Training    [place] Level (Toileting) toileting skills;supervision  -     Position (Toileting) unsupported sitting  -           User Key  (r) = Recorded By, (t) = Taken By, (c) = Cosigned By    Initials Name Provider Type    Leann Love OT Occupational Therapist               Obj/Interventions    No documentation.                Goals/Plan     Row Name 11/17/22 0840          Transfer Goal 1 (OT)    Activity/Assistive Device (Transfer Goal 1, OT) transfers, all  -     [place] Level/Cues Needed (Transfer Goal 1, OT) modified independence  -     Time Frame (Transfer Goal 1, OT) long term goal (LTG);by discharge  -     Progress/Outcome (Transfer Goal 1, OT) goal not met  -     Row Name  11/17/22 0840          Bathing Goal 1 (OT)    Activity/Device (Bathing Goal 1, OT) bathing skills, all  -     [place] Level/Cues Needed (Bathing Goal 1, OT) independent  -MC     Time Frame (Bathing Goal 1, OT) long term goal (LTG);by discharge  -     Progress/Outcomes (Bathing Goal 1, OT) goal not met  -     Row Name 11/17/22 0840          Dressing Goal 1 (OT)    Activity/Device (Dressing Goal 1, OT) dressing skills, all  -MC     [place]/Cues Needed (Dressing Goal 1, OT) independent  -MC     Time Frame (Dressing Goal 1, OT) long term goal (LTG);by discharge  -     Progress/Outcome (Dressing Goal 1, OT) goal not met  -     Row Name 11/17/22 0840          Toileting Goal 1 (OT)    Activity/Device (Toileting Goal 1, OT) toileting skills, all  -     [place] Level/Cues Needed (Toileting Goal 1, OT) independent  -     Time Frame (Toileting Goal 1, OT) long term goal (LTG);by discharge  -     Progress/Outcome (Toileting Goal 1, OT) goal not met  -     Row Name 11/17/22 0840          Problem Specific Goal 1 (OT)    Problem Specific Goal 1 (OT) Patient will Independently manage O2 line during each treatment session to increase safety when completing transfers and functional mobility.  -     Time Frame (Problem Specific Goal 1, OT) long term goal (LTG);by discharge  -     Progress/Outcome (Problem Specific Goal 1, OT) goal met  -           User Key  (r) = Recorded By, (t) = Taken By, (c) = Cosigned By    Initials Name Provider Type    Leann Love, OT Occupational Therapist               Clinical Impression     Row Name 11/17/22 0840          Pain Assessment    Pre/Posttreatment Pain Comment No pain reported  -     Row Name 11/17/22 0840          Plan of Care Review    Plan of Care Reviewed With patient  -     Progress improving  -     Outcome Evaluation OT tx completed. Pt fowlers in bed and agreeable to session. Pt mod I for sup<>sit with HOB elevated. Pt completed toilet  t/f to commode with SBA and was supervision for all toileting skills. Pt completed UB andLB bathing while standing at the sink to improve activity tolerance. Pt required s/u for donning/doffing hospital gown while standing. Pt reported she felt dizzy while standing at sink and OT immediately had her sit down. Pulse ox was not getting a read for ~5 minutes, but dizziness subsided in that time frame. After rest break, pt stood up and finished bathing task at sink. Pt HHA for fxn mobility to the chair. Pt educated on energy conservation techniques. 1 goal met. Continue OT POC.  -     Row Name 11/17/22 0840          Therapy Assessment/Plan (OT)    Rehab Potential (OT) good, to achieve stated therapy goals  -     Criteria for Skilled Therapeutic Interventions Met (OT) yes;meets criteria;skilled treatment is necessary  -     Therapy Frequency (OT) other (see comments)  5-7 d/wk  -     Row Name 11/17/22 0840          Therapy Plan Review/Discharge Plan (OT)    Anticipated Discharge Disposition (OT) home with assist  -     Row Name 11/17/22 0840          Vital Signs    Pre SpO2 (%) 93  -MC     O2 Delivery Pre Treatment supplemental O2  -     O2 Delivery Intra Treatment supplemental O2  -MC     Post SpO2 (%) 94  -MC     O2 Delivery Post Treatment supplemental O2  -     Row Name 11/17/22 0840          Positioning and Restraints    Pre-Treatment Position in bed  -     Post Treatment Position chair  -MC     In Chair notified nsg;reclined;call light within reach;encouraged to call for assist;exit alarm on  -           User Key  (r) = Recorded By, (t) = Taken By, (c) = Cosigned By    Initials Name Provider Type    Leann Love, OT Occupational Therapist               Outcome Measures     Row Name 11/17/22 0840          How much help from another is currently needed...    Putting on and taking off regular lower body clothing? 3  -MC     Bathing (including washing, rinsing, and drying) 3  -MC     Toileting  (which includes using toilet bed pan or urinal) 4  -MC     Putting on and taking off regular upper body clothing 3  -MC     Taking care of personal grooming (such as brushing teeth) 4  -MC     Eating meals 4  -     AM-PAC 6 Clicks Score (OT) 21  -     Row Name 11/17/22 0840          Functional Assessment    Outcome Measure Options AM-PAC 6 Clicks Daily Activity (OT)  -           User Key  (r) = Recorded By, (t) = Taken By, (c) = Cosigned By    Initials Name Provider Type    Leann Love OT Occupational Therapist                Occupational Therapy Education     Title: PT OT SLP Therapies (In Progress)     Topic: Occupational Therapy (In Progress)     Point: ADL training (Done)     Description:   Instruct learner(s) on proper safety adaptation and remediation techniques during self care or transfers.   Instruct in proper use of assistive devices.              Learning Progress Summary           Patient Acceptance, E,TB, VU by  at 11/17/2022 0923    Comment: Pt educated on energy conservation    Acceptance, E,TB,D,H, VU by AMANDA at 11/16/2022 0912    Comment: Pt was educated on EC and AE.    Acceptance, E,TB, VU by SA at 11/15/2022 0856    Comment: OT POC, Role of OT, transfer training                   Point: Home exercise program (Not Started)     Description:   Instruct learner(s) on appropriate technique for monitoring, assisting and/or progressing therapeutic exercises/activities.              Learner Progress:  Not documented in this visit.          Point: Precautions (Done)     Description:   Instruct learner(s) on prescribed precautions during self-care and functional transfers.              Learning Progress Summary           Patient Acceptance, E,TB, VU by  at 11/17/2022 0923    Comment: Pt educated on energy conservation    Acceptance, E,TB,D,H, VU by AMANDA at 11/16/2022 0912    Comment: Pt was educated on EC and AE.    Acceptance, E,TB, VU by SA at 11/15/2022 0856    Comment: OT POC, Role of  OT, transfer training                   Point: Body mechanics (Done)     Description:   Instruct learner(s) on proper positioning and spine alignment during self-care, functional mobility activities and/or exercises.              Learning Progress Summary           Patient Acceptance, E,TB, VU by  at 11/17/2022 0923    Comment: Pt educated on energy conservation    Acceptance, E,TB,D,H, VU by  at 11/16/2022 0912    Comment: Pt was educated on EC and AE.    Acceptance, E,TB, VU by  at 11/15/2022 0856    Comment: OT POC, Role of OT, transfer training                               User Key     Initials Effective Dates Name Provider Type Discipline     06/16/21 -  Aurora Chew COTA Occupational Therapist Assistant OT     10/19/22 -  Leann Hinds, OT Occupational Therapist OT     08/11/22 -  Sarah Bernstein OT Occupational Therapist OT              OT Recommendation and Plan  Therapy Frequency (OT): other (see comments) (5-7 d/wk)  Plan of Care Review  Plan of Care Reviewed With: patient  Progress: improving  Outcome Evaluation: OT tx completed. Pt fowlers in bed and agreeable to session. Pt mod I for sup<>sit with HOB elevated. Pt completed toilet t/f to commode with SBA and was supervision for all toileting skills. Pt completed UB andLB bathing while standing at the sink to improve activity tolerance. Pt required s/u for donning/doffing hospital gown while standing. Pt reported she felt dizzy while standing at sink and OT immediately had her sit down. Pulse ox was not getting a read for ~5 minutes, but dizziness subsided in that time frame. After rest break, pt stood up and finished bathing task at sink. Pt HHA for fxn mobility to the chair. Pt educated on energy conservation techniques. 1 goal met. Continue OT POC.     Time Calculation:    Time Calculation- OT     Row Name 11/17/22 0840             Time Calculation- OT    OT Start Time 0840  -      OT Stop Time 0908  -      OT Time  Calculation (min) 28 min  -      Total Timed Code Minutes- OT 28 minute(s)  -      OT Received On 11/17/22  -         Timed Charges    75869 - OT Therapeutic Activity Minutes 14  -      32209 - OT Self Care/Mgmt Minutes 14  -         Total Minutes    Timed Charges Total Minutes 28  -       Total Minutes 28  -            User Key  (r) = Recorded By, (t) = Taken By, (c) = Cosigned By    Initials Name Provider Type    Leann Love OT Occupational Therapist              Therapy Charges for Today     Code Description Service Date Service Provider Modifiers Qty    [id number]  OT THERAPEUTIC ACT EA 15 MIN 11/17/2022 Leann Hinds OT GO 1    [id number]  OT SELF CARE/MGMT/TRAIN EA 15 MIN 11/17/2022 Leann Hinds OT GO 1               Leann Hinds OT  11/17/2022

## 2022-11-18 ENCOUNTER — DOCUMENTATION (OUTPATIENT)
Dept: CARDIAC REHAB | Facility: HOSPITAL | Age: 72
End: 2022-11-18

## 2022-11-18 NOTE — PAYOR COMM NOTE
"[facility]  Case Managment Extender   Herminia Red  P) [phone]  (F) [phone]      Auth# [id number]  Filomena Perales (72 y.o. Female)     Date of Birth   1950    Social Security Number       Address   [address]  [place] [address]    Home Phone   [phone]    MRN   [id number]       Restoration   Protestant    Marital Status                               Admission Date   11/1/22    Admission Type   Emergency    Admitting Provider       Attending Provider       Department, Room/Bed   [facility] [address] [place], 321/1       Discharge Date   11/17/2022    Discharge Disposition   Skilled Nursing Facility (DC - External)    Discharge Destination                               Attending Provider: (none)   Allergies: Morphine And Related, Penicillins    Isolation: None   Infection: None   Code Status: Prior    Ht: 157.5 cm (62\")   Wt: 59.7 kg (131 lb 9.6 oz)    Admission Cmt: None   Principal Problem: None                Active Insurance as of 11/1/2022     Primary Coverage     Payor Plan Insurance Group Employer/Plan Group    [facility] MEDICARE REPLACEMENT [employer] MEDICARE REPLACEMENT [id number]     Payor Plan Address Payor Plan Phone Number Payor Plan Fax Number Effective Dates    [address] [address] [phone]  5/1/2021 - None Entered    [facility] [address]       Subscriber Name Subscriber Birth Date Member ID       FILOMENA PERALES 1950 [id number]                 Emergency Contacts      (Rel.) Home Phone Work Phone Mobile Phone    Renee Li (Grandchild) -- -- [phone]    Meena Marie (Friend) -- -- [phone]               Discharge Summary      Vida Conrad APRN at 11/17/22 1438     Attestation signed by Val Perdomo MD at 11/17/22 5166    I have reviewed this documentation and agree.                      [facility] Medicine Services  DISCHARGE SUMMARY       Date of Admission: " 11/1/2022  Date of Discharge:  11/17/2022  Primary Care Physician: Anahi Camacho APRN    Presenting Problem/History of Present Illness:  Chronic obstructive pulmonary disease with acute exacerbation (HCC) [J44.1]  ST elevation myocardial infarction (STEMI), unspecified artery (HCC) [I21.3]   Final Discharge Diagnoses:  Active Hospital Problems    Diagnosis    • COPD with exacerbation (HCC)        Consults:   Consults     Date and Time Order Name Status Description    11/2/2022  9:56 AM Inpatient Hospitalist Consult            Procedures Performed: Procedure(s):  Left Heart Cath              Pertinent Test Results:   Lab Results (last 24 hours)     Procedure Component Value Units Date/Time    COVID-19,CEPHEID/HUONG,COR/MARIA E/PAD/BERNARDINO/MAD IN-HOUSE(OR EMERGENT/ADD-ON),NP SWAB IN TRANSPORT MEDIA 3-4 HR TAT, RT-PCR - Swab, Nasopharynx [[id number]]  (Normal) Collected: 11/17/22 0931    Specimen: Swab from Nasopharynx Updated: 11/17/22 1009     COVID19 Not Detected    Narrative:      Fact sheet for providers: https://www.fda.gov/media/735215/download     Fact sheet for patients: https://www.fda.gov/media/164923/download  Fact sheet for providers: https://www.fda.gov/media/674479/download    Fact sheet for patients: https://www.fda.gov/media/862005/download    Test performed by PCR.    Basic Metabolic Panel [[id number]]  (Abnormal) Collected: 11/17/22 0541    Specimen: Blood Updated: 11/17/22 0613     Glucose 103 mg/dL      BUN 30 mg/dL      Creatinine 0.96 mg/dL      Sodium 139 mmol/L      Potassium 4.9 mmol/L      Chloride 97 mmol/L      CO2 37.0 mmol/L      Calcium 8.9 mg/dL      BUN/Creatinine Ratio 31.3     Anion Gap 5.0 mmol/L      eGFR 63.8 mL/min/1.73      Comment: National Kidney Foundation and American Society of Nephrology (ASN) Task Force recommended calculation based on the Chronic Kidney Disease Epidemiology Collaboration (CKD-EPI) equation refit without adjustment for race.       Narrative:      GFR Normal  >60  Chronic Kidney Disease <60  Kidney Failure <15      CBC & Differential [[id number]]  (Abnormal) Collected: 11/17/22 0541    Specimen: Blood Updated: 11/17/22 0604    Narrative:      The following orders were created for panel order CBC & Differential.  Procedure                               Abnormality         Status                     ---------                               -----------         ------                     CBC Auto Differential[[id number]]        Abnormal            Final result                 Please view results for these tests on the individual orders.    CBC Auto Differential [[id number]]  (Abnormal) Collected: 11/17/22 0541    Specimen: Blood Updated: 11/17/22 0604     WBC 14.99 10*3/mm3      RBC 3.54 10*6/mm3      Hemoglobin 10.4 g/dL      Hematocrit 32.5 %      MCV 91.8 fL      MCH 29.4 pg      MCHC 32.0 g/dL      RDW 14.6 %      RDW-SD 47.6 fl      MPV 9.2 fL      Platelets 270 10*3/mm3      Neutrophil % 64.7 %      Lymphocyte % 24.5 %      Monocyte % 9.3 %      Eosinophil % 0.5 %      Basophil % 0.1 %      Immature Grans % 0.9 %      Neutrophils, Absolute 9.70 10*3/mm3      Lymphocytes, Absolute 3.68 10*3/mm3      Monocytes, Absolute 1.40 10*3/mm3      Eosinophils, Absolute 0.07 10*3/mm3      Basophils, Absolute 0.01 10*3/mm3      Immature Grans, Absolute 0.13 10*3/mm3      nRBC 0.0 /100 WBC         Imaging Results (Last 24 Hours)     ** No results found for the last 24 hours. **        Chief Complaint on Day of Discharge: No complaints    Hospital Course:  This is a 72-year-old female with past medical history of COPD that presented to [facility] on 11/1/2022 with COPD exacerbation with acute on chronic respiratory failure.  The patient was accepted to [facility] for therapy.  The patient was continued on a oral steroid taper.     Condition on Discharge:  Stable.     Physical Exam on Discharge:  /61 (BP Location: Left leg, Patient Position: Lying)    "Pulse 68   Temp 97.5 °F (36.4 °C) (Temporal)   Resp 18   Ht 157.5 cm (62\")   Wt 59.7 kg (131 lb 9.6 oz)   SpO2 93%   BMI 24.07 kg/m²   Physical Exam  Constitutional:       Appearance: She is well-developed.   HENT:      Head: Normocephalic and atraumatic.   Eyes:      Pupils: Pupils are equal, round, and reactive to light.   Cardiovascular:      Rate and Rhythm: Normal rate and regular rhythm.   Pulmonary:      Effort: Pulmonary effort is normal.      Breath sounds: Normal breath sounds.   Abdominal:      General: Bowel sounds are normal.      Palpations: Abdomen is soft.   Musculoskeletal:         General: Normal range of motion.      Cervical back: Normal range of motion and neck supple.   Skin:     General: Skin is warm and dry.   Neurological:      Mental Status: She is alert and oriented to person, place, and time.   Psychiatric:         Behavior: Behavior normal.       Discharge Disposition:  Skilled Nursing Facility (DC - External)    Discharge Medications:     Discharge Medications      New Medications      Instructions Start Date   gabapentin 400 MG capsule  Commonly known as: NEURONTIN  Replaces: gabapentin 800 MG tablet   400 mg, Oral, Every 8 Hours Scheduled      nicotine 21 MG/24HR patch  Commonly known as: NICODERM CQ   Place 1 patch on the skin as directed by provider Daily **Remove old patch before applying new patch**   Start Date: November 18, 2022     predniSONE 10 MG (21) dose pack  Commonly known as: DELTASONE   Take as directed on package with food.      sennosides-docusate 8.6-50 MG per tablet  Commonly known as: PERICOLACE   2 tablets, Oral, 2 Times Daily PRN         Changes to Medications      Instructions Start Date   furosemide 40 MG tablet  Commonly known as: LASIX  What changed:   · medication strength  · how much to take   40 mg, Oral, Daily   Start Date: November 18, 2022     midodrine 5 MG tablet  Commonly known as: PROAMATINE  What changed:   · medication strength  · how much " to take   5 mg, Oral, 3 Times Daily Before Meals         Continue These Medications      Instructions Start Date   albuterol (2.5 MG/3ML) 0.083% nebulizer solution  Commonly known as: PROVENTIL   2.5 mg, Nebulization, Every 4 Hours PRN      ALBUTEROL SULFATE HFA IN   2 puffs, Inhalation, Every 4 Hours PRN      amitriptyline 25 MG tablet  Commonly known as: ELAVIL   25 mg, Oral, Nightly      budesonide-formoterol 160-4.5 MCG/ACT inhaler  Commonly known as: SYMBICORT   2 puffs, Inhalation, 2 Times Daily PRN      cilostazol 100 MG tablet  Commonly known as: PLETAL   100 mg, Oral, 2 Times Daily Before Meals      clopidogrel 75 MG tablet  Commonly known as: PLAVIX   75 mg, Oral, Daily      O2  Commonly known as: OXYGEN   4 L/min, Inhalation, As Needed      ondansetron 4 MG tablet  Commonly known as: ZOFRAN   4 mg, Oral, Every 8 Hours PRN      pantoprazole 40 MG EC tablet  Commonly known as: Protonix   40 mg, Oral, Daily      penciclovir 1 % cream  Commonly known as: DENAVIR   1 application, Topical, As Needed      pravastatin 20 MG tablet  Commonly known as: PRAVACHOL   20 mg, Oral, Nightly      rivaroxaban 20 MG tablet  Commonly known as: Xarelto   20 mg, Oral, Daily With Dinner      sertraline 100 MG tablet  Commonly known as: ZOLOFT   100 mg, Oral, Daily      traZODone 150 MG tablet  Commonly known as: DESYREL   150 mg, Oral, Nightly      vitamin D 1.25 MG (70118 UT) capsule capsule  Commonly known as: ERGOCALCIFEROL   50,000 Units, Oral, Weekly         Stop These Medications    gabapentin 800 MG tablet  Commonly known as: NEURONTIN  Replaced by: gabapentin 400 MG capsule            Discharge Diet: diabetic/cardiac    Activity at Discharge: As tolerated    Discharge Care Plan/Instructions: As above.   Follow-up Appointment:   Follow-up Information     Anahi Camacho APRN .    Specialty: Nurse Practitioner  Why: PATIENT DISCHARGED TO SKILLED NURSING FACILITY  Contact information:  [address] [facility]  [address]  [phone]                         This document has been electronically signed by DELFINO Brewer on November 17, 2022 14:38 CST        Time: Greater than 30 minutes.                 Electronically signed by Val Perdomo MD at 11/17/22 3899

## 2023-05-01 ENCOUNTER — TELEPHONE (OUTPATIENT)
Dept: FAMILY MEDICINE CLINIC | Facility: CLINIC | Age: 73
End: 2023-05-01
Payer: MEDICARE

## 2023-05-01 NOTE — TELEPHONE ENCOUNTER
Yulisa with [facility] has called asking for Dr Anderson to refill PERCOCET 5-325 mg per 2 tablet Q6H PRN, to [facility] for this patient.  If you need to speak with her call [phone]. thx  Bernice

## 2023-05-09 ENCOUNTER — APPOINTMENT (OUTPATIENT)
Dept: GENERAL RADIOLOGY | Facility: HOSPITAL | Age: 73
DRG: 291 | End: 2023-05-09
Payer: MEDICARE

## 2023-05-09 ENCOUNTER — HOSPITAL ENCOUNTER (INPATIENT)
Facility: HOSPITAL | Age: 73
LOS: 5 days | Discharge: NURSING, REHAB OR LONG-TERM CARE | DRG: 291 | End: 2023-05-15
Attending: STUDENT IN AN ORGANIZED HEALTH CARE EDUCATION/TRAINING PROGRAM | Admitting: STUDENT IN AN ORGANIZED HEALTH CARE EDUCATION/TRAINING PROGRAM
Payer: MEDICARE

## 2023-05-09 DIAGNOSIS — I50.9 ACUTE ON CHRONIC CONGESTIVE HEART FAILURE, UNSPECIFIED HEART FAILURE TYPE: ICD-10-CM

## 2023-05-09 DIAGNOSIS — M79.674 PAIN DUE TO ONYCHOMYCOSIS OF TOENAILS OF BOTH FEET: ICD-10-CM

## 2023-05-09 DIAGNOSIS — B35.1 PAIN DUE TO ONYCHOMYCOSIS OF TOENAILS OF BOTH FEET: ICD-10-CM

## 2023-05-09 DIAGNOSIS — N17.9 AKI (ACUTE KIDNEY INJURY): ICD-10-CM

## 2023-05-09 DIAGNOSIS — I25.10 ATHEROSCLEROSIS OF NATIVE CORONARY ARTERY OF NATIVE HEART WITHOUT ANGINA PECTORIS: ICD-10-CM

## 2023-05-09 DIAGNOSIS — S62.326D CLOSED DISPLACED FRACTURE OF SHAFT OF FIFTH METACARPAL BONE OF RIGHT HAND WITH ROUTINE HEALING, SUBSEQUENT ENCOUNTER: ICD-10-CM

## 2023-05-09 DIAGNOSIS — R55 NEAR SYNCOPE: ICD-10-CM

## 2023-05-09 DIAGNOSIS — I73.9 PVD (PERIPHERAL VASCULAR DISEASE) WITH CLAUDICATION: ICD-10-CM

## 2023-05-09 DIAGNOSIS — J96.21 ACUTE AND CHRONIC RESPIRATORY FAILURE WITH HYPOXIA: Primary | ICD-10-CM

## 2023-05-09 DIAGNOSIS — E78.00 HYPERCHOLESTEREMIA: ICD-10-CM

## 2023-05-09 DIAGNOSIS — I10 BENIGN ESSENTIAL HYPERTENSION: ICD-10-CM

## 2023-05-09 DIAGNOSIS — I21.3 ST ELEVATION MYOCARDIAL INFARCTION (STEMI), UNSPECIFIED ARTERY: ICD-10-CM

## 2023-05-09 DIAGNOSIS — R79.89 POSITIVE D DIMER: ICD-10-CM

## 2023-05-09 DIAGNOSIS — I73.9 PVD (PERIPHERAL VASCULAR DISEASE): ICD-10-CM

## 2023-05-09 DIAGNOSIS — R10.13 EPIGASTRIC PAIN: ICD-10-CM

## 2023-05-09 DIAGNOSIS — R53.81 PHYSICAL DECONDITIONING: ICD-10-CM

## 2023-05-09 DIAGNOSIS — R55 SYNCOPE, UNSPECIFIED SYNCOPE TYPE: ICD-10-CM

## 2023-05-09 DIAGNOSIS — Z72.0 NICOTINE ABUSE: ICD-10-CM

## 2023-05-09 DIAGNOSIS — Z12.31 OTHER SCREENING MAMMOGRAM: ICD-10-CM

## 2023-05-09 DIAGNOSIS — Z74.09 IMPAIRED FUNCTIONAL MOBILITY, BALANCE, GAIT, AND ENDURANCE: ICD-10-CM

## 2023-05-09 DIAGNOSIS — I25.119 ATHEROSCLEROSIS OF NATIVE CORONARY ARTERY OF NATIVE HEART WITH ANGINA PECTORIS: ICD-10-CM

## 2023-05-09 DIAGNOSIS — M79.675 PAIN DUE TO ONYCHOMYCOSIS OF TOENAILS OF BOTH FEET: ICD-10-CM

## 2023-05-09 DIAGNOSIS — Z78.9 IMPAIRED MOBILITY AND ADLS: ICD-10-CM

## 2023-05-09 DIAGNOSIS — J96.01 ACUTE RESPIRATORY FAILURE WITH HYPOXIA: ICD-10-CM

## 2023-05-09 DIAGNOSIS — I65.23 BILATERAL CAROTID ARTERY STENOSIS: ICD-10-CM

## 2023-05-09 DIAGNOSIS — F41.9 ANXIETY: ICD-10-CM

## 2023-05-09 DIAGNOSIS — J44.9 CHRONIC OBSTRUCTIVE PULMONARY DISEASE, UNSPECIFIED COPD TYPE: ICD-10-CM

## 2023-05-09 DIAGNOSIS — J44.1 COPD WITH EXACERBATION: ICD-10-CM
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
## 2023-05-09 DIAGNOSIS — R13.10 DYSPHAGIA, UNSPECIFIED TYPE: ICD-10-CM

## 2023-05-09 DIAGNOSIS — I70.222 ATHEROSCLEROSIS OF NATIVE ARTERIES OF EXTREMITIES WITH REST PAIN, LEFT LEG: ICD-10-CM

## 2023-05-09 DIAGNOSIS — R10.11 RUQ ABDOMINAL PAIN: ICD-10-CM

## 2023-05-09 DIAGNOSIS — J44.1 CHRONIC OBSTRUCTIVE PULMONARY DISEASE WITH ACUTE EXACERBATION: ICD-10-CM

## 2023-05-09 DIAGNOSIS — Z74.09 IMPAIRED MOBILITY AND ADLS: ICD-10-CM

## 2023-05-09 DIAGNOSIS — M79.641 PAIN OF RIGHT HAND: ICD-10-CM

## 2023-05-09 DIAGNOSIS — G89.29 OTHER CHRONIC PAIN: ICD-10-CM

## 2023-05-09 LAB
ALBUMIN SERPL-MCNC: 3.7 G/DL (ref 3.5–5.2)
ALBUMIN/GLOB SERPL: 1.1 G/DL
ALP SERPL-CCNC: 73 U/L (ref 39–117)
ALT SERPL W P-5'-P-CCNC: 24 U/L (ref 1–33)
ANION GAP SERPL CALCULATED.3IONS-SCNC: 14 MMOL/L (ref 5–15)
AST SERPL-CCNC: 38 U/L (ref 1–32)
BASOPHILS # BLD AUTO: 0.09 10*3/MM3 (ref 0–0.2)
BASOPHILS NFR BLD AUTO: 0.7 % (ref 0–1.5)
BILIRUB SERPL-MCNC: 0.4 MG/DL (ref 0–1.2)
BUN SERPL-MCNC: 41 MG/DL (ref 8–23)
BUN/CREAT SERPL: 29.1 (ref 7–25)
CALCIUM SPEC-SCNC: 8.5 MG/DL (ref 8.6–10.5)
CHLORIDE SERPL-SCNC: 100 MMOL/L (ref 98–107)
CO2 SERPL-SCNC: 22 MMOL/L (ref 22–29)
CREAT SERPL-MCNC: 1.41 MG/DL (ref 0.57–1)
D-DIMER, QUANTITATIVE (MAD,POW, STR): 1220 NG/ML (FEU) (ref 0–700)
DEPRECATED RDW RBC AUTO: 44 FL (ref 37–54)
EGFRCR SERPLBLD CKD-EPI 2021: 40.2 ML/MIN/1.73
EOSINOPHIL # BLD AUTO: 0 10*3/MM3 (ref 0–0.4)
EOSINOPHIL NFR BLD AUTO: 0 % (ref 0.3–6.2)
ERYTHROCYTE [DISTWIDTH] IN BLOOD BY AUTOMATED COUNT: 14 % (ref 12.3–15.4)
FLUAV RNA RESP QL NAA+PROBE: NOT DETECTED
FLUBV RNA RESP QL NAA+PROBE: NOT DETECTED
GLOBULIN UR ELPH-MCNC: 3.4 GM/DL
GLUCOSE SERPL-MCNC: 147 MG/DL (ref 65–99)
HCT VFR BLD AUTO: 32.8 % (ref 34–46.6)
HGB BLD-MCNC: 10.9 G/DL (ref 12–15.9)
IMM GRANULOCYTES # BLD AUTO: 0.05 10*3/MM3 (ref 0–0.05)
IMM GRANULOCYTES NFR BLD AUTO: 0.4 % (ref 0–0.5)
LYMPHOCYTES # BLD AUTO: 1.62 10*3/MM3 (ref 0.7–3.1)
LYMPHOCYTES NFR BLD AUTO: 13.2 % (ref 19.6–45.3)
MAGNESIUM SERPL-MCNC: 1.9 MG/DL (ref 1.6–2.4)
MCH RBC QN AUTO: 29.2 PG (ref 26.6–33)
MCHC RBC AUTO-ENTMCNC: 33.2 G/DL (ref 31.5–35.7)
MCV RBC AUTO: 87.9 FL (ref 79–97)
MONOCYTES # BLD AUTO: 1.07 10*3/MM3 (ref 0.1–0.9)
MONOCYTES NFR BLD AUTO: 8.7 % (ref 5–12)
NEUTROPHILS NFR BLD AUTO: 77 % (ref 42.7–76)
NEUTROPHILS NFR BLD AUTO: 9.44 10*3/MM3 (ref 1.7–7)
NRBC BLD AUTO-RTO: 0.2 /100 WBC (ref 0–0.2)
NT-PROBNP SERPL-MCNC: ABNORMAL PG/ML (ref 0–900)
PLATELET # BLD AUTO: 191 10*3/MM3 (ref 140–450)
PMV BLD AUTO: 10.4 FL (ref 6–12)
POTASSIUM SERPL-SCNC: 5.2 MMOL/L (ref 3.5–5.2)
PROT SERPL-MCNC: 7.1 G/DL (ref 6–8.5)
RBC # BLD AUTO: 3.73 10*6/MM3 (ref 3.77–5.28)
SARS-COV-2 RNA RESP QL NAA+PROBE: NOT DETECTED
SODIUM SERPL-SCNC: 136 MMOL/L (ref 136–145)
TROPONIN T SERPL HS-MCNC: 58 NG/L
WBC NRBC COR # BLD: 12.27 10*3/MM3 (ref 3.4–10.8)

## 2023-05-09 PROCEDURE — 94640 AIRWAY INHALATION TREATMENT: CPT

## 2023-05-09 PROCEDURE — 25010000002 ENOXAPARIN PER 10 MG: Performed by: STUDENT IN AN ORGANIZED HEALTH CARE EDUCATION/TRAINING PROGRAM

## 2023-05-09 PROCEDURE — 71045 X-RAY EXAM CHEST 1 VIEW: CPT

## 2023-05-09 PROCEDURE — 83735 ASSAY OF MAGNESIUM: CPT | Performed by: STUDENT IN AN ORGANIZED HEALTH CARE EDUCATION/TRAINING PROGRAM

## 2023-05-09 PROCEDURE — 83036 HEMOGLOBIN GLYCOSYLATED A1C: CPT | Performed by: INTERNAL MEDICINE

## 2023-05-09 PROCEDURE — 80053 COMPREHEN METABOLIC PANEL: CPT | Performed by: STUDENT IN AN ORGANIZED HEALTH CARE EDUCATION/TRAINING PROGRAM

## 2023-05-09 PROCEDURE — 87636 SARSCOV2 & INF A&B AMP PRB: CPT | Performed by: STUDENT IN AN ORGANIZED HEALTH CARE EDUCATION/TRAINING PROGRAM

## 2023-05-09 PROCEDURE — 85025 COMPLETE CBC W/AUTO DIFF WBC: CPT | Performed by: STUDENT IN AN ORGANIZED HEALTH CARE EDUCATION/TRAINING PROGRAM

## 2023-05-09 PROCEDURE — 99285 EMERGENCY DEPT VISIT HI MDM: CPT

## 2023-05-09 PROCEDURE — 85379 FIBRIN DEGRADATION QUANT: CPT | Performed by: STUDENT IN AN ORGANIZED HEALTH CARE EDUCATION/TRAINING PROGRAM

## 2023-05-09 PROCEDURE — 84484 ASSAY OF TROPONIN QUANT: CPT | Performed by: STUDENT IN AN ORGANIZED HEALTH CARE EDUCATION/TRAINING PROGRAM

## 2023-05-09 PROCEDURE — 93005 ELECTROCARDIOGRAM TRACING: CPT | Performed by: STUDENT IN AN ORGANIZED HEALTH CARE EDUCATION/TRAINING PROGRAM

## 2023-05-09 PROCEDURE — 83880 ASSAY OF NATRIURETIC PEPTIDE: CPT | Performed by: STUDENT IN AN ORGANIZED HEALTH CARE EDUCATION/TRAINING PROGRAM

## 2023-05-09 PROCEDURE — 25010000002 FUROSEMIDE PER 20 MG: Performed by: STUDENT IN AN ORGANIZED HEALTH CARE EDUCATION/TRAINING PROGRAM

## 2023-05-09 PROCEDURE — 36415 COLL VENOUS BLD VENIPUNCTURE: CPT

## 2023-05-09 RX ORDER — MIDODRINE HYDROCHLORIDE 5 MG/1
10 TABLET ORAL
COMMUNITY

## 2023-05-09 RX ORDER — FUROSEMIDE 10 MG/ML
60 INJECTION INTRAMUSCULAR; INTRAVENOUS ONCE
Status: COMPLETED | OUTPATIENT
Start: 2023-05-09 | End: 2023-05-09

## 2023-05-09 RX ORDER — LEVOFLOXACIN 500 MG/1
500 TABLET, FILM COATED ORAL DAILY
COMMUNITY
Start: 2023-05-09 | End: 2023-05-15 | Stop reason: HOSPADM

## 2023-05-09 RX ORDER — OXYCODONE HYDROCHLORIDE AND ACETAMINOPHEN 5; 325 MG/1; MG/1
1 TABLET ORAL EVERY 6 HOURS PRN
Status: ON HOLD | COMMUNITY
End: 2023-05-15 | Stop reason: SDUPTHER

## 2023-05-09 RX ORDER — SODIUM CHLORIDE 0.9 % (FLUSH) 0.9 %
10 SYRINGE (ML) INJECTION AS NEEDED
Status: DISCONTINUED | OUTPATIENT
Start: 2023-05-09 | End: 2023-05-15 | Stop reason: HOSPADM

## 2023-05-09 RX ORDER — FLUTICASONE PROPIONATE AND SALMETEROL 500; 50 UG/1; UG/1
POWDER RESPIRATORY (INHALATION)
COMMUNITY

## 2023-05-09 RX ORDER — ENOXAPARIN SODIUM 100 MG/ML
1 INJECTION SUBCUTANEOUS ONCE
Status: COMPLETED | OUTPATIENT
Start: 2023-05-09 | End: 2023-05-09

## 2023-05-09 RX ORDER — IPRATROPIUM BROMIDE AND ALBUTEROL SULFATE 2.5; .5 MG/3ML; MG/3ML
3 SOLUTION RESPIRATORY (INHALATION) EVERY 6 HOURS
COMMUNITY

## 2023-05-09 RX ORDER — IPRATROPIUM BROMIDE AND ALBUTEROL SULFATE 2.5; .5 MG/3ML; MG/3ML
3 SOLUTION RESPIRATORY (INHALATION)
Status: DISCONTINUED | OUTPATIENT
Start: 2023-05-09 | End: 2023-05-10

## 2023-05-09 RX ORDER — DIGOXIN 125 MCG
125 TABLET ORAL
COMMUNITY
End: 2023-05-15 | Stop reason: HOSPADM

## 2023-05-09 RX ORDER — LEVOTHYROXINE SODIUM 0.03 MG/1
25 TABLET ORAL
COMMUNITY

## 2023-05-09 RX ORDER — LANOLIN ALCOHOL/MO/W.PET/CERES
5000 CREAM (GRAM) TOPICAL DAILY
COMMUNITY

## 2023-05-09 RX ORDER — CILOSTAZOL 100 MG/1
100 TABLET ORAL 2 TIMES DAILY
COMMUNITY

## 2023-05-09 RX ORDER — ACETAMINOPHEN 325 MG/1
325 TABLET ORAL EVERY 6 HOURS PRN
COMMUNITY

## 2023-05-09 RX ORDER — ROSUVASTATIN CALCIUM 5 MG/1
5 TABLET, COATED ORAL DAILY
COMMUNITY

## 2023-05-09 RX ADMIN — IPRATROPIUM BROMIDE AND ALBUTEROL SULFATE 3 ML: .5; 2.5 SOLUTION RESPIRATORY (INHALATION) at 21:04

## 2023-05-09 RX ADMIN — FUROSEMIDE 60 MG: 10 INJECTION, SOLUTION INTRAMUSCULAR; INTRAVENOUS at 22:51

## 2023-05-09 RX ADMIN — ENOXAPARIN SODIUM 60 MG: 100 INJECTION SUBCUTANEOUS at 22:56

## 2023-05-09 NOTE — Clinical Note
Level of Care: Telemetry [5]   Diagnosis: Acute and chronic respiratory failure with hypoxia [033558]   Admitting Physician: BEHROOZI, SAEID [[id number]]   Attending Physician: BEHROOZI, SAEID [[id number]]

## 2023-05-10 ENCOUNTER — APPOINTMENT (OUTPATIENT)
Dept: CARDIOLOGY | Facility: HOSPITAL | Age: 73
DRG: 291 | End: 2023-05-10
Payer: MEDICARE

## 2023-05-10 ENCOUNTER — APPOINTMENT (OUTPATIENT)
Dept: NUCLEAR MEDICINE | Facility: HOSPITAL | Age: 73
DRG: 291 | End: 2023-05-10
Payer: MEDICARE

## 2023-05-10 PROBLEM — J96.21 ACUTE AND CHRONIC RESPIRATORY FAILURE WITH HYPOXIA: Status: ACTIVE | Noted: 2023-05-10

## 2023-05-10 LAB
ANION GAP SERPL CALCULATED.3IONS-SCNC: 15 MMOL/L (ref 5–15)
ARTERIAL PATENCY WRIST A: ABNORMAL
ATMOSPHERIC PRESS: 752 MMHG
BASE EXCESS BLDA CALC-SCNC: 3.2 MMOL/L (ref 0–2)
BDY SITE: ABNORMAL
BH CV ECHO MEAS - ACS: 1.75 CM
BH CV ECHO MEAS - AO MAX PG: 7 MMHG
BH CV ECHO MEAS - AO MEAN PG: 3.6 MMHG
BH CV ECHO MEAS - AO ROOT DIAM: 2.6 CM
BH CV ECHO MEAS - AO V2 MAX: 132 CM/SEC
BH CV ECHO MEAS - AO V2 VTI: 26.3 CM
BH CV ECHO MEAS - AVA(I,D): 2.9 CM2
BH CV ECHO MEAS - EDV(CUBED): 78.1 ML
BH CV ECHO MEAS - EDV(MOD-SP2): 89.2 ML
BH CV ECHO MEAS - EDV(MOD-SP4): 82.5 ML
BH CV ECHO MEAS - EF(MOD-BP): 57 %
BH CV ECHO MEAS - EF(MOD-SP2): 51.9 %
BH CV ECHO MEAS - EF(MOD-SP4): 60.8 %
BH CV ECHO MEAS - ESV(CUBED): 26.4 ML
BH CV ECHO MEAS - ESV(MOD-SP2): 42.9 ML
BH CV ECHO MEAS - ESV(MOD-SP4): 32.3 ML
BH CV ECHO MEAS - FS: 30.4 %
BH CV ECHO MEAS - IVS/LVPW: 1.06 CM
BH CV ECHO MEAS - IVSD: 1.29 CM
BH CV ECHO MEAS - LA DIMENSION: 3.9 CM
BH CV ECHO MEAS - LAT PEAK E' VEL: 4.9 CM/SEC
BH CV ECHO MEAS - LV DIASTOLIC VOL/BSA (35-75): 59.9 CM2
BH CV ECHO MEAS - LV MASS(C)D: 194.8 GRAMS
BH CV ECHO MEAS - LV MAX PG: 6.7 MMHG
BH CV ECHO MEAS - LV MEAN PG: 3 MMHG
BH CV ECHO MEAS - LV SYSTOLIC VOL/BSA (12-30): 23.4 CM2
BH CV ECHO MEAS - LV V1 MAX: 129.5 CM/SEC
BH CV ECHO MEAS - LV V1 VTI: 28.5 CM
BH CV ECHO MEAS - LVIDD: 4.3 CM
BH CV ECHO MEAS - LVIDS: 3 CM
BH CV ECHO MEAS - LVOT AREA: 2.7 CM2
BH CV ECHO MEAS - LVOT DIAM: 1.84 CM
BH CV ECHO MEAS - LVPWD: 1.21 CM
BH CV ECHO MEAS - MED PEAK E' VEL: 4.8 CM/SEC
BH CV ECHO MEAS - MR MAX PG: 118.9 MMHG
BH CV ECHO MEAS - MR MAX VEL: 545.1 CM/SEC
BH CV ECHO MEAS - MV A MAX VEL: 128.1 CM/SEC
BH CV ECHO MEAS - MV DEC SLOPE: 927.6 CM/SEC2
BH CV ECHO MEAS - MV E MAX VEL: 104 CM/SEC
BH CV ECHO MEAS - MV E/A: 0.81
BH CV ECHO MEAS - MV MAX PG: 6.8 MMHG
BH CV ECHO MEAS - MV MEAN PG: 2.6 MMHG
BH CV ECHO MEAS - MV P1/2T: 27.4 MSEC
BH CV ECHO MEAS - MV V2 VTI: 30.6 CM
BH CV ECHO MEAS - MVA(P1/2T): 8 CM2
BH CV ECHO MEAS - MVA(VTI): 2.48 CM2
BH CV ECHO MEAS - PA V2 MAX: 109.8 CM/SEC
BH CV ECHO MEAS - PI END-D VEL: 85.7 CM/SEC
BH CV ECHO MEAS - RAP SYSTOLE: 8 MMHG
BH CV ECHO MEAS - RVDD: 2.7 CM
BH CV ECHO MEAS - RVSP: 46.3 MMHG
BH CV ECHO MEAS - SI(MOD-SP2): 33.6 ML/M2
BH CV ECHO MEAS - SI(MOD-SP4): 36.4 ML/M2
BH CV ECHO MEAS - SV(LVOT): 76.1 ML
BH CV ECHO MEAS - SV(MOD-SP2): 46.3 ML
BH CV ECHO MEAS - SV(MOD-SP4): 50.2 ML
BH CV ECHO MEAS - TAPSE (>1.6): 2.23 CM
BH CV ECHO MEAS - TR MAX PG: 38.3 MMHG
BH CV ECHO MEAS - TR MAX VEL: 309.5 CM/SEC
BH CV ECHO MEASUREMENTS AVERAGE E/E' RATIO: 21.44
BUN SERPL-MCNC: 37 MG/DL (ref 8–23)
BUN/CREAT SERPL: 30.1 (ref 7–25)
CALCIUM SPEC-SCNC: 8.6 MG/DL (ref 8.6–10.5)
CHLORIDE SERPL-SCNC: 97 MMOL/L (ref 98–107)
CO2 SERPL-SCNC: 26 MMOL/L (ref 22–29)
CRE SCREEN PCR: NOT DETECTED
CREAT SERPL-MCNC: 1.23 MG/DL (ref 0.57–1)
DEPRECATED RDW RBC AUTO: 47.9 FL (ref 37–54)
DIGOXIN SERPL-MCNC: 2.3 NG/ML (ref 0.6–1.2)
DIGOXIN SERPL-MCNC: 2.6 NG/ML (ref 0.6–1.2)
EGFRCR SERPLBLD CKD-EPI 2021: 47.4 ML/MIN/1.73
ERYTHROCYTE [DISTWIDTH] IN BLOOD BY AUTOMATED COUNT: 14.2 % (ref 12.3–15.4)
GAS FLOW AIRWAY: 10 LPM
GEN 5 2HR TROPONIN T REFLEX: 55 NG/L
GLUCOSE BLDC GLUCOMTR-MCNC: 120 MG/DL (ref 70–130)
GLUCOSE BLDC GLUCOMTR-MCNC: 167 MG/DL (ref 70–130)
GLUCOSE SERPL-MCNC: 99 MG/DL (ref 65–99)
HBA1C MFR BLD: 6.5 % (ref 4.8–5.6)
HCO3 BLDA-SCNC: 29.4 MMOL/L (ref 20–26)
HCT VFR BLD AUTO: 39.5 % (ref 34–46.6)
HGB BLD-MCNC: 12 G/DL (ref 12–15.9)
IMP STRAIN: NOT DETECTED
KPC STRAIN: NOT DETECTED
LEFT ATRIUM VOLUME INDEX: 34.3 ML/M2
Lab: ABNORMAL
MAGNESIUM SERPL-MCNC: 1.8 MG/DL (ref 1.6–2.4)
MAXIMAL PREDICTED HEART RATE: 150 BPM
MCH RBC QN AUTO: 28.2 PG (ref 26.6–33)
MCHC RBC AUTO-ENTMCNC: 30.4 G/DL (ref 31.5–35.7)
MCV RBC AUTO: 92.9 FL (ref 79–97)
MODALITY: ABNORMAL
NDM STRAIN: NOT DETECTED
OXA 48 STRAIN: NOT DETECTED
PCO2 BLDA: 51.5 MM HG (ref 35–45)
PH BLDA: 7.37 PH UNITS (ref 7.35–7.45)
PLATELET # BLD AUTO: 135 10*3/MM3 (ref 140–450)
PMV BLD AUTO: 10.7 FL (ref 6–12)
PO2 BLDA: 75.3 MM HG (ref 83–108)
POTASSIUM SERPL-SCNC: 4.3 MMOL/L (ref 3.5–5.2)
PROCALCITONIN SERPL-MCNC: 0.07 NG/ML (ref 0–0.25)
RBC # BLD AUTO: 4.25 10*6/MM3 (ref 3.77–5.28)
SAO2 % BLDCOA: 93.6 % (ref 94–99)
SODIUM SERPL-SCNC: 138 MMOL/L (ref 136–145)
STRESS TARGET HR: 128 BPM
T4 FREE SERPL-MCNC: 0.97 NG/DL (ref 0.93–1.7)
TROPONIN T DELTA: -3 NG/L
TROPONIN T SERPL HS-MCNC: 55 NG/L
TSH SERPL DL<=0.05 MIU/L-ACNC: 1.5 UIU/ML (ref 0.27–4.2)
VENTILATOR MODE: ABNORMAL
VIM STRAIN: NOT DETECTED
WBC NRBC COR # BLD: 13.52 10*3/MM3 (ref 3.4–10.8)

## 2023-05-10 PROCEDURE — 25010000002 FUROSEMIDE PER 20 MG: Performed by: STUDENT IN AN ORGANIZED HEALTH CARE EDUCATION/TRAINING PROGRAM

## 2023-05-10 PROCEDURE — 84439 ASSAY OF FREE THYROXINE: CPT | Performed by: INTERNAL MEDICINE

## 2023-05-10 PROCEDURE — 80162 ASSAY OF DIGOXIN TOTAL: CPT | Performed by: STUDENT IN AN ORGANIZED HEALTH CARE EDUCATION/TRAINING PROGRAM

## 2023-05-10 PROCEDURE — 63710000001 INSULIN ASPART PER 5 UNITS: Performed by: INTERNAL MEDICINE

## 2023-05-10 PROCEDURE — 25510000001 PERFLUTREN (DEFINITY) 8.476 MG IN SODIUM CHLORIDE (PF) 0.9 % 10 ML INJECTION: Performed by: STUDENT IN AN ORGANIZED HEALTH CARE EDUCATION/TRAINING PROGRAM

## 2023-05-10 PROCEDURE — 83735 ASSAY OF MAGNESIUM: CPT | Performed by: INTERNAL MEDICINE

## 2023-05-10 PROCEDURE — 82948 REAGENT STRIP/BLOOD GLUCOSE: CPT

## 2023-05-10 PROCEDURE — 78580 LUNG PERFUSION IMAGING: CPT

## 2023-05-10 PROCEDURE — 84484 ASSAY OF TROPONIN QUANT: CPT | Performed by: STUDENT IN AN ORGANIZED HEALTH CARE EDUCATION/TRAINING PROGRAM

## 2023-05-10 PROCEDURE — 80162 ASSAY OF DIGOXIN TOTAL: CPT | Performed by: INTERNAL MEDICINE

## 2023-05-10 PROCEDURE — 93308 TTE F-UP OR LMTD: CPT | Performed by: INTERNAL MEDICINE

## 2023-05-10 PROCEDURE — 94799 UNLISTED PULMONARY SVC/PX: CPT

## 2023-05-10 PROCEDURE — 25010000002 METHYLPREDNISOLONE PER 40 MG: Performed by: INTERNAL MEDICINE

## 2023-05-10 PROCEDURE — 97162 PT EVAL MOD COMPLEX 30 MIN: CPT

## 2023-05-10 PROCEDURE — 87081 CULTURE SCREEN ONLY: CPT | Performed by: STUDENT IN AN ORGANIZED HEALTH CARE EDUCATION/TRAINING PROGRAM

## 2023-05-10 PROCEDURE — 36415 COLL VENOUS BLD VENIPUNCTURE: CPT | Performed by: INTERNAL MEDICINE

## 2023-05-10 PROCEDURE — 93325 DOPPLER ECHO COLOR FLOW MAPG: CPT

## 2023-05-10 PROCEDURE — 25010000002 CEFTRIAXONE PER 250 MG: Performed by: INTERNAL MEDICINE

## 2023-05-10 PROCEDURE — 94761 N-INVAS EAR/PLS OXIMETRY MLT: CPT

## 2023-05-10 PROCEDURE — 93321 DOPPLER ECHO F-UP/LMTD STD: CPT

## 2023-05-10 PROCEDURE — 80048 BASIC METABOLIC PNL TOTAL CA: CPT | Performed by: INTERNAL MEDICINE

## 2023-05-10 PROCEDURE — 93325 DOPPLER ECHO COLOR FLOW MAPG: CPT | Performed by: INTERNAL MEDICINE

## 2023-05-10 PROCEDURE — A9540 TC99M MAA: HCPCS | Performed by: STUDENT IN AN ORGANIZED HEALTH CARE EDUCATION/TRAINING PROGRAM

## 2023-05-10 PROCEDURE — 84484 ASSAY OF TROPONIN QUANT: CPT | Performed by: INTERNAL MEDICINE

## 2023-05-10 PROCEDURE — 93308 TTE F-UP OR LMTD: CPT

## 2023-05-10 PROCEDURE — 93321 DOPPLER ECHO F-UP/LMTD STD: CPT | Performed by: INTERNAL MEDICINE

## 2023-05-10 PROCEDURE — 82803 BLOOD GASES ANY COMBINATION: CPT

## 2023-05-10 PROCEDURE — 84145 PROCALCITONIN (PCT): CPT | Performed by: STUDENT IN AN ORGANIZED HEALTH CARE EDUCATION/TRAINING PROGRAM

## 2023-05-10 PROCEDURE — 25010000002 METHYLPREDNISOLONE PER 40 MG: Performed by: STUDENT IN AN ORGANIZED HEALTH CARE EDUCATION/TRAINING PROGRAM

## 2023-05-10 PROCEDURE — 85027 COMPLETE CBC AUTOMATED: CPT | Performed by: INTERNAL MEDICINE

## 2023-05-10 PROCEDURE — 93005 ELECTROCARDIOGRAM TRACING: CPT | Performed by: INTERNAL MEDICINE

## 2023-05-10 PROCEDURE — 0 TECHNETIUM ALBUMIN AGGREGATED: Performed by: STUDENT IN AN ORGANIZED HEALTH CARE EDUCATION/TRAINING PROGRAM

## 2023-05-10 PROCEDURE — 84443 ASSAY THYROID STIM HORMONE: CPT | Performed by: INTERNAL MEDICINE

## 2023-05-10 RX ORDER — BUDESONIDE AND FORMOTEROL FUMARATE DIHYDRATE 160; 4.5 UG/1; UG/1
2 AEROSOL RESPIRATORY (INHALATION)
Status: DISCONTINUED | OUTPATIENT
Start: 2023-05-10 | End: 2023-05-15 | Stop reason: HOSPADM

## 2023-05-10 RX ORDER — GLUCAGON 1 MG/ML
1 KIT INJECTION
Status: DISCONTINUED | OUTPATIENT
Start: 2023-05-10 | End: 2023-05-11 | Stop reason: SDUPTHER

## 2023-05-10 RX ORDER — METHYLPREDNISOLONE SODIUM SUCCINATE 40 MG/ML
40 INJECTION, POWDER, LYOPHILIZED, FOR SOLUTION INTRAMUSCULAR; INTRAVENOUS EVERY 8 HOURS
Status: COMPLETED | OUTPATIENT
Start: 2023-05-10 | End: 2023-05-10

## 2023-05-10 RX ORDER — OXYCODONE HYDROCHLORIDE AND ACETAMINOPHEN 5; 325 MG/1; MG/1
1 TABLET ORAL EVERY 6 HOURS PRN
Status: DISCONTINUED | OUTPATIENT
Start: 2023-05-10 | End: 2023-05-15 | Stop reason: HOSPADM

## 2023-05-10 RX ORDER — MIDODRINE HYDROCHLORIDE 5 MG/1
10 TABLET ORAL
Status: DISCONTINUED | OUTPATIENT
Start: 2023-05-10 | End: 2023-05-15 | Stop reason: HOSPADM

## 2023-05-10 RX ORDER — PREDNISONE 20 MG/1
40 TABLET ORAL DAILY
Status: DISCONTINUED | OUTPATIENT
Start: 2023-05-11 | End: 2023-05-12

## 2023-05-10 RX ORDER — CLOPIDOGREL BISULFATE 75 MG/1
75 TABLET ORAL DAILY
Status: DISCONTINUED | OUTPATIENT
Start: 2023-05-10 | End: 2023-05-10

## 2023-05-10 RX ORDER — INSULIN ASPART 100 [IU]/ML
0-7 INJECTION, SOLUTION INTRAVENOUS; SUBCUTANEOUS
Status: DISCONTINUED | OUTPATIENT
Start: 2023-05-11 | End: 2023-05-11

## 2023-05-10 RX ORDER — LANOLIN ALCOHOL/MO/W.PET/CERES
5000 CREAM (GRAM) TOPICAL DAILY
Status: DISCONTINUED | OUTPATIENT
Start: 2023-05-10 | End: 2023-05-15 | Stop reason: HOSPADM

## 2023-05-10 RX ORDER — PANTOPRAZOLE SODIUM 40 MG/1
40 TABLET, DELAYED RELEASE ORAL DAILY
Status: DISCONTINUED | OUTPATIENT
Start: 2023-05-10 | End: 2023-05-15 | Stop reason: HOSPADM

## 2023-05-10 RX ORDER — INSULIN ASPART 100 [IU]/ML
7 INJECTION, SOLUTION INTRAVENOUS; SUBCUTANEOUS ONCE
Status: COMPLETED | OUTPATIENT
Start: 2023-05-10 | End: 2023-05-10

## 2023-05-10 RX ORDER — PRAVASTATIN SODIUM 20 MG
20 TABLET ORAL NIGHTLY
Status: DISCONTINUED | OUTPATIENT
Start: 2023-05-10 | End: 2023-05-15 | Stop reason: HOSPADM

## 2023-05-10 RX ORDER — SODIUM CHLORIDE 0.9 % (FLUSH) 0.9 %
10 SYRINGE (ML) INJECTION AS NEEDED
Status: DISCONTINUED | OUTPATIENT
Start: 2023-05-10 | End: 2023-05-15 | Stop reason: HOSPADM

## 2023-05-10 RX ORDER — LEVOTHYROXINE SODIUM 0.03 MG/1
25 TABLET ORAL
Status: DISCONTINUED | OUTPATIENT
Start: 2023-05-10 | End: 2023-05-15 | Stop reason: HOSPADM

## 2023-05-10 RX ORDER — SODIUM CHLORIDE 0.9 % (FLUSH) 0.9 %
10 SYRINGE (ML) INJECTION EVERY 12 HOURS SCHEDULED
Status: DISCONTINUED | OUTPATIENT
Start: 2023-05-10 | End: 2023-05-15 | Stop reason: HOSPADM

## 2023-05-10 RX ORDER — IPRATROPIUM BROMIDE AND ALBUTEROL SULFATE 2.5; .5 MG/3ML; MG/3ML
3 SOLUTION RESPIRATORY (INHALATION) EVERY 6 HOURS
Status: DISCONTINUED | OUTPATIENT
Start: 2023-05-10 | End: 2023-05-10

## 2023-05-10 RX ORDER — CLOPIDOGREL BISULFATE 75 MG/1
75 TABLET ORAL DAILY
Status: DISCONTINUED | OUTPATIENT
Start: 2023-05-11 | End: 2023-05-15 | Stop reason: HOSPADM

## 2023-05-10 RX ORDER — ROSUVASTATIN CALCIUM 5 MG/1
5 TABLET, COATED ORAL DAILY
Status: DISCONTINUED | OUTPATIENT
Start: 2023-05-10 | End: 2023-05-10 | Stop reason: SDUPTHER

## 2023-05-10 RX ORDER — DEXTROSE MONOHYDRATE 25 G/50ML
25 INJECTION, SOLUTION INTRAVENOUS
Status: DISCONTINUED | OUTPATIENT
Start: 2023-05-10 | End: 2023-05-11 | Stop reason: SDUPTHER

## 2023-05-10 RX ORDER — IPRATROPIUM BROMIDE AND ALBUTEROL SULFATE 2.5; .5 MG/3ML; MG/3ML
3 SOLUTION RESPIRATORY (INHALATION)
Status: DISCONTINUED | OUTPATIENT
Start: 2023-05-10 | End: 2023-05-14

## 2023-05-10 RX ORDER — DIGOXIN 125 MCG
125 TABLET ORAL
Status: DISCONTINUED | OUTPATIENT
Start: 2023-05-10 | End: 2023-05-10

## 2023-05-10 RX ORDER — SODIUM CHLORIDE 9 MG/ML
40 INJECTION, SOLUTION INTRAVENOUS AS NEEDED
Status: DISCONTINUED | OUTPATIENT
Start: 2023-05-10 | End: 2023-05-15 | Stop reason: HOSPADM

## 2023-05-10 RX ORDER — FUROSEMIDE 10 MG/ML
40 INJECTION INTRAMUSCULAR; INTRAVENOUS
Status: COMPLETED | OUTPATIENT
Start: 2023-05-10 | End: 2023-05-12

## 2023-05-10 RX ORDER — CILOSTAZOL 100 MG/1
100 TABLET ORAL 2 TIMES DAILY
Status: DISCONTINUED | OUTPATIENT
Start: 2023-05-10 | End: 2023-05-10

## 2023-05-10 RX ORDER — AMITRIPTYLINE HYDROCHLORIDE 25 MG/1
25 TABLET, FILM COATED ORAL NIGHTLY
Status: DISCONTINUED | OUTPATIENT
Start: 2023-05-10 | End: 2023-05-15 | Stop reason: HOSPADM

## 2023-05-10 RX ORDER — NICOTINE POLACRILEX 4 MG
15 LOZENGE BUCCAL
Status: DISCONTINUED | OUTPATIENT
Start: 2023-05-10 | End: 2023-05-11 | Stop reason: SDUPTHER

## 2023-05-10 RX ORDER — GABAPENTIN 400 MG/1
400 CAPSULE ORAL EVERY 12 HOURS SCHEDULED
Status: DISCONTINUED | OUTPATIENT
Start: 2023-05-10 | End: 2023-05-15 | Stop reason: HOSPADM

## 2023-05-10 RX ORDER — CILOSTAZOL 100 MG/1
100 TABLET ORAL 2 TIMES DAILY
Status: DISCONTINUED | OUTPATIENT
Start: 2023-05-11 | End: 2023-05-15 | Stop reason: HOSPADM

## 2023-05-10 RX ADMIN — CEFTRIAXONE SODIUM 1 G: 1 INJECTION, POWDER, FOR SOLUTION INTRAMUSCULAR; INTRAVENOUS at 04:32

## 2023-05-10 RX ADMIN — AMITRIPTYLINE HYDROCHLORIDE 25 MG: 25 TABLET, FILM COATED ORAL at 20:00

## 2023-05-10 RX ADMIN — PANTOPRAZOLE SODIUM 40 MG: 40 TABLET, DELAYED RELEASE ORAL at 10:05

## 2023-05-10 RX ADMIN — PRAVASTATIN SODIUM 20 MG: 40 TABLET ORAL at 05:38

## 2023-05-10 RX ADMIN — Medication 10 ML: at 20:00

## 2023-05-10 RX ADMIN — MIDODRINE HYDROCHLORIDE 10 MG: 5 TABLET ORAL at 17:54

## 2023-05-10 RX ADMIN — INSULIN ASPART 7 UNITS: 100 INJECTION, SOLUTION INTRAVENOUS; SUBCUTANEOUS at 21:08

## 2023-05-10 RX ADMIN — IPRATROPIUM BROMIDE AND ALBUTEROL SULFATE 3 ML: 2.5; .5 SOLUTION RESPIRATORY (INHALATION) at 15:06

## 2023-05-10 RX ADMIN — TRAZODONE HYDROCHLORIDE 150 MG: 100 TABLET ORAL at 20:00

## 2023-05-10 RX ADMIN — SERTRALINE 100 MG: 50 TABLET, FILM COATED ORAL at 09:36

## 2023-05-10 RX ADMIN — GABAPENTIN 400 MG: 400 CAPSULE ORAL at 09:36

## 2023-05-10 RX ADMIN — OXYCODONE HYDROCHLORIDE AND ACETAMINOPHEN 1 TABLET: 5; 325 TABLET ORAL at 15:30

## 2023-05-10 RX ADMIN — RIVAROXABAN 20 MG: 10 TABLET, FILM COATED ORAL at 20:00

## 2023-05-10 RX ADMIN — Medication 10 ML: at 05:32

## 2023-05-10 RX ADMIN — IPRATROPIUM BROMIDE AND ALBUTEROL SULFATE 3 ML: 2.5; .5 SOLUTION RESPIRATORY (INHALATION) at 23:14

## 2023-05-10 RX ADMIN — METHYLPREDNISOLONE SODIUM SUCCINATE 40 MG: 40 INJECTION, POWDER, FOR SOLUTION INTRAMUSCULAR; INTRAVENOUS at 13:03

## 2023-05-10 RX ADMIN — AMITRIPTYLINE HYDROCHLORIDE 25 MG: 25 TABLET, FILM COATED ORAL at 05:39

## 2023-05-10 RX ADMIN — Medication 10 ML: at 09:36

## 2023-05-10 RX ADMIN — CYANOCOBALAMIN TAB 1000 MCG 5000 MCG: 1000 TAB at 09:36

## 2023-05-10 RX ADMIN — GABAPENTIN 400 MG: 400 CAPSULE ORAL at 20:00

## 2023-05-10 RX ADMIN — FUROSEMIDE 40 MG: 10 INJECTION, SOLUTION INTRAMUSCULAR; INTRAVENOUS at 09:37

## 2023-05-10 RX ADMIN — IPRATROPIUM BROMIDE AND ALBUTEROL SULFATE 3 ML: 2.5; .5 SOLUTION RESPIRATORY (INHALATION) at 19:29

## 2023-05-10 RX ADMIN — DOXYCYCLINE 100 MG: 100 INJECTION, POWDER, LYOPHILIZED, FOR SOLUTION INTRAVENOUS at 10:03

## 2023-05-10 RX ADMIN — BUDESONIDE AND FORMOTEROL FUMARATE DIHYDRATE 2 PUFF: 160; 4.5 AEROSOL RESPIRATORY (INHALATION) at 07:22

## 2023-05-10 RX ADMIN — TRAZODONE HYDROCHLORIDE 150 MG: 100 TABLET ORAL at 04:39

## 2023-05-10 RX ADMIN — PRAVASTATIN SODIUM 20 MG: 40 TABLET ORAL at 20:00

## 2023-05-10 RX ADMIN — METHYLPREDNISOLONE SODIUM SUCCINATE 40 MG: 40 INJECTION, POWDER, FOR SOLUTION INTRAMUSCULAR; INTRAVENOUS at 19:56

## 2023-05-10 RX ADMIN — MIDODRINE HYDROCHLORIDE 10 MG: 5 TABLET ORAL at 13:03

## 2023-05-10 RX ADMIN — MIDODRINE HYDROCHLORIDE 10 MG: 5 TABLET ORAL at 07:52

## 2023-05-10 RX ADMIN — PERFLUTREN 3 ML: 6.52 INJECTION, SUSPENSION INTRAVENOUS at 11:37

## 2023-05-10 RX ADMIN — TRAZODONE HYDROCHLORIDE 100 MG: 100 TABLET ORAL at 04:31

## 2023-05-10 RX ADMIN — IPRATROPIUM BROMIDE AND ALBUTEROL SULFATE 3 ML: .5; 2.5 SOLUTION RESPIRATORY (INHALATION) at 07:06

## 2023-05-10 RX ADMIN — METHYLPREDNISOLONE SODIUM SUCCINATE 40 MG: 40 INJECTION, POWDER, FOR SOLUTION INTRAMUSCULAR; INTRAVENOUS at 04:32

## 2023-05-10 RX ADMIN — KIT FOR THE PREPARATION OF TECHNETIUM TC 99M ALBUMIN AGGREGATED 1 DOSE: 2.5 INJECTION, POWDER, FOR SOLUTION INTRAVENOUS at 12:06

## 2023-05-10 RX ADMIN — FUROSEMIDE 40 MG: 10 INJECTION, SOLUTION INTRAMUSCULAR; INTRAVENOUS at 17:55

## 2023-05-10 RX ADMIN — LEVOTHYROXINE SODIUM 25 MCG: 25 TABLET ORAL at 07:51

## 2023-05-10 NOTE — ED NOTES
Nursing report ED to floor  Mona Perales  72 y.o.  female    HPI:   Chief Complaint   Patient presents with    Shortness of Breath    Chest Pain       Admitting doctor:   No admitting provider for patient encounter.    Consulting provider(s):  Consults       No orders found for last 30 day(s).             Admitting diagnosis:   The primary encounter diagnosis was Acute and chronic respiratory failure with hypoxia. Diagnoses of Acute on chronic congestive heart failure, unspecified heart failure type, Positive D dimer, and ALVAREZ (acute kidney injury) were also pertinent to this visit.    Code status:   Current Code Status       Date Active Code Status Order ID Comments User Context       Prior            Allergies:   Morphine and related and Penicillins    Intake and Output  No intake or output data in the 24 hours ending 05/10/23 0012    Weight:       05/09/23 2020   Weight: 56.7 kg (125 lb)       Most recent vitals:   Vitals:    05/09/23 2227 05/09/23 2245 05/09/23 2302 05/09/23 2346   BP: 123/58 120/57 106/52 127/60   BP Location:       Patient Position:       Pulse: 64 60 64 60   Resp: 16 16 16 16   Temp:       TempSrc:       SpO2: 94% 91% 94% 95%   Weight:       Height:         Oxygen Therapy: 10 L high flow nasal cannula    Active LDAs/IV Access:   Lines, Drains & Airways       Active LDAs       Name Placement date Placement time Site Days    Peripheral IV 05/09/23 2132 Anterior;Left Forearm 05/09/23 2132  Forearm  less than 1                    Labs (abnormal labs have a star):   Labs Reviewed   COMPREHENSIVE METABOLIC PANEL - Abnormal; Notable for the following components:       Result Value    Glucose 147 (*)     BUN 41 (*)     Creatinine 1.41 (*)     Calcium 8.5 (*)     AST (SGOT) 38 (*)     BUN/Creatinine Ratio 29.1 (*)     eGFR 40.2 (*)     All other components within normal limits    Narrative:     GFR Normal >60  Chronic Kidney Disease <60  Kidney Failure <15     CBC WITH AUTO DIFFERENTIAL -  Abnormal; Notable for the following components:    WBC 12.27 (*)     RBC 3.73 (*)     Hemoglobin 10.9 (*)     Hematocrit 32.8 (*)     Neutrophil % 77.0 (*)     Lymphocyte % 13.2 (*)     Eosinophil % 0.0 (*)     Neutrophils, Absolute 9.44 (*)     Monocytes, Absolute 1.07 (*)     All other components within normal limits   BNP (IN-HOUSE) - Abnormal; Notable for the following components:    proBNP 16,585.0 (*)     All other components within normal limits    Narrative:     Among patients with dyspnea, NT-proBNP is highly sensitive for the detection of acute congestive heart failure. In addition NT-proBNP of <300 pg/ml effectively rules out acute congestive heart failure with 99% negative predictive value.     TROPONIN - Abnormal; Notable for the following components:    HS Troponin T 58 (*)     All other components within normal limits    Narrative:     High Sensitive Troponin T Reference Range:  <10.0 ng/L- Negative Female for AMI  <15.0 ng/L- Negative Male for AMI  >=10 - Abnormal Female indicating possible myocardial injury.  >=15 - Abnormal Male indicating possible myocardial injury.   Clinicians would have to utilize clinical acumen, EKG, Troponin, and serial changes to determine if it is an Acute Myocardial Infarction or myocardial injury due to an underlying chronic condition.        D-DIMER, QUANTITATIVE - Abnormal; Notable for the following components:    D-Dimer, Quantitative 1,220 (*)     All other components within normal limits    Narrative:     According to the assay 's published package insert, a normal (<500 ng/mL (FEU)) D-dimer result in conjunction with a non-high clinical probability assessment, excludes deep vein thrombosis (DVT) and pulmonary embolism (PE) with high sensitivity.    D-dimer values increase with age and this can make VTE exclusion of an older population difficult. To address this, the American College of Physicians, based on best available evidence and recent guidelines,  "recommends that clinicians use age-adjusted D-dimer thresholds in patients greater than 50 years of age with: a) a low probability of PE who do not meet all Pulmonary Embolism Rule Out Criteria, or b) in those with intermediate probability of PE.   The formula for an age-adjusted D-dimer cut-off is \"age*10\".  For example, a 60 year old patient would have an age-adjusted cut-off of 600 ng/mL (FEU) and an 80 year old 800 ng/mL (FEU).     COVID-19 AND FLU A/B, NP SWAB IN TRANSPORT MEDIA 8-12 HR TAT - Normal    Narrative:     Fact sheet for providers: https://www.fda.gov/media/524742/download    Fact sheet for patients: https://www.fda.gov/media/883214/download    Test performed by PCR.   MAGNESIUM - Normal   BLOOD GAS, ARTERIAL   HIGH SENSITIVITIY TROPONIN T 2HR   CBC AND DIFFERENTIAL    Narrative:     The following orders were created for panel order CBC & Differential.  Procedure                               Abnormality         Status                     ---------                               -----------         ------                     CBC Auto Differential[[id number]]        Abnormal            Final result                 Please view results for these tests on the individual orders.       Meds given in ED:   Medications   sodium chloride 0.9 % flush 10 mL (has no administration in time range)   ipratropium-albuterol (DUO-NEB) nebulizer solution 3 mL (3 mL Nebulization Given 5/9/23 2104)   Pharmacy to Dose enoxaparin (LOVENOX) (has no administration in time range)   furosemide (LASIX) injection 60 mg (60 mg Intravenous Given 5/9/23 2251)   Enoxaparin Sodium (LOVENOX) syringe 60 mg (60 mg Subcutaneous Given 5/9/23 2256)     Pharmacy to Dose enoxaparin (LOVENOX),          NIH Stroke Scale:       Isolation/Infection(s):  No active isolations   COVID (rule out)     COVID Testing  Collected 5/9/23  Resulted NEG    Nursing report ED to floor:  Mental status: Pt appears to have dementia and has become more confused the " later it gets. A+O x4 on arrival at ED. Some confusion about place and situation at time of this note.  Ambulatory status: Assist x1 (uses walker at nursing home)  Precautions: None    ED nurse phone extentsion- [phone]

## 2023-05-10 NOTE — PLAN OF CARE
Goal Outcome Evaluation:   Patient admitted to [address] [place] [address]. VSS. Patient denies chest pain at this time.Plan of care continues at this time.

## 2023-05-10 NOTE — THERAPY EVALUATION
Patient Name: Mona Perales  : 1950    MRN: [id number]                              Today's Date: 5/10/2023       Admit Date: 2023    Visit Dx:     ICD-10-CM ICD-9-CM   1. Acute and chronic respiratory failure with hypoxia  J96.21 518.84     799.02   2. Acute on chronic congestive heart failure, unspecified heart failure type  I50.9 428.0   3. Positive D dimer  R79.89 790.92   4. ALVAREZ (acute kidney injury)  N17.9 584.9   5. Impaired functional mobility, balance, gait, and endurance  Z74.09 V49.89     Patient Active Problem List   Diagnosis   • Anxiety   • CAD (coronary atherosclerotic disease)   • Carotid stenosis   • COPD (chronic obstructive pulmonary disease)   • COPD with exacerbation ([facility])   • Depressive disorder, not elsewhere classified   • Benign essential hypertension   • Hypercholesteremia   • Insomnia   • Notalgia   • Osteoporosis   • Other screening mammogram   • RUQ abdominal pain   • PVD (peripheral vascular disease) with claudication   • Nicotine abuse   • Dysphagia   • Epigastric pain   • Pain of right hand   • Closed displaced fracture of shaft of fifth metacarpal bone of right hand   • Cause of injury, fall   • Displaced fracture of shaft of fifth metacarpal bone of right hand with routine healing   • Syncope   • Acute respiratory failure with hypoxia   • (HFpEF) heart failure with preserved ejection fraction (HCC)   • Hypotension   • Elevated WBCs   • Near syncope   • Atherosclerosis of native coronary artery of native heart without angina pectoris   • Atherosclerosis of native arteries of extremities with rest pain, left leg   • PVD (peripheral vascular disease) ([facility])   • Physical deconditioning   • Pain due to onychomycosis of toenails of both feet   • ST elevation myocardial infarction (STEMI), unspecified artery   • Acute and chronic respiratory failure with hypoxia     Past Medical History:   Diagnosis Date   • A-fib    • Anxiety    • Arthritis    • Asthma    • Atherosclerosis  of native arteries of the extremities with ulceration     bilateral legs - bilateral iliac stents 2008      • CHF (congestive heart failure)    • Chronic lower back pain    • COPD (chronic obstructive pulmonary disease)    • Essential (primary) hypertension    • GERD (gastroesophageal reflux disease)    • History of pulmonary embolus (PE)    • Hyperlipidemia    • Insomnia    • Lumbago    • Nicotine dependence    • Occlusion of artery      and stenosis of bilateral carotid arteries - BABS 16-49%, LICA 0-15%   • Other atherosclerosis of native artery of other extremity     LEFT subclavian stent 2005 (occluded)   • Sleep apnea      Past Surgical History:   Procedure Laterality Date   • CARDIAC CATHETERIZATION  04/06/2016    No evidence of any obstructive epicardial CAD.Preserved LV systolic function with EF of 55%.   • CARDIAC CATHETERIZATION N/A 11/1/2022    Procedure: Left Heart Cath;  Surgeon: Neftali Haywood MD;  Location: [facility] CATH INVASIVE LOCATION;  Service: Cardiology;  Laterality: N/A;   • CENTRAL VENOUS LINE INSERTION  04/07/2016    Successful placement of right uppe extremity 6-Turkmen triple lumen PICC line.   • ENDOSCOPY N/A 1/12/2018    Procedure: ESOPHAGOGASTRODUODENOSCOPY;  Surgeon: Bin Oh MD;  Location: [facility] ENDOSCOPY;  Service:    • ENDOSCOPY N/A 1/17/2018    Procedure: ESOPHAGOGASTRODUODENOSCOPY;  Surgeon: Bin Oh MD;  Location: [facility] ENDOSCOPY;  Service:    • ENDOSCOPY N/A 3/16/2018    Procedure: ESOPHAGOGASTRODUODENOSCOPY possible dilation;  Surgeon: Bin Oh MD;  Location: [facility] ENDOSCOPY;  Service: Gastroenterology   • FEMORAL POPLITEAL BYPASS Left 4/14/2020    Procedure: FEMORAL POPLITEAL BYPASS ABOVE KNEE  (POLYTETRAFLUOROETHYLENE)  LEFT COMMON FEMORAL ARTERY ENDARTERECTOMY PTA LEFT ILIAC ARTERIOGRAM        (c-arm#2 and c-arm table);  Surgeon: David Suh MD;  Location: [facility] OR;  Service: Vascular;  Laterality: Left;   • FEMORAL POPLITEAL BYPASS Right  9/17/2021    Procedure: RIGHT common femoral endarterectomy FEMORAL POPLITEAL BYPASS (above the knee polytetrafluoroethylene  lower extremity arteriogram              (c-arm#2 and c-arm table);  Surgeon: David Suh MD;  Location: [facility] OR;  Service: Vascular;  Laterality: Right;   • HYSTERECTOMY     • INTERVENTIONAL RADIOLOGY PROCEDURE Left 9/9/2020    Procedure: IR angiogram extremity left;  Surgeon: David Suh MD;  Location: [facility] ANGIO INVASIVE LOCATION;  Service: Interventional Radiology;  Laterality: Left;   • KYPHOPLASTY N/A 5/23/2019    Procedure: KYPHOPLASTY LUMBAR FOUR;  Surgeon: Aba Santa MD;  Location: [facility] OR;  Service: Orthopedic Spine   • TOTAL SHOULDER REPLACEMENT Left    • TRANSESOPHAGEAL ECHOCARDIOGRAM (JACQUES)  04/07/2016    With color flow-Mild to moderate [facility].LV systolic function well preserved with Ef of 55-60%.Mitral and AV intact.Diastolic dysfunction   • UPPER GASTROINTESTINAL ENDOSCOPY  01/17/2018    Dr. Bin Martin M.D.      General Information     Row Name 05/10/23 08          Physical Therapy Time and Intention    Document Type evaluation  -LR     Mode of Treatment individual therapy;physical therapy  -LR     Row Name 05/10/23 [facility]          General Information    Patient Profile Reviewed yes  -LR     Prior Level of Function independent:;all household mobility;gait;transfer;bed mobility;ADL's  -LR     Existing Precautions/Restrictions fall;oxygen therapy device and L/min  -LR     Row Name 05/10/23 0840          Living Environment    People in Home facility resident  -LR     Row Name 05/10/23 0840          Cognition    Orientation Status (Cognition) oriented to;person;place;situation;disoriented to;time  -LR           User Key  (r) = Recorded By, (t) = Taken By, (c) = Cosigned By    Initials Name Provider Type    LR Timothy Lopez Physical Therapist               Mobility     Row Name 05/10/23 0840          Bed Mobility    Bed Mobility  supine-sit;sit-supine  -LR     Supine-Sit [place] (Bed Mobility) standby assist  -LR     Sit-Supine [place] (Bed Mobility) minimum assist (75% patient effort)  -LR     Assistive Device (Bed Mobility) bed rails;head of bed elevated  -LR     Row Name 05/10/23 0840          Sit-Stand Transfer    Sit-Stand [place] (Transfers) minimum assist (75% patient effort)  -LR     Assistive Device (Sit-Stand Transfers) walker, front-wheeled  -LR     Row Name 05/10/23 0840          Gait/Stairs (Locomotion)    [place] Level (Gait) minimum assist (75% patient effort)  -LR     Distance in Feet (Gait) 4' laterally  -LR     Deviations/Abnormal Patterns (Gait) gait speed decreased;festinating/shuffling;sky decreased  -LR           User Key  (r) = Recorded By, (t) = Taken By, (c) = Cosigned By    Initials Name Provider Type    LR Timothy Lopez Physical Therapist               Obj/Interventions     Row Name 05/10/23 0840          Range of Motion Comprehensive    General Range of Motion no range of motion deficits identified  -LR     Comment, General Range of Motion BLE grossly WFL  -LR     Row Name 05/10/23 0840          Strength Comprehensive (MMT)    Comment, General Manual Muscle Testing (MMT) Assessment BLE grossly 4-/5  -LR     Row Name 05/10/23 0840          Sensory Assessment (Somatosensory)    Sensory Assessment (Somatosensory) sensation intact;LE sensation intact  -LR           User Key  (r) = Recorded By, (t) = Taken By, (c) = Cosigned By    Initials Name Provider Type    LR Timothy Lopez Physical Therapist               Goals/Plan     Row Name 05/10/23 0840          Bed Mobility Goal 1 (PT)    Activity/Assistive Device (Bed Mobility Goal 1, PT) sit to supine;supine to sit  -LR     [place] Level/Cues Needed (Bed Mobility Goal 1, PT) modified independence  -LR     Time Frame (Bed Mobility Goal 1, PT) by discharge  -LR     Progress/Outcomes (Bed Mobility Goal 1, PT) goal not met  -LR     Row Name  05/10/23 0840          Transfer Goal 1 (PT)    Activity/Assistive Device (Transfer Goal 1, PT) sit-to-stand/stand-to-sit;bed-to-chair/chair-to-bed  -LR     [place] Level/Cues Needed (Transfer Goal 1, PT) standby assist  -LR     Time Frame (Transfer Goal 1, PT) by discharge  -LR     Progress/Outcome (Transfer Goal 1, PT) goal not met  -LR     Row Name 05/10/23 0840          Gait Training Goal 1 (PT)    Activity/Assistive Device (Gait Training Goal 1, PT) gait (walking locomotion);assistive device use  -LR     [place] Level (Gait Training Goal 1, PT) standby assist  -LR     Distance (Gait Training Goal 1, PT) 50'x2  -LR     Time Frame (Gait Training Goal 1, PT) by discharge  -LR     Progress/Outcome (Gait Training Goal 1, PT) goal not met  -LR     Row Name 05/10/23 0840          Therapy Assessment/Plan (PT)    Planned Therapy Interventions (PT) balance training;bed mobility training;gait training;home exercise program;joint mobilization;lumbar stabilization;manual therapy techniques;neuromuscular re-education;orthotic fitting/training;patient/family education;postural re-education;prosthetic fitting/training;ROM (range of motion);stair training;strengthening;stretching;transfer training;vestibular therapy;wheelchair management/propulsion training  -LR           User Key  (r) = Recorded By, (t) = Taken By, (c) = Cosigned By    Initials Name Provider Type    LR Timothy Lopez Physical Therapist               Clinical Impression     Row Name 05/10/23 0840          Pain    Pretreatment Pain Rating 0/10 - no pain  -LR     Posttreatment Pain Rating 0/10 - no pain  -LR     Row Name 05/10/23 0840          Plan of Care Review    Plan of Care Reviewed With patient  -LR     Outcome Evaluation PT eval completed. Patient slightly confused throughout session for example: patient was able to put on her L sock but then she attempt to put a second sock on her L foot even though her R foot had no sock. Patient SBA for  supine>sit transfer with HOB  and bed rails and MinAx1 for sit>supine transfer with HOB up and bed rails. MinAx1 for sit<>stand transfer from ER bed which did not all her feet to touch the ground due to her low height. MinAx1 for lateral stepping 4'x1 with RW. Session limited by patient needing to get echo. Anticipate SNF at d/c.  -LR     Row Name 05/10/23 0840          Therapy Assessment/Plan (PT)    Patient/Family Therapy Goals Statement (PT) Patient wants to return to the nursing home.  -LR     Rehab Potential (PT) good, to achieve stated therapy goals  -LR     Criteria for Skilled Interventions Met (PT) yes;meets criteria;skilled treatment is necessary  -LR     Therapy Frequency (PT) other (see comments)  5-7d/week  -LR     Predicted Duration of Therapy Intervention (PT) Until d/c or until all goals met  -LR     Row Name 05/10/23 0840          Vital Signs    Pre Systolic BP Rehab 149  -LR     Pre Treatment Diastolic BP 72  -LR     Pretreatment Heart Rate (beats/min) 87  -LR     Intratreatment Heart Rate (beats/min) 90  -LR     Posttreatment Heart Rate (beats/min) 90  -LR     Pre SpO2 (%) 100  -LR     O2 Delivery Pre Treatment supplemental O2  10L  -LR     Intra SpO2 (%) 98  -LR     O2 Delivery Intra Treatment supplemental O2  -LR     Post SpO2 (%) 98  -LR     Pre Patient Position Supine  -LR     Intra Patient Position Sitting  -LR     Post Patient Position Supine  -LR     Row Name 05/10/23 0840          Positioning and Restraints    Pre-Treatment Position in bed  -LR     Post Treatment Position bed  -LR     In Bed notified nsg;call light within reach;encouraged to call for assist;exit alarm on;with other staff  -LR           User Key  (r) = Recorded By, (t) = Taken By, (c) = Cosigned By    Initials Name Provider Type    LR Timothy Lopez Physical Therapist               Outcome Measures     Row Name 05/10/23 0840          How much help from another person do you currently need...    Turning from your back to  your side while in flat bed without using bedrails? 3  -LR     Moving from lying on back to sitting on the side of a flat bed without bedrails? 3  -LR     Moving to and from a bed to a chair (including a wheelchair)? 3  -LR     Standing up from a chair using your arms (e.g., wheelchair, bedside chair)? 3  -LR     Climbing 3-5 steps with a railing? 2  -LR     To walk in hospital room? 3  -LR     AM-PAC 6 Clicks Score (PT) 17  -LR     Highest level of mobility 5 --> Static standing  -LR     Row Name 05/10/23 0840          Functional Assessment    Outcome Measure Options AM-PAC 6 Clicks Basic Mobility (PT)  -LR           User Key  (r) = Recorded By, (t) = Taken By, (c) = Cosigned By    Initials Name Provider Type    LR Timothy Lopez Physical Therapist                             Physical Therapy Education     Title: PT OT SLP Therapies (In Progress)     Topic: Physical Therapy (Done)     Point: Mobility training (Done)     Learning Progress Summary           Patient Acceptance, E,TB, VU by LR at 5/10/2023 0846    Comment: Educated on PT POC and goals.                   Point: Home exercise program (Done)     Learning Progress Summary           Patient Acceptance, E,TB, VU by LR at 5/10/2023 0846    Comment: Educated on PT POC and goals.                   Point: Body mechanics (Done)     Learning Progress Summary           Patient Acceptance, E,TB, VU by LR at 5/10/2023 0846    Comment: Educated on PT POC and goals.                   Point: Precautions (Done)     Learning Progress Summary           Patient Acceptance, E,TB, VU by LR at 5/10/2023 0846    Comment: Educated on PT POC and goals.                               User Key     Initials Effective Dates Name Provider Type Discipline    LR 06/16/21 -  Timothy Lopez Physical Therapist PT              PT Recommendation and Plan  Planned Therapy Interventions (PT): balance training, bed mobility training, gait training, home exercise program, joint mobilization,  lumbar stabilization, manual therapy techniques, neuromuscular re-education, orthotic fitting/training, patient/family education, postural re-education, prosthetic fitting/training, ROM (range of motion), stair training, strengthening, stretching, transfer training, vestibular therapy, wheelchair management/propulsion training  Plan of Care Reviewed With: patient  Outcome Evaluation: PT eval completed. Patient slightly confused throughout session for example: patient was able to put on her L sock but then she attempt to put a second sock on her L foot even though her R foot had no sock. Patient SBA for supine>sit transfer with HOB  and bed rails and MinAx1 for sit>supine transfer with HOB up and bed rails. MinAx1 for sit<>stand transfer from ER bed which did not all her feet to touch the ground due to her low height. MinAx1 for lateral stepping 4'x1 with RW. Session limited by patient needing to get echo. Anticipate SNF at d/c.     Time Calculation:    PT Charges     Row Name 05/10/23 1108             Time Calculation    Start Time 0835  -LR      Stop Time 0900  -LR      Time Calculation (min) 25 min  -LR      PT Received On 05/10/23  -LR      PT Goal Re-Cert Due Date 05/23/23  -LR         Untimed Charges    PT Eval/Re-eval Minutes 25  -LR         Total Minutes    Untimed Charges Total Minutes 25  -LR       Total Minutes 25  -LR            User Key  (r) = Recorded By, (t) = Taken By, (c) = Cosigned By    Initials Name Provider Type    LR Timothy Lopez Physical Therapist              Therapy Charges for Today     Code Description Service Date Service Provider Modifiers Qty    [id number] HC PT EVAL MOD COMPLEXITY 2 5/10/2023 Timothy Lopez GP 1          PT G-Codes  Outcome Measure Options: AM-PAC 6 Clicks Basic Mobility (PT)  AM-PAC 6 Clicks Score (PT): 17       Timothy Lopez  5/10/2023

## 2023-05-10 NOTE — ED PROVIDER NOTES
Subjective   History of Present Illness  Patient presents from nursing home with shortness of breath.  This morning, patient had a chest x-ray that showed pneumonia and was given Levaquin 500 mg, prednisone 10 mg and a DuoNeb at around 1700.  Patient wears 2 L of oxygen chronically and was noted to be 77% on those 2 L and was bumped up to 4 L which mildly improved her oxygenation, 8 L gave her 87%.  Patient stated she has had chest pain for the last 2 days and shortness of breath started this morning.        Review of Systems   Constitutional: Positive for fatigue. Negative for activity change and appetite change.   HENT: Negative for congestion and ear pain.    Eyes: Negative for pain and discharge.   Respiratory: Positive for cough and shortness of breath. Negative for chest tightness.    Cardiovascular: Positive for chest pain (x 2 days). Negative for palpitations.   Gastrointestinal: Negative for abdominal distention and abdominal pain.   Endocrine: Negative for cold intolerance and heat intolerance.   Genitourinary: Negative for difficulty urinating and dysuria.   Musculoskeletal: Negative for arthralgias and back pain.   Skin: Negative for color change and rash.   Allergic/Immunologic: Negative for environmental allergies and food allergies.   Neurological: Negative for dizziness and headaches.   Hematological: Negative for adenopathy. Does not bruise/bleed easily.   Psychiatric/Behavioral: Negative for agitation and confusion.       Past Medical History:   Diagnosis Date   • A-fib    • Anxiety    • Arthritis    • Asthma    • 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 with ulceration     bilateral legs - bilateral iliac stents 2008      • CHF (congestive heart failure)    • Chronic lower back pain    • COPD (chronic obstructive pulmonary disease)    • Essential (primary) hypertension    • GERD (gastroesophageal reflux disease)    • History of pulmonary embolus (PE)    • Hyperlipidemia    • Insomnia     • Lumbago    • Nicotine dependence    • Occlusion of artery      and stenosis of bilateral carotid arteries - BABS 16-49%, LICA 0-15%   • Other atherosclerosis of native artery of other extremity     LEFT subclavian stent 2005 (occluded)   • Sleep apnea        Allergies   Allergen Reactions   • Morphine And Related Itching, Confusion and Hallucinations   • Penicillins Rash       Past Surgical History:   Procedure Laterality Date   • CARDIAC CATHETERIZATION  04/06/2016    No evidence of any obstructive epicardial CAD.Preserved LV systolic function with EF of 55%.   • CARDIAC CATHETERIZATION N/A 11/1/2022    Procedure: Left Heart Cath;  Surgeon: Neftali Haywood MD;  Location: [facility] CATH INVASIVE LOCATION;  Service: Cardiology;  Laterality: N/A;   • CENTRAL VENOUS LINE INSERTION  04/07/2016    Successful placement of right uppe extremity 6-Korean triple lumen PICC line.   • ENDOSCOPY N/A 1/12/2018    Procedure: ESOPHAGOGASTRODUODENOSCOPY;  Surgeon: Bin Oh MD;  Location: [facility] ENDOSCOPY;  Service:    • ENDOSCOPY N/A 1/17/2018    Procedure: ESOPHAGOGASTRODUODENOSCOPY;  Surgeon: Bin Oh MD;  Location: [facility] ENDOSCOPY;  Service:    • ENDOSCOPY N/A 3/16/2018    Procedure: ESOPHAGOGASTRODUODENOSCOPY possible dilation;  Surgeon: Bin Oh MD;  Location: [facility] ENDOSCOPY;  Service: Gastroenterology   • FEMORAL POPLITEAL BYPASS Left 4/14/2020    Procedure: FEMORAL POPLITEAL BYPASS ABOVE KNEE  (POLYTETRAFLUOROETHYLENE)  LEFT COMMON FEMORAL ARTERY ENDARTERECTOMY PTA LEFT ILIAC ARTERIOGRAM        (c-arm#2 and c-arm table);  Surgeon: David Suh MD;  Location: [facility] OR;  Service: Vascular;  Laterality: Left;   • FEMORAL POPLITEAL BYPASS Right 9/17/2021    Procedure: RIGHT common femoral endarterectomy FEMORAL POPLITEAL BYPASS (above the knee polytetrafluoroethylene  lower extremity arteriogram              (c-arm#2 and c-arm table);  Surgeon: David Suh MD;  Location: [facility] OR;   Service: Vascular;  Laterality: Right;   • HYSTERECTOMY     • INTERVENTIONAL RADIOLOGY PROCEDURE Left 9/9/2020    Procedure: IR angiogram extremity left;  Surgeon: David Suh MD;  Location: [facility] ANGIO INVASIVE LOCATION;  Service: Interventional Radiology;  Laterality: Left;   • KYPHOPLASTY N/A 5/23/2019    Procedure: KYPHOPLASTY LUMBAR FOUR;  Surgeon: Aba Santa MD;  Location: [facility] OR;  Service: Orthopedic Spine   • TOTAL SHOULDER REPLACEMENT Left    • TRANSESOPHAGEAL ECHOCARDIOGRAM (JACQUES)  04/07/2016    With color flow-Mild to moderate [facility].LV systolic function well preserved with Ef of 55-60%.Mitral and AV intact.Diastolic dysfunction   • UPPER GASTROINTESTINAL ENDOSCOPY  01/17/2018    Dr. Bin Martin M.D.       Family History   Problem Relation Age of Onset   • Heart disease Other    • Hypertension Other        Social History     Socioeconomic History   • Marital status:    Tobacco Use   • Smoking status: Every Day     Packs/day: 1.00     Years: 50.00     Pack years: 50.00     Types: Cigarettes   • Smokeless tobacco: Never   Vaping Use   • Vaping Use: Never used   Substance and Sexual Activity   • Alcohol use: No   • Drug use: No   • Sexual activity: Defer           Objective   Physical Exam  Vitals and nursing note reviewed.   Constitutional:       Appearance: She is well-developed.   HENT:      Head: Normocephalic and atraumatic.   Eyes:      Pupils: Pupils are equal, round, and reactive to light.   Neck:      Thyroid: No thyromegaly.      Vascular: No JVD.      Trachea: No tracheal deviation.   Cardiovascular:      Rate and Rhythm: Normal rate and regular rhythm.      Pulses:           Radial pulses are 2+ on the right side and 2+ on the left side.        Dorsalis pedis pulses are 2+ on the right side and 2+ on the left side.      Heart sounds: Normal heart sounds, S1 normal and S2 normal.   Pulmonary:      Effort: Pulmonary effort is normal.      Breath sounds:  Decreased breath sounds (mild throughout) and wheezing (prolonged exp wheezes-mild throughout) present.      Comments: On nonrebreather with sats 98% on exam  Abdominal:      General: Bowel sounds are normal.   Musculoskeletal:         General: Normal range of motion.   Skin:     General: Skin is warm and dry.      Capillary Refill: Capillary refill takes 2 to 3 seconds.   Neurological:      Mental Status: She is alert and oriented to person, place, and time.      GCS: GCS eye subscore is 4. GCS verbal subscore is 5. GCS motor subscore is 6.   Psychiatric:         Speech: Speech normal.         Behavior: Behavior normal.         Thought Content: Thought content normal.         ECG 12 Lead      Date/Time: 5/10/2023 1:15 AM  Performed by: Jannie Roe MD  Authorized by: Jannie Roe MD   Interpreted by physician  Comparison: compared with previous ECG from 11/2/2022  Comments: Intraventricular block, ventricular rate 59 bpm,  ms, QTc 397 ms                 ED Course           Vitals:    05/09/23 2302 05/09/23 2346 05/10/23 0040 05/10/23 0045   BP: 106/52 127/60 132/63 128/62   BP Location:       Patient Position:       Pulse: 64 60  58   Resp: 16 16     Temp:       TempSrc:       SpO2: 94% 95%  94%   Weight:       Height:          Lab Results (last 24 hours)     Procedure Component Value Units Date/Time    High Sensitivity Troponin T 2Hr [[id number]]  (Abnormal) Collected: 05/10/23 0011    Specimen: Blood Updated: 05/10/23 0041     HS Troponin T 55 ng/L      Comment: Specimen hemolyzed.  Results may be affected.        Troponin T Delta -3 ng/L     Narrative:      High Sensitive Troponin T Reference Range:  <10.0 ng/L- Negative Female for AMI  <15.0 ng/L- Negative Male for AMI  >=10 - Abnormal Female indicating possible myocardial injury.  >=15 - Abnormal Male indicating possible myocardial injury.   Clinicians would have to utilize clinical acumen, EKG, Troponin, and serial changes to determine  if it is an Acute Myocardial Infarction or myocardial injury due to an underlying chronic condition.         High Sensitivity Troponin T [[id number]]  (Abnormal) Collected: 05/09/23 2140    Specimen: Blood Updated: 05/09/23 2218     HS Troponin T 58 ng/L     Narrative:      High Sensitive Troponin T Reference Range:  <10.0 ng/L- Negative Female for AMI  <15.0 ng/L- Negative Male for AMI  >=10 - Abnormal Female indicating possible myocardial injury.  >=15 - Abnormal Male indicating possible myocardial injury.   Clinicians would have to utilize clinical acumen, EKG, Troponin, and serial changes to determine if it is an Acute Myocardial Infarction or myocardial injury due to an underlying chronic condition.         Comprehensive Metabolic Panel [[id number]]  (Abnormal) Collected: 05/09/23 2140    Specimen: Blood Updated: 05/09/23 2217     Glucose 147 mg/dL      BUN 41 mg/dL      Creatinine 1.41 mg/dL      Sodium 136 mmol/L      Potassium 5.2 mmol/L      Chloride 100 mmol/L      CO2 22.0 mmol/L      Calcium 8.5 mg/dL      Total Protein 7.1 g/dL      Albumin 3.7 g/dL      ALT (SGPT) 24 U/L      AST (SGOT) 38 U/L      Alkaline Phosphatase 73 U/L      Total Bilirubin 0.4 mg/dL      Globulin 3.4 gm/dL      A/G Ratio 1.1 g/dL      BUN/Creatinine Ratio 29.1     Anion Gap 14.0 mmol/L      eGFR 40.2 mL/min/1.73     Narrative:      GFR Normal >60  Chronic Kidney Disease <60  Kidney Failure <15      Magnesium [[id number]]  (Normal) Collected: 05/09/23 2140    Specimen: Blood Updated: 05/09/23 2217     Magnesium 1.9 mg/dL     BNP [[id number]]  (Abnormal) Collected: 05/09/23 2140    Specimen: Blood Updated: 05/09/23 2215     proBNP 16,585.0 pg/mL     Narrative:      Among patients with dyspnea, NT-proBNP is highly sensitive for the detection of acute congestive heart failure. In addition NT-proBNP of <300 pg/ml effectively rules out acute congestive heart failure with 99% negative predictive value.      D-dimer, Quantitative  "[[id number]]  (Abnormal) Collected: 05/09/23 2140    Specimen: Blood Updated: 05/09/23 2214     D-Dimer, Quantitative 1,220 ng/mL (FEU)     Narrative:      According to the assay 's published package insert, a normal (<500 ng/mL (FEU)) D-dimer result in conjunction with a non-high clinical probability assessment, excludes deep vein thrombosis (DVT) and pulmonary embolism (PE) with high sensitivity.    D-dimer values increase with age and this can make VTE exclusion of an older population difficult. To address this, the [employer], based on best available evidence and recent guidelines, recommends that clinicians use age-adjusted D-dimer thresholds in patients greater than 50 years of age with: a) a low probability of PE who do not meet all Pulmonary Embolism Rule Out Criteria, or b) in those with intermediate probability of PE.   The formula for an age-adjusted D-dimer cut-off is \"age*10\".  For example, a 60 year old patient would have an age-adjusted cut-off of 600 ng/mL (FEU) and an 80 year old 800 ng/mL (FEU).      CBC & Differential [[id number]]  (Abnormal) Collected: 05/09/23 2140    Specimen: Blood Updated: 05/09/23 2202    Narrative:      The following orders were created for panel order CBC & Differential.  Procedure                               Abnormality         Status                     ---------                               -----------         ------                     CBC Auto Differential[[id number]]        Abnormal            Final result                 Please view results for these tests on the individual orders.    CBC Auto Differential [[id number]]  (Abnormal) Collected: 05/09/23 2140    Specimen: Blood Updated: 05/09/23 2202     WBC 12.27 10*3/mm3      RBC 3.73 10*6/mm3      Hemoglobin 10.9 g/dL      Hematocrit 32.8 %      MCV 87.9 fL      MCH 29.2 pg      MCHC 33.2 g/dL      RDW 14.0 %      RDW-SD 44.0 fl      MPV 10.4 fL      Platelets 191 10*3/mm3      " Neutrophil % 77.0 %      Lymphocyte % 13.2 %      Monocyte % 8.7 %      Eosinophil % 0.0 %      Basophil % 0.7 %      Immature Grans % 0.4 %      Neutrophils, Absolute 9.44 10*3/mm3      Lymphocytes, Absolute 1.62 10*3/mm3      Monocytes, Absolute 1.07 10*3/mm3      Eosinophils, Absolute 0.00 10*3/mm3      Basophils, Absolute 0.09 10*3/mm3      Immature Grans, Absolute 0.05 10*3/mm3      nRBC 0.2 /100 WBC     COVID-19 and FLU A/B PCR - Swab, Nasopharynx [[id number]]  (Normal) Collected: 05/09/23 2024    Specimen: Swab from Nasopharynx Updated: 05/09/23 2049     COVID19 Not Detected     Influenza A PCR Not Detected     Influenza B PCR Not Detected    Narrative:      Fact sheet for providers: https://www.fda.gov/media/927786/download    Fact sheet for patients: https://www.fda.gov/media/164872/download    Test performed by PCR.         No radiology results for the last day                                   Medical Decision Making  Patient with acute on chronic respiratory failure, elevated D-dimer, ALVAREZ, BNP elevated.  Chest x-ray shows bilateral interstitial edema.  Patient is unable to go through CTA to rule out PE as she has an ALVAREZ, will be given high-dose Lasix, and unable to give fluids to hydrate kidneys given patient has fluid overload and is requiring additional oxygen for the fluid in her chest.  Full treatment dose Lovenox was initiated until VQ scan can be performed tomorrow.  Pro-Hakeem pending at time of admission.  Discussed case with Dr. Behroozi, who accepts patient for admission.    Acute and chronic respiratory failure with hypoxia: acute illness or injury  Acute on chronic congestive heart failure, unspecified heart failure type: acute illness or injury  ALVAREZ (acute kidney injury): acute illness or injury  Positive D dimer: acute illness or injury  Amount and/or Complexity of Data Reviewed  Labs: ordered.  Radiology: ordered.  ECG/medicine tests: ordered.      Risk  OTC drugs.  Prescription drug  management.  Decision regarding hospitalization.          Final diagnoses:   Acute and chronic respiratory failure with hypoxia   Acute on chronic congestive heart failure, unspecified heart failure type   Positive D dimer   ALVAREZ (acute kidney injury)       ED Disposition  ED Disposition     ED Disposition   Decision to Admit    Condition   --    Comment   Level of Care: Telemetry [5]   Diagnosis: Acute and chronic respiratory failure with hypoxia [851053]   Admitting Physician: BEHROOZI, SAEID [[id number]]   Attending Physician: BEHROOZI, SAEID [[id number]]               No follow-up provider specified.       Medication List      No changes were made to your prescriptions during this visit.       This document has been electronically signed by Jannie Roe MD on May 10, 2023 01:13 CDT    Jannie Roe MD   Part of this note may be an electronic transcription/translation of spoken language to printed text using the Dragon Dictation System.        Jannie Roe MD  05/10/23 0113       Jannie Roe MD  05/10/23 0120

## 2023-05-10 NOTE — H&P
[facility] Medicine Admission      Date of Admission: 5/9/2023      Primary Care Physician: Anahi Camacho APRN      Chief Complaint: Cough    HPI:    Patient is a 72-year-old female with known past medical history of atrial fibrillation, CHF, COPD prior history of tobacco use, hypertension, pulmonary embolism, hyperlipidemia, hypertension, gastroesophageal reflux disease, resident of a skilled nursing facility was brought to the ER concerning hypoxia.  Per ED report she is normally on 2 L oxygen.  In nursing home she was placed on 4 L oxygen was given Levaquin and 10 mg prednisone.  Patient required increased oxygen.  Paramedics was called and patient was placed on  nonrebreather.  Patient was brought to the ER.  Patient was placed on high flow oxygen in ED 7 to 8 L.  She was given dose of Lasix 60-minute concerning congestive heart failure, and Lovenox 1 mg/kg concerning elevated D-dimer level.  Concerning kidney disease patient was not considered being a candidate for CTA of the chest.  VQ scan was ordered.  Hospitalist service was called for admission of the patient.  I discussed the care with Dr. Roe.      Patient was seen and examined in ED room 17.  Patient denies any particular complaint other than cough.  She reports she had productive cough with greenish sputum for 2 days. She denies any  shortness of breath and she states I did not have any shortness of breath until they told me that I have shortness of breath.  She  denies any fall injury trauma fever chills headache sore throat chest pain shortness of breath dyspnea on exertion syncope near syncope palpitation back pain abdominal painUTI-like symptoms.       Patient with history of left heart catheterization in November 2022 which showed:  1.  Mild nonobstructive coronary artery disease  2.  No culprit lesion identified for the EKG changes in inferior leads  3.  Moderately elevated left-sided filling  pressures  4.  Normal LV systolic function        Concurrent Medical History:  has a past medical history of A-fib, Anxiety, Arthritis, Asthma, 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 with ulceration, CHF (congestive heart failure), Chronic lower back pain, COPD (chronic obstructive pulmonary disease), Essential (primary) hypertension, GERD (gastroesophageal reflux disease), History of pulmonary embolus (PE), Hyperlipidemia, Insomnia, Lumbago, Nicotine dependence, Occlusion of artery, Other atherosclerosis of native artery of other extremity, and Sleep apnea.    Past Surgical History:  has a past surgical history that includes Cardiac catheterization (04/06/2016); Central venous catheter insertion (04/07/2016); transesophageal echocardiogram (yanet) (04/07/2016); Total shoulder replacement (Left); Hysterectomy; Esophagogastroduodenoscopy (N/A, 1/12/2018); Esophagogastroduodenoscopy (N/A, 1/17/2018); Upper gastrointestinal endoscopy (01/17/2018); Esophagogastroduodenoscopy (N/A, 3/16/2018); Kyphoplasty (N/A, 5/23/2019); femoral popliteal bypass (Left, 4/14/2020); Interventional radiology procedure (Left, 9/9/2020); femoral popliteal bypass (Right, 9/17/2021); and Cardiac catheterization (N/A, 11/1/2022).    Family History: family history includes Heart disease in an other family member; Hypertension in an other family member.     Social History:  reports that she has been smoking cigarettes. She has a 50.00 pack-year smoking history. She has never used smokeless tobacco. She reports that she does not drink alcohol and does not use drugs.    Allergies:   Allergies   Allergen Reactions   • Morphine And Related Itching, Confusion and Hallucinations   • Penicillins Rash       Medications:   Prior to Admission medications    Medication Sig Start Date End Date Taking? Authorizing Provider   acetaminophen (TYLENOL) 325 MG tablet Take 1 tablet by mouth Every 6 (Six) Hours As Needed for Mild Pain.   Yes Provider,  MD Miguelito   albuterol (PROVENTIL) (2.5 MG/3ML) 0.083% nebulizer solution Take 2.5 mg by nebulization Every 4 (Four) Hours As Needed for Wheezing.   Yes Miguelito Chatman MD   ALBUTEROL SULFATE HFA IN Inhale 2 puffs Every 4 (Four) Hours As Needed.   Yes Miguelito Chatman MD   amitriptyline (ELAVIL) 25 MG tablet Take 1 tablet by mouth Every Night. 5/8/20  Yes Miguelito Chatman MD   cilostazol (PLETAL) 100 MG tablet Take 1 tablet by mouth 2 (Two) Times a Day.   Yes Miguelito Chatman MD   clopidogrel (PLAVIX) 75 MG tablet Take 1 tablet by mouth Daily. 5/2/22  Yes Jacki Polanco APRN   digoxin (LANOXIN) 125 MCG tablet Take 1 tablet by mouth Daily.   Yes Miguelito Chatman MD   Fluticasone-Salmeterol (ADVAIR/WIXELA) 500-50 MCG/ACT DISKUS Inhale 2 (Two) Times a Day.   Yes Miguelito Chatman MD   furosemide (LASIX) 40 MG tablet Take 1 tablet by mouth Daily. 11/18/22  Yes Vida Conrad APRN   gabapentin (NEURONTIN) 400 MG capsule Take 1 capsule by mouth Every 8 (Eight) Hours for 3 days. 11/17/22 5/9/23 Yes Vida Conrad APRN   ipratropium-albuterol (DUO-NEB) 0.5-2.5 mg/3 ml nebulizer Take 3 mL by nebulization Every 6 (Six) Hours.   Yes Miguelito Chatman MD   levoFLOXacin (LEVAQUIN) 500 MG tablet Take 1 tablet by mouth Daily. 5/9/23 5/15/23 Yes Miguelito Chatman MD   levothyroxine (SYNTHROID, LEVOTHROID) 25 MCG tablet Take 1 tablet by mouth Every Morning.   Yes Miguelito Chatman MD   midodrine (PROAMATINE) 5 MG tablet Take 2 tablets by mouth 3 (Three) Times a Day Before Meals.   Yes Miguelito Chatman MD   NON FORMULARY Biofreeze - apply to knees daily   Yes Miguelito Chatman MD   ondansetron (ZOFRAN) 4 MG tablet Take 1 tablet by mouth Every 8 (Eight) Hours As Needed for Nausea or Vomiting.   Yes Miguelito Chatman MD   oxyCODONE-acetaminophen (PERCOCET) 5-325 MG per tablet Take 1 tablet by mouth Every 6 (Six) Hours As Needed.   Yes Provider, MD Miguelito    pantoprazole (PROTONIX) 40 MG EC tablet Take 1 tablet by mouth Daily. 2/5/18  Yes Mary Shen APRN   predniSONE (DELTASONE) 10 MG (21) dose pack Take as directed on package with food. 11/17/22  Yes Vida Conrad APRN   rivaroxaban (Xarelto) 20 MG tablet Take 1 tablet by mouth Daily With Dinner. 5/2/22  Yes Jacki Polanco APRN   rosuvastatin (CRESTOR) 5 MG tablet Take 1 tablet by mouth Daily.   Yes ProviderMiguelito MD   sennosides-docusate (PERICOLACE) 8.6-50 MG per tablet Take 2 tablets by mouth 2 (Two) Times a Day As Needed for Constipation. 11/17/22  Yes Vida Conrad APRN   sertraline (ZOLOFT) 100 MG tablet Take 1 tablet by mouth Daily.   Yes Miguelito Chatman MD   traZODone (DESYREL) 150 MG tablet Take 1 tablet by mouth Every Night. 2/26/19  Yes Donn Triana MD   vitamin B-12 (CYANOCOBALAMIN) 1000 MCG tablet Take 5 tablets by mouth Daily.   Yes ProviderMiguelito MD   budesonide-formoterol (SYMBICORT) 160-4.5 MCG/ACT inhaler Inhale 2 puffs 2 (Two) Times a Day As Needed.    ProviderMiguelito MD   nicotine (NICODERM CQ) 21 MG/24HR patch Place 1 patch on the skin as directed by provider Daily **Remove old patch before applying new patch** 11/18/22   Vida Conrad APRN   O2 (OXYGEN) Inhale 4 L/min As Needed (shortness of air). 9/6/21   ProviderMiguelito MD   penciclovir (DENAVIR) 1 % cream Apply 1 application topically to the appropriate area as directed As Needed (fever blisters).    ProviderMiguelito MD   pravastatin (PRAVACHOL) 20 MG tablet Take 1 tablet by mouth Every Night. 5/2/22   Jacki Polanco APRN   vitamin D (ERGOCALCIFEROL) 1.25 MG (74628 UT) capsule capsule Take 50,000 Units by mouth 1 (One) Time Per Week.    ProviderMiguelito MD       Review of Systems:  Review of Systems   Constitutional: Negative for chills, diaphoresis, fatigue and fever.   HENT: Negative for congestion, dental problem, ear pain, facial swelling, rhinorrhea and sinus pressure.     Eyes: Negative for photophobia, discharge, redness, itching and visual disturbance.   Respiratory: Positive for cough. Negative for apnea, choking, chest tightness, shortness of breath, wheezing and stridor.    Cardiovascular: Negative for chest pain, palpitations and leg swelling.   Gastrointestinal: Negative for abdominal distention, abdominal pain, anal bleeding, blood in stool, diarrhea, nausea, rectal pain and vomiting.   Endocrine: Negative for cold intolerance, heat intolerance, polydipsia, polyphagia and polyuria.   Genitourinary: Negative for difficulty urinating, flank pain, frequency, hematuria and urgency.   Musculoskeletal: Negative for arthralgias, back pain, joint swelling and myalgias.   Skin: Negative for pallor, rash and wound.   Allergic/Immunologic: Negative for environmental allergies and immunocompromised state.   Neurological: Negative for dizziness, tremors, seizures, facial asymmetry, speech difficulty, weakness, light-headedness, numbness and headaches.   Hematological: Negative for adenopathy. Does not bruise/bleed easily.   Psychiatric/Behavioral: Negative for agitation, behavioral problems and hallucinations. The patient is not nervous/anxious.       Otherwise complete ROS is negative except as mentioned above.    Physical Exam:   Temp:  [98.3 °F (36.8 °C)] 98.3 °F (36.8 °C)  Heart Rate:  [58-64] 58  Resp:  [16-20] 16  BP: ()/(52-63) 128/62  Physical Exam  Constitutional:       General: She is not in acute distress.     Appearance: She is obese. She is not ill-appearing, toxic-appearing or diaphoretic.      Comments: Elderly female obese   HENT:      Head: Normocephalic and atraumatic.      Right Ear: External ear normal.      Left Ear: External ear normal.      Nose: Nose normal.      Mouth/Throat:      Mouth: Mucous membranes are moist.      Pharynx: Oropharynx is clear.   Eyes:      Extraocular Movements: Extraocular movements intact.      Conjunctiva/sclera: Conjunctivae  normal.      Pupils: Pupils are equal, round, and reactive to light.   Cardiovascular:      Rate and Rhythm: Normal rate and regular rhythm.      Heart sounds: No murmur heard.    No friction rub. No gallop.   Pulmonary:      Effort: No respiratory distress.      Breath sounds: No stridor. Wheezing and rhonchi present. No rales.      Comments: Coarse breath sounds bilaterally decreased breath  Chest:      Chest wall: No tenderness.   Abdominal:      General: Abdomen is flat. There is no distension.      Palpations: Abdomen is soft.      Tenderness: There is no abdominal tenderness. There is no guarding or rebound.   Musculoskeletal:         General: No swelling or tenderness.      Cervical back: No rigidity or tenderness.      Right lower leg: Edema (minimal) present.      Left lower leg: Edema (minimal) present.   Lymphadenopathy:      Cervical: No cervical adenopathy.   Skin:     General: Skin is warm and dry.      Coloration: Skin is not jaundiced.      Findings: No erythema.   Neurological:      Mental Status: She is alert and oriented to person, place, and time. Mental status is at baseline.      Sensory: No sensory deficit.      Motor: No weakness.      Coordination: Coordination normal.   Psychiatric:         Mood and Affect: Mood normal.         Behavior: Behavior normal.         Judgment: Judgment normal.           Results Reviewed:  I have personally reviewed current lab, radiology, and data and agree with results.  Lab Results (last 24 hours)     Procedure Component Value Units Date/Time    Digoxin Level [[id number]] Collected: 05/10/23 0011    Specimen: Blood Updated: 05/10/23 0119    High Sensitivity Troponin T 2Hr [[id number]]  (Abnormal) Collected: 05/10/23 0011    Specimen: Blood Updated: 05/10/23 0041     HS Troponin T 55 ng/L      Comment: Specimen hemolyzed.  Results may be affected.        Troponin T Delta -3 ng/L     Narrative:      High Sensitive Troponin T Reference Range:  <10.0 ng/L- Negative  Female for AMI  <15.0 ng/L- Negative Male for AMI  >=10 - Abnormal Female indicating possible myocardial injury.  >=15 - Abnormal Male indicating possible myocardial injury.   Clinicians would have to utilize clinical acumen, EKG, Troponin, and serial changes to determine if it is an Acute Myocardial Infarction or myocardial injury due to an underlying chronic condition.         High Sensitivity Troponin T [[id number]]  (Abnormal) Collected: 05/09/23 2140    Specimen: Blood Updated: 05/09/23 2218     HS Troponin T 58 ng/L     Narrative:      High Sensitive Troponin T Reference Range:  <10.0 ng/L- Negative Female for AMI  <15.0 ng/L- Negative Male for AMI  >=10 - Abnormal Female indicating possible myocardial injury.  >=15 - Abnormal Male indicating possible myocardial injury.   Clinicians would have to utilize clinical acumen, EKG, Troponin, and serial changes to determine if it is an Acute Myocardial Infarction or myocardial injury due to an underlying chronic condition.         Comprehensive Metabolic Panel [[id number]]  (Abnormal) Collected: 05/09/23 2140    Specimen: Blood Updated: 05/09/23 2217     Glucose 147 mg/dL      BUN 41 mg/dL      Creatinine 1.41 mg/dL      Sodium 136 mmol/L      Potassium 5.2 mmol/L      Chloride 100 mmol/L      CO2 22.0 mmol/L      Calcium 8.5 mg/dL      Total Protein 7.1 g/dL      Albumin 3.7 g/dL      ALT (SGPT) 24 U/L      AST (SGOT) 38 U/L      Alkaline Phosphatase 73 U/L      Total Bilirubin 0.4 mg/dL      Globulin 3.4 gm/dL      A/G Ratio 1.1 g/dL      BUN/Creatinine Ratio 29.1     Anion Gap 14.0 mmol/L      eGFR 40.2 mL/min/1.73     Narrative:      GFR Normal >60  Chronic Kidney Disease <60  Kidney Failure <15      Magnesium [[id number]]  (Normal) Collected: 05/09/23 2140    Specimen: Blood Updated: 05/09/23 2217     Magnesium 1.9 mg/dL     BNP [[id number]]  (Abnormal) Collected: 05/09/23 2140    Specimen: Blood Updated: 05/09/23 2215     proBNP 16,585.0 pg/mL     Narrative:       "Among patients with dyspnea, NT-proBNP is highly sensitive for the detection of acute congestive heart failure. In addition NT-proBNP of <300 pg/ml effectively rules out acute congestive heart failure with 99% negative predictive value.      D-dimer, Quantitative [[id number]]  (Abnormal) Collected: 05/09/23 2140    Specimen: Blood Updated: 05/09/23 2214     D-Dimer, Quantitative 1,220 ng/mL (FEU)     Narrative:      According to the assay 's published package insert, a normal (<500 ng/mL (FEU)) D-dimer result in conjunction with a non-high clinical probability assessment, excludes deep vein thrombosis (DVT) and pulmonary embolism (PE) with high sensitivity.    D-dimer values increase with age and this can make VTE exclusion of an older population difficult. To address this, the [employer], based on best available evidence and recent guidelines, recommends that clinicians use age-adjusted D-dimer thresholds in patients greater than 50 years of age with: a) a low probability of PE who do not meet all Pulmonary Embolism Rule Out Criteria, or b) in those with intermediate probability of PE.   The formula for an age-adjusted D-dimer cut-off is \"age*10\".  For example, a 60 year old patient would have an age-adjusted cut-off of 600 ng/mL (FEU) and an 80 year old 800 ng/mL (FEU).      CBC & Differential [[id number]]  (Abnormal) Collected: 05/09/23 2140    Specimen: Blood Updated: 05/09/23 2202    Narrative:      The following orders were created for panel order CBC & Differential.  Procedure                               Abnormality         Status                     ---------                               -----------         ------                     CBC Auto Differential[[id number]]        Abnormal            Final result                 Please view results for these tests on the individual orders.    CBC Auto Differential [[id number]]  (Abnormal) Collected: 05/09/23 2140    Specimen: Blood " Updated: 05/09/23 2202     WBC 12.27 10*3/mm3      RBC 3.73 10*6/mm3      Hemoglobin 10.9 g/dL      Hematocrit 32.8 %      MCV 87.9 fL      MCH 29.2 pg      MCHC 33.2 g/dL      RDW 14.0 %      RDW-SD 44.0 fl      MPV 10.4 fL      Platelets 191 10*3/mm3      Neutrophil % 77.0 %      Lymphocyte % 13.2 %      Monocyte % 8.7 %      Eosinophil % 0.0 %      Basophil % 0.7 %      Immature Grans % 0.4 %      Neutrophils, Absolute 9.44 10*3/mm3      Lymphocytes, Absolute 1.62 10*3/mm3      Monocytes, Absolute 1.07 10*3/mm3      Eosinophils, Absolute 0.00 10*3/mm3      Basophils, Absolute 0.09 10*3/mm3      Immature Grans, Absolute 0.05 10*3/mm3      nRBC 0.2 /100 WBC     COVID-19 and FLU A/B PCR - Swab, Nasopharynx [[id number]]  (Normal) Collected: 05/09/23 2024    Specimen: Swab from Nasopharynx Updated: 05/09/23 2049     COVID19 Not Detected     Influenza A PCR Not Detected     Influenza B PCR Not Detected    Narrative:      Fact sheet for providers: https://www.fda.gov/media/819335/download    Fact sheet for patients: https://www.fda.gov/media/303237/download    Test performed by PCR.        Imaging Results (Last 24 Hours)     Procedure Component Value Units Date/Time    XR Chest 1 View [[id number]] Collected: 05/09/23 2030     Updated: 05/09/23 2219    Narrative:      INDICATION:  shortness of breath.    COMPARISON:  None relevant.    FINDINGS:  Diffuse interstitial opacities throughout the lungs may represent chronic change  or interstitial edema/pneumonia.    Heart is mildly enlarged.  No pneumothorax or sizable pleural fluid.                  Assessment:    Active Hospital Problems    Diagnosis    • **Acute and chronic respiratory failure with hypoxia      # Acute on chronic hypoxemic respiratory failure possible multifactorial  # Pulmonary congestion, pulmonary edema  # Acute heart failure exacerbation with preserved left ventricular ejection fraction  # Potential acute COPD exacerbation  # Renal failure, acute renal  failure cannot be exclude with potential chronic kidney disease stage II  # Supratherapeutic digoxin level  # Elevated troponin level nonspecific   # Elevated proBNP level secondary to heart failure   # Elevated D-dimer level non specific  # Pneumonia cannot be excluded  # Chronic anemia   # Leukocytosis  # hypertension  # Hyperlipidemia  # History of pulmonary embolism  # gastroesophageal reflux disease      Treatment plan:  I discussed the care with ED attending Dr. Roe and patient.  I reviewed independently laboratory work-up imaging studies and EKG.  Obtain to inpatient medical service  Maintain on telemetry  Obtain further laboratory work-up occluding TSH hemoglobin A1c level  Obtain sputum for culture and sensitivity   Procalcitonin level was requested in ED.  Repeat EKG and troponin level in a.m.  Obtain echocardiogram  Placed on broad-spectrum IV antibiotic Rocephin and and DOX-SL for potential pneumonia.  Avoid azithromycin at this time considering interaction with digoxin and concern for elevated digoxin level at this time patient apparently received Levaquin at nursing facility  Place on Lasix 40 mg IV twice daily for CHF exacerbation.  Follow BMP daily considering being on diuretics.    Follow digoxin level in a.m. and tomorrow AM.  We will hold her outpatient digoxin.  Avoid nephrotoxic medication as much as possible  Outpatient medication per electronic record includes Xarelto.  We will restart Xarelto tomorrow night at 2100, considering patient received Lovenox 1 mg/kg  in ED tonight.   Hold her Plavix and Pletal for 5/10/2023 restart on 5/11/2020 to minimize risk of bleeding by receiving Lovenox in ED and being on Xarelto.  Place on DuoNeb every 6 hours and Solu-Medrol supportive therapy for COPD exacerbation and continue home medication.  Accu-Chek ACHS  If patient has significantly elevated blood sugar consider placing on insulin sliding scale.  Continue home medication Synthyroid and obtain TSH  level  Avoid combining medications with tendency for prolongation of QTc.  Comorbidities, chronic medical problems will be treated appropriately  Reconcile home medication and continue with essential home medications.  Please see orders for comprehensive plan.          Medical Decision Making  Number and Complexity of problems: Multiple acute complex medical problems.  Differential Diagnosis: Entertained considered and reflected in orders for    Conditions and Status:        Condition is worsening.     [facility] Data  External documents reviewed:   Echocardiogram in November 2022 showed  •  Left ventricular systolic function is normal. Left ventricular ejection fraction appears to be 56 - 60%.  •  Left ventricular wall thickness is consistent with mild concentric hypertrophy.  •  Left ventricular diastolic function is consistent with (grade II w/high LAP) pseudonormalization.  •  Estimated right ventricular systolic pressure from tricuspid regurgitation is mildly elevated (35-45 mmHg).    Results of left heart catheterization in November 2022 as above entered.    My EKG interpretation: Wide QRS complex rate with , Q-wave in lead to 3 aVF  My plain film interpretation: Interstitial edema, pneumonia cannot be excluded   Tests considered but not ordered:      Decision rules/scores evaluated (example [id number], [place], etc):      Discussed with: ED attending Dr. Roe and patient   I have utilized all available immediate resources to obtain, update, or review the patient's current medications (including all prescriptions, over-the-counter products, herbals, cannabis/cannabidiol products, and vitamin/mineral/dietary (nutritional) supplements).          Care Planning  Shared decision making: ED attending Dr. Roe and patient  Code status and discussions: Patient decided for full code.  Patient decided that her daughter Torri Rivero be her healthcare proxy    Disposition  Social Determinants of Health that impact  treatment or disposition:   I expect the patient to be discharged to nursing facility in 2-3 days.          I confirmed that the patient's Advance Care Plan is present, code status is documented, or surrogate decision maker is listed in the patient's medical record.     I have utilized all available immediate resources to obtain, update, or review the patient's current medications.     I discussed the patient's findings and my recommendations with: Patient and she agreed with above plan of care      Saeid Behroozi, MD   05/10/23   01:21 CDT

## 2023-05-10 NOTE — ED NOTES
"RT in room to stick pt for ABG. Pt states \"you get one shot and that's it.\" RT agrees to patients wishes. RT unable to get ABG stick. Pt is refusing any further sticks from any other RT. Pt is placed on 10L high flow nasal cannula SAT   "

## 2023-05-10 NOTE — PAYOR COMM NOTE
"[facility]  Case Managment Extender   Herminia Red  (P) [phone]  (F) [phone]        REF#[id number]  Filomena Perales (72 y.o. Female)     Date of Birth   1950    Social Security Number       Address   [address]  [place] [address]    Home Phone   [phone]    MRN   [id number]       [employer]   [facility]    Marital Status                               Admission Date   5/9/23    Admission Type   Emergency    Admitting Provider   Behroozi, Saeid, MD    Attending Provider   Michele Nunn MD    Department, Room/Bed   [facility] EMERGENCY DEPARTMENT, 17/17       Discharge Date       Discharge Disposition       Discharge Destination                               Attending Provider: Michele Nunn MD    Allergies: Morphine And Related, Penicillins    Isolation: None   Infection: None   Code Status: CPR    Ht: 132.1 cm (52\")   Wt: 56.7 kg (125 lb)    Admission Cmt: None   Principal Problem: Acute and chronic respiratory failure with hypoxia [J96.21]                 Active Insurance as of 5/9/2023     Primary Coverage     Payor Plan Insurance Group Employer/Plan Group    ANTHEM MEDICARE REPLACEMENT ANTHEM MEDICARE ADVANTAGE [id number]     Payor Plan Address Payor Plan Phone Number Payor Plan Fax Number Effective Dates    [address] [phone]  1/1/2023 - None Entered    [facility] [id number]       Subscriber Name Subscriber Birth Date Member ID       FILOMENA PERALES 1950 [id number]                 Emergency Contacts      (Rel.) Home Phone Work Phone Mobile Phone    Renee Li (Grandchild) -- -- [phone]    Meena Marie (Friend) -- -- [phone]    MatOtrri (Daughter) [phone] -- [phone]               History & Physical      Behroozi, Saeid, MD at 05/10/23 0121                [facility] Medicine Admission      Date of Admission: " 5/9/2023      Primary Care Physician: Anahi Camacho APRN      Chief Complaint: Cough    HPI:    Patient is a 72-year-old female with known past medical history of atrial fibrillation, CHF, COPD prior history of tobacco use, hypertension, pulmonary embolism, hyperlipidemia, hypertension, gastroesophageal reflux disease, resident of a skilled nursing facility was brought to the ER concerning hypoxia.  Per ED report she is normally on 2 L oxygen.  In nursing home she was placed on 4 L oxygen was given Levaquin and 10 mg prednisone.  Patient required increased oxygen.  Paramedics was called and patient was placed on  nonrebreather.  Patient was brought to the ER.  Patient was placed on high flow oxygen in ED 7 to 8 L.  She was given dose of Lasix 60-minute concerning congestive heart failure, and Lovenox 1 mg/kg concerning elevated D-dimer level.  Concerning kidney disease patient was not considered being a candidate for CTA of the chest.  VQ scan was ordered.  Hospitalist service was called for admission of the patient.  I discussed the care with Dr. Roe.      Patient was seen and examined in ED room 17.  Patient denies any particular complaint other than cough.  She reports she had productive cough with greenish sputum for 2 days. She denies any  shortness of breath and she states I did not have any shortness of breath until they told me that I have shortness of breath.  She  denies any fall injury trauma fever chills headache sore throat chest pain shortness of breath dyspnea on exertion syncope near syncope palpitation back pain abdominal painUTI-like symptoms.       Patient with history of left heart catheterization in November 2022 which showed:  1.  Mild nonobstructive coronary artery disease  2.  No culprit lesion identified for the EKG changes in inferior leads  3.  Moderately elevated left-sided filling pressures  4.  Normal LV systolic function        Concurrent Medical History:  has a past medical  history of A-fib, Anxiety, Arthritis, Asthma, 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 with ulceration, CHF (congestive heart failure), Chronic lower back pain, COPD (chronic obstructive pulmonary disease), Essential (primary) hypertension, GERD (gastroesophageal reflux disease), History of pulmonary embolus (PE), Hyperlipidemia, Insomnia, Lumbago, Nicotine dependence, Occlusion of artery, Other atherosclerosis of native artery of other extremity, and Sleep apnea.    Past Surgical History:  has a past surgical history that includes Cardiac catheterization (04/06/2016); Central venous catheter insertion (04/07/2016); transesophageal echocardiogram (yanet) (04/07/2016); Total shoulder replacement (Left); Hysterectomy; Esophagogastroduodenoscopy (N/A, 1/12/2018); Esophagogastroduodenoscopy (N/A, 1/17/2018); Upper gastrointestinal endoscopy (01/17/2018); Esophagogastroduodenoscopy (N/A, 3/16/2018); Kyphoplasty (N/A, 5/23/2019); femoral popliteal bypass (Left, 4/14/2020); Interventional radiology procedure (Left, 9/9/2020); femoral popliteal bypass (Right, 9/17/2021); and Cardiac catheterization (N/A, 11/1/2022).    Family History: family history includes Heart disease in an other family member; Hypertension in an other family member.     Social History:  reports that she has been smoking cigarettes. She has a 50.00 pack-year smoking history. She has never used smokeless tobacco. She reports that she does not drink alcohol and does not use drugs.    Allergies:   Allergies   Allergen Reactions   • Morphine And Related Itching, Confusion and Hallucinations   • Penicillins Rash       Medications:   Prior to Admission medications    Medication Sig Start Date End Date Taking? Authorizing Provider   acetaminophen (TYLENOL) 325 MG tablet Take 1 tablet by mouth Every 6 (Six) Hours As Needed for Mild Pain.   Yes Provider, MD Miguelito   albuterol (PROVENTIL) (2.5 MG/3ML) 0.083% nebulizer solution Take 2.5 mg by  nebulization Every 4 (Four) Hours As Needed for Wheezing.   Yes Miguelito Chatman MD   ALBUTEROL SULFATE HFA IN Inhale 2 puffs Every 4 (Four) Hours As Needed.   Yes Miguelito Chatman MD   amitriptyline (ELAVIL) 25 MG tablet Take 1 tablet by mouth Every Night. 5/8/20  Yes Miguelito Chatman MD   cilostazol (PLETAL) 100 MG tablet Take 1 tablet by mouth 2 (Two) Times a Day.   Yes Miguelito Chatman MD   clopidogrel (PLAVIX) 75 MG tablet Take 1 tablet by mouth Daily. 5/2/22  Yes Jacki Polanco APRN   digoxin (LANOXIN) 125 MCG tablet Take 1 tablet by mouth Daily.   Yes Miguelito Chatman MD   Fluticasone-Salmeterol (ADVAIR/WIXELA) 500-50 MCG/ACT DISKUS Inhale 2 (Two) Times a Day.   Yes Miguelito Chatman MD   furosemide (LASIX) 40 MG tablet Take 1 tablet by mouth Daily. 11/18/22  Yes Vida Conrad APRN   gabapentin (NEURONTIN) 400 MG capsule Take 1 capsule by mouth Every 8 (Eight) Hours for 3 days. 11/17/22 5/9/23 Yes Vida Conrad APRN   ipratropium-albuterol (DUO-NEB) 0.5-2.5 mg/3 ml nebulizer Take 3 mL by nebulization Every 6 (Six) Hours.   Yes Miguelito Chatman MD   levoFLOXacin (LEVAQUIN) 500 MG tablet Take 1 tablet by mouth Daily. 5/9/23 5/15/23 Yes Miguelito Chatman MD   levothyroxine (SYNTHROID, LEVOTHROID) 25 MCG tablet Take 1 tablet by mouth Every Morning.   Yes Miguelito Chatman MD   midodrine (PROAMATINE) 5 MG tablet Take 2 tablets by mouth 3 (Three) Times a Day Before Meals.   Yes Miguelito Chatman MD   NON FORMULARY Biofreeze - apply to knees daily   Yes Miguelito Chatman MD   ondansetron (ZOFRAN) 4 MG tablet Take 1 tablet by mouth Every 8 (Eight) Hours As Needed for Nausea or Vomiting.   Yes Miguelito Chatman MD   oxyCODONE-acetaminophen (PERCOCET) 5-325 MG per tablet Take 1 tablet by mouth Every 6 (Six) Hours As Needed.   Yes Miguelito Chatman MD   pantoprazole (PROTONIX) 40 MG EC tablet Take 1 tablet by mouth Daily. 2/5/18  Yes Mary Shen,  DELFINO   predniSONE (DELTASONE) 10 MG (21) dose pack Take as directed on package with food. 11/17/22  Yes Vida Conrad APRN   rivaroxaban (Xarelto) 20 MG tablet Take 1 tablet by mouth Daily With Dinner. 5/2/22  Yes Jacki Polanco APRN   rosuvastatin (CRESTOR) 5 MG tablet Take 1 tablet by mouth Daily.   Yes ProviderMiguelito MD   sennosides-docusate (PERICOLACE) 8.6-50 MG per tablet Take 2 tablets by mouth 2 (Two) Times a Day As Needed for Constipation. 11/17/22  Yes Vida Conrad APRN   sertraline (ZOLOFT) 100 MG tablet Take 1 tablet by mouth Daily.   Yes Miguelito Chatman MD   traZODone (DESYREL) 150 MG tablet Take 1 tablet by mouth Every Night. 2/26/19  Yes Donn Triana MD   vitamin B-12 (CYANOCOBALAMIN) 1000 MCG tablet Take 5 tablets by mouth Daily.   Yes Miguelito Chatman MD   budesonide-formoterol (SYMBICORT) 160-4.5 MCG/ACT inhaler Inhale 2 puffs 2 (Two) Times a Day As Needed.    ProviderMiguelito MD   nicotine (NICODERM CQ) 21 MG/24HR patch Place 1 patch on the skin as directed by provider Daily **Remove old patch before applying new patch** 11/18/22   Vida Conrad APRN   O2 (OXYGEN) Inhale 4 L/min As Needed (shortness of air). 9/6/21   ProviderMiguelito MD   penciclovir (DENAVIR) 1 % cream Apply 1 application topically to the appropriate area as directed As Needed (fever blisters).    ProviderMiguelito MD   pravastatin (PRAVACHOL) 20 MG tablet Take 1 tablet by mouth Every Night. 5/2/22   Jacki Polanco APRN   vitamin D (ERGOCALCIFEROL) 1.25 MG (27712 UT) capsule capsule Take 50,000 Units by mouth 1 (One) Time Per Week.    ProviderMiguelito MD       Review of Systems:  Review of Systems   Constitutional: Negative for chills, diaphoresis, fatigue and fever.   HENT: Negative for congestion, dental problem, ear pain, facial swelling, rhinorrhea and sinus pressure.    Eyes: Negative for photophobia, discharge, redness, itching and visual disturbance.    Respiratory: Positive for cough. Negative for apnea, choking, chest tightness, shortness of breath, wheezing and stridor.    Cardiovascular: Negative for chest pain, palpitations and leg swelling.   Gastrointestinal: Negative for abdominal distention, abdominal pain, anal bleeding, blood in stool, diarrhea, nausea, rectal pain and vomiting.   Endocrine: Negative for cold intolerance, heat intolerance, polydipsia, polyphagia and polyuria.   Genitourinary: Negative for difficulty urinating, flank pain, frequency, hematuria and urgency.   Musculoskeletal: Negative for arthralgias, back pain, joint swelling and myalgias.   Skin: Negative for pallor, rash and wound.   Allergic/Immunologic: Negative for environmental allergies and immunocompromised state.   Neurological: Negative for dizziness, tremors, seizures, facial asymmetry, speech difficulty, weakness, light-headedness, numbness and headaches.   Hematological: Negative for adenopathy. Does not bruise/bleed easily.   Psychiatric/Behavioral: Negative for agitation, behavioral problems and hallucinations. The patient is not nervous/anxious.       Otherwise complete ROS is negative except as mentioned above.    Physical Exam:   Temp:  [98.3 °F (36.8 °C)] 98.3 °F (36.8 °C)  Heart Rate:  [58-64] 58  Resp:  [16-20] 16  BP: ()/(52-63) 128/62  Physical Exam  Constitutional:       General: She is not in acute distress.     Appearance: She is obese. She is not ill-appearing, toxic-appearing or diaphoretic.      Comments: Elderly female obese   HENT:      Head: Normocephalic and atraumatic.      Right Ear: External ear normal.      Left Ear: External ear normal.      Nose: Nose normal.      Mouth/Throat:      Mouth: Mucous membranes are moist.      Pharynx: Oropharynx is clear.   Eyes:      Extraocular Movements: Extraocular movements intact.      Conjunctiva/sclera: Conjunctivae normal.      Pupils: Pupils are equal, round, and reactive to light.   Cardiovascular:       Rate and Rhythm: Normal rate and regular rhythm.      Heart sounds: No murmur heard.    No friction rub. No gallop.   Pulmonary:      Effort: No respiratory distress.      Breath sounds: No stridor. Wheezing and rhonchi present. No rales.      Comments: Coarse breath sounds bilaterally decreased breath  Chest:      Chest wall: No tenderness.   Abdominal:      General: Abdomen is flat. There is no distension.      Palpations: Abdomen is soft.      Tenderness: There is no abdominal tenderness. There is no guarding or rebound.   Musculoskeletal:         General: No swelling or tenderness.      Cervical back: No rigidity or tenderness.      Right lower leg: Edema (minimal) present.      Left lower leg: Edema (minimal) present.   Lymphadenopathy:      Cervical: No cervical adenopathy.   Skin:     General: Skin is warm and dry.      Coloration: Skin is not jaundiced.      Findings: No erythema.   Neurological:      Mental Status: She is alert and oriented to person, place, and time. Mental status is at baseline.      Sensory: No sensory deficit.      Motor: No weakness.      Coordination: Coordination normal.   Psychiatric:         Mood and Affect: Mood normal.         Behavior: Behavior normal.         Judgment: Judgment normal.           Results Reviewed:  I have personally reviewed current lab, radiology, and data and agree with results.  Lab Results (last 24 hours)     Procedure Component Value Units Date/Time    Digoxin Level [[id number]] Collected: 05/10/23 0011    Specimen: Blood Updated: 05/10/23 0119    High Sensitivity Troponin T 2Hr [[id number]]  (Abnormal) Collected: 05/10/23 0011    Specimen: Blood Updated: 05/10/23 0041     HS Troponin T 55 ng/L      Comment: Specimen hemolyzed.  Results may be affected.        Troponin T Delta -3 ng/L     Narrative:      High Sensitive Troponin T Reference Range:  <10.0 ng/L- Negative Female for AMI  <15.0 ng/L- Negative Male for AMI  >=10 - Abnormal Female indicating  possible myocardial injury.  >=15 - Abnormal Male indicating possible myocardial injury.   Clinicians would have to utilize clinical acumen, EKG, Troponin, and serial changes to determine if it is an Acute Myocardial Infarction or myocardial injury due to an underlying chronic condition.         High Sensitivity Troponin T [[id number]]  (Abnormal) Collected: 05/09/23 2140    Specimen: Blood Updated: 05/09/23 2218     HS Troponin T 58 ng/L     Narrative:      High Sensitive Troponin T Reference Range:  <10.0 ng/L- Negative Female for AMI  <15.0 ng/L- Negative Male for AMI  >=10 - Abnormal Female indicating possible myocardial injury.  >=15 - Abnormal Male indicating possible myocardial injury.   Clinicians would have to utilize clinical acumen, EKG, Troponin, and serial changes to determine if it is an Acute Myocardial Infarction or myocardial injury due to an underlying chronic condition.         Comprehensive Metabolic Panel [[id number]]  (Abnormal) Collected: 05/09/23 2140    Specimen: Blood Updated: 05/09/23 2217     Glucose 147 mg/dL      BUN 41 mg/dL      Creatinine 1.41 mg/dL      Sodium 136 mmol/L      Potassium 5.2 mmol/L      Chloride 100 mmol/L      CO2 22.0 mmol/L      Calcium 8.5 mg/dL      Total Protein 7.1 g/dL      Albumin 3.7 g/dL      ALT (SGPT) 24 U/L      AST (SGOT) 38 U/L      Alkaline Phosphatase 73 U/L      Total Bilirubin 0.4 mg/dL      Globulin 3.4 gm/dL      A/G Ratio 1.1 g/dL      BUN/Creatinine Ratio 29.1     Anion Gap 14.0 mmol/L      eGFR 40.2 mL/min/1.73     Narrative:      GFR Normal >60  Chronic Kidney Disease <60  Kidney Failure <15      Magnesium [[id number]]  (Normal) Collected: 05/09/23 2140    Specimen: Blood Updated: 05/09/23 2217     Magnesium 1.9 mg/dL     BNP [[id number]]  (Abnormal) Collected: 05/09/23 2140    Specimen: Blood Updated: 05/09/23 2215     proBNP 16,585.0 pg/mL     Narrative:      Among patients with dyspnea, NT-proBNP is highly sensitive for the detection of acute  "congestive heart failure. In addition NT-proBNP of <300 pg/ml effectively rules out acute congestive heart failure with 99% negative predictive value.      D-dimer, Quantitative [[id number]]  (Abnormal) Collected: 05/09/23 2140    Specimen: Blood Updated: 05/09/23 2214     D-Dimer, Quantitative 1,220 ng/mL (FEU)     Narrative:      According to the assay 's published package insert, a normal (<500 ng/mL (FEU)) D-dimer result in conjunction with a non-high clinical probability assessment, excludes deep vein thrombosis (DVT) and pulmonary embolism (PE) with high sensitivity.    D-dimer values increase with age and this can make VTE exclusion of an older population difficult. To address this, the American College of Physicians, based on best available evidence and recent guidelines, recommends that clinicians use age-adjusted D-dimer thresholds in patients greater than 50 years of age with: a) a low probability of PE who do not meet all Pulmonary Embolism Rule Out Criteria, or b) in those with intermediate probability of PE.   The formula for an age-adjusted D-dimer cut-off is \"age*10\".  For example, a 60 year old patient would have an age-adjusted cut-off of 600 ng/mL (FEU) and an 80 year old 800 ng/mL (FEU).      CBC & Differential [[id number]]  (Abnormal) Collected: 05/09/23 2140    Specimen: Blood Updated: 05/09/23 2202    Narrative:      The following orders were created for panel order CBC & Differential.  Procedure                               Abnormality         Status                     ---------                               -----------         ------                     CBC Auto Differential[[id number]]        Abnormal            Final result                 Please view results for these tests on the individual orders.    CBC Auto Differential [[id number]]  (Abnormal) Collected: 05/09/23 2140    Specimen: Blood Updated: 05/09/23 2202     WBC 12.27 10*3/mm3      RBC 3.73 10*6/mm3      Hemoglobin " 10.9 g/dL      Hematocrit 32.8 %      MCV 87.9 fL      MCH 29.2 pg      MCHC 33.2 g/dL      RDW 14.0 %      RDW-SD 44.0 fl      MPV 10.4 fL      Platelets 191 10*3/mm3      Neutrophil % 77.0 %      Lymphocyte % 13.2 %      Monocyte % 8.7 %      Eosinophil % 0.0 %      Basophil % 0.7 %      Immature Grans % 0.4 %      Neutrophils, Absolute 9.44 10*3/mm3      Lymphocytes, Absolute 1.62 10*3/mm3      Monocytes, Absolute 1.07 10*3/mm3      Eosinophils, Absolute 0.00 10*3/mm3      Basophils, Absolute 0.09 10*3/mm3      Immature Grans, Absolute 0.05 10*3/mm3      nRBC 0.2 /100 WBC     COVID-19 and FLU A/B PCR - Swab, Nasopharynx [[id number]]  (Normal) Collected: 05/09/23 2024    Specimen: Swab from Nasopharynx Updated: 05/09/23 2049     COVID19 Not Detected     Influenza A PCR Not Detected     Influenza B PCR Not Detected    Narrative:      Fact sheet for providers: https://www.fda.gov/media/733325/download    Fact sheet for patients: https://www.fda.gov/media/830879/download    Test performed by PCR.        Imaging Results (Last 24 Hours)     Procedure Component Value Units Date/Time    XR Chest 1 View [[id number]] Collected: 05/09/23 2030     Updated: 05/09/23 2219    Narrative:      INDICATION:  shortness of breath.    COMPARISON:  None relevant.    FINDINGS:  Diffuse interstitial opacities throughout the lungs may represent chronic change  or interstitial edema/pneumonia.    Heart is mildly enlarged.  No pneumothorax or sizable pleural fluid.                  Assessment:    Active Hospital Problems    Diagnosis    • **Acute and chronic respiratory failure with hypoxia      # Acute on chronic hypoxemic respiratory failure possible multifactorial  # Pulmonary congestion, pulmonary edema  # Acute heart failure exacerbation with preserved left ventricular ejection fraction  # Potential acute COPD exacerbation  # Renal failure, acute renal failure cannot be exclude with potential chronic kidney disease stage II  #  Supratherapeutic digoxin level  # Elevated troponin level nonspecific   # Elevated proBNP level secondary to heart failure   # Elevated D-dimer level non specific  # Pneumonia cannot be excluded  # Chronic anemia   # Leukocytosis  # hypertension  # Hyperlipidemia  # History of pulmonary embolism  # gastroesophageal reflux disease      Treatment plan:  I discussed the care with ED attending Dr. Roe and patient.  I reviewed independently laboratory work-up imaging studies and EKG.  Obtain to inpatient medical service  Maintain on telemetry  Obtain further laboratory work-up occluding TSH hemoglobin A1c level  Obtain sputum for culture and sensitivity   Procalcitonin level was requested in ED.  Repeat EKG and troponin level in a.m.  Obtain echocardiogram  Placed on broad-spectrum IV antibiotic Rocephin and and DOX-SL for potential pneumonia.  Avoid azithromycin at this time considering interaction with digoxin and concern for elevated digoxin level at this time patient apparently received Levaquin at nursing facility  Place on Lasix 40 mg IV twice daily for CHF exacerbation.  Follow BMP daily considering being on diuretics.    Follow digoxin level in a.m. and tomorrow AM.  We will hold her outpatient digoxin.  Avoid nephrotoxic medication as much as possible  Outpatient medication per electronic record includes Xarelto.  We will restart Xarelto tomorrow night at 2100, considering patient received Lovenox 1 mg/kg  in ED tonight.   Hold her Plavix and Pletal for 5/10/2023 restart on 5/11/2020 to minimize risk of bleeding by receiving Lovenox in ED and being on Xarelto.  Place on DuoNeb every 6 hours and Solu-Medrol supportive therapy for COPD exacerbation and continue home medication.  Accu-Chek ACHS  If patient has significantly elevated blood sugar consider placing on insulin sliding scale.  Continue home medication Synthyroid and obtain TSH level  Avoid combining medications with tendency for prolongation of  QTc.  Comorbidities, chronic medical problems will be treated appropriately  Reconcile home medication and continue with essential home medications.  Please see orders for comprehensive plan.          Medical Decision Making  Number and Complexity of problems: Multiple acute complex medical problems.  Differential Diagnosis: Entertained considered and reflected in orders for    Conditions and Status:        Condition is worsening.     [facility] Data  External documents reviewed:   Echocardiogram in November 2022 showed  •  Left ventricular systolic function is normal. Left ventricular ejection fraction appears to be 56 - 60%.  •  Left ventricular wall thickness is consistent with mild concentric hypertrophy.  •  Left ventricular diastolic function is consistent with (grade II w/high LAP) pseudonormalization.  •  Estimated right ventricular systolic pressure from tricuspid regurgitation is mildly elevated (35-45 mmHg).    Results of left heart catheterization in November 2022 as above entered.    My EKG interpretation: Wide QRS complex rate with , Q-wave in lead to 3 aVF  My plain film interpretation: Interstitial edema, pneumonia cannot be excluded   Tests considered but not ordered:      Decision rules/scores evaluated (example [id number], Wells, etc):      Discussed with: ED attending Dr. Roe and patient   I have utilized all available immediate resources to obtain, update, or review the patient's current medications (including all prescriptions, over-the-counter products, herbals, cannabis/cannabidiol products, and vitamin/mineral/dietary (nutritional) supplements).          Care Planning  Shared decision making: ED attending Dr. Roe and patient  Code status and discussions: Patient decided for full code.  Patient decided that her daughter Torri Rivero be her healthcare proxy    Disposition  Social Determinants of Health that impact treatment or disposition:   I expect the patient to be discharged to  nursing facility in 2-3 days.          I confirmed that the patient's Advance Care Plan is present, code status is documented, or surrogate decision maker is listed in the patient's medical record.     I have utilized all available immediate resources to obtain, update, or review the patient's current medications.     I discussed the patient's findings and my recommendations with: Patient and she agreed with above plan of care      Saeid Behroozi, MD   05/10/23   01:21 CDT                Electronically signed by Behroozi, Saeid, MD at 05/10/23 0458          Emergency Department Notes      Gracie Wray, RN at 05/09/23 2003        Per Nichol at [facility], pt had CXR today and was DX with pneumonia per xray. Nurse states pt wears O2 at 2L PNC at baseline. States pt was bumped up to 4L P[facility] and sats were 79%. Reports pt was then placed on NRB at 8L (as that is all their oxygen tanks will handle). Pt was given Levaquin 500mg PO, Prednisone 10mg PO and a DuoNeb at 1700. Pt at baseline is A&O x 3, ambulatory per self, and full code status.    Electronically signed by Gracie Wray RN at 05/09/23 2006     Jannie Roe MD at 05/09/23 2021      Procedure Orders    1. ECG 12 Lead [[id number]] ordered by Jannie Roe MD               Subjective   History of Present Illness  Patient presents from nursing home with shortness of breath.  This morning, patient had a chest x-ray that showed pneumonia and was given Levaquin 500 mg, prednisone 10 mg and a DuoNeb at around 1700.  Patient wears 2 L of oxygen chronically and was noted to be 77% on those 2 L and was bumped up to 4 L which mildly improved her oxygenation, 8 L gave her 87%.  Patient stated she has had chest pain for the last 2 days and shortness of breath started this morning.        Review of Systems   Constitutional: Positive for fatigue. Negative for activity change and appetite change.   HENT: Negative for congestion and ear  pain.    Eyes: Negative for pain and discharge.   Respiratory: Positive for cough and shortness of breath. Negative for chest tightness.    Cardiovascular: Positive for chest pain (x 2 days). Negative for palpitations.   Gastrointestinal: Negative for abdominal distention and abdominal pain.   Endocrine: Negative for cold intolerance and heat intolerance.   Genitourinary: Negative for difficulty urinating and dysuria.   Musculoskeletal: Negative for arthralgias and back pain.   Skin: Negative for color change and rash.   Allergic/Immunologic: Negative for environmental allergies and food allergies.   Neurological: Negative for dizziness and headaches.   Hematological: Negative for adenopathy. Does not bruise/bleed easily.   Psychiatric/Behavioral: Negative for agitation and confusion.       Past Medical History:   Diagnosis Date   • A-fib    • Anxiety    • Arthritis    • Asthma    • 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 with ulceration     bilateral legs - bilateral iliac stents 2008      • CHF (congestive heart failure)    • Chronic lower back pain    • COPD (chronic obstructive pulmonary disease)    • Essential (primary) hypertension    • GERD (gastroesophageal reflux disease)    • History of pulmonary embolus (PE)    • Hyperlipidemia    • Insomnia    • Lumbago    • Nicotine dependence    • Occlusion of artery      and stenosis of bilateral carotid arteries - BABS 16-49%, LICA 0-15%   • Other atherosclerosis of native artery of other extremity     LEFT subclavian stent 2005 (occluded)   • Sleep apnea        Allergies   Allergen Reactions   • Morphine And Related Itching, Confusion and Hallucinations   • Penicillins Rash       Past Surgical History:   Procedure Laterality Date   • CARDIAC CATHETERIZATION  04/06/2016    No evidence of any obstructive epicardial CAD.Preserved LV systolic function with EF of 55%.   • CARDIAC CATHETERIZATION N/A 11/1/2022    Procedure: Left Heart Cath;  Surgeon: Sultan  MD Neftali;  Location: [facility] CATH INVASIVE LOCATION;  Service: Cardiology;  Laterality: N/A;   • CENTRAL VENOUS LINE INSERTION  04/07/2016    Successful placement of right uppe extremity 6-[place] triple lumen PICC line.   • ENDOSCOPY N/A 1/12/2018    Procedure: ESOPHAGOGASTRODUODENOSCOPY;  Surgeon: Bin Oh MD;  Location: [facility] ENDOSCOPY;  Service:    • ENDOSCOPY N/A 1/17/2018    Procedure: ESOPHAGOGASTRODUODENOSCOPY;  Surgeon: Bin Oh MD;  Location: [facility] ENDOSCOPY;  Service:    • ENDOSCOPY N/A 3/16/2018    Procedure: ESOPHAGOGASTRODUODENOSCOPY possible dilation;  Surgeon: Bin Oh MD;  Location: [facility] ENDOSCOPY;  Service: Gastroenterology   • FEMORAL POPLITEAL BYPASS Left 4/14/2020    Procedure: FEMORAL POPLITEAL BYPASS ABOVE KNEE  (POLYTETRAFLUOROETHYLENE)  LEFT COMMON FEMORAL ARTERY ENDARTERECTOMY PTA LEFT ILIAC ARTERIOGRAM        (c-arm#2 and c-arm table);  Surgeon: David Suh MD;  Location: [facility] OR;  Service: Vascular;  Laterality: Left;   • FEMORAL POPLITEAL BYPASS Right 9/17/2021    Procedure: RIGHT common femoral endarterectomy FEMORAL POPLITEAL BYPASS (above the knee polytetrafluoroethylene  lower extremity arteriogram              (c-arm#2 and c-arm table);  Surgeon: David Suh MD;  Location: [facility] OR;  Service: Vascular;  Laterality: Right;   • HYSTERECTOMY     • INTERVENTIONAL RADIOLOGY PROCEDURE Left 9/9/2020    Procedure: IR angiogram extremity left;  Surgeon: David Suh MD;  Location: [facility] ANGIO INVASIVE LOCATION;  Service: Interventional Radiology;  Laterality: Left;   • KYPHOPLASTY N/A 5/23/2019    Procedure: KYPHOPLASTY LUMBAR FOUR;  Surgeon: Aba Santa MD;  Location: [facility] OR;  Service: Orthopedic Spine   • TOTAL SHOULDER REPLACEMENT Left    • TRANSESOPHAGEAL ECHOCARDIOGRAM (JACQUES)  04/07/2016    With color flow-Mild to moderate [facility].LV systolic function well preserved with Ef of 55-60%.Mitral and AV  intact.Diastolic dysfunction   • UPPER GASTROINTESTINAL ENDOSCOPY  01/17/2018    Dr. Bin Martin M.D.       Family History   Problem Relation Age of Onset   • Heart disease Other    • Hypertension Other        Social History     Socioeconomic History   • Marital status:    Tobacco Use   • Smoking status: Every Day     Packs/day: 1.00     Years: 50.00     Pack years: 50.00     Types: Cigarettes   • Smokeless tobacco: Never   Vaping Use   • Vaping Use: Never used   Substance and Sexual Activity   • Alcohol use: No   • Drug use: No   • Sexual activity: Defer           Objective   Physical Exam  Vitals and nursing note reviewed.   Constitutional:       Appearance: She is well-developed.   HENT:      Head: Normocephalic and atraumatic.   Eyes:      Pupils: Pupils are equal, round, and reactive to light.   Neck:      Thyroid: No thyromegaly.      Vascular: No JVD.      Trachea: No tracheal deviation.   Cardiovascular:      Rate and Rhythm: Normal rate and regular rhythm.      Pulses:           Radial pulses are 2+ on the right side and 2+ on the left side.        Dorsalis pedis pulses are 2+ on the right side and 2+ on the left side.      Heart sounds: Normal heart sounds, S1 normal and S2 normal.   Pulmonary:      Effort: Pulmonary effort is normal.      Breath sounds: Decreased breath sounds (mild throughout) and wheezing (prolonged exp wheezes-mild throughout) present.      Comments: On nonrebreather with sats 98% on exam  Abdominal:      General: Bowel sounds are normal.   Musculoskeletal:         General: Normal range of motion.   Skin:     General: Skin is warm and dry.      Capillary Refill: Capillary refill takes 2 to 3 seconds.   Neurological:      Mental Status: She is alert and oriented to person, place, and time.      GCS: GCS eye subscore is 4. GCS verbal subscore is 5. GCS motor subscore is 6.   Psychiatric:         Speech: Speech normal.         Behavior: Behavior normal.         Thought  Content: Thought content normal.         ECG 12 Lead      Date/Time: 5/10/2023 1:15 AM  Performed by: Jannie Roe MD  Authorized by: Jannie Roe MD   Interpreted by physician  Comparison: compared with previous ECG from 11/2/2022  Comments: Intraventricular block, ventricular rate 59 bpm,  ms, QTc 397 ms                ED Course           Vitals:    05/09/23 2302 05/09/23 2346 05/10/23 0040 05/10/23 0045   BP: 106/52 127/60 132/63 128/62   BP Location:       Patient Position:       Pulse: 64 60  58   Resp: 16 16     Temp:       TempSrc:       SpO2: 94% 95%  94%   Weight:       Height:          Lab Results (last 24 hours)     Procedure Component Value Units Date/Time    High Sensitivity Troponin T 2Hr [[id number]]  (Abnormal) Collected: 05/10/23 0011    Specimen: Blood Updated: 05/10/23 0041     HS Troponin T 55 ng/L      Comment: Specimen hemolyzed.  Results may be affected.        Troponin T Delta -3 ng/L     Narrative:      High Sensitive Troponin T Reference Range:  <10.0 ng/L- Negative Female for AMI  <15.0 ng/L- Negative Male for AMI  >=10 - Abnormal Female indicating possible myocardial injury.  >=15 - Abnormal Male indicating possible myocardial injury.   Clinicians would have to utilize clinical acumen, EKG, Troponin, and serial changes to determine if it is an Acute Myocardial Infarction or myocardial injury due to an underlying chronic condition.         High Sensitivity Troponin T [[id number]]  (Abnormal) Collected: 05/09/23 2140    Specimen: Blood Updated: 05/09/23 2218     HS Troponin T 58 ng/L     Narrative:      High Sensitive Troponin T Reference Range:  <10.0 ng/L- Negative Female for AMI  <15.0 ng/L- Negative Male for AMI  >=10 - Abnormal Female indicating possible myocardial injury.  >=15 - Abnormal Male indicating possible myocardial injury.   Clinicians would have to utilize clinical acumen, EKG, Troponin, and serial changes to determine if it is an Acute Myocardial  Infarction or myocardial injury due to an underlying chronic condition.         Comprehensive Metabolic Panel [[id number]]  (Abnormal) Collected: 05/09/23 2140    Specimen: Blood Updated: 05/09/23 2217     Glucose 147 mg/dL      BUN 41 mg/dL      Creatinine 1.41 mg/dL      Sodium 136 mmol/L      Potassium 5.2 mmol/L      Chloride 100 mmol/L      CO2 22.0 mmol/L      Calcium 8.5 mg/dL      Total Protein 7.1 g/dL      Albumin 3.7 g/dL      ALT (SGPT) 24 U/L      AST (SGOT) 38 U/L      Alkaline Phosphatase 73 U/L      Total Bilirubin 0.4 mg/dL      Globulin 3.4 gm/dL      A/G Ratio 1.1 g/dL      BUN/Creatinine Ratio 29.1     Anion Gap 14.0 mmol/L      eGFR 40.2 mL/min/1.73     Narrative:      GFR Normal >60  Chronic Kidney Disease <60  Kidney Failure <15      Magnesium [[id number]]  (Normal) Collected: 05/09/23 2140    Specimen: Blood Updated: 05/09/23 2217     Magnesium 1.9 mg/dL     BNP [[id number]]  (Abnormal) Collected: 05/09/23 2140    Specimen: Blood Updated: 05/09/23 2215     proBNP 16,585.0 pg/mL     Narrative:      Among patients with dyspnea, NT-proBNP is highly sensitive for the detection of acute congestive heart failure. In addition NT-proBNP of <300 pg/ml effectively rules out acute congestive heart failure with 99% negative predictive value.      D-dimer, Quantitative [[id number]]  (Abnormal) Collected: 05/09/23 2140    Specimen: Blood Updated: 05/09/23 2214     D-Dimer, Quantitative 1,220 ng/mL (FEU)     Narrative:      According to the assay 's published package insert, a normal (<500 ng/mL (FEU)) D-dimer result in conjunction with a non-high clinical probability assessment, excludes deep vein thrombosis (DVT) and pulmonary embolism (PE) with high sensitivity.    D-dimer values increase with age and this can make VTE exclusion of an older population difficult. To address this, the American [employer], based on best available evidence and recent guidelines, recommends that clinicians  "use age-adjusted D-dimer thresholds in patients greater than 50 years of age with: a) a low probability of PE who do not meet all Pulmonary Embolism Rule Out Criteria, or b) in those with intermediate probability of PE.   The formula for an age-adjusted D-dimer cut-off is \"age*10\".  For example, a 60 year old patient would have an age-adjusted cut-off of 600 ng/mL (FEU) and an 80 year old 800 ng/mL (FEU).      CBC & Differential [[id number]]  (Abnormal) Collected: 05/09/23 2140    Specimen: Blood Updated: 05/09/23 2202    Narrative:      The following orders were created for panel order CBC & Differential.  Procedure                               Abnormality         Status                     ---------                               -----------         ------                     CBC Auto Differential[[id number]]        Abnormal            Final result                 Please view results for these tests on the individual orders.    CBC Auto Differential [[id number]]  (Abnormal) Collected: 05/09/23 2140    Specimen: Blood Updated: 05/09/23 2202     WBC 12.27 10*3/mm3      RBC 3.73 10*6/mm3      Hemoglobin 10.9 g/dL      Hematocrit 32.8 %      MCV 87.9 fL      MCH 29.2 pg      MCHC 33.2 g/dL      RDW 14.0 %      RDW-SD 44.0 fl      MPV 10.4 fL      Platelets 191 10*3/mm3      Neutrophil % 77.0 %      Lymphocyte % 13.2 %      Monocyte % 8.7 %      Eosinophil % 0.0 %      Basophil % 0.7 %      Immature Grans % 0.4 %      Neutrophils, Absolute 9.44 10*3/mm3      Lymphocytes, Absolute 1.62 10*3/mm3      Monocytes, Absolute 1.07 10*3/mm3      Eosinophils, Absolute 0.00 10*3/mm3      Basophils, Absolute 0.09 10*3/mm3      Immature Grans, Absolute 0.05 10*3/mm3      nRBC 0.2 /100 WBC     COVID-19 and FLU A/B PCR - Swab, Nasopharynx [[id number]]  (Normal) Collected: 05/09/23 2024    Specimen: Swab from Nasopharynx Updated: 05/09/23 2049     COVID19 Not Detected     Influenza A PCR Not Detected     Influenza B PCR Not Detected    " Narrative:      Fact sheet for providers: https://www.fda.gov/media/224614/download    Fact sheet for patients: https://www.fda.gov/media/524622/download    Test performed by PCR.         No radiology results for the last day                                   Medical Decision Making  Patient with acute on chronic respiratory failure, elevated D-dimer, ALVAREZ, BNP elevated.  Chest x-ray shows bilateral interstitial edema.  Patient is unable to go through CTA to rule out PE as she has an ALVAREZ, will be given high-dose Lasix, and unable to give fluids to hydrate kidneys given patient has fluid overload and is requiring additional oxygen for the fluid in her chest.  Full treatment dose Lovenox was initiated until VQ scan can be performed tomorrow.  Pro-Hakeem pending at time of admission.  Discussed case with Dr. Behroozi, who accepts patient for admission.    Acute and chronic respiratory failure with hypoxia: acute illness or injury  Acute on chronic congestive heart failure, unspecified heart failure type: acute illness or injury  ALVAREZ (acute kidney injury): acute illness or injury  Positive D dimer: acute illness or injury  Amount and/or Complexity of Data Reviewed  Labs: ordered.  Radiology: ordered.  ECG/medicine tests: ordered.      Risk  OTC drugs.  Prescription drug management.  Decision regarding hospitalization.          Final diagnoses:   Acute and chronic respiratory failure with hypoxia   Acute on chronic congestive heart failure, unspecified heart failure type   Positive D dimer   ALVAREZ (acute kidney injury)       ED Disposition  ED Disposition     ED Disposition   Decision to Admit    Condition   --    Comment   Level of Care: Telemetry [5]   Diagnosis: Acute and chronic respiratory failure with hypoxia [952420]   Admitting Physician: BEHROOZI, SAEID [[id number]]   Attending Physician: BEHROOZI, SAEID [[id number]]               No follow-up provider specified.       Medication List      No changes were made to your  prescriptions during this visit.       This document has been electronically signed by Jannie Roe MD on May 10, 2023 01:13 CDT    Jannie Roe MD   Part of this note may be an electronic transcription/translation of spoken language to printed text using the Dragon Dictation System.        Jannie Roe MD  05/10/23 0113       Jannie Roe MD  05/10/23 0120      Electronically signed by Jannie Roe MD at 05/10/23 0120     Ivory Hoyos RN at 05/09/23 2132        Unable to obtain blood x2 attempts    Electronically signed by Ivory Hoyos RN at 05/09/23 2132     Kristin Hill PCT at 05/09/23 2148        Pure wick was placed on patient.     Electronically signed by Kristin Hill PCT at 05/09/23 2148     Ivory Hoyos RN at 05/09/23 2323        Per lab, there is no phlebotomist available to draw labs.     Electronically signed by Ivory Hoyos RN at 05/09/23 2323     Ivory Hoyos RN at 05/10/23 0012          [facility]  [facility]  72 [facility]  [facility]    HPI:   Chief Complaint   Patient presents with   • Shortness of Breath   • Chest Pain       Admitting doctor:   No admitting provider for patient encounter.    Consulting provider(s):  Consults       No orders found for last 30 day(s).             Admitting diagnosis:   The primary encounter diagnosis was Acute and chronic respiratory failure with hypoxia. Diagnoses of Acute on chronic congestive heart failure, unspecified heart failure type, Positive D dimer, and ALVAREZ (acute kidney injury) were also pertinent to this visit.    Code status:   Current Code Status       Date Active Code Status Order ID Comments User Context       Prior            Allergies:   Morphine and related and Penicillins    Intake and Output  No intake or output data in the 24 hours ending 05/10/23 0012    Weight:       05/09/23 2020   Weight: 56.7 kg (125 lb)       Most recent vitals:   Vitals:    05/09/23  2227 05/09/23 2245 05/09/23 2302 05/09/23 2346   BP: 123/58 120/57 106/52 127/60   BP Location:       Patient Position:       Pulse: 64 60 64 60   Resp: 16 16 16 16   Temp:       TempSrc:       SpO2: 94% 91% 94% 95%   Weight:       Height:         Oxygen Therapy: 10 L high flow nasal cannula    Active LDAs/IV Access:   Lines, Drains & Airways       Active LDAs       Name Placement date Placement time Site Days    Peripheral IV 05/09/23 2132 Anterior;Left Forearm 05/09/23 2132  Forearm  less than 1                    Labs (abnormal labs have a star):   Labs Reviewed   COMPREHENSIVE METABOLIC PANEL - Abnormal; Notable for the following components:       Result Value    Glucose 147 (*)     BUN 41 (*)     Creatinine 1.41 (*)     Calcium 8.5 (*)     AST (SGOT) 38 (*)     BUN/Creatinine Ratio 29.1 (*)     eGFR 40.2 (*)     All other components within normal limits    Narrative:     GFR Normal >60  Chronic Kidney Disease <60  Kidney Failure <15     CBC WITH AUTO DIFFERENTIAL - Abnormal; Notable for the following components:    WBC 12.27 (*)     RBC 3.73 (*)     Hemoglobin 10.9 (*)     Hematocrit 32.8 (*)     Neutrophil % 77.0 (*)     Lymphocyte % 13.2 (*)     Eosinophil % 0.0 (*)     Neutrophils, Absolute 9.44 (*)     Monocytes, Absolute 1.07 (*)     All other components within normal limits   BNP (IN-HOUSE) - Abnormal; Notable for the following components:    proBNP 16,585.0 (*)     All other components within normal limits    Narrative:     Among patients with dyspnea, NT-proBNP is highly sensitive for the detection of acute congestive heart failure. In addition NT-proBNP of <300 pg/ml effectively rules out acute congestive heart failure with 99% negative predictive value.     TROPONIN - Abnormal; Notable for the following components:    HS Troponin T 58 (*)     All other components within normal limits    Narrative:     High Sensitive Troponin T Reference Range:  <10.0 ng/L- Negative Female for AMI  <15.0 ng/L-  "Negative Male for AMI  >=10 - Abnormal Female indicating possible myocardial injury.  >=15 - Abnormal Male indicating possible myocardial injury.   Clinicians would have to utilize clinical acumen, EKG, Troponin, and serial changes to determine if it is an Acute Myocardial Infarction or myocardial injury due to an underlying chronic condition.        D-DIMER, QUANTITATIVE - Abnormal; Notable for the following components:    D-Dimer, Quantitative 1,220 (*)     All other components within normal limits    Narrative:     According to the assay 's published package insert, a normal (<500 ng/mL (FEU)) D-dimer result in conjunction with a non-high clinical probability assessment, excludes deep vein thrombosis (DVT) and pulmonary embolism (PE) with high sensitivity.    D-dimer values increase with age and this can make VTE exclusion of an older population difficult. To address this, the [employer], based on best available evidence and recent guidelines, recommends that clinicians use age-adjusted D-dimer thresholds in patients greater than 50 years of age with: a) a low probability of PE who do not meet all Pulmonary Embolism Rule Out Criteria, or b) in those with intermediate probability of PE.   The formula for an age-adjusted D-dimer cut-off is \"age*10\".  For example, a 60 year old patient would have an age-adjusted cut-off of 600 ng/mL (FEU) and an 80 year old 800 ng/mL (FEU).     COVID-19 AND FLU A/B, NP SWAB IN TRANSPORT MEDIA 8-12 HR TAT - Normal    Narrative:     Fact sheet for providers: https://www.fda.gov/media/225054/download    Fact sheet for patients: https://www.fda.gov/media/485044/download    Test performed by PCR.   MAGNESIUM - Normal   BLOOD GAS, ARTERIAL   HIGH SENSITIVITIY TROPONIN T 2HR   CBC AND DIFFERENTIAL    Narrative:     The following orders were created for panel order CBC & Differential.  Procedure                               Abnormality         Status         "             ---------                               -----------         ------                     CBC Auto Differential[[id number]]        Abnormal            Final result                 Please view results for these tests on the individual orders.       Meds given in ED:   Medications   sodium chloride 0.9 % flush 10 mL (has no administration in time range)   ipratropium-albuterol (DUO-NEB) nebulizer solution 3 mL (3 mL Nebulization Given 5/9/23 2104)   Pharmacy to Dose enoxaparin (LOVENOX) (has no administration in time range)   furosemide (LASIX) injection 60 mg (60 mg Intravenous Given 5/9/23 2251)   Enoxaparin Sodium (LOVENOX) syringe 60 mg (60 mg Subcutaneous Given 5/9/23 2256)     Pharmacy to Dose enoxaparin (LOVENOX),          NIH Stroke Scale:       Isolation/Infection(s):  No active isolations   COVID (rule out)     COVID Testing  Collected 5/9/23  Resulted NEG    Nursing report ED to floor:  Mental status: Pt appears to have dementia and has become more confused the later it gets. A+O x4 on arrival at ED. Some confusion about place and situation at time of this note.  Ambulatory status: Assist x1 (uses walker at nursing home)  Precautions: None    ED nurse phone extentsion- [phone]    Electronically signed by Ivory Hoyos RN at 05/10/23 0014     Leann Moreno, PCT at 05/10/23 0224        This tech attempted blood draw x2, unable to obtain    Electronically signed by Leann Moreno PCT at 05/10/23 0224         Lab Results (last 24 hours)     Procedure Component Value Units Date/Time    POC Glucose Once [[id number]]  (Normal) Collected: 05/10/23 0738    Specimen: Blood Updated: 05/10/23 0818     Glucose 120 mg/dL      Comment: Result Not ConfirmedOperator: [id number] DAHIANA REBAMeter ID: [id number]       CBC (No Diff) [[id number]]  (Abnormal) Collected: 05/10/23 0706    Specimen: Blood Updated: 05/10/23 0726     WBC 13.52 10*3/mm3      RBC 4.25 10*6/mm3      Hemoglobin 12.0  g/dL      Hematocrit 39.5 %      MCV 92.9 fL      MCH 28.2 pg      MCHC 30.4 g/dL      RDW 14.2 %      RDW-SD 47.9 fl      MPV 10.7 fL      Platelets 135 10*3/mm3     High Sensitivity Troponin T [[id number]]  (Abnormal) Collected: 05/10/23 0546    Specimen: Blood Updated: 05/10/23 0717     HS Troponin T 55 ng/L     Narrative:      High Sensitive Troponin T Reference Range:  <10.0 ng/L- Negative Female for AMI  <15.0 ng/L- Negative Male for AMI  >=10 - Abnormal Female indicating possible myocardial injury.  >=15 - Abnormal Male indicating possible myocardial injury.   Clinicians would have to utilize clinical acumen, EKG, Troponin, and serial changes to determine if it is an Acute Myocardial Infarction or myocardial injury due to an underlying chronic condition.         T4, Free [[id number]]  (Normal) Collected: 05/10/23 0546    Specimen: Blood Updated: 05/10/23 0711     Free T4 0.97 ng/dL     Narrative:      Results may be falsely increased if patient taking Biotin.      TSH [[id number]]  (Normal) Collected: 05/10/23 0546    Specimen: Blood Updated: 05/10/23 0711     TSH 1.500 uIU/mL     Magnesium [[id number]]  (Normal) Collected: 05/10/23 0546    Specimen: Blood Updated: 05/10/23 0706     Magnesium 1.8 mg/dL     Basic Metabolic Panel [[id number]]  (Abnormal) Collected: 05/10/23 0546    Specimen: Blood Updated: 05/10/23 0706     Glucose 99 mg/dL      BUN 37 mg/dL      Creatinine 1.23 mg/dL      Sodium 138 mmol/L      Potassium 4.3 mmol/L      Chloride 97 mmol/L      CO2 26.0 mmol/L      Calcium 8.6 mg/dL      BUN/Creatinine Ratio 30.1     Anion Gap 15.0 mmol/L      eGFR 47.4 mL/min/1.73     Narrative:      GFR Normal >60  Chronic Kidney Disease <60  Kidney Failure <15      Digoxin Level [[id number]]  (Abnormal) Collected: 05/10/23 0546    Specimen: Blood Updated: 05/10/23 0706     Digoxin 2.30 ng/mL     Blood Gas, Arterial - [[id number]]  (Abnormal) Collected: 05/10/23 0702    Specimen: Arterial Blood Updated: 05/10/23  "0703     Site Arterial Line     Artur's Test N/A     pH, Arterial 7.365 pH units      pCO2, Arterial 51.5 mm Hg      Comment: 83 Value above reference range        pO2, Arterial 75.3 mm Hg      Comment: 84 Value below reference range        HCO3, Arterial 29.4 mmol/L      Comment: 83 Value above reference range        Base Excess, Arterial 3.2 mmol/L      Comment: 83 Value above reference range        O2 Saturation, Arterial 93.6 %      Comment: 84 Value below reference range        Barometric Pressure for Blood Gas 752 mmHg      Modality HFNC     Flow Rate 10.0 lpm      Ventilator Mode NA     Collected by      Comment: Meter: [id number]     :  [id number]       Hemoglobin A1c [[id number]]  (Abnormal) Collected: 05/09/23 2140    Specimen: Blood Updated: 05/10/23 0659     Hemoglobin A1C 6.50 %     Narrative:      Hemoglobin A1C Ranges:    Increased Risk for Diabetes  5.7% to 6.4%  Diabetes                     >= 6.5%  Diabetic Goal                < 7.0%    Procalcitonin [[id number]]  (Normal) Collected: 05/10/23 0011    Specimen: Blood Updated: 05/10/23 0312     Procalcitonin 0.07 ng/mL     Narrative:      As a Marker for Sepsis (Non-Neonates):    1. <0.5 ng/mL represents a low risk of severe sepsis and/or septic shock.  2. >2 ng/mL represents a high risk of severe sepsis and/or septic shock.    As a Marker for Lower Respiratory Tract Infections that require antibiotic therapy:    PCT on Admission    Antibiotic Therapy       6-12 Hrs later    >0.5                Strongly Recommended  >0.25 - <0.5        Recommended   0.1 - 0.25          Discouraged              Remeasure/reassess PCT  <0.1                Strongly Discouraged     Remeasure/reassess PCT    As 28 day mortality risk marker: \"Change in Procalcitonin Result\" (>80% or <=80%) if Day 0 (or Day 1) and Day 4 values are available. Refer to http://www.[employer]-pct-calculator.com    Change in PCT <=80%  A decrease of PCT levels below or equal to 80% " defines a positive change in PCT test result representing a higher risk for 28-day all-cause mortality of patients diagnosed with severe sepsis for septic shock.    Change in PCT >80%  A decrease of PCT levels of more than 80% defines a negative change in PCT result representing a lower risk for 28-day all-cause mortality of patients diagnosed with severe sepsis or septic shock.       Digoxin Level [[id number]]  (Abnormal) Collected: 05/10/23 0011    Specimen: Blood Updated: 05/10/23 0136     Digoxin 2.60 ng/mL     High Sensitivity Troponin T 2Hr [[id number]]  (Abnormal) Collected: 05/10/23 0011    Specimen: Blood Updated: 05/10/23 0041     HS Troponin T 55 ng/L      Comment: Specimen hemolyzed.  Results may be affected.        Troponin T Delta -3 ng/L     Narrative:      High Sensitive Troponin T Reference Range:  <10.0 ng/L- Negative Female for AMI  <15.0 ng/L- Negative Male for AMI  >=10 - Abnormal Female indicating possible myocardial injury.  >=15 - Abnormal Male indicating possible myocardial injury.   Clinicians would have to utilize clinical acumen, EKG, Troponin, and serial changes to determine if it is an Acute Myocardial Infarction or myocardial injury due to an underlying chronic condition.         High Sensitivity Troponin T [[id number]]  (Abnormal) Collected: 05/09/23 2140    Specimen: Blood Updated: 05/09/23 2218     HS Troponin T 58 ng/L     Narrative:      High Sensitive Troponin T Reference Range:  <10.0 ng/L- Negative Female for AMI  <15.0 ng/L- Negative Male for AMI  >=10 - Abnormal Female indicating possible myocardial injury.  >=15 - Abnormal Male indicating possible myocardial injury.   Clinicians would have to utilize clinical acumen, EKG, Troponin, and serial changes to determine if it is an Acute Myocardial Infarction or myocardial injury due to an underlying chronic condition.         Comprehensive Metabolic Panel [[id number]]  (Abnormal) Collected: 05/09/23 2140    Specimen: Blood Updated:  05/09/23 2217     Glucose 147 mg/dL      BUN 41 mg/dL      Creatinine 1.41 mg/dL      Sodium 136 mmol/L      Potassium 5.2 mmol/L      Chloride 100 mmol/L      CO2 22.0 mmol/L      Calcium 8.5 mg/dL      Total Protein 7.1 g/dL      Albumin 3.7 g/dL      ALT (SGPT) 24 U/L      AST (SGOT) 38 U/L      Alkaline Phosphatase 73 U/L      Total Bilirubin 0.4 mg/dL      Globulin 3.4 gm/dL      A/G Ratio 1.1 g/dL      BUN/Creatinine Ratio 29.1     Anion Gap 14.0 mmol/L      eGFR 40.2 mL/min/1.73     Narrative:      GFR Normal >60  Chronic Kidney Disease <60  Kidney Failure <15      Magnesium [[id number]]  (Normal) Collected: 05/09/23 2140    Specimen: Blood Updated: 05/09/23 2217     Magnesium 1.9 mg/dL     BNP [[id number]]  (Abnormal) Collected: 05/09/23 2140    Specimen: Blood Updated: 05/09/23 2215     proBNP 16,585.0 pg/mL     Narrative:      Among patients with dyspnea, NT-proBNP is highly sensitive for the detection of acute congestive heart failure. In addition NT-proBNP of <300 pg/ml effectively rules out acute congestive heart failure with 99% negative predictive value.      D-dimer, Quantitative [[id number]]  (Abnormal) Collected: 05/09/23 2140    Specimen: Blood Updated: 05/09/23 2214     D-Dimer, Quantitative 1,220 ng/mL (FEU)     Narrative:      According to the assay 's published package insert, a normal (<500 ng/mL (FEU)) D-dimer result in conjunction with a non-high clinical probability assessment, excludes deep vein thrombosis (DVT) and pulmonary embolism (PE) with high sensitivity.    D-dimer values increase with age and this can make VTE exclusion of an older population difficult. To address this, the American College [employer], based on best available evidence and recent guidelines, recommends that clinicians use age-adjusted D-dimer thresholds in patients greater than 50 years of age with: a) a low probability of PE who do not meet all Pulmonary Embolism Rule Out Criteria, or b) in those with  "intermediate probability of PE.   The formula for an age-adjusted D-dimer cut-off is \"age*10\".  For example, a 60 year old patient would have an age-adjusted cut-off of 600 ng/mL (FEU) and an 80 year old 800 ng/mL (FEU).      CBC & Differential [[id number]]  (Abnormal) Collected: 05/09/23 2140    Specimen: Blood Updated: 05/09/23 2202    Narrative:      The following orders were created for panel order CBC & Differential.  Procedure                               Abnormality         Status                     ---------                               -----------         ------                     CBC Auto Differential[[id number]]        Abnormal            Final result                 Please view results for these tests on the individual orders.    CBC Auto Differential [[id number]]  (Abnormal) Collected: 05/09/23 2140    Specimen: Blood Updated: 05/09/23 2202     WBC 12.27 10*3/mm3      RBC 3.73 10*6/mm3      Hemoglobin 10.9 g/dL      Hematocrit 32.8 %      MCV 87.9 fL      MCH 29.2 pg      MCHC 33.2 g/dL      RDW 14.0 %      RDW-SD 44.0 fl      MPV 10.4 fL      Platelets 191 10*3/mm3      Neutrophil % 77.0 %      Lymphocyte % 13.2 %      Monocyte % 8.7 %      Eosinophil % 0.0 %      Basophil % 0.7 %      Immature Grans % 0.4 %      Neutrophils, Absolute 9.44 10*3/mm3      Lymphocytes, Absolute 1.62 10*3/mm3      Monocytes, Absolute 1.07 10*3/mm3      Eosinophils, Absolute 0.00 10*3/mm3      Basophils, Absolute 0.09 10*3/mm3      Immature Grans, Absolute 0.05 10*3/mm3      nRBC 0.2 /100 WBC     COVID-19 and FLU A/B PCR - Swab, Nasopharynx [[id number]]  (Normal) Collected: 05/09/23 2024    Specimen: Swab from Nasopharynx Updated: 05/09/23 2049     COVID19 Not Detected     Influenza A PCR Not Detected     Influenza B PCR Not Detected    Narrative:      Fact sheet for providers: https://www.fda.gov/media/158327/download    Fact sheet for patients: https://www.fda.gov/media/704784/download    Test performed by PCR.    "     Imaging Results (Last 24 Hours)     Procedure Component Value Units Date/Time    XR Chest 1 View [[id number]] Collected: 05/09/23 2030     Updated: 05/09/23 2219    Narrative:      INDICATION:  shortness of breath.    COMPARISON:  None relevant.    FINDINGS:  Diffuse interstitial opacities throughout the lungs may represent chronic change  or interstitial edema/pneumonia.    Heart is mildly enlarged.  No pneumothorax or sizable pleural fluid.              ECG/EMG Results (last 24 hours)     Procedure Component Value Units Date/Time    ECG 12 Lead Dyspnea [[id number]] Collected: 05/09/23 2014     Updated: 05/09/23 2140     QT Interval 374 ms      QTC Interval 376 ms     Narrative:      Test Reason : Dyspnea  Blood Pressure :   */*   mmHG  Vent. Rate :  61 BPM     Atrial Rate :  61 BPM     P-R Int :   * ms          QRS Dur : 156 ms      QT Int : 374 ms       P-R-T Axes :   *  27  65 degrees     QTc Int : 376 ms    Wide QRS rhythm  Nonspecific intraventricular block  Possible Inferior infarct , age undetermined  Abnormal ECG  When compared with ECG of 02-NOV-2022 07:17,  Wide QRS rhythm has replaced Sinus rhythm    Referred By:            Confirmed By:     ECG 12 Lead [[id number]] Resulted: 05/10/23 0120     Updated: 05/10/23 0120    ECG 12 Lead Chest Pain [[id number]] Collected: 05/09/23 2303     Updated: 05/10/23 0531     QT Interval 402 ms      QTC Interval 397 ms     Narrative:      Test Reason : Chest Pain  Blood Pressure :   */*   mmHG  Vent. Rate :  59 BPM     Atrial Rate :  58 BPM     P-R Int :   * ms          QRS Dur : 144 ms      QT Int : 402 ms       P-R-T Axes :   *   4  13 degrees     QTc Int : 397 ms    Wide QRS rhythm  Nonspecific intraventricular block  Inferior infarct , age undetermined  Cannot rule out Anterior infarct , age undetermined  Abnormal ECG  When compared with ECG of 09-MAY-2023 20:14,  No significant change was found    Referred By:            Confirmed By:     ECG 12 Lead Other; Troponin  [[id number]] Collected: 05/10/23 0902     Updated: 05/10/23 0953     QT Interval 360 ms      QTC Interval 428 ms     Narrative:      Test Reason : Other~  Blood Pressure :   */*   mmHG  Vent. Rate :  85 BPM     Atrial Rate :  85 BPM     P-R Int : 162 ms          QRS Dur :  98 ms      QT Int : 360 ms       P-R-T Axes :  49 -19 201 degrees     QTc Int : 428 ms    Normal sinus rhythm  Left atrial enlargement  Left ventricular hypertrophy  Inferior infarct , age undetermined  Marked ST abnormality, possible lateral subendocardial injury  Abnormal ECG  When compared with ECG of 09-MAY-2023 23:03,  Sinus rhythm has replaced Wide QRS rhythm    Referred By:            Confirmed By:           Physician Progress Notes (last 24 hours)  Notes from 05/09/23 1007 through 05/10/23 1007   No notes of this type exist for this encounter.         Medical Student Notes (last 24 hours)  Notes from 05/09/23 1007 through 05/10/23 1007   No notes of this type exist for this encounter.         Consult Notes (last 24 hours)  Notes from 05/09/23 1007 through 05/10/23 1007   No notes of this type exist for this encounter.

## 2023-05-10 NOTE — PLAN OF CARE
Goal Outcome Evaluation:  Plan of Care Reviewed With: patient           Outcome Evaluation: PT eval completed. Patient slightly confused throughout session for example: patient was able to put on her L sock but then she attempt to put a second sock on her L foot even though her R foot had no sock. Patient SBA for supine>sit transfer with HOB  and bed rails and MinAx1 for sit>supine transfer with HOB up and bed rails. MinAx1 for sit<>stand transfer from ER bed which did not all her feet to touch the ground due to her low height. MinAx1 for lateral stepping 4'x1 with RW. Session limited by patient needing to get echo. Anticipate SNF at d/c.      PT Evaluation Complexity  History, PT Evaluation Complexity: 3 or more personal factors and/or comorbidities  Examination of Body Systems (PT Eval Complexity): total of 4 or more elements  Clinical Presentation (PT Evaluation Complexity): evolving  Clinical Decision Making (PT Evaluation Complexity): moderate complexity  Overall Complexity (PT Evaluation Complexity): moderate complexity

## 2023-05-10 NOTE — ED TRIAGE NOTES
Per Nichol at [facility], pt had CXR today and was DX with pneumonia per xray. Nurse states pt wears O2 at 2L PNC at baseline. States pt was bumped up to 4L PNC and sats were 79%. Reports pt was then placed on NRB at 8L (as that is all their oxygen tanks will handle). Pt was given Levaquin 500mg PO, Prednisone 10mg PO and a DuoNeb at 1700. Pt at baseline is A&O x 3, ambulatory per self, and full code status.

## 2023-05-11 LAB
ANION GAP SERPL CALCULATED.3IONS-SCNC: 15 MMOL/L (ref 5–15)
BUN SERPL-MCNC: 41 MG/DL (ref 8–23)
BUN/CREAT SERPL: 39.8 (ref 7–25)
CALCIUM SPEC-SCNC: 9 MG/DL (ref 8.6–10.5)
CHLORIDE SERPL-SCNC: 99 MMOL/L (ref 98–107)
CO2 SERPL-SCNC: 27 MMOL/L (ref 22–29)
CREAT SERPL-MCNC: 1.03 MG/DL (ref 0.57–1)
DEPRECATED RDW RBC AUTO: 46.5 FL (ref 37–54)
DIGOXIN SERPL-MCNC: 1.6 NG/ML (ref 0.6–1.2)
EGFRCR SERPLBLD CKD-EPI 2021: 58.6 ML/MIN/1.73
ERYTHROCYTE [DISTWIDTH] IN BLOOD BY AUTOMATED COUNT: 14.2 % (ref 12.3–15.4)
GLUCOSE BLDC GLUCOMTR-MCNC: 162 MG/DL (ref 70–130)
GLUCOSE BLDC GLUCOMTR-MCNC: 168 MG/DL (ref 70–130)
GLUCOSE BLDC GLUCOMTR-MCNC: 177 MG/DL (ref 70–130)
GLUCOSE BLDC GLUCOMTR-MCNC: 332 MG/DL (ref 70–130)
GLUCOSE SERPL-MCNC: 131 MG/DL (ref 65–99)
HCT VFR BLD AUTO: 34.4 % (ref 34–46.6)
HGB BLD-MCNC: 10.7 G/DL (ref 12–15.9)
MCH RBC QN AUTO: 28.6 PG (ref 26.6–33)
MCHC RBC AUTO-ENTMCNC: 31.1 G/DL (ref 31.5–35.7)
MCV RBC AUTO: 92 FL (ref 79–97)
PLATELET # BLD AUTO: 217 10*3/MM3 (ref 140–450)
PMV BLD AUTO: 10.9 FL (ref 6–12)
POTASSIUM SERPL-SCNC: 4.1 MMOL/L (ref 3.5–5.2)
RBC # BLD AUTO: 3.74 10*6/MM3 (ref 3.77–5.28)
SODIUM SERPL-SCNC: 141 MMOL/L (ref 136–145)
WBC NRBC COR # BLD: 11.75 10*3/MM3 (ref 3.4–10.8)

## 2023-05-11 PROCEDURE — 63710000001 INSULIN ASPART PER 5 UNITS: Performed by: INTERNAL MEDICINE

## 2023-05-11 PROCEDURE — 82948 REAGENT STRIP/BLOOD GLUCOSE: CPT

## 2023-05-11 PROCEDURE — 94799 UNLISTED PULMONARY SVC/PX: CPT

## 2023-05-11 PROCEDURE — 80162 ASSAY OF DIGOXIN TOTAL: CPT | Performed by: INTERNAL MEDICINE

## 2023-05-11 PROCEDURE — 97110 THERAPEUTIC EXERCISES: CPT

## 2023-05-11 PROCEDURE — 36415 COLL VENOUS BLD VENIPUNCTURE: CPT | Performed by: STUDENT IN AN ORGANIZED HEALTH CARE EDUCATION/TRAINING PROGRAM

## 2023-05-11 PROCEDURE — 63710000001 PREDNISONE PER 1 MG: Performed by: STUDENT IN AN ORGANIZED HEALTH CARE EDUCATION/TRAINING PROGRAM

## 2023-05-11 PROCEDURE — 63710000001 INSULIN ASPART PER 5 UNITS: Performed by: STUDENT IN AN ORGANIZED HEALTH CARE EDUCATION/TRAINING PROGRAM

## 2023-05-11 PROCEDURE — 94760 N-INVAS EAR/PLS OXIMETRY 1: CPT

## 2023-05-11 PROCEDURE — 94664 DEMO&/EVAL PT USE INHALER: CPT

## 2023-05-11 PROCEDURE — 80048 BASIC METABOLIC PNL TOTAL CA: CPT | Performed by: STUDENT IN AN ORGANIZED HEALTH CARE EDUCATION/TRAINING PROGRAM

## 2023-05-11 PROCEDURE — 85027 COMPLETE CBC AUTOMATED: CPT | Performed by: STUDENT IN AN ORGANIZED HEALTH CARE EDUCATION/TRAINING PROGRAM

## 2023-05-11 PROCEDURE — 97530 THERAPEUTIC ACTIVITIES: CPT

## 2023-05-11 PROCEDURE — 97116 GAIT TRAINING THERAPY: CPT

## 2023-05-11 PROCEDURE — 25010000002 FUROSEMIDE PER 20 MG: Performed by: STUDENT IN AN ORGANIZED HEALTH CARE EDUCATION/TRAINING PROGRAM

## 2023-05-11 RX ORDER — NICOTINE POLACRILEX 4 MG
15 LOZENGE BUCCAL
Status: DISCONTINUED | OUTPATIENT
Start: 2023-05-11 | End: 2023-05-15 | Stop reason: HOSPADM

## 2023-05-11 RX ORDER — INSULIN ASPART 100 [IU]/ML
0-9 INJECTION, SOLUTION INTRAVENOUS; SUBCUTANEOUS
Status: DISCONTINUED | OUTPATIENT
Start: 2023-05-11 | End: 2023-05-15 | Stop reason: HOSPADM

## 2023-05-11 RX ORDER — DEXTROSE MONOHYDRATE 25 G/50ML
25 INJECTION, SOLUTION INTRAVENOUS
Status: DISCONTINUED | OUTPATIENT
Start: 2023-05-11 | End: 2023-05-15 | Stop reason: HOSPADM

## 2023-05-11 RX ORDER — GLUCAGON 1 MG/ML
1 KIT INJECTION
Status: DISCONTINUED | OUTPATIENT
Start: 2023-05-11 | End: 2023-05-15 | Stop reason: HOSPADM

## 2023-05-11 RX ADMIN — INSULIN ASPART 4 UNITS: 100 INJECTION, SOLUTION INTRAVENOUS; SUBCUTANEOUS at 17:48

## 2023-05-11 RX ADMIN — NICOTINE POLACRILEX 4 MG: 2 GUM, CHEWING BUCCAL at 15:50

## 2023-05-11 RX ADMIN — INSULIN ASPART 2 UNITS: 100 INJECTION, SOLUTION INTRAVENOUS; SUBCUTANEOUS at 12:21

## 2023-05-11 RX ADMIN — PRAVASTATIN SODIUM 20 MG: 40 TABLET ORAL at 20:18

## 2023-05-11 RX ADMIN — GABAPENTIN 400 MG: 400 CAPSULE ORAL at 20:18

## 2023-05-11 RX ADMIN — IPRATROPIUM BROMIDE AND ALBUTEROL SULFATE 3 ML: 2.5; .5 SOLUTION RESPIRATORY (INHALATION) at 10:55

## 2023-05-11 RX ADMIN — RIVAROXABAN 20 MG: 10 TABLET, FILM COATED ORAL at 17:32

## 2023-05-11 RX ADMIN — FUROSEMIDE 40 MG: 10 INJECTION, SOLUTION INTRAMUSCULAR; INTRAVENOUS at 17:32

## 2023-05-11 RX ADMIN — GABAPENTIN 400 MG: 400 CAPSULE ORAL at 07:40

## 2023-05-11 RX ADMIN — FUROSEMIDE 40 MG: 10 INJECTION, SOLUTION INTRAMUSCULAR; INTRAVENOUS at 07:40

## 2023-05-11 RX ADMIN — IPRATROPIUM BROMIDE AND ALBUTEROL SULFATE 3 ML: 2.5; .5 SOLUTION RESPIRATORY (INHALATION) at 19:41

## 2023-05-11 RX ADMIN — SERTRALINE 100 MG: 50 TABLET, FILM COATED ORAL at 07:40

## 2023-05-11 RX ADMIN — AMITRIPTYLINE HYDROCHLORIDE 25 MG: 25 TABLET, FILM COATED ORAL at 20:18

## 2023-05-11 RX ADMIN — MIDODRINE HYDROCHLORIDE 10 MG: 5 TABLET ORAL at 17:32

## 2023-05-11 RX ADMIN — OXYCODONE HYDROCHLORIDE AND ACETAMINOPHEN 1 TABLET: 5; 325 TABLET ORAL at 18:41

## 2023-05-11 RX ADMIN — Medication 10 ML: at 20:19

## 2023-05-11 RX ADMIN — IPRATROPIUM BROMIDE AND ALBUTEROL SULFATE 3 ML: 2.5; .5 SOLUTION RESPIRATORY (INHALATION) at 04:19

## 2023-05-11 RX ADMIN — INSULIN ASPART 2 UNITS: 100 INJECTION, SOLUTION INTRAVENOUS; SUBCUTANEOUS at 07:39

## 2023-05-11 RX ADMIN — CILOSTAZOL 100 MG: 100 TABLET ORAL at 10:15

## 2023-05-11 RX ADMIN — CYANOCOBALAMIN TAB 1000 MCG 5000 MCG: 1000 TAB at 07:41

## 2023-05-11 RX ADMIN — LEVOTHYROXINE SODIUM 25 MCG: 25 TABLET ORAL at 05:37

## 2023-05-11 RX ADMIN — PANTOPRAZOLE SODIUM 40 MG: 40 TABLET, DELAYED RELEASE ORAL at 07:41

## 2023-05-11 RX ADMIN — MIDODRINE HYDROCHLORIDE 10 MG: 5 TABLET ORAL at 05:37

## 2023-05-11 RX ADMIN — TRAZODONE HYDROCHLORIDE 150 MG: 100 TABLET ORAL at 20:19

## 2023-05-11 RX ADMIN — IPRATROPIUM BROMIDE AND ALBUTEROL SULFATE 3 ML: 2.5; .5 SOLUTION RESPIRATORY (INHALATION) at 07:24

## 2023-05-11 RX ADMIN — IPRATROPIUM BROMIDE AND ALBUTEROL SULFATE 3 ML: 2.5; .5 SOLUTION RESPIRATORY (INHALATION) at 14:19

## 2023-05-11 RX ADMIN — Medication 10 ML: at 08:06

## 2023-05-11 RX ADMIN — BUDESONIDE AND FORMOTEROL FUMARATE DIHYDRATE 2 PUFF: 160; 4.5 AEROSOL RESPIRATORY (INHALATION) at 19:41

## 2023-05-11 RX ADMIN — MIDODRINE HYDROCHLORIDE 10 MG: 5 TABLET ORAL at 10:16

## 2023-05-11 RX ADMIN — CLOPIDOGREL BISULFATE 75 MG: 75 TABLET ORAL at 07:41

## 2023-05-11 RX ADMIN — OXYCODONE HYDROCHLORIDE AND ACETAMINOPHEN 1 TABLET: 5; 325 TABLET ORAL at 12:21

## 2023-05-11 RX ADMIN — CILOSTAZOL 100 MG: 100 TABLET ORAL at 21:21

## 2023-05-11 RX ADMIN — PREDNISONE 40 MG: 20 TABLET ORAL at 07:42

## 2023-05-11 NOTE — PLAN OF CARE
Problem: Adult Inpatient Plan of Care  Goal: Plan of Care Review  Outcome: Ongoing, Progressing  Flowsheets (Taken 5/11/2023 4607)  Plan of Care Reviewed With: patient (Pended)   Goal Outcome Evaluation:  Plan of Care Reviewed With: (P) patient       Pt reports no c/s difficulties. Pt has a history of dysphagia in 2018 and has missing tooth/teeth. Pt may benefit from SLP evaluation. Pt reports a poor appetite prior to admission d/t not feeling hungry but has gotten better this morning. Educated pt on CHF and provided a nutrition educational handout. Pt lives at NH and does not prepare food on her own. Pt gets provided a menu from NH and chooses options. Explained ways to reduce sodium. RD staff will provide continuing education as necessary. Pt has had no significant wt changes. No noted BM.

## 2023-05-11 NOTE — PLAN OF CARE
Goal Outcome Evaluation:  Plan of Care Reviewed With: patient           Outcome Evaluation: sup-sit sba,sit-stand-sit min/cga of 1;amb 4',16' with rw and min of 1

## 2023-05-11 NOTE — DISCHARGE PLACEMENT REQUEST
"Filomena Perales (72 y.o. Female)     Date of Birth   1950    Social Security Number       Address   [address] [place] [address]    Home Phone   [phone]    MRN   [id number]       Evangelical   Hinduism    Marital Status                               Admission Date   5/9/23    Admission Type   Emergency    Admitting Provider   Behroozi, Saeid, MD    Attending Provider   Michele Nunn MD    Department, Room/Bed   [address], 318/1       Discharge Date       Discharge Disposition       Discharge Destination                               Attending Provider: Michele Nunn MD    Allergies: Morphine And Related, Penicillins    Isolation: None   Infection: None   Code Status: CPR    Ht: 132.1 cm (52\")   Wt: 63 kg (138 lb 12.8 oz)    Admission Cmt: None   Principal Problem: Acute and chronic respiratory failure with hypoxia [J96.21]                 Active Insurance as of 5/9/2023     Primary Coverage     Payor Plan Insurance Group Employer/Plan Group    ANTHEM MEDICARE REPLACEMENT ANTHEM MEDICARE ADVANTAGE [id number]     Payor Plan Address Payor Plan Phone Number Payor Plan Fax Number Effective Dates    [address] [phone]  1/1/2023 - None Entered    [facility] [id number]       Subscriber Name Subscriber Birth Date Member ID       FILOMENA PERALES 1950 [id number]                 Emergency Contacts      (Rel.) Home Phone Work Phone Mobile Phone    Renee Li (Grandchild) -- -- [phone]    Meena Marie (Friend) -- -- [phone]    Torri Rivero (Daughter) [phone] -- [phone]              "

## 2023-05-11 NOTE — PROGRESS NOTES
Adult Nutrition  Assessment/PES    Patient Name:  Mona Perales  YOB: 1950  MRN: [id number]  Admit Date:  5/9/2023    Assessment Date:  5/11/2023    Comments:    Pt admitted for acute and chronic respiratory failure with hypoxia. PMHx includes CAD, PVD, hypotension, HTN, HF, COPD, and possible dementia. RD staff visited pt for MST score 2 and CHF. Pt reports nkfa and no n/v. Pt reports no c/s difficulties, however, pt has a history of dysphagia in 2018 and has missing tooth/teeth. Pt may benefit from SLP evaluation. Pt reports a poor appetite prior to admission d/t not feeling hungry but has gotten better this morning. Educated pt on CHF and provided a nutrition educational handout. Pt lives at NH and does not prepare food on her own. Pt gets provided a menu from NH and chooses options. Explained ways to reduce sodium by not adding salt or using salt substitutes to foods and choosing lower sodium options. Pt reports drink 6 Dr. Pepper's per day. Pt agreed to reduce to 3-4 per day to help reduce sodium in diet. RD staff will provide continuing education as necessary.   CBW: 138#. Pt has had no significant wt changes. Pt has gained 7# x6 months and CBW aligns with wt history.   No noted BM and no noted edema. Noted wound: bilateral midline perineum.   RD staff will continue to monitor and follow hospital course.      Reason for Assessment     Row Name 05/11/23 1506          Reason for Assessment    Reason For Assessment identified at risk by screening criteria;diagnosis/disease state (P)      Diagnosis cardiac disease (P)   CHF     Identified At Risk by Screening Criteria MST SCORE 2+ (P)                 Nutrition/Diet History     Row Name 05/11/23 150          Nutrition/Diet History    Typical Intake (Food/Fluid/EN/PN) Pt reports poor appetite prior to admission and has gotten better this morning. Pt lives at NH. (P)                 Labs/Tests/Procedures/Meds     Row Name 05/11/23 2027           Labs/Procedures/Meds    Lab Results Reviewed reviewed, pertinent (P)      Lab Results Comments Glu 131, BUN 41, Creat 1.03, A1c 6.5 (P)         Diagnostic Tests/Procedures    Diagnostic Test/Procedure Reviewed reviewed (P)         Medications    Pertinent Medications Reviewed reviewed, pertinent (P)      Pertinent Medications Comments lasix, ssi, vitamin b12, protonix (P)                   Estimated/Assessed Needs - Anthropometrics     Row Name 05/11/23 1505          Anthropometrics    Weight for Calculation 37.6 kg (83 lb) (P)      Additional Documentation Ideal Body Weight (IBW) (Group) (P)         Ideal Body Weight (IBW)    Ideal Body Weight 83# (P)         Estimated/Assessed Needs    Additional Documentation KCAL/KG (Group);Protein Requirements (Group);Fluid Requirements (Group) (P)         KCAL/KG    KCAL/KG 20 Kcal/Kg (kcal);25 Kcal/Kg (kcal) (P)      20 Kcal/Kg (kcal) 752.98 (P)      25 Kcal/Kg (kcal) 941.225 (P)         Protein Requirements    Weight Used For Protein Calculations 37.6 kg (83 lb) (P)      Est Protein Requirement Amount (gms/kg) 1.0 gm protein (P)      Estimated Protein Requirements (gms/day) 37.65 (P)         Fluid Requirements    Fluid Requirements (mL/day) 1000 (P)      RDA Method (mL) 1000 (P)                 Nutrition Prescription Ordered     Row Name 05/11/23 1507          Nutrition Prescription PO    Current PO Diet Regular (P)      Fluid Consistency Thin (P)      Common Modifiers Cardiac;Consistent Carbohydrate (P)                      Problem/Interventions:   Problem 1     Row Name 05/11/23 1507          Nutrition Diagnoses Problem 1    Problem 1 Knowledge Deficit (P)      Etiology (related to) Medical Diagnosis (P)      Cardiac CHF (P)      Signs/Symptoms (evidenced by) Report/Observation;Demonstrated Information Deficit (P)      Reported/Observed By Patient (P)                       Intervention Goal     Row Name 05/11/23 1507          Intervention Goal    General Maintain  nutrition;Meet nutritional needs for age/condition;Provide information regarding MNT for treatment/condition (P)      PO Meet estimated needs;Continue positive trend (P)      Weight Appropriate weight loss (P)                 Nutrition Intervention     Row Name 05/11/23 1509          Nutrition Intervention    RD/Tech Action Follow Tx progress;Care plan reviewd;Encourage intake;Advise available snack (P)                   Education/Evaluation     Row Name 05/11/23 1510          Education    Education Education topics;Advised regarding habits/behavior (P)      Education Topics Basic nutrition;CHF;Na+ (P)      Na+ (mg/day) 2300 mg/day (P)      Advised Regarding Habits/Behavior Food choices;Snacks;Self monitor (P)         Monitor/Evaluation    Monitor Per protocol;PO intake;Pertinent labs;Weight;Skin status (P)      Education Follow-up Reinforce PRN (P)                  Electronically signed by:  Camryn Burr  05/11/23 15:11 CDT

## 2023-05-11 NOTE — PROGRESS NOTES
[facility]   INPATIENT PROGRESS NOTE      Patient Name: Mona Perales  Date of Admission: 5/9/2023  Today's Date: 05/11/23  Length of Stay: 1  Primary Care Physician: Anahi Camacho APRN    Subjective       HPI   72-year-old female from SNF, history of heart failure with preserved EF, A-fib, COPD with tobacco abuse, chronic hypoxia on 2 L O2 at baseline, hypertension, history of PE anticoagulated on Xarelto, presented from SNF due to increased shortness of breath and hypoxia despite increasing her supplemental oxygen and initially required nonrebreather then high flow nasal cannula in order to maintain adequate SPO2.  She does not have evidence of pulmonary edema on chest x-ray as well as significant elevated proBNP over 16,000.  She had some elevated high-sensitivity troponin at 58, which was trended with downtrend to 55, likely related to demand ischemia in setting of decompensated heart failure.  She also had evidence of ALVAREZ with creatinine significant from baseline up to 1.41, however likely related to cardiorenal syndrome as renal function did improve some with treatment on diuretic therapy.      Events were reviewed.  Patient feels less short of breath today.  Up in chair resting fairly comfortably.  Supplemental O2 has been weaned down to 3.5 L.  She has had good urine output thus far.        Review of Systems   Comprehensive ROS completed  All pertinent negatives and positives are as above. All other systems have been reviewed and are negative unless otherwise stated.     Objective    Temp:  [96.8 °F (36 °C)-98.3 °F (36.8 °C)] 97 °F (36.1 °C)  Heart Rate:  [44-86] 71  Resp:  [16-20] 18  BP: (116-153)/(54-94) 153/72  Physical Exam  General: Chronically ill-appearing, no acute distress  Cardiac: Normal rate, S1-S2 present  Respiratory: Bilateral crackles appreciated, no use of accessory muscles  Abdomen: Nondistended  Extremities: No  edema  Skin: Warm and dry        Results Review:  I have reviewed the labs, radiology results, and diagnostic studies.    Laboratory Data:   Results from last 7 days   Lab Units 05/11/23  0745 05/10/23  0706 05/09/23  2140   WBC 10*3/mm3 11.75* 13.52* 12.27*   HEMOGLOBIN g/dL 10.7* 12.0 10.9*   HEMATOCRIT % 34.4 39.5 32.8*   PLATELETS 10*3/mm3 217 135* 191        Results from last 7 days   Lab Units 05/11/23  0745 05/10/23  0546 05/09/23  2140   SODIUM mmol/L 141 138 136   POTASSIUM mmol/L 4.1 4.3 5.2   CHLORIDE mmol/L 99 97* 100   CO2 mmol/L 27.0 26.0 22.0   BUN mg/dL 41* 37* 41*   CREATININE mg/dL 1.03* 1.23* 1.41*   CALCIUM mg/dL 9.0 8.6 8.5*   BILIRUBIN mg/dL  --   --  0.4   ALK PHOS U/L  --   --  73   ALT (SGPT) U/L  --   --  24   AST (SGOT) U/L  --   --  38*   GLUCOSE mg/dL 131* 99 147*       Culture Data:   No results found for: BLOODCX  No results found for: URINECX  No results found for: RESPCX  No results found for: WOUNDCX  No results found for: STOOLCX  No components found for: BODYFLD    Radiology Data:   Imaging Results (Last 24 Hours)     ** No results found for the last 24 hours. **          I have reviewed the patient's current medications.     Assessment/Plan     Active Hospital Problems    Diagnosis    • **Acute and chronic respiratory failure with hypoxia    • (HFpEF) heart failure with preserved ejection fraction (HCC)        Acute on chronic HFpEF with pulmonary edema  Reviewed labs, elevated proBNP over 16,000  Independently reviewed chest x-ray, appears to have bilateral pulmonary edema, pulmonary vascular congestion  Reviewed I's and O's, maintaining net negative fluid balance, net -1.9 L  -- Repeat echo reviewed a 5/10/2023, preserved LVEF 55 to 60%, diastolic dysfunction noted  - Continue on IV Lasix 40 mg twice daily, continue to follow strict I's and O's, daily weights, reassess tomorrow    Acute on chronic hypoxic respiratory failure  Chronic hypercapnia  COPD  - Continue supplemental  O2 support to maintain adequate gas exchange, goal SPO2 88-90%, continue attempts at weaning supplemental O2 further  - Bronchodilators for COPD    Postural hypotension  - Continue midodrine 3 times daily, monitor hemodynamics    Tobacco abuse  - Provide nicotine gum at patient's request    Peripheral arterial disease  - Continue Plavix, Pletal, statin    Neuropathy  - Continue gabapentin    Diabetes  - Glucose monitoring with sliding scale insulin correction as needed, defer basal insulin for now    Hypothyroidism  - Continue levothyroxine    History of PE  - Anticoagulated on Xarelto    Multiple complex medical problems  Morbidity mortality high risk  Medical decision making high complexity      Discharge Planning: TBD, likely discharge back to SNF in the next few days, possibly Monday      Electronically signed by Michele Nunn MD, 05/11/23, 14:34 CDT.

## 2023-05-11 NOTE — THERAPY TREATMENT NOTE
Acute Care - Physical Therapy Treatment Note  [facility]     Patient Name: Mona Perales  : 1950  MRN: [id number]  Today's Date: 2023      Visit Dx:     ICD-10-CM ICD-9-CM   1. Acute and chronic respiratory failure with hypoxia  J96.21 518.84     799.02   2. Acute on chronic congestive heart failure, unspecified heart failure type  I50.9 428.0   3. Positive D dimer  R79.89 790.92   4. ALVAREZ (acute kidney injury)  N17.9 584.9   5. Impaired functional mobility, balance, gait, and endurance  Z74.09 V49.89     Patient Active Problem List   Diagnosis   • Anxiety   • CAD (coronary atherosclerotic disease)   • Carotid stenosis   • COPD (chronic obstructive pulmonary disease)   • COPD with exacerbation (formerly [facility])   • Depressive disorder, not elsewhere classified   • Benign essential hypertension   • Hypercholesteremia   • Insomnia   • Notalgia   • Osteoporosis   • Other screening mammogram   • RUQ abdominal pain   • PVD (peripheral vascular disease) with claudication   • Nicotine abuse   • Dysphagia   • Epigastric pain   • Pain of right hand   • Closed displaced fracture of shaft of fifth metacarpal bone of right hand   • Cause of injury, fall   • Displaced fracture of shaft of fifth metacarpal bone of right hand with routine healing   • Syncope   • Acute respiratory failure with hypoxia   • (HFpEF) heart failure with preserved ejection fraction (HCC)   • Hypotension   • Elevated WBCs   • Near syncope   • Atherosclerosis of native coronary artery of native heart without angina pectoris   • Atherosclerosis of native arteries of extremities with rest pain, left leg   • PVD (peripheral vascular disease) (formerly [facility])   • Physical deconditioning   • Pain due to onychomycosis of toenails of both feet   • ST elevation myocardial infarction (STEMI), unspecified artery   • Acute and chronic respiratory failure with hypoxia     Past Medical History:   Diagnosis Date   • A-fib    • Anxiety    • Arthritis    • Asthma    •  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 with ulceration     bilateral legs - bilateral iliac stents 2008      • CHF (congestive heart failure)    • Chronic lower back pain    • COPD (chronic obstructive pulmonary disease)    • Essential (primary) hypertension    • GERD (gastroesophageal reflux disease)    • History of pulmonary embolus (PE)    • Hyperlipidemia    • Insomnia    • Lumbago    • Nicotine dependence    • Occlusion of artery      and stenosis of bilateral carotid arteries - BABS 16-49%, LICA 0-15%   • Other atherosclerosis of native artery of other extremity     LEFT subclavian stent 2005 (occluded)   • Sleep apnea      Past Surgical History:   Procedure Laterality Date   • CARDIAC CATHETERIZATION  04/06/2016    No evidence of any obstructive epicardial CAD.Preserved LV systolic function with EF of 55%.   • CARDIAC CATHETERIZATION N/A 11/1/2022    Procedure: Left Heart Cath;  Surgeon: Neftali Haywood MD;  Location: [facility] CATH INVASIVE LOCATION;  Service: Cardiology;  Laterality: N/A;   • CENTRAL VENOUS LINE INSERTION  04/07/2016    Successful placement of right uppe extremity 6-[place] triple lumen PICC line.   • ENDOSCOPY N/A 1/12/2018    Procedure: ESOPHAGOGASTRODUODENOSCOPY;  Surgeon: Bin Oh MD;  Location: [facility] ENDOSCOPY;  Service:    • ENDOSCOPY N/A 1/17/2018    Procedure: ESOPHAGOGASTRODUODENOSCOPY;  Surgeon: Bin Oh MD;  Location: [facility] ENDOSCOPY;  Service:    • ENDOSCOPY N/A 3/16/2018    Procedure: ESOPHAGOGASTRODUODENOSCOPY possible dilation;  Surgeon: Bin Oh MD;  Location: [facility] ENDOSCOPY;  Service: Gastroenterology   • FEMORAL POPLITEAL BYPASS Left 4/14/2020    Procedure: FEMORAL POPLITEAL BYPASS ABOVE KNEE  (POLYTETRAFLUOROETHYLENE)  LEFT COMMON FEMORAL ARTERY ENDARTERECTOMY PTA LEFT ILIAC ARTERIOGRAM        (c-arm#2 and c-arm table);  Surgeon: David Suh MD;  Location: [facility] OR;  Service: Vascular;  Laterality: Left;   • FEMORAL  POPLITEAL BYPASS Right 9/17/2021    Procedure: RIGHT common femoral endarterectomy FEMORAL POPLITEAL BYPASS (above the knee polytetrafluoroethylene  lower extremity arteriogram              (c-arm#2 and c-arm table);  Surgeon: David Suh MD;  Location: [facility] OR;  Service: Vascular;  Laterality: Right;   • HYSTERECTOMY     • INTERVENTIONAL RADIOLOGY PROCEDURE Left 9/9/2020    Procedure: IR angiogram extremity left;  Surgeon: David Suh MD;  Location: [facility] ANGIO INVASIVE LOCATION;  Service: Interventional Radiology;  Laterality: Left;   • KYPHOPLASTY N/A 5/23/2019    Procedure: KYPHOPLASTY LUMBAR FOUR;  Surgeon: Aba Santa MD;  Location: [facility] OR;  Service: Orthopedic Spine   • TOTAL SHOULDER REPLACEMENT Left    • TRANSESOPHAGEAL ECHOCARDIOGRAM (JACQUES)  04/07/2016    With color flow-Mild to moderate [facility].LV systolic function well preserved with Ef of 55-60%.Mitral and AV intact.Diastolic dysfunction   • UPPER GASTROINTESTINAL ENDOSCOPY  01/17/2018    Dr. Bin Martin M.D.     PT Assessment (last 12 hours)     PT Evaluation and Treatment     Row Name 05/11/23 1112          Physical Therapy Time and Intention    Subjective Information complains of;pain  -LN     Document Type therapy note (daily note)  -LN     Mode of Treatment individual therapy;physical therapy  -LN     Comment initial bp 79/62 dinomap,pta took it manual and heard it at 80 for systolic and unable to hear distolic;nsg called to room and got manual bp at 82/42-pt states she runs low anyway and pt asymptomatic-nsg ok for rx  -LN     Row Name 05/11/23 1112          General Information    Patient Profile Reviewed yes  -LN     Equipment Currently Used at Home shower chair  has RW, but not sure where it is located  -LN     Existing Precautions/Restrictions fall;oxygen therapy device and L/min  -LN     Row Name 05/11/23 1112          Home Use of Assistive/Adaptive Equipment    Equipment Currently Used at Home  "none  -LN     Row Name 23 111          Pain    Pretreatment Pain Rating 9/10  -LN     Posttreatment Pain Rating 9/10  -LN     Pain Location lower  -LN     Pain Location - back  -LN     Pain Intervention(s) Repositioned;Nursing Notified  -     Row Name 23          Cognition    Orientation Status (Cognition) oriented to;person  ;needed cues for where she was at-thought she could go to the front porch and smoke \"those 2 men have been out there all am\"  -LN     Row Name 23          Range of Motion Comprehensive    General Range of Motion no range of motion deficits identified  -LN     [facility] Name 23          Bed Mobility    Bed Mobility supine-sit;sit-supine  -LN     Supine-Sit [place] (Bed Mobility) standby assist  -LN     Sit-Supine [place] (Bed Mobility) not tested  -LN     Assistive Device (Bed Mobility) bed rails;head of bed elevated  -LN     [facility] Name 23          Sit-Stand Transfer    Sit-Stand [place] (Transfers) minimum assist (75% patient effort)  -LN     Assistive Device (Sit-Stand Transfers) walker, front-wheeled  -LN     [facility] Name 23          Stand-Sit Transfer    Stand-Sit [place] (Transfers) verbal cues;contact guard  -LN     Assistive Device (Stand-Sit Transfers) walker, front-wheeled  -LN     Row Name 23          Toilet Transfer    Type (Toilet Transfer) sit-stand;stand-sit  -LN     [place] Level (Toilet Transfer) minimum assist (75% patient effort)  -LN     Assistive Device (Toilet Transfer) commode;grab bars/safety frame;walker, front-wheeled  -LN     Comment, (Toilet Transfer) ind pericare  -LN     Row Name 23          Gait/Stairs (Locomotion)    [place] Level (Gait) minimum assist (75% patient effort)  -LN     Distance in Feet (Gait) 4',16'  -LN     Deviations/Abnormal Patterns (Gait) gait speed decreased;sky decreased  -     Row Name 23          Motor Skills    " Therapeutic Exercise hip;knee;ankle  -LN     Row Name 05/11/23 1112          Hip (Therapeutic Exercise)    Hip (Therapeutic Exercise) AROM (active range of motion)  -LN     Hip AROM (Therapeutic Exercise) bilateral;flexion;aBduction;aDduction  -LN     Row Name 05/11/23 1112          Knee (Therapeutic Exercise)    Knee (Therapeutic Exercise) AROM (active range of motion)  -LN     Knee AROM (Therapeutic Exercise) bilateral;LAQ (long arc quad)  -LN     Row Name 05/11/23 1112          Ankle (Therapeutic Exercise)    Ankle (Therapeutic Exercise) AROM (active range of motion)  -LN     Ankle AROM (Therapeutic Exercise) bilateral;dorsiflexion;plantarflexion  -LN     Row Name 05/11/23 1112          Respiratory WDL    Respiratory WDL breath sounds  -LN     Rhythm/Pattern, Respiratory unlabored  -LN     Cough Frequency infrequent  -LN     Cough Type nonproductive  -LN     Row Name 05/11/23 1112          Breath Sounds    Breath Sounds All Fields  -LN     All Lung Fields Breath Sounds clear;diminished  -LN     Row Name 05/11/23 1112          Skin WDL    Skin WDL WDL;all  -LN     Skin Color/Characteristics without discoloration  -LN     Skin Temperature warm  -LN     Skin Moisture dry  -LN     Skin Elasticity quick return to original state  -LN     Skin Integrity pressure injury  -LN     Row Name 05/11/23 1112          Wound 11/17/22 1300 Bilateral midline perineum Traumatic    Wound - Properties Group Placement Date: 11/17/22 -JY Placement Time: 1300  -JY Side: Bilateral  -JY Orientation: midline  -JY Location: perineum  -JY Primary Wound Type: Traumatic  -JY, raw from wiping     Closure SHAYLEE  -LN     Periwound non-blanchable  -LN     Periwound Temperature warm  -LN     Periwound Skin Turgor firm  -LN     Drainage Amount none  -LN     Retired Wound - Properties Group Placement Date: 11/17/22  -JY Placement Time: 1300  -JY Side: Bilateral  -JY Orientation: midline  -JY Location: perineum  -JY Primary Wound Type: Traumatic  -JY,  raw from wilmer     Retired Wound - Properties Group Date first assessed: 11/17/22  -JY Time first assessed: 1300  -JY Side: Bilateral  -JY Location: perineum  -JY Primary Wound Type: Traumatic  -JY, raw from wilmer     Row Name 05/11/23 1112          Coping    Observed Emotional State calm;cooperative  -LN     Verbalized Emotional State acceptance  -LN     Family/Support Persons family  -LN     Involvement in Care not present at bedside  -LN     Row Name 05/11/23 1112          Plan of Care Review    Plan of Care Reviewed With patient  -LN     Outcome Evaluation sup-sit sba,sit-stand-sit min/cga of 1;amb 4',16' with rw and min of 1  -LN     Row Name 05/11/23 1112          Vital Signs    Pre Systolic BP Rehab 82  -LN     Pre Treatment Diastolic BP 42  -LN     Post Systolic BP Rehab 84  -LN     Post Treatment Diastolic BP 52  -LN     Pretreatment Heart Rate (beats/min) 68  -LN     Intratreatment Heart Rate (beats/min) 75  -LN     Posttreatment Heart Rate (beats/min) 59  -LN     Pre SpO2 (%) 93  -LN     O2 Delivery Pre Treatment supplemental O2  -LN     Intra SpO2 (%) 90  -LN     Post SpO2 (%) 91  -LN     Pre Patient Position Sitting  fowlers  -LN     Intra Patient Position Standing  -LN     Post Patient Position Sitting  -LN     Row Name 05/11/23 1112          Positioning and Restraints    Post Treatment Position chair  -LN     In Chair notified nsg;sitting;call light within reach;encouraged to call for assist;exit alarm on  -LN     Row Name 05/11/23 1112          Therapy Assessment/Plan (PT)    Rehab Potential (PT) good, to achieve stated therapy goals  -LN     Criteria for Skilled Interventions Met (PT) yes;meets criteria;skilled treatment is necessary  -LN     Therapy Frequency (PT) other (see comments)  5-7d/week  -LN     Row Name 05/11/23 1112          Bed Mobility Goal 1 (PT)    Activity/Assistive Device (Bed Mobility Goal 1, PT) sit to supine;supine to sit  -LN     [place] Level/Cues Needed (Bed Mobility  Goal 1, PT) modified independence  -LN     Time Frame (Bed Mobility Goal 1, PT) by discharge  -LN     Progress/Outcomes (Bed Mobility Goal 1, PT) goal not met  -LN     Row Name 05/11/23 1112          Transfer Goal 1 (PT)    Activity/Assistive Device (Transfer Goal 1, PT) sit-to-stand/stand-to-sit;bed-to-chair/chair-to-bed  -LN     [place] Level/Cues Needed (Transfer Goal 1, PT) standby assist  -LN     Time Frame (Transfer Goal 1, PT) by discharge  -LN     Progress/Outcome (Transfer Goal 1, PT) goal not met  -LN     Row Name 05/11/23 1112          Gait Training Goal 1 (PT)    Activity/Assistive Device (Gait Training Goal 1, PT) gait (walking locomotion);assistive device use  -LN     [place] Level (Gait Training Goal 1, PT) standby assist  -LN     Distance (Gait Training Goal 1, PT) 50'x2  -LN     Time Frame (Gait Training Goal 1, PT) by discharge  -LN     Progress/Outcome (Gait Training Goal 1, PT) goal not met  -LN           User Key  (r) = Recorded By, (t) = Taken By, (c) = Cosigned By    Initials Name Provider Type    Danyell Hagen RN Registered Nurse    Torri Vogel PTA Physical Therapist Assistant                Physical Therapy Education     Title: PT OT SLP Therapies (In Progress)     Topic: Physical Therapy (Done)     Point: Mobility training (Done)     Learning Progress Summary           Patient Acceptance, E,TB, VU by LR at 5/10/2023 0846    Comment: Educated on PT POC and goals.                   Point: Home exercise program (Done)     Learning Progress Summary           Patient Acceptance, E,TB, VU by LR at 5/10/2023 0846    Comment: Educated on PT POC and goals.                   Point: Body mechanics (Done)     Learning Progress Summary           Patient Acceptance, E,TB, VU by LR at 5/10/2023 0846    Comment: Educated on PT POC and goals.                   Point: Precautions (Done)     Learning Progress Summary           Patient Acceptance, E,TB, VU by LR at 5/10/2023 0846     Comment: Educated on PT POC and goals.                               User Key     Initials Effective Dates Name Provider Type Discipline    LR 06/16/21 -  Timothy Lopez Physical Therapist PT              PT Recommendation and Plan  Therapy Frequency (PT): other (see comments) (5-7d/week)  Plan of Care Reviewed With: patient  Outcome Evaluation: sup-sit sba,sit-stand-sit min/cga of 1;amb 4',16' with rw and min of 1   Outcome Measures     Row Name 05/11/23 1112             How much help from another person do you currently need...    Turning from your back to your side while in flat bed without using bedrails? 3  -LN      Moving from lying on back to sitting on the side of a flat bed without bedrails? 3  -LN      Moving to and from a bed to a chair (including a wheelchair)? 3  -LN      Standing up from a chair using your arms (e.g., wheelchair, bedside chair)? 3  -LN      Climbing 3-5 steps with a railing? 2  -LN      To walk in hospital room? 3  -LN      AM-PAC 6 Clicks Score (PT) 17  -LN         Functional Assessment    Outcome Measure Options AM-PAC 6 Clicks Basic Mobility (PT)  -LN            User Key  (r) = Recorded By, (t) = Taken By, (c) = Cosigned By    Initials Name Provider Type    LN Torri Sanchez PTA Physical Therapist Assistant                 Time Calculation:    PT Charges     Row Name 05/11/23 1306             Time Calculation    Start Time 1112  -LN      Stop Time 1155  -LN      Time Calculation (min) 43 min  -LN      PT Received On 05/11/23  -LN         Time Calculation- PT    Total Timed Code Minutes- PT 43 minute(s)  -LN            User Key  (r) = Recorded By, (t) = Taken By, (c) = Cosigned By    Initials Name Provider Type    LN Torri Sanchez PTA Physical Therapist Assistant              Therapy Charges for Today     Code Description Service Date Service Provider Modifiers Qty    59380969692  PT THERAPEUTIC ACT EA 15 MIN 5/11/2023 Torri Sanchez PTA GP 1    [id number]  GAIT TRAINING EA 15  MIN 5/11/2023 Torri Sanchez, PTA GP 1    [id number] HC PT THER PROC EA 15 MIN 5/11/2023 Torri Sanchez, PTA GP 1          PT G-Codes  Outcome Measure Options: AM-PAC 6 Clicks Basic Mobility (PT)  AM-PAC 6 Clicks Score (PT): 17    Torri Sanchez PTA  5/11/2023

## 2023-05-11 NOTE — PLAN OF CARE
Problem: Skin Injury Risk Increased  Goal: Skin Health and Integrity  Outcome: Ongoing, Progressing  Intervention: Optimize Skin Protection     Problem: Fall Injury Risk  Goal: Absence of Fall and Fall-Related Injury  Outcome: Ongoing, Progressing  Intervention: Promote Injury-Free Environment     Problem: COPD (Chronic Obstructive Pulmonary Disease) Comorbidity  Goal: Maintenance of COPD Symptom Control  Outcome: Ongoing, Progressing     Problem: Heart Failure Comorbidity  Goal: Maintenance of Heart Failure Symptom Control  Outcome: Ongoing, Progressing     Problem: Hypertension Comorbidity  Goal: Blood Pressure in Desired Range  Outcome: Ongoing, Progressing   Goal Outcome Evaluation:            Patient had no c/o shortness of breath during the night.  Heart rate irregular and dropping down into the 40s and 50s. Plan of care continues.

## 2023-05-12 LAB
ANION GAP SERPL CALCULATED.3IONS-SCNC: 12 MMOL/L (ref 5–15)
BUN SERPL-MCNC: 37 MG/DL (ref 8–23)
BUN/CREAT SERPL: 42 (ref 7–25)
CALCIUM SPEC-SCNC: 9.3 MG/DL (ref 8.6–10.5)
CHLORIDE SERPL-SCNC: 98 MMOL/L (ref 98–107)
CO2 SERPL-SCNC: 33 MMOL/L (ref 22–29)
CREAT SERPL-MCNC: 0.88 MG/DL (ref 0.57–1)
DEPRECATED RDW RBC AUTO: 47.7 FL (ref 37–54)
EGFRCR SERPLBLD CKD-EPI 2021: 70.8 ML/MIN/1.73
ERYTHROCYTE [DISTWIDTH] IN BLOOD BY AUTOMATED COUNT: 14.6 % (ref 12.3–15.4)
GLUCOSE BLDC GLUCOMTR-MCNC: 102 MG/DL (ref 70–130)
GLUCOSE BLDC GLUCOMTR-MCNC: 150 MG/DL (ref 70–130)
GLUCOSE BLDC GLUCOMTR-MCNC: 155 MG/DL (ref 70–130)
GLUCOSE BLDC GLUCOMTR-MCNC: 197 MG/DL (ref 70–130)
GLUCOSE BLDC GLUCOMTR-MCNC: 203 MG/DL (ref 70–130)
GLUCOSE SERPL-MCNC: 85 MG/DL (ref 65–99)
HCT VFR BLD AUTO: 39.8 % (ref 34–46.6)
HGB BLD-MCNC: 12.4 G/DL (ref 12–15.9)
MCH RBC QN AUTO: 28.7 PG (ref 26.6–33)
MCHC RBC AUTO-ENTMCNC: 31.2 G/DL (ref 31.5–35.7)
MCV RBC AUTO: 92.1 FL (ref 79–97)
PLATELET # BLD AUTO: 220 10*3/MM3 (ref 140–450)
PMV BLD AUTO: 10.5 FL (ref 6–12)
POTASSIUM SERPL-SCNC: 3.5 MMOL/L (ref 3.5–5.2)
RBC # BLD AUTO: 4.32 10*6/MM3 (ref 3.77–5.28)
SODIUM SERPL-SCNC: 143 MMOL/L (ref 136–145)
WBC NRBC COR # BLD: 15.56 10*3/MM3 (ref 3.4–10.8)

## 2023-05-12 PROCEDURE — 94799 UNLISTED PULMONARY SVC/PX: CPT

## 2023-05-12 PROCEDURE — 63710000001 INSULIN ASPART PER 5 UNITS: Performed by: STUDENT IN AN ORGANIZED HEALTH CARE EDUCATION/TRAINING PROGRAM

## 2023-05-12 PROCEDURE — 25010000002 FUROSEMIDE PER 20 MG: Performed by: STUDENT IN AN ORGANIZED HEALTH CARE EDUCATION/TRAINING PROGRAM

## 2023-05-12 PROCEDURE — 94760 N-INVAS EAR/PLS OXIMETRY 1: CPT

## 2023-05-12 PROCEDURE — 97116 GAIT TRAINING THERAPY: CPT

## 2023-05-12 PROCEDURE — 80048 BASIC METABOLIC PNL TOTAL CA: CPT | Performed by: STUDENT IN AN ORGANIZED HEALTH CARE EDUCATION/TRAINING PROGRAM

## 2023-05-12 PROCEDURE — 63710000001 PREDNISONE PER 1 MG: Performed by: STUDENT IN AN ORGANIZED HEALTH CARE EDUCATION/TRAINING PROGRAM

## 2023-05-12 PROCEDURE — 97530 THERAPEUTIC ACTIVITIES: CPT

## 2023-05-12 PROCEDURE — 85027 COMPLETE CBC AUTOMATED: CPT | Performed by: STUDENT IN AN ORGANIZED HEALTH CARE EDUCATION/TRAINING PROGRAM

## 2023-05-12 PROCEDURE — 36415 COLL VENOUS BLD VENIPUNCTURE: CPT | Performed by: STUDENT IN AN ORGANIZED HEALTH CARE EDUCATION/TRAINING PROGRAM

## 2023-05-12 PROCEDURE — 82948 REAGENT STRIP/BLOOD GLUCOSE: CPT

## 2023-05-12 RX ORDER — FUROSEMIDE 40 MG/1
40 TABLET ORAL
Status: DISCONTINUED | OUTPATIENT
Start: 2023-05-13 | End: 2023-05-15 | Stop reason: HOSPADM

## 2023-05-12 RX ADMIN — OXYCODONE HYDROCHLORIDE AND ACETAMINOPHEN 1 TABLET: 5; 325 TABLET ORAL at 18:07

## 2023-05-12 RX ADMIN — INSULIN ASPART 2 UNITS: 100 INJECTION, SOLUTION INTRAVENOUS; SUBCUTANEOUS at 11:11

## 2023-05-12 RX ADMIN — MIDODRINE HYDROCHLORIDE 10 MG: 5 TABLET ORAL at 11:11

## 2023-05-12 RX ADMIN — MIDODRINE HYDROCHLORIDE 10 MG: 5 TABLET ORAL at 09:24

## 2023-05-12 RX ADMIN — CLOPIDOGREL BISULFATE 75 MG: 75 TABLET ORAL at 09:24

## 2023-05-12 RX ADMIN — RIVAROXABAN 20 MG: 10 TABLET, FILM COATED ORAL at 18:07

## 2023-05-12 RX ADMIN — IPRATROPIUM BROMIDE AND ALBUTEROL SULFATE 3 ML: 2.5; .5 SOLUTION RESPIRATORY (INHALATION) at 19:10

## 2023-05-12 RX ADMIN — FUROSEMIDE 40 MG: 10 INJECTION, SOLUTION INTRAMUSCULAR; INTRAVENOUS at 18:07

## 2023-05-12 RX ADMIN — MIDODRINE HYDROCHLORIDE 10 MG: 5 TABLET ORAL at 18:06

## 2023-05-12 RX ADMIN — GABAPENTIN 400 MG: 400 CAPSULE ORAL at 09:24

## 2023-05-12 RX ADMIN — BUDESONIDE AND FORMOTEROL FUMARATE DIHYDRATE 2 PUFF: 160; 4.5 AEROSOL RESPIRATORY (INHALATION) at 10:27

## 2023-05-12 RX ADMIN — SERTRALINE 100 MG: 50 TABLET, FILM COATED ORAL at 09:23

## 2023-05-12 RX ADMIN — PRAVASTATIN SODIUM 20 MG: 40 TABLET ORAL at 20:24

## 2023-05-12 RX ADMIN — CILOSTAZOL 100 MG: 100 TABLET ORAL at 20:24

## 2023-05-12 RX ADMIN — Medication 10 ML: at 09:24

## 2023-05-12 RX ADMIN — BUDESONIDE AND FORMOTEROL FUMARATE DIHYDRATE 2 PUFF: 160; 4.5 AEROSOL RESPIRATORY (INHALATION) at 19:17

## 2023-05-12 RX ADMIN — PANTOPRAZOLE SODIUM 40 MG: 40 TABLET, DELAYED RELEASE ORAL at 09:24

## 2023-05-12 RX ADMIN — CILOSTAZOL 100 MG: 100 TABLET ORAL at 09:23

## 2023-05-12 RX ADMIN — CYANOCOBALAMIN TAB 1000 MCG 5000 MCG: 1000 TAB at 09:23

## 2023-05-12 RX ADMIN — LEVOTHYROXINE SODIUM 25 MCG: 25 TABLET ORAL at 06:20

## 2023-05-12 RX ADMIN — FUROSEMIDE 40 MG: 10 INJECTION, SOLUTION INTRAMUSCULAR; INTRAVENOUS at 09:24

## 2023-05-12 RX ADMIN — AMITRIPTYLINE HYDROCHLORIDE 25 MG: 25 TABLET, FILM COATED ORAL at 20:24

## 2023-05-12 RX ADMIN — IPRATROPIUM BROMIDE AND ALBUTEROL SULFATE 3 ML: 2.5; .5 SOLUTION RESPIRATORY (INHALATION) at 23:18

## 2023-05-12 RX ADMIN — IPRATROPIUM BROMIDE AND ALBUTEROL SULFATE 3 ML: 2.5; .5 SOLUTION RESPIRATORY (INHALATION) at 10:31

## 2023-05-12 RX ADMIN — TRAZODONE HYDROCHLORIDE 150 MG: 100 TABLET ORAL at 20:23

## 2023-05-12 RX ADMIN — IPRATROPIUM BROMIDE AND ALBUTEROL SULFATE 3 ML: 2.5; .5 SOLUTION RESPIRATORY (INHALATION) at 14:26

## 2023-05-12 RX ADMIN — GABAPENTIN 400 MG: 400 CAPSULE ORAL at 20:24

## 2023-05-12 RX ADMIN — INSULIN ASPART 2 UNITS: 100 INJECTION, SOLUTION INTRAVENOUS; SUBCUTANEOUS at 18:06

## 2023-05-12 RX ADMIN — PREDNISONE 40 MG: 20 TABLET ORAL at 09:27

## 2023-05-12 NOTE — PLAN OF CARE
Problem: Skin Injury Risk Increased  Goal: Skin Health and Integrity  Outcome: Ongoing, Progressing  Intervention: Optimize Skin Protection     Problem: Fall Injury Risk  Goal: Absence of Fall and Fall-Related Injury  Outcome: Ongoing, Progressing  Intervention: Promote Injury-Free Environment     Problem: COPD (Chronic Obstructive Pulmonary Disease) Comorbidity  Goal: Maintenance of COPD Symptom Control  Outcome: Ongoing, Progressing     Problem: Heart Failure Comorbidity  Goal: Maintenance of Heart Failure Symptom Control  Outcome: Ongoing, Progressing     Problem: Hypertension Comorbidity  Goal: Blood Pressure in Desired Range  Outcome: Ongoing, Progressing     Problem: Adult Inpatient Plan of Care  Goal: Plan of Care Review  Outcome: Ongoing, Progressing  Goal: Patient-Specific Goal (Individualized)  Outcome: Ongoing, Progressing  Goal: Absence of Hospital-Acquired Illness or Injury  Outcome: Ongoing, Progressing  Intervention: Identify and Manage Fall Risk  Intervention: Prevent Skin Injury  Intervention: Prevent and Manage VTE (Venous Thromboembolism) Risk  Goal: Optimal Comfort and Wellbeing  Outcome: Ongoing, Progressing  Goal: Readiness for Transition of Care  Outcome: Ongoing, Progressing   Goal Outcome Evaluation:            Patient remains confused at times. Resting in bed. VSS with heart rate in the 50s. Plan of care continues.

## 2023-05-12 NOTE — THERAPY TREATMENT NOTE
Acute Care - Physical Therapy Treatment Note  [facility]     Patient Name: Mona Perales  : 1950  MRN: [id number]  Today's Date: 2023      Visit Dx:     ICD-10-CM ICD-9-CM   1. Acute and chronic respiratory failure with hypoxia  J96.21 518.84     799.02   2. Acute on chronic congestive heart failure, unspecified heart failure type  I50.9 428.0   3. Positive D dimer  R79.89 790.92   4. ALVAREZ (acute kidney injury)  N17.9 584.9   5. Impaired functional mobility, balance, gait, and endurance  Z74.09 V49.89     Patient Active Problem List   Diagnosis   • Anxiety   • CAD (coronary atherosclerotic disease)   • Carotid stenosis   • COPD (chronic obstructive pulmonary disease)   • COPD with exacerbation ([facility])   • Depressive disorder, not elsewhere classified   • Benign essential hypertension   • Hypercholesteremia   • Insomnia   • Notalgia   • Osteoporosis   • Other screening mammogram   • RUQ abdominal pain   • PVD (peripheral vascular disease) with claudication   • Nicotine abuse   • Dysphagia   • Epigastric pain   • Pain of right hand   • Closed displaced fracture of shaft of fifth metacarpal bone of right hand   • Cause of injury, fall   • Displaced fracture of shaft of fifth metacarpal bone of right hand with routine healing   • Syncope   • Acute respiratory failure with hypoxia   • (HFpEF) heart failure with preserved ejection fraction (HCC)   • Hypotension   • Elevated WBCs   • Near syncope   • Atherosclerosis of native coronary artery of native heart without angina pectoris   • Atherosclerosis of native arteries of extremities with rest pain, left leg   • PVD (peripheral vascular disease) ([facility])   • Physical deconditioning   • Pain due to onychomycosis of toenails of both feet   • ST elevation myocardial infarction (STEMI), unspecified artery   • Acute and chronic respiratory failure with hypoxia     Past Medical History:   Diagnosis Date   • A-fib    • Anxiety    • Arthritis    • Asthma    •  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 with ulceration     bilateral legs - bilateral iliac stents 2008      • CHF (congestive heart failure)    • Chronic lower back pain    • COPD (chronic obstructive pulmonary disease)    • Essential (primary) hypertension    • GERD (gastroesophageal reflux disease)    • History of pulmonary embolus (PE)    • Hyperlipidemia    • Insomnia    • Lumbago    • Nicotine dependence    • Occlusion of artery      and stenosis of bilateral carotid arteries - BABS 16-49%, LICA 0-15%   • Other atherosclerosis of native artery of other extremity     LEFT subclavian stent 2005 (occluded)   • Sleep apnea      Past Surgical History:   Procedure Laterality Date   • CARDIAC CATHETERIZATION  04/06/2016    No evidence of any obstructive epicardial CAD.Preserved LV systolic function with EF of 55%.   • CARDIAC CATHETERIZATION N/A 11/1/2022    Procedure: Left Heart Cath;  Surgeon: Neftali Haywood MD;  Location: [facility] CATH INVASIVE LOCATION;  Service: Cardiology;  Laterality: N/A;   • CENTRAL VENOUS LINE INSERTION  04/07/2016    Successful placement of right uppe extremity 6-[place] triple lumen PICC line.   • ENDOSCOPY N/A 1/12/2018    Procedure: ESOPHAGOGASTRODUODENOSCOPY;  Surgeon: Bin Oh MD;  Location: [facility] ENDOSCOPY;  Service:    • ENDOSCOPY N/A 1/17/2018    Procedure: ESOPHAGOGASTRODUODENOSCOPY;  Surgeon: Bni Oh MD;  Location: [facility] ENDOSCOPY;  Service:    • ENDOSCOPY N/A 3/16/2018    Procedure: ESOPHAGOGASTRODUODENOSCOPY possible dilation;  Surgeon: Bin Oh MD;  Location: [facility] ENDOSCOPY;  Service: Gastroenterology   • FEMORAL POPLITEAL BYPASS Left 4/14/2020    Procedure: FEMORAL POPLITEAL BYPASS ABOVE KNEE  (POLYTETRAFLUOROETHYLENE)  LEFT COMMON FEMORAL ARTERY ENDARTERECTOMY PTA LEFT ILIAC ARTERIOGRAM        (c-arm#2 and c-arm table);  Surgeon: David Suh MD;  Location: [facility] OR;  Service: Vascular;  Laterality: Left;   • FEMORAL  POPLITEAL BYPASS Right 9/17/2021    Procedure: RIGHT common femoral endarterectomy FEMORAL POPLITEAL BYPASS (above the knee polytetrafluoroethylene  lower extremity arteriogram              (c-arm#2 and c-arm table);  Surgeon: David Suh MD;  Location: [facility] OR;  Service: Vascular;  Laterality: Right;   • HYSTERECTOMY     • INTERVENTIONAL RADIOLOGY PROCEDURE Left 9/9/2020    Procedure: IR angiogram extremity left;  Surgeon: David Suh MD;  Location: [facility] ANGIO INVASIVE LOCATION;  Service: Interventional Radiology;  Laterality: Left;   • KYPHOPLASTY N/A 5/23/2019    Procedure: KYPHOPLASTY LUMBAR FOUR;  Surgeon: Aba Santa MD;  Location: [facility] OR;  Service: Orthopedic Spine   • TOTAL SHOULDER REPLACEMENT Left    • TRANSESOPHAGEAL ECHOCARDIOGRAM (JACQUES)  04/07/2016    With color flow-Mild to moderate [facility].LV systolic function well preserved with Ef of 55-60%.Mitral and AV intact.Diastolic dysfunction   • UPPER GASTROINTESTINAL ENDOSCOPY  01/17/2018    Dr. Bin Martin M.D.     PT Assessment (last 12 hours)     PT Evaluation and Treatment     Row Name 05/12/23 1502          Physical Therapy Time and Intention    Document Type therapy note (daily note)  -JANES     Mode of Treatment individual therapy;physical therapy  -JANES     Patient Effort good  -JANES     Row Name 05/12/23 1502          General Information    Patient Profile Reviewed yes  -     Equipment Currently Used at Home shower chair  has RW, but not sure where it is located  -     Existing Precautions/Restrictions fall;oxygen therapy device and L/min  -JANES     Row Name 05/12/23 1502          Home Use of Assistive/Adaptive Equipment    Equipment Currently Used at Home none  -JANES     Row Name 05/12/23 1502          Pain    Pretreatment Pain Rating 9/10  -JANES     Posttreatment Pain Rating 9/10  -     Pain Location lower  -JANES     Pain Location - back  -     Pain Intervention(s) Repositioned  -     Row Name 05/12/23  1502          Cognition    Affect/Mental Status (Cognition) WFL  -     Orientation Status (Cognition) oriented to;person;place;situation  pt thinking it is novemeber.  -     Personal Safety Interventions fall prevention program maintained;gait belt;muscle strengthening facilitated;nonskid shoes/slippers when out of bed;supervised activity  -     Row Name 05/12/23 1502          Range of Motion Comprehensive    General Range of Motion no range of motion deficits identified  -     Row Name 05/12/23 1502          Bed Mobility    Bed Mobility supine-sit;sit-supine  -     Supine-Sit [place] (Bed Mobility) standby assist  -     Sit-Supine [place] (Bed Mobility) --  -     Assistive Device (Bed Mobility) bed rails;head of bed elevated  -     Row Name 05/12/23 1502          Transfers    Transfers sit-stand transfer;stand-sit transfer;bed-chair transfer  -[facility] Name 05/12/23 1502          Bed-Chair Transfer    Bed-Chair [place] (Transfers) minimum assist (75% patient effort)  -     Row Name 05/12/23 1502          Sit-Stand Transfer    Sit-Stand [place] (Transfers) minimum assist (75% patient effort)  -     Assistive Device (Sit-Stand Transfers) --  -[facility] Name 05/12/23 1502          Stand-Sit Transfer    Stand-Sit [place] (Transfers) verbal cues;contact guard  -     Assistive Device (Stand-Sit Transfers) --  -[facility] Name 05/12/23 1502          Toilet Transfer    Type (Toilet Transfer) --  -     [place] Level (Toilet Transfer) --  -     Assistive Device (Toilet Transfer) --  -[facility] Name 05/12/23 1502          Gait/Stairs (Locomotion)    Gait/Stairs Locomotion gait/ambulation independence;gait/ambulation assistive device;gait pattern;distance ambulated;gait deviations  -     [place] Level (Gait) minimum assist (75% patient effort)  -     Assistive Device (Gait) --  pt defers AD but reaches for handrail in moura.  -     Distance in Feet (Gait)  210  -JANES     Deviations/Abnormal Patterns (Gait) gait speed decreased;sky decreased  unsteady. multiple minor LOB.  -JANES     Row Name 05/12/23 1502          Respiratory WDL    Respiratory WDL breath sounds  -JANES     Rhythm/Pattern, Respiratory unlabored  -JANES     Cough Frequency infrequent  -JANES     Cough Type nonproductive  -JANES     Row Name 05/12/23 1502          Breath Sounds    Breath Sounds All Fields  -JANES     All Lung Fields Breath Sounds clear;diminished  -JANES     Row Name 05/12/23 1502          Skin WDL    Skin WDL WDL;all  -JANES     Skin Color/Characteristics without discoloration  -JANES     Skin Temperature warm  -JANES     Skin Moisture dry  -JANES     Skin Elasticity quick return to original state  -JANES     Skin Integrity pressure injury  -JANES     Row Name 05/12/23 1502          Wound 11/17/22 1300 Bilateral midline perineum Traumatic    Wound - Properties Group Placement Date: 11/17/22  -JY Placement Time: 1300  -JY Side: Bilateral  -JY Orientation: midline  -JY Location: perineum  -JY Primary Wound Type: Traumatic  -JY, raw from wiping     Closure SHAYLEE  -JANES     Periwound non-blanchable  -JANES     Periwound Temperature warm  -JANES     Periwound Skin Turgor firm  -JANES     Drainage Amount none  -JANES     Retired Wound - Properties Group Placement Date: 11/17/22 -JY Placement Time: 1300  -JY Side: Bilateral  -JY Orientation: midline  -JY Location: perineum  -JY Primary Wound Type: Traumatic  -JY, raw from wiping     Retired Wound - Properties Group Date first assessed: 11/17/22  -JY Time first assessed: 1300  -JY Side: Bilateral  -JY Location: perineum  -JY Primary Wound Type: Traumatic  -JY, raw from wiping     Row Name 05/12/23 1502          Coping    Observed Emotional State calm;cooperative  -JANES     Verbalized Emotional State acceptance  -JANES     Family/Support Persons family  -JANES     Involvement in Care not present at bedside  -JANES     Row Name 05/12/23 1502          Vital Signs    Pre Systolic BP Rehab 105  -JANES     Pre  Treatment Diastolic BP 58  -     Post Systolic BP Rehab 138  -     Post Treatment Diastolic BP 65  -JANES     Pretreatment Heart Rate (beats/min) 76  -JANES     Posttreatment Heart Rate (beats/min) 86  -JANES     Pre SpO2 (%) 85  pt has taken off O2. re-applied and quickly improved to 92%  -     O2 Delivery Pre Treatment room air  -JANES     Intra SpO2 (%) 95  -JANES     O2 Delivery Intra Treatment supplemental O2  -JANES     Post SpO2 (%) 94  -JANES     O2 Delivery Post Treatment supplemental O2  -JANES     Pre Patient Position Supine  -JANES     Post Patient Position Sitting  -     Row Name 05/12/23 1502          Positioning and Restraints    Pre-Treatment Position in bed  -JANES     Post Treatment Position chair  -JANES     In Chair reclined;call light within reach;encouraged to call for assist;exit alarm on  all needs met  -     Row Name 05/12/23 1502          Therapy Assessment/Plan (PT)    Rehab Potential (PT) good, to achieve stated therapy goals  -     Criteria for Skilled Interventions Met (PT) yes;meets criteria;skilled treatment is necessary  -     Therapy Frequency (PT) other (see comments)  5-7d/week  -     Row Name 05/12/23 1502          Bed Mobility Goal 1 (PT)    Activity/Assistive Device (Bed Mobility Goal 1, PT) sit to supine;supine to sit  -     [place] Level/Cues Needed (Bed Mobility Goal 1, PT) modified independence  -JANES     Time Frame (Bed Mobility Goal 1, PT) by discharge  -     Progress/Outcomes (Bed Mobility Goal 1, PT) goal not met  -     Row Name 05/12/23 1502          Transfer Goal 1 (PT)    Activity/Assistive Device (Transfer Goal 1, PT) sit-to-stand/stand-to-sit;bed-to-chair/chair-to-bed  -     [place] Level/Cues Needed (Transfer Goal 1, PT) standby assist  -     Time Frame (Transfer Goal 1, PT) by discharge  -     Progress/Outcome (Transfer Goal 1, PT) goal not met  -     Row Name 05/12/23 1502          Gait Training Goal 1 (PT)    Activity/Assistive Device (Gait Training Goal  1, PT) gait (walking locomotion);assistive device use  -     [place] Level (Gait Training Goal 1, PT) standby assist  -     Distance (Gait Training Goal 1, PT) 50'x2  -     Time Frame (Gait Training Goal 1, PT) by discharge  -     Progress/Outcome (Gait Training Goal 1, PT) goal not met  -           User Key  (r) = Recorded By, (t) = Taken By, (c) = Cosigned By    Initials Name Provider Type    Danyell Hagen, RN Registered Nurse    Parish Rodriguez PTA Physical Therapist Assistant                Physical Therapy Education     Title: PT OT SLP Therapies (In Progress)     Topic: Physical Therapy (In Progress)     Point: Mobility training (In Progress)     Learning Progress Summary           Patient Acceptance, E, NR by  at 5/12/2023 1529    Acceptance, E,TB, VU by LR at 5/10/2023 0846    Comment: Educated on PT POC and goals.                   Point: Home exercise program (Done)     Learning Progress Summary           Patient Acceptance, E,TB, VU by LR at 5/10/2023 0846    Comment: Educated on PT POC and goals.                   Point: Body mechanics (Done)     Learning Progress Summary           Patient Acceptance, E,TB, VU by LR at 5/10/2023 0846    Comment: Educated on PT POC and goals.                   Point: Precautions (Done)     Learning Progress Summary           Patient Acceptance, E,TB, VU by LR at 5/10/2023 0846    Comment: Educated on PT POC and goals.                               User Key     Initials Effective Dates Name Provider Type Discipline     06/16/21 -  Parish Cuadra PTA Physical Therapist Assistant PT    LR 06/16/21 -  Timothy Lopez Physical Therapist PT              PT Recommendation and Plan  Therapy Frequency (PT): other (see comments) (5-7d/week)  Plan of Care Reviewed With: patient  Progress: improving  Outcome Evaluation: pt responded well to PT with increased gait to 210 ft min A without AD. multiple LOB. pt would benefit from RW. SBA bed mobility.  min A t/f. SpO2 t 855 upon entry on RA because pt had taken O2 off. no new goals met at this time. pt would continue to benefit from PT services.   Outcome Measures     Row Name 05/12/23 1502 05/11/23 1112          How much help from another person do you currently need...    Turning from your back to your side while in flat bed without using bedrails? 3  -JANES 3  -LN     Moving from lying on back to sitting on the side of a flat bed without bedrails? 3  -JANES 3  -LN     Moving to and from a bed to a chair (including a wheelchair)? 3  -JANES 3  -LN     Standing up from a chair using your arms (e.g., wheelchair, bedside chair)? 3  -JANES 3  -LN     Climbing 3-5 steps with a railing? 2  -JANES 2  -LN     To walk in hospital room? 3  -JANES 3  -LN     AM-PAC 6 Clicks Score (PT) 17  -JANES 17  -LN        Functional Assessment    Outcome Measure Options AM-PAC 6 Clicks Basic Mobility (PT)  -JANES AM-PAC 6 Clicks Basic Mobility (PT)  -LN           User Key  (r) = Recorded By, (t) = Taken By, (c) = Cosigned By    Initials Name Provider Type    Parish Rodriguez, PTA Physical Therapist Assistant    LN Torri Sanchez PTA Physical Therapist Assistant                 Time Calculation:    PT Charges     Row Name 05/12/23 1532             Time Calculation    Start Time 1502  -      Stop Time 1525  -      Time Calculation (min) 23 min  -JANES         Time Calculation- PT    Total Timed Code Minutes- PT 23 minute(s)  -JANES         Timed Charges    80271 - Gait Training Minutes  15  -JANES      45113 - PT Therapeutic Activity Minutes 8  -JANES         Total Minutes    Timed Charges Total Minutes 23  -JANES       Total Minutes 23  -JANES            User Key  (r) = Recorded By, (t) = Taken By, (c) = Cosigned By    Initials Name Provider Type    Parish Rodriguez, PTA Physical Therapist Assistant              Therapy Charges for Today     Code Description Service Date Service Provider Modifiers Qty    88882024516 HC GAIT TRAINING EA 15 MIN 5/12/2023 Parish Cuadra  JOSÉ ANTONIO, PTA GP 1    [id number]  PT THERAPEUTIC ACT EA 15 MIN 5/12/2023 Parish Cuadra, PTA GP 1          PT G-Codes  Outcome Measure Options: AM-PAC 6 Clicks Basic Mobility (PT)  AM-PAC 6 Clicks Score (PT): 17    Parish Cuadra PTA  5/12/2023

## 2023-05-12 NOTE — PLAN OF CARE
Goal Outcome Evaluation:  Plan of Care Reviewed With: patient     Problem: Adult Inpatient Plan of Care  Goal: Plan of Care Review  Outcome: Ongoing, Progressing  Flowsheets (Taken 5/12/2023 9055)  Progress: improving  Plan of Care Reviewed With: patient  Outcome Evaluation: pt responded well to PT with increased gait to 210 ft min A without AD. multiple LOB. pt would benefit from RW. SBA bed mobility. min A t/f. SpO2 at 85% upon entry on RA because pt had taken O2 off. no new goals met at this time. pt would continue to benefit from PT services.

## 2023-05-12 NOTE — PAYOR COMM NOTE
"  Yen Romero RN [facility]  [phone]     Phone  [phone]     Fax  Cont. Stay REview      Filomena Perales (72 y.o. Female)     Date of Birth   1950    Social Security Number       Address   [address] [place] [address]    Home Phone   [phone]    MRN   [id number]       Islam   Yazidi    Marital Status                               Admission Date   5/9/23    Admission Type   Emergency    Admitting Provider   Behroozi, Saeid, MD    Attending Provider   Michele Nunn MD    Department, Room/Bed   [address], 318/1       Discharge Date       Discharge Disposition       Discharge Destination                               Attending Provider: Michele Nunn MD    Allergies: Morphine And Related, Penicillins    Isolation: None   Infection: None   Code Status: CPR    Ht: 132.1 cm (52\")   Wt: 61.7 kg (136 lb)    Admission Cmt: None   Principal Problem: Acute and chronic respiratory failure with hypoxia [J96.21]                 Active Insurance as of 5/9/2023     Primary Coverage     Payor Plan Insurance Group Employer/Plan Group    ANTHEM MEDICARE REPLACEMENT ANTHEM MEDICARE ADVANTAGE [id number]     Payor Plan Address Payor Plan Phone Number Payor Plan Fax Number Effective Dates    [address] [phone]  1/1/2023 - None Entered    [facility] [id number]       Subscriber Name Subscriber Birth Date Member ID       FILOMENA PERALES 1950 [id number]                 Emergency Contacts      (Rel.) Home Phone Work Phone Mobile Phone    Renee Li (Grandchild) -- -- [phone]    Meena Marie (Friend) -- -- [phone]    MatTorri mart (Daughter) [phone] -- [phone]            Vital Signs (last day)     Date/Time Temp Temp src Pulse Resp BP Patient Position SpO2    05/12/23 1049 97.5 (36.4) Temporal 79 18 92/53 Lying 92    05/12/23 1039 -- -- 76 -- -- -- --    05/12/23 " 1031 -- -- 84 22 -- -- 83    05/12/23 0739 97.6 (36.4) Temporal 62 16 106/59 Lying 95    05/12/23 0436 -- -- 52 -- -- -- --    05/12/23 0316 97.4 (36.3) Infrared 58 16 103/65 Lying 90    05/12/23 0110 -- -- 68 -- -- -- --    05/1950 -- -- 63 -- -- -- --    05/11/23 1941 -- -- 69 16 -- -- 95    05/11/23 1917 97.3 (36.3) Infrared 75 16 102/55 Lying 93    05/11/23 1523 -- -- 79 -- -- -- --    05/11/23 1506 97.3 (36.3) Temporal 107 16 98/69 Sitting 92    05/11/23 1419 -- -- 71 18 -- -- 96    05/11/23 1201 97 (36.1) Temporal 73 18 -- Sitting 91    05/11/23 1055 -- -- 80 16 -- -- 95    05/11/23 0751 -- -- 82 -- -- -- --    05/11/23 0750 96.9 (36.1) Temporal 77 18 153/72 Lying 93    05/11/23 0724 -- -- 72 18 -- -- 95    05/11/23 0419 -- -- 66 18 -- -- 94    05/11/23 0321 98.2 (36.8) Temporal 54 18 149/67 Lying 94    05/11/23 0100 -- -- 78 -- -- -- --          Oxygen Therapy (last day)     Date/Time SpO2 Device (Oxygen Therapy) Flow (L/min) Oxygen Concentration (%) ETCO2 (mmHg)    05/12/23 1049 92 nasal cannula 3.5 -- --    05/12/23 1039 -- nasal cannula 3.5 -- --    05/12/23 1031 83 room air -- -- --    05/12/23 0928 -- -- 3.5 -- --    05/12/23 0739 95 nasal cannula 3.5 -- --    05/12/23 0316 90 nasal cannula 3.5 -- --    05/1950 -- nasal cannula 3.5 -- --    05/11/23 1947 -- nasal cannula 3.5 -- --    05/11/23 1941 95 nasal cannula 3.5 -- --    05/11/23 1917 93 nasal cannula -- -- --    05/11/23 1506 92 nasal cannula 3.5 -- --    05/11/23 1419 96 nasal cannula 3.5 -- --    05/11/23 1201 91 nasal cannula 3.5 -- --    05/11/23 1055 95 nasal cannula 3.5 -- --    05/11/23 0750 93 -- -- -- --    05/11/23 0724 95 nasal cannula 3.5 -- --    05/11/23 0419 94 nasal cannula 3.5 -- --    05/11/23 0321 94 nasal cannula 3.5 -- --          Current Facility-Administered Medications   Medication Dose Route Frequency Provider Last Rate Last Admin   • amitriptyline (ELAVIL) tablet 25 mg  25 mg Oral Nightly Behroozi, Saeid, MD    25 mg at 05/11/23 2018   • budesonide-formoterol (SYMBICORT) 160-4.5 MCG/ACT inhaler 2 puff  2 puff Inhalation BID - RT Behroozi, Saeid, MD   2 puff at 05/12/23 1027   • cilostazol (PLETAL) tablet 100 mg  100 mg Oral BID Behroozi, Saeid, MD   100 mg at 05/12/23 0923   • clopidogrel (PLAVIX) tablet 75 mg  75 mg Oral Daily Behroozi, Saeid, MD   75 mg at 05/12/23 0924   • dextrose (D50W) (25 g/50 mL) IV injection 25 g  25 g Intravenous Q15 Min PRN Michele Nunn MD       • dextrose (GLUTOSE) oral gel 15 g  15 g Oral Q15 Min PRN Michele Nunn MD       • furosemide (LASIX) injection 40 mg  40 mg Intravenous BID Michele Nunn MD   40 mg at 05/12/23 0924   • [START ON 5/13/2023] furosemide (LASIX) tablet 40 mg  40 mg Oral BID Michele Nunn MD       • gabapentin (NEURONTIN) capsule 400 mg  400 mg Oral Q12H Behroozi, Saeid, MD   400 mg at 05/12/23 0924   • glucagon HCl (Diagnostic) injection 1 mg  1 mg Intramuscular Q15 Min PRN Michele Nunn MD       • Insulin Aspart (novoLOG) injection 0-9 Units  0-9 Units Subcutaneous TID AC Michele Nunn MD   2 Units at 05/12/23 1111   • ipratropium-albuterol (DUO-NEB) nebulizer solution 3 mL  3 mL Nebulization Q4H - RT Michele Nunn MD   3 mL at 05/12/23 1031   • levothyroxine (SYNTHROID, LEVOTHROID) tablet 25 mcg  25 mcg Oral Q AM Behroozi, Saeid, MD   25 mcg at 05/12/23 0620   • midodrine (PROAMATINE) tablet 10 mg  10 mg Oral TID AC Michele Nunn MD   10 mg at 05/12/23 1111   • nicotine polacrilex (NICORETTE) gum 4 mg  4 mg Mouth/Throat Q1H PRN Michele Nunn MD   4 mg at 05/11/23 1550   • oxyCODONE-acetaminophen (PERCOCET) 5-325 MG per tablet 1 tablet  1 tablet Oral Q6H PRN Behroozi, Saeid, MD   1 tablet at 05/11/23 1841   • pantoprazole (PROTONIX) EC tablet 40 mg  40 mg Oral Daily Behroozi, Saeid, MD   40 mg at 05/12/23 0924   • pravastatin (PRAVACHOL) tablet 20 mg  20 mg Oral Nightly Behroozi, Saeid, MD   20 mg at 05/11/23 2018   •  rivaroxaban (XARELTO) tablet 20 mg  20 mg Oral Daily With Dinner Behroozi, Saeid, MD   20 mg at 05/11/23 1732   • sertraline (ZOLOFT) tablet 100 mg  100 mg Oral Daily Behroozi, Saeid, MD   100 mg at 05/12/23 0923   • sodium chloride 0.9 % flush 10 mL  10 mL Intravenous PRN Jannie Roe MD       • sodium chloride 0.9 % flush 10 mL  10 mL Intravenous Q12H Behroozi, Saeid, MD   10 mL at 05/12/23 0924   • sodium chloride 0.9 % flush 10 mL  10 mL Intravenous PRN Behroozi, Saeid, MD       • sodium chloride 0.9 % infusion 40 mL  40 mL Intravenous PRN Behroozi, Saeid, MD       • traZODone (DESYREL) tablet 150 mg  150 mg Oral Nightly Behroozi, Saeid, MD   150 mg at 05/11/23 2019   • vitamin B-12 (CYANOCOBALAMIN) tablet 5,000 mcg  5,000 mcg Oral Daily Behroozi, Saeid, MD   5,000 mcg at 05/12/23 0923        Physician Progress Notes (last 48 hours)      Michele Nunn MD at 05/11/23 1434              [facility] Medicine   INPATIENT PROGRESS NOTE      Patient Name: Mona Perales  Date of Admission: 5/9/2023  Today's Date: 05/11/23  Length of Stay: 1  Primary Care Physician: Anahi Camacho APRN    Subjective       HPI   72-year-old female from SNF, history of heart failure with preserved EF, A-fib, COPD with tobacco abuse, chronic hypoxia on 2 L O2 at baseline, hypertension, history of PE anticoagulated on Xarelto, presented from SNF due to increased shortness of breath and hypoxia despite increasing her supplemental oxygen and initially required nonrebreather then high flow nasal cannula in order to maintain adequate SPO2.  She does not have evidence of pulmonary edema on chest x-ray as well as significant elevated proBNP over 16,000.  She had some elevated high-sensitivity troponin at 58, which was trended with downtrend to 55, likely related to demand ischemia in setting of decompensated heart failure.  She also had evidence of ALVAREZ with creatinine significant  from baseline up to 1.41, however likely related to cardiorenal syndrome as renal function did improve some with treatment on diuretic therapy.      Events were reviewed.  Patient feels less short of breath today.  Up in chair resting fairly comfortably.  Supplemental O2 has been weaned down to 3.5 L.  She has had good urine output thus far.        Review of Systems   Comprehensive ROS completed  All pertinent negatives and positives are as above. All other systems have been reviewed and are negative unless otherwise stated.     Objective    Temp:  [96.8 °F (36 °C)-98.3 °F (36.8 °C)] 97 °F (36.1 °C)  Heart Rate:  [44-86] 71  Resp:  [16-20] 18  BP: (116-153)/(54-94) 153/72  Physical Exam  General: Chronically ill-appearing, no acute distress  Cardiac: Normal rate, S1-S2 present  Respiratory: Bilateral crackles appreciated, no use of accessory muscles  Abdomen: Nondistended  Extremities: No edema  Skin: Warm and dry        Results Review:  I have reviewed the labs, radiology results, and diagnostic studies.    Laboratory Data:   Results from last 7 days   Lab Units 05/11/23  0745 05/10/23  0706 05/09/23  2140   WBC 10*3/mm3 11.75* 13.52* 12.27*   HEMOGLOBIN g/dL 10.7* 12.0 10.9*   HEMATOCRIT % 34.4 39.5 32.8*   PLATELETS 10*3/mm3 217 135* 191        Results from last 7 days   Lab Units 05/11/23  0745 05/10/23  0546 05/09/23  2140   SODIUM mmol/L 141 138 136   POTASSIUM mmol/L 4.1 4.3 5.2   CHLORIDE mmol/L 99 97* 100   CO2 mmol/L 27.0 26.0 22.0   BUN mg/dL 41* 37* 41*   CREATININE mg/dL 1.03* 1.23* 1.41*   CALCIUM mg/dL 9.0 8.6 8.5*   BILIRUBIN mg/dL  --   --  0.4   ALK PHOS U/L  --   --  73   ALT (SGPT) U/L  --   --  24   AST (SGOT) U/L  --   --  38*   GLUCOSE mg/dL 131* 99 147*       Culture Data:   No results found for: BLOODCX  No results found for: URINECX  No results found for: RESPCX  No results found for: WOUNDCX  No results found for: STOOLCX  No components found for: BODYFLD    Radiology Data:   Imaging  Results (Last 24 Hours)     ** No results found for the last 24 hours. **          I have reviewed the patient's current medications.     Assessment/Plan     Active Hospital Problems    Diagnosis    • **Acute and chronic respiratory failure with hypoxia    • (HFpEF) heart failure with preserved ejection fraction (HCC)        Acute on chronic HFpEF with pulmonary edema  Reviewed labs, elevated proBNP over 16,000  Independently reviewed chest x-ray, appears to have bilateral pulmonary edema, pulmonary vascular congestion  Reviewed I's and O's, maintaining net negative fluid balance, net -1.9 L  -- Repeat echo reviewed a 5/10/2023, preserved LVEF 55 to 60%, diastolic dysfunction noted  - Continue on IV Lasix 40 mg twice daily, continue to follow strict I's and O's, daily weights, reassess tomorrow    Acute on chronic hypoxic respiratory failure  Chronic hypercapnia  COPD  - Continue supplemental O2 support to maintain adequate gas exchange, goal SPO2 88-90%, continue attempts at weaning supplemental O2 further  - Bronchodilators for COPD    Postural hypotension  - Continue midodrine 3 times daily, monitor hemodynamics    Tobacco abuse  - Provide nicotine gum at patient's request    Peripheral arterial disease  - Continue Plavix, Pletal, statin    Neuropathy  - Continue gabapentin    Diabetes  - Glucose monitoring with sliding scale insulin correction as needed, defer basal insulin for now    Hypothyroidism  - Continue levothyroxine    History of PE  - Anticoagulated on Xarelto    Multiple complex medical problems  Morbidity mortality high risk  Medical decision making high complexity      Discharge Planning: TBD, likely discharge back to SNF in the next few days, possibly Monday      Electronically signed by Michele Nunn MD, 05/11/23, 14:34 CDT.      Electronically signed by Michele Nunn MD at 05/11/23 9455

## 2023-05-13 LAB
ANION GAP SERPL CALCULATED.3IONS-SCNC: 12 MMOL/L (ref 5–15)
BUN SERPL-MCNC: 31 MG/DL (ref 8–23)
BUN/CREAT SERPL: 34.4 (ref 7–25)
CALCIUM SPEC-SCNC: 8.5 MG/DL (ref 8.6–10.5)
CHLORIDE SERPL-SCNC: 98 MMOL/L (ref 98–107)
CO2 SERPL-SCNC: 31 MMOL/L (ref 22–29)
CREAT SERPL-MCNC: 0.9 MG/DL (ref 0.57–1)
DEPRECATED RDW RBC AUTO: 42.6 FL (ref 37–54)
EGFRCR SERPLBLD CKD-EPI 2021: 68.9 ML/MIN/1.73
ERYTHROCYTE [DISTWIDTH] IN BLOOD BY AUTOMATED COUNT: 14.1 % (ref 12.3–15.4)
GLUCOSE BLDC GLUCOMTR-MCNC: 149 MG/DL (ref 70–130)
GLUCOSE BLDC GLUCOMTR-MCNC: 172 MG/DL (ref 70–130)
GLUCOSE BLDC GLUCOMTR-MCNC: 181 MG/DL (ref 70–130)
GLUCOSE BLDC GLUCOMTR-MCNC: 86 MG/DL (ref 70–130)
GLUCOSE SERPL-MCNC: 86 MG/DL (ref 65–99)
HCT VFR BLD AUTO: 36.5 % (ref 34–46.6)
HGB BLD-MCNC: 11.9 G/DL (ref 12–15.9)
MCH RBC QN AUTO: 28.1 PG (ref 26.6–33)
MCHC RBC AUTO-ENTMCNC: 32.6 G/DL (ref 31.5–35.7)
MCV RBC AUTO: 86.3 FL (ref 79–97)
PLATELET # BLD AUTO: 260 10*3/MM3 (ref 140–450)
PMV BLD AUTO: 9.4 FL (ref 6–12)
POTASSIUM SERPL-SCNC: 3.4 MMOL/L (ref 3.5–5.2)
RBC # BLD AUTO: 4.23 10*6/MM3 (ref 3.77–5.28)
SODIUM SERPL-SCNC: 141 MMOL/L (ref 136–145)
WBC NRBC COR # BLD: 14.56 10*3/MM3 (ref 3.4–10.8)

## 2023-05-13 PROCEDURE — 85027 COMPLETE CBC AUTOMATED: CPT | Performed by: STUDENT IN AN ORGANIZED HEALTH CARE EDUCATION/TRAINING PROGRAM

## 2023-05-13 PROCEDURE — 94760 N-INVAS EAR/PLS OXIMETRY 1: CPT

## 2023-05-13 PROCEDURE — 82948 REAGENT STRIP/BLOOD GLUCOSE: CPT

## 2023-05-13 PROCEDURE — 94799 UNLISTED PULMONARY SVC/PX: CPT

## 2023-05-13 PROCEDURE — 63710000001 INSULIN ASPART PER 5 UNITS: Performed by: STUDENT IN AN ORGANIZED HEALTH CARE EDUCATION/TRAINING PROGRAM

## 2023-05-13 PROCEDURE — 80048 BASIC METABOLIC PNL TOTAL CA: CPT | Performed by: STUDENT IN AN ORGANIZED HEALTH CARE EDUCATION/TRAINING PROGRAM

## 2023-05-13 PROCEDURE — 94664 DEMO&/EVAL PT USE INHALER: CPT

## 2023-05-13 RX ORDER — POTASSIUM CHLORIDE 750 MG/1
40 CAPSULE, EXTENDED RELEASE ORAL ONCE
Status: COMPLETED | OUTPATIENT
Start: 2023-05-13 | End: 2023-05-13

## 2023-05-13 RX ADMIN — CLOPIDOGREL BISULFATE 75 MG: 75 TABLET ORAL at 08:37

## 2023-05-13 RX ADMIN — PRAVASTATIN SODIUM 20 MG: 40 TABLET ORAL at 20:17

## 2023-05-13 RX ADMIN — GABAPENTIN 400 MG: 400 CAPSULE ORAL at 08:37

## 2023-05-13 RX ADMIN — LEVOTHYROXINE SODIUM 25 MCG: 25 TABLET ORAL at 06:13

## 2023-05-13 RX ADMIN — RIVAROXABAN 20 MG: 10 TABLET, FILM COATED ORAL at 17:48

## 2023-05-13 RX ADMIN — GABAPENTIN 400 MG: 400 CAPSULE ORAL at 20:17

## 2023-05-13 RX ADMIN — MIDODRINE HYDROCHLORIDE 10 MG: 5 TABLET ORAL at 17:48

## 2023-05-13 RX ADMIN — FUROSEMIDE 40 MG: 40 TABLET ORAL at 17:48

## 2023-05-13 RX ADMIN — CILOSTAZOL 100 MG: 100 TABLET ORAL at 08:37

## 2023-05-13 RX ADMIN — POTASSIUM CHLORIDE 40 MEQ: 10 CAPSULE, COATED, EXTENDED RELEASE ORAL at 11:30

## 2023-05-13 RX ADMIN — Medication 10 ML: at 08:38

## 2023-05-13 RX ADMIN — IPRATROPIUM BROMIDE AND ALBUTEROL SULFATE 3 ML: 2.5; .5 SOLUTION RESPIRATORY (INHALATION) at 19:25

## 2023-05-13 RX ADMIN — INSULIN ASPART 2 UNITS: 100 INJECTION, SOLUTION INTRAVENOUS; SUBCUTANEOUS at 11:29

## 2023-05-13 RX ADMIN — TRAZODONE HYDROCHLORIDE 150 MG: 100 TABLET ORAL at 20:17

## 2023-05-13 RX ADMIN — SERTRALINE 100 MG: 50 TABLET, FILM COATED ORAL at 08:38

## 2023-05-13 RX ADMIN — IPRATROPIUM BROMIDE AND ALBUTEROL SULFATE 3 ML: 2.5; .5 SOLUTION RESPIRATORY (INHALATION) at 06:57

## 2023-05-13 RX ADMIN — FUROSEMIDE 40 MG: 40 TABLET ORAL at 08:38

## 2023-05-13 RX ADMIN — Medication 10 ML: at 20:17

## 2023-05-13 RX ADMIN — CYANOCOBALAMIN TAB 1000 MCG 5000 MCG: 1000 TAB at 08:37

## 2023-05-13 RX ADMIN — IPRATROPIUM BROMIDE AND ALBUTEROL SULFATE 3 ML: 2.5; .5 SOLUTION RESPIRATORY (INHALATION) at 11:19

## 2023-05-13 RX ADMIN — AMITRIPTYLINE HYDROCHLORIDE 25 MG: 25 TABLET, FILM COATED ORAL at 20:17

## 2023-05-13 RX ADMIN — BUDESONIDE AND FORMOTEROL FUMARATE DIHYDRATE 2 PUFF: 160; 4.5 AEROSOL RESPIRATORY (INHALATION) at 07:05

## 2023-05-13 RX ADMIN — BUDESONIDE AND FORMOTEROL FUMARATE DIHYDRATE 2 PUFF: 160; 4.5 AEROSOL RESPIRATORY (INHALATION) at 19:25

## 2023-05-13 RX ADMIN — IPRATROPIUM BROMIDE AND ALBUTEROL SULFATE 3 ML: 2.5; .5 SOLUTION RESPIRATORY (INHALATION) at 14:56

## 2023-05-13 RX ADMIN — CILOSTAZOL 100 MG: 100 TABLET ORAL at 20:17

## 2023-05-13 RX ADMIN — MIDODRINE HYDROCHLORIDE 10 MG: 5 TABLET ORAL at 08:38

## 2023-05-13 RX ADMIN — MIDODRINE HYDROCHLORIDE 10 MG: 5 TABLET ORAL at 11:30

## 2023-05-13 RX ADMIN — PANTOPRAZOLE SODIUM 40 MG: 40 TABLET, DELAYED RELEASE ORAL at 08:38

## 2023-05-13 NOTE — PROGRESS NOTES
[facility] Medicine   INPATIENT PROGRESS NOTE      Patient Name: Mona Perales  Date of Admission: 5/9/2023  Today's Date: 05/13/23  Length of Stay: 3  Primary Care Physician: Anahi Camacho APRN    Subjective       HPI   72-year-old female from SNF, history of heart failure with preserved EF, A-fib, COPD with tobacco abuse, chronic hypoxia on 2 L O2 at baseline, hypertension, history of PE anticoagulated on Xarelto, presented from SNF due to increased shortness of breath and hypoxia despite increasing her supplemental oxygen and initially required nonrebreather then high flow nasal cannula in order to maintain adequate SPO2.  She does not have evidence of pulmonary edema on chest x-ray as well as significant elevated proBNP over 16,000.  She had some elevated high-sensitivity troponin at 58, which was trended with downtrend to 55, likely related to demand ischemia in setting of decompensated heart failure.  She also had evidence of ALVAREZ with creatinine significant from baseline up to 1.41, however likely related to cardiorenal syndrome as renal function did improve some with treatment on diuretic therapy.      Resting comfortably.  No acute events overnight.  Less short of breath.  Continues to have good urine output.      ROS  Comprehensive ROS completed  All pertinent negatives and positives are as above. All other systems have been reviewed and are negative unless otherwise stated.     Objective    Temp:  [96.9 °F (36.1 °C)-97.8 °F (36.6 °C)] 97.8 °F (36.6 °C)  Heart Rate:  [58-86] 63  Resp:  [16-18] 16  BP: ()/(51-83) 140/63  Physical Exam  General: Chronically ill-appearing, no acute distress  Cardiac: Normal rate, S1-S2 present  Respiratory: No wheezes or crackles  Abdomen: Nondistended  Extremities: No edema  Skin: Warm and dry        Results Review:  I have reviewed the labs, radiology results, and diagnostic studies.    Laboratory Data:    Results from last 7 days   Lab Units 05/13/23  0623 05/12/23  0751 05/11/23  0745   WBC 10*3/mm3 14.56* 15.56* 11.75*   HEMOGLOBIN g/dL 11.9* 12.4 10.7*   HEMATOCRIT % 36.5 39.8 34.4   PLATELETS 10*3/mm3 260 220 217        Results from last 7 days   Lab Units 05/13/23  0623 05/12/23  0751 05/11/23  0745 05/10/23  0546 05/09/23  2140   SODIUM mmol/L 141 143 141   < > 136   POTASSIUM mmol/L 3.4* 3.5 4.1   < > 5.2   CHLORIDE mmol/L 98 98 99   < > 100   CO2 mmol/L 31.0* 33.0* 27.0   < > 22.0   BUN mg/dL 31* 37* 41*   < > 41*   CREATININE mg/dL 0.90 0.88 1.03*   < > 1.41*   CALCIUM mg/dL 8.5* 9.3 9.0   < > 8.5*   BILIRUBIN mg/dL  --   --   --   --  0.4   ALK PHOS U/L  --   --   --   --  73   ALT (SGPT) U/L  --   --   --   --  24   AST (SGOT) U/L  --   --   --   --  38*   GLUCOSE mg/dL 86 85 131*   < > 147*    < > = values in this interval not displayed.       Culture Data:   No results found for: BLOODCX  No results found for: URINECX  No results found for: RESPCX  No results found for: WOUNDCX  No results found for: STOOLCX  No components found for: BODYFLD    Radiology Data:   Imaging Results (Last 24 Hours)     ** No results found for the last 24 hours. **          I have reviewed the patient's current medications.     Assessment/Plan     Active Hospital Problems    Diagnosis    • **Acute and chronic respiratory failure with hypoxia    • (HFpEF) heart failure with preserved ejection fraction (HCC)        Acute on chronic HFpEF  --Improved with IV Lasix, transition to oral Lasix 40 mg twice daily today, reviewed I's and O's, continues to maintain significant net negative fluid balance    Acute on chronic hypoxic respiratory failure  Chronic hypercapnia  COPD  - Continue supplemental O2 support to maintain adequate gas exchange, goal SPO2 88-90%, continue attempts at weaning supplemental O2 further  - Bronchodilators for COPD    Postural hypotension  - Continue midodrine 3 times daily, monitor hemodynamics    Tobacco  abuse  - Provide nicotine gum at patient's request    Peripheral arterial disease  - Continue Plavix, Pletal, statin    Neuropathy  - Continue gabapentin    Diabetes  - Glucose monitoring with sliding scale insulin correction as needed, defer basal insulin for now    Hypothyroidism  - Continue levothyroxine    History of PE  - Anticoagulated on Xarelto      Discharge Planning: Anticipate likely discharge back to SNF on Monday      Electronically signed by Michele Nunn MD, 05/13/23, 13:44 CDT.

## 2023-05-13 NOTE — PROGRESS NOTES
[facility] Medicine   INPATIENT PROGRESS NOTE      Patient Name: Mona Perales  Date of Admission: 5/9/2023  Today's Date: 05/12/23  Length of Stay: 2  Primary Care Physician: Anahi Camacho APRN    Subjective       HPI   72-year-old female from SNF, history of heart failure with preserved EF, A-fib, COPD with tobacco abuse, chronic hypoxia on 2 L O2 at baseline, hypertension, history of PE anticoagulated on Xarelto, presented from SNF due to increased shortness of breath and hypoxia despite increasing her supplemental oxygen and initially required nonrebreather then high flow nasal cannula in order to maintain adequate SPO2.  She does not have evidence of pulmonary edema on chest x-ray as well as significant elevated proBNP over 16,000.  She had some elevated high-sensitivity troponin at 58, which was trended with downtrend to 55, likely related to demand ischemia in setting of decompensated heart failure.  She also had evidence of ALVAREZ with creatinine significant from baseline up to 1.41, however likely related to cardiorenal syndrome as renal function did improve some with treatment on diuretic therapy.      No acute events overnight.  Breathing more comfortably.  Less short of breath but not quite back to her baseline.  Denies chest pain.  No other complaints.  Has had good urine output.          Review of Systems   Comprehensive ROS completed  All pertinent negatives and positives are as above. All other systems have been reviewed and are negative unless otherwise stated.     Objective    Temp:  [97.4 °F (36.3 °C)-97.6 °F (36.4 °C)] 97.4 °F (36.3 °C)  Heart Rate:  [52-84] 78  Resp:  [16-22] 16  BP: ()/(53-67) 112/67  Physical Exam  General: Chronically ill-appearing, no acute distress  Cardiac: Normal rate, S1-S2 present  Respiratory: Faint bilateral crackles appreciated, no use of accessory muscles  Abdomen: Nondistended  Extremities: No  edema  Skin: Warm and dry        Results Review:  I have reviewed the labs, radiology results, and diagnostic studies.    Laboratory Data:   Results from last 7 days   Lab Units 05/12/23  0751 05/11/23  0745 05/10/23  0706   WBC 10*3/mm3 15.56* 11.75* 13.52*   HEMOGLOBIN g/dL 12.4 10.7* 12.0   HEMATOCRIT % 39.8 34.4 39.5   PLATELETS 10*3/mm3 220 217 135*        Results from last 7 days   Lab Units 05/12/23  0751 05/11/23  0745 05/10/23  0546 05/09/23  2140   SODIUM mmol/L 143 141 138 136   POTASSIUM mmol/L 3.5 4.1 4.3 5.2   CHLORIDE mmol/L 98 99 97* 100   CO2 mmol/L 33.0* 27.0 26.0 22.0   BUN mg/dL 37* 41* 37* 41*   CREATININE mg/dL 0.88 1.03* 1.23* 1.41*   CALCIUM mg/dL 9.3 9.0 8.6 8.5*   BILIRUBIN mg/dL  --   --   --  0.4   ALK PHOS U/L  --   --   --  73   ALT (SGPT) U/L  --   --   --  24   AST (SGOT) U/L  --   --   --  38*   GLUCOSE mg/dL 85 131* 99 147*       Culture Data:   No results found for: BLOODCX  No results found for: URINECX  No results found for: RESPCX  No results found for: WOUNDCX  No results found for: STOOLCX  No components found for: BODYFLD    Radiology Data:   Imaging Results (Last 24 Hours)     ** No results found for the last 24 hours. **          I have reviewed the patient's current medications.     Assessment/Plan     Active Hospital Problems    Diagnosis    • **Acute and chronic respiratory failure with hypoxia    • (HFpEF) heart failure with preserved ejection fraction (HCC)        Acute on chronic HFpEF  --Continue IV Lasix today, maintaining significant net negative fluid balance on review of I's and O's, likely transition to oral Lasix tomorrow    Acute on chronic hypoxic respiratory failure  Chronic hypercapnia  COPD  - Continue supplemental O2 support to maintain adequate gas exchange, goal SPO2 88-90%, continue attempts at weaning supplemental O2 further  - Bronchodilators for COPD    Postural hypotension  - Continue midodrine 3 times daily, monitor hemodynamics    Tobacco  abuse  - Provide nicotine gum at patient's request    Peripheral arterial disease  - Continue Plavix, Pletal, statin    Neuropathy  - Continue gabapentin    Diabetes  - Glucose monitoring with sliding scale insulin correction as needed, defer basal insulin for now    Hypothyroidism  - Continue levothyroxine    History of PE  - Anticoagulated on Xarelto      Discharge Planning: TBD, likely discharge back to SNF in the next few days, possibly Monday      Electronically signed by Michele Nunn MD, 05/12/23, 19:41 CDT.

## 2023-05-14 LAB
ANION GAP SERPL CALCULATED.3IONS-SCNC: 15 MMOL/L (ref 5–15)
BASOPHILS # BLD AUTO: 0.08 10*3/MM3 (ref 0–0.2)
BASOPHILS NFR BLD AUTO: 0.5 % (ref 0–1.5)
BUN SERPL-MCNC: 27 MG/DL (ref 8–23)
BUN/CREAT SERPL: 32.5 (ref 7–25)
CALCIUM SPEC-SCNC: 8.5 MG/DL (ref 8.6–10.5)
CHLORIDE SERPL-SCNC: 99 MMOL/L (ref 98–107)
CO2 SERPL-SCNC: 28 MMOL/L (ref 22–29)
CREAT SERPL-MCNC: 0.83 MG/DL (ref 0.57–1)
DEPRECATED RDW RBC AUTO: 46.2 FL (ref 37–54)
EGFRCR SERPLBLD CKD-EPI 2021: 75.9 ML/MIN/1.73
EOSINOPHIL # BLD AUTO: 0.12 10*3/MM3 (ref 0–0.4)
EOSINOPHIL NFR BLD AUTO: 0.8 % (ref 0.3–6.2)
ERYTHROCYTE [DISTWIDTH] IN BLOOD BY AUTOMATED COUNT: 14.4 % (ref 12.3–15.4)
GLUCOSE BLDC GLUCOMTR-MCNC: 107 MG/DL (ref 70–130)
GLUCOSE BLDC GLUCOMTR-MCNC: 162 MG/DL (ref 70–130)
GLUCOSE BLDC GLUCOMTR-MCNC: 174 MG/DL (ref 70–130)
GLUCOSE SERPL-MCNC: 98 MG/DL (ref 65–99)
HCT VFR BLD AUTO: 38.8 % (ref 34–46.6)
HGB BLD-MCNC: 12.4 G/DL (ref 12–15.9)
IMM GRANULOCYTES # BLD AUTO: 0.05 10*3/MM3 (ref 0–0.05)
IMM GRANULOCYTES NFR BLD AUTO: 0.3 % (ref 0–0.5)
LYMPHOCYTES # BLD AUTO: 3.26 10*3/MM3 (ref 0.7–3.1)
LYMPHOCYTES NFR BLD AUTO: 21.6 % (ref 19.6–45.3)
MCH RBC QN AUTO: 28.8 PG (ref 26.6–33)
MCHC RBC AUTO-ENTMCNC: 32 G/DL (ref 31.5–35.7)
MCV RBC AUTO: 90.2 FL (ref 79–97)
MONOCYTES # BLD AUTO: 1.5 10*3/MM3 (ref 0.1–0.9)
MONOCYTES NFR BLD AUTO: 9.9 % (ref 5–12)
NEUTROPHILS NFR BLD AUTO: 10.1 10*3/MM3 (ref 1.7–7)
NEUTROPHILS NFR BLD AUTO: 66.9 % (ref 42.7–76)
NRBC BLD AUTO-RTO: 0 /100 WBC (ref 0–0.2)
PLATELET # BLD AUTO: 232 10*3/MM3 (ref 140–450)
PMV BLD AUTO: 9.8 FL (ref 6–12)
POTASSIUM SERPL-SCNC: 4.2 MMOL/L (ref 3.5–5.2)
RBC # BLD AUTO: 4.3 10*6/MM3 (ref 3.77–5.28)
SODIUM SERPL-SCNC: 142 MMOL/L (ref 136–145)
WBC NRBC COR # BLD: 15.11 10*3/MM3 (ref 3.4–10.8)

## 2023-05-14 PROCEDURE — 94664 DEMO&/EVAL PT USE INHALER: CPT

## 2023-05-14 PROCEDURE — 82948 REAGENT STRIP/BLOOD GLUCOSE: CPT

## 2023-05-14 PROCEDURE — 94799 UNLISTED PULMONARY SVC/PX: CPT

## 2023-05-14 PROCEDURE — 97110 THERAPEUTIC EXERCISES: CPT

## 2023-05-14 PROCEDURE — 63710000001 INSULIN ASPART PER 5 UNITS: Performed by: STUDENT IN AN ORGANIZED HEALTH CARE EDUCATION/TRAINING PROGRAM

## 2023-05-14 PROCEDURE — 97116 GAIT TRAINING THERAPY: CPT

## 2023-05-14 PROCEDURE — 36415 COLL VENOUS BLD VENIPUNCTURE: CPT | Performed by: STUDENT IN AN ORGANIZED HEALTH CARE EDUCATION/TRAINING PROGRAM

## 2023-05-14 PROCEDURE — 94760 N-INVAS EAR/PLS OXIMETRY 1: CPT

## 2023-05-14 PROCEDURE — 80048 BASIC METABOLIC PNL TOTAL CA: CPT | Performed by: STUDENT IN AN ORGANIZED HEALTH CARE EDUCATION/TRAINING PROGRAM

## 2023-05-14 PROCEDURE — 85025 COMPLETE CBC W/AUTO DIFF WBC: CPT | Performed by: STUDENT IN AN ORGANIZED HEALTH CARE EDUCATION/TRAINING PROGRAM

## 2023-05-14 RX ORDER — IPRATROPIUM BROMIDE AND ALBUTEROL SULFATE 2.5; .5 MG/3ML; MG/3ML
3 SOLUTION RESPIRATORY (INHALATION)
Status: DISCONTINUED | OUTPATIENT
Start: 2023-05-14 | End: 2023-05-15 | Stop reason: HOSPADM

## 2023-05-14 RX ADMIN — FUROSEMIDE 40 MG: 40 TABLET ORAL at 17:19

## 2023-05-14 RX ADMIN — OXYCODONE HYDROCHLORIDE AND ACETAMINOPHEN 1 TABLET: 5; 325 TABLET ORAL at 23:22

## 2023-05-14 RX ADMIN — MIDODRINE HYDROCHLORIDE 10 MG: 5 TABLET ORAL at 17:19

## 2023-05-14 RX ADMIN — CILOSTAZOL 100 MG: 100 TABLET ORAL at 08:45

## 2023-05-14 RX ADMIN — BUDESONIDE AND FORMOTEROL FUMARATE DIHYDRATE 2 PUFF: 160; 4.5 AEROSOL RESPIRATORY (INHALATION) at 07:08

## 2023-05-14 RX ADMIN — PANTOPRAZOLE SODIUM 40 MG: 40 TABLET, DELAYED RELEASE ORAL at 08:45

## 2023-05-14 RX ADMIN — MIDODRINE HYDROCHLORIDE 10 MG: 5 TABLET ORAL at 08:45

## 2023-05-14 RX ADMIN — IPRATROPIUM BROMIDE AND ALBUTEROL SULFATE 3 ML: 2.5; .5 SOLUTION RESPIRATORY (INHALATION) at 20:05

## 2023-05-14 RX ADMIN — INSULIN ASPART 2 UNITS: 100 INJECTION, SOLUTION INTRAVENOUS; SUBCUTANEOUS at 10:52

## 2023-05-14 RX ADMIN — LEVOTHYROXINE SODIUM 25 MCG: 25 TABLET ORAL at 05:03

## 2023-05-14 RX ADMIN — TRAZODONE HYDROCHLORIDE 150 MG: 100 TABLET ORAL at 20:14

## 2023-05-14 RX ADMIN — Medication 10 ML: at 20:14

## 2023-05-14 RX ADMIN — IPRATROPIUM BROMIDE AND ALBUTEROL SULFATE 3 ML: 2.5; .5 SOLUTION RESPIRATORY (INHALATION) at 12:18

## 2023-05-14 RX ADMIN — OXYCODONE HYDROCHLORIDE AND ACETAMINOPHEN 1 TABLET: 5; 325 TABLET ORAL at 08:46

## 2023-05-14 RX ADMIN — CILOSTAZOL 100 MG: 100 TABLET ORAL at 20:14

## 2023-05-14 RX ADMIN — CLOPIDOGREL BISULFATE 75 MG: 75 TABLET ORAL at 08:45

## 2023-05-14 RX ADMIN — INSULIN ASPART 2 UNITS: 100 INJECTION, SOLUTION INTRAVENOUS; SUBCUTANEOUS at 17:19

## 2023-05-14 RX ADMIN — BUDESONIDE AND FORMOTEROL FUMARATE DIHYDRATE 2 PUFF: 160; 4.5 AEROSOL RESPIRATORY (INHALATION) at 20:05

## 2023-05-14 RX ADMIN — RIVAROXABAN 20 MG: 10 TABLET, FILM COATED ORAL at 17:19

## 2023-05-14 RX ADMIN — SERTRALINE 100 MG: 50 TABLET, FILM COATED ORAL at 08:45

## 2023-05-14 RX ADMIN — IPRATROPIUM BROMIDE AND ALBUTEROL SULFATE 3 ML: 2.5; .5 SOLUTION RESPIRATORY (INHALATION) at 06:59

## 2023-05-14 RX ADMIN — FUROSEMIDE 40 MG: 40 TABLET ORAL at 08:45

## 2023-05-14 RX ADMIN — AMITRIPTYLINE HYDROCHLORIDE 25 MG: 25 TABLET, FILM COATED ORAL at 20:14

## 2023-05-14 RX ADMIN — CYANOCOBALAMIN TAB 1000 MCG 5000 MCG: 1000 TAB at 08:45

## 2023-05-14 RX ADMIN — OXYCODONE HYDROCHLORIDE AND ACETAMINOPHEN 1 TABLET: 5; 325 TABLET ORAL at 17:19

## 2023-05-14 RX ADMIN — GABAPENTIN 400 MG: 400 CAPSULE ORAL at 08:45

## 2023-05-14 RX ADMIN — GABAPENTIN 400 MG: 400 CAPSULE ORAL at 20:14

## 2023-05-14 RX ADMIN — MIDODRINE HYDROCHLORIDE 10 MG: 5 TABLET ORAL at 10:52

## 2023-05-14 RX ADMIN — PRAVASTATIN SODIUM 20 MG: 40 TABLET ORAL at 20:14

## 2023-05-14 RX ADMIN — Medication 10 ML: at 08:45

## 2023-05-14 NOTE — THERAPY TREATMENT NOTE
Acute Care - Physical Therapy Treatment Note  [facility]     Patient Name: Mona Perales  : 1950  MRN: [id number]  Today's Date: 2023      Visit Dx:     ICD-10-CM ICD-9-CM   1. Acute and chronic respiratory failure with hypoxia  J96.21 518.84     799.02   2. Acute on chronic congestive heart failure, unspecified heart failure type  I50.9 428.0   3. Positive D dimer  R79.89 790.92   4. ALVAREZ (acute kidney injury)  N17.9 584.9   5. Impaired functional mobility, balance, gait, and endurance  Z74.09 V49.89     Patient Active Problem List   Diagnosis   • Anxiety   • CAD (coronary atherosclerotic disease)   • Carotid stenosis   • COPD (chronic obstructive pulmonary disease)   • COPD with exacerbation ([facility])   • Depressive disorder, not elsewhere classified   • Benign essential hypertension   • Hypercholesteremia   • Insomnia   • Notalgia   • Osteoporosis   • Other screening mammogram   • RUQ abdominal pain   • PVD (peripheral vascular disease) with claudication   • Nicotine abuse   • Dysphagia   • Epigastric pain   • Pain of right hand   • Closed displaced fracture of shaft of fifth metacarpal bone of right hand   • Cause of injury, fall   • Displaced fracture of shaft of fifth metacarpal bone of right hand with routine healing   • Syncope   • Acute respiratory failure with hypoxia   • (HFpEF) heart failure with preserved ejection fraction (HCC)   • Hypotension   • Elevated WBCs   • Near syncope   • Atherosclerosis of native coronary artery of native heart without angina pectoris   • Atherosclerosis of native arteries of extremities with rest pain, left leg   • PVD (peripheral vascular disease) ([facility])   • Physical deconditioning   • Pain due to onychomycosis of toenails of both feet   • ST elevation myocardial infarction (STEMI), unspecified artery   • Acute and chronic respiratory failure with hypoxia     Past Medical History:   Diagnosis Date   • A-fib    • Anxiety    • Arthritis    • Asthma    •  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 with ulceration     bilateral legs - bilateral iliac stents 2008      • CHF (congestive heart failure)    • Chronic lower back pain    • COPD (chronic obstructive pulmonary disease)    • Essential (primary) hypertension    • GERD (gastroesophageal reflux disease)    • History of pulmonary embolus (PE)    • Hyperlipidemia    • Insomnia    • Lumbago    • Nicotine dependence    • Occlusion of artery      and stenosis of bilateral carotid arteries - BABS 16-49%, LICA 0-15%   • Other atherosclerosis of native artery of other extremity     LEFT subclavian stent 2005 (occluded)   • Sleep apnea      Past Surgical History:   Procedure Laterality Date   • CARDIAC CATHETERIZATION  04/06/2016    No evidence of any obstructive epicardial CAD.Preserved LV systolic function with EF of 55%.   • CARDIAC CATHETERIZATION N/A 11/1/2022    Procedure: Left Heart Cath;  Surgeon: Neftali Haywood MD;  Location: [facility] CATH INVASIVE LOCATION;  Service: Cardiology;  Laterality: N/A;   • CENTRAL VENOUS LINE INSERTION  04/07/2016    Successful placement of right uppe extremity 6-[place] triple lumen PICC line.   • ENDOSCOPY N/A 1/12/2018    Procedure: ESOPHAGOGASTRODUODENOSCOPY;  Surgeon: Bin Oh MD;  Location: [facility] ENDOSCOPY;  Service:    • ENDOSCOPY N/A 1/17/2018    Procedure: ESOPHAGOGASTRODUODENOSCOPY;  Surgeon: Bin Oh MD;  Location: [facility] ENDOSCOPY;  Service:    • ENDOSCOPY N/A 3/16/2018    Procedure: ESOPHAGOGASTRODUODENOSCOPY possible dilation;  Surgeon: Bin Oh MD;  Location: [facility] ENDOSCOPY;  Service: Gastroenterology   • FEMORAL POPLITEAL BYPASS Left 4/14/2020    Procedure: FEMORAL POPLITEAL BYPASS ABOVE KNEE  (POLYTETRAFLUOROETHYLENE)  LEFT COMMON FEMORAL ARTERY ENDARTERECTOMY PTA LEFT ILIAC ARTERIOGRAM        (c-arm#2 and c-arm table);  Surgeon: David Suh MD;  Location: [facility] OR;  Service: Vascular;  Laterality: Left;   • FEMORAL  "POPLITEAL BYPASS Right 9/17/2021    Procedure: RIGHT common femoral endarterectomy FEMORAL POPLITEAL BYPASS (above the knee polytetrafluoroethylene  lower extremity arteriogram              (c-arm#2 and c-arm table);  Surgeon: David Suh MD;  Location: [facility] OR;  Service: Vascular;  Laterality: Right;   • HYSTERECTOMY     • INTERVENTIONAL RADIOLOGY PROCEDURE Left 9/9/2020    Procedure: IR angiogram extremity left;  Surgeon: David Suh MD;  Location: [facility] ANGIO INVASIVE LOCATION;  Service: Interventional Radiology;  Laterality: Left;   • KYPHOPLASTY N/A 5/23/2019    Procedure: KYPHOPLASTY LUMBAR FOUR;  Surgeon: Aba Santa MD;  Location: [facility] OR;  Service: Orthopedic Spine   • TOTAL SHOULDER REPLACEMENT Left    • TRANSESOPHAGEAL ECHOCARDIOGRAM (JACQUES)  04/07/2016    With color flow-Mild to moderate [facility].LV systolic function well preserved with Ef of 55-60%.Mitral and AV intact.Diastolic dysfunction   • UPPER GASTROINTESTINAL ENDOSCOPY  01/17/2018    Dr. Bin Martin M.D.     PT Assessment (last 12 hours)     PT Evaluation and Treatment     Row Name 05/14/23 1042          Physical Therapy Time and Intention    Subjective Information complains of;pain  \"I feel better\"  -LN     Document Type therapy note (daily note)  -LN     Mode of Treatment individual therapy;physical therapy  -LN     Patient Effort good  -LN     Row Name 05/14/23 1042          General Information    Patient Profile Reviewed yes  -LN     Equipment Currently Used at Home shower chair  has RW, but not sure where it is located  -LN     Existing Precautions/Restrictions fall;oxygen therapy device and L/min  -LN     Row Name 05/14/23 1042          Home Use of Assistive/Adaptive Equipment    Equipment Currently Used at Home none  -LN     Row Name 05/14/23 1042          Pain    Pretreatment Pain Rating 10/10  -LN     Posttreatment Pain Rating 10/10  -LN     Pain Location anterior  -LN     Pain Location - back  " -LN     Pain Intervention(s) Repositioned;Nursing Notified  -LN     Row Name 23 1042          Pain Scale: Word Pre/Post-Treatment    Pain Location - head  -LN     Pre/Posttreatment Pain Comment 10/10 before rx after rx 7/10  -LN     Row Name 23 1042          Cognition    Affect/Mental Status (Cognition) WFL  -LN     Orientation Status (Cognition) oriented to;person;place    -LN     Row Name 23 1042          Range of Motion Comprehensive    General Range of Motion no range of motion deficits identified  -LN     Row Name 23 1042          Bed Mobility    Bed Mobility supine-sit;sit-supine  -LN     Supine-Sit [place] (Bed Mobility) modified independence  -LN     Sit-Supine [place] (Bed Mobility) not tested  -LN     Assistive Device (Bed Mobility) bed rails;head of bed elevated  -LN     Row Name 23 1042          Transfers    Transfers sit-stand transfer;stand-sit transfer;bed-chair transfer  -     Row Name 23 1042          Bed-Chair Transfer    Bed-Chair [place] (Transfers) minimum assist (75% patient effort)  -LN     Assistive Device (Bed-Chair Transfers) walker, front-wheeled  -LN     Row Name 23 1042          Sit-Stand Transfer    Sit-Stand [place] (Transfers) contact guard;verbal cues  -LN     Assistive Device (Sit-Stand Transfers) walker, front-wheeled  -LN     Row Name 23 1042          Stand-Sit Transfer    Stand-Sit [place] (Transfers) verbal cues;contact guard  -LN     Assistive Device (Stand-Sit Transfers) walker, front-wheeled  -LN     Row Name 23 1042          Toilet Transfer    Type (Toilet Transfer) sit-stand;stand-sit  -LN     Assistive Device (Toilet Transfer) commode;grab bars/safety frame;walker, front-wheeled  -LN     Row Name 23 1042          Gait/Stairs (Locomotion)    Gait/Stairs Locomotion gait/ambulation independence;gait/ambulation assistive device;gait pattern;distance ambulated;gait deviations  -LN      [place] Level (Gait) minimum assist (75% patient effort);contact guard  -LN     Assistive Device (Gait) walker, front-wheeled  -LN     Distance in Feet (Gait) 220  -LN     Deviations/Abnormal Patterns (Gait) gait speed decreased;sky decreased  .  -LN     Row Name 05/14/23 1042          Hip (Therapeutic Exercise)    Hip (Therapeutic Exercise) AROM (active range of motion)  -LN     Hip AROM (Therapeutic Exercise) bilateral;flexion;aBduction;aDduction  -LN     Row Name 05/14/23 1042          Knee (Therapeutic Exercise)    Knee (Therapeutic Exercise) AROM (active range of motion)  -LN     Knee AROM (Therapeutic Exercise) bilateral;LAQ (long arc quad)  -LN     Row Name 05/14/23 1042          Ankle (Therapeutic Exercise)    Ankle (Therapeutic Exercise) AROM (active range of motion)  -LN     Ankle AROM (Therapeutic Exercise) bilateral;dorsiflexion;plantarflexion  -LN     Row Name 05/14/23 1042          Respiratory WDL    Respiratory WDL X;breath sounds  -LN     Rhythm/Pattern, Respiratory depth regular;no shortness of breath reported;pattern regular;unlabored  -LN     Expansion/Accessory Muscles/Retractions no use of accessory muscles  -LN     Nailbeds no discoloration  -LN     Mucous Membranes pink;intact;moist  -LN     Cough Frequency infrequent  -LN     Cough Type nonproductive  -LN     Row Name 05/14/23 1042          Breath Sounds    Breath Sounds All Fields  -LN     All Lung Fields Breath Sounds diminished  -LN     RLL Breath Sounds Posterior:;crackles, fine  -LN     Row Name 05/14/23 1042          Skin WDL    Skin WDL X;all  -LN     Skin Color/Characteristics without discoloration  -LN     Skin Temperature warm  -LN     Skin Moisture dry  -LN     Skin Elasticity quick return to original state  -LN     Skin Integrity pressure injury  -LN     Row Name 05/14/23 1042          Wound 11/17/22 1300 Bilateral midline perineum Traumatic    Wound - Properties Group Placement Date: 11/17/22  -JY Placement Time: 1300   -JY Side: Bilateral  -JY Orientation: midline  -JY Location: perineum  -JY Primary Wound Type: Traumatic  -JY, raw from wiping     Closure SHAYLEE  -LN     Base pink;non-granulating  -LN     Periwound non-blanchable  -LN     Periwound Temperature warm  -LN     Periwound Skin Turgor firm  -LN     Drainage Amount none  -LN     Retired Wound - Properties Group Placement Date: 11/17/22  -JY Placement Time: 1300  -JY Side: Bilateral  -JY Orientation: midline  -JY Location: perineum  -JY Primary Wound Type: Traumatic  -JY, raw from wiping     Retired Wound - Properties Group Date first assessed: 11/17/22  -JY Time first assessed: 1300  -JY Side: Bilateral  -JY Location: perineum  -JY Primary Wound Type: Traumatic  -JY, raw from wiping     Row Name 05/14/23 1042          Coping    Observed Emotional State calm;cooperative  -LN     Verbalized Emotional State acceptance  -LN     Family/Support Persons family  -LN     Involvement in Care not present at bedside  -LN     Row Name 05/14/23 1042          Plan of Care Review    Plan of Care Reviewed With patient  -LN     Outcome Evaluation sup-sit mod ind,sit-stand-sit cga of 1 and vc's for hand placement;amb 220 with rw and min/cga of 1-improved balance with rw  -LN     Row Name 05/14/23 1042          Vital Signs    Pre Systolic BP Rehab 94  -LN     Pre Treatment Diastolic BP 60  left arm manual  -LN     Post Systolic BP Rehab 122  -LN     Post Treatment Diastolic BP 70  right arm manual  -LN     Pretreatment Heart Rate (beats/min) 71  -LN     Intratreatment Heart Rate (beats/min) 100  -LN     Posttreatment Heart Rate (beats/min) 87  -LN     Pre SpO2 (%) 96  -LN     O2 Delivery Pre Treatment supplemental O2  -LN     Intra SpO2 (%) 89  92% after 2' rest  -LN     Post SpO2 (%) 93  -LN     Pre Patient Position Supine  -LN     Intra Patient Position Standing  -LN     Post Patient Position Sitting  -LN     Row Name 05/14/23 1042          Positioning and Restraints    In Chair  reclined;call light within reach;encouraged to call for assist;exit alarm on;legs elevated  -LN     Row Name 05/14/23 1042          Therapy Assessment/Plan (PT)    Rehab Potential (PT) good, to achieve stated therapy goals  -LN     Criteria for Skilled Interventions Met (PT) yes;meets criteria;skilled treatment is necessary  -LN     Therapy Frequency (PT) other (see comments)  5-7d/week  -LN     Row Name 05/14/23 1042          Bed Mobility Goal 1 (PT)    Activity/Assistive Device (Bed Mobility Goal 1, PT) sit to supine;supine to sit  -LN     [place] Level/Cues Needed (Bed Mobility Goal 1, PT) modified independence  -LN     Time Frame (Bed Mobility Goal 1, PT) by discharge  -LN     Progress/Outcomes (Bed Mobility Goal 1, PT) goal partially met  -LN     Row Name 05/14/23 1042          Transfer Goal 1 (PT)    Activity/Assistive Device (Transfer Goal 1, PT) sit-to-stand/stand-to-sit;bed-to-chair/chair-to-bed  -LN     [place] Level/Cues Needed (Transfer Goal 1, PT) standby assist  -LN     Time Frame (Transfer Goal 1, PT) by discharge  -LN     Progress/Outcome (Transfer Goal 1, PT) goal not met  -LN     Row Name 05/14/23 1042          Gait Training Goal 1 (PT)    Activity/Assistive Device (Gait Training Goal 1, PT) gait (walking locomotion);assistive device use  -LN     [place] Level (Gait Training Goal 1, PT) standby assist  -LN     Distance (Gait Training Goal 1, PT) 50'x2  -LN     Time Frame (Gait Training Goal 1, PT) by discharge  -LN     Progress/Outcome (Gait Training Goal 1, PT) goal not met  -LN           User Key  (r) = Recorded By, (t) = Taken By, (c) = Cosigned By    Initials Name Provider Type    Danyell Hagen RN Registered Nurse    Torri Voegl PTA Physical Therapist Assistant                Physical Therapy Education     Title: PT OT SLP Therapies (In Progress)     Topic: Physical Therapy (In Progress)     Point: Mobility training (In Progress)     Learning Progress Summary            Patient Acceptance, E, NR by JANES at 5/12/2023 1529    Acceptance, E,TB, VU by LR at 5/10/2023 0846    Comment: Educated on PT POC and goals.                   Point: Home exercise program (Done)     Learning Progress Summary           Patient Acceptance, E,TB, VU by LR at 5/10/2023 0846    Comment: Educated on PT POC and goals.                   Point: Body mechanics (Done)     Learning Progress Summary           Patient Acceptance, E,TB, VU by LR at 5/10/2023 0846    Comment: Educated on PT POC and goals.                   Point: Precautions (Done)     Learning Progress Summary           Patient Acceptance, E,TB, VU by LR at 5/10/2023 0846    Comment: Educated on PT POC and goals.                               User Key     Initials Effective Dates Name Provider Type Discipline    JANES 06/16/21 -  Parish Cuadra PTA Physical Therapist Assistant PT    LR 06/16/21 -  Timothy Lopez Physical Therapist PT              PT Recommendation and Plan  Therapy Frequency (PT): other (see comments) (5-7d/week)  Plan of Care Reviewed With: patient  Outcome Evaluation: sup-sit mod ind,sit-stand-sit cga of 1 and vc's for hand placement;amb 220 with rw and min/cga of 1-improved balance with rw   Outcome Measures     Row Name 05/14/23 1042 05/12/23 1502          How much help from another person do you currently need...    Turning from your back to your side while in flat bed without using bedrails? 4  -LN 3  -JANES     Moving from lying on back to sitting on the side of a flat bed without bedrails? 4  -LN 3  -JANES     Moving to and from a bed to a chair (including a wheelchair)? 3  -LN 3  -JANES     Standing up from a chair using your arms (e.g., wheelchair, bedside chair)? 3  -LN 3  -JANES     Climbing 3-5 steps with a railing? 2  -LN 2  -JANES     To walk in hospital room? 3  -LN 3  -JANES     AM-PAC 6 Clicks Score (PT) 19  -LN 17  -JANES        Functional Assessment    Outcome Measure Options AM-PAC 6 Clicks Basic Mobility (PT)  -LN AM-PAC 6  Clicks Basic Mobility (PT)  -JANES           User Key  (r) = Recorded By, (t) = Taken By, (c) = Cosigned By    Initials Name Provider Type    Parish Rodriguez PTA Physical Therapist Assistant    Torri Vogel PTA Physical Therapist Assistant                 Time Calculation:    PT Charges     Row Name 05/14/23 1228             Time Calculation    Start Time 1042  -LN      Stop Time 1115  -LN      Time Calculation (min) 33 min  -LN      PT Received On 05/14/23  -LN         Time Calculation- PT    Total Timed Code Minutes- PT 33 minute(s)  -LN            User Key  (r) = Recorded By, (t) = Taken By, (c) = Cosigned By    Initials Name Provider Type    Torri Vogel PTA Physical Therapist Assistant              Therapy Charges for Today     Code Description Service Date Service Provider Modifiers Qty    54681092867 HC GAIT TRAINING EA 15 MIN 5/14/2023 Torri Sanchez PTA GP 1    98518141493 HC PT THER PROC EA 15 MIN 5/14/2023 Torri Sanchez PTA GP 1          PT G-Codes  Outcome Measure Options: AM-PAC 6 Clicks Basic Mobility (PT)  AM-PAC 6 Clicks Score (PT): 19    Torri Sanchez PTA  5/14/2023

## 2023-05-14 NOTE — PLAN OF CARE
Goal Outcome Evaluation:  Plan of Care Reviewed With: patient           Outcome Evaluation: sup-sit mod ind,sit-stand-sit cga of 1 and vc's for hand placement;amb 220 with rw and min/cga of 1-improved balance with rw

## 2023-05-14 NOTE — PLAN OF CARE
Problem: Skin Injury Risk Increased  Goal: Skin Health and Integrity  Outcome: Ongoing, Progressing  Intervention: Optimize Skin Protection  Recent Flowsheet Documentation  Taken 5/13/2023 1950 by Lynda Luque RN  Pressure Reduction Techniques: frequent weight shift encouraged  Head of Bed (HOB) Positioning: HOB elevated  Pressure Reduction Devices: specialty bed utilized  Skin Protection: incontinence pads utilized   Goal Outcome Evaluation:  Plan of Care Reviewed With: patient        Progress: improving

## 2023-05-14 NOTE — PLAN OF CARE
Problem: Skin Injury Risk Increased  Goal: Skin Health and Integrity  Outcome: Ongoing, Progressing  Intervention: Optimize Skin Protection  Description: Perform a full pressure injury risk assessment, as indicated by screening, upon admission to care unit.  Reassess skin (injury risk, full inspection) frequently (e.g., scheduled interval, with change in condition) to provide optimal early detection and prevention.  Maintain adequate tissue perfusion (e.g., encourage fluid balance; avoid crossing legs, constrictive clothing or devices) to promote tissue oxygenation.  Maintain head of bed at lowest degree of elevation tolerated, considering medical condition and other restrictions.  Avoid positioning onto an area that remains reddened.  Minimize incontinence and moisture (e.g., toileting schedule; moisture-wicking pad, diaper or incontinence collection device; skin moisture barrier).  Cleanse skin promptly and gently when soiled utilizing a pH-balanced cleanser.  Relieve and redistribute pressure (e.g., scheduled position changes, weight shifts, use of support surface, medical device repositioning, protective dressing application, use of positioning device, microclimate control, use of pressure-injury-monitor  Encourage increased activity, such as sitting in a chair at the bedside or early mobilization, when able to tolerate.  Recent Flowsheet Documentation  Taken 5/14/2023 1300 by Fatemeh Mendoza RN  Head of Bed (HOB) Positioning: HOB elevated  Taken 5/14/2023 1100 by Fatemeh Mendoza RN  Head of Bed (HOB) Positioning: HOB elevated  Taken 5/14/2023 0900 by Fatemeh Mendoza RN  Head of Bed (HOB) Positioning: HOB elevated     Problem: Fall Injury Risk  Goal: Absence of Fall and Fall-Related Injury  Outcome: Ongoing, Progressing  Intervention: Promote Injury-Free Environment  Description: Provide a safe, barrier-free environment that encourages independent activity.  Keep care area uncluttered and  well-lighted.  Determine need for increased observation or monitoring.  Avoid use of devices that minimize mobility, such as restraints or indwelling urinary catheter.  Recent Flowsheet Documentation  Taken 5/14/2023 1300 by Fatemeh Mendoza RN  Safety Promotion/Fall Prevention: safety round/check completed  Taken 5/14/2023 1100 by Fatemeh Mendoza RN  Safety Promotion/Fall Prevention: safety round/check completed  Taken 5/14/2023 0900 by Fatemeh Mendoza RN  Safety Promotion/Fall Prevention: safety round/check completed  Taken 5/14/2023 0700 by Fatemeh Mendoza RN  Safety Promotion/Fall Prevention: safety round/check completed     Problem: Hypertension Comorbidity  Goal: Blood Pressure in Desired Range  Outcome: Ongoing, Progressing     Problem: Adult Inpatient Plan of Care  Goal: Absence of Hospital-Acquired Illness or Injury  Intervention: Identify and Manage Fall Risk  Description: Perform standard risk assessment on admission using a validated tool or comprehensive approach appropriate to the patient; reassess fall risk frequently, with change in status or transfer to another level of care.  Communicate fall injury risk to interprofessional healthcare team.  Determine need for increased observation, equipment and environmental modification, such as low bed, signage and supportive, nonskid footwear.  Adjust safety measures to individual developmental age, stage and identified risk factors.  Reinforce the importance of safety and physical activity with patient and family.  Perform regular intentional rounding to assess need for position change, pain assessment and personal needs, including assistance with toileting.  Recent Flowsheet Documentation  Taken 5/14/2023 1300 by Fatemeh Mendoza RN  Safety Promotion/Fall Prevention: safety round/check completed  Taken 5/14/2023 1100 by Fatemeh Mendoza RN  Safety Promotion/Fall Prevention: safety round/check completed  Taken 5/14/2023 0900 by Fatemeh Mendoza  RN  Safety Promotion/Fall Prevention: safety round/check completed  Taken 5/14/2023 0700 by Fatemeh Mendoza RN  Safety Promotion/Fall Prevention: safety round/check completed  Intervention: Prevent Skin Injury  Description: Perform a screening for skin injury risk, such as pressure or moisture associated skin damage on admission and at regular intervals throughout hospital stay.  Keep all areas of skin (especially folds) clean and dry.  Maintain adequate skin hydration.  Relieve and redistribute pressure and protect bony prominences; implement measures based on patient-specific risk factors.  Match turning and repositioning schedule to clinical condition.  Encourage weight shift frequently; assist with reposition if unable to complete independently.  Float heels off bed; avoid pressure on the Achilles tendon.  Keep skin free from extended contact with medical devices.  Encourage functional activity and mobility, as early as tolerated.  Use aids (e.g., slide boards, mechanical lift) during transfer.  Recent Flowsheet Documentation  Taken 5/14/2023 1300 by Fatemeh Mendoza RN  Body Position:   tilted   left  Taken 5/14/2023 1100 by Fatemeh Mendoza RN  Body Position:   left   turned  Taken 5/14/2023 0900 by Fatemeh Mendoza RN  Body Position:   right   tilted  Taken 5/14/2023 0700 by Fatemeh Mendoza RN  Body Position: supine  Intervention: Prevent and Manage VTE (Venous Thromboembolism) Risk  Description: Assess for VTE (venous thromboembolism) risk.  Encourage and assist with early ambulation.  Initiate and maintain compression or other therapy, as indicated, based on identified risk in accordance with organizational protocol and provider order.  Encourage both active and passive leg exercises while in bed, if unable to ambulate.  Recent Flowsheet Documentation  Taken 5/14/2023 1300 by Fatemeh Mendoza RN  Activity Management: activity encouraged  Taken 5/14/2023 1100 by Fatemeh Mendoza RN  Activity  Management: activity encouraged  Taken 5/14/2023 0900 by Fatemeh Mendoza RN  Activity Management: activity encouraged  Taken 5/14/2023 0707 by Fatemeh Mendoza RN  VTE Prevention/Management: (see MAR) other (see comments)  Taken 5/14/2023 0700 by Fatemeh Mendoza RN  Activity Management: activity encouraged  Goal: Optimal Comfort and Wellbeing  Intervention: Monitor Pain and Promote Comfort  Description: Assess pain level, treatment efficacy and patient response at regular intervals using a consistent pain scale.  Consider the presence and impact of preexisting chronic pain.  Encourage patient and caregiver involvement in pain assessment, interventions and safety measures.  Recent Flowsheet Documentation  Taken 5/14/2023 0846 by Fatemeh Mendoza RN  Pain Management Interventions: see MAR   Goal Outcome Evaluation:

## 2023-05-14 NOTE — PROGRESS NOTES
[facility]   INPATIENT PROGRESS NOTE      Patient Name: Mona Perales  Date of Admission: 5/9/2023  Today's Date: 05/14/23  Length of Stay: 4  Primary Care Physician: Anahi Camacho APRN    Subjective     72-year-old female from SNF, history of heart failure with preserved EF, A-fib, COPD with tobacco abuse, chronic hypoxia on 2 L O2 at baseline, hypertension, history of PE anticoagulated on Xarelto, presented from SNF due to increased shortness of breath and hypoxia despite increasing her supplemental oxygen and initially required nonrebreather then high flow nasal cannula in order to maintain adequate SPO2.  She does not have evidence of pulmonary edema on chest x-ray as well as significant elevated proBNP over 16,000.  She had some elevated high-sensitivity troponin at 58, which was trended with downtrend to 55, likely related to demand ischemia in setting of decompensated heart failure.  She also had evidence of ALVAREZ with creatinine significant from baseline up to 1.41, however likely related to cardiorenal syndrome as renal function did improve some with treatment on diuretic therapy.  Echocardiogram obtained on 5/10/2023 showed preserved LVEF, some diastolic dysfunction noted.  She clinically improved during hospitalization with excellent diuresis on IV Lasix and transition back to oral Lasix 40 mg twice daily.      Resting comfortably.  No acute events overnight.  Less short of breath.  Continues to have good urine output.  Tolerating diet.  No complaints.      ROS  Comprehensive ROS completed  All pertinent negatives and positives are as above. All other systems have been reviewed and are negative unless otherwise stated.     Objective    Temp:  [97 °F (36.1 °C)-97.3 °F (36.3 °C)] 97.3 °F (36.3 °C)  Heart Rate:  [60-83] 74  Resp:  [16] 16  BP: ()/(54-77) 122/77  Physical Exam  General:  no acute distress  Cardiac: Normal rate, S1-S2  present  Respiratory: No wheezes or crackles  Abdomen: Nondistended  Extremities: No edema  Skin: Warm and dry        Results Review:  I have reviewed the labs, radiology results, and diagnostic studies.    Laboratory Data:   Results from last 7 days   Lab Units 05/14/23  0944 05/13/23  0623 05/12/23  0751   WBC 10*3/mm3 15.11* 14.56* 15.56*   HEMOGLOBIN g/dL 12.4 11.9* 12.4   HEMATOCRIT % 38.8 36.5 39.8   PLATELETS 10*3/mm3 232 260 220        Results from last 7 days   Lab Units 05/14/23  0606 05/13/23  0623 05/12/23  0751 05/10/23  0546 05/09/23  2140   SODIUM mmol/L 142 141 143   < > 136   POTASSIUM mmol/L 4.2 3.4* 3.5   < > 5.2   CHLORIDE mmol/L 99 98 98   < > 100   CO2 mmol/L 28.0 31.0* 33.0*   < > 22.0   BUN mg/dL 27* 31* 37*   < > 41*   CREATININE mg/dL 0.83 0.90 0.88   < > 1.41*   CALCIUM mg/dL 8.5* 8.5* 9.3   < > 8.5*   BILIRUBIN mg/dL  --   --   --   --  0.4   ALK PHOS U/L  --   --   --   --  73   ALT (SGPT) U/L  --   --   --   --  24   AST (SGOT) U/L  --   --   --   --  38*   GLUCOSE mg/dL 98 86 85   < > 147*    < > = values in this interval not displayed.       Culture Data:   No results found for: BLOODCX  No results found for: URINECX  No results found for: RESPCX  No results found for: WOUNDCX  No results found for: STOOLCX  No components found for: BODYFLD    Radiology Data:   Imaging Results (Last 24 Hours)     ** No results found for the last 24 hours. **          I have reviewed the patient's current medications.     Assessment/Plan     Active Hospital Problems    Diagnosis    • **Acute and chronic respiratory failure with hypoxia    • (HFpEF) heart failure with preserved ejection fraction (HCC)        Acute on chronic HFpEF  -- Improved  -- Continue oral Lasix 40 mg twice daily, reviewed I's and O's, continues to maintain significant net negative fluid balance    Acute on chronic hypoxic respiratory failure  Chronic hypercapnia  COPD  - Continue supplemental O2 support to maintain adequate gas  exchange, goal SPO2 88-92%, continue to wean O2 as able  - Bronchodilators for COPD    Postural hypotension-improved  - Continue home dose midodrine    Tobacco abuse  - Nicotine gum    Peripheral arterial disease  - Continue Plavix, Pletal, statin    Neuropathy  - Continue gabapentin    Diabetes  - Glucose monitoring with sliding scale insulin correction as needed, defer basal insulin for now    Hypothyroidism  - Continue levothyroxine    History of PE  - Anticoagulated on Xarelto      Discharge Planning: Anticipate likely discharge back to SNF on Monday      Electronically signed by Michele Nunn MD, 05/14/23, 15:19 CDT.

## 2023-05-15 VITALS
OXYGEN SATURATION: 99 % | WEIGHT: 133.7 LBS | SYSTOLIC BLOOD PRESSURE: 101 MMHG | DIASTOLIC BLOOD PRESSURE: 56 MMHG | BODY MASS INDEX: 30.94 KG/M2 | RESPIRATION RATE: 16 BRPM | TEMPERATURE: 98.3 F | HEART RATE: 62 BPM | HEIGHT: 55 IN

## 2023-05-15 PROBLEM — I50.33 ACUTE ON CHRONIC HEART FAILURE WITH PRESERVED EJECTION FRACTION (HFPEF): Status: ACTIVE | Noted: 2023-05-15

## 2023-05-15 LAB
ANION GAP SERPL CALCULATED.3IONS-SCNC: 11 MMOL/L (ref 5–15)
BUN SERPL-MCNC: 26 MG/DL (ref 8–23)
BUN/CREAT SERPL: 28.3 (ref 7–25)
CALCIUM SPEC-SCNC: 8.3 MG/DL (ref 8.6–10.5)
CHLORIDE SERPL-SCNC: 98 MMOL/L (ref 98–107)
CO2 SERPL-SCNC: 31 MMOL/L (ref 22–29)
CREAT SERPL-MCNC: 0.92 MG/DL (ref 0.57–1)
EGFRCR SERPLBLD CKD-EPI 2021: 67.1 ML/MIN/1.73
GLUCOSE BLDC GLUCOMTR-MCNC: 122 MG/DL (ref 70–130)
GLUCOSE BLDC GLUCOMTR-MCNC: 90 MG/DL (ref 70–130)
GLUCOSE BLDC GLUCOMTR-MCNC: 92 MG/DL (ref 70–130)
GLUCOSE SERPL-MCNC: 90 MG/DL (ref 65–99)
POTASSIUM SERPL-SCNC: 3.5 MMOL/L (ref 3.5–5.2)
SARS-COV-2 RNA RESP QL NAA+PROBE: NOT DETECTED
SODIUM SERPL-SCNC: 140 MMOL/L (ref 136–145)
WHOLE BLOOD HOLD SPECIMEN: NORMAL

## 2023-05-15 PROCEDURE — 82948 REAGENT STRIP/BLOOD GLUCOSE: CPT

## 2023-05-15 PROCEDURE — 87635 SARS-COV-2 COVID-19 AMP PRB: CPT | Performed by: STUDENT IN AN ORGANIZED HEALTH CARE EDUCATION/TRAINING PROGRAM

## 2023-05-15 PROCEDURE — 94799 UNLISTED PULMONARY SVC/PX: CPT

## 2023-05-15 PROCEDURE — 97110 THERAPEUTIC EXERCISES: CPT

## 2023-05-15 PROCEDURE — 97535 SELF CARE MNGMENT TRAINING: CPT

## 2023-05-15 PROCEDURE — 94760 N-INVAS EAR/PLS OXIMETRY 1: CPT

## 2023-05-15 PROCEDURE — 94664 DEMO&/EVAL PT USE INHALER: CPT

## 2023-05-15 PROCEDURE — 80048 BASIC METABOLIC PNL TOTAL CA: CPT | Performed by: STUDENT IN AN ORGANIZED HEALTH CARE EDUCATION/TRAINING PROGRAM

## 2023-05-15 PROCEDURE — 97116 GAIT TRAINING THERAPY: CPT

## 2023-05-15 RX ORDER — OXYCODONE HYDROCHLORIDE AND ACETAMINOPHEN 5; 325 MG/1; MG/1
1 TABLET ORAL EVERY 6 HOURS PRN
Qty: 12 TABLET | Refills: 0 | Status: SHIPPED | OUTPATIENT
Start: 2023-05-15 | End: 2023-05-18

## 2023-05-15 RX ORDER — FUROSEMIDE 40 MG/1
40 TABLET ORAL DAILY
Qty: 60 TABLET | Refills: 0 | Status: SHIPPED | OUTPATIENT
Start: 2023-05-15 | End: 2023-05-15 | Stop reason: SDUPTHER

## 2023-05-15 RX ORDER — NALOXONE HYDROCHLORIDE 4 MG/.1ML
SPRAY NASAL
Qty: 2 EACH | Refills: 0 | Status: SHIPPED | OUTPATIENT
Start: 2023-05-15

## 2023-05-15 RX ORDER — FUROSEMIDE 40 MG/1
40 TABLET ORAL DAILY
Qty: 60 TABLET | Refills: 0 | Status: SHIPPED | OUTPATIENT
Start: 2023-05-15

## 2023-05-15 RX ORDER — GABAPENTIN 400 MG/1
400 CAPSULE ORAL EVERY 8 HOURS SCHEDULED
Qty: 9 CAPSULE | Refills: 0 | Status: SHIPPED | OUTPATIENT
Start: 2023-05-15 | End: 2023-05-18

## 2023-05-15 RX ADMIN — GABAPENTIN 400 MG: 400 CAPSULE ORAL at 09:16

## 2023-05-15 RX ADMIN — SERTRALINE 100 MG: 50 TABLET, FILM COATED ORAL at 09:16

## 2023-05-15 RX ADMIN — MIDODRINE HYDROCHLORIDE 10 MG: 5 TABLET ORAL at 12:28

## 2023-05-15 RX ADMIN — PANTOPRAZOLE SODIUM 40 MG: 40 TABLET, DELAYED RELEASE ORAL at 09:16

## 2023-05-15 RX ADMIN — BUDESONIDE AND FORMOTEROL FUMARATE DIHYDRATE 2 PUFF: 160; 4.5 AEROSOL RESPIRATORY (INHALATION) at 06:51

## 2023-05-15 RX ADMIN — CYANOCOBALAMIN TAB 1000 MCG 5000 MCG: 1000 TAB at 09:16

## 2023-05-15 RX ADMIN — OXYCODONE HYDROCHLORIDE AND ACETAMINOPHEN 1 TABLET: 5; 325 TABLET ORAL at 12:32

## 2023-05-15 RX ADMIN — LEVOTHYROXINE SODIUM 25 MCG: 25 TABLET ORAL at 05:49

## 2023-05-15 RX ADMIN — MIDODRINE HYDROCHLORIDE 10 MG: 5 TABLET ORAL at 09:16

## 2023-05-15 RX ADMIN — CLOPIDOGREL BISULFATE 75 MG: 75 TABLET ORAL at 09:16

## 2023-05-15 RX ADMIN — FUROSEMIDE 40 MG: 40 TABLET ORAL at 09:18

## 2023-05-15 RX ADMIN — IPRATROPIUM BROMIDE AND ALBUTEROL SULFATE 3 ML: 2.5; .5 SOLUTION RESPIRATORY (INHALATION) at 13:08

## 2023-05-15 RX ADMIN — CILOSTAZOL 100 MG: 100 TABLET ORAL at 09:16

## 2023-05-15 RX ADMIN — Medication 10 ML: at 09:17

## 2023-05-15 RX ADMIN — IPRATROPIUM BROMIDE AND ALBUTEROL SULFATE 3 ML: 2.5; .5 SOLUTION RESPIRATORY (INHALATION) at 06:45

## 2023-05-15 NOTE — PLAN OF CARE
Problem: Skin Injury Risk Increased  Goal: Skin Health and Integrity  Outcome: Ongoing, Progressing  Intervention: Optimize Skin Protection  Recent Flowsheet Documentation  Taken 5/14/2023 2120 by Lynda Luque RN  Head of Bed (HOB) Positioning: HOB elevated  Taken 5/14/2023 2030 by Lynda Luque RN  Head of Bed (HOB) Positioning: HOB elevated  Taken 5/14/2023 1930 by Lynda Luque, RN  Pressure Reduction Techniques:   weight shift assistance provided   frequent weight shift encouraged  Pressure Reduction Devices: specialty bed utilized  Skin Protection: incontinence pads utilized   Goal Outcome Evaluation:  Plan of Care Reviewed With: patient        Progress: improving

## 2023-05-15 NOTE — PAYOR COMM NOTE
"    Yen Romero, PINEDA [facility]  [phone]      Phone  [phone]       Fax  Cont. Stay Review      Filomena Perales (72 y.o. Female)     Date of Birth   1950    Social Security Number       Address   [address] [place] [address]    Home Phone   [phone]    MRN   [id number]       Restorationist   Jew    Marital Status                               Admission Date   5/9/23    Admission Type   Emergency    Admitting Provider   Behroozi, Saeid, MD    Attending Provider   Michele Nunn MD    Department, Room/Bed   [facility] [address] [place], 318/1       Discharge Date       Discharge Disposition   Skilled Nursing Facility (DC - External)    Discharge Destination                               Attending Provider: Michele Nunn MD    Allergies: Morphine And Related, Penicillins    Isolation: None   Infection: COVID (rule out) (05/15/23)   Code Status: CPR    Ht: 132.1 cm (52\")   Wt: 60.6 kg (133 lb 11.2 oz)    Admission Cmt: None   Principal Problem: Acute and chronic respiratory failure with hypoxia [J96.21]                 Active Insurance as of 5/9/2023     Primary Coverage     Payor Plan Insurance Group Employer/Plan Group    ANTHEM MEDICARE REPLACEMENT ANTHEM MEDICARE ADVANTAGE [id number]     Payor Plan Address Payor Plan Phone Number Payor Plan Fax Number Effective Dates    [address] [phone]  1/1/2023 - None Entered    [facility] [id number]       Subscriber Name Subscriber Birth Date Member ID       FILOMENA PERALES 1950 [id number]                 Emergency Contacts      (Rel.) Home Phone Work Phone Mobile Phone    GuillermoRenee (Grandchild) -- -- [phone]    Meena Marie (Friend) -- -- [phone]    Torri Rivero (Daughter) [phone] -- [phone]            Vital Signs (last day)     Date/Time Temp Temp src Pulse Resp BP Patient Position SpO2    05/15/23 1114 -- -- -- -- 101/56 " Lying --    05/15/23 1112 98.3 (36.8) Temporal 78 16 97/54 Lying 90    05/15/23 0740 97.8 (36.6) Temporal 71 16 96/58 Lying 92    05/15/23 0651 -- -- 61 17 -- -- 98    05/15/23 0645 -- -- 69 16 -- -- 97    05/15/23 0347 98.5 (36.9) Temporal 63 16 104/59 Lying 93    05/14/23 2302 98.2 (36.8) Temporal 75 16 110/59 Lying 93    05/14/23 2012 -- -- 69 -- -- -- --    05/14/23 2005 -- -- 69 16 -- -- 92    05/14/23 1913 96.3 (35.7) Temporal 75 16 141/65 Sitting 91    05/14/23 1531 98.2 (36.8) Temporal 77 16 95/50 Lying 93    05/14/23 1225 -- -- 74 -- -- -- --    05/14/23 1218 -- -- 72 16 -- -- 92    05/14/23 1118 -- -- -- -- 122/77 Lying --    05/14/23 0921 97.3 (36.3) Temporal 83 16 94/60 Lying 93    05/14/23 0728 97 (36.1) Temporal 80 16 104/54 Lying 96    05/14/23 0722 -- -- 80 -- -- -- --    05/14/23 0708 -- -- 66 -- -- -- --    05/14/23 0659 -- -- 74 16 -- -- 97    05/14/23 0313 97.2 (36.2) Infrared 63 16 99/54 Lying 93    05/14/23 0204 -- -- 61 -- -- -- --          Oxygen Therapy (last day)     Date/Time SpO2 Device (Oxygen Therapy) Flow (L/min) Oxygen Concentration (%) ETCO2 (mmHg)    05/15/23 1112 90 nasal cannula -- -- --    05/15/23 0740 92 nasal cannula 2 -- --    05/15/23 0651 98 nasal cannula 2 -- --    05/15/23 0645 97 nasal cannula 2 -- --    05/15/23 0347 93 nasal cannula 2 -- --    05/14/23 2302 93 nasal cannula 2 -- --    05/14/23 2012 -- nasal cannula 2 -- --    05/14/23 2005 92 nasal cannula 2 -- --    05/14/23 1913 91 nasal cannula 2 -- --    05/14/23 1531 93 nasal cannula 2 -- --    05/14/23 1225 -- nasal cannula 2 -- --    05/14/23 1218 92 nasal cannula 2 -- --    05/14/23 0921 93 nasal cannula 2 -- --    05/14/23 0728 96 nasal cannula 2 -- --    05/14/23 0708 -- nasal cannula 2 -- --    05/14/23 0659 97 nasal cannula 3  -- --    05/14/23 0313 93 nasal cannula 3 -- --          Current Facility-Administered Medications   Medication Dose Route Frequency Provider Last Rate Last Admin   • amitriptyline  (ELAVIL) tablet 25 mg  25 mg Oral Nightly Behroozi, Saeid, MD   25 mg at 05/14/23 2014   • budesonide-formoterol (SYMBICORT) 160-4.5 MCG/ACT inhaler 2 puff  2 puff Inhalation BID - RT Behroozi, Saeid, MD   2 puff at 05/15/23 0651   • cilostazol (PLETAL) tablet 100 mg  100 mg Oral BID Behroozi, Saeid, MD   100 mg at 05/15/23 0916   • clopidogrel (PLAVIX) tablet 75 mg  75 mg Oral Daily Behroozi, Saeid, MD   75 mg at 05/15/23 0916   • dextrose (D50W) (25 g/50 mL) IV injection 25 g  25 g Intravenous Q15 Min PRN Michele Nunn MD       • dextrose (GLUTOSE) oral gel 15 g  15 g Oral Q15 Min PRN Michele Nunn MD       • furosemide (LASIX) tablet 40 mg  40 mg Oral BID Michele Nunn MD   40 mg at 05/14/23 1719   • gabapentin (NEURONTIN) capsule 400 mg  400 mg Oral Q12H Behroozi, Saeid, MD   400 mg at 05/15/23 0916   • glucagon HCl (Diagnostic) injection 1 mg  1 mg Intramuscular Q15 Min PRN Michele Nunn MD       • Insulin Aspart (novoLOG) injection 0-9 Units  0-9 Units Subcutaneous TID AC Michele Nunn MD   2 Units at 05/14/23 1719   • ipratropium-albuterol (DUO-NEB) nebulizer solution 3 mL  3 mL Nebulization Q6H While Awake - RT Michele Nunn MD   3 mL at 05/15/23 0645   • levothyroxine (SYNTHROID, LEVOTHROID) tablet 25 mcg  25 mcg Oral Q AM Behroozi, Saeid, MD   25 mcg at 05/15/23 0549   • midodrine (PROAMATINE) tablet 10 mg  10 mg Oral TID AC Michele Nunn MD   10 mg at 05/15/23 0916   • nicotine polacrilex (NICORETTE) gum 4 mg  4 mg Mouth/Throat Q1H PRN Michele Nunn MD   4 mg at 05/11/23 1550   • oxyCODONE-acetaminophen (PERCOCET) 5-325 MG per tablet 1 tablet  1 tablet Oral Q6H PRN Behroozi, Saeid, MD   1 tablet at 05/14/23 2322   • pantoprazole (PROTONIX) EC tablet 40 mg  40 mg Oral Daily Behroozi, Saeid, MD   40 mg at 05/15/23 0916   • pravastatin (PRAVACHOL) tablet 20 mg  20 mg Oral Nightly Behroozi, Saeid, MD   20 mg at 05/14/23 2014   • rivaroxaban (XARELTO) tablet 20  mg  20 mg Oral Daily With Dinner Behroozi, Saeid, MD   20 mg at 05/14/23 1719   • sertraline (ZOLOFT) tablet 100 mg  100 mg Oral Daily Behroozi, Saeid, MD   100 mg at 05/15/23 0916   • sodium chloride 0.9 % flush 10 mL  10 mL Intravenous PRN Jannie Roe MD       • sodium chloride 0.9 % flush 10 mL  10 mL Intravenous Q12H Behroozi, Saeid, MD   10 mL at 05/15/23 0917   • sodium chloride 0.9 % flush 10 mL  10 mL Intravenous PRN Behroozi, Saeid, MD       • sodium chloride 0.9 % infusion 40 mL  40 mL Intravenous PRN Behroozi, Saeid, MD       • traZODone (DESYREL) tablet 150 mg  150 mg Oral Nightly Behroozi, Saeid, MD   150 mg at 05/14/23 2014   • vitamin B-12 (CYANOCOBALAMIN) tablet 5,000 mcg  5,000 mcg Oral Daily Behroozi, Saeid, MD   5,000 mcg at 05/15/23 0916        Physician Progress Notes (last 48 hours)      Michele Nunn MD at 05/14/23 1519              [facility] Medicine   INPATIENT PROGRESS NOTE      Patient Name: Mona Perales  Date of Admission: 5/9/2023  Today's Date: 05/14/23  Length of Stay: 4  Primary Care Physician: Anahi Camacho APRN    Subjective     72-year-old female from SNF, history of heart failure with preserved EF, A-fib, COPD with tobacco abuse, chronic hypoxia on 2 L O2 at baseline, hypertension, history of PE anticoagulated on Xarelto, presented from SNF due to increased shortness of breath and hypoxia despite increasing her supplemental oxygen and initially required nonrebreather then high flow nasal cannula in order to maintain adequate SPO2.  She does not have evidence of pulmonary edema on chest x-ray as well as significant elevated proBNP over 16,000.  She had some elevated high-sensitivity troponin at 58, which was trended with downtrend to 55, likely related to demand ischemia in setting of decompensated heart failure.  She also had evidence of ALVAREZ with creatinine significant from baseline up to 1.41, however  likely related to cardiorenal syndrome as renal function did improve some with treatment on diuretic therapy.  Echocardiogram obtained on 5/10/2023 showed preserved LVEF, some diastolic dysfunction noted.  She clinically improved during hospitalization with excellent diuresis on IV Lasix and transition back to oral Lasix 40 mg twice daily.      Resting comfortably.  No acute events overnight.  Less short of breath.  Continues to have good urine output.  Tolerating diet.  No complaints.      ROS  Comprehensive ROS completed  All pertinent negatives and positives are as above. All other systems have been reviewed and are negative unless otherwise stated.     Objective    Temp:  [97 °F (36.1 °C)-97.3 °F (36.3 °C)] 97.3 °F (36.3 °C)  Heart Rate:  [60-83] 74  Resp:  [16] 16  BP: ()/(54-77) 122/77  Physical Exam  General:  no acute distress  Cardiac: Normal rate, S1-S2 present  Respiratory: No wheezes or crackles  Abdomen: Nondistended  Extremities: No edema  Skin: Warm and dry        Results Review:  I have reviewed the labs, radiology results, and diagnostic studies.    Laboratory Data:   Results from last 7 days   Lab Units 05/14/23  0944 05/13/23  0623 05/12/23  0751   WBC 10*3/mm3 15.11* 14.56* 15.56*   HEMOGLOBIN g/dL 12.4 11.9* 12.4   HEMATOCRIT % 38.8 36.5 39.8   PLATELETS 10*3/mm3 232 260 220        Results from last 7 days   Lab Units 05/14/23  0606 05/13/23  0623 05/12/23  0751 05/10/23  0546 05/09/23  2140   SODIUM mmol/L 142 141 143   < > 136   POTASSIUM mmol/L 4.2 3.4* 3.5   < > 5.2   CHLORIDE mmol/L 99 98 98   < > 100   CO2 mmol/L 28.0 31.0* 33.0*   < > 22.0   BUN mg/dL 27* 31* 37*   < > 41*   CREATININE mg/dL 0.83 0.90 0.88   < > 1.41*   CALCIUM mg/dL 8.5* 8.5* 9.3   < > 8.5*   BILIRUBIN mg/dL  --   --   --   --  0.4   ALK PHOS U/L  --   --   --   --  73   ALT (SGPT) U/L  --   --   --   --  24   AST (SGOT) U/L  --   --   --   --  38*   GLUCOSE mg/dL 98 86 85   < > 147*    < > = values in this  interval not displayed.       Culture Data:   No results found for: BLOODCX  No results found for: URINECX  No results found for: RESPCX  No results found for: WOUNDCX  No results found for: STOOLCX  No components found for: BODYFLD    Radiology Data:   Imaging Results (Last 24 Hours)     ** No results found for the last 24 hours. **          I have reviewed the patient's current medications.     Assessment/Plan     Active Hospital Problems    Diagnosis    • **Acute and chronic respiratory failure with hypoxia    • (HFpEF) heart failure with preserved ejection fraction (HCC)        Acute on chronic HFpEF  -- Improved  -- Continue oral Lasix 40 mg twice daily, reviewed I's and O's, continues to maintain significant net negative fluid balance    Acute on chronic hypoxic respiratory failure  Chronic hypercapnia  COPD  - Continue supplemental O2 support to maintain adequate gas exchange, goal SPO2 88-92%, continue to wean O2 as able  - Bronchodilators for COPD    Postural hypotension-improved  - Continue home dose midodrine    Tobacco abuse  - Nicotine gum    Peripheral arterial disease  - Continue Plavix, Pletal, statin    Neuropathy  - Continue gabapentin    Diabetes  - Glucose monitoring with sliding scale insulin correction as needed, defer basal insulin for now    Hypothyroidism  - Continue levothyroxine    History of PE  - Anticoagulated on Xarelto      Discharge Planning: Anticipate likely discharge back to SNF on Monday      Electronically signed by Michele Nunn MD, 05/14/23, 15:19 CDT.      Electronically signed by Michele Nunn MD at 05/14/23 1523     Michele Nunn MD at 05/13/23 1344              [facility] Medicine   INPATIENT PROGRESS NOTE      Patient Name: oMna Perales  Date of Admission: 5/9/2023  Today's Date: 05/13/23  Length of Stay: 3  Primary Care Physician: Anahi Camacho APRN    Subjective       HPI   72-year-old female  from SNF, history of heart failure with preserved EF, A-fib, COPD with tobacco abuse, chronic hypoxia on 2 L O2 at baseline, hypertension, history of PE anticoagulated on Xarelto, presented from SNF due to increased shortness of breath and hypoxia despite increasing her supplemental oxygen and initially required nonrebreather then high flow nasal cannula in order to maintain adequate SPO2.  She does not have evidence of pulmonary edema on chest x-ray as well as significant elevated proBNP over 16,000.  She had some elevated high-sensitivity troponin at 58, which was trended with downtrend to 55, likely related to demand ischemia in setting of decompensated heart failure.  She also had evidence of ALVAREZ with creatinine significant from baseline up to 1.41, however likely related to cardiorenal syndrome as renal function did improve some with treatment on diuretic therapy.      Resting comfortably.  No acute events overnight.  Less short of breath.  Continues to have good urine output.      ROS  Comprehensive ROS completed  All pertinent negatives and positives are as above. All other systems have been reviewed and are negative unless otherwise stated.     Objective    Temp:  [96.9 °F (36.1 °C)-97.8 °F (36.6 °C)] 97.8 °F (36.6 °C)  Heart Rate:  [58-86] 63  Resp:  [16-18] 16  BP: ()/(51-83) 140/63  Physical Exam  General: Chronically ill-appearing, no acute distress  Cardiac: Normal rate, S1-S2 present  Respiratory: No wheezes or crackles  Abdomen: Nondistended  Extremities: No edema  Skin: Warm and dry        Results Review:  I have reviewed the labs, radiology results, and diagnostic studies.    Laboratory Data:   Results from last 7 days   Lab Units 05/13/23 0623 05/12/23 0751 05/11/23  0745   WBC 10*3/mm3 14.56* 15.56* 11.75*   HEMOGLOBIN g/dL 11.9* 12.4 10.7*   HEMATOCRIT % 36.5 39.8 34.4   PLATELETS 10*3/mm3 260 220 217        Results from last 7 days   Lab Units 05/13/23 0623 05/12/23 0751 05/11/23  0745  05/10/23  0546 05/09/23  2140   SODIUM mmol/L 141 143 141   < > 136   POTASSIUM mmol/L 3.4* 3.5 4.1   < > 5.2   CHLORIDE mmol/L 98 98 99   < > 100   CO2 mmol/L 31.0* 33.0* 27.0   < > 22.0   BUN mg/dL 31* 37* 41*   < > 41*   CREATININE mg/dL 0.90 0.88 1.03*   < > 1.41*   CALCIUM mg/dL 8.5* 9.3 9.0   < > 8.5*   BILIRUBIN mg/dL  --   --   --   --  0.4   ALK PHOS U/L  --   --   --   --  73   ALT (SGPT) U/L  --   --   --   --  24   AST (SGOT) U/L  --   --   --   --  38*   GLUCOSE mg/dL 86 85 131*   < > 147*    < > = values in this interval not displayed.       Culture Data:   No results found for: BLOODCX  No results found for: URINECX  No results found for: RESPCX  No results found for: WOUNDCX  No results found for: STOOLCX  No components found for: BODYFLD    Radiology Data:   Imaging Results (Last 24 Hours)     ** No results found for the last 24 hours. **          I have reviewed the patient's current medications.     Assessment/Plan     Active Hospital Problems    Diagnosis    • **Acute and chronic respiratory failure with hypoxia    • (HFpEF) heart failure with preserved ejection fraction (HCC)        Acute on chronic HFpEF  --Improved with IV Lasix, transition to oral Lasix 40 mg twice daily today, reviewed I's and O's, continues to maintain significant net negative fluid balance    Acute on chronic hypoxic respiratory failure  Chronic hypercapnia  COPD  - Continue supplemental O2 support to maintain adequate gas exchange, goal SPO2 88-90%, continue attempts at weaning supplemental O2 further  - Bronchodilators for COPD    Postural hypotension  - Continue midodrine 3 times daily, monitor hemodynamics    Tobacco abuse  - Provide nicotine gum at patient's request    Peripheral arterial disease  - Continue Plavix, Pletal, statin    Neuropathy  - Continue gabapentin    Diabetes  - Glucose monitoring with sliding scale insulin correction as needed, defer basal insulin for now    Hypothyroidism  - Continue  levothyroxine    History of PE  - Anticoagulated on Xarelto      Discharge Planning: Anticipate likely discharge back to SNF on Monday      Electronically signed by Michele Nunn MD, 05/13/23, 13:44 CDT.      Electronically signed by Michele Nunn MD at 05/13/23 7508

## 2023-05-15 NOTE — DISCHARGE SUMMARY
[facility]  DISCHARGE SUMMARY       Date of Admission: 5/9/2023  Date of Discharge:  5/15/2023  Primary Care Physician: Anahi Camacho APRN    Presenting Problem/History of Present Illness:  ALVAREZ (acute kidney injury) [N17.9]  Acute and chronic respiratory failure with hypoxia [J96.21]  Impaired functional mobility, balance, gait, and endurance [Z74.09]  Acute on chronic congestive heart failure, unspecified heart failure type [I50.9]  Acute on chronic heart failure with preserved ejection fraction (HFpEF) [I50.33]       Final Discharge Diagnoses:  Active Hospital Problems    Diagnosis    • **Acute and chronic respiratory failure with hypoxia    • Acute on chronic heart failure with preserved ejection fraction (HFpEF)    • ALVAREZ         Consults:   Consults     No orders found from 4/10/2023 to 5/10/2023.          Pertinent Test Results:   Lab Results (most recent)     Procedure Component Value Units Date/Time    Basic Metabolic Panel [[id number]]  (Abnormal) Collected: 05/15/23 0541    Specimen: Blood Updated: 05/15/23 0704     Glucose 90 mg/dL      BUN 26 mg/dL      Creatinine 0.92 mg/dL      Sodium 140 mmol/L      Potassium 3.5 mmol/L      Chloride 98 mmol/L      CO2 31.0 mmol/L      Calcium 8.3 mg/dL      BUN/Creatinine Ratio 28.3     Anion Gap 11.0 mmol/L      eGFR 67.1 mL/min/1.73     Narrative:      GFR Normal >60  Chronic Kidney Disease <60  Kidney Failure <15      Extra Tubes [[id number]] Collected: 05/15/23 0541    Specimen: Blood, Venous Line Updated: 05/15/23 0645    Narrative:      The following orders were created for panel order Extra Tubes.  Procedure                               Abnormality         Status                     ---------                               -----------         ------                     Lavender Top[[id number]]                                     Final result                 Please view results for these  tests on the individual orders.    Lavender Top [[id number]] Collected: 05/15/23 0541    Specimen: Blood Updated: 05/15/23 0645     Extra Tube hold for add-on     Comment: Auto resulted       POC Glucose Once [[id number]]  (Normal) Collected: 05/15/23 0554    Specimen: Blood Updated: 05/15/23 0632     Glucose 92 mg/dL      Comment: RN NotifiedOperator: [id number] BRAYDON FAITHMeter ID: [id number]       POC Glucose Once [[id number]]  (Normal) Collected: 05/14/23 1918    Specimen: Blood Updated: 05/15/23 0509     Glucose 90 mg/dL      Comment: RN NotifiedOperator: [id number] BRAYDON FAITHMeter ID: [id number]       CBC & Differential [[id number]]  (Abnormal) Collected: 05/14/23 0944    Specimen: Blood Updated: 05/14/23 0950    Narrative:      The following orders were created for panel order CBC & Differential.  Procedure                               Abnormality         Status                     ---------                               -----------         ------                     CBC Auto Differential[[id number]]        Abnormal            Final result                 Please view results for these tests on the individual orders.    CBC Auto Differential [[id number]]  (Abnormal) Collected: 05/14/23 0944    Specimen: Blood Updated: 05/14/23 0950     WBC 15.11 10*3/mm3      RBC 4.30 10*6/mm3      Hemoglobin 12.4 g/dL      Hematocrit 38.8 %      MCV 90.2 fL      MCH 28.8 pg      MCHC 32.0 g/dL      RDW 14.4 %      RDW-SD 46.2 fl      MPV 9.8 fL      Platelets 232 10*3/mm3      Neutrophil % 66.9 %      Lymphocyte % 21.6 %      Monocyte % 9.9 %      Eosinophil % 0.8 %      Basophil % 0.5 %      Immature Grans % 0.3 %      Neutrophils, Absolute 10.10 10*3/mm3      Lymphocytes, Absolute 3.26 10*3/mm3      Monocytes, Absolute 1.50 10*3/mm3      Eosinophils, Absolute 0.12 10*3/mm3      Basophils, Absolute 0.08 10*3/mm3      Immature Grans, Absolute 0.05 10*3/mm3      nRBC 0.0 /100 WBC     Basic Metabolic Panel [[id number]]   (Abnormal) Collected: 05/14/23 0606    Specimen: Blood Updated: 05/14/23 0710     Glucose 98 mg/dL      BUN 27 mg/dL      Creatinine 0.83 mg/dL      Sodium 142 mmol/L      Potassium 4.2 mmol/L      Comment: Specimen hemolyzed.  Results may be affected.        Chloride 99 mmol/L      CO2 28.0 mmol/L      Calcium 8.5 mg/dL      BUN/Creatinine Ratio 32.5     Anion Gap 15.0 mmol/L      eGFR 75.9 mL/min/1.73     Narrative:      GFR Normal >60  Chronic Kidney Disease <60  Kidney Failure <15      CBC (No Diff) [[id number]]  (Abnormal) Collected: 05/13/23 0623    Specimen: Blood Updated: 05/13/23 0637     WBC 14.56 10*3/mm3      RBC 4.23 10*6/mm3      Hemoglobin 11.9 g/dL      Hematocrit 36.5 %      MCV 86.3 fL      MCH 28.1 pg      MCHC 32.6 g/dL      RDW 14.1 %      RDW-SD 42.6 fl      MPV 9.4 fL      Platelets 260 10*3/mm3     CBC (No Diff) [[id number]]  (Abnormal) Collected: 05/12/23 0751    Specimen: Blood Updated: 05/12/23 0817     WBC 15.56 10*3/mm3      RBC 4.32 10*6/mm3      Hemoglobin 12.4 g/dL      Hematocrit 39.8 %      MCV 92.1 fL      MCH 28.7 pg      MCHC 31.2 g/dL      RDW 14.6 %      RDW-SD 47.7 fl      MPV 10.5 fL      Platelets 220 10*3/mm3     Digoxin Level [[id number]]  (Abnormal) Collected: 05/11/23 0745    Specimen: Blood Updated: 05/11/23 0832     Digoxin 1.60 ng/mL     CRE Screen by PCR - Swab, Large Intestine, Rectum [[id number]] Collected: 05/10/23 1728    Specimen: Swab from Large Intestine, Rectum Updated: 05/10/23 1928     CRE SCREEN Not Detected     Comment: Test performed by real-time polymerase chain reaction (qPCR).        OXA 48 Strain Not Detected     IMP STRAIN Not Detected     VIM STRAIN Not Detected     NDM Strain Not Detected     KPC Strain Not Detected    High Sensitivity Troponin T [[id number]]  (Abnormal) Collected: 05/10/23 0546    Specimen: Blood Updated: 05/10/23 0717     HS Troponin T 55 ng/L     Narrative:      High Sensitive Troponin T Reference Range:  <10.0 ng/L- Negative Female  for AMI  <15.0 ng/L- Negative Male for AMI  >=10 - Abnormal Female indicating possible myocardial injury.  >=15 - Abnormal Male indicating possible myocardial injury.   Clinicians would have to utilize clinical acumen, EKG, Troponin, and serial changes to determine if it is an Acute Myocardial Infarction or myocardial injury due to an underlying chronic condition.         T4, Free [[id number]]  (Normal) Collected: 05/10/23 0546    Specimen: Blood Updated: 05/10/23 0711     Free T4 0.97 ng/dL     Narrative:      Results may be falsely increased if patient taking Biotin.      TSH [[id number]]  (Normal) Collected: 05/10/23 0546    Specimen: Blood Updated: 05/10/23 0711     TSH 1.500 uIU/mL     Magnesium [[id number]]  (Normal) Collected: 05/10/23 0546    Specimen: Blood Updated: 05/10/23 0706     Magnesium 1.8 mg/dL     Digoxin Level [[id number]]  (Abnormal) Collected: 05/10/23 0546    Specimen: Blood Updated: 05/10/23 0706     Digoxin 2.30 ng/mL     Blood Gas, Arterial - [[id number]]  (Abnormal) Collected: 05/10/23 0702    Specimen: Arterial Blood Updated: 05/10/23 0703     Site Arterial Line     Artur's Test N/A     pH, Arterial 7.365 pH units      pCO2, Arterial 51.5 mm Hg      Comment: 83 Value above reference range        pO2, Arterial 75.3 mm Hg      Comment: 84 Value below reference range        HCO3, Arterial 29.4 mmol/L      Comment: 83 Value above reference range        Base Excess, Arterial 3.2 mmol/L      Comment: 83 Value above reference range        O2 Saturation, Arterial 93.6 %      Comment: 84 Value below reference range        Barometric Pressure for Blood Gas 752 mmHg      Modality HFNC     Flow Rate 10.0 lpm      Ventilator Mode NA     Collected by      Comment: Meter: [id number]     :  [id number]       Hemoglobin A1c [[id number]]  (Abnormal) Collected: 05/09/23 2140    Specimen: Blood Updated: 05/10/23 0659     Hemoglobin A1C 6.50 %     Narrative:      Hemoglobin A1C Ranges:    Increased  "Risk for Diabetes  5.7% to 6.4%  Diabetes                     >= 6.5%  Diabetic Goal                < 7.0%    Procalcitonin [[id number]]  (Normal) Collected: 05/10/23 0011    Specimen: Blood Updated: 05/10/23 0312     Procalcitonin 0.07 ng/mL     Narrative:      As a Marker for Sepsis (Non-Neonates):    1. <0.5 ng/mL represents a low risk of severe sepsis and/or septic shock.  2. >2 ng/mL represents a high risk of severe sepsis and/or septic shock.    As a Marker for Lower Respiratory Tract Infections that require antibiotic therapy:    PCT on Admission    Antibiotic Therapy       6-12 Hrs later    >0.5                Strongly Recommended  >0.25 - <0.5        Recommended   0.1 - 0.25          Discouraged              Remeasure/reassess PCT  <0.1                Strongly Discouraged     Remeasure/reassess PCT    As 28 day mortality risk marker: \"Change in Procalcitonin Result\" (>80% or <=80%) if Day 0 (or Day 1) and Day 4 values are available. Refer to [email or web address]://[email or web address]pct[place][email or web address]    Change in PCT <=80%  A decrease of PCT levels below or equal to 80% defines a positive change in PCT test result representing a higher risk for 28-day all-cause mortality of patients diagnosed with severe sepsis for septic shock.    Change in PCT >80%  A decrease of PCT levels of more than 80% defines a negative change in PCT result representing a lower risk for 28-day all-cause mortality of patients diagnosed with severe sepsis or septic shock.       High Sensitivity Troponin T 2Hr [[id number]]  (Abnormal) Collected: 05/10/23 0011    Specimen: Blood Updated: 05/10/23 0041     HS Troponin T 55 ng/L      Comment: Specimen hemolyzed.  Results may be affected.        Troponin T Delta -3 ng/L     Narrative:      High Sensitive Troponin T Reference Range:  <10.0 ng/L- Negative Female for AMI  <15.0 ng/L- Negative Male for AMI  >=10 - Abnormal Female indicating possible myocardial injury.  >=15 - Abnormal Male indicating possible " myocardial injury.   Clinicians would have to utilize clinical acumen, EKG, Troponin, and serial changes to determine if it is an Acute Myocardial Infarction or myocardial injury due to an underlying chronic condition.         High Sensitivity Troponin T [[id number]]  (Abnormal) Collected: 05/09/23 2140    Specimen: Blood Updated: 05/09/23 2218     HS Troponin T 58 ng/L     Narrative:      High Sensitive Troponin T Reference Range:  <10.0 ng/L- Negative Female for AMI  <15.0 ng/L- Negative Male for AMI  >=10 - Abnormal Female indicating possible myocardial injury.  >=15 - Abnormal Male indicating possible myocardial injury.   Clinicians would have to utilize clinical acumen, EKG, Troponin, and serial changes to determine if it is an Acute Myocardial Infarction or myocardial injury due to an underlying chronic condition.         Comprehensive Metabolic Panel [[id number]]  (Abnormal) Collected: 05/09/23 2140    Specimen: Blood Updated: 05/09/23 2217     Glucose 147 mg/dL      BUN 41 mg/dL      Creatinine 1.41 mg/dL      Sodium 136 mmol/L      Potassium 5.2 mmol/L      Chloride 100 mmol/L      CO2 22.0 mmol/L      Calcium 8.5 mg/dL      Total Protein 7.1 g/dL      Albumin 3.7 g/dL      ALT (SGPT) 24 U/L      AST (SGOT) 38 U/L      Alkaline Phosphatase 73 U/L      Total Bilirubin 0.4 mg/dL      Globulin 3.4 gm/dL      A/G Ratio 1.1 g/dL      BUN/Creatinine Ratio 29.1     Anion Gap 14.0 mmol/L      eGFR 40.2 mL/min/1.73     Narrative:      GFR Normal >60  Chronic Kidney Disease <60  Kidney Failure <15      Magnesium [[id number]]  (Normal) Collected: 05/09/23 2140    Specimen: Blood Updated: 05/09/23 2217     Magnesium 1.9 mg/dL     BNP [[id number]]  (Abnormal) Collected: 05/09/23 2140    Specimen: Blood Updated: 05/09/23 2215     proBNP 16,585.0 pg/mL     Narrative:      Among patients with dyspnea, NT-proBNP is highly sensitive for the detection of acute congestive heart failure. In addition NT-proBNP of <300 pg/ml  "effectively rules out acute congestive heart failure with 99% negative predictive value.      D-dimer, Quantitative [[id number]]  (Abnormal) Collected: 05/09/23 2140    Specimen: Blood Updated: 05/09/23 2214     D-Dimer, Quantitative 1,220 ng/mL (FEU)     Narrative:      According to the assay 's published package insert, a normal (<500 ng/mL (FEU)) D-dimer result in conjunction with a non-high clinical probability assessment, excludes deep vein thrombosis (DVT) and pulmonary embolism (PE) with high sensitivity.    D-dimer values increase with age and this can make VTE exclusion of an older population difficult. To address this, the [employer], based on best available evidence and recent guidelines, recommends that clinicians use age-adjusted D-dimer thresholds in patients greater than 50 years of age with: a) a low probability of PE who do not meet all Pulmonary Embolism Rule Out Criteria, or b) in those with intermediate probability of PE.   The formula for an age-adjusted D-dimer cut-off is \"age*10\".  For example, a 60 year old patient would have an age-adjusted cut-off of 600 ng/mL (FEU) and an 80 year old 800 ng/mL (FEU).      CBC & Differential [[id number]]  (Abnormal) Collected: 05/09/23 2140    Specimen: Blood Updated: 05/09/23 2202    Narrative:      The following orders were created for panel order CBC & Differential.  Procedure                               Abnormality         Status                     ---------                               -----------         ------                     CBC Auto Differential[[id number]]        Abnormal            Final result                 Please view results for these tests on the individual orders.    CBC Auto Differential [[id number]]  (Abnormal) Collected: 05/09/23 2140    Specimen: Blood Updated: 05/09/23 2202     WBC 12.27 10*3/mm3      RBC 3.73 10*6/mm3      Hemoglobin 10.9 g/dL      Hematocrit 32.8 %      MCV 87.9 fL      MCH 29.2 " pg      MCHC 33.2 g/dL      RDW 14.0 %      RDW-SD 44.0 fl      MPV 10.4 fL      Platelets 191 10*3/mm3      Neutrophil % 77.0 %      Lymphocyte % 13.2 %      Monocyte % 8.7 %      Eosinophil % 0.0 %      Basophil % 0.7 %      Immature Grans % 0.4 %      Neutrophils, Absolute 9.44 10*3/mm3      Lymphocytes, Absolute 1.62 10*3/mm3      Monocytes, Absolute 1.07 10*3/mm3      Eosinophils, Absolute 0.00 10*3/mm3      Basophils, Absolute 0.09 10*3/mm3      Immature Grans, Absolute 0.05 10*3/mm3      nRBC 0.2 /100 WBC     COVID-19 and FLU A/B PCR - Swab, Nasopharynx [[id number]]  (Normal) Collected: 05/09/23 2024    Specimen: Swab from Nasopharynx Updated: 05/09/23 2049     COVID19 Not Detected     Influenza A PCR Not Detected     Influenza B PCR Not Detected    Narrative:      Fact sheet for providers: https://www.fda.gov/media/902187/download    Fact sheet for patients: https://www.fda.gov/media/641397/download    Test performed by PCR.        Imaging Results (Most Recent)     Procedure Component Value Units Date/Time    NM Lung Scan Perfusion Particulate [[id number]] Collected: 05/10/23 1336     Updated: 05/10/23 1432    Narrative:      Exam:  Pulmonary perfusion scan.    History:  Elevated D-dimer, shortness of breath.    Comparison:  Exam is correlated with a chest radiograph 05/09/2023.    RADIOPHARMACEUTICALS:  6.6 mCi Tc99m-MAA IV.    TECHNIQUE:  Planar ventilation scan was performed in multiple projections after Tc-DTPA  aerosol nebulizer administration followed by perfusion scan with Tc-MAA IV in  similar projections.  Projections include anterior, posterior, right lateral,  left lateral, RPO and LPO.    Findings:  Normal, uniform uptake throughout the lungs on perfusion imaging.  No moderate  or large segmental perfusion defects are identified.      Impression:      Very low probability for pulmonary embolism.    XR Chest 1 View [[id number]] Collected: 05/09/23 2030     Updated: 05/09/23 2219    Narrative:       "INDICATION:  shortness of breath.    COMPARISON:  None relevant.    FINDINGS:  Diffuse interstitial opacities throughout the lungs may represent chronic change  or interstitial edema/pneumonia.    Heart is mildly enlarged.  No pneumothorax or sizable pleural fluid.                  Hospital Course:  72-year-old female from SNF, history of heart failure with preserved EF, A-fib, COPD with tobacco abuse, chronic hypoxia on 2 L O2 at baseline, hypertension, history of PE anticoagulated on Xarelto, presented from SNF due to increased shortness of breath and hypoxia despite increasing her supplemental oxygen and initially required nonrebreather then high flow nasal cannula in order to maintain adequate SPO2.  She does not have evidence of pulmonary edema on chest x-ray as well as significant elevated proBNP over 16,000.  She had some elevated high-sensitivity troponin at 58, which was trended with downtrend to 55, likely related to demand ischemia in setting of decompensated heart failure.  She also had evidence of ALVAREZ with creatinine significant from baseline up to 1.41, however likely related to cardiorenal syndrome as renal function did improve some with treatment on diuretic therapy.  Echocardiogram obtained on 5/10/2023 showed preserved LVEF, some diastolic dysfunction noted.  She clinically improved during hospitalization with excellent diuresis on IV Lasix and transition back to oral Lasix.  Clinically improved back to her baseline.  Patient medically stable for discharge back to SNF.  Continue on oral Lasix 40 mg daily with instructions to take additional tablet as needed for any shortness of breath, increased weight gain or edema.         Condition on Discharge: Stable      Physical Exam on Discharge:  BP 96/58 (BP Location: Left arm, Patient Position: Lying)   Pulse 71   Temp 97.8 °F (36.6 °C) (Temporal)   Resp 16   Ht 132.1 cm (52\")   Wt 60.6 kg (133 lb 11.2 oz)   SpO2 92%   BMI 34.76 kg/m²   Physical " Exam  General: No acute distress  Cardiac: Normal rate  Respiratory: No wheezes or rales  Abdomen: Nondistended  Extremities: No edema      Discharge Disposition:  Skilled Nursing Facility (DC - External)    Discharge Medications:     Discharge Medications      New Medications      Instructions Start Date   naloxone 4 MG/0.1ML nasal spray  Commonly known as: NARCAN   Call 911. Don't prime. Orange in 1 nostril for overdose. Repeat in 2-3 minutes in other nostril if no or minimal breathing/responsiveness.         Changes to Medications      Instructions Start Date   furosemide 40 MG tablet  Commonly known as: LASIX  What changed: additional instructions   40 mg, Oral, Daily, Take additional tablet in the afternoon or evening for increased weight gain >3lbs, shortness of breath or leg swelling      oxyCODONE-acetaminophen 5-325 MG per tablet  Commonly known as: PERCOCET  What changed: reasons to take this   1 tablet, Oral, Every 6 Hours PRN         Continue These Medications      Instructions Start Date   acetaminophen 325 MG tablet  Commonly known as: TYLENOL   325 mg, Oral, Every 6 Hours PRN      albuterol (2.5 MG/3ML) 0.083% nebulizer solution  Commonly known as: PROVENTIL   2.5 mg, Nebulization, Every 4 Hours PRN      ALBUTEROL SULFATE HFA IN   2 puffs, Inhalation, Every 4 Hours PRN      amitriptyline 25 MG tablet  Commonly known as: ELAVIL   25 mg, Oral, Nightly      budesonide-formoterol 160-4.5 MCG/ACT inhaler  Commonly known as: SYMBICORT   2 puffs, Inhalation, 2 Times Daily PRN      cilostazol 100 MG tablet  Commonly known as: PLETAL   100 mg, Oral, 2 Times Daily      clopidogrel 75 MG tablet  Commonly known as: PLAVIX   75 mg, Oral, Daily      Fluticasone-Salmeterol 500-50 MCG/ACT DISKUS  Commonly known as: ADVAIR/WIXELA   Inhalation, 2 Times Daily - RT      gabapentin 400 MG capsule  Commonly known as: NEURONTIN   400 mg, Oral, Every 8 Hours Scheduled      ipratropium-albuterol 0.5-2.5 mg/3 ml  nebulizer  Commonly known as: DUO-NEB   3 mL, Nebulization, Every 6 Hours      levothyroxine 25 MCG tablet  Commonly known as: SYNTHROID, LEVOTHROID   25 mcg, Oral, Every Early Morning      midodrine 5 MG tablet  Commonly known as: PROAMATINE   10 mg, Oral, 3 Times Daily Before Meals      nicotine 21 MG/24HR patch  Commonly known as: NICODERM CQ   Place 1 patch on the skin as directed by provider Daily **Remove old patch before applying new patch**      NON FORMULARY   Biofreeze - apply to knees daily      O2  Commonly known as: OXYGEN   4 L/min, Inhalation, As Needed      ondansetron 4 MG tablet  Commonly known as: ZOFRAN   4 mg, Oral, Every 8 Hours PRN      pantoprazole 40 MG EC tablet  Commonly known as: Protonix   40 mg, Oral, Daily      penciclovir 1 % cream  Commonly known as: DENAVIR   1 application, Topical, As Needed      pravastatin 20 MG tablet  Commonly known as: PRAVACHOL   20 mg, Oral, Nightly      rivaroxaban 20 MG tablet  Commonly known as: Xarelto   20 mg, Oral, Daily With Dinner      rosuvastatin 5 MG tablet  Commonly known as: CRESTOR   5 mg, Oral, Daily      sennosides-docusate 8.6-50 MG per tablet  Commonly known as: PERICOLACE   2 tablets, Oral, 2 Times Daily PRN      sertraline 100 MG tablet  Commonly known as: ZOLOFT   100 mg, Oral, Daily      traZODone 150 MG tablet  Commonly known as: DESYREL   150 mg, Oral, Nightly      vitamin B-12 1000 MCG tablet  Commonly known as: CYANOCOBALAMIN   5,000 mcg, Oral, Daily      vitamin D 1.25 MG (60235 UT) capsule capsule  Commonly known as: ERGOCALCIFEROL   50,000 Units, Oral, Weekly         Stop These Medications    digoxin 125 MCG tablet  Commonly known as: LANOXIN     levoFLOXacin 500 MG tablet  Commonly known as: LEVAQUIN     predniSONE 10 MG (21) dose pack  Commonly known as: DELTASONE            Discharge Diet: Heart healthy, low-sodium    Activity at Discharge: As tolerated    Discharge Care Plan/Instructions: Follow-up with physician at [facility].   Take medications as directed.      Follow-up Appointments:   No future appointments.    Test Results Pending at Discharge:   Pending Labs     Order Current Status    COVID-19,CEPHEID/HUONG,COR/MARIA E/PAD/BERNARDINO/MAD IN-HOUSE(OR EMERGENT/ADD-ON),NP SWAB IN TRANSPORT MEDIA 3-4 HR TAT, RT-PCR - Swab, Nasopharynx In process              Time: 38 minutes

## 2023-05-15 NOTE — THERAPY TREATMENT NOTE
Acute Care - Physical Therapy Treatment Note  [facility]     Patient Name: Mona Perales  : 1950  MRN: [id number]  Today's Date: 5/15/2023      Visit Dx:     ICD-10-CM ICD-9-CM   1. Acute and chronic respiratory failure with hypoxia  J96.21 518.84     799.02   2. Acute on chronic congestive heart failure, unspecified heart failure type  I50.9 428.0   3. Positive D dimer  R79.89 790.92   4. ALVAREZ (acute kidney injury)  N17.9 584.9   5. Impaired functional mobility, balance, gait, and endurance  Z74.09 V49.89     Patient Active Problem List   Diagnosis   • Anxiety   • CAD (coronary atherosclerotic disease)   • Carotid stenosis   • COPD (chronic obstructive pulmonary disease)   • COPD with exacerbation ([facility])   • Depressive disorder, not elsewhere classified   • Benign essential hypertension   • Hypercholesteremia   • Insomnia   • Notalgia   • Osteoporosis   • Other screening mammogram   • RUQ abdominal pain   • PVD (peripheral vascular disease) with claudication   • Nicotine abuse   • Dysphagia   • Epigastric pain   • Pain of right hand   • Closed displaced fracture of shaft of fifth metacarpal bone of right hand   • Cause of injury, fall   • Displaced fracture of shaft of fifth metacarpal bone of right hand with routine healing   • Syncope   • Acute respiratory failure with hypoxia   • (HFpEF) heart failure with preserved ejection fraction (HCC)   • Hypotension   • Elevated WBCs   • Near syncope   • Atherosclerosis of native coronary artery of native heart without angina pectoris   • Atherosclerosis of native arteries of extremities with rest pain, left leg   • PVD (peripheral vascular disease) ([facility])   • Physical deconditioning   • Pain due to onychomycosis of toenails of both feet   • ST elevation myocardial infarction (STEMI), unspecified artery   • Acute and chronic respiratory failure with hypoxia   • Acute on chronic heart failure with preserved ejection fraction (HFpEF)     Past Medical History:    Diagnosis Date   • A-fib    • Anxiety    • Arthritis    • Asthma    • 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 with ulceration     bilateral legs - bilateral iliac stents 2008      • CHF (congestive heart failure)    • Chronic lower back pain    • COPD (chronic obstructive pulmonary disease)    • Essential (primary) hypertension    • GERD (gastroesophageal reflux disease)    • History of pulmonary embolus (PE)    • Hyperlipidemia    • Insomnia    • Lumbago    • Nicotine dependence    • Occlusion of artery      and stenosis of bilateral carotid arteries - BABS 16-49%, LICA 0-15%   • Other atherosclerosis of native artery of other extremity     LEFT subclavian stent 2005 (occluded)   • Sleep apnea      Past Surgical History:   Procedure Laterality Date   • CARDIAC CATHETERIZATION  04/06/2016    No evidence of any obstructive epicardial CAD.Preserved LV systolic function with EF of 55%.   • CARDIAC CATHETERIZATION N/A 11/1/2022    Procedure: Left Heart Cath;  Surgeon: Neftali Haywood MD;  Location: [facility] CATH INVASIVE LOCATION;  Service: Cardiology;  Laterality: N/A;   • CENTRAL VENOUS LINE INSERTION  04/07/2016    Successful placement of right uppe extremity 6-Latvian triple lumen PICC line.   • ENDOSCOPY N/A 1/12/2018    Procedure: ESOPHAGOGASTRODUODENOSCOPY;  Surgeon: Bin Oh MD;  Location: [facility] ENDOSCOPY;  Service:    • ENDOSCOPY N/A 1/17/2018    Procedure: ESOPHAGOGASTRODUODENOSCOPY;  Surgeon: Bin Oh MD;  Location: [facility] ENDOSCOPY;  Service:    • ENDOSCOPY N/A 3/16/2018    Procedure: ESOPHAGOGASTRODUODENOSCOPY possible dilation;  Surgeon: Bin Oh MD;  Location: [facility] ENDOSCOPY;  Service: Gastroenterology   • FEMORAL POPLITEAL BYPASS Left 4/14/2020    Procedure: FEMORAL POPLITEAL BYPASS ABOVE KNEE  (POLYTETRAFLUOROETHYLENE)  LEFT COMMON FEMORAL ARTERY ENDARTERECTOMY PTA LEFT ILIAC ARTERIOGRAM        (c-arm#2 and c-arm table);  Surgeon: David Suh MD;   Location: [facility] [place] OR;  Service: Vascular;  Laterality: Left;   • FEMORAL POPLITEAL BYPASS Right 9/17/2021    Procedure: RIGHT common femoral endarterectomy FEMORAL POPLITEAL BYPASS (above the knee polytetrafluoroethylene  lower extremity arteriogram              (c-arm#2 and c-arm table);  Surgeon: David Suh MD;  Location: [facility] [place] OR;  Service: Vascular;  Laterality: Right;   • HYSTERECTOMY     • INTERVENTIONAL RADIOLOGY PROCEDURE Left 9/9/2020    Procedure: IR angiogram extremity left;  Surgeon: David Suh MD;  Location: [facility] [place] ANGIO INVASIVE LOCATION;  Service: Interventional Radiology;  Laterality: Left;   • KYPHOPLASTY N/A 5/23/2019    Procedure: KYPHOPLASTY LUMBAR FOUR;  Surgeon: Aba Santa MD;  Location: [facility] [place] OR;  Service: Orthopedic Spine   • TOTAL SHOULDER REPLACEMENT Left    • TRANSESOPHAGEAL ECHOCARDIOGRAM (JACQUES)  04/07/2016    With color flow-Mild to moderate [facility].LV systolic function well preserved with Ef of 55-60%.Mitral and AV intact.Diastolic dysfunction   • UPPER GASTROINTESTINAL ENDOSCOPY  01/17/2018    Dr. Bin Martin M.D.     PT Assessment (last 12 hours)     PT Evaluation and Treatment     Row Name 05/15/23 0945 05/15/23 0922       Physical Therapy Time and Intention    Subjective Information complains of;pain  -LN --  -LN    Document Type therapy note (daily note)  -LN therapy note (daily note)  -LN    Mode of Treatment individual therapy;physical therapy  -LN individual therapy;physical therapy  -LN    Patient Effort good  -LN good  -LN    Comment -- sta  -LN    Row Name 05/15/23 0945 05/15/23 0922       General Information    Patient Profile Reviewed yes  -LN yes  -LN    Equipment Currently Used at Home shower chair  has RW, but not sure where it is located  -LN shower chair  has RW, but not sure where it is located  -LN    Existing Precautions/Restrictions fall;oxygen therapy device and L/min  -LN fall;oxygen therapy device and L/min  -LN    Row  Name 05/15/23 0945 05/15/23 0922       Home Use of Assistive/Adaptive Equipment    Equipment Currently Used at Home none  -LN none  -LN    Row Name 05/15/23 0945 05/15/23 0922       Pain    Pretreatment Pain Rating 10/10  -LN --  -LN    Posttreatment Pain Rating 10/10  -LN --    Pain Location lower  -LN lower  -LN    Pain Location - back  -LN back  -LN    Pain Intervention(s) Repositioned;Nursing Notified  -LN --    Row Name 05/15/23 0945 05/15/23 0922       Cognition    Affect/Mental Status (Cognition) WFL  -LN WFL  -LN    Orientation Status (Cognition) oriented to;person;place    -LN oriented to;person;place    -LN    Row Name 05/15/23 0945 05/15/23 0922       Range of Motion Comprehensive    General Range of Motion no range of motion deficits identified  -LN no range of motion deficits identified  -LN    Row Name 05/15/23 0945 05/15/23 0922       Bed Mobility    Bed Mobility supine-sit;sit-supine  -LN supine-sit;sit-supine  -LN    Supine-Sit [place] (Bed Mobility) modified independence  -LN modified independence  -LN    Sit-Supine [place] (Bed Mobility) modified independence  -LN not tested  -LN    Assistive Device (Bed Mobility) bed rails;head of bed elevated  -LN bed rails;head of bed elevated  -LN    Row Name 05/15/23 0945 05/15/23 0922       Transfers    Transfers sit-stand transfer;stand-sit transfer;bed-chair transfer  -LN sit-stand transfer;stand-sit transfer;bed-chair transfer  -LN    Row Name 05/15/23 0945 05/15/23 0922       Bed-Chair Transfer    Bed-Chair [place] (Transfers) --  -LN minimum assist (75% patient effort)  -LN    Assistive Device (Bed-Chair Transfers) walker, front-wheeled  -LN walker, front-wheeled  -LN    Row Name 05/15/23 0945 05/15/23 0922       Sit-Stand Transfer    Sit-Stand [place] (Transfers) verbal cues;standby assist  -LN contact guard;verbal cues  -LN    Assistive Device (Sit-Stand Transfers) walker, front-wheeled  -LN walker, front-wheeled  -LN    Row  Name 05/15/23 0945 05/15/23 0922       Stand-Sit Transfer    Stand-Sit [place] (Transfers) verbal cues;contact guard  -LN verbal cues;contact guard  -LN    Assistive Device (Stand-Sit Transfers) walker, front-wheeled  -LN walker, front-wheeled  -LN    Row Name 05/15/23 0945 05/15/23 0922       Toilet Transfer    Type (Toilet Transfer) sit-stand;stand-sit  -LN sit-stand;stand-sit  -LN    [place] Level (Toilet Transfer) --  -LN minimum assist (75% patient effort)  -LN    Assistive Device (Toilet Transfer) commode;grab bars/safety frame;walker, front-wheeled  -LN commode;grab bars/safety frame;walker, front-wheeled  -LN    Row Name 05/15/23 0945 05/15/23 0922       Gait/Stairs (Locomotion)    Gait/Stairs Locomotion gait/ambulation independence;gait/ambulation assistive device;gait pattern;distance ambulated;gait deviations  -LN gait/ambulation independence;gait/ambulation assistive device;gait pattern;distance ambulated;gait deviations  -LN    [place] Level (Gait) minimum assist (75% patient effort);contact guard  -LN minimum assist (75% patient effort);contact guard  -LN    Assistive Device (Gait) walker, front-wheeled  -LN walker, front-wheeled  -LN    Distance in Feet (Gait) 220  -LN --    Deviations/Abnormal Patterns (Gait) gait speed decreased;sky decreased  .  -LN gait speed decreased;sky decreased  .  -    Row Name 05/15/23 0945          Hip (Therapeutic Exercise)    Hip (Therapeutic Exercise) AROM (active range of motion)  -LN     Hip AROM (Therapeutic Exercise) bilateral;flexion;aBduction;aDduction  -LN     Row Name 05/15/23 0945          Knee (Therapeutic Exercise)    Knee (Therapeutic Exercise) AROM (active range of motion)  -LN     Knee AROM (Therapeutic Exercise) bilateral;LAQ (long arc quad)  -LN     Row Name 05/15/23 0945          Ankle (Therapeutic Exercise)    Ankle (Therapeutic Exercise) AROM (active range of motion)  -LN     Ankle AROM (Therapeutic Exercise)  bilateral;dorsiflexion;plantarflexion  -LN     Row Name 05/15/23 0945 05/15/23 0922       Respiratory WDL    Respiratory WDL breath sounds;cough  -LN breath sounds;cough  -LN    Rhythm/Pattern, Respiratory depth regular;no shortness of breath reported;pattern regular;unlabored  -LN depth regular;no shortness of breath reported;pattern regular;unlabored  -LN    Expansion/Accessory Muscles/Retractions no use of accessory muscles  -LN no use of accessory muscles  -LN    Nailbeds no discoloration  -LN no discoloration  -LN    Mucous Membranes pink;intact;moist  -LN pink;intact;moist  -LN    Cough Frequency infrequent  -LN infrequent  -LN    Cough Type dry;good;nonproductive  -LN dry;good;nonproductive  -LN    Row Name 05/15/23 0945 05/15/23 0922       Breath Sounds    Breath Sounds All Fields  -LN All Fields  -LN    All Lung Fields Breath Sounds equal bilaterally;Anterior:;Lateral:;coarse  -LN equal bilaterally;Anterior:;Lateral:;coarse  -LN    RLL Breath Sounds Posterior:;crackles, fine  -LN Posterior:;crackles, fine  -LN    Row Name 05/15/23 0945 05/15/23 0922       Skin WDL    Skin WDL X;all  -LN X;all  -LN    Skin Color/Characteristics without discoloration  -LN without discoloration  -LN    Skin Temperature warm  -LN warm  -LN    Skin Moisture dry  -LN dry  -LN    Skin Elasticity quick return to original state  -LN quick return to original state  -LN    Skin Integrity pressure injury  -LN pressure injury  -LN    Row Name 05/15/23 0945 05/15/23 0922       Wound 11/17/22 1300 Bilateral midline perineum Traumatic    Wound - Properties Group Placement Date: 11/17/22  -JY Placement Time: 1300  -JY Side: Bilateral  -JY Orientation: midline  -JY Location: perineum  -JY Primary Wound Type: Traumatic  -JY, raw from wiping     Closure SHAYLEE  -LN SHAYLEE  -LN    Base pink;non-granulating  -LN pink;non-granulating  -LN    Periwound non-blanchable  -LN non-blanchable  -LN    Periwound Temperature warm  -LN warm  -LN    Periwound Skin  Turgor firm  -LN firm  -LN    Drainage Amount none  -LN none  -LN    Retired Wound - Properties Group Placement Date: 11/17/22  -JY Placement Time: 1300  -JY Side: Bilateral  -JY Orientation: midline  -JY Location: perineum  -JY Primary Wound Type: Traumatic  -JY, raw from wiping     Retired Wound - Properties Group Date first assessed: 11/17/22  -JY Time first assessed: 1300  -JY Side: Bilateral  -JY Location: perineum  -JY Primary Wound Type: Traumatic  -JY, raw from wiping     Row Name 05/15/23 0945 05/15/23 0922       Coping    Observed Emotional State calm;cooperative  -LN calm;cooperative  -LN    Verbalized Emotional State acceptance  -LN acceptance  -LN    Family/Support Persons family  -LN family  -LN    Involvement in Care not present at bedside  -LN not present at bedside  -LN    Row Name 05/15/23 0945          Plan of Care Review    Plan of Care Reviewed With patient  -LN     Outcome Evaluation sup-sit-sup mod ind,sit-stand-sit cg/sba of 1.amb 220' with rw and cga of 1;min assist to put on shirt and pants  -LN     Row Name 05/15/23 0945          Vital Signs    Pre Systolic BP Rehab 100  right arm  -LN     Pre Treatment Diastolic BP 60  -LN     Post Systolic BP Rehab 128  -LN     Post Treatment Diastolic BP 72  right arm  -LN     Pretreatment Heart Rate (beats/min) 69  -LN     Intratreatment Heart Rate (beats/min) 71  -LN     Posttreatment Heart Rate (beats/min) 76  -LN     Pre SpO2 (%) 94  -LN     O2 Delivery Pre Treatment supplemental O2  -LN     Intra SpO2 (%) 98  -LN     Post SpO2 (%) 92  -LN     Pre Patient Position Sitting  -LN     Intra Patient Position Standing  -LN     Post Patient Position Sitting  -LN     Row Name 05/15/23 0945          Positioning and Restraints    In Bed notified nsg;fowlers;call light within reach;encouraged to call for assist;exit alarm on  -LN     Row Name 05/15/23 0945 05/15/23 0922       Therapy Assessment/Plan (PT)    Rehab Potential (PT) good, to achieve stated therapy  goals  -LN good, to achieve stated therapy goals  -LN    Criteria for Skilled Interventions Met (PT) yes;meets criteria;skilled treatment is necessary  -LN yes;meets criteria;skilled treatment is necessary  -LN    Therapy Frequency (PT) other (see comments)  5-7d/week  -LN other (see comments)  5-7d/week  -LN    Row Name 05/15/23 0945 05/15/23 0922       Bed Mobility Goal 1 (PT)    Activity/Assistive Device (Bed Mobility Goal 1, PT) sit to supine;supine to sit  -LN sit to supine;supine to sit  -LN    [place] Level/Cues Needed (Bed Mobility Goal 1, PT) modified independence  -LN modified independence  -LN    Time Frame (Bed Mobility Goal 1, PT) by discharge  -LN by discharge  -LN    Progress/Outcomes (Bed Mobility Goal 1, PT) goal met  -LN goal partially met  -LN    Row Name 05/15/23 0945 05/15/23 0922       Transfer Goal 1 (PT)    Activity/Assistive Device (Transfer Goal 1, PT) sit-to-stand/stand-to-sit;bed-to-chair/chair-to-bed  -LN sit-to-stand/stand-to-sit;bed-to-chair/chair-to-bed  -LN    [place] Level/Cues Needed (Transfer Goal 1, PT) standby assist  -LN standby assist  -LN    Time Frame (Transfer Goal 1, PT) by discharge  -LN by discharge  -LN    Progress/Outcome (Transfer Goal 1, PT) goal partially met  -LN goal not met  -LN    Row Name 05/15/23 0945 05/15/23 0922       Gait Training Goal 1 (PT)    Activity/Assistive Device (Gait Training Goal 1, PT) gait (walking locomotion);assistive device use  -LN gait (walking locomotion);assistive device use  -LN    [place] Level (Gait Training Goal 1, PT) standby assist  -LN standby assist  -LN    Distance (Gait Training Goal 1, PT) 50'x2  -LN 50'x2  -LN    Time Frame (Gait Training Goal 1, PT) by discharge  -LN by discharge  -LN    Progress/Outcome (Gait Training Goal 1, PT) goal not met  -LN goal not met  -LN          User Key  (r) = Recorded By, (t) = Taken By, (c) = Cosigned By    Initials Name Provider Type    Danyell Hagen, RN Registered  Nurse    Torri Vogel, PTA Physical Therapist Assistant                Physical Therapy Education     Title: PT OT SLP Therapies (In Progress)     Topic: Physical Therapy (In Progress)     Point: Mobility training (In Progress)     Learning Progress Summary           Patient Acceptance, E, NR by JANES at 5/12/2023 1529    Acceptance, E,TB, VU by LR at 5/10/2023 0846    Comment: Educated on PT POC and goals.                   Point: Home exercise program (Done)     Learning Progress Summary           Patient Acceptance, E,TB, VU by LR at 5/10/2023 0846    Comment: Educated on PT POC and goals.                   Point: Body mechanics (Done)     Learning Progress Summary           Patient Acceptance, E,TB, VU by LR at 5/10/2023 0846    Comment: Educated on PT POC and goals.                   Point: Precautions (Done)     Learning Progress Summary           Patient Acceptance, E,TB, VU by LR at 5/10/2023 0846    Comment: Educated on PT POC and goals.                               User Key     Initials Effective Dates Name Provider Type Discipline    JANES 06/16/21 -  Parish Cuadra PTA Physical Therapist Assistant PT    LR 06/16/21 -  Timothy Lopez Physical Therapist PT              PT Recommendation and Plan  Therapy Frequency (PT): other (see comments) (5-7d/week)  Plan of Care Reviewed With: patient  Outcome Evaluation: sup-sit-sup mod ind,sit-stand-sit cg/sba of 1.amb 220' with rw and cga of 1;min assist to put on shirt and pants   Outcome Measures     Row Name 05/15/23 0945 05/14/23 1042 05/12/23 1502       How much help from another person do you currently need...    Turning from your back to your side while in flat bed without using bedrails? 4  -LN 4  -LN 3  -JANES    Moving from lying on back to sitting on the side of a flat bed without bedrails? 4  -LN 4  -LN 3  -JANES    Moving to and from a bed to a chair (including a wheelchair)? 3  -LN 3  -LN 3  -JANES    Standing up from a chair using your arms (e.g.,  wheelchair, bedside chair)? 3  -LN 3  -LN 3  -JANES    Climbing 3-5 steps with a railing? 2  -LN 2  -LN 2  -JANES    To walk in hospital room? 3  -LN 3  -LN 3  -JANES    AM-PAC 6 Clicks Score (PT) 19  -LN 19  -LN 17  -JANES       Functional Assessment    Outcome Measure Options AM-PAC 6 Clicks Basic Mobility (PT)  -LN AM-PAC 6 Clicks Basic Mobility (PT)  -LN AM-PAC 6 Clicks Basic Mobility (PT)  -JANES          User Key  (r) = Recorded By, (t) = Taken By, (c) = Cosigned By    Initials Name Provider Type    JANES Parish Cuadra, PTA Physical Therapist Assistant    LN DanielTorri hunt S, PTA Physical Therapist Assistant                 Time Calculation:    PT Charges     Row Name 05/15/23 1034             Time Calculation    Start Time 0945  -LN      Stop Time 1033  -LN      Time Calculation (min) 48 min  -LN      PT Received On 05/15/23  -LN         Time Calculation- PT    Total Timed Code Minutes- PT 48 minute(s)  -LN            User Key  (r) = Recorded By, (t) = Taken By, (c) = Cosigned By    Initials Name Provider Type    LN Daniel, Torri S, PTA Physical Therapist Assistant              Therapy Charges for Today     Code Description Service Date Service Provider Modifiers Qty    51085140004 HC GAIT TRAINING EA 15 MIN 5/14/2023 Daniel, Torri S, PTA GP 1    78910181763 HC PT THER PROC EA 15 MIN 5/14/2023 Daniel, Torri S, PTA GP 1    23249026897 HC GAIT TRAINING EA 15 MIN 5/15/2023 Daniel, Torri S, PTA GP 1    10038444309 HC PT THER PROC EA 15 MIN 5/15/2023 Daniel, Torri S, PTA GP 1    92503474574 HC PT SELF CARE/MGMT/TRAIN EA 15 MIN 5/15/2023 Daniel, Torri S, PTA GP 1          PT G-Codes  Outcome Measure Options: AM-PAC 6 Clicks Basic Mobility (PT)  AM-PAC 6 Clicks Score (PT): 19    Torri S Daniel PTA  5/15/2023

## 2023-05-15 NOTE — PLAN OF CARE
Goal Outcome Evaluation:  Plan of Care Reviewed With: patient           Outcome Evaluation: sup-sit-sup mod ind,sit-stand-sit cg/sba of 1.amb 220' with rw and cga of 1;min assist to put on shirt and pants

## 2023-05-16 NOTE — PAYOR COMM NOTE
"Natasha Quintero  [facility]  Case Management Extender  [phone] phone  [phone] fax      Ref# [id number]    Filomena Perales (72 y.o. Female)     Date of Birth   1950    Social Security Number       Address   [address] [place] [address]    Home Phone   [phone]    MRN   [id number]       [employer]   [facility]    Marital Status                               Admission Date   5/9/23    Admission Type   Emergency    Admitting Provider   Behroozi, Saeid, MD    Attending Provider       Department, Room/Bed   [address], 318/1       Discharge Date   5/15/2023    Discharge Disposition   Skilled Nursing Facility (DC - External)    Discharge Destination                               Attending Provider: (none)   Allergies: Morphine And Related, Penicillins    Isolation: None   Infection: None   Code Status: Prior    Ht: 132.1 cm (52\")   Wt: 60.6 kg (133 lb 11.2 oz)    Admission Cmt: None   Principal Problem: Acute and chronic respiratory failure with hypoxia [J96.21]                 Active Insurance as of 5/9/2023     Primary Coverage     Payor Plan Insurance Group Employer/Plan Group    ANTHEM MEDICARE REPLACEMENT ANTHEM MEDICARE ADVANTAGE [id number]     Payor Plan Address Payor Plan Phone Number Payor Plan Fax Number Effective Dates    [address] [phone]  1/1/2023 - None Entered    [facility] [address]       Subscriber Name Subscriber Birth Date Member ID       FILOMENA PERALES 1950 [id number]                 Emergency Contacts      (Rel.) Home Phone Work Phone Mobile Phone    Renee Li (Grandchild) -- -- [phone]    Meena Marie (Friend) -- -- [phone]    Torri Rivero (Daughter) [phone] -- [phone]               Discharge Summary      Michele Nunn MD at 05/15/23 0850              [facility] Medicine Services  DISCHARGE " SUMMARY       Date of Admission: 5/9/2023  Date of Discharge:  5/15/2023  Primary Care Physician: Anahi Camacho APRN    Presenting Problem/History of Present Illness:  ALVAREZ (acute kidney injury) [N17.9]  Acute and chronic respiratory failure with hypoxia [J96.21]  Impaired functional mobility, balance, gait, and endurance [Z74.09]  Acute on chronic congestive heart failure, unspecified heart failure type [I50.9]  Acute on chronic heart failure with preserved ejection fraction (HFpEF) [I50.33]       Final Discharge Diagnoses:  Active Hospital Problems    Diagnosis    • **Acute and chronic respiratory failure with hypoxia    • Acute on chronic heart failure with preserved ejection fraction (HFpEF)    • ALVAREZ         Consults:   Consults     No orders found from 4/10/2023 to 5/10/2023.          Pertinent Test Results:   Lab Results (most recent)     Procedure Component Value Units Date/Time    Basic Metabolic Panel [[id number]]  (Abnormal) Collected: 05/15/23 0541    Specimen: Blood Updated: 05/15/23 0704     Glucose 90 mg/dL      BUN 26 mg/dL      Creatinine 0.92 mg/dL      Sodium 140 mmol/L      Potassium 3.5 mmol/L      Chloride 98 mmol/L      CO2 31.0 mmol/L      Calcium 8.3 mg/dL      BUN/Creatinine Ratio 28.3     Anion Gap 11.0 mmol/L      eGFR 67.1 mL/min/1.73     Narrative:      GFR Normal >60  Chronic Kidney Disease <60  Kidney Failure <15      Extra Tubes [[id number]] Collected: 05/15/23 0541    Specimen: Blood, Venous Line Updated: 05/15/23 0645    Narrative:      The following orders were created for panel order Extra Tubes.  Procedure                               Abnormality         Status                     ---------                               -----------         ------                     Lavender Top[[id number]]                                     Final result                 Please view results for these tests on the individual orders.    Lavender Top [[id number]] Collected: 05/15/23 0541     Specimen: Blood Updated: 05/15/23 0645     Extra Tube hold for add-on     Comment: Auto resulted       POC Glucose Once [[id number]]  (Normal) Collected: 05/15/23 0554    Specimen: Blood Updated: 05/15/23 0632     Glucose 92 mg/dL      Comment: RN NotifiedOperator: [id number] BRAYDON FAITHMeter ID: [id number]       POC Glucose Once [[id number]]  (Normal) Collected: 05/14/23 1918    Specimen: Blood Updated: 05/15/23 0509     Glucose 90 mg/dL      Comment: RN NotifiedOperator: [id number] BRAYDON FAITHMeter ID: [id number]       CBC & Differential [[id number]]  (Abnormal) Collected: 05/14/23 0944    Specimen: Blood Updated: 05/14/23 0950    Narrative:      The following orders were created for panel order CBC & Differential.  Procedure                               Abnormality         Status                     ---------                               -----------         ------                     CBC Auto Differential[[id number]]        Abnormal            Final result                 Please view results for these tests on the individual orders.    CBC Auto Differential [[id number]]  (Abnormal) Collected: 05/14/23 0944    Specimen: Blood Updated: 05/14/23 0950     WBC 15.11 10*3/mm3      RBC 4.30 10*6/mm3      Hemoglobin 12.4 g/dL      Hematocrit 38.8 %      MCV 90.2 fL      MCH 28.8 pg      MCHC 32.0 g/dL      RDW 14.4 %      RDW-SD 46.2 fl      MPV 9.8 fL      Platelets 232 10*3/mm3      Neutrophil % 66.9 %      Lymphocyte % 21.6 %      Monocyte % 9.9 %      Eosinophil % 0.8 %      Basophil % 0.5 %      Immature Grans % 0.3 %      Neutrophils, Absolute 10.10 10*3/mm3      Lymphocytes, Absolute 3.26 10*3/mm3      Monocytes, Absolute 1.50 10*3/mm3      Eosinophils, Absolute 0.12 10*3/mm3      Basophils, Absolute 0.08 10*3/mm3      Immature Grans, Absolute 0.05 10*3/mm3      nRBC 0.0 /100 WBC     Basic Metabolic Panel [[id number]]  (Abnormal) Collected: 05/14/23 0606    Specimen: Blood Updated: 05/14/23 0710     Glucose 98  mg/dL      BUN 27 mg/dL      Creatinine 0.83 mg/dL      Sodium 142 mmol/L      Potassium 4.2 mmol/L      Comment: Specimen hemolyzed.  Results may be affected.        Chloride 99 mmol/L      CO2 28.0 mmol/L      Calcium 8.5 mg/dL      BUN/Creatinine Ratio 32.5     Anion Gap 15.0 mmol/L      eGFR 75.9 mL/min/1.73     Narrative:      GFR Normal >60  Chronic Kidney Disease <60  Kidney Failure <15      CBC (No Diff) [[id number]]  (Abnormal) Collected: 05/13/23 0623    Specimen: Blood Updated: 05/13/23 0637     WBC 14.56 10*3/mm3      RBC 4.23 10*6/mm3      Hemoglobin 11.9 g/dL      Hematocrit 36.5 %      MCV 86.3 fL      MCH 28.1 pg      MCHC 32.6 g/dL      RDW 14.1 %      RDW-SD 42.6 fl      MPV 9.4 fL      Platelets 260 10*3/mm3     CBC (No Diff) [[id number]]  (Abnormal) Collected: 05/12/23 0751    Specimen: Blood Updated: 05/12/23 0817     WBC 15.56 10*3/mm3      RBC 4.32 10*6/mm3      Hemoglobin 12.4 g/dL      Hematocrit 39.8 %      MCV 92.1 fL      MCH 28.7 pg      MCHC 31.2 g/dL      RDW 14.6 %      RDW-SD 47.7 fl      MPV 10.5 fL      Platelets 220 10*3/mm3     Digoxin Level [[id number]]  (Abnormal) Collected: 05/11/23 0745    Specimen: Blood Updated: 05/11/23 0832     Digoxin 1.60 ng/mL     CRE Screen by PCR - Swab, Large Intestine, Rectum [[id number]] Collected: 05/10/23 1728    Specimen: Swab from Large Intestine, Rectum Updated: 05/10/23 1928     CRE SCREEN Not Detected     Comment: Test performed by real-time polymerase chain reaction (qPCR).        OXA 48 Strain Not Detected     IMP STRAIN Not Detected     VIM STRAIN Not Detected     NDM Strain Not Detected     KPC Strain Not Detected    High Sensitivity Troponin T [[id number]]  (Abnormal) Collected: 05/10/23 0546    Specimen: Blood Updated: 05/10/23 0717     HS Troponin T 55 ng/L     Narrative:      High Sensitive Troponin T Reference Range:  <10.0 ng/L- Negative Female for AMI  <15.0 ng/L- Negative Male for AMI  >=10 - Abnormal Female indicating possible  myocardial injury.  >=15 - Abnormal Male indicating possible myocardial injury.   Clinicians would have to utilize clinical acumen, EKG, Troponin, and serial changes to determine if it is an Acute Myocardial Infarction or myocardial injury due to an underlying chronic condition.         T4, Free [[id number]]  (Normal) Collected: 05/10/23 0546    Specimen: Blood Updated: 05/10/23 0711     Free T4 0.97 ng/dL     Narrative:      Results may be falsely increased if patient taking Biotin.      TSH [[id number]]  (Normal) Collected: 05/10/23 0546    Specimen: Blood Updated: 05/10/23 0711     TSH 1.500 uIU/mL     Magnesium [[id number]]  (Normal) Collected: 05/10/23 0546    Specimen: Blood Updated: 05/10/23 0706     Magnesium 1.8 mg/dL     Digoxin Level [[id number]]  (Abnormal) Collected: 05/10/23 0546    Specimen: Blood Updated: 05/10/23 0706     Digoxin 2.30 ng/mL     Blood Gas, Arterial - [[id number]]  (Abnormal) Collected: 05/10/23 0702    Specimen: Arterial Blood Updated: 05/10/23 0703     Site Arterial Line     Artur's Test N/A     pH, Arterial 7.365 pH units      pCO2, Arterial 51.5 mm Hg      Comment: 83 Value above reference range        pO2, Arterial 75.3 mm Hg      Comment: 84 Value below reference range        HCO3, Arterial 29.4 mmol/L      Comment: 83 Value above reference range        Base Excess, Arterial 3.2 mmol/L      Comment: 83 Value above reference range        O2 Saturation, Arterial 93.6 %      Comment: 84 Value below reference range        Barometric Pressure for Blood Gas 752 mmHg      Modality HFNC     Flow Rate 10.0 lpm      Ventilator Mode NA     Collected by      Comment: Meter: [id number]     :  [id number]       Hemoglobin A1c [[id number]]  (Abnormal) Collected: 05/09/23 2140    Specimen: Blood Updated: 05/10/23 0659     Hemoglobin A1C 6.50 %     Narrative:      Hemoglobin A1C Ranges:    Increased Risk for Diabetes  5.7% to 6.4%  Diabetes                     >= 6.5%  Diabetic Goal        "         < 7.0%    Procalcitonin [[id number]]  (Normal) Collected: 05/10/23 0011    Specimen: Blood Updated: 05/10/23 0312     Procalcitonin 0.07 ng/mL     Narrative:      As a Marker for Sepsis (Non-Neonates):    1. <0.5 ng/mL represents a low risk of severe sepsis and/or septic shock.  2. >2 ng/mL represents a high risk of severe sepsis and/or septic shock.    As a Marker for Lower Respiratory Tract Infections that require antibiotic therapy:    PCT on Admission    Antibiotic Therapy       6-12 Hrs later    >0.5                Strongly Recommended  >0.25 - <0.5        Recommended   0.1 - 0.25          Discouraged              Remeasure/reassess PCT  <0.1                Strongly Discouraged     Remeasure/reassess PCT    As 28 day mortality risk marker: \"Change in Procalcitonin Result\" (>80% or <=80%) if Day 0 (or Day 1) and Day 4 values are available. Refer to http://www.[email or web address]-pct-calculator.com    Change in PCT <=80%  A decrease of PCT levels below or equal to 80% defines a positive change in PCT test result representing a higher risk for 28-day all-cause mortality of patients diagnosed with severe sepsis for septic shock.    Change in PCT >80%  A decrease of PCT levels of more than 80% defines a negative change in PCT result representing a lower risk for 28-day all-cause mortality of patients diagnosed with severe sepsis or septic shock.       High Sensitivity Troponin T 2Hr [[id number]]  (Abnormal) Collected: 05/10/23 0011    Specimen: Blood Updated: 05/10/23 0041     HS Troponin T 55 ng/L      Comment: Specimen hemolyzed.  Results may be affected.        Troponin T Delta -3 ng/L     Narrative:      High Sensitive Troponin T Reference Range:  <10.0 ng/L- Negative Female for AMI  <15.0 ng/L- Negative Male for AMI  >=10 - Abnormal Female indicating possible myocardial injury.  >=15 - Abnormal Male indicating possible myocardial injury.   Clinicians would have to utilize clinical acumen, EKG, Troponin, and serial " changes to determine if it is an Acute Myocardial Infarction or myocardial injury due to an underlying chronic condition.         High Sensitivity Troponin T [[id number]]  (Abnormal) Collected: 05/09/23 2140    Specimen: Blood Updated: 05/09/23 2218     HS Troponin T 58 ng/L     Narrative:      High Sensitive Troponin T Reference Range:  <10.0 ng/L- Negative Female for AMI  <15.0 ng/L- Negative Male for AMI  >=10 - Abnormal Female indicating possible myocardial injury.  >=15 - Abnormal Male indicating possible myocardial injury.   Clinicians would have to utilize clinical acumen, EKG, Troponin, and serial changes to determine if it is an Acute Myocardial Infarction or myocardial injury due to an underlying chronic condition.         Comprehensive Metabolic Panel [[id number]]  (Abnormal) Collected: 05/09/23 2140    Specimen: Blood Updated: 05/09/23 2217     Glucose 147 mg/dL      BUN 41 mg/dL      Creatinine 1.41 mg/dL      Sodium 136 mmol/L      Potassium 5.2 mmol/L      Chloride 100 mmol/L      CO2 22.0 mmol/L      Calcium 8.5 mg/dL      Total Protein 7.1 g/dL      Albumin 3.7 g/dL      ALT (SGPT) 24 U/L      AST (SGOT) 38 U/L      Alkaline Phosphatase 73 U/L      Total Bilirubin 0.4 mg/dL      Globulin 3.4 gm/dL      A/G Ratio 1.1 g/dL      BUN/Creatinine Ratio 29.1     Anion Gap 14.0 mmol/L      eGFR 40.2 mL/min/1.73     Narrative:      GFR Normal >60  Chronic Kidney Disease <60  Kidney Failure <15      Magnesium [[id number]]  (Normal) Collected: 05/09/23 2140    Specimen: Blood Updated: 05/09/23 2217     Magnesium 1.9 mg/dL     BNP [[id number]]  (Abnormal) Collected: 05/09/23 2140    Specimen: Blood Updated: 05/09/23 2215     proBNP 16,585.0 pg/mL     Narrative:      Among patients with dyspnea, NT-proBNP is highly sensitive for the detection of acute congestive heart failure. In addition NT-proBNP of <300 pg/ml effectively rules out acute congestive heart failure with 99% negative predictive value.      D-dimer,  "Quantitative [[id number]]  (Abnormal) Collected: 05/09/23 2140    Specimen: Blood Updated: 05/09/23 2214     D-Dimer, Quantitative 1,220 ng/mL (FEU)     Narrative:      According to the assay 's published package insert, a normal (<500 ng/mL (FEU)) D-dimer result in conjunction with a non-high clinical probability assessment, excludes deep vein thrombosis (DVT) and pulmonary embolism (PE) with high sensitivity.    D-dimer values increase with age and this can make VTE exclusion of an older population difficult. To address this, the [employer] College [employer], based on best available evidence and recent guidelines, recommends that clinicians use age-adjusted D-dimer thresholds in patients greater than 50 years of age with: a) a low probability of PE who do not meet all Pulmonary Embolism Rule Out Criteria, or b) in those with intermediate probability of PE.   The formula for an age-adjusted D-dimer cut-off is \"age*10\".  For example, a 60 year old patient would have an age-adjusted cut-off of 600 ng/mL (FEU) and an 80 year old 800 ng/mL (FEU).      CBC & Differential [[id number]]  (Abnormal) Collected: 05/09/23 2140    Specimen: Blood Updated: 05/09/23 2202    Narrative:      The following orders were created for panel order CBC & Differential.  Procedure                               Abnormality         Status                     ---------                               -----------         ------                     CBC Auto Differential[[id number]]        Abnormal            Final result                 Please view results for these tests on the individual orders.    CBC Auto Differential [[id number]]  (Abnormal) Collected: 05/09/23 2140    Specimen: Blood Updated: 05/09/23 2202     WBC 12.27 10*3/mm3      RBC 3.73 10*6/mm3      Hemoglobin 10.9 g/dL      Hematocrit 32.8 %      MCV 87.9 fL      MCH 29.2 pg      MCHC 33.2 g/dL      RDW 14.0 %      RDW-SD 44.0 fl      MPV 10.4 fL      Platelets 191 10*3/mm3  "     Neutrophil % 77.0 %      Lymphocyte % 13.2 %      Monocyte % 8.7 %      Eosinophil % 0.0 %      Basophil % 0.7 %      Immature Grans % 0.4 %      Neutrophils, Absolute 9.44 10*3/mm3      Lymphocytes, Absolute 1.62 10*3/mm3      Monocytes, Absolute 1.07 10*3/mm3      Eosinophils, Absolute 0.00 10*3/mm3      Basophils, Absolute 0.09 10*3/mm3      Immature Grans, Absolute 0.05 10*3/mm3      nRBC 0.2 /100 WBC     COVID-19 and FLU A/B PCR - Swab, Nasopharynx [[id number]]  (Normal) Collected: 05/09/23 2024    Specimen: Swab from Nasopharynx Updated: 05/09/23 2049     COVID19 Not Detected     Influenza A PCR Not Detected     Influenza B PCR Not Detected    Narrative:      Fact sheet for providers: https://www.fda.gov/media/707112/download    Fact sheet for patients: https://www.fda.gov/media/909532/download    Test performed by PCR.        Imaging Results (Most Recent)     Procedure Component Value Units Date/Time    NM Lung Scan Perfusion Particulate [[id number]] Collected: 05/10/23 1336     Updated: 05/10/23 1432    Narrative:      Exam:  Pulmonary perfusion scan.    History:  Elevated D-dimer, shortness of breath.    Comparison:  Exam is correlated with a chest radiograph 05/09/2023.    RADIOPHARMACEUTICALS:  6.6 mCi Tc99m-MAA IV.    TECHNIQUE:  Planar ventilation scan was performed in multiple projections after Tc-DTPA  aerosol nebulizer administration followed by perfusion scan with Tc-MAA IV in  similar projections.  Projections include anterior, posterior, right lateral,  left lateral, RPO and LPO.    Findings:  Normal, uniform uptake throughout the lungs on perfusion imaging.  No moderate  or large segmental perfusion defects are identified.      Impression:      Very low probability for pulmonary embolism.    XR Chest 1 View [[id number]] Collected: 05/09/23 2030     Updated: 05/09/23 2219    Narrative:      INDICATION:  shortness of breath.    COMPARISON:  None relevant.    FINDINGS:  Diffuse interstitial  "opacities throughout the lungs may represent chronic change  or interstitial edema/pneumonia.    Heart is mildly enlarged.  No pneumothorax or sizable pleural fluid.                  Hospital Course:  72-year-old female from SNF, history of heart failure with preserved EF, A-fib, COPD with tobacco abuse, chronic hypoxia on 2 L O2 at baseline, hypertension, history of PE anticoagulated on Xarelto, presented from SNF due to increased shortness of breath and hypoxia despite increasing her supplemental oxygen and initially required nonrebreather then high flow nasal cannula in order to maintain adequate SPO2.  She does not have evidence of pulmonary edema on chest x-ray as well as significant elevated proBNP over 16,000.  She had some elevated high-sensitivity troponin at 58, which was trended with downtrend to 55, likely related to demand ischemia in setting of decompensated heart failure.  She also had evidence of ALVAREZ with creatinine significant from baseline up to 1.41, however likely related to cardiorenal syndrome as renal function did improve some with treatment on diuretic therapy.  Echocardiogram obtained on 5/10/2023 showed preserved LVEF, some diastolic dysfunction noted.  She clinically improved during hospitalization with excellent diuresis on IV Lasix and transition back to oral Lasix.  Clinically improved back to her baseline.  Patient medically stable for discharge back to SNF.  Continue on oral Lasix 40 mg daily with instructions to take additional tablet as needed for any shortness of breath, increased weight gain or edema.         Condition on Discharge: Stable      Physical Exam on Discharge:  BP 96/58 (BP Location: Left arm, Patient Position: Lying)   Pulse 71   Temp 97.8 °F (36.6 °C) (Temporal)   Resp 16   Ht 132.1 cm (52\")   Wt 60.6 kg (133 lb 11.2 oz)   SpO2 92%   BMI 34.76 kg/m²   Physical Exam  General: No acute distress  Cardiac: Normal rate  Respiratory: No wheezes or rales  Abdomen: " Nondistended  Extremities: No edema      Discharge Disposition:  Skilled Nursing Facility (DC - External)    Discharge Medications:     Discharge Medications      New Medications      Instructions Start Date   naloxone 4 MG/0.1ML nasal spray  Commonly known as: NARCAN   Call 911. Don't prime. Allen in 1 nostril for overdose. Repeat in 2-3 minutes in other nostril if no or minimal breathing/responsiveness.         Changes to Medications      Instructions Start Date   furosemide 40 MG tablet  Commonly known as: LASIX  What changed: additional instructions   40 mg, Oral, Daily, Take additional tablet in the afternoon or evening for increased weight gain >3lbs, shortness of breath or leg swelling      oxyCODONE-acetaminophen 5-325 MG per tablet  Commonly known as: PERCOCET  What changed: reasons to take this   1 tablet, Oral, Every 6 Hours PRN         Continue These Medications      Instructions Start Date   acetaminophen 325 MG tablet  Commonly known as: TYLENOL   325 mg, Oral, Every 6 Hours PRN      albuterol (2.5 MG/3ML) 0.083% nebulizer solution  Commonly known as: PROVENTIL   2.5 mg, Nebulization, Every 4 Hours PRN      ALBUTEROL SULFATE HFA IN   2 puffs, Inhalation, Every 4 Hours PRN      amitriptyline 25 MG tablet  Commonly known as: ELAVIL   25 mg, Oral, Nightly      budesonide-formoterol 160-4.5 MCG/ACT inhaler  Commonly known as: SYMBICORT   2 puffs, Inhalation, 2 Times Daily PRN      cilostazol 100 MG tablet  Commonly known as: PLETAL   100 mg, Oral, 2 Times Daily      clopidogrel 75 MG tablet  Commonly known as: PLAVIX   75 mg, Oral, Daily      Fluticasone-Salmeterol 500-50 MCG/ACT DISKUS  Commonly known as: ADVAIR/WIXELA   Inhalation, 2 Times Daily - RT      gabapentin 400 MG capsule  Commonly known as: NEURONTIN   400 mg, Oral, Every 8 Hours Scheduled      ipratropium-albuterol 0.5-2.5 mg/3 ml nebulizer  Commonly known as: DUO-NEB   3 mL, Nebulization, Every 6 Hours      levothyroxine 25 MCG  tablet  Commonly known as: SYNTHROID, LEVOTHROID   25 mcg, Oral, Every Early Morning      midodrine 5 MG tablet  Commonly known as: PROAMATINE   10 mg, Oral, 3 Times Daily Before Meals      nicotine 21 MG/24HR patch  Commonly known as: NICODERM CQ   Place 1 patch on the skin as directed by provider Daily **Remove old patch before applying new patch**      NON FORMULARY   Biofreeze - apply to knees daily      O2  Commonly known as: OXYGEN   4 L/min, Inhalation, As Needed      ondansetron 4 MG tablet  Commonly known as: ZOFRAN   4 mg, Oral, Every 8 Hours PRN      pantoprazole 40 MG EC tablet  Commonly known as: Protonix   40 mg, Oral, Daily      penciclovir 1 % cream  Commonly known as: DENAVIR   1 application, Topical, As Needed      pravastatin 20 MG tablet  Commonly known as: PRAVACHOL   20 mg, Oral, Nightly      rivaroxaban 20 MG tablet  Commonly known as: Xarelto   20 mg, Oral, Daily With Dinner      rosuvastatin 5 MG tablet  Commonly known as: CRESTOR   5 mg, Oral, Daily      sennosides-docusate 8.6-50 MG per tablet  Commonly known as: PERICOLACE   2 tablets, Oral, 2 Times Daily PRN      sertraline 100 MG tablet  Commonly known as: ZOLOFT   100 mg, Oral, Daily      traZODone 150 MG tablet  Commonly known as: DESYREL   150 mg, Oral, Nightly      vitamin B-12 1000 MCG tablet  Commonly known as: CYANOCOBALAMIN   5,000 mcg, Oral, Daily      vitamin D 1.25 MG (15017 UT) capsule capsule  Commonly known as: ERGOCALCIFEROL   50,000 Units, Oral, Weekly         Stop These Medications    digoxin 125 MCG tablet  Commonly known as: LANOXIN     levoFLOXacin 500 MG tablet  Commonly known as: LEVAQUIN     predniSONE 10 MG (21) dose pack  Commonly known as: DELTASONE            Discharge Diet: Heart healthy, low-sodium    Activity at Discharge: As tolerated    Discharge Care Plan/Instructions: Follow-up with physician at [facility].  Take medications as directed.      Follow-up Appointments:   No future appointments.    Test Results  Pending at Discharge:   Pending Labs     Order Current Status    COVID-19,CEPHEID/HUONG,COR/MARIA E/PAD/BERNARDINO/MAD IN-HOUSE(OR EMERGENT/ADD-ON),NP SWAB IN TRANSPORT MEDIA 3-4 HR TAT, RT-PCR - Swab, Nasopharynx In process              Time: 38 minutes                Electronically signed by Michele Nunn MD at 05/15/23 1109       Discharge Order (From admission, onward)     Start     Ordered    05/15/23 0840  Discharge patient  Once        Expected Discharge Date: 05/15/23    Expected Discharge Time: Morning    Discharge Disposition: Skilled Nursing Facility (DC - External)    Physician of Record for Attribution - Please select from Treatment Team: MICHELE NUNN [[id number]]    Review needed by CMO to determine Physician of Record: No       Question Answer Comment   Physician of Record for Attribution - Please select from Treatment Team MICHELE NUNN    Review needed by CMO to determine Physician of Record No        05/15/23 0832

## 2023-05-22 LAB
QT INTERVAL: 360 MS
QT INTERVAL: 374 MS
QT INTERVAL: 402 MS
QTC INTERVAL: 376 MS
QTC INTERVAL: 397 MS
QTC INTERVAL: 428 MS

## 2023-08-01 DIAGNOSIS — I73.9 PVD (PERIPHERAL VASCULAR DISEASE) WITH CLAUDICATION: Primary | ICD-10-CM

## 2023-08-22 ENCOUNTER — OFFICE VISIT (OUTPATIENT)
Dept: CARDIAC SURGERY | Facility: CLINIC | Age: 73
End: 2023-08-22
Payer: MEDICARE

## 2023-08-22 VITALS
HEART RATE: 85 BPM | SYSTOLIC BLOOD PRESSURE: 106 MMHG | BODY MASS INDEX: 34.76 KG/M2 | DIASTOLIC BLOOD PRESSURE: 54 MMHG | OXYGEN SATURATION: 97 % | HEIGHT: 55 IN

## 2023-08-22 DIAGNOSIS — I70.222 ATHEROSCLEROSIS OF NATIVE ARTERIES OF EXTREMITIES WITH REST PAIN, LEFT LEG: ICD-10-CM

## 2023-08-22 DIAGNOSIS — E78.00 HYPERCHOLESTEREMIA: ICD-10-CM

## 2023-08-22 DIAGNOSIS — I79.8 OTHER DISORDERS OF ARTERIES, ARTERIOLES AND CAPILLARIES IN DISEASES CLASSIFIED ELSEWHERE: ICD-10-CM

## 2023-08-22 DIAGNOSIS — J96.21 ACUTE AND CHRONIC RESPIRATORY FAILURE WITH HYPOXIA: ICD-10-CM

## 2023-08-22 DIAGNOSIS — I73.9 PERIPHERAL VASCULAR DISEASE: ICD-10-CM

## 2023-08-22 DIAGNOSIS — Z79.01 CURRENT USE OF LONG TERM ANTICOAGULATION: ICD-10-CM

## 2023-08-22 DIAGNOSIS — I10 BENIGN ESSENTIAL HYPERTENSION: Primary | ICD-10-CM

## 2023-08-22 DIAGNOSIS — I50.33 ACUTE ON CHRONIC HEART FAILURE WITH PRESERVED EJECTION FRACTION (HFPEF): ICD-10-CM

## 2023-08-22 RX ORDER — OXYCODONE HYDROCHLORIDE AND ACETAMINOPHEN 5; 325 MG/1; MG/1
1 TABLET ORAL EVERY 6 HOURS PRN
Status: ON HOLD | COMMUNITY

## 2023-08-22 RX ORDER — CLOPIDOGREL BISULFATE 75 MG/1
75 TABLET ORAL DAILY
Qty: 90 TABLET | Refills: 3 | Status: ON HOLD | OUTPATIENT
Start: 2023-08-22

## 2023-08-22 NOTE — PATIENT INSTRUCTIONS
mild reduction Arterial Flow bilateral Lower Extremity remained stable  Patent LEFT graft  Medical Management: Restart PLAVIX,Continue STATIN   Continue Eliquis  If signs and symptoms of ischemia should occur including but not limited to pale/blue discoloration of limb, increasing pain with ambulation or at rest, or a non-healing wound. Patient is to notify Heart and Vascular center for immediate evaluation.   Return 1 yr     mild Carotid Artery Stenosis bilateral remained stable Asymptomatic   Medical Management: PLAVIX,STATIN   If you should experience any neurological symptoms including but not limited to visual or speech disturbances confusion, seizures, or weakness of limbs of one side of your body notify Heart and Vascular center immediately for evaluation or if after hours present to the nearest Emergency Department.    Return 1 yr

## 2023-08-22 NOTE — PROGRESS NOTES
CVTS Office Progress Note       Subjective   Patient ID: Mona Perales is a 72 y.o. female is here today for follow-up.    Chief Complaint:    Chief Complaint   Patient presents with    Carotid Artery Disease    Peripheral Vascular Disease       The following portions of the patient's history were reviewed and updated as appropriate: allergies, current medications, past family history, past medical history, past social history, past surgical history and problem list.  Recent images independently reviewed.  Available laboratory values reviewed.    PCP:  Anahi Camacho APRN  Cardiology:  Kimmy     72 y.o. female with HTN(stable, increased risk stroke, rupture), Hyperlipidemia(stable, increased risk cardiovascular events), Smoker(uncontrolled, increased risk cardiovascular events), COPD(stable, increased risk pulmonary complications) and Atrial fibrillation(stable, increased risk stroke)  Anticoagulation (Eliquis, stable) Hx PE (stable). PVD (LSCA stent 2005, B iliac stents 2008) lost to follow up. Chronic Pain.  quit 2023 .  Difficulty ambulating. No TIA stroke amaurosis.  No MI claudication. No other associated signs, symptoms or modifying factors. Recent move to SNF d/t inadequate home support. Progressing well. Home oxygen.  Missed several followup appointments. Returns today for vascular evaluation    2005: LSCA Stent  5/2022 RBI: RIGHT 1.1 Triphasic LEFT 0.7 Biphasic   2008: Bilateral Iliac stents  3/23/2020:  CHELSIE: RIGHT 0.83 Triphasic  LEFT 0.31 Biphasic/Monophasic (DP/PT)  3/26/2020: CTA Runoff: Occlusion of the proximal left superficial femoral artery with  reconstitution of the distal left SFA. The popliteal artery  appears occluded distally, with reconstitution of the arteries of  the trifurcation. The left calf vessels are patent, as above.Patent left common iliac and proximal left external iliac stents. There appears to be mild luminal narrowing within the  left external iliac stent. Stenosis of  the origins of the superior mesenteric artery and  bilateral renal arteries as above. Irregular narrowing of the right superficial femoral artery,  as described above without occlusion. The right popliteal artery  and the right calf arteries are patent, as above.  4/14/2020: LEFT Femoral-popliteal Bypass PTFE  5/22/2020:  CHELSIE: RIGHT 1.1 Triphasic LEFT 0.79 Tri/Biphasic (PT/DP)   Patent LEFT graft. mPSV 237cm/s (native) Triphasic  8/25/2020:  CHELSIE: RIGHT 0.67 Tri/Biphasic (DP/PT) LEFT 0.52 Tri/Biphasic (PT/DP). Patent LEFT Fem-Pop graft. Native prx (333cm/s) Triphasic.   9/9/2020: LEFT/RIGHT OBDULIA PTA  9/22/2020:  CHELSIE: RIGHT 1.2 Triphasic LEFT 0.81 Biphasic  1/29/2021: CHELSIE: RIGHT 0.92 Tripahsic LEFT 0.75 Tri/Biphasic (PT/DP) Patent PTFE graft LEFT native typ587tp/s.   7/26/21: CHELSIE: RIGHT 0.62 Biphasic LEFT 0.85 Tri/Biphasic (PT/DP)   9/2021 R Fem-Pop AK PTFE, R CFA endart  5/2022: CHELSIE: RIGHT 0.88 Triphasic LEFT 0.67 Tri/Biphasic (PT/DP). Patent RIGHT graft (255cm/s inflow)   8/2023: CHELSIE: RIGHT 0.87 Triphasic LEFT 0.89 Triphasic. Patent LEFT graft (inflow 380cm/s)    2016: Carotid Duplex: BABS 0-49% LICA 0-49%  2017: Carotid duplex: BABS 0-49% LICA 0-49%  2018: Carotid duplex: BABS 0-49% LICA 0-49%  8/2023: Carotid Duplex: RIGHT 0-49% (95/32cm/s, ratio 1.4) LEFT 0-49% (118/30cm/s, ratio 0.9) Antegrade     Past Medical History:   Diagnosis Date    A-fib     Anxiety     Arthritis     Asthma     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 with ulceration     bilateral legs - bilateral iliac stents 2008       CHF (congestive heart failure)     Chronic lower back pain     COPD (chronic obstructive pulmonary disease)     Essential (primary) hypertension     GERD (gastroesophageal reflux disease)     History of pulmonary embolus (PE)     Hyperlipidemia     Insomnia     Lumbago     Nicotine dependence     Occlusion of artery      and stenosis of bilateral carotid arteries - BABS 16-49%, LICA 0-15%    Other  atherosclerosis of native artery of other extremity     LEFT subclavian stent 2005 (occluded)    Sleep apnea      Past Surgical History:   Procedure Laterality Date    CARDIAC CATHETERIZATION  04/06/2016    No evidence of any obstructive epicardial CAD.Preserved LV systolic function with EF of 55%.    CARDIAC CATHETERIZATION N/A 11/1/2022    Procedure: Left Heart Cath;  Surgeon: Neftali Haywood MD;  Location: [facility] CATH INVASIVE LOCATION;  Service: Cardiology;  Laterality: N/A;    CENTRAL VENOUS LINE INSERTION  04/07/2016    Successful placement of right uppe extremity 6-[place] triple lumen PICC line.    ENDOSCOPY N/A 1/12/2018    Procedure: ESOPHAGOGASTRODUODENOSCOPY;  Surgeon: Bin Oh MD;  Location: [facility] ENDOSCOPY;  Service:     ENDOSCOPY N/A 1/17/2018    Procedure: ESOPHAGOGASTRODUODENOSCOPY;  Surgeon: Bin Oh MD;  Location: [facility] ENDOSCOPY;  Service:     ENDOSCOPY N/A 3/16/2018    Procedure: ESOPHAGOGASTRODUODENOSCOPY possible dilation;  Surgeon: Bin Oh MD;  Location: [facility] ENDOSCOPY;  Service: Gastroenterology    FEMORAL POPLITEAL BYPASS Left 4/14/2020    Procedure: FEMORAL POPLITEAL BYPASS ABOVE KNEE  (POLYTETRAFLUOROETHYLENE)  LEFT COMMON FEMORAL ARTERY ENDARTERECTOMY PTA LEFT ILIAC ARTERIOGRAM        (c-arm#2 and c-arm table);  Surgeon: David Suh MD;  Location: [facility] OR;  Service: Vascular;  Laterality: Left;    FEMORAL POPLITEAL BYPASS Right 9/17/2021    Procedure: RIGHT common femoral endarterectomy FEMORAL POPLITEAL BYPASS (above the knee polytetrafluoroethylene  lower extremity arteriogram              (c-arm#2 and c-arm table);  Surgeon: David Suh MD;  Location: [facility] OR;  Service: Vascular;  Laterality: Right;    HYSTERECTOMY      INTERVENTIONAL RADIOLOGY PROCEDURE Left 9/9/2020    Procedure: IR angiogram extremity left;  Surgeon: David Suh MD;  Location: [facility] ANGIO INVASIVE LOCATION;  Service: Interventional Radiology;   Laterality: Left;    KYPHOPLASTY N/A 5/23/2019    Procedure: KYPHOPLASTY LUMBAR FOUR;  Surgeon: Aba Santa MD;  Location: [facility];  Service: Orthopedic Spine    TOTAL SHOULDER REPLACEMENT Left     TRANSESOPHAGEAL ECHOCARDIOGRAM (JACQUES)  04/07/2016    With color flow-Mild to moderate [facility].LV systolic function well preserved with Ef of 55-60%.Mitral and AV intact.Diastolic dysfunction    UPPER GASTROINTESTINAL ENDOSCOPY  01/17/2018    Dr. Bin Martin M.D.     Family History   Problem Relation Age of Onset    Heart disease Other     Hypertension Other      Social History     Socioeconomic History    Marital status:    Tobacco Use    Smoking status: Former     Packs/day: 1.00     Years: 50.00     Pack years: 50.00     Types: Cigarettes     Passive exposure: Past    Smokeless tobacco: Never   Vaping Use    Vaping Use: Never used   Substance and Sexual Activity    Alcohol use: No    Drug use: No    Sexual activity: Defer         ALLERGIES:   Morphine and related and Penicillins    MEDICATIONS:      Current Outpatient Medications:     acetaminophen (TYLENOL) 325 MG tablet, Take 1 tablet by mouth Every 6 (Six) Hours As Needed for Mild Pain., Disp: , Rfl:     albuterol (PROVENTIL) (2.5 MG/3ML) 0.083% nebulizer solution, Take 2.5 mg by nebulization Every 4 (Four) Hours As Needed for Wheezing., Disp: , Rfl:     ALBUTEROL SULFATE HFA IN, Inhale 2 puffs Every 4 (Four) Hours As Needed., Disp: , Rfl:     amitriptyline (ELAVIL) 25 MG tablet, Take 1 tablet by mouth Every Night., Disp: , Rfl:     apixaban (ELIQUIS) 5 MG tablet tablet, Take 1 tablet by mouth 2 (Two) Times a Day., Disp: , Rfl:     budesonide-formoterol (SYMBICORT) 160-4.5 MCG/ACT inhaler, Inhale 2 puffs 2 (Two) Times a Day As Needed., Disp: , Rfl:     cilostazol (PLETAL) 100 MG tablet, Take 1 tablet by mouth 2 (Two) Times a Day., Disp: , Rfl:     clopidogrel (PLAVIX) 75 MG tablet, Take 1 tablet by mouth Daily., Disp: 90 tablet, Rfl: 3     Fluticasone-Salmeterol (ADVAIR/WIXELA) 500-50 MCG/ACT DISKUS, Inhale 2 (Two) Times a Day., Disp: , Rfl:     furosemide (LASIX) 40 MG tablet, Take 1 tablet by mouth Daily. Take additional tablet in the afternoon or evening for increased weight gain >3lbs, shortness of breath or leg swelling, Disp: 60 tablet, Rfl: 0    gabapentin (NEURONTIN) 400 MG capsule, Take 1 capsule by mouth Every 8 (Eight) Hours for 3 days., Disp: 9 capsule, Rfl: 0    ipratropium-albuterol (DUO-NEB) 0.5-2.5 mg/3 ml nebulizer, Take 3 mL by nebulization Every 6 (Six) Hours., Disp: , Rfl:     levothyroxine (SYNTHROID, LEVOTHROID) 25 MCG tablet, Take 1 tablet by mouth Every Morning., Disp: , Rfl:     midodrine (PROAMATINE) 5 MG tablet, Take 2 tablets by mouth 3 (Three) Times a Day Before Meals., Disp: , Rfl:     naloxone (NARCAN) 4 MG/0.1ML nasal spray, Call 911. Don't prime. [place] in 1 nostril for overdose. Repeat in 2-3 minutes in other nostril if no or minimal breathing/responsiveness., Disp: 2 each, Rfl: 0    nicotine (NICODERM CQ) 21 MG/24HR patch, Place 1 patch on the skin as directed by provider Daily **Remove old patch before applying new patch**, Disp: 28 each, Rfl: 0    NON FORMULARY, Biofreeze - apply to knees daily, Disp: , Rfl:     O2 (OXYGEN), Inhale 4 L/min As Needed (shortness of air)., Disp: , Rfl:     ondansetron (ZOFRAN) 4 MG tablet, Take 1 tablet by mouth Every 8 (Eight) Hours As Needed for Nausea or Vomiting., Disp: , Rfl:     oxyCODONE-acetaminophen (PERCOCET) 5-325 MG per tablet, Take 1 tablet by mouth Every 6 (Six) Hours As Needed., Disp: , Rfl:     pantoprazole (PROTONIX) 40 MG EC tablet, Take 1 tablet by mouth Daily., Disp: 30 tablet, Rfl: 5    penciclovir (DENAVIR) 1 % cream, Apply 1 application  topically to the appropriate area as directed As Needed (fever blisters)., Disp: , Rfl:     pravastatin (PRAVACHOL) 20 MG tablet, Take 1 tablet by mouth Every Night., Disp: 90 tablet, Rfl: 3    sennosides-docusate (PERICOLACE)  "8.6-50 MG per tablet, Take 2 tablets by mouth 2 (Two) Times a Day As Needed for Constipation., Disp: 30 tablet, Rfl: 0    sertraline (ZOLOFT) 100 MG tablet, Take 1 tablet by mouth Daily., Disp: , Rfl:     traZODone (DESYREL) 150 MG tablet, Take 1 tablet by mouth Every Night., Disp: , Rfl:     Review of Systems   Constitutional: Positive for malaise/fatigue.   HENT:  Positive for nosebleeds.    Eyes:  Negative for visual disturbance.   Cardiovascular:  Positive for claudication (mild-left). Negative for cyanosis, leg swelling and near-syncope.   Respiratory:  Negative for hemoptysis and shortness of breath.    Hematologic/Lymphatic: Negative for bleeding problem. Does not bruise/bleed easily.   Skin:  Negative for color change and nail changes.   Musculoskeletal:  Positive for back pain. Negative for muscle weakness.   Gastrointestinal:  Negative for dysphagia, hematemesis and melena.   Genitourinary:  Negative for hematuria.   Neurological:  Positive for loss of balance. Negative for dizziness, focal weakness, light-headedness, numbness, paresthesias and weakness.   Psychiatric/Behavioral:  Negative for altered mental status.    All other systems reviewed and are negative.     Objective   Vitals:    08/22/23 1506   BP: 106/54   BP Location: Left arm   Pulse: 85   SpO2: 97%   Height: 132.1 cm (52\")     Body mass index is 34.76 kg/mý.  Physical Exam   Constitutional: She is oriented to person, place, and time.   HENT:   Head: Atraumatic.   Neck: Carotid bruit is not present.   Cardiovascular: Normal rate, regular rhythm and normal heart sounds.   Pulses:       Dorsalis pedis pulses are 2+ on the right side and 2+ on the left side.        Posterior tibial pulses are 2+ on the right side and 2+ on the left side.   Pulmonary/Chest: Effort normal and breath sounds normal.   Abdominal: Soft. Bowel sounds are normal.     Vascular Status -  Her right foot exhibits normal foot vasculature . Her left foot exhibits normal foot " vasculature .  Skin Integrity  -  Her right foot skin is intact.Her left foot skin is intact..  Neurological: She is alert and oriented to person, place, and time. Gait (WC/Walker) abnormal.   Skin: Skin is warm and dry. Capillary refill takes 2 to 3 seconds.   Psychiatric: Thought content normal.   Vitals reviewed.        Assessment & Plan     Independent Review of Radiographic Studies:   Detailed discussion regarding risks, benefits, and treatment plan. Images independently reviewed. Patient understands, agrees, and wishes to proceed with plan.      1. Peripheral vascular disease  mild reduction Arterial Flow bilateral Lower Extremity remained stable  Patent LEFT graft  Medical Management: Restart PLAVIX,Continue STATIN   Continue Eliquis  If signs and symptoms of ischemia should occur including but not limited to pale/blue discoloration of limb, increasing pain with ambulation or at rest, or a non-healing wound. Patient is to notify Heart and Vascular center for immediate evaluation.   Return 1 yr   - clopidogrel (PLAVIX) 75 MG tablet; Take 1 tablet by mouth Daily.  Dispense: 90 tablet; Refill: 3    2. Atherosclerosis of native arteries of extremities with rest pain, left leg  - clopidogrel (PLAVIX) 75 MG tablet; Take 1 tablet by mouth Daily.  Dispense: 90 tablet; Refill: 3  - Doppler Ankle Brachial Index Single Level CAR; Future  - Duplex Lower Extremity Art / Grafts - Bilateral CAR; Future  - Duplex Carotid Ultrasound CAR; Future    3. Other disorders of arteries, arterioles and capillaries in diseases classified elsewhere  - Duplex Carotid Ultrasound CAR; Future    4. Benign essential hypertension  controlled    5. Hypercholesteremia  Lipid-lowering therapy has been proven beneficial in patients with cardio-vascular disease. Current guidelines recommend statin treatment for all patients with PAD,CAD and carotid stenosis. Statins are beneficial in preventing cardiovascular events, increasing functional capacity  and lower the risk of adverse limb loss in PAD. Statins decrease the progression of plaque formation and may improve peripheral vessel lining, and aid in reversing atherosclerosis.     6. Acute on chronic heart failure with preserved ejection fraction (HFpEF)  Euvolemic    7. Acute and chronic respiratory failure with hypoxia  Portable Oxygen    8. Current use of long term anticoagulation  Eliquis  Notify provider if you experience excessive bleeding from the nose, cuts, gums, rectum, urinary tract, or vagina. Reddish or brown urine or stool. Vomiting of blood or hemorrhoidal bleeding. If major injury occurs present to the Emergency Department.         Advance Care Planning discussed and  Informational packet given to patient. Advance Care Plan on file: No    Time spent 30 minutes in face to face evaluation, reviewing of medical history, tests, and procedures. Independent interpretation of vascular studies, ordering additional tests, documentation, and coordination of care.   Counseling and education with the patient and family regarding treatment options, plan of care, and hoped for outcomes. All questions answered.           This document has been electronically signed by DELFINO Garcia on August 23, 2023 09:00 CDT

## 2023-08-23 PROBLEM — Z79.01 CURRENT USE OF LONG TERM ANTICOAGULATION: Status: ACTIVE | Noted: 2023-08-23

## 2023-08-24 ENCOUNTER — APPOINTMENT (OUTPATIENT)
Dept: ULTRASOUND IMAGING | Facility: HOSPITAL | Age: 73
DRG: 308 | End: 2023-08-24
Payer: MEDICARE

## 2023-08-24 ENCOUNTER — HOSPITAL ENCOUNTER (INPATIENT)
Facility: HOSPITAL | Age: 73
LOS: 6 days | Discharge: HOME HEALTH | DRG: 308 | End: 2023-08-30
Attending: FAMILY MEDICINE | Admitting: INTERNAL MEDICINE
Payer: MEDICARE

## 2023-08-24 ENCOUNTER — APPOINTMENT (OUTPATIENT)
Dept: INTERVENTIONAL RADIOLOGY/VASCULAR | Facility: HOSPITAL | Age: 73
DRG: 308 | End: 2023-08-24
Payer: MEDICARE

## 2023-08-24 DIAGNOSIS — I70.222 ATHEROSCLEROSIS OF NATIVE ARTERIES OF EXTREMITIES WITH REST PAIN, LEFT LEG: ICD-10-CM

## 2023-08-24 DIAGNOSIS — Z12.31 OTHER SCREENING MAMMOGRAM: ICD-10-CM

## 2023-08-24 DIAGNOSIS — R53.81 PHYSICAL DECONDITIONING: ICD-10-CM

## 2023-08-24 DIAGNOSIS — Z74.09 IMPAIRED FUNCTIONAL MOBILITY, BALANCE, GAIT, AND ENDURANCE: ICD-10-CM

## 2023-08-24 DIAGNOSIS — R13.10 DYSPHAGIA, UNSPECIFIED TYPE: ICD-10-CM

## 2023-08-24 DIAGNOSIS — R55 SYNCOPE, UNSPECIFIED SYNCOPE TYPE: ICD-10-CM

## 2023-08-24 DIAGNOSIS — R55 NEAR SYNCOPE: ICD-10-CM

## 2023-08-24 DIAGNOSIS — I65.23 BILATERAL CAROTID ARTERY STENOSIS: ICD-10-CM

## 2023-08-24 DIAGNOSIS — B35.1 PAIN DUE TO ONYCHOMYCOSIS OF TOENAILS OF BOTH FEET: ICD-10-CM

## 2023-08-24 DIAGNOSIS — E78.00 HYPERCHOLESTEREMIA: ICD-10-CM

## 2023-08-24 DIAGNOSIS — J44.9 CHRONIC OBSTRUCTIVE PULMONARY DISEASE, UNSPECIFIED COPD TYPE: ICD-10-CM

## 2023-08-24 DIAGNOSIS — Z74.09 IMPAIRED MOBILITY AND ADLS: ICD-10-CM

## 2023-08-24 DIAGNOSIS — M79.641 PAIN OF RIGHT HAND: ICD-10-CM

## 2023-08-24 DIAGNOSIS — Z72.0 NICOTINE ABUSE: ICD-10-CM

## 2023-08-24 DIAGNOSIS — I25.10 ATHEROSCLEROSIS OF NATIVE CORONARY ARTERY OF NATIVE HEART WITHOUT ANGINA PECTORIS: ICD-10-CM

## 2023-08-24 DIAGNOSIS — I48.91 ATRIAL FIBRILLATION, UNSPECIFIED TYPE: ICD-10-CM

## 2023-08-24 DIAGNOSIS — F41.9 ANXIETY: ICD-10-CM
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
## 2023-08-24 DIAGNOSIS — I73.9 PVD (PERIPHERAL VASCULAR DISEASE): ICD-10-CM

## 2023-08-24 DIAGNOSIS — I25.119 ATHEROSCLEROSIS OF NATIVE CORONARY ARTERY OF NATIVE HEART WITH ANGINA PECTORIS: ICD-10-CM

## 2023-08-24 DIAGNOSIS — I21.3 ST ELEVATION MYOCARDIAL INFARCTION (STEMI), UNSPECIFIED ARTERY: ICD-10-CM

## 2023-08-24 DIAGNOSIS — I73.9 PVD (PERIPHERAL VASCULAR DISEASE) WITH CLAUDICATION: ICD-10-CM

## 2023-08-24 DIAGNOSIS — R10.11 RUQ ABDOMINAL PAIN: ICD-10-CM

## 2023-08-24 DIAGNOSIS — I10 BENIGN ESSENTIAL HYPERTENSION: ICD-10-CM

## 2023-08-24 DIAGNOSIS — Z78.9 IMPAIRED MOBILITY AND ADLS: ICD-10-CM

## 2023-08-24 DIAGNOSIS — J96.01 ACUTE RESPIRATORY FAILURE WITH HYPOXIA: ICD-10-CM

## 2023-08-24 DIAGNOSIS — S62.326D CLOSED DISPLACED FRACTURE OF SHAFT OF FIFTH METACARPAL BONE OF RIGHT HAND WITH ROUTINE HEALING, SUBSEQUENT ENCOUNTER: ICD-10-CM

## 2023-08-24 DIAGNOSIS — J44.1 CHRONIC OBSTRUCTIVE PULMONARY DISEASE WITH ACUTE EXACERBATION: ICD-10-CM

## 2023-08-24 DIAGNOSIS — R06.02 SHORTNESS OF BREATH: Primary | ICD-10-CM

## 2023-08-24 DIAGNOSIS — M79.674 PAIN DUE TO ONYCHOMYCOSIS OF TOENAILS OF BOTH FEET: ICD-10-CM

## 2023-08-24 DIAGNOSIS — M79.675 PAIN DUE TO ONYCHOMYCOSIS OF TOENAILS OF BOTH FEET: ICD-10-CM

## 2023-08-24 DIAGNOSIS — R77.8 ELEVATED TROPONIN: ICD-10-CM

## 2023-08-24 DIAGNOSIS — J44.1 COPD WITH EXACERBATION: ICD-10-CM

## 2023-08-24 DIAGNOSIS — R10.13 EPIGASTRIC PAIN: ICD-10-CM

## 2023-08-24 PROBLEM — R06.00 DYSPNEA: Status: ACTIVE | Noted: 2023-08-24

## 2023-08-24 LAB
ALBUMIN SERPL-MCNC: 2.9 G/DL (ref 3.5–5.2)
ALBUMIN/GLOB SERPL: 0.9 G/DL
ALP SERPL-CCNC: 57 U/L (ref 39–117)
ALT SERPL W P-5'-P-CCNC: 10 U/L (ref 1–33)
ANION GAP SERPL CALCULATED.3IONS-SCNC: 13 MMOL/L (ref 5–15)
AST SERPL-CCNC: 27 U/L (ref 1–32)
BASOPHILS # BLD AUTO: 0.13 10*3/MM3 (ref 0–0.2)
BASOPHILS NFR BLD AUTO: 0.9 % (ref 0–1.5)
BILIRUB SERPL-MCNC: 0.2 MG/DL (ref 0–1.2)
BUN SERPL-MCNC: 29 MG/DL (ref 8–23)
BUN/CREAT SERPL: 25.2 (ref 7–25)
CALCIUM SPEC-SCNC: 6.8 MG/DL (ref 8.6–10.5)
CHLORIDE SERPL-SCNC: 106 MMOL/L (ref 98–107)
CK SERPL-CCNC: 765 U/L (ref 20–180)
CO2 SERPL-SCNC: 19 MMOL/L (ref 22–29)
CREAT SERPL-MCNC: 1.15 MG/DL (ref 0.57–1)
DEPRECATED RDW RBC AUTO: 46.9 FL (ref 37–54)
EGFRCR SERPLBLD CKD-EPI 2021: 51.4 ML/MIN/1.73
EOSINOPHIL # BLD AUTO: 0.05 10*3/MM3 (ref 0–0.4)
EOSINOPHIL NFR BLD AUTO: 0.3 % (ref 0.3–6.2)
ERYTHROCYTE [DISTWIDTH] IN BLOOD BY AUTOMATED COUNT: 14.1 % (ref 12.3–15.4)
GEN 5 2HR TROPONIN T REFLEX: 85 NG/L
GLOBULIN UR ELPH-MCNC: 3.4 GM/DL
GLUCOSE SERPL-MCNC: 105 MG/DL (ref 65–99)
HCT VFR BLD AUTO: 26 % (ref 34–46.6)
HGB BLD-MCNC: 8.5 G/DL (ref 12–15.9)
HOLD SPECIMEN: NORMAL
IMM GRANULOCYTES # BLD AUTO: 0.08 10*3/MM3 (ref 0–0.05)
IMM GRANULOCYTES NFR BLD AUTO: 0.5 % (ref 0–0.5)
LYMPHOCYTES # BLD AUTO: 2.85 10*3/MM3 (ref 0.7–3.1)
LYMPHOCYTES NFR BLD AUTO: 18.7 % (ref 19.6–45.3)
MAGNESIUM SERPL-MCNC: 1.8 MG/DL (ref 1.6–2.4)
MCH RBC QN AUTO: 29.5 PG (ref 26.6–33)
MCHC RBC AUTO-ENTMCNC: 32.7 G/DL (ref 31.5–35.7)
MCV RBC AUTO: 90.3 FL (ref 79–97)
MONOCYTES # BLD AUTO: 1.79 10*3/MM3 (ref 0.1–0.9)
MONOCYTES NFR BLD AUTO: 11.8 % (ref 5–12)
NEUTROPHILS NFR BLD AUTO: 10.31 10*3/MM3 (ref 1.7–7)
NEUTROPHILS NFR BLD AUTO: 67.8 % (ref 42.7–76)
NRBC BLD AUTO-RTO: 0 /100 WBC (ref 0–0.2)
NT-PROBNP SERPL-MCNC: 7267 PG/ML (ref 0–900)
PLATELET # BLD AUTO: 191 10*3/MM3 (ref 140–450)
PMV BLD AUTO: 9.7 FL (ref 6–12)
POTASSIUM SERPL-SCNC: 3.4 MMOL/L (ref 3.5–5.2)
PROT SERPL-MCNC: 6.3 G/DL (ref 6–8.5)
RBC # BLD AUTO: 2.88 10*6/MM3 (ref 3.77–5.28)
SODIUM SERPL-SCNC: 138 MMOL/L (ref 136–145)
TROPONIN T DELTA: 13 NG/L
TROPONIN T SERPL HS-MCNC: 72 NG/L
WBC NRBC COR # BLD: 15.21 10*3/MM3 (ref 3.4–10.8)
WHOLE BLOOD HOLD COAG: NORMAL

## 2023-08-24 PROCEDURE — 83735 ASSAY OF MAGNESIUM: CPT | Performed by: FAMILY MEDICINE

## 2023-08-24 PROCEDURE — 25010000002 AMIODARONE IN DEXTROSE 5% 150-4.21 MG/100ML-% SOLUTION: Performed by: FAMILY MEDICINE

## 2023-08-24 PROCEDURE — 96365 THER/PROPH/DIAG IV INF INIT: CPT

## 2023-08-24 PROCEDURE — 76937 US GUIDE VASCULAR ACCESS: CPT

## 2023-08-24 PROCEDURE — 93005 ELECTROCARDIOGRAM TRACING: CPT | Performed by: FAMILY MEDICINE

## 2023-08-24 PROCEDURE — 96366 THER/PROPH/DIAG IV INF ADDON: CPT

## 2023-08-24 PROCEDURE — 93010 ELECTROCARDIOGRAM REPORT: CPT | Performed by: INTERNAL MEDICINE

## 2023-08-24 PROCEDURE — C1751 CATH, INF, PER/CENT/MIDLINE: HCPCS

## 2023-08-24 PROCEDURE — 96376 TX/PRO/DX INJ SAME DRUG ADON: CPT

## 2023-08-24 PROCEDURE — 99285 EMERGENCY DEPT VISIT HI MDM: CPT

## 2023-08-24 PROCEDURE — 93005 ELECTROCARDIOGRAM TRACING: CPT

## 2023-08-24 PROCEDURE — 36415 COLL VENOUS BLD VENIPUNCTURE: CPT | Performed by: FAMILY MEDICINE

## 2023-08-24 PROCEDURE — 96367 TX/PROPH/DG ADDL SEQ IV INF: CPT

## 2023-08-24 PROCEDURE — 80053 COMPREHEN METABOLIC PANEL: CPT | Performed by: FAMILY MEDICINE

## 2023-08-24 PROCEDURE — 83880 ASSAY OF NATRIURETIC PEPTIDE: CPT | Performed by: FAMILY MEDICINE

## 2023-08-24 PROCEDURE — 36410 VNPNXR 3YR/> PHY/QHP DX/THER: CPT

## 2023-08-24 PROCEDURE — 84484 ASSAY OF TROPONIN QUANT: CPT | Performed by: FAMILY MEDICINE

## 2023-08-24 PROCEDURE — 82550 ASSAY OF CK (CPK): CPT | Performed by: FAMILY MEDICINE

## 2023-08-24 PROCEDURE — 85025 COMPLETE CBC W/AUTO DIFF WBC: CPT | Performed by: FAMILY MEDICINE

## 2023-08-24 PROCEDURE — 94640 AIRWAY INHALATION TREATMENT: CPT

## 2023-08-24 PROCEDURE — 25010000002 AMIODARONE IN DEXTROSE 5% 360-4.14 MG/200ML-% SOLUTION: Performed by: FAMILY MEDICINE

## 2023-08-24 RX ORDER — BUDESONIDE AND FORMOTEROL FUMARATE DIHYDRATE 160; 4.5 UG/1; UG/1
2 AEROSOL RESPIRATORY (INHALATION)
Status: DISCONTINUED | OUTPATIENT
Start: 2023-08-24 | End: 2023-08-30 | Stop reason: HOSPADM

## 2023-08-24 RX ORDER — LEVOTHYROXINE SODIUM 0.03 MG/1
25 TABLET ORAL
Status: DISCONTINUED | OUTPATIENT
Start: 2023-08-25 | End: 2023-08-30 | Stop reason: HOSPADM

## 2023-08-24 RX ORDER — ASPIRIN 81 MG/1
324 TABLET, CHEWABLE ORAL ONCE
Status: COMPLETED | OUTPATIENT
Start: 2023-08-24 | End: 2023-08-24

## 2023-08-24 RX ORDER — SODIUM CHLORIDE 0.9 % (FLUSH) 0.9 %
10 SYRINGE (ML) INJECTION EVERY 12 HOURS SCHEDULED
Status: DISCONTINUED | OUTPATIENT
Start: 2023-08-24 | End: 2023-08-30 | Stop reason: HOSPADM

## 2023-08-24 RX ORDER — IPRATROPIUM BROMIDE AND ALBUTEROL SULFATE 2.5; .5 MG/3ML; MG/3ML
3 SOLUTION RESPIRATORY (INHALATION)
Status: DISCONTINUED | OUTPATIENT
Start: 2023-08-24 | End: 2023-08-30 | Stop reason: HOSPADM

## 2023-08-24 RX ORDER — ONDANSETRON 2 MG/ML
4 INJECTION INTRAMUSCULAR; INTRAVENOUS EVERY 6 HOURS PRN
Status: DISCONTINUED | OUTPATIENT
Start: 2023-08-24 | End: 2023-08-30 | Stop reason: HOSPADM

## 2023-08-24 RX ORDER — ACETAMINOPHEN 325 MG/1
325 TABLET ORAL EVERY 6 HOURS PRN
Status: DISCONTINUED | OUTPATIENT
Start: 2023-08-24 | End: 2023-08-30 | Stop reason: HOSPADM

## 2023-08-24 RX ORDER — GABAPENTIN 400 MG/1
400 CAPSULE ORAL EVERY 12 HOURS SCHEDULED
Status: DISCONTINUED | OUTPATIENT
Start: 2023-08-24 | End: 2023-08-30 | Stop reason: HOSPADM

## 2023-08-24 RX ORDER — NITROGLYCERIN 0.4 MG/1
0.4 TABLET SUBLINGUAL
Status: DISCONTINUED | OUTPATIENT
Start: 2023-08-24 | End: 2023-08-30 | Stop reason: HOSPADM

## 2023-08-24 RX ORDER — CLOPIDOGREL BISULFATE 75 MG/1
75 TABLET ORAL DAILY
Status: DISCONTINUED | OUTPATIENT
Start: 2023-08-24 | End: 2023-08-30 | Stop reason: HOSPADM

## 2023-08-24 RX ORDER — SODIUM CHLORIDE 0.9 % (FLUSH) 0.9 %
10 SYRINGE (ML) INJECTION AS NEEDED
Status: DISCONTINUED | OUTPATIENT
Start: 2023-08-24 | End: 2023-08-30 | Stop reason: HOSPADM

## 2023-08-24 RX ORDER — NICOTINE 21 MG/24HR
1 PATCH, TRANSDERMAL 24 HOURS TRANSDERMAL
Status: DISCONTINUED | OUTPATIENT
Start: 2023-08-25 | End: 2023-08-30 | Stop reason: HOSPADM

## 2023-08-24 RX ORDER — MIDODRINE HYDROCHLORIDE 5 MG/1
10 TABLET ORAL
Status: DISCONTINUED | OUTPATIENT
Start: 2023-08-24 | End: 2023-08-30 | Stop reason: HOSPADM

## 2023-08-24 RX ORDER — ASPIRIN 81 MG/1
TABLET, CHEWABLE ORAL
Status: COMPLETED
Start: 2023-08-24 | End: 2023-08-24

## 2023-08-24 RX ORDER — OXYCODONE HYDROCHLORIDE AND ACETAMINOPHEN 5; 325 MG/1; MG/1
1 TABLET ORAL EVERY 6 HOURS PRN
Status: DISCONTINUED | OUTPATIENT
Start: 2023-08-24 | End: 2023-08-27

## 2023-08-24 RX ORDER — FUROSEMIDE 40 MG/1
40 TABLET ORAL DAILY
Status: DISCONTINUED | OUTPATIENT
Start: 2023-08-24 | End: 2023-08-30 | Stop reason: HOSPADM

## 2023-08-24 RX ORDER — SODIUM CHLORIDE 9 MG/ML
40 INJECTION, SOLUTION INTRAVENOUS AS NEEDED
Status: DISCONTINUED | OUTPATIENT
Start: 2023-08-24 | End: 2023-08-30 | Stop reason: HOSPADM

## 2023-08-24 RX ADMIN — APIXABAN 5 MG: 5 TABLET, FILM COATED ORAL at 20:04

## 2023-08-24 RX ADMIN — AMIODARONE HYDROCHLORIDE 0.5 MG/MIN: 1.8 INJECTION, SOLUTION INTRAVENOUS at 19:36

## 2023-08-24 RX ADMIN — MIDODRINE HYDROCHLORIDE 10 MG: 5 TABLET ORAL at 17:33

## 2023-08-24 RX ADMIN — Medication 10 ML: at 20:04

## 2023-08-24 RX ADMIN — AMIODARONE HYDROCHLORIDE 150 MG: 1.5 INJECTION, SOLUTION INTRAVENOUS at 14:10

## 2023-08-24 RX ADMIN — GABAPENTIN 400 MG: 400 CAPSULE ORAL at 20:04

## 2023-08-24 RX ADMIN — SERTRALINE 100 MG: 50 TABLET, FILM COATED ORAL at 17:32

## 2023-08-24 RX ADMIN — IPRATROPIUM BROMIDE AND ALBUTEROL SULFATE 3 ML: .5; 3 SOLUTION RESPIRATORY (INHALATION) at 20:43

## 2023-08-24 RX ADMIN — ACETAMINOPHEN 325 MG: 325 TABLET, FILM COATED ORAL at 23:43

## 2023-08-24 RX ADMIN — SODIUM CHLORIDE 5 MG/HR: 900 INJECTION, SOLUTION INTRAVENOUS at 11:26

## 2023-08-24 RX ADMIN — AMIODARONE HYDROCHLORIDE 1 MG/MIN: 1.8 INJECTION, SOLUTION INTRAVENOUS at 14:26

## 2023-08-24 RX ADMIN — ASPIRIN 324 MG: 81 TABLET, CHEWABLE ORAL at 11:20

## 2023-08-24 RX ADMIN — METOROPROLOL TARTRATE 5 MG: 5 INJECTION, SOLUTION INTRAVENOUS at 23:43

## 2023-08-24 RX ADMIN — CLOPIDOGREL BISULFATE 75 MG: 75 TABLET ORAL at 17:33

## 2023-08-24 NOTE — ED NOTES
Nursing report ED to floor  Mona Perales  72 y.o.  female    HPI:   Chief Complaint   Patient presents with    Pneumonia    Shortness of Breath       Admitting doctor:   No admitting provider for patient encounter.    Consulting provider(s):  Consults       No orders found from 7/26/2023 to 8/25/2023.             Admitting diagnosis:   There were no encounter diagnoses.    Code status:   Current Code Status       Date Active Code Status Order ID Comments User Context       Prior            Allergies:   Morphine and related and Penicillins    Intake and Output  No intake or output data in the 24 hours ending 08/24/23 1400    Weight:       08/24/23  1121   Weight: 61.2 kg (135 lb)       Most recent vitals:   Vitals:    08/24/23 1155 08/24/23 1211 08/24/23 1255 08/24/23 1256   BP: 103/81 104/86 118/72 118/72   Pulse: (!) 145 (!) 158 (!) 158 (!) 134   Resp:       SpO2:   (!) 85% 90%   Weight:       Height:         Oxygen Therapy: 6LNC    Active LDAs/IV Access:   Lines, Drains & Airways       Active LDAs       Name Placement date Placement time Site Days    Midline Catheter - Double Lumen 08/24/23 Right Basilic 08/24/23  1331  -- less than 1    Peripheral IV 05/11/23 1809 Right Hand 05/11/23  1809  Hand  104    Peripheral IV 08/24/23 1111 Anterior;Left Forearm 08/24/23  1111  Forearm  less than 1    Peripheral IV 08/24/23 1121 Anterior;Right Hand 08/24/23  1121  Hand  less than 1                    Labs (abnormal labs have a star):   Labs Reviewed   COMPREHENSIVE METABOLIC PANEL - Abnormal; Notable for the following components:       Result Value    Glucose 105 (*)     BUN 29 (*)     Creatinine 1.15 (*)     Potassium 3.4 (*)     CO2 19.0 (*)     Calcium 6.8 (*)     Albumin 2.9 (*)     BUN/Creatinine Ratio 25.2 (*)     eGFR 51.4 (*)     All other components within normal limits    Narrative:     GFR Normal >60  Chronic Kidney Disease <60  Kidney Failure <15     TROPONIN - Abnormal; Notable for the following  components:    HS Troponin T 72 (*)     All other components within normal limits    Narrative:     High Sensitive Troponin T Reference Range:  <10.0 ng/L- Negative Female for AMI  <15.0 ng/L- Negative Male for AMI  >=10 - Abnormal Female indicating possible myocardial injury.  >=15 - Abnormal Male indicating possible myocardial injury.   Clinicians would have to utilize clinical acumen, EKG, Troponin, and serial changes to determine if it is an Acute Myocardial Infarction or myocardial injury due to an underlying chronic condition.        CBC WITH AUTO DIFFERENTIAL - Abnormal; Notable for the following components:    WBC 15.21 (*)     RBC 2.88 (*)     Hemoglobin 8.5 (*)     Hematocrit 26.0 (*)     Lymphocyte % 18.7 (*)     Neutrophils, Absolute 10.31 (*)     Monocytes, Absolute 1.79 (*)     Immature Grans, Absolute 0.08 (*)     All other components within normal limits   CK - Abnormal; Notable for the following components:    Creatine Kinase 765 (*)     All other components within normal limits   MAGNESIUM - Normal   BNP (IN-HOUSE)   HIGH SENSITIVITIY TROPONIN T 2HR   CBC AND DIFFERENTIAL    Narrative:     The following orders were created for panel order CBC & Differential.  Procedure                               Abnormality         Status                     ---------                               -----------         ------                     CBC Auto Differential[[id number]]        Abnormal            Final result                 Please view results for these tests on the individual orders.   EXTRA TUBES    Narrative:     The following orders were created for panel order Extra Tubes.  Procedure                               Abnormality         Status                     ---------                               -----------         ------                     Gold Top - [facility][[id number]]                                   Final result               Light Blue Top[[id number]]                                   Final  result                 Please view results for these tests on the individual orders.   GOLD TOP - SST   LIGHT BLUE TOP       Meds given in ED:   Medications   sodium chloride 0.9 % flush 10 mL (has no administration in time range)   nitroglycerin (NITROSTAT) SL tablet 0.4 mg (0.4 mg Sublingual Not Given 8/24/23 1130)   dilTIAZem (CARDIZEM) 100 mg in 100 mL NS infusion (ADV) (15 mg/hr Intravenous Rate/Dose Change 8/24/23 1255)   amiodarone 150 mg in 100 mL D5W (loading dose) (has no administration in time range)     Followed by   amiodarone 360 mg in 200 mL D5W infusion (has no administration in time range)     Followed by   amiodarone 360 mg in 200 mL D5W infusion (has no administration in time range)   aspirin chewable tablet 324 mg (324 mg Oral Given 8/24/23 1120)   dilTIAZem (CARDIZEM) bolus from bag 1 mg/mL 10 mg (10 mg Intravenous Bolus from Bag 8/24/23 1127)     amiodarone, 1 mg/min   Followed by  amiodarone, 0.5 mg/min  dilTIAZem, 5-15 mg/hr, Last Rate: 15 mg/hr (08/24/23 1255)         NIH Stroke Scale:       Isolation/Infection(s):  No active isolations   No active infections     COVID Testing  Collected yes  Resulted neg    Nursing report ED to floor:  Mental status: A&Ox4   Ambulatory status: plus 2 Assist  Precautions: fall    ED nurse phone [phone]

## 2023-08-24 NOTE — PLAN OF CARE
Goal Outcome Evaluation:      Pt. Is still on the Amiodarone gtt. VSS, she is alert to name and . No complaints of pain during my shift. Pt. Is inct of bladder. She is currently on HF NC at 6 lpm. Needs met, will cont to monitor!

## 2023-08-24 NOTE — H&P
[facility] Medicine Admission      Date of Admission: 8/24/2023      Primary Care Physician: Anahi Camacho APRN      Chief Complaint: palpitations    HPI:Female patient with history of a fib, CHF, COPD, smoker, comes with complains for the last 2 day with palpations and shortness of breath. ED started on cardizem drip and amiodarone bolus and then drip; her blood pressure starting going down and then we cancelled cardizem drip and blood pressure improved;  Associated symptoms is shortness of breath  She denies chest pain, abdominal pain, nausea, vomiting, fever, diarrhea    Concurrent Medical History:  has a past medical history of A-fib, Anxiety, Arthritis, Asthma, 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 with ulceration, CHF (congestive heart failure), Chronic lower back pain, COPD (chronic obstructive pulmonary disease), Essential (primary) hypertension, GERD (gastroesophageal reflux disease), History of pulmonary embolus (PE), Hyperlipidemia, Insomnia, Lumbago, Nicotine dependence, Occlusion of artery, Other atherosclerosis of native artery of other extremity, and Sleep apnea.    Past Surgical History:  has a past surgical history that includes Cardiac catheterization (04/06/2016); Central venous catheter insertion (04/07/2016); transesophageal echocardiogram (yanet) (04/07/2016); Total shoulder replacement (Left); Hysterectomy; Esophagogastroduodenoscopy (N/A, 1/12/2018); Esophagogastroduodenoscopy (N/A, 1/17/2018); Upper gastrointestinal endoscopy (01/17/2018); Esophagogastroduodenoscopy (N/A, 3/16/2018); Kyphoplasty (N/A, 5/23/2019); femoral popliteal bypass (Left, 4/14/2020); Interventional radiology procedure (Left, 9/9/2020); femoral popliteal bypass (Right, 9/17/2021); and Cardiac catheterization (N/A, 11/1/2022).    Family History: family history includes Heart disease in an other family member; Hypertension in an other family member.     Social  History:  reports that she has quit smoking. Her smoking use included cigarettes. She has a 50.00 pack-year smoking history. She has been exposed to tobacco smoke. She has never used smokeless tobacco. She reports that she does not drink alcohol and does not use drugs.    Allergies:   Allergies   Allergen Reactions    Morphine And Related Itching, Confusion and Hallucinations    Penicillins Rash       Medications:   Prior to Admission medications    Medication Sig Start Date End Date Taking? Authorizing Provider   acetaminophen (TYLENOL) 325 MG tablet Take 1 tablet by mouth Every 6 (Six) Hours As Needed for Mild Pain.    Miguelito Chatman MD   albuterol (PROVENTIL) (2.5 MG/3ML) 0.083% nebulizer solution Take 2.5 mg by nebulization Every 4 (Four) Hours As Needed for Wheezing.    Miguelito Chatman MD   ALBUTEROL SULFATE HFA IN Inhale 2 puffs Every 4 (Four) Hours As Needed.    Miguelito Chatman MD   amitriptyline (ELAVIL) 25 MG tablet Take 1 tablet by mouth Every Night. 5/8/20   Miguelito Chatman MD   apixaban (ELIQUIS) 5 MG tablet tablet Take 1 tablet by mouth 2 (Two) Times a Day.    Miguelito Chatman MD   budesonide-formoterol (SYMBICORT) 160-4.5 MCG/ACT inhaler Inhale 2 puffs 2 (Two) Times a Day As Needed.    Miguelito Chatman MD   cilostazol (PLETAL) 100 MG tablet Take 1 tablet by mouth 2 (Two) Times a Day.    Miguelito Chatman MD   clopidogrel (PLAVIX) 75 MG tablet Take 1 tablet by mouth Daily. 8/22/23   Jacki Polanco APRN   Fluticasone-Salmeterol (ADVAIR/WIXELA) 500-50 MCG/ACT DISKUS Inhale 2 (Two) Times a Day.    Miguelito Chatman MD   furosemide (LASIX) 40 MG tablet Take 1 tablet by mouth Daily. Take additional tablet in the afternoon or evening for increased weight gain >3lbs, shortness of breath or leg swelling 5/15/23   Michele Nunn MD   gabapentin (NEURONTIN) 400 MG capsule Take 1 capsule by mouth Every 8 (Eight) Hours for 3 days. 5/15/23 8/22/23  Arline  Michele RAMSAY MD   ipratropium-albuterol (DUO-NEB) 0.5-2.5 mg/3 ml nebulizer Take 3 mL by nebulization Every 6 (Six) Hours.    Miguelito Chatman MD   levothyroxine (SYNTHROID, LEVOTHROID) 25 MCG tablet Take 1 tablet by mouth Every Morning.    Miguelito Chatman MD   midodrine (PROAMATINE) 5 MG tablet Take 2 tablets by mouth 3 (Three) Times a Day Before Meals.    Miguelito Chatman MD   naloxone (NARCAN) 4 MG/0.1ML nasal spray Call 911. Don't prime. Tuttle in 1 nostril for overdose. Repeat in 2-3 minutes in other nostril if no or minimal breathing/responsiveness. 5/15/23   Michele Nunn MD   nicotine (NICODERM CQ) 21 MG/24HR patch Place 1 patch on the skin as directed by provider Daily **Remove old patch before applying new patch** 11/18/22   Vida Conrad APRN   NON FORMULARY Biofreeze - apply to knees daily    Miguelito Chatman MD   O2 (OXYGEN) Inhale 4 L/min As Needed (shortness of air). 9/6/21   Miguelito Chatman MD   ondansetron (ZOFRAN) 4 MG tablet Take 1 tablet by mouth Every 8 (Eight) Hours As Needed for Nausea or Vomiting.    Miguelito Chatman MD   oxyCODONE-acetaminophen (PERCOCET) 5-325 MG per tablet Take 1 tablet by mouth Every 6 (Six) Hours As Needed.    Miguelito Chatman MD   pantoprazole (PROTONIX) 40 MG EC tablet Take 1 tablet by mouth Daily. 2/5/18   Mary Shen APRN   penciclovir (DENAVIR) 1 % cream Apply 1 application  topically to the appropriate area as directed As Needed (fever blisters).    Miguelito Chatman MD   pravastatin (PRAVACHOL) 20 MG tablet Take 1 tablet by mouth Every Night. 5/2/22   Jacki Polanco APRN   sennosides-docusate (PERICOLACE) 8.6-50 MG per tablet Take 2 tablets by mouth 2 (Two) Times a Day As Needed for Constipation. 11/17/22   Vida Conrad APRN   sertraline (ZOLOFT) 100 MG tablet Take 1 tablet by mouth Daily.    Provider, MD Miguelito   traZODone (DESYREL) 150 MG tablet Take 1 tablet by mouth Every Night. 2/26/19   Kong  Donn CHAU MD       apixaban, 5 mg, Oral, BID  budesonide-formoterol, 2 puff, Inhalation, BID - RT  clopidogrel, 75 mg, Oral, Daily  furosemide, 40 mg, Oral, Daily  gabapentin, 400 mg, Oral, Q12H  ipratropium-albuterol, 3 mL, Nebulization, Q6H - RT  [START ON 8/25/2023] levothyroxine, 25 mcg, Oral, Q AM  midodrine, 10 mg, Oral, TID AC  [START ON 8/25/2023] nicotine, 1 patch, Transdermal, Q24H  sertraline, 100 mg, Oral, Daily  sodium chloride, 10 mL, Intravenous, Q12H      amiodarone, 1 mg/min, Last Rate: 1 mg/min (08/24/23 1552)   Followed by  amiodarone, 0.5 mg/min  dilTIAZem, 5-15 mg/hr, Last Rate: Stopped (08/24/23 1439)        Review of Systems:  Review of Systems   All other systems reviewed and are negative.   Otherwise complete ROS is negative except as mentioned above.    Physical Exam:   Temp:  [97.9 °F (36.6 °C)-98.3 °F (36.8 °C)] 97.9 °F (36.6 °C)  Heart Rate:  [106-181] 116  Resp:  [24-26] 26  BP: ()/(52-86) 93/63  Physical Exam  Vitals reviewed.   Constitutional:       Appearance: She is ill-appearing.   HENT:      Head: Normocephalic.      Nose: Nose normal.      Mouth/Throat:      Mouth: Mucous membranes are moist.   Eyes:      Extraocular Movements: Extraocular movements intact.   Cardiovascular:      Rate and Rhythm: Tachycardia present. Rhythm irregular.   Pulmonary:      Effort: Pulmonary effort is normal.      Breath sounds: Normal breath sounds.      Comments: On anterior chest wall  Abdominal:      General: Bowel sounds are normal.      Palpations: Abdomen is soft.   Musculoskeletal:      Cervical back: Normal range of motion and neck supple.   Skin:     General: Skin is warm.   Neurological:      General: No focal deficit present.      Mental Status: Mental status is at baseline.   Psychiatric:         Behavior: Behavior normal.         Results Reviewed:  I have personally reviewed current lab, radiology, and data and agree with results.  Lab Results (last 24 hours)       Procedure  Component Value Units Date/Time    High Sensitivity Troponin T 2Hr [[id number]]  (Abnormal) Collected: 08/24/23 1437    Specimen: Blood Updated: 08/24/23 1549     HS Troponin T 85 ng/L      Troponin T Delta 13 ng/L     Narrative:      High Sensitive Troponin T Reference Range:  <10.0 ng/L- Negative Female for AMI  <15.0 ng/L- Negative Male for AMI  >=10 - Abnormal Female indicating possible myocardial injury.  >=15 - Abnormal Male indicating possible myocardial injury.   Clinicians would have to utilize clinical acumen, EKG, Troponin, and serial changes to determine if it is an Acute Myocardial Infarction or myocardial injury due to an underlying chronic condition.         BNP [[id number]]  (Abnormal) Collected: 08/24/23 1244    Specimen: Blood Updated: 08/24/23 1540     proBNP 7,267.0 pg/mL     Narrative:      Among patients with dyspnea, NT-proBNP is highly sensitive for the detection of acute congestive heart failure. In addition NT-proBNP of <300 pg/ml effectively rules out acute congestive heart failure with 99% negative predictive value.      Extra Tubes [[id number]] Collected: 08/24/23 1246    Specimen: Blood, Venous Line Updated: 08/24/23 1400    Narrative:      The following orders were created for panel order Extra Tubes.  Procedure                               Abnormality         Status                     ---------                               -----------         ------                     Gold Top - SST[[id number]]                                   Final result               Light Blue Top[[id number]]                                   Final result                 Please view results for these tests on the individual orders.    Gold Top - SST [[id number]] Collected: 08/24/23 1247    Specimen: Blood Updated: 08/24/23 1400     Extra Tube Hold for add-ons.     Comment: Auto resulted.       Light Blue Top [[id number]] Collected: 08/24/23 1246    Specimen: Blood Updated: 08/24/23 1400     Extra Tube Hold for  add-ons.     Comment: Auto resulted       Comprehensive Metabolic Panel [[id number]]  (Abnormal) Collected: 08/24/23 1244    Specimen: Blood Updated: 08/24/23 1349     Glucose 105 mg/dL      BUN 29 mg/dL      Creatinine 1.15 mg/dL      Sodium 138 mmol/L      Potassium 3.4 mmol/L      Comment: Slight hemolysis detected by analyzer. Results may be affected.        Chloride 106 mmol/L      CO2 19.0 mmol/L      Calcium 6.8 mg/dL      Total Protein 6.3 g/dL      Albumin 2.9 g/dL      ALT (SGPT) 10 U/L      AST (SGOT) 27 U/L      Alkaline Phosphatase 57 U/L      Total Bilirubin 0.2 mg/dL      Globulin 3.4 gm/dL      A/G Ratio 0.9 g/dL      BUN/Creatinine Ratio 25.2     Anion Gap 13.0 mmol/L      eGFR 51.4 mL/min/1.73     Narrative:      GFR Normal >60  Chronic Kidney Disease <60  Kidney Failure <15      Magnesium [[id number]]  (Normal) Collected: 08/24/23 1244    Specimen: Blood Updated: 08/24/23 1349     Magnesium 1.8 mg/dL     CK [[id number]]  (Abnormal) Collected: 08/24/23 1244    Specimen: Blood Updated: 08/24/23 1348     Creatine Kinase 765 U/L     High Sensitivity Troponin T [[id number]]  (Abnormal) Collected: 08/24/23 1244    Specimen: Blood Updated: 08/24/23 1334     HS Troponin T 72 ng/L     Narrative:      High Sensitive Troponin T Reference Range:  <10.0 ng/L- Negative Female for AMI  <15.0 ng/L- Negative Male for AMI  >=10 - Abnormal Female indicating possible myocardial injury.  >=15 - Abnormal Male indicating possible myocardial injury.   Clinicians would have to utilize clinical acumen, EKG, Troponin, and serial changes to determine if it is an Acute Myocardial Infarction or myocardial injury due to an underlying chronic condition.         CBC & Differential [[id number]]  (Abnormal) Collected: 08/24/23 1244    Specimen: Blood Updated: 08/24/23 1250    Narrative:      The following orders were created for panel order CBC & Differential.  Procedure                               Abnormality         Status                      ---------                               -----------         ------                     CBC Auto Differential[[id number]]        Abnormal            Final result                 Please view results for these tests on the individual orders.    CBC Auto Differential [[id number]]  (Abnormal) Collected: 08/24/23 1244    Specimen: Blood Updated: 08/24/23 1250     WBC 15.21 10*3/mm3      RBC 2.88 10*6/mm3      Hemoglobin 8.5 g/dL      Hematocrit 26.0 %      MCV 90.3 fL      MCH 29.5 pg      MCHC 32.7 g/dL      RDW 14.1 %      RDW-SD 46.9 fl      MPV 9.7 fL      Platelets 191 10*3/mm3      Neutrophil % 67.8 %      Lymphocyte % 18.7 %      Monocyte % 11.8 %      Eosinophil % 0.3 %      Basophil % 0.9 %      Immature Grans % 0.5 %      Neutrophils, Absolute 10.31 10*3/mm3      Lymphocytes, Absolute 2.85 10*3/mm3      Monocytes, Absolute 1.79 10*3/mm3      Eosinophils, Absolute 0.05 10*3/mm3      Basophils, Absolute 0.13 10*3/mm3      Immature Grans, Absolute 0.08 10*3/mm3      nRBC 0.0 /100 WBC           Imaging Results (Last 24 Hours)       Procedure Component Value Units Date/Time    US Guided Vascular Access [[id number]] Collected: 08/24/23 1415     Updated: 08/24/23 1532    Narrative:      Ultrasound guidance vascular    FINDINGS:  Real-time ultrasound imaging was provided for vascular access.  Please refer to  procedure note for real-time exam findings.  The right basilic vein was found to  be patent and compressible.  Access under direct sonographic visualization.  Permanent saved images sent to the PACS for documentation.      IR Insert Midline Without Port Pump 5 Plus [[id number]] Resulted: 08/24/23 1352     Updated: 08/24/23 1352    Narrative:      This procedure was auto-finalized with no dictation required.              Assessment and Plan    Atrial fibrillation with RVR     Stopped cardizem drip due to hypotension     Continue with amiodarone drip     Troponin elevated likely type II: recent heart cath on  11/2022; no obstructive CAD     Discussed with Dr Haywood, her cardiologist     Chronic medical problems     COPD, no AE     Essential hypertension    Medical Decision Making  Number and Complexity of problems: one major complex  Differential Diagnosis: ACS    Conditions and Status:        Condition is unchanged.     [facility] Data  External documents reviewed: previous medical records  My EKG interpretation: a fib with RVR  My plain film interpretation: chronic changes  Tests considered but not ordered: none     Discussed with: patient and ED physician      Treatment Plan  See above    Care Planning  Shared decision making: her granddaughter Renee  Code status and discussions: full code    Disposition  Social Determinants of Health that impact treatment or disposition: none  I expect the patient to be discharged: In progress.     I confirmed that the patient's Advance Care Plan is present, code status is documented, or surrogate decision maker is listed in the patient's medical record.       Danish Flores MD

## 2023-08-24 NOTE — PROGRESS NOTES
TWO PATIENT IDENTIFIERS WERE USED. THE PATIENT WAS DRAPED WITH A FULL BODY DRAPE AND THE PATIENT'S RIGHT ARM WAS PREPPED WITH CHLORA PREP. ULTRASOUND WAS USED TO LOCALIZE THERIGHT BASILIC VEIN. SUBCUTANEOUS TISSUE AT THE CATHETER SITE WAS INFILTRATED WITH 2% LIDOCAINE. UNDER ULTRASOUND GUIDANCE, THE VEIN WAS ACCESSED WITH A 21 GAUGE  NEEDLE. AN 0.018 WIRE WAS THEN THREADED THROUGH THE NEEDLE. THE 21 GAUGE NEEDLE WAS REMOVED AND A 4 Frisian SHEATH WAS PLACED OVER THE WIRE INTO THE VEIN.THE MIDLINE CATHETER WAS TRIMMED TO 20CM. THE MIDLINE CATHETER WAS THEN PLACED OVER THE WIRE INTO THE VEIN, THE SHEATH WAS PEELED AWAY, WIRE WAS REMOVED. CATHETER WAS FLUSHED WITH NORMAL SALINE AND CATHETER TIP APPLIED. BIOPATCH PLACED. CATHETER SECURED WITH STAT LOCK AND TEGADERM. PATIENT TOLERATED PROCEDURE WELL. THIS WAS DONE AT BEDSIDE      IMPRESSION:SUCCESSFUL PLACEMENT OF DUAL LUMEN MIDLINE.           Danyell Reid  8/24/2023  13:51 CDT

## 2023-08-25 LAB
ANION GAP SERPL CALCULATED.3IONS-SCNC: 13 MMOL/L (ref 5–15)
BASOPHILS # BLD AUTO: 0.08 10*3/MM3 (ref 0–0.2)
BASOPHILS NFR BLD AUTO: 0.5 % (ref 0–1.5)
BUN SERPL-MCNC: 25 MG/DL (ref 8–23)
BUN/CREAT SERPL: 26.6 (ref 7–25)
CALCIUM SPEC-SCNC: 8.4 MG/DL (ref 8.6–10.5)
CHLORIDE SERPL-SCNC: 97 MMOL/L (ref 98–107)
CO2 SERPL-SCNC: 24 MMOL/L (ref 22–29)
CREAT SERPL-MCNC: 0.94 MG/DL (ref 0.57–1)
DEPRECATED RDW RBC AUTO: 48.3 FL (ref 37–54)
EGFRCR SERPLBLD CKD-EPI 2021: 65.4 ML/MIN/1.73
EOSINOPHIL # BLD AUTO: 0.08 10*3/MM3 (ref 0–0.4)
EOSINOPHIL NFR BLD AUTO: 0.5 % (ref 0.3–6.2)
ERYTHROCYTE [DISTWIDTH] IN BLOOD BY AUTOMATED COUNT: 14.3 % (ref 12.3–15.4)
GEN 5 2HR TROPONIN T REFLEX: 83 NG/L
GLUCOSE SERPL-MCNC: 127 MG/DL (ref 65–99)
HCT VFR BLD AUTO: 31.1 % (ref 34–46.6)
HGB BLD-MCNC: 9.8 G/DL (ref 12–15.9)
IMM GRANULOCYTES # BLD AUTO: 0.11 10*3/MM3 (ref 0–0.05)
IMM GRANULOCYTES NFR BLD AUTO: 0.7 % (ref 0–0.5)
LYMPHOCYTES # BLD AUTO: 2.6 10*3/MM3 (ref 0.7–3.1)
LYMPHOCYTES NFR BLD AUTO: 16.9 % (ref 19.6–45.3)
MAGNESIUM SERPL-MCNC: 2 MG/DL (ref 1.6–2.4)
MCH RBC QN AUTO: 28.9 PG (ref 26.6–33)
MCHC RBC AUTO-ENTMCNC: 31.5 G/DL (ref 31.5–35.7)
MCV RBC AUTO: 91.7 FL (ref 79–97)
MONOCYTES # BLD AUTO: 1.71 10*3/MM3 (ref 0.1–0.9)
MONOCYTES NFR BLD AUTO: 11.1 % (ref 5–12)
NEUTROPHILS NFR BLD AUTO: 10.83 10*3/MM3 (ref 1.7–7)
NEUTROPHILS NFR BLD AUTO: 70.3 % (ref 42.7–76)
NRBC BLD AUTO-RTO: 0 /100 WBC (ref 0–0.2)
PLATELET # BLD AUTO: 227 10*3/MM3 (ref 140–450)
PMV BLD AUTO: 9.8 FL (ref 6–12)
POTASSIUM SERPL-SCNC: 4.1 MMOL/L (ref 3.5–5.2)
QT INTERVAL: 405 MS
QTC INTERVAL: 468 MS
RBC # BLD AUTO: 3.39 10*6/MM3 (ref 3.77–5.28)
SODIUM SERPL-SCNC: 134 MMOL/L (ref 136–145)
TROPONIN T DELTA: 2 NG/L
TROPONIN T SERPL HS-MCNC: 81 NG/L
WBC NRBC COR # BLD: 15.41 10*3/MM3 (ref 3.4–10.8)

## 2023-08-25 PROCEDURE — 94799 UNLISTED PULMONARY SVC/PX: CPT

## 2023-08-25 PROCEDURE — 84484 ASSAY OF TROPONIN QUANT: CPT | Performed by: NURSE PRACTITIONER

## 2023-08-25 PROCEDURE — 94761 N-INVAS EAR/PLS OXIMETRY MLT: CPT

## 2023-08-25 PROCEDURE — 94664 DEMO&/EVAL PT USE INHALER: CPT

## 2023-08-25 PROCEDURE — 99222 1ST HOSP IP/OBS MODERATE 55: CPT | Performed by: INTERNAL MEDICINE

## 2023-08-25 PROCEDURE — 93005 ELECTROCARDIOGRAM TRACING: CPT | Performed by: INTERNAL MEDICINE

## 2023-08-25 PROCEDURE — 85025 COMPLETE CBC W/AUTO DIFF WBC: CPT | Performed by: INTERNAL MEDICINE

## 2023-08-25 PROCEDURE — 93010 ELECTROCARDIOGRAM REPORT: CPT | Performed by: INTERNAL MEDICINE

## 2023-08-25 PROCEDURE — 80048 BASIC METABOLIC PNL TOTAL CA: CPT | Performed by: INTERNAL MEDICINE

## 2023-08-25 PROCEDURE — 83735 ASSAY OF MAGNESIUM: CPT | Performed by: INTERNAL MEDICINE

## 2023-08-25 PROCEDURE — 25010000002 AMIODARONE IN DEXTROSE 5% 360-4.14 MG/200ML-% SOLUTION: Performed by: FAMILY MEDICINE

## 2023-08-25 RX ORDER — ROSUVASTATIN CALCIUM 5 MG/1
5 TABLET, COATED ORAL NIGHTLY
Status: DISCONTINUED | OUTPATIENT
Start: 2023-08-25 | End: 2023-08-30 | Stop reason: HOSPADM

## 2023-08-25 RX ORDER — RANOLAZINE 500 MG/1
500 TABLET, EXTENDED RELEASE ORAL EVERY 12 HOURS SCHEDULED
Status: DISCONTINUED | OUTPATIENT
Start: 2023-08-25 | End: 2023-08-30 | Stop reason: HOSPADM

## 2023-08-25 RX ORDER — AMIODARONE HYDROCHLORIDE 200 MG/1
200 TABLET ORAL EVERY 12 HOURS SCHEDULED
Status: COMPLETED | OUTPATIENT
Start: 2023-08-25 | End: 2023-08-29

## 2023-08-25 RX ORDER — AMIODARONE HYDROCHLORIDE 200 MG/1
200 TABLET ORAL
Status: DISCONTINUED | OUTPATIENT
Start: 2023-08-30 | End: 2023-08-30 | Stop reason: HOSPADM

## 2023-08-25 RX ADMIN — METOROPROLOL TARTRATE 5 MG: 5 INJECTION, SOLUTION INTRAVENOUS at 01:23

## 2023-08-25 RX ADMIN — IPRATROPIUM BROMIDE AND ALBUTEROL SULFATE 3 ML: .5; 3 SOLUTION RESPIRATORY (INHALATION) at 08:17

## 2023-08-25 RX ADMIN — Medication 10 ML: at 20:31

## 2023-08-25 RX ADMIN — FUROSEMIDE 40 MG: 40 TABLET ORAL at 08:03

## 2023-08-25 RX ADMIN — RANOLAZINE 500 MG: 500 TABLET, FILM COATED, EXTENDED RELEASE ORAL at 20:33

## 2023-08-25 RX ADMIN — AMIODARONE HYDROCHLORIDE 200 MG: 200 TABLET ORAL at 10:11

## 2023-08-25 RX ADMIN — MIDODRINE HYDROCHLORIDE 10 MG: 5 TABLET ORAL at 12:38

## 2023-08-25 RX ADMIN — MIDODRINE HYDROCHLORIDE 10 MG: 5 TABLET ORAL at 08:02

## 2023-08-25 RX ADMIN — CLOPIDOGREL BISULFATE 75 MG: 75 TABLET ORAL at 08:02

## 2023-08-25 RX ADMIN — Medication 10 ML: at 08:05

## 2023-08-25 RX ADMIN — APIXABAN 5 MG: 5 TABLET, FILM COATED ORAL at 08:03

## 2023-08-25 RX ADMIN — LEVOTHYROXINE SODIUM 25 MCG: 25 TABLET ORAL at 05:01

## 2023-08-25 RX ADMIN — GABAPENTIN 400 MG: 400 CAPSULE ORAL at 08:02

## 2023-08-25 RX ADMIN — IPRATROPIUM BROMIDE AND ALBUTEROL SULFATE 3 ML: .5; 3 SOLUTION RESPIRATORY (INHALATION) at 18:56

## 2023-08-25 RX ADMIN — AMIODARONE HYDROCHLORIDE 0.5 MG/MIN: 1.8 INJECTION, SOLUTION INTRAVENOUS at 04:55

## 2023-08-25 RX ADMIN — GABAPENTIN 400 MG: 400 CAPSULE ORAL at 20:30

## 2023-08-25 RX ADMIN — OXYCODONE HYDROCHLORIDE AND ACETAMINOPHEN 1 TABLET: 5; 325 TABLET ORAL at 20:42

## 2023-08-25 RX ADMIN — ACETAMINOPHEN 325 MG: 325 TABLET, FILM COATED ORAL at 06:49

## 2023-08-25 RX ADMIN — RANOLAZINE 500 MG: 500 TABLET, FILM COATED, EXTENDED RELEASE ORAL at 14:57

## 2023-08-25 RX ADMIN — NICOTINE 1 PATCH: 21 PATCH, EXTENDED RELEASE TRANSDERMAL at 08:03

## 2023-08-25 RX ADMIN — SERTRALINE 100 MG: 50 TABLET, FILM COATED ORAL at 08:02

## 2023-08-25 RX ADMIN — APIXABAN 5 MG: 5 TABLET, FILM COATED ORAL at 20:30

## 2023-08-25 RX ADMIN — ROSUVASTATIN CALCIUM 5 MG: 5 TABLET, FILM COATED ORAL at 20:30

## 2023-08-25 RX ADMIN — BUDESONIDE AND FORMOTEROL FUMARATE DIHYDRATE 2 PUFF: 160; 4.5 AEROSOL RESPIRATORY (INHALATION) at 18:56

## 2023-08-25 RX ADMIN — AMIODARONE HYDROCHLORIDE 200 MG: 200 TABLET ORAL at 20:30

## 2023-08-25 RX ADMIN — BUDESONIDE AND FORMOTEROL FUMARATE DIHYDRATE 2 PUFF: 160; 4.5 AEROSOL RESPIRATORY (INHALATION) at 13:52

## 2023-08-25 RX ADMIN — IPRATROPIUM BROMIDE AND ALBUTEROL SULFATE 3 ML: .5; 3 SOLUTION RESPIRATORY (INHALATION) at 13:51

## 2023-08-25 RX ADMIN — MIDODRINE HYDROCHLORIDE 10 MG: 5 TABLET ORAL at 18:25

## 2023-08-25 RX ADMIN — METOPROLOL TARTRATE 25 MG: 25 TABLET, FILM COATED ORAL at 00:33

## 2023-08-25 NOTE — PROGRESS NOTES
[facility] Medicine Services  INPATIENT PROGRESS NOTE    Length of Stay: 1  Date of Admission: 8/24/2023  Primary Care Physician: Anahi Camacho APRN    Subjective   (S) Admitted for atrial fibrillation with RVR; no chest pain  Today, she perked up; earlier today she is back to NSR    Review of Systems   All other systems reviewed and are negative.     All pertinent negatives and positives are as above. All other systems have been reviewed and are negative unless otherwise stated.     Prior to Admission medications    Medication Sig Start Date End Date Taking? Authorizing Provider   acetaminophen (TYLENOL) 325 MG tablet Take 1 tablet by mouth Every 6 (Six) Hours As Needed for Mild Pain.    Miguelito Chatman MD   albuterol (PROVENTIL) (2.5 MG/3ML) 0.083% nebulizer solution Take 2.5 mg by nebulization Every 4 (Four) Hours As Needed for Wheezing.    Miguelito Chatman MD   ALBUTEROL SULFATE HFA IN Inhale 2 puffs Every 4 (Four) Hours As Needed.    Miguelito Chatman MD   amitriptyline (ELAVIL) 25 MG tablet Take 1 tablet by mouth Every Night. 5/8/20   Miguelito Chatman MD   apixaban (ELIQUIS) 5 MG tablet tablet Take 1 tablet by mouth 2 (Two) Times a Day.    Miguelito Chatman MD   budesonide-formoterol (SYMBICORT) 160-4.5 MCG/ACT inhaler Inhale 2 puffs 2 (Two) Times a Day As Needed.    Miguelito Chatman MD   cilostazol (PLETAL) 100 MG tablet Take 1 tablet by mouth 2 (Two) Times a Day.    Miguelito Chatman MD   clopidogrel (PLAVIX) 75 MG tablet Take 1 tablet by mouth Daily. 8/22/23   Jacki Polanco APRN   Fluticasone-Salmeterol (ADVAIR/WIXELA) 500-50 MCG/ACT DISKUS Inhale 2 (Two) Times a Day.    Miguelito Chatman MD   furosemide (LASIX) 40 MG tablet Take 1 tablet by mouth Daily. Take additional tablet in the afternoon or evening for increased weight gain >3lbs, shortness of breath or leg swelling 5/15/23   Michele Nunn MD   gabapentin  (NEURONTIN) 400 MG capsule Take 1 capsule by mouth Every 8 (Eight) Hours for 3 days. 5/15/23 8/22/23  Michele Nunn MD   ipratropium-albuterol (DUO-NEB) 0.5-2.5 mg/3 ml nebulizer Take 3 mL by nebulization Every 6 (Six) Hours.    Miguelito Chatman MD   levothyroxine (SYNTHROID, LEVOTHROID) 25 MCG tablet Take 1 tablet by mouth Every Morning.    Miguelito Chatman MD   midodrine (PROAMATINE) 5 MG tablet Take 2 tablets by mouth 3 (Three) Times a Day Before Meals.    Miguelito Chatman MD   naloxone (NARCAN) 4 MG/0.1ML nasal spray Call 911. Don't prime. [place] in 1 nostril for overdose. Repeat in 2-3 minutes in other nostril if no or minimal breathing/responsiveness. 5/15/23   Michele Nunn MD   nicotine (NICODERM CQ) 21 MG/24HR patch Place 1 patch on the skin as directed by provider Daily **Remove old patch before applying new patch** 11/18/22   Vida Conrad APRN   NON FORMULARY Biofreeze - apply to knees daily    Miguelito Chatman MD   O2 (OXYGEN) Inhale 4 L/min As Needed (shortness of air). 9/6/21   Miguelito Chatman MD   ondansetron (ZOFRAN) 4 MG tablet Take 1 tablet by mouth Every 8 (Eight) Hours As Needed for Nausea or Vomiting.    Miguelito Chatman MD   oxyCODONE-acetaminophen (PERCOCET) 5-325 MG per tablet Take 1 tablet by mouth Every 6 (Six) Hours As Needed.    Miguelito Chatman MD   pantoprazole (PROTONIX) 40 MG EC tablet Take 1 tablet by mouth Daily. 2/5/18   Mary Shen APRN   penciclovir (DENAVIR) 1 % cream Apply 1 application  topically to the appropriate area as directed As Needed (fever blisters).    Miguelito Chatman MD   pravastatin (PRAVACHOL) 20 MG tablet Take 1 tablet by mouth Every Night. 5/2/22   Jacki Polanco APRN   sennosides-docusate (PERICOLACE) 8.6-50 MG per tablet Take 2 tablets by mouth 2 (Two) Times a Day As Needed for Constipation. 11/17/22   Vida Conrad APRN   sertraline (ZOLOFT) 100 MG tablet Take 1 tablet by mouth Daily.     Provider, MD Miguelito   traZODone (DESYREL) 150 MG tablet Take 1 tablet by mouth Every Night. 2/26/19   Donn Triana MD       apixaban, 5 mg, Oral, BID  budesonide-formoterol, 2 puff, Inhalation, BID - RT  clopidogrel, 75 mg, Oral, Daily  furosemide, 40 mg, Oral, Daily  gabapentin, 400 mg, Oral, Q12H  ipratropium-albuterol, 3 mL, Nebulization, Q6H - RT  levothyroxine, 25 mcg, Oral, Q AM  midodrine, 10 mg, Oral, TID AC  nicotine, 1 patch, Transdermal, Q24H  sertraline, 100 mg, Oral, Daily  sodium chloride, 10 mL, Intravenous, Q12H      amiodarone, 0.5 mg/min, Last Rate: 0.5 mg/min (08/25/23 0455)  dilTIAZem, 5-15 mg/hr, Last Rate: Stopped (08/24/23 1439)        Objective    Temp:  [97.9 °F (36.6 °C)-99.9 °F (37.7 °C)] 99.9 °F (37.7 °C)  Heart Rate:  [] 71  Resp:  [20-26] 20  BP: ()/(52-86) 95/64    Physical Exam  Vitals reviewed.   Constitutional:       Appearance: Normal appearance.   HENT:      Head: Atraumatic.      Nose: Nose normal.   Eyes:      Extraocular Movements: Extraocular movements intact.   Cardiovascular:      Rate and Rhythm: Normal rate and regular rhythm.      Heart sounds: Normal heart sounds.   Pulmonary:      Breath sounds: Normal breath sounds.   Abdominal:      General: Bowel sounds are normal.      Palpations: Abdomen is soft.   Musculoskeletal:         General: Normal range of motion.      Cervical back: Normal range of motion and neck supple.   Skin:     General: Skin is warm.   Neurological:      General: No focal deficit present.      Mental Status: She is alert. Mental status is at baseline.   Psychiatric:         Behavior: Behavior normal.     Results Review:  I have reviewed the labs, radiology results, and diagnostic studies.    Laboratory Data:   Results from last 7 days   Lab Units 08/25/23  0743 08/24/23  1244   SODIUM mmol/L 134* 138   POTASSIUM mmol/L 4.1 3.4*   CHLORIDE mmol/L 97* 106   CO2 mmol/L 24.0 19.0*   BUN mg/dL 25* 29*   CREATININE mg/dL 0.94 1.15*    GLUCOSE mg/dL 127* 105*   CALCIUM mg/dL 8.4* 6.8*   BILIRUBIN mg/dL  --  0.2   ALK PHOS U/L  --  57   ALT (SGPT) U/L  --  10   AST (SGOT) U/L  --  27   ANION GAP mmol/L 13.0 13.0     Estimated Creatinine Clearance: 47.9 mL/min (by C-G formula based on SCr of 0.94 mg/dL).  Results from last 7 days   Lab Units 08/25/23  0743 08/24/23  1244   MAGNESIUM mg/dL 2.0 1.8         Results from last 7 days   Lab Units 08/25/23  0743 08/24/23  1244   WBC 10*3/mm3 15.41* 15.21*   HEMOGLOBIN g/dL 9.8* 8.5*   HEMATOCRIT % 31.1* 26.0*   PLATELETS 10*3/mm3 227 191           Culture Data:   No results found for: BLOODCX  No results found for: URINECX  No results found for: RESPCX  No results found for: WOUNDCX  No results found for: STOOLCX  No components found for: BODYFLD    Radiology Data:   Imaging Results (Last 24 Hours)       Procedure Component Value Units Date/Time    US Guided Vascular Access [[id number]] Collected: 08/24/23 1415     Updated: 08/24/23 1532    Narrative:      Ultrasound guidance vascular    FINDINGS:  Real-time ultrasound imaging was provided for vascular access.  Please refer to  procedure note for real-time exam findings.  The right basilic vein was found to  be patent and compressible.  Access under direct sonographic visualization.  Permanent saved images sent to the PACS for documentation.      IR Insert Midline Without Port Pump 5 Plus [[id number]] Resulted: 08/24/23 1352     Updated: 08/24/23 1352    Narrative:      This procedure was auto-finalized with no dictation required.            I have reviewed the patient's current medications.     Assessment/Plan     Atrial fibrillation with RVR    converted to NSR     Troponin elevated likely type II: recent heart cath on 11/2022; no obstructive CAD     change amiodarone drip melissa amiodarone tab     Will advance her diet     Appreciated cardiology assistance      Chronic medical problems     COPD, no AE     Essential hypertension     Medical Decision  Making  Number and Complexity of problems: one major complex  Differential Diagnosis: ACS     Conditions and Status:        Condition is unchanged.     MDM Data  External documents reviewed: previous medical records  My EKG interpretation: a fib with RVR  My plain film interpretation: chronic changes  Tests considered but not ordered: none     Discussed with: patient and RN  Treatment Plan  See above     Care Planning  Shared decision making: her granddaughter Renee  Code status and discussions: full code     Disposition  Social Determinants of Health that impact treatment or disposition: none  I expect the patient to be discharged: In progress.       I confirmed that the patient's Advance Care Plan is present, code status is documented, or surrogate decision maker is listed in the patient's medical record.     Danish Flores MD

## 2023-08-25 NOTE — CONSULTS
[facility] Cardiology Consult    Referring Provider: Marino Perez MD      Reason for Consultation:     troponin elevated; a fib with RVR       Patient Care Team:  Anahi Camacho APRN as PCP - General (Nurse Practitioner)  Kris Peres MD (Inactive) as Consulting Physician (Cardiology)    Chief complaint   Chief Complaint   Patient presents with    Pneumonia    Shortness of Breath       Subjective .     History of present illness:     Mr. Mona Ayala is a 72-year-old  female with medical history of hypertension, hyperlipidemia, GERD, atrial fibrillation, history of PE, PVD, carotid artery disease, HFpEF, COPD, smoker, dementia, and nonobstructive CAD.    She presented to [facility] ER with complaints of shortness of breath and low oxygen saturations.  EKG on arrival showing atrial fibrillation with at 178 bpm.  Possible acute MI.  She was given aspirin and Cardizem bolus and drip.  CMP with creatinine 1.15, BUN 29, potassium 3.4, albumin 2.9 , CBC with hemoglobin 8.5 and hematocrit 26.0, WBC 15.21.  proBNP 7, 267.  High sensitive troponin elevated at 72 and 85 with delta 13.  Repeat EKG showing rate 12, atrial flutter with 2:1, 3:1 conduction.    Upon examination she tells me that she had SOA that brought her in.  Oxygen levels at home have been low per ER record and she has home oxygen as needed.  Today denying CP, diaphoresis, nausea, or vomiting. Converted to NSR on amiodarone drip. RN reports that she is confused at times. She tells me that she lives at [facility] over the last 3 months.  I attempted twice to call her daughter and her granddaughter with no answer.    Review of records showing left heart catheterization November 2022 by Dr. Haywood showing nonobstructive CAD.  Echocardiogram at that time with LVEF preserved, mild concentric hypertrophy, diastolic dysfunction grade 2.  RVSP mildly elevated at 35 to 45 mmHg.    The  CVD Risk score (LIYA'Agostino, et al., 2008) failed to calculate for the following reasons:    The patient has a prior MI, stroke, CHF, or peripheral vascular disease diagnosis      Review of Systems  Review of Systems   Unable to perform ROS: Dementia   Constitutional:  Negative for diaphoresis.   Respiratory:  Positive for shortness of breath. Negative for cough.    Cardiovascular:  Negative for chest pain.   Gastrointestinal:  Negative for nausea and vomiting.     History  Past Medical History:   Diagnosis Date    A-fib     Anxiety     Arthritis     Asthma     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 with ulceration     bilateral legs - bilateral iliac stents 2008       CHF (congestive heart failure)     Chronic lower back pain     COPD (chronic obstructive pulmonary disease)     Essential (primary) hypertension     GERD (gastroesophageal reflux disease)     History of pulmonary embolus (PE)     Hyperlipidemia     Insomnia     Lumbago     Nicotine dependence     Occlusion of artery      and stenosis of bilateral carotid arteries - BABS 16-49%, LICA 0-15%    Other atherosclerosis of native artery of other extremity     LEFT subclavian stent 2005 (occluded)    Sleep apnea    ,   Past Surgical History:   Procedure Laterality Date    CARDIAC CATHETERIZATION  04/06/2016    No evidence of any obstructive epicardial CAD.Preserved LV systolic function with EF of 55%.    CARDIAC CATHETERIZATION N/A 11/1/2022    Procedure: Left Heart Cath;  Surgeon: Neftali Haywood MD;  Location: [facility] CATH INVASIVE LOCATION;  Service: Cardiology;  Laterality: N/A;    CENTRAL VENOUS LINE INSERTION  04/07/2016    Successful placement of right uppe extremity 6-Korean triple lumen PICC line.    ENDOSCOPY N/A 1/12/2018    Procedure: ESOPHAGOGASTRODUODENOSCOPY;  Surgeon: Bin Oh MD;  Location: [facility] ENDOSCOPY;  Service:     ENDOSCOPY N/A 1/17/2018    Procedure: ESOPHAGOGASTRODUODENOSCOPY;  Surgeon: Bin Oh MD;   Location: [facility] ENDOSCOPY;  Service:     ENDOSCOPY N/A 3/16/2018    Procedure: ESOPHAGOGASTRODUODENOSCOPY possible dilation;  Surgeon: Bin Martin MD;  Location: [facility] ENDOSCOPY;  Service: Gastroenterology    FEMORAL POPLITEAL BYPASS Left 4/14/2020    Procedure: FEMORAL POPLITEAL BYPASS ABOVE KNEE  (POLYTETRAFLUOROETHYLENE)  LEFT COMMON FEMORAL ARTERY ENDARTERECTOMY PTA LEFT ILIAC ARTERIOGRAM        (c-arm#2 and c-arm table);  Surgeon: David Suh MD;  Location: [facility] OR;  Service: Vascular;  Laterality: Left;    FEMORAL POPLITEAL BYPASS Right 9/17/2021    Procedure: RIGHT common femoral endarterectomy FEMORAL POPLITEAL BYPASS (above the knee polytetrafluoroethylene  lower extremity arteriogram              (c-arm#2 and c-arm table);  Surgeon: David Suh MD;  Location: [facility] OR;  Service: Vascular;  Laterality: Right;    HYSTERECTOMY      INTERVENTIONAL RADIOLOGY PROCEDURE Left 9/9/2020    Procedure: IR angiogram extremity left;  Surgeon: David Suh MD;  Location: [facility] ANGIO INVASIVE LOCATION;  Service: Interventional Radiology;  Laterality: Left;    KYPHOPLASTY N/A 5/23/2019    Procedure: KYPHOPLASTY LUMBAR FOUR;  Surgeon: Aba Santa MD;  Location: [facility] OR;  Service: Orthopedic Spine    TOTAL SHOULDER REPLACEMENT Left     TRANSESOPHAGEAL ECHOCARDIOGRAM (JACQUES)  04/07/2016    With color flow-Mild to moderate [facility].LV systolic function well preserved with Ef of 55-60%.Mitral and AV intact.Diastolic dysfunction    UPPER GASTROINTESTINAL ENDOSCOPY  01/17/2018    Dr. Bin Martin M.D.   ,   Family History   Problem Relation Age of Onset    Heart disease Other     Hypertension Other    ,   Social History     Tobacco Use    Smoking status: Former     Packs/day: 1.00     Years: 50.00     Pack years: 50.00     Types: Cigarettes     Passive exposure: Past    Smokeless tobacco: Never   Vaping Use    Vaping Use: Never used   Substance Use Topics    Alcohol use:  No    Drug use: No   ,   Medications Prior to Admission   Medication Sig Dispense Refill Last Dose    acetaminophen (TYLENOL) 325 MG tablet Take 1 tablet by mouth Every 6 (Six) Hours As Needed for Mild Pain.       albuterol (PROVENTIL) (2.5 MG/3ML) 0.083% nebulizer solution Take 2.5 mg by nebulization Every 4 (Four) Hours As Needed for Wheezing.       ALBUTEROL SULFATE HFA IN Inhale 2 puffs Every 4 (Four) Hours As Needed.       amitriptyline (ELAVIL) 25 MG tablet Take 1 tablet by mouth Every Night.       apixaban (ELIQUIS) 5 MG tablet tablet Take 1 tablet by mouth 2 (Two) Times a Day.       budesonide-formoterol (SYMBICORT) 160-4.5 MCG/ACT inhaler Inhale 2 puffs 2 (Two) Times a Day As Needed.       cilostazol (PLETAL) 100 MG tablet Take 1 tablet by mouth 2 (Two) Times a Day.       clopidogrel (PLAVIX) 75 MG tablet Take 1 tablet by mouth Daily. 90 tablet 3     Fluticasone-Salmeterol (ADVAIR/WIXELA) 500-50 MCG/ACT DISKUS Inhale 2 (Two) Times a Day.       furosemide (LASIX) 40 MG tablet Take 1 tablet by mouth Daily. Take additional tablet in the afternoon or evening for increased weight gain >3lbs, shortness of breath or leg swelling 60 tablet 0     gabapentin (NEURONTIN) 400 MG capsule Take 1 capsule by mouth Every 8 (Eight) Hours for 3 days. 9 capsule 0     ipratropium-albuterol (DUO-NEB) 0.5-2.5 mg/3 ml nebulizer Take 3 mL by nebulization Every 6 (Six) Hours.       levothyroxine (SYNTHROID, LEVOTHROID) 25 MCG tablet Take 1 tablet by mouth Every Morning.       midodrine (PROAMATINE) 5 MG tablet Take 2 tablets by mouth 3 (Three) Times a Day Before Meals.       naloxone (NARCAN) 4 MG/0.1ML nasal spray Call 911. Don't prime. Follansbee in 1 nostril for overdose. Repeat in 2-3 minutes in other nostril if no or minimal breathing/responsiveness. 2 each 0     nicotine (NICODERM CQ) 21 MG/24HR patch Place 1 patch on the skin as directed by provider Daily **Remove old patch before applying new patch** 28 each 0     NON FORMULARY  "Biofreeze - apply to knees daily       O2 (OXYGEN) Inhale 4 L/min As Needed (shortness of air).       ondansetron (ZOFRAN) 4 MG tablet Take 1 tablet by mouth Every 8 (Eight) Hours As Needed for Nausea or Vomiting.       oxyCODONE-acetaminophen (PERCOCET) 5-325 MG per tablet Take 1 tablet by mouth Every 6 (Six) Hours As Needed.       pantoprazole (PROTONIX) 40 MG EC tablet Take 1 tablet by mouth Daily. 30 tablet 5     penciclovir (DENAVIR) 1 % cream Apply 1 application  topically to the appropriate area as directed As Needed (fever blisters).       pravastatin (PRAVACHOL) 20 MG tablet Take 1 tablet by mouth Every Night. 90 tablet 3     sennosides-docusate (PERICOLACE) 8.6-50 MG per tablet Take 2 tablets by mouth 2 (Two) Times a Day As Needed for Constipation. 30 tablet 0     sertraline (ZOLOFT) 100 MG tablet Take 1 tablet by mouth Daily.       traZODone (DESYREL) 150 MG tablet Take 1 tablet by mouth Every Night.         ALLERGIES  Morphine and related and Penicillins    The following portions of the patient's history were reviewed and updated as appropriate: allergies, current medications, past family history, past medical history, past social history, past surgical history and problem list.     Old and outside records reviewed (if available) and pertinent information is included in the objective data.     Objective     Vital Sign Min/Max for last 24 hours  Temp  Min: 97.9 °F (36.6 °C)  Max: 99.9 °F (37.7 °C)   BP  Min: 89/52  Max: 153/64   Pulse  Min: 65  Max: 158   Resp  Min: 20  Max: 26   SpO2  Min: 80 %  Max: 100 %   Flow (L/min)  Min: 4  Max: 6   No data recorded     Flowsheet Rows      Flowsheet Row First Filed Value   Admission Height 157.5 cm (62\") Documented at 08/24/2023 1121   Admission Weight 61.2 kg (135 lb) Documented at 08/24/2023 1121               Physical Exam:  Physical Exam  Vitals and nursing note reviewed.   Constitutional:       Appearance: Normal appearance. She is well-developed and " well-groomed. She is not ill-appearing or diaphoretic.      Interventions: Nasal cannula in place.   HENT:      Head: Normocephalic.      Nose: Nose normal.      Mouth/Throat:      Lips: Pink.      Mouth: Mucous membranes are moist.   Eyes:      General: Lids are normal.      Conjunctiva/sclera: Conjunctivae normal.   Neck:      Vascular: No carotid bruit or JVD.      Trachea: Trachea normal.   Cardiovascular:      Rate and Rhythm: Normal rate and regular rhythm.      Heart sounds: Normal heart sounds, S1 normal and S2 normal. No murmur heard.    No gallop.   Pulmonary:      Effort: Pulmonary effort is normal.      Breath sounds: Normal breath sounds and air entry. No wheezing, rhonchi or rales.   Abdominal:      General: Bowel sounds are normal. There is no distension.      Palpations: Abdomen is soft.      Tenderness: There is no abdominal tenderness.   Musculoskeletal:      Right lower leg: No edema.      Left lower leg: No edema.   Skin:     General: Skin is warm and dry.      Capillary Refill: Capillary refill takes less than 2 seconds.      Coloration: Skin is not ashen, cyanotic or pale.   Neurological:      Mental Status: She is alert and oriented to person, place, and time.   Psychiatric:         Attention and Perception: Attention normal.         Mood and Affect: Mood normal.         Speech: Speech normal.          Results Reviewed by myself:     Results from last 7 days   Lab Units 08/25/23  0743 08/24/23  1244   SODIUM mmol/L 134* 138   POTASSIUM mmol/L 4.1 3.4*   CHLORIDE mmol/L 97* 106   CO2 mmol/L 24.0 19.0*   BUN mg/dL 25* 29*   CREATININE mg/dL 0.94 1.15*   CALCIUM mg/dL 8.4* 6.8*   BILIRUBIN mg/dL  --  0.2   ALK PHOS U/L  --  57   ALT (SGPT) U/L  --  10   AST (SGOT) U/L  --  27   GLUCOSE mg/dL 127* 105*       Estimated Creatinine Clearance: 47.9 mL/min (by C-G formula based on SCr of 0.94 mg/dL).    Results from last 7 days   Lab Units 08/25/23  0743 08/24/23  1244   MAGNESIUM mg/dL 2.0 1.8        Results from last 7 days   Lab Units 08/25/23  0743 08/24/23  1244   WBC 10*3/mm3 15.41* 15.21*   HEMOGLOBIN g/dL 9.8* 8.5*   PLATELETS 10*3/mm3 227 191             Lab Results   Component Value Date    CKTOTAL 765 (H) 08/24/2023    CKMB 2.19 09/17/2018    TROPONINI 0.032 02/22/2019    TROPONINT 85 (C) 08/24/2023     Lab Results   Component Value Date    PROBNP 7,267.0 (H) 08/24/2023       I/O last 3 completed shifts:  In: 235.5 [I.V.:235.5]  Out: 1400 [Urine:1400]    Cardiographics  ECG/EMG Results (last 24 hours)       Procedure Component Value Units Date/Time    ECG 12 Lead Chest Pain [[id number]] Collected: 08/24/23 1317     Updated: 08/24/23 1345     QT Interval 328 ms      QTC Interval 465 ms     Narrative:      Test Reason : Chest Pain  Blood Pressure :   */*   mmHG  Vent. Rate : 121 BPM     Atrial Rate : 344 BPM     P-R Int :   * ms          QRS Dur :  88 ms      QT Int : 328 ms       P-R-T Axes :   * -16 131 degrees     QTc Int : 465 ms    Atrial flutter with variable AV block  Left ventricular hypertrophy with repolarization abnormality  Inferior infarct (cited on or before 18-DEC-2012)  Anterior infarct (cited on or before 06-APR-2016)  ** ** ACUTE MI ** **  Abnormal ECG  When compared with ECG of 24-AUG-2023 10:54,  Previous ECG has undetermined rhythm, needs review  ST less elevated in Inferior leads    Referred By:            Confirmed By:     ECG 12 Lead Chest Pain [[id number]] Resulted: 08/24/23 1411     Updated: 08/24/23 1411    ECG 12 Lead [[id number]] Resulted: 08/24/23 1411     Updated: 08/24/23 1411    SCANNED EKG [[id number]] Resulted: 08/24/23     Updated: 08/24/23 2136    SCANNED EKG [[id number]] Resulted: 08/24/23     Updated: 08/24/23 2136    ECG 12 Lead Rhythm Change [[id number]] Collected: 08/25/23 0810     Updated: 08/25/23 0936     QT Interval 430 ms      QTC Interval 495 ms     Narrative:      Test Reason : Rhythm Change  Blood Pressure :   */*   mmHG  Vent. Rate :  80 BPM     Atrial  Rate :  80 BPM     P-R Int : 148 ms          QRS Dur :  94 ms      QT Int : 430 ms       P-R-T Axes :  71   2 122 degrees     QTc Int : 495 ms    Normal sinus rhythm  Possible Left atrial enlargement  Inferior infarct (cited on or before 18-DEC-2012)  ST & T wave abnormality, consider lateral ischemia  Abnormal ECG  When compared with ECG of 24-AUG-2023 13:17,  Significant changes have occurred    Referred By:            Confirmed By:             Results for orders placed during the hospital encounter of 05/09/23    Adult Transthoracic Echo Limited W/ Cont if Necessary Per Protocol    Interpretation Summary    Left ventricular systolic function is normal. Left ventricular ejection fraction appears to be 56 - 60%.    Left ventricular wall thickness is consistent with mild concentric hypertrophy.    Left ventricular diastolic function is consistent with (grade Ia w/high LAP) impaired relaxation.    The left atrial cavity is mildly dilated.    Estimated right ventricular systolic pressure from tricuspid regurgitation is moderately elevated (45-55 mmHg).      No radiology results for the last 30 days.    Intake/Output         08/24/23 0700 - 08/25/23 0659    Intake (ml) 235.5    Output (ml) 1400    Net (ml) -1164.5    Last Weight 61.2 kg (135 lb)              Assessment & Plan       Dyspnea    Shortness of breath. She presented to [facility] ER with complaints of shortness of breath and low oxygen saturations.  EKG on arrival showing atrial fibrillation with at 178 bpm.  Possible acute MI.  She was given aspirin and Cardizem bolus and drip.  CMP with creatinine 1.15, BUN 29, potassium 3.4, albumin 2.9 , CBC with hemoglobin 8.5 and hematocrit 26.0, WBC 15.21.  proBNP 7, 267.  High sensitive troponin elevated at 72 and 85 with delta 13.  Repeat EKG showing rate improved, atrial flutter with 2:1, 3:1 conduction.  Ruled in for ACS, most likely type II MI from ischemic demand due to elevated heart rate.    Denying  chest pain, and shortness of breath has improved.  Shortness of breath can be multifactorial due atrial fibrillation RVR, COPD, CAD /ischemic equivalent, and HFpEF.      She is now in normal sinus rhythm.  No distress.  EKG today showing T wave abnormality in lateral leads.    Not able to add beta-blocker or nitrate due to marginal blood pressure   continue Plavix  Continue atorvastatin  Start Ranexa 500 mg twice daily  Start Crestor 5 mg daily    2.  Atrial/flutter/fibrillation with RVR.  Presented with shortness of breath.  EKG with heart rate 178 atrial fibrillation with RVR.  Was given IV Cardizem and then amiodarone.  Repeat EKG yesterday showing 2:1, 3:1 atrial flutter converted to normal sinus this rhythm this morning.    Transition to oral amiodarone 200 mg twice daily x5 days  Blood pressure marginal unable to add CCB.    Continue Eliquis 5 mg twice daily.  CHADS-VASc Risk Assessment score 5     3.  Hypotension.  Blood pressure hypotensive to marginal.  Not able to add beta-blocker or CCB.  Continue to monitor blood pressures    4.  Elevated proBNP.  proBNP 7,267.  Appears euvolemic and in a well perfused state.  Takes Lasix at home.    Continue Lasix 40 mg daily    Disposition: Continue current location.  We will continue to follow.  Thank you for the consult.    I discussed the patient's findings and my recommendations with patient and nursing staff and Dr. Haywood        This document has been electronically signed by DELFINO Funes on August 25, 2023 09:10 CDT   Electronically signed by DELFINO Funes, 08/25/23, 09:10 AM CDT.      I personally saw and examined Mona Perales after the APRN.  I personally performed a history and physical examination of the patient.  I personally reviewed independent findings and plan of care.  I discussed management with the APRN.  I agree with the APRN's documentation.    72-year-old female with hypertension, hyperlipidemia, A-fib, PE nonobstructive  coronary artery disease who presented to the hospital with palpitation was found to have new onset A-fib.    Vitals:    08/25/23 0930 08/25/23 1000 08/25/23 1200 08/25/23 1230   BP: 91/61 (!) 87/57 95/66 98/72   BP Location:       Patient Position:       Pulse: 68 69 73 74   Resp:       Temp:       TempSrc:       SpO2: 95% 95% 97% 100%   Weight:       Height:         1.  Atrial flutter with RVR:  New onset, unknown duration.  [id number] score was elevated at 4.   Agree with anticoagulation with Eliquis.  Converted to sinus rhythm with amiodarone.  Continue p.o. amiodarone can consider ablation as outpatient if recurrence    Troponin elevation secondary to type II demand ischemia.  Nonobstructive coronary artery disease previously.    Thank you for the consult.  We will sign off.  Please call with questions.          Electronically signed by Neftali Haywood MD, 08/25/23, 1:47 PM CDT.          PROVIDER:[TOKEN:[[id number]]]

## 2023-08-25 NOTE — DISCHARGE PLACEMENT REQUEST
"Filomena Perales (72 y.o. Female)       Date of Birth   1950    Social Security Number       Address   [address] [place] [address]    Home Phone   [phone]    MRN   [id number]       Islam   Caodaism    Marital Status                               Admission Date   8/24/23    Admission Type   Emergency    Admitting Provider   Danish Flores MD    Attending Provider   Danish Flores MD    Department, Room/Bed   [facility] CRITICAL CARE STEPDOWN, 08/A       Discharge Date       Discharge Disposition       Discharge Destination                                 Attending Provider: Danish Flores MD    Allergies: Morphine And Related, Penicillins    Isolation: None   Infection: None   Code Status: CPR    Ht: 157.5 cm (62\")   Wt: 61.2 kg (135 lb)    Admission Cmt: None   Principal Problem: Dyspnea [R06.00]                   Active Insurance as of 8/24/2023       Primary Coverage       Payor Plan Insurance Group Employer/Plan Group    [facility] MEDICARE REPLACEMENT [facility] DUAL COMPLETE MEDICARE REPLACEMENT [id number]       Payor Plan Address Payor Plan Phone Number Payor Plan Fax Number Effective Dates    [address] [phone]  8/1/2023 - None Entered    [facility] [id number]         Subscriber Name Subscriber Birth Date Member ID       FILOMENA PERALES 1950 [id number]               Secondary Coverage       Payor Plan Insurance Group Employer/Plan Group    [place]        Payor Plan Address Payor Plan Phone Number Payor Plan Fax Number Effective Dates    [address] [phone]  11/1/2022 - None Entered    [facility] [id number]         Subscriber Name Subscriber Birth Date Member ID       FILOMENA PERALES 1950 [id number]                     Emergency Contacts        (Rel.) Home Phone Work Phone Mobile Phone    Renee Li (Grandchild) -- -- [phone]    Meena Marie " (Friend) -- -- [phone]    Torri Rivero (Daughter) [phone] -- [phone]

## 2023-08-25 NOTE — PLAN OF CARE
Patient uop adequate. Patient is confused at times. Heart rate was elevated, called  For orders. Patient is currently resting between care.

## 2023-08-26 ENCOUNTER — APPOINTMENT (OUTPATIENT)
Dept: GENERAL RADIOLOGY | Facility: HOSPITAL | Age: 73
DRG: 308 | End: 2023-08-26
Payer: MEDICARE

## 2023-08-26 ENCOUNTER — APPOINTMENT (OUTPATIENT)
Dept: CT IMAGING | Facility: HOSPITAL | Age: 73
DRG: 308 | End: 2023-08-26
Payer: MEDICARE

## 2023-08-26 LAB
ANION GAP SERPL CALCULATED.3IONS-SCNC: 12 MMOL/L (ref 5–15)
ARTERIAL PATENCY WRIST A: ABNORMAL
ATMOSPHERIC PRESS: 747 MMHG
B PARAPERT DNA SPEC QL NAA+PROBE: NOT DETECTED
B PERT DNA SPEC QL NAA+PROBE: NOT DETECTED
BACTERIA UR QL AUTO: ABNORMAL /HPF
BASE EXCESS BLDA CALC-SCNC: 2.6 MMOL/L (ref 0–2)
BASOPHILS # BLD AUTO: 0.13 10*3/MM3 (ref 0–0.2)
BASOPHILS NFR BLD AUTO: 0.8 % (ref 0–1.5)
BDY SITE: ABNORMAL
BILIRUB UR QL STRIP: NEGATIVE
BUN SERPL-MCNC: 21 MG/DL (ref 8–23)
BUN/CREAT SERPL: 24.1 (ref 7–25)
C PNEUM DNA NPH QL NAA+NON-PROBE: NOT DETECTED
CALCIUM SPEC-SCNC: 8.7 MG/DL (ref 8.6–10.5)
CHLORIDE SERPL-SCNC: 101 MMOL/L (ref 98–107)
CLARITY UR: CLEAR
CO2 SERPL-SCNC: 26 MMOL/L (ref 22–29)
COLOR UR: YELLOW
CREAT SERPL-MCNC: 0.87 MG/DL (ref 0.57–1)
D-LACTATE SERPL-SCNC: 0.9 MMOL/L (ref 0.5–2)
DEPRECATED RDW RBC AUTO: 46.2 FL (ref 37–54)
EGFRCR SERPLBLD CKD-EPI 2021: 71.8 ML/MIN/1.73
EOSINOPHIL # BLD AUTO: 0.17 10*3/MM3 (ref 0–0.4)
EOSINOPHIL NFR BLD AUTO: 1.1 % (ref 0.3–6.2)
ERYTHROCYTE [DISTWIDTH] IN BLOOD BY AUTOMATED COUNT: 14.1 % (ref 12.3–15.4)
FLUAV SUBTYP SPEC NAA+PROBE: NOT DETECTED
FLUBV RNA ISLT QL NAA+PROBE: NOT DETECTED
GAS FLOW AIRWAY: 10 LPM
GLUCOSE SERPL-MCNC: 102 MG/DL (ref 65–99)
GLUCOSE UR STRIP-MCNC: NEGATIVE MG/DL
HADV DNA SPEC NAA+PROBE: NOT DETECTED
HCO3 BLDA-SCNC: 27 MMOL/L (ref 20–26)
HCOV 229E RNA SPEC QL NAA+PROBE: NOT DETECTED
HCOV HKU1 RNA SPEC QL NAA+PROBE: NOT DETECTED
HCOV NL63 RNA SPEC QL NAA+PROBE: NOT DETECTED
HCOV OC43 RNA SPEC QL NAA+PROBE: NOT DETECTED
HCT VFR BLD AUTO: 29.5 % (ref 34–46.6)
HGB BLD-MCNC: 9.7 G/DL (ref 12–15.9)
HGB UR QL STRIP.AUTO: NEGATIVE
HMPV RNA NPH QL NAA+NON-PROBE: NOT DETECTED
HPIV1 RNA ISLT QL NAA+PROBE: NOT DETECTED
HPIV2 RNA SPEC QL NAA+PROBE: NOT DETECTED
HPIV3 RNA NPH QL NAA+PROBE: NOT DETECTED
HPIV4 P GENE NPH QL NAA+PROBE: NOT DETECTED
HYALINE CASTS UR QL AUTO: ABNORMAL /LPF
IMM GRANULOCYTES # BLD AUTO: 0.15 10*3/MM3 (ref 0–0.05)
IMM GRANULOCYTES NFR BLD AUTO: 1 % (ref 0–0.5)
KETONES UR QL STRIP: NEGATIVE
LEUKOCYTE ESTERASE UR QL STRIP.AUTO: NEGATIVE
LYMPHOCYTES # BLD AUTO: 2.8 10*3/MM3 (ref 0.7–3.1)
LYMPHOCYTES NFR BLD AUTO: 18.1 % (ref 19.6–45.3)
Lab: ABNORMAL
M PNEUMO IGG SER IA-ACNC: NOT DETECTED
MAGNESIUM SERPL-MCNC: 2 MG/DL (ref 1.6–2.4)
MCH RBC QN AUTO: 29.5 PG (ref 26.6–33)
MCHC RBC AUTO-ENTMCNC: 32.9 G/DL (ref 31.5–35.7)
MCV RBC AUTO: 89.7 FL (ref 79–97)
MODALITY: ABNORMAL
MONOCYTES # BLD AUTO: 1.8 10*3/MM3 (ref 0.1–0.9)
MONOCYTES NFR BLD AUTO: 11.6 % (ref 5–12)
NEUTROPHILS NFR BLD AUTO: 10.42 10*3/MM3 (ref 1.7–7)
NEUTROPHILS NFR BLD AUTO: 67.4 % (ref 42.7–76)
NITRITE UR QL STRIP: NEGATIVE
NRBC BLD AUTO-RTO: 0 /100 WBC (ref 0–0.2)
PCO2 BLDA: 40.1 MM HG (ref 35–45)
PH BLDA: 7.44 PH UNITS (ref 7.35–7.45)
PH UR STRIP.AUTO: 5.5 [PH] (ref 5–9)
PLATELET # BLD AUTO: 213 10*3/MM3 (ref 140–450)
PMV BLD AUTO: 9.7 FL (ref 6–12)
PO2 BLDA: 66.4 MM HG (ref 83–108)
POTASSIUM SERPL-SCNC: 3.7 MMOL/L (ref 3.5–5.2)
PROCALCITONIN SERPL-MCNC: 0.11 NG/ML (ref 0–0.25)
PROT UR QL STRIP: ABNORMAL
RBC # BLD AUTO: 3.29 10*6/MM3 (ref 3.77–5.28)
RBC # UR STRIP: ABNORMAL /HPF
REF LAB TEST METHOD: ABNORMAL
RHINOVIRUS RNA SPEC NAA+PROBE: NOT DETECTED
RSV RNA NPH QL NAA+NON-PROBE: NOT DETECTED
SAO2 % BLDCOA: ABNORMAL %
SARS-COV-2 RNA NPH QL NAA+NON-PROBE: NOT DETECTED
SODIUM SERPL-SCNC: 139 MMOL/L (ref 136–145)
SP GR UR STRIP: 1.02 (ref 1–1.03)
SQUAMOUS #/AREA URNS HPF: ABNORMAL /HPF
UROBILINOGEN UR QL STRIP: ABNORMAL
VENTILATOR MODE: ABNORMAL
WBC # UR STRIP: ABNORMAL /HPF
WBC NRBC COR # BLD: 15.47 10*3/MM3 (ref 3.4–10.8)

## 2023-08-26 PROCEDURE — 25010000002 CEFTRIAXONE PER 250 MG: Performed by: INTERNAL MEDICINE

## 2023-08-26 PROCEDURE — 25010000002 FUROSEMIDE PER 20 MG: Performed by: FAMILY MEDICINE

## 2023-08-26 PROCEDURE — 83735 ASSAY OF MAGNESIUM: CPT | Performed by: INTERNAL MEDICINE

## 2023-08-26 PROCEDURE — 25010000002 ALBUMIN HUMAN 25% PER 50 ML: Performed by: FAMILY MEDICINE

## 2023-08-26 PROCEDURE — 71045 X-RAY EXAM CHEST 1 VIEW: CPT

## 2023-08-26 PROCEDURE — 94799 UNLISTED PULMONARY SVC/PX: CPT

## 2023-08-26 PROCEDURE — 25010000002 FUROSEMIDE PER 20 MG: Performed by: INTERNAL MEDICINE

## 2023-08-26 PROCEDURE — 94760 N-INVAS EAR/PLS OXIMETRY 1: CPT

## 2023-08-26 PROCEDURE — 80048 BASIC METABOLIC PNL TOTAL CA: CPT | Performed by: INTERNAL MEDICINE

## 2023-08-26 PROCEDURE — 83605 ASSAY OF LACTIC ACID: CPT | Performed by: INTERNAL MEDICINE

## 2023-08-26 PROCEDURE — 84145 PROCALCITONIN (PCT): CPT | Performed by: INTERNAL MEDICINE

## 2023-08-26 PROCEDURE — 0202U NFCT DS 22 TRGT SARS-COV-2: CPT | Performed by: INTERNAL MEDICINE

## 2023-08-26 PROCEDURE — 94664 DEMO&/EVAL PT USE INHALER: CPT

## 2023-08-26 PROCEDURE — 81001 URINALYSIS AUTO W/SCOPE: CPT | Performed by: INTERNAL MEDICINE

## 2023-08-26 PROCEDURE — 82803 BLOOD GASES ANY COMBINATION: CPT

## 2023-08-26 PROCEDURE — 36600 WITHDRAWAL OF ARTERIAL BLOOD: CPT

## 2023-08-26 PROCEDURE — 85025 COMPLETE CBC W/AUTO DIFF WBC: CPT | Performed by: INTERNAL MEDICINE

## 2023-08-26 PROCEDURE — 71250 CT THORAX DX C-: CPT

## 2023-08-26 PROCEDURE — P9047 ALBUMIN (HUMAN), 25%, 50ML: HCPCS | Performed by: FAMILY MEDICINE

## 2023-08-26 PROCEDURE — 87040 BLOOD CULTURE FOR BACTERIA: CPT | Performed by: INTERNAL MEDICINE

## 2023-08-26 RX ORDER — ALBUMIN (HUMAN) 12.5 G/50ML
25 SOLUTION INTRAVENOUS ONCE
Status: COMPLETED | OUTPATIENT
Start: 2023-08-26 | End: 2023-08-26

## 2023-08-26 RX ORDER — FUROSEMIDE 10 MG/ML
40 INJECTION INTRAMUSCULAR; INTRAVENOUS ONCE
Status: COMPLETED | OUTPATIENT
Start: 2023-08-26 | End: 2023-08-26

## 2023-08-26 RX ORDER — LIDOCAINE 50 MG/G
2 PATCH TOPICAL
Status: DISCONTINUED | OUTPATIENT
Start: 2023-08-26 | End: 2023-08-30 | Stop reason: HOSPADM

## 2023-08-26 RX ORDER — ALBUTEROL SULFATE 2.5 MG/3ML
2.5 SOLUTION RESPIRATORY (INHALATION) EVERY 4 HOURS PRN
Status: DISCONTINUED | OUTPATIENT
Start: 2023-08-26 | End: 2023-08-30 | Stop reason: HOSPADM

## 2023-08-26 RX ADMIN — BUDESONIDE AND FORMOTEROL FUMARATE DIHYDRATE 2 PUFF: 160; 4.5 AEROSOL RESPIRATORY (INHALATION) at 06:53

## 2023-08-26 RX ADMIN — AMIODARONE HYDROCHLORIDE 200 MG: 200 TABLET ORAL at 08:12

## 2023-08-26 RX ADMIN — ACETAMINOPHEN 325 MG: 325 TABLET, FILM COATED ORAL at 08:12

## 2023-08-26 RX ADMIN — MIDODRINE HYDROCHLORIDE 10 MG: 5 TABLET ORAL at 13:12

## 2023-08-26 RX ADMIN — OXYCODONE HYDROCHLORIDE AND ACETAMINOPHEN 1 TABLET: 5; 325 TABLET ORAL at 22:35

## 2023-08-26 RX ADMIN — ACETAMINOPHEN 650 MG: 325 TABLET, FILM COATED ORAL at 13:12

## 2023-08-26 RX ADMIN — IPRATROPIUM BROMIDE AND ALBUTEROL SULFATE 3 ML: .5; 3 SOLUTION RESPIRATORY (INHALATION) at 19:36

## 2023-08-26 RX ADMIN — APIXABAN 5 MG: 5 TABLET, FILM COATED ORAL at 08:12

## 2023-08-26 RX ADMIN — BUDESONIDE AND FORMOTEROL FUMARATE DIHYDRATE 2 PUFF: 160; 4.5 AEROSOL RESPIRATORY (INHALATION) at 19:36

## 2023-08-26 RX ADMIN — ROSUVASTATIN CALCIUM 5 MG: 5 TABLET, FILM COATED ORAL at 20:30

## 2023-08-26 RX ADMIN — CLOPIDOGREL BISULFATE 75 MG: 75 TABLET ORAL at 08:12

## 2023-08-26 RX ADMIN — Medication 10 ML: at 08:13

## 2023-08-26 RX ADMIN — NICOTINE 1 PATCH: 21 PATCH, EXTENDED RELEASE TRANSDERMAL at 08:12

## 2023-08-26 RX ADMIN — GABAPENTIN 400 MG: 400 CAPSULE ORAL at 22:35

## 2023-08-26 RX ADMIN — IPRATROPIUM BROMIDE AND ALBUTEROL SULFATE 3 ML: .5; 3 SOLUTION RESPIRATORY (INHALATION) at 01:37

## 2023-08-26 RX ADMIN — CEFTRIAXONE SODIUM 1000 MG: 1 INJECTION, POWDER, FOR SOLUTION INTRAMUSCULAR; INTRAVENOUS at 21:02

## 2023-08-26 RX ADMIN — AMIODARONE HYDROCHLORIDE 200 MG: 200 TABLET ORAL at 20:30

## 2023-08-26 RX ADMIN — MIDODRINE HYDROCHLORIDE 10 MG: 5 TABLET ORAL at 08:12

## 2023-08-26 RX ADMIN — FUROSEMIDE 40 MG: 40 TABLET ORAL at 08:12

## 2023-08-26 RX ADMIN — MIDODRINE HYDROCHLORIDE 10 MG: 5 TABLET ORAL at 17:11

## 2023-08-26 RX ADMIN — LEVOTHYROXINE SODIUM 25 MCG: 25 TABLET ORAL at 06:14

## 2023-08-26 RX ADMIN — FUROSEMIDE 40 MG: 10 INJECTION, SOLUTION INTRAMUSCULAR; INTRAVENOUS at 13:11

## 2023-08-26 RX ADMIN — SERTRALINE 100 MG: 50 TABLET, FILM COATED ORAL at 08:12

## 2023-08-26 RX ADMIN — FUROSEMIDE 40 MG: 10 INJECTION, SOLUTION INTRAMUSCULAR; INTRAVENOUS at 08:11

## 2023-08-26 RX ADMIN — ACETAMINOPHEN 325 MG: 325 TABLET, FILM COATED ORAL at 01:24

## 2023-08-26 RX ADMIN — IPRATROPIUM BROMIDE AND ALBUTEROL SULFATE 3 ML: .5; 3 SOLUTION RESPIRATORY (INHALATION) at 06:53

## 2023-08-26 RX ADMIN — Medication 10 ML: at 20:31

## 2023-08-26 RX ADMIN — APIXABAN 5 MG: 5 TABLET, FILM COATED ORAL at 20:30

## 2023-08-26 RX ADMIN — RANOLAZINE 500 MG: 500 TABLET, FILM COATED, EXTENDED RELEASE ORAL at 08:12

## 2023-08-26 RX ADMIN — ALBUMIN (HUMAN) 25 G: 0.25 INJECTION, SOLUTION INTRAVENOUS at 13:10

## 2023-08-26 RX ADMIN — DOXYCYCLINE 100 MG: 100 INJECTION, POWDER, LYOPHILIZED, FOR SOLUTION INTRAVENOUS at 13:13

## 2023-08-26 RX ADMIN — GABAPENTIN 400 MG: 400 CAPSULE ORAL at 08:12

## 2023-08-26 RX ADMIN — OXYCODONE HYDROCHLORIDE AND ACETAMINOPHEN 1 TABLET: 5; 325 TABLET ORAL at 13:12

## 2023-08-26 RX ADMIN — RANOLAZINE 500 MG: 500 TABLET, FILM COATED, EXTENDED RELEASE ORAL at 22:35

## 2023-08-26 RX ADMIN — LIDOCAINE 2 PATCH: 50 PATCH TOPICAL at 13:11

## 2023-08-26 RX ADMIN — IPRATROPIUM BROMIDE AND ALBUTEROL SULFATE 3 ML: .5; 3 SOLUTION RESPIRATORY (INHALATION) at 14:23

## 2023-08-26 RX ADMIN — DOXYCYCLINE 100 MG: 100 INJECTION, POWDER, LYOPHILIZED, FOR SOLUTION INTRAVENOUS at 22:33

## 2023-08-26 NOTE — PROGRESS NOTES
Patient with history of COPD, A-fib with RVR, heart failure preserved ejection fraction.  Patient being evaluated by Dr. Haywood and during this hospitalization in conjunction with hospitalist service.  Patient noted within past 60 minutes to ambulate to bathroom, with oxygen desaturation to upper 70s.  RT involved.  And patient bumped to 6 L nasal cannula.  O2 sat transitions 86 to 88%.  We will perform stat ABG and chest x-ray.  Patient already on daily IV Lasix.  We will give additional 40 mg IV Lasix.  We will also give albuterol treatment nebulization x1.  Patient already on DuoNeb scheduled every 6.    Patient Vitals for the past 24 hrs:   BP Temp Temp src Pulse Resp SpO2 Weight   08/26/23 0659 -- -- -- -- -- (!) 87 % --   08/26/23 0653 -- -- -- 96 18 (!) 86 % --   08/26/23 0638 119/87 -- -- 98 -- (!) 88 % --   08/26/23 0333 -- -- -- -- -- -- 69.1 kg (152 lb 6.4 oz)   08/26/23 0328 107/72 98.6 °F (37 °C) Temporal 93 18 91 % --   08/26/23 0137 -- -- -- 78 20 94 % --   08/26/23 0013 -- -- -- 77 -- -- --   08/25/23 2319 97/59 99.5 °F (37.5 °C) Oral 77 20 95 % --   08/25/23 2122 99/68 99.8 °F (37.7 °C) Oral 87 22 91 % 69.8 kg (153 lb 12.8 oz)   08/25/23 2106 -- -- -- 85 -- -- --   08/25/23 2100 117/67 -- -- 82 -- 91 % --   08/25/23 2033 104/75 -- -- 102 -- -- --   08/25/23 2000 104/75 99.6 °F (37.6 °C) Oral 83 20 91 % --   08/25/23 1858 -- -- -- 87 -- 90 % --   08/25/23 1856 -- -- -- 85 20 (!) 87 % --   08/25/23 1800 113/79 -- -- 88 -- 92 % --   08/25/23 1600 105/73 99.5 °F (37.5 °C) Oral 74 20 96 % --   08/25/23 1500 108/69 -- -- 76 -- 96 % --   08/25/23 1400 104/67 -- -- 72 -- 100 % --   08/25/23 1359 104/67 -- -- 72 -- 100 % --   08/25/23 1351 -- -- -- 73 20 99 % --   08/25/23 1230 98/72 -- -- 74 -- 100 % --   08/25/23 1200 95/66 -- -- 73 -- 97 % --   08/25/23 1000 (!) 87/57 -- -- 69 -- 95 % --   08/25/23 0930 91/61 -- -- 68 -- 95 % --   08/25/23 0900 95/64 -- -- 71 -- 92 % --   08/25/23 0833 91/64 -- -- 66 20  94 % --   08/25/23 0830 91/64 -- -- 65 -- 98 % --   08/25/23 0824 -- -- -- -- 20 -- --   08/25/23 0817 -- -- -- -- 20 -- --   08/25/23 0745 97/58 99.9 °F (37.7 °C) Oral 69 20 95 % --   08/25/23 0730 96/57 -- -- 73 -- 93 % --        Laboratory Data:   Results from last 7 days   Lab Units 08/26/23  0545 08/25/23  0743 08/24/23  1244   WBC 10*3/mm3 15.47* 15.41* 15.21*   HEMOGLOBIN g/dL 9.7* 9.8* 8.5*   HEMATOCRIT % 29.5* 31.1* 26.0*   PLATELETS 10*3/mm3 213 227 191        Results from last 7 days   Lab Units 08/26/23  0545 08/25/23  0743 08/24/23  1244   SODIUM mmol/L 139 134* 138   POTASSIUM mmol/L 3.7 4.1 3.4*   CHLORIDE mmol/L 101 97* 106   CO2 mmol/L 26.0 24.0 19.0*   BUN mg/dL 21 25* 29*   CREATININE mg/dL 0.87 0.94 1.15*   CALCIUM mg/dL 8.7 8.4* 6.8*   BILIRUBIN mg/dL  --   --  0.2   ALK PHOS U/L  --   --  57   ALT (SGPT) U/L  --   --  10   AST (SGOT) U/L  --   --  27   GLUCOSE mg/dL 102* 127* 105*       Culture Data:   No results found for: BLOODCX  No results found for: URINECX  No results found for: RESPCX  No results found for: WOUNDCX  No results found for: STOOLCX  No components found for: BODYFLD    Radiology Data:   Imaging Results (Last 24 Hours)       ** No results found for the last 24 hours. **              Electronically signed by Davi Brady MD, 08/26/23, 07:04 CDT.

## 2023-08-26 NOTE — NURSING NOTE
Pt received txfr orders to room 320.  Called report at 2055.  Placed pt on tele box, transported pt to room 320.  Pt able to stand and ambulate with assistance.

## 2023-08-26 NOTE — PLAN OF CARE
Goal Outcome Evaluation:              Outcome Evaluation: Pt transfered from ICU overnight; alert, has been forgetful at times, resolves with redirection; VSS, plan of care continues

## 2023-08-26 NOTE — SIGNIFICANT NOTE
08/26/23 1202   OTHER   Discipline physical therapist   Rehab Time/Intention   Session Not Performed unable to evaluate, medical status change  (spoke with the patient's RN who said to hold the PT eval today in lieu of pt's oxygen status being compromised, on 10 liters with high flow currently. Will check back tomorrow to assess if pt is appropriate at that time.)   Recommendation   PT - Next Appointment 08/27/23

## 2023-08-26 NOTE — PROGRESS NOTES
[facility]  INPATIENT PROGRESS NOTE    Length of Stay: 2  Date of Admission: 8/24/2023  Primary Care Physician: Anahi Camacho APRN    Subjective   Chief Complaint: Shortness of air  HPI:    Patient became hypoxic overnight and had been put on supplemental oxygen currently on 10 L.    Review of Systems   Respiratory:  Positive for shortness of breath.    Cardiovascular:  Negative for chest pain.        All pertinent negatives and positives are as above. All other systems have been reviewed and are negative unless otherwise stated.     Objective    Temp:  [98.6 °F (37 °C)-101.1 °F (38.4 °C)] 101.1 °F (38.4 °C)  Heart Rate:  [] 100  Resp:  [18-22] 18  BP: ()/(59-99) 158/99  Physical Exam  Vitals and nursing note reviewed.   Constitutional:       General: She is not in acute distress.     Appearance: She is well-developed. She is not diaphoretic.   HENT:      Head: Normocephalic and atraumatic.   Cardiovascular:      Rate and Rhythm: Normal rate.   Pulmonary:      Effort: Pulmonary effort is normal. No respiratory distress.      Breath sounds: No wheezing.   Abdominal:      General: There is no distension.      Palpations: Abdomen is soft.   Musculoskeletal:         General: Normal range of motion.   Skin:     General: Skin is warm and dry.   Neurological:      Mental Status: She is alert.      Cranial Nerves: No cranial nerve deficit.   Psychiatric:         Behavior: Behavior normal.         Thought Content: Thought content normal.         Judgment: Judgment normal.             Results Review:  I have reviewed the labs, radiology results, and diagnostic studies.    Laboratory Data:   Lab Results (last 24 hours)       Procedure Component Value Units Date/Time    Blood Gas, Arterial - [[id number]]  (Abnormal) Collected: 08/26/23 0745    Specimen: Arterial Blood Updated: 08/26/23 0742     Site Right Radial     Artur's Test N/A     pH, Arterial  7.437 pH units      pCO2, Arterial 40.1 mm Hg      pO2, Arterial 66.4 mm Hg      Comment: 84 Value below reference range        HCO3, Arterial 27.0 mmol/L      Comment: 83 Value above reference range        Base Excess, Arterial 2.6 mmol/L      Comment:  UzwqsmdgfOswydCddHumjinozGzvlGgvboEucqsel75 Value above reference range        O2 Saturation, Arterial --     Comment:  YwrbtjyosEyfkqIaq[id number] OXI spectrum mismatch        Barometric Pressure for Blood Gas 747 mmHg      Modality Nasal Cannula     Flow Rate 10.0 lpm      Ventilator Mode NA     Collected by mandi     Comment: Meter: [id number]     :  [id number]       Magnesium [[id number]]  (Normal) Collected: 08/26/23 0545    Specimen: Blood Updated: 08/26/23 0615     Magnesium 2.0 mg/dL     Basic Metabolic Panel [[id number]]  (Abnormal) Collected: 08/26/23 0545    Specimen: Blood Updated: 08/26/23 0615     Glucose 102 mg/dL      BUN 21 mg/dL      Creatinine 0.87 mg/dL      Sodium 139 mmol/L      Potassium 3.7 mmol/L      Chloride 101 mmol/L      CO2 26.0 mmol/L      Calcium 8.7 mg/dL      BUN/Creatinine Ratio 24.1     Anion Gap 12.0 mmol/L      eGFR 71.8 mL/min/1.73     Narrative:      GFR Normal >60  Chronic Kidney Disease <60  Kidney Failure <15      CBC & Differential [[id number]]  (Abnormal) Collected: 08/26/23 0545    Specimen: Blood Updated: 08/26/23 0602    Narrative:      The following orders were created for panel order CBC & Differential.  Procedure                               Abnormality         Status                     ---------                               -----------         ------                     CBC Auto Differential[[id number]]        Abnormal            Final result                 Please view results for these tests on the individual orders.    CBC Auto Differential [[id number]]  (Abnormal) Collected: 08/26/23 0545    Specimen: Blood Updated: 08/26/23 0602     WBC 15.47 10*3/mm3      RBC 3.29 10*6/mm3       Hemoglobin 9.7 g/dL      Hematocrit 29.5 %      MCV 89.7 fL      MCH 29.5 pg      MCHC 32.9 g/dL      RDW 14.1 %      RDW-SD 46.2 fl      MPV 9.7 fL      Platelets 213 10*3/mm3      Neutrophil % 67.4 %      Lymphocyte % 18.1 %      Monocyte % 11.6 %      Eosinophil % 1.1 %      Basophil % 0.8 %      Immature Grans % 1.0 %      Neutrophils, Absolute 10.42 10*3/mm3      Lymphocytes, Absolute 2.80 10*3/mm3      Monocytes, Absolute 1.80 10*3/mm3      Eosinophils, Absolute 0.17 10*3/mm3      Basophils, Absolute 0.13 10*3/mm3      Immature Grans, Absolute 0.15 10*3/mm3      nRBC 0.0 /100 WBC     High Sensitivity Troponin T 2Hr [[id number]]  (Abnormal) Collected: 08/25/23 1458    Specimen: Blood Updated: 08/25/23 1612     HS Troponin T 83 ng/L      Troponin T Delta 2 ng/L     Narrative:      High Sensitive Troponin T Reference Range:  <10.0 ng/L- Negative Female for AMI  <15.0 ng/L- Negative Male for AMI  >=10 - Abnormal Female indicating possible myocardial injury.  >=15 - Abnormal Male indicating possible myocardial injury.   Clinicians would have to utilize clinical acumen, EKG, Troponin, and serial changes to determine if it is an Acute Myocardial Infarction or myocardial injury due to an underlying chronic condition.         High Sensitivity Troponin T [[id number]]  (Abnormal) Collected: 08/25/23 0743    Specimen: Blood Updated: 08/25/23 1443     HS Troponin T 81 ng/L     Narrative:      High Sensitive Troponin T Reference Range:  <10.0 ng/L- Negative Female for AMI  <15.0 ng/L- Negative Male for AMI  >=10 - Abnormal Female indicating possible myocardial injury.  >=15 - Abnormal Male indicating possible myocardial injury.   Clinicians would have to utilize clinical acumen, EKG, Troponin, and serial changes to determine if it is an Acute Myocardial Infarction or myocardial injury due to an underlying chronic condition.                 Culture Data:   No results found for: BLOODCX  No results found for: URINECX  No  results found for: RESPCX  No results found for: WOUNDCX  No results found for: STOOLCX  No components found for: BODYFLD    Radiology Data:   Imaging Results (Last 24 Hours)       Procedure Component Value Units Date/Time    XR Chest 1 View [[id number]] Collected: 08/26/23 0723     Updated: 08/26/23 0725    Narrative:      EXAM: Single view chest    COMPARISON: Single view chest 5/9/2023    HISTORY: Respiratory distress    FINDINGS: Single frontal view is obtained of the chest.  Heart and mediastinal  contours are stable.  Diffuse prominence of pulmonary interstitium with  superimposed bibasilar opacities.  Low lung volumes.      Impression:      1.  Right-sided PICC line is present and projects over the region of the  axillary vein.    2.  Diffuse prominence of pulmonary interstitium suggesting either diffuse or  atypical pneumonia versus pulmonary edema.    3.  Superimposed bibasilar atelectasis or pneumonia.    4.  Trace bilateral pleural effusions.            I have reviewed the patient's current medications.     Assessment/Plan     Active Hospital Problems    Diagnosis     **Dyspnea      Acute hypoxic respiratory failure-continue with supplemental oxygen, wean as tolerated.  Continue with nebulizer treatment    COPD exacerbation-continue with nebulizer treatments every 6 hours, Symbicort will be ordered        Herman Espinosa MD   08/26/23   10:38 CDT

## 2023-08-27 LAB
ALBUMIN SERPL-MCNC: 3.7 G/DL (ref 3.5–5.2)
ALBUMIN/GLOB SERPL: 0.9 G/DL
ALP SERPL-CCNC: 68 U/L (ref 39–117)
ALT SERPL W P-5'-P-CCNC: 25 U/L (ref 1–33)
ANION GAP SERPL CALCULATED.3IONS-SCNC: 13 MMOL/L (ref 5–15)
ANISOCYTOSIS BLD QL: ABNORMAL
ARTERIAL PATENCY WRIST A: POSITIVE
AST SERPL-CCNC: 40 U/L (ref 1–32)
ATMOSPHERIC PRESS: 746 MMHG
BASE EXCESS BLDA CALC-SCNC: 5.6 MMOL/L (ref 0–2)
BDY SITE: ABNORMAL
BILIRUB SERPL-MCNC: 0.4 MG/DL (ref 0–1.2)
BUN SERPL-MCNC: 23 MG/DL (ref 8–23)
BUN/CREAT SERPL: 27.1 (ref 7–25)
CALCIUM SPEC-SCNC: 8.7 MG/DL (ref 8.6–10.5)
CHLORIDE SERPL-SCNC: 102 MMOL/L (ref 98–107)
CO2 SERPL-SCNC: 28 MMOL/L (ref 22–29)
CREAT SERPL-MCNC: 0.85 MG/DL (ref 0.57–1)
DEPRECATED RDW RBC AUTO: 45.9 FL (ref 37–54)
EGFRCR SERPLBLD CKD-EPI 2021: 73.8 ML/MIN/1.73
EOSINOPHIL # BLD MANUAL: 0.27 10*3/MM3 (ref 0–0.4)
EOSINOPHIL NFR BLD MANUAL: 2 % (ref 0.3–6.2)
ERYTHROCYTE [DISTWIDTH] IN BLOOD BY AUTOMATED COUNT: 14.2 % (ref 12.3–15.4)
GAS FLOW AIRWAY: 6 LPM
GLOBULIN UR ELPH-MCNC: 4.1 GM/DL
GLUCOSE SERPL-MCNC: 92 MG/DL (ref 65–99)
HCO3 BLDA-SCNC: 30.9 MMOL/L (ref 20–26)
HCT VFR BLD AUTO: 29.5 % (ref 34–46.6)
HGB BLD-MCNC: 9.5 G/DL (ref 12–15.9)
HYPOCHROMIA BLD QL: ABNORMAL
LYMPHOCYTES # BLD MANUAL: 2.3 10*3/MM3 (ref 0.7–3.1)
LYMPHOCYTES NFR BLD MANUAL: 9 % (ref 5–12)
Lab: ABNORMAL
MAGNESIUM SERPL-MCNC: 1.8 MG/DL (ref 1.6–2.4)
MCH RBC QN AUTO: 28.7 PG (ref 26.6–33)
MCHC RBC AUTO-ENTMCNC: 32.2 G/DL (ref 31.5–35.7)
MCV RBC AUTO: 89.1 FL (ref 79–97)
MODALITY: ABNORMAL
MONOCYTES # BLD: 1.22 10*3/MM3 (ref 0.1–0.9)
NEUTROPHILS # BLD AUTO: 9.73 10*3/MM3 (ref 1.7–7)
NEUTROPHILS NFR BLD MANUAL: 71 % (ref 42.7–76)
NEUTS BAND NFR BLD MANUAL: 1 % (ref 0–5)
PCO2 BLDA: 47.5 MM HG (ref 35–45)
PH BLDA: 7.42 PH UNITS (ref 7.35–7.45)
PLATELET # BLD AUTO: 290 10*3/MM3 (ref 140–450)
PMV BLD AUTO: 9.9 FL (ref 6–12)
PO2 BLDA: 107 MM HG (ref 83–108)
POLYCHROMASIA BLD QL SMEAR: ABNORMAL
POTASSIUM SERPL-SCNC: 3.3 MMOL/L (ref 3.5–5.2)
PROT SERPL-MCNC: 7.8 G/DL (ref 6–8.5)
QT INTERVAL: 274 MS
QT INTERVAL: 328 MS
QTC INTERVAL: 465 MS
QTC INTERVAL: 471 MS
RBC # BLD AUTO: 3.31 10*6/MM3 (ref 3.77–5.28)
SAO2 % BLDCOA: 97.8 % (ref 94–99)
SMALL PLATELETS BLD QL SMEAR: ADEQUATE
SODIUM SERPL-SCNC: 143 MMOL/L (ref 136–145)
VARIANT LYMPHS NFR BLD MANUAL: 17 % (ref 19.6–45.3)
VENTILATOR MODE: ABNORMAL
WBC MORPH BLD: NORMAL
WBC NRBC COR # BLD: 13.51 10*3/MM3 (ref 3.4–10.8)

## 2023-08-27 PROCEDURE — 83735 ASSAY OF MAGNESIUM: CPT | Performed by: INTERNAL MEDICINE

## 2023-08-27 PROCEDURE — 94760 N-INVAS EAR/PLS OXIMETRY 1: CPT

## 2023-08-27 PROCEDURE — 36600 WITHDRAWAL OF ARTERIAL BLOOD: CPT

## 2023-08-27 PROCEDURE — 94799 UNLISTED PULMONARY SVC/PX: CPT

## 2023-08-27 PROCEDURE — 94664 DEMO&/EVAL PT USE INHALER: CPT

## 2023-08-27 PROCEDURE — 85007 BL SMEAR W/DIFF WBC COUNT: CPT | Performed by: INTERNAL MEDICINE

## 2023-08-27 PROCEDURE — 80053 COMPREHEN METABOLIC PANEL: CPT | Performed by: FAMILY MEDICINE

## 2023-08-27 PROCEDURE — 97162 PT EVAL MOD COMPLEX 30 MIN: CPT

## 2023-08-27 PROCEDURE — 85025 COMPLETE CBC W/AUTO DIFF WBC: CPT | Performed by: INTERNAL MEDICINE

## 2023-08-27 PROCEDURE — 82803 BLOOD GASES ANY COMBINATION: CPT

## 2023-08-27 PROCEDURE — 25010000002 CEFTRIAXONE PER 250 MG: Performed by: INTERNAL MEDICINE

## 2023-08-27 RX ORDER — OXYCODONE HYDROCHLORIDE AND ACETAMINOPHEN 5; 325 MG/1; MG/1
1 TABLET ORAL EVERY 12 HOURS PRN
Status: DISCONTINUED | OUTPATIENT
Start: 2023-08-27 | End: 2023-08-30 | Stop reason: HOSPADM

## 2023-08-27 RX ADMIN — IPRATROPIUM BROMIDE AND ALBUTEROL SULFATE 3 ML: .5; 3 SOLUTION RESPIRATORY (INHALATION) at 13:32

## 2023-08-27 RX ADMIN — IPRATROPIUM BROMIDE AND ALBUTEROL SULFATE 3 ML: .5; 3 SOLUTION RESPIRATORY (INHALATION) at 01:45

## 2023-08-27 RX ADMIN — LIDOCAINE 2 PATCH: 50 PATCH TOPICAL at 08:35

## 2023-08-27 RX ADMIN — IPRATROPIUM BROMIDE AND ALBUTEROL SULFATE 3 ML: .5; 3 SOLUTION RESPIRATORY (INHALATION) at 07:40

## 2023-08-27 RX ADMIN — DOXYCYCLINE 100 MG: 100 INJECTION, POWDER, LYOPHILIZED, FOR SOLUTION INTRAVENOUS at 22:32

## 2023-08-27 RX ADMIN — APIXABAN 5 MG: 5 TABLET, FILM COATED ORAL at 08:38

## 2023-08-27 RX ADMIN — MIDODRINE HYDROCHLORIDE 10 MG: 5 TABLET ORAL at 12:10

## 2023-08-27 RX ADMIN — AMIODARONE HYDROCHLORIDE 200 MG: 200 TABLET ORAL at 20:00

## 2023-08-27 RX ADMIN — MIDODRINE HYDROCHLORIDE 10 MG: 5 TABLET ORAL at 17:57

## 2023-08-27 RX ADMIN — CLOPIDOGREL BISULFATE 75 MG: 75 TABLET ORAL at 08:37

## 2023-08-27 RX ADMIN — CEFTRIAXONE SODIUM 1000 MG: 1 INJECTION, POWDER, FOR SOLUTION INTRAMUSCULAR; INTRAVENOUS at 20:03

## 2023-08-27 RX ADMIN — IPRATROPIUM BROMIDE AND ALBUTEROL SULFATE 3 ML: .5; 3 SOLUTION RESPIRATORY (INHALATION) at 19:18

## 2023-08-27 RX ADMIN — Medication 10 ML: at 08:42

## 2023-08-27 RX ADMIN — GABAPENTIN 400 MG: 400 CAPSULE ORAL at 20:00

## 2023-08-27 RX ADMIN — LEVOTHYROXINE SODIUM 25 MCG: 25 TABLET ORAL at 05:49

## 2023-08-27 RX ADMIN — AMIODARONE HYDROCHLORIDE 200 MG: 200 TABLET ORAL at 08:37

## 2023-08-27 RX ADMIN — ROSUVASTATIN CALCIUM 5 MG: 5 TABLET, FILM COATED ORAL at 20:01

## 2023-08-27 RX ADMIN — OXYCODONE HYDROCHLORIDE AND ACETAMINOPHEN 1 TABLET: 5; 325 TABLET ORAL at 08:36

## 2023-08-27 RX ADMIN — SERTRALINE 100 MG: 50 TABLET, FILM COATED ORAL at 08:37

## 2023-08-27 RX ADMIN — Medication 10 ML: at 20:01

## 2023-08-27 RX ADMIN — RANOLAZINE 500 MG: 500 TABLET, FILM COATED, EXTENDED RELEASE ORAL at 08:36

## 2023-08-27 RX ADMIN — NICOTINE 1 PATCH: 21 PATCH, EXTENDED RELEASE TRANSDERMAL at 08:39

## 2023-08-27 RX ADMIN — GABAPENTIN 400 MG: 400 CAPSULE ORAL at 12:09

## 2023-08-27 RX ADMIN — RANOLAZINE 500 MG: 500 TABLET, FILM COATED, EXTENDED RELEASE ORAL at 20:01

## 2023-08-27 RX ADMIN — BUDESONIDE AND FORMOTEROL FUMARATE DIHYDRATE 2 PUFF: 160; 4.5 AEROSOL RESPIRATORY (INHALATION) at 07:40

## 2023-08-27 RX ADMIN — MIDODRINE HYDROCHLORIDE 10 MG: 5 TABLET ORAL at 08:38

## 2023-08-27 RX ADMIN — DOXYCYCLINE 100 MG: 100 INJECTION, POWDER, LYOPHILIZED, FOR SOLUTION INTRAVENOUS at 12:09

## 2023-08-27 RX ADMIN — APIXABAN 5 MG: 5 TABLET, FILM COATED ORAL at 20:01

## 2023-08-27 RX ADMIN — BUDESONIDE AND FORMOTEROL FUMARATE DIHYDRATE 2 PUFF: 160; 4.5 AEROSOL RESPIRATORY (INHALATION) at 19:19

## 2023-08-27 NOTE — PROGRESS NOTES
Patient suffered from temperature spike today.  Nursing asking for COVID test.  Totally agree COVID testing is extremely relevant/valid in this patient.  Will order respiratory panel PCR ASAP.  We will also order UA reflex for culture, blood cultures, lactic acid, and procalcitonin.  Patient already on Doxy.  We will extend out antibiotic coverage by adding IV Rocephin.  Worried about possible respiratory infection given desaturations noted on vital signs.            Patient Vitals for the past 24 hrs:   BP Temp Temp src Pulse Resp SpO2 Weight   08/26/23 1936 -- -- -- 76 20 99 % --   08/26/23 1612 -- -- -- 70 -- -- --   08/26/23 1553 110/64 -- -- -- -- -- --   08/26/23 1546 (!) 81/50 99.1 °F (37.3 °C) Oral 71 18 98 % --   08/26/23 1431 -- -- -- 89 18 100 % --   08/26/23 1423 -- -- -- 88 18 98 % --   08/26/23 1216 144/79 100.5 °F (38.1 °C) Oral 87 18 91 % --   08/26/23 0909 -- -- -- 93 -- -- --   08/26/23 0748 -- -- -- -- -- 93 % --   08/26/23 0738 158/99 (!) 101.1 °F (38.4 °C) Oral 100 18 90 % --   08/26/23 0659 -- -- -- -- -- (!) 88 % --   08/26/23 0653 -- -- -- 96 18 (!) 86 % --   08/26/23 0638 119/87 -- -- 98 -- (!) 88 % --   08/26/23 0333 -- -- -- -- -- -- 69.1 kg (152 lb 6.4 oz)   08/26/23 0328 107/72 98.6 °F (37 °C) Oral 93 18 91 % --   08/26/23 0137 -- -- -- 78 20 94 % --   08/26/23 0013 -- -- -- 77 -- -- --   08/25/23 2319 97/59 99.5 °F (37.5 °C) Oral 77 20 95 % --   08/25/23 2122 99/68 99.8 °F (37.7 °C) Oral 87 22 91 % 69.8 kg (153 lb 12.8 oz)   08/25/23 2106 -- -- -- 85 -- -- --   08/25/23 2100 117/67 -- -- 82 -- 91 % --   08/25/23 2033 104/75 -- -- 102 -- -- --        Laboratory Data:   Results from last 7 days   Lab Units 08/26/23  0545 08/25/23  0743 08/24/23  1244   WBC 10*3/mm3 15.47* 15.41* 15.21*   HEMOGLOBIN g/dL 9.7* 9.8* 8.5*   HEMATOCRIT % 29.5* 31.1* 26.0*   PLATELETS 10*3/mm3 213 227 191        Results from last 7 days   Lab Units 08/26/23  0545 08/25/23  0743 08/24/23  1244   SODIUM mmol/L  139 134* 138   POTASSIUM mmol/L 3.7 4.1 3.4*   CHLORIDE mmol/L 101 97* 106   CO2 mmol/L 26.0 24.0 19.0*   BUN mg/dL 21 25* 29*   CREATININE mg/dL 0.87 0.94 1.15*   CALCIUM mg/dL 8.7 8.4* 6.8*   BILIRUBIN mg/dL  --   --  0.2   ALK PHOS U/L  --   --  57   ALT (SGPT) U/L  --   --  10   AST (SGOT) U/L  --   --  27   GLUCOSE mg/dL 102* 127* 105*       Culture Data:   No results found for: BLOODCX  No results found for: URINECX  No results found for: RESPCX  No results found for: WOUNDCX  No results found for: STOOLCX  No components found for: BODYFLD    Radiology Data:   Imaging Results (Last 24 Hours)       Procedure Component Value Units Date/Time    CT Chest Without Contrast Diagnostic [[id number]] Collected: 08/26/23 1334     Updated: 08/26/23 1341    Narrative:        TECHNIQUE:  Axial images from the thoracic inlet through the levels of the diaphragms were  performed followed by 2D multiplanar reformats.    FINDINGS:  Airways are patent.  There are scattered areas of patchy peripheral airspace  consolidations scattered throughout bilateral lungs.  Trace bilateral pleural  effusions noted.  No pneumothorax.    No mediastinal, hilar or axillary adenopathy.  No significant pericardial  effusion.  The thoracic aorta is not grossly aneurysmal.  Moderate to severe  atherosclerosis.  Thoracic spine is intact.  No gross or acute bone abnormality.      Impression:      1.  Findings compatible with multifocal peripheral atypical/viral pneumonia.  Recommend radiographic follow-up to resolution.  2.  Trace bilateral pleural effusions.      XR Chest 1 View [[id number]] Collected: 08/26/23 0723     Updated: 08/26/23 1313    Narrative:      EXAM: Single view chest    COMPARISON: Single view chest 5/9/2023    HISTORY: Respiratory distress    FINDINGS: Single frontal view is obtained of the chest.  Heart and mediastinal  contours are stable.  Diffuse prominence of pulmonary interstitium with  superimposed bibasilar opacities.  Low  lung volumes.      Impression:      1.  Right-sided PICC line is present and projects over the region of the  axillary vein.    2.  Diffuse prominence of pulmonary interstitium suggesting either diffuse or  atypical pneumonia versus pulmonary edema.    3.  Superimposed bibasilar atelectasis or pneumonia.    4.  Trace bilateral pleural effusions.            I have reviewed the patient's current medications.     Scheduled Meds:amiodarone, 200 mg, Oral, Q12H  [START ON 8/30/2023] amiodarone, 200 mg, Oral, Q24H  apixaban, 5 mg, Oral, BID  budesonide-formoterol, 2 puff, Inhalation, BID - RT  clopidogrel, 75 mg, Oral, Daily  doxycycline, 100 mg, Intravenous, Q12H  furosemide, 40 mg, Oral, Daily  gabapentin, 400 mg, Oral, Q12H  ipratropium-albuterol, 3 mL, Nebulization, Q6H - RT  levothyroxine, 25 mcg, Oral, Q AM  lidocaine, 2 patch, Transdermal, Q24H  midodrine, 10 mg, Oral, TID AC  nicotine, 1 patch, Transdermal, Q24H  ranolazine, 500 mg, Oral, Q12H  rosuvastatin, 5 mg, Oral, Nightly  sertraline, 100 mg, Oral, Daily  sodium chloride, 10 mL, Intravenous, Q12H      Continuous Infusions:   PRN Meds:.  acetaminophen    albuterol    nitroglycerin    O2    ondansetron    oxyCODONE-acetaminophen    [COMPLETED] Insert Peripheral IV **AND** sodium chloride    sodium chloride    sodium chloride   Electronically signed by Davi Brady MD, 08/26/23, 20:12 CDT.

## 2023-08-27 NOTE — PLAN OF CARE
Problem: Adult Inpatient Plan of Care  Goal: Plan of Care Review  Outcome: Ongoing, Progressing  Flowsheets (Taken 8/27/2023 0415)  Progress: no change  Plan of Care Reviewed With: patient  Goal: Patient-Specific Goal (Individualized)  Outcome: Ongoing, Progressing  Goal: Absence of Hospital-Acquired Illness or Injury  Outcome: Ongoing, Progressing  Intervention: Identify and Manage Fall Risk  Recent Flowsheet Documentation  Taken 8/27/2023 0010 by Brittney Castillo RN  Safety Promotion/Fall Prevention: safety round/check completed  Taken 8/26/2023 2230 by Brittney Castillo RN  Safety Promotion/Fall Prevention: safety round/check completed  Taken 8/26/2023 2105 by Brittney Castillo RN  Safety Promotion/Fall Prevention: safety round/check completed  Taken 8/26/2023 2030 by Brittney Castillo RN  Safety Promotion/Fall Prevention: safety round/check completed  Taken 8/26/2023 1925 by Brittney Castillo RN  Safety Promotion/Fall Prevention: safety round/check completed  Intervention: Prevent Skin Injury  Recent Flowsheet Documentation  Taken 8/27/2023 0010 by Brittney Castillo RN  Body Position:   right   tilted  Taken 8/26/2023 2230 by Brittney Castillo RN  Body Position:   left   tilted  Taken 8/26/2023 2030 by Brittney Castillo RN  Body Position: supine  Intervention: Prevent and Manage VTE (Venous Thromboembolism) Risk  Recent Flowsheet Documentation  Taken 8/26/2023 2030 by Brittney Castillo RN  VTE Prevention/Management: (eliquis) other (see comments)  Goal: Optimal Comfort and Wellbeing  Outcome: Ongoing, Progressing  Intervention: Provide Person-Centered Care  Recent Flowsheet Documentation  Taken 8/26/2023 2030 by Brittney Castillo RN  Trust Relationship/Rapport: care explained  Goal: Readiness for Transition of Care  Outcome: Ongoing, Progressing     Problem: Asthma Comorbidity  Goal: Maintenance of Asthma Control  Outcome: Ongoing, Progressing     Problem: COPD (Chronic Obstructive Pulmonary Disease) Comorbidity  Goal:  Maintenance of COPD Symptom Control  Outcome: Ongoing, Progressing  Intervention: Maintain COPD-Symptom Control  Recent Flowsheet Documentation  Taken 8/26/2023 2030 by Brittney Castillo RN  Supportive Measures: active listening utilized     Problem: Diabetes Comorbidity  Goal: Blood Glucose Level Within Targeted Range  Outcome: Ongoing, Progressing     Problem: Heart Failure Comorbidity  Goal: Maintenance of Heart Failure Symptom Control  Outcome: Ongoing, Progressing     Problem: Hypertension Comorbidity  Goal: Blood Pressure in Desired Range  Outcome: Ongoing, Progressing     Problem: Obstructive Sleep Apnea Risk or Actual Comorbidity Management  Goal: Unobstructed Breathing During Sleep  Outcome: Ongoing, Progressing     Problem: Skin Injury Risk Increased  Goal: Skin Health and Integrity  Outcome: Ongoing, Progressing  Intervention: Optimize Skin Protection  Recent Flowsheet Documentation  Taken 8/27/2023 0010 by Brittney Castillo RN  Head of Bed (HOB) Positioning: HOB elevated  Taken 8/26/2023 2230 by Brittney Castillo RN  Head of Bed (HOB) Positioning: HOB elevated  Taken 8/26/2023 2030 by Brittney Castillo RN  Head of Bed (HOB) Positioning: HOB elevated     Problem: Fall Injury Risk  Goal: Absence of Fall and Fall-Related Injury  Outcome: Ongoing, Progressing  Intervention: Promote Injury-Free Environment  Recent Flowsheet Documentation  Taken 8/27/2023 0010 by Brittney Castillo, RN  Safety Promotion/Fall Prevention: safety round/check completed  Taken 8/26/2023 2230 by Brittney Castillo RN  Safety Promotion/Fall Prevention: safety round/check completed  Taken 8/26/2023 2105 by Brittney Castillo RN  Safety Promotion/Fall Prevention: safety round/check completed  Taken 8/26/2023 2030 by Brittney Castillo RN  Safety Promotion/Fall Prevention: safety round/check completed  Taken 8/26/2023 1925 by Brittney Castillo RN  Safety Promotion/Fall Prevention: safety round/check completed     Problem: Fluid Imbalance (Pneumonia)  Goal:  Fluid Balance  Outcome: Ongoing, Progressing     Problem: Infection (Pneumonia)  Goal: Resolution of Infection Signs and Symptoms  Outcome: Ongoing, Progressing     Problem: Respiratory Compromise (Pneumonia)  Goal: Effective Oxygenation and Ventilation  Outcome: Ongoing, Progressing  Intervention: Promote Airway Secretion Clearance  Recent Flowsheet Documentation  Taken 8/26/2023 2030 by Brittney Castillo RN  Cough And Deep Breathing: done independently per patient  Intervention: Optimize Oxygenation and Ventilation  Recent Flowsheet Documentation  Taken 8/27/2023 0010 by Brittney Castillo, RN  Head of Bed (HOB) Positioning: HOB elevated  Taken 8/26/2023 2230 by Brittney Castillo, RN  Head of Bed (HOB) Positioning: HOB elevated  Taken 8/26/2023 2030 by Brittney Castillo, RN  Head of Bed (HOB) Positioning: HOB elevated   Goal Outcome Evaluation:  Plan of Care Reviewed With: patient        Progress: no change

## 2023-08-27 NOTE — THERAPY EVALUATION
Patient Name: Mona Perales  : 1950    MRN: [id number]                              Today's Date: 2023       Admit Date: 2023    Visit Dx:     ICD-10-CM ICD-9-CM   1. Shortness of breath  R06.02 786.05   2. Elevated troponin  R77.8 790.6   3. Atrial fibrillation, unspecified type  I48.91 427.31   4. Impaired functional mobility, balance, gait, and endurance [Z74.09 (ICD-10-CM)]  Z74.09 V49.89     Patient Active Problem List   Diagnosis    Anxiety    CAD (coronary atherosclerotic disease)    Carotid stenosis    COPD (chronic obstructive pulmonary disease)    COPD with exacerbation    Depressive disorder, not elsewhere classified    Benign essential hypertension    Hypercholesteremia    Insomnia    Notalgia    Osteoporosis    Other screening mammogram    RUQ abdominal pain    PVD (peripheral vascular disease) with claudication    Nicotine abuse    Dysphagia    Epigastric pain    Pain of right hand    Closed displaced fracture of shaft of fifth metacarpal bone of right hand    Cause of injury, fall    Displaced fracture of shaft of fifth metacarpal bone of right hand with routine healing    Syncope    Acute respiratory failure with hypoxia    (HFpEF) heart failure with preserved ejection fraction    Hypotension    Elevated WBCs    Near syncope    Atherosclerosis of native coronary artery of native heart without angina pectoris    Atherosclerosis of native arteries of extremities with rest pain, left leg    PVD (peripheral vascular disease)    Physical deconditioning    Pain due to onychomycosis of toenails of both feet    ST elevation myocardial infarction (STEMI), unspecified artery    Acute and chronic respiratory failure with hypoxia    Acute on chronic heart failure with preserved ejection fraction (HFpEF)    Current use of long term anticoagulation    Dyspnea     Past Medical History:   Diagnosis Date    A-fib     Anxiety     Arthritis     Asthma     Atherosclerosis of native arteries of the  extremities with ulceration     bilateral legs - bilateral iliac stents 2008       CHF (congestive heart failure)     Chronic lower back pain     COPD (chronic obstructive pulmonary disease)     Essential (primary) hypertension     GERD (gastroesophageal reflux disease)     History of pulmonary embolus (PE)     Hyperlipidemia     Insomnia     Lumbago     Nicotine dependence     Occlusion of artery      and stenosis of bilateral carotid arteries - BABS 16-49%, LICA 0-15%    Other atherosclerosis of native artery of other extremity     LEFT subclavian stent 2005 (occluded)    Sleep apnea      Past Surgical History:   Procedure Laterality Date    CARDIAC CATHETERIZATION  04/06/2016    No evidence of any obstructive epicardial CAD.Preserved LV systolic function with EF of 55%.    CARDIAC CATHETERIZATION N/A 11/1/2022    Procedure: Left Heart Cath;  Surgeon: Nfetali Haywood MD;  Location: [facility] CATH INVASIVE LOCATION;  Service: Cardiology;  Laterality: N/A;    CENTRAL VENOUS LINE INSERTION  04/07/2016    Successful placement of right uppe extremity 6-American triple lumen PICC line.    ENDOSCOPY N/A 1/12/2018    Procedure: ESOPHAGOGASTRODUODENOSCOPY;  Surgeon: Bin Oh MD;  Location: [facility] ENDOSCOPY;  Service:     ENDOSCOPY N/A 1/17/2018    Procedure: ESOPHAGOGASTRODUODENOSCOPY;  Surgeon: Bin Oh MD;  Location: [facility] ENDOSCOPY;  Service:     ENDOSCOPY N/A 3/16/2018    Procedure: ESOPHAGOGASTRODUODENOSCOPY possible dilation;  Surgeon: Bin Oh MD;  Location: [facility] ENDOSCOPY;  Service: Gastroenterology    FEMORAL POPLITEAL BYPASS Left 4/14/2020    Procedure: FEMORAL POPLITEAL BYPASS ABOVE KNEE  (POLYTETRAFLUOROETHYLENE)  LEFT COMMON FEMORAL ARTERY ENDARTERECTOMY PTA LEFT ILIAC ARTERIOGRAM        (c-arm#2 and c-arm table);  Surgeon: David Suh MD;  Location: [facility] OR;  Service: Vascular;  Laterality: Left;    FEMORAL POPLITEAL BYPASS Right 9/17/2021    Procedure: RIGHT common femoral  endarterectomy FEMORAL POPLITEAL BYPASS (above the knee polytetrafluoroethylene  lower extremity arteriogram              (c-arm#2 and c-arm table);  Surgeon: David Suh MD;  Location: [facility] OR;  Service: Vascular;  Laterality: Right;    HYSTERECTOMY      INTERVENTIONAL RADIOLOGY PROCEDURE Left 9/9/2020    Procedure: IR angiogram extremity left;  Surgeon: David Suh MD;  Location: [facility] ANGIO INVASIVE LOCATION;  Service: Interventional Radiology;  Laterality: Left;    KYPHOPLASTY N/A 5/23/2019    Procedure: KYPHOPLASTY LUMBAR FOUR;  Surgeon: Aba Santa MD;  Location: [facility] OR;  Service: Orthopedic Spine    TOTAL SHOULDER REPLACEMENT Left     TRANSESOPHAGEAL ECHOCARDIOGRAM (JACQUES)  04/07/2016    With color flow-Mild to moderate [facility].LV systolic function well preserved with Ef of 55-60%.Mitral and AV intact.Diastolic dysfunction    UPPER GASTROINTESTINAL ENDOSCOPY  01/17/2018    Dr. Bin Martin M.D.      General Information       Row Name 08/27/23 0925          Physical Therapy Time and Intention    Document Type evaluation  -CZ     Mode of Treatment physical therapy  -CZ       Row Name 08/27/23 0925          General Information    Patient Profile Reviewed yes  -CZ     Prior Level of Function independent:;all household mobility  -CZ     Existing Precautions/Restrictions fall;oxygen therapy device and L/min  -CZ     Barriers to Rehab medically complex  -CZ       Row Name 08/27/23 0925          Living Environment    People in Home facility resident  -CZ       Row Name 08/27/23 0925          Home Main Entrance    Number of Stairs, Main Entrance none  -CZ       Row Name 08/27/23 0925          Stairs Within Home, Primary    Stairs, Within Home, Primary At SNF for rehab.  Ambulates with FWW, O2 PRN.  -CZ     Number of Stairs, Within Home, Primary none  -CZ       Row Name 08/27/23 0925          Cognition    Orientation Status (Cognition) oriented x 4  -CZ       Row Name  08/27/23 0925          Safety Issues, Functional Mobility    Impairments Affecting Function (Mobility) strength;endurance/activity tolerance;pain;balance  -CZ               User Key  (r) = Recorded By, (t) = Taken By, (c) = Cosigned By      Initials Name Provider Type    Hakeem Gutierrez, PT Physical Therapist                   Mobility       Row Name 08/27/23 0925          Bed Mobility    Bed Mobility supine-sit;sit-supine  -CZ     Supine-Sit [place] (Bed Mobility) supervision  -CZ     Sit-Supine [place] (Bed Mobility) supervision  -CZ     Assistive Device (Bed Mobility) head of bed elevated;bed rails  -CZ       Row Name 08/27/23 0925          Sit-Stand Transfer    Sit-Stand [place] (Transfers) contact guard  -CZ     Assistive Device (Sit-Stand Transfers) walker, front-wheeled  -CZ       Row Name 08/27/23 0925          Gait/Stairs (Locomotion)    [place] Level (Gait) contact guard  -CZ     Assistive Device (Gait) walker, front-wheeled  -CZ     Distance in Feet (Gait) 5'x 2.  -CZ     Comment, (Gait/Stairs) Sidesteps L and R along bed.  Attempted longer distance ambulation but patient c/o dizziness.  BP stable in seated and supine, unable to read BP in standing.  -CZ               User Key  (r) = Recorded By, (t) = Taken By, (c) = Cosigned By      Initials Name Provider Type    Hakeem Gutierrez, PT Physical Therapist                   Obj/Interventions       Row Name 08/27/23 0925          Range of Motion Comprehensive    General Range of Motion bilateral lower extremity ROM WFL  -CZ       Row Name 08/27/23 0925          Strength Comprehensive (MMT)    Comment, General Manual Muscle Testing (MMT) Assessment BLEs: 4-/5 grossly.  -CZ       Row Name 08/27/23 0925          Sensory Assessment (Somatosensory)    Sensory Assessment (Somatosensory) LE sensation intact  -CZ               User Key  (r) = Recorded By, (t) = Taken By, (c) = Cosigned By      Initials Name Provider Type    MAXIMINO Wolfe  Hakeem OCHOA, PT Physical Therapist                   Goals/Plan       Row Name 08/27/23 0925          Bed Mobility Goal 1 (PT)    Activity/Assistive Device (Bed Mobility Goal 1, PT) sit to supine/supine to sit  -CZ     [place] Level/Cues Needed (Bed Mobility Goal 1, PT) modified independence  -CZ     Time Frame (Bed Mobility Goal 1, PT) by discharge  -CZ     Progress/Outcomes (Bed Mobility Goal 1, PT) goal not met  -CZ       Row Name 08/27/23 0925          Transfer Goal 1 (PT)    Activity/Assistive Device (Transfer Goal 1, PT) sit-to-stand/stand-to-sit;bed-to-chair/chair-to-bed;walker, rolling  -CZ     [place] Level/Cues Needed (Transfer Goal 1, PT) modified independence  -CZ     Time Frame (Transfer Goal 1, PT) by discharge  -CZ     Strategies/Barriers (Transfers Goal 1, PT) Dizzy upon standing during evaluation.  -CZ     Progress/Outcome (Transfer Goal 1, PT) goal not met  -CZ       Row Name 08/27/23 0925          Gait Training Goal 1 (PT)    Activity/Assistive Device (Gait Training Goal 1, PT) walker, rolling  -CZ     [place] Level (Gait Training Goal 1, PT) modified independence  -CZ     Distance (Gait Training Goal 1, PT) 30'x2.  -CZ     Strategies/Barriers (Gait Training Goal 1, PT) Dizzy upon standing during evaluation.  -CZ     Progress/Outcome (Gait Training Goal 1, PT) goal not met  -CZ       Row Name 08/27/23 0925          Problem Specific Goal 1 (PT)    Problem Specific Goal 1 (PT) Score 24/28 on Tinetti fall risk assessment.  -CZ     Time Frame (Problem Specific Goal 1, PT) by discharge  -CZ     Strategies/Barriers (Problem Specific Goal 1, PT) Dizzy upon standing during evaluation.  -CZ     Progress/Outcome (Problem Specific Goal 1, PT) goal not met  -CZ       Row Name 08/27/23 0925          Therapy Assessment/Plan (PT)    Planned Therapy Interventions (PT) balance training;bed mobility training;gait training;patient/family education;transfer training;strengthening;stretching;ROM (range of  motion)  -CZ               User Key  (r) = Recorded By, (t) = Taken By, (c) = Cosigned By      Initials Name Provider Type    CZ Hakeem Wolfe, PT Physical Therapist                   Clinical Impression       Row Name 08/27/23 0925          Pain    Pretreatment Pain Rating 7/10  -CZ     Posttreatment Pain Rating 7/10  -CZ     Pain Location lower  -CZ     Pain Location - back  -CZ     Pain Intervention(s) Medication (See MAR);Repositioned;Ambulation/increased activity;Distraction  -CZ       Row Name 08/27/23 0925          Plan of Care Review    Plan of Care Reviewed With patient  -CZ     Outcome Evaluation Initial PT evaluation complete. Patient is alert and cooperative.  She requires SPV with bed mobility, CGA with transfers and gait, ambulating 5'x2 with FWW, sidesteps L and R along bed.  Ambulation distance limited by c/o dizziness.  BP stable in supine and seated, unable to assess in standing. SpO2 95% on 8 LPM throughout eval.  Patient currently at SNF for rehab.  She is appropriate for return to SNF for continued rehab.  Goals established, continue skilled I/P PT.  -       Row Name 08/27/23 0925          Therapy Assessment/Plan (PT)    Rehab Potential (PT) good, to achieve stated therapy goals  -     Criteria for Skilled Interventions Met (PT) yes;skilled treatment is necessary  -     Therapy Frequency (PT) other (see comments)  5-7 days/week.  -       Row Name 08/27/23 0925          Vital Signs    Pre Systolic BP Rehab 100  -CZ     Pre Treatment Diastolic BP 70  -CZ     Intra Systolic BP Rehab 100  -CZ     Intra Treatment Diastolic BP 71  -CZ     Post Systolic BP Rehab --  Unable to read BP in standing.  -CZ     Pretreatment Heart Rate (beats/min) 72  -CZ     Posttreatment Heart Rate (beats/min) 77  -CZ     Pre SpO2 (%) 95  -CZ     O2 Delivery Pre Treatment hi-flow  8 LPM, home O2 PRN.  -CZ     Post SpO2 (%) 95  -CZ     Pre Patient Position Supine  -CZ     Intra Patient Position Sitting  -CZ      Post Patient Position Supine  -CZ       Row Name 08/27/23 0925          Positioning and Restraints    Pre-Treatment Position in bed  -CZ     Post Treatment Position bed  -CZ     In Bed supine;call light within reach;encouraged to call for assist;exit alarm on  -CZ               User Key  (r) = Recorded By, (t) = Taken By, (c) = Cosigned By      Initials Name Provider Type    Hakeem Gutierrez, PT Physical Therapist                   Outcome Measures       Row Name 08/27/23 1000          How much help from another person do you currently need...    Turning from your back to your side while in flat bed without using bedrails? 3 (P)   -KB     Moving from lying on back to sitting on the side of a flat bed without bedrails? 3 (P)   -KB     Moving to and from a bed to a chair (including a wheelchair)? 3 (P)   -KB     Standing up from a chair using your arms (e.g., wheelchair, bedside chair)? 2 (P)   -KB     Climbing 3-5 steps with a railing? 2 (P)   -KB     To walk in hospital room? 2 (P)   -KB     AM-PAC 6 Clicks Score (PT) 15 (P)   -KB     Highest level of mobility 4 --> Transferred to chair/commode (P)   -KB               User Key  (r) = Recorded By, (t) = Taken By, (c) = Cosigned By      Initials Name Provider Type    Evelyne Dey, RN Extern Registered Nurse                                 Physical Therapy Education       Title: PT OT SLP Therapies (Not Started)       Topic: Physical Therapy (Not Started)       Point: Mobility training (Not Started)       Learner Progress:  Not documented in this visit.              Point: Home exercise program (Not Started)       Learner Progress:  Not documented in this visit.              Point: Body mechanics (Not Started)       Learner Progress:  Not documented in this visit.              Point: Precautions (Not Started)       Learner Progress:  Not documented in this visit.                                  PT Recommendation and Plan  Planned Therapy Interventions (PT):  balance training, bed mobility training, gait training, patient/family education, transfer training, strengthening, stretching, ROM (range of motion)  Plan of Care Reviewed With: patient  Outcome Evaluation: Initial PT evaluation complete. Patient is alert and cooperative.  She requires SPV with bed mobility, CGA with transfers and gait, ambulating 5'x2 with FWW, sidesteps L and R along bed.  Ambulation distance limited by c/o dizziness.  BP stable in supine and seated, unable to assess in standing. SpO2 95% on 8 LPM throughout eval.  Patient currently at SNF for rehab.  She is appropriate for return to SNF for continued rehab.  Goals established, continue skilled I/P PT.     Time Calculation:   PT Evaluation Complexity  History, PT Evaluation Complexity: 1-2 personal factors and/or comorbidities  Examination of Body Systems (PT Eval Complexity): total of 3 or more elements  Clinical Presentation (PT Evaluation Complexity): evolving  Clinical Decision Making (PT Evaluation Complexity): moderate complexity  Overall Complexity (PT Evaluation Complexity): moderate complexity     PT Charges       Row Name 08/27/23 1224             Time Calculation    Start Time 0925  -CZ      Stop Time 1005  -CZ      Time Calculation (min) 40 min  -CZ      PT Received On 08/27/23  -CZ      PT Goal Re-Cert Due Date 09/09/23  -CZ         Untimed Charges    PT Eval/Re-eval Minutes 40  -CZ         Total Minutes    Untimed Charges Total Minutes 40  -CZ       Total Minutes 40  -CZ                User Key  (r) = Recorded By, (t) = Taken By, (c) = Cosigned By      Initials Name Provider Type    CZ Hakeem Wolfe, PT Physical Therapist                  Therapy Charges for Today       Code Description Service Date Service Provider Modifiers Qty    [id number]  PT EVAL MOD COMPLEXITY 3 8/27/2023 Hakeem Wolfe, PT GP 1            PT G-Codes  AM-PAC 6 Clicks Score (PT): (P) 15  PT Discharge Summary  Anticipated Discharge Disposition (PT):  skilled nursing facility    Hakeem Wolfe, PT  8/27/2023

## 2023-08-27 NOTE — PLAN OF CARE
Goal Outcome Evaluation:  Plan of Care Reviewed With: patient           Outcome Evaluation: Initial PT evaluation complete. Patient is alert and cooperative.  She requires SPV with bed mobility, CGA with transfers and gait, ambulating 5'x2 with FWW, sidesteps L and R along bed.  Ambulation distance limited by c/o dizziness.  BP stable in supine and seated, unable to assess in standing. SpO2 95% on 8 LPM throughout eval.  Patient currently at SNF for rehab.  She is appropriate for return to SNF for continued rehab.  Goals established, continue skilled I/P PT.      Anticipated Discharge Disposition (PT): skilled nursing facility

## 2023-08-27 NOTE — PROGRESS NOTES
[facility] Medicine Services  INPATIENT PROGRESS NOTE    Length of Stay: 3  Date of Admission: 8/24/2023  Primary Care Physician: Anahi Camacho APRN    Subjective   (S) Admitted for atrial fibrillation with RVR; no chest pain  Had fever yesterday and increase her oxygen requirements; from 10 L went down to 8, will keep weaning her off    Review of Systems   All other systems reviewed and are negative.     All pertinent negatives and positives are as above. All other systems have been reviewed and are negative unless otherwise stated.     Prior to Admission medications    Medication Sig Start Date End Date Taking? Authorizing Provider   acetaminophen (TYLENOL) 325 MG tablet Take 1 tablet by mouth Every 6 (Six) Hours As Needed for Mild Pain.    Miguelito Chatman MD   albuterol (PROVENTIL) (2.5 MG/3ML) 0.083% nebulizer solution Take 2.5 mg by nebulization Every 4 (Four) Hours As Needed for Wheezing.    Miguelito Chatman MD   ALBUTEROL SULFATE HFA IN Inhale 2 puffs Every 4 (Four) Hours As Needed.    Miguelito Chatman MD   amitriptyline (ELAVIL) 25 MG tablet Take 1 tablet by mouth Every Night. 5/8/20   Miguelito Chatman MD   apixaban (ELIQUIS) 5 MG tablet tablet Take 1 tablet by mouth 2 (Two) Times a Day.    Miguelito Chatman MD   budesonide-formoterol (SYMBICORT) 160-4.5 MCG/ACT inhaler Inhale 2 puffs 2 (Two) Times a Day As Needed.    Miguelito Chatman MD   cilostazol (PLETAL) 100 MG tablet Take 1 tablet by mouth 2 (Two) Times a Day.    Miguelito Chatman MD   clopidogrel (PLAVIX) 75 MG tablet Take 1 tablet by mouth Daily. 8/22/23   Jacki Polanco APRN   Fluticasone-Salmeterol (ADVAIR/WIXELA) 500-50 MCG/ACT DISKUS Inhale 2 (Two) Times a Day.    Miguelito Chatman MD   furosemide (LASIX) 40 MG tablet Take 1 tablet by mouth Daily. Take additional tablet in the afternoon or evening for increased weight gain >3lbs, shortness of breath or leg  swelling 5/15/23   Michele Nunn MD   gabapentin (NEURONTIN) 400 MG capsule Take 1 capsule by mouth Every 8 (Eight) Hours for 3 days. 5/15/23 8/22/23  Michele Nunn MD   ipratropium-albuterol (DUO-NEB) 0.5-2.5 mg/3 ml nebulizer Take 3 mL by nebulization Every 6 (Six) Hours.    Miguelito Chatman MD   levothyroxine (SYNTHROID, LEVOTHROID) 25 MCG tablet Take 1 tablet by mouth Every Morning.    Miguelito Chatman MD   midodrine (PROAMATINE) 5 MG tablet Take 2 tablets by mouth 3 (Three) Times a Day Before Meals.    Miguelito Chatman MD   naloxone (NARCAN) 4 MG/0.1ML nasal spray Call 911. Don't prime. [place] in 1 nostril for overdose. Repeat in 2-3 minutes in other nostril if no or minimal breathing/responsiveness. 5/15/23   Michele Nunn MD   nicotine (NICODERM CQ) 21 MG/24HR patch Place 1 patch on the skin as directed by provider Daily **Remove old patch before applying new patch** 11/18/22   Vida Conrad APRN   NON FORMULARY Biofreeze - apply to knees daily    Miguelito Chatman MD   O2 (OXYGEN) Inhale 4 L/min As Needed (shortness of air). 9/6/21   Miguelito Chatman MD   ondansetron (ZOFRAN) 4 MG tablet Take 1 tablet by mouth Every 8 (Eight) Hours As Needed for Nausea or Vomiting.    Miguelito Chatman MD   oxyCODONE-acetaminophen (PERCOCET) 5-325 MG per tablet Take 1 tablet by mouth Every 6 (Six) Hours As Needed.    Miguelito Chatman MD   pantoprazole (PROTONIX) 40 MG EC tablet Take 1 tablet by mouth Daily. 2/5/18   Mary Shen APRN   penciclovir (DENAVIR) 1 % cream Apply 1 application  topically to the appropriate area as directed As Needed (fever blisters).    Miguelito Chatman MD   pravastatin (PRAVACHOL) 20 MG tablet Take 1 tablet by mouth Every Night. 5/2/22   Jacki Polanco APRN   sennosides-docusate (PERICOLACE) 8.6-50 MG per tablet Take 2 tablets by mouth 2 (Two) Times a Day As Needed for Constipation. 11/17/22   Vida Conrad, DELFINO   sertraline  (ZOLOFT) 100 MG tablet Take 1 tablet by mouth Daily.    Provider, MD Miguelito   traZODone (DESYREL) 150 MG tablet Take 1 tablet by mouth Every Night. 2/26/19   Donn Triana MD       amiodarone, 200 mg, Oral, Q12H  [START ON 8/30/2023] amiodarone, 200 mg, Oral, Q24H  apixaban, 5 mg, Oral, BID  budesonide-formoterol, 2 puff, Inhalation, BID - RT  cefTRIAXone, 1,000 mg, Intravenous, Q24H  clopidogrel, 75 mg, Oral, Daily  doxycycline, 100 mg, Intravenous, Q12H  furosemide, 40 mg, Oral, Daily  gabapentin, 400 mg, Oral, Q12H  ipratropium-albuterol, 3 mL, Nebulization, Q6H - RT  levothyroxine, 25 mcg, Oral, Q AM  lidocaine, 2 patch, Transdermal, Q24H  midodrine, 10 mg, Oral, TID AC  nicotine, 1 patch, Transdermal, Q24H  ranolazine, 500 mg, Oral, Q12H  rosuvastatin, 5 mg, Oral, Nightly  sertraline, 100 mg, Oral, Daily  sodium chloride, 10 mL, Intravenous, Q12H             Objective    Temp:  [98.5 °F (36.9 °C)-99.1 °F (37.3 °C)] 98.8 °F (37.1 °C)  Heart Rate:  [70-89] 72  Resp:  [16-20] 16  BP: ()/(50-74) 98/64    Physical Exam  Vitals reviewed.   Constitutional:       Appearance: Normal appearance.   HENT:      Head: Atraumatic.      Nose: Nose normal.   Eyes:      Extraocular Movements: Extraocular movements intact.   Cardiovascular:      Rate and Rhythm: Normal rate and regular rhythm.      Heart sounds: Normal heart sounds.   Pulmonary:      Comments: Bibasilar rales  Abdominal:      General: Bowel sounds are normal.      Palpations: Abdomen is soft.   Musculoskeletal:         General: Normal range of motion.      Cervical back: Normal range of motion and neck supple.   Skin:     General: Skin is warm.   Neurological:      General: No focal deficit present.      Mental Status: She is alert. Mental status is at baseline.   Psychiatric:         Behavior: Behavior normal.     Results Review:  I have reviewed the labs, radiology results, and diagnostic studies.    Laboratory Data:   Results from last 7 days   Lab  Units 08/27/23  0646 08/26/23  0545 08/25/23  0743 08/24/23  1244   SODIUM mmol/L 143 139 134* 138   POTASSIUM mmol/L 3.3* 3.7 4.1 3.4*   CHLORIDE mmol/L 102 101 97* 106   CO2 mmol/L 28.0 26.0 24.0 19.0*   BUN mg/dL 23 21 25* 29*   CREATININE mg/dL 0.85 0.87 0.94 1.15*   GLUCOSE mg/dL 92 102* 127* 105*   CALCIUM mg/dL 8.7 8.7 8.4* 6.8*   BILIRUBIN mg/dL 0.4  --   --  0.2   ALK PHOS U/L 68  --   --  57   ALT (SGPT) U/L 25  --   --  10   AST (SGOT) U/L 40*  --   --  27   ANION GAP mmol/L 13.0 12.0 13.0 13.0       Estimated Creatinine Clearance: 55.3 mL/min (by C-G formula based on SCr of 0.85 mg/dL).  Results from last 7 days   Lab Units 08/27/23  0646 08/26/23  0545 08/25/23  0743   MAGNESIUM mg/dL 1.8 2.0 2.0           Results from last 7 days   Lab Units 08/27/23  0646 08/26/23  0545 08/25/23  0743 08/24/23  1244   WBC 10*3/mm3 13.51* 15.47* 15.41* 15.21*   HEMOGLOBIN g/dL 9.5* 9.7* 9.8* 8.5*   HEMATOCRIT % 29.5* 29.5* 31.1* 26.0*   PLATELETS 10*3/mm3 290 213 227 191             Culture Data:   No results found for: BLOODCX  No results found for: URINECX  No results found for: RESPCX  No results found for: WOUNDCX  No results found for: STOOLCX  No components found for: BODYFLD    Radiology Data:   Imaging Results (Last 24 Hours)       Procedure Component Value Units Date/Time    CT Chest Without Contrast Diagnostic [[id number]] Collected: 08/26/23 1334     Updated: 08/26/23 1341    Narrative:        TECHNIQUE:  Axial images from the thoracic inlet through the levels of the diaphragms were  performed followed by 2D multiplanar reformats.    FINDINGS:  Airways are patent.  There are scattered areas of patchy peripheral airspace  consolidations scattered throughout bilateral lungs.  Trace bilateral pleural  effusions noted.  No pneumothorax.    No mediastinal, hilar or axillary adenopathy.  No significant pericardial  effusion.  The thoracic aorta is not grossly aneurysmal.  Moderate to severe  atherosclerosis.   Thoracic spine is intact.  No gross or acute bone abnormality.      Impression:      1.  Findings compatible with multifocal peripheral atypical/viral pneumonia.  Recommend radiographic follow-up to resolution.  2.  Trace bilateral pleural effusions.      XR Chest 1 View [[id number]] Collected: 08/26/23 0723     Updated: 08/26/23 1313    Narrative:      EXAM: Single view chest    COMPARISON: Single view chest 5/9/2023    HISTORY: Respiratory distress    FINDINGS: Single frontal view is obtained of the chest.  Heart and mediastinal  contours are stable.  Diffuse prominence of pulmonary interstitium with  superimposed bibasilar opacities.  Low lung volumes.      Impression:      1.  Right-sided PICC line is present and projects over the region of the  axillary vein.    2.  Diffuse prominence of pulmonary interstitium suggesting either diffuse or  atypical pneumonia versus pulmonary edema.    3.  Superimposed bibasilar atelectasis or pneumonia.    4.  Trace bilateral pleural effusions.            I have reviewed the patient's current medications.     Assessment/Plan     Atrial fibrillation with RVR    converted to NSR     Troponin elevated likely type II: recent heart cath on 11/2022; no obstructive CAD     change amiodarone drip melissa amiodarone tab     Will advance her diet     Appreciated cardiology assistance     Bilateral pneumonia     Not POA     Continue with rocephin and doxycycline 2/5     Chronic medical problems     COPD, no AE     Essential hypertension     Medical Decision Making  Number and Complexity of problems: one major complex  Differential Diagnosis: ACS     Conditions and Status:        Condition is unchanged.     [facility] Data  External documents reviewed: previous medical records  My EKG interpretation: a fib with RVR  My plain film interpretation: chronic changes  Tests considered but not ordered: none     Discussed with: patient and RN  Treatment Plan  See above     Care Planning  Shared decision  making: her granddaughter Renee  Code status and discussions: full code     Disposition  Social Determinants of Health that impact treatment or disposition: none  I expect the patient to be discharged: In progress.       I confirmed that the patient's Advance Care Plan is present, code status is documented, or surrogate decision maker is listed in the patient's medical record.     Danish Flores MD

## 2023-08-28 LAB
MAGNESIUM SERPL-MCNC: 1.6 MG/DL (ref 1.6–2.4)
POTASSIUM SERPL-SCNC: 3.6 MMOL/L (ref 3.5–5.2)

## 2023-08-28 PROCEDURE — 83735 ASSAY OF MAGNESIUM: CPT | Performed by: INTERNAL MEDICINE

## 2023-08-28 PROCEDURE — 97530 THERAPEUTIC ACTIVITIES: CPT

## 2023-08-28 PROCEDURE — 97110 THERAPEUTIC EXERCISES: CPT

## 2023-08-28 PROCEDURE — 94799 UNLISTED PULMONARY SVC/PX: CPT

## 2023-08-28 PROCEDURE — 25010000002 CEFTRIAXONE PER 250 MG: Performed by: INTERNAL MEDICINE

## 2023-08-28 PROCEDURE — 94760 N-INVAS EAR/PLS OXIMETRY 1: CPT

## 2023-08-28 PROCEDURE — 94664 DEMO&/EVAL PT USE INHALER: CPT

## 2023-08-28 PROCEDURE — 84132 ASSAY OF SERUM POTASSIUM: CPT | Performed by: INTERNAL MEDICINE

## 2023-08-28 RX ADMIN — BUDESONIDE AND FORMOTEROL FUMARATE DIHYDRATE 2 PUFF: 160; 4.5 AEROSOL RESPIRATORY (INHALATION) at 19:51

## 2023-08-28 RX ADMIN — APIXABAN 5 MG: 5 TABLET, FILM COATED ORAL at 20:41

## 2023-08-28 RX ADMIN — BUDESONIDE AND FORMOTEROL FUMARATE DIHYDRATE 2 PUFF: 160; 4.5 AEROSOL RESPIRATORY (INHALATION) at 06:46

## 2023-08-28 RX ADMIN — OXYCODONE HYDROCHLORIDE AND ACETAMINOPHEN 1 TABLET: 5; 325 TABLET ORAL at 16:39

## 2023-08-28 RX ADMIN — MIDODRINE HYDROCHLORIDE 10 MG: 5 TABLET ORAL at 08:38

## 2023-08-28 RX ADMIN — RANOLAZINE 500 MG: 500 TABLET, FILM COATED, EXTENDED RELEASE ORAL at 20:41

## 2023-08-28 RX ADMIN — IPRATROPIUM BROMIDE AND ALBUTEROL SULFATE 3 ML: .5; 3 SOLUTION RESPIRATORY (INHALATION) at 12:32

## 2023-08-28 RX ADMIN — SERTRALINE 100 MG: 50 TABLET, FILM COATED ORAL at 08:38

## 2023-08-28 RX ADMIN — RANOLAZINE 500 MG: 500 TABLET, FILM COATED, EXTENDED RELEASE ORAL at 08:38

## 2023-08-28 RX ADMIN — LIDOCAINE 2 PATCH: 50 PATCH TOPICAL at 08:37

## 2023-08-28 RX ADMIN — ROSUVASTATIN CALCIUM 5 MG: 5 TABLET, FILM COATED ORAL at 20:41

## 2023-08-28 RX ADMIN — IPRATROPIUM BROMIDE AND ALBUTEROL SULFATE 3 ML: .5; 3 SOLUTION RESPIRATORY (INHALATION) at 06:45

## 2023-08-28 RX ADMIN — MIDODRINE HYDROCHLORIDE 10 MG: 5 TABLET ORAL at 16:39

## 2023-08-28 RX ADMIN — Medication 10 ML: at 20:44

## 2023-08-28 RX ADMIN — DOXYCYCLINE 100 MG: 100 INJECTION, POWDER, LYOPHILIZED, FOR SOLUTION INTRAVENOUS at 12:00

## 2023-08-28 RX ADMIN — IPRATROPIUM BROMIDE AND ALBUTEROL SULFATE 3 ML: .5; 3 SOLUTION RESPIRATORY (INHALATION) at 00:49

## 2023-08-28 RX ADMIN — CEFTRIAXONE SODIUM 1000 MG: 1 INJECTION, POWDER, FOR SOLUTION INTRAMUSCULAR; INTRAVENOUS at 20:41

## 2023-08-28 RX ADMIN — IPRATROPIUM BROMIDE AND ALBUTEROL SULFATE 3 ML: .5; 3 SOLUTION RESPIRATORY (INHALATION) at 19:51

## 2023-08-28 RX ADMIN — CLOPIDOGREL BISULFATE 75 MG: 75 TABLET ORAL at 08:38

## 2023-08-28 RX ADMIN — Medication 10 ML: at 08:38

## 2023-08-28 RX ADMIN — AMIODARONE HYDROCHLORIDE 200 MG: 200 TABLET ORAL at 20:41

## 2023-08-28 RX ADMIN — MIDODRINE HYDROCHLORIDE 10 MG: 5 TABLET ORAL at 12:00

## 2023-08-28 RX ADMIN — LEVOTHYROXINE SODIUM 25 MCG: 25 TABLET ORAL at 06:12

## 2023-08-28 RX ADMIN — FUROSEMIDE 40 MG: 40 TABLET ORAL at 08:38

## 2023-08-28 RX ADMIN — DOXYCYCLINE 100 MG: 100 INJECTION, POWDER, LYOPHILIZED, FOR SOLUTION INTRAVENOUS at 23:48

## 2023-08-28 RX ADMIN — AMIODARONE HYDROCHLORIDE 200 MG: 200 TABLET ORAL at 08:38

## 2023-08-28 RX ADMIN — GABAPENTIN 400 MG: 400 CAPSULE ORAL at 20:41

## 2023-08-28 RX ADMIN — NICOTINE 1 PATCH: 21 PATCH, EXTENDED RELEASE TRANSDERMAL at 08:38

## 2023-08-28 RX ADMIN — GABAPENTIN 400 MG: 400 CAPSULE ORAL at 08:38

## 2023-08-28 RX ADMIN — APIXABAN 5 MG: 5 TABLET, FILM COATED ORAL at 08:38

## 2023-08-28 NOTE — PLAN OF CARE
Problem: Adult Inpatient Plan of Care  Goal: Plan of Care Review  Outcome: Ongoing, Progressing  Flowsheets (Taken 8/28/2023 0421)  Progress: improving  Plan of Care Reviewed With: patient  Goal: Patient-Specific Goal (Individualized)  Outcome: Ongoing, Progressing  Goal: Absence of Hospital-Acquired Illness or Injury  Outcome: Ongoing, Progressing  Intervention: Identify and Manage Fall Risk  Recent Flowsheet Documentation  Taken 8/28/2023 0400 by Brittney Castillo RN  Safety Promotion/Fall Prevention: safety round/check completed  Taken 8/28/2023 0200 by Brittney Castillo RN  Safety Promotion/Fall Prevention: safety round/check completed  Taken 8/28/2023 0015 by Brittney Castillo RN  Safety Promotion/Fall Prevention: safety round/check completed  Taken 8/27/2023 2230 by Brittney Castillo RN  Safety Promotion/Fall Prevention: safety round/check completed  Taken 8/27/2023 2100 by Brittney Castillo RN  Safety Promotion/Fall Prevention: safety round/check completed  Taken 8/27/2023 2000 by Brittney Castillo RN  Safety Promotion/Fall Prevention: safety round/check completed  Taken 8/27/2023 1915 by Brittney Castillo RN  Safety Promotion/Fall Prevention: safety round/check completed  Intervention: Prevent Skin Injury  Recent Flowsheet Documentation  Taken 8/28/2023 0400 by Brittney Castillo RN  Body Position:   right   side-lying  Taken 8/28/2023 0200 by Brittney Castillo RN  Body Position: supine  Taken 8/28/2023 0015 by Brittney Castillo RN  Body Position:   left   tilted  Taken 8/27/2023 2230 by Brittney Castillo RN  Body Position: supine  Taken 8/27/2023 2000 by Brittney Castillo RN  Body Position: sitting up in bed  Intervention: Prevent and Manage VTE (Venous Thromboembolism) Risk  Recent Flowsheet Documentation  Taken 8/28/2023 0200 by Brittney Castillo RN  Activity Management: ambulated to bathroom  Taken 8/27/2023 2230 by Brittney Castillo RN  Activity Management: ambulated to bathroom  Taken 8/27/2023 2000 by Brittney Castillo RN  VTE  Prevention/Management: (eliquis) other (see comments)  Goal: Optimal Comfort and Wellbeing  Outcome: Ongoing, Progressing  Intervention: Monitor Pain and Promote Comfort  Recent Flowsheet Documentation  Taken 8/27/2023 2000 by Brittney Castillo RN  Pain Management Interventions: see MAR  Intervention: Provide Person-Centered Care  Recent Flowsheet Documentation  Taken 8/27/2023 2000 by Brittney Castillo RN  Trust Relationship/Rapport: care explained  Goal: Readiness for Transition of Care  Outcome: Ongoing, Progressing     Problem: Asthma Comorbidity  Goal: Maintenance of Asthma Control  Outcome: Ongoing, Progressing     Problem: COPD (Chronic Obstructive Pulmonary Disease) Comorbidity  Goal: Maintenance of COPD Symptom Control  Outcome: Ongoing, Progressing     Problem: Diabetes Comorbidity  Goal: Blood Glucose Level Within Targeted Range  Outcome: Ongoing, Progressing     Problem: Heart Failure Comorbidity  Goal: Maintenance of Heart Failure Symptom Control  Outcome: Ongoing, Progressing     Problem: Hypertension Comorbidity  Goal: Blood Pressure in Desired Range  Outcome: Ongoing, Progressing     Problem: Obstructive Sleep Apnea Risk or Actual Comorbidity Management  Goal: Unobstructed Breathing During Sleep  Outcome: Ongoing, Progressing     Problem: Skin Injury Risk Increased  Goal: Skin Health and Integrity  Outcome: Ongoing, Progressing  Intervention: Optimize Skin Protection  Recent Flowsheet Documentation  Taken 8/28/2023 0400 by Brittney Castillo RN  Head of Bed (HOB) Positioning: HOB elevated  Taken 8/28/2023 0200 by Brittney Castillo RN  Head of Bed (HOB) Positioning: HOB elevated  Taken 8/28/2023 0015 by Brittney Castillo RN  Head of Bed (HOB) Positioning: HOB elevated  Taken 8/27/2023 2230 by Brittney Castillo RN  Head of Bed (HOB) Positioning: HOB elevated     Problem: Fall Injury Risk  Goal: Absence of Fall and Fall-Related Injury  Outcome: Ongoing, Progressing  Intervention: Promote Injury-Free  Environment  Recent Flowsheet Documentation  Taken 8/28/2023 0400 by Brittney Castillo RN  Safety Promotion/Fall Prevention: safety round/check completed  Taken 8/28/2023 0200 by Brittney Castillo RN  Safety Promotion/Fall Prevention: safety round/check completed  Taken 8/28/2023 0015 by Brittney Castillo RN  Safety Promotion/Fall Prevention: safety round/check completed  Taken 8/27/2023 2230 by Brittney Castillo RN  Safety Promotion/Fall Prevention: safety round/check completed  Taken 8/27/2023 2100 by Brittney Castillo RN  Safety Promotion/Fall Prevention: safety round/check completed  Taken 8/27/2023 2000 by Brittney Castillo RN  Safety Promotion/Fall Prevention: safety round/check completed  Taken 8/27/2023 1915 by Brittney Castillo RN  Safety Promotion/Fall Prevention: safety round/check completed     Problem: Fluid Imbalance (Pneumonia)  Goal: Fluid Balance  Outcome: Ongoing, Progressing     Problem: Infection (Pneumonia)  Goal: Resolution of Infection Signs and Symptoms  Outcome: Ongoing, Progressing     Problem: Respiratory Compromise (Pneumonia)  Goal: Effective Oxygenation and Ventilation  Outcome: Ongoing, Progressing  Intervention: Optimize Oxygenation and Ventilation  Recent Flowsheet Documentation  Taken 8/28/2023 0400 by Brittney Castillo RN  Head of Bed (HOB) Positioning: HOB elevated  Taken 8/28/2023 0200 by Brittney Castillo RN  Head of Bed (HOB) Positioning: HOB elevated  Taken 8/28/2023 0015 by Brittney Castillo RN  Head of Bed (HOB) Positioning: HOB elevated  Taken 8/27/2023 2230 by Brittney Castillo RN  Head of Bed (HOB) Positioning: HOB elevated   Goal Outcome Evaluation:  Plan of Care Reviewed With: patient        Progress: improving

## 2023-08-28 NOTE — THERAPY TREATMENT NOTE
Acute Care - Physical Therapy Treatment Note  [facility]     Patient Name: Mona Perales  : 1950  MRN: [id number]  Today's Date: 2023      Visit Dx:     ICD-10-CM ICD-9-CM   1. Shortness of breath  R06.02 786.05   2. Elevated troponin  R77.8 790.6   3. Atrial fibrillation, unspecified type  I48.91 427.31   4. Impaired functional mobility, balance, gait, and endurance [Z74.09 (ICD-10-CM)]  Z74.09 V49.89     Patient Active Problem List   Diagnosis    Anxiety    CAD (coronary atherosclerotic disease)    Carotid stenosis    COPD (chronic obstructive pulmonary disease)    COPD with exacerbation    Depressive disorder, not elsewhere classified    Benign essential hypertension    Hypercholesteremia    Insomnia    Notalgia    Osteoporosis    Other screening mammogram    RUQ abdominal pain    PVD (peripheral vascular disease) with claudication    Nicotine abuse    Dysphagia    Epigastric pain    Pain of right hand    Closed displaced fracture of shaft of fifth metacarpal bone of right hand    Cause of injury, fall    Displaced fracture of shaft of fifth metacarpal bone of right hand with routine healing    Syncope    Acute respiratory failure with hypoxia    (HFpEF) heart failure with preserved ejection fraction    Hypotension    Elevated WBCs    Near syncope    Atherosclerosis of native coronary artery of native heart without angina pectoris    Atherosclerosis of native arteries of extremities with rest pain, left leg    PVD (peripheral vascular disease)    Physical deconditioning    Pain due to onychomycosis of toenails of both feet    ST elevation myocardial infarction (STEMI), unspecified artery    Acute and chronic respiratory failure with hypoxia    Acute on chronic heart failure with preserved ejection fraction (HFpEF)    Current use of long term anticoagulation    Dyspnea     Past Medical History:   Diagnosis Date    A-fib     Anxiety     Arthritis     Asthma     Atherosclerosis of native  arteries of the extremities with ulceration     bilateral legs - bilateral iliac stents 2008       CHF (congestive heart failure)     Chronic lower back pain     COPD (chronic obstructive pulmonary disease)     Essential (primary) hypertension     GERD (gastroesophageal reflux disease)     History of pulmonary embolus (PE)     Hyperlipidemia     Insomnia     Lumbago     Nicotine dependence     Occlusion of artery      and stenosis of bilateral carotid arteries - BABS 16-49%, LICA 0-15%    Other atherosclerosis of native artery of other extremity     LEFT subclavian stent 2005 (occluded)    Sleep apnea      Past Surgical History:   Procedure Laterality Date    CARDIAC CATHETERIZATION  04/06/2016    No evidence of any obstructive epicardial CAD.Preserved LV systolic function with EF of 55%.    CARDIAC CATHETERIZATION N/A 11/1/2022    Procedure: Left Heart Cath;  Surgeon: Neftali Haywood MD;  Location: [facility] CATH INVASIVE LOCATION;  Service: Cardiology;  Laterality: N/A;    CENTRAL VENOUS LINE INSERTION  04/07/2016    Successful placement of right uppe extremity 6-Yi triple lumen PICC line.    ENDOSCOPY N/A 1/12/2018    Procedure: ESOPHAGOGASTRODUODENOSCOPY;  Surgeon: Bin Oh MD;  Location: [facility] ENDOSCOPY;  Service:     ENDOSCOPY N/A 1/17/2018    Procedure: ESOPHAGOGASTRODUODENOSCOPY;  Surgeon: Bin Oh MD;  Location: [facility] ENDOSCOPY;  Service:     ENDOSCOPY N/A 3/16/2018    Procedure: ESOPHAGOGASTRODUODENOSCOPY possible dilation;  Surgeon: Bin Oh MD;  Location: [facility] ENDOSCOPY;  Service: Gastroenterology    FEMORAL POPLITEAL BYPASS Left 4/14/2020    Procedure: FEMORAL POPLITEAL BYPASS ABOVE KNEE  (POLYTETRAFLUOROETHYLENE)  LEFT COMMON FEMORAL ARTERY ENDARTERECTOMY PTA LEFT ILIAC ARTERIOGRAM        (c-arm#2 and c-arm table);  Surgeon: David Suh MD;  Location: [facility] OR;  Service: Vascular;  Laterality: Left;    FEMORAL POPLITEAL BYPASS Right 9/17/2021    Procedure: RIGHT  common femoral endarterectomy FEMORAL POPLITEAL BYPASS (above the knee polytetrafluoroethylene  lower extremity arteriogram              (c-arm#2 and c-arm table);  Surgeon: David Suh MD;  Location: [facility] OR;  Service: Vascular;  Laterality: Right;    HYSTERECTOMY      INTERVENTIONAL RADIOLOGY PROCEDURE Left 9/9/2020    Procedure: IR angiogram extremity left;  Surgeon: David Suh MD;  Location: [facility] ANGIO INVASIVE LOCATION;  Service: Interventional Radiology;  Laterality: Left;    KYPHOPLASTY N/A 5/23/2019    Procedure: KYPHOPLASTY LUMBAR FOUR;  Surgeon: Aba Santa MD;  Location: [facility] OR;  Service: Orthopedic Spine    TOTAL SHOULDER REPLACEMENT Left     TRANSESOPHAGEAL ECHOCARDIOGRAM (JACQUES)  04/07/2016    With color flow-Mild to moderate [facility].LV systolic function well preserved with Ef of 55-60%.Mitral and AV intact.Diastolic dysfunction    UPPER GASTROINTESTINAL ENDOSCOPY  01/17/2018    Dr. Bin Martin M.D.     PT Assessment (last 12 hours)       PT Evaluation and Treatment       Row Name 08/28/23 1505          Physical Therapy Time and Intention    Subjective Information complains of;pain  -LN     Document Type therapy note (daily note)  -LN     Mode of Treatment physical therapy  -LN     Comment nsg ok for rx  -LN       Row Name 08/28/23 1505          General Information    Patient Profile Reviewed yes  -LN     Equipment Currently Used at Home shower chair  has RW, but not sure where it is located  -LN     Existing Precautions/Restrictions fall;oxygen therapy device and L/min  -LN       Row Name 08/28/23 1505          Home Use of Assistive/Adaptive Equipment    Equipment Currently Used at Home cane, straight  -LN       Row Name 08/28/23 1502          Pain    Pretreatment Pain Rating 7/10  -LN     Posttreatment Pain Rating 7/10  -LN     Pain Location lower  -LN     Pain Location - back  -LN     Pain Intervention(s) Nursing Notified;Repositioned  -LN       Row  Name 08/28/23 1505          Cognition    Orientation Status (Cognition) oriented x 4  -LN       Row Name 08/28/23 1505          Range of Motion Comprehensive    General Range of Motion bilateral lower extremity ROM WFL  -       Row Name 08/28/23 1505          Bed Mobility    Bed Mobility supine-sit;sit-supine  -LN     Supine-Sit [place] (Bed Mobility) supervision  -LN     Sit-Supine [place] (Bed Mobility) supervision  -LN     Assistive Device (Bed Mobility) head of bed elevated;bed rails  -LN       Row Name 08/28/23 1505          Bed-Chair Transfer    Bed-Chair [place] (Transfers) minimum assist (75% patient effort)  -LN       Row Name 08/28/23 1505          Chair-Bed Transfer    Chair-Bed [place] (Transfers) minimum assist (75% patient effort)  -     Assistive Device (Chair-Bed Transfers) walker, front-wheeled  -LN       Row Name 08/28/23 1505          Sit-Stand Transfer    Sit-Stand [place] (Transfers) contact guard  -LN     Assistive Device (Sit-Stand Transfers) walker, front-wheeled  -LN       Row Name 08/28/23 1505          Stand-Sit Transfer    Stand-Sit [place] (Transfers) contact guard  -LN     Assistive Device (Stand-Sit Transfers) walker, front-wheeled  -LN       Row Name 08/28/23 1505          Gait/Stairs (Locomotion)    [place] Level (Gait) contact guard  -LN     Assistive Device (Gait) walker, front-wheeled  -     Distance in Feet (Gait) bed-chair-bed  -       Row Name 08/28/23 1505          Safety Issues, Functional Mobility    Impairments Affecting Function (Mobility) strength;endurance/activity tolerance;pain;balance  -       Row Name 08/28/23 1505          Motor Skills    Therapeutic Exercise hip;knee;ankle  -       Row Name 08/28/23 1505          Hip (Therapeutic Exercise)    Hip (Therapeutic Exercise) AROM (active range of motion)  -     Hip AROM (Therapeutic Exercise) bilateral;flexion;aBduction;aDduction  -       Row Name 08/28/23 1505           Knee (Therapeutic Exercise)    Knee (Therapeutic Exercise) AROM (active range of motion)  -LN     Knee AROM (Therapeutic Exercise) bilateral;LAQ (long arc quad)  -LN       Row Name 08/28/23 1505          Ankle (Therapeutic Exercise)    Ankle (Therapeutic Exercise) AROM (active range of motion)  -LN     Ankle AROM (Therapeutic Exercise) bilateral;dorsiflexion;plantarflexion  -LN       Row Name 08/28/23 1505          Respiratory WDL    Respiratory WDL breath sounds  -LN     Rhythm/Pattern, Respiratory shortness of breath  on exertion  -LN     Cough Frequency frequent  -LN     Cough Type nonproductive  -LN       Row Name 08/28/23 1505          Breath Sounds    Breath Sounds All Fields  -LN     All Lung Fields Breath Sounds clear;diminished  -LN     CHAPIN Breath Sounds clear  -LN     LLL Breath Sounds clear  -LN     RUL Breath Sounds clear  -LN     RML Breath Sounds diminished;clear  -LN     RLL Breath Sounds rhonchi;wheezes, expiratory  -LN       Row Name 08/28/23 1505          Skin WDL    Skin WDL X;all  -LN     Skin Color/Characteristics without discoloration  -LN     Skin Temperature warm  -LN     Skin Moisture dry  -LN     Skin Elasticity slow return to original state  -LN     Skin Integrity bruised (ecchymotic)  scattered bruising  -LN       Row Name 08/28/23 1505          Coping    Observed Emotional State calm;cooperative  -LN     Verbalized Emotional State acceptance  -LN     Family/Support Persons family  -LN     Involvement in Care not present at bedside  -LN       Row Name 08/28/23 1505          Plan of Care Review    Plan of Care Reviewed With patient  -LN     Outcome Evaluation sup-sit-sup sba of 1,sit-stand-sit cga of 1 and rw;bed-chair-bed min of 1 with rw;arom jake le's-pt will need short rw for room  -LN       Row Name 08/28/23 1505          Vital Signs    Post Systolic BP Rehab 125  -LN     Post Treatment Diastolic BP 83  -LN     Intratreatment Heart Rate (beats/min) 78  -LN     Posttreatment Heart Rate  (beats/min) 77  -LN     Intra SpO2 (%) 95  -LN     O2 Delivery Intra Treatment supplemental O2  -LN     Post SpO2 (%) 95  -LN     Pre Patient Position Supine  -LN     Intra Patient Position Standing  -LN     Post Patient Position Supine  -LN       Row Name 08/28/23 1507          Positioning and Restraints    In Bed supine;call light within reach;encouraged to call for assist;exit alarm on  -LN       Row Name 08/28/23 150          Therapy Assessment/Plan (PT)    Rehab Potential (PT) good, to achieve stated therapy goals  -LN     Criteria for Skilled Interventions Met (PT) yes;skilled treatment is necessary  -LN     Therapy Frequency (PT) other (see comments)  5-7 days/week.  -LN       Row Name 08/28/23 1509          Bed Mobility Goal 1 (PT)    Activity/Assistive Device (Bed Mobility Goal 1, PT) sit to supine/supine to sit  -LN     [place] Level/Cues Needed (Bed Mobility Goal 1, PT) modified independence  -LN     Time Frame (Bed Mobility Goal 1, PT) by discharge  -LN     Progress/Outcomes (Bed Mobility Goal 1, PT) goal not met  -LN       Row Name 08/28/23 1509          Transfer Goal 1 (PT)    Activity/Assistive Device (Transfer Goal 1, PT) sit-to-stand/stand-to-sit;bed-to-chair/chair-to-bed;walker, rolling  -LN     [place] Level/Cues Needed (Transfer Goal 1, PT) modified independence  -LN     Time Frame (Transfer Goal 1, PT) by discharge  -LN     Strategies/Barriers (Transfers Goal 1, PT) Dizzy upon standing during evaluation.  -LN     Progress/Outcome (Transfer Goal 1, PT) goal not met  -LN       Row Name 08/28/23 1509          Gait Training Goal 1 (PT)    Activity/Assistive Device (Gait Training Goal 1, PT) walker, rolling  -LN     [place] Level (Gait Training Goal 1, PT) modified independence  -LN     Distance (Gait Training Goal 1, PT) 30'x2.  -LN     Strategies/Barriers (Gait Training Goal 1, PT) Dizzy upon standing during evaluation.  -LN     Progress/Outcome (Gait Training Goal 1, PT) goal not  met  -LN       Row Name 08/28/23 1505          Problem Specific Goal 1 (PT)    Problem Specific Goal 1 (PT) Score 24/28 on Tinetti fall risk assessment.  -LN     Time Frame (Problem Specific Goal 1, PT) by discharge  -LN     Strategies/Barriers (Problem Specific Goal 1, PT) Dizzy upon standing during evaluation.  -LN     Progress/Outcome (Problem Specific Goal 1, PT) goal not met  -LN               User Key  (r) = Recorded By, (t) = Taken By, (c) = Cosigned By      Initials Name Provider Type    LN Torri Sanchez PTA Physical Therapist Assistant                    Physical Therapy Education       Title: PT OT SLP Therapies (Not Started)       Topic: Physical Therapy (Not Started)       Point: Mobility training (Not Started)       Learner Progress:  Not documented in this visit.              Point: Home exercise program (Not Started)       Learner Progress:  Not documented in this visit.              Point: Body mechanics (Not Started)       Learner Progress:  Not documented in this visit.              Point: Precautions (Not Started)       Learner Progress:  Not documented in this visit.                                  PT Recommendation and Plan  Anticipated Discharge Disposition (PT): skilled nursing facility  Therapy Frequency (PT): other (see comments) (5-7 days/week.)  Plan of Care Reviewed With: patient  Outcome Evaluation: sup-sit-sup sba of 1,sit-stand-sit cga of 1 and rw;bed-chair-bed min of 1 with rw;arom jake le's-pt will need short rw for room   Outcome Measures       Row Name 08/28/23 1500             How much help from another person do you currently need...    Turning from your back to your side while in flat bed without using bedrails? 3  -LN      Moving from lying on back to sitting on the side of a flat bed without bedrails? 3  -LN      Moving to and from a bed to a chair (including a wheelchair)? 3  -LN      Standing up from a chair using your arms (e.g., wheelchair, bedside chair)? 3  -LN       Climbing 3-5 steps with a railing? 2  -LN      To walk in hospital room? 2  -LN      AM-PAC 6 Clicks Score (PT) 16  -LN         Functional Assessment    Outcome Measure Options AM-PAC 6 Clicks Basic Mobility (PT)  -LN                User Key  (r) = Recorded By, (t) = Taken By, (c) = Cosigned By      Initials Name Provider Type    LN Torri Sanchez PTA Physical Therapist Assistant                     Time Calculation:    PT Charges       Row Name 08/28/23 1531             Time Calculation    Start Time 1505  -LN      Stop Time 1531  -LN      Time Calculation (min) 26 min  -LN      PT Received On 08/28/23  -LN         Time Calculation- PT    Total Timed Code Minutes- PT 26 minute(s)  -LN                User Key  (r) = Recorded By, (t) = Taken By, (c) = Cosigned By      Initials Name Provider Type    Torri Vogel, PTA Physical Therapist Assistant                  Therapy Charges for Today       Code Description Service Date Service Provider Modifiers Qty    67949294099 HC PT THERAPEUTIC ACT EA 15 MIN 8/28/2023 Torri Sanchez PTA GP 1    32533049476 HC PT THER PROC EA 15 MIN 8/28/2023 Torri Sanchez PTA GP 1            PT G-Codes  Outcome Measure Options: AM-PAC 6 Clicks Basic Mobility (PT)  AM-PAC 6 Clicks Score (PT): 16    Torri Sanchez PTA  8/28/2023

## 2023-08-28 NOTE — PROGRESS NOTES
[facility] Medicine Services  INPATIENT PROGRESS NOTE    Length of Stay: 4  Date of Admission: 8/24/2023  Primary Care Physician: Anahi Camacho APRN    Subjective   (S) Admitted for atrial fibrillation with RVR; no chest pain  Keep weaning oxygen to 6 L today; she is participated with PT    Review of Systems   All other systems reviewed and are negative.     All pertinent negatives and positives are as above. All other systems have been reviewed and are negative unless otherwise stated.     Prior to Admission medications    Medication Sig Start Date End Date Taking? Authorizing Provider   acetaminophen (TYLENOL) 325 MG tablet Take 1 tablet by mouth Every 6 (Six) Hours As Needed for Mild Pain.    Miguelito Chatman MD   albuterol (PROVENTIL) (2.5 MG/3ML) 0.083% nebulizer solution Take 2.5 mg by nebulization Every 4 (Four) Hours As Needed for Wheezing.    Miguelito Chatman MD   ALBUTEROL SULFATE HFA IN Inhale 2 puffs Every 4 (Four) Hours As Needed.    Miguelito Chatman MD   amitriptyline (ELAVIL) 25 MG tablet Take 1 tablet by mouth Every Night. 5/8/20   Miguelito Chatman MD   apixaban (ELIQUIS) 5 MG tablet tablet Take 1 tablet by mouth 2 (Two) Times a Day.    Miguelito Chatman MD   budesonide-formoterol (SYMBICORT) 160-4.5 MCG/ACT inhaler Inhale 2 puffs 2 (Two) Times a Day As Needed.    Miguelito Chatman MD   cilostazol (PLETAL) 100 MG tablet Take 1 tablet by mouth 2 (Two) Times a Day.    Miguelito Chatman MD   clopidogrel (PLAVIX) 75 MG tablet Take 1 tablet by mouth Daily. 8/22/23   Jacki Polanco APRN   Fluticasone-Salmeterol (ADVAIR/WIXELA) 500-50 MCG/ACT DISKUS Inhale 2 (Two) Times a Day.    Miguelito Chatman MD   furosemide (LASIX) 40 MG tablet Take 1 tablet by mouth Daily. Take additional tablet in the afternoon or evening for increased weight gain >3lbs, shortness of breath or leg swelling 5/15/23   Michele Nunn MD    gabapentin (NEURONTIN) 400 MG capsule Take 1 capsule by mouth Every 8 (Eight) Hours for 3 days. 5/15/23 8/22/23  Michele Nunn MD   ipratropium-albuterol (DUO-NEB) 0.5-2.5 mg/3 ml nebulizer Take 3 mL by nebulization Every 6 (Six) Hours.    Miguelito Chatman MD   levothyroxine (SYNTHROID, LEVOTHROID) 25 MCG tablet Take 1 tablet by mouth Every Morning.    Miguelito Chatman MD   midodrine (PROAMATINE) 5 MG tablet Take 2 tablets by mouth 3 (Three) Times a Day Before Meals.    Miguelito Chatman MD   naloxone (NARCAN) 4 MG/0.1ML nasal spray Call 911. Don't prime. Olden in 1 nostril for overdose. Repeat in 2-3 minutes in other nostril if no or minimal breathing/responsiveness. 5/15/23   Michele Nunn MD   nicotine (NICODERM CQ) 21 MG/24HR patch Place 1 patch on the skin as directed by provider Daily **Remove old patch before applying new patch** 11/18/22   Vida Conrad APRN   NON FORMULARY Biofreeze - apply to knees daily    Miguelito Chatman MD   O2 (OXYGEN) Inhale 4 L/min As Needed (shortness of air). 9/6/21   Miguelito Chatman MD   ondansetron (ZOFRAN) 4 MG tablet Take 1 tablet by mouth Every 8 (Eight) Hours As Needed for Nausea or Vomiting.    Miguelito Chatman MD   oxyCODONE-acetaminophen (PERCOCET) 5-325 MG per tablet Take 1 tablet by mouth Every 6 (Six) Hours As Needed.    Miguelito Chatman MD   pantoprazole (PROTONIX) 40 MG EC tablet Take 1 tablet by mouth Daily. 2/5/18   Mary Shen APRN   penciclovir (DENAVIR) 1 % cream Apply 1 application  topically to the appropriate area as directed As Needed (fever blisters).    Miguelito Chatman MD   pravastatin (PRAVACHOL) 20 MG tablet Take 1 tablet by mouth Every Night. 5/2/22   Jacki Polanco APRN   sennosides-docusate (PERICOLACE) 8.6-50 MG per tablet Take 2 tablets by mouth 2 (Two) Times a Day As Needed for Constipation. 11/17/22   Vida Conrad APRN   sertraline (ZOLOFT) 100 MG tablet Take 1 tablet by mouth  Daily.    Provider, MD Miguelito   traZODone (DESYREL) 150 MG tablet Take 1 tablet by mouth Every Night. 2/26/19   Donn Triana MD       amiodarone, 200 mg, Oral, Q12H  [START ON 8/30/2023] amiodarone, 200 mg, Oral, Q24H  apixaban, 5 mg, Oral, BID  budesonide-formoterol, 2 puff, Inhalation, BID - RT  cefTRIAXone, 1,000 mg, Intravenous, Q24H  clopidogrel, 75 mg, Oral, Daily  doxycycline, 100 mg, Intravenous, Q12H  furosemide, 40 mg, Oral, Daily  gabapentin, 400 mg, Oral, Q12H  ipratropium-albuterol, 3 mL, Nebulization, Q6H - RT  levothyroxine, 25 mcg, Oral, Q AM  lidocaine, 2 patch, Transdermal, Q24H  midodrine, 10 mg, Oral, TID AC  nicotine, 1 patch, Transdermal, Q24H  ranolazine, 500 mg, Oral, Q12H  rosuvastatin, 5 mg, Oral, Nightly  sertraline, 100 mg, Oral, Daily  sodium chloride, 10 mL, Intravenous, Q12H             Objective    Temp:  [97.7 °F (36.5 °C)-99.4 °F (37.4 °C)] 97.7 °F (36.5 °C)  Heart Rate:  [66-79] 74  Resp:  [16-18] 18  BP: (104-125)/(58-83) 122/80    Physical Exam  Vitals reviewed.   Constitutional:       Appearance: Normal appearance.   HENT:      Head: Atraumatic.      Nose: Nose normal.   Eyes:      Extraocular Movements: Extraocular movements intact.   Cardiovascular:      Rate and Rhythm: Normal rate and regular rhythm.      Heart sounds: Normal heart sounds.   Pulmonary:      Comments: Bibasilar rales  Abdominal:      General: Bowel sounds are normal.      Palpations: Abdomen is soft.   Musculoskeletal:         General: Normal range of motion.      Cervical back: Normal range of motion and neck supple.   Skin:     General: Skin is warm.   Neurological:      General: No focal deficit present.      Mental Status: She is alert. Mental status is at baseline.   Psychiatric:         Behavior: Behavior normal.     Results Review:  I have reviewed the labs, radiology results, and diagnostic studies.    Laboratory Data:   Results from last 7 days   Lab Units 08/28/23  1118 08/27/23  0646  08/26/23  0545 08/25/23  0743 08/24/23  1244   SODIUM mmol/L  --  143 139 134* 138   POTASSIUM mmol/L 3.6 3.3* 3.7 4.1 3.4*   CHLORIDE mmol/L  --  102 101 97* 106   CO2 mmol/L  --  28.0 26.0 24.0 19.0*   BUN mg/dL  --  23 21 25* 29*   CREATININE mg/dL  --  0.85 0.87 0.94 1.15*   GLUCOSE mg/dL  --  92 102* 127* 105*   CALCIUM mg/dL  --  8.7 8.7 8.4* 6.8*   BILIRUBIN mg/dL  --  0.4  --   --  0.2   ALK PHOS U/L  --  68  --   --  57   ALT (SGPT) U/L  --  25  --   --  10   AST (SGOT) U/L  --  40*  --   --  27   ANION GAP mmol/L  --  13.0 12.0 13.0 13.0       Estimated Creatinine Clearance: 55.5 mL/min (by C-G formula based on SCr of 0.85 mg/dL).  Results from last 7 days   Lab Units 08/28/23  1118 08/27/23  0646 08/26/23  0545   MAGNESIUM mg/dL 1.6 1.8 2.0           Results from last 7 days   Lab Units 08/27/23  0646 08/26/23  0545 08/25/23  0743 08/24/23  1244   WBC 10*3/mm3 13.51* 15.47* 15.41* 15.21*   HEMOGLOBIN g/dL 9.5* 9.7* 9.8* 8.5*   HEMATOCRIT % 29.5* 29.5* 31.1* 26.0*   PLATELETS 10*3/mm3 290 213 227 191             Culture Data:   Blood Culture   Date Value Ref Range Status   08/26/2023 No growth at 24 hours  Preliminary   08/26/2023 No growth at 24 hours  Preliminary     No results found for: URINECX  No results found for: RESPCX  No results found for: WOUNDCX  No results found for: STOOLCX  No components found for: BODYFLD    Radiology Data:   Imaging Results (Last 24 Hours)       ** No results found for the last 24 hours. **            I have reviewed the patient's current medications.     Assessment/Plan     Atrial fibrillation with RVR    converted to NSR     Troponin elevated likely type II: recent heart cath on 11/2022; no obstructive CAD     on amiodarone tab     Appreciated cardiology assistance     Bilateral pneumonia     Not POA     Continue with rocephin and doxycycline 3/5     Acute respiratory failure with hypoxemia     Due to above; weaning her off of oxygen    Chronic medical problems      COPD, no AE     Essential hypertension     Medical Decision Making  Number and Complexity of problems: one major complex  Differential Diagnosis: ACS     Conditions and Status:        Condition is unchanged.     MDM Data  External documents reviewed: previous medical records  My EKG interpretation: a fib with RVR  My plain film interpretation: chronic changes  Tests considered but not ordered: none     Discussed with: patient and RN  Treatment Plan  See above     Care Planning  Shared decision making: her granddaughter Renee  Code status and discussions: full code     Disposition  Social Determinants of Health that impact treatment or disposition: none  I expect the patient to be discharged: In progress.       I confirmed that the patient's Advance Care Plan is present, code status is documented, or surrogate decision maker is listed in the patient's medical record.     Danish Flores MD

## 2023-08-28 NOTE — PLAN OF CARE
Goal Outcome Evaluation:  Plan of Care Reviewed With: patient   Patient worked with PT and OT today. O2 at 5L today. Patient denies chest pain at this time. VSS. Plan of care continues at this time.

## 2023-08-28 NOTE — PLAN OF CARE
Goal Outcome Evaluation:  Plan of Care Reviewed With: patient           Outcome Evaluation: sup-sit-sup sba of 1,sit-stand-sit cga of 1 and rw;bed-chair-bed min of 1 with rw;arom jake le's-pt will need short rw for room      Anticipated Discharge Disposition (PT): skilled nursing facility

## 2023-08-29 PROCEDURE — 94760 N-INVAS EAR/PLS OXIMETRY 1: CPT

## 2023-08-29 PROCEDURE — 94799 UNLISTED PULMONARY SVC/PX: CPT

## 2023-08-29 PROCEDURE — 97116 GAIT TRAINING THERAPY: CPT

## 2023-08-29 PROCEDURE — 97110 THERAPEUTIC EXERCISES: CPT

## 2023-08-29 PROCEDURE — 25010000002 CEFTRIAXONE PER 250 MG: Performed by: INTERNAL MEDICINE

## 2023-08-29 PROCEDURE — 25010000002 ONDANSETRON PER 1 MG: Performed by: INTERNAL MEDICINE

## 2023-08-29 RX ADMIN — DOXYCYCLINE 100 MG: 100 INJECTION, POWDER, LYOPHILIZED, FOR SOLUTION INTRAVENOUS at 23:43

## 2023-08-29 RX ADMIN — APIXABAN 5 MG: 5 TABLET, FILM COATED ORAL at 20:02

## 2023-08-29 RX ADMIN — IPRATROPIUM BROMIDE AND ALBUTEROL SULFATE 3 ML: .5; 3 SOLUTION RESPIRATORY (INHALATION) at 12:45

## 2023-08-29 RX ADMIN — APIXABAN 5 MG: 5 TABLET, FILM COATED ORAL at 08:42

## 2023-08-29 RX ADMIN — OXYCODONE HYDROCHLORIDE AND ACETAMINOPHEN 1 TABLET: 5; 325 TABLET ORAL at 17:20

## 2023-08-29 RX ADMIN — Medication 10 ML: at 20:03

## 2023-08-29 RX ADMIN — DOXYCYCLINE 100 MG: 100 INJECTION, POWDER, LYOPHILIZED, FOR SOLUTION INTRAVENOUS at 11:44

## 2023-08-29 RX ADMIN — CLOPIDOGREL BISULFATE 75 MG: 75 TABLET ORAL at 08:42

## 2023-08-29 RX ADMIN — LEVOTHYROXINE SODIUM 25 MCG: 25 TABLET ORAL at 06:16

## 2023-08-29 RX ADMIN — BUDESONIDE AND FORMOTEROL FUMARATE DIHYDRATE 2 PUFF: 160; 4.5 AEROSOL RESPIRATORY (INHALATION) at 06:50

## 2023-08-29 RX ADMIN — CEFTRIAXONE SODIUM 1000 MG: 1 INJECTION, POWDER, FOR SOLUTION INTRAMUSCULAR; INTRAVENOUS at 20:03

## 2023-08-29 RX ADMIN — GABAPENTIN 400 MG: 400 CAPSULE ORAL at 08:42

## 2023-08-29 RX ADMIN — OXYCODONE HYDROCHLORIDE AND ACETAMINOPHEN 1 TABLET: 5; 325 TABLET ORAL at 06:16

## 2023-08-29 RX ADMIN — MIDODRINE HYDROCHLORIDE 10 MG: 5 TABLET ORAL at 17:17

## 2023-08-29 RX ADMIN — AMIODARONE HYDROCHLORIDE 200 MG: 200 TABLET ORAL at 08:42

## 2023-08-29 RX ADMIN — RANOLAZINE 500 MG: 500 TABLET, FILM COATED, EXTENDED RELEASE ORAL at 20:01

## 2023-08-29 RX ADMIN — RANOLAZINE 500 MG: 500 TABLET, FILM COATED, EXTENDED RELEASE ORAL at 08:42

## 2023-08-29 RX ADMIN — IPRATROPIUM BROMIDE AND ALBUTEROL SULFATE 3 ML: .5; 3 SOLUTION RESPIRATORY (INHALATION) at 06:50

## 2023-08-29 RX ADMIN — MIDODRINE HYDROCHLORIDE 10 MG: 5 TABLET ORAL at 06:16

## 2023-08-29 RX ADMIN — IPRATROPIUM BROMIDE AND ALBUTEROL SULFATE 3 ML: .5; 3 SOLUTION RESPIRATORY (INHALATION) at 00:36

## 2023-08-29 RX ADMIN — ACETAMINOPHEN 325 MG: 325 TABLET, FILM COATED ORAL at 23:43

## 2023-08-29 RX ADMIN — ONDANSETRON 4 MG: 2 INJECTION INTRAMUSCULAR; INTRAVENOUS at 04:43

## 2023-08-29 RX ADMIN — Medication 10 ML: at 08:46

## 2023-08-29 RX ADMIN — SERTRALINE 100 MG: 50 TABLET, FILM COATED ORAL at 08:42

## 2023-08-29 RX ADMIN — GABAPENTIN 400 MG: 400 CAPSULE ORAL at 20:02

## 2023-08-29 RX ADMIN — MIDODRINE HYDROCHLORIDE 10 MG: 5 TABLET ORAL at 11:39

## 2023-08-29 RX ADMIN — FUROSEMIDE 40 MG: 40 TABLET ORAL at 08:42

## 2023-08-29 RX ADMIN — NICOTINE 1 PATCH: 21 PATCH, EXTENDED RELEASE TRANSDERMAL at 08:42

## 2023-08-29 RX ADMIN — ROSUVASTATIN CALCIUM 5 MG: 5 TABLET, FILM COATED ORAL at 20:01

## 2023-08-29 RX ADMIN — LIDOCAINE 2 PATCH: 50 PATCH TOPICAL at 08:44

## 2023-08-29 RX ADMIN — AMIODARONE HYDROCHLORIDE 200 MG: 200 TABLET ORAL at 20:01

## 2023-08-29 NOTE — THERAPY TREATMENT NOTE
Acute Care - Physical Therapy Treatment Note  [facility]     Patient Name: Mona Perales  : 1950  MRN: [id number]  Today's Date: 2023      Visit Dx:     ICD-10-CM ICD-9-CM   1. Shortness of breath  R06.02 786.05   2. Elevated troponin  R77.8 790.6   3. Atrial fibrillation, unspecified type  I48.91 427.31   4. Impaired functional mobility, balance, gait, and endurance [Z74.09 (ICD-10-CM)]  Z74.09 V49.89     Patient Active Problem List   Diagnosis    Anxiety    CAD (coronary atherosclerotic disease)    Carotid stenosis    COPD (chronic obstructive pulmonary disease)    COPD with exacerbation    Depressive disorder, not elsewhere classified    Benign essential hypertension    Hypercholesteremia    Insomnia    Notalgia    Osteoporosis    Other screening mammogram    RUQ abdominal pain    PVD (peripheral vascular disease) with claudication    Nicotine abuse    Dysphagia    Epigastric pain    Pain of right hand    Closed displaced fracture of shaft of fifth metacarpal bone of right hand    Cause of injury, fall    Displaced fracture of shaft of fifth metacarpal bone of right hand with routine healing    Syncope    Acute respiratory failure with hypoxia    (HFpEF) heart failure with preserved ejection fraction    Hypotension    Elevated WBCs    Near syncope    Atherosclerosis of native coronary artery of native heart without angina pectoris    Atherosclerosis of native arteries of extremities with rest pain, left leg    PVD (peripheral vascular disease)    Physical deconditioning    Pain due to onychomycosis of toenails of both feet    ST elevation myocardial infarction (STEMI), unspecified artery    Acute and chronic respiratory failure with hypoxia    Acute on chronic heart failure with preserved ejection fraction (HFpEF)    Current use of long term anticoagulation    Dyspnea     Past Medical History:   Diagnosis Date    A-fib     Anxiety     Arthritis     Asthma     Atherosclerosis of native  arteries of the extremities with ulceration     bilateral legs - bilateral iliac stents 2008       CHF (congestive heart failure)     Chronic lower back pain     COPD (chronic obstructive pulmonary disease)     Essential (primary) hypertension     GERD (gastroesophageal reflux disease)     History of pulmonary embolus (PE)     Hyperlipidemia     Insomnia     Lumbago     Nicotine dependence     Occlusion of artery      and stenosis of bilateral carotid arteries - BABS 16-49%, LICA 0-15%    Other atherosclerosis of native artery of other extremity     LEFT subclavian stent 2005 (occluded)    Sleep apnea      Past Surgical History:   Procedure Laterality Date    CARDIAC CATHETERIZATION  04/06/2016    No evidence of any obstructive epicardial CAD.Preserved LV systolic function with EF of 55%.    CARDIAC CATHETERIZATION N/A 11/1/2022    Procedure: Left Heart Cath;  Surgeon: Neftali Haywood MD;  Location: [facility] CATH INVASIVE LOCATION;  Service: Cardiology;  Laterality: N/A;    CENTRAL VENOUS LINE INSERTION  04/07/2016    Successful placement of right uppe extremity 6-Croatian triple lumen PICC line.    ENDOSCOPY N/A 1/12/2018    Procedure: ESOPHAGOGASTRODUODENOSCOPY;  Surgeon: Bin Oh MD;  Location: [facility] ENDOSCOPY;  Service:     ENDOSCOPY N/A 1/17/2018    Procedure: ESOPHAGOGASTRODUODENOSCOPY;  Surgeon: Bin Oh MD;  Location: [facility] ENDOSCOPY;  Service:     ENDOSCOPY N/A 3/16/2018    Procedure: ESOPHAGOGASTRODUODENOSCOPY possible dilation;  Surgeon: Bin Oh MD;  Location: [facility] ENDOSCOPY;  Service: Gastroenterology    FEMORAL POPLITEAL BYPASS Left 4/14/2020    Procedure: FEMORAL POPLITEAL BYPASS ABOVE KNEE  (POLYTETRAFLUOROETHYLENE)  LEFT COMMON FEMORAL ARTERY ENDARTERECTOMY PTA LEFT ILIAC ARTERIOGRAM        (c-arm#2 and c-arm table);  Surgeon: David Suh MD;  Location: [facility] OR;  Service: Vascular;  Laterality: Left;    FEMORAL POPLITEAL BYPASS Right 9/17/2021    Procedure: RIGHT  common femoral endarterectomy FEMORAL POPLITEAL BYPASS (above the knee polytetrafluoroethylene  lower extremity arteriogram              (c-arm#2 and c-arm table);  Surgeon: David Suh MD;  Location: [facility] OR;  Service: Vascular;  Laterality: Right;    HYSTERECTOMY      INTERVENTIONAL RADIOLOGY PROCEDURE Left 9/9/2020    Procedure: IR angiogram extremity left;  Surgeon: David Suh MD;  Location: [facility] ANGIO INVASIVE LOCATION;  Service: Interventional Radiology;  Laterality: Left;    KYPHOPLASTY N/A 5/23/2019    Procedure: KYPHOPLASTY LUMBAR FOUR;  Surgeon: Aba Santa MD;  Location: [facility] OR;  Service: Orthopedic Spine    TOTAL SHOULDER REPLACEMENT Left     TRANSESOPHAGEAL ECHOCARDIOGRAM (JACQUES)  04/07/2016    With color flow-Mild to moderate [facility].LV systolic function well preserved with Ef of 55-60%.Mitral and AV intact.Diastolic dysfunction    UPPER GASTROINTESTINAL ENDOSCOPY  01/17/2018    Dr. Bin Martin M.D.     PT Assessment (last 12 hours)       PT Evaluation and Treatment       Row Name 08/29/23 1022          Physical Therapy Time and Intention    Subjective Information complains of;pain  -TW     Document Type therapy note (daily note)  -TW     Mode of Treatment physical therapy;individual therapy  -TW     Patient Effort good  -TW       Row Name 08/29/23 1022          General Information    Patient Profile Reviewed yes  -TW     Patient Observations alert;cooperative;agree to therapy  -TW     Patient/Family/Caregiver Comments/Observations none  -TW     General Observations of Patient Pt sup in bed.  -TW     Equipment Currently Used at Home shower chair  -TW     Existing Precautions/Restrictions fall;oxygen therapy device and L/min  -TW       Row Name 08/29/23 1022          Home Use of Assistive/Adaptive Equipment    Equipment Currently Used at Home cane, straight  -TW       Row Name 08/29/23 1022          Pain    Pretreatment Pain Rating 8/10  -TW      Posttreatment Pain Rating 9/10  -TW     Pain Location lower  -TW     Pain Location - back  -TW     Pain Intervention(s) Nursing Notified  -TW       Row Name 08/29/23 1022          Cognition    Affect/Mental Status (Cognition) WFL  -TW     Orientation Status (Cognition) oriented x 4  -TW     Follows Commands (Cognition) WFL  -TW     Personal Safety Interventions fall prevention program maintained;gait belt;nonskid shoes/slippers when out of bed;muscle strengthening facilitated  -TW       Row Name 08/29/23 1022          Range of Motion Comprehensive    General Range of Motion bilateral lower extremity ROM WFL  -TW       Row Name 08/29/23 1022          Bed Mobility    Bed Mobility supine-sit;sit-supine  -TW     Supine-Sit [place] (Bed Mobility) supervision  -TW     Sit-Supine [place] (Bed Mobility) not tested  -TW     Assistive Device (Bed Mobility) head of bed elevated;bed rails  -TW       Row Name 08/29/23 1022          Bed-Chair Transfer    Bed-Chair [place] (Transfers) minimum assist (75% patient effort)  -TW     Assistive Device (Bed-Chair Transfers) walker, front-wheeled  -TW       Row Name 08/29/23 1022          Sit-Stand Transfer    Sit-Stand [place] (Transfers) contact guard  -TW     Assistive Device (Sit-Stand Transfers) walker, front-wheeled  -TW       Row Name 08/29/23 1022          Stand-Sit Transfer    Stand-Sit [place] (Transfers) contact guard  -TW     Assistive Device (Stand-Sit Transfers) walker, front-wheeled  -TW       Row Name 08/29/23 1022          Gait/Stairs (Locomotion)    [place] Level (Gait) contact guard  -TW     Assistive Device (Gait) walker, front-wheeled  -     Distance in Feet (Gait) 24ft and 14ft  -TW     Deviations/Abnormal Patterns (Gait) stride length decreased;base of support, narrow  -TW     Comment, (Gait/Stairs) Pt seems unsteady when making turns.  -TW       Row Name 08/29/23 1022          Safety Issues, Functional Mobility    Impairments  Affecting Function (Mobility) strength;endurance/activity tolerance;pain;balance  -TW       Row Name 08/29/23 1022          Hip (Therapeutic Exercise)    Hip AROM (Therapeutic Exercise) bilateral;sitting  -TW       Row Name 08/29/23 1022          Knee (Therapeutic Exercise)    Knee AROM (Therapeutic Exercise) bilateral;sitting  -TW       Row Name 08/29/23 1022          Ankle (Therapeutic Exercise)    Ankle AROM (Therapeutic Exercise) bilateral;sitting  -TW       Row Name 08/29/23 1022          Plan of Care Review    Plan of Care Reviewed With patient  -TW     Progress improving  -TW     Outcome Evaluation Pt agreeable to therapy. Pt able to t/f sup to sit with spv. Pt stood with CGA and amb 24ft and 14ft with seated rest in between. Pt did perform B LE seated ex's sitting in recliner and was left up in recliner at end of tx. Pt did c/o 9/10 back pain throughout tx and nsg made aware. Pt would cont to benefit from therapy at this time.  -TW       Row Name 08/29/23 1022          Vital Signs    Pretreatment Heart Rate (beats/min) 58  -TW     Posttreatment Heart Rate (beats/min) 64  -TW     Pre SpO2 (%) 97  -TW     O2 Delivery Pre Treatment supplemental O2  -TW     Post SpO2 (%) 96  -TW     O2 Delivery Post Treatment supplemental O2  -TW     Pre Patient Position Supine  -TW     Intra Patient Position Standing  -TW     Post Patient Position Sitting  -TW       Row Name 08/29/23 1022          Positioning and Restraints    Pre-Treatment Position in bed  -TW     Post Treatment Position chair  -TW     In Chair sitting;call light within reach;encouraged to call for assist;notified nsg  -TW       Row Name 08/29/23 1022          Therapy Assessment/Plan (PT)    Rehab Potential (PT) good, to achieve stated therapy goals  -TW     Criteria for Skilled Interventions Met (PT) yes;skilled treatment is necessary  -TW     Therapy Frequency (PT) other (see comments)  5-7 days/week.  -TW       Row Name 08/29/23 1022          Bed Mobility  Goal 1 (PT)    Activity/Assistive Device (Bed Mobility Goal 1, PT) sit to supine/supine to sit  -TW     [place] Level/Cues Needed (Bed Mobility Goal 1, PT) modified independence  -TW     Time Frame (Bed Mobility Goal 1, PT) by discharge  -TW     Progress/Outcomes (Bed Mobility Goal 1, PT) goal not met  -TW       Row Name 08/29/23 1022          Transfer Goal 1 (PT)    Activity/Assistive Device (Transfer Goal 1, PT) sit-to-stand/stand-to-sit;bed-to-chair/chair-to-bed;walker, rolling  -TW     [place] Level/Cues Needed (Transfer Goal 1, PT) modified independence  -TW     Time Frame (Transfer Goal 1, PT) by discharge  -TW     Strategies/Barriers (Transfers Goal 1, PT) Dizzy upon standing during evaluation.  -TW     Progress/Outcome (Transfer Goal 1, PT) goal not met  -TW       Row Name 08/29/23 1022          Gait Training Goal 1 (PT)    Activity/Assistive Device (Gait Training Goal 1, PT) walker, rolling  -TW     [place] Level (Gait Training Goal 1, PT) modified independence  -TW     Distance (Gait Training Goal 1, PT) 30'x2.  -TW     Strategies/Barriers (Gait Training Goal 1, PT) Dizzy upon standing during evaluation.  -TW     Progress/Outcome (Gait Training Goal 1, PT) goal not met  -TW       Row Name 08/29/23 1022          Problem Specific Goal 1 (PT)    Problem Specific Goal 1 (PT) Score 24/28 on Tinetti fall risk assessment.  -TW     Time Frame (Problem Specific Goal 1, PT) by discharge  -TW     Strategies/Barriers (Problem Specific Goal 1, PT) Dizzy upon standing during evaluation.  -TW     Progress/Outcome (Problem Specific Goal 1, PT) goal not met  -TW               User Key  (r) = Recorded By, (t) = Taken By, (c) = Cosigned By      Initials Name Provider Type    TW Bassem Lamar PTA Physical Therapist Assistant                    Physical Therapy Education       Title: PT OT SLP Therapies (Not Started)       Topic: Physical Therapy (Not Started)       Point: Mobility training (Not Started)        Learner Progress:  Not documented in this visit.              Point: Home exercise program (Not Started)       Learner Progress:  Not documented in this visit.              Point: Body mechanics (Not Started)       Learner Progress:  Not documented in this visit.              Point: Precautions (Not Started)       Learner Progress:  Not documented in this visit.                                  PT Recommendation and Plan  Anticipated Discharge Disposition (PT): skilled nursing facility  Therapy Frequency (PT): other (see comments) (5-7 days/week.)  Plan of Care Reviewed With: patient  Progress: improving  Outcome Evaluation: Pt agreeable to therapy. Pt able to t/f sup to sit with spv. Pt stood with CGA and amb 24ft and 14ft with seated rest in between. Pt did perform B LE seated ex's sitting in recliner and was left up in recliner at end of tx. Pt did c/o 9/10 back pain throughout tx and nsg made aware. Pt would cont to benefit from therapy at this time.   Outcome Measures       Row Name 08/28/23 1500             How much help from another person do you currently need...    Turning from your back to your side while in flat bed without using bedrails? 3  -LN      Moving from lying on back to sitting on the side of a flat bed without bedrails? 3  -LN      Moving to and from a bed to a chair (including a wheelchair)? 3  -LN      Standing up from a chair using your arms (e.g., wheelchair, bedside chair)? 3  -LN      Climbing 3-5 steps with a railing? 2  -LN      To walk in hospital room? 2  -LN      AM-PAC 6 Clicks Score (PT) 16  -LN         Functional Assessment    Outcome Measure Options AM-PAC 6 Clicks Basic Mobility (PT)  -LN                User Key  (r) = Recorded By, (t) = Taken By, (c) = Cosigned By      Initials Name Provider Type    LN Torri Sanchez PTA Physical Therapist Assistant                     Time Calculation:    PT Charges       Row Name 08/29/23 4526             Time Calculation    Start Time  1022  -TW      Stop Time 1050  -TW      Time Calculation (min) 28 min  -TW         Time Calculation- PT    Total Timed Code Minutes- PT 28 minute(s)  -TW                User Key  (r) = Recorded By, (t) = Taken By, (c) = Cosigned By      Initials Name Provider Type    TW Bassem Lamar PTA Physical Therapist Assistant                  Therapy Charges for Today       Code Description Service Date Service Provider Modifiers Qty    17660652969 HC GAIT TRAINING EA 15 MIN 8/29/2023 Bassem Lamar PTA GP 1    82906815315 HC PT THER PROC EA 15 MIN 8/29/2023 Bassem Lamar PTA GP 1            PT G-Codes  Outcome Measure Options: AM-PAC 6 Clicks Basic Mobility (PT)  AM-PAC 6 Clicks Score (PT): 16    Bassem Lamar PTA  8/29/2023

## 2023-08-29 NOTE — PLAN OF CARE
Goal Outcome Evaluation:  Plan of Care Reviewed With: patient        Progress: improving  Outcome Evaluation: Pt agreeable to therapy. Pt able to t/f sup to sit with spv. Pt stood with CGA and amb 24ft and 14ft with seated rest in between. Pt did perform B LE seated ex's sitting in recliner and was left up in recliner at end of tx. Pt did c/o 9/10 back pain throughout tx and nsg made aware. Pt would cont to benefit from therapy at this time.      Anticipated Discharge Disposition (PT): skilled nursing facility

## 2023-08-29 NOTE — PROGRESS NOTES
[facility]  INPATIENT PROGRESS NOTE    Length of Stay: 5  Date of Admission: 8/24/2023  Primary Care Physician: Anahi Camacho APRN    Subjective   (S) Admitted for atrial fibrillation with RVR; no chest pain  Keep weaning oxygen; placed her on 2 L and after 10 min her oxygen levels are 93 %; discussed with respiratory therapist     Review of Systems   All other systems reviewed and are negative.     All pertinent negatives and positives are as above. All other systems have been reviewed and are negative unless otherwise stated.     Prior to Admission medications    Medication Sig Start Date End Date Taking? Authorizing Provider   acetaminophen (TYLENOL) 325 MG tablet Take 1 tablet by mouth Every 6 (Six) Hours As Needed for Mild Pain.    Miguelito Chatman MD   albuterol (PROVENTIL) (2.5 MG/3ML) 0.083% nebulizer solution Take 2.5 mg by nebulization Every 4 (Four) Hours As Needed for Wheezing.    Miguelito Chatman MD   ALBUTEROL SULFATE HFA IN Inhale 2 puffs Every 4 (Four) Hours As Needed.    Miguelito Chatman MD   amitriptyline (ELAVIL) 25 MG tablet Take 1 tablet by mouth Every Night. 5/8/20   Miguelito Chatman MD   apixaban (ELIQUIS) 5 MG tablet tablet Take 1 tablet by mouth 2 (Two) Times a Day.    Miguelito Chatman MD   budesonide-formoterol (SYMBICORT) 160-4.5 MCG/ACT inhaler Inhale 2 puffs 2 (Two) Times a Day As Needed.    Miguelito Chatman MD   cilostazol (PLETAL) 100 MG tablet Take 1 tablet by mouth 2 (Two) Times a Day.    Miguelito Chatman MD   clopidogrel (PLAVIX) 75 MG tablet Take 1 tablet by mouth Daily. 8/22/23   Jacki Polanco APRN   Fluticasone-Salmeterol (ADVAIR/WIXELA) 500-50 MCG/ACT DISKUS Inhale 2 (Two) Times a Day.    Miguelito Chatman MD   furosemide (LASIX) 40 MG tablet Take 1 tablet by mouth Daily. Take additional tablet in the afternoon or evening for increased weight gain >3lbs, shortness of  breath or leg swelling 5/15/23   Michele Nunn MD   gabapentin (NEURONTIN) 400 MG capsule Take 1 capsule by mouth Every 8 (Eight) Hours for 3 days. 5/15/23 8/22/23  Michele Nunn MD   ipratropium-albuterol (DUO-NEB) 0.5-2.5 mg/3 ml nebulizer Take 3 mL by nebulization Every 6 (Six) Hours.    Miguelito Chatman MD   levothyroxine (SYNTHROID, LEVOTHROID) 25 MCG tablet Take 1 tablet by mouth Every Morning.    Miguelito Chatman MD   midodrine (PROAMATINE) 5 MG tablet Take 2 tablets by mouth 3 (Three) Times a Day Before Meals.    Miguelito Chatman MD   naloxone (NARCAN) 4 MG/0.1ML nasal spray Call 911. Don't prime. [place] in 1 nostril for overdose. Repeat in 2-3 minutes in other nostril if no or minimal breathing/responsiveness. 5/15/23   Michele Nunn MD   nicotine (NICODERM CQ) 21 MG/24HR patch Place 1 patch on the skin as directed by provider Daily **Remove old patch before applying new patch** 11/18/22   Vida Conrad APRN   NON FORMULARY Biofreeze - apply to knees daily    Miguelito Chatman MD   O2 (OXYGEN) Inhale 4 L/min As Needed (shortness of air). 9/6/21   Miguelito Chatman MD   ondansetron (ZOFRAN) 4 MG tablet Take 1 tablet by mouth Every 8 (Eight) Hours As Needed for Nausea or Vomiting.    Miguelito Chatman MD   oxyCODONE-acetaminophen (PERCOCET) 5-325 MG per tablet Take 1 tablet by mouth Every 6 (Six) Hours As Needed.    Miguelito Chatman MD   pantoprazole (PROTONIX) 40 MG EC tablet Take 1 tablet by mouth Daily. 2/5/18   Mary Shen APRN   penciclovir (DENAVIR) 1 % cream Apply 1 application  topically to the appropriate area as directed As Needed (fever blisters).    Miguelito Chatman MD   pravastatin (PRAVACHOL) 20 MG tablet Take 1 tablet by mouth Every Night. 5/2/22   Jacki Polanco APRN   sennosides-docusate (PERICOLACE) 8.6-50 MG per tablet Take 2 tablets by mouth 2 (Two) Times a Day As Needed for Constipation. 11/17/22   Vida Conrad, APRN    sertraline (ZOLOFT) 100 MG tablet Take 1 tablet by mouth Daily.    Provider, MD Miguelito   traZODone (DESYREL) 150 MG tablet Take 1 tablet by mouth Every Night. 2/26/19   Donn Triana MD       amiodarone, 200 mg, Oral, Q12H  [START ON 8/30/2023] amiodarone, 200 mg, Oral, Q24H  apixaban, 5 mg, Oral, BID  budesonide-formoterol, 2 puff, Inhalation, BID - RT  cefTRIAXone, 1,000 mg, Intravenous, Q24H  clopidogrel, 75 mg, Oral, Daily  doxycycline, 100 mg, Intravenous, Q12H  furosemide, 40 mg, Oral, Daily  gabapentin, 400 mg, Oral, Q12H  ipratropium-albuterol, 3 mL, Nebulization, Q6H - RT  levothyroxine, 25 mcg, Oral, Q AM  lidocaine, 2 patch, Transdermal, Q24H  midodrine, 10 mg, Oral, TID AC  nicotine, 1 patch, Transdermal, Q24H  ranolazine, 500 mg, Oral, Q12H  rosuvastatin, 5 mg, Oral, Nightly  sertraline, 100 mg, Oral, Daily  sodium chloride, 10 mL, Intravenous, Q12H             Objective    Temp:  [97.2 °F (36.2 °C)-98.5 °F (36.9 °C)] 97.2 °F (36.2 °C)  Heart Rate:  [60-74] 61  Resp:  [18] 18  BP: (112-123)/(67-84) 116/69    Physical Exam  Vitals reviewed.   Constitutional:       Appearance: Normal appearance.   HENT:      Head: Atraumatic.      Nose: Nose normal.   Eyes:      Extraocular Movements: Extraocular movements intact.   Cardiovascular:      Rate and Rhythm: Normal rate and regular rhythm.      Heart sounds: Normal heart sounds.   Pulmonary:      Comments: Bibasilar rales  Abdominal:      General: Bowel sounds are normal.      Palpations: Abdomen is soft.   Musculoskeletal:         General: Normal range of motion.      Cervical back: Normal range of motion and neck supple.   Skin:     General: Skin is warm.   Neurological:      General: No focal deficit present.      Mental Status: She is alert. Mental status is at baseline.   Psychiatric:         Behavior: Behavior normal.     Results Review:  I have reviewed the labs, radiology results, and diagnostic studies.    Laboratory Data:   Results from last  7 days   Lab Units 08/28/23  1118 08/27/23  0646 08/26/23  0545 08/25/23  0743 08/24/23  1244   SODIUM mmol/L  --  143 139 134* 138   POTASSIUM mmol/L 3.6 3.3* 3.7 4.1 3.4*   CHLORIDE mmol/L  --  102 101 97* 106   CO2 mmol/L  --  28.0 26.0 24.0 19.0*   BUN mg/dL  --  23 21 25* 29*   CREATININE mg/dL  --  0.85 0.87 0.94 1.15*   GLUCOSE mg/dL  --  92 102* 127* 105*   CALCIUM mg/dL  --  8.7 8.7 8.4* 6.8*   BILIRUBIN mg/dL  --  0.4  --   --  0.2   ALK PHOS U/L  --  68  --   --  57   ALT (SGPT) U/L  --  25  --   --  10   AST (SGOT) U/L  --  40*  --   --  27   ANION GAP mmol/L  --  13.0 12.0 13.0 13.0       Estimated Creatinine Clearance: 55.2 mL/min (by C-G formula based on SCr of 0.85 mg/dL).  Results from last 7 days   Lab Units 08/28/23  1118 08/27/23  0646 08/26/23  0545   MAGNESIUM mg/dL 1.6 1.8 2.0           Results from last 7 days   Lab Units 08/27/23  0646 08/26/23  0545 08/25/23  0743 08/24/23  1244   WBC 10*3/mm3 13.51* 15.47* 15.41* 15.21*   HEMOGLOBIN g/dL 9.5* 9.7* 9.8* 8.5*   HEMATOCRIT % 29.5* 29.5* 31.1* 26.0*   PLATELETS 10*3/mm3 290 213 227 191             Culture Data:   Blood Culture   Date Value Ref Range Status   08/26/2023 No growth at 2 days  Preliminary   08/26/2023 No growth at 2 days  Preliminary     No results found for: URINECX  No results found for: RESPCX  No results found for: WOUNDCX  No results found for: STOOLCX  No components found for: BODYFLD    Radiology Data:   Imaging Results (Last 24 Hours)       ** No results found for the last 24 hours. **            I have reviewed the patient's current medications.     Assessment/Plan     Atrial fibrillation with RVR    converted to NSR     Troponin elevated likely type II: recent heart cath on 11/2022; no obstructive CAD     on amiodarone tab     Appreciated cardiology assistance     Bilateral pneumonia     Not POA     Continue with rocephin and doxycycline 4/5;      Acute respiratory failure with hypoxemia     Due to above; weaning her off  of oxygen; from 10 L 3 days ago, now on 2 L    Chronic medical problems     COPD, no AE     Essential hypertension     Medical Decision Making  Number and Complexity of problems: one major complex  Differential Diagnosis: ACS     Conditions and Status:        Condition is improving     MDM Data  External documents reviewed: previous medical records  My EKG interpretation: a fib with RVR  My plain film interpretation: chronic changes  Tests considered but not ordered: none     Discussed with: patient and RN    Treatment Plan  See above     Care Planning  Shared decision making: her granddaughter Renee  Code status and discussions: full code     Disposition  Social Determinants of Health that impact treatment or disposition: none  I expect the patient to be discharged: tomorrow can return to [facility]      I confirmed that the patient's Advance Care Plan is present, code status is documented, or surrogate decision maker is listed in the patient's medical record.     Danish Flores MD

## 2023-08-30 ENCOUNTER — READMISSION MANAGEMENT (OUTPATIENT)
Dept: CALL CENTER | Facility: HOSPITAL | Age: 73
End: 2023-08-30
Payer: MEDICARE

## 2023-08-30 VITALS
BODY MASS INDEX: 28.16 KG/M2 | RESPIRATION RATE: 18 BRPM | SYSTOLIC BLOOD PRESSURE: 97 MMHG | HEIGHT: 62 IN | TEMPERATURE: 98 F | DIASTOLIC BLOOD PRESSURE: 69 MMHG | WEIGHT: 153 LBS | OXYGEN SATURATION: 91 % | HEART RATE: 56 BPM

## 2023-08-30 PROBLEM — I48.91 ATRIAL FIBRILLATION: Status: ACTIVE | Noted: 2023-08-30

## 2023-08-30 LAB — SARS-COV-2 RNA RESP QL NAA+PROBE: NOT DETECTED

## 2023-08-30 PROCEDURE — 94799 UNLISTED PULMONARY SVC/PX: CPT

## 2023-08-30 PROCEDURE — 94760 N-INVAS EAR/PLS OXIMETRY 1: CPT

## 2023-08-30 PROCEDURE — 97116 GAIT TRAINING THERAPY: CPT

## 2023-08-30 PROCEDURE — 97530 THERAPEUTIC ACTIVITIES: CPT

## 2023-08-30 PROCEDURE — 94664 DEMO&/EVAL PT USE INHALER: CPT

## 2023-08-30 PROCEDURE — 87635 SARS-COV-2 COVID-19 AMP PRB: CPT | Performed by: FAMILY MEDICINE

## 2023-08-30 PROCEDURE — 97110 THERAPEUTIC EXERCISES: CPT

## 2023-08-30 RX ORDER — RANOLAZINE 500 MG/1
500 TABLET, EXTENDED RELEASE ORAL EVERY 12 HOURS SCHEDULED
Qty: 60 TABLET | Refills: 0 | Status: SHIPPED | OUTPATIENT
Start: 2023-08-30 | End: 2023-09-29

## 2023-08-30 RX ORDER — DOXYCYCLINE HYCLATE 100 MG/1
100 CAPSULE ORAL 2 TIMES DAILY
Qty: 4 CAPSULE | Refills: 0 | Status: SHIPPED | OUTPATIENT
Start: 2023-08-30 | End: 2023-08-30 | Stop reason: SDUPTHER

## 2023-08-30 RX ORDER — CEFDINIR 300 MG/1
300 CAPSULE ORAL 2 TIMES DAILY
Qty: 4 CAPSULE | Refills: 0 | Status: SHIPPED | OUTPATIENT
Start: 2023-08-30 | End: 2023-08-30 | Stop reason: SDUPTHER

## 2023-08-30 RX ORDER — GABAPENTIN 400 MG/1
400 CAPSULE ORAL EVERY 8 HOURS SCHEDULED
Qty: 6 CAPSULE | Refills: 0 | Status: SHIPPED | OUTPATIENT
Start: 2023-08-30 | End: 2023-09-01

## 2023-08-30 RX ORDER — CEFDINIR 300 MG/1
300 CAPSULE ORAL 2 TIMES DAILY
Qty: 4 CAPSULE | Refills: 0 | Status: SHIPPED | OUTPATIENT
Start: 2023-08-30 | End: 2023-09-01

## 2023-08-30 RX ORDER — AMIODARONE HYDROCHLORIDE 200 MG/1
200 TABLET ORAL
Qty: 30 TABLET | Refills: 0 | Status: SHIPPED | OUTPATIENT
Start: 2023-08-31 | End: 2023-08-30 | Stop reason: SDUPTHER

## 2023-08-30 RX ORDER — OXYCODONE HYDROCHLORIDE AND ACETAMINOPHEN 5; 325 MG/1; MG/1
1 TABLET ORAL EVERY 6 HOURS PRN
Qty: 8 TABLET | Refills: 0 | Status: SHIPPED | OUTPATIENT
Start: 2023-08-30 | End: 2023-09-01

## 2023-08-30 RX ORDER — RANOLAZINE 500 MG/1
500 TABLET, EXTENDED RELEASE ORAL EVERY 12 HOURS SCHEDULED
Qty: 60 TABLET | Refills: 0 | Status: SHIPPED | OUTPATIENT
Start: 2023-08-30 | End: 2023-08-30 | Stop reason: SDUPTHER

## 2023-08-30 RX ORDER — DOXYCYCLINE HYCLATE 100 MG/1
100 CAPSULE ORAL 2 TIMES DAILY
Qty: 4 CAPSULE | Refills: 0 | Status: SHIPPED | OUTPATIENT
Start: 2023-08-30 | End: 2023-09-01

## 2023-08-30 RX ORDER — AMIODARONE HYDROCHLORIDE 200 MG/1
200 TABLET ORAL
Qty: 30 TABLET | Refills: 0 | Status: SHIPPED | OUTPATIENT
Start: 2023-08-31 | End: 2023-09-30

## 2023-08-30 RX ADMIN — APIXABAN 5 MG: 5 TABLET, FILM COATED ORAL at 08:02

## 2023-08-30 RX ADMIN — RANOLAZINE 500 MG: 500 TABLET, FILM COATED, EXTENDED RELEASE ORAL at 08:02

## 2023-08-30 RX ADMIN — OXYCODONE HYDROCHLORIDE AND ACETAMINOPHEN 1 TABLET: 5; 325 TABLET ORAL at 08:05

## 2023-08-30 RX ADMIN — MIDODRINE HYDROCHLORIDE 10 MG: 5 TABLET ORAL at 11:18

## 2023-08-30 RX ADMIN — DOXYCYCLINE 100 MG: 100 INJECTION, POWDER, LYOPHILIZED, FOR SOLUTION INTRAVENOUS at 11:18

## 2023-08-30 RX ADMIN — IPRATROPIUM BROMIDE AND ALBUTEROL SULFATE 3 ML: .5; 3 SOLUTION RESPIRATORY (INHALATION) at 00:23

## 2023-08-30 RX ADMIN — Medication 10 ML: at 08:03

## 2023-08-30 RX ADMIN — MIDODRINE HYDROCHLORIDE 10 MG: 5 TABLET ORAL at 06:12

## 2023-08-30 RX ADMIN — FUROSEMIDE 40 MG: 40 TABLET ORAL at 08:02

## 2023-08-30 RX ADMIN — NICOTINE 1 PATCH: 21 PATCH, EXTENDED RELEASE TRANSDERMAL at 08:02

## 2023-08-30 RX ADMIN — LIDOCAINE 2 PATCH: 50 PATCH TOPICAL at 08:02

## 2023-08-30 RX ADMIN — SERTRALINE 100 MG: 50 TABLET, FILM COATED ORAL at 08:02

## 2023-08-30 RX ADMIN — AMIODARONE HYDROCHLORIDE 200 MG: 200 TABLET ORAL at 08:02

## 2023-08-30 RX ADMIN — BUDESONIDE AND FORMOTEROL FUMARATE DIHYDRATE 2 PUFF: 160; 4.5 AEROSOL RESPIRATORY (INHALATION) at 06:47

## 2023-08-30 RX ADMIN — GABAPENTIN 400 MG: 400 CAPSULE ORAL at 08:02

## 2023-08-30 RX ADMIN — CLOPIDOGREL BISULFATE 75 MG: 75 TABLET ORAL at 08:02

## 2023-08-30 RX ADMIN — LEVOTHYROXINE SODIUM 25 MCG: 25 TABLET ORAL at 06:12

## 2023-08-30 RX ADMIN — IPRATROPIUM BROMIDE AND ALBUTEROL SULFATE 3 ML: .5; 3 SOLUTION RESPIRATORY (INHALATION) at 06:47

## 2023-08-30 NOTE — OUTREACH NOTE
Prep Survey      Flowsheet Row Responses   [facility] facility patient discharged from? [facility]   Is LACE score < 7 ? No   Eligibility Not Eligible   What are the reasons patient is not eligible? [facility]   Does the patient have one of the following disease processes/diagnoses(primary or secondary)? Other   Prep survey completed? Yes            Aliza DAVIS - Registered Nurse

## 2023-08-30 NOTE — THERAPY TREATMENT NOTE
Acute Care - Physical Therapy Treatment Note  [facility]     Patient Name: Mona Perales  : 1950  MRN: [id number]  Today's Date: 2023      Visit Dx:     ICD-10-CM ICD-9-CM   1. Shortness of breath  R06.02 786.05   2. Elevated troponin  R77.8 790.6   3. Atrial fibrillation, unspecified type  I48.91 427.31   4. Impaired functional mobility, balance, gait, and endurance [Z74.09 (ICD-10-CM)]  Z74.09 V49.89     Patient Active Problem List   Diagnosis    Anxiety    CAD (coronary atherosclerotic disease)    Carotid stenosis    COPD (chronic obstructive pulmonary disease)    COPD with exacerbation    Depressive disorder, not elsewhere classified    Benign essential hypertension    Hypercholesteremia    Insomnia    Notalgia    Osteoporosis    Other screening mammogram    RUQ abdominal pain    PVD (peripheral vascular disease) with claudication    Nicotine abuse    Dysphagia    Epigastric pain    Pain of right hand    Closed displaced fracture of shaft of fifth metacarpal bone of right hand    Cause of injury, fall    Displaced fracture of shaft of fifth metacarpal bone of right hand with routine healing    Syncope    Acute respiratory failure with hypoxia    (HFpEF) heart failure with preserved ejection fraction    Hypotension    Elevated WBCs    Near syncope    Atherosclerosis of native coronary artery of native heart without angina pectoris    Atherosclerosis of native arteries of extremities with rest pain, left leg    PVD (peripheral vascular disease)    Physical deconditioning    Pain due to onychomycosis of toenails of both feet    ST elevation myocardial infarction (STEMI), unspecified artery    Acute and chronic respiratory failure with hypoxia    Acute on chronic heart failure with preserved ejection fraction (HFpEF)    Current use of long term anticoagulation    Dyspnea     Past Medical History:   Diagnosis Date    A-fib     Anxiety     Arthritis     Asthma     Atherosclerosis of native  arteries of the extremities with ulceration     bilateral legs - bilateral iliac stents 2008       CHF (congestive heart failure)     Chronic lower back pain     COPD (chronic obstructive pulmonary disease)     Essential (primary) hypertension     GERD (gastroesophageal reflux disease)     History of pulmonary embolus (PE)     Hyperlipidemia     Insomnia     Lumbago     Nicotine dependence     Occlusion of artery      and stenosis of bilateral carotid arteries - BABS 16-49%, LICA 0-15%    Other atherosclerosis of native artery of other extremity     LEFT subclavian stent 2005 (occluded)    Sleep apnea      Past Surgical History:   Procedure Laterality Date    CARDIAC CATHETERIZATION  04/06/2016    No evidence of any obstructive epicardial CAD.Preserved LV systolic function with EF of 55%.    CARDIAC CATHETERIZATION N/A 11/1/2022    Procedure: Left Heart Cath;  Surgeon: Neftali Haywood MD;  Location: [facility] CATH INVASIVE LOCATION;  Service: Cardiology;  Laterality: N/A;    CENTRAL VENOUS LINE INSERTION  04/07/2016    Successful placement of right uppe extremity 6-Italian triple lumen PICC line.    ENDOSCOPY N/A 1/12/2018    Procedure: ESOPHAGOGASTRODUODENOSCOPY;  Surgeon: Bin Oh MD;  Location: [facility] ENDOSCOPY;  Service:     ENDOSCOPY N/A 1/17/2018    Procedure: ESOPHAGOGASTRODUODENOSCOPY;  Surgeon: Bin Oh MD;  Location: [facility] ENDOSCOPY;  Service:     ENDOSCOPY N/A 3/16/2018    Procedure: ESOPHAGOGASTRODUODENOSCOPY possible dilation;  Surgeon: Bin Oh MD;  Location: [facility] ENDOSCOPY;  Service: Gastroenterology    FEMORAL POPLITEAL BYPASS Left 4/14/2020    Procedure: FEMORAL POPLITEAL BYPASS ABOVE KNEE  (POLYTETRAFLUOROETHYLENE)  LEFT COMMON FEMORAL ARTERY ENDARTERECTOMY PTA LEFT ILIAC ARTERIOGRAM        (c-arm#2 and c-arm table);  Surgeon: David Suh MD;  Location: [facility] OR;  Service: Vascular;  Laterality: Left;    FEMORAL POPLITEAL BYPASS Right 9/17/2021    Procedure: RIGHT  common femoral endarterectomy FEMORAL POPLITEAL BYPASS (above the knee polytetrafluoroethylene  lower extremity arteriogram              (c-arm#2 and c-arm table);  Surgeon: David Suh MD;  Location: [facility] OR;  Service: Vascular;  Laterality: Right;    HYSTERECTOMY      INTERVENTIONAL RADIOLOGY PROCEDURE Left 9/9/2020    Procedure: IR angiogram extremity left;  Surgeon: David Suh MD;  Location: [facility] ANGIO INVASIVE LOCATION;  Service: Interventional Radiology;  Laterality: Left;    KYPHOPLASTY N/A 5/23/2019    Procedure: KYPHOPLASTY LUMBAR FOUR;  Surgeon: Aba Santa MD;  Location: [facility] OR;  Service: Orthopedic Spine    TOTAL SHOULDER REPLACEMENT Left     TRANSESOPHAGEAL ECHOCARDIOGRAM (JACQUES)  04/07/2016    With color flow-Mild to moderate [facility].LV systolic function well preserved with Ef of 55-60%.Mitral and AV intact.Diastolic dysfunction    UPPER GASTROINTESTINAL ENDOSCOPY  01/17/2018    Dr. Bin Martin M.D.     PT Assessment (last 12 hours)       PT Evaluation and Treatment       Row Name 08/30/23 0907          Physical Therapy Time and Intention    Subjective Information no complaints  -TW     Document Type therapy note (daily note)  -TW     Mode of Treatment physical therapy;individual therapy  -TW     Patient Effort good  -TW       Row Name 08/30/23 0907          General Information    Patient Profile Reviewed yes  -TW     Patient Observations alert;cooperative;agree to therapy  -TW     Patient/Family/Caregiver Comments/Observations none  -TW     General Observations of Patient Pt sup in bed with O2 applied at 2L per NC and agreeable to therapy.  -TW     Existing Precautions/Restrictions fall;oxygen therapy device and L/min  -TW       Row Name 08/30/23 0907          Home Use of Assistive/Adaptive Equipment    Equipment Currently Used at Home cane, straight  -TW       Row Name 08/30/23 0907          Pain    Pretreatment Pain Rating 7/10  -TW     Posttreatment  Pain Rating 7/10  -TW     Pain Location lower  -TW     Pain Location - back  -TW       Row Name 08/30/23 0907          Cognition    Affect/Mental Status (Cognition) WFL  -TW     Orientation Status (Cognition) oriented x 4  -TW     Follows Commands (Cognition) WFL  -TW     Personal Safety Interventions fall prevention program maintained;gait belt;nonskid shoes/slippers when out of bed;muscle strengthening facilitated  -TW       Row Name 08/30/23 0907          Range of Motion Comprehensive    General Range of Motion bilateral lower extremity ROM WFL  -TW       Row Name 08/30/23 0907          Bed Mobility    Bed Mobility supine-sit;sit-supine  -TW     Supine-Sit [place] (Bed Mobility) supervision  -TW     Sit-Supine [place] (Bed Mobility) not tested  -TW     Assistive Device (Bed Mobility) head of bed elevated;bed rails  -TW       Row Name 08/30/23 0907          Bed-Chair Transfer    Bed-Chair [place] (Transfers) contact guard  -TW     Assistive Device (Bed-Chair Transfers) walker, front-wheeled  -TW       Row Name 08/30/23 0907          Sit-Stand Transfer    Sit-Stand [place] (Transfers) contact guard  -TW     Assistive Device (Sit-Stand Transfers) walker, front-wheeled  -TW       Row Name 08/30/23 0907          Stand-Sit Transfer    Stand-Sit [place] (Transfers) contact guard  -TW     Assistive Device (Stand-Sit Transfers) walker, front-wheeled  -TW       Row Name 08/30/23 0907          Gait/Stairs (Locomotion)    [place] Level (Gait) contact guard  -TW     Assistive Device (Gait) walker, front-wheeled  -TW     Distance in Feet (Gait) 140ft  -TW     Deviations/Abnormal Patterns (Gait) stride length decreased;base of support, narrow  -TW     Comment, (Gait/Stairs) Pt desatted with gait but able to recover to 90% with 2-3 minute seated rest.  -TW       Row Name 08/30/23 0907          Safety Issues, Functional Mobility    Impairments Affecting Function (Mobility) strength;endurance/activity  tolerance;pain;balance  -TW       Row Name 08/30/23 0907          Hip (Therapeutic Exercise)    Hip AROM (Therapeutic Exercise) bilateral;sitting  -TW       Row Name 08/30/23 0907          Knee (Therapeutic Exercise)    Knee AROM (Therapeutic Exercise) bilateral;sitting  -TW       Row Name 08/30/23 0907          Ankle (Therapeutic Exercise)    Ankle AROM (Therapeutic Exercise) bilateral;sitting  -TW       Row Name 08/30/23 0907          Plan of Care Review    Plan of Care Reviewed With patient  -TW     Progress improving  -TW     Outcome Evaluation Pt sup in bed and agreeable to therapy. Pt t/f sup to sit with spv. Pt stood with CGA and amb 140ft with RW and CGA with O2 applied at 2L per NC. Pt desatted to 82% but able to recover to 90% with 2-3 minute seated rest. Pt performed B LE ther ex sitting in recliner and was left up in recliner with all needs met. Pt would cont to benefit from therapy at  this time.  -TW       Row Name 08/30/23 0907          Vital Signs    Pretreatment Heart Rate (beats/min) 64  -TW     Intratreatment Heart Rate (beats/min) 96  -TW     Posttreatment Heart Rate (beats/min) 70  -TW     Pre SpO2 (%) 94  -TW     O2 Delivery Pre Treatment nasal cannula  -TW     Intra SpO2 (%) 82  -TW     O2 Delivery Intra Treatment nasal cannula  -TW     Post SpO2 (%) 95  -TW     O2 Delivery Post Treatment nasal cannula  -TW     Pre Patient Position Supine  -TW     Intra Patient Position Standing  -TW     Post Patient Position Sitting  -TW       Row Name 08/30/23 0907          Positioning and Restraints    Pre-Treatment Position in bed  -TW     Post Treatment Position chair  -TW     In Chair reclined;call light within reach;encouraged to call for assist;notified nsg  -TW       Row Name 08/30/23 0907          Therapy Assessment/Plan (PT)    Rehab Potential (PT) good, to achieve stated therapy goals  -TW     Criteria for Skilled Interventions Met (PT) yes;skilled treatment is necessary  -TW     Therapy Frequency  (PT) other (see comments)  5-7 days/week.  -TW       Row Name 08/30/23 0907          Bed Mobility Goal 1 (PT)    Activity/Assistive Device (Bed Mobility Goal 1, PT) sit to supine/supine to sit  -TW     [place] Level/Cues Needed (Bed Mobility Goal 1, PT) modified independence  -TW     Time Frame (Bed Mobility Goal 1, PT) by discharge  -TW     Progress/Outcomes (Bed Mobility Goal 1, PT) goal not met  -TW       Row Name 08/30/23 0907          Transfer Goal 1 (PT)    Activity/Assistive Device (Transfer Goal 1, PT) sit-to-stand/stand-to-sit;bed-to-chair/chair-to-bed;walker, rolling  -TW     [place] Level/Cues Needed (Transfer Goal 1, PT) modified independence  -TW     Time Frame (Transfer Goal 1, PT) by discharge  -TW     Strategies/Barriers (Transfers Goal 1, PT) Dizzy upon standing during evaluation.  -TW     Progress/Outcome (Transfer Goal 1, PT) goal not met  -TW       Row Name 08/30/23 0907          Gait Training Goal 1 (PT)    Activity/Assistive Device (Gait Training Goal 1, PT) walker, rolling  -TW     [place] Level (Gait Training Goal 1, PT) modified independence  -TW     Distance (Gait Training Goal 1, PT) 30'x2.  -TW     Strategies/Barriers (Gait Training Goal 1, PT) Dizzy upon standing during evaluation.  -TW     Progress/Outcome (Gait Training Goal 1, PT) goal not met  -TW       Row Name 08/30/23 0907          Problem Specific Goal 1 (PT)    Problem Specific Goal 1 (PT) Score 24/28 on Tinetti fall risk assessment.  -TW     Time Frame (Problem Specific Goal 1, PT) by discharge  -TW     Strategies/Barriers (Problem Specific Goal 1, PT) Dizzy upon standing during evaluation.  -TW     Progress/Outcome (Problem Specific Goal 1, PT) goal not met  -TW               User Key  (r) = Recorded By, (t) = Taken By, (c) = Cosigned By      Initials Name Provider Type    Bassem Diaz PTA Physical Therapist Assistant                    Physical Therapy Education       Title: PT OT SLP Therapies (Not  Started)       Topic: Physical Therapy (Not Started)       Point: Mobility training (Not Started)       Learner Progress:  Not documented in this visit.              Point: Home exercise program (Not Started)       Learner Progress:  Not documented in this visit.              Point: Body mechanics (Not Started)       Learner Progress:  Not documented in this visit.              Point: Precautions (Not Started)       Learner Progress:  Not documented in this visit.                                  PT Recommendation and Plan  Anticipated Discharge Disposition (PT): skilled nursing facility  Therapy Frequency (PT): other (see comments) (5-7 days/week.)  Plan of Care Reviewed With: patient  Progress: improving  Outcome Evaluation: Pt sup in bed and agreeable to therapy. Pt t/f sup to sit with spv. Pt stood with CGA and amb 140ft with RW and CGA with O2 applied at 2L per NC. Pt desatted to 82% but able to recover to 90% with 2-3 minute seated rest. Pt performed B LE ther ex sitting in recliner and was left up in recliner with all needs met. Pt would cont to benefit from therapy at  this time.   Outcome Measures       Row Name 08/28/23 1500             How much help from another person do you currently need...    Turning from your back to your side while in flat bed without using bedrails? 3  -LN      Moving from lying on back to sitting on the side of a flat bed without bedrails? 3  -LN      Moving to and from a bed to a chair (including a wheelchair)? 3  -LN      Standing up from a chair using your arms (e.g., wheelchair, bedside chair)? 3  -LN      Climbing 3-5 steps with a railing? 2  -LN      To walk in hospital room? 2  -LN      AM-PAC 6 Clicks Score (PT) 16  -LN         Functional Assessment    Outcome Measure Options AM-PAC 6 Clicks Basic Mobility (PT)  -LN                User Key  (r) = Recorded By, (t) = Taken By, (c) = Cosigned By      Initials Name Provider Type    Torri Vogel PTA Physical Therapist  Assistant                     Time Calculation:    PT Charges       Row Name 08/30/23 1232             Time Calculation    Start Time 0907  -TW      Stop Time 0947  -TW      Time Calculation (min) 40 min  -TW         Time Calculation- PT    Total Timed Code Minutes- PT 40 minute(s)  -TW                User Key  (r) = Recorded By, (t) = Taken By, (c) = Cosigned By      Initials Name Provider Type     Bassem Lamar, [place] Physical Therapist Assistant                  Therapy Charges for Today       Code Description Service Date Service Provider Modifiers Qty    11469073673 HC GAIT TRAINING EA 15 MIN 8/29/2023 Bassem Lamar, PTA GP 1    10257786479 HC PT THER PROC EA 15 MIN 8/29/2023 Bassem Lamar, PTA GP 1    64621688204 HC GAIT TRAINING EA 15 MIN 8/30/2023 Bassem Lamar, PTA GP 1    61617985312 HC PT THER PROC EA 15 MIN 8/30/2023 Bassem Lamar, PTA GP 1    98100456281 HC PT THERAPEUTIC ACT EA 15 MIN 8/30/2023 Bassem Lamar, PTA GP 1            PT G-Codes  Outcome Measure Options: AM-PAC 6 Clicks Basic Mobility (PT)  AM-PAC 6 Clicks Score (PT): 17    Bassem Lamar PTA  8/30/2023

## 2023-08-30 NOTE — PLAN OF CARE
Goal Outcome Evaluation:  Plan of Care Reviewed With: patient        Progress: improving  Outcome Evaluation: Pt sup in bed and agreeable to therapy. Pt t/f sup to sit with spv. Pt stood with CGA and amb 140ft with RW and CGA with O2 applied at 2L per NC. Pt desatted to 82% but able to recover to 90% with 2-3 minute seated rest. Pt performed B LE ther ex sitting in recliner and was left up in recliner with all needs met. Pt would cont to benefit from therapy at  this time.      Anticipated Discharge Disposition (PT): skilled nursing facility

## 2023-08-30 NOTE — DISCHARGE SUMMARY
[facility] Medicine Services  DISCHARGE SUMMARY       Date of Admission: 8/24/2023  Date of Discharge:  8/30/2023  Primary Care Physician: Anahi Camacho APRN    Presenting Problem/History of Present Illness:  Shortness of breath [R06.02]  Dyspnea [R06.00]  Elevated troponin [R77.8]  Atrial fibrillation, unspecified type [I48.91]       Final Discharge Diagnoses:  Active Hospital Problems    Diagnosis     **Dyspnea     Atrial fibrillation     Pneumonia        Consults:   Consults       Date and Time Order Name Status Description    8/25/2023 12:31 AM Inpatient Cardiology Consult Completed             Procedures Performed:                 Pertinent Test Results:   Lab Results (most recent)       Procedure Component Value Units Date/Time    Blood Culture - Blood, Arm, Right [[id number]]  (Normal) Collected: 08/26/23 2102    Specimen: Blood from Arm, Right Updated: 08/29/23 2115     Blood Culture No growth at 3 days    Blood Culture - Blood, Arm, Left [[id number]]  (Normal) Collected: 08/26/23 2102    Specimen: Blood from Arm, Left Updated: 08/29/23 2115     Blood Culture No growth at 3 days    Magnesium [[id number]]  (Normal) Collected: 08/28/23 1118    Specimen: Blood Updated: 08/28/23 1200     Magnesium 1.6 mg/dL     Potassium [[id number]]  (Normal) Collected: 08/28/23 1118    Specimen: Blood Updated: 08/28/23 1200     Potassium 3.6 mmol/L     Blood Gas, Arterial - [[id number]]  (Abnormal) Collected: 08/27/23 1654    Specimen: Arterial Blood Updated: 08/27/23 1651     Site Right Brachial     Artur's Test Positive     pH, Arterial 7.422 pH units      pCO2, Arterial 47.5 mm Hg      Comment: 83 Value above reference range        pO2, Arterial 107.0 mm Hg      HCO3, Arterial 30.9 mmol/L      Comment: 83 Value above reference range        Base Excess, Arterial 5.6 mmol/L      Comment: 83 Value above reference range        O2 Saturation, Arterial 97.8 %      Barometric  Pressure for Blood Gas 746 mmHg      Modality HFNC     Flow Rate 6.0 lpm      Ventilator Mode NA     Collected by      Comment: Meter: [id number]     :  [id number]       Manual Differential [[id number]]  (Abnormal) Collected: 08/27/23 0646    Specimen: Blood Updated: 08/27/23 0952     Neutrophil % 71.0 %      Lymphocyte % 17.0 %      Monocyte % 9.0 %      Eosinophil % 2.0 %      Bands %  1.0 %      Neutrophils Absolute 9.73 10*3/mm3      Lymphocytes Absolute 2.30 10*3/mm3      Monocytes Absolute 1.22 10*3/mm3      Eosinophils Absolute 0.27 10*3/mm3      Anisocytosis --     Comment: Occasional          Hypochromia Slight/1+     Polychromasia --     Comment: Occasional          WBC Morphology Normal     Platelet Estimate Adequate    Magnesium [[id number]]  (Normal) Collected: 08/27/23 0646    Specimen: Blood Updated: 08/27/23 0759     Magnesium 1.8 mg/dL     Comprehensive Metabolic Panel [[id number]]  (Abnormal) Collected: 08/27/23 0646    Specimen: Blood Updated: 08/27/23 0759     Glucose 92 mg/dL      BUN 23 mg/dL      Creatinine 0.85 mg/dL      Sodium 143 mmol/L      Potassium 3.3 mmol/L      Chloride 102 mmol/L      CO2 28.0 mmol/L      Calcium 8.7 mg/dL      Total Protein 7.8 g/dL      Albumin 3.7 g/dL      ALT (SGPT) 25 U/L      AST (SGOT) 40 U/L      Alkaline Phosphatase 68 U/L      Total Bilirubin 0.4 mg/dL      Globulin 4.1 gm/dL      A/G Ratio 0.9 g/dL      BUN/Creatinine Ratio 27.1     Anion Gap 13.0 mmol/L      eGFR 73.8 mL/min/1.73     Narrative:      GFR Normal >60  Chronic Kidney Disease <60  Kidney Failure <15      CBC & Differential [[id number]]  (Abnormal) Collected: 08/27/23 0646    Specimen: Blood Updated: 08/27/23 0756    Narrative:      The following orders were created for panel order CBC & Differential.  Procedure                               Abnormality         Status                     ---------                               -----------         ------                     CBC Auto  Differential[[id number]]        Abnormal            Final result               Scan Slide[[id number]]                                                                    Please view results for these tests on the individual orders.    CBC Auto Differential [[id number]]  (Abnormal) Collected: 08/27/23 0646    Specimen: Blood Updated: 08/27/23 0756     WBC 13.51 10*3/mm3      RBC 3.31 10*6/mm3      Hemoglobin 9.5 g/dL      Hematocrit 29.5 %      MCV 89.1 fL      MCH 28.7 pg      MCHC 32.2 g/dL      RDW 14.2 %      RDW-SD 45.9 fl      MPV 9.9 fL      Platelets 290 10*3/mm3     Urinalysis, Microscopic Only - Urine, Clean Catch [[id number]]  (Abnormal) Collected: 08/26/23 2254    Specimen: Urine, Clean Catch Updated: 08/26/23 2312     RBC, UA 0-2 /HPF      WBC, UA 0-2 /HPF      Comment: Urine culture not indicated.        Bacteria, UA None Seen /HPF      Squamous Epithelial Cells, UA 3-5 /HPF      Hyaline Casts, UA 13-20 /LPF      Methodology Automated Microscopy    Urinalysis With Culture If Indicated - Urine, Clean Catch [[id number]]  (Abnormal) Collected: 08/26/23 2254    Specimen: Urine, Clean Catch Updated: 08/26/23 2312     Color, UA Yellow     Appearance, UA Clear     pH, UA 5.5     Specific Gravity, UA 1.017     Glucose, UA Negative     Ketones, UA Negative     Bilirubin, UA Negative     Blood, UA Negative     Protein,  mg/dL (2+)     Leuk Esterase, UA Negative     Nitrite, UA Negative     Urobilinogen, UA 1.0 E.U./dL    Narrative:      In absence of clinical symptoms, the presence of pyuria, bacteria, and/or nitrites on the urinalysis result does not correlate with infection.    Procalcitonin [[id number]]  (Normal) Collected: 08/26/23 2102    Specimen: Blood Updated: 08/26/23 2142     Procalcitonin 0.11 ng/mL     Narrative:      As a Marker for Sepsis (Non-Neonates):    1. <0.5 ng/mL represents a low risk of severe sepsis and/or septic shock.  2. >2 ng/mL represents a high risk of severe sepsis and/or septic  "shock.    As a Marker for Lower Respiratory Tract Infections that require antibiotic therapy:    PCT on Admission    Antibiotic Therapy       6-12 Hrs later    >0.5                Strongly Recommended  >0.25 - <0.5        Recommended   0.1 - 0.25          Discouraged              Remeasure/reassess PCT  <0.1                Strongly Discouraged     Remeasure/reassess PCT    As 28 day mortality risk marker: \"Change in Procalcitonin Result\" (>80% or <=80%) if Day 0 (or Day 1) and Day 4 values are available. Refer to http://[email or web address]-pct-[email or web address]    Change in PCT <=80%  A decrease of PCT levels below or equal to 80% defines a positive change in PCT test result representing a higher risk for 28-day all-cause mortality of patients diagnosed with severe sepsis for septic shock.    Change in PCT >80%  A decrease of PCT levels of more than 80% defines a negative change in PCT result representing a lower risk for 28-day all-cause mortality of patients diagnosed with severe sepsis or septic shock.       Respiratory Panel PCR w/COVID-19(SARS-CoV-2) HAYDEN/BRUCE/MARIA E/PAD/COR/MAD/DEMI In-House, NP Swab in UTM/VTM, 3-4 HR TAT - Swab, Nasopharynx [[id number]]  (Normal) Collected: 08/26/23 2023    Specimen: Swab from Nasopharynx Updated: 08/26/23 2135     ADENOVIRUS, PCR Not Detected     Coronavirus 229E Not Detected     Coronavirus HKU1 Not Detected     Coronavirus NL63 Not Detected     Coronavirus OC43 Not Detected     COVID19 Not Detected     Human Metapneumovirus Not Detected     Human Rhinovirus/Enterovirus Not Detected     Influenza A PCR Not Detected     Influenza B PCR Not Detected     Parainfluenza Virus 1 Not Detected     Parainfluenza Virus 2 Not Detected     Parainfluenza Virus 3 Not Detected     Parainfluenza Virus 4 Not Detected     RSV, PCR Not Detected     Bordetella pertussis pcr Not Detected     Bordetella parapertussis PCR Not Detected     Chlamydophila pneumoniae PCR Not Detected     Mycoplasma pneumo by PCR Not " Detected    Narrative:      In the setting of a positive respiratory panel with a viral infection PLUS a negative procalcitonin without other underlying concern for bacterial infection, consider observing off antibiotics or discontinuation of antibiotics and continue supportive care. If the respiratory panel is positive for atypical bacterial infection (Bordetella pertussis, Chlamydophila pneumoniae, or Mycoplasma pneumoniae), consider antibiotic de-escalation to target atypical bacterial infection.    Lactic Acid, Plasma [[id number]]  (Normal) Collected: 08/26/23 2102    Specimen: Blood Updated: 08/26/23 2123     Lactate 0.9 mmol/L     Blood Gas, Arterial - [[id number]]  (Abnormal) Collected: 08/26/23 0745    Specimen: Arterial Blood Updated: 08/26/23 0742     Site Right Radial     Artur's Test N/A     pH, Arterial 7.437 pH units      pCO2, Arterial 40.1 mm Hg      pO2, Arterial 66.4 mm Hg      Comment: 84 Value below reference range        HCO3, Arterial 27.0 mmol/L      Comment: 83 Value above reference range        Base Excess, Arterial 2.6 mmol/L      Comment:  XvcmogltzEedtjSt[id number] Value above reference range        O2 Saturation, Arterial --     Comment:  GmazdcnroIuuyb[id number] OXI spectrum mismatch        Barometric Pressure for Blood Gas 747 mmHg      Modality Nasal Cannula     Flow Rate 10.0 lpm      Ventilator Mode NA     Collected by mandi     Comment: Meter: [id number]     :  [id number]       Basic Metabolic Panel [[id number]]  (Abnormal) Collected: 08/26/23 0545    Specimen: Blood Updated: 08/26/23 0615     Glucose 102 mg/dL      BUN 21 mg/dL      Creatinine 0.87 mg/dL      Sodium 139 mmol/L      Potassium 3.7 mmol/L      Chloride 101 mmol/L      CO2 26.0 mmol/L      Calcium 8.7 mg/dL      BUN/Creatinine Ratio 24.1     Anion Gap 12.0 mmol/L      eGFR 71.8 mL/min/1.73     Narrative:      GFR Normal >60  Chronic Kidney Disease <60  Kidney Failure <15       CBC & Differential [[id number]]  (Abnormal) Collected: 08/26/23 0545    Specimen: Blood Updated: 08/26/23 0602    Narrative:      The following orders were created for panel order CBC & Differential.  Procedure                               Abnormality         Status                     ---------                               -----------         ------                     CBC Auto Differential[[id number]]        Abnormal            Final result                 Please view results for these tests on the individual orders.    CBC Auto Differential [[id number]]  (Abnormal) Collected: 08/26/23 0545    Specimen: Blood Updated: 08/26/23 0602     WBC 15.47 10*3/mm3      RBC 3.29 10*6/mm3      Hemoglobin 9.7 g/dL      Hematocrit 29.5 %      MCV 89.7 fL      MCH 29.5 pg      MCHC 32.9 g/dL      RDW 14.1 %      RDW-SD 46.2 fl      MPV 9.7 fL      Platelets 213 10*3/mm3      Neutrophil % 67.4 %      Lymphocyte % 18.1 %      Monocyte % 11.6 %      Eosinophil % 1.1 %      Basophil % 0.8 %      Immature Grans % 1.0 %      Neutrophils, Absolute 10.42 10*3/mm3      Lymphocytes, Absolute 2.80 10*3/mm3      Monocytes, Absolute 1.80 10*3/mm3      Eosinophils, Absolute 0.17 10*3/mm3      Basophils, Absolute 0.13 10*3/mm3      Immature Grans, Absolute 0.15 10*3/mm3      nRBC 0.0 /100 WBC     High Sensitivity Troponin T 2Hr [[id number]]  (Abnormal) Collected: 08/25/23 1458    Specimen: Blood Updated: 08/25/23 1612     HS Troponin T 83 ng/L      Troponin T Delta 2 ng/L     Narrative:      High Sensitive Troponin T Reference Range:  <10.0 ng/L- Negative Female for AMI  <15.0 ng/L- Negative Male for AMI  >=10 - Abnormal Female indicating possible myocardial injury.  >=15 - Abnormal Male indicating possible myocardial injury.   Clinicians would have to utilize clinical acumen, EKG, Troponin, and serial changes to determine if it is an Acute Myocardial Infarction or myocardial injury due to an underlying chronic condition.         High  Sensitivity Troponin T [[id number]]  (Abnormal) Collected: 08/25/23 0743    Specimen: Blood Updated: 08/25/23 1443     HS Troponin T 81 ng/L     Narrative:      High Sensitive Troponin T Reference Range:  <10.0 ng/L- Negative Female for AMI  <15.0 ng/L- Negative Male for AMI  >=10 - Abnormal Female indicating possible myocardial injury.  >=15 - Abnormal Male indicating possible myocardial injury.   Clinicians would have to utilize clinical acumen, EKG, Troponin, and serial changes to determine if it is an Acute Myocardial Infarction or myocardial injury due to an underlying chronic condition.         Basic Metabolic Panel [[id number]]  (Abnormal) Collected: 08/25/23 0743    Specimen: Blood Updated: 08/25/23 0822     Glucose 127 mg/dL      BUN 25 mg/dL      Creatinine 0.94 mg/dL      Sodium 134 mmol/L      Potassium 4.1 mmol/L      Chloride 97 mmol/L      CO2 24.0 mmol/L      Calcium 8.4 mg/dL      BUN/Creatinine Ratio 26.6     Anion Gap 13.0 mmol/L      eGFR 65.4 mL/min/1.73     Narrative:      GFR Normal >60  Chronic Kidney Disease <60  Kidney Failure <15      High Sensitivity Troponin T 2Hr [[id number]]  (Abnormal) Collected: 08/24/23 1437    Specimen: Blood Updated: 08/24/23 1549     HS Troponin T 85 ng/L      Troponin T Delta 13 ng/L     Narrative:      High Sensitive Troponin T Reference Range:  <10.0 ng/L- Negative Female for AMI  <15.0 ng/L- Negative Male for AMI  >=10 - Abnormal Female indicating possible myocardial injury.  >=15 - Abnormal Male indicating possible myocardial injury.   Clinicians would have to utilize clinical acumen, EKG, Troponin, and serial changes to determine if it is an Acute Myocardial Infarction or myocardial injury due to an underlying chronic condition.         BNP [[id number]]  (Abnormal) Collected: 08/24/23 1244    Specimen: Blood Updated: 08/24/23 1540     proBNP 7,267.0 pg/mL     Narrative:      Among patients with dyspnea, NT-proBNP is highly sensitive for the detection of acute  congestive heart failure. In addition NT-proBNP of <300 pg/ml effectively rules out acute congestive heart failure with 99% negative predictive value.      Extra Tubes [[id number]] Collected: 08/24/23 1246    Specimen: Blood, Venous Line Updated: 08/24/23 1400    Narrative:      The following orders were created for panel order Extra Tubes.  Procedure                               Abnormality         Status                     ---------                               -----------         ------                     Gold Top - SST[[id number]]                                   Final result               Light Blue Top[[id number]]                                   Final result                 Please view results for these tests on the individual orders.    Gold Top - SST [[id number]] Collected: 08/24/23 1247    Specimen: Blood Updated: 08/24/23 1400     Extra Tube Hold for add-ons.     Comment: Auto resulted.       Light Blue Top [[id number]] Collected: 08/24/23 1246    Specimen: Blood Updated: 08/24/23 1400     Extra Tube Hold for add-ons.     Comment: Auto resulted       Comprehensive Metabolic Panel [[id number]]  (Abnormal) Collected: 08/24/23 1244    Specimen: Blood Updated: 08/24/23 1349     Glucose 105 mg/dL      BUN 29 mg/dL      Creatinine 1.15 mg/dL      Sodium 138 mmol/L      Potassium 3.4 mmol/L      Comment: Slight hemolysis detected by analyzer. Results may be affected.        Chloride 106 mmol/L      CO2 19.0 mmol/L      Calcium 6.8 mg/dL      Total Protein 6.3 g/dL      Albumin 2.9 g/dL      ALT (SGPT) 10 U/L      AST (SGOT) 27 U/L      Alkaline Phosphatase 57 U/L      Total Bilirubin 0.2 mg/dL      Globulin 3.4 gm/dL      A/G Ratio 0.9 g/dL      BUN/Creatinine Ratio 25.2     Anion Gap 13.0 mmol/L      eGFR 51.4 mL/min/1.73     Narrative:      GFR Normal >60  Chronic Kidney Disease <60  Kidney Failure <15      CK [[id number]]  (Abnormal) Collected: 08/24/23 1244    Specimen: Blood Updated: 08/24/23 1348      Creatine Kinase 765 U/L     High Sensitivity Troponin T [[id number]]  (Abnormal) Collected: 08/24/23 1244    Specimen: Blood Updated: 08/24/23 1334     HS Troponin T 72 ng/L     Narrative:      High Sensitive Troponin T Reference Range:  <10.0 ng/L- Negative Female for AMI  <15.0 ng/L- Negative Male for AMI  >=10 - Abnormal Female indicating possible myocardial injury.  >=15 - Abnormal Male indicating possible myocardial injury.   Clinicians would have to utilize clinical acumen, EKG, Troponin, and serial changes to determine if it is an Acute Myocardial Infarction or myocardial injury due to an underlying chronic condition.               Imaging Results (Most Recent)       Procedure Component Value Units Date/Time    CT Chest Without Contrast Diagnostic [[id number]] Collected: 08/26/23 1334     Updated: 08/26/23 1341    Narrative:        TECHNIQUE:  Axial images from the thoracic inlet through the levels of the diaphragms were  performed followed by 2D multiplanar reformats.    FINDINGS:  Airways are patent.  There are scattered areas of patchy peripheral airspace  consolidations scattered throughout bilateral lungs.  Trace bilateral pleural  effusions noted.  No pneumothorax.    No mediastinal, hilar or axillary adenopathy.  No significant pericardial  effusion.  The thoracic aorta is not grossly aneurysmal.  Moderate to severe  atherosclerosis.  Thoracic spine is intact.  No gross or acute bone abnormality.      Impression:      1.  Findings compatible with multifocal peripheral atypical/viral pneumonia.  Recommend radiographic follow-up to resolution.  2.  Trace bilateral pleural effusions.      XR Chest 1 View [[id number]] Collected: 08/26/23 0723     Updated: 08/26/23 1313    Narrative:      EXAM: Single view chest    COMPARISON: Single view chest 5/9/2023    HISTORY: Respiratory distress    FINDINGS: Single frontal view is obtained of the chest.  Heart and mediastinal  contours are stable.  Diffuse prominence of  pulmonary interstitium with  superimposed bibasilar opacities.  Low lung volumes.      Impression:      1.  Right-sided PICC line is present and projects over the region of the  axillary vein.    2.  Diffuse prominence of pulmonary interstitium suggesting either diffuse or  atypical pneumonia versus pulmonary edema.    3.  Superimposed bibasilar atelectasis or pneumonia.    4.  Trace bilateral pleural effusions.    US Guided Vascular Access [[id number]] Collected: 08/24/23 1415     Updated: 08/24/23 1532    Narrative:      Ultrasound guidance vascular    FINDINGS:  Real-time ultrasound imaging was provided for vascular access.  Please refer to  procedure note for real-time exam findings.  The right basilic vein was found to  be patent and compressible.  Access under direct sonographic visualization.  Permanent saved images sent to the PACS for documentation.      IR Insert Midline Without Port Pump 5 Plus [[id number]] Resulted: 08/24/23 1352     Updated: 08/24/23 1352    Narrative:      This procedure was auto-finalized with no dictation required.            Chief Complaint on Day of Discharge: No new complaints    Hospital Course:  The patient is a 72 y.o. female with medical history notable for atrial fibrillation, CHF, COPD, and history of PE who presented to [facility] with palpitations.  Patient was found to be in A-fib with RVR.  She was seen and evaluated by cardiology and her medications were adjusted.  She was noted to have elevated troponin felt to be related to type II event.  Patient also was treated for underlying pneumonia with antimicrobial therapy.  Patient had good improvement during her stay and was able to be weaned to her home oxygen setting.  Patient otherwise had no new complaints and felt back to baseline and requested discharge home with outpatient follow-up with her providers.  Patient will finish her antimicrobial course at home.  Patient voiced understanding and agreement to  "the plan.      Condition on Discharge: Stable    Physical Exam on Discharge:  BP 97/69 (BP Location: Left arm, Patient Position: Sitting)   Pulse 56   Temp 98 °F (36.7 °C) (Temporal)   Resp 18   Ht 157.5 cm (62\")   Wt 69.4 kg (153 lb)   SpO2 91%   BMI 27.98 kg/m²   Physical Exam  Constitutional:       General: She is not in acute distress.     Appearance: She is not toxic-appearing.   HENT:      Head: Normocephalic and atraumatic.      Right Ear: External ear normal.      Left Ear: External ear normal.      Nose: Nose normal.      Mouth/Throat:      Pharynx: Oropharynx is clear.   Eyes:      Conjunctiva/sclera: Conjunctivae normal.   Cardiovascular:      Rate and Rhythm: Normal rate and regular rhythm.      Heart sounds: Normal heart sounds.   Pulmonary:      Comments: Diminished but clear bilaterally  Abdominal:      General: Bowel sounds are normal.      Palpations: Abdomen is soft.      Tenderness: There is no abdominal tenderness.   Musculoskeletal:         General: No deformity.   Skin:     General: Skin is warm and dry.      Capillary Refill: Capillary refill takes less than 2 seconds.   Neurological:      General: No focal deficit present.      Mental Status: She is alert and oriented to person, place, and time. Mental status is at baseline.   Psychiatric:         Behavior: Behavior normal.       [place] [facility] [place] Medications:     Discharge Medications        New Medications        Instructions Start Date   amiodarone 200 MG tablet  Commonly known as: PACERONE   200 mg, Oral, Every 24 Hours Scheduled   Start Date: August 31, 2023     cefdinir 300 MG capsule  Commonly known as: OMNICEF   300 mg, Oral, 2 Times Daily      doxycycline 100 MG capsule  Commonly known as: VIBRAMYCIN   100 mg, Oral, 2 Times Daily      ranolazine 500 MG 12 hr tablet  Commonly known as: RANEXA   500 mg, Oral, Every 12 Hours Scheduled             Changes to Medications        Instructions " Start Date   oxyCODONE-acetaminophen 5-325 MG per tablet  Commonly known as: PERCOCET  What changed: reasons to take this   1 tablet, Oral, Every 6 Hours PRN             Continue These Medications        Instructions Start Date   acetaminophen 325 MG tablet  Commonly known as: TYLENOL   325 mg, Oral, Every 6 Hours PRN      albuterol (2.5 MG/3ML) 0.083% nebulizer solution  Commonly known as: PROVENTIL   2.5 mg, Nebulization, Every 4 Hours PRN      ALBUTEROL SULFATE HFA IN   2 puffs, Inhalation, Every 4 Hours PRN      amitriptyline 25 MG tablet  Commonly known as: ELAVIL   25 mg, Oral, Nightly      apixaban 5 MG tablet tablet  Commonly known as: ELIQUIS   5 mg, Oral, 2 Times Daily      budesonide-formoterol 160-4.5 MCG/ACT inhaler  Commonly known as: SYMBICORT   2 puffs, Inhalation, 2 Times Daily PRN      cilostazol 100 MG tablet  Commonly known as: PLETAL   100 mg, Oral, 2 Times Daily      clopidogrel 75 MG tablet  Commonly known as: PLAVIX   75 mg, Oral, Daily      Fluticasone-Salmeterol 500-50 MCG/ACT DISKUS  Commonly known as: ADVAIR/WIXELA   Inhalation, 2 Times Daily - RT      furosemide 40 MG tablet  Commonly known as: LASIX   40 mg, Oral, Daily, Take additional tablet in the afternoon or evening for increased weight gain >3lbs, shortness of breath or leg swelling      gabapentin 400 MG capsule  Commonly known as: NEURONTIN   400 mg, Oral, Every 8 Hours Scheduled      ipratropium-albuterol 0.5-2.5 mg/3 ml nebulizer  Commonly known as: DUO-NEB   3 mL, Nebulization, Every 6 Hours      levothyroxine 25 MCG tablet  Commonly known as: SYNTHROID, LEVOTHROID   25 mcg, Oral, Every Early Morning      midodrine 5 MG tablet  Commonly known as: PROAMATINE   10 mg, Oral, 3 Times Daily Before Meals      naloxone 4 MG/0.1ML nasal spray  Commonly known as: NARCAN   Call 911. Don't prime. [place] in 1 nostril for overdose. Repeat in 2-3 minutes in other nostril if no or minimal breathing/responsiveness.      nicotine 21 MG/24HR  patch  Commonly known as: NICODERM CQ   Place 1 patch on the skin as directed by provider Daily **Remove old patch before applying new patch**      NON FORMULARY   Biofreeze - apply to knees daily      O2  Commonly known as: OXYGEN   4 L/min, Inhalation, As Needed      ondansetron 4 MG tablet  Commonly known as: ZOFRAN   4 mg, Oral, Every 8 Hours PRN      pantoprazole 40 MG EC tablet  Commonly known as: Protonix   40 mg, Oral, Daily      penciclovir 1 % cream  Commonly known as: DENAVIR   1 application , Topical, As Needed      pravastatin 20 MG tablet  Commonly known as: PRAVACHOL   20 mg, Oral, Nightly      sennosides-docusate 8.6-50 MG per tablet  Commonly known as: PERICOLACE   2 tablets, Oral, 2 Times Daily PRN      sertraline 100 MG tablet  Commonly known as: ZOLOFT   100 mg, Oral, Daily      traZODone 150 MG tablet  Commonly known as: DESYREL   150 mg, Oral, Nightly               Discharge Diet: Cardiac diet    Activity at Discharge:   Activity Instructions       Gradually Increase Activity Until at Pre-Hospitalization Level              Discharge Care Plan/Instructions: Take medications as prescribed, follow-up with PCP and cardiology, and return for worsening symptoms.    Follow-up Appointments:   Future Appointments   Date Time Provider Department Center   11/22/2023  9:15 AM Neftali Haywood MD [facility] None   8/22/2024  1:00 PM [facility]   8/22/2024  1:30 PM [facility]   8/22/2024  3:00 PM Jacki Polanco APRN [facility] None       Test Results Pending at Discharge:   Pending Labs       Order Current Status    Blood Culture - Blood, Arm, Left Preliminary result    Blood Culture - Blood, Arm, Right Preliminary result            Siva Le MD    Time: 32 minutes

## 2023-08-31 LAB
BACTERIA SPEC AEROBE CULT: NORMAL
BACTERIA SPEC AEROBE CULT: NORMAL

## 2023-09-08 DIAGNOSIS — I73.9 PVD (PERIPHERAL VASCULAR DISEASE) WITH CLAUDICATION: Primary | ICD-10-CM

## 2023-09-17 ENCOUNTER — LAB REQUISITION (OUTPATIENT)
Dept: LAB | Facility: HOSPITAL | Age: 73
End: 2023-09-17
Payer: MEDICARE

## 2023-09-17 DIAGNOSIS — R82.79 OTHER ABNORMAL FINDINGS ON MICROBIOLOGICAL EXAMINATION OF URINE: ICD-10-CM

## 2023-09-17 LAB
BACTERIA UR QL AUTO: ABNORMAL /HPF
BILIRUB UR QL STRIP: NEGATIVE
CLARITY UR: CLEAR
COLOR UR: YELLOW
GLUCOSE UR STRIP-MCNC: NEGATIVE MG/DL
HGB UR QL STRIP.AUTO: NEGATIVE
HYALINE CASTS UR QL AUTO: ABNORMAL /LPF
KETONES UR QL STRIP: NEGATIVE
LEUKOCYTE ESTERASE UR QL STRIP.AUTO: ABNORMAL
NITRITE UR QL STRIP: NEGATIVE
PH UR STRIP.AUTO: 5.5 [PH] (ref 5–9)
PROT UR QL STRIP: NEGATIVE
RBC # UR STRIP: ABNORMAL /HPF
REF LAB TEST METHOD: ABNORMAL
SP GR UR STRIP: 1.01 (ref 1–1.03)
SQUAMOUS #/AREA URNS HPF: ABNORMAL /HPF
UROBILINOGEN UR QL STRIP: ABNORMAL
WBC # UR STRIP: ABNORMAL /HPF

## 2023-09-17 PROCEDURE — 81001 URINALYSIS AUTO W/SCOPE: CPT | Performed by: NURSE PRACTITIONER

## 2023-09-17 PROCEDURE — 87086 URINE CULTURE/COLONY COUNT: CPT | Performed by: NURSE PRACTITIONER

## 2023-09-18 LAB — BACTERIA SPEC AEROBE CULT: NO GROWTH

## 2023-11-26 NOTE — PLAN OF CARE
Problem: Patient Care Overview (Adult)  Goal: Plan of Care Review  Outcome: Outcome(s) achieved Date Met: 01/16/18 01/16/18 0956   Coping/Psychosocial Response Interventions   Plan Of Care Reviewed With patient   Outcome Evaluation   Outcome Summary/Follow up Plan Pt is independent with all transfers and gait on room air. Pt reports that she is ambulating halls independently with no safety concerns and is back to baseline level of function. She reports that she no longer requires skilled PT and would like to be discharged from therapy. Patient aware that she should continue walking program while in the hospital and that if any mobility concerns arise to speak with RN/MD to reorder therapy. Pt is safe for d/c home from PT perspective once medically cleared       Problem: Inpatient Physical Therapy  Goal: Transfer Training Goal 1 STG- PT  Outcome: Outcome(s) achieved Date Met: 01/16/18   01/10/18 1512 01/11/18 0911 01/16/18 0956   Transfer Training PT STG   Transfer Training PT STG, Date Established --  01/11/18 --    Transfer Training PT STG, Time to Achieve 5 days --  --    Transfer Training PT STG, Activity Type bed to chair /chair to bed;sit to stand/stand to sit;toilet --  --    Transfer Training PT STG, [place] Level independent --  --    Transfer Training PT STG, Date Goal Reviewed --  --  01/16/18   Transfer Training PT STG, Outcome --  --  goal met     Goal: Gait Training Goal STG- PT  Outcome: Outcome(s) achieved Date Met: 01/16/18   01/10/18 1512 01/16/18 0956   Gait Training PT STG   Gait Training Goal PT STG, Date Established 01/10/18 --    Gait Training Goal PT STG, Time to Achieve 5 days --    Gait Training Goal PT STG, [place] Level conditional independence --    Gait Training Goal PT STG, Assist Device cane, straight --    Gait Training Goal PT STG, Distance to Achieve 600 ft --    Gait Training Goal PT STG, Date Goal Reviewed --  01/16/18   Gait Training Goal PT STG, Outcome --  goal met  Universal Safety Interventions

## 2024-04-02 NOTE — PROGRESS NOTES
[facility] Medicine Services  INPATIENT PROGRESS NOTE    Length of Stay: 5  Date of Admission: 1/9/2018  Primary Care Physician: DELFINO Smith    Subjective     Chief Complaint   Patient presents with   • Shortness of Breath       HPI:  Patient was seen and examined this morning. Patient feels much better , dyspnea cough significantly  improved and resolving , feels stronger, ambulating the hallways . Still complains of slight wheezing   Patient is passing gas, Patient denies, headache or dizziness, chest pain or palpitations,abdominal pain N/V/D , blood in the urine or blood in the stool , or any urinary symptoms , weakness or numbness or tingling in the extremities or visual changes.    Review of Systems     All pertinent negatives and positives are as above. All other systems have been reviewed and are negative unless otherwise stated.   Objective    Physical exam    Temp:  [97 °F (36.1 °C)-98.7 °F (37.1 °C)] 97.5 °F (36.4 °C)  Heart Rate:  [64-79] 74  Resp:  [18-20] 18  BP: (114-145)/(55-78) 145/78    -Constitutional: Patient is AAO x 3. Patient appears well-developed and well-nourished.   -CVS: Normal rate, regular rhythm, normal heart sounds and intact distal pulses.  Exam reveals no gallop and no friction rub.  No murmur heard.  -Pulm: mild b/l scattered wheezes ( significantly improved)  -Abdominal: Soft. Bowel sounds are normal. No distension, no tenderness. There is no rebound and no guarding. No hernia.   -Musculoskeletal: No Cyanosis clubbing or edema.    Results Review:    Laboratory Data:     Results from last 7 days  Lab Units 01/14/18  0600 01/13/18  0338 01/12/18  0640  01/09/18  1828   SODIUM mmol/L 138 141 141  < > 133*   POTASSIUM mmol/L 4.3 3.7 4.3  < > 4.3   CHLORIDE mmol/L 98 105 102  < > 100   CO2 mmol/L 30.0 27.0 29.0  < > 23.0   BUN mg/dL 19 14 16  < > 11   CREATININE mg/dL 0.64 0.62 0.57  < > 0.50   GLUCOSE mg/dL 136* 114* 136*  <  > 101*   CALCIUM mg/dL 9.0 7.9* 9.3  < > 9.5   BILIRUBIN mg/dL  --   --   --   --  0.4   ALK PHOS U/L  --   --   --   --  92   ALT (SGPT) U/L  --   --   --   --  31   AST (SGOT) U/L  --   --   --   --  23   ANION GAP mmol/L 10.0 9.0 10.0  < > 10.0   < > = values in this interval not displayed.  Estimated Creatinine Clearance: 64.5 mL/min (by C-G formula based on Cr of 0.64).            Results from last 7 days  Lab Units 01/14/18  0600 01/13/18  0338 01/12/18  0640 01/11/18  0605 01/10/18  1225   WBC 10*3/mm3 12.79* 14.64* 22.18* 25.07* 18.77*   HEMOGLOBIN g/dL 10.9* 10.1* 11.2* 10.7* 11.2*   HEMATOCRIT % 33.0* 30.2* 33.1* 32.5* 31.9*   PLATELETS 10*3/mm3 341 314 377 340 250       Results from last 7 days  Lab Units 01/09/18  1646   INR  0.95       Culture Data:   No results found for: BLOODCX  No results found for: URINECX  No results found for: RESPCX  No results found for: WOUNDCX  No results found for: STOOLCX  No components found for: BODYFLD    Micobiology  Blood Culture   Date Value Ref Range Status   01/09/2018 No growth at less than 24 hours  Preliminary                            Radiology Data:   Imaging Results (all)     Procedure Component Value Units Date/Time    XR Chest 2 View [[id number]] Collected:  01/09/18 1554     Updated:  01/09/18 1610    Narrative:         EXAM:          Radiograph(s), Chest   VIEWS:    PA / Lat ; 2       DATE/TIME:  1/9/2018 4:07 PM CST                INDICATION:   shortness of breath    COMPARISON:  CXR: 4/6/16             FINDINGS:             - lines/tubes:    none     - cardiac:         size within normal limits         - mediastinum: contour within normal limits         - lungs:         Linearly oriented airspace disease of the right  upper lobe. The remainder of the lungs are clear. Left lung  granuloma noted.             - pleura:         no evidence of  fluid                  - osseous:         Status post left shoulder repair.                   - misc.:          Impression:       CONCLUSION:        1. Right upper lobe airspace disease presumably representing a  focus of pneumonia.                                               Electronically signed by:  KRYSTEN Cast MD  1/9/2018 4:09 PM  CST Workstation: [id number]    IR PICC wo fluoro guidance [[id number]] Resulted:  01/09/18 1654     Updated:  01/09/18 1654    Narrative:       This procedure was auto-finalized with no dictation required.    US Guided Vascular Access [[id number]] Resulted:  01/09/18 1716     Updated:  01/09/18 1716    Narrative:       This procedure was auto-finalized with no dictation required.    XR Chest Post CVA Port [[id number]] Collected:  01/09/18 1655     Updated:  01/09/18 1719    Narrative:         EXAM:         Radiograph(s), Chest   VIEWS:   Portable ; 1       DATE/TIME:  1/9/2018 5:15 PM CST                INDICATION:   PICC placement    COMPARISON:  CXR: 4/7/16             FINDINGS:             - lines/tubes:    Right approach PICC line in standard position.      - cardiac:         size within normal limits         - mediastinum: contour within normal limits         - lungs:         Focus of airspace disease in the right lung,  probably lower lobe.             - pleura:         no evidence of  fluid                  - osseous:         Status post left shoulder replacement.                   - misc.:         Impression:       CONCLUSION:        1. Right approach PICC line in standard position.  2. Suspect focal airspace disease in the right lower lobe.                                                Electronically signed by:  KRYSTEN Cast MD  1/9/2018 5:18 PM  CST Workstation: [id number]          I have reviewed the patient current medications.   Assessment/Plan     Hospital Problem List     * (Principal)Dysphagia    Overview Signed 1/12/2018  1:12 PM by Bin Oh MD     Added automatically from request for surgery [id number]         Pneumonia of right upper lobe due to infectious organism           Assessment / Plan    --Pneumonia  failed outpatient therapy.  Symptoms improving, dyspnea improved . wheezing improved  WBC improving :22.2--14.6--12.79  continue IV  Antibiotics(vanc and zosyn) and  supplemental oxygen, solumedrol decreased to 60mg Q8 hours), inceptive tara,      --dysphagia   proximal esophageal dilatation on CTA  CTA:Dilated proximal esophagus .Barium swallow done.   Dr Oh consulted . EGD plan for wednesday , as yesterday was unable to perform EGD due to pulmonary function .  Will continue soft blend GI diet . Plan for scope on Wednesday    --COPD exacerbation:   Continue supplemental oxygen, bronchodilators and steroids.       --anxiety  Ativan     --hx of PE  eliquis . Will hold on Monday AM as patient having EGD Wed.    --Tobacco use  nicotine patch    --Hypertension:   Controlled  Continue home medications     --GI prophylaxis/DVT prophylaxis    This document has been electronically signed by Conner Ingram MD on January 14, 2018 1:04 PM     present

## (undated) DEVICE — GLV XRAY PROTECT RADIONX LTX SZ8 KHAKI

## (undated) DEVICE — DRAPE,SPLIT,BILATERAL,STERILE: Brand: MEDLINE

## (undated) DEVICE — PART NUMBER 108260, VTI 8 MHZ DISPOSABLE DOPPLER PROBE, STRAIGHT, BOX: Brand: VTI 8 MHZ DISPOSABLE DOPPLER PROBE, STRAIGHT, BOX

## (undated) DEVICE — NDL HYPO PRECISIONGLIDE/REG 18G 1IN PNK

## (undated) DEVICE — GLV SURG SENSICARE POLYISPRN W/ALOE PF LF 6.5 GRN STRL

## (undated) DEVICE — CANN SMPL SOFTECH BIFLO ETCO2 A/M 7FT

## (undated) DEVICE — SUT MONOCRYL 4/0 PS2 27IN Y426H ETY426H

## (undated) DEVICE — SPNG LAP 18X18IN LF STRL PK/5

## (undated) DEVICE — ANGIOGRAPHIC CATHETER: Brand: EXPO™

## (undated) DEVICE — SKIN AFFIX SURG ADHESIVE 72/CS 0.55ML: Brand: MEDLINE

## (undated) DEVICE — PAD HEMOST NEPTUNE 2X2IN

## (undated) DEVICE — INTRO SHEATH ART/FEM ENGAGE .038 6F12CM

## (undated) DEVICE — GOWN,PREVENTION PLUS,XLNG/XXLARGE,STRL: Brand: MEDLINE

## (undated) DEVICE — ADHS SKIN PREMIERPRO EXOFIN TOPICAL HI/VISC .5ML

## (undated) DEVICE — TBG HI PRESSURE 500PSI 20IN

## (undated) DEVICE — PREVENA PEEL & PLACE SYSTEM KIT- 13 CM: Brand: PREVENA™ PEEL & PLACE™

## (undated) DEVICE — SUT GORE TTC12 CV6  6M10B

## (undated) DEVICE — STERILE POLYISOPRENE POWDER-FREE SURGICAL GLOVES WITH EMOLLIENT COATING: Brand: PROTEXIS

## (undated) DEVICE — SKIN PREP TRAY W/CHG: Brand: MEDLINE INDUSTRIES, INC.

## (undated) DEVICE — PK ANGIO LF 60

## (undated) DEVICE — TOTAL TRAY, 16FR 10ML SIL FOLEY, URN: Brand: MEDLINE

## (undated) DEVICE — DRSNG SURESITE WNDW 4X4.5

## (undated) DEVICE — ELECTRODE,RT,STRESS,FOAM,50PK: Brand: MEDLINE

## (undated) DEVICE — SHEET,DRAPE,53X77,STERILE: Brand: MEDLINE

## (undated) DEVICE — GOWN,NON-REINFORCED,4XL: Brand: MEDLINE

## (undated) DEVICE — GAUZE,SPONGE,4"X4",16PLY,XRAY,STRL,LF: Brand: MEDLINE

## (undated) DEVICE — PINNACLE INTRODUCER SHEATH: Brand: PINNACLE

## (undated) DEVICE — CATH GUIDE LAUNCHER AL.75 6F 100CM

## (undated) DEVICE — STERILE POLYISOPRENE POWDER-FREE SURGICAL GLOVES: Brand: PROTEXIS

## (undated) DEVICE — CORONARY ARTERY BYPASS GRAFT MARKERS, STAINLESS STEEL, DISTAL, WITHOUT HOLDER: Brand: ANASTOMARK CORONARY ARTERY BYPASS GRAFT MARKERS, STAINLESS STEEL, DISTAL

## (undated) DEVICE — 3M™ IOBAN™ 2 ANTIMICROBIAL INCISE DRAPE 6651EZ: Brand: IOBAN™ 2

## (undated) DEVICE — SUT PROLENE 7-0 BV175-7 24IN DB ETH8766H

## (undated) DEVICE — SUT VIC 3/0 SH 27IN J416H

## (undated) DEVICE — SUT PROLN CARDIO BV1 6/0 24IN 8805H

## (undated) DEVICE — PACK,UNIVERSAL,NO GOWNS: Brand: MEDLINE

## (undated) DEVICE — KT INTRO MINISTICK MAX W/GW NITNL/TUNG ECHO 4F 21G 7CM

## (undated) DEVICE — 1010 S-DRAPE TOWEL DRAPE 10/BX: Brand: STERI-DRAPE™

## (undated) DEVICE — GOWN,AURORA,NOREINF,RAGLAN,XL,STERILE: Brand: MEDLINE

## (undated) DEVICE — PERCLOSE™ PROSTYLE™ SUTURE-MEDIATED CLOSURE AND REPAIR SYSTEM: Brand: PERCLOSE™ PROSTYLE™

## (undated) DEVICE — PERCLOSE PROGLIDE™ SUTURE-MEDIATED CLOSURE SYSTEM: Brand: PERCLOSE PROGLIDE™

## (undated) DEVICE — BALN SYNFLATE VERTREBRAL 5ML 15X23.3MM MD

## (undated) DEVICE — KT ACC DIA TP OPNEND 10G STRL

## (undated) DEVICE — DRP SURG UNIV BASIC BILAMINATE 53X77IN DISP

## (undated) DEVICE — DRSNG TELFA PAD NONADH STR 1S 3X8IN

## (undated) DEVICE — SOL IRR NACL 0.9PCT BT 1000ML

## (undated) DEVICE — SUT SILK 2/0 FS BLK 18IN 685G

## (undated) DEVICE — INTRO SHEATH ULTIMUM ACT 5F

## (undated) DEVICE — ADAPT STD LUER FOR CONFIDENCE RESVR

## (undated) DEVICE — TP SXN YANKR BLB TIP W/TBG 10F LF STRL

## (undated) DEVICE — MODEL BT2000 P/N 700287-012KIT CONTENTS: MANIFOLD WITH SALINE AND CONTRAST PORTS, SALINE TUBING WITH SPIKE AND HAND SYRINGE, TRANSDUCER: Brand: BT2000 AUTOMATED MANIFOLD KIT

## (undated) DEVICE — GLV SURG SENSICARE PI PF LF 7 GRN STRL

## (undated) DEVICE — SUT PDS 3/0 SH 27IN DYED Z316H

## (undated) DEVICE — INTRO FLEXOR CHECKFLO TM 2 .038IN 6F45CM

## (undated) DEVICE — SUT PROLENE CARDIO C1D 6/0 24IN 8726H

## (undated) DEVICE — I-KNIFE II CUTTING INSTRUMENT 5 MM: Brand: I-KNIFE

## (undated) DEVICE — PENCL ES MEGADINE EZ/CLEAN BUTN W/HOLSTR 10FT

## (undated) DEVICE — ST CVR PROB PULLUP ULTRASND 5X48IN

## (undated) DEVICE — PATIENT RETURN ELECTRODE, SINGLE-USE, CONTACT QUALITY MONITORING, ADULT, WITH 9FT CORD, FOR PATIENTS WEIGING OVER 33LBS. (15KG): Brand: MEGADYNE

## (undated) DEVICE — GW PERIPH GUIDERIGHT STD/J/TP PTFE/PCOAT SS .035IN 3MM 150CM

## (undated) DEVICE — GW PERIPH GUIDERIGHT STD/EXCHNG/J/TIP SS 0.035IN 5X260CM

## (undated) DEVICE — RADIFOCUS GLIDEWIRE: Brand: GLIDEWIRE

## (undated) DEVICE — DEV TORQ GW SEADRAGON .018 TO .038IN

## (undated) DEVICE — TOWEL,OR,DSP,ST,BLUE,DLX,8/PK,10PK/CS: Brand: MEDLINE

## (undated) DEVICE — GLIDESHEATH SLENDER STAINLESS STEEL KIT: Brand: GLIDESHEATH SLENDER

## (undated) DEVICE — CATH DIAG EXPO .045 FL4 5F 100CM

## (undated) DEVICE — FOGARTY - HYDRAGRIP SURGICAL - CLAMP INSERTS: Brand: FOGARTY SOFTJAW

## (undated) DEVICE — ULTRAVERSE 035 PTA DILATATION CATHETER 8.0MM X 60MM BALLOON, 130CM SHAFT: Brand: ULTRAVERSE® 035 PTA DILATATION CATHETER

## (undated) DEVICE — GLV SURG NEOLON 2G PF LF 6.5 STRL

## (undated) DEVICE — KT INTRO MINISTICK MAX W/GW PALLADIUM ECHO 4F 21G 7CM

## (undated) DEVICE — SYR LUERLOK 20CC BX/50

## (undated) DEVICE — I-KNIFE CUTTING INSTRUMENT 15 DEGREE: Brand: I-KNIFE

## (undated) DEVICE — SYS SKIN CLS DERMABOND PRINEO W/22CM MESH TP

## (undated) DEVICE — APPL CHLORAPREP HI/LITE 26ML ORNG

## (undated) DEVICE — SUT VIC 3/0 TIES 18IN J110T

## (undated) DEVICE — SINGLE-USE BIOPSY FORCEPS: Brand: RADIAL JAW 4

## (undated) DEVICE — SYS VBS INFLAT 1/PU STRL

## (undated) DEVICE — MAD PERIPHERAL VASCULAR-LF: Brand: MEDLINE INDUSTRIES, INC.

## (undated) DEVICE — CATH GUIDE SOFTVU SELECT/V HT OMNI .038 5F 65CM

## (undated) DEVICE — RADIFOCUS GLIDEWIRE ADVANTAGE GUIDEWIRE: Brand: GLIDEWIRE ADVANTAGE

## (undated) DEVICE — DRLL ACC 10G STRL

## (undated) DEVICE — PK CATH LAB 60

## (undated) DEVICE — PK KYPHOPLASTY CUST

## (undated) DEVICE — GLV SURG SENSICARE PI LF PF 7.5 GRN STRL

## (undated) DEVICE — ADHS LIQ MASTISOL 2/3ML

## (undated) DEVICE — X-DRAPE ABS 12"X17" .25MM LEAD EQUIV STERILE X-RAY SHIELD 10/BOX: Brand: X-DRAPE

## (undated) DEVICE — VLV HEMO GUARDIAN INSRT/TOOL W TORQ DEV

## (undated) DEVICE — CONQUEST® PTA BALLOON DILATATION CATHETER 8 MM X 40 MM, 75 CM CATHETER: Brand: CONQUEST®

## (undated) DEVICE — SUT VICRYL 4/0 SUTUPAK 18 J109T

## (undated) DEVICE — GLV SURG TRIUMPH ORTHO W/ALOE PF LTX 7.0

## (undated) DEVICE — PACK,LAPAROTOMY,NO GOWNS: Brand: MEDLINE

## (undated) DEVICE — SUT GORE TTC9 CV6 30IN 6M02B

## (undated) DEVICE — RADIFOCUS GLIDECATH: Brand: GLIDECATH

## (undated) DEVICE — GLV SURG SIGNATURE ESSENTIAL PF LTX SZ6.5

## (undated) DEVICE — DEV INFL MONARCH 20ML